# Patient Record
Sex: FEMALE | Race: WHITE | NOT HISPANIC OR LATINO
[De-identification: names, ages, dates, MRNs, and addresses within clinical notes are randomized per-mention and may not be internally consistent; named-entity substitution may affect disease eponyms.]

---

## 2019-05-15 ENCOUNTER — APPOINTMENT (OUTPATIENT)
Dept: SURGICAL ONCOLOGY | Facility: CLINIC | Age: 84
End: 2019-05-15
Payer: COMMERCIAL

## 2019-05-15 VITALS
TEMPERATURE: 98 F | DIASTOLIC BLOOD PRESSURE: 80 MMHG | HEIGHT: 67 IN | HEART RATE: 83 BPM | OXYGEN SATURATION: 96 % | WEIGHT: 146.5 LBS | BODY MASS INDEX: 22.99 KG/M2 | SYSTOLIC BLOOD PRESSURE: 163 MMHG

## 2019-05-15 DIAGNOSIS — Z78.9 OTHER SPECIFIED HEALTH STATUS: ICD-10-CM

## 2019-05-15 DIAGNOSIS — Z86.79 PERSONAL HISTORY OF OTHER DISEASES OF THE CIRCULATORY SYSTEM: ICD-10-CM

## 2019-05-15 DIAGNOSIS — Z86.69 PERSONAL HISTORY OF OTHER DISEASES OF THE NERVOUS SYSTEM AND SENSE ORGANS: ICD-10-CM

## 2019-05-15 PROCEDURE — 99245 OFF/OP CONSLTJ NEW/EST HI 55: CPT

## 2019-05-15 NOTE — OB HISTORY
[Postmenopausal] : The patient is postmenopausal [Menarche Age ____] : menarche age [unfilled] [Menopause Age____] : age at menopause was [unfilled]

## 2019-05-15 NOTE — HISTORY OF PRESENT ILLNESS
[de-identified] : 84 y/o female presents for initial consultation for left heel melanoma. \par \par She is s/p punch biopsy of two sites on her left heel on 5/1/19 performed by Navneet Branch PA-C.\par Pathology:\par 1. 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 pT4B\par 2. Left heel inferior: malignant melanoma, 4.2 mm thick, with ulcerations. pT4B \par \par She reports she has had the lesion for many decades and it started to bleed a few months ago, which prompted her to visit the dermatologist. \par Pt has a pacemaker and is currently on Warfarin for Afib as per Dr. Ronald Shepard of cardiology.

## 2019-05-15 NOTE — CONSULT LETTER
[Please see my note below.] : Please see my note below. [Consult Letter:] : I had the pleasure of evaluating your patient, [unfilled]. [Dear  ___] : Dear  [unfilled], [Sincerely,] : Sincerely, [DrElizabeth  ___] : Dr. COLUNGA [FreeTextEntry3] : Perez Russo MD FACS

## 2019-05-15 NOTE — ASSESSMENT
[FreeTextEntry1] : Left heel melanoma at least 4.2 mm thick with ulcerations and mitoses\par I had a long discussion with the patient and her son regarding her diagnosis, prognosis, and all management options. \par We will proceed with wide excision with 2 cm margins and left inguinal lymph node biopsy with plastic surgery reconstruction. \par Procedure discussed in detail.\par All questions answered. \par Will refer patient to Dr. Azam Rodas of plastic surgery. \par Will get cardiology clearance \par Hold coumadin 3 days pre op if ok w cardiology

## 2019-05-15 NOTE — PHYSICAL EXAM
[Normal] : supple, no neck mass and thyroid not enlarged [Normal Supraclavicular Lymph Nodes] : normal supraclavicular lymph nodes [Normal Neck Lymph Nodes] : normal neck lymph nodes  [Normal Axillary Lymph Nodes] : normal axillary lymph nodes [Normal Groin Lymph Nodes] : normal groin lymph nodes [Normal] : oriented to person, place and time, with appropriate affect [de-identified] : 3 x 2 cm pigmented lesion left heel, no in transit mets

## 2019-05-15 NOTE — ADDENDUM
[FreeTextEntry1] : I, Chadwick William, acted solely as a scribe for Dr. Perez Russo on this date 05/15/2019.

## 2019-05-21 ENCOUNTER — OUTPATIENT (OUTPATIENT)
Dept: OUTPATIENT SERVICES | Facility: HOSPITAL | Age: 84
LOS: 1 days | End: 2019-05-21
Payer: COMMERCIAL

## 2019-05-21 ENCOUNTER — RESULT REVIEW (OUTPATIENT)
Age: 84
End: 2019-05-21

## 2019-05-21 VITALS
HEIGHT: 64.4 IN | DIASTOLIC BLOOD PRESSURE: 83 MMHG | SYSTOLIC BLOOD PRESSURE: 160 MMHG | WEIGHT: 146.39 LBS | TEMPERATURE: 98 F | HEART RATE: 77 BPM | RESPIRATION RATE: 16 BRPM

## 2019-05-21 DIAGNOSIS — Z90.89 ACQUIRED ABSENCE OF OTHER ORGANS: Chronic | ICD-10-CM

## 2019-05-21 DIAGNOSIS — I48.91 UNSPECIFIED ATRIAL FIBRILLATION: ICD-10-CM

## 2019-05-21 DIAGNOSIS — C43.9 MALIGNANT MELANOMA OF SKIN, UNSPECIFIED: ICD-10-CM

## 2019-05-21 DIAGNOSIS — Z01.818 ENCOUNTER FOR OTHER PREPROCEDURAL EXAMINATION: ICD-10-CM

## 2019-05-21 DIAGNOSIS — C43.72 MALIGNANT MELANOMA OF LEFT LOWER LIMB, INCLUDING HIP: ICD-10-CM

## 2019-05-21 DIAGNOSIS — Z95.0 PRESENCE OF CARDIAC PACEMAKER: ICD-10-CM

## 2019-05-21 LAB
ANION GAP SERPL CALC-SCNC: 6 MMOL/L — SIGNIFICANT CHANGE UP (ref 5–17)
APTT BLD: 44.7 SEC — HIGH (ref 27.5–36.3)
BUN SERPL-MCNC: 22 MG/DL — SIGNIFICANT CHANGE UP (ref 7–23)
CALCIUM SERPL-MCNC: 10.1 MG/DL — SIGNIFICANT CHANGE UP (ref 8.4–10.5)
CHLORIDE SERPL-SCNC: 105 MMOL/L — SIGNIFICANT CHANGE UP (ref 96–108)
CO2 SERPL-SCNC: 31 MMOL/L — SIGNIFICANT CHANGE UP (ref 22–31)
CREAT SERPL-MCNC: 0.71 MG/DL — SIGNIFICANT CHANGE UP (ref 0.5–1.3)
GLUCOSE SERPL-MCNC: 124 MG/DL — HIGH (ref 70–99)
HCT VFR BLD CALC: 44.7 % — SIGNIFICANT CHANGE UP (ref 34.5–45)
HGB BLD-MCNC: 14.2 G/DL — SIGNIFICANT CHANGE UP (ref 11.5–15.5)
INR BLD: 2.36 RATIO — HIGH (ref 0.88–1.16)
MCHC RBC-ENTMCNC: 30 PG — SIGNIFICANT CHANGE UP (ref 27–34)
MCHC RBC-ENTMCNC: 31.8 GM/DL — LOW (ref 32–36)
MCV RBC AUTO: 94.5 FL — SIGNIFICANT CHANGE UP (ref 80–100)
PLATELET # BLD AUTO: 127 K/UL — LOW (ref 150–400)
POTASSIUM SERPL-MCNC: 4.8 MMOL/L — SIGNIFICANT CHANGE UP (ref 3.5–5.3)
POTASSIUM SERPL-SCNC: 4.8 MMOL/L — SIGNIFICANT CHANGE UP (ref 3.5–5.3)
PROTHROM AB SERPL-ACNC: 27.2 SEC — HIGH (ref 10–12.9)
RBC # BLD: 4.73 M/UL — SIGNIFICANT CHANGE UP (ref 3.8–5.2)
RBC # FLD: 14.1 % — SIGNIFICANT CHANGE UP (ref 10.3–14.5)
SODIUM SERPL-SCNC: 142 MMOL/L — SIGNIFICANT CHANGE UP (ref 135–145)
WBC # BLD: 6.14 K/UL — SIGNIFICANT CHANGE UP (ref 3.8–10.5)
WBC # FLD AUTO: 6.14 K/UL — SIGNIFICANT CHANGE UP (ref 3.8–10.5)

## 2019-05-21 PROCEDURE — 85610 PROTHROMBIN TIME: CPT

## 2019-05-21 PROCEDURE — 85027 COMPLETE CBC AUTOMATED: CPT

## 2019-05-21 PROCEDURE — 93005 ELECTROCARDIOGRAM TRACING: CPT

## 2019-05-21 PROCEDURE — 85730 THROMBOPLASTIN TIME PARTIAL: CPT

## 2019-05-21 PROCEDURE — 80048 BASIC METABOLIC PNL TOTAL CA: CPT

## 2019-05-21 PROCEDURE — 93010 ELECTROCARDIOGRAM REPORT: CPT | Mod: NC

## 2019-05-21 PROCEDURE — G0463: CPT

## 2019-05-21 PROCEDURE — 88321 CONSLTJ&REPRT SLD PREP ELSWR: CPT

## 2019-05-21 PROCEDURE — 36415 COLL VENOUS BLD VENIPUNCTURE: CPT

## 2019-05-21 RX ORDER — SODIUM CHLORIDE 9 MG/ML
1000 INJECTION, SOLUTION INTRAVENOUS
Refills: 0 | Status: DISCONTINUED | OUTPATIENT
Start: 2019-05-28 | End: 2019-05-28

## 2019-05-21 RX ORDER — WARFARIN SODIUM 2.5 MG/1
1 TABLET ORAL
Qty: 0 | Refills: 0 | DISCHARGE

## 2019-05-21 NOTE — H&P PST ADULT - NSICDXPASTMEDICALHX_GEN_ALL_CORE_FT
PAST MEDICAL HISTORY:  Atrial fibrillation     HLD (hyperlipidemia)     HTN (hypertension)     Pacemaker

## 2019-05-21 NOTE — H&P PST ADULT - ASSESSMENT
84 y/o female with pmhx HTN, HLD, afib ( on coumadin ), PPM presents to PST for scheduled wide excision left heel melanoma left inguinal sentinel node biopsy gamma probe injection in nuclear med notified on 5/28/19. Patient reports change to mole on left heel seen by dermatologist and removal required.

## 2019-05-21 NOTE — H&P PST ADULT - MUSCULOSKELETAL
details… detailed exam no joint warmth/no joint erythema/no joint swelling/ROM intact/normal strength

## 2019-05-21 NOTE — H&P PST ADULT - HISTORY OF PRESENT ILLNESS
82 y/o female with pmhx HTN, HLD, afib (on coumadin ), PPM presents to PST for scheduled wide excision left heel melanoma left inguinal sentinel node biopsy gamma probe injection in nuclear med notified on 5/28/19. Patient reports change to mole associated with occasional on left heel seen by dermatologist and removal required.

## 2019-05-21 NOTE — H&P PST ADULT - RS GEN PE MLT RESP DETAILS PC
respirations non-labored/airway patent/clear to auscultation bilaterally/good air movement/breath sounds equal

## 2019-05-21 NOTE — H&P PST ADULT - NSICDXPROBLEM_GEN_ALL_CORE_FT
PROBLEM DIAGNOSES  Problem: Skin melanoma  Assessment and Plan:   Patient scheduled wide excision left heel melanoma left inguinal sentinel node biopsy gamma probe injection in nuclear med notified on 5/28/19.   Pre op instructions given and explained.  Avoid NSAIDs and OTC supplements.   Patient verbalized understanding      Problem: Atrial fibrillation  Assessment and Plan: cardiac consult scheduled 5/22/19  coumadin as per cardiology.  Cardiac meds am of surgery with sip of water   Patient verbalized understanding.     Problem: Pacemaker  Assessment and Plan: or notified   [employer] faxed to to

## 2019-05-21 NOTE — H&P PST ADULT - NSANTHOSAYNRD_GEN_A_CORE
No. JAIME screening performed.  STOP BANG Legend: 0-2 = LOW Risk; 3-4 = INTERMEDIATE Risk; 5-8 = HIGH Risk

## 2019-05-22 LAB — NRBC # BLD: 0 /100 WBCS — SIGNIFICANT CHANGE UP (ref 0–0)

## 2019-05-23 LAB — SURGICAL PATHOLOGY STUDY: SIGNIFICANT CHANGE UP

## 2019-05-23 NOTE — H&P PST ADULT - MALLAMPATI CLASS
How Many Skin Cancers Have You Had?: more than one What Is The Reason For Today's Visit?: History of Non-Melanoma Skin Cancer Class II - visualization of the soft palate, fauces, and uvula

## 2019-05-27 ENCOUNTER — TRANSCRIPTION ENCOUNTER (OUTPATIENT)
Age: 84
End: 2019-05-27

## 2019-05-27 ENCOUNTER — FORM ENCOUNTER (OUTPATIENT)
Age: 84
End: 2019-05-27

## 2019-05-28 ENCOUNTER — APPOINTMENT (OUTPATIENT)
Dept: SURGICAL ONCOLOGY | Facility: HOSPITAL | Age: 84
End: 2019-05-28

## 2019-05-28 ENCOUNTER — RESULT REVIEW (OUTPATIENT)
Age: 84
End: 2019-05-28

## 2019-05-28 ENCOUNTER — OUTPATIENT (OUTPATIENT)
Dept: OUTPATIENT SERVICES | Facility: HOSPITAL | Age: 84
LOS: 1 days | End: 2019-05-28
Payer: COMMERCIAL

## 2019-05-28 VITALS
HEIGHT: 64.4 IN | TEMPERATURE: 98 F | SYSTOLIC BLOOD PRESSURE: 150 MMHG | DIASTOLIC BLOOD PRESSURE: 90 MMHG | RESPIRATION RATE: 18 BRPM | OXYGEN SATURATION: 99 % | HEART RATE: 85 BPM | WEIGHT: 146.39 LBS

## 2019-05-28 VITALS
DIASTOLIC BLOOD PRESSURE: 75 MMHG | HEART RATE: 62 BPM | SYSTOLIC BLOOD PRESSURE: 132 MMHG | OXYGEN SATURATION: 98 % | TEMPERATURE: 98 F | RESPIRATION RATE: 16 BRPM

## 2019-05-28 DIAGNOSIS — Z98.49 CATARACT EXTRACTION STATUS, UNSPECIFIED EYE: Chronic | ICD-10-CM

## 2019-05-28 DIAGNOSIS — C43.72 MALIGNANT MELANOMA OF LEFT LOWER LIMB, INCLUDING HIP: ICD-10-CM

## 2019-05-28 DIAGNOSIS — Z90.89 ACQUIRED ABSENCE OF OTHER ORGANS: Chronic | ICD-10-CM

## 2019-05-28 LAB
INR BLD: 1.44 RATIO — HIGH (ref 0.88–1.16)
PROTHROM AB SERPL-ACNC: 16.3 SEC — HIGH (ref 10–12.9)

## 2019-05-28 PROCEDURE — 38900 IO MAP OF SENT LYMPH NODE: CPT | Mod: AS

## 2019-05-28 PROCEDURE — 38792 RA TRACER ID OF SENTINL NODE: CPT | Mod: 59

## 2019-05-28 PROCEDURE — 88305 TISSUE EXAM BY PATHOLOGIST: CPT | Mod: 26

## 2019-05-28 PROCEDURE — 78195 LYMPH SYSTEM IMAGING: CPT | Mod: 26

## 2019-05-28 PROCEDURE — 38531 OPEN BX/EXC INGUINOFEM NODES: CPT

## 2019-05-28 PROCEDURE — 88342 IMHCHEM/IMCYTCHM 1ST ANTB: CPT | Mod: 26

## 2019-05-28 PROCEDURE — 88307 TISSUE EXAM BY PATHOLOGIST: CPT

## 2019-05-28 PROCEDURE — 38308 INCISION OF LYMPH CHANNELS: CPT | Mod: AS

## 2019-05-28 PROCEDURE — 88305 TISSUE EXAM BY PATHOLOGIST: CPT

## 2019-05-28 PROCEDURE — 38792 RA TRACER ID OF SENTINL NODE: CPT

## 2019-05-28 PROCEDURE — 85610 PROTHROMBIN TIME: CPT

## 2019-05-28 PROCEDURE — 88341 IMHCHEM/IMCYTCHM EA ADD ANTB: CPT

## 2019-05-28 PROCEDURE — 38308 INCISION OF LYMPH CHANNELS: CPT

## 2019-05-28 PROCEDURE — 15100 SPLT AGRFT T/A/L 1ST 100SQCM: CPT

## 2019-05-28 PROCEDURE — 36415 COLL VENOUS BLD VENIPUNCTURE: CPT

## 2019-05-28 PROCEDURE — 38531 OPEN BX/EXC INGUINOFEM NODES: CPT | Mod: LT

## 2019-05-28 PROCEDURE — 12042 INTMD RPR N-HF/GENIT2.6-7.5: CPT

## 2019-05-28 PROCEDURE — 28047 RESECT FOOT/TOE TUMOR 3 CM/>: CPT

## 2019-05-28 PROCEDURE — 88341 IMHCHEM/IMCYTCHM EA ADD ANTB: CPT | Mod: 26

## 2019-05-28 PROCEDURE — 88307 TISSUE EXAM BY PATHOLOGIST: CPT | Mod: 26

## 2019-05-28 PROCEDURE — 38792 RA TRACER ID OF SENTINL NODE: CPT | Mod: AS

## 2019-05-28 PROCEDURE — 28047 RESECT FOOT/TOE TUMOR 3 CM/>: CPT | Mod: AS

## 2019-05-28 PROCEDURE — 38900 IO MAP OF SENT LYMPH NODE: CPT

## 2019-05-28 PROCEDURE — A9541: CPT

## 2019-05-28 PROCEDURE — 11626 EXC S/N/H/F/G MAL+MRG >4 CM: CPT

## 2019-05-28 PROCEDURE — 88342 IMHCHEM/IMCYTCHM 1ST ANTB: CPT

## 2019-05-28 PROCEDURE — 78195 LYMPH SYSTEM IMAGING: CPT

## 2019-05-28 RX ORDER — SODIUM CHLORIDE 9 MG/ML
1000 INJECTION, SOLUTION INTRAVENOUS
Refills: 0 | Status: DISCONTINUED | OUTPATIENT
Start: 2019-05-28 | End: 2019-05-28

## 2019-05-28 RX ORDER — MORPHINE SULFATE 50 MG/1
2 CAPSULE, EXTENDED RELEASE ORAL ONCE
Refills: 0 | Status: DISCONTINUED | OUTPATIENT
Start: 2019-05-28 | End: 2019-05-28

## 2019-05-28 RX ORDER — IBUPROFEN 200 MG
400 TABLET ORAL EVERY 6 HOURS
Refills: 0 | Status: DISCONTINUED | OUTPATIENT
Start: 2019-05-28 | End: 2019-06-12

## 2019-05-28 RX ORDER — HYDROMORPHONE HYDROCHLORIDE 2 MG/ML
0.2 INJECTION INTRAMUSCULAR; INTRAVENOUS; SUBCUTANEOUS
Refills: 0 | Status: DISCONTINUED | OUTPATIENT
Start: 2019-05-28 | End: 2019-05-28

## 2019-05-28 RX ORDER — OXYCODONE HYDROCHLORIDE 5 MG/1
5 TABLET ORAL ONCE
Refills: 0 | Status: DISCONTINUED | OUTPATIENT
Start: 2019-05-28 | End: 2019-05-28

## 2019-05-28 RX ORDER — ONDANSETRON 8 MG/1
4 TABLET, FILM COATED ORAL ONCE
Refills: 0 | Status: DISCONTINUED | OUTPATIENT
Start: 2019-05-28 | End: 2019-05-28

## 2019-05-28 RX ADMIN — SODIUM CHLORIDE 50 MILLILITER(S): 9 INJECTION, SOLUTION INTRAVENOUS at 11:34

## 2019-05-28 NOTE — BRIEF OPERATIVE NOTE - NSICDXBRIEFPROCEDURE_GEN_ALL_CORE_FT
PROCEDURES:  Biopsy of left sentinel inguinal lymph node 28-May-2019 15:08:59  Jodi Nash  Wide excision, melanoma, lower extremity, with split-thickness skin graft application if indicated 28-May-2019 15:08:40  Jodi Nash

## 2019-05-28 NOTE — ASU DISCHARGE PLAN (ADULT/PEDIATRIC) - PROVIDER TOKENS
PROVIDER:[TOKEN:[[id number]],FOLLOWUP:[1 week]],PROVIDER:[TOKEN:[[id number]],FOLLOWUP:[1 week]]

## 2019-05-28 NOTE — ASU DISCHARGE PLAN (ADULT/PEDIATRIC) - ASU DC SPECIAL INSTRUCTIONSFT
Keep Dressings dry   Ambulate very little on left foot . Elevate when resting.  Take Percocet for severe pain , otherwise just take tylenol  Follow up with Ariane Russo and [facility] as informed by them

## 2019-05-28 NOTE — ASU DISCHARGE PLAN (ADULT/PEDIATRIC) - CALL YOUR DOCTOR IF YOU HAVE ANY OF THE FOLLOWING:
Swelling that gets worse/Pain not relieved by Medications/Wound/Surgical Site with redness, or foul smelling discharge or pus/Numbness, tingling, color or temperature change to extremity/Bleeding that does not stop/Fever greater than (need to indicate Fahrenheit or Celsius)

## 2019-05-28 NOTE — ASU DISCHARGE PLAN (ADULT/PEDIATRIC) - CARE PROVIDER_API CALL
Perez Russo)  Surgery  [address] [place] [address]  Phone: [phone]  Fax: [phone]  Follow Up Time: 1 week    Azam Rodas)  Plastic Surgery; Surgery  [address] [place] [address], [address]  [place] [address]  Phone: [phone]  Fax: [phone]  Follow Up Time: 1 week

## 2019-05-28 NOTE — ASU DISCHARGE PLAN (ADULT/PEDIATRIC) - PROCEDURE
Wide excision Melanoma left foot, [place] node bx left groin, skin graft from right thigh Wide excision Melanoma left foot, [place] node bx left groin, skin graft from right thigh s/p Biopsy of left sentinel inguinal lymph node Wide excision, melanoma, lower extremity, with split-thickness skin graft application

## 2019-05-28 NOTE — BRIEF OPERATIVE NOTE - NSICDXBRIEFPOSTOP_GEN_ALL_CORE_FT
POST-OP DIAGNOSIS:  Malignant melanoma 28-May-2019 15:08:03  Jodi Nash  Melanoma 28-May-2019 15:07:53  Jodi Nash

## 2019-05-29 PROBLEM — Z95.0 PRESENCE OF CARDIAC PACEMAKER: Chronic | Status: ACTIVE | Noted: 2019-05-21

## 2019-05-29 PROBLEM — E78.5 HYPERLIPIDEMIA, UNSPECIFIED: Chronic | Status: ACTIVE | Noted: 2019-05-21

## 2019-05-29 PROBLEM — I48.91 UNSPECIFIED ATRIAL FIBRILLATION: Chronic | Status: ACTIVE | Noted: 2019-05-21

## 2019-05-29 PROBLEM — I10 ESSENTIAL (PRIMARY) HYPERTENSION: Chronic | Status: ACTIVE | Noted: 2019-05-21

## 2019-06-05 ENCOUNTER — APPOINTMENT (OUTPATIENT)
Dept: SURGICAL ONCOLOGY | Facility: CLINIC | Age: 84
End: 2019-06-05
Payer: COMMERCIAL

## 2019-06-05 VITALS
HEIGHT: 67 IN | SYSTOLIC BLOOD PRESSURE: 147 MMHG | RESPIRATION RATE: 18 BRPM | OXYGEN SATURATION: 84 % | HEART RATE: 101 BPM | WEIGHT: 143 LBS | DIASTOLIC BLOOD PRESSURE: 74 MMHG | TEMPERATURE: 98 F | BODY MASS INDEX: 22.44 KG/M2

## 2019-06-05 PROCEDURE — 99024 POSTOP FOLLOW-UP VISIT: CPT

## 2019-06-05 NOTE — REASON FOR VISIT
[Initial Consultation] : an initial consultation for [Post-Op] : a post-op for [Melanoma] : melanoma

## 2019-06-07 LAB — SURGICAL PATHOLOGY STUDY: SIGNIFICANT CHANGE UP

## 2019-06-12 ENCOUNTER — OUTPATIENT (OUTPATIENT)
Dept: OUTPATIENT SERVICES | Facility: HOSPITAL | Age: 84
LOS: 1 days | Discharge: HOME | End: 2019-06-12

## 2019-06-12 DIAGNOSIS — Z90.89 ACQUIRED ABSENCE OF OTHER ORGANS: Chronic | ICD-10-CM

## 2019-06-12 DIAGNOSIS — Z98.49 CATARACT EXTRACTION STATUS, UNSPECIFIED EYE: Chronic | ICD-10-CM

## 2019-06-12 DIAGNOSIS — C43.8 MALIGNANT MELANOMA OF OVERLAPPING SITES OF SKIN: ICD-10-CM

## 2019-06-14 ENCOUNTER — FORM ENCOUNTER (OUTPATIENT)
Age: 84
End: 2019-06-14

## 2019-06-15 ENCOUNTER — OUTPATIENT (OUTPATIENT)
Dept: OUTPATIENT SERVICES | Facility: HOSPITAL | Age: 84
LOS: 1 days | End: 2019-06-15
Payer: COMMERCIAL

## 2019-06-15 ENCOUNTER — APPOINTMENT (OUTPATIENT)
Dept: NUCLEAR MEDICINE | Facility: CLINIC | Age: 84
End: 2019-06-15
Payer: COMMERCIAL

## 2019-06-15 DIAGNOSIS — Z00.8 ENCOUNTER FOR OTHER GENERAL EXAMINATION: ICD-10-CM

## 2019-06-15 DIAGNOSIS — Z98.49 CATARACT EXTRACTION STATUS, UNSPECIFIED EYE: Chronic | ICD-10-CM

## 2019-06-15 DIAGNOSIS — Z90.89 ACQUIRED ABSENCE OF OTHER ORGANS: Chronic | ICD-10-CM

## 2019-06-15 PROCEDURE — 78816 PET IMAGE W/CT FULL BODY: CPT | Mod: 26,PI

## 2019-06-15 PROCEDURE — 78816 PET IMAGE W/CT FULL BODY: CPT

## 2019-06-15 PROCEDURE — A9552: CPT

## 2019-06-18 ENCOUNTER — RESULT REVIEW (OUTPATIENT)
Age: 84
End: 2019-06-18

## 2019-06-18 ENCOUNTER — APPOINTMENT (OUTPATIENT)
Dept: HEMATOLOGY ONCOLOGY | Facility: CLINIC | Age: 84
End: 2019-06-18
Payer: COMMERCIAL

## 2019-06-18 VITALS
HEART RATE: 76 BPM | HEIGHT: 65 IN | WEIGHT: 145 LBS | TEMPERATURE: 98.7 F | SYSTOLIC BLOOD PRESSURE: 150 MMHG | OXYGEN SATURATION: 98 % | RESPIRATION RATE: 16 BRPM | BODY MASS INDEX: 24.16 KG/M2 | DIASTOLIC BLOOD PRESSURE: 78 MMHG

## 2019-06-18 LAB
BASOPHILS # BLD AUTO: 0 K/UL — SIGNIFICANT CHANGE UP (ref 0–0.2)
BASOPHILS NFR BLD AUTO: 0.6 % — SIGNIFICANT CHANGE UP (ref 0–2)
EOSINOPHIL # BLD AUTO: 0 K/UL — SIGNIFICANT CHANGE UP (ref 0–0.5)
EOSINOPHIL NFR BLD AUTO: 0.4 % — SIGNIFICANT CHANGE UP (ref 0–6)
HCT VFR BLD CALC: 46 % — HIGH (ref 34.5–45)
HGB BLD-MCNC: 15.1 G/DL — SIGNIFICANT CHANGE UP (ref 11.5–15.5)
LYMPHOCYTES # BLD AUTO: 1 K/UL — SIGNIFICANT CHANGE UP (ref 1–3.3)
LYMPHOCYTES # BLD AUTO: 18.7 % — SIGNIFICANT CHANGE UP (ref 13–44)
MCHC RBC-ENTMCNC: 30.8 PG — SIGNIFICANT CHANGE UP (ref 27–34)
MCHC RBC-ENTMCNC: 32.8 G/DL — SIGNIFICANT CHANGE UP (ref 32–36)
MCV RBC AUTO: 93.9 FL — SIGNIFICANT CHANGE UP (ref 80–100)
MONOCYTES # BLD AUTO: 0.3 K/UL — SIGNIFICANT CHANGE UP (ref 0–0.9)
MONOCYTES NFR BLD AUTO: 6.2 % — SIGNIFICANT CHANGE UP (ref 2–14)
NEUTROPHILS # BLD AUTO: 3.9 K/UL — SIGNIFICANT CHANGE UP (ref 1.8–7.4)
NEUTROPHILS NFR BLD AUTO: 74.1 % — SIGNIFICANT CHANGE UP (ref 43–77)
PLATELET # BLD AUTO: 183 K/UL — SIGNIFICANT CHANGE UP (ref 150–400)
RBC # BLD: 4.89 M/UL — SIGNIFICANT CHANGE UP (ref 3.8–5.2)
RBC # FLD: 13 % — SIGNIFICANT CHANGE UP (ref 10.3–14.5)
WBC # BLD: 5.2 K/UL — SIGNIFICANT CHANGE UP (ref 3.8–10.5)
WBC # FLD AUTO: 5.2 K/UL — SIGNIFICANT CHANGE UP (ref 3.8–10.5)

## 2019-06-18 PROCEDURE — 99205 OFFICE O/P NEW HI 60 MIN: CPT

## 2019-06-18 NOTE — CONSULT LETTER
[Dear  ___] : Dear  [unfilled], [Consult Letter:] : I had the pleasure of evaluating your patient, [unfilled]. [Please see my note below.] : Please see my note below. [Sincerely,] : Sincerely, [DrElizabeth  ___] : Dr. COLUNGA [DrElizabeth ___] : Dr. COLUNGA

## 2019-06-19 LAB
ALBUMIN SERPL ELPH-MCNC: 4.7 G/DL
ALP BLD-CCNC: 58 U/L
ALT SERPL-CCNC: 20 U/L
ANION GAP SERPL CALC-SCNC: 12 MMOL/L
APTT BLD: 44.2 SEC
AST SERPL-CCNC: 22 U/L
BILIRUB SERPL-MCNC: 0.5 MG/DL
BUN SERPL-MCNC: 21 MG/DL
CALCIUM SERPL-MCNC: 10.1 MG/DL
CHLORIDE SERPL-SCNC: 99 MMOL/L
CO2 SERPL-SCNC: 29 MMOL/L
CREAT SERPL-MCNC: 0.62 MG/DL
CRP SERPL-MCNC: 0.18 MG/DL
GLUCOSE SERPL-MCNC: 109 MG/DL
HBV CORE IGG+IGM SER QL: NONREACTIVE
HBV SURFACE AB SER QL: NONREACTIVE
HBV SURFACE AG SER QL: NONREACTIVE
HCV AB SER QL: NONREACTIVE
HCV S/CO RATIO: 0.39 S/CO
INR PPP: 3.33 RATIO
LDH SERPL-CCNC: 235 U/L
POTASSIUM SERPL-SCNC: 4.7 MMOL/L
PROT SERPL-MCNC: 7.2 G/DL
PT BLD: 39.8 SEC
SODIUM SERPL-SCNC: 140 MMOL/L
TSH SERPL-ACNC: 1.07 UIU/ML

## 2019-06-19 NOTE — REVIEW OF SYSTEMS
[Negative] : Allergic/Immunologic [FreeTextEntry2] : has been using a walker to avoid weight bearing; and was told that she can start to do so now by the Plastics doctor. [FreeTextEntry3] : glasses; cataracts s/p repair [FreeTextEntry5] : no palpitations.

## 2019-06-19 NOTE — HISTORY OF PRESENT ILLNESS
[T: ___] : T[unfilled] [N: ___] : N[unfilled] [AJCC Stage: ____] : AJCC Stage: [unfilled] [M: ___] : M[unfilled] [de-identified] : Mrs Rucker is an 84 year old female with past medical history of HTN, PPM (last battery replacement 2013), AFib presenting to the office for an initial consultation of melanoma.\par \par She noticed a lesion located on her left heel which started to bleed a few months ago which prompted her to be seen by her Dermatologist.  On 5/1/2019 had punch biopsy performed.\par Pathology:\par 1. 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 pT4b\par 2. Left heel inferior: malignant melanoma, 4.2 mm thick, with ulcerations. pT4b\par \par Patient was seen by Dr Perez Russo on 5/15/2019 who recommended wide excision with 2 cm margins and left inguinal LN biopsy with reconstruction.\par \par On 5/28/2019 patient underwent left heel wide resection and sentinel lymph node biopsy by Dr Russo.\par Pathology: \par 1. Left inguinal lymph node biopsy: two out of the four lymph nodes positive for melanoma (2/4)\par 2. Skin and soft tissue, left heel foot: melanoma, 3.5 mm in thickness.  Margins negative: pT4b pN2a\par \par PET/CT performed on 6/15/2019: Postsurgical changes left heel with multiple surgical clips and high attenuation material. Residual FDG avidity along the surgical defect may represent postsurgical change/inflammation, residual disease, or combination of these entities. Postsurgical collection left inguinal region. No FDG avid locoregional or distant metastatic disease. \par \par She is healing well on the primary and graft harvest site. [de-identified] : Surgical Oncology: Perez Russo [phone]\par Cardiology: Ronald Shepard [phone]\par Plastics - Azam Rodas\par Dermatology - Juan Diego Olson\par \par Son - Philip. Cell # [phone]\par Patient - Cell [phone]\par 3 other children; 2 relatively local and one in [place].

## 2019-06-19 NOTE — PHYSICAL EXAM
[Ambulatory and capable of all self care but unable to carry out any work activities] : Status 2- Ambulatory and capable of all self care but unable to carry out any work activities. Up and about more than 50% of waking hours [Normal] : grossly intact [de-identified] : She has not been weightbearing on the left foot and has difficulty standing on the left leg at this time. She needs assistance to stand.  [de-identified] : RIght thigh - harvest site is healing well. Left groin - seroma is soft and nontender, and incision is well healed. Left heel is wrapped and clean and dry.

## 2019-06-20 LAB — ANA SER IF-ACNC: NEGATIVE

## 2019-06-22 NOTE — ASSESSMENT
[FreeTextEntry1] : I had a long discussion with the patient and her son regarding preliminary path report which revealed negative margins on the wide excision of the left heel melanoma and at least two positive sentinel lymph nodes. \par Will order PET/CT scan to evaluate extent of residual disease and to rule out metastatic disease\par Will refer to Dr. Nile Mcbride of med onc since she will need adjuvant systemic therapy given positive nodes\par Await final pathology \par Discussed left radical groin dissection vs systemic immunotherapy pending additional workup and pending Dr. Mcbride's consultation\par \par enclosed pathology report

## 2019-06-22 NOTE — HISTORY OF PRESENT ILLNESS
[de-identified] : 85 y/o female s/p left superior heel melanoma excision with left inguinal sentinel node biopsy on 5/28/19.\par Preliminary pathology revealed negative margins with at least two positive inguinal lymph nodes.\par \par She is s/p punch biopsy of two sites on her left heel on 5/1/19 performed by Navneet Branch PA-C.\par Pathology:\par 1. 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 pT4B\par 2. Left heel inferior: malignant melanoma, 4.2 mm thick, with ulcerations. pT4B \par \par She reports she has had the lesion for many decades and it started to bleed a few months ago, which prompted her to visit the dermatologist. \par Pt has a pacemaker and is currently on Warfarin for Afib as per Dr. Ronald Shepard of cardiology.

## 2019-06-22 NOTE — ADDENDUM
[FreeTextEntry1] : I, Chadwick William, acted solely as a scribe for Dr. Perez Russo on this date 06/05/2019.

## 2019-06-22 NOTE — PHYSICAL EXAM
Fax # [phone] Dr Lisa [facility] # [phone]   Sathya Tena  Patient physician requesting cardiac clearance and holding blood thinners prior to Left ESWL under general anesthesia please advise last visit 3-1-18 thanks.
Faxed as requested.
[de-identified] : Left leg dressing intact with mild serous drainage on heel. Left groin incision healing without evidence of seroma.

## 2019-06-22 NOTE — CONSULT LETTER
[Dear  ___] : Dear  [unfilled], [Please see my note below.] : Please see my note below. [Sincerely,] : Sincerely, [DrElizabeth  ___] : Dr. COLUNGA [Courtesy Letter:] : I had the pleasure of seeing your patient, [unfilled], in my office today. [FreeTextEntry3] : Perez Russo MD FACS

## 2019-06-24 ENCOUNTER — EMERGENCY (EMERGENCY)
Facility: HOSPITAL | Age: 84
LOS: 0 days | Discharge: HOME | End: 2019-06-24
Attending: EMERGENCY MEDICINE | Admitting: EMERGENCY MEDICINE
Payer: COMMERCIAL

## 2019-06-24 VITALS
TEMPERATURE: 98 F | DIASTOLIC BLOOD PRESSURE: 81 MMHG | RESPIRATION RATE: 16 BRPM | HEART RATE: 80 BPM | SYSTOLIC BLOOD PRESSURE: 140 MMHG | OXYGEN SATURATION: 100 %

## 2019-06-24 VITALS
DIASTOLIC BLOOD PRESSURE: 79 MMHG | HEART RATE: 67 BPM | TEMPERATURE: 98 F | RESPIRATION RATE: 17 BRPM | SYSTOLIC BLOOD PRESSURE: 122 MMHG | OXYGEN SATURATION: 99 %

## 2019-06-24 DIAGNOSIS — Z90.89 ACQUIRED ABSENCE OF OTHER ORGANS: Chronic | ICD-10-CM

## 2019-06-24 DIAGNOSIS — I48.91 UNSPECIFIED ATRIAL FIBRILLATION: ICD-10-CM

## 2019-06-24 DIAGNOSIS — M79.89 OTHER SPECIFIED SOFT TISSUE DISORDERS: ICD-10-CM

## 2019-06-24 DIAGNOSIS — Z98.49 CATARACT EXTRACTION STATUS, UNSPECIFIED EYE: Chronic | ICD-10-CM

## 2019-06-24 DIAGNOSIS — L76.21 POSTPROCEDURAL HEMORRHAGE OF SKIN AND SUBCUTANEOUS TISSUE FOLLOWING A DERMATOLOGIC PROCEDURE: ICD-10-CM
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
## 2019-06-24 DIAGNOSIS — Z88.0 ALLERGY STATUS TO PENICILLIN: ICD-10-CM

## 2019-06-24 DIAGNOSIS — Z98.890 OTHER SPECIFIED POSTPROCEDURAL STATES: Chronic | ICD-10-CM

## 2019-06-24 DIAGNOSIS — I10 ESSENTIAL (PRIMARY) HYPERTENSION: ICD-10-CM

## 2019-06-24 DIAGNOSIS — C76.52 MALIGNANT NEOPLASM OF LEFT LOWER LIMB: ICD-10-CM

## 2019-06-24 DIAGNOSIS — Z95.0 PRESENCE OF CARDIAC PACEMAKER: ICD-10-CM

## 2019-06-24 DIAGNOSIS — E78.5 HYPERLIPIDEMIA, UNSPECIFIED: ICD-10-CM

## 2019-06-24 LAB
ALBUMIN SERPL ELPH-MCNC: 2.9 G/DL — LOW (ref 3.3–5)
ALP SERPL-CCNC: 60 U/L — SIGNIFICANT CHANGE UP (ref 40–120)
ALT FLD-CCNC: 18 U/L — SIGNIFICANT CHANGE UP (ref 12–78)
ANION GAP SERPL CALC-SCNC: 6 MMOL/L — SIGNIFICANT CHANGE UP (ref 5–17)
APTT BLD: 37 SEC — HIGH (ref 27.5–36.3)
AST SERPL-CCNC: 12 U/L — LOW (ref 15–37)
BASOPHILS # BLD AUTO: 0.05 K/UL — SIGNIFICANT CHANGE UP (ref 0–0.2)
BASOPHILS NFR BLD AUTO: 0.7 % — SIGNIFICANT CHANGE UP (ref 0–2)
BILIRUB SERPL-MCNC: 0.8 MG/DL — SIGNIFICANT CHANGE UP (ref 0.2–1.2)
BUN SERPL-MCNC: 23 MG/DL — SIGNIFICANT CHANGE UP (ref 7–23)
CALCIUM SERPL-MCNC: 9.3 MG/DL — SIGNIFICANT CHANGE UP (ref 8.5–10.1)
CHLORIDE SERPL-SCNC: 102 MMOL/L — SIGNIFICANT CHANGE UP (ref 96–108)
CO2 SERPL-SCNC: 31 MMOL/L — SIGNIFICANT CHANGE UP (ref 22–31)
CREAT SERPL-MCNC: 0.61 MG/DL — SIGNIFICANT CHANGE UP (ref 0.5–1.3)
EOSINOPHIL # BLD AUTO: 0.04 K/UL — SIGNIFICANT CHANGE UP (ref 0–0.5)
EOSINOPHIL NFR BLD AUTO: 0.5 % — SIGNIFICANT CHANGE UP (ref 0–6)
GLUCOSE SERPL-MCNC: 111 MG/DL — HIGH (ref 70–99)
HCT VFR BLD CALC: 38.9 % — SIGNIFICANT CHANGE UP (ref 34.5–45)
HGB BLD-MCNC: 12.8 G/DL — SIGNIFICANT CHANGE UP (ref 11.5–15.5)
IMM GRANULOCYTES NFR BLD AUTO: 0.3 % — SIGNIFICANT CHANGE UP (ref 0–1.5)
INR BLD: 2.39 RATIO — HIGH (ref 0.88–1.16)
LYMPHOCYTES # BLD AUTO: 0.92 K/UL — LOW (ref 1–3.3)
LYMPHOCYTES # BLD AUTO: 12.4 % — LOW (ref 13–44)
MCHC RBC-ENTMCNC: 30.5 PG — SIGNIFICANT CHANGE UP (ref 27–34)
MCHC RBC-ENTMCNC: 32.9 GM/DL — SIGNIFICANT CHANGE UP (ref 32–36)
MCV RBC AUTO: 92.8 FL — SIGNIFICANT CHANGE UP (ref 80–100)
MONOCYTES # BLD AUTO: 0.68 K/UL — SIGNIFICANT CHANGE UP (ref 0–0.9)
MONOCYTES NFR BLD AUTO: 9.2 % — SIGNIFICANT CHANGE UP (ref 2–14)
NEUTROPHILS # BLD AUTO: 5.7 K/UL — SIGNIFICANT CHANGE UP (ref 1.8–7.4)
NEUTROPHILS NFR BLD AUTO: 76.9 % — SIGNIFICANT CHANGE UP (ref 43–77)
PLATELET # BLD AUTO: 163 K/UL — SIGNIFICANT CHANGE UP (ref 150–400)
POTASSIUM SERPL-MCNC: 4.2 MMOL/L — SIGNIFICANT CHANGE UP (ref 3.5–5.3)
POTASSIUM SERPL-SCNC: 4.2 MMOL/L — SIGNIFICANT CHANGE UP (ref 3.5–5.3)
PROT SERPL-MCNC: 6.6 GM/DL — SIGNIFICANT CHANGE UP (ref 6–8.3)
PROTHROM AB SERPL-ACNC: 27.2 SEC — HIGH (ref 10–12.9)
RBC # BLD: 4.19 M/UL — SIGNIFICANT CHANGE UP (ref 3.8–5.2)
RBC # FLD: 13.5 % — SIGNIFICANT CHANGE UP (ref 10.3–14.5)
SODIUM SERPL-SCNC: 139 MMOL/L — SIGNIFICANT CHANGE UP (ref 135–145)
WBC # BLD: 7.41 K/UL — SIGNIFICANT CHANGE UP (ref 3.8–10.5)
WBC # FLD AUTO: 7.41 K/UL — SIGNIFICANT CHANGE UP (ref 3.8–10.5)

## 2019-06-24 PROCEDURE — 93971 EXTREMITY STUDY: CPT | Mod: 26,LT

## 2019-06-24 PROCEDURE — 99284 EMERGENCY DEPT VISIT MOD MDM: CPT | Mod: 25

## 2019-06-24 NOTE — ED ADULT NURSE REASSESSMENT NOTE - NS ED NURSE REASSESS COMMENT FT1
RN placed sterile gauze on wound upper left groin. Pt to see Plastic surgeon later today to for I/D.

## 2019-06-24 NOTE — ED PROVIDER NOTE - SKIN [+], MLM
bleeding from surgical wound left foot, erythema/induration left groin (site of lymph node excision)

## 2019-06-24 NOTE — ED ADULT NURSE NOTE - OBJECTIVE STATEMENT
85 y/o female awake alert and oriented x4 presents to ED c/o left foot pain and bleeding. pt had a foot surgery (mole removed and had skin graft) on 5/28/19. Had staples removed couple of days ago and noticed bleeding from the site this morning. Pt is scheduled to follow up with plastic today. Denies fall or injury.

## 2019-06-24 NOTE — ED PROVIDER NOTE - CLINICAL SUMMARY MEDICAL DECISION MAKING FREE TEXT BOX
(1) left foot wound - no active bleeding, no s/s infection. Will check INR, dressing applied  (2) left groin seroma s/p I&D -- on PO doxy, followed closely by Plastics ([facility]) and has appt later today.  Likely f/u in office for further management, ?repeat I&D  (3) left leg swelling -- most likely post surgical inflammation, particularly given lymph node excision. Will check duplex to exclude DVT

## 2019-06-24 NOTE — ED PROVIDER NOTE - NSFOLLOWUPINSTRUCTIONS_ED_ALL_ED_FT
Follow-up with Dr. Rodas now  Return with any recurrent bleeding, new fevers, persistent/worsening redness pain to left groin  continue current medications

## 2019-06-24 NOTE — ED PROVIDER NOTE - OBJECTIVE STATEMENT
83F hx HTN, hyperchol, AF, PPM s/p wide excision left heel melanoma 5/28 here today with bleeding 83F hx HTN, hyperchol, AF, PPM s/p wide excision left heel melanoma 5/28 here today with bleeding from site (now controlled).  Also with some mild left calf/leg swelling, and tender induration left groin (site of lymph node resection, recent I&D of seroma). No fever/chills.  No other complaints.

## 2019-06-24 NOTE — ED PROVIDER NOTE - CARE PROVIDER_API CALL
Azam Rodas)  Plastic Surgery; Surgery  [address] [facility], [address]  [place] [address]  Phone: [phone]  Fax: [phone]  Follow Up Time:

## 2019-06-24 NOTE — ED ADULT NURSE NOTE - NSIMPLEMENTINTERV_GEN_ALL_ED
Implemented All Fall with Harm Risk Interventions:  [place] to call system. Call bell, personal items and telephone within reach. Instruct patient to call for assistance. Room bathroom lighting operational. Non-slip footwear when patient is off stretcher. Physically safe environment: no spills, clutter or unnecessary equipment. Stretcher in lowest position, wheels locked, appropriate side rails in place. Provide visual cue, wrist band, yellow gown, etc. Monitor gait and stability. Monitor for mental status changes and reorient to person, place, and time. Review medications for side effects contributing to fall risk. Reinforce activity limits and safety measures with patient and family. Provide visual clues: red socks.

## 2019-06-24 NOTE — ED PROVIDER NOTE - PSH
H/O cataract extraction  both eyes  History of melanoma excision    S/P tonsillectomy and adenoidectomy

## 2019-06-26 ENCOUNTER — LABORATORY RESULT (OUTPATIENT)
Age: 84
End: 2019-06-26

## 2019-06-26 ENCOUNTER — APPOINTMENT (OUTPATIENT)
Dept: SURGICAL ONCOLOGY | Facility: CLINIC | Age: 84
End: 2019-06-26
Payer: COMMERCIAL

## 2019-06-26 VITALS
TEMPERATURE: 98.1 F | RESPIRATION RATE: 16 BRPM | WEIGHT: 145 LBS | HEIGHT: 65 IN | OXYGEN SATURATION: 97 % | DIASTOLIC BLOOD PRESSURE: 75 MMHG | HEART RATE: 94 BPM | SYSTOLIC BLOOD PRESSURE: 149 MMHG | BODY MASS INDEX: 24.16 KG/M2

## 2019-06-26 PROCEDURE — 99024 POSTOP FOLLOW-UP VISIT: CPT

## 2019-06-26 NOTE — PROCEDURE
[FreeTextEntry1] : I&D of left groin infected seroma performed. \par Penrose drain placed\par Cultures sent.\par Patient tolerated procedure well \par \par

## 2019-06-26 NOTE — CONSULT LETTER
[Dear  ___] : Dear  [unfilled], [Courtesy Letter:] : I had the pleasure of seeing your patient, [unfilled], in my office today. [Please see my note below.] : Please see my note below. [Sincerely,] : Sincerely, [DrElizabeth  ___] : Dr. COLUNGA [FreeTextEntry3] : Perez Russo MD FACS

## 2019-06-26 NOTE — HISTORY OF PRESENT ILLNESS
[de-identified] : 85 y/o female s/p left superior heel melanoma excision with left inguinal sentinel node biopsy on 5/28/19.\par Pathology:\par 1. Left inguinal sentinel lymph nodes, biopsy. Two out of four lymph nodes positive for melanoma (2/4). \par 2. Skin and soft tissue, left heel foot, wide resection. Melanoma 3.5mm in thickness. Margins negative (1mm from the closest deep margin). [id number]\par \par PET/CT 6/15/19: 1. Postsurgical changes left heel with multiple surgical clips and high attenuation material. Residual FDG avidity along the surgical defect may represent postsurgical change/inflammation, residual disease, or combination of these entities. \par 2. Postsurgical collection left inguinal region. No FDG avid locoregional or distant metastatic disease.\par \par She reports recent ED visit after seeing drainage from her incision site.\par She is currently on oral Doxycycline. \par She also c/o edema of her left lower extremity and erythema and edema at her left groin near her incision.\par She is scheduled to begin immunotherapy July 2019.\par \par She is s/p punch biopsy of two sites on her left heel on 5/1/19 performed by Navneet Branch PA-C.\par Pathology:\par 1. 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 pT4B\par 2. Left heel inferior: malignant melanoma, 4.2 mm thick, with ulcerations. pT4B \par \par She reports she has had the lesion for many decades and it started to bleed a few months prior to diagnosis, which prompted her to visit the dermatologist. \par Pt has a pacemaker and is currently on Warfarin for Afib as per Dr. Ronald Shepard of cardiology.

## 2019-06-26 NOTE — ADDENDUM
[FreeTextEntry1] : I, Chadwick William, acted solely as a scribe for Dr. Perez Russo on this date 06/26/2019.

## 2019-06-26 NOTE — PHYSICAL EXAM
[de-identified] : Left groin incision erythematous and indurated w fluctuant area proximal portion of incision as well as fluctuant area superior to inguinal crease.

## 2019-06-26 NOTE — ASSESSMENT
[FreeTextEntry1] : Left groin infected seroma\par S/p I&D\par Continue Penrose drain\par Continue Doxycycline \par RTO 1 week\par F/U with Dr. Rodas

## 2019-07-03 ENCOUNTER — APPOINTMENT (OUTPATIENT)
Dept: SURGICAL ONCOLOGY | Facility: CLINIC | Age: 84
End: 2019-07-03
Payer: COMMERCIAL

## 2019-07-03 VITALS
TEMPERATURE: 98.4 F | SYSTOLIC BLOOD PRESSURE: 154 MMHG | HEIGHT: 65 IN | OXYGEN SATURATION: 98 % | WEIGHT: 145 LBS | DIASTOLIC BLOOD PRESSURE: 73 MMHG | HEART RATE: 83 BPM | RESPIRATION RATE: 18 BRPM | BODY MASS INDEX: 24.16 KG/M2

## 2019-07-03 PROCEDURE — 99024 POSTOP FOLLOW-UP VISIT: CPT

## 2019-07-03 NOTE — HISTORY OF PRESENT ILLNESS
[de-identified] : 83 y/o female s/p left superior heel melanoma excision with left inguinal sentinel node biopsy on 5/28/19.\par Pathology:\par 1. Left inguinal sentinel lymph nodes, biopsy. Two out of four lymph nodes positive for melanoma (2/4). \par 2. Skin and soft tissue, left heel foot, wide resection. Melanoma 3.5mm in thickness. Margins negative (1mm from the closest deep margin). [id number]\par \par She is s/p I&D of left groin infected seroma (6/26/19).\par Culture: Rare Enterobacter cloaecae\par \par \par PET/CT 6/15/19: 1. Postsurgical changes left heel with multiple surgical clips and high attenuation material. Residual FDG avidity along the surgical defect may represent postsurgical change/inflammation, residual disease, or combination of these entities. \par 2. Postsurgical collection left inguinal region. No FDG avid locoregional or distant metastatic disease.\par \par She reports recent ED visit after seeing drainage from her incision site.\par She is currently on oral Doxycycline. \par She also c/o edema of her left lower extremity and erythema and edema at her left groin near her incision.\par She is scheduled to begin immunotherapy July 2019.\par \par She is s/p punch biopsy of two sites on her left heel on 5/1/19 performed by Navneet Branch PA-C.\par Pathology:\par 1. 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 pT4B\par 2. Left heel inferior: malignant melanoma, 4.2 mm thick, with ulcerations. pT4B \par \par She reports she has had the lesion for many decades and it started to bleed a few months prior to diagnosis, which prompted her to visit the dermatologist. \par Pt has a pacemaker and is currently on Warfarin for Afib as per Dr. Ronald Shepard of cardiology.

## 2019-07-03 NOTE — CONSULT LETTER
[Courtesy Letter:] : I had the pleasure of seeing your patient, [unfilled], in my office today. [Dear  ___] : Dear  [unfilled], [Please see my note below.] : Please see my note below. [Sincerely,] : Sincerely, [DrElizabeth  ___] : Dr. COLUNGA [FreeTextEntry3] : Perez Russo MD FACS

## 2019-07-03 NOTE — ASSESSMENT
[FreeTextEntry1] : Clinically improved\par Continue Penrose drain left groin\par Continue local wound care left heel as per Dr. Rodas  of plastic surgery\par Complete course of oral abx\par RTO when returns from vacation

## 2019-07-03 NOTE — PHYSICAL EXAM
[de-identified] : Significant improvement left groin infected wound. Penrose drain in place. Abscess cavity significantly smaller. Drain left in place. Sterile dressing applied.

## 2019-07-11 ENCOUNTER — OUTPATIENT (OUTPATIENT)
Dept: OUTPATIENT SERVICES | Facility: HOSPITAL | Age: 84
LOS: 1 days | Discharge: HOME | End: 2019-07-11

## 2019-07-11 DIAGNOSIS — Z90.89 ACQUIRED ABSENCE OF OTHER ORGANS: Chronic | ICD-10-CM

## 2019-07-11 DIAGNOSIS — C43.8 MALIGNANT MELANOMA OF OVERLAPPING SITES OF SKIN: ICD-10-CM

## 2019-07-11 DIAGNOSIS — Z98.49 CATARACT EXTRACTION STATUS, UNSPECIFIED EYE: Chronic | ICD-10-CM

## 2019-07-11 DIAGNOSIS — Z98.890 OTHER SPECIFIED POSTPROCEDURAL STATES: Chronic | ICD-10-CM

## 2019-07-11 RX ORDER — WARFARIN 4 MG/1
TABLET ORAL
Refills: 0 | Status: DISCONTINUED | COMMUNITY
End: 2019-07-05

## 2019-07-15 ENCOUNTER — LABORATORY RESULT (OUTPATIENT)
Age: 84
End: 2019-07-15

## 2019-07-15 ENCOUNTER — APPOINTMENT (OUTPATIENT)
Dept: INFUSION THERAPY | Facility: HOSPITAL | Age: 84
End: 2019-07-15

## 2019-07-16 DIAGNOSIS — Z51.11 ENCOUNTER FOR ANTINEOPLASTIC CHEMOTHERAPY: ICD-10-CM

## 2019-07-16 DIAGNOSIS — C43.9 MALIGNANT MELANOMA OF SKIN, UNSPECIFIED: ICD-10-CM

## 2019-07-31 ENCOUNTER — APPOINTMENT (OUTPATIENT)
Dept: SURGICAL ONCOLOGY | Facility: CLINIC | Age: 84
End: 2019-07-31
Payer: COMMERCIAL

## 2019-07-31 VITALS
DIASTOLIC BLOOD PRESSURE: 87 MMHG | RESPIRATION RATE: 18 BRPM | HEIGHT: 65 IN | BODY MASS INDEX: 23.32 KG/M2 | TEMPERATURE: 98.4 F | WEIGHT: 140 LBS | SYSTOLIC BLOOD PRESSURE: 153 MMHG | OXYGEN SATURATION: 98 % | HEART RATE: 83 BPM

## 2019-07-31 PROCEDURE — 99024 POSTOP FOLLOW-UP VISIT: CPT

## 2019-07-31 NOTE — ADDENDUM
[FreeTextEntry1] : I, Jeb Torre, acted solely as a scribe for Dr. Perez Russo on this date 07/31/2019.\par

## 2019-07-31 NOTE — ASSESSMENT
[FreeTextEntry1] : Clinically improved\par Continue Penrose drain left groin\par Continue local wound care left heel as per Dr. Rodas  of plastic surgery\par Complete course of oral abx\par RTO 1 month

## 2019-07-31 NOTE — HISTORY OF PRESENT ILLNESS
[de-identified] : 83 y/o female s/p left superior heel melanoma excision with left inguinal sentinel node biopsy on 5/28/19.\par Pathology:\par 1. Left inguinal sentinel lymph nodes, biopsy. Two out of four lymph nodes positive for melanoma (2/4). \par 2. Skin and soft tissue, left heel foot, wide resection. Melanoma 3.5mm in thickness. Margins negative (1mm from the closest deep margin). [id number]\par \par She is s/p I&D of left groin infected seroma (6/26/19).\par Culture: Rare Enterobacter cloaecae\par \par \par PET/CT 6/15/19: 1. Postsurgical changes left heel with multiple surgical clips and high attenuation material. Residual FDG avidity along the surgical defect may represent postsurgical change/inflammation, residual disease, or combination of these entities. \par 2. Postsurgical collection left inguinal region. No FDG avid locoregional or distant metastatic disease.\par \par She reports recent ED visit after seeing drainage from her incision site.\par She is currently on oral Doxycycline. \par She also c/o edema of her left lower extremity and erythema and edema at her left groin near her incision.\par She began immunotherapy July 2019.\par \par She is s/p punch biopsy of two sites on her left heel on 5/1/19 performed by Navneet Branch PA-C.\par Pathology:\par 1. 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 pT4B\par 2. Left heel inferior: malignant melanoma, 4.2 mm thick, with ulcerations. pT4B \par \par She reports she has had the lesion for many decades and it started to bleed a few months prior to diagnosis, which prompted her to visit the dermatologist. \par Pt has a pacemaker and is currently on Warfarin for Afib as per Dr. Ronald Shepard of cardiology. \par She is now taking Eliquis.

## 2019-08-01 ENCOUNTER — APPOINTMENT (OUTPATIENT)
Dept: HEMATOLOGY ONCOLOGY | Facility: CLINIC | Age: 84
End: 2019-08-01
Payer: COMMERCIAL

## 2019-08-01 VITALS
RESPIRATION RATE: 16 BRPM | DIASTOLIC BLOOD PRESSURE: 76 MMHG | SYSTOLIC BLOOD PRESSURE: 124 MMHG | HEART RATE: 97 BPM | TEMPERATURE: 98.3 F | OXYGEN SATURATION: 100 % | BODY MASS INDEX: 23.3 KG/M2 | WEIGHT: 139.99 LBS

## 2019-08-01 PROCEDURE — 99214 OFFICE O/P EST MOD 30 MIN: CPT

## 2019-08-01 RX ORDER — SULFAMETHOXAZOLE AND TRIMETHOPRIM 800; 160 MG/1; MG/1
800-160 TABLET ORAL
Qty: 14 | Refills: 0 | Status: COMPLETED | COMMUNITY
Start: 2019-06-28

## 2019-08-01 RX ORDER — DOXYCYCLINE HYCLATE 100 MG/1
100 CAPSULE ORAL
Qty: 14 | Refills: 0 | Status: COMPLETED | COMMUNITY
Start: 2019-06-26 | End: 2019-07-10

## 2019-08-01 RX ORDER — OXYCODONE AND ACETAMINOPHEN 5; 325 MG/1; MG/1
5-325 TABLET ORAL
Qty: 3 | Refills: 0 | Status: COMPLETED | COMMUNITY
Start: 2019-05-28

## 2019-08-01 RX ORDER — MUPIROCIN 20 MG/G
2 OINTMENT TOPICAL
Qty: 22 | Refills: 0 | Status: DISCONTINUED | COMMUNITY
Start: 2019-05-01

## 2019-08-01 RX ORDER — CLINDAMYCIN HYDROCHLORIDE 300 MG/1
300 CAPSULE ORAL
Qty: 15 | Refills: 0 | Status: COMPLETED | COMMUNITY
Start: 2019-05-28

## 2019-08-01 RX ORDER — METOPROLOL TARTRATE 100 MG/1
100 TABLET, FILM COATED ORAL
Qty: 180 | Refills: 0 | Status: COMPLETED | COMMUNITY
Start: 2019-02-09

## 2019-08-01 NOTE — REVIEW OF SYSTEMS
[Negative] : Allergic/Immunologic [FreeTextEntry3] : glasses; cataracts s/p repair [FreeTextEntry2] : she is weight bearing, and no pain. [FreeTextEntry5] : no palpitations.

## 2019-08-01 NOTE — HISTORY OF PRESENT ILLNESS
[T: ___] : T[unfilled] [N: ___] : N[unfilled] [M: ___] : M[unfilled] [AJCC Stage: ____] : AJCC Stage: [unfilled] [Disease: _____________________] : Disease: [unfilled] [de-identified] : Mrs Rucker is an 84 year old female with past medical history of HTN, PPM (last battery replacement 2013), AFib presenting to the office for an initial consultation of melanoma.\par \par She noticed a lesion located on her left heel which started to bleed a few months ago which prompted her to be seen by her Dermatologist.  On 5/1/2019 had punch biopsy performed.\par Pathology:\par 1. 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 pT4b\par 2. Left heel inferior: malignant melanoma, 4.2 mm thick, with ulcerations. pT4b\par \par Patient was seen by Dr Perez Russo on 5/15/2019 who recommended wide excision with 2 cm margins and left inguinal LN biopsy with reconstruction.\par \par On 5/28/2019 patient underwent left heel wide resection and sentinel lymph node biopsy by Dr Russo.\par Pathology: \par 1. Left inguinal lymph node biopsy: two out of the four lymph nodes positive for melanoma (2/4)\par 2. Skin and soft tissue, left heel foot: melanoma, 3.5 mm in thickness.  Margins negative: pT4b pN2a\par \par PET/CT performed on 6/15/2019: Postsurgical changes left heel with multiple surgical clips and high attenuation material. Residual FDG avidity along the surgical defect may represent postsurgical change/inflammation, residual disease, or combination of these entities. Postsurgical collection left inguinal region. No FDG avid locoregional or distant metastatic disease. \par \par She is healing well on the primary and graft harvest site.\par \par 8/1/2019: Patient is here for follow up.  Nivolumab C1 was given on 7/15/2019. She tolerated infusion well and reports no major adverse events from treatment.  She feels well today.  Denies fever, chills, arthralgia, diarrhea or cough.  Her left lower leg wound is healing appropriately.  She is able to ambulate and put weight on her left foot without any restrictions. Her main complaint is swelling in the left lower leg that is edema from the node dissection. She hopes to continue to work full time as an . She has asked about treatment continuation at a [facility] facility closer to home in [place].  [Date: ____________] : Patient's last distress assessment performed on [unfilled]. [de-identified] : Surgical Oncology: Perez Russo [phone]\par Cardiology: Ronald Shepard [phone]\par Plastics - Azam Rodas\par Dermatology - Juan Diego Olson\par \par Son - Philip. Cell # [phone]\par Hortencia - Cell # [phone]\par Patient - Cell [phone]\par 3 other children; 2 relatively local and one in [place]. [3 - Distress Level] : Distress Level: 3 [Patient given social work contact information and resource sheet] : Patient was given social work contact information and resource sheet

## 2019-08-01 NOTE — PHYSICAL EXAM
[Normal] : affect appropriate [Restricted in physically strenuous activity but ambulatory and able to carry out work of a light or sedentary nature] : Status 1- Restricted in physically strenuous activity but ambulatory and able to carry out work of a light or sedentary nature, e.g., light house work, office work [de-identified] : Left groin - seroma is decreasing, and incision is well healed. Left heel is wrapped and clean and dry. Left lower leg edema is 2+

## 2019-08-08 ENCOUNTER — OUTPATIENT (OUTPATIENT)
Dept: OUTPATIENT SERVICES | Facility: HOSPITAL | Age: 84
LOS: 1 days | Discharge: HOME | End: 2019-08-08

## 2019-08-08 DIAGNOSIS — Z90.89 ACQUIRED ABSENCE OF OTHER ORGANS: Chronic | ICD-10-CM

## 2019-08-08 DIAGNOSIS — C43.8 MALIGNANT MELANOMA OF OVERLAPPING SITES OF SKIN: ICD-10-CM

## 2019-08-08 DIAGNOSIS — Z98.890 OTHER SPECIFIED POSTPROCEDURAL STATES: Chronic | ICD-10-CM

## 2019-08-08 DIAGNOSIS — Z98.49 CATARACT EXTRACTION STATUS, UNSPECIFIED EYE: Chronic | ICD-10-CM

## 2019-08-12 ENCOUNTER — LABORATORY RESULT (OUTPATIENT)
Age: 84
End: 2019-08-12

## 2019-08-12 ENCOUNTER — APPOINTMENT (OUTPATIENT)
Dept: INFUSION THERAPY | Facility: HOSPITAL | Age: 84
End: 2019-08-12

## 2019-08-12 ENCOUNTER — RESULT REVIEW (OUTPATIENT)
Age: 84
End: 2019-08-12

## 2019-08-12 LAB
BASOPHILS # BLD AUTO: 0 K/UL — SIGNIFICANT CHANGE UP (ref 0–0.2)
BASOPHILS NFR BLD AUTO: 0.8 % — SIGNIFICANT CHANGE UP (ref 0–2)
EOSINOPHIL # BLD AUTO: 0.1 K/UL — SIGNIFICANT CHANGE UP (ref 0–0.5)
EOSINOPHIL NFR BLD AUTO: 1.5 % — SIGNIFICANT CHANGE UP (ref 0–6)
HCT VFR BLD CALC: 39.3 % — SIGNIFICANT CHANGE UP (ref 34.5–45)
HGB BLD-MCNC: 12.7 G/DL — SIGNIFICANT CHANGE UP (ref 11.5–15.5)
LYMPHOCYTES # BLD AUTO: 1.3 K/UL — SIGNIFICANT CHANGE UP (ref 1–3.3)
LYMPHOCYTES # BLD AUTO: 23.5 % — SIGNIFICANT CHANGE UP (ref 13–44)
MCHC RBC-ENTMCNC: 31.1 PG — SIGNIFICANT CHANGE UP (ref 27–34)
MCHC RBC-ENTMCNC: 32.2 G/DL — SIGNIFICANT CHANGE UP (ref 32–36)
MCV RBC AUTO: 96.5 FL — SIGNIFICANT CHANGE UP (ref 80–100)
MONOCYTES # BLD AUTO: 0.4 K/UL — SIGNIFICANT CHANGE UP (ref 0–0.9)
MONOCYTES NFR BLD AUTO: 7.7 % — SIGNIFICANT CHANGE UP (ref 2–14)
NEUTROPHILS # BLD AUTO: 3.6 K/UL — SIGNIFICANT CHANGE UP (ref 1.8–7.4)
NEUTROPHILS NFR BLD AUTO: 66.4 % — SIGNIFICANT CHANGE UP (ref 43–77)
PLATELET # BLD AUTO: 151 K/UL — SIGNIFICANT CHANGE UP (ref 150–400)
RBC # BLD: 4.08 M/UL — SIGNIFICANT CHANGE UP (ref 3.8–5.2)
RBC # FLD: 13.6 % — SIGNIFICANT CHANGE UP (ref 10.3–14.5)
WBC # BLD: 5.4 K/UL — SIGNIFICANT CHANGE UP (ref 3.8–10.5)
WBC # FLD AUTO: 5.4 K/UL — SIGNIFICANT CHANGE UP (ref 3.8–10.5)

## 2019-08-12 RX ORDER — WARFARIN SODIUM 2.5 MG/1
0 TABLET ORAL
Qty: 0 | Refills: 0 | DISCHARGE

## 2019-08-13 DIAGNOSIS — C43.9 MALIGNANT MELANOMA OF SKIN, UNSPECIFIED: ICD-10-CM

## 2019-08-13 DIAGNOSIS — Z51.11 ENCOUNTER FOR ANTINEOPLASTIC CHEMOTHERAPY: ICD-10-CM

## 2019-08-16 ENCOUNTER — APPOINTMENT (OUTPATIENT)
Age: 84
End: 2019-08-16
Payer: COMMERCIAL

## 2019-08-16 VITALS
DIASTOLIC BLOOD PRESSURE: 84 MMHG | BODY MASS INDEX: 23.49 KG/M2 | SYSTOLIC BLOOD PRESSURE: 149 MMHG | HEIGHT: 65 IN | HEART RATE: 90 BPM | TEMPERATURE: 97.7 F | WEIGHT: 141 LBS

## 2019-08-16 DIAGNOSIS — Z82.49 FAMILY HISTORY OF ISCHEMIC HEART DISEASE AND OTHER DISEASES OF THE CIRCULATORY SYSTEM: ICD-10-CM

## 2019-08-16 DIAGNOSIS — Z82.3 FAMILY HISTORY OF STROKE: ICD-10-CM

## 2019-08-16 DIAGNOSIS — Z63.4 DISAPPEARANCE AND DEATH OF FAMILY MEMBER: ICD-10-CM

## 2019-08-16 PROCEDURE — 99244 OFF/OP CNSLTJ NEW/EST MOD 40: CPT

## 2019-08-16 SDOH — SOCIAL STABILITY - SOCIAL INSECURITY: DISSAPEARANCE AND DEATH OF FAMILY MEMBER: Z63.4

## 2019-08-19 NOTE — HISTORY OF PRESENT ILLNESS
[de-identified] : 84 F with HTN, PPM, atrial fibrillation ( Dr. Elam- 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.\par \par CASE SYNOPSIS:\par May 2019- patient notices a bleeding left heel cutaneous lesion; \par \par 5/1/19-punch biopsy performed by a dermatologist ([facility]) ; pathology:\par - left heel superior: malignant melanoma, 4.2 mm thick with ulcerations, pT4b.\par -left heel inferior: malignant melanoma, 4.2 mm thick, with ulceration, pT4b.\par \par 5/28/19- left heel wide resection and sentinel lymph node biopsy (); pathology:\par -Left inguinal lymph node biopsy: 2/4 lymph nodes positive for melanoma\par -Skin and soft tissue, left heel foot, consistent with melanoma, 3.5 mm thickness, margins negative, pT4b, pN 2a= stage III C\par \par 6/15/19: PET- residual FDG avidity along the surgical defect consistent with postsurgical changes, or residual disease, or combination of the two. No FDG avid local regional or distant metastatic disease. \par \par 7/15/19- started Nivolumab under the care of Dr. Mcbride at [facility].\par \par 8/12/19- cycle # 2 Nivolumab also at [facility].\par  [FreeTextEntry1] : Nivolumab- flat dose monthly [de-identified] : Here to transition medical oncologic care to our facility, which is closer to her home in [place]. Patient recovered well postoperatively and is making progress with physical therapy. Her wound is healed, but she continues to have swelling in the left lower extremity (she is status post sentinel lymph node dissection). She is still  working as a  at a local school. Accompanied by son, Brent.

## 2019-08-19 NOTE — ASSESSMENT
[FreeTextEntry1] : Ms. HULL 's questions were answered to her satisfaction. She expressed her understanding and willingness to comply with the above recommendations, and  will return to the office to review the results of the blood tests in 4 weeks.\par

## 2019-08-19 NOTE — CONSULT LETTER
[Consult Letter:] : I had the pleasure of evaluating your patient, [unfilled]. [Please see my note below.] : Please see my note below. [Consult Closing:] : Thank you very much for allowing me to participate in the care of this patient.  If you have any questions, please do not hesitate to contact me. [Sincerely,] : Sincerely, [Dear  ___] : Dear ~LARA, [DrElizabeth ___] : Dr. COLUNGA [DrElizabeth  ___] : Dr. COLUNGA [FreeTextEntry2] : Nile Mcbride M.D. [FreeTextEntry3] : MARCY HIDALGO M.D.\par Medical Oncology\par [facility]\par [facility]\par [address] [place] [address], [facility] [address]\par [place] [address]\par Telephone: [phone]\par FAX: [phone]\par \par

## 2019-08-19 NOTE — PHYSICAL EXAM
[Restricted in physically strenuous activity but ambulatory and able to carry out work of a light or sedentary nature] : Status 1- Restricted in physically strenuous activity but ambulatory and able to carry out work of a light or sedentary nature, e.g., light house work, office work [Normal] : affect appropriate [de-identified] : Left lower extremity edema 2+ [de-identified] : left groin seroma decreasing in size.  [de-identified] : left heel wound measuring 5 cm in largest diameter, pink granulation tissue on surface; no bleeding.

## 2019-08-19 NOTE — REASON FOR VISIT
[Initial Consultation] : an initial consultation [Family Member] : family member [FreeTextEntry2] : left foot melanoma

## 2019-08-21 ENCOUNTER — APPOINTMENT (OUTPATIENT)
Dept: SURGICAL ONCOLOGY | Facility: CLINIC | Age: 84
End: 2019-08-21
Payer: COMMERCIAL

## 2019-08-21 VITALS
WEIGHT: 141 LBS | OXYGEN SATURATION: 98 % | HEART RATE: 96 BPM | RESPIRATION RATE: 18 BRPM | BODY MASS INDEX: 23.49 KG/M2 | HEIGHT: 65 IN | TEMPERATURE: 98 F | SYSTOLIC BLOOD PRESSURE: 158 MMHG | DIASTOLIC BLOOD PRESSURE: 90 MMHG

## 2019-08-21 PROCEDURE — 99215 OFFICE O/P EST HI 40 MIN: CPT | Mod: 24

## 2019-08-21 NOTE — CONSULT LETTER
[Dear  ___] : Dear  [unfilled], [Courtesy Letter:] : I had the pleasure of seeing your patient, [unfilled], in my office today. [Sincerely,] : Sincerely, [Please see my note below.] : Please see my note below. [DrElizabeth  ___] : Dr. COLUNGA [FreeTextEntry3] : Perez Russo MD FACS

## 2019-08-21 NOTE — ASSESSMENT
[FreeTextEntry1] : Stage III left heel melanoma\par On immunotherapy\par Wound essentially closed\par Continue wound care\par Will refer to Dr. Zhang of cancer rehab for lymph edema therapy\par RTO 3 months

## 2019-08-21 NOTE — HISTORY OF PRESENT ILLNESS
[de-identified] : 83 y/o female s/p left superior heel melanoma excision with left inguinal sentinel node biopsy on 5/28/19.\par Pathology:\par 1. Left inguinal sentinel lymph nodes, biopsy. Two out of four lymph nodes positive for melanoma (2/4). \par 2. Skin and soft tissue, left heel foot, wide resection. Melanoma 3.5mm in thickness. Margins negative (1mm from the closest deep margin). [id number]\par \par She is s/p I&D of left groin infected seroma (6/26/19).\par Culture: Rare Enterobacter cloaecae\par \par PET/CT 6/15/19: 1. Postsurgical changes left heel with multiple surgical clips and high attenuation material. Residual FDG avidity along the surgical defect may represent postsurgical change/inflammation, residual disease, or combination of these entities. \par 2. Postsurgical collection left inguinal region. No FDG avid locoregional or distant metastatic disease.\par \par She reports recent ED visit after seeing drainage from her incision site.\par She was on oral Doxycycline. \par She began immunotherapy July 2019. She is switching to a local doctor in [place] from Dr. Mcbride.\par She desires going to PT. She c/o lymphedema in her left LE.\par \par She is s/p punch biopsy of two sites on her left heel on 5/1/19 performed by Navneet Branch PA-C.\par Pathology:\par 1. 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 pT4B\par 2. Left heel inferior: malignant melanoma, 4.2 mm thick, with ulcerations. pT4B \par \par She reports she has had the lesion for many decades and it started to bleed a few months prior to diagnosis, which prompted her to visit the dermatologist. \par Pt has a pacemaker and is currently on Warfarin for Afib as per Dr. Ronald Shepard of cardiology. \par She is now taking Eliquis.

## 2019-08-21 NOTE — ADDENDUM
[FreeTextEntry1] : I, Jeb Torre, acted solely as a scribe for Dr. Perez Russo on this date 08/21/2019.\par

## 2019-08-27 ENCOUNTER — APPOINTMENT (OUTPATIENT)
Dept: PHYSICAL MEDICINE AND REHAB | Facility: CLINIC | Age: 84
End: 2019-08-27
Payer: COMMERCIAL

## 2019-08-27 VITALS
OXYGEN SATURATION: 100 % | DIASTOLIC BLOOD PRESSURE: 96 MMHG | HEART RATE: 84 BPM | SYSTOLIC BLOOD PRESSURE: 156 MMHG | TEMPERATURE: 97.8 F

## 2019-08-27 PROCEDURE — 99203 OFFICE O/P NEW LOW 30 MIN: CPT

## 2019-08-27 NOTE — PHYSICAL EXAM
[FreeTextEntry1] : General Appearance: Well developed, well nourished, in no acute distress.\par Skin: Inspection of the skin reveals no rashes or ulcerations.\par HEENT: The sclerae were anicteric and conjunctivae were pink and moist. \par Chest: Normal chest expansion.\par Abdomen: Soft and non-tender with normal bowel sounds.\par Musculoskeletal: There is tenderness over lumbar paraspinals. Decreased range of motion of the lumbar spine. No gross bony deformities. Straight leg raise test is negative. Normal range of motion bilateral hips. Albert/ZAIN test is negative.\par Extremities + edema LLE, + left heel wound, dressed \par Neurologic: Cranial nerves are intact The patient has normal muscle strength in bilateral upper and lower extremities.

## 2019-08-27 NOTE — ASSESSMENT
[FreeTextEntry1] : \par Duplex LLE to r/o DVT \par Pt will require LD therapy after left heel wound heals \par Patient would benefit from manual lymphatic drainage for upper extremity lymphedema as well as compression bandaging to progress to compression sleeve. Lymphedema education provided including proper skin care as well as to see me or PMD if notices any redness or increase in swelling. Patient will follow up with me in 6 weeks. \par

## 2019-08-27 NOTE — HISTORY OF PRESENT ILLNESS
[FreeTextEntry1] : 83 y/o female s/p left superior heel melanoma excision with left inguinal sentinel node biopsy on 5/28/19.\par Pathology: Two out of four lymph nodes positive for melanoma (2/4). \par 2. Skin and soft tissue, left heel foot, wide resection. Melanoma 3.5mm in thickness. Margins negative \par \par PET/CT 6/15/19: 1. Postsurgical changes left heel with multiple surgical clips and high attenuation material. Residual FDG avidity along the surgical defect may represent postsurgical change/inflammation, residual disease, or combination of these entities. \par \par She began immunotherapy July 2019.\par She still has a wound on her heel, sees plastics for wound care\par She has no pain, no difficulty with mobility.

## 2019-08-30 ENCOUNTER — FORM ENCOUNTER (OUTPATIENT)
Age: 84
End: 2019-08-30

## 2019-08-31 ENCOUNTER — APPOINTMENT (OUTPATIENT)
Dept: ULTRASOUND IMAGING | Facility: CLINIC | Age: 84
End: 2019-08-31
Payer: COMMERCIAL

## 2019-08-31 ENCOUNTER — OUTPATIENT (OUTPATIENT)
Dept: OUTPATIENT SERVICES | Facility: HOSPITAL | Age: 84
LOS: 1 days | End: 2019-08-31
Payer: COMMERCIAL

## 2019-08-31 DIAGNOSIS — Z90.89 ACQUIRED ABSENCE OF OTHER ORGANS: Chronic | ICD-10-CM

## 2019-08-31 DIAGNOSIS — Z98.890 OTHER SPECIFIED POSTPROCEDURAL STATES: Chronic | ICD-10-CM

## 2019-08-31 DIAGNOSIS — Z98.49 CATARACT EXTRACTION STATUS, UNSPECIFIED EYE: Chronic | ICD-10-CM

## 2019-08-31 DIAGNOSIS — Z00.8 ENCOUNTER FOR OTHER GENERAL EXAMINATION: ICD-10-CM

## 2019-08-31 PROCEDURE — 93971 EXTREMITY STUDY: CPT | Mod: 26,LT

## 2019-08-31 PROCEDURE — 93971 EXTREMITY STUDY: CPT

## 2019-09-05 RX ORDER — SODIUM CHLORIDE 9 MG/ML
250 INJECTION INTRAMUSCULAR; INTRAVENOUS; SUBCUTANEOUS
Refills: 0 | Status: DISCONTINUED | OUTPATIENT
Start: 2019-09-09 | End: 2019-12-17

## 2019-09-06 ENCOUNTER — OTHER (OUTPATIENT)
Age: 84
End: 2019-09-06

## 2019-09-06 ENCOUNTER — EMERGENCY (EMERGENCY)
Facility: HOSPITAL | Age: 84
LOS: 0 days | Discharge: HOME | End: 2019-09-06
Attending: EMERGENCY MEDICINE
Payer: COMMERCIAL

## 2019-09-06 VITALS
WEIGHT: 141.1 LBS | HEIGHT: 65 IN | TEMPERATURE: 97 F | RESPIRATION RATE: 18 BRPM | HEART RATE: 66 BPM | DIASTOLIC BLOOD PRESSURE: 92 MMHG | OXYGEN SATURATION: 100 % | SYSTOLIC BLOOD PRESSURE: 154 MMHG

## 2019-09-06 VITALS
HEART RATE: 62 BPM | TEMPERATURE: 98 F | DIASTOLIC BLOOD PRESSURE: 72 MMHG | RESPIRATION RATE: 18 BRPM | OXYGEN SATURATION: 99 % | SYSTOLIC BLOOD PRESSURE: 136 MMHG
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
## 2019-09-06 DIAGNOSIS — E78.5 HYPERLIPIDEMIA, UNSPECIFIED: ICD-10-CM

## 2019-09-06 DIAGNOSIS — S09.90XA UNSPECIFIED INJURY OF HEAD, INITIAL ENCOUNTER: ICD-10-CM
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
## 2019-09-06 DIAGNOSIS — I10 ESSENTIAL (PRIMARY) HYPERTENSION: ICD-10-CM

## 2019-09-06 DIAGNOSIS — S01.21XA LACERATION WITHOUT FOREIGN BODY OF NOSE, INITIAL ENCOUNTER: ICD-10-CM

## 2019-09-06 DIAGNOSIS — I48.91 UNSPECIFIED ATRIAL FIBRILLATION: ICD-10-CM

## 2019-09-06 DIAGNOSIS — Z98.49 CATARACT EXTRACTION STATUS, UNSPECIFIED EYE: Chronic | ICD-10-CM

## 2019-09-06 DIAGNOSIS — S01.511A LACERATION WITHOUT FOREIGN BODY OF LIP, INITIAL ENCOUNTER: ICD-10-CM

## 2019-09-06 DIAGNOSIS — Z23 ENCOUNTER FOR IMMUNIZATION: ICD-10-CM

## 2019-09-06 DIAGNOSIS — Z98.890 OTHER SPECIFIED POSTPROCEDURAL STATES: Chronic | ICD-10-CM
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
## 2019-09-06 DIAGNOSIS — Z95.0 PRESENCE OF CARDIAC PACEMAKER: ICD-10-CM

## 2019-09-06 DIAGNOSIS — Z79.01 LONG TERM (CURRENT) USE OF ANTICOAGULANTS: ICD-10-CM

## 2019-09-06 DIAGNOSIS — M50.30 OTHER CERVICAL DISC DEGENERATION, UNSPECIFIED CERVICAL REGION: ICD-10-CM

## 2019-09-06 DIAGNOSIS — M47.9 SPONDYLOSIS, UNSPECIFIED: ICD-10-CM

## 2019-09-06 DIAGNOSIS — Z90.89 ACQUIRED ABSENCE OF OTHER ORGANS: Chronic | ICD-10-CM
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
## 2019-09-06 DIAGNOSIS — S02.2XXB FRACTURE OF NASAL BONES, INITIAL ENCOUNTER FOR OPEN FRACTURE: ICD-10-CM

## 2019-09-06 LAB
ALBUMIN SERPL ELPH-MCNC: 4.3 G/DL — SIGNIFICANT CHANGE UP (ref 3.3–5)
ALP SERPL-CCNC: 60 U/L — SIGNIFICANT CHANGE UP (ref 40–120)
ALT FLD-CCNC: 40 U/L — SIGNIFICANT CHANGE UP (ref 12–78)
ANION GAP SERPL CALC-SCNC: 10 MMOL/L — SIGNIFICANT CHANGE UP (ref 5–17)
APTT BLD: 34.2 SEC — SIGNIFICANT CHANGE UP (ref 27.5–36.3)
AST SERPL-CCNC: 28 U/L — SIGNIFICANT CHANGE UP (ref 15–37)
BASOPHILS # BLD AUTO: 0.06 K/UL — SIGNIFICANT CHANGE UP (ref 0–0.2)
BASOPHILS NFR BLD AUTO: 1.4 % — SIGNIFICANT CHANGE UP (ref 0–2)
BILIRUB SERPL-MCNC: 0.8 MG/DL — SIGNIFICANT CHANGE UP (ref 0.2–1.2)
BUN SERPL-MCNC: 16 MG/DL — SIGNIFICANT CHANGE UP (ref 7–23)
CALCIUM SERPL-MCNC: 9.3 MG/DL — SIGNIFICANT CHANGE UP (ref 8.5–10.1)
CHLORIDE SERPL-SCNC: 103 MMOL/L — SIGNIFICANT CHANGE UP (ref 96–108)
CO2 SERPL-SCNC: 29 MMOL/L — SIGNIFICANT CHANGE UP (ref 22–31)
CREAT SERPL-MCNC: 0.75 MG/DL — SIGNIFICANT CHANGE UP (ref 0.5–1.3)
EOSINOPHIL # BLD AUTO: 0.06 K/UL — SIGNIFICANT CHANGE UP (ref 0–0.5)
EOSINOPHIL NFR BLD AUTO: 1.4 % — SIGNIFICANT CHANGE UP (ref 0–6)
ETHANOL SERPL-MCNC: <10 MG/DL — SIGNIFICANT CHANGE UP (ref 0–10)
GLUCOSE SERPL-MCNC: 131 MG/DL — HIGH (ref 70–99)
HCT VFR BLD CALC: 40.8 % — SIGNIFICANT CHANGE UP (ref 34.5–45)
HGB BLD-MCNC: 13 G/DL — SIGNIFICANT CHANGE UP (ref 11.5–15.5)
IMM GRANULOCYTES NFR BLD AUTO: 0.2 % — SIGNIFICANT CHANGE UP (ref 0–1.5)
INR BLD: 1.27 RATIO — HIGH (ref 0.88–1.16)
LACTATE SERPL-SCNC: 2.1 MMOL/L — HIGH (ref 0.7–2)
LIDOCAIN IGE QN: 130 U/L — SIGNIFICANT CHANGE UP (ref 73–393)
LYMPHOCYTES # BLD AUTO: 0.92 K/UL — LOW (ref 1–3.3)
LYMPHOCYTES # BLD AUTO: 20.7 % — SIGNIFICANT CHANGE UP (ref 13–44)
MCHC RBC-ENTMCNC: 31 PG — SIGNIFICANT CHANGE UP (ref 27–34)
MCHC RBC-ENTMCNC: 31.9 GM/DL — LOW (ref 32–36)
MCV RBC AUTO: 97.1 FL — SIGNIFICANT CHANGE UP (ref 80–100)
MONOCYTES # BLD AUTO: 0.44 K/UL — SIGNIFICANT CHANGE UP (ref 0–0.9)
MONOCYTES NFR BLD AUTO: 9.9 % — SIGNIFICANT CHANGE UP (ref 2–14)
NEUTROPHILS # BLD AUTO: 2.95 K/UL — SIGNIFICANT CHANGE UP (ref 1.8–7.4)
NEUTROPHILS NFR BLD AUTO: 66.4 % — SIGNIFICANT CHANGE UP (ref 43–77)
PLATELET # BLD AUTO: 138 K/UL — LOW (ref 150–400)
POTASSIUM SERPL-MCNC: 4.3 MMOL/L — SIGNIFICANT CHANGE UP (ref 3.5–5.3)
POTASSIUM SERPL-SCNC: 4.3 MMOL/L — SIGNIFICANT CHANGE UP (ref 3.5–5.3)
PROT SERPL-MCNC: 7.6 GM/DL — SIGNIFICANT CHANGE UP (ref 6–8.3)
PROTHROM AB SERPL-ACNC: 14.2 SEC — HIGH (ref 10–12.9)
RBC # BLD: 4.2 M/UL — SIGNIFICANT CHANGE UP (ref 3.8–5.2)
RBC # FLD: 14.1 % — SIGNIFICANT CHANGE UP (ref 10.3–14.5)
SODIUM SERPL-SCNC: 142 MMOL/L — SIGNIFICANT CHANGE UP (ref 135–145)
WBC # BLD: 4.44 K/UL — SIGNIFICANT CHANGE UP (ref 3.8–10.5)
WBC # FLD AUTO: 4.44 K/UL — SIGNIFICANT CHANGE UP (ref 3.8–10.5)

## 2019-09-06 PROCEDURE — 80053 COMPREHEN METABOLIC PANEL: CPT

## 2019-09-06 PROCEDURE — 36415 COLL VENOUS BLD VENIPUNCTURE: CPT

## 2019-09-06 PROCEDURE — 86901 BLOOD TYPING SEROLOGIC RH(D): CPT

## 2019-09-06 PROCEDURE — 93010 ELECTROCARDIOGRAM REPORT: CPT | Mod: 77

## 2019-09-06 PROCEDURE — 72125 CT NECK SPINE W/O DYE: CPT

## 2019-09-06 PROCEDURE — 80307 DRUG TEST PRSMV CHEM ANLYZR: CPT

## 2019-09-06 PROCEDURE — 76376 3D RENDER W/INTRP POSTPROCES: CPT

## 2019-09-06 PROCEDURE — 70486 CT MAXILLOFACIAL W/O DYE: CPT | Mod: 26

## 2019-09-06 PROCEDURE — 76376 3D RENDER W/INTRP POSTPROCES: CPT | Mod: 26

## 2019-09-06 PROCEDURE — 70486 CT MAXILLOFACIAL W/O DYE: CPT

## 2019-09-06 PROCEDURE — 83690 ASSAY OF LIPASE: CPT

## 2019-09-06 PROCEDURE — 85025 COMPLETE CBC W/AUTO DIFF WBC: CPT

## 2019-09-06 PROCEDURE — 86850 RBC ANTIBODY SCREEN: CPT

## 2019-09-06 PROCEDURE — 96365 THER/PROPH/DIAG IV INF INIT: CPT | Mod: 59

## 2019-09-06 PROCEDURE — 70450 CT HEAD/BRAIN W/O DYE: CPT

## 2019-09-06 PROCEDURE — 96366 THER/PROPH/DIAG IV INF ADDON: CPT | Mod: 59

## 2019-09-06 PROCEDURE — 85610 PROTHROMBIN TIME: CPT

## 2019-09-06 PROCEDURE — 83605 ASSAY OF LACTIC ACID: CPT

## 2019-09-06 PROCEDURE — 99284 EMERGENCY DEPT VISIT MOD MDM: CPT

## 2019-09-06 PROCEDURE — 90715 TDAP VACCINE 7 YRS/> IM: CPT

## 2019-09-06 PROCEDURE — 72125 CT NECK SPINE W/O DYE: CPT | Mod: 26

## 2019-09-06 PROCEDURE — 86900 BLOOD TYPING SEROLOGIC ABO: CPT

## 2019-09-06 PROCEDURE — 99284 EMERGENCY DEPT VISIT MOD MDM: CPT | Mod: 25

## 2019-09-06 PROCEDURE — 70450 CT HEAD/BRAIN W/O DYE: CPT | Mod: 26

## 2019-09-06 PROCEDURE — 93005 ELECTROCARDIOGRAM TRACING: CPT

## 2019-09-06 PROCEDURE — 85730 THROMBOPLASTIN TIME PARTIAL: CPT

## 2019-09-06 PROCEDURE — 90471 IMMUNIZATION ADMIN: CPT

## 2019-09-06 RX ORDER — TETANUS TOXOID, REDUCED DIPHTHERIA TOXOID AND ACELLULAR PERTUSSIS VACCINE, ADSORBED 5; 2.5; 8; 8; 2.5 [IU]/.5ML; [IU]/.5ML; UG/.5ML; UG/.5ML; UG/.5ML
0.5 SUSPENSION INTRAMUSCULAR ONCE
Refills: 0 | Status: COMPLETED | OUTPATIENT
Start: 2019-09-06 | End: 2019-09-06

## 2019-09-06 RX ADMIN — Medication 100 MILLIGRAM(S): at 12:15

## 2019-09-06 RX ADMIN — Medication 900 MILLIGRAM(S): at 14:00

## 2019-09-06 RX ADMIN — TETANUS TOXOID, REDUCED DIPHTHERIA TOXOID AND ACELLULAR PERTUSSIS VACCINE, ADSORBED 0.5 MILLILITER(S): 5; 2.5; 8; 8; 2.5 SUSPENSION INTRAMUSCULAR at 14:05

## 2019-09-06 NOTE — ED PROVIDER NOTE - ENMT, MLM
Airway patent, Nasal mucosa clear. Mouth with normal mucosa. Throat has no vesicles, no oropharyngeal exudates and uvula is midline. Airway patent, Nasal mucosa clear. Mouth with normal mucosa. Throat has no vesicles, no oropharyngeal exudates and uvula is midline.  No septal

## 2019-09-06 NOTE — ED PROVIDER NOTE - CARE PLAN
Principal Discharge DX:	Injury of head, initial encounter  Secondary Diagnosis:	Lip laceration, initial encounter  Secondary Diagnosis:	Nasal laceration, initial encounter  Secondary Diagnosis:	Open fracture of nasal bone, initial encounter

## 2019-09-06 NOTE — ED PROVIDER NOTE - SECONDARY DIAGNOSIS.
Lip laceration, initial encounter Nasal laceration, initial encounter Open fracture of nasal bone, initial encounter

## 2019-09-06 NOTE — ED PROVIDER NOTE - PROGRESS NOTE DETAILS
Ashia URENA for ED attending Dr. Rodriguez: Pt requested Dr. Ramirez. Attending adam Rodriguez/rell Ramirez and will see pt.

## 2019-09-06 NOTE — ED ADULT NURSE REASSESSMENT NOTE - NS ED NURSE REASSESS COMMENT FT1
patient transported to [facility] for suturing by  dr villar.  trauma alert cancelled by dr morrison

## 2019-09-06 NOTE — ED ADULT TRIAGE NOTE - CHIEF COMPLAINT QUOTE
Pt trip and fall while walking in the hallway at her job , nasal laceration , neg loc, pt is on immunotherapy , and she is on elliquis

## 2019-09-06 NOTE — ED PROVIDER NOTE - CARE PROVIDER_API CALL
Yariel Franco)  Plastic Surgery  [address], [address], [place] [address]  Phone: [phone]  Fax: [phone]  Follow Up Time:

## 2019-09-06 NOTE — ED PROVIDER NOTE - PATIENT PORTAL LINK FT
You can access the FollowMyHealth Patient Portal offered by [facility] by registering at the following website: [email or web address]. By joining [facility]’s FollowMyHealth portal, you will also be able to view your health information using other applications (apps) compatible with our system.

## 2019-09-06 NOTE — ED PROVIDER NOTE - OBJECTIVE STATEMENT
83 y/o female with a PMHx of A fib s/p pacemaker on Eliquis, HTN, HLD presents to the ED c/o mechanical trip and fall this morning. Pt notes tripping and falling due to lymphedema in legs at approximately 09:00. No LOC. Presents with laceration to bridge of nose and upper lip. +Facial pain. No other acute complaints at this time. 83 y/o female with a PMHx of A fib s/p pacemaker on Eliquis, HTN, HLD, s/p melanoma excision, s/p tonsillectomy, s/p adenoidectomy presents to the ED c/o mechanical trip and fall this morning. Pt notes tripping and falling due to lymphedema in legs at approximately 09:00. No LOC. Presents with laceration to bridge of nose and upper lip. +Facial pain. No other acute complaints at this time. 85 y/o female with a PMHx of A fib s/p pacemaker on Eliquis, HTN, HLD, s/p melanoma excision, s/p tonsillectomy, s/p adenoidectomy presents to the ED c/o mechanical trip and fall this morning. Pt notes tripping and falling due to lymphedema in legs at approximately 09:00. No LOC. Presents with laceration to bridge of nose and upper lip. +Facial pain. No other acute complaints at this time. Pt notes she is on immunotherapy.

## 2019-09-06 NOTE — ED ADULT NURSE NOTE - OBJECTIVE STATEMENT
patient s/p trip and fall, no LOC, Hit her nose causing a laceration to bridge of nose.  Patient is on eliquis for afib.  Patient was wearing crocs and states they were loose on her foot causing the fall.  Only c/o pain on her nose

## 2019-09-09 ENCOUNTER — APPOINTMENT (OUTPATIENT)
Dept: INFUSION THERAPY | Facility: HOSPITAL | Age: 84
End: 2019-09-09

## 2019-09-09 ENCOUNTER — OUTPATIENT (OUTPATIENT)
Dept: OUTPATIENT SERVICES | Facility: HOSPITAL | Age: 84
LOS: 1 days | End: 2019-09-09
Payer: COMMERCIAL

## 2019-09-09 ENCOUNTER — OTHER (OUTPATIENT)
Age: 84
End: 2019-09-09

## 2019-09-09 VITALS
SYSTOLIC BLOOD PRESSURE: 129 MMHG | DIASTOLIC BLOOD PRESSURE: 82 MMHG | HEIGHT: 65 IN | TEMPERATURE: 99 F | HEART RATE: 85 BPM | RESPIRATION RATE: 18 BRPM | WEIGHT: 142.2 LBS

## 2019-09-09 VITALS — DIASTOLIC BLOOD PRESSURE: 70 MMHG | HEART RATE: 83 BPM | SYSTOLIC BLOOD PRESSURE: 125 MMHG

## 2019-09-09 DIAGNOSIS — Z90.89 ACQUIRED ABSENCE OF OTHER ORGANS: Chronic | ICD-10-CM

## 2019-09-09 DIAGNOSIS — Z98.890 OTHER SPECIFIED POSTPROCEDURAL STATES: Chronic | ICD-10-CM

## 2019-09-09 DIAGNOSIS — R69 ILLNESS, UNSPECIFIED: ICD-10-CM

## 2019-09-09 DIAGNOSIS — Z98.49 CATARACT EXTRACTION STATUS, UNSPECIFIED EYE: Chronic | ICD-10-CM

## 2019-09-09 LAB
ALBUMIN SERPL ELPH-MCNC: 3.9 G/DL — SIGNIFICANT CHANGE UP (ref 3.3–5)
ALP SERPL-CCNC: 58 U/L — SIGNIFICANT CHANGE UP (ref 40–120)
ALT FLD-CCNC: 35 U/L — SIGNIFICANT CHANGE UP (ref 12–78)
ANION GAP SERPL CALC-SCNC: 4 MMOL/L — LOW (ref 5–17)
AST SERPL-CCNC: 19 U/L — SIGNIFICANT CHANGE UP (ref 15–37)
BASOPHILS # BLD AUTO: 0.05 K/UL — SIGNIFICANT CHANGE UP (ref 0–0.2)
BASOPHILS NFR BLD AUTO: 0.9 % — SIGNIFICANT CHANGE UP (ref 0–2)
BILIRUB SERPL-MCNC: 0.6 MG/DL — SIGNIFICANT CHANGE UP (ref 0.2–1.2)
BUN SERPL-MCNC: 16 MG/DL — SIGNIFICANT CHANGE UP (ref 7–23)
CALCIUM SERPL-MCNC: 9.5 MG/DL — SIGNIFICANT CHANGE UP (ref 8.5–10.1)
CHLORIDE SERPL-SCNC: 105 MMOL/L — SIGNIFICANT CHANGE UP (ref 96–108)
CO2 SERPL-SCNC: 29 MMOL/L — SIGNIFICANT CHANGE UP (ref 22–31)
CREAT SERPL-MCNC: 0.71 MG/DL — SIGNIFICANT CHANGE UP (ref 0.5–1.3)
EOSINOPHIL # BLD AUTO: 0.04 K/UL — SIGNIFICANT CHANGE UP (ref 0–0.5)
EOSINOPHIL NFR BLD AUTO: 0.7 % — SIGNIFICANT CHANGE UP (ref 0–6)
GLUCOSE SERPL-MCNC: 118 MG/DL — HIGH (ref 70–99)
HCT VFR BLD CALC: 39.8 % — SIGNIFICANT CHANGE UP (ref 34.5–45)
HGB BLD-MCNC: 12.8 G/DL — SIGNIFICANT CHANGE UP (ref 11.5–15.5)
IMM GRANULOCYTES NFR BLD AUTO: 0.3 % — SIGNIFICANT CHANGE UP (ref 0–1.5)
LYMPHOCYTES # BLD AUTO: 0.82 K/UL — LOW (ref 1–3.3)
LYMPHOCYTES # BLD AUTO: 14.3 % — SIGNIFICANT CHANGE UP (ref 13–44)
MCHC RBC-ENTMCNC: 30.6 PG — SIGNIFICANT CHANGE UP (ref 27–34)
MCHC RBC-ENTMCNC: 32.2 GM/DL — SIGNIFICANT CHANGE UP (ref 32–36)
MCV RBC AUTO: 95.2 FL — SIGNIFICANT CHANGE UP (ref 80–100)
MONOCYTES # BLD AUTO: 0.71 K/UL — SIGNIFICANT CHANGE UP (ref 0–0.9)
MONOCYTES NFR BLD AUTO: 12.4 % — SIGNIFICANT CHANGE UP (ref 2–14)
NEUTROPHILS # BLD AUTO: 4.1 K/UL — SIGNIFICANT CHANGE UP (ref 1.8–7.4)
NEUTROPHILS NFR BLD AUTO: 71.4 % — SIGNIFICANT CHANGE UP (ref 43–77)
PLATELET # BLD AUTO: 142 K/UL — LOW (ref 150–400)
POTASSIUM SERPL-MCNC: 4.5 MMOL/L — SIGNIFICANT CHANGE UP (ref 3.5–5.3)
POTASSIUM SERPL-SCNC: 4.5 MMOL/L — SIGNIFICANT CHANGE UP (ref 3.5–5.3)
PROT SERPL-MCNC: 7.2 GM/DL — SIGNIFICANT CHANGE UP (ref 6–8.3)
RBC # BLD: 4.18 M/UL — SIGNIFICANT CHANGE UP (ref 3.8–5.2)
RBC # FLD: 14.2 % — SIGNIFICANT CHANGE UP (ref 10.3–14.5)
SODIUM SERPL-SCNC: 138 MMOL/L — SIGNIFICANT CHANGE UP (ref 135–145)
TSH SERPL-MCNC: 1.13 UU/ML — SIGNIFICANT CHANGE UP (ref 0.34–4.82)
WBC # BLD: 5.74 K/UL — SIGNIFICANT CHANGE UP (ref 3.8–10.5)
WBC # FLD AUTO: 5.74 K/UL — SIGNIFICANT CHANGE UP (ref 3.8–10.5)

## 2019-09-09 PROCEDURE — 36415 COLL VENOUS BLD VENIPUNCTURE: CPT

## 2019-09-09 PROCEDURE — 80053 COMPREHEN METABOLIC PANEL: CPT

## 2019-09-09 PROCEDURE — 85025 COMPLETE CBC W/AUTO DIFF WBC: CPT

## 2019-09-09 PROCEDURE — 96413 CHEMO IV INFUSION 1 HR: CPT

## 2019-09-09 PROCEDURE — 84443 ASSAY THYROID STIM HORMONE: CPT

## 2019-09-09 RX ORDER — NIVOLUMAB 10 MG/ML
480 INJECTION INTRAVENOUS ONCE
Refills: 0 | Status: COMPLETED | OUTPATIENT
Start: 2019-09-09 | End: 2019-09-09

## 2019-09-09 RX ADMIN — NIVOLUMAB 196 MILLIGRAM(S): 10 INJECTION INTRAVENOUS at 16:09

## 2019-09-10 DIAGNOSIS — R69 ILLNESS, UNSPECIFIED: ICD-10-CM

## 2019-09-10 DIAGNOSIS — C43.72 MALIGNANT MELANOMA OF LEFT LOWER LIMB, INCLUDING HIP: ICD-10-CM

## 2019-09-16 ENCOUNTER — APPOINTMENT (OUTPATIENT)
Dept: ELECTROPHYSIOLOGY | Facility: CLINIC | Age: 84
End: 2019-09-16
Payer: OTHER MISCELLANEOUS

## 2019-09-16 VITALS
HEART RATE: 88 BPM | WEIGHT: 140 LBS | DIASTOLIC BLOOD PRESSURE: 93 MMHG | SYSTOLIC BLOOD PRESSURE: 149 MMHG | BODY MASS INDEX: 23.32 KG/M2 | HEIGHT: 65 IN

## 2019-09-16 PROCEDURE — 93279 PRGRMG DEV EVAL PM/LDLS PM: CPT

## 2019-10-07 ENCOUNTER — OUTPATIENT (OUTPATIENT)
Dept: OUTPATIENT SERVICES | Facility: HOSPITAL | Age: 84
LOS: 1 days | End: 2019-10-07
Payer: COMMERCIAL

## 2019-10-07 VITALS
RESPIRATION RATE: 16 BRPM | TEMPERATURE: 98 F | HEIGHT: 65 IN | HEART RATE: 88 BPM | SYSTOLIC BLOOD PRESSURE: 130 MMHG | WEIGHT: 149.03 LBS | DIASTOLIC BLOOD PRESSURE: 79 MMHG

## 2019-10-07 VITALS — SYSTOLIC BLOOD PRESSURE: 133 MMHG | HEART RATE: 79 BPM | DIASTOLIC BLOOD PRESSURE: 73 MMHG

## 2019-10-07 DIAGNOSIS — C43.72 MALIGNANT MELANOMA OF LEFT LOWER LIMB, INCLUDING HIP: ICD-10-CM

## 2019-10-07 DIAGNOSIS — R69 ILLNESS, UNSPECIFIED: ICD-10-CM

## 2019-10-07 DIAGNOSIS — Z98.49 CATARACT EXTRACTION STATUS, UNSPECIFIED EYE: Chronic | ICD-10-CM

## 2019-10-07 DIAGNOSIS — Z98.890 OTHER SPECIFIED POSTPROCEDURAL STATES: Chronic | ICD-10-CM

## 2019-10-07 DIAGNOSIS — Z90.89 ACQUIRED ABSENCE OF OTHER ORGANS: Chronic | ICD-10-CM

## 2019-10-07 LAB
ALBUMIN SERPL ELPH-MCNC: 4.1 G/DL — SIGNIFICANT CHANGE UP (ref 3.3–5)
ALP SERPL-CCNC: 53 U/L — SIGNIFICANT CHANGE UP (ref 40–120)
ALT FLD-CCNC: 37 U/L — SIGNIFICANT CHANGE UP (ref 12–78)
ANION GAP SERPL CALC-SCNC: 6 MMOL/L — SIGNIFICANT CHANGE UP (ref 5–17)
AST SERPL-CCNC: 23 U/L — SIGNIFICANT CHANGE UP (ref 15–37)
BASOPHILS # BLD AUTO: 0.07 K/UL — SIGNIFICANT CHANGE UP (ref 0–0.2)
BASOPHILS NFR BLD AUTO: 1.6 % — SIGNIFICANT CHANGE UP (ref 0–2)
BILIRUB SERPL-MCNC: 0.6 MG/DL — SIGNIFICANT CHANGE UP (ref 0.2–1.2)
BUN SERPL-MCNC: 20 MG/DL — SIGNIFICANT CHANGE UP (ref 7–23)
CALCIUM SERPL-MCNC: 9.4 MG/DL — SIGNIFICANT CHANGE UP (ref 8.5–10.1)
CHLORIDE SERPL-SCNC: 103 MMOL/L — SIGNIFICANT CHANGE UP (ref 96–108)
CO2 SERPL-SCNC: 29 MMOL/L — SIGNIFICANT CHANGE UP (ref 22–31)
CREAT SERPL-MCNC: 0.74 MG/DL — SIGNIFICANT CHANGE UP (ref 0.5–1.3)
EOSINOPHIL # BLD AUTO: 0.07 K/UL — SIGNIFICANT CHANGE UP (ref 0–0.5)
EOSINOPHIL NFR BLD AUTO: 1.6 % — SIGNIFICANT CHANGE UP (ref 0–6)
GLUCOSE SERPL-MCNC: 92 MG/DL — SIGNIFICANT CHANGE UP (ref 70–99)
HBA1C BLD-MCNC: 5.2 % — SIGNIFICANT CHANGE UP (ref 4–5.6)
HCT VFR BLD CALC: 38.8 % — SIGNIFICANT CHANGE UP (ref 34.5–45)
HGB BLD-MCNC: 12.5 G/DL — SIGNIFICANT CHANGE UP (ref 11.5–15.5)
IMM GRANULOCYTES NFR BLD AUTO: 0.2 % — SIGNIFICANT CHANGE UP (ref 0–1.5)
LYMPHOCYTES # BLD AUTO: 1.17 K/UL — SIGNIFICANT CHANGE UP (ref 1–3.3)
LYMPHOCYTES # BLD AUTO: 27.1 % — SIGNIFICANT CHANGE UP (ref 13–44)
MCHC RBC-ENTMCNC: 30.6 PG — SIGNIFICANT CHANGE UP (ref 27–34)
MCHC RBC-ENTMCNC: 32.2 GM/DL — SIGNIFICANT CHANGE UP (ref 32–36)
MCV RBC AUTO: 95.1 FL — SIGNIFICANT CHANGE UP (ref 80–100)
MONOCYTES # BLD AUTO: 0.38 K/UL — SIGNIFICANT CHANGE UP (ref 0–0.9)
MONOCYTES NFR BLD AUTO: 8.8 % — SIGNIFICANT CHANGE UP (ref 2–14)
NEUTROPHILS # BLD AUTO: 2.61 K/UL — SIGNIFICANT CHANGE UP (ref 1.8–7.4)
NEUTROPHILS NFR BLD AUTO: 60.7 % — SIGNIFICANT CHANGE UP (ref 43–77)
PLATELET # BLD AUTO: 131 K/UL — LOW (ref 150–400)
POTASSIUM SERPL-MCNC: 4.3 MMOL/L — SIGNIFICANT CHANGE UP (ref 3.5–5.3)
POTASSIUM SERPL-SCNC: 4.3 MMOL/L — SIGNIFICANT CHANGE UP (ref 3.5–5.3)
PROT SERPL-MCNC: 7.4 GM/DL — SIGNIFICANT CHANGE UP (ref 6–8.3)
RBC # BLD: 4.08 M/UL — SIGNIFICANT CHANGE UP (ref 3.8–5.2)
RBC # FLD: 13.5 % — SIGNIFICANT CHANGE UP (ref 10.3–14.5)
SODIUM SERPL-SCNC: 138 MMOL/L — SIGNIFICANT CHANGE UP (ref 135–145)
TSH SERPL-MCNC: 0.96 UU/ML — SIGNIFICANT CHANGE UP (ref 0.34–4.82)
WBC # BLD: 4.31 K/UL — SIGNIFICANT CHANGE UP (ref 3.8–10.5)
WBC # FLD AUTO: 4.31 K/UL — SIGNIFICANT CHANGE UP (ref 3.8–10.5)

## 2019-10-07 PROCEDURE — 96413 CHEMO IV INFUSION 1 HR: CPT

## 2019-10-07 PROCEDURE — 83036 HEMOGLOBIN GLYCOSYLATED A1C: CPT

## 2019-10-07 PROCEDURE — 36415 COLL VENOUS BLD VENIPUNCTURE: CPT

## 2019-10-07 PROCEDURE — 84443 ASSAY THYROID STIM HORMONE: CPT

## 2019-10-07 PROCEDURE — 80053 COMPREHEN METABOLIC PANEL: CPT

## 2019-10-07 PROCEDURE — 85025 COMPLETE CBC W/AUTO DIFF WBC: CPT

## 2019-10-07 RX ORDER — NIVOLUMAB 10 MG/ML
480 INJECTION INTRAVENOUS ONCE
Refills: 0 | Status: COMPLETED | OUTPATIENT
Start: 2019-10-07 | End: 2019-10-07

## 2019-10-07 RX ORDER — SODIUM CHLORIDE 9 MG/ML
250 INJECTION INTRAMUSCULAR; INTRAVENOUS; SUBCUTANEOUS
Refills: 0 | Status: DISCONTINUED | OUTPATIENT
Start: 2019-10-07 | End: 2020-01-09

## 2019-10-07 RX ADMIN — NIVOLUMAB 480 MILLIGRAM(S): 10 INJECTION INTRAVENOUS at 16:47

## 2019-10-07 RX ADMIN — NIVOLUMAB 196 MILLIGRAM(S): 10 INJECTION INTRAVENOUS at 16:20

## 2019-10-07 RX ADMIN — SODIUM CHLORIDE 250 MILLILITER(S): 9 INJECTION INTRAMUSCULAR; INTRAVENOUS; SUBCUTANEOUS at 16:47

## 2019-10-07 RX ADMIN — SODIUM CHLORIDE 30 MILLILITER(S): 9 INJECTION INTRAMUSCULAR; INTRAVENOUS; SUBCUTANEOUS at 16:23

## 2019-10-14 ENCOUNTER — FORM ENCOUNTER (OUTPATIENT)
Age: 84
End: 2019-10-14

## 2019-10-15 ENCOUNTER — OUTPATIENT (OUTPATIENT)
Dept: OUTPATIENT SERVICES | Facility: HOSPITAL | Age: 84
LOS: 1 days | End: 2019-10-15
Payer: COMMERCIAL

## 2019-10-15 ENCOUNTER — APPOINTMENT (OUTPATIENT)
Dept: ULTRASOUND IMAGING | Facility: CLINIC | Age: 84
End: 2019-10-15
Payer: COMMERCIAL

## 2019-10-15 DIAGNOSIS — Z98.890 OTHER SPECIFIED POSTPROCEDURAL STATES: Chronic | ICD-10-CM

## 2019-10-15 DIAGNOSIS — Z98.49 CATARACT EXTRACTION STATUS, UNSPECIFIED EYE: Chronic | ICD-10-CM

## 2019-10-15 DIAGNOSIS — Z90.89 ACQUIRED ABSENCE OF OTHER ORGANS: Chronic | ICD-10-CM

## 2019-10-15 DIAGNOSIS — Z00.8 ENCOUNTER FOR OTHER GENERAL EXAMINATION: ICD-10-CM

## 2019-10-15 PROCEDURE — 76882 US LMTD JT/FCL EVL NVASC XTR: CPT

## 2019-10-15 PROCEDURE — 76882 US LMTD JT/FCL EVL NVASC XTR: CPT | Mod: 26,LT

## 2019-10-17 NOTE — ED PROVIDER NOTE - WOUND TYPE
[FreeTextEntry1] : Dear Peggy,\par \par Thank you for referring Jesus Bailey for cardiac evaluation and followup of chronic atrial fibrillation. He is a 60-year-old male with a history of hypertension, hypercholesterolemia, type 2 diabetes, and obesity. He has been in atrial fibrillation since you started following him in 2017.  He had an echo and nuclear stress test done at that time and both were unremarkable.  He is physically active and has no palpitations or other cardiac symptoms.  He was seen in your office last week after an interval of 18 months and he had run out of his medications a few week prior to the visit.  He is now back on his usual regimen and is asymptomatic.
surgical wound left foot

## 2019-10-29 RX ORDER — SODIUM CHLORIDE 9 MG/ML
250 INJECTION INTRAMUSCULAR; INTRAVENOUS; SUBCUTANEOUS
Refills: 0 | Status: DISCONTINUED | OUTPATIENT
Start: 2019-11-04 | End: 2020-02-04

## 2019-11-04 ENCOUNTER — OUTPATIENT (OUTPATIENT)
Dept: OUTPATIENT SERVICES | Facility: HOSPITAL | Age: 84
LOS: 1 days | End: 2019-11-04
Payer: COMMERCIAL

## 2019-11-04 VITALS
HEART RATE: 85 BPM | DIASTOLIC BLOOD PRESSURE: 80 MMHG | RESPIRATION RATE: 16 BRPM | WEIGHT: 149.25 LBS | TEMPERATURE: 98 F | HEIGHT: 65 IN | SYSTOLIC BLOOD PRESSURE: 159 MMHG

## 2019-11-04 DIAGNOSIS — C43.72 MALIGNANT MELANOMA OF LEFT LOWER LIMB, INCLUDING HIP: ICD-10-CM

## 2019-11-04 DIAGNOSIS — Z90.89 ACQUIRED ABSENCE OF OTHER ORGANS: Chronic | ICD-10-CM

## 2019-11-04 DIAGNOSIS — Z98.890 OTHER SPECIFIED POSTPROCEDURAL STATES: Chronic | ICD-10-CM

## 2019-11-04 DIAGNOSIS — R69 ILLNESS, UNSPECIFIED: ICD-10-CM

## 2019-11-04 DIAGNOSIS — Z98.49 CATARACT EXTRACTION STATUS, UNSPECIFIED EYE: Chronic | ICD-10-CM

## 2019-11-04 LAB
ALBUMIN SERPL ELPH-MCNC: 3.5 G/DL — SIGNIFICANT CHANGE UP (ref 3.3–5)
ALP SERPL-CCNC: 53 U/L — SIGNIFICANT CHANGE UP (ref 40–120)
ALT FLD-CCNC: 43 U/L — SIGNIFICANT CHANGE UP (ref 12–78)
ANION GAP SERPL CALC-SCNC: 5 MMOL/L — SIGNIFICANT CHANGE UP (ref 5–17)
AST SERPL-CCNC: 25 U/L — SIGNIFICANT CHANGE UP (ref 15–37)
BILIRUB SERPL-MCNC: 0.6 MG/DL — SIGNIFICANT CHANGE UP (ref 0.2–1.2)
BUN SERPL-MCNC: 25 MG/DL — HIGH (ref 7–23)
CALCIUM SERPL-MCNC: 9.2 MG/DL — SIGNIFICANT CHANGE UP (ref 8.5–10.1)
CHLORIDE SERPL-SCNC: 104 MMOL/L — SIGNIFICANT CHANGE UP (ref 96–108)
CO2 SERPL-SCNC: 26 MMOL/L — SIGNIFICANT CHANGE UP (ref 22–31)
CREAT SERPL-MCNC: 0.79 MG/DL — SIGNIFICANT CHANGE UP (ref 0.5–1.3)
GLUCOSE SERPL-MCNC: 104 MG/DL — HIGH (ref 70–99)
POTASSIUM SERPL-MCNC: 4.4 MMOL/L — SIGNIFICANT CHANGE UP (ref 3.5–5.3)
POTASSIUM SERPL-SCNC: 4.4 MMOL/L — SIGNIFICANT CHANGE UP (ref 3.5–5.3)
PROT SERPL-MCNC: 7 GM/DL — SIGNIFICANT CHANGE UP (ref 6–8.3)
SODIUM SERPL-SCNC: 135 MMOL/L — SIGNIFICANT CHANGE UP (ref 135–145)

## 2019-11-04 PROCEDURE — 80053 COMPREHEN METABOLIC PANEL: CPT

## 2019-11-04 PROCEDURE — 36415 COLL VENOUS BLD VENIPUNCTURE: CPT

## 2019-11-04 PROCEDURE — 96413 CHEMO IV INFUSION 1 HR: CPT

## 2019-11-04 RX ORDER — NIVOLUMAB 10 MG/ML
480 INJECTION INTRAVENOUS ONCE
Refills: 0 | Status: COMPLETED | OUTPATIENT
Start: 2019-11-04 | End: 2019-11-04

## 2019-11-04 RX ADMIN — SODIUM CHLORIDE 250 MILLILITER(S): 9 INJECTION INTRAMUSCULAR; INTRAVENOUS; SUBCUTANEOUS at 17:05

## 2019-11-04 RX ADMIN — SODIUM CHLORIDE 30 MILLILITER(S): 9 INJECTION INTRAMUSCULAR; INTRAVENOUS; SUBCUTANEOUS at 16:31

## 2019-11-04 RX ADMIN — NIVOLUMAB 480 MILLIGRAM(S): 10 INJECTION INTRAVENOUS at 17:00

## 2019-11-04 RX ADMIN — NIVOLUMAB 196 MILLIGRAM(S): 10 INJECTION INTRAVENOUS at 16:28

## 2019-11-05 ENCOUNTER — APPOINTMENT (OUTPATIENT)
Dept: HEMATOLOGY ONCOLOGY | Facility: CLINIC | Age: 84
End: 2019-11-05
Payer: COMMERCIAL

## 2019-11-05 ENCOUNTER — OTHER (OUTPATIENT)
Age: 84
End: 2019-11-05

## 2019-11-05 VITALS
HEART RATE: 69 BPM | TEMPERATURE: 97.5 F | SYSTOLIC BLOOD PRESSURE: 165 MMHG | WEIGHT: 148.8 LBS | RESPIRATION RATE: 16 BRPM | HEIGHT: 65 IN | BODY MASS INDEX: 24.79 KG/M2 | DIASTOLIC BLOOD PRESSURE: 75 MMHG

## 2019-11-05 PROCEDURE — 99214 OFFICE O/P EST MOD 30 MIN: CPT

## 2019-11-05 RX ORDER — SIMVASTATIN 10 MG/1
10 TABLET, FILM COATED ORAL DAILY
Refills: 0 | Status: ACTIVE | COMMUNITY
Start: 2019-11-05

## 2019-11-05 RX ORDER — APIXABAN 5 MG/1
5 TABLET, FILM COATED ORAL
Qty: 180 | Refills: 3 | Status: ACTIVE | COMMUNITY
Start: 2019-11-05

## 2019-11-05 RX ORDER — MULTIVITAMIN/IRON/FOLIC ACID 18MG-0.4MG
TABLET ORAL DAILY
Refills: 0 | Status: ACTIVE | COMMUNITY
Start: 2019-11-05

## 2019-11-05 RX ORDER — VIT A/VIT C/VIT E/ZINC/COPPER 4296-226
CAPSULE ORAL
Refills: 0 | Status: ACTIVE | COMMUNITY
Start: 2019-11-05

## 2019-11-05 NOTE — ASSESSMENT
[FreeTextEntry1] : Ms. HULL 's questions were answered to her satisfaction. She will return to the office  in 4 months.\par

## 2019-11-05 NOTE — RESULTS/DATA
[FreeTextEntry1] : I reviewed recent blood work results with patient.\par 10/7/19:\par WBC 4.31, hemoglobin 12.3 g/dL, hematocrit 38.8%, platelet 131,000\par

## 2019-11-05 NOTE — REASON FOR VISIT
[Follow-Up Visit] : a follow-up [Family Member] : family member [FreeTextEntry2] : left foot melanoma

## 2019-11-05 NOTE — PHYSICAL EXAM
[Restricted in physically strenuous activity but ambulatory and able to carry out work of a light or sedentary nature] : Status 1- Restricted in physically strenuous activity but ambulatory and able to carry out work of a light or sedentary nature, e.g., light house work, office work [Normal] : affect appropriate [de-identified] : Left lower extremity edema 2+ [de-identified] : + lymphedema left thigh  [de-identified] : wearing compressive stockings [de-identified] : left heel wound reportedly healed

## 2019-11-05 NOTE — HISTORY OF PRESENT ILLNESS
[de-identified] : 84 F with HTN, PPM, atrial fibrillation ( Dr. Elam- 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 2019.\par \par CASE SYNOPSIS:\par May 2019- patient notices a bleeding left heel cutaneous lesion; \par \par 5/1/19-punch biopsy performed by a dermatologist ([facility]) ; pathology:\par - left heel superior: malignant melanoma, 4.2 mm thick with ulcerations, pT4b.\par -left heel inferior: malignant melanoma, 4.2 mm thick, with ulceration, pT4b.\par \par 5/28/19- left heel wide resection and sentinel lymph node biopsy (); pathology:\par -Left inguinal lymph node biopsy: 2/4 lymph nodes positive for melanoma\par -Skin and soft tissue, left heel foot, consistent with melanoma, 3.5 mm thickness, margins negative, pT4b, pN 2a= stage III C\par \par 6/15/19: PET- residual FDG avidity along the surgical defect consistent with postsurgical changes, or residual disease, or combination of the two. No FDG avid local regional or distant metastatic disease. \par \par 7/15/19- started Nivolumab under the care of Dr. Mcbride at [facility].\par \par 8/12/19- cycle # 2 Nivolumab also at [facility].\par \par 10/15/19: Left lower extremity ultrasound-benign appearing left inguinal lymph nodes, containing fatty hilum, measuring 0.9 x 0.8 x 1.3 cm, 1.2 x 0.7 x 0.9 cm, and one 0.5 x 0.8 x 0.9 cm. Additional fluid collection representing seroma present in the left groin. [FreeTextEntry1] : Nivolumab- flat dose monthly [de-identified] : Returning for short-term followup; last seen in the office over 16 2019. In the interim patient had repeat lower extremity ultrasound focusing on inguinal lymphadenopathy. Study consistent with left lower extremity ultrasound-benign appearing left inguinal lymph nodes, containing fatty hilum, measuring 0.9 x 0.8 x 1.3 cm, 1.2 x 0.7 x 0.9 cm, and one 0.5 x 0.8 x 0.9 cm. Additional fluid collection representing seroma present in the left groin. Since her care was switched to our office, patient had 5th dose of Nivolumab yesterday ( 11//4/19). Seems to tolerate treatment well. Locally, wound essentially closed. Wearing compressive stocking.

## 2019-11-06 ENCOUNTER — APPOINTMENT (OUTPATIENT)
Dept: SURGICAL ONCOLOGY | Facility: CLINIC | Age: 84
End: 2019-11-06
Payer: COMMERCIAL

## 2019-11-06 VITALS
OXYGEN SATURATION: 98 % | HEIGHT: 65 IN | DIASTOLIC BLOOD PRESSURE: 78 MMHG | HEART RATE: 92 BPM | RESPIRATION RATE: 16 BRPM | SYSTOLIC BLOOD PRESSURE: 156 MMHG | BODY MASS INDEX: 24.32 KG/M2 | WEIGHT: 146 LBS

## 2019-11-06 PROCEDURE — 99215 OFFICE O/P EST HI 40 MIN: CPT

## 2019-11-25 NOTE — ASSESSMENT
[FreeTextEntry1] : Stage III left heel melanoma s/p WEX 5/19\par ROXANN\par Continue immunotherapy as per Dr. Kimbrough\par Would get CT scan chest abdomen and pelvis at 6 months and PEt scan in 1 year\par Follow up with Dr. Olson of dermatology\par RTO 3 months

## 2019-11-25 NOTE — PHYSICAL EXAM
[Normal] : supple, no neck mass and thyroid not enlarged [Normal Neck Lymph Nodes] : normal neck lymph nodes  [Normal Supraclavicular Lymph Nodes] : normal supraclavicular lymph nodes [Normal Groin Lymph Nodes] : normal groin lymph nodes [Normal Axillary Lymph Nodes] : normal axillary lymph nodes [Normal] : oriented to person, place and time, with appropriate affect [de-identified] : 2+ left LE lymphedema. No inguinal adenopathy. [de-identified] : Left lateral heel skin graft site with hyperkeratosis - debrided. No evidence of recurrence.

## 2019-11-25 NOTE — HISTORY OF PRESENT ILLNESS
[de-identified] : 85 y/o female s/p wide excision left superior heel melanoma with left inguinal sentinel node biopsy on 5/28/19.\par Pathology:\par 1. Left inguinal sentinel lymph nodes, biopsy. Two out of four lymph nodes positive for melanoma (2/4). \par 2. Skin and soft tissue, left heel foot, wide resection. Melanoma 3.5mm in thickness. Margins negative (1mm from the closest deep margin). [id number]\par \par She is s/p I&D of left groin infected seroma (6/26/19).\par Culture: Rare Enterobacter cloaecae\par \par PET/CT 6/15/19: 1. Postsurgical changes left heel with multiple surgical clips and high attenuation material. Residual FDG avidity along the surgical defect may represent postsurgical change/inflammation, residual disease, or combination of these entities. \par 2. Postsurgical collection left inguinal region. No FDG avid locoregional or distant metastatic disease.\par \par She began immunotherapy Nivolumab July 2019. She is following up with Dr. Kimbrough. She notes that she will likely be on immunotherapy for a year.\par She states that she is wearing compression stockings and she believes her lymphedema edema is improving.\par She states that she will be following up with her dermatologist, Dr. Olson.\par \par She is s/p punch biopsy of two sites on her left heel on 5/1/19 performed by Navneet Branch PA-C.\par Pathology:\par 1. 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 pT4B\par 2. Left heel inferior: malignant melanoma, 4.2 mm thick, with ulcerations. pT4B \par \par She c/o crusty, flaking appearance of left heel wound. She denies use of moisturizer.\par She notes a fractured nose requiring stitches by Dr. Ramirez of [facility] in September 2019.\par Pt has a pacemaker and is currently on Warfarin for Afib as per Dr. Ronald Shepard of cardiology. \par She is now taking Eliquis.\par Denies constitutional symptoms. \par Denies fever or chills.

## 2019-11-25 NOTE — CONSULT LETTER
[Courtesy Letter:] : I had the pleasure of seeing your patient, [unfilled], in my office today. [Please see my note below.] : Please see my note below. [Sincerely,] : Sincerely, [DrElizabeth  ___] : Dr. COLUNGA [FreeTextEntry3] : Perez Russo MD FACS  [DrElizabeth ___] : Dr. COLUNGA [Dear  ___] : Dear  [unfilled],

## 2019-12-02 ENCOUNTER — OUTPATIENT (OUTPATIENT)
Dept: OUTPATIENT SERVICES | Facility: HOSPITAL | Age: 84
LOS: 1 days | End: 2019-12-02
Payer: COMMERCIAL

## 2019-12-02 VITALS
DIASTOLIC BLOOD PRESSURE: 79 MMHG | TEMPERATURE: 98 F | WEIGHT: 149.25 LBS | SYSTOLIC BLOOD PRESSURE: 146 MMHG | HEART RATE: 83 BPM

## 2019-12-02 DIAGNOSIS — C43.72 MALIGNANT MELANOMA OF LEFT LOWER LIMB, INCLUDING HIP: ICD-10-CM

## 2019-12-02 DIAGNOSIS — Z98.890 OTHER SPECIFIED POSTPROCEDURAL STATES: Chronic | ICD-10-CM

## 2019-12-02 DIAGNOSIS — Z98.49 CATARACT EXTRACTION STATUS, UNSPECIFIED EYE: Chronic | ICD-10-CM

## 2019-12-02 DIAGNOSIS — Z90.89 ACQUIRED ABSENCE OF OTHER ORGANS: Chronic | ICD-10-CM

## 2019-12-02 DIAGNOSIS — R69 ILLNESS, UNSPECIFIED: ICD-10-CM

## 2019-12-02 LAB
ALBUMIN SERPL ELPH-MCNC: 3.7 G/DL — SIGNIFICANT CHANGE UP (ref 3.3–5)
ALP SERPL-CCNC: 59 U/L — SIGNIFICANT CHANGE UP (ref 40–120)
ALT FLD-CCNC: 47 U/L — SIGNIFICANT CHANGE UP (ref 12–78)
ANION GAP SERPL CALC-SCNC: 6 MMOL/L — SIGNIFICANT CHANGE UP (ref 5–17)
AST SERPL-CCNC: 34 U/L — SIGNIFICANT CHANGE UP (ref 15–37)
BASOPHILS # BLD AUTO: 0.04 K/UL — SIGNIFICANT CHANGE UP (ref 0–0.2)
BASOPHILS NFR BLD AUTO: 0.9 % — SIGNIFICANT CHANGE UP (ref 0–2)
BILIRUB SERPL-MCNC: 0.6 MG/DL — SIGNIFICANT CHANGE UP (ref 0.2–1.2)
BUN SERPL-MCNC: 23 MG/DL — SIGNIFICANT CHANGE UP (ref 7–23)
CALCIUM SERPL-MCNC: 9.2 MG/DL — SIGNIFICANT CHANGE UP (ref 8.5–10.1)
CHLORIDE SERPL-SCNC: 103 MMOL/L — SIGNIFICANT CHANGE UP (ref 96–108)
CO2 SERPL-SCNC: 30 MMOL/L — SIGNIFICANT CHANGE UP (ref 22–31)
CREAT SERPL-MCNC: 0.6 MG/DL — SIGNIFICANT CHANGE UP (ref 0.5–1.3)
EOSINOPHIL # BLD AUTO: 0.11 K/UL — SIGNIFICANT CHANGE UP (ref 0–0.5)
EOSINOPHIL NFR BLD AUTO: 2.5 % — SIGNIFICANT CHANGE UP (ref 0–6)
GLUCOSE SERPL-MCNC: 84 MG/DL — SIGNIFICANT CHANGE UP (ref 70–99)
HCT VFR BLD CALC: 38.4 % — SIGNIFICANT CHANGE UP (ref 34.5–45)
HGB BLD-MCNC: 12 G/DL — SIGNIFICANT CHANGE UP (ref 11.5–15.5)
IMM GRANULOCYTES NFR BLD AUTO: 0.2 % — SIGNIFICANT CHANGE UP (ref 0–1.5)
LYMPHOCYTES # BLD AUTO: 1.05 K/UL — SIGNIFICANT CHANGE UP (ref 1–3.3)
LYMPHOCYTES # BLD AUTO: 23.5 % — SIGNIFICANT CHANGE UP (ref 13–44)
MCHC RBC-ENTMCNC: 29.6 PG — SIGNIFICANT CHANGE UP (ref 27–34)
MCHC RBC-ENTMCNC: 31.3 GM/DL — LOW (ref 32–36)
MCV RBC AUTO: 94.6 FL — SIGNIFICANT CHANGE UP (ref 80–100)
MONOCYTES # BLD AUTO: 0.48 K/UL — SIGNIFICANT CHANGE UP (ref 0–0.9)
MONOCYTES NFR BLD AUTO: 10.7 % — SIGNIFICANT CHANGE UP (ref 2–14)
NEUTROPHILS # BLD AUTO: 2.78 K/UL — SIGNIFICANT CHANGE UP (ref 1.8–7.4)
NEUTROPHILS NFR BLD AUTO: 62.2 % — SIGNIFICANT CHANGE UP (ref 43–77)
PLATELET # BLD AUTO: 125 K/UL — LOW (ref 150–400)
POTASSIUM SERPL-MCNC: 4.1 MMOL/L — SIGNIFICANT CHANGE UP (ref 3.5–5.3)
POTASSIUM SERPL-SCNC: 4.1 MMOL/L — SIGNIFICANT CHANGE UP (ref 3.5–5.3)
PROT SERPL-MCNC: 7 GM/DL — SIGNIFICANT CHANGE UP (ref 6–8.3)
RBC # BLD: 4.06 M/UL — SIGNIFICANT CHANGE UP (ref 3.8–5.2)
RBC # FLD: 13.1 % — SIGNIFICANT CHANGE UP (ref 10.3–14.5)
SODIUM SERPL-SCNC: 139 MMOL/L — SIGNIFICANT CHANGE UP (ref 135–145)
TSH SERPL-MCNC: 1.82 UU/ML — SIGNIFICANT CHANGE UP (ref 0.34–4.82)
WBC # BLD: 4.47 K/UL — SIGNIFICANT CHANGE UP (ref 3.8–10.5)
WBC # FLD AUTO: 4.47 K/UL — SIGNIFICANT CHANGE UP (ref 3.8–10.5)

## 2019-12-02 PROCEDURE — 96413 CHEMO IV INFUSION 1 HR: CPT

## 2019-12-02 PROCEDURE — 85025 COMPLETE CBC W/AUTO DIFF WBC: CPT

## 2019-12-02 PROCEDURE — 36415 COLL VENOUS BLD VENIPUNCTURE: CPT

## 2019-12-02 PROCEDURE — 84443 ASSAY THYROID STIM HORMONE: CPT

## 2019-12-02 PROCEDURE — 80053 COMPREHEN METABOLIC PANEL: CPT

## 2019-12-02 RX ORDER — NIVOLUMAB 10 MG/ML
480 INJECTION INTRAVENOUS ONCE
Refills: 0 | Status: COMPLETED | OUTPATIENT
Start: 2019-12-02 | End: 2019-12-02

## 2019-12-02 RX ADMIN — NIVOLUMAB 196 MILLIGRAM(S): 10 INJECTION INTRAVENOUS at 16:47

## 2019-12-02 RX ADMIN — NIVOLUMAB 480 MILLIGRAM(S): 10 INJECTION INTRAVENOUS at 17:20

## 2019-12-26 RX ORDER — SODIUM CHLORIDE 9 MG/ML
250 INJECTION INTRAMUSCULAR; INTRAVENOUS; SUBCUTANEOUS
Refills: 0 | Status: DISCONTINUED | OUTPATIENT
Start: 2019-12-30 | End: 2020-03-31

## 2019-12-30 ENCOUNTER — OUTPATIENT (OUTPATIENT)
Dept: OUTPATIENT SERVICES | Facility: HOSPITAL | Age: 84
LOS: 1 days | End: 2019-12-30
Payer: COMMERCIAL

## 2019-12-30 VITALS — DIASTOLIC BLOOD PRESSURE: 68 MMHG | HEART RATE: 63 BPM | SYSTOLIC BLOOD PRESSURE: 104 MMHG

## 2019-12-30 VITALS
WEIGHT: 144.84 LBS | DIASTOLIC BLOOD PRESSURE: 72 MMHG | HEART RATE: 62 BPM | HEIGHT: 65 IN | SYSTOLIC BLOOD PRESSURE: 117 MMHG | TEMPERATURE: 98 F

## 2019-12-30 DIAGNOSIS — Z98.890 OTHER SPECIFIED POSTPROCEDURAL STATES: Chronic | ICD-10-CM

## 2019-12-30 DIAGNOSIS — Z98.49 CATARACT EXTRACTION STATUS, UNSPECIFIED EYE: Chronic | ICD-10-CM

## 2019-12-30 DIAGNOSIS — Z90.89 ACQUIRED ABSENCE OF OTHER ORGANS: Chronic | ICD-10-CM

## 2019-12-30 DIAGNOSIS — R69 ILLNESS, UNSPECIFIED: ICD-10-CM

## 2019-12-30 LAB
ALBUMIN SERPL ELPH-MCNC: 3.1 G/DL — LOW (ref 3.3–5)
ALP SERPL-CCNC: 65 U/L — SIGNIFICANT CHANGE UP (ref 40–120)
ALT FLD-CCNC: 31 U/L — SIGNIFICANT CHANGE UP (ref 12–78)
ANION GAP SERPL CALC-SCNC: 7 MMOL/L — SIGNIFICANT CHANGE UP (ref 5–17)
AST SERPL-CCNC: 27 U/L — SIGNIFICANT CHANGE UP (ref 15–37)
BASOPHILS # BLD AUTO: 0.07 K/UL — SIGNIFICANT CHANGE UP (ref 0–0.2)
BASOPHILS NFR BLD AUTO: 1.2 % — SIGNIFICANT CHANGE UP (ref 0–2)
BILIRUB SERPL-MCNC: 0.7 MG/DL — SIGNIFICANT CHANGE UP (ref 0.2–1.2)
BUN SERPL-MCNC: 20 MG/DL — SIGNIFICANT CHANGE UP (ref 7–23)
CALCIUM SERPL-MCNC: 9.2 MG/DL — SIGNIFICANT CHANGE UP (ref 8.5–10.1)
CHLORIDE SERPL-SCNC: 102 MMOL/L — SIGNIFICANT CHANGE UP (ref 96–108)
CO2 SERPL-SCNC: 27 MMOL/L — SIGNIFICANT CHANGE UP (ref 22–31)
CREAT SERPL-MCNC: 0.58 MG/DL — SIGNIFICANT CHANGE UP (ref 0.5–1.3)
EOSINOPHIL # BLD AUTO: 0.24 K/UL — SIGNIFICANT CHANGE UP (ref 0–0.5)
EOSINOPHIL NFR BLD AUTO: 4.1 % — SIGNIFICANT CHANGE UP (ref 0–6)
GLUCOSE SERPL-MCNC: 76 MG/DL — SIGNIFICANT CHANGE UP (ref 70–99)
HCT VFR BLD CALC: 36 % — SIGNIFICANT CHANGE UP (ref 34.5–45)
HGB BLD-MCNC: 11.9 G/DL — SIGNIFICANT CHANGE UP (ref 11.5–15.5)
IMM GRANULOCYTES NFR BLD AUTO: 0.3 % — SIGNIFICANT CHANGE UP (ref 0–1.5)
LYMPHOCYTES # BLD AUTO: 1.33 K/UL — SIGNIFICANT CHANGE UP (ref 1–3.3)
LYMPHOCYTES # BLD AUTO: 22.5 % — SIGNIFICANT CHANGE UP (ref 13–44)
MCHC RBC-ENTMCNC: 29.9 PG — SIGNIFICANT CHANGE UP (ref 27–34)
MCHC RBC-ENTMCNC: 33.1 GM/DL — SIGNIFICANT CHANGE UP (ref 32–36)
MCV RBC AUTO: 90.5 FL — SIGNIFICANT CHANGE UP (ref 80–100)
MONOCYTES # BLD AUTO: 0.54 K/UL — SIGNIFICANT CHANGE UP (ref 0–0.9)
MONOCYTES NFR BLD AUTO: 9.1 % — SIGNIFICANT CHANGE UP (ref 2–14)
NEUTROPHILS # BLD AUTO: 3.71 K/UL — SIGNIFICANT CHANGE UP (ref 1.8–7.4)
NEUTROPHILS NFR BLD AUTO: 62.8 % — SIGNIFICANT CHANGE UP (ref 43–77)
PLATELET # BLD AUTO: 155 K/UL — SIGNIFICANT CHANGE UP (ref 150–400)
POTASSIUM SERPL-MCNC: 4.3 MMOL/L — SIGNIFICANT CHANGE UP (ref 3.5–5.3)
POTASSIUM SERPL-SCNC: 4.3 MMOL/L — SIGNIFICANT CHANGE UP (ref 3.5–5.3)
PROT SERPL-MCNC: 6.3 GM/DL — SIGNIFICANT CHANGE UP (ref 6–8.3)
RBC # BLD: 3.98 M/UL — SIGNIFICANT CHANGE UP (ref 3.8–5.2)
RBC # FLD: 12.7 % — SIGNIFICANT CHANGE UP (ref 10.3–14.5)
SODIUM SERPL-SCNC: 136 MMOL/L — SIGNIFICANT CHANGE UP (ref 135–145)
TSH SERPL-MCNC: 3.19 UU/ML — SIGNIFICANT CHANGE UP (ref 0.34–4.82)
WBC # BLD: 5.91 K/UL — SIGNIFICANT CHANGE UP (ref 3.8–10.5)
WBC # FLD AUTO: 5.91 K/UL — SIGNIFICANT CHANGE UP (ref 3.8–10.5)

## 2019-12-30 PROCEDURE — 80053 COMPREHEN METABOLIC PANEL: CPT

## 2019-12-30 PROCEDURE — 36415 COLL VENOUS BLD VENIPUNCTURE: CPT

## 2019-12-30 PROCEDURE — 96413 CHEMO IV INFUSION 1 HR: CPT

## 2019-12-30 PROCEDURE — 84443 ASSAY THYROID STIM HORMONE: CPT

## 2019-12-30 PROCEDURE — 85025 COMPLETE CBC W/AUTO DIFF WBC: CPT

## 2019-12-30 RX ORDER — NIVOLUMAB 10 MG/ML
480 INJECTION INTRAVENOUS ONCE
Refills: 0 | Status: COMPLETED | OUTPATIENT
Start: 2019-12-30 | End: 2019-12-30

## 2019-12-30 RX ADMIN — NIVOLUMAB 196 MILLIGRAM(S): 10 INJECTION INTRAVENOUS at 13:39

## 2019-12-30 RX ADMIN — SODIUM CHLORIDE 30 MILLILITER(S): 9 INJECTION INTRAMUSCULAR; INTRAVENOUS; SUBCUTANEOUS at 13:12

## 2019-12-30 NOTE — DISCHARGE INSTRUCTIONS: CHEMOTHERAPY - [id number]
For Rash: continue to apply moisturizing lotion, can also apply cortisone cream.  Can try Benadryl at night or claritin.

## 2019-12-31 DIAGNOSIS — R69 ILLNESS, UNSPECIFIED: ICD-10-CM

## 2019-12-31 DIAGNOSIS — C43.9 MALIGNANT MELANOMA OF SKIN, UNSPECIFIED: ICD-10-CM

## 2020-01-13 ENCOUNTER — OUTPATIENT (OUTPATIENT)
Dept: OUTPATIENT SERVICES | Facility: HOSPITAL | Age: 85
LOS: 1 days | Discharge: HOME | End: 2020-01-13

## 2020-01-13 DIAGNOSIS — Z98.49 CATARACT EXTRACTION STATUS, UNSPECIFIED EYE: Chronic | ICD-10-CM

## 2020-01-13 DIAGNOSIS — Z90.89 ACQUIRED ABSENCE OF OTHER ORGANS: Chronic | ICD-10-CM

## 2020-01-13 DIAGNOSIS — Z98.890 OTHER SPECIFIED POSTPROCEDURAL STATES: Chronic | ICD-10-CM

## 2020-01-13 DIAGNOSIS — C43.72 MALIGNANT MELANOMA OF LEFT LOWER LIMB, INCLUDING HIP: ICD-10-CM

## 2020-01-15 ENCOUNTER — APPOINTMENT (OUTPATIENT)
Age: 85
End: 2020-01-15
Payer: COMMERCIAL

## 2020-01-15 ENCOUNTER — RESULT REVIEW (OUTPATIENT)
Age: 85
End: 2020-01-15

## 2020-01-15 PROCEDURE — 99499A: CUSTOM | Mod: NC

## 2020-01-16 DIAGNOSIS — Z79.899 ENCOUNTER FOR THERAPEUTIC DRUG LVL MONITORING: ICD-10-CM

## 2020-01-16 DIAGNOSIS — Z51.81 ENCOUNTER FOR THERAPEUTIC DRUG LVL MONITORING: ICD-10-CM

## 2020-01-16 DIAGNOSIS — Z51.12 ENCOUNTER FOR ANTINEOPLASTIC IMMUNOTHERAPY: ICD-10-CM

## 2020-01-17 ENCOUNTER — RESULT REVIEW (OUTPATIENT)
Age: 85
End: 2020-01-17

## 2020-01-17 ENCOUNTER — APPOINTMENT (OUTPATIENT)
Age: 85
End: 2020-01-17
Payer: COMMERCIAL

## 2020-01-17 PROCEDURE — 99499A: CUSTOM | Mod: NC

## 2020-01-26 ENCOUNTER — INPATIENT (INPATIENT)
Facility: HOSPITAL | Age: 85
LOS: 4 days | Discharge: HOME | DRG: 350 | End: 2020-01-31
Attending: SURGERY | Admitting: SURGERY
Payer: COMMERCIAL

## 2020-01-26 VITALS
DIASTOLIC BLOOD PRESSURE: 92 MMHG | SYSTOLIC BLOOD PRESSURE: 150 MMHG | RESPIRATION RATE: 18 BRPM | HEART RATE: 88 BPM | WEIGHT: 132.94 LBS | HEIGHT: 64 IN | OXYGEN SATURATION: 100 % | TEMPERATURE: 98 F

## 2020-01-26 DIAGNOSIS — Z79.01 LONG TERM (CURRENT) USE OF ANTICOAGULANTS: ICD-10-CM

## 2020-01-26 DIAGNOSIS — E43 UNSPECIFIED SEVERE PROTEIN-CALORIE MALNUTRITION: ICD-10-CM

## 2020-01-26 DIAGNOSIS — Z86.73 PERSONAL HISTORY OF TRANSIENT ISCHEMIC ATTACK (TIA), AND CEREBRAL INFARCTION WITHOUT RESIDUAL DEFICITS: ICD-10-CM

## 2020-01-26 DIAGNOSIS — I48.91 UNSPECIFIED ATRIAL FIBRILLATION: ICD-10-CM

## 2020-01-26 DIAGNOSIS — Z98.49 CATARACT EXTRACTION STATUS, UNSPECIFIED EYE: Chronic | ICD-10-CM

## 2020-01-26 DIAGNOSIS — K40.30 UNILATERAL INGUINAL HERNIA, WITH OBSTRUCTION, WITHOUT GANGRENE, NOT SPECIFIED AS RECURRENT: ICD-10-CM

## 2020-01-26 DIAGNOSIS — Z98.890 OTHER SPECIFIED POSTPROCEDURAL STATES: Chronic | ICD-10-CM

## 2020-01-26 DIAGNOSIS — Z90.89 ACQUIRED ABSENCE OF OTHER ORGANS: Chronic | ICD-10-CM

## 2020-01-26 DIAGNOSIS — I10 ESSENTIAL (PRIMARY) HYPERTENSION: ICD-10-CM

## 2020-01-26 NOTE — ED ADULT TRIAGE NOTE - CHIEF COMPLAINT QUOTE
pt presents to ED with complaints of constant generalized abdominal pain, nausea, and vomiting. pt states symptoms started this am.

## 2020-01-27 ENCOUNTER — RESULT REVIEW (OUTPATIENT)
Age: 85
End: 2020-01-27

## 2020-01-27 ENCOUNTER — APPOINTMENT (OUTPATIENT)
Age: 85
End: 2020-01-27

## 2020-01-27 DIAGNOSIS — K56.609 UNSPECIFIED INTESTINAL OBSTRUCTION, UNSPECIFIED AS TO PARTIAL VERSUS COMPLETE OBSTRUCTION: ICD-10-CM

## 2020-01-27 LAB
ALBUMIN SERPL ELPH-MCNC: 3.7 G/DL — SIGNIFICANT CHANGE UP (ref 3.3–5)
ALP SERPL-CCNC: 70 U/L — SIGNIFICANT CHANGE UP (ref 40–120)
ALT FLD-CCNC: 48 U/L — SIGNIFICANT CHANGE UP (ref 12–78)
ANION GAP SERPL CALC-SCNC: 7 MMOL/L — SIGNIFICANT CHANGE UP (ref 5–17)
APTT BLD: 32 SEC — SIGNIFICANT CHANGE UP (ref 27.5–36.3)
AST SERPL-CCNC: 22 U/L — SIGNIFICANT CHANGE UP (ref 15–37)
BASOPHILS # BLD AUTO: 0.01 K/UL — SIGNIFICANT CHANGE UP (ref 0–0.2)
BASOPHILS NFR BLD AUTO: 0.1 % — SIGNIFICANT CHANGE UP (ref 0–2)
BILIRUB SERPL-MCNC: 0.7 MG/DL — SIGNIFICANT CHANGE UP (ref 0.2–1.2)
BUN SERPL-MCNC: 37 MG/DL — HIGH (ref 7–23)
CALCIUM SERPL-MCNC: 9.3 MG/DL — SIGNIFICANT CHANGE UP (ref 8.5–10.1)
CHLORIDE SERPL-SCNC: 102 MMOL/L — SIGNIFICANT CHANGE UP (ref 96–108)
CO2 SERPL-SCNC: 26 MMOL/L — SIGNIFICANT CHANGE UP (ref 22–31)
CREAT SERPL-MCNC: 0.6 MG/DL — SIGNIFICANT CHANGE UP (ref 0.5–1.3)
EOSINOPHIL # BLD AUTO: 0 K/UL — SIGNIFICANT CHANGE UP (ref 0–0.5)
EOSINOPHIL NFR BLD AUTO: 0 % — SIGNIFICANT CHANGE UP (ref 0–6)
GLUCOSE SERPL-MCNC: 124 MG/DL — HIGH (ref 70–99)
HCT VFR BLD CALC: 37 % — SIGNIFICANT CHANGE UP (ref 34.5–45)
HGB BLD-MCNC: 11.9 G/DL — SIGNIFICANT CHANGE UP (ref 11.5–15.5)
IMM GRANULOCYTES NFR BLD AUTO: 0.6 % — SIGNIFICANT CHANGE UP (ref 0–1.5)
INR BLD: 1.05 RATIO — SIGNIFICANT CHANGE UP (ref 0.88–1.16)
INR BLD: 1.21 RATIO — HIGH (ref 0.88–1.16)
LACTATE SERPL-SCNC: 1.8 MMOL/L — SIGNIFICANT CHANGE UP (ref 0.7–2)
LIDOCAIN IGE QN: 180 U/L — SIGNIFICANT CHANGE UP (ref 73–393)
LYMPHOCYTES # BLD AUTO: 1.79 K/UL — SIGNIFICANT CHANGE UP (ref 1–3.3)
LYMPHOCYTES # BLD AUTO: 11.4 % — LOW (ref 13–44)
MCHC RBC-ENTMCNC: 29.5 PG — SIGNIFICANT CHANGE UP (ref 27–34)
MCHC RBC-ENTMCNC: 32.2 GM/DL — SIGNIFICANT CHANGE UP (ref 32–36)
MCV RBC AUTO: 91.6 FL — SIGNIFICANT CHANGE UP (ref 80–100)
MONOCYTES # BLD AUTO: 1.49 K/UL — HIGH (ref 0–0.9)
MONOCYTES NFR BLD AUTO: 9.5 % — SIGNIFICANT CHANGE UP (ref 2–14)
NEUTROPHILS # BLD AUTO: 12.26 K/UL — HIGH (ref 1.8–7.4)
NEUTROPHILS NFR BLD AUTO: 78.4 % — HIGH (ref 43–77)
PLATELET # BLD AUTO: 222 K/UL — SIGNIFICANT CHANGE UP (ref 150–400)
POTASSIUM SERPL-MCNC: 4.2 MMOL/L — SIGNIFICANT CHANGE UP (ref 3.5–5.3)
POTASSIUM SERPL-SCNC: 4.2 MMOL/L — SIGNIFICANT CHANGE UP (ref 3.5–5.3)
PROT SERPL-MCNC: 6.8 GM/DL — SIGNIFICANT CHANGE UP (ref 6–8.3)
PROTHROM AB SERPL-ACNC: 11.7 SEC — SIGNIFICANT CHANGE UP (ref 10–12.9)
PROTHROM AB SERPL-ACNC: 13.5 SEC — HIGH (ref 10–12.9)
RBC # BLD: 4.04 M/UL — SIGNIFICANT CHANGE UP (ref 3.8–5.2)
RBC # FLD: 13.9 % — SIGNIFICANT CHANGE UP (ref 10.3–14.5)
SODIUM SERPL-SCNC: 135 MMOL/L — SIGNIFICANT CHANGE UP (ref 135–145)
WBC # BLD: 15.65 K/UL — HIGH (ref 3.8–10.5)
WBC # FLD AUTO: 15.65 K/UL — HIGH (ref 3.8–10.5)

## 2020-01-27 PROCEDURE — 80162 ASSAY OF DIGOXIN TOTAL: CPT

## 2020-01-27 PROCEDURE — 85610 PROTHROMBIN TIME: CPT

## 2020-01-27 PROCEDURE — 93005 ELECTROCARDIOGRAM TRACING: CPT

## 2020-01-27 PROCEDURE — 74177 CT ABD & PELVIS W/CONTRAST: CPT | Mod: 26

## 2020-01-27 PROCEDURE — 88302 TISSUE EXAM BY PATHOLOGIST: CPT | Mod: 26

## 2020-01-27 PROCEDURE — 97163 PT EVAL HIGH COMPLEX 45 MIN: CPT | Mod: GP

## 2020-01-27 PROCEDURE — 85730 THROMBOPLASTIN TIME PARTIAL: CPT

## 2020-01-27 PROCEDURE — C9113: CPT

## 2020-01-27 PROCEDURE — 99223 1ST HOSP IP/OBS HIGH 75: CPT

## 2020-01-27 PROCEDURE — 88302 TISSUE EXAM BY PATHOLOGIST: CPT

## 2020-01-27 PROCEDURE — 97116 GAIT TRAINING THERAPY: CPT | Mod: GP

## 2020-01-27 PROCEDURE — 36415 COLL VENOUS BLD VENIPUNCTURE: CPT

## 2020-01-27 PROCEDURE — 74177 CT ABD & PELVIS W/CONTRAST: CPT

## 2020-01-27 PROCEDURE — 80053 COMPREHEN METABOLIC PANEL: CPT

## 2020-01-27 PROCEDURE — C1781: CPT

## 2020-01-27 PROCEDURE — 80048 BASIC METABOLIC PNL TOTAL CA: CPT

## 2020-01-27 PROCEDURE — 85027 COMPLETE CBC AUTOMATED: CPT

## 2020-01-27 PROCEDURE — 93010 ELECTROCARDIOGRAM REPORT: CPT

## 2020-01-27 PROCEDURE — C9132: CPT

## 2020-01-27 PROCEDURE — 74177 CT ABD & PELVIS W/CONTRAST: CPT | Mod: 26,77

## 2020-01-27 RX ORDER — DIGOXIN 250 MCG
0.1 TABLET ORAL ONCE
Refills: 0 | Status: COMPLETED | OUTPATIENT
Start: 2020-01-27 | End: 2020-01-27

## 2020-01-27 RX ORDER — SIMVASTATIN 20 MG/1
10 TABLET, FILM COATED ORAL AT BEDTIME
Refills: 0 | Status: DISCONTINUED | OUTPATIENT
Start: 2020-01-27 | End: 2020-01-27

## 2020-01-27 RX ORDER — APIXABAN 2.5 MG/1
2.5 TABLET, FILM COATED ORAL
Refills: 0 | Status: DISCONTINUED | OUTPATIENT
Start: 2020-01-27 | End: 2020-01-27

## 2020-01-27 RX ORDER — PANTOPRAZOLE SODIUM 20 MG/1
40 TABLET, DELAYED RELEASE ORAL DAILY
Refills: 0 | Status: DISCONTINUED | OUTPATIENT
Start: 2020-01-27 | End: 2020-01-29

## 2020-01-27 RX ORDER — MORPHINE SULFATE 50 MG/1
2 CAPSULE, EXTENDED RELEASE ORAL EVERY 4 HOURS
Refills: 0 | Status: DISCONTINUED | OUTPATIENT
Start: 2020-01-27 | End: 2020-01-27

## 2020-01-27 RX ORDER — FENTANYL CITRATE 50 UG/ML
25 INJECTION INTRAVENOUS
Refills: 0 | Status: DISCONTINUED | OUTPATIENT
Start: 2020-01-27 | End: 2020-01-28

## 2020-01-27 RX ORDER — DIGOXIN 250 MCG
0.12 TABLET ORAL DAILY
Refills: 0 | Status: DISCONTINUED | OUTPATIENT
Start: 2020-01-27 | End: 2020-01-27

## 2020-01-27 RX ORDER — DIGOXIN 250 MCG
0.1 TABLET ORAL DAILY
Refills: 0 | Status: DISCONTINUED | OUTPATIENT
Start: 2020-01-27 | End: 2020-01-27

## 2020-01-27 RX ORDER — MORPHINE SULFATE 50 MG/1
2 CAPSULE, EXTENDED RELEASE ORAL EVERY 4 HOURS
Refills: 0 | Status: DISCONTINUED | OUTPATIENT
Start: 2020-01-27 | End: 2020-01-29

## 2020-01-27 RX ORDER — OXYCODONE HYDROCHLORIDE 5 MG/1
5 TABLET ORAL ONCE
Refills: 0 | Status: DISCONTINUED | OUTPATIENT
Start: 2020-01-27 | End: 2020-01-27

## 2020-01-27 RX ORDER — SODIUM CHLORIDE 9 MG/ML
1000 INJECTION INTRAMUSCULAR; INTRAVENOUS; SUBCUTANEOUS
Refills: 0 | Status: DISCONTINUED | OUTPATIENT
Start: 2020-01-27 | End: 2020-01-27

## 2020-01-27 RX ORDER — KETOROLAC TROMETHAMINE 30 MG/ML
15 SYRINGE (ML) INJECTION EVERY 4 HOURS
Refills: 0 | Status: DISCONTINUED | OUTPATIENT
Start: 2020-01-27 | End: 2020-01-27

## 2020-01-27 RX ORDER — METOPROLOL TARTRATE 50 MG
5 TABLET ORAL EVERY 6 HOURS
Refills: 0 | Status: DISCONTINUED | OUTPATIENT
Start: 2020-01-27 | End: 2020-01-29

## 2020-01-27 RX ORDER — ONDANSETRON 8 MG/1
4 TABLET, FILM COATED ORAL ONCE
Refills: 0 | Status: DISCONTINUED | OUTPATIENT
Start: 2020-01-27 | End: 2020-01-28

## 2020-01-27 RX ORDER — SODIUM CHLORIDE 9 MG/ML
1000 INJECTION, SOLUTION INTRAVENOUS
Refills: 0 | Status: DISCONTINUED | OUTPATIENT
Start: 2020-01-27 | End: 2020-01-28

## 2020-01-27 RX ORDER — MORPHINE SULFATE 50 MG/1
2 CAPSULE, EXTENDED RELEASE ORAL ONCE
Refills: 0 | Status: DISCONTINUED | OUTPATIENT
Start: 2020-01-27 | End: 2020-01-27

## 2020-01-27 RX ORDER — ENOXAPARIN SODIUM 100 MG/ML
40 INJECTION SUBCUTANEOUS DAILY
Refills: 0 | Status: DISCONTINUED | OUTPATIENT
Start: 2020-01-27 | End: 2020-01-29

## 2020-01-27 RX ORDER — MORPHINE SULFATE 50 MG/1
4 CAPSULE, EXTENDED RELEASE ORAL ONCE
Refills: 0 | Status: DISCONTINUED | OUTPATIENT
Start: 2020-01-27 | End: 2020-01-27

## 2020-01-27 RX ORDER — METOPROLOL TARTRATE 50 MG
100 TABLET ORAL
Refills: 0 | Status: DISCONTINUED | OUTPATIENT
Start: 2020-01-27 | End: 2020-01-27

## 2020-01-27 RX ORDER — ONDANSETRON 8 MG/1
4 TABLET, FILM COATED ORAL ONCE
Refills: 0 | Status: COMPLETED | OUTPATIENT
Start: 2020-01-27 | End: 2020-01-27

## 2020-01-27 RX ORDER — PROTHROMBIN COMPLEX CONCENTRATE (HUMAN) 25.5; 16.5; 24; 22; 22; 26 [IU]/ML; [IU]/ML; [IU]/ML; [IU]/ML; [IU]/ML; [IU]/ML
2000 POWDER, FOR SOLUTION INTRAVENOUS ONCE
Refills: 0 | Status: COMPLETED | OUTPATIENT
Start: 2020-01-27 | End: 2020-01-27

## 2020-01-27 RX ORDER — SODIUM CHLORIDE 9 MG/ML
1000 INJECTION INTRAMUSCULAR; INTRAVENOUS; SUBCUTANEOUS ONCE
Refills: 0 | Status: COMPLETED | OUTPATIENT
Start: 2020-01-27 | End: 2020-01-27

## 2020-01-27 RX ADMIN — Medication 100 MILLIGRAM(S): at 09:56

## 2020-01-27 RX ADMIN — Medication 15 MILLIGRAM(S): at 04:34

## 2020-01-27 RX ADMIN — Medication 15 MILLIGRAM(S): at 10:25

## 2020-01-27 RX ADMIN — MORPHINE SULFATE 2 MILLIGRAM(S): 50 CAPSULE, EXTENDED RELEASE ORAL at 16:35

## 2020-01-27 RX ADMIN — MORPHINE SULFATE 4 MILLIGRAM(S): 50 CAPSULE, EXTENDED RELEASE ORAL at 02:21

## 2020-01-27 RX ADMIN — MORPHINE SULFATE 2 MILLIGRAM(S): 50 CAPSULE, EXTENDED RELEASE ORAL at 17:05

## 2020-01-27 RX ADMIN — Medication 40 MILLIGRAM(S): at 14:53

## 2020-01-27 RX ADMIN — Medication 0.12 MILLIGRAM(S): at 07:06

## 2020-01-27 RX ADMIN — MORPHINE SULFATE 2 MILLIGRAM(S): 50 CAPSULE, EXTENDED RELEASE ORAL at 00:18

## 2020-01-27 RX ADMIN — MORPHINE SULFATE 4 MILLIGRAM(S): 50 CAPSULE, EXTENDED RELEASE ORAL at 02:50

## 2020-01-27 RX ADMIN — PANTOPRAZOLE SODIUM 40 MILLIGRAM(S): 20 TABLET, DELAYED RELEASE ORAL at 14:54

## 2020-01-27 RX ADMIN — MORPHINE SULFATE 2 MILLIGRAM(S): 50 CAPSULE, EXTENDED RELEASE ORAL at 00:40

## 2020-01-27 RX ADMIN — SODIUM CHLORIDE 100 MILLILITER(S): 9 INJECTION INTRAMUSCULAR; INTRAVENOUS; SUBCUTANEOUS at 03:59

## 2020-01-27 RX ADMIN — Medication 15 MILLIGRAM(S): at 09:55

## 2020-01-27 RX ADMIN — SODIUM CHLORIDE 100 MILLILITER(S): 9 INJECTION INTRAMUSCULAR; INTRAVENOUS; SUBCUTANEOUS at 12:29

## 2020-01-27 RX ADMIN — SODIUM CHLORIDE 1000 MILLILITER(S): 9 INJECTION INTRAMUSCULAR; INTRAVENOUS; SUBCUTANEOUS at 01:18

## 2020-01-27 RX ADMIN — ONDANSETRON 4 MILLIGRAM(S): 8 TABLET, FILM COATED ORAL at 00:18

## 2020-01-27 RX ADMIN — ENOXAPARIN SODIUM 40 MILLIGRAM(S): 100 INJECTION SUBCUTANEOUS at 12:29

## 2020-01-27 RX ADMIN — PROTHROMBIN COMPLEX CONCENTRATE (HUMAN) 400 INTERNATIONAL UNIT(S): 25.5; 16.5; 24; 22; 22; 26 POWDER, FOR SOLUTION INTRAVENOUS at 15:37

## 2020-01-27 RX ADMIN — Medication 15 MILLIGRAM(S): at 05:00

## 2020-01-27 RX ADMIN — SODIUM CHLORIDE 1000 MILLILITER(S): 9 INJECTION INTRAMUSCULAR; INTRAVENOUS; SUBCUTANEOUS at 00:18

## 2020-01-27 NOTE — BRIEF OPERATIVE NOTE - OPERATION/FINDINGS
Left Incarcerated Direct Inguinal Hernia, containing loop of dusky small bowel.  Reduced Laparoscopically.  Open left inguinal hernia repaired with mesh.   Re-eval laparoscopic look at bowel showed peristalsis and better color. Left Incarcerated indirect Inguinal Hernia, containing loop of dusky small bowel.  Reduced Laparoscopically.  Open left inguinal hernia repaired with mesh.   Re-eval laparoscopic look at bowel showed peristalsis and better color. Left Incarcerated direct Inguinal Hernia, defect medial to epigastric vessels but higher then where a femoral defect would be. containing loop of dusky small bowel.  Reduced Laparoscopically.  Open left inguinal hernia repaired with mesh. diffuse edema odleft groin, Pt had recent left groin LN dissection and had scaring, limiting the evaluation for out side femoral sac or defect also very oozy because eo being on Eliquis did not appreciate a medial bulge, there was obvious direct weakness of inguinal floor and we also found a small sac like herniation at deep ring.  Re-eval laparoscopic look at bowel showed peristalsis and better color.

## 2020-01-27 NOTE — PROGRESS NOTE ADULT - SUBJECTIVE AND OBJECTIVE BOX
Patient is a 84y old  Female who presents with a chief complaint of abd pain (27 Jan 2020 14:51)      HPI:  85 yo female c/o abdominal pain, diffuse, since early afternoon.  Cramping in nature.   +nausea, vomited x 1.  No diarrhea.  Had  a BM this morning, soft but formed. Passing gas today.  Nonsmoker, nondrinker.  No previous abdominal surgeries.  On prednisone x 8 days for low cortisol levels.    ED attempted to reduce left inguinal hernia, however unsuccessful. Pt given ice packs, morphine and placed in trendelenberg position. Patient then stating that her pain is gone. Patients hernia then able to be reduced in ED.  11/27: Pt seen and examined, still has abdominal soreness, left groin pain less then before, no nausea but feels distended, and has not passed any gas or had a BM.     ROS:.  [X] A ten-point review of systems was otherwise negative except as noted.  Systemic:	[ ] Fever	[ ] Chills	[ ] Night sweats    [ ] Fatigue	[ ] Other  [] Cardiovascular:  [] Pulmonary:  [] Renal/Urologic:  [] Gastrointestinal: abdominal pain, vomiting  [] Metabolic:  [] Neurologic:  [] Hematologic:  [] ENT:  [] Ophthalmologic:  [] Musculoskeletal:    [ ] Due to altered mental status/intubation, subjective information were not able to be obtained from the patient. History was obtained, to the extent possible, from review of the chart and collateral sources of information.    All other system review is negative .  PAST MEDICAL & SURGICAL HISTORY:  HLD (hyperlipidemia)  HTN (hypertension)  Pacemaker  Atrial fibrillation  History of melanoma excision  H/O cataract extraction: both eyes  S/P tonsillectomy and adenoidectomy    FAMILY HISTORY:  No pertinent family history in first degree relatives    Social History:     Alcohol: Denied  Smoking: Denied  Drug Use: Denied        Vital Signs Last 24 Hrs  T(C): 36.6 (27 Jan 2020 11:45), Max: 36.8 (26 Jan 2020 23:47)  T(F): 97.8 (27 Jan 2020 11:45), Max: 98.3 (27 Jan 2020 03:24)  HR: 68 (27 Jan 2020 11:45) (68 - 98)  BP: 109/52 (27 Jan 2020 11:45) (109/52 - 150/92)  BP(mean): 65 (27 Jan 2020 11:45) (65 - 65)  RR: 17 (27 Jan 2020 11:45) (17 - 20)  SpO2: 99% (27 Jan 2020 11:45) (97% - 100%)    I&O's Summary    27 Jan 2020 07:01  -  27 Jan 2020 15:36  --------------------------------------------------------  IN: 900 mL / OUT: 0 mL / NET: 900 mL        PHYSICAL EXAM:  Constitutional: NAD, GCS: 15/15  AOX3  Eyes:  WNL  ENMT:  WNL  Neck:  WNL, non tender  Back: Non tender  Respiratory: CTABL  Cardiovascular:  S1+S2+0  Gastrointestinal: Soft, distended, diffuse soreness, no rebound or guarding  Left groin bulge not fully reducible. No skin changes.   Genitourinary:  WNL  Extremities: NV intact  Vascular:  Intact  Neurological: No focal neurological deficit,  CN, motor and sensory system grossly intact.  Skin: WNL  Musculoskeletal: WNL  Psychiatric: Grossly WNL        Labs:                          11.9   15.65 )-----------( 222      ( 27 Jan 2020 00:07 )             37.0       01-27    135  |  102  |  37<H>  ----------------------------<  124<H>  4.2   |  26  |  0.60    Ca    9.3      27 Jan 2020 00:07    TPro  6.8  /  Alb  3.7  /  TBili  0.7  /  DBili  x   /  AST  22  /  ALT  48  /  AlkPhos  70  01-27      PT/INR - ( 27 Jan 2020 14:19 )   PT: 13.5 sec;   INR: 1.21 ratio         PTT - ( 27 Jan 2020 14:19 )  PTT:32.0 sec  Lactate, Blood: 1.8 mmol/L (01.27.20 @ 00:07)    < from: CT Abdomen and Pelvis w/ IV Cont (01.27.20 @ 12:59) >  FINDINGS:    LOWER CHEST: Developing bilateral pleural effusions, larger on the right, with underlying compressive atelectasis of lower lobes. Marked cardiomegaly. Pacemaker in situ.    LIVER: Hepatic cysts and hepatic hypodensities unchanged.  BILE DUCTS: Normal caliber.  GALLBLADDER: Vicarious excretion of contrast into the gallbladder lumen.  SPLEEN: Within normal limits.  PANCREAS: Within normal limits.  ADRENALS: Within normal limits.  KIDNEYS/URETERS: Within normal limits.    BLADDER: Collapsed  REPRODUCTIVE ORGANS: Unremarkable uterus.    BOWEL: Abnormally distended proximal and mid small bowel loops with air-fluid levels measuring up to 3.3 cm in caliber. Transition point appears to be at the left inguinal hernia where there is a knuckle of small bowel. A few collapsed loops are present distal to the hernia. Appendix is normal.. Mild retained fecal matter throughout the colon.  PERITONEUM: Increasing ascites.  VESSELS: Within normal limits.  RETROPERITONEUM/LYMPH NODES: No lymphadenopathy.    ABDOMINAL WALL: Left inguinal hernia containing ascites and a knuckle of small bowel.  BONES: There are at the lumbar degenerative changes. Grade 1 L4-5 anterolisthesis. Osteopenia.    IMPRESSION:     Since the previous examination approximately 12 hours prior, bilateral pleural effusions have developed and ascites is increasing.      < end of copied text >  < from: CT Abdomen and Pelvis w/ IV Cont (01.27.20 @ 12:59) >  Persistent distal small bowel obstruction secondary to probable incarcerated left inguinal hernia.    Pacemaker. Cardiomegaly.      < end of copied text >

## 2020-01-27 NOTE — ED PROVIDER NOTE - OBJECTIVE STATEMENT
85 yo female c/o abdominal pain, diffuse, since early afternoon.  Cramping in nature.   +nausea, vomited x 1.  No diarrhea.  Had  a BM this morning, soft but formed. Passing gas today.  Nonsmoker, nondrinker.  No previous abdominal surgeries.  On prednisone x 8 days for low cortisol levels.

## 2020-01-27 NOTE — DIETITIAN INITIAL EVALUATION ADULT. - ADD RECOMMEND
1) Advance diet when feasible to fulls>low fiber. 2) monitor weights daily 3) Gelatein plus jello po TID (60 gm protein). 4) monitor labs/lytes and replete prn. 5) PTA pt with inadequate protein intake to promote wound healing-recommend increasing protein enriched foods in po diet when advanced. 6) Suggest add MVI w/minerals, Vit C 500 mg BID, add Zinc Sulfate 220 mg x 10 days to promote wound healing.

## 2020-01-27 NOTE — ED PROVIDER NOTE - PROGRESS NOTE DETAILS
results of CT d/w pt.  inguinal hernia tender, firm, not reducible.  Ice placed.  morphine given. d/w surgical resident will see patient hernia improved, seen by surgical resident.  will admit to dr ahumada service for observation orders placed by surgery prior to bridge orders placed in ED

## 2020-01-27 NOTE — DIETITIAN INITIAL EVALUATION ADULT. - OTHER INFO
85 yo female c/o abdominal pain, diffuse, since early afternoon.  Cramping in nature.   +nausea, vomited x 1.  No diarrhea.  Had  a BM this morning, soft but formed. Passing gas today.  Nonsmoker, nondrinker.  No previous abdominal surgeries.  On prednisone x 8 days for low cortisol levels. Pt with small bowel obstruction  and small left hernia. ED attempted to reduce left inguinal hernia however temporary possible surgery needed. Spoke with surgeon (Dr. Abernathy ) pending repeat CT scan- pending results surgical intervention if bowel remains obstructed. Last bowel movement on 1/26/20 before admission. Pt remains NPO currently, if CT results with no Bowel obstruction then as per surgery diet will be advanced. Pt with no difficulty chewing or swallowing. Pt appears thin and frail. Pt with + weight loss x 4 months 7% (non significant). Currently receiving immuno therapy for left ankle s/p melanoma excision. Will continue to monitor and suggest advancing po diet when medically feasible. Pt meets criteria for severe protein/calorie malnutrition in context of acute on chronic illness. See below for suggestions.

## 2020-01-27 NOTE — BRIEF OPERATIVE NOTE - NSICDXBRIEFPROCEDURE_GEN_ALL_CORE_FT
PROCEDURES:  Diagnostic laparoscopy 27-Jan-2020 20:16:04  SARITA FOOTE  Initial repair of incarcerated inguinal hernia in patient 5 years of age or older 27-Jan-2020 20:15:50  SARITA FOOTE

## 2020-01-27 NOTE — H&P ADULT - NSICDXPASTSURGICALHX_GEN_ALL_CORE_FT
PAST SURGICAL HISTORY:  H/O cataract extraction both eyes    History of melanoma excision     S/P tonsillectomy and adenoidectomy

## 2020-01-27 NOTE — DIETITIAN INITIAL EVALUATION ADULT. - PERTINENT MEDS FT
MEDICATIONS  (STANDING):  digoxin     Tablet 0.125 milliGRAM(s) Oral daily  enoxaparin Injectable 40 milliGRAM(s) SubCutaneous daily  metoprolol tartrate 100 milliGRAM(s) Oral two times a day  simvastatin 10 milliGRAM(s) Oral at bedtime  sodium chloride 0.9%. 1000 milliLiter(s) (100 mL/Hr) IV Continuous <Continuous>    MEDICATIONS  (PRN):  ketorolac   Injectable 15 milliGRAM(s) IV Push every 4 hours PRN Moderate Pain (4 - 6)

## 2020-01-27 NOTE — H&P ADULT - NSHPLABSRESULTS_GEN_ALL_CORE
Complete Blood Count + Automated Diff (01.27.20 @ 00:07)    WBC Count: 15.65 K/uL    RBC Count: 4.04 M/uL    Hemoglobin< from: CT Abdomen and Pelvis w/ IV Cont (01.27.20 @ 01:26) >      EXAM:  CT ABDOMEN AND PELVIS IC                            PROCEDURE DATE:  01/27/2020          INTERPRETATION:  CLINICAL INFORMATION: Abdominal pain, nausea, vomiting.    COMPARISON: 6/15/2019.    PROCEDURE:   CT of the Abdomen and Pelvis was performed with intravenous contrast.   Intravenous contrast: 90 ml Omnipaque 350. 10 ml discarded.  Oral contrast: None.  Sagittal and coronal reformats were performed.    FINDINGS:    LOWER CHEST: Mild interlobular septal thickening at the lung bases. Bibasilar dependent and platelike atelectasis. Trace bilateral pleural effusions. Marked cardiomegaly. Small pericardial effusion. Pacemaker lead in the right ventricle.    LIVER: Mild periportal edema. Few small hepatic cysts and subcentimeter low-attenuation lesions that are too small to characterize.  BILE DUCTS: Normal caliber.  GALLBLADDER: Within normal limits.  SPLEEN: Within normal limits.  PANCREAS: Within normal limits.  ADRENALS: Within normal limits.  KIDNEYS/URETERS: Kidneys enhance symmetrically without hydronephrosis or focal renal parenchymal lesion.    BLADDER: Under distended.  REPRODUCTIVE ORGANS: Few small calcified uterine fibroids. Adnexa are grossly unremarkable.    BOWEL: Numerous dilated fluid and air-filled small bowel loops throughout the abdomen with distal fecalization and transition point at a small left inguinal hernia containing a short segment of small bowel. Small volume of fluid in the hernia sac. Distal small bowel loops are relatively decompressed. Appendix is normal. Colonic diverticulosis without evidence of diverticulitis.  PERITONEUM: Associated mesenteric edema and small volume ascites including a small volume of fluid in the left inguinal hernia sac. No pneumoperitoneum. No mesenteric/portal venous gas.  VESSELS: Atherosclerotic calcifications of the aortoiliac tree and abdominal aortic branches. Normal caliber abdominal aorta.  RETROPERITONEUM/LYMPH NODES: No significant retroperitoneal lymphadenopathy.    ABDOMINAL WALL: Generalized soft tissue edema and left inguinal hernia as described above.  BONES: Bones are demineralized with multilevel degenerative changes of the spine.    IMPRESSION:     Small bowel obstruction with transition point at a small left inguinal hernia containing a short segment of small bowel. Associated mesenteric edema and small volume ascites including fluid in the hernia sac.                    LOVLEEN ULI D.O. ATTENDING RADIOLOGIST  This document has been electronically signed. Jan 27 2020  1:57AM                < end of copied text >    : 11.9 g/dL    Hematocrit: 37.0 %    Mean Cell Volume: 91.6 fl    Mean Cell Hemoglobin: 29.5 pg    Mean Cell Hemoglobin Conc: 32.2 gm/dL    Red Cell Distrib Width: 13.9 %    Platelet Count - Automated: 222 K/uL

## 2020-01-27 NOTE — H&P ADULT - HISTORY OF PRESENT ILLNESS
85 yo female c/o abdominal pain, diffuse, since early afternoon.  Cramping in nature.   +nausea, vomited x 1.  No diarrhea.  Had  a BM this morning, soft but formed. Passing gas today.  Nonsmoker, nondrinker.  No previous abdominal surgeries.  On prednisone x 8 days for low cortisol levels.    ED attempted to reduce left inguinal hernia, however unsuccessful. Pt given ice packs, morphine and placed in trendelenberg position. Patient then stating that her pain is gone. Patients hernia then able to be reduced in ED.      PAST MEDICAL/SURGICAL/FAMILY/SOCIAL HISTORY:    Past Medical History:  Atrial fibrillation    HLD (hyperlipidemia)    HTN (hypertension)    Pacemaker.     Past Surgical History:  H/O cataract extraction  both eyes  History of melanoma excision    S/P tonsillectomy and adenoidectomy.

## 2020-01-27 NOTE — ED ADULT NURSE NOTE - OBJECTIVE STATEMENT
pt presents to the ED with abdominal pain that started earlier today, pt states she is unable to keep down any food or liquids and has been forcing herself to vomit to help relieve the pain, pt states she did not take any medication PTA, pt states she is on immunotherapy for a melanoma in her foot and had her last transfusion in december, pt denies chest pain, SOB, LOPEZ, fevers, sick contacts, last BM this morning

## 2020-01-27 NOTE — H&P ADULT - NSHPPHYSICALEXAM_GEN_ALL_CORE
Vital Signs Last 24 Hrs  T(C): 36.8 (26 Jan 2020 23:47), Max: 36.8 (26 Jan 2020 23:47)  T(F): 98.2 (26 Jan 2020 23:47), Max: 98.2 (26 Jan 2020 23:47)  HR: 85 (27 Jan 2020 02:17) (85 - 88)  BP: 149/84 (27 Jan 2020 02:17) (149/84 - 150/92)  BP(mean): --  RR: 20 (27 Jan 2020 02:17) (18 - 20)  SpO2: 97% (27 Jan 2020 02:17) (97% - 100%)    PHYSICAL EXAM:   · CONSTITUTIONAL: Well appearing, awake, alert, oriented to person, place, time/situation; appears uncomfortable  · ENMT: Airway patent, Nasal mucosa clear. Mouth with normal mucosa.  · EYES: Clear bilaterally, pupils equal, round and reactive to light.  · CARDIAC: Normal rate, regular rhythm.  · RESPIRATORY: Breath sounds clear and equal bilaterally.  · GASTROINTESTINAL: Abdomen soft, diffusely tender, distended.  No r/g, no palpable inguinal hernia on examination  · MUSCULOSKELETAL: Spine appears normal, range of motion is not limited, no muscle or joint tenderness  · NEUROLOGICAL: Alert and oriented, no focal deficits, no motor or sensory deficits.  · SKIN: Skin normal color for race, warm, dry and intact. No evidence of rash.  · PSYCHIATRIC: Alert and oriented to person, place, time/situation. normal mood and affect. no apparent risk to self or others.

## 2020-01-27 NOTE — PROVIDER CONTACT NOTE (OTHER) - SITUATION
spoke with Dr. Mae, she will try for consult today, if patient is going to OR today we should ask hospitalist up on unit. if surgery is not today she will come to see patient

## 2020-01-27 NOTE — CHART NOTE - NSCHARTNOTEFT_GEN_A_CORE
Upon Nutritional Assessment by the Registered Dietitian your patient was determined to meet criteria / has evidence of the following diagnosis/diagnoses:          [ ]  Mild Protein Calorie Malnutrition        [ ]  Moderate Protein Calorie Malnutrition        [x ] Severe Protein Calorie Malnutrition        [ ] Unspecified Protein Calorie Malnutrition        [ ] Underweight / BMI <19        [ ] Morbid Obesity / BMI > 40      Findings as based on:  •  Comprehensive nutrition assessment and consultation  •  Calorie counts (nutrient intake analysis)  •  Food acceptance and intake status from observations by staff  •  Follow up  •  Patient education  •  Intervention secondary to interdisciplinary rounds  •   concerns      *********Malnutrition severe protein calorie malnutrition in context of acute on chronic illness.   Etiology related to inadequate po calorie/protein intake to meet ENN 2/2 SBO w/ Hernia.   Signs/Symptoms NPO with poor intake x 2 days, weight loss, severe/moderate muscle/fat wasting, unhealing wound.   Goal/Expected Outcome optimal po intake w/ increased protein to meet ENN. Reduced s/s of malnutrition. Healing wound.    · Additional Recommendations  1) Advance diet when feasible to fulls>low fiber.   2) monitor weights daily   3) Gelatein plus jello po TID (60 gm protein).   4) monitor labs/lytes and replete prn.   5) PTA pt with inadequate protein intake to promote wound healing-recommend increasing protein enriched foods in po diet when advanced.   6) Suggest add MVI w/minerals, Vit C 500 mg BID, add Zinc Sulfate 220 mg x 10 days to promote wound healing.          PROVIDER Section:     By signing this assessment you are acknowledging and agree with the diagnosis/diagnoses assigned by the Registered Dietitian    Comments:

## 2020-01-27 NOTE — DIETITIAN INITIAL EVALUATION ADULT. - PHYSICAL APPEARANCE
other (specify)/underweight NFPE Findings:  MUSCLE WASTING: Severe: (x  )Temporalis ( x ) Clavicle (x  ) Deltoid ( x )Scapula   ( x ) Interosseous   FAT LOSS: Severe: (x  ) Ribs ( x ) Triceps Moderate: (x  ) Ocular ( x ) Buccal   Skin: no pressure injuries, (+) wound skin graft with no edema documented. David score-21 (low risk for skin breakdown)

## 2020-01-27 NOTE — PROGRESS NOTE ADULT - ASSESSMENT
84 Y old female with multiple medical problems, on A/C  , on steroids and immunotherapy with SBO due to incarcerated left inguinal hernia.  Th hernia was partially reduced overnight, she still has SBO, last Eliquis was yesterday morning, she is a high risk for perioperative  morbidity, mortality, and complications of bleeding, poor healing , anastomotic leak but considering he incarcerated hernia, not fully reducible even this am  bowel is risk of necrosis, perforation , which can be fatal as well, discussed at length with Pt and family medicine, cardiology , will plan for diagnostic laparoscopy repair, possible laparotomy, bowel resection or ostomy all risk benefits and alternatives discussed.  Risk of cardio respiratory event , ICU monitoring also discussed. Informed consent obtained  NPO   IV hydration  NGT  Medicine consult  Cardiology consult  K centra in hope to help her coagulate better for surgery , dose discussed with pharmacy   Pt booked awaiting OR.

## 2020-01-27 NOTE — PATIENT PROFILE ADULT - NSPROHMONCTXGIVEN_GEN_A_NUR
immunotherarpy received one a month, and January appt was postponed because of her cortisol level and she is supposed to follow-up with an endocrinologist, on Feb 5th.

## 2020-01-27 NOTE — CONSULT NOTE ADULT - ASSESSMENT
A/P: 84F p/w abd pain.    1. Preoperative optimization. As per surgery, pt will need emergent surgery for incarcerated hernia.  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 Her last dose was yesterday AM.  I would usually wait 48hrs prior to any surgery- but since this surgery is emergent- she will be taken to OR.  Cont Bbl preoperatively.  Pt is moderate risk for surgery, but a higher bleeding risk with recent eliquis use.  As per surgery, this is an emergency procedure.    2. Atrial fibrillation. Cont rate control strategy with Bbl. No LTA for now.    3. Recent cardiac testing. Last ischemic eval 2/17 that was negative for ischemia.  2Decho 8/17 with Nl LV fxn, mod-sev MR.    4. HTN. BP is stable.     5. DVT proph. Keep NPO as pt will be going to OR.

## 2020-01-27 NOTE — ED PROVIDER NOTE - CONSTITUTIONAL, MLM
normal... Well appearing, awake, alert, oriented to person, place, time/situation; appears uncomfortable

## 2020-01-27 NOTE — DIETITIAN INITIAL EVALUATION ADULT. - PERTINENT LABORATORY DATA
01-27 Na135 mmol/L Glu 124 mg/dL<H> K+ 4.2 mmol/L Cr  0.60 mg/dL BUN 37 mg/dL<H> Phos n/a   Alb 3.7 g/dL PAB n/a

## 2020-01-27 NOTE — CONSULT NOTE ADULT - SUBJECTIVE AND OBJECTIVE BOX
Cardiology Consultation    HPI: 85 yo female c/o abdominal pain, diffuse, since early afternoon.  Cramping in nature.   +nausea, vomited x 1.  No diarrhea.  Had  a BM this morning, soft but formed. Passing gas today.  Nonsmoker, nondrinker.  No previous abdominal surgeries.  On prednisone x 8 days for low cortisol levels.  ED attempted to reduce left inguinal hernia, however unsuccessful. Pt given ice packs, morphine and placed in trendelenberg position. Patient then stating that her pain is gone. Patients hernia then able to be reduced in ED.    1/27. Pt seen examined on 5S.   D/w surgery- as per surgery, pt will need emergency surgery for incarcerated hernia, worsening SBO.  Case d/w pt and son. No CP/SOB. Not on tele.   +Abd pain.    PAST MEDICAL/SURGICAL/FAMILY/SOCIAL HISTORY:    Past Medical History:  Atrial fibrillation    HLD (hyperlipidemia)    HTN (hypertension)    Pacemaker.     Past Surgical History:  H/O cataract extraction  both eyes  History of melanoma excision    S/P tonsillectomy and adenoidectomy. (27 Jan 2020 02:52)    PAST MEDICAL & SURGICAL HISTORY:  HLD (hyperlipidemia)  HTN (hypertension)  Pacemaker  Atrial fibrillation  History of melanoma excision  H/O cataract extraction: both eyes  S/P tonsillectomy and adenoidectomy    Allergies  penicillins (Hives)    SOCIAL HISTORY: Denies tobacco, etoh abuse or illicit drug use    FAMILY HISTORY:  No pertinent family history in first degree relatives    MEDICATIONS  (STANDING):  digoxin     Tablet 0.125 milliGRAM(s) Oral daily  enoxaparin Injectable 40 milliGRAM(s) SubCutaneous daily  methylPREDNISolone sodium succinate Injectable 40 milliGRAM(s) IV Push daily  metoprolol tartrate 100 milliGRAM(s) Oral two times a day  pantoprazole  Injectable 40 milliGRAM(s) IV Push daily  prothrombin complex concentrate IVPB (KCENTRA) 2000 International Unit(s) IV Intermittent once  simvastatin 10 milliGRAM(s) Oral at bedtime  sodium chloride 0.9%. 1000 milliLiter(s) (100 mL/Hr) IV Continuous <Continuous>    MEDICATIONS  (PRN):  ketorolac   Injectable 15 milliGRAM(s) IV Push every 4 hours PRN Moderate Pain (4 - 6)    Vital Signs Last 24 Hrs  T(C): 36.6 (27 Jan 2020 11:45), Max: 36.8 (26 Jan 2020 23:47)  T(F): 97.8 (27 Jan 2020 11:45), Max: 98.3 (27 Jan 2020 03:24)  HR: 68 (27 Jan 2020 11:45) (68 - 98)  BP: 109/52 (27 Jan 2020 11:45) (109/52 - 150/92)  BP(mean): 65 (27 Jan 2020 11:45) (65 - 65)  RR: 17 (27 Jan 2020 11:45) (17 - 20)  SpO2: 99% (27 Jan 2020 11:45) (97% - 100%)    REVIEW OF SYSTEMS:    CONSTITUTIONAL:  As per HPI.  HEENT:  Eyes:  No diplopia or blurred vision. ENT:  No earache, sore throat or runny nose.  CARDIOVASCULAR:  No pressure, squeezing, strangling, tightness, heaviness or aching about the chest, neck, axilla or epigastrium.  RESPIRATORY:  No cough, shortness of breath, PND or orthopnea.  GASTROINTESTINAL:  +abd pain, distension.  GENITOURINARY:  No dysuria, frequency or urgency.  MUSCULOSKELETAL:  As per HPI.  SKIN:  No change in skin, hair or nails.  NEUROLOGIC:  No paresthesias, fasciculations, seizures or weakness.    PHYSICAL EXAMINATION:    GENERAL APPEARANCE:  Pt. is not currently dyspneic, in no distress. Pt. is alert, oriented, and pleasant.  HEENT:  Pupils are normal and react normally. No icterus. Mucous membranes well colored.  NECK:  Supple. No lymphadenopathy. Jugular venous pressure not elevated. Carotids equal.   HEART:   The cardiac impulse has a normal quality. There are no murmurs, rubs or gallops noted. Irregular rhythm  CHEST:  Chest is clear to auscultation. Normal respiratory effort.  ABDOMEN:  Soft and nontender.   EXTREMITIES:  There is no edema.     I&O's Summary    27 Jan 2020 07:01  -  27 Jan 2020 14:53  --------------------------------------------------------  IN: 900 mL / OUT: 0 mL / NET: 900 mL    LABS:                        11.9   15.65 )-----------( 222      ( 27 Jan 2020 00:07 )             37.0     01-27    135  |  102  |  37<H>  ----------------------------<  124<H>  4.2   |  26  |  0.60    Ca    9.3      27 Jan 2020 00:07    TPro  6.8  /  Alb  3.7  /  TBili  0.7  /  DBili  x   /  AST  22  /  ALT  48  /  AlkPhos  70  01-27    LIVER FUNCTIONS - ( 27 Jan 2020 00:07 )  Alb: 3.7 g/dL / Pro: 6.8 gm/dL / ALK PHOS: 70 U/L / ALT: 48 U/L / AST: 22 U/L / GGT: x           EKG: A sensed V paced @ 69.    TELEMETRY: Not on tele.     CARDIAC TESTS: pending    RADIOLOGY & ADDITIONAL STUDIES:   < from: CT Abdomen and Pelvis w/ IV Cont (01.27.20 @ 12:59) >  Since the previous examination approximately 12 hours prior, bilateral pleural effusions have developed and ascites is increasing.  Persistent distal small bowel obstruction secondary to probable incarcerated left inguinal hernia.  Pacemaker. Cardiomegaly.    ASSESSMENT & PLAN:

## 2020-01-27 NOTE — CONSULT NOTE ADULT - ASSESSMENT
84/F with PMHx of HTN, A fib, s/p PPM on Eliquis, HTN, HLD, Melanoma s/p Sx now on immunotherapy leading to Adrenal insufficiency on prednisone 20 mg orally BID, was brought to ED for c/o Abd pain and admitted under Sx service for SBO and ? incarcerated left inguinal Hernia. Repeat CT abdo shows worsening ascites and SBO with incarcerated left inguinal hernia.     Pt denies any BM or flatus.      Pt admitted with :    1) SBO + ? Incarcerated Left Inguinal Hernia: admitted to Sx service  NPO except meds  Eliquis on hold  iv fluids  would need cardiac clearance prior to Sx; cardio consult requested  serial abd exams  add iv Protonix as pt NPO    2) Hx of A Fib:   Eliquis on hold  no heparin drip as per Sx  cardio consult requested  cont BB  cont dog; check Dig level    3) HX of recent Adrenal Insufficiency sec to immunotherapy : on prednisone 20 mg BID  would need stress doses if going for Sx, which seems most likely at this time  would request endo consult also Mx of immunotherapy induced adrenal insufficiency  would switch to iv steroid for now     4) PPM: cont supportive care    5) HTN: cont BB    6) HLD: cont statin    7) DVT PPX: on lovenox    poc discussed with pt, her son at bedside, team, Dr. William.     time spent 90 min

## 2020-01-27 NOTE — ED ADULT NURSE NOTE - CHPI ED NUR SYMPTOMS NEG
no burning urination/no dysuria/no hematuria/no chills/no fever/no abdominal distension/no blood in stool/no diarrhea

## 2020-01-27 NOTE — CONSULT NOTE ADULT - SUBJECTIVE AND OBJECTIVE BOX
84/F with PMHx of HTN, A fib, s/p PPM on Eliquis, HTN, HLD, Melanoma s/p Sx now on immunotherapy leading to Adrenal insufficiency on prednisone 20 mg orally BID, was brought to ED for c/o Abd pain and admitted under Sx service for SBO and ? incarcerated left inguinal Hernia. Repeat CT abdo shows worsening ascites and SBO with incarcerated left inguinal hernia.     Pt denies any BM or flatus.    PMHX: a above; no known CAD, DM  PSHx: as above  Family Hx: no Melanoma  Social HX: non smoker; drinks wine socially    ROS: All 10 systems reviewed; apart from mentioned above everything else is neg.      PHYSICAL EXAM:    Daily Height in cm: 162.56 (2020 23:47)    Daily Weight in k.2 (2020 11:24)    ICU Vital Signs Last 24 Hrs  T(C): 36.6 (2020 11:45), Max: 36.8 (2020 23:47)  T(F): 97.8 (2020 11:45), Max: 98.3 (2020 03:24)  HR: 68 (2020 11:45) (68 - 98)  BP: 109/52 (2020 11:45) (109/52 - 150/92)  BP(mean): 65 (2020 11:45) (65 - 65)  ABP: --  ABP(mean): --  RR: 17 (2020 11:45) (17 - 20)  SpO2: 99% (2020 11:45) (97% - 100%)      Constitutional: Well appearing  HEENT: Atraumatic, JOEL, Normal, No congestion  Respiratory: Breath Sounds normal, no rhonchi/wheeze  Cardiovascular: N S1S2; LITO present  Gastrointestinal: Abdomen soft, non tender, Bowel Sounds present  Extremities: No edema, peripheral pulses present  Neurological: AAO x 3, no gross focal motor deficits  Skin: Non cellulitic, no rash, ulcers  Lymph Nodes: No lymphadenopathy noted  Back: No CVA tenderness   Musculoskeletal: non tender  Breasts: Deferred  Genitourinary: deferred  Rectal: Deferred                          11.9   15.65 )-----------( 222      ( 2020 00:07 )             37.0       CBC Full  -  ( 2020 00:07 )  WBC Count : 15.65 K/uL  RBC Count : 4.04 M/uL  Hemoglobin : 11.9 g/dL  Hematocrit : 37.0 %  Platelet Count - Automated : 222 K/uL  Mean Cell Volume : 91.6 fl  Mean Cell Hemoglobin : 29.5 pg  Mean Cell Hemoglobin Concentration : 32.2 gm/dL  Auto Neutrophil # : 12.26 K/uL  Auto Lymphocyte # : 1.79 K/uL  Auto Monocyte # : 1.49 K/uL  Auto Eosinophil # : 0.00 K/uL  Auto Basophil # : 0.01 K/uL  Auto Neutrophil % : 78.4 %  Auto Lymphocyte % : 11.4 %  Auto Monocyte % : 9.5 %  Auto Eosinophil % : 0.0 %  Auto Basophil % : 0.1 %          135  |  102  |  37<H>  ----------------------------<  124<H>  4.2   |  26  |  0.60    Ca    9.3      2020 00:07    TPro  6.8  /  Alb  3.7  /  TBili  0.7  /  DBili  x   /  AST  22  /  ALT  48  /  AlkPhos  70        LIVER FUNCTIONS - ( 2020 00:07 )  Alb: 3.7 g/dL / Pro: 6.8 gm/dL / ALK PHOS: 70 U/L / ALT: 48 U/L / AST: 22 U/L / GGT: x               < from: CT Abdomen and Pelvis w/ IV Cont (20 @ 12:59) >  IMPRESSION:     Since the previous examination approximately 12 hours prior, bilateral pleural effusions have developed and ascites is increasing.    Persistent distal small bowel obstruction secondary to probable incarcerated left inguinal hernia.    Pacemaker. Cardiomegaly.    < end of copied text >          < from: CT Abdomen and Pelvis w/ IV Cont (20 @ 01:26) >  IMPRESSION:     Small bowel obstruction with transition point at a small left inguinal hernia containing a short segment of small bowel. Associated mesenteric edema and small volume ascites including fluid in the hernia sac.    < end of copied text >          MEDICATIONS  (STANDING):  digoxin     Tablet 0.125 milliGRAM(s) Oral daily  enoxaparin Injectable 40 milliGRAM(s) SubCutaneous daily  metoprolol tartrate 100 milliGRAM(s) Oral two times a day  simvastatin 10 milliGRAM(s) Oral at bedtime  sodium chloride 0.9%. 1000 milliLiter(s) (100 mL/Hr) IV Continuous <Continuous> 84/F with PMHx of HTN, A fib, s/p PPM on Eliquis, HTN, HLD, Melanoma s/p Sx now on immunotherapy leading to Adrenal insufficiency on prednisone 20 mg orally BID, was brought to ED for c/o Abd pain and admitted under Sx service for SBO and ? incarcerated left inguinal Hernia. Repeat CT abdo shows worsening ascites and SBO with incarcerated left inguinal hernia.     Pt denies any BM or flatus.    PMHX: a above; no known CAD, DM  PSHx: as above  Family Hx: no Melanoma  Social HX: non smoker; drinks wine socially    ROS: All 10 systems reviewed; apart from mentioned above everything else is neg.      PHYSICAL EXAM:    Daily Height in cm: 162.56 (2020 23:47)    Daily Weight in k.2 (2020 11:24)    ICU Vital Signs Last 24 Hrs  T(C): 36.6 (2020 11:45), Max: 36.8 (2020 23:47)  T(F): 97.8 (2020 11:45), Max: 98.3 (2020 03:24)  HR: 68 (2020 11:45) (68 - 98)  BP: 109/52 (2020 11:45) (109/52 - 150/92)  BP(mean): 65 (2020 11:45) (65 - 65)  ABP: --  ABP(mean): --  RR: 17 (2020 11:45) (17 - 20)  SpO2: 99% (2020 11:45) (97% - 100%)      Constitutional: Well appearing  HEENT: Atraumatic, JOEL, Normal, No congestion  Respiratory: Breath Sounds normal, no rhonchi/wheeze  Cardiovascular: N S1S2; LITO present  Gastrointestinal: Abdomen soft, non tender, Bowel Sounds absent  Extremities: No edema, peripheral pulses present  Neurological: AAO x 3, no gross focal motor deficits  Skin: Non cellulitic, no rash, ulcers  Lymph Nodes: No lymphadenopathy noted  Back: No CVA tenderness   Musculoskeletal: non tender  Breasts: Deferred  Genitourinary: deferred  Rectal: Deferred                          11.9   15.65 )-----------( 222      ( 2020 00:07 )             37.0       CBC Full  -  ( 2020 00:07 )  WBC Count : 15.65 K/uL  RBC Count : 4.04 M/uL  Hemoglobin : 11.9 g/dL  Hematocrit : 37.0 %  Platelet Count - Automated : 222 K/uL  Mean Cell Volume : 91.6 fl  Mean Cell Hemoglobin : 29.5 pg  Mean Cell Hemoglobin Concentration : 32.2 gm/dL  Auto Neutrophil # : 12.26 K/uL  Auto Lymphocyte # : 1.79 K/uL  Auto Monocyte # : 1.49 K/uL  Auto Eosinophil # : 0.00 K/uL  Auto Basophil # : 0.01 K/uL  Auto Neutrophil % : 78.4 %  Auto Lymphocyte % : 11.4 %  Auto Monocyte % : 9.5 %  Auto Eosinophil % : 0.0 %  Auto Basophil % : 0.1 %          135  |  102  |  37<H>  ----------------------------<  124<H>  4.2   |  26  |  0.60    Ca    9.3      2020 00:07    TPro  6.8  /  Alb  3.7  /  TBili  0.7  /  DBili  x   /  AST  22  /  ALT  48  /  AlkPhos  70        LIVER FUNCTIONS - ( 2020 00:07 )  Alb: 3.7 g/dL / Pro: 6.8 gm/dL / ALK PHOS: 70 U/L / ALT: 48 U/L / AST: 22 U/L / GGT: x               < from: CT Abdomen and Pelvis w/ IV Cont (20 @ 12:59) >  IMPRESSION:     Since the previous examination approximately 12 hours prior, bilateral pleural effusions have developed and ascites is increasing.    Persistent distal small bowel obstruction secondary to probable incarcerated left inguinal hernia.    Pacemaker. Cardiomegaly.    < end of copied text >          < from: CT Abdomen and Pelvis w/ IV Cont (20 @ 01:26) >  IMPRESSION:     Small bowel obstruction with transition point at a small left inguinal hernia containing a short segment of small bowel. Associated mesenteric edema and small volume ascites including fluid in the hernia sac.    < end of copied text >          MEDICATIONS  (STANDING):  digoxin     Tablet 0.125 milliGRAM(s) Oral daily  enoxaparin Injectable 40 milliGRAM(s) SubCutaneous daily  metoprolol tartrate 100 milliGRAM(s) Oral two times a day  simvastatin 10 milliGRAM(s) Oral at bedtime  sodium chloride 0.9%. 1000 milliLiter(s) (100 mL/Hr) IV Continuous <Continuous>

## 2020-01-27 NOTE — ED PROVIDER NOTE - CARE PLAN
Principal Discharge DX:	SBO (small bowel obstruction)  Secondary Diagnosis:	Inguinal hernia of left side with obstruction

## 2020-01-27 NOTE — H&P ADULT - ASSESSMENT
84 year old female with previously incarcerated left inguinal hernia causing SBO. Left inguinal hernia reduced in ED    - Admit to Dr. Pool  - Pain control  - NPO/IVF  - Resume home meds  - Serial abdominal exams  - f/u return of bowel function, then can resume diet  - Hold eliquis, in case surgical intervention becomes necessary if patients obstruction does not resolve  - DVT ppx  - Hospitalist eval    Discussed with Dr. Pool

## 2020-01-28 LAB
ALBUMIN SERPL ELPH-MCNC: 2.8 G/DL — LOW (ref 3.3–5)
ALP SERPL-CCNC: 62 U/L — SIGNIFICANT CHANGE UP (ref 40–120)
ALT FLD-CCNC: 30 U/L — SIGNIFICANT CHANGE UP (ref 12–78)
ANION GAP SERPL CALC-SCNC: 4 MMOL/L — LOW (ref 5–17)
ANION GAP SERPL CALC-SCNC: 5 MMOL/L — SIGNIFICANT CHANGE UP (ref 5–17)
AST SERPL-CCNC: 16 U/L — SIGNIFICANT CHANGE UP (ref 15–37)
BILIRUB SERPL-MCNC: 0.9 MG/DL — SIGNIFICANT CHANGE UP (ref 0.2–1.2)
BUN SERPL-MCNC: 33 MG/DL — HIGH (ref 7–23)
BUN SERPL-MCNC: 35 MG/DL — HIGH (ref 7–23)
CALCIUM SERPL-MCNC: 7.9 MG/DL — LOW (ref 8.5–10.1)
CALCIUM SERPL-MCNC: 8.2 MG/DL — LOW (ref 8.5–10.1)
CHLORIDE SERPL-SCNC: 105 MMOL/L — SIGNIFICANT CHANGE UP (ref 96–108)
CHLORIDE SERPL-SCNC: 105 MMOL/L — SIGNIFICANT CHANGE UP (ref 96–108)
CO2 SERPL-SCNC: 26 MMOL/L — SIGNIFICANT CHANGE UP (ref 22–31)
CO2 SERPL-SCNC: 27 MMOL/L — SIGNIFICANT CHANGE UP (ref 22–31)
CREAT SERPL-MCNC: 0.54 MG/DL — SIGNIFICANT CHANGE UP (ref 0.5–1.3)
CREAT SERPL-MCNC: 0.64 MG/DL — SIGNIFICANT CHANGE UP (ref 0.5–1.3)
DIGOXIN SERPL-MCNC: 0.73 NG/ML — LOW (ref 0.8–2)
GLUCOSE SERPL-MCNC: 113 MG/DL — HIGH (ref 70–99)
GLUCOSE SERPL-MCNC: 122 MG/DL — HIGH (ref 70–99)
HCT VFR BLD CALC: 33.2 % — LOW (ref 34.5–45)
HGB BLD-MCNC: 10.8 G/DL — LOW (ref 11.5–15.5)
MCHC RBC-ENTMCNC: 30.2 PG — SIGNIFICANT CHANGE UP (ref 27–34)
MCHC RBC-ENTMCNC: 32.5 GM/DL — SIGNIFICANT CHANGE UP (ref 32–36)
MCV RBC AUTO: 92.7 FL — SIGNIFICANT CHANGE UP (ref 80–100)
PLATELET # BLD AUTO: 156 K/UL — SIGNIFICANT CHANGE UP (ref 150–400)
POTASSIUM SERPL-MCNC: 4.8 MMOL/L — SIGNIFICANT CHANGE UP (ref 3.5–5.3)
POTASSIUM SERPL-MCNC: 5.2 MMOL/L — SIGNIFICANT CHANGE UP (ref 3.5–5.3)
POTASSIUM SERPL-SCNC: 4.8 MMOL/L — SIGNIFICANT CHANGE UP (ref 3.5–5.3)
POTASSIUM SERPL-SCNC: 5.2 MMOL/L — SIGNIFICANT CHANGE UP (ref 3.5–5.3)
PROT SERPL-MCNC: 5.5 GM/DL — LOW (ref 6–8.3)
RBC # BLD: 3.58 M/UL — LOW (ref 3.8–5.2)
RBC # FLD: 14.2 % — SIGNIFICANT CHANGE UP (ref 10.3–14.5)
SODIUM SERPL-SCNC: 136 MMOL/L — SIGNIFICANT CHANGE UP (ref 135–145)
SODIUM SERPL-SCNC: 136 MMOL/L — SIGNIFICANT CHANGE UP (ref 135–145)
WBC # BLD: 9.66 K/UL — SIGNIFICANT CHANGE UP (ref 3.8–10.5)
WBC # FLD AUTO: 9.66 K/UL — SIGNIFICANT CHANGE UP (ref 3.8–10.5)

## 2020-01-28 PROCEDURE — 49507 PRP I/HERN INIT BLOCK >5 YR: CPT

## 2020-01-28 RX ADMIN — Medication 40 MILLIGRAM(S): at 05:58

## 2020-01-28 RX ADMIN — ENOXAPARIN SODIUM 40 MILLIGRAM(S): 100 INJECTION SUBCUTANEOUS at 12:10

## 2020-01-28 RX ADMIN — Medication 5 MILLIGRAM(S): at 18:46

## 2020-01-28 RX ADMIN — Medication 5 MILLIGRAM(S): at 05:58

## 2020-01-28 RX ADMIN — SODIUM CHLORIDE 75 MILLILITER(S): 9 INJECTION, SOLUTION INTRAVENOUS at 04:00

## 2020-01-28 RX ADMIN — PANTOPRAZOLE SODIUM 40 MILLIGRAM(S): 20 TABLET, DELAYED RELEASE ORAL at 11:44

## 2020-01-28 NOTE — PROGRESS NOTE ADULT - ASSESSMENT
84 Y old female with SBO secondary to Incarcerated Left Inguinal hernia POD 1 Left inguinal hernia repair.  - Continue NPO   - ContinueIV hydration  - Medicine/Card following  - Hold off on AC today, will likely restart tomorrow  - D/C rivas today    discussed with attending, Dr. William

## 2020-01-28 NOTE — PROGRESS NOTE ADULT - SUBJECTIVE AND OBJECTIVE BOX
Patient seen and examined at bedside on am rounds.    Remains in PACU overnight, no events as per nursing.  Uo rivas catheter:  300cc overnight.  Denies flatus, bowel movement since surgery.  Pain controlled.    ICU Vital Signs Last 24 Hrs  T(C): 36.9 (28 Jan 2020 06:00), Max: 37.4 (27 Jan 2020 20:32)  T(F): 98.4 (28 Jan 2020 06:00), Max: 99.4 (27 Jan 2020 20:32)  HR: 76 (28 Jan 2020 08:00) (66 - 82)  BP: 103/52 (28 Jan 2020 08:00) (95/49 - 128/54)  BP(mean): 65 (27 Jan 2020 11:45) (65 - 65)  ABP: --  ABP(mean): --  RR: 23 (28 Jan 2020 08:00) (11 - 23)  SpO2: 99% (28 Jan 2020 08:00) (95% - 100%)    MEDICATIONS  (STANDING):  enoxaparin Injectable 40 milliGRAM(s) SubCutaneous daily  lactated ringers. 1000 milliLiter(s) (75 mL/Hr) IV Continuous <Continuous>  methylPREDNISolone sodium succinate Injectable 40 milliGRAM(s) IV Push daily  metoprolol tartrate Injectable 5 milliGRAM(s) IV Push every 6 hours  pantoprazole  Injectable 40 milliGRAM(s) IV Push daily    MEDICATIONS  (PRN):  fentaNYL    Injectable 25 MICROGram(s) IV Push every 5 minutes PRN Moderate Pain (4 - 6)  morphine  - Injectable 2 milliGRAM(s) IV Push every 4 hours PRN Moderate Pain (4 - 6)  ondansetron Injectable 4 milliGRAM(s) IV Push once PRN Nausea and/or Vomiting      PHYSICAL EXAM:  Constitutional: NAD, GCS: 15/15  AOX3  Eyes:  WNL  ENMT:  WNL  Neck:  WNL, non tender  Back: Non tender  Respiratory: CTABL  Cardiovascular:  S1+S2+0  Gastrointestinal: Soft, distended, diffuse soreness, no rebound or guarding, dressing C/D/I.  Left groin bulge not fully reducible. No skin changes.   Genitourinary:  WNL  Extremities: NV intact  Vascular:  Intact  Neurological: No focal neurological deficit,  CN, motor and sensory system grossly intact.  Skin: WNL  Musculoskeletal: WNL  Psychiatric: Grossly WNL        Labs:                          10.8   9.66  )-----------( 156      ( 28 Jan 2020 06:10 )             33.2     01-28    136  |  105  |  35<H>  ----------------------------<  122<H>  5.2   |  27  |  0.54    Ca    7.9<L>      28 Jan 2020 06:10    TPro  6.8  /  Alb  3.7  /  TBili  0.7  /  DBili  x   /  AST  22  /  ALT  48  /  AlkPhos  70  01-27

## 2020-01-28 NOTE — PROGRESS NOTE ADULT - ASSESSMENT
84/F with PMHx of HTN, A fib, s/p PPM on Eliquis, HTN, HLD, Melanoma s/p Sx now on immunotherapy leading to Adrenal insufficiency on prednisone 20 mg orally BID, was brought to ED for c/o Abd pain and admitted under Sx service for SBO and ? incarcerated left inguinal Hernia. Repeat CT abdo shows worsening ascites and SBO with incarcerated left inguinal hernia.     SBO + ? Incarcerated Left Inguinal Hernia: admitted to Sx service  NPO except meds  Eliquis on hold  iv fluids   cardiac clearance prior to Sx   serial abd exams  iv Protonix as pt NPO    Hx of A Fib:   Eliquis on hold  no heparin drip as per Sx  cont BB  cont dog; check Dig level    HX of recent Adrenal Insufficiency sec to immunotherapy : on prednisone 20 mg BID  would need stress doses if going for Sx, which seems most likely at this time  would request endo consult also Mx of immunotherapy induced adrenal insufficiency  would switch to iv steroid for now 84/F with PMHx of HTN, A fib, s/p PPM on Eliquis, HTN, HLD, Melanoma s/p Sx now on immunotherapy leading to Adrenal insufficiency on prednisone 20 mg orally BID, was brought to ED for c/o Abd pain and admitted under Sx service for SBO and ? incarcerated left inguinal Hernia. Repeat CT abdo shows worsening ascites and SBO with incarcerated left inguinal hernia.     SBO secondary to L inguinal hernia.  -01/27, diagnostic laparoscopy, reduction of incarcerated bowel in L inguinal hernia.  open repair.  -NPO, ice chips.  -IVFs.  -may resume AC in AM.  -DVT/GI prophylaxis.    AF.  -apixaban on hold.  -metoprolol.  -digoxin (level 0.73).    AI secondary to immunotherapy.  -outpatient regimen, prednisone 20mg po bid.  -steroids changed to stress dose while in the perioperative period.  -Endocrine consult.

## 2020-01-28 NOTE — PROGRESS NOTE ADULT - SUBJECTIVE AND OBJECTIVE BOX
CC:  Patient is a 84y old  Female who presents with a chief complaint of SBO (28 Jan 2020 08:43)    SUBJECTIVE:     -no new complaints or issues at current time.    ROS:  all other review of systems are negative unless indicated above.    enoxaparin Injectable 40 milliGRAM(s) SubCutaneous daily  methylPREDNISolone sodium succinate Injectable 40 milliGRAM(s) IV Push daily  metoprolol tartrate Injectable 5 milliGRAM(s) IV Push every 6 hours  morphine  - Injectable 2 milliGRAM(s) IV Push every 4 hours PRN  pantoprazole  Injectable 40 milliGRAM(s) IV Push daily    T(C): 36.6 (01-28-20 @ 14:33), Max: 37.4 (01-27-20 @ 20:32)  HR: 93 (01-28-20 @ 14:33) (66 - 93)  BP: 108/55 (01-28-20 @ 14:33) (95/49 - 128/54)  RR: 16 (01-28-20 @ 14:33) (11 - 23)  SpO2: 98% (01-28-20 @ 14:33) (95% - 100%)    Constitutional: NAD.   HEENT: PERRL, EOMI, MMM.  Neck: Soft and supple, No carotid bruit, No JVD  Respiratory: Breath sounds are clear bilaterally, No wheezing, rales or rhonchi  Cardiovascular: S1 and S2, regular rate and rhythm, no murmur, rub or gallop.  Gastrointestinal: Bowel Sounds present, soft, nontender, nondistended, no guarding, no rebound, no mass.  Extremities: No peripheral edema  Vascular: 2+ peripheral pulses  Neurological: A/O x , no focal deficits  Musculoskeletal: 5/5 strength b/l upper and lower extremities  Skin:  no visible rashes.                         10.8   9.66  )-----------( 156      ( 28 Jan 2020 06:10 )             33.2     PT/INR - ( 27 Jan 2020 16:32 )   PT: 11.7 sec;   INR: 1.05 ratio         PTT - ( 27 Jan 2020 14:19 )  PTT:32.0 sec  01-28    136  |  105  |  33<H>  ----------------------------<  113<H>  4.8   |  26  |  0.64    Ca    8.2<L>      28 Jan 2020 11:19    TPro  5.5<L>  /  Alb  2.8<L>  /  TBili  0.9  /  DBili  x   /  AST  16  /  ALT  30  /  AlkPhos  62  01-28

## 2020-01-28 NOTE — PROGRESS NOTE ADULT - ASSESSMENT
84 y old female S/P diagnostic laparoscopy  reduction of incarcerated bowel in left inguinal hernia , open repair of hernia  POD one  has minimal GI function A fib, frequent PVCs  npo ,ice chips  IV hydration   DVT/GI prophylaxis  A/C in am   Serial abdominal exam , await GI function   trend labs, replace electrolyte  OOB, ambulate   D/C Rodriguez  medical and cardiology follow up   D/W nursing staff, family

## 2020-01-28 NOTE — PROGRESS NOTE ADULT - SUBJECTIVE AND OBJECTIVE BOX
Patient is a 84y old  Female who presents with a chief complaint of SBO (28 Jan 2020 08:43)      HPI:  83 yo female c/o abdominal pain, diffuse, since early afternoon.  Cramping in nature.   +nausea, vomited x 1.  No diarrhea.  Had  a BM this morning, soft but formed. Passing gas today.  Nonsmoker, nondrinker.  No previous abdominal surgeries.  On prednisone x 8 days for low cortisol levels.    ED attempted to reduce left inguinal hernia, however unsuccessful. Pt given ice packs, morphine and placed in trendelenberg position. Patient then stating that her pain is gone. Patients hernia then able to be reduced in ED.    1/28: Pt seen and examined, pain well controlled no fever, passed some gas, no nausea.    ROS:.  [X] A ten-point review of systems was otherwise negative except as noted.  Systemic:	[ ] Fever	[ ] Chills	[ ] Night sweats    [ ] Fatigue	[ ] Other  [] Cardiovascular:  [] Pulmonary:  [] Renal/Urologic:  [] Gastrointestinal: abdominal pain, vomiting  [] Metabolic:  [] Neurologic:  [] Hematologic:  [] ENT:  [] Ophthalmologic:  [] Musculoskeletal:    [ ] Due to altered mental status/intubation, subjective information were not able to be obtained from the patient. History was obtained, to the extent possible, from review of the chart and collateral sources of information.    All other system review is negative .  PAST MEDICAL & SURGICAL HISTORY:  HLD (hyperlipidemia)  HTN (hypertension)  Pacemaker  Atrial fibrillation  History of melanoma excision  H/O cataract extraction: both eyes  S/P tonsillectomy and adenoidectomy    FAMILY HISTORY:  No pertinent family history in first degree relatives    Social History:     Alcohol: Denied  Smoking: Denied  Drug Use: Denied        Vital Signs Last 24 Hrs  T(C): 36.9 (28 Jan 2020 06:00), Max: 37.4 (27 Jan 2020 20:32)  T(F): 98.4 (28 Jan 2020 06:00), Max: 99.4 (27 Jan 2020 20:32)  HR: 77 (28 Jan 2020 12:00) (66 - 82)  BP: 118/50 (28 Jan 2020 12:00) (95/49 - 128/54)  BP(mean): --  RR: 14 (28 Jan 2020 12:00) (11 - 23)  SpO2: 100% (28 Jan 2020 12:00) (95% - 100%)    I&O's Summary    27 Jan 2020 07:01  -  28 Jan 2020 07:00  --------------------------------------------------------  IN: 3100 mL / OUT: 510 mL / NET: 2590 mL    28 Jan 2020 07:01  -  28 Jan 2020 13:19  --------------------------------------------------------  IN: 0 mL / OUT: 115 mL / NET: -115 mL        PHYSICAL EXAM:  Constitutional: NAD, GCS: 15/15  AOX3  Eyes:  WNL  ENMT:  WNL  Neck:  WNL, non tender  Back: Non tender  Respiratory: CTABL  Cardiovascular:  S1+S2+0  Gastrointestinal: Soft, mild distension, appropriately tender, dressing CDI.  Genitourinary:  WNL  Extremities: NV intact  Vascular:  Intact  Neurological: No focal neurological deficit,  CN, motor and sensory system grossly intact.  Skin: WNL  Musculoskeletal: WNL  Psychiatric: Grossly WNL        Labs:                          10.8   9.66  )-----------( 156      ( 28 Jan 2020 06:10 )             33.2       01-28    136  |  105  |  33<H>  ----------------------------<  113<H>  4.8   |  26  |  0.64    Ca    8.2<L>      28 Jan 2020 11:19    TPro  5.5<L>  /  Alb  2.8<L>  /  TBili  0.9  /  DBili  x   /  AST  16  /  ALT  30  /  AlkPhos  62  01-28      PT/INR - ( 27 Jan 2020 16:32 )   PT: 11.7 sec;   INR: 1.05 ratio         PTT - ( 27 Jan 2020 14:19 )  PTT:32.0 sec

## 2020-01-29 LAB
ANION GAP SERPL CALC-SCNC: 5 MMOL/L — SIGNIFICANT CHANGE UP (ref 5–17)
BUN SERPL-MCNC: 33 MG/DL — HIGH (ref 7–23)
CALCIUM SERPL-MCNC: 8.1 MG/DL — LOW (ref 8.5–10.1)
CHLORIDE SERPL-SCNC: 106 MMOL/L — SIGNIFICANT CHANGE UP (ref 96–108)
CO2 SERPL-SCNC: 26 MMOL/L — SIGNIFICANT CHANGE UP (ref 22–31)
CREAT SERPL-MCNC: 0.54 MG/DL — SIGNIFICANT CHANGE UP (ref 0.5–1.3)
GLUCOSE SERPL-MCNC: 90 MG/DL — SIGNIFICANT CHANGE UP (ref 70–99)
HCT VFR BLD CALC: 31.6 % — LOW (ref 34.5–45)
HGB BLD-MCNC: 10.3 G/DL — LOW (ref 11.5–15.5)
MCHC RBC-ENTMCNC: 29.7 PG — SIGNIFICANT CHANGE UP (ref 27–34)
MCHC RBC-ENTMCNC: 32.6 GM/DL — SIGNIFICANT CHANGE UP (ref 32–36)
MCV RBC AUTO: 91.1 FL — SIGNIFICANT CHANGE UP (ref 80–100)
PLATELET # BLD AUTO: 150 K/UL — SIGNIFICANT CHANGE UP (ref 150–400)
POTASSIUM SERPL-MCNC: 4 MMOL/L — SIGNIFICANT CHANGE UP (ref 3.5–5.3)
POTASSIUM SERPL-SCNC: 4 MMOL/L — SIGNIFICANT CHANGE UP (ref 3.5–5.3)
RBC # BLD: 3.47 M/UL — LOW (ref 3.8–5.2)
RBC # FLD: 14.4 % — SIGNIFICANT CHANGE UP (ref 10.3–14.5)
SODIUM SERPL-SCNC: 137 MMOL/L — SIGNIFICANT CHANGE UP (ref 135–145)
WBC # BLD: 8.07 K/UL — SIGNIFICANT CHANGE UP (ref 3.8–10.5)
WBC # FLD AUTO: 8.07 K/UL — SIGNIFICANT CHANGE UP (ref 3.8–10.5)

## 2020-01-29 PROCEDURE — 99222 1ST HOSP IP/OBS MODERATE 55: CPT

## 2020-01-29 PROCEDURE — 99024 POSTOP FOLLOW-UP VISIT: CPT

## 2020-01-29 RX ORDER — PANTOPRAZOLE SODIUM 20 MG/1
40 TABLET, DELAYED RELEASE ORAL
Refills: 0 | Status: DISCONTINUED | OUTPATIENT
Start: 2020-01-30 | End: 2020-01-31

## 2020-01-29 RX ORDER — METOPROLOL TARTRATE 50 MG
50 TABLET ORAL
Refills: 0 | Status: DISCONTINUED | OUTPATIENT
Start: 2020-01-29 | End: 2020-01-31

## 2020-01-29 RX ORDER — APIXABAN 2.5 MG/1
5 TABLET, FILM COATED ORAL
Refills: 0 | Status: DISCONTINUED | OUTPATIENT
Start: 2020-01-29 | End: 2020-01-31

## 2020-01-29 RX ADMIN — Medication 5 MILLIGRAM(S): at 00:07

## 2020-01-29 RX ADMIN — APIXABAN 5 MILLIGRAM(S): 2.5 TABLET, FILM COATED ORAL at 18:56

## 2020-01-29 RX ADMIN — Medication 5 MILLIGRAM(S): at 13:19

## 2020-01-29 RX ADMIN — Medication 5 MILLIGRAM(S): at 05:49

## 2020-01-29 RX ADMIN — Medication 50 MILLIGRAM(S): at 18:56

## 2020-01-29 RX ADMIN — Medication 40 MILLIGRAM(S): at 05:49

## 2020-01-29 RX ADMIN — PANTOPRAZOLE SODIUM 40 MILLIGRAM(S): 20 TABLET, DELAYED RELEASE ORAL at 13:19

## 2020-01-29 NOTE — PROGRESS NOTE ADULT - SUBJECTIVE AND OBJECTIVE BOX
CC:Patient is a 84y old  Female who presents with a chief complaint of preop optimization (29 Jan 2020 09:14)      Subjective:  Pt seen and examined at bedside with chaperone. Pt is AAOx3, pt in no acute distress. Pt denied c/o fever, chills, chest pain, SOB, N/V/D, extremity pain or dysfunction, hemoptysis, hematemesis, hematuria, hematochexia, headache, diplopia, vertigo, dizzyness. Pt states (+) void, (+) ambulation, (+) bowel function of flatus, (+) incisional pain control    ROS:  otherwise as abovementioned ROS    Vital Signs Last 24 Hrs  T(C): 36.5 (29 Jan 2020 11:08), Max: 37.3 (28 Jan 2020 14:00)  T(F): 97.7 (29 Jan 2020 11:08), Max: 99.2 (29 Jan 2020 00:01)  HR: 90 (29 Jan 2020 05:49) (75 - 96)  BP: 120/66 (29 Jan 2020 11:08) (101/44 - 135/66)  BP(mean): --  RR: 18 (29 Jan 2020 11:08) (16 - 18)  SpO2: 99% (29 Jan 2020 11:08) (96% - 99%)    Labs:                                10.3   8.07  )-----------( 150      ( 29 Jan 2020 07:19 )             31.6     CBC Full  -  ( 29 Jan 2020 07:19 )  WBC Count : 8.07 K/uL  RBC Count : 3.47 M/uL  Hemoglobin : 10.3 g/dL  Hematocrit : 31.6 %  Platelet Count - Automated : 150 K/uL  Mean Cell Volume : 91.1 fl  Mean Cell Hemoglobin : 29.7 pg  Mean Cell Hemoglobin Concentration : 32.6 gm/dL  Auto Neutrophil # : x  Auto Lymphocyte # : x  Auto Monocyte # : x  Auto Eosinophil # : x  Auto Basophil # : x  Auto Neutrophil % : x  Auto Lymphocyte % : x  Auto Monocyte % : x  Auto Eosinophil % : x  Auto Basophil % : x    01-29    137  |  106  |  33<H>  ----------------------------<  90  4.0   |  26  |  0.54    Ca    8.1<L>      29 Jan 2020 07:19    TPro  5.5<L>  /  Alb  2.8<L>  /  TBili  0.9  /  DBili  x   /  AST  16  /  ALT  30  /  AlkPhos  62  01-28    LIVER FUNCTIONS - ( 28 Jan 2020 11:19 )  Alb: 2.8 g/dL / Pro: 5.5 gm/dL / ALK PHOS: 62 U/L / ALT: 30 U/L / AST: 16 U/L / GGT: x           PT/INR - ( 27 Jan 2020 16:32 )   PT: 11.7 sec;   INR: 1.05 ratio         PTT - ( 27 Jan 2020 14:19 )  PTT:32.0 sec      Meds:  apixaban 5 milliGRAM(s) Oral two times a day  methylPREDNISolone sodium succinate Injectable 40 milliGRAM(s) IV Push daily  metoprolol tartrate Injectable 5 milliGRAM(s) IV Push every 6 hours  morphine  - Injectable 2 milliGRAM(s) IV Push every 4 hours PRN  pantoprazole  Injectable 40 milliGRAM(s) IV Push daily      Radiology:      Physical exam:  Pt is aaox3  Pt in no acute distress  Psych: normal affect  Resp: CTAB  CVS: S1S2(+)  ABD: bowel sounds (+), soft, non distended, no rebound, no guarding, no rigidity, no skin changes to exam. Incision sites are clean, dry, intact, no cellulitis, no d/c, no purulence, no fluctuance. (+) appropriate incisional tenderness to exam  EXT: no calf tenderness or edema to exam b/l, on VTE prophylaxis  Skin: no adverse skin changes to exam

## 2020-01-29 NOTE — PROGRESS NOTE ADULT - SUBJECTIVE AND OBJECTIVE BOX
Cardiology Progress Note    HPI: 83 yo female c/o abdominal pain, diffuse, since early afternoon.  Cramping in nature.   +nausea, vomited x 1.  No diarrhea.  Had  a BM this morning, soft but formed. Passing gas today.  Nonsmoker, nondrinker.  No previous abdominal surgeries.  On prednisone x 8 days for low cortisol levels.  ED attempted to reduce left inguinal hernia, however unsuccessful. Pt given ice packs, morphine and placed in trendelenberg position. Patient then stating that her pain is gone. Patients hernia then able to be reduced in ED.    1/27. Pt seen examined on 5S.   D/w surgery- as per surgery, pt will need emergency surgery for incarcerated hernia, worsening SBO.  Case d/w pt and son. No CP/SOB. Not on tele.   +Abd pain.    1/29. POD1 from abd surgery. No CP. Feels better.   No events last pm.     PAST MEDICAL/SURGICAL/FAMILY/SOCIAL HISTORY:    Past Medical History:  Atrial fibrillation    HLD (hyperlipidemia)    HTN (hypertension)    Pacemaker.     Past Surgical History:  H/O cataract extraction  both eyes  History of melanoma excision    S/P tonsillectomy and adenoidectomy. (27 Jan 2020 02:52)    PAST MEDICAL & SURGICAL HISTORY:  HLD (hyperlipidemia)  HTN (hypertension)  Pacemaker  Atrial fibrillation  History of melanoma excision  H/O cataract extraction: both eyes  S/P tonsillectomy and adenoidectomy    Allergies  penicillins (Hives)    SOCIAL HISTORY: Denies tobacco, etoh abuse or illicit drug use    FAMILY HISTORY:  No pertinent family history in first degree relatives    MEDICATIONS  (STANDING):  digoxin     Tablet 0.125 milliGRAM(s) Oral daily  enoxaparin Injectable 40 milliGRAM(s) SubCutaneous daily  methylPREDNISolone sodium succinate Injectable 40 milliGRAM(s) IV Push daily  metoprolol tartrate 100 milliGRAM(s) Oral two times a day  pantoprazole  Injectable 40 milliGRAM(s) IV Push daily  prothrombin complex concentrate IVPB (KCENTRA) 2000 International Unit(s) IV Intermittent once  simvastatin 10 milliGRAM(s) Oral at bedtime  sodium chloride 0.9%. 1000 milliLiter(s) (100 mL/Hr) IV Continuous <Continuous>    MEDICATIONS  (PRN):  ketorolac   Injectable 15 milliGRAM(s) IV Push every 4 hours PRN Moderate Pain (4 - 6)    Vital Signs Last 24 Hrs  T(C): 36.6 (27 Jan 2020 11:45), Max: 36.8 (26 Jan 2020 23:47)  T(F): 97.8 (27 Jan 2020 11:45), Max: 98.3 (27 Jan 2020 03:24)  HR: 68 (27 Jan 2020 11:45) (68 - 98)  BP: 109/52 (27 Jan 2020 11:45) (109/52 - 150/92)  BP(mean): 65 (27 Jan 2020 11:45) (65 - 65)  RR: 17 (27 Jan 2020 11:45) (17 - 20)  SpO2: 99% (27 Jan 2020 11:45) (97% - 100%)    REVIEW OF SYSTEMS:    CONSTITUTIONAL:  As per HPI.  HEENT:  Eyes:  No diplopia or blurred vision. ENT:  No earache, sore throat or runny nose.  CARDIOVASCULAR:  No pressure, squeezing, strangling, tightness, heaviness or aching about the chest, neck, axilla or epigastrium.  RESPIRATORY:  No cough, shortness of breath, PND or orthopnea.  GASTROINTESTINAL:  +abd pain, distension.  GENITOURINARY:  No dysuria, frequency or urgency.  MUSCULOSKELETAL:  As per HPI.  SKIN:  No change in skin, hair or nails.  NEUROLOGIC:  No paresthesias, fasciculations, seizures or weakness.    PHYSICAL EXAMINATION:    GENERAL APPEARANCE:  Pt. is not currently dyspneic, in no distress. Pt. is alert, oriented, and pleasant.  HEENT:  Pupils are normal and react normally. No icterus. Mucous membranes well colored.  NECK:  Supple. No lymphadenopathy. Jugular venous pressure not elevated. Carotids equal.   HEART:   The cardiac impulse has a normal quality. There are no murmurs, rubs or gallops noted. Irregular rhythm  CHEST:  Chest is clear to auscultation. Normal respiratory effort.  ABDOMEN:  Soft and nontender.   EXTREMITIES:  There is no edema.     I&O's Summary    27 Jan 2020 07:01  -  27 Jan 2020 14:53  --------------------------------------------------------  IN: 900 mL / OUT: 0 mL / NET: 900 mL    LABS:                        11.9   15.65 )-----------( 222      ( 27 Jan 2020 00:07 )             37.0     01-27    135  |  102  |  37<H>  ----------------------------<  124<H>  4.2   |  26  |  0.60    Ca    9.3      27 Jan 2020 00:07    TPro  6.8  /  Alb  3.7  /  TBili  0.7  /  DBili  x   /  AST  22  /  ALT  48  /  AlkPhos  70  01-27    LIVER FUNCTIONS - ( 27 Jan 2020 00:07 )  Alb: 3.7 g/dL / Pro: 6.8 gm/dL / ALK PHOS: 70 U/L / ALT: 48 U/L / AST: 22 U/L / GGT: x           EKG: A sensed V paced @ 69.    TELEMETRY: Not on tele.     CARDIAC TESTS: pending    RADIOLOGY & ADDITIONAL STUDIES:   < from: CT Abdomen and Pelvis w/ IV Cont (01.27.20 @ 12:59) >  Since the previous examination approximately 12 hours prior, bilateral pleural effusions have developed and ascites is increasing.  Persistent distal small bowel obstruction secondary to probable incarcerated left inguinal hernia.  Pacemaker. Cardiomegaly.    ASSESSMENT & PLAN:

## 2020-01-29 NOTE — CONSULT NOTE ADULT - SUBJECTIVE AND OBJECTIVE BOX
HPI:  85 yo female c/o abdominal pain, diffuse, since early afternoon.  Cramping in nature.   +nausea, vomited x 1.  No diarrhea.  Had  a BM this morning, soft but formed. Passing gas today.  Nonsmoker, nondrinker.  No previous abdominal surgeries.  On prednisone x 8 days for low cortisol levels.    ED attempted to reduce left inguinal hernia, however unsuccessful. Pt given ice packs, morphine and placed in trendelenberg position. Patient then stating that her pain is gone. Patients hernia then able to be reduced in ED.     S/P SBO secondary to Incarcerated Left Inguinal hernia , Left inguinal hernia repair.      Patient is a 84y old  Female who presents with a chief complaint of abdominal soreness s/p ex-lap for SBO and incarcerated hernia      Consulted by Dr. William for VTE prophylaxis, risk stratification, and anticoagulation management.    PAST MEDICAL & SURGICAL HISTORY:  HLD (hyperlipidemia)  HTN (hypertension)  Pacemaker  Atrial fibrillation  History of melanoma excision  H/O cataract extraction: both eyes  S/P tonsillectomy and adenoidectomy    BMI: 23.5    CrCl:76.6    CAPRINI SCORE  AGE RELATED RISK FACTORS                                                       MOBILITY RELATED FACTORS  [ ] Age 41-60 years                                            (1 Point)                  [ ] Bed rest                                                        (1 Point)  [ ] Age: 61-74 years                                           (2 Points)                [ ] Plaster cast                                                   (2 Points)  [ x] Age= 75 years                                              (3 Points)                 [ ] Bed bound for more than 72 hours                   (2 Points)    DISEASE RELATED RISK FACTORS                                               GENDER SPECIFIC FACTORS  [ ] Edema in the lower extremities                       (1 Point)           [ ] Pregnancy                                                            (1 Point)  [ ] Varicose veins                                               (1 Point)                  [ ] Post-partum < 6 weeks                                      (1 Point)             [ ] BMI > 25 Kg/m2                                            (1 Point)                  [ ] Hormonal therapy or oral contraception       (1 Point)                 [ ] Sepsis (in the previous month)                        (1 Point)             [ ] History of pregnancy complications                (1Point)  [ ] Pneumonia or serious lung disease                                             [ ] Unexplained or recurrent  (=/>3), premature                                 (In the previous month)                               (1 Point)                birth with toxemia or growth-restricted infant (1 Point)  [ ] Abnormal pulmonary function test            (1 Point)                                   SURGERY RELATED RISK FACTORS  [ ] Acute myocardial infarction                       (1 Point)                  [ ]  Section                                         (1 Point)  [ ] Congestive heart failure (in the previous month) (1 Point)   [ ] Minor surgery   lasting <45 minutes       (1 Point)   [ ] Inflammatory bowel disease                             (1 Point)          [ ] Arthroscopic surgery                                  (2 Points)  [ ] Central venous access                                    (2 Points)            [ x] General surgery lasting >45 minutes      (2 Points)       [ ] Stroke (in the previous month)                  (5 Points)            [ ] Elective major lower extremity arthroplasty (5 Points)                                   [  ] Malignancy (present or past include skin melanoma                                          but exclude  basal skin cell)    (2 points)                                      TRAUMA RELATED RISK FACTORS                HEMATOLOGY RELATED FACTORS                                  [ ] Fracture of the hip, pelvis, or leg                       (5 Points)  [ ] Prior episodes of VTE                                     (3 Points)          [ ] Acute spinal cord injury (in the previous month)  (5 Points)  [ ] Positive family history for VTE                         (3 Points)       [ ] Paralysis (less than 1 month)                          (5 Points)  [ ] Prothrombin 23530 A                                      (3 Points)         [ ] Multiple Trauma (within 1month)                 (5Points)                                                                                                                                                                [ ] Factor V Leiden                                          (3 Points)                                OTHER RISK FACTORS                          [ ] Lupus anticoagulants                                     (3 Points)                       [ ] BMI > 40                          (1 Point)                                                         [ ] Anticardiolipin antibodies                                (3 Points)                   [ ] Smoking                              (1Point)                                                [ ] High homocysteine in the blood                      (3 Points)                [  ] Diabetes requiring insulin (1point)                         [ ] Other congenital or acquired thrombophilia       (3 Points)          [  ] Chemotherapy                   (1 Point)  [ ] Heparin induced thrombocytopenia                  (3 Points)             [  ] Blood Transfusion                (1 point)                                                                                                             Total Score [   5       ]                                                                                                                                                                                                                                                                                                                                      [id number] Score: 6    IMPROVE Bleeding Risk Score: 1.5      Falls Risk:   High (x  )  Mod (  )  Low (  )    : Patient seen at bedside OOB to chair eating clear liquids and tolerating. Reports abdominal pain as "pinching pain" with movement. Denies any NVD, +flatus. Explained will resume eliquis this evening. She verbalizes agreement and understanding.    FAMILY HISTORY:  No pertinent family history in first degree relatives    Denies any personal or familial history of clotting or bleeding disorders.    Allergies    penicillins (Hives)    Intolerances    REVIEW OF SYSTEMS    (  )Fever	     (  )Constipation	(  )SOB				(  )Headache	(  )Dysuria  (  )Chills	     (  )Melena	(  )Dyspnea present on exertion (  )Dizziness   (  )Polyuria  (  )Nausea	     (  )Hematochezia	(  )Cough         (  )Syncope   	         (  )Hematuria  (  )Vomiting    (  )Chest Pain	(  )Wheezing		(x) abdominal discomfort	(  )Weakness  (  )Diarrhea     (  )Palpitations	(  )Anorexia			(  )Myalgia    Pertinent positives in HPI and daily subjective. All other systems negative.    PHYSICAL EXAM:    Constitutional: Appears Well    Neurological: A& O x 3    Skin: Warm    Respiratory and Thorax: normal effort; Breath sounds: normal; No rales/wheezing/rhonchi  	  Cardiovascular: S1, S2, irregularly irregular, NMBR	    Gastrointestinal: BS + x 4Q,sluggish, tender LLQ	    Genitourinary:  Bladder nondistended, nontender    Musculoskeletal:   General Right:   no muscle/joint tenderness,   normal tone, no joint swelling,   ROM: full	    General Left:   no muscle/joint tenderness,   normal tone, no joint swelling,   ROM: full    Abd: LLQ with staple DELFINA    Lower extrems:   Right: no calf tenderness              negative richard's sign               + pedal pulses    Left:   no calf tenderness              negative richard's sign               + pedal pulses                          10.3   8.07  )-----------( 150      ( 2020 07:19 )             31.6       01-    137  |  106  |  33<H>  ----------------------------<  90  4.0   |  26  |  0.54    Ca    8.1<L>      2020 07:19    TPro  5.5<L>  /  Alb  2.8<L>  /  TBili  0.9  /  DBili  x   /  AST  16  /  ALT  30  /  AlkPhos  62  01-28      PT/INR - ( 2020 16:32 )   PT: 11.7 sec;   INR: 1.05 ratio         PTT - ( 2020 14:19 )  PTT:32.0 sec				    MEDICATIONS  (STANDING):  apixaban 5 milliGRAM(s) Oral two times a day  methylPREDNISolone sodium succinate Injectable 40 milliGRAM(s) IV Push daily  metoprolol tartrate Injectable 5 milliGRAM(s) IV Push every 6 hours  pantoprazole  Injectable 40 milliGRAM(s) IV Push daily      Vital Signs Last 24 Hrs  T(C): 36.5 (2020 11:08), Max: 37.3 (2020 14:00)  T(F): 97.7 (2020 11:08), Max: 99.2 (2020 00:01)  HR: 90 (2020 05:49) (75 - 96)  BP: 120/66 (2020 11:08) (101/44 - 135/66)  BP(mean): --  RR: 18 (2020 11:08) (14 - 18)  SpO2: 99% (2020 11:08) (96% - 100%)      **Current DVT Prophylaxis:    LMWH                   (  )  Heparin SQ           (  )  Coumadin             (  )  Xarelto                  (  )  Eliquis                   ( x )  Venodynes           (x  )  Ambulation          (x  )  UFH                       (  )  ECASA                   (  )  Contraindicated  (  )

## 2020-01-29 NOTE — PROGRESS NOTE ADULT - ASSESSMENT
A/P:  SBO  Incarcerated left inguinal hernia, s/p repair  Trial of diet advancement  Monitor bowel function  GI/DVT prophylaxis  Pain control  Incentive spirometry  Serial abd exams  Cont current care and meds  Pt aware of and agrees with all of the above

## 2020-01-29 NOTE — PROGRESS NOTE ADULT - ASSESSMENT
A/P: 84F p/w abd pain.    1. Postop care. Restart eliquis for CVA proph in setting of afib.   Cont Bbl. PT.     2. Atrial fibrillation. Cont rate control strategy with Bbl. Restart LTA when ok by surgery.     3. Recent cardiac testing. Last ischemic eval 2/17 that was negative for ischemia.  2Decho 8/17 with Nl LV fxn, mod-sev MR.    4. HTN. BP is stable.     5. DVT proph.

## 2020-01-29 NOTE — PROGRESS NOTE ADULT - SUBJECTIVE AND OBJECTIVE BOX
Patient seen and examined at bedside on am rounds.    No overnight events.  Admits flatus this am.  Tolerating sips of water.  Ambulating with assistance and Pain controlled.    ICU Vital Signs Last 24 Hrs  T(C): 36.4 (29 Jan 2020 05:49), Max: 37.3 (28 Jan 2020 14:00)  T(F): 97.5 (29 Jan 2020 05:49), Max: 99.2 (29 Jan 2020 00:01)  HR: 90 (29 Jan 2020 05:49) (73 - 96)  BP: 135/66 (29 Jan 2020 05:49) (101/44 - 135/66)  BP(mean): --  ABP: --  ABP(mean): --  RR: 18 (29 Jan 2020 05:49) (14 - 20)  SpO2: 97% (29 Jan 2020 05:49) (96% - 100%)      PHYSICAL EXAM:  Constitutional: NAD, GCS: 15/15  AOX3  Eyes:  WNL  ENMT:  WNL  Neck:  WNL, non tender  Back: Non tender  Respiratory: CTABL  Cardiovascular:  S1+S2+0  Gastrointestinal: Soft, nondistended, diffuse soreness, no rebound or guarding, dressing C/D/I.  Left groin bulge not fully reducible. No skin changes.   Genitourinary:  WNL  Extremities: NV intact  Vascular:  Intact  Neurological: No focal neurological deficit,  CN, motor and sensory system grossly intact.  Skin: WNL  Musculoskeletal: WNL  Psychiatric: Grossly WNL                              10.3   8.07  )-----------( 150      ( 29 Jan 2020 07:19 )             31.6     01-29    137  |  106  |  33<H>  ----------------------------<  90  4.0   |  26  |  0.54    Ca    8.1<L>      29 Jan 2020 07:19    TPro  5.5<L>  /  Alb  2.8<L>  /  TBili  0.9  /  DBili  x   /  AST  16  /  ALT  30  /  AlkPhos  62  01-28

## 2020-01-29 NOTE — PROGRESS NOTE ADULT - ASSESSMENT
84 Y old female with SBO secondary to Incarcerated Left Inguinal hernia POD 2 Left inguinal hernia repair.  - Advance to CLD today  - ContinueIV hydration until tolerating CLD  - Medicine/Card following  - AC today, will likely restart today, on tele  - DVT ppx    discussed with attending, Dr. William

## 2020-01-29 NOTE — PROGRESS NOTE ADULT - SUBJECTIVE AND OBJECTIVE BOX
CC:  Patient is a 84y old  Female who presents with a chief complaint of preop optimization (29 Jan 2020 09:14)    SUBJECTIVE: son at bedside.    -no new complaints or issues at current time.    ROS:  all other review of systems are negative unless indicated above.    apixaban 5 milliGRAM(s) Oral two times a day  metoprolol tartrate 50 milliGRAM(s) Oral two times a day  predniSONE   Tablet 20 milliGRAM(s) Oral two times a day    T(C): 36.5 (01-29-20 @ 11:08), Max: 37.3 (01-29-20 @ 00:01)  HR: 90 (01-29-20 @ 05:49) (86 - 96)  BP: 120/66 (01-29-20 @ 11:08) (120/60 - 135/66)  RR: 18 (01-29-20 @ 11:08) (17 - 18)  SpO2: 99% (01-29-20 @ 11:08) (96% - 99%)    Constitutional: NAD.   HEENT: PERRL, EOMI, MMM.  Neck: Soft and supple, No carotid bruit, No JVD  Respiratory: Breath sounds are clear bilaterally, No wheezing, rales or rhonchi  Cardiovascular: S1 and S2, regular rate and rhythm, no murmur, rub or gallop.  Gastrointestinal: Bowel Sounds present, soft, nontender, nondistended, no guarding, no rebound, no mass.  Extremities: No peripheral edema  Vascular: 2+ peripheral pulses  Neurological: A/O x 3, no focal deficits  Musculoskeletal: 5/5 strength b/l upper and lower extremities  Skin:  no visible rashes.                         10.3   8.07  )-----------( 150      ( 29 Jan 2020 07:19 )             31.6     PT/INR - ( 27 Jan 2020 16:32 )   PT: 11.7 sec;   INR: 1.05 ratio           01-29    137  |  106  |  33<H>  ----------------------------<  90  4.0   |  26  |  0.54    Ca    8.1<L>      29 Jan 2020 07:19    TPro  5.5<L>  /  Alb  2.8<L>  /  TBili  0.9  /  DBili  x   /  AST  16  /  ALT  30  /  AlkPhos  62  01-28

## 2020-01-29 NOTE — PROGRESS NOTE ADULT - ASSESSMENT
84/F with PMHx of HTN, A fib, s/p PPM on Eliquis, HTN, HLD, Melanoma s/p Sx now on immunotherapy leading to Adrenal insufficiency on prednisone 20 mg orally BID, was brought to ED for c/o Abd pain and admitted under Sx service for SBO and ? incarcerated left inguinal Hernia. Repeat CT abdo shows worsening ascites and SBO with incarcerated left inguinal hernia.     SBO secondary to L inguinal hernia.  -01/27, diagnostic laparoscopy, reduction of incarcerated bowel in L inguinal hernia.  open repair.  -diet advanced to clear liquids.  -DC Protonix iv, start 40mg po qd.    AF.  -resume apixaban 5mg po bid.  -DC metoprolol iv, resume metoprolol tartrate 50mg po bid.    AI secondary to immunotherapy.  -outpatient regimen, prednisone 20mg po bid.  -DC SoluMedrol, resume prednisone 20mg po bid.    debility.  -mobilize/PT.

## 2020-01-29 NOTE — CONSULT NOTE ADULT - ASSESSMENT
This is an 84 year old female s/p explratory lap with reduction and repair incarcerated left inuinal hernia with atrial fibrillation ahd [id number] score 6 due to history of CVA. She is high risk for VTE and low risk for bleeding.     Plan:  ::Stop Lovenox 40 mg SQ daily  ::Resume Eliquis 5mg PO BID, first dose tonight 1/29  ::Watch for s/s bleeding  ::Daily CBC/BMP  ::Venodynes b/l  :: Enc ambulation    Thank you for this consult, will continue to follow.    Dispo: Home

## 2020-01-30 PROCEDURE — 99231 SBSQ HOSP IP/OBS SF/LOW 25: CPT

## 2020-01-30 PROCEDURE — 99024 POSTOP FOLLOW-UP VISIT: CPT

## 2020-01-30 RX ADMIN — PANTOPRAZOLE SODIUM 40 MILLIGRAM(S): 20 TABLET, DELAYED RELEASE ORAL at 05:39

## 2020-01-30 RX ADMIN — APIXABAN 5 MILLIGRAM(S): 2.5 TABLET, FILM COATED ORAL at 05:37

## 2020-01-30 RX ADMIN — Medication 50 MILLIGRAM(S): at 05:37

## 2020-01-30 RX ADMIN — Medication 20 MILLIGRAM(S): at 18:11

## 2020-01-30 RX ADMIN — APIXABAN 5 MILLIGRAM(S): 2.5 TABLET, FILM COATED ORAL at 18:11

## 2020-01-30 RX ADMIN — Medication 20 MILLIGRAM(S): at 05:37

## 2020-01-30 NOTE — PROGRESS NOTE ADULT - ASSESSMENT
A/P: 84F p/w abd pain.    1. Postop care. Restart eliquis for CVA proph in setting of afib.   Cont Bbl. PT.     2. Atrial fibrillation. Cont rate control strategy with Bbl. .     3. Recent cardiac testing. Last ischemic eval 2/17 that was negative for ischemia.  2Decho 8/17 with Nl LV fxn, mod-sev MR.    4. HTN. BP is stable.     5. DVT proph.

## 2020-01-30 NOTE — PROGRESS NOTE ADULT - SUBJECTIVE AND OBJECTIVE BOX
Patient seen and examined at bedside on am rounds.    No overnight events.  Admits flatus this am.  No BM.  Tolerating CLD.  Ambulating with assistance and Pain controlled.    ICU Vital Signs Last 24 Hrs  T(C): 36.6 (30 Jan 2020 05:05), Max: 36.6 (30 Jan 2020 05:05)  T(F): 97.9 (30 Jan 2020 05:05), Max: 97.9 (30 Jan 2020 05:05)  HR: 84 (30 Jan 2020 05:05) (69 - 84)  BP: 121/54 (30 Jan 2020 05:05) (120/66 - 130/67)  BP(mean): 71 (30 Jan 2020 05:05) (71 - 71)  ABP: --  ABP(mean): --  RR: 18 (30 Jan 2020 05:05) (18 - 19)  SpO2: 99% (30 Jan 2020 05:05) (99% - 100%)    PHYSICAL EXAM:  Constitutional: NAD, GCS: 15/15  AOX3  Eyes:  WNL  ENMT:  WNL  Neck:  WNL, non tender  Back: Non tender  Respiratory: CTABL  Cardiovascular:  S1+S2+0  Gastrointestinal: Soft, nondistended, diffuse soreness, no rebound or guarding, dressing C/D/I.  Left groin bulge not fully reducible. No skin changes.   Genitourinary:  WNL  Extremities: NV intact  Vascular:  Intact  Neurological: No focal neurological deficit,  CN, motor and sensory system grossly intact.  Skin: WNL  Musculoskeletal: WNL  Psychiatric: Grossly WNL                          10.3   8.07  )-----------( 150      ( 29 Jan 2020 07:19 )             31.6     01-29    137  |  106  |  33<H>  ----------------------------<  90  4.0   |  26  |  0.54    Ca    8.1<L>      29 Jan 2020 07:19    TPro  5.5<L>  /  Alb  2.8<L>  /  TBili  0.9  /  DBili  x   /  AST  16  /  ALT  30  /  AlkPhos  62  01-28

## 2020-01-30 NOTE — PROGRESS NOTE ADULT - SUBJECTIVE AND OBJECTIVE BOX
Patient is a 84y old  Female who presents with a chief complaint of abd pain (30 Jan 2020 14:18)      HPI:  83 yo female c/o abdominal pain, diffuse, since early afternoon.  Cramping in nature.   +nausea, vomited x 1.  No diarrhea.  Had  a BM this morning, soft but formed. Passing gas today.  Nonsmoker, nondrinker.  No previous abdominal surgeries.  On prednisone x 8 days for low cortisol levels.    ED attempted to reduce left inguinal hernia, however unsuccessful. Pt given ice packs, morphine and placed in trendelenberg position. Patient then stating that her pain is gone. Patients hernia then able to be reduced in ED.  1/30: Pt seen and examined, feeling well, tolerating diet, no fever, passing gas.  ROS:.  [X] A ten-point review of systems was otherwise negative except as noted in HPI.  Systemic:	[ ] Fever	[ ] Chills	[ ] Night sweats    [ ] Fatigue	[ ] Other  [] Cardiovascular:  [] Pulmonary:  [] Renal/Urologic:  [] Gastrointestinal: abdominal pain, vomiting  [] Metabolic:  [] Neurologic:  [] Hematologic:  [] ENT:  [] Ophthalmologic:  [] Musculoskeletal:    [ ] Due to altered mental status/intubation, subjective information were not able to be obtained from the patient. History was obtained, to the extent possible, from review of the chart and collateral sources of information.    All other system review is negative .  PAST MEDICAL & SURGICAL HISTORY:  HLD (hyperlipidemia)  HTN (hypertension)  Pacemaker  Atrial fibrillation  History of melanoma excision  H/O cataract extraction: both eyes  S/P tonsillectomy and adenoidectomy    FAMILY HISTORY:  No pertinent family history in first degree relatives    Social History:     Alcohol: Denied  Smoking: Denied  Drug Use: Denied        Vital Signs Last 24 Hrs  T(C): 36.7 (30 Jan 2020 11:10), Max: 36.7 (30 Jan 2020 11:10)  T(F): 98 (30 Jan 2020 11:10), Max: 98 (30 Jan 2020 11:10)  HR: 78 (30 Jan 2020 11:10) (69 - 84)  BP: 122/63 (30 Jan 2020 11:10) (121/54 - 130/67)  BP(mean): 71 (30 Jan 2020 05:05) (71 - 71)  RR: 18 (30 Jan 2020 11:10) (18 - 19)  SpO2: 100% (30 Jan 2020 11:10) (99% - 100%)    I&O's Summary      PHYSICAL EXAM:  Constitutional: NAD, GCS: 15/15  AOX3  Eyes:  WNL  ENMT:  WNL  Neck:  WNL, non tender  Back: Non tender  Respiratory: CTABL  Cardiovascular:  S1+S2+0  Gastrointestinal: Soft, ND, appropriately tender  incision CDI.   Genitourinary:  WNL  Extremities: NV intact  Vascular:  Intact  Neurological: No focal neurological deficit,  CN, motor and sensory system grossly intact.  Skin: WNL  Musculoskeletal: WNL  Psychiatric: Grossly WNL        Labs:                          10.3   8.07  )-----------( 150      ( 29 Jan 2020 07:19 )             31.6       01-29    137  |  106  |  33<H>  ----------------------------<  90  4.0   |  26  |  0.54    Ca    8.1<L>      29 Jan 2020 07:19

## 2020-01-30 NOTE — PROGRESS NOTE ADULT - ASSESSMENT
This is an 84 year old female s/p explratory lap with reduction and repair incarcerated left inuinal hernia with atrial fibrillation ahd [id number] score 6 due to history of CVA. She is high risk for VTE and low risk for bleeding.     Plan:    ::cont Eliquis 5mg PO BID,   ::Watch for s/s bleeding  ::Daily CBC/BMP  ::Venodynes b/l  :: Enc ambulation     will sign off case please reconsult if needed.     Dispo: Home

## 2020-01-30 NOTE — PROGRESS NOTE ADULT - ASSESSMENT
84 Y old female with SBO secondary to Incarcerated Left Inguinal hernia POD 3 Left inguinal hernia repair.  - FLD today  - ContinueIV hydration until tolerating diet  - Medicine/Card following  - Eliquis restarted.  - DVT ppx    will discuss with attending, Dr. William

## 2020-01-30 NOTE — PROGRESS NOTE ADULT - ASSESSMENT
84/F with PMHx of HTN, A fib, s/p PPM on Eliquis, HTN, HLD, Melanoma s/p Sx now on immunotherapy leading to Adrenal insufficiency on prednisone 20 mg orally BID, was brought to ED for c/o Abd pain and admitted under Sx service for SBO and ? incarcerated left inguinal Hernia. Repeat CT abdo shows worsening ascites and SBO with incarcerated left inguinal hernia.     SBO secondary to L inguinal hernia.  -01/27, diagnostic laparoscopy, reduction of incarcerated bowel in L inguinal hernia.  open repair.  -diet advanced to regular diet.  -Protonix 40mg po qd.    AF.  -apixaban 5mg po bid.  -metoprolol tartrate 50mg po bid.    AI secondary to immunotherapy.  -outpatient regimen, prednisone 20mg po bid.  -prednisone 20mg po bid.    debility.  -mobilize/PT.

## 2020-01-30 NOTE — PROGRESS NOTE ADULT - SUBJECTIVE AND OBJECTIVE BOX
Patient is a 84y old  Female who presents with a chief complaint of SBO secondary to incarcerated left inguinal hernia (30 Jan 2020 07:47)    1/30- denies CP or SOB , tolerating clears    MEDICATIONS  (STANDING):  apixaban 5 milliGRAM(s) Oral two times a day  metoprolol tartrate 50 milliGRAM(s) Oral two times a day  pantoprazole    Tablet 40 milliGRAM(s) Oral before breakfast  predniSONE   Tablet 20 milliGRAM(s) Oral two times a day    MEDICATIONS  (PRN):            Vital Signs Last 24 Hrs  T(C): 36.6 (30 Jan 2020 05:05), Max: 36.6 (30 Jan 2020 05:05)  T(F): 97.9 (30 Jan 2020 05:05), Max: 97.9 (30 Jan 2020 05:05)  HR: 84 (30 Jan 2020 05:05) (69 - 84)  BP: 121/54 (30 Jan 2020 05:05) (120/66 - 130/67)  BP(mean): 71 (30 Jan 2020 05:05) (71 - 71)  RR: 18 (30 Jan 2020 05:05) (18 - 19)  SpO2: 99% (30 Jan 2020 05:05) (99% - 100%)            INTERPRETATION OF TELEMETRY: AFIB     ECG:        LABS:                        10.3   8.07  )-----------( 150      ( 29 Jan 2020 07:19 )             31.6     01-29    137  |  106  |  33<H>  ----------------------------<  90  4.0   |  26  |  0.54    Ca    8.1<L>      29 Jan 2020 07:19    TPro  5.5<L>  /  Alb  2.8<L>  /  TBili  0.9  /  DBili  x   /  AST  16  /  ALT  30  /  AlkPhos  62  01-28            I&O's Summary    BNP  RADIOLOGY & ADDITIONAL STUDIES:

## 2020-01-30 NOTE — PROGRESS NOTE ADULT - ASSESSMENT
84 Y old female S/P diagnostic laparoscopy release of SBO, repair of left groin hernia, tolerating  Clear liquid diet.  Advance diet  Pain control, continue PCA  DVT/GI prophylaxis  Resume home meds   Medical service following  Await GI more  function  Replace electrolyte  PT  OOB, ambulate  incentive spirometry  D/C home in am if continue to tolerate diet   D/W family, Nursing staff.

## 2020-01-30 NOTE — PHYSICAL THERAPY INITIAL EVALUATION ADULT - GENERAL OBSERVATIONS, REHAB EVAL
Pt found sitting OOB in chair with tele monitor, and chair alarm attached. Son present. Pt c/o pain by sx site 5/10.

## 2020-01-30 NOTE — PROGRESS NOTE ADULT - SUBJECTIVE AND OBJECTIVE BOX
CC:  Patient is a 84y old  Female who presents with a chief complaint of abd pain (30 Jan 2020 14:18)    SUBJECTIVE: son at bedside.  OOB in chair.    -resumed regular diet, tolerating.    ROS:  all other review of systems are negative unless indicated above.    apixaban 5 milliGRAM(s) Oral two times a day  metoprolol tartrate 50 milliGRAM(s) Oral two times a day  pantoprazole    Tablet 40 milliGRAM(s) Oral before breakfast  predniSONE   Tablet 20 milliGRAM(s) Oral two times a day    T(C): 36.7 (01-30-20 @ 11:10), Max: 36.7 (01-30-20 @ 11:10)  HR: 78 (01-30-20 @ 11:10) (69 - 84)  BP: 122/63 (01-30-20 @ 11:10) (121/54 - 130/67)  RR: 18 (01-30-20 @ 11:10) (18 - 19)  SpO2: 100% (01-30-20 @ 11:10) (99% - 100%)    Constitutional: NAD.   HEENT: PERRL, EOMI, MMM.  Neck: Soft and supple, No carotid bruit, No JVD  Respiratory: Breath sounds are clear bilaterally, No wheezing, rales or rhonchi  Cardiovascular: S1 and S2, regular rate and rhythm, no murmur, rub or gallop.  Gastrointestinal: Bowel Sounds present, soft, nontender, nondistended, no guarding, no rebound, no mass.  Extremities: No peripheral edema  Vascular: 2+ peripheral pulses  Neurological: A/O x 3, no focal deficits  Musculoskeletal: 5/5 strength b/l upper and lower extremities  Skin:  no visible rashes.                         10.3   8.07  )-----------( 150      ( 29 Jan 2020 07:19 )             31.6       01-29    137  |  106  |  33<H>  ----------------------------<  90  4.0   |  26  |  0.54    Ca    8.1<L>      29 Jan 2020 07:19

## 2020-01-30 NOTE — PHYSICAL THERAPY INITIAL EVALUATION ADULT - PERTINENT HX OF CURRENT PROBLEM, REHAB EVAL
Pt admitted to  secondary to abd pain. Pt with a  hx of melanoma, had sx, and now on immunotherapy. CT abd/pelvis: SBO with left inguinal hernia.

## 2020-01-31 ENCOUNTER — TRANSCRIPTION ENCOUNTER (OUTPATIENT)
Age: 85
End: 2020-01-31

## 2020-01-31 VITALS
SYSTOLIC BLOOD PRESSURE: 109 MMHG | TEMPERATURE: 98 F | HEART RATE: 85 BPM | OXYGEN SATURATION: 99 % | RESPIRATION RATE: 17 BRPM | DIASTOLIC BLOOD PRESSURE: 64 MMHG

## 2020-01-31 RX ADMIN — PANTOPRAZOLE SODIUM 40 MILLIGRAM(S): 20 TABLET, DELAYED RELEASE ORAL at 06:02

## 2020-01-31 RX ADMIN — APIXABAN 5 MILLIGRAM(S): 2.5 TABLET, FILM COATED ORAL at 06:02

## 2020-01-31 RX ADMIN — Medication 20 MILLIGRAM(S): at 06:02

## 2020-01-31 RX ADMIN — Medication 50 MILLIGRAM(S): at 06:02

## 2020-01-31 NOTE — PROGRESS NOTE ADULT - SUBJECTIVE AND OBJECTIVE BOX
CC:  Patient is a 84y old  Female who presents with a chief complaint of Bowel obstruction (31 Jan 2020 13:56)    SUBJECTIVE: son at bedside.    -tolerating regular diet.  -BM today.    ROS:  (-) AB pain, NV.  all other review of systems are negative unless indicated above.    apixaban 5 milliGRAM(s) Oral two times a day  bisacodyl 5 milliGRAM(s) Oral every 12 hours PRN  metoprolol tartrate 50 milliGRAM(s) Oral two times a day  pantoprazole    Tablet 40 milliGRAM(s) Oral before breakfast  predniSONE   Tablet 20 milliGRAM(s) Oral two times a day    T(C): 36.4 (01-31-20 @ 10:36), Max: 36.8 (01-31-20 @ 05:23)  HR: 85 (01-31-20 @ 10:36) (85 - 113)  BP: 109/64 (01-31-20 @ 10:36) (96/55 - 138/73)  RR: 17 (01-31-20 @ 10:36) (17 - 18)  SpO2: 99% (01-31-20 @ 10:36) (96% - 99%)    Constitutional: NAD.   HEENT: PERRL, EOMI, MMM.  Neck: Soft and supple, No carotid bruit, No JVD  Respiratory: Breath sounds are clear bilaterally, No wheezing, rales or rhonchi  Cardiovascular: S1 and S2, regular rate and rhythm, no murmur, rub or gallop.  Gastrointestinal: Bowel Sounds present, soft, nontender, nondistended, no guarding, no rebound, no mass.  Extremities: No peripheral edema  Vascular: 2+ peripheral pulses  Neurological: A/O x 3, no focal deficits  Musculoskeletal: 5/5 strength b/l upper and lower extremities  Skin:  no visible rashes.

## 2020-01-31 NOTE — DISCHARGE NOTE PROVIDER - NSDCMRMEDTOKEN_GEN_ALL_CORE_FT
areds 2: 2 tab(s) orally once a day  bisacodyl 5 mg oral delayed release tablet: 1 tab(s) orally every 12 hours, As needed, Constipation  Centrum oral tablet: 1 tab(s) orally once a day  digoxin 125 mcg (0.125 mg) oral tablet: 1 tab(s) orally once a day  digoxin 250 mcg (0.25 mg) oral tablet: 1 tab(s) orally once a day  Eliquis 2.5 mg oral tablet: 1 tab(s) orally 2 times a day  metoprolol tartrate 100 mg oral tablet: 1 tab(s) orally 2 times a day  simvastatin 10 mg oral tablet: 1 tab(s) orally once a day (at bedtime)  Tylenol 500 mg oral tablet: 2 tab(s) orally , As Needed

## 2020-01-31 NOTE — PROGRESS NOTE ADULT - ASSESSMENT
84/F with PMHx of HTN, A fib, s/p PPM on Eliquis, HTN, HLD, Melanoma s/p Sx now on immunotherapy leading to Adrenal insufficiency on prednisone 20 mg orally BID, was brought to ED for c/o Abd pain and admitted under Sx service for SBO and ? incarcerated left inguinal Hernia. Repeat CT abdo shows worsening ascites and SBO with incarcerated left inguinal hernia.     SBO secondary to L inguinal hernia.  -01/27, diagnostic laparoscopy, reduction of incarcerated bowel in L inguinal hernia.  open repair.  -diet advanced to regular diet.  -Protonix 40mg po qd.    AF.  -apixaban 5mg po bid.  -metoprolol tartrate 50mg po bid.    AI secondary to immunotherapy.  -resumed outpatient regimen, prednisone 20mg po bid.    debility.  -mobilize/PT.    disposition.  -may DC home today.

## 2020-01-31 NOTE — PROGRESS NOTE ADULT - REASON FOR ADMISSION
SBO
SBO secondary to incarcerated left inguinal hernia
SBO secondary to incarcerated left inguinal hernia
abd pain
preop optimization
preop optimization
AFIB

## 2020-01-31 NOTE — DISCHARGE NOTE PROVIDER - NSDCFUSCHEDAPPT_GEN_ALL_CORE_FT
SHARON HULL ; 02/05/2020 ; NPP Endocrin [address]  SHARON HULL ; 02/08/2020 ; NPP Rad  SHARON Demarco ; 02/12/2020 ; NPP Surgonc [address]  SHARON HULL ; 02/13/2020 ; NPP [facility]

## 2020-01-31 NOTE — DISCHARGE NOTE PROVIDER - CARE PROVIDER_API CALL
Karen William)  Surgery; Surgical Critical Care  [address] [facility], [address] [place] [address]  Phone: [phone]  Fax: [phone]  Follow Up Time:

## 2020-01-31 NOTE — DISCHARGE NOTE PROVIDER - NSDCCPCAREPLAN_GEN_ALL_CORE_FT
PRINCIPAL DISCHARGE DIAGNOSIS  Diagnosis: SBO (small bowel obstruction)  Assessment and Plan of Treatment:       SECONDARY DISCHARGE DIAGNOSES  Diagnosis: Inguinal hernia of left side with obstruction  Assessment and Plan of Treatment:

## 2020-01-31 NOTE — PROGRESS NOTE ADULT - SUBJECTIVE AND OBJECTIVE BOX
Cardiology Progress Note    HPI: 85 yo female c/o abdominal pain, diffuse, since early afternoon.  Cramping in nature.   +nausea, vomited x 1.  No diarrhea.  Had  a BM this morning, soft but formed. Passing gas today.  Nonsmoker, nondrinker.  No previous abdominal surgeries.  On prednisone x 8 days for low cortisol levels.  ED attempted to reduce left inguinal hernia, however unsuccessful. Pt given ice packs, morphine and placed in trendelenberg position. Patient then stating that her pain is gone. Patients hernia then able to be reduced in ED.    1/27. Pt seen examined on 5S.   D/w surgery- as per surgery, pt will need emergency surgery for incarcerated hernia, worsening SBO.  Case d/w pt and son. No CP/SOB. Not on tele.   +Abd pain.    1/29. POD1 from abd surgery. No CP. Feels better.   No events last pm.     1/31. No CP/SOB. Sitting in chair, eating breakfast.   No events last pm.    PAST MEDICAL/SURGICAL/FAMILY/SOCIAL HISTORY:    Past Medical History:  Atrial fibrillation    HLD (hyperlipidemia)    HTN (hypertension)    Pacemaker.     Past Surgical History:  H/O cataract extraction  both eyes  History of melanoma excision    S/P tonsillectomy and adenoidectomy. (27 Jan 2020 02:52)    PAST MEDICAL & SURGICAL HISTORY:  HLD (hyperlipidemia)  HTN (hypertension)  Pacemaker  Atrial fibrillation  History of melanoma excision  H/O cataract extraction: both eyes  S/P tonsillectomy and adenoidectomy    Allergies  penicillins (Hives)    SOCIAL HISTORY: Denies tobacco, etoh abuse or illicit drug use    FAMILY HISTORY:  No pertinent family history in first degree relatives    MEDICATIONS  (STANDING):  digoxin     Tablet 0.125 milliGRAM(s) Oral daily  enoxaparin Injectable 40 milliGRAM(s) SubCutaneous daily  methylPREDNISolone sodium succinate Injectable 40 milliGRAM(s) IV Push daily  metoprolol tartrate 100 milliGRAM(s) Oral two times a day  pantoprazole  Injectable 40 milliGRAM(s) IV Push daily  prothrombin complex concentrate IVPB (KCENTRA) 2000 International Unit(s) IV Intermittent once  simvastatin 10 milliGRAM(s) Oral at bedtime  sodium chloride 0.9%. 1000 milliLiter(s) (100 mL/Hr) IV Continuous <Continuous>    MEDICATIONS  (PRN):  ketorolac   Injectable 15 milliGRAM(s) IV Push every 4 hours PRN Moderate Pain (4 - 6)    Vital Signs Last 24 Hrs  T(C): 36.6 (27 Jan 2020 11:45), Max: 36.8 (26 Jan 2020 23:47)  T(F): 97.8 (27 Jan 2020 11:45), Max: 98.3 (27 Jan 2020 03:24)  HR: 68 (27 Jan 2020 11:45) (68 - 98)  BP: 109/52 (27 Jan 2020 11:45) (109/52 - 150/92)  BP(mean): 65 (27 Jan 2020 11:45) (65 - 65)  RR: 17 (27 Jan 2020 11:45) (17 - 20)  SpO2: 99% (27 Jan 2020 11:45) (97% - 100%)    REVIEW OF SYSTEMS:    CONSTITUTIONAL:  As per HPI.  HEENT:  Eyes:  No diplopia or blurred vision. ENT:  No earache, sore throat or runny nose.  CARDIOVASCULAR:  No pressure, squeezing, strangling, tightness, heaviness or aching about the chest, neck, axilla or epigastrium.  RESPIRATORY:  No cough, shortness of breath, PND or orthopnea.  GASTROINTESTINAL:  +abd pain, distension.  GENITOURINARY:  No dysuria, frequency or urgency.  MUSCULOSKELETAL:  As per HPI.  SKIN:  No change in skin, hair or nails.  NEUROLOGIC:  No paresthesias, fasciculations, seizures or weakness.    PHYSICAL EXAMINATION:    GENERAL APPEARANCE:  Pt. is not currently dyspneic, in no distress. Pt. is alert, oriented, and pleasant.  HEENT:  Pupils are normal and react normally. No icterus. Mucous membranes well colored.  NECK:  Supple. No lymphadenopathy. Jugular venous pressure not elevated. Carotids equal.   HEART:   The cardiac impulse has a normal quality. There are no murmurs, rubs or gallops noted. Irregular rhythm  CHEST:  Chest is clear to auscultation. Normal respiratory effort.  ABDOMEN:  Soft and nontender.   EXTREMITIES:  There is no edema.     I&O's Summary    27 Jan 2020 07:01  -  27 Jan 2020 14:53  --------------------------------------------------------  IN: 900 mL / OUT: 0 mL / NET: 900 mL    LABS:                        11.9   15.65 )-----------( 222      ( 27 Jan 2020 00:07 )             37.0     01-27    135  |  102  |  37<H>  ----------------------------<  124<H>  4.2   |  26  |  0.60    Ca    9.3      27 Jan 2020 00:07    TPro  6.8  /  Alb  3.7  /  TBili  0.7  /  DBili  x   /  AST  22  /  ALT  48  /  AlkPhos  70  01-27    LIVER FUNCTIONS - ( 27 Jan 2020 00:07 )  Alb: 3.7 g/dL / Pro: 6.8 gm/dL / ALK PHOS: 70 U/L / ALT: 48 U/L / AST: 22 U/L / GGT: x           EKG: A sensed V paced @ 69.    TELEMETRY: Not on tele.     CARDIAC TESTS: pending    RADIOLOGY & ADDITIONAL STUDIES:   < from: CT Abdomen and Pelvis w/ IV Cont (01.27.20 @ 12:59) >  Since the previous examination approximately 12 hours prior, bilateral pleural effusions have developed and ascites is increasing.  Persistent distal small bowel obstruction secondary to probable incarcerated left inguinal hernia.  Pacemaker. Cardiomegaly.    ASSESSMENT & PLAN:

## 2020-01-31 NOTE — DISCHARGE NOTE PROVIDER - NSDCCPTREATMENT_GEN_ALL_CORE_FT
PRINCIPAL PROCEDURE  Procedure: Repair of nonreducible recurrent inguinal hernia  Findings and Treatment:

## 2020-01-31 NOTE — DISCHARGE NOTE PROVIDER - HOSPITAL COURSE
85 yo female c/o abdominal pain, diffuse, since early afternoon.  Cramping in nature.   +nausea, vomited x 1.  No diarrhea.  Had  a BM this morning, soft but formed. Passing gas today.  Nonsmoker, nondrinker.  No previous abdominal surgeries.  On prednisone x 8 days for low cortisol levels.        ED attempted to reduce left inguinal hernia, however unsuccessful. Pt given ice packs, morphine and placed in trendelenberg position. Patient then stating that her pain is gone. Patients hernia then able to be reduced in ED.    1/30: Pt seen and examined, feeling well, tolerating diet, no fever, passing gas.    1/31 Tolerating diet, clear for discharge.        Vital Signs Last 24 Hrs    T(C): 36.4 (31 Jan 2020 10:36), Max: 36.8 (31 Jan 2020 05:23)    T(F): 97.6 (31 Jan 2020 10:36), Max: 98.3 (31 Jan 2020 05:23)    HR: 85 (31 Jan 2020 10:36) (85 - 113)    BP: 109/64 (31 Jan 2020 10:36) (96/55 - 138/73)    RR: 17 (31 Jan 2020 10:36) (17 - 18)    SpO2: 99% (31 Jan 2020 10:36) (96% - 99%)        Abd- wounds clear, non tender.

## 2020-01-31 NOTE — PROGRESS NOTE ADULT - ASSESSMENT
A/P: 84F p/w abd pain.    1. Postop care. Continue eliquis for CVA proph in setting of afib.   Cont Bbl. PT.     2. Atrial fibrillation. Cont rate control strategy with Bbl. Continue eliquis.      3. Recent cardiac testing. Last ischemic eval 2/17 that was negative for ischemia.  2Decho 8/17 with Nl LV fxn, mod-sev MR.  Outpt f/u with valvular heart disease.    4. HTN. BP is stable.     5. DVT proph. OOB as tolerated.

## 2020-01-31 NOTE — DISCHARGE NOTE NURSING/CASE MANAGEMENT/SOCIAL WORK - PATIENT PORTAL LINK FT
You can access the FollowMyHealth Patient Portal offered by [facility] by registering at the following website: [email or web address]. By joining [facility]’s FollowMyHealth portal, you will also be able to view your health information using other applications (apps) compatible with our system.

## 2020-02-05 ENCOUNTER — APPOINTMENT (OUTPATIENT)
Dept: ENDOCRINOLOGY | Facility: CLINIC | Age: 85
End: 2020-02-05
Payer: COMMERCIAL

## 2020-02-05 VITALS — SYSTOLIC BLOOD PRESSURE: 130 MMHG | DIASTOLIC BLOOD PRESSURE: 80 MMHG

## 2020-02-05 VITALS
HEART RATE: 100 BPM | DIASTOLIC BLOOD PRESSURE: 84 MMHG | TEMPERATURE: 97.7 F | WEIGHT: 136 LBS | OXYGEN SATURATION: 95 % | HEIGHT: 65 IN | SYSTOLIC BLOOD PRESSURE: 142 MMHG | BODY MASS INDEX: 22.66 KG/M2

## 2020-02-05 PROCEDURE — 99204 OFFICE O/P NEW MOD 45 MIN: CPT

## 2020-02-07 ENCOUNTER — FORM ENCOUNTER (OUTPATIENT)
Age: 85
End: 2020-02-07

## 2020-02-08 ENCOUNTER — OUTPATIENT (OUTPATIENT)
Dept: OUTPATIENT SERVICES | Facility: HOSPITAL | Age: 85
LOS: 1 days | End: 2020-02-08
Payer: COMMERCIAL

## 2020-02-08 ENCOUNTER — APPOINTMENT (OUTPATIENT)
Dept: ULTRASOUND IMAGING | Facility: CLINIC | Age: 85
End: 2020-02-08
Payer: COMMERCIAL

## 2020-02-08 DIAGNOSIS — Z00.8 ENCOUNTER FOR OTHER GENERAL EXAMINATION: ICD-10-CM

## 2020-02-08 DIAGNOSIS — Z98.890 OTHER SPECIFIED POSTPROCEDURAL STATES: Chronic | ICD-10-CM

## 2020-02-08 DIAGNOSIS — Z98.49 CATARACT EXTRACTION STATUS, UNSPECIFIED EYE: Chronic | ICD-10-CM

## 2020-02-08 DIAGNOSIS — Z90.89 ACQUIRED ABSENCE OF OTHER ORGANS: Chronic | ICD-10-CM

## 2020-02-08 PROCEDURE — 76882 US LMTD JT/FCL EVL NVASC XTR: CPT

## 2020-02-08 PROCEDURE — 76882 US LMTD JT/FCL EVL NVASC XTR: CPT | Mod: 26,LT

## 2020-02-11 ENCOUNTER — OUTPATIENT (OUTPATIENT)
Dept: OUTPATIENT SERVICES | Facility: HOSPITAL | Age: 85
LOS: 1 days | Discharge: HOME | End: 2020-02-11

## 2020-02-11 DIAGNOSIS — C43.72 MALIGNANT MELANOMA OF LEFT LOWER LIMB, INCLUDING HIP: ICD-10-CM

## 2020-02-11 DIAGNOSIS — Z98.49 CATARACT EXTRACTION STATUS, UNSPECIFIED EYE: Chronic | ICD-10-CM

## 2020-02-11 DIAGNOSIS — Z90.89 ACQUIRED ABSENCE OF OTHER ORGANS: Chronic | ICD-10-CM

## 2020-02-11 DIAGNOSIS — Z98.890 OTHER SPECIFIED POSTPROCEDURAL STATES: Chronic | ICD-10-CM

## 2020-02-12 ENCOUNTER — APPOINTMENT (OUTPATIENT)
Dept: SURGICAL ONCOLOGY | Facility: CLINIC | Age: 85
End: 2020-02-12
Payer: MEDICARE

## 2020-02-12 VITALS
HEART RATE: 104 BPM | BODY MASS INDEX: 21.99 KG/M2 | SYSTOLIC BLOOD PRESSURE: 166 MMHG | DIASTOLIC BLOOD PRESSURE: 93 MMHG | OXYGEN SATURATION: 98 % | RESPIRATION RATE: 16 BRPM | WEIGHT: 132 LBS | HEIGHT: 65 IN

## 2020-02-12 PROCEDURE — 99215 OFFICE O/P EST HI 40 MIN: CPT

## 2020-02-12 NOTE — PROCEDURE
[FreeTextEntry1] : Left heel skin graft well healed with some hypertrophic keratin eschar inferiorly and distally - debrided sharply at the bed side.

## 2020-02-12 NOTE — CONSULT LETTER
[Dear  ___] : Dear  [unfilled], [Courtesy Letter:] : I had the pleasure of seeing your patient, [unfilled], in my office today. [Please see my note below.] : Please see my note below. [Sincerely,] : Sincerely, [DrElizabeth  ___] : Dr. COLUNGA [DrElizabeth ___] : Dr. COLUNGA [FreeTextEntry3] : Perez Russo MD FACS

## 2020-02-12 NOTE — ADDENDUM
[FreeTextEntry1] : I, Jeb Torre, acted solely as a scribe for Dr. Perez Russo on this date 02/12/2020.

## 2020-02-12 NOTE — ASSESSMENT
[FreeTextEntry1] : Stage III left heel melanoma\par S/p WEX May 2019\par ROXANN\par Continue steroids as per endocrinology\par Follow up with medical oncology concerning restarting immunotherapy\par Continue dermatologic surveillance as per Dr. Olson\par RTO 3 months

## 2020-02-12 NOTE — HISTORY OF PRESENT ILLNESS
[de-identified] : 83 y/o female s/p wide excision left superior heel melanoma with left inguinal sentinel node biopsy on 5/28/19.\par Pathology:\par 1. Left inguinal sentinel lymph nodes, biopsy. Two out of four lymph nodes positive for melanoma (2/4). \par 2. Skin and soft tissue, left heel foot, wide resection. Melanoma 3.5mm in thickness. Margins negative (1mm from the closest deep margin). [id number]\par \par She is s/p I&D of left groin infected seroma (6/26/19).\par Culture: Rare Enterobacter cloaecae\par \par PET/CT 6/15/19: 1. Postsurgical changes left heel with multiple surgical clips and high attenuation material. Residual FDG avidity along the surgical defect may represent postsurgical change/inflammation, residual disease, or combination of these entities. \par 2. Postsurgical collection left inguinal region. No FDG avid locoregional or distant metastatic disease.\par \par She began immunotherapy Nivolumab July 2019 and finished 7 sessions but her thighs felt very heavy. She was instructed to hold the immunotherapy after low cortisol levels and grade 3 adrenal insufficiency. She reports losing 14lbs. She is following up with Dr. Kimbrough tomorrow. She followed up with an endocrinologist and is currently on prednisone.\par She states that she is wearing compression stockings and she believes her lymphedema edema is improving.\par She states that she will be following up with her dermatologist, Dr. Olson.\par \par She is s/p punch biopsy of two sites on her left heel on 5/1/19 performed by Navneet Branch PA-C.\par Pathology:\par 1. 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 pT4B\par 2. Left heel inferior: malignant melanoma, 4.2 mm thick, with ulcerations. pT4B \par \par She notes incarceration of hernia near left groin for which she underwent emergency surgery end of January 2020 at [facility] by Dr. William.\par She notes a fractured nose requiring stitches by Dr. Ramirez of [place] in September 2019.\par Pt has a pacemaker and is currently on Warfarin for Afib as per Dr. Ronald Shepard of cardiology. \par She is now taking Eliquis.\par Denies fever or chills.

## 2020-02-12 NOTE — PHYSICAL EXAM
[Normal] : supple, no neck mass and thyroid not enlarged [Normal Neck Lymph Nodes] : normal neck lymph nodes  [Normal Supraclavicular Lymph Nodes] : normal supraclavicular lymph nodes [Normal Groin Lymph Nodes] : normal groin lymph nodes [Normal Axillary Lymph Nodes] : normal axillary lymph nodes [Normal] : oriented to person, place and time, with appropriate affect [de-identified] : 1+ left LE lymphedema. [de-identified] : Left heel skin graft well healed with some hypertrophic keratin eschar inferiorly and distally - debrided sharply at the bed side. No evidence of recurrence. No adenopathy. No other suspicious skin lesions.

## 2020-02-13 ENCOUNTER — APPOINTMENT (OUTPATIENT)
Dept: SURGERY | Facility: CLINIC | Age: 85
End: 2020-02-13
Payer: COMMERCIAL

## 2020-02-13 ENCOUNTER — RESULT REVIEW (OUTPATIENT)
Age: 85
End: 2020-02-13

## 2020-02-13 ENCOUNTER — APPOINTMENT (OUTPATIENT)
Age: 85
End: 2020-02-13
Payer: COMMERCIAL

## 2020-02-13 VITALS
BODY MASS INDEX: 22.53 KG/M2 | DIASTOLIC BLOOD PRESSURE: 86 MMHG | HEIGHT: 64 IN | OXYGEN SATURATION: 98 % | WEIGHT: 132 LBS | SYSTOLIC BLOOD PRESSURE: 147 MMHG | HEART RATE: 77 BPM

## 2020-02-13 VITALS
HEART RATE: 83 BPM | DIASTOLIC BLOOD PRESSURE: 76 MMHG | TEMPERATURE: 98.8 F | BODY MASS INDEX: 22.49 KG/M2 | HEIGHT: 65 IN | RESPIRATION RATE: 16 BRPM | SYSTOLIC BLOOD PRESSURE: 146 MMHG | WEIGHT: 135 LBS

## 2020-02-13 DIAGNOSIS — Z98.890 OTHER SPECIFIED POSTPROCEDURAL STATES: ICD-10-CM

## 2020-02-13 PROCEDURE — 99214 OFFICE O/P EST MOD 30 MIN: CPT

## 2020-02-13 PROCEDURE — 99024 POSTOP FOLLOW-UP VISIT: CPT

## 2020-02-13 NOTE — PHYSICAL EXAM
[de-identified] : Abdomen soft, ND< NT, incision CDI, mild serous  drainage LEft inguinal incision no fluctuation, cellulitis , no cough impulse, normal post of swelling.

## 2020-02-13 NOTE — REASON FOR VISIT
[Post Op: _________] : a [unfilled] post op visit [FreeTextEntry1] : diagnostic laparoscopy , left inguinal hernia repair

## 2020-02-13 NOTE — ASSESSMENT
[FreeTextEntry1] : S/P diagnostic clap, release of SBO, repair of left inguinal hernia , doing well. Pt has melanoma with  mets to LN, on immunotherapy and steroid; She is aware she is high risk for hernia recurrence\par Staples removed\par CT in 3- month \par to resume immunotherapy in 3 weeks.\par Follow up with  PMD, Oncology .

## 2020-02-13 NOTE — HISTORY OF PRESENT ILLNESS
[de-identified] : Pt seen and examined, pain well controlled, no fever, tolerating diet, regular GI function . Generalized weakness.

## 2020-02-14 LAB — LDH SERPL L TO P-CCNC: 245 U/L — HIGH (ref 50–242)

## 2020-02-23 NOTE — PHYSICAL EXAM
[Alert] : alert [No Acute Distress] : no acute distress [Well Nourished] : well nourished [Well Developed] : well developed [EOMI] : extra ocular movement intact [Normal Sclera/Conjunctiva] : normal sclera/conjunctiva [Normal Oropharynx] : the oropharynx was normal [No Proptosis] : no proptosis [No Thyroid Nodules] : there were no palpable thyroid nodules [No Respiratory Distress] : no respiratory distress [Thyroid Not Enlarged] : the thyroid was not enlarged [Normal Rate] : heart rate was normal  [No Accessory Muscle Use] : no accessory muscle use [Clear to Auscultation] : lungs were clear to auscultation bilaterally [Normal S1, S2] : normal S1 and S2 [Pedal Pulses Normal] : the pedal pulses are present [Regular Rhythm] : with a regular rhythm [No Edema] : there was no peripheral edema [Normal Bowel Sounds] : normal bowel sounds [Not Tender] : non-tender [Not Distended] : not distended [Soft] : abdomen soft [Post Cervical Nodes] : posterior cervical nodes [Anterior Cervical Nodes] : anterior cervical nodes [Normal] : normal and non tender [Axillary Nodes] : axillary nodes [No Spinal Tenderness] : no spinal tenderness [Spine Straight] : spine straight [Normal Strength/Tone] : muscle strength and tone were normal [No Stigmata of Cushings Syndrome] : no stigmata of cushings syndrome [Normal Gait] : normal gait [Normal Reflexes] : deep tendon reflexes were 2+ and symmetric [No Rash] : no rash [No Tremors] : no tremors [Oriented x3] : oriented to person, place, and time [Acanthosis Nigricans] : no acanthosis nigricans

## 2020-02-23 NOTE — HISTORY OF PRESENT ILLNESS
[FreeTextEntry1] : Ms. HULL  is a 84 year  year old female  who presents for initial endocrine evaluation. She presents with regard to a history of recently noted low end cortisol She presnts via the courtesy of Dr. Kimbrough.\par . Additional medical history includes that of  Malignant Melanoma for which she has been on immunotherapy -had seven treatments -started in July and ended in late December Was on Nivolumab(Opdivo) which can have side effect of hypophysitis\par She has started to feel not as well in late december-noted incr fatigue/lethargy.\par thighs fellt heavy.\par Then became increasingly lethargic\par Has had loss of appetite had lsot 14 lbs\par Recent cortisol  am returned at 1.3 with concomitant level of Acth of 5.8\par Denies having taken or having been given any corticosteroids prior.\par Had been placed on prednisone 10 mg bid then incr to 20 mg bid\par Since going on the prednisone has very quickly felt significantly better. Appetite and energy level have returned just about to pre treatment levels.\par Last week though had emergency surgery for  incarcerated left ?inguinal hernia. In hospital for one week-now feeling well.\par Additional medical history includes that of a fib, gerd and and anemia.Does follow with Dr. Shepard as her cardiologist.\par \par \par

## 2020-02-28 RX ORDER — SODIUM CHLORIDE 9 MG/ML
250 INJECTION INTRAMUSCULAR; INTRAVENOUS; SUBCUTANEOUS
Refills: 0 | Status: DISCONTINUED | OUTPATIENT
Start: 2020-03-02 | End: 2020-06-02

## 2020-03-02 ENCOUNTER — OUTPATIENT (OUTPATIENT)
Dept: OUTPATIENT SERVICES | Facility: HOSPITAL | Age: 85
LOS: 1 days | End: 2020-03-02
Payer: COMMERCIAL

## 2020-03-02 VITALS
WEIGHT: 139.11 LBS | DIASTOLIC BLOOD PRESSURE: 79 MMHG | HEIGHT: 65 IN | HEART RATE: 97 BPM | TEMPERATURE: 98 F | SYSTOLIC BLOOD PRESSURE: 134 MMHG | RESPIRATION RATE: 16 BRPM

## 2020-03-02 DIAGNOSIS — Z90.89 ACQUIRED ABSENCE OF OTHER ORGANS: Chronic | ICD-10-CM

## 2020-03-02 DIAGNOSIS — Z98.890 OTHER SPECIFIED POSTPROCEDURAL STATES: Chronic | ICD-10-CM

## 2020-03-02 DIAGNOSIS — C43.9 MALIGNANT MELANOMA OF SKIN, UNSPECIFIED: ICD-10-CM

## 2020-03-02 DIAGNOSIS — R69 ILLNESS, UNSPECIFIED: ICD-10-CM

## 2020-03-02 DIAGNOSIS — Z98.49 CATARACT EXTRACTION STATUS, UNSPECIFIED EYE: Chronic | ICD-10-CM

## 2020-03-02 LAB
ALBUMIN SERPL ELPH-MCNC: 3.8 G/DL — SIGNIFICANT CHANGE UP (ref 3.3–5)
ALP SERPL-CCNC: 52 U/L — SIGNIFICANT CHANGE UP (ref 40–120)
ALT FLD-CCNC: 49 U/L — SIGNIFICANT CHANGE UP (ref 12–78)
ANION GAP SERPL CALC-SCNC: 5 MMOL/L — SIGNIFICANT CHANGE UP (ref 5–17)
AST SERPL-CCNC: 28 U/L — SIGNIFICANT CHANGE UP (ref 15–37)
BASOPHILS # BLD AUTO: 0.04 K/UL — SIGNIFICANT CHANGE UP (ref 0–0.2)
BASOPHILS NFR BLD AUTO: 0.6 % — SIGNIFICANT CHANGE UP (ref 0–2)
BILIRUB SERPL-MCNC: 0.7 MG/DL — SIGNIFICANT CHANGE UP (ref 0.2–1.2)
BUN SERPL-MCNC: 27 MG/DL — HIGH (ref 7–23)
CALCIUM SERPL-MCNC: 9 MG/DL — SIGNIFICANT CHANGE UP (ref 8.5–10.1)
CHLORIDE SERPL-SCNC: 108 MMOL/L — SIGNIFICANT CHANGE UP (ref 96–108)
CO2 SERPL-SCNC: 28 MMOL/L — SIGNIFICANT CHANGE UP (ref 22–31)
CREAT SERPL-MCNC: 0.69 MG/DL — SIGNIFICANT CHANGE UP (ref 0.5–1.3)
EOSINOPHIL # BLD AUTO: 0.01 K/UL — SIGNIFICANT CHANGE UP (ref 0–0.5)
EOSINOPHIL NFR BLD AUTO: 0.1 % — SIGNIFICANT CHANGE UP (ref 0–6)
GLUCOSE SERPL-MCNC: 95 MG/DL — SIGNIFICANT CHANGE UP (ref 70–99)
HCT VFR BLD CALC: 39 % — SIGNIFICANT CHANGE UP (ref 34.5–45)
HGB BLD-MCNC: 12.2 G/DL — SIGNIFICANT CHANGE UP (ref 11.5–15.5)
IMM GRANULOCYTES NFR BLD AUTO: 0.4 % — SIGNIFICANT CHANGE UP (ref 0–1.5)
LYMPHOCYTES # BLD AUTO: 0.48 K/UL — LOW (ref 1–3.3)
LYMPHOCYTES # BLD AUTO: 7 % — LOW (ref 13–44)
MCHC RBC-ENTMCNC: 30.4 PG — SIGNIFICANT CHANGE UP (ref 27–34)
MCHC RBC-ENTMCNC: 31.3 GM/DL — LOW (ref 32–36)
MCV RBC AUTO: 97.3 FL — SIGNIFICANT CHANGE UP (ref 80–100)
MONOCYTES # BLD AUTO: 0.28 K/UL — SIGNIFICANT CHANGE UP (ref 0–0.9)
MONOCYTES NFR BLD AUTO: 4.1 % — SIGNIFICANT CHANGE UP (ref 2–14)
NEUTROPHILS # BLD AUTO: 6.01 K/UL — SIGNIFICANT CHANGE UP (ref 1.8–7.4)
NEUTROPHILS NFR BLD AUTO: 87.8 % — HIGH (ref 43–77)
PLATELET # BLD AUTO: 147 K/UL — LOW (ref 150–400)
POTASSIUM SERPL-MCNC: 4.4 MMOL/L — SIGNIFICANT CHANGE UP (ref 3.5–5.3)
POTASSIUM SERPL-SCNC: 4.4 MMOL/L — SIGNIFICANT CHANGE UP (ref 3.5–5.3)
PROT SERPL-MCNC: 6.6 GM/DL — SIGNIFICANT CHANGE UP (ref 6–8.3)
RBC # BLD: 4.01 M/UL — SIGNIFICANT CHANGE UP (ref 3.8–5.2)
RBC # FLD: 16.6 % — HIGH (ref 10.3–14.5)
SODIUM SERPL-SCNC: 141 MMOL/L — SIGNIFICANT CHANGE UP (ref 135–145)
T4 FREE SERPL-MCNC: 1.06 NG/DL — SIGNIFICANT CHANGE UP (ref 0.76–1.46)
TSH SERPL-MCNC: 0.76 UU/ML — SIGNIFICANT CHANGE UP (ref 0.34–4.82)
WBC # BLD: 6.85 K/UL — SIGNIFICANT CHANGE UP (ref 3.8–10.5)
WBC # FLD AUTO: 6.85 K/UL — SIGNIFICANT CHANGE UP (ref 3.8–10.5)

## 2020-03-02 PROCEDURE — 85025 COMPLETE CBC W/AUTO DIFF WBC: CPT

## 2020-03-02 PROCEDURE — 80053 COMPREHEN METABOLIC PANEL: CPT

## 2020-03-02 PROCEDURE — 84443 ASSAY THYROID STIM HORMONE: CPT

## 2020-03-02 PROCEDURE — 84439 ASSAY OF FREE THYROXINE: CPT

## 2020-03-02 PROCEDURE — 36415 COLL VENOUS BLD VENIPUNCTURE: CPT

## 2020-03-02 PROCEDURE — 96413 CHEMO IV INFUSION 1 HR: CPT

## 2020-03-02 RX ORDER — NIVOLUMAB 10 MG/ML
480 INJECTION INTRAVENOUS ONCE
Refills: 0 | Status: COMPLETED | OUTPATIENT
Start: 2020-03-02 | End: 2020-03-02

## 2020-03-02 RX ADMIN — NIVOLUMAB 480 MILLIGRAM(S): 10 INJECTION INTRAVENOUS at 13:56

## 2020-03-02 RX ADMIN — SODIUM CHLORIDE 30 MILLILITER(S): 9 INJECTION INTRAMUSCULAR; INTRAVENOUS; SUBCUTANEOUS at 13:30

## 2020-03-02 RX ADMIN — SODIUM CHLORIDE 250 MILLILITER(S): 9 INJECTION INTRAMUSCULAR; INTRAVENOUS; SUBCUTANEOUS at 14:05

## 2020-03-02 RX ADMIN — NIVOLUMAB 196 MILLIGRAM(S): 10 INJECTION INTRAVENOUS at 13:26

## 2020-03-04 ENCOUNTER — APPOINTMENT (OUTPATIENT)
Dept: ENDOCRINOLOGY | Facility: CLINIC | Age: 85
End: 2020-03-04
Payer: COMMERCIAL

## 2020-03-04 VITALS
OXYGEN SATURATION: 97 % | DIASTOLIC BLOOD PRESSURE: 78 MMHG | SYSTOLIC BLOOD PRESSURE: 125 MMHG | TEMPERATURE: 98.7 F | BODY MASS INDEX: 22.66 KG/M2 | HEART RATE: 80 BPM | HEIGHT: 65 IN | WEIGHT: 136 LBS

## 2020-03-04 PROCEDURE — 99214 OFFICE O/P EST MOD 30 MIN: CPT

## 2020-03-12 ENCOUNTER — APPOINTMENT (OUTPATIENT)
Dept: SURGERY | Facility: CLINIC | Age: 85
End: 2020-03-12
Payer: COMMERCIAL

## 2020-03-12 VITALS
HEART RATE: 63 BPM | WEIGHT: 136 LBS | BODY MASS INDEX: 22.66 KG/M2 | DIASTOLIC BLOOD PRESSURE: 79 MMHG | TEMPERATURE: 97.4 F | SYSTOLIC BLOOD PRESSURE: 128 MMHG | HEIGHT: 65 IN | OXYGEN SATURATION: 99 %

## 2020-03-12 PROCEDURE — 99024 POSTOP FOLLOW-UP VISIT: CPT

## 2020-03-12 NOTE — HISTORY OF PRESENT ILLNESS
[de-identified] : S/P LIH repair 3 weeks ago, with clear oozing  one edge of the wound, no fever, no redness, no pain. H/O L groin LN exploration few weeks before hernia repair.

## 2020-03-12 NOTE — PHYSICAL EXAM
[de-identified] : Left groin wound clean, no redness, no induration, no purulence, no fluctuation, lateral end with constant clear fluid .

## 2020-03-12 NOTE — PLAN
[FreeTextEntry1] : post op clear fluid, no infection, H/O left LN dissection ore op with fluid collection, not sure if it is a seroma or lymphatic leak. \par CLean with Betadine , cover with gauze and tape, will do ultra sound if continue t0 drain VS CT, follow up with surg oncology.

## 2020-03-25 LAB
25(OH)D3 SERPL-MCNC: 37.5 NG/ML
ACTH SER-ACNC: <1.5 PG/ML
ALDOSTERONE SERUM: 6.5 NG/DL
CORTICOSTEROID BIND GLOBULIN: 1.8 MG/DL
CORTIS SERPL-MCNC: 2.6 UG/DL
CORTIS SERPL-MCNC: <1 UG/DL
CORTISOL, FREE: <0.03 UG/DL
ESTIMATED AVERAGE GLUCOSE: 111 MG/DL
FSH SERPL-MCNC: 79.4 IU/L
HBA1C MFR BLD HPLC: 5.5 %
IGF-1 INTERP: NORMAL
IGF-I BLD-MCNC: 127 NG/ML
LH SERPL-ACNC: 38.7 IU/L
PFCX: <3.1 %
PROLACTIN SERPL-MCNC: 22.5 NG/ML
PROLACTIN SERPL-MCNC: 24.4 NG/ML
RENIN ACTIVITY, PLASMA: 0.28 NG/ML/HR
T3FREE SERPL-MCNC: 1.72 PG/ML
T3FREE SERPL-MCNC: 2.62 PG/ML
T4 FREE SERPL-MCNC: 1.4 NG/DL
T4 FREE SERPL-MCNC: 1.6 NG/DL
THYROGLOB AB SERPL-ACNC: <20 IU/ML
THYROPEROXIDASE AB SERPL IA-ACNC: 14.6 IU/ML
TSH SERPL-ACNC: 1.24 UIU/ML
TSH SERPL-ACNC: 1.41 UIU/ML
VIT B12 SERPL-MCNC: 952 PG/ML

## 2020-03-27 RX ORDER — SODIUM CHLORIDE 9 MG/ML
250 INJECTION INTRAMUSCULAR; INTRAVENOUS; SUBCUTANEOUS
Refills: 0 | Status: DISCONTINUED | OUTPATIENT
Start: 2020-03-30 | End: 2020-06-30

## 2020-03-28 NOTE — HISTORY OF PRESENT ILLNESS
[FreeTextEntry1] : Ms. HULL   is a 84 year year old female who returns today for endocrine reevaluation.  Patient returns with regard to  a history of adrenal insufficiency felt to be cynthia on bu her immunotherapy with regard to her hx of  malignant melanoma\par \par Melanoma stable -is back on the immunotherapy as of this past week.\par Has tapered the prednisone from 40 mg down to 10 mg in am  2.5 mg at 4 pm. On this for at least one week.\par does wear surg stockings\par Denies c/p sob dizziness or lightheadedness.\par

## 2020-03-30 ENCOUNTER — OUTPATIENT (OUTPATIENT)
Dept: OUTPATIENT SERVICES | Facility: HOSPITAL | Age: 85
LOS: 1 days | End: 2020-03-30
Payer: COMMERCIAL

## 2020-03-30 VITALS
HEART RATE: 86 BPM | SYSTOLIC BLOOD PRESSURE: 147 MMHG | DIASTOLIC BLOOD PRESSURE: 83 MMHG | WEIGHT: 139.77 LBS | TEMPERATURE: 98 F

## 2020-03-30 VITALS — SYSTOLIC BLOOD PRESSURE: 111 MMHG | DIASTOLIC BLOOD PRESSURE: 69 MMHG | HEART RATE: 67 BPM

## 2020-03-30 DIAGNOSIS — Z98.890 OTHER SPECIFIED POSTPROCEDURAL STATES: Chronic | ICD-10-CM

## 2020-03-30 DIAGNOSIS — C43.9 MALIGNANT MELANOMA OF SKIN, UNSPECIFIED: ICD-10-CM

## 2020-03-30 DIAGNOSIS — Z90.89 ACQUIRED ABSENCE OF OTHER ORGANS: Chronic | ICD-10-CM

## 2020-03-30 DIAGNOSIS — Z98.49 CATARACT EXTRACTION STATUS, UNSPECIFIED EYE: Chronic | ICD-10-CM

## 2020-03-30 DIAGNOSIS — R69 ILLNESS, UNSPECIFIED: ICD-10-CM

## 2020-03-30 PROCEDURE — 96413 CHEMO IV INFUSION 1 HR: CPT

## 2020-03-30 RX ORDER — NIVOLUMAB 10 MG/ML
480 INJECTION INTRAVENOUS ONCE
Refills: 0 | Status: COMPLETED | OUTPATIENT
Start: 2020-03-30 | End: 2020-03-30

## 2020-03-30 RX ADMIN — NIVOLUMAB 196 MILLIGRAM(S): 10 INJECTION INTRAVENOUS at 13:14

## 2020-03-30 RX ADMIN — SODIUM CHLORIDE 30 MILLILITER(S): 9 INJECTION INTRAMUSCULAR; INTRAVENOUS; SUBCUTANEOUS at 12:03

## 2020-04-13 ENCOUNTER — OUTPATIENT (OUTPATIENT)
Dept: OUTPATIENT SERVICES | Facility: HOSPITAL | Age: 85
LOS: 1 days | Discharge: HOME | End: 2020-04-13

## 2020-04-13 DIAGNOSIS — C43.72 MALIGNANT MELANOMA OF LEFT LOWER LIMB, INCLUDING HIP: ICD-10-CM

## 2020-04-13 DIAGNOSIS — Z90.89 ACQUIRED ABSENCE OF OTHER ORGANS: Chronic | ICD-10-CM

## 2020-04-13 DIAGNOSIS — Z98.49 CATARACT EXTRACTION STATUS, UNSPECIFIED EYE: Chronic | ICD-10-CM

## 2020-04-13 DIAGNOSIS — Z98.890 OTHER SPECIFIED POSTPROCEDURAL STATES: Chronic | ICD-10-CM

## 2020-04-14 NOTE — PHYSICAL EXAM
[Restricted in physically strenuous activity but ambulatory and able to carry out work of a light or sedentary nature] : Status 1- Restricted in physically strenuous activity but ambulatory and able to carry out work of a light or sedentary nature, e.g., light house work, office work [Normal] : grossly intact [de-identified] : Left lower extremity edema 2+ [de-identified] : s/p left inguinal hernia repair [de-identified] : + lymphedema left thigh  [de-identified] : wearing compressive stockings [de-identified] : left heel wound reportedly healed

## 2020-04-14 NOTE — HISTORY OF PRESENT ILLNESS
[Disease: _____________________] : Disease: [unfilled] [T: ___] : T[unfilled] [N: ___] : N[unfilled] [M: ___] : M[unfilled] [AJCC Stage: ____] : AJCC Stage: [unfilled] [de-identified] : 84 F with HTN, PPM, atrial fibrillation ( Dr. Elam- [facility]), on Eliquis since July 019, diagnosed with malignant melanoma of left heel in May 2019.\par \par CASE SYNOPSIS:\par May 2019- patient notices a bleeding left heel cutaneous lesion; \par \par 5/1/19-punch biopsy performed by a dermatologist ([facility]) ; pathology:\par - left heel superior: malignant melanoma, 4.2 mm thick with ulcerations, pT4b.\par -left heel inferior: malignant melanoma, 4.2 mm thick, with ulceration, pT4b.\par \par 5/28/19- left heel wide resection and sentinel lymph node biopsy (); pathology:\par -Left inguinal lymph node biopsy: 2/4 lymph nodes positive for melanoma\par -Skin and soft tissue, left heel foot, consistent with melanoma, 3.5 mm thickness, margins negative, pT4b, pN 2a= stage III C\par \par 6/15/19: PET- residual FDG avidity along the surgical defect consistent with postsurgical changes, or residual disease, or combination of the two. No FDG avid local regional or distant metastatic disease. \par \par 7/15/19- started Nivolumab under the care of Dr. Mcbride at [facility], [place].\par \par 8/12/19- cycle # 2 Nivolumab also at [facility], [place].\par \par 10/15/19: Left lower extremity ultrasound-benign appearing left inguinal lymph nodes, containing fatty hilum, measuring 0.9 x 0.8 x 1.3 cm, 1.2 x 0.7 x 0.9 cm, and one 0.5 x 0.8 x 0.9 cm. 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 Probable left groin postoperative collections/seromas.\par  [de-identified] : Malignant melanoma [FreeTextEntry1] : Nivolumab- flat dose monthly [de-identified] : Last seen in the office 11/5/19; in interim patient admitted at [facility] with small bowel obstruction and incarcerated bowel in the left inguinal hernia which was diagnosed and repaired laparoscopically. She was subsequently discharged home and the resumed immunotherapy in office (last dose #7-given on 12/30/19). Concerned about not being able to gain weight.She was also seen in consultation by Dr. Antonio Gaines (endocrinology) for adrenal insufficiency (low cortisol), thought to be due to immunotherapy. Continued using Prednisone daily; has a sliding scale of tapering Prednisone until end of February, and was cleared to resume immunotherapy. Decided on group home in June 2020. Accompanied by daughter and son.

## 2020-04-14 NOTE — RESULTS/DATA
[FreeTextEntry1] : I reviewed recent blood work results with patient.\par \par 10/7/19:\par WBC 4.31, hemoglobin 12.3 g/dL, hematocrit 38.8%, platelet 131,000\par

## 2020-04-14 NOTE — ASSESSMENT
[FreeTextEntry1] : Ms. HULL 's questions were answered to her satisfaction. She will return to the office  in 2 months.\par

## 2020-04-16 ENCOUNTER — APPOINTMENT (OUTPATIENT)
Dept: ENDOCRINOLOGY | Facility: CLINIC | Age: 85
End: 2020-04-16

## 2020-04-16 ENCOUNTER — APPOINTMENT (OUTPATIENT)
Dept: ENDOCRINOLOGY | Facility: CLINIC | Age: 85
End: 2020-04-16
Payer: COMMERCIAL

## 2020-04-16 PROCEDURE — 99214 OFFICE O/P EST MOD 30 MIN: CPT | Mod: 95

## 2020-04-16 RX ORDER — PREDNISONE 5 MG/1
5 TABLET ORAL
Qty: 30 | Refills: 5 | Status: DISCONTINUED | COMMUNITY
Start: 2020-02-05 | End: 2020-04-16

## 2020-04-16 RX ORDER — PREDNISONE 10 MG/1
10 TABLET ORAL
Qty: 60 | Refills: 4 | Status: DISCONTINUED | COMMUNITY
Start: 2020-01-16 | End: 2020-04-16

## 2020-04-19 NOTE — HISTORY OF PRESENT ILLNESS
[Home] : at home, [unfilled] , at the time of the visit. [Medical Office: ([facility])___] : at the medical office located in  [Patient] : the patient [Self] : self [FreeTextEntry1] : Ms. HULL   is a 84 year year old female who returns today for endocrine reevaluation.  Patient returns with regard to  a history of adrenal insufficiency felt to be brought on bu her immunotherapy with regard to her hx of  malignant melanoma\par Melanoma stable -is back on the immunotherapy as of this past week.\par Has tapered the prednisone now as 5mg in am and 2.5 mg in afternoon. Does note she falls asleep at times briefly in the afternoon.\par does wear surg stockings\par Denies c/p sob dizziness or lightheadedness.\par

## 2020-04-19 NOTE — PHYSICAL EXAM
[Alert] : alert [Well Nourished] : well nourished [No Acute Distress] : no acute distress [Oriented x3] : oriented to person, place, and time [Normal Insight/Judgement] : insight and judgment were intact

## 2020-04-23 DIAGNOSIS — Z79.899 OTHER LONG TERM (CURRENT) DRUG THERAPY: ICD-10-CM

## 2020-04-24 RX ORDER — OMEPRAZOLE 40 MG/1
40 CAPSULE, DELAYED RELEASE ORAL
Qty: 90 | Refills: 3 | Status: DISCONTINUED | COMMUNITY
Start: 2020-01-21 | End: 2020-04-24

## 2020-04-27 ENCOUNTER — RESULT REVIEW (OUTPATIENT)
Age: 85
End: 2020-04-27

## 2020-04-27 ENCOUNTER — APPOINTMENT (OUTPATIENT)
Age: 85
End: 2020-04-27
Payer: COMMERCIAL

## 2020-04-27 VITALS
WEIGHT: 143 LBS | HEIGHT: 65 IN | HEART RATE: 80 BPM | RESPIRATION RATE: 16 BRPM | BODY MASS INDEX: 23.82 KG/M2 | TEMPERATURE: 97.8 F | DIASTOLIC BLOOD PRESSURE: 73 MMHG | SYSTOLIC BLOOD PRESSURE: 128 MMHG

## 2020-04-27 VITALS
HEIGHT: 65 IN | SYSTOLIC BLOOD PRESSURE: 128 MMHG | DIASTOLIC BLOOD PRESSURE: 73 MMHG | BODY MASS INDEX: 23.82 KG/M2 | WEIGHT: 143 LBS

## 2020-04-27 LAB
ALBUMIN SERPL ELPH-MCNC: 4.5 G/DL — SIGNIFICANT CHANGE UP (ref 3.3–5)
ALP SERPL-CCNC: 56 U/L — SIGNIFICANT CHANGE UP (ref 40–120)
ALT FLD-CCNC: 26 U/L — SIGNIFICANT CHANGE UP (ref 10–45)
ANION GAP SERPL CALC-SCNC: 11 MMOL/L — SIGNIFICANT CHANGE UP (ref 5–17)
AST SERPL-CCNC: 21 U/L — SIGNIFICANT CHANGE UP (ref 10–40)
BASOPHILS # BLD AUTO: 0.07 K/UL — SIGNIFICANT CHANGE UP (ref 0–0.2)
BASOPHILS NFR BLD AUTO: 0.9 % — SIGNIFICANT CHANGE UP (ref 0–2)
BILIRUB SERPL-MCNC: 0.5 MG/DL — SIGNIFICANT CHANGE UP (ref 0.2–1.2)
BUN SERPL-MCNC: 34 MG/DL — HIGH (ref 7–23)
CALCIUM SERPL-MCNC: 9.3 MG/DL — SIGNIFICANT CHANGE UP (ref 8.4–10.5)
CHLORIDE SERPL-SCNC: 99 MMOL/L — SIGNIFICANT CHANGE UP (ref 96–108)
CO2 SERPL-SCNC: 29 MMOL/L — SIGNIFICANT CHANGE UP (ref 22–31)
CREAT SERPL-MCNC: 0.79 MG/DL — SIGNIFICANT CHANGE UP (ref 0.5–1.3)
EOSINOPHIL # BLD AUTO: 0.07 K/UL — SIGNIFICANT CHANGE UP (ref 0–0.5)
EOSINOPHIL NFR BLD AUTO: 0.9 % — SIGNIFICANT CHANGE UP (ref 0–6)
GLUCOSE SERPL-MCNC: 132 MG/DL — HIGH (ref 70–99)
HCT VFR BLD CALC: 41.4 % — SIGNIFICANT CHANGE UP (ref 34.5–45)
HGB BLD-MCNC: 13.2 G/DL — SIGNIFICANT CHANGE UP (ref 11.5–15.5)
IMM GRANULOCYTES NFR BLD AUTO: 0.4 % — SIGNIFICANT CHANGE UP (ref 0–1.5)
LDH SERPL L TO P-CCNC: 240 U/L — SIGNIFICANT CHANGE UP (ref 50–242)
LYMPHOCYTES # BLD AUTO: 0.99 K/UL — LOW (ref 1–3.3)
LYMPHOCYTES # BLD AUTO: 12.6 % — LOW (ref 13–44)
MCHC RBC-ENTMCNC: 30.8 PG — SIGNIFICANT CHANGE UP (ref 27–34)
MCHC RBC-ENTMCNC: 31.9 GM/DL — LOW (ref 32–36)
MCV RBC AUTO: 96.5 FL — SIGNIFICANT CHANGE UP (ref 80–100)
MONOCYTES # BLD AUTO: 0.4 K/UL — SIGNIFICANT CHANGE UP (ref 0–0.9)
MONOCYTES NFR BLD AUTO: 5.1 % — SIGNIFICANT CHANGE UP (ref 2–14)
NEUTROPHILS # BLD AUTO: 6.28 K/UL — SIGNIFICANT CHANGE UP (ref 1.8–7.4)
NEUTROPHILS NFR BLD AUTO: 80.1 % — HIGH (ref 43–77)
NRBC # BLD: 0 /100 WBCS — SIGNIFICANT CHANGE UP (ref 0–0)
PLATELET # BLD AUTO: 153 K/UL — SIGNIFICANT CHANGE UP (ref 150–400)
POTASSIUM SERPL-MCNC: 4.5 MMOL/L — SIGNIFICANT CHANGE UP (ref 3.5–5.3)
POTASSIUM SERPL-SCNC: 4.5 MMOL/L — SIGNIFICANT CHANGE UP (ref 3.5–5.3)
PROT SERPL-MCNC: 6.6 G/DL — SIGNIFICANT CHANGE UP (ref 6–8.3)
RBC # BLD: 4.29 M/UL — SIGNIFICANT CHANGE UP (ref 3.8–5.2)
RBC # FLD: 14.3 % — SIGNIFICANT CHANGE UP (ref 10.3–14.5)
SODIUM SERPL-SCNC: 140 MMOL/L — SIGNIFICANT CHANGE UP (ref 135–145)
T4 FREE+ TSH PNL SERPL: 1.72 UIU/ML — SIGNIFICANT CHANGE UP (ref 0.27–4.2)
WBC # BLD: 7.84 K/UL — SIGNIFICANT CHANGE UP (ref 3.8–10.5)
WBC # FLD AUTO: 7.84 K/UL — SIGNIFICANT CHANGE UP (ref 3.8–10.5)

## 2020-04-27 PROCEDURE — 99214 OFFICE O/P EST MOD 30 MIN: CPT

## 2020-04-27 NOTE — HISTORY OF PRESENT ILLNESS
[Disease: _____________________] : Disease: [unfilled] [T: ___] : T[unfilled] [N: ___] : N[unfilled] [M: ___] : M[unfilled] [AJCC Stage: ____] : AJCC Stage: [unfilled] [FreeTextEntry1] : Nivolumab- flat dose monthly [de-identified] : Malignant melanoma [de-identified] : 84 F with HTN, PPM, atrial fibrillation ( Dr. Elam- [facility]), on Eliquis since July 019, diagnosed with malignant melanoma of left heel in May 2019.\par \par CASE SYNOPSIS:\par May 2019- patient notices a bleeding left heel cutaneous lesion; \par \par 5/1/19-punch biopsy performed by a dermatologist ([facility]) ; pathology:\par - left heel superior: malignant melanoma, 4.2 mm thick with ulcerations, pT4b.\par -left heel inferior: malignant melanoma, 4.2 mm thick, with ulceration, pT4b.\par \par 5/28/19- left heel wide resection and sentinel lymph node biopsy (); pathology:\par -Left inguinal lymph node biopsy: 2/4 lymph nodes positive for melanoma\par -Skin and soft tissue, left heel foot, consistent with melanoma, 3.5 mm thickness, margins negative, pT4b, pN 2a= stage III C\par \par 6/15/19: PET- residual FDG avidity along the surgical defect consistent with postsurgical changes, or residual disease, or combination of the two. No FDG avid local regional or distant metastatic disease. \par \par 7/15/19- started Nivolumab under the care of Dr. Mcbride at [facility].\par \par 8/12/19- cycle # 2 Nivolumab also at [facility].\par \par 10/15/19: Left lower extremity ultrasound-benign appearing left inguinal lymph nodes, containing fatty hilum, measuring 0.9 x 0.8 x 1.3 cm, 1.2 x 0.7 x 0.9 cm, and one 0.5 x 0.8 x 0.9 cm. Additional fluid collection representing seroma present in the left groin.\par \par 1/27-1/31/20 patient admitted at 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 Probable left groin postoperative collections/seromas.\par  [de-identified] : Ms. HULL is here for follow up; she was last seen in office 2 months ago. She continued systemic immunotherapy with Nivolumab (last dose 3/2/20); for cycle #10/ 13 today. Tolerating treatment well, with no significant toxicities. Had restaging LLE ultrasound 2/8/20 showed postoperative seroma, no lymphadenopathy. Has follow up with Dr. Antonio Gaines for therapy- induced adrenal insufficiency; continues on daily physiologic doses of prednisone (presently at 7.5 mg daily).  Her lower extremity swelling has resolved.  Patient has retired from school.\par

## 2020-04-27 NOTE — RESULTS/DATA
[FreeTextEntry1] : I reviewed recent + today's blood work results with patient.\par \par 3/2/20:\par WBC  6.85 K; hemoglobin 12.2 g/dL; hematocrit 39 %, platelet 147,000\par \par 10/7/19:\par WBC 4.31, hemoglobin 12.3 g/dL, hematocrit 38.8%, platelet 131,000\par

## 2020-04-27 NOTE — ASSESSMENT
Pt advised   [FreeTextEntry1] : Ms. HULL 's questions were answered to her satisfaction. She will return to the office  in 2 months.\par

## 2020-04-27 NOTE — PHYSICAL EXAM
[Restricted in physically strenuous activity but ambulatory and able to carry out work of a light or sedentary nature] : Status 1- Restricted in physically strenuous activity but ambulatory and able to carry out work of a light or sedentary nature, e.g., light house work, office work [Normal] : affect appropriate [de-identified] : Left lower extremity edema 2+ [de-identified] : + lymphedema left thigh much improved [de-identified] : s/p left inguinal hernia repair [de-identified] : left heel wound  healed [de-identified] : wearing compressive stockings

## 2020-04-28 DIAGNOSIS — Z51.11 ENCOUNTER FOR ANTINEOPLASTIC CHEMOTHERAPY: ICD-10-CM

## 2020-04-28 LAB — ACTH SER-ACNC: <1.5 PG/ML — LOW (ref 7.2–63.3)

## 2020-04-28 RX ORDER — PREDNISONE 2.5 MG/1
2.5 TABLET ORAL
Qty: 270 | Refills: 1 | Status: DISCONTINUED | COMMUNITY
Start: 2020-04-16 | End: 2020-04-28

## 2020-05-03 LAB
CORTICOSTEROID BINDING GLOBULIN RESULT: 2.2 MG/DL — SIGNIFICANT CHANGE UP
CORTIS F/TOTAL MFR SERPL: <2.5 % — SIGNIFICANT CHANGE UP
CORTIS SERPL-MCNC: <1 UG/DL — LOW
CORTISOL, FREE RESULT: <0.02 UG/DL — LOW

## 2020-05-06 ENCOUNTER — EMERGENCY (EMERGENCY)
Facility: HOSPITAL | Age: 85
LOS: 0 days | Discharge: HOME | End: 2020-05-06
Attending: EMERGENCY MEDICINE
Payer: COMMERCIAL

## 2020-05-06 VITALS
SYSTOLIC BLOOD PRESSURE: 122 MMHG | DIASTOLIC BLOOD PRESSURE: 84 MMHG | TEMPERATURE: 98 F | HEART RATE: 71 BPM | RESPIRATION RATE: 18 BRPM | OXYGEN SATURATION: 96 %

## 2020-05-06 VITALS — WEIGHT: 169.98 LBS | HEIGHT: 67 IN

## 2020-05-06 DIAGNOSIS — Z98.890 OTHER SPECIFIED POSTPROCEDURAL STATES: Chronic | ICD-10-CM

## 2020-05-06 DIAGNOSIS — Z98.49 CATARACT EXTRACTION STATUS, UNSPECIFIED EYE: Chronic | ICD-10-CM

## 2020-05-06 DIAGNOSIS — Z90.89 ACQUIRED ABSENCE OF OTHER ORGANS: Chronic | ICD-10-CM

## 2020-05-06 DIAGNOSIS — T14.8XXA OTHER INJURY OF UNSPECIFIED BODY REGION, INITIAL ENCOUNTER: ICD-10-CM

## 2020-05-06 DIAGNOSIS — Z86.2 PERSONAL HISTORY OF DISEASES OF THE BLOOD AND BLOOD-FORMING ORGANS AND CERTAIN DISORDERS INVOLVING THE IMMUNE MECHANISM: ICD-10-CM

## 2020-05-06 LAB
ALBUMIN SERPL ELPH-MCNC: 3.8 G/DL — SIGNIFICANT CHANGE UP (ref 3.3–5)
ALP SERPL-CCNC: 56 U/L — SIGNIFICANT CHANGE UP (ref 40–120)
ALT FLD-CCNC: 36 U/L — SIGNIFICANT CHANGE UP (ref 12–78)
ANION GAP SERPL CALC-SCNC: 5 MMOL/L — SIGNIFICANT CHANGE UP (ref 5–17)
APTT BLD: 29.4 SEC — SIGNIFICANT CHANGE UP (ref 27.5–36.3)
AST SERPL-CCNC: 16 U/L — SIGNIFICANT CHANGE UP (ref 15–37)
BASOPHILS # BLD AUTO: 0.01 K/UL — SIGNIFICANT CHANGE UP (ref 0–0.2)
BASOPHILS NFR BLD AUTO: 0.1 % — SIGNIFICANT CHANGE UP (ref 0–2)
BILIRUB SERPL-MCNC: 0.6 MG/DL — SIGNIFICANT CHANGE UP (ref 0.2–1.2)
BUN SERPL-MCNC: 36 MG/DL — HIGH (ref 7–23)
CALCIUM SERPL-MCNC: 9 MG/DL — SIGNIFICANT CHANGE UP (ref 8.5–10.1)
CHLORIDE SERPL-SCNC: 100 MMOL/L — SIGNIFICANT CHANGE UP (ref 96–108)
CO2 SERPL-SCNC: 31 MMOL/L — SIGNIFICANT CHANGE UP (ref 22–31)
CREAT SERPL-MCNC: 0.94 MG/DL — SIGNIFICANT CHANGE UP (ref 0.5–1.3)
EOSINOPHIL # BLD AUTO: 0 K/UL — SIGNIFICANT CHANGE UP (ref 0–0.5)
EOSINOPHIL NFR BLD AUTO: 0 % — SIGNIFICANT CHANGE UP (ref 0–6)
GLUCOSE SERPL-MCNC: 119 MG/DL — HIGH (ref 70–99)
HCT VFR BLD CALC: 43.2 % — SIGNIFICANT CHANGE UP (ref 34.5–45)
HGB BLD-MCNC: 13.8 G/DL — SIGNIFICANT CHANGE UP (ref 11.5–15.5)
IMM GRANULOCYTES NFR BLD AUTO: 0.6 % — SIGNIFICANT CHANGE UP (ref 0–1.5)
INR BLD: 1.18 RATIO — HIGH (ref 0.88–1.16)
LIDOCAIN IGE QN: 73 U/L — SIGNIFICANT CHANGE UP (ref 73–393)
LYMPHOCYTES # BLD AUTO: 1.37 K/UL — SIGNIFICANT CHANGE UP (ref 1–3.3)
LYMPHOCYTES # BLD AUTO: 15.5 % — SIGNIFICANT CHANGE UP (ref 13–44)
MCHC RBC-ENTMCNC: 30.3 PG — SIGNIFICANT CHANGE UP (ref 27–34)
MCHC RBC-ENTMCNC: 31.9 GM/DL — LOW (ref 32–36)
MCV RBC AUTO: 94.7 FL — SIGNIFICANT CHANGE UP (ref 80–100)
MONOCYTES # BLD AUTO: 0.51 K/UL — SIGNIFICANT CHANGE UP (ref 0–0.9)
MONOCYTES NFR BLD AUTO: 5.8 % — SIGNIFICANT CHANGE UP (ref 2–14)
NEUTROPHILS # BLD AUTO: 6.89 K/UL — SIGNIFICANT CHANGE UP (ref 1.8–7.4)
NEUTROPHILS NFR BLD AUTO: 78 % — HIGH (ref 43–77)
PLATELET # BLD AUTO: 153 K/UL — SIGNIFICANT CHANGE UP (ref 150–400)
POTASSIUM SERPL-MCNC: 4.4 MMOL/L — SIGNIFICANT CHANGE UP (ref 3.5–5.3)
POTASSIUM SERPL-SCNC: 4.4 MMOL/L — SIGNIFICANT CHANGE UP (ref 3.5–5.3)
PROT SERPL-MCNC: 7.3 GM/DL — SIGNIFICANT CHANGE UP (ref 6–8.3)
PROTHROM AB SERPL-ACNC: 13.2 SEC — HIGH (ref 10–12.9)
RBC # BLD: 4.56 M/UL — SIGNIFICANT CHANGE UP (ref 3.8–5.2)
RBC # FLD: 13.9 % — SIGNIFICANT CHANGE UP (ref 10.3–14.5)
SARS-COV-2 RNA SPEC QL NAA+PROBE: SIGNIFICANT CHANGE UP
SODIUM SERPL-SCNC: 136 MMOL/L — SIGNIFICANT CHANGE UP (ref 135–145)
WBC # BLD: 8.83 K/UL — SIGNIFICANT CHANGE UP (ref 3.8–10.5)
WBC # FLD AUTO: 8.83 K/UL — SIGNIFICANT CHANGE UP (ref 3.8–10.5)

## 2020-05-06 PROCEDURE — 74177 CT ABD & PELVIS W/CONTRAST: CPT

## 2020-05-06 PROCEDURE — 96374 THER/PROPH/DIAG INJ IV PUSH: CPT | Mod: XU

## 2020-05-06 PROCEDURE — 86901 BLOOD TYPING SEROLOGIC RH(D): CPT

## 2020-05-06 PROCEDURE — 85730 THROMBOPLASTIN TIME PARTIAL: CPT

## 2020-05-06 PROCEDURE — 99285 EMERGENCY DEPT VISIT HI MDM: CPT | Mod: 25

## 2020-05-06 PROCEDURE — 80053 COMPREHEN METABOLIC PANEL: CPT

## 2020-05-06 PROCEDURE — 87635 SARS-COV-2 COVID-19 AMP PRB: CPT

## 2020-05-06 PROCEDURE — 74177 CT ABD & PELVIS W/CONTRAST: CPT | Mod: 26

## 2020-05-06 PROCEDURE — 85610 PROTHROMBIN TIME: CPT

## 2020-05-06 PROCEDURE — 86850 RBC ANTIBODY SCREEN: CPT

## 2020-05-06 PROCEDURE — 86900 BLOOD TYPING SEROLOGIC ABO: CPT

## 2020-05-06 PROCEDURE — 83690 ASSAY OF LIPASE: CPT

## 2020-05-06 PROCEDURE — 36415 COLL VENOUS BLD VENIPUNCTURE: CPT

## 2020-05-06 PROCEDURE — 99284 EMERGENCY DEPT VISIT MOD MDM: CPT

## 2020-05-06 PROCEDURE — 85025 COMPLETE CBC W/AUTO DIFF WBC: CPT

## 2020-05-06 PROCEDURE — 99285 EMERGENCY DEPT VISIT HI MDM: CPT

## 2020-05-06 RX ORDER — ONDANSETRON 8 MG/1
4 TABLET, FILM COATED ORAL ONCE
Refills: 0 | Status: COMPLETED | OUTPATIENT
Start: 2020-05-06 | End: 2020-05-06

## 2020-05-06 RX ORDER — ONDANSETRON 8 MG/1
4 TABLET, FILM COATED ORAL ONCE
Refills: 0 | Status: DISCONTINUED | OUTPATIENT
Start: 2020-05-06 | End: 2020-05-06

## 2020-05-06 RX ORDER — SODIUM CHLORIDE 9 MG/ML
1000 INJECTION, SOLUTION INTRAVENOUS
Refills: 0 | Status: DISCONTINUED | OUTPATIENT
Start: 2020-05-06 | End: 2020-05-06

## 2020-05-06 RX ADMIN — ONDANSETRON 4 MILLIGRAM(S): 8 TABLET, FILM COATED ORAL at 01:45

## 2020-05-06 RX ADMIN — SODIUM CHLORIDE 50 MILLILITER(S): 9 INJECTION, SOLUTION INTRAVENOUS at 04:45

## 2020-05-06 NOTE — ED PROVIDER NOTE - NSFOLLOWUPINSTRUCTIONS_ED_ALL_ED_FT
please follow up with Dr Wliliam tomorrow.   call her office today for appointment time.   return to ED for any concerns.

## 2020-05-06 NOTE — CONSULT NOTE ADULT - ASSESSMENT
left groin lump reduced in ER. Abdominal pain better. Pt clinically has recurrence, no SBO on CT. She wants to go home today She was aware considering she was in steroids, chemo she was malnourished she is high risk of recurrence. She will need a lap repair. She wants to go home today. Should call office if develops pain, , lump again followup up in office tomorrow to discuss plan to repair the recurrence .

## 2020-05-06 NOTE — CONSULT NOTE ADULT - SUBJECTIVE AND OBJECTIVE BOX
Patient is a 84y old  Female who presents with a chief complaint of   HPI:  Pt with metastatic lymphoma, on Eliquis prednisone, chemo had left groin hernia repair in January with abdominal pain, left groin swelling after yard work this evening. Had vomiting. No fever.   ROS:.  [X] A ten-point review of systems was otherwise negative except as noted.  Systemic:	[ ] Fever	[ ] Chills	[ ] Night sweats    [ ] Fatigue	[ ] Other  [] Cardiovascular:  [] Pulmonary:  [] Renal/Urologic:  [] Gastrointestinal: abdominal pain, vomiting  [] Metabolic:  [] Neurologic:  [] Hematologic:  [] ENT:  [] Ophthalmologic:  [] Musculoskeletal:    [ ] Due to altered mental status/intubation, subjective information were not able to be obtained from the patient. History was obtained, to the extent possible, from review of the chart and collateral sources of information.    PAST MEDICAL & SURGICAL HISTORY:  HLD (hyperlipidemia)  HTN (hypertension)  Pacemaker  Atrial fibrillation  History of melanoma excision  H/O cataract extraction: both eyes  S/P tonsillectomy and adenoidectomy    FAMILY HISTORY:  No pertinent family history in first degree relatives    Social History:    Alcohol: Denied  Smoking: Denied  Drug Use: Denied        Allergies    penicillins (Hives)    Intolerances      MEDICATIONS  (STANDING):  lactated ringers. 1000 milliLiter(s) (50 mL/Hr) IV Continuous <Continuous>  ondansetron Injectable 4 milliGRAM(s) IV Push once    Vital Signs Last 24 Hrs  T(C): 36.8 (06 May 2020 04:45), Max: 36.8 (06 May 2020 04:45)  T(F): 98.3 (06 May 2020 04:45), Max: 98.3 (06 May 2020 04:45)  HR: 71 (06 May 2020 04:45) (71 - 90)  BP: 122/84 (06 May 2020 04:45) (122/84 - 179/114)  BP(mean): 133 (06 May 2020 01:05) (133 - 133)  RR: 18 (06 May 2020 04:45) (18 - 18)  SpO2: 96% (06 May 2020 04:45) (96% - 96%)  PHYSICAL EXAM:  Constitutional: NAD, GCS: 15/15  AOX3  Eyes:  WNL  ENMT:  WNL  Neck:  WNL, non tender  Back: Non tender  Respiratory: CTABL  Cardiovascular:  S1+S2+0  Gastrointestinal: Soft, ND , NT. left groin swilling possible recurrence  Genitourinary:  WNL  Extremities: NV intact  Vascular:  Intact  Neurological: No focal neurological deficit,  CN, motor and sensory system grossly intact.  Skin: WNL  Musculoskeletal: WNL  Psychiatric: Grossly WNL      Labs:                          13.8   8.83  )-----------( 153      ( 06 May 2020 01:39 )             43.2       05-06    136  |  100  |  36<H>  ----------------------------<  119<H>  4.4   |  31  |  0.94    Ca    9.0      06 May 2020 01:39    TPro  7.3  /  Alb  3.8  /  TBili  0.6  /  DBili  x   /  AST  16  /  ALT  36  /  AlkPhos  56  05-06      PT/INR - ( 06 May 2020 01:39 )   PT: 13.2 sec;   INR: 1.18 ratio         PTT - ( 06 May 2020 01:39 )  PTT:29.4 sec    Radiology Results:    < from: CT Abdomen and Pelvis w/ Oral Cont and w/ IV Cont (05.06.20 @ 03:44) >    BOWEL: Small hiatus hernia. No bowel obstruction or overt bowel wall thickening. Appendix is not discretely identified. Colonic diverticulosis without evidence of diverticulitis.Moderate volume of stool in thecolon.  PERITONEUM: Small volume of abdominal and pelvic ascites. No pneumoperitoneum. No loculated collection to suggest abscess. No mesenteric lymphadenopathy.  VESSELS: Atherosclerotic calcifications of the aortoiliac tree and abdominal aortic branches. Normal caliber abdominal aorta.  RETROPERITONEUM/LYMPH NODES: No lymphadenopathy.    ABDOMINAL WALL: Postsurgical changes in the left groin with small simple fluid collection measuring up to 3.9 x 3.1 cm.  BONES: Multilevel degenerative changesof the spine and mild lumbar dextroscoliosis.    IMPRESSION:     No bowel obstruction or evidence of bowel inflammation. Colonic diverticulosis without evidence of diverticulitis. Moderate volume of stool in the colon.    Small volume of abdominal and pelvic ascites.          < end of copied text >

## 2020-05-06 NOTE — ED ADULT NURSE NOTE - NSIMPLEMENTINTERV_GEN_ALL_ED
Implemented All Fall with Harm Risk Interventions:  [place] to call system. Call bell, personal items and telephone within reach. Instruct patient to call for assistance. Room bathroom lighting operational. Non-slip footwear when patient is off stretcher. Physically safe environment: no spills, clutter or unnecessary equipment. Stretcher in lowest position, wheels locked, appropriate side rails in place. Provide visual cue, wrist band, yellow gown, etc. Monitor gait and stability. Monitor for mental status changes and reorient to person, place, and time. Review medications for side effects contributing to fall risk. Reinforce activity limits and safety measures with patient and family. Provide visual clues: red socks.

## 2020-05-06 NOTE — ED PROVIDER NOTE - CARE PROVIDER_API CALL
Karen William)  Surgery; Surgical Critical Care  [address] [facility], [address] [place] [address]  Phone: [phone]  Fax: [phone]  Follow Up Time:

## 2020-05-06 NOTE — ED PROVIDER NOTE - OBJECTIVE STATEMENT
83 y/o female in ED c/o n/v/LLQ pain today after gardening.   pt states h/o hernia repair in 1/2020 by Dr William.   states feels the same.   pt denies any fever, HA, cp, sob, diarrhea .   no sick contacts or recent travel.

## 2020-05-06 NOTE — ED PROVIDER NOTE - PROGRESS NOTE DETAILS
Dr William in ED and reduced left inguinal hernia.   requesting CT, labs, and will reeval results noted.   Dr William notified and recommend d/c home and f/u in office tomorrow.

## 2020-05-06 NOTE — ED PROVIDER NOTE - PATIENT PORTAL LINK FT
You can access the FollowMyHealth Patient Portal offered by [facility] by registering at the following website: [email or web address]. By joining [employer]’s FollowMyHealth portal, you will also be able to view your health information using other applications (apps) compatible with our system.

## 2020-05-06 NOTE — ED ADULT TRIAGE NOTE - CHIEF COMPLAINT QUOTE
PT P/w c/o left inguinal hernia.  pt had recent hernia repair in January in same location.  pt denies chest pain, fever, cough, sick contacts.

## 2020-05-07 ENCOUNTER — APPOINTMENT (OUTPATIENT)
Dept: SURGERY | Facility: CLINIC | Age: 85
End: 2020-05-07
Payer: COMMERCIAL

## 2020-05-07 VITALS
DIASTOLIC BLOOD PRESSURE: 90 MMHG | BODY MASS INDEX: 23.82 KG/M2 | HEIGHT: 65 IN | WEIGHT: 143 LBS | TEMPERATURE: 97.7 F | OXYGEN SATURATION: 99 % | SYSTOLIC BLOOD PRESSURE: 147 MMHG | HEART RATE: 88 BPM

## 2020-05-07 DIAGNOSIS — I10 ESSENTIAL (PRIMARY) HYPERTENSION: ICD-10-CM

## 2020-05-07 DIAGNOSIS — Z79.01 LONG TERM (CURRENT) USE OF ANTICOAGULANTS: ICD-10-CM

## 2020-05-07 DIAGNOSIS — R10.32 LEFT LOWER QUADRANT PAIN: ICD-10-CM

## 2020-05-07 DIAGNOSIS — R11.2 NAUSEA WITH VOMITING, UNSPECIFIED: ICD-10-CM

## 2020-05-07 DIAGNOSIS — Z98.41 CATARACT EXTRACTION STATUS, RIGHT EYE: ICD-10-CM

## 2020-05-07 DIAGNOSIS — K57.30 DIVERTICULOSIS OF LARGE INTESTINE WITHOUT PERFORATION OR ABSCESS WITHOUT BLEEDING: ICD-10-CM

## 2020-05-07 DIAGNOSIS — Z85.828 PERSONAL HISTORY OF OTHER MALIGNANT NEOPLASM OF SKIN: ICD-10-CM

## 2020-05-07 DIAGNOSIS — M50.30 OTHER CERVICAL DISC DEGENERATION, UNSPECIFIED CERVICAL REGION: ICD-10-CM

## 2020-05-07 DIAGNOSIS — K76.89 OTHER SPECIFIED DISEASES OF LIVER: ICD-10-CM

## 2020-05-07 DIAGNOSIS — Z95.0 PRESENCE OF CARDIAC PACEMAKER: ICD-10-CM

## 2020-05-07 DIAGNOSIS — Z98.890 OTHER SPECIFIED POSTPROCEDURAL STATES: ICD-10-CM

## 2020-05-07 DIAGNOSIS — Z98.42 CATARACT EXTRACTION STATUS, LEFT EYE: ICD-10-CM

## 2020-05-07 DIAGNOSIS — K41.90 UNILATERAL FEMORAL HERNIA, W/OUT OBSTRUCTION OR GANGRENE, NOT SPECIFIED AS RECURRENT: ICD-10-CM

## 2020-05-07 DIAGNOSIS — Z88.0 ALLERGY STATUS TO PENICILLIN: ICD-10-CM

## 2020-05-07 DIAGNOSIS — K44.9 DIAPHRAGMATIC HERNIA WITHOUT OBSTRUCTION OR GANGRENE: ICD-10-CM

## 2020-05-07 DIAGNOSIS — I48.91 UNSPECIFIED ATRIAL FIBRILLATION: ICD-10-CM

## 2020-05-07 DIAGNOSIS — E78.5 HYPERLIPIDEMIA, UNSPECIFIED: ICD-10-CM

## 2020-05-07 PROCEDURE — 99213 OFFICE O/P EST LOW 20 MIN: CPT

## 2020-05-07 NOTE — PLAN
[FreeTextEntry1] : Laparoscopic left femoral hernia repair \par Hold Eliquis  4day before surgery \par Medical /Cardiac clearance\par PST \par Hernia repair 5/22 07:14

## 2020-05-07 NOTE — HISTORY OF PRESENT ILLNESS
[de-identified] : H/O emergency left groin hernia repair 5 month ago, on prednisone, chemo and A/C with recurrence 2 days ago, reduced in ER can cause SBO  again needs laparoscopic repair. mild pain. Passing gas, no nausea.

## 2020-05-19 ENCOUNTER — OUTPATIENT (OUTPATIENT)
Dept: OUTPATIENT SERVICES | Facility: HOSPITAL | Age: 85
LOS: 1 days | Discharge: HOME | End: 2020-05-19

## 2020-05-19 DIAGNOSIS — Z98.890 OTHER SPECIFIED POSTPROCEDURAL STATES: Chronic | ICD-10-CM

## 2020-05-19 DIAGNOSIS — C43.72 MALIGNANT MELANOMA OF LEFT LOWER LIMB, INCLUDING HIP: ICD-10-CM

## 2020-05-19 DIAGNOSIS — Z90.89 ACQUIRED ABSENCE OF OTHER ORGANS: Chronic | ICD-10-CM

## 2020-05-19 DIAGNOSIS — Z98.49 CATARACT EXTRACTION STATUS, UNSPECIFIED EYE: Chronic | ICD-10-CM

## 2020-05-22 ENCOUNTER — APPOINTMENT (OUTPATIENT)
Dept: ENDOCRINOLOGY | Facility: CLINIC | Age: 85
End: 2020-05-22
Payer: COMMERCIAL

## 2020-05-22 PROCEDURE — 99214 OFFICE O/P EST MOD 30 MIN: CPT | Mod: 95

## 2020-05-22 NOTE — REASON FOR VISIT
[Home] : at home, [unfilled] , at the time of the visit. [Medical Office: ([facility])___] : at the medical office located in

## 2020-05-26 ENCOUNTER — APPOINTMENT (OUTPATIENT)
Age: 85
End: 2020-05-26

## 2020-05-26 ENCOUNTER — RESULT REVIEW (OUTPATIENT)
Age: 85
End: 2020-05-26

## 2020-05-26 VITALS
SYSTOLIC BLOOD PRESSURE: 126 MMHG | BODY MASS INDEX: 23.49 KG/M2 | TEMPERATURE: 97.8 F | DIASTOLIC BLOOD PRESSURE: 82 MMHG | HEART RATE: 86 BPM | WEIGHT: 141 LBS | RESPIRATION RATE: 16 BRPM | HEIGHT: 65 IN

## 2020-05-27 DIAGNOSIS — Z51.11 ENCOUNTER FOR ANTINEOPLASTIC CHEMOTHERAPY: ICD-10-CM

## 2020-05-27 LAB — ACTH SER-ACNC: <1.5 PG/ML — LOW (ref 7.2–63.3)

## 2020-06-02 LAB
CORTICOSTEROID BINDING GLOBULIN RESULT: 2 MG/DL — SIGNIFICANT CHANGE UP
CORTIS F/TOTAL MFR SERPL: <2.8 % — SIGNIFICANT CHANGE UP
CORTIS SERPL-MCNC: <1 UG/DL — LOW
CORTISOL, FREE RESULT: <0.03 UG/DL — LOW

## 2020-06-07 NOTE — HISTORY OF PRESENT ILLNESS
[FreeTextEntry1] : Ms. HULL   is a 84 year year old female who returns today for endocrine reevaluation.  Patient returns with regard to  a history of adrenal insufficiency felt to be brought on by her immunotherapy with regard to her hx of  malignant melanoma\par Melanoma stable -Continue son her immunotherapy.  Last round we had increased the hydrocortisone around the time of her treatment then gradually tapered back down to physiologic replacement dose.\par Has tapered the prednisone now at 7.5 mgin am and 2.5 mg in afternoon. We had increased the st to 22.5 mg total  and slowly tapered down and will have to restart at higher dose as she is due for immunotherapy tx next week.\par Denies c/p sob dizziness or lightheadedness.\par \par

## 2020-06-11 ENCOUNTER — APPOINTMENT (OUTPATIENT)
Dept: SURGERY | Facility: CLINIC | Age: 85
End: 2020-06-11
Payer: COMMERCIAL

## 2020-06-11 VITALS
DIASTOLIC BLOOD PRESSURE: 96 MMHG | SYSTOLIC BLOOD PRESSURE: 165 MMHG | OXYGEN SATURATION: 99 % | HEART RATE: 85 BPM | HEIGHT: 65 IN | WEIGHT: 141 LBS | BODY MASS INDEX: 23.49 KG/M2 | TEMPERATURE: 98.1 F

## 2020-06-11 DIAGNOSIS — K40.90 UNILATERAL INGUINAL HERNIA, W/OUT OBSTRUCTION OR GANGRENE, NOT SPECIFIED AS RECURRENT: ICD-10-CM

## 2020-06-11 PROCEDURE — 99212 OFFICE O/P EST SF 10 MIN: CPT

## 2020-06-11 NOTE — HISTORY OF PRESENT ILLNESS
[de-identified] : Pt with melanoma, on steroids  and immunotherapy S/P hernia repair in January  now with recurrence, H/O incarceration last month that was reduced in ER. Need a repair because of high risk of incarceration.

## 2020-06-11 NOTE — ASSESSMENT
[FreeTextEntry1] : discussed in detail, pt will be off Eliquis 48 hours. We discussed in detail, risk of surgery  now, she will continue her immunotherapy ad steroids.  We discuss waiting till August, that she potentially can but if incarceration occurs that we can not reduce may need emergent surgery then. We discussed it is hard to predict what will happen and she will decide  and let us know.

## 2020-06-14 ENCOUNTER — INPATIENT (INPATIENT)
Facility: HOSPITAL | Age: 85
LOS: 4 days | Discharge: HOME | DRG: 330 | End: 2020-06-19
Attending: SURGERY | Admitting: SURGERY
Payer: COMMERCIAL

## 2020-06-14 VITALS — WEIGHT: 136.03 LBS

## 2020-06-14 DIAGNOSIS — C43.9 MALIGNANT MELANOMA OF SKIN, UNSPECIFIED: ICD-10-CM

## 2020-06-14 DIAGNOSIS — K43.0 INCISIONAL HERNIA WITH OBSTRUCTION, WITHOUT GANGRENE: ICD-10-CM

## 2020-06-14 DIAGNOSIS — Z98.42 CATARACT EXTRACTION STATUS, LEFT EYE: ICD-10-CM

## 2020-06-14 DIAGNOSIS — Z98.890 OTHER SPECIFIED POSTPROCEDURAL STATES: ICD-10-CM

## 2020-06-14 DIAGNOSIS — Z79.52 LONG TERM (CURRENT) USE OF SYSTEMIC STEROIDS: ICD-10-CM

## 2020-06-14 DIAGNOSIS — K55.9 VASCULAR DISORDER OF INTESTINE, UNSPECIFIED: ICD-10-CM

## 2020-06-14 DIAGNOSIS — Z95.0 PRESENCE OF CARDIAC PACEMAKER: ICD-10-CM

## 2020-06-14 DIAGNOSIS — E78.5 HYPERLIPIDEMIA, UNSPECIFIED: ICD-10-CM

## 2020-06-14 DIAGNOSIS — Z98.49 CATARACT EXTRACTION STATUS, UNSPECIFIED EYE: Chronic | ICD-10-CM

## 2020-06-14 DIAGNOSIS — K40.31 UNILATERAL INGUINAL HERNIA, WITH OBSTRUCTION, WITHOUT GANGRENE, RECURRENT: ICD-10-CM

## 2020-06-14 DIAGNOSIS — Z92.21 PERSONAL HISTORY OF ANTINEOPLASTIC CHEMOTHERAPY: ICD-10-CM

## 2020-06-14 DIAGNOSIS — Z90.89 ACQUIRED ABSENCE OF OTHER ORGANS: Chronic | ICD-10-CM

## 2020-06-14 DIAGNOSIS — I10 ESSENTIAL (PRIMARY) HYPERTENSION: ICD-10-CM

## 2020-06-14 DIAGNOSIS — Z98.890 OTHER SPECIFIED POSTPROCEDURAL STATES: Chronic | ICD-10-CM

## 2020-06-14 DIAGNOSIS — K46.0 UNSPECIFIED ABDOMINAL HERNIA WITH OBSTRUCTION, WITHOUT GANGRENE: ICD-10-CM

## 2020-06-14 DIAGNOSIS — Z79.01 LONG TERM (CURRENT) USE OF ANTICOAGULANTS: ICD-10-CM

## 2020-06-14 DIAGNOSIS — Z53.31 LAPAROSCOPIC SURGICAL PROCEDURE CONVERTED TO OPEN PROCEDURE: ICD-10-CM

## 2020-06-14 DIAGNOSIS — E27.3 DRUG-INDUCED ADRENOCORTICAL INSUFFICIENCY: ICD-10-CM

## 2020-06-14 DIAGNOSIS — Z88.0 ALLERGY STATUS TO PENICILLIN: ICD-10-CM

## 2020-06-14 DIAGNOSIS — Z98.41 CATARACT EXTRACTION STATUS, RIGHT EYE: ICD-10-CM

## 2020-06-14 DIAGNOSIS — I48.91 UNSPECIFIED ATRIAL FIBRILLATION: ICD-10-CM

## 2020-06-14 LAB
ALBUMIN SERPL ELPH-MCNC: 3.7 G/DL — SIGNIFICANT CHANGE UP (ref 3.3–5)
ALP SERPL-CCNC: 40 U/L — SIGNIFICANT CHANGE UP (ref 40–120)
ALT FLD-CCNC: 44 U/L — SIGNIFICANT CHANGE UP (ref 12–78)
ANION GAP SERPL CALC-SCNC: 2 MMOL/L — LOW (ref 5–17)
APTT BLD: 29.5 SEC — SIGNIFICANT CHANGE UP (ref 27.5–36.3)
AST SERPL-CCNC: 26 U/L — SIGNIFICANT CHANGE UP (ref 15–37)
BASOPHILS # BLD AUTO: 0.02 K/UL — SIGNIFICANT CHANGE UP (ref 0–0.2)
BASOPHILS NFR BLD AUTO: 0.3 % — SIGNIFICANT CHANGE UP (ref 0–2)
BILIRUB SERPL-MCNC: 0.7 MG/DL — SIGNIFICANT CHANGE UP (ref 0.2–1.2)
BUN SERPL-MCNC: 31 MG/DL — HIGH (ref 7–23)
CALCIUM SERPL-MCNC: 9 MG/DL — SIGNIFICANT CHANGE UP (ref 8.5–10.1)
CHLORIDE SERPL-SCNC: 104 MMOL/L — SIGNIFICANT CHANGE UP (ref 96–108)
CO2 SERPL-SCNC: 30 MMOL/L — SIGNIFICANT CHANGE UP (ref 22–31)
CREAT SERPL-MCNC: 0.78 MG/DL — SIGNIFICANT CHANGE UP (ref 0.5–1.3)
EOSINOPHIL # BLD AUTO: 0 K/UL — SIGNIFICANT CHANGE UP (ref 0–0.5)
EOSINOPHIL NFR BLD AUTO: 0 % — SIGNIFICANT CHANGE UP (ref 0–6)
GLUCOSE SERPL-MCNC: 116 MG/DL — HIGH (ref 70–99)
HCT VFR BLD CALC: 43.1 % — SIGNIFICANT CHANGE UP (ref 34.5–45)
HGB BLD-MCNC: 13.7 G/DL — SIGNIFICANT CHANGE UP (ref 11.5–15.5)
IMM GRANULOCYTES NFR BLD AUTO: 0.4 % — SIGNIFICANT CHANGE UP (ref 0–1.5)
INR BLD: 1.14 RATIO — SIGNIFICANT CHANGE UP (ref 0.88–1.16)
LACTATE SERPL-SCNC: 1 MMOL/L — SIGNIFICANT CHANGE UP (ref 0.7–2)
LYMPHOCYTES # BLD AUTO: 0.94 K/UL — LOW (ref 1–3.3)
LYMPHOCYTES # BLD AUTO: 13.4 % — SIGNIFICANT CHANGE UP (ref 13–44)
MCHC RBC-ENTMCNC: 30.3 PG — SIGNIFICANT CHANGE UP (ref 27–34)
MCHC RBC-ENTMCNC: 31.8 GM/DL — LOW (ref 32–36)
MCV RBC AUTO: 95.4 FL — SIGNIFICANT CHANGE UP (ref 80–100)
MONOCYTES # BLD AUTO: 0.33 K/UL — SIGNIFICANT CHANGE UP (ref 0–0.9)
MONOCYTES NFR BLD AUTO: 4.7 % — SIGNIFICANT CHANGE UP (ref 2–14)
NEUTROPHILS # BLD AUTO: 5.71 K/UL — SIGNIFICANT CHANGE UP (ref 1.8–7.4)
NEUTROPHILS NFR BLD AUTO: 81.2 % — HIGH (ref 43–77)
PLATELET # BLD AUTO: 121 K/UL — LOW (ref 150–400)
POTASSIUM SERPL-MCNC: 4.5 MMOL/L — SIGNIFICANT CHANGE UP (ref 3.5–5.3)
POTASSIUM SERPL-SCNC: 4.5 MMOL/L — SIGNIFICANT CHANGE UP (ref 3.5–5.3)
PROT SERPL-MCNC: 6.8 GM/DL — SIGNIFICANT CHANGE UP (ref 6–8.3)
PROTHROM AB SERPL-ACNC: 12.7 SEC — SIGNIFICANT CHANGE UP (ref 10–12.9)
RBC # BLD: 4.52 M/UL — SIGNIFICANT CHANGE UP (ref 3.8–5.2)
RBC # FLD: 13.9 % — SIGNIFICANT CHANGE UP (ref 10.3–14.5)
SARS-COV-2 RNA SPEC QL NAA+PROBE: SIGNIFICANT CHANGE UP
SODIUM SERPL-SCNC: 136 MMOL/L — SIGNIFICANT CHANGE UP (ref 135–145)
WBC # BLD: 7.03 K/UL — SIGNIFICANT CHANGE UP (ref 3.8–10.5)
WBC # FLD AUTO: 7.03 K/UL — SIGNIFICANT CHANGE UP (ref 3.8–10.5)

## 2020-06-14 PROCEDURE — 88302 TISSUE EXAM BY PATHOLOGIST: CPT

## 2020-06-14 PROCEDURE — 88307 TISSUE EXAM BY PATHOLOGIST: CPT

## 2020-06-14 PROCEDURE — 80048 BASIC METABOLIC PNL TOTAL CA: CPT

## 2020-06-14 PROCEDURE — 87635 SARS-COV-2 COVID-19 AMP PRB: CPT

## 2020-06-14 PROCEDURE — 99232 SBSQ HOSP IP/OBS MODERATE 35: CPT | Mod: 57

## 2020-06-14 PROCEDURE — 74018 RADEX ABDOMEN 1 VIEW: CPT | Mod: 26

## 2020-06-14 PROCEDURE — C1889: CPT

## 2020-06-14 PROCEDURE — 84100 ASSAY OF PHOSPHORUS: CPT

## 2020-06-14 PROCEDURE — 83735 ASSAY OF MAGNESIUM: CPT

## 2020-06-14 PROCEDURE — C9132: CPT

## 2020-06-14 PROCEDURE — 87040 BLOOD CULTURE FOR BACTERIA: CPT

## 2020-06-14 PROCEDURE — 36415 COLL VENOUS BLD VENIPUNCTURE: CPT

## 2020-06-14 PROCEDURE — 97163 PT EVAL HIGH COMPLEX 45 MIN: CPT | Mod: GP

## 2020-06-14 PROCEDURE — 86769 SARS-COV-2 COVID-19 ANTIBODY: CPT

## 2020-06-14 PROCEDURE — 93005 ELECTROCARDIOGRAM TRACING: CPT

## 2020-06-14 PROCEDURE — 97116 GAIT TRAINING THERAPY: CPT | Mod: GP

## 2020-06-14 PROCEDURE — 71045 X-RAY EXAM CHEST 1 VIEW: CPT

## 2020-06-14 PROCEDURE — 97530 THERAPEUTIC ACTIVITIES: CPT | Mod: GP

## 2020-06-14 PROCEDURE — 85027 COMPLETE CBC AUTOMATED: CPT

## 2020-06-14 PROCEDURE — 83605 ASSAY OF LACTIC ACID: CPT

## 2020-06-14 PROCEDURE — 74177 CT ABD & PELVIS W/CONTRAST: CPT | Mod: 26

## 2020-06-14 PROCEDURE — 74018 RADEX ABDOMEN 1 VIEW: CPT

## 2020-06-14 RX ORDER — DIGOXIN 250 MCG
0.12 TABLET ORAL DAILY
Refills: 0 | Status: DISCONTINUED | OUTPATIENT
Start: 2020-06-14 | End: 2020-06-14

## 2020-06-14 RX ORDER — METOPROLOL TARTRATE 50 MG
5 TABLET ORAL EVERY 6 HOURS
Refills: 0 | Status: DISCONTINUED | OUTPATIENT
Start: 2020-06-14 | End: 2020-06-15

## 2020-06-14 RX ORDER — ENOXAPARIN SODIUM 100 MG/ML
40 INJECTION SUBCUTANEOUS DAILY
Refills: 0 | Status: DISCONTINUED | OUTPATIENT
Start: 2020-06-15 | End: 2020-06-15

## 2020-06-14 RX ORDER — MORPHINE SULFATE 50 MG/1
4 CAPSULE, EXTENDED RELEASE ORAL ONCE
Refills: 0 | Status: DISCONTINUED | OUTPATIENT
Start: 2020-06-14 | End: 2020-06-14

## 2020-06-14 RX ORDER — SODIUM CHLORIDE 9 MG/ML
1000 INJECTION, SOLUTION INTRAVENOUS
Refills: 0 | Status: DISCONTINUED | OUTPATIENT
Start: 2020-06-14 | End: 2020-06-15

## 2020-06-14 RX ORDER — ACETAMINOPHEN 500 MG
1000 TABLET ORAL ONCE
Refills: 0 | Status: COMPLETED | OUTPATIENT
Start: 2020-06-14 | End: 2020-06-14

## 2020-06-14 RX ORDER — SODIUM CHLORIDE 9 MG/ML
1000 INJECTION INTRAMUSCULAR; INTRAVENOUS; SUBCUTANEOUS ONCE
Refills: 0 | Status: COMPLETED | OUTPATIENT
Start: 2020-06-14 | End: 2020-06-14

## 2020-06-14 RX ORDER — ACETAMINOPHEN 500 MG
650 TABLET ORAL EVERY 6 HOURS
Refills: 0 | Status: DISCONTINUED | OUTPATIENT
Start: 2020-06-14 | End: 2020-06-15

## 2020-06-14 RX ORDER — METOPROLOL TARTRATE 50 MG
100 TABLET ORAL
Refills: 0 | Status: DISCONTINUED | OUTPATIENT
Start: 2020-06-14 | End: 2020-06-14

## 2020-06-14 RX ADMIN — Medication 5 MILLIGRAM(S): at 22:52

## 2020-06-14 RX ADMIN — MORPHINE SULFATE 4 MILLIGRAM(S): 50 CAPSULE, EXTENDED RELEASE ORAL at 19:02

## 2020-06-14 RX ADMIN — Medication 400 MILLIGRAM(S): at 22:53

## 2020-06-14 RX ADMIN — SODIUM CHLORIDE 1000 MILLILITER(S): 9 INJECTION INTRAMUSCULAR; INTRAVENOUS; SUBCUTANEOUS at 22:48

## 2020-06-14 NOTE — H&P ADULT - NSHPPHYSICALEXAM_GEN_ALL_CORE
Gen: NAD, afebrile, elderly female  Neuro A&Ox3, no gross motor or sensory deficit  Chest no tenderness  CV S1/S2  Abd soft, mildly distended, nontender, left groin tender on palpation, small tense nonreducible mass in groin below incision line  Ext warm, well perfused extremity, no peripheral edema  Skin no skin changes in left groin, obvious healed groin scar

## 2020-06-14 NOTE — ED ADULT NURSE REASSESSMENT NOTE - NS ED NURSE REASSESS COMMENT FT1
Received pt from [facility], pt to get NG tube and indwelling rivas. Pt rapid swabbed for COVID. VSS. EKG completed.

## 2020-06-14 NOTE — CONSULT NOTE ADULT - ASSESSMENT
85 F with hx of left inguinal hernia repair in 01/2020, presents with left groin pain, currently on chemo, prednisone and eliquis for multiple comorbidities    -exam of left groin mass suggesting postop scar tissue, possibly from recent hernia repair or lymph node biopsy  -follow up CT A/P pending  -will admit if CT suggesting incarcerated hernia  -however recent CT on 05/2020 did not suggest any inguinal hernia at that time  -otherwise if CT nl, can follow up in surgery clinic for elective repairwith Dr William     Plan discussed with surgical attending, Dr Driver

## 2020-06-14 NOTE — ED ADULT NURSE NOTE - CHIEF COMPLAINT QUOTE
pt states she has a hernia in her left groin that popped out today that she can not put back in, pt c/o left groin pain and abdominal pain pt cole NVD and fever, pt ahs surgery to repair hernia on june 25th [facility] scheduled

## 2020-06-14 NOTE — H&P ADULT - NSHPLABSRESULTS_GEN_ALL_CORE
13.7   7.03  )-----------( 121      ( 14 Jun 2020 18:10 )             43.1   06-14    136  |  104  |  31<H>  ----------------------------<  116<H>  4.5   |  30  |  0.78    Ca    9.0      14 Jun 2020 18:10    TPro  6.8  /  Alb  3.7  /  TBili  0.7  /  DBili  x   /  AST  26  /  ALT  44  /  AlkPhos  40  06-14    < from: CT Abdomen and Pelvis w/ IV Cont (06.14.20 @ 20:16) >      EXAM:  CT ABDOMEN AND PELVIS IC                            PROCEDURE DATE:  06/14/2020          INTERPRETATION:  Abdominal/Pelvic CT    6/14/2020 8:48 PM    Indication: Abdominal pain, evaluate for an incarcerated left inguinal hernia    Technique:Axial images were obtained following oral and IV contrast from the lung bases through pubic symphysis.  90 cc of Omnipaque 350 was administered intravenously without complication and 10 cc was discarded.  Reformatted coronal and sagittal images are submitted.    Comparison: CT of May 6, 2020    FINDINGS:    LUNG BASES:  There are no pleural effusions. Enlarged heart. A pacemaker wire terminates in the right ventricle. Trace pericardial effusion.  PERITONEUM:  There is no free air or focal collection.  Mild interloop fluid.  LIVER: Redemonstration of scattered small cysts.  SPLEEN: Normal.  GALLBLADDER: Contracted.  BILIARY TREE: Unremarkable.  PANCREAS: Normal.  ADRENAL GLANDS: Normal.  KIDNEYS: Normal.  BOWEL: The stomach is incompletely distended. Dilated fluid-filled loops of small bowel with transition point in the left inguinal hernia. There is fluid in the left inguinal hernia concerning for incarceration. There is mild to moderate mesenteric edema and interloop fluid around the dilated loops. Distal small bowel loops are collapsed. The appendix is unremarkable. No significant fecal load. Extensive sigmoid diverticulosis.    URINARY BLADDER: Incompletely distended.  PELVIC ORGANS: No pelvic masses.    There is no significant adenopathy.  VASCULATURE: Unremarkable.  RETROPERITONEUM:  There is no mass.  BONES: Unremarkable.  ABDOMINAL WALL: Unremarkable.    IMPRESSION:    High-grade small bowel obstruction related to an obstructed knuckle of small bowel in the left inguinal hernia. Fluid within the hernia sac is concerning for incarcerated bowel . Mild to moderate mesenteric edema and interloop fluid.  No pneumatosis, bowel thickening, abnormal bowel enhancement, free air, or focal collection at this time.     Findings discussed with MIRIAM Monahan at 9 pm on 6/14/2020 with RBV.    Diverticulosis.    GAGE KOHLER M.D, ATTENDING RADIOLOGIST  This document has been electronically signed. Jun 14 2020  9:01PM              < end of copied text >

## 2020-06-14 NOTE — H&P ADULT - HISTORY OF PRESENT ILLNESS
84 y/o female with hx of A-fib, HTN, HLD, Adrenal insufficiency, L inguinal hernia, and around 2pm, pt felt the hernia pop out with some pain associated in abdomen. No other sx. Pt tried to push it back in but was not successful. Has an appointment scheduled with Dr. William on 06/29 for surgery but was unable to contact GI today so went to ED. Denies any fever, chills, CP, SOB, N/V, constipation or diarrhea.  Pt also has a hx of metastatic lymphoma, on chemo, AFib on Eliquis and Adrenal insufficiency on prednisone 10 mg daily ,had left groin hernia repair in January with Dr William. Pt had recent abdominal pain with left groin swelling after yard work in 05/2020.Came to ED at that time and Dr William was able to reduce it in ED. Pt also has a hx of left lymph node dissection for lymphoma bx 2 years ago. 86 y/o female with hx of A-fib, HTN, HLD, Melanoma, Med-induced Adrenal insufficiency, L inguinal hernia, and around 2pm, pt felt the hernia pop out with some pain associated in abdomen. No other sx. Pt tried to push it back in but was not successful. Has an appointment scheduled with Dr. William on 06/29 for surgery but was unable to contact GI today so went to ED. Denies any fever, chills, CP, SOB, N/V, constipation or diarrhea.  Pt also has a hx of metastatic lymphoma, on chemo, AFib on Eliquis and Adrenal insufficiency on prednisone 10 mg daily ,had left groin hernia repair in January with Dr William. Pt had recent abdominal pain with left groin swelling after yard work in 05/2020.Came to ED at that time and Dr William was able to reduce it in ED. Pt also has a hx of left lymph node dissection for melanoma bx 2 years ago.

## 2020-06-14 NOTE — ED STATDOCS - GASTROINTESTINAL, MLM
abdomen soft, non-tender, and non-distended. Bowel sounds present.  Firm mass in left inguinal region, nonreducible.

## 2020-06-14 NOTE — H&P ADULT - ASSESSMENT
85 F with hx of left inguinal hernia repair in 01/2020, presents with left groin pain, currently on chemo, prednisone and eliquis for multiple comorbidities    -exam of left groin mass suggesting bulging ass could be incarcerated hernia or postop fluid collection scar tissue seen on previous CT 05/2020  -reviewed CT A/P  persistent for SBO and incarcerated inguinal hernia  -will admit to surgical service  -NPO  -NGT to LWS  -IVF hydration  -hold ELiquis  -DVT/GI ppx  -Hospitalist consult for multiple comorbidities management  -Plan for surgery possibly in am due to high grade SBO  -Medical optimization for surgery needed    Plan discussed with surgical attending, Dr Driver

## 2020-06-14 NOTE — ED ADULT TRIAGE NOTE - CHIEF COMPLAINT QUOTE
pt states she has a hernia in her left groin that popped out today that she can not put back in, pt c/o left groin pain and abdominal pain pt cole NVD and fever, pt ahs surgery to repair hernia on june 25th [facility] scheduled

## 2020-06-14 NOTE — CONSULT NOTE ADULT - SUBJECTIVE AND OBJECTIVE BOX
84 y/o female with hx of A-fib, HTN, HLD, Adrenal insufficiency, L inguinal hernia, and around 2pm, pt felt the hernia pop out with some pain associated in abdomen. No other sx. Pt tried to push it back in but was not successful. Has an appointment scheduled with Dr. William on 06/29 for surgery but was unable to contact GI today so went to ED. Denies any fever, chills, CP, SOB, N/V, constipation or diarrhea.  Pt also has a hx of metastatic lymphoma, on chemo, AFib on Eliquis and Adrenal insufficiency on prednisone 10 mg daily ,had left groin hernia repair in January with Dr William. Pt had recent abdominal pain with left groin swelling after yard work in 05/2020.Came to ED at that time and Dr William was able to reduce it in ED. Pt also has a hx of left lymph node dissection for lymphoma bx 2 years ago.    PAST MEDICAL & SURGICAL HISTORY:  Lymphoma  HLD (hyperlipidemia)  HTN (hypertension)  Pacemaker  Atrial fibrillation  Adrenal Insufficiency  History of melanoma excision  H/O cataract extraction: both eyes  S/P tonsillectomy and adenoidectomy  S/P Left Inguinal Hernia Repair  S/P Left Inguinal Lymph Nodes Biopsy    Allergies    penicillins (Hives)    Intolerances    Vital Signs Last 24 Hrs  T(C): 36.6 (14 Jun 2020 16:38), Max: 36.6 (14 Jun 2020 16:38)  T(F): 97.8 (14 Jun 2020 16:38), Max: 97.8 (14 Jun 2020 16:38)  HR: 101 (14 Jun 2020 16:38) (101 - 101)  BP: 136/119 (14 Jun 2020 16:38) (136/119 - 136/119)  BP(mean): 125 (14 Jun 2020 16:38) (125 - 125)  RR: 17 (14 Jun 2020 16:38) (17 - 17)  SpO2: 100% (14 Jun 2020 16:38) (100% - 100%)    Gen: NAD, afebrile, elderly female  Neuro A&Ox3, no gross motor or sensory deficit  Chest no tenderness  CV S1/S2  Abd soft, mildly distended, nontender, left groin tender on palpation, small tense nonreducible mass in groin below incision line  Ext warm, well perfused extremity, no peripheral edema  Skin no skin changes in left groin, obvious healed groin scar                          13.7   7.03  )-----------( x        ( 14 Jun 2020 18:10 )             43.1     06-14    136  |  104  |  31<H>  ----------------------------<  116<H>  4.5   |  30  |  0.78    Ca    9.0      14 Jun 2020 18:10    TPro  6.8  /  Alb  3.7  /  TBili  0.7  /  DBili  x   /  AST  26  /  ALT  44  /  AlkPhos  40  06-14    pending repeat CT A/P    < from: CT Abdomen and Pelvis w/ Oral Cont and w/ IV Cont (05.06.20 @ 03:44) >      < end of copied text >

## 2020-06-14 NOTE — ED STATDOCS - PROGRESS NOTE DETAILS
85 yr. old female PMH: left Ing. Hernia presents to ED with report of painful  left inguinal hernia that popped out and unable to push it back in. Has surgery scheduled with dr. William on June 29th. Seen and examined by attending in intake. Plan: Reduce Hernia Will F/U with results and re evaluate. Todd NP Surgical Resident Dr. Martinez called and consulted. Will evaluate patient in ED. Todd CAZARES Seen an devaluated by Dr. Driver in ED. Agreed to CT abd./pelvis and Labs. Todd NP Case d/w radiologist, +SBO secondary to incarcerated hernia.  Case d/w surgical resident who already saw the patient earlier who stated she would call Dr. Driver and call back.  Patient and patient's son updated on the findings and need for admission -Imelda Causey PA-C lactate added to assess for bowel ischemia in the setting of incarcerated hernia.  As per resident Briana, will hold abx until lactate results -Imelda Causey PA-C

## 2020-06-15 ENCOUNTER — RESULT REVIEW (OUTPATIENT)
Age: 85
End: 2020-06-15

## 2020-06-15 LAB
ANION GAP SERPL CALC-SCNC: 5 MMOL/L — SIGNIFICANT CHANGE UP (ref 5–17)
ANION GAP SERPL CALC-SCNC: 7 MMOL/L — SIGNIFICANT CHANGE UP (ref 5–17)
BUN SERPL-MCNC: 18 MG/DL — SIGNIFICANT CHANGE UP (ref 7–23)
BUN SERPL-MCNC: 23 MG/DL — SIGNIFICANT CHANGE UP (ref 7–23)
CALCIUM SERPL-MCNC: 7.9 MG/DL — LOW (ref 8.5–10.1)
CALCIUM SERPL-MCNC: 8.6 MG/DL — SIGNIFICANT CHANGE UP (ref 8.5–10.1)
CHLORIDE SERPL-SCNC: 100 MMOL/L — SIGNIFICANT CHANGE UP (ref 96–108)
CHLORIDE SERPL-SCNC: 102 MMOL/L — SIGNIFICANT CHANGE UP (ref 96–108)
CO2 SERPL-SCNC: 28 MMOL/L — SIGNIFICANT CHANGE UP (ref 22–31)
CO2 SERPL-SCNC: 28 MMOL/L — SIGNIFICANT CHANGE UP (ref 22–31)
CREAT SERPL-MCNC: 0.66 MG/DL — SIGNIFICANT CHANGE UP (ref 0.5–1.3)
CREAT SERPL-MCNC: 0.96 MG/DL — SIGNIFICANT CHANGE UP (ref 0.5–1.3)
GLUCOSE SERPL-MCNC: 122 MG/DL — HIGH (ref 70–99)
GLUCOSE SERPL-MCNC: 123 MG/DL — HIGH (ref 70–99)
HCT VFR BLD CALC: 41.5 % — SIGNIFICANT CHANGE UP (ref 34.5–45)
HCT VFR BLD CALC: 41.5 % — SIGNIFICANT CHANGE UP (ref 34.5–45)
HGB BLD-MCNC: 13.2 G/DL — SIGNIFICANT CHANGE UP (ref 11.5–15.5)
HGB BLD-MCNC: 13.6 G/DL — SIGNIFICANT CHANGE UP (ref 11.5–15.5)
LACTATE SERPL-SCNC: 1.1 MMOL/L — SIGNIFICANT CHANGE UP (ref 0.7–2)
MAGNESIUM SERPL-MCNC: 1.8 MG/DL — SIGNIFICANT CHANGE UP (ref 1.6–2.6)
MCHC RBC-ENTMCNC: 30.5 PG — SIGNIFICANT CHANGE UP (ref 27–34)
MCHC RBC-ENTMCNC: 31.3 PG — SIGNIFICANT CHANGE UP (ref 27–34)
MCHC RBC-ENTMCNC: 31.8 GM/DL — LOW (ref 32–36)
MCHC RBC-ENTMCNC: 32.8 GM/DL — SIGNIFICANT CHANGE UP (ref 32–36)
MCV RBC AUTO: 95.6 FL — SIGNIFICANT CHANGE UP (ref 80–100)
MCV RBC AUTO: 95.8 FL — SIGNIFICANT CHANGE UP (ref 80–100)
NRBC # BLD: 0 /100 WBCS — SIGNIFICANT CHANGE UP (ref 0–0)
PHOSPHATE SERPL-MCNC: 3 MG/DL — SIGNIFICANT CHANGE UP (ref 2.5–4.5)
PLATELET # BLD AUTO: 112 K/UL — LOW (ref 150–400)
PLATELET # BLD AUTO: 116 K/UL — LOW (ref 150–400)
POTASSIUM SERPL-MCNC: 3.8 MMOL/L — SIGNIFICANT CHANGE UP (ref 3.5–5.3)
POTASSIUM SERPL-MCNC: 4.3 MMOL/L — SIGNIFICANT CHANGE UP (ref 3.5–5.3)
POTASSIUM SERPL-SCNC: 3.8 MMOL/L — SIGNIFICANT CHANGE UP (ref 3.5–5.3)
POTASSIUM SERPL-SCNC: 4.3 MMOL/L — SIGNIFICANT CHANGE UP (ref 3.5–5.3)
RBC # BLD: 4.33 M/UL — SIGNIFICANT CHANGE UP (ref 3.8–5.2)
RBC # BLD: 4.34 M/UL — SIGNIFICANT CHANGE UP (ref 3.8–5.2)
RBC # FLD: 14 % — SIGNIFICANT CHANGE UP (ref 10.3–14.5)
RBC # FLD: 14.3 % — SIGNIFICANT CHANGE UP (ref 10.3–14.5)
SARS-COV-2 IGG SERPL QL IA: NEGATIVE — SIGNIFICANT CHANGE UP
SARS-COV-2 IGM SERPL IA-ACNC: <0.1 INDEX — SIGNIFICANT CHANGE UP
SODIUM SERPL-SCNC: 133 MMOL/L — LOW (ref 135–145)
SODIUM SERPL-SCNC: 137 MMOL/L — SIGNIFICANT CHANGE UP (ref 135–145)
WBC # BLD: 7.97 K/UL — SIGNIFICANT CHANGE UP (ref 3.8–10.5)
WBC # BLD: 8.31 K/UL — SIGNIFICANT CHANGE UP (ref 3.8–10.5)
WBC # FLD AUTO: 7.97 K/UL — SIGNIFICANT CHANGE UP (ref 3.8–10.5)
WBC # FLD AUTO: 8.31 K/UL — SIGNIFICANT CHANGE UP (ref 3.8–10.5)

## 2020-06-15 PROCEDURE — 44120 REMOVAL OF SMALL INTESTINE: CPT | Mod: 80

## 2020-06-15 PROCEDURE — 99233 SBSQ HOSP IP/OBS HIGH 50: CPT | Mod: 57

## 2020-06-15 PROCEDURE — 99254 IP/OBS CNSLTJ NEW/EST MOD 60: CPT

## 2020-06-15 PROCEDURE — 88302 TISSUE EXAM BY PATHOLOGIST: CPT | Mod: 26

## 2020-06-15 PROCEDURE — 49557 REREPAIR FEM HERNIA BLOCKED: CPT | Mod: 80

## 2020-06-15 PROCEDURE — 71045 X-RAY EXAM CHEST 1 VIEW: CPT | Mod: 26

## 2020-06-15 PROCEDURE — 88307 TISSUE EXAM BY PATHOLOGIST: CPT | Mod: 26

## 2020-06-15 RX ORDER — ONDANSETRON 8 MG/1
4 TABLET, FILM COATED ORAL EVERY 6 HOURS
Refills: 0 | Status: DISCONTINUED | OUTPATIENT
Start: 2020-06-15 | End: 2020-06-15

## 2020-06-15 RX ORDER — DIGOXIN 250 MCG
0.12 TABLET ORAL DAILY
Refills: 0 | Status: DISCONTINUED | OUTPATIENT
Start: 2020-06-15 | End: 2020-06-15

## 2020-06-15 RX ORDER — FENTANYL CITRATE 50 UG/ML
50 INJECTION INTRAVENOUS
Refills: 0 | Status: DISCONTINUED | OUTPATIENT
Start: 2020-06-15 | End: 2020-06-15

## 2020-06-15 RX ORDER — MORPHINE SULFATE 50 MG/1
2 CAPSULE, EXTENDED RELEASE ORAL EVERY 4 HOURS
Refills: 0 | Status: DISCONTINUED | OUTPATIENT
Start: 2020-06-15 | End: 2020-06-19

## 2020-06-15 RX ORDER — SIMVASTATIN 20 MG/1
10 TABLET, FILM COATED ORAL AT BEDTIME
Refills: 0 | Status: DISCONTINUED | OUTPATIENT
Start: 2020-06-15 | End: 2020-06-15

## 2020-06-15 RX ORDER — METOPROLOL TARTRATE 50 MG
5 TABLET ORAL EVERY 6 HOURS
Refills: 0 | Status: DISCONTINUED | OUTPATIENT
Start: 2020-06-15 | End: 2020-06-15

## 2020-06-15 RX ORDER — HYDROCORTISONE 20 MG
50 TABLET ORAL DAILY
Refills: 0 | Status: DISCONTINUED | OUTPATIENT
Start: 2020-06-15 | End: 2020-06-15

## 2020-06-15 RX ORDER — HYDROMORPHONE HYDROCHLORIDE 2 MG/ML
0.5 INJECTION INTRAMUSCULAR; INTRAVENOUS; SUBCUTANEOUS
Refills: 0 | Status: DISCONTINUED | OUTPATIENT
Start: 2020-06-15 | End: 2020-06-15

## 2020-06-15 RX ORDER — PROCHLORPERAZINE MALEATE 5 MG
10 TABLET ORAL ONCE
Refills: 0 | Status: COMPLETED | OUTPATIENT
Start: 2020-06-15 | End: 2020-06-15

## 2020-06-15 RX ORDER — DIGOXIN 250 MCG
0.1 TABLET ORAL DAILY
Refills: 0 | Status: DISCONTINUED | OUTPATIENT
Start: 2020-06-15 | End: 2020-06-18

## 2020-06-15 RX ORDER — HEPARIN SODIUM 5000 [USP'U]/ML
5000 INJECTION INTRAVENOUS; SUBCUTANEOUS EVERY 8 HOURS
Refills: 0 | Status: DISCONTINUED | OUTPATIENT
Start: 2020-06-16 | End: 2020-06-18

## 2020-06-15 RX ORDER — SODIUM CHLORIDE 9 MG/ML
1000 INJECTION, SOLUTION INTRAVENOUS
Refills: 0 | Status: DISCONTINUED | OUTPATIENT
Start: 2020-06-15 | End: 2020-06-15

## 2020-06-15 RX ORDER — PROTHROMBIN COMPLEX CONCENTRATE (HUMAN) 25.5; 16.5; 24; 22; 22; 26 [IU]/ML; [IU]/ML; [IU]/ML; [IU]/ML; [IU]/ML; [IU]/ML
1500 POWDER, FOR SOLUTION INTRAVENOUS ONCE
Refills: 0 | Status: COMPLETED | OUTPATIENT
Start: 2020-06-15 | End: 2020-06-15

## 2020-06-15 RX ORDER — HYDROCORTISONE 20 MG
50 TABLET ORAL DAILY
Refills: 0 | Status: DISCONTINUED | OUTPATIENT
Start: 2020-06-15 | End: 2020-06-16

## 2020-06-15 RX ORDER — DIGOXIN 250 MCG
0.1 TABLET ORAL DAILY
Refills: 0 | Status: DISCONTINUED | OUTPATIENT
Start: 2020-06-15 | End: 2020-06-15

## 2020-06-15 RX ORDER — METOPROLOL TARTRATE 50 MG
5 TABLET ORAL EVERY 6 HOURS
Refills: 0 | Status: DISCONTINUED | OUTPATIENT
Start: 2020-06-15 | End: 2020-06-18

## 2020-06-15 RX ORDER — METOPROLOL TARTRATE 50 MG
100 TABLET ORAL
Refills: 0 | Status: DISCONTINUED | OUTPATIENT
Start: 2020-06-15 | End: 2020-06-15

## 2020-06-15 RX ORDER — ONDANSETRON 8 MG/1
4 TABLET, FILM COATED ORAL ONCE
Refills: 0 | Status: DISCONTINUED | OUTPATIENT
Start: 2020-06-15 | End: 2020-06-15

## 2020-06-15 RX ADMIN — Medication 650 MILLIGRAM(S): at 12:02

## 2020-06-15 RX ADMIN — Medication 5 MILLIGRAM(S): at 22:26

## 2020-06-15 RX ADMIN — ONDANSETRON 4 MILLIGRAM(S): 8 TABLET, FILM COATED ORAL at 00:53

## 2020-06-15 RX ADMIN — Medication 10 MILLIGRAM(S): at 03:45

## 2020-06-15 RX ADMIN — ONDANSETRON 4 MILLIGRAM(S): 8 TABLET, FILM COATED ORAL at 12:02

## 2020-06-15 RX ADMIN — SODIUM CHLORIDE 120 MILLILITER(S): 9 INJECTION, SOLUTION INTRAVENOUS at 00:47

## 2020-06-15 RX ADMIN — SODIUM CHLORIDE 120 MILLILITER(S): 9 INJECTION, SOLUTION INTRAVENOUS at 09:29

## 2020-06-15 RX ADMIN — PROTHROMBIN COMPLEX CONCENTRATE (HUMAN) 400 INTERNATIONAL UNIT(S): 25.5; 16.5; 24; 22; 22; 26 POWDER, FOR SOLUTION INTRAVENOUS at 09:29

## 2020-06-15 RX ADMIN — Medication 50 MILLIGRAM(S): at 22:26

## 2020-06-15 RX ADMIN — ONDANSETRON 4 MILLIGRAM(S): 8 TABLET, FILM COATED ORAL at 06:29

## 2020-06-15 NOTE — PROGRESS NOTE ADULT - SUBJECTIVE AND OBJECTIVE BOX
Pt was seen and examined at bedside this morning with surgery team. No acute overnight events reported by nursing staff. Pt offers no new complaints at this time, NGT in place no gas/bm reported. Denies fever/cp/sob/n/v/d/c. Vitals stable.     T(C): 36.4 (06-15-20 @ 04:40), Max: 36.7 (06-14-20 @ 22:40)  HR: 81 (06-15-20 @ 06:00) (80 - 103)  BP: 144/68 (06-15-20 @ 06:00) (125/72 - 171/92)  RR: 22 (06-15-20 @ 06:00) (16 - 22)  SpO2: 95% (06-15-20 @ 06:00) (95% - 100%)    PHYSICAL EXAM:  Constitutional: NAD, GCS: 15/15  AOX3  Eyes:  WNL  ENMT:  WNL  Neck:  WNL, non tender  Back: Non tender  Respiratory: CTABL  Cardiovascular:  S1+S2+0  Gastrointestinal: Soft, nt/nd, no rgr  Groin: left inguinal hernia appreciated  Genitourinary:  WNL  Extremities: NV intact  Vascular:  Intact  Neurological: No focal neurological deficit,  CN, motor and sensory system grossly intact.  Skin: WNL  Musculoskeletal: WNL  Psychiatric: Grossly WNL                          13.2   7.97  )-----------( 116      ( 15 Magnus 2020 06:54 )             41.5     06-15    137  |  102  |  23  ----------------------------<  123<H>  3.8   |  28  |  0.66    Ca    8.6      15 Magnus 2020 06:54  Phos  3.0     06-15  Mg     1.8     06-15    TPro  6.8  /  Alb  3.7  /  TBili  0.7  /  DBili  x   /  AST  26  /  ALT  44  /  AlkPhos  40  06-14

## 2020-06-15 NOTE — BRIEF OPERATIVE NOTE - NSICDXBRIEFPROCEDURE_GEN_ALL_CORE_FT
PROCEDURES:  Exploratory laparotomy 15-Magnus-2020 19:16:58  NOELLE KENNEY  Open repair of recurrent inguinal hernia using suture 15-Magnus-2020 19:16:41  NOELLE KENNEY

## 2020-06-15 NOTE — BRIEF OPERATIVE NOTE - OPERATION/FINDINGS
Laparoscopy showing non viable bowel in femoral hernia  Open approach via left inguinal incision and midline infra umbilical laparotomy  5cm small bowel resected   Hernia repaired primarily with 2-0 prolene suture     Skin staples applied to skin   PACU in stable condition

## 2020-06-15 NOTE — CONSULT NOTE ADULT - SUBJECTIVE AND OBJECTIVE BOX
Patient is 86yo female with PMhx of Afib, on Digoxin and Eliquis, HLD, melanoma on chemo, Adrenal Insufficiency on Prednisone, presented with groin pain, and incarcerated bowel s/p OR repair, with surgery. Pt is post op, and reports to be feeling fine, no major complaints. Pt denies fever, chills, CP, SOB, palpitations, HD, dizziness.     PMHx as above  PSHx as above  Meds as per EMR  FHx NC  Social denies smoking, etoh, drug use  ROS as above    T(C): 36.3 (06-15-20 @ 19:12), Max: 38.2 (06-15-20 @ 11:54)  HR: 93 (06-15-20 @ 20:00) (80 - 127)  BP: 145/63 (06-15-20 @ 20:00) (115/49 - 171/92)  RR: 20 (06-15-20 @ 20:00) (16 - 26)  SpO2: 96% (06-15-20 @ 20:00) (93% - 99%)  Wt(kg): --        Gen: AAOx3, NAD  HEENT: NCAT, EOMI, +NGT  Neck: Supple  CV: nml S1S2, RRR  Lungs: CTABL  Abd: Soft, NT, ND, BS+  Ext: No edema  Neuro: Non focal  Skin: mid-umbilical incision  Psych: normal affect                              13.2   7.97  )-----------( 116      ( 15 Magnus 2020 06:54 )             41.5     15 Magnus 2020 06:54    137    |  102    |  23     ----------------------------<  123    3.8     |  28     |  0.66     Ca    8.6        15 Magnus 2020 06:54  Phos  3.0       15 Magnus 2020 06:54  Mg     1.8       15 Magnus 2020 06:54    TPro  6.8    /  Alb  3.7    /  TBili  0.7    /  DBili  x      /  AST  26     /  ALT  44     /  AlkPhos  40     14 Jun 2020 18:10    PT/INR - ( 14 Jun 2020 18:10 )   PT: 12.7 sec;   INR: 1.14 ratio         PTT - ( 14 Jun 2020 18:10 )  PTT:29.5 sec  CAPILLARY BLOOD GLUCOSE        LIVER FUNCTIONS - ( 14 Jun 2020 18:10 )  Alb: 3.7 g/dL / Pro: 6.8 gm/dL / ALK PHOS: 40 U/L / ALT: 44 U/L / AST: 26 U/L / GGT: x

## 2020-06-15 NOTE — PATIENT PROFILE ADULT - DISASTER - INDICATE TYPE
Health Care Proxy (HCP)
This patient is currently being treated with Vancomycin and Zosyn IV for  pneumonia.  ED vitals Temp 101.4F , HR  128 O sat 83% on 3L NC.  Please specify the medical diagnosis associated with this findings in the setting of pneumonia.  Please specify if this was likely;    Non infectious SIRS  Sepsis present on admission  Other ( please specify condition)  Clinically insignificant

## 2020-06-15 NOTE — CONSULT NOTE ADULT - ASSESSMENT
86yo female with PMhx of Afib, on Digoxin and Eliquis, HLD, melanoma on chemo, Adrenal Insufficiency on Prednisone, presented with groin pain, and incarcerated bowel s/p OR repair, with surgery.      Afib  HLD  Melanoma  Adrenal Insufficiency    Recommend:  - supp o2 as needed  - post op abx as per surgery  - Incentive spirometry  - resume A/C, Eliquis when cleared from surgical standpoint  - Digoxin 0.125mg daily  - Lopressor  - Prednisone 12.5mg PO daily  - IVF  - Pain control  - GI ppx, PPI  - DVT ppx,  - Medicine will continue to follow

## 2020-06-15 NOTE — PROGRESS NOTE ADULT - ATTENDING COMMENTS
85 Y old female with recurrent LIH with SBO, on Eliquis, irreducible, high risk of bowel ischemia, on steroids, immunotherapy for node positive melanoma    NPO  NGT   IV hydration   Hold Eliquis  K centra  Medcial clearance  last Eliquis 24 hours ago  D/W Pt and son Will reverse with k centra, OR today for exploration and repair.  D/W Nursing staff 85 Y old female with recurrent LIH with SBO, on Eliquis, irreducible, high risk of bowel ischemia, on steroids, immunotherapy for node positive melanoma    NPO  NGT   IV hydration   Hold Eliquis  K centra  Medical consult, for in patient monitoring, pt for emergent surgery .  last Eliquis 24 hours ago  D/W Pt and son Will reverse with k centra, OR today for exploration and repair.  D/W Nursing staff

## 2020-06-16 LAB
HCT VFR BLD CALC: 39.6 % — SIGNIFICANT CHANGE UP (ref 34.5–45)
HGB BLD-MCNC: 13 G/DL — SIGNIFICANT CHANGE UP (ref 11.5–15.5)
MAGNESIUM SERPL-MCNC: 1.6 MG/DL — SIGNIFICANT CHANGE UP (ref 1.6–2.6)
MCHC RBC-ENTMCNC: 31.3 PG — SIGNIFICANT CHANGE UP (ref 27–34)
MCHC RBC-ENTMCNC: 32.8 GM/DL — SIGNIFICANT CHANGE UP (ref 32–36)
MCV RBC AUTO: 95.2 FL — SIGNIFICANT CHANGE UP (ref 80–100)
PHOSPHATE SERPL-MCNC: 4.5 MG/DL — SIGNIFICANT CHANGE UP (ref 2.5–4.5)
PLATELET # BLD AUTO: 111 K/UL — LOW (ref 150–400)
RBC # BLD: 4.16 M/UL — SIGNIFICANT CHANGE UP (ref 3.8–5.2)
RBC # FLD: 14.1 % — SIGNIFICANT CHANGE UP (ref 10.3–14.5)
WBC # BLD: 9.07 K/UL — SIGNIFICANT CHANGE UP (ref 3.8–10.5)
WBC # FLD AUTO: 9.07 K/UL — SIGNIFICANT CHANGE UP (ref 3.8–10.5)

## 2020-06-16 PROCEDURE — 99024 POSTOP FOLLOW-UP VISIT: CPT

## 2020-06-16 PROCEDURE — 99291 CRITICAL CARE FIRST HOUR: CPT

## 2020-06-16 RX ORDER — SODIUM CHLORIDE 9 MG/ML
1000 INJECTION INTRAMUSCULAR; INTRAVENOUS; SUBCUTANEOUS
Refills: 0 | Status: DISCONTINUED | OUTPATIENT
Start: 2020-06-16 | End: 2020-06-18

## 2020-06-16 RX ORDER — HYDROCORTISONE 20 MG
25 TABLET ORAL EVERY 12 HOURS
Refills: 0 | Status: DISCONTINUED | OUTPATIENT
Start: 2020-06-16 | End: 2020-06-18

## 2020-06-16 RX ORDER — METOPROLOL TARTRATE 50 MG
5 TABLET ORAL ONCE
Refills: 0 | Status: COMPLETED | OUTPATIENT
Start: 2020-06-16 | End: 2020-06-16

## 2020-06-16 RX ORDER — METOPROLOL TARTRATE 50 MG
5 TABLET ORAL ONCE
Refills: 0 | Status: DISCONTINUED | OUTPATIENT
Start: 2020-06-16 | End: 2020-06-16

## 2020-06-16 RX ADMIN — Medication 50 MILLIGRAM(S): at 11:01

## 2020-06-16 RX ADMIN — Medication 5 MILLIGRAM(S): at 20:03

## 2020-06-16 RX ADMIN — Medication 5 MILLIGRAM(S): at 16:25

## 2020-06-16 RX ADMIN — Medication 5 MILLIGRAM(S): at 11:02

## 2020-06-16 RX ADMIN — HEPARIN SODIUM 5000 UNIT(S): 5000 INJECTION INTRAVENOUS; SUBCUTANEOUS at 23:41

## 2020-06-16 RX ADMIN — HEPARIN SODIUM 5000 UNIT(S): 5000 INJECTION INTRAVENOUS; SUBCUTANEOUS at 05:07

## 2020-06-16 RX ADMIN — Medication 5 MILLIGRAM(S): at 05:07

## 2020-06-16 RX ADMIN — HEPARIN SODIUM 5000 UNIT(S): 5000 INJECTION INTRAVENOUS; SUBCUTANEOUS at 13:17

## 2020-06-16 RX ADMIN — SODIUM CHLORIDE 100 MILLILITER(S): 9 INJECTION INTRAMUSCULAR; INTRAVENOUS; SUBCUTANEOUS at 22:23

## 2020-06-16 RX ADMIN — Medication 0.1 MILLIGRAM(S): at 11:02

## 2020-06-16 RX ADMIN — SODIUM CHLORIDE 100 MILLILITER(S): 9 INJECTION INTRAMUSCULAR; INTRAVENOUS; SUBCUTANEOUS at 11:06

## 2020-06-16 NOTE — PROGRESS NOTE ADULT - SUBJECTIVE AND OBJECTIVE BOX
Pt was seen and examined at bedside this morning with surgery team. No acute overnight events reported by nursing staff. Pt offers no new complaints at this time. Denies fever/cp/sob/n/v/d/c. Vitals stable.     T(C): 36.4 (06-16-20 @ 05:00), Max: 38.2 (06-15-20 @ 11:54)  HR: 107 (06-16-20 @ 08:00) (93 - 134)  BP: 151/71 (06-16-20 @ 08:00) (115/49 - 157/96)  RR: 18 (06-16-20 @ 08:00) (16 - 26)  SpO2: 99% (06-16-20 @ 08:00) (93% - 99%)    PHYSICAL EXAM:  Constitutional: NAD, GCS: 15/15  AOX3  Eyes:  WNL  ENMT:  WNL  Neck:  WNL, non tender  Back: Non tender  Respiratory: CTABL  Cardiovascular:  S1+S2+0  Gastrointestinal: Soft,nt/nd  Surgical site: Dressing c/d/i, mild strikethough  Genitourinary:  WNL  Extremities: NV intact  Vascular:  Intact  Neurological: No focal neurological deficit,  CN, motor and sensory system grossly intact.  Skin: WNL  Musculoskeletal: WNL  Psychiatric: Grossly WNL                          13.0   9.07  )-----------( 111      ( 16 Jun 2020 07:22 )             39.6     06-16    133<L>  |  100  |  20  ----------------------------<  111<H>  4.0   |  26  |  0.70    Ca    8.2<L>      16 Jun 2020 07:22  Phos  4.5     06-16  Mg     1.6     06-16    TPro  6.8  /  Alb  3.7  /  TBili  0.7  /  DBili  x   /  AST  26  /  ALT  44  /  AlkPhos  40  06-14

## 2020-06-16 NOTE — PROGRESS NOTE ADULT - SUBJECTIVE AND OBJECTIVE BOX
CC:Patient is a 85y old  Female who presents with a chief complaint of incarcerated hernia repair (15 Magnus 2020 19:53)      Subjective:  Pt seen and examined at bedside with chaperone. Pt is AAOx3, pt in no acute distress. Pt states tolerated incisional pain with meds. Pt denied c/o fever, chills, chest pain, SOB, N/V/D, extremity pain or dysfunction, hemoptysis, hematemesis, hematuria, hematochexia, headache, diplopia, vertigo, dizzyness. +Rivas cath, awaiting bowel function and ambulation with PT    ROS:  otherwise as abovementioned ROS    Vital Signs Last 24 Hrs  T(C): 36.4 (16 Jun 2020 05:00), Max: 38.1 (15 Magnus 2020 14:00)  T(F): 97.6 (16 Jun 2020 05:00), Max: 100.5 (15 Magnus 2020 14:00)  HR: 109 (16 Jun 2020 10:00) (93 - 134)  BP: 130/67 (16 Jun 2020 10:00) (115/49 - 157/96)  BP(mean): 82 (16 Jun 2020 10:00) (63 - 102)  RR: 23 (16 Jun 2020 11:00) (16 - 25)  SpO2: 98% (16 Jun 2020 09:00) (93% - 99%)    Labs:                                13.0   9.07  )-----------( 111      ( 16 Jun 2020 07:22 )             39.6     CBC Full  -  ( 16 Jun 2020 07:22 )  WBC Count : 9.07 K/uL  RBC Count : 4.16 M/uL  Hemoglobin : 13.0 g/dL  Hematocrit : 39.6 %  Platelet Count - Automated : 111 K/uL  Mean Cell Volume : 95.2 fl  Mean Cell Hemoglobin : 31.3 pg  Mean Cell Hemoglobin Concentration : 32.8 gm/dL  Auto Neutrophil # : x  Auto Lymphocyte # : x  Auto Monocyte # : x  Auto Eosinophil # : x  Auto Basophil # : x  Auto Neutrophil % : x  Auto Lymphocyte % : x  Auto Monocyte % : x  Auto Eosinophil % : x  Auto Basophil % : x    06-16    133<L>  |  100  |  20  ----------------------------<  111<H>  4.0   |  26  |  0.70    Ca    8.2<L>      16 Jun 2020 07:22  Phos  4.5     06-16  Mg     1.6     06-16    TPro  6.8  /  Alb  3.7  /  TBili  0.7  /  DBili  x   /  AST  26  /  ALT  44  /  AlkPhos  40  06-14    LIVER FUNCTIONS - ( 14 Jun 2020 18:10 )  Alb: 3.7 g/dL / Pro: 6.8 gm/dL / ALK PHOS: 40 U/L / ALT: 44 U/L / AST: 26 U/L / GGT: x           PT/INR - ( 14 Jun 2020 18:10 )   PT: 12.7 sec;   INR: 1.14 ratio         PTT - ( 14 Jun 2020 18:10 )  PTT:29.5 sec      Meds:  digoxin  Injectable 0.1 milliGRAM(s) IV Push daily  heparin   Injectable 5000 Unit(s) SubCutaneous every 8 hours  hydrocortisone sodium succinate Injectable 50 milliGRAM(s) IV Push daily  metoprolol tartrate Injectable 5 milliGRAM(s) IV Push every 6 hours  morphine  - Injectable 2 milliGRAM(s) IV Push every 4 hours PRN  sodium chloride 0.9%. 1000 milliLiter(s) IV Continuous <Continuous>      Radiology:      Physical exam:  Pt is aaox3  Pt in no acute distress  Psych: normal affect  Resp: CTAB  CVS: S1S2(+)  ABD: NGT demonstrates appropriate bilious output. Bowel sounds hypoactive, soft, non distended, no rebound, no guarding, no rigidity, no skin changes to exam. Incision sites clean, dry, intact, no cellulitis, no d/c, no purulence, no fluctuance. (+) appropriate incisional tenderness to exam  EXT: no calf tenderness or edema to exam b/l, on VTE prophylaxis  Skin: no adverse skin changes to exam  : rivas cath

## 2020-06-16 NOTE — PHYSICAL THERAPY INITIAL EVALUATION ADULT - GENERAL OBSERVATIONS, REHAB EVAL
Pt received seated in bedside chair, AAOx4, reports 4/10 pinching at incision. Pt with NGT, IV, tele, BP cuff.

## 2020-06-16 NOTE — PHYSICAL THERAPY INITIAL EVALUATION ADULT - PERTINENT HX OF CURRENT PROBLEM, REHAB EVAL
85 F with hx of left inguinal hernia repair in 01/2020, L groin swelling and abd pain after yard work in 05/2020, presents with left sudden groin pain and felt hernia pop out. Pt now POD#1 for L femoral hernia repair.

## 2020-06-16 NOTE — PHYSICAL THERAPY INITIAL EVALUATION ADULT - CRITERIA FOR SKILLED THERAPEUTIC INTERVENTIONS
therapy frequency/anticipated equipment needs at discharge/impairments found/risk reduction/prevention/rehab potential/anticipated discharge recommendation/functional limitations in following categories/predicted duration of therapy intervention

## 2020-06-16 NOTE — PROGRESS NOTE ADULT - SUBJECTIVE AND OBJECTIVE BOX
HPI: 85 y.o. female with PMHx of Afib on Digoxin and Eliquis, HLD, melanoma on chemo, Adrenal Insufficiency on Prednisone, presented with groin pain, and incarcerated bowel s/p OR repair, with surgery. Pt is post op, and reports to be feeling fine, no major complaints. Pt denies fever, chills, CP, SOB, palpitations, HD, dizziness.     Interval HPI: mild pain, abdominal distention, thirsty, at the moment Afib with RVR - responded to IV metoprolol  Other ROS reviewed and neg     ICU Vital Signs Last 24 Hrs  T(C): 37.4 (16 Jun 2020 15:43), Max: 37.4 (16 Jun 2020 15:43)  T(F): 99.4 (16 Jun 2020 15:43), Max: 99.4 (16 Jun 2020 15:43)  HR: 109 (16 Jun 2020 10:00) (93 - 134)  BP: 130/67 (16 Jun 2020 10:00) (130/67 - 157/96)  BP(mean): 82 (16 Jun 2020 10:00) (82 - 102)  RR: 23 (16 Jun 2020 11:00) (16 - 25)  SpO2: 98% (16 Jun 2020 09:00) (93% - 99%)    I&O's Detail    15 Magnus 2020 07:01  -  16 Jun 2020 07:00  --------------------------------------------------------  IN:    Other: 2200 mL  Total IN: 2200 mL    OUT:    Indwelling Catheter - Urethral: 500 mL    Other: 810 mL  Total OUT: 1310 mL    Total NET: 890 mL                        13.0   9.07  )-----------( 111      ( 16 Jun 2020 07:22 )             39.6     16 Jun 2020 07:22    133    |  100    |  20     ----------------------------<  111    4.0     |  26     |  0.70     Ca    8.2        16 Jun 2020 07:22  Phos  4.5       16 Jun 2020 07:22  Mg     1.6       16 Jun 2020 07:22    TPro  6.8    /  Alb  3.7    /  TBili  0.7    /  DBili  x      /  AST  26     /  ALT  44     /  AlkPhos  40     14 Jun 2020 18:10    PT/INR - ( 14 Jun 2020 18:10 )   PT: 12.7 sec;   INR: 1.14 ratio       PTT - ( 14 Jun 2020 18:10 )  PTT:29.5 sec    LIVER FUNCTIONS - ( 14 Jun 2020 18:10 )  Alb: 3.7 g/dL / Pro: 6.8 gm/dL / ALK PHOS: 40 U/L / ALT: 44 U/L / AST: 26 U/L / GGT: x        MEDICATIONS  (STANDING):  digoxin  Injectable 0.1 milliGRAM(s) IV Push daily  heparin   Injectable 5000 Unit(s) SubCutaneous every 8 hours  hydrocortisone sodium succinate Injectable 50 milliGRAM(s) IV Push daily  metoprolol tartrate Injectable 5 milliGRAM(s) IV Push every 6 hours  sodium chloride 0.9%. 1000 milliLiter(s) (100 mL/Hr) IV Continuous <Continuous>    MEDICATIONS  (PRN):  morphine  - Injectable 2 milliGRAM(s) IV Push every 4 hours PRN Moderate Pain (4 - 6)      PHYSICAL EXAM:    General: elderly female in no acute distress  Eyes: PERRLA, EOMI; conjunctiva and sclera clear  Head: Normocephalic; atraumatic  ENMT: No nasal discharge; airway clear  Neck: Supple; non tender; no masses  Respiratory: No wheezes, rales or rhonchi  Cardiovascular: S1, S2 irreg, tachycardic  Gastrointestinal: Soft distended abd with diminished bowel sounds, NGT to LWS with bilous content  Genitourinary: No costovertebral angle tenderness  Extremities: No clubbing, cyanosis or edema  Vascular: Peripheral pulses palpable 2+ bilaterally  Neurological: Alert and oriented x4  Skin: Warm and dry.   Musculoskeletal: Normal tone, without deformities  Psychiatric: Cooperative and appropriate

## 2020-06-17 ENCOUNTER — APPOINTMENT (OUTPATIENT)
Dept: SURGICAL ONCOLOGY | Facility: CLINIC | Age: 85
End: 2020-06-17

## 2020-06-17 LAB
ANION GAP SERPL CALC-SCNC: 6 MMOL/L — SIGNIFICANT CHANGE UP (ref 5–17)
BUN SERPL-MCNC: 18 MG/DL — SIGNIFICANT CHANGE UP (ref 7–23)
CALCIUM SERPL-MCNC: 8.2 MG/DL — LOW (ref 8.5–10.1)
CHLORIDE SERPL-SCNC: 104 MMOL/L — SIGNIFICANT CHANGE UP (ref 96–108)
CO2 SERPL-SCNC: 26 MMOL/L — SIGNIFICANT CHANGE UP (ref 22–31)
CREAT SERPL-MCNC: 0.54 MG/DL — SIGNIFICANT CHANGE UP (ref 0.5–1.3)
GLUCOSE SERPL-MCNC: 72 MG/DL — SIGNIFICANT CHANGE UP (ref 70–99)
HCT VFR BLD CALC: 36.8 % — SIGNIFICANT CHANGE UP (ref 34.5–45)
HGB BLD-MCNC: 11.9 G/DL — SIGNIFICANT CHANGE UP (ref 11.5–15.5)
MAGNESIUM SERPL-MCNC: 1.6 MG/DL — SIGNIFICANT CHANGE UP (ref 1.6–2.6)
MCHC RBC-ENTMCNC: 30.4 PG — SIGNIFICANT CHANGE UP (ref 27–34)
MCHC RBC-ENTMCNC: 32.3 GM/DL — SIGNIFICANT CHANGE UP (ref 32–36)
MCV RBC AUTO: 94.1 FL — SIGNIFICANT CHANGE UP (ref 80–100)
PHOSPHATE SERPL-MCNC: 1.5 MG/DL — LOW (ref 2.5–4.5)
PLATELET # BLD AUTO: 100 K/UL — LOW (ref 150–400)
POTASSIUM SERPL-MCNC: 3.4 MMOL/L — LOW (ref 3.5–5.3)
POTASSIUM SERPL-SCNC: 3.4 MMOL/L — LOW (ref 3.5–5.3)
RBC # BLD: 3.91 M/UL — SIGNIFICANT CHANGE UP (ref 3.8–5.2)
RBC # FLD: 14.2 % — SIGNIFICANT CHANGE UP (ref 10.3–14.5)
SODIUM SERPL-SCNC: 136 MMOL/L — SIGNIFICANT CHANGE UP (ref 135–145)
WBC # BLD: 6.53 K/UL — SIGNIFICANT CHANGE UP (ref 3.8–10.5)
WBC # FLD AUTO: 6.53 K/UL — SIGNIFICANT CHANGE UP (ref 3.8–10.5)

## 2020-06-17 PROCEDURE — 99233 SBSQ HOSP IP/OBS HIGH 50: CPT

## 2020-06-17 PROCEDURE — 99232 SBSQ HOSP IP/OBS MODERATE 35: CPT | Mod: 57

## 2020-06-17 RX ADMIN — Medication 5 MILLIGRAM(S): at 17:33

## 2020-06-17 RX ADMIN — HEPARIN SODIUM 5000 UNIT(S): 5000 INJECTION INTRAVENOUS; SUBCUTANEOUS at 06:28

## 2020-06-17 RX ADMIN — Medication 5 MILLIGRAM(S): at 12:13

## 2020-06-17 RX ADMIN — Medication 5 MILLIGRAM(S): at 06:28

## 2020-06-17 RX ADMIN — HEPARIN SODIUM 5000 UNIT(S): 5000 INJECTION INTRAVENOUS; SUBCUTANEOUS at 20:33

## 2020-06-17 RX ADMIN — Medication 25 MILLIGRAM(S): at 17:33

## 2020-06-17 RX ADMIN — Medication 25 MILLIGRAM(S): at 06:28

## 2020-06-17 RX ADMIN — Medication 0.1 MILLIGRAM(S): at 12:14

## 2020-06-17 RX ADMIN — Medication 5 MILLIGRAM(S): at 00:41

## 2020-06-17 RX ADMIN — HEPARIN SODIUM 5000 UNIT(S): 5000 INJECTION INTRAVENOUS; SUBCUTANEOUS at 13:57

## 2020-06-17 NOTE — PROGRESS NOTE ADULT - SUBJECTIVE AND OBJECTIVE BOX
Pt seen and examined at bedside. Pt is AAOx3, pt in no acute distress. Pt states tolerated incisional pain with meds. Pt denied c/o fever, chills, chest pain, SOB, N/V/D, extremity pain or dysfunction, hemoptysis, hematemesis, hematuria, hematochezia headache, diplopia, vertigo, dizziness, awaiting bowel function and ambulation with PT. Midline dressing was saturated overnight and had to n=be changed    ROS:  otherwise as abovementioned ROS    Vital Signs Last 24 Hrs  T(C): 37.2 (17 Jun 2020 09:25), Max: 37.6 (16 Jun 2020 20:19)  T(F): 99 (17 Jun 2020 09:25), Max: 99.7 (17 Jun 2020 05:30)  HR: 95 (17 Jun 2020 07:00) (92 - 140)  BP: 150/74 (17 Jun 2020 07:00) (115/64 - 165/69)  BP(mean): 93 (17 Jun 2020 07:00) (75 - 101)  RR: 21 (17 Jun 2020 07:00) (17 - 28)  SpO2: 95% (17 Jun 2020 07:00) (93% - 98%)      Labs:              11.9                 136  | 26   | 18           6.53  >-----------< 100<L>   ------------------------< 72                    36.8                 3.4<L>| 104  | 0.54                                                                      Ca 8.2<L> Mg 1.6   Ph 1.5<L>    ,             13.0                 133<L>| 26   | 20           9.07  >-----------< 111<L>   ------------------------< 111<H>                 39.6                 4.0  | 100  | 0.70                                                                      Ca 8.2<L> Mg 1.6   Ph 4.5                Meds:  digoxin  Injectable 0.1 milliGRAM(s) IV Push daily  heparin   Injectable 5000 Unit(s) SubCutaneous every 8 hours  hydrocortisone sodium succinate Injectable 50 milliGRAM(s) IV Push daily  metoprolol tartrate Injectable 5 milliGRAM(s) IV Push every 6 hours  morphine  - Injectable 2 milliGRAM(s) IV Push every 4 hours PRN  sodium chloride 0.9%. 1000 milliLiter(s) IV Continuous <Continuous>      Radiology:      Physical exam:  Pt is aaox3  Pt in no acute distress  Psych: normal affect  Resp: CTAB  CVS: S1S2(+)  ABD: NGT demonstrates appropriate bilious output but was minimal; output of 150. Bowel sounds hypoactive, soft, non distended, no rebound, no guarding, no rigidity, no skin changes to exam. Incision sites clean, dry, intact, no cellulitis, no d/c, no purulence, no fluctuance. (+) appropriate incisional tenderness to exam  EXT: no calf tenderness or edema to exam b/l, on VTE prophylaxis  Skin: no adverse skin changes to exam  : rivas cath

## 2020-06-17 NOTE — PROGRESS NOTE ADULT - SUBJECTIVE AND OBJECTIVE BOX
HPI: 85 y.o. female with PMHx of Afib on Digoxin and Eliquis, HLD, melanoma on chemo, Adrenal Insufficiency on Prednisone, presented with groin pain, and incarcerated bowel s/p OR repair, with surgery. Pt is post op, and reports to be feeling fine, no major complaints. Pt denies fever, chills, CP, SOB, palpitations, HD, dizziness.     Interval HPI: mild pain, abdominal distention, thirsty, rate is controlled, ambulated, NGT clamped, no flatus  Other ROS reviewed and neg     ICU Vital Signs Last 24 Hrs  T(C): 37.7 (17 Jun 2020 12:45), Max: 37.7 (17 Jun 2020 12:45)  T(F): 99.9 (17 Jun 2020 12:45), Max: 99.9 (17 Jun 2020 12:45)  HR: 95 (17 Jun 2020 07:00) (92 - 140)  BP: 150/74 (17 Jun 2020 07:00) (115/64 - 165/69)  BP(mean): 93 (17 Jun 2020 07:00) (75 - 101)  RR: 21 (17 Jun 2020 07:00) (17 - 21)  SpO2: 95% (17 Jun 2020 07:00) (93% - 98%)                       11.9   6.53  )-----------( 100      ( 17 Jun 2020 07:30 )             36.8     17 Jun 2020 07:30    136    |  104    |  18     ----------------------------<  72     3.4     |  26     |  0.54     Ca    8.2        17 Jun 2020 07:30  Phos  1.5       17 Jun 2020 07:30  Mg     1.6       17 Jun 2020 07:30    MEDICATIONS  (STANDING):  digoxin  Injectable 0.1 milliGRAM(s) IV Push daily  heparin   Injectable 5000 Unit(s) SubCutaneous every 8 hours  hydrocortisone sodium succinate Injectable 25 milliGRAM(s) IV Push every 12 hours  metoprolol tartrate Injectable 5 milliGRAM(s) IV Push every 6 hours  sodium chloride 0.9%. 1000 milliLiter(s) (100 mL/Hr) IV Continuous <Continuous>    MEDICATIONS  (PRN):  morphine  - Injectable 2 milliGRAM(s) IV Push every 4 hours PRN Moderate Pain (4 - 6)    PHYSICAL EXAM:    General: elderly female in no acute distress  Eyes: PERRLA, EOMI; conjunctiva and sclera clear  Head: Normocephalic; atraumatic  ENMT: No nasal discharge; airway clear  Neck: Supple; non tender; no masses  Respiratory: No wheezes, rales or rhonchi  Cardiovascular: S1, S2 irreg  Gastrointestinal: Soft distended abd with + bowel sounds, NGT clamped  Genitourinary: No costovertebral angle tenderness  Extremities: No clubbing, cyanosis or edema  Vascular: Peripheral pulses palpable 2+ bilaterally  Neurological: Alert and oriented x4  Skin: Warm and dry.   Musculoskeletal: Normal tone, without deformities  Psychiatric: Cooperative and appropriate

## 2020-06-18 PROCEDURE — 99024 POSTOP FOLLOW-UP VISIT: CPT

## 2020-06-18 PROCEDURE — 99233 SBSQ HOSP IP/OBS HIGH 50: CPT

## 2020-06-18 RX ORDER — METOPROLOL TARTRATE 50 MG
100 TABLET ORAL EVERY 12 HOURS
Refills: 0 | Status: DISCONTINUED | OUTPATIENT
Start: 2020-06-18 | End: 2020-06-19

## 2020-06-18 RX ORDER — DIGOXIN 250 MCG
0.12 TABLET ORAL DAILY
Refills: 0 | Status: DISCONTINUED | OUTPATIENT
Start: 2020-06-18 | End: 2020-06-19

## 2020-06-18 RX ORDER — MAGNESIUM SULFATE 500 MG/ML
2 VIAL (ML) INJECTION ONCE
Refills: 0 | Status: COMPLETED | OUTPATIENT
Start: 2020-06-18 | End: 2020-06-18

## 2020-06-18 RX ORDER — POTASSIUM PHOSPHATE, MONOBASIC POTASSIUM PHOSPHATE, DIBASIC 236; 224 MG/ML; MG/ML
15 INJECTION, SOLUTION INTRAVENOUS ONCE
Refills: 0 | Status: COMPLETED | OUTPATIENT
Start: 2020-06-18 | End: 2020-06-18

## 2020-06-18 RX ORDER — APIXABAN 2.5 MG/1
2.5 TABLET, FILM COATED ORAL
Refills: 0 | Status: DISCONTINUED | OUTPATIENT
Start: 2020-06-18 | End: 2020-06-19

## 2020-06-18 RX ADMIN — HEPARIN SODIUM 5000 UNIT(S): 5000 INJECTION INTRAVENOUS; SUBCUTANEOUS at 13:04

## 2020-06-18 RX ADMIN — HEPARIN SODIUM 5000 UNIT(S): 5000 INJECTION INTRAVENOUS; SUBCUTANEOUS at 06:26

## 2020-06-18 RX ADMIN — Medication 25 MILLIGRAM(S): at 06:25

## 2020-06-18 RX ADMIN — Medication 5 MILLIGRAM(S): at 12:58

## 2020-06-18 RX ADMIN — Medication 100 MILLIGRAM(S): at 18:49

## 2020-06-18 RX ADMIN — Medication 5 MILLIGRAM(S): at 00:03

## 2020-06-18 RX ADMIN — APIXABAN 2.5 MILLIGRAM(S): 2.5 TABLET, FILM COATED ORAL at 18:49

## 2020-06-18 RX ADMIN — POTASSIUM PHOSPHATE, MONOBASIC POTASSIUM PHOSPHATE, DIBASIC 63.75 MILLIMOLE(S): 236; 224 INJECTION, SOLUTION INTRAVENOUS at 12:58

## 2020-06-18 RX ADMIN — Medication 0.1 MILLIGRAM(S): at 12:43

## 2020-06-18 RX ADMIN — Medication 5 MILLIGRAM(S): at 06:26

## 2020-06-18 RX ADMIN — Medication 50 GRAM(S): at 12:52

## 2020-06-18 NOTE — PROGRESS NOTE ADULT - ATTENDING COMMENTS
Pt seen and examined, S/P ex lap, SBR, left femoral hernia repair, POD 4 passing gas, had a small BM  CLD  Pain control  IV hydration  DVT/GI prophylaxis  Resume home meds   Medical service following  Await more  GI function  Replace electrolyte  Abdominal binders  PT  OOB, ambulate  incentive spirometry  Nursing staff.

## 2020-06-18 NOTE — PROGRESS NOTE ADULT - SUBJECTIVE AND OBJECTIVE BOX
Pt seen and examined at bedside, no acute events. tolerating sips of clears. + bowel function.     ROS:  otherwise as abovementioned ROS    ICU Vital Signs Last 24 Hrs  T(C): 36.4 (18 Jun 2020 06:10), Max: 37.7 (17 Jun 2020 12:45)  T(F): 97.6 (18 Jun 2020 06:10), Max: 99.9 (17 Jun 2020 12:45)  HR: 85 (18 Jun 2020 04:00) (85 - 125)  BP: 141/71 (18 Jun 2020 04:00) (115/47 - 168/62)  BP(mean): 87 (18 Jun 2020 04:00) (64 - 100)  ABP: --  ABP(mean): --  RR: 20 (18 Jun 2020 04:00) (16 - 25)  SpO2: 95% (18 Jun 2020 04:00) (92% - 100%)        Labs:                           11.9   6.53  )-----------( 100      ( 17 Jun 2020 07:30 )             36.8     06-17    136  |  104  |  18  ----------------------------<  72  3.4<L>   |  26  |  0.54    Ca    8.2<L>      17 Jun 2020 07:30  Phos  1.5     06-17  Mg     1.6     06-17          Meds:  digoxin  Injectable 0.1 milliGRAM(s) IV Push daily  heparin   Injectable 5000 Unit(s) SubCutaneous every 8 hours  hydrocortisone sodium succinate Injectable 50 milliGRAM(s) IV Push daily  metoprolol tartrate Injectable 5 milliGRAM(s) IV Push every 6 hours  morphine  - Injectable 2 milliGRAM(s) IV Push every 4 hours PRN  sodium chloride 0.9%. 1000 milliLiter(s) IV Continuous <Continuous>      Radiology:      Physical exam:  Pt is aaox3  Pt in no acute distress  Psych: normal affect  Resp: CTAB  CVS: S1S2(+)  ABD: NGT demonstrates appropriate bilious output but was minimal; output of 150. Bowel sounds hypoactive, soft, non distended, no rebound, no guarding, no rigidity, no skin changes to exam. Incision sites clean, dry, intact, no cellulitis, no d/c, no purulence, no fluctuance. (+) appropriate incisional tenderness to exam  EXT: no calf tenderness or edema to exam b/l, on VTE prophylaxis  Skin: no adverse skin changes to exam  : rivas cath

## 2020-06-18 NOTE — PROGRESS NOTE ADULT - SUBJECTIVE AND OBJECTIVE BOX
HPI: 85 y.o. female with PMHx of Afib on Digoxin and Eliquis, HLD, melanoma on chemo, Adrenal Insufficiency on Prednisone, presented with groin pain, and incarcerated bowel s/p OR repair, with surgery. Pt is post op, and reports to be feeling fine, no major complaints. Pt denies fever, chills, CP, SOB, palpitations, HD, dizziness.     Interval HPI: tolerates liquid diet, mild abdominal distention, rate is controlled, ambulated, NGT removed, + flatus  Other ROS reviewed and neg     ICU Vital Signs Last 24 Hrs  T(C): 37.3 (18 Jun 2020 11:56), Max: 37.4 (17 Jun 2020 15:41)  T(F): 99.1 (18 Jun 2020 11:56), Max: 99.4 (17 Jun 2020 15:41)  HR: 95 (18 Jun 2020 13:00) (83 - 111)  BP: 125/72 (18 Jun 2020 13:00) (115/47 - 168/62)  BP(mean): 81 (18 Jun 2020 13:00) (64 - 103)  RR: 20 (18 Jun 2020 13:00) (12 - 26)  SpO2: 98% (18 Jun 2020 13:00) (93% - 100%)                       11.9   6.53  )-----------( 100      ( 17 Jun 2020 07:30 )             36.8     17 Jun 2020 07:30    136    |  104    |  18     ----------------------------<  72     3.4     |  26     |  0.54     Ca    8.2        17 Jun 2020 07:30  Phos  1.5       17 Jun 2020 07:30  Mg     1.6       17 Jun 2020 07:30    MEDICATIONS  (STANDING):  apixaban 2.5 milliGRAM(s) Oral two times a day  digoxin     Tablet 0.125 milliGRAM(s) Oral daily  metoprolol tartrate 100 milliGRAM(s) Oral every 12 hours  predniSONE   Tablet 20 milliGRAM(s) Oral daily    MEDICATIONS  (PRN):  morphine  - Injectable 2 milliGRAM(s) IV Push every 4 hours PRN Moderate Pain (4 - 6)    PHYSICAL EXAM:    General: elderly female in no acute distress  Eyes: PERRLA, EOMI; conjunctiva and sclera clear  Head: Normocephalic; atraumatic  ENMT: No nasal discharge; airway clear  Neck: Supple; non tender; no masses  Respiratory: No wheezes, rales or rhonchi  Cardiovascular: S1, S2 irreg  Gastrointestinal: Soft minimally distended abd with + bowel sounds  Genitourinary: No costovertebral angle tenderness  Extremities: No clubbing, cyanosis or edema  Vascular: Peripheral pulses palpable 2+ bilaterally  Neurological: Alert and oriented x4  Skin: Warm and dry.   Musculoskeletal: Normal tone, without deformities  Psychiatric: Cooperative and appropriate

## 2020-06-19 ENCOUNTER — TRANSCRIPTION ENCOUNTER (OUTPATIENT)
Age: 85
End: 2020-06-19

## 2020-06-19 VITALS — TEMPERATURE: 98 F

## 2020-06-19 LAB
ANION GAP SERPL CALC-SCNC: 5 MMOL/L — SIGNIFICANT CHANGE UP (ref 5–17)
BUN SERPL-MCNC: 16 MG/DL — SIGNIFICANT CHANGE UP (ref 7–23)
CALCIUM SERPL-MCNC: 8.6 MG/DL — SIGNIFICANT CHANGE UP (ref 8.5–10.1)
CHLORIDE SERPL-SCNC: 107 MMOL/L — SIGNIFICANT CHANGE UP (ref 96–108)
CO2 SERPL-SCNC: 28 MMOL/L — SIGNIFICANT CHANGE UP (ref 22–31)
CREAT SERPL-MCNC: 0.55 MG/DL — SIGNIFICANT CHANGE UP (ref 0.5–1.3)
GLUCOSE SERPL-MCNC: 87 MG/DL — SIGNIFICANT CHANGE UP (ref 70–99)
HCT VFR BLD CALC: 39.2 % — SIGNIFICANT CHANGE UP (ref 34.5–45)
HGB BLD-MCNC: 13 G/DL — SIGNIFICANT CHANGE UP (ref 11.5–15.5)
MCHC RBC-ENTMCNC: 31.1 PG — SIGNIFICANT CHANGE UP (ref 27–34)
MCHC RBC-ENTMCNC: 33.2 GM/DL — SIGNIFICANT CHANGE UP (ref 32–36)
MCV RBC AUTO: 93.8 FL — SIGNIFICANT CHANGE UP (ref 80–100)
PLATELET # BLD AUTO: 120 K/UL — LOW (ref 150–400)
POTASSIUM SERPL-MCNC: 3.7 MMOL/L — SIGNIFICANT CHANGE UP (ref 3.5–5.3)
POTASSIUM SERPL-SCNC: 3.7 MMOL/L — SIGNIFICANT CHANGE UP (ref 3.5–5.3)
RBC # BLD: 4.18 M/UL — SIGNIFICANT CHANGE UP (ref 3.8–5.2)
RBC # FLD: 14.1 % — SIGNIFICANT CHANGE UP (ref 10.3–14.5)
SODIUM SERPL-SCNC: 140 MMOL/L — SIGNIFICANT CHANGE UP (ref 135–145)
WBC # BLD: 4.68 K/UL — SIGNIFICANT CHANGE UP (ref 3.8–10.5)
WBC # FLD AUTO: 4.68 K/UL — SIGNIFICANT CHANGE UP (ref 3.8–10.5)

## 2020-06-19 PROCEDURE — 99233 SBSQ HOSP IP/OBS HIGH 50: CPT

## 2020-06-19 PROCEDURE — 99024 POSTOP FOLLOW-UP VISIT: CPT

## 2020-06-19 RX ORDER — OXYCODONE HYDROCHLORIDE 5 MG/1
1 TABLET ORAL
Qty: 20 | Refills: 0
Start: 2020-06-19 | End: 2020-06-23

## 2020-06-19 RX ADMIN — APIXABAN 2.5 MILLIGRAM(S): 2.5 TABLET, FILM COATED ORAL at 06:01

## 2020-06-19 RX ADMIN — Medication 0.12 MILLIGRAM(S): at 06:01

## 2020-06-19 RX ADMIN — Medication 20 MILLIGRAM(S): at 06:01

## 2020-06-19 RX ADMIN — Medication 100 MILLIGRAM(S): at 06:49

## 2020-06-19 NOTE — DISCHARGE NOTE PROVIDER - CARE PROVIDER_API CALL
Karen William  SURGERY  [address] [place] [address]  Phone: [phone]  Fax: [phone]  Follow Up Time: 2 weeks    Primary Medical Doctor,   Phone: (   )    -  Fax: (   )    -  Follow Up Time: 1 week

## 2020-06-19 NOTE — DISCHARGE NOTE PROVIDER - NSDCACTIVITY_GEN_ALL_CORE
No heavy lifting/straining/Do not drive or operate machinery/Stairs allowed/Walking - Outdoors allowed/Showering allowed/Do not make important decisions/Walking - Indoors allowed

## 2020-06-19 NOTE — DISCHARGE NOTE PROVIDER - PROVIDER TOKENS
PROVIDER:[TOKEN:[[id number]],FOLLOWUP:[2 weeks]],FREE:[LAST:[Primary Medical Doctor],PHONE:[(   )    -],FAX:[(   )    -],FOLLOWUP:[1 week]]

## 2020-06-19 NOTE — DISCHARGE NOTE PROVIDER - NSDCMRMEDTOKEN_GEN_ALL_CORE_FT
areds 2: 2 tab(s) orally once a day  bisacodyl 5 mg oral delayed release tablet: 1 tab(s) orally every 12 hours, As needed, Constipation  Centrum oral tablet: 1 tab(s) orally once a day  digoxin 125 mcg (0.125 mg) oral tablet: 1 tab(s) orally once a day  digoxin 250 mcg (0.25 mg) oral tablet: 1 tab(s) orally once a day  Eliquis 2.5 mg oral tablet: 1 tab(s) orally 2 times a day  metoprolol tartrate 100 mg oral tablet: 1 tab(s) orally 2 times a day  oxyCODONE 5 mg oral tablet: 1 tab(s) orally every 6 hours, As Needed -for moderate pain MDD:4 tabs   predniSONE: 12.5  orally once a day  simvastatin 10 mg oral tablet: 1 tab(s) orally once a day (at bedtime)  Tylenol 500 mg oral tablet: 2 tab(s) orally , As Needed

## 2020-06-19 NOTE — DISCHARGE NOTE PROVIDER - NSDCHC_MEDRECSTATUS_GEN_ALL_CORE
I have contacted patient to schedule dm eye exam. Patient stated that she will schedule her appointment with Cris Lemus's at the [facility] in the next month.   
Admission Reconciliation is Completed  Discharge Reconciliation is Completed

## 2020-06-19 NOTE — PROGRESS NOTE ADULT - ASSESSMENT
Patient is a 85y old  Female who presents with a chief complaint of     HPI:  86 y/o female with hx of A-fib, HTN, HLD, Melanoma, Med-induced Adrenal insufficiency, L inguinal hernia, and around 2pm, pt felt the hernia pop out with some pain associated in abdomen. No other sx. Pt tried to push it back in but was not successful. Has an appointment scheduled with Dr. William on 06/29 for surgery but was unable to contact GI today so went to ED. Denies any fever, chills, CP, SOB, N/V, constipation or diarrhea.  Pt also has a hx of metastatic lymphoma, on chemo, AFib on Eliquis and Adrenal insufficiency on prednisone 10 mg daily ,had left groin hernia repair in January with Dr William. Pt had recent abdominal pain with left groin swelling after yard work in 05/2020.Came to ED at that time and Dr William was able to reduce it in ED. Pt also has a hx of left lymph node dissection for melanoma bx 2 years ago. (14 Jun 2020 22:07)  6/15: no nausea, no fever, not  GI function, mild right groin pain,   ROS:.  [X] A ten-point review of systems was otherwise negative except as noted.  Systemic:	[ ] Fever	[ ] Chills	[ ] Night sweats    [ ] Fatigue	[ ] Other  [] Cardiovascular:  [] Pulmonary:  [] Renal/Urologic:  [] Gastrointestinal: abdominal pain, vomiting  [] Metabolic:  [] Neurologic:  [] Hematologic:  [] ENT:  [] Ophthalmologic:  [] Musculoskeletal:    [ ] Due to altered mental status/intubation, subjective information were not able to be obtained from the patient. History was obtained, to the extent possible, from review of the chart and collateral sources of information.    All other system review is negative .  PAST MEDICAL & SURGICAL HISTORY:  HLD (hyperlipidemia)  HTN (hypertension)  Pacemaker  Atrial fibrillation  History of melanoma excision  H/O cataract extraction: both eyes  S/P tonsillectomy and adenoidectomy    FAMILY HISTORY:  No pertinent family history in first degree relatives    Social History:     Alcohol: Denied  Smoking: Denied  Drug Use: Denied        Vital Signs Last 24 Hrs  T(C): 37.9 (15 Magnus 2020 09:04), Max: 37.9 (15 Magnus 2020 09:04)  T(F): 100.2 (15 Magnus 2020 09:04), Max: 100.2 (15 Magnus 2020 09:04)  HR: 89 (15 Magnus 2020 08:00) (80 - 103)  BP: 132/57 (15 Magnus 2020 08:00) (125/72 - 171/92)  BP(mean): 77 (15 Magnus 2020 08:00) (75 - 125)  RR: 24 (15 Magnus 2020 08:00) (16 - 24)  SpO2: 95% (15 Magnus 2020 08:00) (95% - 100%)    I&O's Summary    14 Jun 2020 07:01  -  15 Magnus 2020 07:00  --------------------------------------------------------  IN: 0 mL / OUT: 200 mL / NET: -200 mL        PHYSICAL EXAM:  Constitutional: NAD, GCS: 15/15  AOX3  Eyes:  WNL  ENMT:  WNL  Neck:  WNL, non tender  Back: Non tender  Respiratory: CTABL  Cardiovascular:  S1+S2+0  Gastrointestinal: Soft, distended, NT, right groin lump partially reduced moderate pain.  Genitourinary:  WNL  Extremities: NV intact  Vascular:  Intact  Neurological: No focal neurological deficit,  CN, motor and sensory system grossly intact.  Skin: WNL  Musculoskeletal: WNL  Psychiatric: Grossly WNL        Labs:                          13.2   7.97  )-----------( 116      ( 15 Magnus 2020 06:54 )             41.5       06-15    137  |  102  |  23  ----------------------------<  123<H>  3.8   |  28  |  0.66    Ca    8.6      15 Magnus 2020 06:54  Phos  3.0     06-15  Mg     1.8     06-15    TPro  6.8  /  Alb  3.7  /  TBili  0.7  /  DBili  x   /  AST  26  /  ALT  44  /  AlkPhos  40  06-14      PT/INR - ( 14 Jun 2020 18:10 )   PT: 12.7 sec;   INR: 1.14 ratio         PTT - ( 14 Jun 2020 18:10 )  PTT:29.5 sec    < from: CT Abdomen and Pelvis w/ IV Cont (06.14.20 @ 20:16) >  IMPRESSION:    High-grade small bowel obstruction related to an obstructed knuckle of small bowel in the left inguinal hernia. Fluid within the hernia sac is concerning for incarcerated bowel . Mild to moderate mesenteric edema and interloop fluid.  No pneumatosis, bowel thickening, abnormal bowel enhancement, free air, or focal collection at this time.     Findings discussed with MIRIAM Monahan at 9 pm on 6/14/2020 with RBV.    Diverticulosis.        < end of copied text >
Pt explained the surgical procedures in both medical terminology and in lay terms that the patient understood,  exploratory laparotomy possible bowel resection, possible ostomy. Repair of left inguinal hernia.    the benefits and alternatives of surgery including non-operative management. Pt explained at length in both  medical terminology and in lay terms that the patient understood, the associated risks of surgery including but not limited to infection,  bleeding, nerve/organ injury, post operative pain, poor wound healing, scar formation both internally and externally, risk of seroma/  hematoma/abcess formation, risk of hernia development, risk of  anastomotic leak or breakdown, risk of need for further GI/IR/Surgery intervention as required. Risk of recurrence, pt on Eliquis high risk of bleeding. Pt explained in both medical terminology and in lay terms that the patient understood, the HIGH associated yan and post operative risks of cardiac/cns/respiratory insult or injury, risk of death. Pt understood all of the above. All questions were answered. Pt gave informed consent for surgery.
85 F with hx of left inguinal hernia repair in 01/2020, presents with left groin pain, currently on chemo, prednisone and eliquis for multiple comorbidities    -NPO  -Continue NGT to LWS  -Monitor I's/O's  -IVF hydration  -hold ELiquis off for 24 today, continue to hold  -DVT/GI ppx  -FU Hospitalist recs  -Plan for surgery possibly in am due to high grade SBO  -Medical optimization for surgery needed    Will discuss plan with Dr. William, surgery attending
85 y.o. female with PMHx of Afib on Digoxin and Eliquis, HLD, melanoma on chemo, Adrenal Insufficiency on Prednisone, presented with groin pain, and incarcerated bowel s/p OR repair, with surgery.    1. Incarcerated hernia s/p repair POD#1 - pain control, NGT to LWS, IV hydration   - add chewing gum to restart peristalsis    2. Afib with RVR - IV digoxin, IV metoprolol, change to po when diet advanced   - resume eliquis when cleared by surgery    3. Hx of adrenal insufficiency on prednisone - now on IV hydrocortisone daily - appropriate dosing is BID or TID - will change    4. VTE proph - UFH until switched to eliquis
85 y.o. female with PMHx of Afib on Digoxin and Eliquis, HLD, melanoma on chemo, Adrenal Insufficiency on Prednisone, presented with groin pain, and incarcerated bowel s/p OR repair, with surgery.    1. Incarcerated hernia s/p repair POD#2 - pain control, NGT clamped, IV hydration   - added chewing gum to restart peristalsis - + BS    2. Afib with RVR - IV digoxin, IV metoprolol, change to po when diet advanced   - resume eliquis when cleared by surgery    3. Hx of adrenal insufficiency on prednisone - now on IV hydrocortisone daily - appropriate dosing is BID or TID - will change    4. VTE proph - UFH until switched to eliquis
85 y.o. female with PMHx of Afib on Digoxin and Eliquis, HLD, melanoma on chemo, Adrenal Insufficiency on Prednisone, presented with groin pain, and incarcerated bowel s/p OR repair, with surgery.    1. Incarcerated hernia s/p repair POD#3 - pain control, NGT removed, IV hydration stopped as tolerates po     2. Afib with RVR - changed to po metoprolol and digoxin (she was off dig since may)   - resumed eliquis     3. Hx of adrenal insufficiency on prednisone - changed from hydrocortizone to prednisone 20 mg and plan to DC on 15 mg - discussed with Dr. Gaines    4. VTE proph - UFH switched to eliquis
85 y.o. female with PMHx of Afib on Digoxin and Eliquis, HLD, melanoma on chemo, Adrenal Insufficiency on Prednisone, presented with groin pain, and incarcerated bowel s/p OR repair, with surgery.    1. Incarcerated hernia s/p repair POD#4 - tolerates diet, DC home as per surgical team     2. Afib with RVR - changed to po metoprolol and digoxin (she was off dig since may)   - resumed eliquis    - cont metoprolol on DC and may stop digoxin    3. Hx of adrenal insufficiency on prednisone due to immunotx for melanoma - changed from hydrocortizone to prednisone 20 mg and plan to DC on 15 mg - discussed with Dr. Gaines    4. VTE proph - UFH switched to eliquis
A/P:  S/P ex-lap, small bowel resection  S/P femoral hernia repair  NGT clamped this am  NPO, IV hydration  GI/DVT prophylaxis  Pain control  Incentive spirometry  Serial abd exams  F/U labs  Monitor bowel function return  Ambulation with PT  Cont current care and meds      Case discussed with Dr Drivre
A/P:  S/P ex-lap, small bowel resection  S/P femoral hernia repair  NGT dc  advance diet as tolerated  GI/DVT prophylaxis  Pain control  Incentive spirometry  Serial abd exams  F/U labs  Monitor bowel function return  Ambulation with PT  Cont current care and meds      Case discussed with Dr Driver
A/P:  S/P ex-lap, small bowel resection  S/P femoral hernia repair  NGT decompression  NPO, IV hydration  GI/DVT prophylaxis  Pain control  Incentive spirometry  Serial abd exams  F/U labs  Monitor bowel function return  Ambulation with PT  Cont current care and meds  Pt aware of and agrees with all of the above
Assessment:  86 YO F s/p Exlap, small bowel resection and repair of left femoral hernia    Plan:  Pain control  Will advance diet as tolerated  Incentive spirometry  Serial abd exams  Incisions clean and intact  GI/DVT prophylaxis  Ambulation with PT  Dispo: Possible DC if tolerating solid food      Case discussed with Dr Pool
Pt is a 85 year old female found to have incasurated left inguinal hernia s/p exploratory laparotomy open left inguinal hernia repair POD1    -Monitor vitals  -Diet: NPO ADAT  -NGT to LWS poss clamp trial today  -Pain control prn  -Anti emetic prn  -Continue IVF  -Serial abd exams  -Monitor surgical site  -Monitor daily labs   -Encourage oob/ambulation  -Encourage IS use  -Poss restart eliquis today   -DVT/GI ppx    Will discuss with Dr. William , surgery attending

## 2020-06-19 NOTE — PROGRESS NOTE ADULT - SUBJECTIVE AND OBJECTIVE BOX
HPI: 85 y.o. female with PMHx of Afib on Digoxin and Eliquis, HLD, melanoma on chemo, Adrenal Insufficiency on Prednisone, presented with groin pain, and incarcerated bowel s/p OR repair, with surgery. Pt is post op, and reports to be feeling fine, no major complaints. Pt denies fever, chills, CP, SOB, palpitations, HD, dizziness.     Interval HPI: tolerates diet, + BM, rate is controlled, ambulated  Other ROS reviewed and neg     ICU Vital Signs Last 24 Hrs  T(C): 36.6 (19 Jun 2020 11:15), Max: 37.1 (19 Jun 2020 00:02)  T(F): 97.9 (19 Jun 2020 11:15), Max: 98.8 (19 Jun 2020 00:02)  HR: 90 (19 Jun 2020 10:00) (78 - 117)  BP: 115/62 (19 Jun 2020 10:00) (115/62 - 146/72)  BP(mean): 74 (19 Jun 2020 10:00) (74 - 107)  RR: 21 (19 Jun 2020 10:00) (15 - 26)  SpO2: 96% (19 Jun 2020 10:00) (92% - 98%)                        13.0   4.68  )-----------( 120      ( 19 Jun 2020 06:56 )             39.2     19 Jun 2020 06:56    140    |  107    |  16     ----------------------------<  87     3.7     |  28     |  0.55     Ca    8.6        19 Jun 2020 06:56    MEDICATIONS  (STANDING):  apixaban 2.5 milliGRAM(s) Oral two times a day  digoxin     Tablet 0.125 milliGRAM(s) Oral daily  metoprolol tartrate 100 milliGRAM(s) Oral every 12 hours  predniSONE   Tablet 20 milliGRAM(s) Oral daily    MEDICATIONS  (PRN):  morphine  - Injectable 2 milliGRAM(s) IV Push every 4 hours PRN Moderate Pain (4 - 6)    PHYSICAL EXAM:    General: elderly female in no acute distress  Eyes: PERRLA, EOMI; conjunctiva and sclera clear  Head: Normocephalic; atraumatic  ENMT: No nasal discharge; airway clear  Neck: Supple; non tender; no masses  Respiratory: No wheezes, rales or rhonchi  Cardiovascular: S1, S2 irreg  Gastrointestinal: Soft minimally distended abd with + bowel sounds  Genitourinary: No costovertebral angle tenderness  Extremities: No clubbing, cyanosis or edema  Vascular: Peripheral pulses palpable 2+ bilaterally  Neurological: Alert and oriented x4  Skin: Warm and dry.   Musculoskeletal: Normal tone, without deformities  Psychiatric: Cooperative and appropriate

## 2020-06-19 NOTE — DISCHARGE NOTE NURSING/CASE MANAGEMENT/SOCIAL WORK - PATIENT PORTAL LINK FT
You can access the FollowMyHealth Patient Portal offered by [facility] by registering at the following website: [email or web address]. By joining [employer]’s FollowMyHealth portal, you will also be able to view your health information using other applications (apps) compatible with our system.

## 2020-06-19 NOTE — DISCHARGE NOTE PROVIDER - NSDCFUSCHEDAPPT_GEN_ALL_CORE_FT
SHARON HULL ; 06/22/2020 ; HNT PreAdmits  SHARON HULL ; 06/25/2020 ; HNT PreAdmits  SHARON HULL ; 06/25/2020 ; NPP IntMed Surg [address]  SHARON HULL ; 07/02/2020 ; NPP [facility]  SHARON HULL ; 07/14/2020 ; NPP Endocrin [address]

## 2020-06-19 NOTE — DISCHARGE NOTE PROVIDER - HOSPITAL COURSE
Pt s/p exploratory laparotomy, left femoral hernia repair, small bowel resection. Pt tolerating diet with resumption of bowel function. Pt stable throughout hospital stay and admission

## 2020-06-19 NOTE — DISCHARGE NOTE PROVIDER - NSDCCPCAREPLAN_GEN_ALL_CORE_FT
PRINCIPAL DISCHARGE DIAGNOSIS  Diagnosis: Incarcerated hernia  Assessment and Plan of Treatment:       SECONDARY DISCHARGE DIAGNOSES  Diagnosis: Small bowel obstruction  Assessment and Plan of Treatment:

## 2020-06-19 NOTE — DISCHARGE NOTE PROVIDER - NSDCFUADDINST_GEN_ALL_CORE_FT
Take all appropriate fall risk precautions  Please seek immediate medical attention for fever>100.4, chest pain, worsening abdominal pain, shortness of breath, any adverse changes to health

## 2020-06-19 NOTE — PROGRESS NOTE ADULT - SUBJECTIVE AND OBJECTIVE BOX
Pt seen and examined at bedside, no acute events. Pt had no complaints. Tolerating liquid diet. Continues to have bowel function. Denied fever, chills, nausea, vomiting or SOB overnight.     Vital Signs Last 24 Hrs  T(C): 36.5 (19 Jun 2020 05:51), Max: 37.3 (18 Jun 2020 11:56)  T(F): 97.7 (19 Jun 2020 05:51), Max: 99.1 (18 Jun 2020 11:56)  HR: 80 (19 Jun 2020 08:00) (78 - 117)  BP: 118/90 (19 Jun 2020 08:00) (118/90 - 159/77)  BP(mean): 96 (19 Jun 2020 08:00) (75 - 107)  RR: 22 (19 Jun 2020 08:00) (15 - 26)  SpO2: 97% (19 Jun 2020 08:00) (92% - 98%)      General: NAD,   Neck: Supple  Chest: Equal expansion bilaterally, equal breath sounds  CV: S1S2 Present  Abdomen: Soft, distended, incisional tender, non-tympanic, bowel sounds , Staples intact, abdominal binder in place  Extremities: Grossly symmetric       Labs:               13.0                        140  | 28   | 16           4.68  >-----------< 120<L>   ------------------------< 87                     39.2                         3.7  | 107  | 0.55                                       Ca 8.6   Mg x     Ph x        Meds:  digoxin  Injectable 0.1 milliGRAM(s) IV Push daily  heparin   Injectable 5000 Unit(s) SubCutaneous every 8 hours  hydrocortisone sodium succinate Injectable 50 milliGRAM(s) IV Push daily  metoprolol tartrate Injectable 5 milliGRAM(s) IV Push every 6 hours  morphine  - Injectable 2 milliGRAM(s) IV Push every 4 hours PRN  sodium chloride 0.9%. 1000 milliLiter(s) IV Continuous <Continuous>

## 2020-06-20 LAB
CULTURE RESULTS: SIGNIFICANT CHANGE UP
SPECIMEN SOURCE: SIGNIFICANT CHANGE UP

## 2020-06-24 ENCOUNTER — OUTPATIENT (OUTPATIENT)
Dept: OUTPATIENT SERVICES | Facility: HOSPITAL | Age: 85
LOS: 1 days | Discharge: HOME | End: 2020-06-24

## 2020-06-24 DIAGNOSIS — Z90.89 ACQUIRED ABSENCE OF OTHER ORGANS: Chronic | ICD-10-CM

## 2020-06-24 DIAGNOSIS — Z98.890 OTHER SPECIFIED POSTPROCEDURAL STATES: Chronic | ICD-10-CM

## 2020-06-24 DIAGNOSIS — C43.72 MALIGNANT MELANOMA OF LEFT LOWER LIMB, INCLUDING HIP: ICD-10-CM

## 2020-06-24 DIAGNOSIS — Z98.49 CATARACT EXTRACTION STATUS, UNSPECIFIED EYE: Chronic | ICD-10-CM

## 2020-06-25 ENCOUNTER — RESULT REVIEW (OUTPATIENT)
Age: 85
End: 2020-06-25

## 2020-06-25 ENCOUNTER — APPOINTMENT (OUTPATIENT)
Age: 85
End: 2020-06-25

## 2020-06-25 ENCOUNTER — APPOINTMENT (OUTPATIENT)
Dept: SURGERY | Facility: HOSPITAL | Age: 85
End: 2020-06-25

## 2020-06-25 VITALS
BODY MASS INDEX: 22.16 KG/M2 | WEIGHT: 133 LBS | HEIGHT: 65 IN | SYSTOLIC BLOOD PRESSURE: 116 MMHG | DIASTOLIC BLOOD PRESSURE: 70 MMHG | TEMPERATURE: 96.6 F | RESPIRATION RATE: 16 BRPM | HEART RATE: 85 BPM

## 2020-06-25 LAB
ALBUMIN SERPL ELPH-MCNC: 4.4 G/DL — SIGNIFICANT CHANGE UP (ref 3.3–5)
ALP SERPL-CCNC: 38 U/L — LOW (ref 40–120)
ALT FLD-CCNC: 43 U/L — SIGNIFICANT CHANGE UP (ref 10–45)
ANION GAP SERPL CALC-SCNC: 13 MMOL/L — SIGNIFICANT CHANGE UP (ref 5–17)
AST SERPL-CCNC: 21 U/L — SIGNIFICANT CHANGE UP (ref 10–40)
BASOPHILS # BLD AUTO: 0.04 K/UL — SIGNIFICANT CHANGE UP (ref 0–0.2)
BASOPHILS NFR BLD AUTO: 0.6 % — SIGNIFICANT CHANGE UP (ref 0–2)
BILIRUB SERPL-MCNC: 0.3 MG/DL — SIGNIFICANT CHANGE UP (ref 0.2–1.2)
BUN SERPL-MCNC: 36 MG/DL — HIGH (ref 7–23)
CALCIUM SERPL-MCNC: 9.5 MG/DL — SIGNIFICANT CHANGE UP (ref 8.4–10.5)
CHLORIDE SERPL-SCNC: 100 MMOL/L — SIGNIFICANT CHANGE UP (ref 96–108)
CO2 SERPL-SCNC: 28 MMOL/L — SIGNIFICANT CHANGE UP (ref 22–31)
CREAT SERPL-MCNC: 0.81 MG/DL — SIGNIFICANT CHANGE UP (ref 0.5–1.3)
EOSINOPHIL # BLD AUTO: 0.04 K/UL — SIGNIFICANT CHANGE UP (ref 0–0.5)
EOSINOPHIL NFR BLD AUTO: 0.6 % — SIGNIFICANT CHANGE UP (ref 0–6)
GLUCOSE SERPL-MCNC: 147 MG/DL — HIGH (ref 70–99)
HCT VFR BLD CALC: 42.6 % — SIGNIFICANT CHANGE UP (ref 34.5–45)
HGB BLD-MCNC: 13.6 G/DL — SIGNIFICANT CHANGE UP (ref 11.5–15.5)
IMM GRANULOCYTES NFR BLD AUTO: 0.9 % — SIGNIFICANT CHANGE UP (ref 0–1.5)
LDH SERPL L TO P-CCNC: 262 U/L — HIGH (ref 50–242)
LYMPHOCYTES # BLD AUTO: 0.85 K/UL — LOW (ref 1–3.3)
LYMPHOCYTES # BLD AUTO: 12.7 % — LOW (ref 13–44)
MCHC RBC-ENTMCNC: 30.4 PG — SIGNIFICANT CHANGE UP (ref 27–34)
MCHC RBC-ENTMCNC: 31.9 GM/DL — LOW (ref 32–36)
MCV RBC AUTO: 95.1 FL — SIGNIFICANT CHANGE UP (ref 80–100)
MONOCYTES # BLD AUTO: 0.23 K/UL — SIGNIFICANT CHANGE UP (ref 0–0.9)
MONOCYTES NFR BLD AUTO: 3.4 % — SIGNIFICANT CHANGE UP (ref 2–14)
NEUTROPHILS # BLD AUTO: 5.49 K/UL — SIGNIFICANT CHANGE UP (ref 1.8–7.4)
NEUTROPHILS NFR BLD AUTO: 81.8 % — HIGH (ref 43–77)
NRBC # BLD: 0 /100 WBCS — SIGNIFICANT CHANGE UP (ref 0–0)
PLATELET # BLD AUTO: 202 K/UL — SIGNIFICANT CHANGE UP (ref 150–400)
POTASSIUM SERPL-MCNC: 4.1 MMOL/L — SIGNIFICANT CHANGE UP (ref 3.5–5.3)
POTASSIUM SERPL-SCNC: 4.1 MMOL/L — SIGNIFICANT CHANGE UP (ref 3.5–5.3)
PROT SERPL-MCNC: 6.3 G/DL — SIGNIFICANT CHANGE UP (ref 6–8.3)
RBC # BLD: 4.48 M/UL — SIGNIFICANT CHANGE UP (ref 3.8–5.2)
RBC # FLD: 14.4 % — SIGNIFICANT CHANGE UP (ref 10.3–14.5)
SODIUM SERPL-SCNC: 141 MMOL/L — SIGNIFICANT CHANGE UP (ref 135–145)
WBC # BLD: 6.71 K/UL — SIGNIFICANT CHANGE UP (ref 3.8–10.5)
WBC # FLD AUTO: 6.71 K/UL — SIGNIFICANT CHANGE UP (ref 3.8–10.5)

## 2020-06-25 RX ORDER — DIGOXIN 250 MCG
1 TABLET ORAL
Qty: 0 | Refills: 0 | DISCHARGE

## 2020-06-26 DIAGNOSIS — Z51.11 ENCOUNTER FOR ANTINEOPLASTIC CHEMOTHERAPY: ICD-10-CM

## 2020-07-02 ENCOUNTER — APPOINTMENT (OUTPATIENT)
Dept: SURGERY | Facility: CLINIC | Age: 85
End: 2020-07-02
Payer: MEDICARE

## 2020-07-02 VITALS
OXYGEN SATURATION: 99 % | TEMPERATURE: 97.6 F | BODY MASS INDEX: 22.16 KG/M2 | WEIGHT: 133 LBS | HEART RATE: 81 BPM | HEIGHT: 65 IN | SYSTOLIC BLOOD PRESSURE: 135 MMHG | DIASTOLIC BLOOD PRESSURE: 83 MMHG

## 2020-07-02 DIAGNOSIS — Z98.890 OTHER SPECIFIED POSTPROCEDURAL STATES: ICD-10-CM

## 2020-07-02 PROCEDURE — 99024 POSTOP FOLLOW-UP VISIT: CPT

## 2020-07-02 NOTE — PLAN
[FreeTextEntry1] : Doing well, staples removed, steri strips applied, \par Follow up in 6 month \par Pt instructed to call if any pain, incisional discharge.

## 2020-07-02 NOTE — REASON FOR VISIT
[Post Op: _________] : a [unfilled] post op visit [FreeTextEntry1] : S/P ex lap, SBR, left groin hernia repair.

## 2020-07-02 NOTE — HISTORY OF PRESENT ILLNESS
[de-identified] : Doing well, tolerating diet, no nausea, vomiting, fever. regular GI function, off an on stool softener.

## 2020-07-14 ENCOUNTER — APPOINTMENT (OUTPATIENT)
Dept: ENDOCRINOLOGY | Facility: CLINIC | Age: 85
End: 2020-07-14
Payer: MEDICARE

## 2020-07-16 ENCOUNTER — APPOINTMENT (OUTPATIENT)
Dept: ENDOCRINOLOGY | Facility: CLINIC | Age: 85
End: 2020-07-16
Payer: MEDICARE

## 2020-07-16 PROCEDURE — 99214 OFFICE O/P EST MOD 30 MIN: CPT | Mod: 95

## 2020-07-16 NOTE — PHYSICAL EXAM
[No Acute Distress] : no acute distress [Well Nourished] : well nourished [Alert] : alert [Oriented x3] : oriented to person, place, and time [Normal Insight/Judgement] : insight and judgment were intact [Normal Affect] : the affect was normal [Normal Mood] : the mood was normal

## 2020-07-18 DIAGNOSIS — Z11.59 ENCOUNTER FOR SCREENING FOR OTHER VIRAL DISEASES: ICD-10-CM

## 2020-07-20 ENCOUNTER — LABORATORY RESULT (OUTPATIENT)
Age: 85
End: 2020-07-20

## 2020-07-20 ENCOUNTER — APPOINTMENT (OUTPATIENT)
Age: 85
End: 2020-07-20

## 2020-07-20 ENCOUNTER — OUTPATIENT (OUTPATIENT)
Dept: OUTPATIENT SERVICES | Facility: HOSPITAL | Age: 85
LOS: 1 days | Discharge: HOME | End: 2020-07-20

## 2020-07-20 DIAGNOSIS — C43.72 MALIGNANT MELANOMA OF LEFT LOWER LIMB, INCLUDING HIP: ICD-10-CM

## 2020-07-20 DIAGNOSIS — Z90.89 ACQUIRED ABSENCE OF OTHER ORGANS: Chronic | ICD-10-CM

## 2020-07-20 DIAGNOSIS — Z98.49 CATARACT EXTRACTION STATUS, UNSPECIFIED EYE: Chronic | ICD-10-CM

## 2020-07-20 DIAGNOSIS — Z98.890 OTHER SPECIFIED POSTPROCEDURAL STATES: Chronic | ICD-10-CM

## 2020-07-23 ENCOUNTER — APPOINTMENT (OUTPATIENT)
Age: 85
End: 2020-07-23

## 2020-07-23 ENCOUNTER — RESULT REVIEW (OUTPATIENT)
Age: 85
End: 2020-07-23

## 2020-07-23 ENCOUNTER — APPOINTMENT (OUTPATIENT)
Age: 85
End: 2020-07-23
Payer: MEDICARE

## 2020-07-23 VITALS
SYSTOLIC BLOOD PRESSURE: 119 MMHG | BODY MASS INDEX: 22.99 KG/M2 | HEIGHT: 65 IN | RESPIRATION RATE: 16 BRPM | DIASTOLIC BLOOD PRESSURE: 74 MMHG | WEIGHT: 138 LBS | TEMPERATURE: 97.3 F | HEART RATE: 81 BPM

## 2020-07-23 VITALS
WEIGHT: 138 LBS | HEIGHT: 65 IN | BODY MASS INDEX: 22.99 KG/M2 | DIASTOLIC BLOOD PRESSURE: 74 MMHG | SYSTOLIC BLOOD PRESSURE: 119 MMHG

## 2020-07-23 LAB
BASOPHILS # BLD AUTO: 0.03 K/UL — SIGNIFICANT CHANGE UP (ref 0–0.2)
BASOPHILS NFR BLD AUTO: 0.4 % — SIGNIFICANT CHANGE UP (ref 0–2)
EOSINOPHIL # BLD AUTO: 0.01 K/UL — SIGNIFICANT CHANGE UP (ref 0–0.5)
EOSINOPHIL NFR BLD AUTO: 0.1 % — SIGNIFICANT CHANGE UP (ref 0–6)
HCT VFR BLD CALC: 41.5 % — SIGNIFICANT CHANGE UP (ref 34.5–45)
HGB BLD-MCNC: 12.9 G/DL — SIGNIFICANT CHANGE UP (ref 11.5–15.5)
IMM GRANULOCYTES NFR BLD AUTO: 0.4 % — SIGNIFICANT CHANGE UP (ref 0–1.5)
LYMPHOCYTES # BLD AUTO: 0.75 K/UL — LOW (ref 1–3.3)
LYMPHOCYTES # BLD AUTO: 10.4 % — LOW (ref 13–44)
MCHC RBC-ENTMCNC: 29.7 PG — SIGNIFICANT CHANGE UP (ref 27–34)
MCHC RBC-ENTMCNC: 31.1 GM/DL — LOW (ref 32–36)
MCV RBC AUTO: 95.6 FL — SIGNIFICANT CHANGE UP (ref 80–100)
MONOCYTES # BLD AUTO: 0.14 K/UL — SIGNIFICANT CHANGE UP (ref 0–0.9)
MONOCYTES NFR BLD AUTO: 1.9 % — LOW (ref 2–14)
NEUTROPHILS # BLD AUTO: 6.28 K/UL — SIGNIFICANT CHANGE UP (ref 1.8–7.4)
NEUTROPHILS NFR BLD AUTO: 86.8 % — HIGH (ref 43–77)
NRBC # BLD: 0 /100 WBCS — SIGNIFICANT CHANGE UP (ref 0–0)
PLATELET # BLD AUTO: 146 K/UL — LOW (ref 150–400)
RBC # BLD: 4.34 M/UL — SIGNIFICANT CHANGE UP (ref 3.8–5.2)
RBC # FLD: 14.7 % — HIGH (ref 10.3–14.5)
WBC # BLD: 7.24 K/UL — SIGNIFICANT CHANGE UP (ref 3.8–10.5)
WBC # FLD AUTO: 7.24 K/UL — SIGNIFICANT CHANGE UP (ref 3.8–10.5)

## 2020-07-23 PROCEDURE — 99214 OFFICE O/P EST MOD 30 MIN: CPT

## 2020-07-23 RX ORDER — DIGOXIN 125 UG/1
125 TABLET ORAL DAILY
Refills: 0 | Status: DISCONTINUED | COMMUNITY
Start: 2019-11-05 | End: 2020-07-23

## 2020-07-23 NOTE — HISTORY OF PRESENT ILLNESS
[T: ___] : T[unfilled] [Disease: _____________________] : Disease: [unfilled] [M: ___] : M[unfilled] [N: ___] : N[unfilled] [AJCC Stage: ____] : AJCC Stage: [unfilled] [de-identified] : Malignant melanoma [de-identified] : 84 F with HTN, PPM, atrial fibrillation ( Dr. 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.\par \par CASE SYNOPSIS:\par May 2019- patient notices a bleeding left heel cutaneous lesion; \par \par 5/1/19- punch biopsy performed by a dermatologist ([facility]) ; pathology:\par - left heel superior: malignant melanoma, 4.2 mm thick with ulcerations, pT4b.\par -left heel inferior: malignant melanoma, 4.2 mm thick, with ulceration, pT4b.\par \par 5/28/19- left heel wide resection and sentinel lymph node biopsy (); pathology:\par -Left inguinal lymph node biopsy: 2/4 lymph nodes positive for melanoma\par -Skin and soft tissue, left heel foot, consistent with melanoma, 3.5 mm thickness, margins negative, pT4b, pN 2a= stage III C\par \par 6/15/19: PET- residual FDG avidity along the surgical defect consistent with postsurgical changes, or residual disease, or combination of the two. No FDG avid local regional or distant metastatic disease. \par \par 7/15/19- started Nivolumab under the care of Dr. Mcbride at [facility].\par \par 8/12/19- cycle # 2 Nivolumab also at [facility].\par \par 10/15/19: Left lower extremity ultrasound-benign appearing left inguinal lymph nodes, containing fatty hilum, measuring 0.9 x 0.8 x 1.3 cm, 1.2 x 0.7 x 0.9 cm, and one 0.5 x 0.8 x 0.9 cm. 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 Probable left groin postoperative collections/seromas.\par \par 6/14/2020–exploratory laparotomy, left femoral hernia repair, small bowel resection (due to incarcerated hernia).\par \par 7/15/19 - 7/22/20: completed 1 year maintenance Nivolumab. [FreeTextEntry1] : Nivolumab- flat dose monthly [de-identified] : Here for short-term follow-up as today she is completing 1 year of maintenance nivolumab for stage III malignant melanoma of lower extremity.  Of note, patient started nivolumab therapy on 7/15/2019 at [facility] under the care of Dr. Mcbride.  Patient has recovered well after recent incarcerated hernia repair on 6/14/2020.  Surgical pathology consistent with mucosal ischemia, vascular congestion, edema and reactive changes in the wall of the segment of small intestine.  There was no evidence of metastatic malignant melanoma.  Patient has recovered well postoperatively and was able to complete last cycle of nivolumab without delays.  Staples removed on 7/12/2020 by Dr. Argueta.  Patient was also followed up monthly by Dr. Gaines (endocrinologist) who has helped with tapering doses of prednisone and treatment for immunotherapy–induced adrenal insufficiency.\par

## 2020-07-23 NOTE — PHYSICAL EXAM
[Restricted in physically strenuous activity but ambulatory and able to carry out work of a light or sedentary nature] : Status 1- Restricted in physically strenuous activity but ambulatory and able to carry out work of a light or sedentary nature, e.g., light house work, office work [Normal] : normal appearance, no rash, nodules, vesicles, ulcers, erythema [de-identified] : Left lower extremity edema 2+ [de-identified] : wearing compressive stockings [de-identified] : + lymphedema left leg much improved [de-identified] : s/p left inguinal hernia repair [de-identified] : left heel wound  healed, hypopigmented skin

## 2020-07-24 DIAGNOSIS — Z51.11 ENCOUNTER FOR ANTINEOPLASTIC CHEMOTHERAPY: ICD-10-CM

## 2020-07-24 LAB
ALBUMIN SERPL ELPH-MCNC: 4.3 G/DL — SIGNIFICANT CHANGE UP (ref 3.3–5)
ALP SERPL-CCNC: 38 U/L — LOW (ref 40–120)
ALT FLD-CCNC: 26 U/L — SIGNIFICANT CHANGE UP (ref 10–45)
ANION GAP SERPL CALC-SCNC: 14 MMOL/L — SIGNIFICANT CHANGE UP (ref 5–17)
AST SERPL-CCNC: 24 U/L — SIGNIFICANT CHANGE UP (ref 10–40)
BILIRUB SERPL-MCNC: 0.4 MG/DL — SIGNIFICANT CHANGE UP (ref 0.2–1.2)
BUN SERPL-MCNC: 28 MG/DL — HIGH (ref 7–23)
CALCIUM SERPL-MCNC: 9.5 MG/DL — SIGNIFICANT CHANGE UP (ref 8.4–10.5)
CHLORIDE SERPL-SCNC: 102 MMOL/L — SIGNIFICANT CHANGE UP (ref 96–108)
CO2 SERPL-SCNC: 25 MMOL/L — SIGNIFICANT CHANGE UP (ref 22–31)
CREAT SERPL-MCNC: 0.8 MG/DL — SIGNIFICANT CHANGE UP (ref 0.5–1.3)
GLUCOSE SERPL-MCNC: 180 MG/DL — HIGH (ref 70–99)
POTASSIUM SERPL-MCNC: 4.5 MMOL/L — SIGNIFICANT CHANGE UP (ref 3.5–5.3)
POTASSIUM SERPL-SCNC: 4.5 MMOL/L — SIGNIFICANT CHANGE UP (ref 3.5–5.3)
PROT SERPL-MCNC: 6.4 G/DL — SIGNIFICANT CHANGE UP (ref 6–8.3)
SODIUM SERPL-SCNC: 142 MMOL/L — SIGNIFICANT CHANGE UP (ref 135–145)
TSH SERPL-MCNC: 0.37 UIU/ML — SIGNIFICANT CHANGE UP (ref 0.27–4.2)

## 2020-07-30 ENCOUNTER — APPOINTMENT (OUTPATIENT)
Dept: SURGERY | Facility: CLINIC | Age: 85
End: 2020-07-30
Payer: MEDICARE

## 2020-07-30 VITALS
DIASTOLIC BLOOD PRESSURE: 84 MMHG | OXYGEN SATURATION: 99 % | TEMPERATURE: 97.7 F | HEART RATE: 94 BPM | SYSTOLIC BLOOD PRESSURE: 123 MMHG

## 2020-07-30 PROCEDURE — 99212 OFFICE O/P EST SF 10 MIN: CPT

## 2020-07-30 NOTE — HISTORY OF PRESENT ILLNESS
[de-identified] : doing well,  pain well controlled, tolerating diet, regular GI function. Finished with Chemo. mild swelling of incision, no redness,no drainage.

## 2020-08-07 NOTE — HISTORY OF PRESENT ILLNESS
[Home] : at home, [unfilled] , at the time of the visit. [Verbal consent obtained from patient] : the patient, [unfilled] [Spouse] : spouse [FreeTextEntry1] : Ms. HULL   is a 84 year year old female who returns today for endocrine reevaluation.  Patient returns with regard to  a history of adrenal insufficiency felt to be brought on by her immunotherapy with regard to her hx of  malignant melanoma.\par She is s/p  emergency  ex lap with small bowel repair and left groin hernia repair on June 15th. Had immunotherapy tx  and is due for another tx next weeks-treatments are now every 28 days.\par Melanoma  said to be stable -Continues on her immunotherapy per Dr. Pastrana . The next tx is the last of the 13th tx.Last round we had increased the hydrocortisone around the time of her treatment then gradually tapered back down to physiologic replacement dose.\par Has tapered the prednisone now at 7.5 mg in am and 2.5 mg in afternoon. We had increased the st to 22.5 mg total  and slowly tapered down and will have to restart at higher dose as she is due for immunotherapy tx next week.\par Denies c/p, sob, dizziness, lightheadedness nausea or vomiting.\par \par

## 2020-08-19 ENCOUNTER — OUTPATIENT (OUTPATIENT)
Dept: OUTPATIENT SERVICES | Facility: HOSPITAL | Age: 85
LOS: 1 days | Discharge: HOME | End: 2020-08-19

## 2020-08-19 DIAGNOSIS — C43.72 MALIGNANT MELANOMA OF LEFT LOWER LIMB, INCLUDING HIP: ICD-10-CM

## 2020-08-19 DIAGNOSIS — Z98.49 CATARACT EXTRACTION STATUS, UNSPECIFIED EYE: Chronic | ICD-10-CM

## 2020-08-19 DIAGNOSIS — Z98.890 OTHER SPECIFIED POSTPROCEDURAL STATES: Chronic | ICD-10-CM

## 2020-08-19 DIAGNOSIS — Z90.89 ACQUIRED ABSENCE OF OTHER ORGANS: Chronic | ICD-10-CM

## 2020-08-20 ENCOUNTER — RESULT REVIEW (OUTPATIENT)
Age: 85
End: 2020-08-20

## 2020-08-20 ENCOUNTER — APPOINTMENT (OUTPATIENT)
Age: 85
End: 2020-08-20
Payer: COMMERCIAL

## 2020-08-20 LAB
BASOPHILS # BLD AUTO: 0.03 K/UL — SIGNIFICANT CHANGE UP (ref 0–0.2)
BASOPHILS NFR BLD AUTO: 0.5 % — SIGNIFICANT CHANGE UP (ref 0–2)
EOSINOPHIL # BLD AUTO: 0.04 K/UL — SIGNIFICANT CHANGE UP (ref 0–0.5)
EOSINOPHIL NFR BLD AUTO: 0.6 % — SIGNIFICANT CHANGE UP (ref 0–6)
HCT VFR BLD CALC: 39.9 % — SIGNIFICANT CHANGE UP (ref 34.5–45)
HGB BLD-MCNC: 12.7 G/DL — SIGNIFICANT CHANGE UP (ref 11.5–15.5)
IMM GRANULOCYTES NFR BLD AUTO: 0.3 % — SIGNIFICANT CHANGE UP (ref 0–1.5)
LYMPHOCYTES # BLD AUTO: 0.94 K/UL — LOW (ref 1–3.3)
LYMPHOCYTES # BLD AUTO: 15 % — SIGNIFICANT CHANGE UP (ref 13–44)
MCHC RBC-ENTMCNC: 30.4 PG — SIGNIFICANT CHANGE UP (ref 27–34)
MCHC RBC-ENTMCNC: 31.8 GM/DL — LOW (ref 32–36)
MCV RBC AUTO: 95.5 FL — SIGNIFICANT CHANGE UP (ref 80–100)
MONOCYTES # BLD AUTO: 0.48 K/UL — SIGNIFICANT CHANGE UP (ref 0–0.9)
MONOCYTES NFR BLD AUTO: 7.6 % — SIGNIFICANT CHANGE UP (ref 2–14)
NEUTROPHILS # BLD AUTO: 4.77 K/UL — SIGNIFICANT CHANGE UP (ref 1.8–7.4)
NEUTROPHILS NFR BLD AUTO: 76 % — SIGNIFICANT CHANGE UP (ref 43–77)
NRBC # BLD: 0 /100 WBCS — SIGNIFICANT CHANGE UP (ref 0–0)
PLATELET # BLD AUTO: 143 K/UL — LOW (ref 150–400)
RBC # BLD: 4.18 M/UL — SIGNIFICANT CHANGE UP (ref 3.8–5.2)
RBC # FLD: 15.4 % — HIGH (ref 10.3–14.5)
WBC # BLD: 6.28 K/UL — SIGNIFICANT CHANGE UP (ref 3.8–10.5)
WBC # FLD AUTO: 6.28 K/UL — SIGNIFICANT CHANGE UP (ref 3.8–10.5)

## 2020-08-20 PROCEDURE — 99499A: CUSTOM | Mod: NC

## 2020-08-21 LAB
ALBUMIN SERPL ELPH-MCNC: 4.2 G/DL — SIGNIFICANT CHANGE UP (ref 3.3–5)
ALP SERPL-CCNC: 37 U/L — LOW (ref 40–120)
ALT FLD-CCNC: 22 U/L — SIGNIFICANT CHANGE UP (ref 10–45)
ANION GAP SERPL CALC-SCNC: 11 MMOL/L — SIGNIFICANT CHANGE UP (ref 5–17)
AST SERPL-CCNC: 16 U/L — SIGNIFICANT CHANGE UP (ref 10–40)
BILIRUB SERPL-MCNC: 0.5 MG/DL — SIGNIFICANT CHANGE UP (ref 0.2–1.2)
BUN SERPL-MCNC: 31 MG/DL — HIGH (ref 7–23)
CALCIUM SERPL-MCNC: 9.3 MG/DL — SIGNIFICANT CHANGE UP (ref 8.4–10.5)
CHLORIDE SERPL-SCNC: 103 MMOL/L — SIGNIFICANT CHANGE UP (ref 96–108)
CO2 SERPL-SCNC: 30 MMOL/L — SIGNIFICANT CHANGE UP (ref 22–31)
CREAT SERPL-MCNC: 0.88 MG/DL — SIGNIFICANT CHANGE UP (ref 0.5–1.3)
GLUCOSE SERPL-MCNC: 97 MG/DL — SIGNIFICANT CHANGE UP (ref 70–99)
LDH SERPL L TO P-CCNC: 229 U/L — SIGNIFICANT CHANGE UP (ref 50–242)
POTASSIUM SERPL-MCNC: 4.6 MMOL/L — SIGNIFICANT CHANGE UP (ref 3.5–5.3)
POTASSIUM SERPL-SCNC: 4.6 MMOL/L — SIGNIFICANT CHANGE UP (ref 3.5–5.3)
PROT SERPL-MCNC: 6 G/DL — SIGNIFICANT CHANGE UP (ref 6–8.3)
SODIUM SERPL-SCNC: 144 MMOL/L — SIGNIFICANT CHANGE UP (ref 135–145)

## 2020-08-26 ENCOUNTER — APPOINTMENT (OUTPATIENT)
Dept: SURGICAL ONCOLOGY | Facility: CLINIC | Age: 85
End: 2020-08-26
Payer: MEDICARE

## 2020-08-26 VITALS
HEIGHT: 65 IN | BODY MASS INDEX: 23.32 KG/M2 | OXYGEN SATURATION: 97 % | DIASTOLIC BLOOD PRESSURE: 97 MMHG | WEIGHT: 140 LBS | RESPIRATION RATE: 15 BRPM | SYSTOLIC BLOOD PRESSURE: 143 MMHG | HEART RATE: 91 BPM

## 2020-08-26 PROCEDURE — 99214 OFFICE O/P EST MOD 30 MIN: CPT

## 2020-08-26 NOTE — ADDENDUM
[FreeTextEntry1] : I, Laura Christianson, acted soley as a scribe for Dr. Perez Russo on this date 08/26/2020.

## 2020-08-26 NOTE — HISTORY OF PRESENT ILLNESS
Physical Therapy Daily Progress Note    Visit #13    Subjective     Joselyn Grant reports: she was able to push the footrest of her recliner in with her left leg. Also went shopping. Sciatica is really bad lately, she feels like that is limiting her more than her knee at this point.       Objective   See Exercise, Manual, and Modality Logs for complete treatment.       Assessment/Plan  Added TrA retraining today, encouraged pt to incorporate this into daily activities to stabilize her low back.  Progress per Plan of Care           Manual Therapy:    0     mins  80012;  Therapeutic Exercise:    20     mins  91710;     Neuromuscular Driss:    15    mins  31286;    Therapeutic Activity:     0     mins  27589;     Gait Trainin     mins  11704;     Ultrasound:     0     mins  71558;    Electrical Stimulation:    15     mins  18800 ( );    Timed Treatment:   43   mins   Total Treatment:     58   mins    Zena Longo PT, DPT  Physical Therapist  [place] License #[id number]                    
[de-identified] : 84 y/o female s/p wide excision left superior heel melanoma with left inguinal sentinel node biopsy on 5/28/19.\par Pathology:\par 1. Left inguinal sentinel lymph nodes, biopsy. Two out of four lymph nodes positive for melanoma (2/4). \par 2. Skin and soft tissue, left heel foot, wide resection. Melanoma 3.5mm in thickness. Margins negative (1mm from the closest deep margin). [id number]\par \par She is s/p I&D of left groin infected seroma (6/26/19).\par Culture: Rare Enterobacter cloaecae\par \par PET/CT 6/15/19: 1. Postsurgical changes left heel with multiple surgical clips and high attenuation material. Residual FDG avidity along the surgical defect may represent postsurgical change/inflammation, residual disease, or combination of these entities. \par 2. Postsurgical collection left inguinal region. No FDG avid locoregional or distant metastatic disease.\par \par She completed immunotherapy Nivolumab July 2020 as per Dr. Kimbrough\par Pt continues to wear compression stockings for lymphedema. \par \par Pt notes she is s/p left groin hernia repair with mesh.\par \par She states that she will be following up with her dermatologist, Dr. Olson.\par She is s/p punch biopsy of two sites on her left heel on 5/1/19 performed by Navneet Branch PA-C.\par Pathology:\par 1. 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 pT4B\par 2. Left heel inferior: malignant melanoma, 4.2 mm thick, with ulcerations. pT4B \par \par She c/o crusty, flaking appearance of left heel wound. She denies use of moisturizer.\par She notes a fractured nose requiring stitches by Dr. Ramirez of [place] in September 2019.\par Pt has a pacemaker and is currently on Warfarin for Afib as per Dr. Ronald Shepard of cardiology. \par She is now taking Eliquis.\par Denies constitutional symptoms. \par Denies fever or chills.

## 2020-08-26 NOTE — PHYSICAL EXAM
[Normal] : supple, no neck mass and thyroid not enlarged [Normal] : oriented to person, place and time, with appropriate affect [de-identified] : No inguinal adenopathy.  [de-identified] : N [de-identified] : 2+ left LE lymphedema.  [de-identified] : Left lateral heel skin graft healed well. No evidence of recurrence.

## 2020-08-26 NOTE — ASSESSMENT
[FreeTextEntry1] : Stage III left heel melanoma s/p WEX 5/19\par S/p immunotherapy w Dr. Kimbrough 7/2020. \par ROXANN\par Follow up with med/onc. \par Will likely need repeat imaging in December 2020. \par Cont. dermatologic surveillance with Dr. Olson.\par RTO 4 months

## 2020-09-14 RX ORDER — NITROFURANTOIN MACROCRYSTAL 50 MG
1 CAPSULE ORAL
Qty: 0 | Refills: 0 | DISCHARGE
Start: 2020-09-14 | End: 2020-09-21

## 2020-09-23 ENCOUNTER — INPATIENT (INPATIENT)
Facility: HOSPITAL | Age: 85
LOS: 5 days | Discharge: HOME HEALTH | DRG: 871 | End: 2020-09-29
Attending: HOSPITALIST | Admitting: HOSPITALIST
Payer: MEDICARE

## 2020-09-23 VITALS — OXYGEN SATURATION: 89 % | HEIGHT: 65 IN | WEIGHT: 141.98 LBS

## 2020-09-23 DIAGNOSIS — Z90.89 ACQUIRED ABSENCE OF OTHER ORGANS: Chronic | ICD-10-CM

## 2020-09-23 DIAGNOSIS — J18.9 PNEUMONIA, UNSPECIFIED ORGANISM: ICD-10-CM

## 2020-09-23 DIAGNOSIS — Z98.890 OTHER SPECIFIED POSTPROCEDURAL STATES: Chronic | ICD-10-CM

## 2020-09-23 DIAGNOSIS — Z98.49 CATARACT EXTRACTION STATUS, UNSPECIFIED EYE: Chronic | ICD-10-CM

## 2020-09-23 LAB
ADD ON TEST-SPECIMEN IN LAB: SIGNIFICANT CHANGE UP
ALBUMIN SERPL ELPH-MCNC: 3.2 G/DL — LOW (ref 3.3–5)
ALP SERPL-CCNC: 40 U/L — SIGNIFICANT CHANGE UP (ref 40–120)
ALT FLD-CCNC: 34 U/L — SIGNIFICANT CHANGE UP (ref 12–78)
ANION GAP SERPL CALC-SCNC: 7 MMOL/L — SIGNIFICANT CHANGE UP (ref 5–17)
APPEARANCE UR: CLEAR — SIGNIFICANT CHANGE UP
APTT BLD: 40.3 SEC — HIGH (ref 27.5–35.5)
AST SERPL-CCNC: 24 U/L — SIGNIFICANT CHANGE UP (ref 15–37)
BASOPHILS # BLD AUTO: 0.02 K/UL — SIGNIFICANT CHANGE UP (ref 0–0.2)
BASOPHILS NFR BLD AUTO: 0.2 % — SIGNIFICANT CHANGE UP (ref 0–2)
BILIRUB SERPL-MCNC: 1.8 MG/DL — HIGH (ref 0.2–1.2)
BILIRUB UR-MCNC: NEGATIVE — SIGNIFICANT CHANGE UP
BUN SERPL-MCNC: 26 MG/DL — HIGH (ref 7–23)
CALCIUM SERPL-MCNC: 8.4 MG/DL — LOW (ref 8.5–10.1)
CHLORIDE SERPL-SCNC: 98 MMOL/L — SIGNIFICANT CHANGE UP (ref 96–108)
CO2 SERPL-SCNC: 27 MMOL/L — SIGNIFICANT CHANGE UP (ref 22–31)
COLOR SPEC: YELLOW — SIGNIFICANT CHANGE UP
CREAT SERPL-MCNC: 0.97 MG/DL — SIGNIFICANT CHANGE UP (ref 0.5–1.3)
DIFF PNL FLD: ABNORMAL
EOSINOPHIL # BLD AUTO: 0.05 K/UL — SIGNIFICANT CHANGE UP (ref 0–0.5)
EOSINOPHIL NFR BLD AUTO: 0.4 % — SIGNIFICANT CHANGE UP (ref 0–6)
GLUCOSE SERPL-MCNC: 125 MG/DL — HIGH (ref 70–99)
GLUCOSE UR QL: NEGATIVE MG/DL — SIGNIFICANT CHANGE UP
HCT VFR BLD CALC: 42.5 % — SIGNIFICANT CHANGE UP (ref 34.5–45)
HGB BLD-MCNC: 14 G/DL — SIGNIFICANT CHANGE UP (ref 11.5–15.5)
IMM GRANULOCYTES NFR BLD AUTO: 0.9 % — SIGNIFICANT CHANGE UP (ref 0–1.5)
KETONES UR-MCNC: ABNORMAL
LACTATE SERPL-SCNC: 1.8 MMOL/L — SIGNIFICANT CHANGE UP (ref 0.7–2)
LEUKOCYTE ESTERASE UR-ACNC: ABNORMAL
LIDOCAIN IGE QN: 34 U/L — LOW (ref 73–393)
LYMPHOCYTES # BLD AUTO: 0.39 K/UL — LOW (ref 1–3.3)
LYMPHOCYTES # BLD AUTO: 3.1 % — LOW (ref 13–44)
MAGNESIUM SERPL-MCNC: 1.5 MG/DL — LOW (ref 1.6–2.6)
MCHC RBC-ENTMCNC: 30.8 PG — SIGNIFICANT CHANGE UP (ref 27–34)
MCHC RBC-ENTMCNC: 32.9 GM/DL — SIGNIFICANT CHANGE UP (ref 32–36)
MCV RBC AUTO: 93.4 FL — SIGNIFICANT CHANGE UP (ref 80–100)
MONOCYTES # BLD AUTO: 1.1 K/UL — HIGH (ref 0–0.9)
MONOCYTES NFR BLD AUTO: 8.7 % — SIGNIFICANT CHANGE UP (ref 2–14)
NEUTROPHILS # BLD AUTO: 11.03 K/UL — HIGH (ref 1.8–7.4)
NEUTROPHILS NFR BLD AUTO: 86.7 % — HIGH (ref 43–77)
NITRITE UR-MCNC: NEGATIVE — SIGNIFICANT CHANGE UP
NT-PROBNP SERPL-SCNC: 8125 PG/ML — HIGH (ref 0–450)
PH UR: 6.5 — SIGNIFICANT CHANGE UP (ref 5–8)
PLATELET # BLD AUTO: 115 K/UL — LOW (ref 150–400)
POTASSIUM SERPL-MCNC: 3.6 MMOL/L — SIGNIFICANT CHANGE UP (ref 3.5–5.3)
POTASSIUM SERPL-SCNC: 3.6 MMOL/L — SIGNIFICANT CHANGE UP (ref 3.5–5.3)
PROT SERPL-MCNC: 6.5 GM/DL — SIGNIFICANT CHANGE UP (ref 6–8.3)
PROT UR-MCNC: 30 MG/DL
RBC # BLD: 4.55 M/UL — SIGNIFICANT CHANGE UP (ref 3.8–5.2)
RBC # FLD: 14.6 % — HIGH (ref 10.3–14.5)
SARS-COV-2 RNA SPEC QL NAA+PROBE: SIGNIFICANT CHANGE UP
SODIUM SERPL-SCNC: 132 MMOL/L — LOW (ref 135–145)
SP GR SPEC: 1 — LOW (ref 1.01–1.02)
TROPONIN I SERPL-MCNC: 0.03 NG/ML — SIGNIFICANT CHANGE UP (ref 0.01–0.04)
TROPONIN I SERPL-MCNC: 0.05 NG/ML — HIGH (ref 0.01–0.04)
UROBILINOGEN FLD QL: 4 MG/DL
WBC # BLD: 12.71 K/UL — HIGH (ref 3.8–10.5)
WBC # FLD AUTO: 12.71 K/UL — HIGH (ref 3.8–10.5)

## 2020-09-23 PROCEDURE — 80048 BASIC METABOLIC PNL TOTAL CA: CPT

## 2020-09-23 PROCEDURE — 71045 X-RAY EXAM CHEST 1 VIEW: CPT | Mod: 26

## 2020-09-23 PROCEDURE — 86769 SARS-COV-2 COVID-19 ANTIBODY: CPT

## 2020-09-23 PROCEDURE — 83735 ASSAY OF MAGNESIUM: CPT

## 2020-09-23 PROCEDURE — 84484 ASSAY OF TROPONIN QUANT: CPT

## 2020-09-23 PROCEDURE — 85027 COMPLETE CBC AUTOMATED: CPT

## 2020-09-23 PROCEDURE — 81001 URINALYSIS AUTO W/SCOPE: CPT

## 2020-09-23 PROCEDURE — 36415 COLL VENOUS BLD VENIPUNCTURE: CPT

## 2020-09-23 PROCEDURE — 71275 CT ANGIOGRAPHY CHEST: CPT | Mod: 26

## 2020-09-23 PROCEDURE — 99223 1ST HOSP IP/OBS HIGH 75: CPT

## 2020-09-23 PROCEDURE — 0225U NFCT DS DNA&RNA 21 SARSCOV2: CPT

## 2020-09-23 PROCEDURE — 93306 TTE W/DOPPLER COMPLETE: CPT

## 2020-09-23 RX ORDER — APIXABAN 2.5 MG/1
5 TABLET, FILM COATED ORAL EVERY 12 HOURS
Refills: 0 | Status: DISCONTINUED | OUTPATIENT
Start: 2020-09-23 | End: 2020-09-29

## 2020-09-23 RX ORDER — SODIUM CHLORIDE 9 MG/ML
2000 INJECTION INTRAMUSCULAR; INTRAVENOUS; SUBCUTANEOUS ONCE
Refills: 0 | Status: COMPLETED | OUTPATIENT
Start: 2020-09-23 | End: 2020-09-23

## 2020-09-23 RX ORDER — AZTREONAM 2 G
1000 VIAL (EA) INJECTION EVERY 8 HOURS
Refills: 0 | Status: DISCONTINUED | OUTPATIENT
Start: 2020-09-23 | End: 2020-09-24

## 2020-09-23 RX ORDER — ONDANSETRON 8 MG/1
4 TABLET, FILM COATED ORAL EVERY 6 HOURS
Refills: 0 | Status: DISCONTINUED | OUTPATIENT
Start: 2020-09-23 | End: 2020-09-29

## 2020-09-23 RX ORDER — AZTREONAM 2 G
1000 VIAL (EA) INJECTION ONCE
Refills: 0 | Status: COMPLETED | OUTPATIENT
Start: 2020-09-23 | End: 2020-09-23

## 2020-09-23 RX ORDER — ACETAMINOPHEN 500 MG
650 TABLET ORAL EVERY 6 HOURS
Refills: 0 | Status: DISCONTINUED | OUTPATIENT
Start: 2020-09-23 | End: 2020-09-29

## 2020-09-23 RX ORDER — ACETAMINOPHEN 500 MG
2 TABLET ORAL
Qty: 0 | Refills: 0 | DISCHARGE

## 2020-09-23 RX ORDER — INFLUENZA VIRUS VACCINE 15; 15; 15; 15 UG/.5ML; UG/.5ML; UG/.5ML; UG/.5ML
0.5 SUSPENSION INTRAMUSCULAR ONCE
Refills: 0 | Status: COMPLETED | OUTPATIENT
Start: 2020-09-23 | End: 2020-09-23

## 2020-09-23 RX ORDER — VANCOMYCIN HCL 1 G
1000 VIAL (EA) INTRAVENOUS ONCE
Refills: 0 | Status: COMPLETED | OUTPATIENT
Start: 2020-09-23 | End: 2020-09-23

## 2020-09-23 RX ORDER — SIMVASTATIN 20 MG/1
10 TABLET, FILM COATED ORAL AT BEDTIME
Refills: 0 | Status: DISCONTINUED | OUTPATIENT
Start: 2020-09-23 | End: 2020-09-29

## 2020-09-23 RX ORDER — ASPIRIN/CALCIUM CARB/MAGNESIUM 324 MG
81 TABLET ORAL DAILY
Refills: 0 | Status: DISCONTINUED | OUTPATIENT
Start: 2020-09-23 | End: 2020-09-23

## 2020-09-23 RX ORDER — METOPROLOL TARTRATE 50 MG
100 TABLET ORAL
Refills: 0 | Status: DISCONTINUED | OUTPATIENT
Start: 2020-09-23 | End: 2020-09-24

## 2020-09-23 RX ORDER — ASPIRIN/CALCIUM CARB/MAGNESIUM 324 MG
162 TABLET ORAL ONCE
Refills: 0 | Status: COMPLETED | OUTPATIENT
Start: 2020-09-23 | End: 2020-09-23

## 2020-09-23 RX ADMIN — Medication 250 MILLIGRAM(S): at 15:28

## 2020-09-23 RX ADMIN — Medication 50 MILLIGRAM(S): at 22:42

## 2020-09-23 RX ADMIN — SIMVASTATIN 10 MILLIGRAM(S): 20 TABLET, FILM COATED ORAL at 22:42

## 2020-09-23 RX ADMIN — APIXABAN 5 MILLIGRAM(S): 2.5 TABLET, FILM COATED ORAL at 22:52

## 2020-09-23 RX ADMIN — SODIUM CHLORIDE 2000 MILLILITER(S): 9 INJECTION INTRAMUSCULAR; INTRAVENOUS; SUBCUTANEOUS at 15:05

## 2020-09-23 RX ADMIN — Medication 50 MILLIGRAM(S): at 14:30

## 2020-09-23 RX ADMIN — Medication 162 MILLIGRAM(S): at 15:05

## 2020-09-23 NOTE — ED PROVIDER NOTE - NEUROLOGICAL, MLM
Alert and oriented, no focal deficits, no motor or sensory deficits. Alert and oriented, no focal deficits, no motor or sensory deficits. NIH 0. GCS 15.

## 2020-09-23 NOTE — ED PROVIDER NOTE - NS_ ATTENDINGSCRIBEDETAILS _ED_A_ED_FT
I Balbir Malave MD saw and examined the patient. Scribe documented for me and under my supervision. I have modified the scribe's documentation where necessary to reflect my history, physical exam and other relevant documentations pertinent to the care of the patient.

## 2020-09-23 NOTE — ED PROVIDER NOTE - CARE PLAN
Principal Discharge DX:	Pneumonia of both lower lobes due to infectious organism  Secondary Diagnosis:	Fever, unspecified fever cause  Secondary Diagnosis:	Elevated troponin I level   Principal Discharge DX:	Pneumonia of both lower lobes due to infectious organism  Goal:	SOB, bilateral pleural effusion, fever, possible sepsis vs. PNA vs. pleural effusion/undiagnosed CHF  Assessment and plan of treatment:	- Fever, treated with antipyretic, IV hydration  - SOB, tachypnea, abnormal x-ray, CT angio to rule out PE, CT shows PNA. Abx treatment, inpatient follow up and care, patient not safe to go home.  Secondary Diagnosis:	Fever, unspecified fever cause  Secondary Diagnosis:	Elevated troponin I level

## 2020-09-23 NOTE — ED PROVIDER NOTE - RESPIRATORY, MLM
Breath sounds clear and equal bilaterally. Tachypneic. b/l basilar crackles, greater on right. No retractions.

## 2020-09-23 NOTE — H&P ADULT - NSHPPHYSICALEXAM_GEN_ALL_CORE
Vital Signs Last 24 Hrs  T(C): 37.1 (24 Sep 2020 03:00), Max: 39.1 (23 Sep 2020 14:13)  T(F): 98.7 (24 Sep 2020 03:00), Max: 102.4 (23 Sep 2020 14:13)  HR: 106 (24 Sep 2020 04:00) (80 - 106)  BP: 114/74 (24 Sep 2020 04:00) (99/55 - 114/74)  BP(mean): 84 (24 Sep 2020 04:00) (68 - 84)  RR: 19 (24 Sep 2020 04:00) (18 - 22)  SpO2: 94% (24 Sep 2020 04:00) (89% - 100%)

## 2020-09-23 NOTE — ED ADULT NURSE REASSESSMENT NOTE - NS ED NURSE REASSESS COMMENT FT1
pt transferred to unit. experienced 1 episode of emesis in elevator in route to floor. When arriving on unit, pt reports she is no longer nauseous. pt transported on monitor and with 6L NC. chart w/ pt.

## 2020-09-23 NOTE — ED PROVIDER NOTE - PLAN OF CARE
SOB, bilateral pleural effusion, fever, possible sepsis vs. PNA vs. pleural effusion/undiagnosed CHF - Fever, treated with antipyretic, IV hydration  - SOB, tachypnea, abnormal x-ray, CT angio to rule out PE, CT shows PNA. Abx treatment, inpatient follow up and care, patient not safe to go home.

## 2020-09-23 NOTE — H&P ADULT - NEUROLOGICAL DETAILS
sensation intact/cranial nerves intact/alert and oriented x 3/responds to verbal commands/normal strength/deep reflexes intact/responds to pain

## 2020-09-23 NOTE — ED PROVIDER NOTE - PROGRESS NOTE DETAILS
Ashia Perez for attending Dr. Malave: Pt requests to contact her son, Laci. Gives permission to discuss all details.  Will contact Laci when results come back and pt is updated. Scribe Jaclyn Casale for attending Dr Malave: updated pts son, Laci, with results of the labs and imagining as per pts son pt has no known hx of CHF, does not take any diuretic medications at home. will admit pt. I Jaclyn Casale attest that this documentation has been prepared under the direction and in the presence of Doctor Malave Scribe Jaclyn Casale for attending Dr Malave: spoke with dr nathan, CT angio shows possible BL PNA. pt also has BL pleural effusion. pt initially placed on 2L normal saline spoke with Rn who will hold further fluid. abx already given pt s/p Vancomycin. updated pts son with results and plan for admission. Scribe Jaclyn Casale for attending Dr Malave: spoke to  on behalf of transfer center, states that his anticoagulants do not need to be held inspite of their conjunctival hemorrhage. Scribe Jaclyn Casale for attending Dr Malave: updated pts son, Laci, with results of the labs and imagining as per pts son pt has no known hx of CHF, does not take any diuretic medications at home. will admit pt given her fever, SOB, high risk for PNA, CHF, vs. ACS. Scribe Jaclyn Casale for attending Dr Malave: spoke with dr thomas, CT angio shows possible BL PNA. pt also has BL pleural effusion. pt initially placed on 2L normal saline (as per sepsis criteria) spoke with RN who will hold further fluid for the next hour or go in slowly with time. abx already given pt s/p Vancomycin. updated pts son with results and plan for admission. Scribe Jaclyn Casale for attending Dr Malave: spoke to Dr. Flann[facility], via transfer center, states that her anticoagulants do not need to be held in spite of presence of conjunctival hemorrhage as risk of cessation is high and subconjunctival hemorrhage are often HTNisve or idipathic and would heal with time.

## 2020-09-23 NOTE — ED PROVIDER NOTE - ENMT, MLM
Airway patent, Nasal mucosa clear. Mouth with normal mucosa. Throat has no vesicles, no oropharyngeal exudates and uvula is midline. Airway patent, Nasal mucosa clear. Mouth with normal mucosa. Throat has no vesicles, no oropharyngeal exudates and uvula is midline. No epistaxis.

## 2020-09-23 NOTE — PHARMACOTHERAPY INTERVENTION NOTE - COMMENTS
med history complete, reviewed medications and allergies with patient and confirmed with doctor first med profile, all medications related questions answered

## 2020-09-23 NOTE — ED ADULT NURSE NOTE - OBJECTIVE STATEMENT
pt reports she was brought to hospital by son because he noticed that the patient was having labored breathing. pt denies chest pain/sob/dizziness/lightheadedness. Pt reports soft stools yesterday and nausea/vomiting yesterday for several times. pt on the monitor, will ctm

## 2020-09-23 NOTE — ED ADULT NURSE NOTE - CHPI ED NUR SYMPTOMS NEG
no body aches/no cough/no headache/no hemoptysis/no chills/no chest pain/no diaphoresis/no fever/no wheezing/no edema

## 2020-09-23 NOTE — ED PROVIDER NOTE - OBJECTIVE STATEMENT
84 y/o female with a PMHx of Afib, GERD, HLD, HTN, hypoadrenalism, lymphedema, metastatic melanoma, pacemaker presents to the ED c/o SOB and dehydration. On Digoxin. No other complaints at this time. PCP: Dr. Shepard. 84 y/o female with a PMHx of Afib previously on Digoxin, currently on Eliquis, GERD, HLD, HTN, hypoadrenalism, lymphedema, metastatic melanoma, pacemaker presents to the ED c/o left eye redness, SOB, and "drained." States her son told her she was breathing fast. Denies abd pain, neck stiffness, rash, hematuria, hematochezia, melena. Recently treated for UTI, unsure which medication. No recent trauma. Allergies: Penicillin. No other complaints at this time. Oncologist: Dr. Kimbrough. 84 y/o female with a PMHx of Afib previously on Digoxin, currently on Eliquis, GERD, HLD, HTN, hypoadrenalism, lymphedema, metastatic melanoma, pacemaker presents to the ED c/o left eye redness, SOB, and feeling "drained." States her son told her she was breathing fast. Denies abd pain, neck stiffness, rash, hematuria, hematochezia, melena. Recently treated for UTI, unsure which medication. No recent trauma. Allergies: Penicillin. No other complaints at this time. Oncologist: Dr. Kimbrough. 84 y/o female with a PMHx of Afib previously on Digoxin, currently on Eliquis, GERD, HLD, HTN, hypoadrenalism, lymphedema, metastatic melanoma, pacemaker presents to the ED c/o left eye redness, SOB, and feeling "drained." States her son told her she was breathing fast having difficulty getting words out, and short of breath. Denies abd pain, neck stiffness, rash, hematuria, hematochezia, melena, neck stiffness. Recently treated for UTI, unsure which medication/abx used for treatment. No recent trauma. No saddle anesthesia. No incontinence of urine or stool. No visual or focal neurological problems. As per patient, she had some "redness in her eyes" which combined with other findings, trigged concern on part of the patient's son behooving her to come to the ED. No neck stiffness, and no rash. Allergies: Penicillin. No other complaints at this time. Oncologist: Dr. Kimbrough.

## 2020-09-23 NOTE — H&P ADULT - HISTORY OF PRESENT ILLNESS
84 y/o female with a PMHx of Afib previously on Digoxin, currently on Eliquis, GERD, HLD, HTN, hypoadrenalism, lymphedema, metastatic melanoma, pacemaker presents to the ED c/o left eye redness, SOB, and feeling "drained." States her son told her she was breathing fast. Denies abd pain, neck stiffness, rash, hematuria, hematochezia, melena. Recently treated for UTI, unsure which medication. No recent trauma. Allergies: Penicillin. No other complaints at this time. 86 y/o Female with a PMHx of Afib previously on Digoxin, currently on Eliquis, GERD, HLD, HTN, hypoadrenalism, lymphedema, metastatic melanoma, pacemaker presents to the ED shortness of breath, and feeling "drained." Also has some non productive cough. No chest pain. States her son told her she was breathing fast. Denies abdominal pain, neck stiffness, rash, hematuria, hematochezia, melena. Recently treated for UTI, unsure which medication. No recent trauma. She also has redness in both eyes today. No vision problem. No other complaints at this time. No fever at home. Patient  was found to be hypoxic in ED and was given Oxygen by NC.

## 2020-09-23 NOTE — ED PROVIDER NOTE - GASTROINTESTINAL, MLM
Abdomen soft, non-tender, no guarding. Abdomen soft, non-tender, no guarding. Rectal temp performed by RN, resulting 102+. Abdomen soft, non-tender, no guarding. Rectal temp performed by RN, resulting 102F + fever.

## 2020-09-23 NOTE — ED ADULT NURSE NOTE - EXTENSIONS OF SELF_ADULT
None Bactrim Pregnancy And Lactation Text: This medication is Pregnancy Category D and is known to cause fetal risk.  It is also excreted in breast milk.

## 2020-09-23 NOTE — ED PROVIDER NOTE - MUSCULOSKELETAL, MLM
Spine appears normal, range of motion is not limited, no muscle or joint tenderness Spine appears normal, range of motion is not limited, no muscle or joint tenderness. No nuchal rigidity.

## 2020-09-23 NOTE — ED PROVIDER NOTE - EYES, MLM
Clear bilaterally, pupils equal, round and reactive to light. Clear bilaterally, pupils equal, round and reactive to light. EOMI. Left subconjunctival hemorrhage Clear bilaterally, pupils equal, round and reactive to light. EOMI. Left sided lateral subconjunctival hemorrhage and a small area of Rt medial subconjunctival hemorrhage.

## 2020-09-23 NOTE — H&P ADULT - ASSESSMENT
86 yo Female presented with shortness of breath and cough.    A/P:    1.  Pneumonia  Sepsis  -started on IV antibiotics  -follow clinically and cultures  -follow ID consult    2.  Elevated troponin  Elevated BNP  -follow troponin  -follow echo report  -follow cardiology consult    3.  Chronic A fib  -follow clinically  -continue Metoprolol  -continue Eliquis    4.  Conjunctival hemorrhage  -as per Oncall Opthal, as there is no visual disturbance, can be follow wed up in clinic  -no other intervention needed now    5.  DVT ppx  -Eliquis 86 yo Female presented with shortness of breath and cough.    A/P:    1.  Pneumonia  Sepsis  -started on IV antibiotics  -follow clinically and cultures  -follow ID consult    2.  Elevated troponin  Elevated BNP  -follow troponin  -follow echo report  -follow cardiology consult    3.  Chronic A fib  -follow clinically  -continue Metoprolol  -continue Eliquis    4.  Conjunctival hemorrhage  -as per Oncall Opthal, as there is no visual disturbance, can be follow wed up in clinic  -no other intervention needed now    5.  DVT ppx  -Eliquis    6.  Code status: Full code.

## 2020-09-23 NOTE — ED PROVIDER NOTE - PMH
Atrial fibrillation    GERD (gastroesophageal reflux disease)    HLD (hyperlipidemia)    HTN (hypertension)    Hypoadrenalism    Lymphedema    Metastatic melanoma    Pacemaker

## 2020-09-23 NOTE — ED PROVIDER NOTE - CLINICAL SUMMARY MEDICAL DECISION MAKING FREE TEXT BOX
Pt with fever and tachypnea, unknown COVID status, recent UTI on abx. Plan: UA, septic workup, XR, CTA of chest to r/o PE vs PNA vs other pulmonary pathology, likely admission.

## 2020-09-23 NOTE — ED PROVIDER NOTE - CONSTITUTIONAL, MLM
normal... Well appearing, awake, alert, oriented to person, place, time/situation and in no apparent distress. Well appearing, awake, alert, oriented to person, place, time/situation and in mild distress due to tachypnea.

## 2020-09-24 LAB
ANION GAP SERPL CALC-SCNC: 6 MMOL/L — SIGNIFICANT CHANGE UP (ref 5–17)
BUN SERPL-MCNC: 28 MG/DL — HIGH (ref 7–23)
CALCIUM SERPL-MCNC: 8.8 MG/DL — SIGNIFICANT CHANGE UP (ref 8.5–10.1)
CHLORIDE SERPL-SCNC: 98 MMOL/L — SIGNIFICANT CHANGE UP (ref 96–108)
CO2 SERPL-SCNC: 28 MMOL/L — SIGNIFICANT CHANGE UP (ref 22–31)
CREAT SERPL-MCNC: 0.74 MG/DL — SIGNIFICANT CHANGE UP (ref 0.5–1.3)
GLUCOSE SERPL-MCNC: 87 MG/DL — SIGNIFICANT CHANGE UP (ref 70–99)
HCT VFR BLD CALC: 44.8 % — SIGNIFICANT CHANGE UP (ref 34.5–45)
HGB BLD-MCNC: 14.8 G/DL — SIGNIFICANT CHANGE UP (ref 11.5–15.5)
MCHC RBC-ENTMCNC: 30.7 PG — SIGNIFICANT CHANGE UP (ref 27–34)
MCHC RBC-ENTMCNC: 33 GM/DL — SIGNIFICANT CHANGE UP (ref 32–36)
MCV RBC AUTO: 92.9 FL — SIGNIFICANT CHANGE UP (ref 80–100)
PLATELET # BLD AUTO: 86 K/UL — LOW (ref 150–400)
POTASSIUM SERPL-MCNC: 3.8 MMOL/L — SIGNIFICANT CHANGE UP (ref 3.5–5.3)
POTASSIUM SERPL-SCNC: 3.8 MMOL/L — SIGNIFICANT CHANGE UP (ref 3.5–5.3)
RBC # BLD: 4.82 M/UL — SIGNIFICANT CHANGE UP (ref 3.8–5.2)
RBC # FLD: 14.4 % — SIGNIFICANT CHANGE UP (ref 10.3–14.5)
SODIUM SERPL-SCNC: 132 MMOL/L — LOW (ref 135–145)
TROPONIN I SERPL-MCNC: <0.015 NG/ML — SIGNIFICANT CHANGE UP (ref 0.01–0.04)
WBC # BLD: 5.34 K/UL — SIGNIFICANT CHANGE UP (ref 3.8–10.5)
WBC # FLD AUTO: 5.34 K/UL — SIGNIFICANT CHANGE UP (ref 3.8–10.5)

## 2020-09-24 PROCEDURE — 99233 SBSQ HOSP IP/OBS HIGH 50: CPT

## 2020-09-24 PROCEDURE — 93306 TTE W/DOPPLER COMPLETE: CPT | Mod: 26

## 2020-09-24 RX ORDER — METOPROLOL TARTRATE 50 MG
5 TABLET ORAL EVERY 6 HOURS
Refills: 0 | Status: DISCONTINUED | OUTPATIENT
Start: 2020-09-24 | End: 2020-09-29

## 2020-09-24 RX ORDER — METOPROLOL TARTRATE 50 MG
50 TABLET ORAL
Refills: 0 | Status: DISCONTINUED | OUTPATIENT
Start: 2020-09-24 | End: 2020-09-25

## 2020-09-24 RX ORDER — PROCHLORPERAZINE MALEATE 5 MG
10 TABLET ORAL ONCE
Refills: 0 | Status: COMPLETED | OUTPATIENT
Start: 2020-09-24 | End: 2020-09-24

## 2020-09-24 RX ORDER — FUROSEMIDE 40 MG
40 TABLET ORAL ONCE
Refills: 0 | Status: COMPLETED | OUTPATIENT
Start: 2020-09-24 | End: 2020-09-24

## 2020-09-24 RX ORDER — METOPROLOL TARTRATE 50 MG
5 TABLET ORAL EVERY 6 HOURS
Refills: 0 | Status: DISCONTINUED | OUTPATIENT
Start: 2020-09-24 | End: 2020-09-24

## 2020-09-24 RX ORDER — CEFEPIME 1 G/1
1000 INJECTION, POWDER, FOR SOLUTION INTRAMUSCULAR; INTRAVENOUS EVERY 12 HOURS
Refills: 0 | Status: DISCONTINUED | OUTPATIENT
Start: 2020-09-24 | End: 2020-09-25

## 2020-09-24 RX ORDER — CEFEPIME 1 G/1
1000 INJECTION, POWDER, FOR SOLUTION INTRAMUSCULAR; INTRAVENOUS EVERY 12 HOURS
Refills: 0 | Status: DISCONTINUED | OUTPATIENT
Start: 2020-09-24 | End: 2020-09-24

## 2020-09-24 RX ADMIN — Medication 100 MILLIGRAM(S): at 11:10

## 2020-09-24 RX ADMIN — Medication 10 MILLIGRAM(S): at 03:45

## 2020-09-24 RX ADMIN — SIMVASTATIN 10 MILLIGRAM(S): 20 TABLET, FILM COATED ORAL at 21:20

## 2020-09-24 RX ADMIN — Medication 100 MILLIGRAM(S): at 09:31

## 2020-09-24 RX ADMIN — APIXABAN 5 MILLIGRAM(S): 2.5 TABLET, FILM COATED ORAL at 21:20

## 2020-09-24 RX ADMIN — Medication 50 MILLIGRAM(S): at 06:16

## 2020-09-24 RX ADMIN — ONDANSETRON 4 MILLIGRAM(S): 8 TABLET, FILM COATED ORAL at 08:53

## 2020-09-24 RX ADMIN — APIXABAN 5 MILLIGRAM(S): 2.5 TABLET, FILM COATED ORAL at 09:31

## 2020-09-24 RX ADMIN — CEFEPIME 1000 MILLIGRAM(S): 1 INJECTION, POWDER, FOR SOLUTION INTRAMUSCULAR; INTRAVENOUS at 11:10

## 2020-09-24 RX ADMIN — ONDANSETRON 4 MILLIGRAM(S): 8 TABLET, FILM COATED ORAL at 02:51

## 2020-09-24 RX ADMIN — CEFEPIME 1000 MILLIGRAM(S): 1 INJECTION, POWDER, FOR SOLUTION INTRAMUSCULAR; INTRAVENOUS at 21:20

## 2020-09-24 RX ADMIN — Medication 50 MILLIGRAM(S): at 21:20

## 2020-09-24 RX ADMIN — Medication 15 MILLIGRAM(S): at 09:31

## 2020-09-24 RX ADMIN — Medication 100 MILLIGRAM(S): at 21:20

## 2020-09-24 NOTE — CONSULT NOTE ADULT - SUBJECTIVE AND OBJECTIVE BOX
Patient is a 85y old  Female who presents with a chief complaint of complain of shortness of breath (24 Sep 2020 08:41)    HPI:  84 y/o Female with a PMHx of Afib previously on Digoxin, currently on Eliquis, GERD, HLD, HTN, hypoadrenalism, lymphedema, metastatic melanoma, pacemaker admitted on  for evaluation of shortness of breath and weakness with mild nonproductive cough; upon admission she was hypoxic and felt better after being put on oxygen via nasal cannula. Of note she has bilateral conjunctival injection; she has mascara on her eyes for days and does not recall if she is allergic to any makeup.   She did not travel, has no sick contacts; lives with her son who is not sick and he went to [place] over the weekend to do car racing. Had mild nausea, no bowel movement since admission and denies diarrhea      PMH: as above  PSH: as above  Meds: per reconciliation sheet, noted below  MEDICATIONS  (STANDING):  apixaban 5 milliGRAM(s) Oral every 12 hours  cefepime   IVPB 1000 milliGRAM(s) IV Intermittent every 12 hours  furosemide   Injectable 40 milliGRAM(s) IV Push once  influenza   Vaccine 0.5 milliLiter(s) IntraMuscular once  metoprolol tartrate 100 milliGRAM(s) Oral two times a day  predniSONE   Tablet 15 milliGRAM(s) Oral daily  simvastatin 10 milliGRAM(s) Oral at bedtime    MEDICATIONS  (PRN):  acetaminophen   Tablet .. 650 milliGRAM(s) Oral every 6 hours PRN Temp greater or equal to 38C (100.4F), Mild Pain (1 - 3)  metoprolol tartrate Injectable 5 milliGRAM(s) IV Push every 6 hours PRN HR >110  ondansetron Injectable 4 milliGRAM(s) IV Push every 6 hours PRN Nausea and/or Vomiting    Allergies    penicillins (Hives)--- 20 years ago    Intolerances      Social: no smoking, no alcohol, no illegal drugs; no recent travel, no exposure to TB  FAMILY HISTORY:  No pertinent family history in first degree relatives       no history of premature cardiovascular disease in first degree relatives  ROS: the patient has no chills, no HA, no dizziness, no sore throat, no blurry vision, no CP, no palpitations,no abdominal pain, no diarrhea, no N/V, no dysuria, no leg pain, no claudication, no rash, no joint aches, no rectal pain or bleeding, no night sweats  All other systems reviewed and are negative    Vital Signs Last 24 Hrs  T(C): 37.1 (24 Sep 2020 03:00), Max: 39.1 (23 Sep 2020 14:13)  T(F): 98.7 (24 Sep 2020 03:00), Max: 102.4 (23 Sep 2020 14:13)  HR: 127 (24 Sep 2020 09:00) (80 - 127)  BP: 133/52 (24 Sep 2020 08:15) (99/55 - 133/52)  BP(mean): 67 (24 Sep 2020 08:15) (67 - 84)  RR: 30 (24 Sep 2020 09:00) (18 - 30)  SpO2: 96% (24 Sep 2020 09:00) (89% - 100%)  Daily Height in cm: 165.1 (23 Sep 2020 14:01)    Daily Weight in k.5 (23 Sep 2020 21:46)    PE:    Constitutional: frail looking  HEENT: NC/AT, EOMI, PERRLA, conjunctivae injection bilaterally; caked mascara on eyelids and under her eyes  Neck: supple; thyroid not palpable  Back: no tenderness  Respiratory: respiratory effort normal; bibasilar crackles  Cardiovascular: S1S2 regular, no murmurs  Abdomen: soft, not tender, not distended, positive BS; no liver or spleen organomegaly  Genitourinary: no suprapubic tenderness  Musculoskeletal: no muscle tenderness, no joint swelling or tenderness  Neurological/ Psychiatric: AxOx3, judgement and insight normal;  moving all extremities  Skin: no rashes; no palpable lesions    Labs: all available labs reviewed                        14.8   5.34  )-----------( 86       ( 24 Sep 2020 06:45 )             44.8     09-24    132<L>  |  98  |  28<H>  ----------------------------<  87  3.8   |  28  |  0.74    Ca    8.8      24 Sep 2020 06:45  Mg     1.5     09-    TPro  6.5  /  Alb  3.2<L>  /  TBili  1.8<H>  /  DBili  x   /  AST  24  /  ALT  34  /  AlkPhos  40  09-     LIVER FUNCTIONS - ( 23 Sep 2020 14:32 )  Alb: 3.2 g/dL / Pro: 6.5 gm/dL / ALK PHOS: 40 U/L / ALT: 34 U/L / AST: 24 U/L / GGT: x           Urinalysis Basic - ( 23 Sep 2020 17:26 )    Color: Yellow / Appearance: Clear / S.005 / pH: x  Gluc: x / Ketone: Trace  / Bili: Negative / Urobili: 4 mg/dL   Blood: x / Protein: 30 mg/dL / Nitrite: Negative   Leuk Esterase: Trace / RBC: 11-25 /HPF / WBC 6-10   Sq Epi: x / Non Sq Epi: Moderate / Bacteria: Moderate      COVID-19 PCR . (20 @ 14:32)    COVID-19 PCR: NotDetec: Testing is performed using polymerase chain reaction (PCR) or  transcription mediated amplification (TMA). This COVID-19 (SARS-CoV-2)  nucleic acid amplification test was validated by "[employer] [place] and is  in use under the FDA Emergency Use Authorization (EUA) for clinical labs  CLIA-certified to perform high complexity testing. Test results should be  correlated with clinical presentation, patient history, and epidemiology.          < from: CT Angio Chest PE Protocol w/ IV Cont (20 @ 16:24) >    IMPRESSION:  No pulmonary embolism    Cardiomegaly.    Small bilateral pleural effusions. Bilateral lower lobe opacities could be on the basis of atelectasis or pneumonia.      < end of copied text >            Radiology: all available radiological tests reviewed    Advanced directives addressed: full resuscitation

## 2020-09-24 NOTE — CONSULT NOTE ADULT - SUBJECTIVE AND OBJECTIVE BOX
Patient is a 85y old  Female who presents with a chief complaint of complain of shortness of breath (23 Sep 2020 21:06)      HPI:  84 y/o Female with a PMHx of Afib previously on Digoxin, currently on Eliquis, GERD, HLD, HTN, hypoadrenalism, lymphedema, metastatic melanoma, pacemaker presents to the ED shortness of breath, and feeling "drained." Also has some non productive cough. No chest pain. States her son told her she was breathing fast. Denies abdominal pain, neck stiffness, rash, hematuria, hematochezia, melena. Recently treated for UTI, unsure which medication. No recent trauma. She also has redness in both eyes today. No vision problem. No other complaints at this time. No fever at home. Patient  was found to be hypoxic in ED and was given Oxygen by NC.  (23 Sep 2020 21:06)    was exposed to son who had fever recently , she also has AFIB with RVR but no CP or SOB also found to have borderline trop , B/L PNA by CT scan Had an echo ast year that showed Normal LV fucntion , last stress test was in 2017   PAST MEDICAL & SURGICAL HISTORY:  HLD (hyperlipidemia)    HTN (hypertension)    Pacemaker    Atrial fibrillation    History of melanoma excision    H/O cataract extraction  both eyes    S/P tonsillectomy and adenoidectomy                                      MEDICATIONS  (STANDING):  apixaban 5 milliGRAM(s) Oral every 12 hours  aztreonam  IVPB 1000 milliGRAM(s) IV Intermittent every 8 hours  influenza   Vaccine 0.5 milliLiter(s) IntraMuscular once'  metoprolol tartrate 100 milliGRAM(s) Oral two times a day  predniSONE   Tablet 15 milliGRAM(s) Oral daily  simvastatin 10 milliGRAM(s) Oral at bedtime    MEDICATIONS  (PRN):  acetaminophen   Tablet .. 650 milliGRAM(s) Oral every 6 hours PRN Temp greater or equal to 38C (100.4F), Mild Pain (1 - 3)  ondansetron Injectable 4 milliGRAM(s) IV Push every 6 hours PRN Nausea and/or Vomiting      FAMILY HISTORY:  No pertinent family history in first degree relatives        SOCIAL HISTORY:    CIGARETTES:        Vital Signs Last 24 Hrs  T(C): 37.1 (24 Sep 2020 03:00), Max: 39.1 (23 Sep 2020 14:13)  T(F): 98.7 (24 Sep 2020 03:00), Max: 102.4 (23 Sep 2020 14:13)  HR: 114 (24 Sep 2020 08:15) (80 - 114)  BP: 133/52 (24 Sep 2020 08:15) (99/55 - 133/52)  BP(mean): 67 (24 Sep 2020 08:15) (67 - 84)  RR: 24 (24 Sep 2020 08:15) (18 - 24)  SpO2: 98% (24 Sep 2020 08:15) (89% - 100%)            INTERPRETATION OF TELEMETRY: AFIB with HR 130s    ECG:    I&O's Detail    23 Sep 2020 07:01  -  24 Sep 2020 07:00  --------------------------------------------------------  IN:    IV PiggyBack: 50 mL  Total IN: 50 mL    OUT:  Total OUT: 0 mL    Total NET: 50 mL          LABS:                        14.8   5.34  )-----------( 86       ( 24 Sep 2020 06:45 )             44.8     09-24    132<L>  |  98  |  28<H>  ----------------------------<  87  3.8   |  28  |  0.74    Ca    8.8      24 Sep 2020 06:45  Mg     1.5     09-23    TPro  6.5  /  Alb  3.2<L>  /  TBili  1.8<H>  /  DBili  x   /  AST  24  /  ALT  34  /  AlkPhos  40  09-23    CARDIAC MARKERS ( 24 Sep 2020 06:45 )  <0.015 ng/mL / x     / x     / x     / x      CARDIAC MARKERS ( 23 Sep 2020 18:42 )  0.030 ng/mL / x     / x     / x     / x      CARDIAC MARKERS ( 23 Sep 2020 14:32 )  0.049 ng/mL / x     / x     / x     / x          PTT - ( 23 Sep 2020 14:32 )  PTT:40.3 sec  Urinalysis Basic - ( 23 Sep 2020 17:26 )    Color: Yellow / Appearance: Clear / S.005 / pH: x  Gluc: x / Ketone: Trace  / Bili: Negative / Urobili: 4 mg/dL   Blood: x / Protein: 30 mg/dL / Nitrite: Negative   Leuk Esterase: Trace / RBC: 11-25 /HPF / WBC 6-10   Sq Epi: x / Non Sq Epi: Moderate / Bacteria: Moderate      I&O's Summary    23 Sep 2020 07:01  -  24 Sep 2020 07:00  --------------------------------------------------------  IN: 50 mL / OUT: 0 mL / NET: 50 mL      BNPSerum Pro-Brain Natriuretic Peptide: 8125 pg/mL ( @ 14:32)    RADIOLOGY & ADDITIONAL STUDIES:

## 2020-09-24 NOTE — CONSULT NOTE ADULT - ASSESSMENT
84 y/o Female with a PMHx of Afib previously on Digoxin, currently on Eliquis, GERD, HLD, HTN, hypoadrenalism, lymphedema, metastatic melanoma, pacemaker admitted on 9/23 for evaluation of shortness of breath and weakness with mild nonproductive cough; upon admission she was hypoxic and felt better after being put on oxygen via nasal cannula. Of note she has bilateral conjunctival injection; she has mascara on her eyes for days and does not recall if she is allergic to any makeup.   She did not travel, has no sick contacts; lives with her son who is not sick and he went to [place] over the weekend to do car racing. Had mild nausea, no bowel movement since admission and denies diarrhea  1. Patient admitted with pneumonia superimposed on pulmonary edema  - follow up cultures   - serial cbc and monitor temperature   - oxygen and nebs as needed   - iv hydration and supportive care   - reviewed prior medical records to evaluate for resistant or atypical pathogens   - will optimize antibiotics to cefepime, which the patient should tolerate with remote penicillin allergy which is usually to cogeners in older formulations to penicillin  - Monitor closely in view of history of penicillin allergy   - add doxycycline to atypical and resistant pathogens  2. other issues: per medicine
84 y/o Female with a PMHx of Afib previously on Digoxin, currently on Eliquis, GERD, HLD, HTN, hypoadrenalism, lymphedema, metastatic melanoma, pacemaker presents to the ED shortness of breath, and feeling "drained." Also has some non productive cough. No chest pain. States her son told her she was breathing fast. Denies abdominal pain, neck stiffness, rash, hematuria, hematochezia, melena. Recently treated for UTI, unsure which medication. No recent trauma. She also has redness in both eyes today. No vision problem. No other complaints at this time. No fever at home. Patient  was found to be hypoxic in ED and was given Oxygen by NC.  (23 Sep 2020 21:06)    was exposed to son who had fever recently , she also has AFIB with RVR but no CP or SOB also found to have borderline trop , B/L PNA by CT scan Had an echo ast year that showed Normal LV fucntion , last stress test was in 2017   Trop likely on basis of demand ischemia from rapid HR   1) cont metoprolol 100 mg BID add IV lopressor 5mg Q6 PRN for HR >110  2) ABX for PNA  3) cont eliquis for AFIB   4) recheck echo to look for a decline in EF which would prompt further cardiac gibson  5) cont statin for hyperlipedemia

## 2020-09-24 NOTE — PROGRESS NOTE ADULT - ASSESSMENT
86 y/o Female with PMHx of Afib previously on Digoxin, currently on Eliquis, GERD, HLD, HTN, hypoadrenalism, lymphedema, metastatic melanoma, pacemaker presents to the ED shortness of breath, and feeling "drained" with N/V/decreased po intake for days and s/p recent UTI Tx with macrobid.    1. Sepsis POA w/o septic shock due to BL PNA due to suspected Gram neg/pos organisms - f/u BCx, sputum Cx   - IV abx as per ID   - may need additional IVF    2. PAF with RVR in the settings of hypovolemia - responded to metoprolol but dropped BP - may need bolus if not recovered   - cont eliquis   - metoprolol dose decreased    3. Dehydration due to SE of macrobid - N/V, may need additional fluids    4. Hx of hypoadrenalism on po steroids - if remains hypotensive will give few doses of stress steroids    5. Recent UTI - resolved, f/u UCx    6. Thrombocytopenia - monitor, possible due to macrobid

## 2020-09-25 LAB
ANION GAP SERPL CALC-SCNC: 8 MMOL/L — SIGNIFICANT CHANGE UP (ref 5–17)
ANION GAP SERPL CALC-SCNC: 9 MMOL/L — SIGNIFICANT CHANGE UP (ref 5–17)
BUN SERPL-MCNC: 29 MG/DL — HIGH (ref 7–23)
BUN SERPL-MCNC: 33 MG/DL — HIGH (ref 7–23)
CALCIUM SERPL-MCNC: 8.9 MG/DL — SIGNIFICANT CHANGE UP (ref 8.5–10.1)
CALCIUM SERPL-MCNC: 9.2 MG/DL — SIGNIFICANT CHANGE UP (ref 8.5–10.1)
CHLORIDE SERPL-SCNC: 100 MMOL/L — SIGNIFICANT CHANGE UP (ref 96–108)
CHLORIDE SERPL-SCNC: 102 MMOL/L — SIGNIFICANT CHANGE UP (ref 96–108)
CO2 SERPL-SCNC: 24 MMOL/L — SIGNIFICANT CHANGE UP (ref 22–31)
CO2 SERPL-SCNC: 25 MMOL/L — SIGNIFICANT CHANGE UP (ref 22–31)
CREAT SERPL-MCNC: 0.68 MG/DL — SIGNIFICANT CHANGE UP (ref 0.5–1.3)
CREAT SERPL-MCNC: 0.94 MG/DL — SIGNIFICANT CHANGE UP (ref 0.5–1.3)
GLUCOSE SERPL-MCNC: 107 MG/DL — HIGH (ref 70–99)
GLUCOSE SERPL-MCNC: 77 MG/DL — SIGNIFICANT CHANGE UP (ref 70–99)
HCT VFR BLD CALC: 38.8 % — SIGNIFICANT CHANGE UP (ref 34.5–45)
HGB BLD-MCNC: 12.6 G/DL — SIGNIFICANT CHANGE UP (ref 11.5–15.5)
MCHC RBC-ENTMCNC: 30.4 PG — SIGNIFICANT CHANGE UP (ref 27–34)
MCHC RBC-ENTMCNC: 32.5 GM/DL — SIGNIFICANT CHANGE UP (ref 32–36)
MCV RBC AUTO: 93.5 FL — SIGNIFICANT CHANGE UP (ref 80–100)
PLATELET # BLD AUTO: 92 K/UL — LOW (ref 150–400)
POTASSIUM SERPL-MCNC: 3.3 MMOL/L — LOW (ref 3.5–5.3)
POTASSIUM SERPL-MCNC: 3.4 MMOL/L — LOW (ref 3.5–5.3)
POTASSIUM SERPL-SCNC: 3.3 MMOL/L — LOW (ref 3.5–5.3)
POTASSIUM SERPL-SCNC: 3.4 MMOL/L — LOW (ref 3.5–5.3)
RBC # BLD: 4.15 M/UL — SIGNIFICANT CHANGE UP (ref 3.8–5.2)
RBC # FLD: 14.3 % — SIGNIFICANT CHANGE UP (ref 10.3–14.5)
SODIUM SERPL-SCNC: 133 MMOL/L — LOW (ref 135–145)
SODIUM SERPL-SCNC: 135 MMOL/L — SIGNIFICANT CHANGE UP (ref 135–145)
WBC # BLD: 5.22 K/UL — SIGNIFICANT CHANGE UP (ref 3.8–10.5)
WBC # FLD AUTO: 5.22 K/UL — SIGNIFICANT CHANGE UP (ref 3.8–10.5)

## 2020-09-25 PROCEDURE — 99232 SBSQ HOSP IP/OBS MODERATE 35: CPT

## 2020-09-25 PROCEDURE — 99497 ADVNCD CARE PLAN 30 MIN: CPT

## 2020-09-25 RX ORDER — METOPROLOL TARTRATE 50 MG
100 TABLET ORAL
Refills: 0 | Status: DISCONTINUED | OUTPATIENT
Start: 2020-09-25 | End: 2020-09-29

## 2020-09-25 RX ORDER — MEROPENEM 1 G/30ML
500 INJECTION INTRAVENOUS EVERY 8 HOURS
Refills: 0 | Status: DISCONTINUED | OUTPATIENT
Start: 2020-09-25 | End: 2020-09-29

## 2020-09-25 RX ORDER — DIPHENHYDRAMINE HCL 50 MG
25 CAPSULE ORAL EVERY 4 HOURS
Refills: 0 | Status: DISCONTINUED | OUTPATIENT
Start: 2020-09-25 | End: 2020-09-29

## 2020-09-25 RX ORDER — POTASSIUM BICARBONATE 978 MG/1
20 TABLET, EFFERVESCENT ORAL
Refills: 0 | Status: DISCONTINUED | OUTPATIENT
Start: 2020-09-25 | End: 2020-09-25

## 2020-09-25 RX ORDER — FUROSEMIDE 40 MG
40 TABLET ORAL ONCE
Refills: 0 | Status: COMPLETED | OUTPATIENT
Start: 2020-09-25 | End: 2020-09-25

## 2020-09-25 RX ORDER — POTASSIUM CHLORIDE 20 MEQ
40 PACKET (EA) ORAL ONCE
Refills: 0 | Status: COMPLETED | OUTPATIENT
Start: 2020-09-25 | End: 2020-09-25

## 2020-09-25 RX ORDER — FUROSEMIDE 40 MG
40 TABLET ORAL ONCE
Refills: 0 | Status: DISCONTINUED | OUTPATIENT
Start: 2020-09-25 | End: 2020-09-25

## 2020-09-25 RX ADMIN — Medication 100 MILLIGRAM(S): at 11:11

## 2020-09-25 RX ADMIN — MEROPENEM 100 MILLIGRAM(S): 1 INJECTION INTRAVENOUS at 16:35

## 2020-09-25 RX ADMIN — APIXABAN 5 MILLIGRAM(S): 2.5 TABLET, FILM COATED ORAL at 11:11

## 2020-09-25 RX ADMIN — Medication 40 MILLIEQUIVALENT(S): at 11:11

## 2020-09-25 RX ADMIN — Medication 40 MILLIGRAM(S): at 11:10

## 2020-09-25 RX ADMIN — Medication 15 MILLIGRAM(S): at 12:33

## 2020-09-25 RX ADMIN — MEROPENEM 100 MILLIGRAM(S): 1 INJECTION INTRAVENOUS at 21:41

## 2020-09-25 RX ADMIN — Medication 100 MILLIGRAM(S): at 21:42

## 2020-09-25 RX ADMIN — Medication 100 MILLIGRAM(S): at 18:52

## 2020-09-25 RX ADMIN — Medication 100 MILLIGRAM(S): at 11:12

## 2020-09-25 RX ADMIN — SIMVASTATIN 10 MILLIGRAM(S): 20 TABLET, FILM COATED ORAL at 21:41

## 2020-09-25 RX ADMIN — Medication 25 MILLIGRAM(S): at 11:11

## 2020-09-25 RX ADMIN — Medication 25 MILLIGRAM(S): at 23:35

## 2020-09-25 RX ADMIN — APIXABAN 5 MILLIGRAM(S): 2.5 TABLET, FILM COATED ORAL at 21:40

## 2020-09-25 RX ADMIN — Medication 25 MILLIGRAM(S): at 18:52

## 2020-09-25 NOTE — CDI QUERY NOTE - NSCDIOTHERTXTBX_GEN_ALL_CORE_HH
This patient was documented by cardiology to have decompensated diastolic dysfunction.    Progress Note Adult-Cardiology Attending 9-25-20 @ 08:54  4)echo still shows normal LV systolic function but probable decompenasted diastolic dysfxn from rapid HR will give additional IV lasix today times 1 but replete K first     TTE Echo Complete w/o Contrast w/ Doppler (09.24.20 @ 14:35)  Trace pericardial effusion is present.   Pleural effusion - is present..    Serum Pro-Brain Natriuretic Peptide: 8125 pg/mL (09.23.20 @ 14:32)    Patient is received IV Lasix.    IS the above clinical criteria indicative of a further diagnosis?  A) Acute diastolic CHF.  B) Acute on chronic diastolic CHF.  C) Other, please specify:

## 2020-09-25 NOTE — PROGRESS NOTE ADULT - ASSESSMENT
84 y/o Female with a PMHx of Afib previously on Digoxin, currently on Eliquis, GERD, HLD, HTN, hypoadrenalism, lymphedema, metastatic melanoma, pacemaker presents to the ED shortness of breath, and feeling "drained." Also has some non productive cough. No chest pain. States her son told her she was breathing fast. Denies abdominal pain, neck stiffness, rash, hematuria, hematochezia, melena. Recently treated for UTI, unsure which medication. No recent trauma. She also has redness in both eyes today. No vision problem. No other complaints at this time. No fever at home. Patient  was found to be hypoxic in ED and was given Oxygen by NC.  (23 Sep 2020 21:06)    was exposed to son who had fever recently , she also has AFIB with RVR but no CP or SOB also found to have borderline trop , B/L PNA by CT scan Had an echo ast year that showed Normal LV fucntion , last stress test was in 2017   Trop likely on basis of demand ischemia from rapid HR   1) Increase metoprolol back to  100 mg BID add IV lopressor 5mg Q6 PRN for HR >110  2) ABX for PNA  3) cont eliquis for AFIB   4)echo still shows normal LV systolic function but probable decompenasted diastolic dysfxn from rapid HR will give additional IV lasix today times 1 but replete K first   5) cont statin for hyperlipedemia

## 2020-09-25 NOTE — PROGRESS NOTE ADULT - ASSESSMENT
86 y/o Female with a PMHx of Afib previously on Digoxin, currently on Eliquis, GERD, HLD, HTN, hypoadrenalism, lymphedema, metastatic melanoma, pacemaker admitted on 9/23 for evaluation of shortness of breath and weakness with mild nonproductive cough; upon admission she was hypoxic and felt better after being put on oxygen via nasal cannula. Of note she has bilateral conjunctival injection; she has mascara on her eyes for days and does not recall if she is allergic to any makeup.   She did not travel, has no sick contacts; lives with her son who is not sick and he went to [place] over the weekend to do car racing. Had mild nausea, no bowel movement since admission and denies diarrhea  1. Patient admitted with pneumonia superimposed on pulmonary edema  - follow up cultures   - serial cbc and monitor temperature   - oxygen and nebs as needed   - iv hydration and supportive care   - reviewed prior medical records to evaluate for resistant or atypical pathogens   - day #2 doxycycline  - given possible drug rash will change cefepime to meropenem  2. other issues: per medicine

## 2020-09-25 NOTE — PROGRESS NOTE ADULT - ASSESSMENT
84 y/o Female with PMHx of Afib previously on Digoxin, currently on Eliquis, GERD, HLD, HTN, hypoadrenalism, lymphedema, metastatic melanoma, pacemaker presents to the ED shortness of breath, and feeling "drained" with N/V/decreased po intake for days and s/p recent UTI Tx with macrobid.    1. Sepsis POA w/o septic shock due to BL PNA due to suspected Gram neg/pos organisms - f/u BCx, sputum Cx   - IV abx as per ID. ?rash to cefepime. will give benadryl. cefepime being changed to meropenem per ID   - may need additional IVF    2. PAF with RVR in the settings of hypovolemia - responded to metoprolol but dropped BP - may need bolus if not recovered   - cont eliquis   - metoprolol dose decreased    3. Dehydration due to SE of macrobid - N/V, may need additional fluids    4. Hx of hypoadrenalism on po steroids - if remains hypotensive will give few doses of stress steroids    5. Recent UTI - resolved, f/u UCx    6. Thrombocytopenia - monitor, possible due to macrobid 86 y/o Female with PMHx of Afib previously on Digoxin, currently on Eliquis, GERD, HLD, HTN, hypoadrenalism, lymphedema, metastatic melanoma, pacemaker presents to the ED shortness of breath, and feeling "drained" with N/V/decreased po intake for days and s/p recent UTI Tx with macrobid.    acute respiratory failure w/ hypoxia, POA  Sepsis POA w/o septic shock due to BL PNA due to suspected Gram neg/pos organisms    - f/u BCx, sputum Cx   - IV abx as per ID. ?rash to cefepime. will give benadryl. cefepime being changed to meropenem per ID   - may need additional IVF   - ID consult appreciated    PAF with RVR  suspected acute diastolic CHF   - cont eliquis   - metoprolol dose adjusted   - s/p IV lasix   - cardio consult appreciated    Hx of hypoadrenalism on po steroids - if remains hypotensive will give few doses of stress steroids    Recent UTI - resolved, f/u UCx    Thrombocytopenia - monitor, possible due to macrobid    dispo: tele

## 2020-09-25 NOTE — PROGRESS NOTE ADULT - CONVERSATION DETAILS
discussed at bedside with patient discussing GOC, advance care planning, and code status. pt wants to be full code.

## 2020-09-26 LAB
ANION GAP SERPL CALC-SCNC: 4 MMOL/L — LOW (ref 5–17)
BUN SERPL-MCNC: 39 MG/DL — HIGH (ref 7–23)
CALCIUM SERPL-MCNC: 8.7 MG/DL — SIGNIFICANT CHANGE UP (ref 8.5–10.1)
CHLORIDE SERPL-SCNC: 105 MMOL/L — SIGNIFICANT CHANGE UP (ref 96–108)
CO2 SERPL-SCNC: 29 MMOL/L — SIGNIFICANT CHANGE UP (ref 22–31)
CREAT SERPL-MCNC: 0.69 MG/DL — SIGNIFICANT CHANGE UP (ref 0.5–1.3)
GLUCOSE SERPL-MCNC: 93 MG/DL — SIGNIFICANT CHANGE UP (ref 70–99)
HCT VFR BLD CALC: 38.2 % — SIGNIFICANT CHANGE UP (ref 34.5–45)
HGB BLD-MCNC: 12.7 G/DL — SIGNIFICANT CHANGE UP (ref 11.5–15.5)
MAGNESIUM SERPL-MCNC: 1.8 MG/DL — SIGNIFICANT CHANGE UP (ref 1.6–2.6)
MCHC RBC-ENTMCNC: 30.6 PG — SIGNIFICANT CHANGE UP (ref 27–34)
MCHC RBC-ENTMCNC: 33.2 GM/DL — SIGNIFICANT CHANGE UP (ref 32–36)
MCV RBC AUTO: 92 FL — SIGNIFICANT CHANGE UP (ref 80–100)
PLATELET # BLD AUTO: 113 K/UL — LOW (ref 150–400)
POTASSIUM SERPL-MCNC: 3.8 MMOL/L — SIGNIFICANT CHANGE UP (ref 3.5–5.3)
POTASSIUM SERPL-SCNC: 3.8 MMOL/L — SIGNIFICANT CHANGE UP (ref 3.5–5.3)
RBC # BLD: 4.15 M/UL — SIGNIFICANT CHANGE UP (ref 3.8–5.2)
RBC # FLD: 14 % — SIGNIFICANT CHANGE UP (ref 10.3–14.5)
SODIUM SERPL-SCNC: 138 MMOL/L — SIGNIFICANT CHANGE UP (ref 135–145)
WBC # BLD: 4.74 K/UL — SIGNIFICANT CHANGE UP (ref 3.8–10.5)
WBC # FLD AUTO: 4.74 K/UL — SIGNIFICANT CHANGE UP (ref 3.8–10.5)

## 2020-09-26 PROCEDURE — 99232 SBSQ HOSP IP/OBS MODERATE 35: CPT

## 2020-09-26 RX ORDER — FUROSEMIDE 40 MG
20 TABLET ORAL DAILY
Refills: 0 | Status: DISCONTINUED | OUTPATIENT
Start: 2020-09-26 | End: 2020-09-29

## 2020-09-26 RX ADMIN — MEROPENEM 100 MILLIGRAM(S): 1 INJECTION INTRAVENOUS at 22:38

## 2020-09-26 RX ADMIN — Medication 100 MILLIGRAM(S): at 06:19

## 2020-09-26 RX ADMIN — Medication 15 MILLIGRAM(S): at 10:55

## 2020-09-26 RX ADMIN — Medication 20 MILLIGRAM(S): at 17:22

## 2020-09-26 RX ADMIN — SIMVASTATIN 10 MILLIGRAM(S): 20 TABLET, FILM COATED ORAL at 22:42

## 2020-09-26 RX ADMIN — Medication 100 MILLIGRAM(S): at 22:41

## 2020-09-26 RX ADMIN — APIXABAN 5 MILLIGRAM(S): 2.5 TABLET, FILM COATED ORAL at 10:54

## 2020-09-26 RX ADMIN — APIXABAN 5 MILLIGRAM(S): 2.5 TABLET, FILM COATED ORAL at 22:41

## 2020-09-26 RX ADMIN — MEROPENEM 100 MILLIGRAM(S): 1 INJECTION INTRAVENOUS at 13:38

## 2020-09-26 RX ADMIN — MEROPENEM 100 MILLIGRAM(S): 1 INJECTION INTRAVENOUS at 06:19

## 2020-09-26 RX ADMIN — Medication 100 MILLIGRAM(S): at 10:55

## 2020-09-26 RX ADMIN — Medication 100 MILLIGRAM(S): at 17:12

## 2020-09-26 NOTE — PROGRESS NOTE ADULT - ASSESSMENT
86 y/o Female with PMHx of Afib previously on Digoxin, currently on Eliquis, GERD, HLD, HTN, hypoadrenalism, lymphedema, metastatic melanoma, pacemaker presents to the ED shortness of breath, and feeling "drained" with N/V/decreased po intake for days and s/p recent UTI Tx with macrobid.    acute respiratory failure w/ hypoxia, POA  Sepsis POA w/o septic shock due to BL PNA due to suspected Gram neg/pos organisms    - blood culture NGTD   - ?rash to cefepime. benadryl prn. cefepime being changed to meropenem per ID   - may need additional IVF   - ID consult appreciated    PAF with RVR  suspected acute diastolic CHF   - cont eliquis   - metoprolol dose adjusted   - s/p IV lasix   - cardio consult appreciated    Hx of hypoadrenalism on po steroids - if remains hypotensive will give few doses of stress steroids    Recent UTI - resolved    Thrombocytopenia - monitor, possible due to macrobid, plts improving    dispo: tele

## 2020-09-26 NOTE — PROGRESS NOTE ADULT - ASSESSMENT
Pneumonia- on ABx    Atrial fib with RVR  continue current dose of metoprolol- just uptitrated yesterday and rate is better.  Continue full dose AC with Elliquis      HFpEF acute decompensated.    Can give lasix 20mg daily  Diuresis with close monitoring of the renal function and electrolytes.  Goal potassium of 4 and magnesium of 2.   Strict I/O and daily wt checks. Low sodium diet. Nutrition education.     Other medical issues- Management per primary team.   Thank you for allowing me to participate in the care of this patient. Please feel free to contact me with any questions.

## 2020-09-26 NOTE — PROGRESS NOTE ADULT - ASSESSMENT
84 y/o Female with a PMHx of Afib previously on Digoxin, currently on Eliquis, GERD, HLD, HTN, hypoadrenalism, lymphedema, metastatic melanoma, pacemaker admitted on 9/23 for evaluation of shortness of breath and weakness with mild nonproductive cough; upon admission she was hypoxic and felt better after being put on oxygen via nasal cannula. Of note she has bilateral conjunctival injection; she has mascara on her eyes for days and does not recall if she is allergic to any makeup.   She did not travel, has no sick contacts; lives with her son who is not sick and he went to [place] over the weekend to do car racing. Had mild nausea, no bowel movement since admission and denies diarrhea  1. Patient admitted with pneumonia superimposed on pulmonary edema  - follow up cultures   - serial cbc and monitor temperature   - oxygen and nebs as needed   - iv hydration and supportive care   - reviewed prior medical records to evaluate for resistant or atypical pathogens   - day #3 doxycycline  - given possible drug rash will change cefepime to meropenem, day # 2  - tolerating antibiotics without rashes or side effects   2. other issues: per medicine

## 2020-09-27 LAB
ANION GAP SERPL CALC-SCNC: 7 MMOL/L — SIGNIFICANT CHANGE UP (ref 5–17)
BUN SERPL-MCNC: 41 MG/DL — HIGH (ref 7–23)
CALCIUM SERPL-MCNC: 9.2 MG/DL — SIGNIFICANT CHANGE UP (ref 8.5–10.1)
CHLORIDE SERPL-SCNC: 104 MMOL/L — SIGNIFICANT CHANGE UP (ref 96–108)
CO2 SERPL-SCNC: 29 MMOL/L — SIGNIFICANT CHANGE UP (ref 22–31)
CREAT SERPL-MCNC: 0.76 MG/DL — SIGNIFICANT CHANGE UP (ref 0.5–1.3)
GLUCOSE SERPL-MCNC: 99 MG/DL — SIGNIFICANT CHANGE UP (ref 70–99)
MAGNESIUM SERPL-MCNC: 1.7 MG/DL — SIGNIFICANT CHANGE UP (ref 1.6–2.6)
POTASSIUM SERPL-MCNC: 3.3 MMOL/L — LOW (ref 3.5–5.3)
POTASSIUM SERPL-SCNC: 3.3 MMOL/L — LOW (ref 3.5–5.3)
SODIUM SERPL-SCNC: 140 MMOL/L — SIGNIFICANT CHANGE UP (ref 135–145)

## 2020-09-27 PROCEDURE — 99232 SBSQ HOSP IP/OBS MODERATE 35: CPT

## 2020-09-27 RX ORDER — POTASSIUM CHLORIDE 20 MEQ
40 PACKET (EA) ORAL EVERY 4 HOURS
Refills: 0 | Status: COMPLETED | OUTPATIENT
Start: 2020-09-27 | End: 2020-09-27

## 2020-09-27 RX ORDER — MAGNESIUM SULFATE 500 MG/ML
1 VIAL (ML) INJECTION ONCE
Refills: 0 | Status: COMPLETED | OUTPATIENT
Start: 2020-09-27 | End: 2020-09-27

## 2020-09-27 RX ADMIN — MEROPENEM 100 MILLIGRAM(S): 1 INJECTION INTRAVENOUS at 22:08

## 2020-09-27 RX ADMIN — SIMVASTATIN 10 MILLIGRAM(S): 20 TABLET, FILM COATED ORAL at 22:10

## 2020-09-27 RX ADMIN — Medication 40 MILLIEQUIVALENT(S): at 22:09

## 2020-09-27 RX ADMIN — Medication 100 MILLIGRAM(S): at 06:24

## 2020-09-27 RX ADMIN — Medication 15 MILLIGRAM(S): at 11:06

## 2020-09-27 RX ADMIN — Medication 100 MILLIGRAM(S): at 22:10

## 2020-09-27 RX ADMIN — MEROPENEM 100 MILLIGRAM(S): 1 INJECTION INTRAVENOUS at 14:48

## 2020-09-27 RX ADMIN — Medication 100 MILLIGRAM(S): at 17:15

## 2020-09-27 RX ADMIN — APIXABAN 5 MILLIGRAM(S): 2.5 TABLET, FILM COATED ORAL at 22:10

## 2020-09-27 RX ADMIN — Medication 20 MILLIGRAM(S): at 11:05

## 2020-09-27 RX ADMIN — Medication 100 GRAM(S): at 17:14

## 2020-09-27 RX ADMIN — Medication 40 MILLIEQUIVALENT(S): at 17:15

## 2020-09-27 RX ADMIN — Medication 100 MILLIGRAM(S): at 11:05

## 2020-09-27 RX ADMIN — APIXABAN 5 MILLIGRAM(S): 2.5 TABLET, FILM COATED ORAL at 11:05

## 2020-09-27 RX ADMIN — MEROPENEM 100 MILLIGRAM(S): 1 INJECTION INTRAVENOUS at 06:24

## 2020-09-27 NOTE — PROGRESS NOTE ADULT - ASSESSMENT
84 y/o Female with PMHx of Afib previously on Digoxin, currently on Eliquis, GERD, HLD, HTN, hypoadrenalism, lymphedema, metastatic melanoma, pacemaker presents to the ED shortness of breath, and feeling "drained" with N/V/decreased po intake for days and s/p recent UTI Tx with macrobid.    acute respiratory failure w/ hypoxia, POA  Sepsis POA w/o septic shock due to BL PNA due to suspected Gram neg/pos organisms    - blood culture NGTD   - ?rash to cefepime. benadryl prn. cefepime being changed to meropenem per ID   - doxycycline   - may need additional IVF   - ID consult appreciated    PAF with RVR  suspected acute diastolic CHF   - cont eliquis   - metoprolol dose adjusted   - s/p IV lasix, now on oral lasix  - cardio consult appreciated    Hypokalemia  - replace K and Mg    Hx of hypoadrenalism on po steroids - if remains hypotensive will give few doses of stress steroids    Recent UTI - resolved    Thrombocytopenia - monitor, possible due to macrobid, plts improving    dispo: tele. possible discharge home on Monday

## 2020-09-27 NOTE — PROGRESS NOTE ADULT - ASSESSMENT
Pneumonia- on ABx    Atrial fib with RVR  continue current dose of metoprolol- HR better controlled.   Continue full dose AC with Elliquis      HFpEF acute decompensated.    Can give lasix 20mg daily  Diuresis with close monitoring of the renal function and electrolytes.  Goal potassium of 4 and magnesium of 2.   Strict I/O and daily wt checks. Low sodium diet. Nutrition education.     Other medical issues- Management per primary team.   Thank you for allowing me to participate in the care of this patient. Please feel free to contact me with any questions.

## 2020-09-28 ENCOUNTER — TRANSCRIPTION ENCOUNTER (OUTPATIENT)
Age: 85
End: 2020-09-28

## 2020-09-28 LAB
ANION GAP SERPL CALC-SCNC: 2 MMOL/L — LOW (ref 5–17)
BUN SERPL-MCNC: 34 MG/DL — HIGH (ref 7–23)
CALCIUM SERPL-MCNC: 8.7 MG/DL — SIGNIFICANT CHANGE UP (ref 8.5–10.1)
CHLORIDE SERPL-SCNC: 107 MMOL/L — SIGNIFICANT CHANGE UP (ref 96–108)
CO2 SERPL-SCNC: 32 MMOL/L — HIGH (ref 22–31)
CREAT SERPL-MCNC: 0.63 MG/DL — SIGNIFICANT CHANGE UP (ref 0.5–1.3)
CULTURE RESULTS: SIGNIFICANT CHANGE UP
GLUCOSE SERPL-MCNC: 79 MG/DL — SIGNIFICANT CHANGE UP (ref 70–99)
HCT VFR BLD CALC: 39.9 % — SIGNIFICANT CHANGE UP (ref 34.5–45)
HGB BLD-MCNC: 12.4 G/DL — SIGNIFICANT CHANGE UP (ref 11.5–15.5)
MAGNESIUM SERPL-MCNC: 2 MG/DL — SIGNIFICANT CHANGE UP (ref 1.6–2.6)
MCHC RBC-ENTMCNC: 29.8 PG — SIGNIFICANT CHANGE UP (ref 27–34)
MCHC RBC-ENTMCNC: 31.1 GM/DL — LOW (ref 32–36)
MCV RBC AUTO: 95.9 FL — SIGNIFICANT CHANGE UP (ref 80–100)
PLATELET # BLD AUTO: 146 K/UL — LOW (ref 150–400)
POTASSIUM SERPL-MCNC: 4.5 MMOL/L — SIGNIFICANT CHANGE UP (ref 3.5–5.3)
POTASSIUM SERPL-SCNC: 4.5 MMOL/L — SIGNIFICANT CHANGE UP (ref 3.5–5.3)
RBC # BLD: 4.16 M/UL — SIGNIFICANT CHANGE UP (ref 3.8–5.2)
RBC # FLD: 14 % — SIGNIFICANT CHANGE UP (ref 10.3–14.5)
SODIUM SERPL-SCNC: 141 MMOL/L — SIGNIFICANT CHANGE UP (ref 135–145)
SPECIMEN SOURCE: SIGNIFICANT CHANGE UP
WBC # BLD: 5.34 K/UL — SIGNIFICANT CHANGE UP (ref 3.8–10.5)
WBC # FLD AUTO: 5.34 K/UL — SIGNIFICANT CHANGE UP (ref 3.8–10.5)

## 2020-09-28 PROCEDURE — 99232 SBSQ HOSP IP/OBS MODERATE 35: CPT

## 2020-09-28 RX ORDER — DIGOXIN 250 MCG
0.12 TABLET ORAL DAILY
Refills: 0 | Status: DISCONTINUED | OUTPATIENT
Start: 2020-09-28 | End: 2020-09-29

## 2020-09-28 RX ADMIN — Medication 20 MILLIGRAM(S): at 10:09

## 2020-09-28 RX ADMIN — Medication 100 MILLIGRAM(S): at 06:43

## 2020-09-28 RX ADMIN — APIXABAN 5 MILLIGRAM(S): 2.5 TABLET, FILM COATED ORAL at 10:10

## 2020-09-28 RX ADMIN — Medication 100 MILLIGRAM(S): at 17:45

## 2020-09-28 RX ADMIN — MEROPENEM 100 MILLIGRAM(S): 1 INJECTION INTRAVENOUS at 06:43

## 2020-09-28 RX ADMIN — Medication 100 MILLIGRAM(S): at 10:10

## 2020-09-28 RX ADMIN — MEROPENEM 100 MILLIGRAM(S): 1 INJECTION INTRAVENOUS at 21:41

## 2020-09-28 RX ADMIN — Medication 100 MILLIGRAM(S): at 21:42

## 2020-09-28 RX ADMIN — APIXABAN 5 MILLIGRAM(S): 2.5 TABLET, FILM COATED ORAL at 21:42

## 2020-09-28 RX ADMIN — MEROPENEM 100 MILLIGRAM(S): 1 INJECTION INTRAVENOUS at 14:09

## 2020-09-28 RX ADMIN — Medication 0.12 MILLIGRAM(S): at 10:09

## 2020-09-28 RX ADMIN — Medication 15 MILLIGRAM(S): at 10:09

## 2020-09-28 RX ADMIN — SIMVASTATIN 10 MILLIGRAM(S): 20 TABLET, FILM COATED ORAL at 21:42

## 2020-09-28 NOTE — DISCHARGE NOTE PROVIDER - HOSPITAL COURSE
Assessment and Plan:   86 y/o Female with PMHx of Afib previously on Digoxin, currently on Eliquis, GERD, HLD, HTN, hypoadrenalism, lymphedema, metastatic melanoma, pacemaker presents to the ED shortness of breath, and feeling "drained" with N/V/decreased po intake for days and s/p recent UTI Tx with macrobid.    acute respiratory failure w/ hypoxia, POA  Sepsis POA w/o septic shock due to BL PNA due to suspected Gram neg/pos organisms    - blood culture NGTD   - ?rash to cefepime. benadryl prn. cefepime changed to meropenem per ID -- rash has cleared up   - doxycycline    - ID consult appreciated -- plan to d/c home on 5 more days of oral doxycycline as rec by ID    PAF with RVR  suspected acute diastolic CHF   - cont eliquis   - metoprolol dose adjusted, now on 100 mg BID. rate uncontrolled. digxoin started today   - s/p IV lasix, now on oral lasix  - cardio consult appreciated    Hypokalemia  - replace K and Mg    Hx of hypoadrenalism on po steroids - if remains hypotensive will give few doses of stress steroids    Recent UTI - resolved    Thrombocytopenia - monitor, possible due to macrobid, plts improving CC: sob   HPI: 85 y female w/ pmh afib on eliquis, PPM, previously on dig, GERD, HLD, HTN, hypoadrenalism, lymphedema, metastatic melanoma p/w sob, non productive cough. No chest pain.  Treated here for rapid afib and pna.  Chart reviewed.  seen early this morning and feeling much better.  Eager to go home.  Denies cp, sob, palp, cough.  States her pna improved.  No complaints.  Discussed d/c plan and meds as well as close f/u w/ Dr. Shepard. Discussed w/ RN, will ambulate today and check O2 sats prior to d/c.   ROS: as per hpi above, other 10 point ros negative   Vital Signs Last 24 Hrs  T(C): 36.8 (29 Sep 2020 07:31), Max: 36.8 (29 Sep 2020 07:31)  T(F): 98.2 (29 Sep 2020 07:31), Max: 98.2 (29 Sep 2020 07:31)  HR: 85 (29 Sep 2020 07:31) (85 - 91)  BP: 133/86 (29 Sep 2020 07:31) (120/69 - 133/86)  BP(mean): --  RR: 18 (29 Sep 2020 07:31) (18 - 19)  SpO2: 100% (29 Sep 2020 07:31) (97% - 100%)    PHYSICAL EXAM:    General: NAD, comfortable, seated in chair, non acutely ill appearing  Neuro: AAOx3, no focal deficits  HEENT: NCAT  Neck: Soft and supple, No JVD  Respiratory: CTA b/l, no w/r/r  Cardiovascular: S1 and S2, irregularly irregular  Gastrointestinal: non ttp   Extremities: No edema  Vascular: 2+ peripheral pulses  Musculoskeletal: full rom    all labs reviewed.  meds reviewed        Assessment and Plan:   1. acute respiratory failure w/ hypoxia, POA due to sepsis POA w/o septic shock due to BL PNA due to suspected Gram neg/pos organisms - Resolved   - blood culture NGTD   - ?rash to cefepime. benadryl prn. cefepime changed to meropenem per ID -- rash has cleared up   - doxycycline    - ID consult appreciated -- plan to d/c home on 5 more days of oral doxycycline as rec by ID.  check O2 sats prior to d/c w/ exertion 9/29    RN aware   2. PAF with RVR in setting of above- resolved  suspected acute on chronic diastolic CHF- improved.  Echo nml ef, mod pulm htn, TR   - cont eliquis   - metoprolol dose adjusted, now on 100 mg BID. rate uncontrolled. digxoin started today   - s/p IV lasix, now on oral lasix  - cardio consult appreciated- improving on dig, BB.  ambulate and d/c planning home if stable O2 sats and HR w/ exertion  3. Hypokalemia  - replace K and Mg  4. Hx of hypoadrenalism on po steroids    d/c home 55min.   Meds sent to pharmacy **FULL PROGRESS NOTE 9/29**  CC: sob   HPI: 85 y female w/ pmh afib on eliquis, PPM, previously on dig, GERD, HLD, HTN, hypoadrenalism, lymphedema, metastatic melanoma p/w sob, non productive cough. No chest pain.  Treated here for rapid afib and pna.  Chart reviewed.  seen early this morning and feeling much better.  Eager to go home.  Denies cp, sob, palp, cough.  States her pna improved.  No complaints.  Discussed d/c plan and meds as well as close f/u w/ Dr. Shepard. Discussed w/ RN, will ambulate today and check O2 sats prior to d/c.   ROS: as per hpi above, other 10 point ros negative   Vital Signs Last 24 Hrs  T(C): 36.8 (29 Sep 2020 07:31), Max: 36.8 (29 Sep 2020 07:31)  T(F): 98.2 (29 Sep 2020 07:31), Max: 98.2 (29 Sep 2020 07:31)  HR: 85 (29 Sep 2020 07:31) (85 - 91)  BP: 133/86 (29 Sep 2020 07:31) (120/69 - 133/86)  BP(mean): --  RR: 18 (29 Sep 2020 07:31) (18 - 19)  SpO2: 100% (29 Sep 2020 07:31) (97% - 100%)    PHYSICAL EXAM:    General: NAD, comfortable, seated in chair, non acutely ill appearing  Neuro: AAOx3, no focal deficits  HEENT: NCAT  Neck: Soft and supple, No JVD  Respiratory: CTA b/l, no w/r/r  Cardiovascular: S1 and S2, irregularly irregular  Gastrointestinal: non ttp   Extremities: No edema  Vascular: 2+ peripheral pulses  Musculoskeletal: full rom    all labs reviewed.  meds reviewed        Assessment and Plan:   1. acute respiratory failure w/ hypoxia, POA due to sepsis POA w/o septic shock due to BL PNA due to suspected Gram neg/pos organisms - Resolved   - blood culture NGTD   - ?rash to cefepime. benadryl prn. cefepime changed to meropenem per ID -- rash has cleared up   - doxycycline    - ID consult appreciated -- plan to d/c home on 5 more days of oral doxycycline as rec by ID.  check O2 sats prior to d/c w/ exertion 9/29    RN aware   2. PAF with RVR in setting of above- resolved  suspected acute on chronic diastolic CHF- improved.  Echo nml ef, mod pulm htn, TR   - cont eliquis   - metoprolol dose adjusted, now on 100 mg BID. rate uncontrolled. digxoin started today   - s/p IV lasix, now on oral lasix  - cardio consult appreciated- improving on dig, BB.  ambulate and d/c planning home if stable O2 sats and HR w/ exertion  3. Hypokalemia  - replace K and Mg  4. Hx of hypoadrenalism on po steroids    d/c home 55min.   Meds sent to pharmacy

## 2020-09-28 NOTE — DISCHARGE NOTE PROVIDER - NSDCMRMEDTOKEN_GEN_ALL_CORE_FT
areds 2: 2 tab(s) orally once a day  Centrum oral tablet: 1 tab(s) orally once a day  Eliquis 5 mg oral tablet: 1 tab(s) orally 2 times a day  metoprolol tartrate 100 mg oral tablet: 1 tab(s) orally 2 times a day  predniSONE 2.5 mg oral tablet: 3 tab(s) orally once a day (total of 7.5 mg daily)  simvastatin 10 mg oral tablet: 1 tab(s) orally once a day (at bedtime)   areds 2: 2 tab(s) orally once a day  Centrum oral tablet: 1 tab(s) orally once a day  digoxin 125 mcg (0.125 mg) oral tablet: 1 tab(s) orally once a day  doxycycline monohydrate 100 mg oral capsule: 1 cap(s) orally every 12 hours  Eliquis 5 mg oral tablet: 1 tab(s) orally 2 times a day  furosemide 20 mg oral tablet: 1 tab(s) orally once a day  metoprolol tartrate 100 mg oral tablet: 1 tab(s) orally 2 times a day  predniSONE 2.5 mg oral tablet: 3 tab(s) orally once a day (total of 7.5 mg daily)  simvastatin 10 mg oral tablet: 1 tab(s) orally once a day (at bedtime)

## 2020-09-28 NOTE — PROGRESS NOTE ADULT - ASSESSMENT
84 y/o Female with a PMHx of Afib previously on Digoxin, currently on Eliquis, GERD, HLD, HTN, hypoadrenalism, lymphedema, metastatic melanoma, pacemaker admitted on 9/23 for evaluation of shortness of breath and weakness with mild nonproductive cough; upon admission she was hypoxic and felt better after being put on oxygen via nasal cannula. Of note she has bilateral conjunctival injection; she has mascara on her eyes for days and does not recall if she is allergic to any makeup.   She did not travel, has no sick contacts; lives with her son who is not sick and he went to [place] over the weekend to do car racing. Had mild nausea, no bowel movement since admission and denies diarrhea  1. Patient admitted with pneumonia superimposed on pulmonary edema  - follow up cultures   - serial cbc and monitor temperature   - oxygen and nebs as needed   - iv hydration and supportive care   - reviewed prior medical records to evaluate for resistant or atypical pathogens   - day #5 doxycycline  - given possible drug rash will change cefepime to meropenem, day # 4; completed 5 days iv antibiotics  - okay  from id standpoint to discharge home on 5 more days doxycycline 100 mg po q 12 hours  - tolerating antibiotics without rashes or side effects   2. other issues: per medicine

## 2020-09-28 NOTE — DIETITIAN INITIAL EVALUATION ADULT. - PERTINENT LABORATORY DATA
09-28 Na141 mmol/L Glu 79 mg/dL K+ 4.5 mmol/L Cr  0.63 mg/dL BUN 34 mg/dL<H> Phos n/a   Alb n/a   PAB n/a

## 2020-09-28 NOTE — DISCHARGE NOTE PROVIDER - NSDCCPCAREPLAN_GEN_ALL_CORE_FT
PRINCIPAL DISCHARGE DIAGNOSIS  Diagnosis: Pneumonia of both lower lobes due to infectious organism  Assessment and Plan of Treatment:       SECONDARY DISCHARGE DIAGNOSES  Diagnosis: Atrial fibrillation with RVR  Assessment and Plan of Treatment:      PRINCIPAL DISCHARGE DIAGNOSIS  Diagnosis: PNA (pneumonia)  Assessment and Plan of Treatment: Take 5 more days of doxycycline as prescribed.  Follow up with primary doc in 1 week.  Return to ED if you have shortness of breath, fevers, worsening cough.      SECONDARY DISCHARGE DIAGNOSES  Diagnosis: Atrial fibrillation with RVR  Assessment and Plan of Treatment: Improved w/ med changes.  Take metoprolol 100mg twice daily, digoxin 0.125mg daily and continue your eliquis.  Continue to take lasix 20mg daily as per Cardiology.  Check daily weights and keep record.  Follow up with your Cardiologist  within 1 week- call to make appt.

## 2020-09-28 NOTE — DIETITIAN INITIAL EVALUATION ADULT. - PHYSICAL APPEARANCE
well nourished/other (specify) David score 19; skin intact; no NFPE performed due to COVID suspicion

## 2020-09-28 NOTE — DIETITIAN INITIAL EVALUATION ADULT. - OTHER INFO
84 y/o Female with a PMHx of Afib previously on Digoxin, currently on Eliquis, GERD, HLD, HTN, hypoadrenalism, lymphedema, metastatic melanoma, pacemaker presents to the ED shortness of breath, and feeling "drained." Also has some non productive cough. No chest pain. States her son told her she was breathing fast. Denies abdominal pain, neck stiffness, rash, hematuria, hematochezia, melena. Recently treated for UTI, unsure which medication. No recent trauma. She also has redness in both eyes today. No vision problem. No other complaints at this time. No fever at home. Patient  was found to be hypoxic in ED and was given Oxygen by NC.    Pt is feeling better and doesn't state N/V/D/C or chewing/swallowing difficulty. She stated she had 100% of pancakes and fresh fruit this morning and has been tolerating meals well. Takes centrum silver. No food intolerances. UBW 2 years prior was 142, then unintentionally lost 10 lbs due to various health concerns (hernia, chemo) and gained it back. Now presents at 142#. Weight gain is considered appropriate and intentional after weight loss. Pt understands adequate nutrition for her condition and feels better.

## 2020-09-28 NOTE — PROGRESS NOTE ADULT - ASSESSMENT
Pneumonia- on ABx    Atrial fib with RVR-   continue current dose of metoprolol- HR better controlled but still in 90/min at rest.  Will add digoxin since she maxed on dose of metoprolol.   Continue full dose AC with Elliquis      HFpEF acute decompensated.    continue lasix 20mg daily.Appears euvolemic.  Diuresis with close monitoring of the renal function and electrolytes.  Goal potassium of 4 and magnesium of 2.   Strict I/O and daily wt checks. Low sodium diet. Nutrition education.     Other medical issues- Management per primary team.   Thank you for allowing me to participate in the care of this patient. Please feel free to contact me with any questions.

## 2020-09-28 NOTE — PROGRESS NOTE ADULT - ASSESSMENT
84 y/o Female with PMHx of Afib previously on Digoxin, currently on Eliquis, GERD, HLD, HTN, hypoadrenalism, lymphedema, metastatic melanoma, pacemaker presents to the ED shortness of breath, and feeling "drained" with N/V/decreased po intake for days and s/p recent UTI Tx with macrobid.    acute respiratory failure w/ hypoxia, POA  Sepsis POA w/o septic shock due to BL PNA due to suspected Gram neg/pos organisms    - blood culture NGTD   - ?rash to cefepime. benadryl prn. cefepime changed to meropenem per ID -- rash has cleared up   - doxycycline    - ID consult appreciated -- plan to d/c home on 5 more days of oral doxycycline as rec by ID    PAF with RVR  suspected acute diastolic CHF   - cont eliquis   - metoprolol dose adjusted, now on 100 mg BID. rate uncontrolled. digxoin started today   - s/p IV lasix, now on oral lasix  - cardio consult appreciated    Hypokalemia  - replace K and Mg    Hx of hypoadrenalism on po steroids - if remains hypotensive will give few doses of stress steroids    Recent UTI - resolved    Thrombocytopenia - monitor, possible due to macrobid, plts improving    Dispo/ signout for AM hospitalist: discharge home tomorrow if cleared by cardio. a-fib rate uncontrolled today and started on digoxin. BB dose adjusted this admission. on treatment w/ Iv antibx for pneumonia but plan to d/c home on oral doxy as rec by ID. pt is off O2 and w/ good O2 sats on RA. reduced prednisone dose back to home dose of 7.5 mg (takes 15 mg in acute setting).

## 2020-09-28 NOTE — DISCHARGE NOTE PROVIDER - CARE PROVIDER_API CALL
Ronald Shepard (MD)  Cardiovascular Disease; Nuclear Cardiology  [address] [place] [address]  Phone: [phone]  Fax: [phone]  Follow Up Time:

## 2020-09-28 NOTE — DIETITIAN INITIAL EVALUATION ADULT. - PERTINENT MEDS FT
MEDICATIONS  (STANDING):  apixaban 5 milliGRAM(s) Oral every 12 hours  digoxin     Tablet 0.125 milliGRAM(s) Oral daily  doxycycline hyclate Capsule 100 milliGRAM(s) Oral every 12 hours  furosemide    Tablet 20 milliGRAM(s) Oral daily  influenza   Vaccine 0.5 milliLiter(s) IntraMuscular once  meropenem  IVPB 500 milliGRAM(s) IV Intermittent every 8 hours  metoprolol tartrate 100 milliGRAM(s) Oral two times a day  predniSONE   Tablet 15 milliGRAM(s) Oral daily  simvastatin 10 milliGRAM(s) Oral at bedtime    MEDICATIONS  (PRN):  acetaminophen   Tablet .. 650 milliGRAM(s) Oral every 6 hours PRN Temp greater or equal to 38C (100.4F), Mild Pain (1 - 3)  diphenhydrAMINE 25 milliGRAM(s) Oral every 4 hours PRN Rash and/or Itching  metoprolol tartrate Injectable 5 milliGRAM(s) IV Push every 6 hours PRN HR >110  ondansetron Injectable 4 milliGRAM(s) IV Push every 6 hours PRN Nausea and/or Vomiting

## 2020-09-29 ENCOUNTER — TRANSCRIPTION ENCOUNTER (OUTPATIENT)
Age: 85
End: 2020-09-29

## 2020-09-29 VITALS — OXYGEN SATURATION: 99 % | HEART RATE: 88 BPM

## 2020-09-29 LAB
ANION GAP SERPL CALC-SCNC: 5 MMOL/L — SIGNIFICANT CHANGE UP (ref 5–17)
BUN SERPL-MCNC: 31 MG/DL — HIGH (ref 7–23)
CALCIUM SERPL-MCNC: 9 MG/DL — SIGNIFICANT CHANGE UP (ref 8.5–10.1)
CHLORIDE SERPL-SCNC: 103 MMOL/L — SIGNIFICANT CHANGE UP (ref 96–108)
CO2 SERPL-SCNC: 31 MMOL/L — SIGNIFICANT CHANGE UP (ref 22–31)
CREAT SERPL-MCNC: 0.69 MG/DL — SIGNIFICANT CHANGE UP (ref 0.5–1.3)
GLUCOSE SERPL-MCNC: 83 MG/DL — SIGNIFICANT CHANGE UP (ref 70–99)
MAGNESIUM SERPL-MCNC: 1.9 MG/DL — SIGNIFICANT CHANGE UP (ref 1.6–2.6)
POTASSIUM SERPL-MCNC: 4.2 MMOL/L — SIGNIFICANT CHANGE UP (ref 3.5–5.3)
POTASSIUM SERPL-SCNC: 4.2 MMOL/L — SIGNIFICANT CHANGE UP (ref 3.5–5.3)
SODIUM SERPL-SCNC: 139 MMOL/L — SIGNIFICANT CHANGE UP (ref 135–145)

## 2020-09-29 PROCEDURE — 99239 HOSP IP/OBS DSCHRG MGMT >30: CPT

## 2020-09-29 RX ORDER — DIGOXIN 250 MCG
1 TABLET ORAL
Qty: 30 | Refills: 0
Start: 2020-09-29 | End: 2020-10-28

## 2020-09-29 RX ORDER — METOPROLOL TARTRATE 50 MG
1 TABLET ORAL
Qty: 60 | Refills: 0
Start: 2020-09-29 | End: 2020-10-28

## 2020-09-29 RX ORDER — METOPROLOL TARTRATE 50 MG
1 TABLET ORAL
Qty: 0 | Refills: 0 | DISCHARGE
Start: 2020-09-29

## 2020-09-29 RX ORDER — METOPROLOL TARTRATE 50 MG
1 TABLET ORAL
Qty: 0 | Refills: 0 | DISCHARGE

## 2020-09-29 RX ORDER — FUROSEMIDE 40 MG
1 TABLET ORAL
Qty: 30 | Refills: 0
Start: 2020-09-29 | End: 2020-10-28

## 2020-09-29 RX ADMIN — Medication 100 MILLIGRAM(S): at 09:52

## 2020-09-29 RX ADMIN — Medication 100 MILLIGRAM(S): at 05:45

## 2020-09-29 RX ADMIN — MEROPENEM 100 MILLIGRAM(S): 1 INJECTION INTRAVENOUS at 05:45

## 2020-09-29 RX ADMIN — Medication 20 MILLIGRAM(S): at 09:52

## 2020-09-29 RX ADMIN — APIXABAN 5 MILLIGRAM(S): 2.5 TABLET, FILM COATED ORAL at 09:52

## 2020-09-29 RX ADMIN — MEROPENEM 100 MILLIGRAM(S): 1 INJECTION INTRAVENOUS at 14:11

## 2020-09-29 RX ADMIN — Medication 7.5 MILLIGRAM(S): at 09:52

## 2020-09-29 RX ADMIN — Medication 0.12 MILLIGRAM(S): at 09:52

## 2020-09-29 NOTE — PROGRESS NOTE ADULT - ASSESSMENT
Pneumonia- on ABx    Atrial fib with RVR-   On max dose of metoprolol , on digoxin.  still rate 80-90/min. Will ambulate her shortly and reasses the rate. Informed RN.   Continue full dose AC with Elliquis      HFpEF acute decompensated.    continue lasix 20mg daily.Appears euvolemic.  Diuresis with close monitoring of the renal function and electrolytes.  Goal potassium of 4 and magnesium of 2.   Strict I/O and daily wt checks. Low sodium diet. Nutrition education.     Other medical issues- Management per primary team.   Thank you for allowing me to participate in the care of this patient. Please feel free to contact me with any questions.

## 2020-09-29 NOTE — DISCHARGE NOTE NURSING/CASE MANAGEMENT/SOCIAL WORK - PATIENT PORTAL LINK FT
You can access the FollowMyHealth Patient Portal offered by [facility] by registering at the following website: [email or web address]. By joining [employer]’s FollowMyHealth portal, you will also be able to view your health information using other applications (apps) compatible with our system.

## 2020-09-29 NOTE — PROGRESS NOTE ADULT - PROVIDER SPECIALTY LIST ADULT
Cardiology
Hospitalist
Infectious Disease
Hospitalist
Cardiology
Cardiology

## 2020-09-29 NOTE — PROGRESS NOTE ADULT - SUBJECTIVE AND OBJECTIVE BOX
Patient is a 85y old  Female who presents with a chief complaint of complain of shortness of breath (24 Sep 2020 11:38)    - "I feel so much better today "    MEDICATIONS  (STANDING):  apixaban 5 milliGRAM(s) Oral every 12 hours  cefepime  Injectable. 1000 milliGRAM(s) IV Push every 12 hours  doxycycline hyclate Capsule 100 milliGRAM(s) Oral every 12 hours  influenza   Vaccine 0.5 milliLiter(s) IntraMuscular once  metoprolol tartrate 50 milliGRAM(s) Oral two times a day  predniSONE   Tablet 15 milliGRAM(s) Oral daily  simvastatin 10 milliGRAM(s) Oral at bedtime    MEDICATIONS  (PRN):  acetaminophen   Tablet .. 650 milliGRAM(s) Oral every 6 hours PRN Temp greater or equal to 38C (100.4F), Mild Pain (1 - 3)  metoprolol tartrate Injectable 5 milliGRAM(s) IV Push every 6 hours PRN HR >110  ondansetron Injectable 4 milliGRAM(s) IV Push every 6 hours PRN Nausea and/or Vomiting            Vital Signs Last 24 Hrs  T(C): 36.9 (25 Sep 2020 08:05), Max: 37.1 (24 Sep 2020 19:06)  T(F): 98.5 (25 Sep 2020 08:05), Max: 98.7 (24 Sep 2020 19:06)  HR: 65 (25 Sep 2020 08:05) (65 - 127)  BP: 167/56 (25 Sep 2020 08:05) (79/56 - 167/56)  BP(mean): 61 (24 Sep 2020 14:00) (50 - 67)  RR: 16 (25 Sep 2020 08:05) (16 - 31)  SpO2: 97% (25 Sep 2020 08:05) (92% - 100%)            INTERPRETATION OF TELEMETRY:  AFIB 100-110  ECG:        LABS:                        12.6   5.22  )-----------( 92       ( 25 Sep 2020 06:53 )             38.8     -    135  |  102  |  29<H>  ----------------------------<  77  3.3<L>   |  24  |  0.68    Ca    8.9      25 Sep 2020 06:53  Mg     1.5     09-    TPro  6.5  /  Alb  3.2<L>  /  TBili  1.8<H>  /  DBili  x   /  AST  24  /  ALT  34  /  AlkPhos  40      CARDIAC MARKERS ( 24 Sep 2020 06:45 )  <0.015 ng/mL / x     / x     / x     / x      CARDIAC MARKERS ( 23 Sep 2020 18:42 )  0.030 ng/mL / x     / x     / x     / x      CARDIAC MARKERS ( 23 Sep 2020 14:32 )  0.049 ng/mL / x     / x     / x     / x          PTT - ( 23 Sep 2020 14:32 )  PTT:40.3 sec  Urinalysis Basic - ( 23 Sep 2020 17:26 )    Color: Yellow / Appearance: Clear / S.005 / pH: x  Gluc: x / Ketone: Trace  / Bili: Negative / Urobili: 4 mg/dL   Blood: x / Protein: 30 mg/dL / Nitrite: Negative   Leuk Esterase: Trace / RBC: 11-25 /HPF / WBC 6-10   Sq Epi: x / Non Sq Epi: Moderate / Bacteria: Moderate      I&O's Summary    24 Sep 2020 07:01  -  25 Sep 2020 07:00  --------------------------------------------------------  IN: 30 mL / OUT: 0 mL / NET: 30 mL      BNP  RADIOLOGY & ADDITIONAL STUDIES:            
Patient is a 85y old  Female who presents with a chief complaint of complain of shortness of breath.       HPI:  86 y/o Female with a PMHx of Afib previously on Digoxin, currently on Eliquis, GERD, HLD, HTN, hypoadrenalism, lymphedema, metastatic melanoma, pacemaker presents to the ED shortness of breath, and feeling "drained." Also has some non productive cough.     9/26- pt still c/o palpitations, mild SOB.     9/27- pt seen and examined this am.  She denies any symptoms.     9/28- pt seen and examined by me today. pt states that her SOB is getting better.    PAST MEDICAL & SURGICAL HISTORY:  HLD (hyperlipidemia)    HTN (hypertension)    Pacemaker    Atrial fibrillation    History of melanoma excision    H/O cataract extraction  both eyes    S/P tonsillectomy and adenoidectomy        MEDICATIONS  (STANDING):  apixaban 5 milliGRAM(s) Oral every 12 hours  doxycycline hyclate Capsule 100 milliGRAM(s) Oral every 12 hours  influenza   Vaccine 0.5 milliLiter(s) IntraMuscular once  meropenem  IVPB 500 milliGRAM(s) IV Intermittent every 8 hours  metoprolol tartrate 100 milliGRAM(s) Oral two times a day  predniSONE   Tablet 15 milliGRAM(s) Oral daily  simvastatin 10 milliGRAM(s) Oral at bedtime    MEDICATIONS  (PRN):  acetaminophen   Tablet .. 650 milliGRAM(s) Oral every 6 hours PRN Temp greater or equal to 38C (100.4F), Mild Pain (1 - 3)  diphenhydrAMINE 25 milliGRAM(s) Oral every 4 hours PRN Rash and/or Itching  metoprolol tartrate Injectable 5 milliGRAM(s) IV Push every 6 hours PRN HR >110  ondansetron Injectable 4 milliGRAM(s) IV Push every 6 hours PRN Nausea and/or Vomiting      FAMILY HISTORY:  No pertinent family history in first degree relatives        SOCIAL HISTORY:    REVIEW OF SYSTEMS:  CONSTITUTIONAL:    No fatigue, malaise, lethargy.  No fever or chills.  RESPIRATORY:  No cough.  No wheeze.  No hemoptysis.  no shortness of breath.  CARDIOVASCULAR:  No chest pains.  no palpitations. no shortness of breath, No orthopnea or PND.  GASTROINTESTINAL:  No abdominal pain.  No nausea or vomiting.    GENITOURINARY:    No hematuria.    MUSCULOSKELETAL:  c/o musculoskeletal pain.  No joint swelling.  No arthritis.  NEUROLOGICAL:  No tingling or numbness or weakness.  PSYCHIATRIC:  No confusion  SKIN:  No rashes.          ICU Vital Signs Last 24 Hrs  T(C): 36.4 (27 Sep 2020 21:51), Max: 36.9 (27 Sep 2020 09:12)  T(F): 97.5 (27 Sep 2020 21:51), Max: 98.5 (27 Sep 2020 09:12)  HR: 97 (27 Sep 2020 21:51) (96 - 97)  BP: 124/64 (27 Sep 2020 21:51) (119/76 - 124/64)  BP(mean): --  ABP: --  ABP(mean): --  RR: 18 (27 Sep 2020 09:12) (18 - 18)  SpO2: 97% (27 Sep 2020 21:51) (96% - 97%)      PHYSICAL EXAM-    Constitutional : ill looking female , no distress    Head: Head is normocephalic and atraumatic.      Neck: No jugular venous distention. No audible carotid bruits. There are strong carotid pulses bilaterally. No JVD.     Cardiovascular: irregular rate and rhythm without S3, S4. No murmurs or rubs are appreciated.      Respiratory: faint basal rales.     Abdomen: Soft, nontender, nondistended with positive bowel sounds.      Extremity: No tenderness. No  pitting edema     Neurologic: The patient is alert and oriented.      Skin: No rash, no obvious lesions noted.      Psychiatric: The patient appears to be emotionally stable.      INTERPRETATION OF TELEMETRY: afib 90/min     ECG:    I&O's Detail      LABS:                        12.4   5.34  )-----------( 146      ( 28 Sep 2020 07:09 )             39.9     09-27    140  |  104  |  41<H>  ----------------------------<  99  3.3<L>   |  29  |  0.76    Ca    9.2      27 Sep 2020 10:58  Mg     1.7     09-27                                I&O's Summary    BNP  RADIOLOGY & ADDITIONAL STUDIES:  est< from: CT Angio Chest PE Protocol w/ IV Cont (09.23.20 @ 16:24) >  IMPRESSION:  No pulmonary embolism    Cardiomegaly.    Small bilateral pleural effusions. Bilateral lower lobe opacities could be on the basis of atelectasis or pneumonia.              ETELVINA GREY M.D.,ATTENDING RADIOLOGIST  This document has been electronically signed. Sep 23 2020  4:33PM    < end of copied text >  e< from: TTE Echo Complete w/o Contrast w/ Doppler (09.24.20 @ 14:35) >   Impression     Summary     Mild concentric left ventricular hypertrophy is present.   Estimated left ventricular ejection fraction is 60-65 %.   A calcified papillary muscle tip is noted.   The left atrium is severely dilated.   The right atrium appears severely dilated.   A device wire is seen in the RV and RA.   Fibrocalcific changes noted to the Aortic valve leaflets with preserved   leaflet excursion.   Trace aortic regurgitation is present.   Mild mitral annular calcification is present.   Fibrocalcific changes noted to the mitral valve leaflets with preserved   leaflet excursion.   Mild (1+) mitral regurgitation is present.   Thetricuspid valve leaflets are thin and pliable; valve motion is normal.   Severe (4+) tricuspid valve regurgitation is present.   Moderate pulmonary hypertension.   Pulmonic valve not well seen.   Trace pericardial effusion is present.   Pleural effusion - is present..   The IVC is mildly dilated.     Signature     ----------------------------------------------------------------   Electronically signed by Ej Simmons MD(Interpreting   physician) on 09/24/2020 05:18 PM    < end of copied text >  
  Date of service: 09-25-20 @ 12:49    Patient sitting in bed  She is breathing more comfortable   Has redness of extremities, is sweaty as well        ROS unable to obtain secondary to patient medical condition     MEDICATIONS  (STANDING):  apixaban 5 milliGRAM(s) Oral every 12 hours  doxycycline hyclate Capsule 100 milliGRAM(s) Oral every 12 hours  influenza   Vaccine 0.5 milliLiter(s) IntraMuscular once  meropenem  IVPB 500 milliGRAM(s) IV Intermittent every 8 hours  metoprolol tartrate 100 milliGRAM(s) Oral two times a day  predniSONE   Tablet 15 milliGRAM(s) Oral daily  simvastatin 10 milliGRAM(s) Oral at bedtime    MEDICATIONS  (PRN):  acetaminophen   Tablet .. 650 milliGRAM(s) Oral every 6 hours PRN Temp greater or equal to 38C (100.4F), Mild Pain (1 - 3)  diphenhydrAMINE 25 milliGRAM(s) Oral every 4 hours PRN Rash and/or Itching  metoprolol tartrate Injectable 5 milliGRAM(s) IV Push every 6 hours PRN HR >110  ondansetron Injectable 4 milliGRAM(s) IV Push every 6 hours PRN Nausea and/or Vomiting      Vital Signs Last 24 Hrs  T(C): 36.9 (25 Sep 2020 08:05), Max: 37.1 (24 Sep 2020 19:06)  T(F): 98.5 (25 Sep 2020 08:05), Max: 98.7 (24 Sep 2020 19:06)  HR: 65 (25 Sep 2020 08:05) (65 - 125)  BP: 167/56 (25 Sep 2020 08:05) (102/49 - 167/56)  BP(mean): 61 (24 Sep 2020 14:00) (61 - 67)  RR: 16 (25 Sep 2020 08:05) (16 - 23)  SpO2: 97% (25 Sep 2020 08:05) (95% - 100%)        Physical Exam:            Constitutional: frail looking  HEENT: NC/AT, EOMI, PERRLA, conjunctivae injection bilaterally with mild clearing on lateral edges  Neck: supple; thyroid not palpable  Back: no tenderness  Respiratory: respiratory effort normal; bibasilar crackles  Cardiovascular: S1S2 regular, no murmurs  Abdomen: soft, not tender, not distended, positive BS; no liver or spleen organomegaly  Genitourinary: no suprapubic tenderness  Musculoskeletal: no muscle tenderness, no joint swelling or tenderness  Neurological/ Psychiatric: AxOx3, judgement and insight normal;  moving all extremities  Skin: no rashes; no palpable lesions; erythema of extremities    Labs: all available labs reviewed                       Labs:                        12.6   5.22  )-----------( 92       ( 25 Sep 2020 06:53 )             38.8     09-25    135  |  102  |  29<H>  ----------------------------<  77  3.3<L>   |  24  |  0.68    Ca    8.9      25 Sep 2020 06:53  Mg     1.5     09-23    TPro  6.5  /  Alb  3.2<L>  /  TBili  1.8<H>  /  DBili  x   /  AST  24  /  ALT  34  /  AlkPhos  40  09-23           Cultures:       Culture - Blood (collected 09-23-20 @ 14:32)  Source: .Blood Blood  Preliminary Report (09-24-20 @ 20:01):    No growth to date.              COVID-19 PCR . (09.23.20 @ 14:32)    COVID-19 PCR: NotDetec: Testing is performed using polymerase chain reaction (PCR) or  transcription mediated amplification (TMA). This COVID-19 (SARS-CoV-2)  nucleic acid amplification test was validated by [facility] and is  in use under the FDA Emergency Use Authorization (EUA) for clinical labs  CLIA-certified to perform high complexity testing. Test results should be  correlated with clinical presentation, patient history, and epidemiology.          < from: CT Angio Chest PE Protocol w/ IV Cont (09.23.20 @ 16:24) >    IMPRESSION:  No pulmonary embolism    Cardiomegaly.    Small bilateral pleural effusions. Bilateral lower lobe opacities could be on the basis of atelectasis or pneumonia.      < end of copied text >            Radiology: all available radiological tests reviewed    Advanced directives addressed: full resuscitation
CC: complain of shortness of breath (24 Sep 2020 10:20)    HPI: 84 y/o Female with PMHx of Afib previously on Digoxin, currently on Eliquis, GERD, HLD, HTN, hypoadrenalism, lymphedema, metastatic melanoma, pacemaker presents to the ED shortness of breath, and feeling "drained." Also has some non productive cough. No chest pain. States her son told her she was breathing fast. Denies abdominal pain, neck stiffness, rash, hematuria, hematochezia, melena. Recently treated for UTI, unsure which medication. No recent trauma. She also has redness in both eyes today. No vision problem. No other complaints at this time. No fever at home. Patient  was found to be hypoxic in ED and was given Oxygen by NC.  (23 Sep 2020 21:06)    INTERVAL HPI/OVERNIGHT EVENTS: feeling better but had palpitations earlier. rash is improving    ROS: no chest pain, no dyspnea    T(C): 36.5 (09-26-20 @ 09:23), Max: 36.8 (09-25-20 @ 20:53)  HR: 82 (09-26-20 @ 09:23) (82 - 98)  BP: 118/82 (09-26-20 @ 09:23) (109/70 - 118/82)  RR: 18 (09-26-20 @ 09:23) (18 - 18)  SpO2: 97% (09-26-20 @ 09:23) (92% - 97%)     PHYSICAL EXAM:    General: elderly female in no acute distress  Eyes: PERRLA, EOMI; b/l conjunctiva with hemorrhage  Head: Normocephalic; atraumatic  ENMT: No nasal discharge; airway clear, dry mucosals  Neck: Supple; non tender; no masses  Respiratory: No wheezes, rales or rhonchi, decreased BS at bases BL with bibasilar crackles  Cardiovascular: S1, S2 irreg, Left upper chest PPM  Gastrointestinal: Soft non-tender non-distended; Normal bowel sounds  Genitourinary: No costovertebral angle tenderness  Extremities: No clubbing, cyanosis or edema  Vascular: Peripheral pulses palpable 2+ bilaterally  Neurological: Alert and oriented x4  Skin: Warm and dry. erythematous patches on b/l LE w/ macular rash on the lateral thighs. also involving abdomen and b/l UE   Musculoskeletal: Normal tone, without deformities  Psychiatric: Cooperative and appropriate      LABS: All Labs Reviewed:                        12.7   4.74  )-----------( 113      ( 26 Sep 2020 07:17 )             38.2     09-26    138  |  105  |  39<H>  ----------------------------<  93  3.8   |  29  |  0.69    Ca    8.7      26 Sep 2020 07:17  Mg     1.8     09-26              Blood Culture: 09-23 @ 14:32  Organism --  Gram Stain Blood -- Gram Stain --  Specimen Source .Blood Blood  Culture-Blood --      MEDICATIONS  (STANDING):  apixaban 5 milliGRAM(s) Oral every 12 hours  cefepime  Injectable. 1000 milliGRAM(s) IV Push every 12 hours  doxycycline hyclate Capsule 100 milliGRAM(s) Oral every 12 hours  influenza   Vaccine 0.5 milliLiter(s) IntraMuscular once  metoprolol tartrate 50 milliGRAM(s) Oral two times a day  predniSONE   Tablet 15 milliGRAM(s) Oral daily  simvastatin 10 milliGRAM(s) Oral at bedtime    MEDICATIONS  (PRN):  acetaminophen   Tablet .. 650 milliGRAM(s) Oral every 6 hours PRN Temp greater or equal to 38C (100.4F), Mild Pain (1 - 3)  metoprolol tartrate Injectable 5 milliGRAM(s) IV Push every 6 hours PRN HR >110  ondansetron Injectable 4 milliGRAM(s) IV Push every 6 hours PRN Nausea and/or Vomiting    RADIOLOGY & ADDITIONAL TESTS: personally visualized    
CC: complain of shortness of breath (24 Sep 2020 10:20)    HPI: 86 y/o Female with PMHx of Afib previously on Digoxin, currently on Eliquis, GERD, HLD, HTN, hypoadrenalism, lymphedema, metastatic melanoma, pacemaker presents to the ED shortness of breath, and feeling "drained." Also has some non productive cough. No chest pain. States her son told her she was breathing fast. Denies abdominal pain, neck stiffness, rash, hematuria, hematochezia, melena. Recently treated for UTI, unsure which medication. No recent trauma. She also has redness in both eyes today. No vision problem. No other complaints at this time. No fever at home. Patient  was found to be hypoxic in ED and was given Oxygen by NC.  (23 Sep 2020 21:06)    INTERVAL HPI/OVERNIGHT EVENTS: N/V resolved, dropped BP after metoprolol PO, clinically dry and thirsty  Other ROS reviewed and neg     Vital Signs Last 24 Hrs  T(C): 37.1 (24 Sep 2020 03:00), Max: 39.1 (23 Sep 2020 14:13)  T(F): 98.7 (24 Sep 2020 03:00), Max: 102.4 (23 Sep 2020 14:13)  HR: 77 (24 Sep 2020 10:45) (73 - 127)  BP: 84/46 (24 Sep 2020 10:45) (81/44 - 133/52)  BP(mean): 55 (24 Sep 2020 10:45) (50 - 84)  RR: 22 (24 Sep 2020 10:45) (16 - 30)  SpO2: 95% (24 Sep 2020 10:45) (89% - 100%)  I&O's Detail    23 Sep 2020 07:  -  24 Sep 2020 07:00  --------------------------------------------------------  IN:    IV PiggyBack: 50 mL  Total IN: 50 mL    OUT:  Total OUT: 0 mL    Total NET: 50 mL      24 Sep 2020 07:  -  24 Sep 2020 11:38  --------------------------------------------------------  IN:    Oral Fluid: 30 mL  Total IN: 30 mL    OUT:  Total OUT: 0 mL    Total NET: 30 mL          CARDIAC MARKERS ( 24 Sep 2020 06:45 )  <0.015 ng/mL / x     / x     / x     / x      CARDIAC MARKERS ( 23 Sep 2020 18:42 )  0.030 ng/mL / x     / x     / x     / x      CARDIAC MARKERS ( 23 Sep 2020 14:32 )  0.049 ng/mL / x     / x     / x     / x                                14.8   5.34  )-----------( 86       ( 24 Sep 2020 06:45 )             44.8     24 Sep 2020 06:45    132    |  98     |  28     ----------------------------<  87     3.8     |  28     |  0.74     Ca    8.8        24 Sep 2020 06:45  Mg     1.5       23 Sep 2020 14:32    TPro  6.5    /  Alb  3.2    /  TBili  1.8    /  DBili  x      /  AST  24     /  ALT  34     /  AlkPhos  40     23 Sep 2020 14:32    PTT - ( 23 Sep 2020 14:32 )  PTT:40.3 sec    LIVER FUNCTIONS - ( 23 Sep 2020 14:32 )  Alb: 3.2 g/dL / Pro: 6.5 gm/dL / ALK PHOS: 40 U/L / ALT: 34 U/L / AST: 24 U/L / GGT: x           Urinalysis Basic - ( 23 Sep 2020 17:26 )    Color: Yellow / Appearance: Clear / S.005 / pH: x  Gluc: x / Ketone: Trace  / Bili: Negative / Urobili: 4 mg/dL   Blood: x / Protein: 30 mg/dL / Nitrite: Negative   Leuk Esterase: Trace / RBC: 11-25 /HPF / WBC 6-10   Sq Epi: x / Non Sq Epi: Moderate / Bacteria: Moderate    MEDICATIONS  (STANDING):  apixaban 5 milliGRAM(s) Oral every 12 hours  cefepime  Injectable. 1000 milliGRAM(s) IV Push every 12 hours  doxycycline hyclate Capsule 100 milliGRAM(s) Oral every 12 hours  influenza   Vaccine 0.5 milliLiter(s) IntraMuscular once  metoprolol tartrate 50 milliGRAM(s) Oral two times a day  predniSONE   Tablet 15 milliGRAM(s) Oral daily  simvastatin 10 milliGRAM(s) Oral at bedtime    MEDICATIONS  (PRN):  acetaminophen   Tablet .. 650 milliGRAM(s) Oral every 6 hours PRN Temp greater or equal to 38C (100.4F), Mild Pain (1 - 3)  metoprolol tartrate Injectable 5 milliGRAM(s) IV Push every 6 hours PRN HR >110  ondansetron Injectable 4 milliGRAM(s) IV Push every 6 hours PRN Nausea and/or Vomiting    RADIOLOGY & ADDITIONAL TESTS: personally visualized    PHYSICAL EXAM:    General: elderly female in no acute distress  Eyes: PERRLA, EOMI; Left conjunctiva with hemorrhage  Head: Normocephalic; atraumatic  ENMT: No nasal discharge; airway clear, dry mucosals  Neck: Supple; non tender; no masses  Respiratory: No wheezes, rales or rhonchi, decreased BS at bases BL with bibasilar crackles  Cardiovascular: S1, S2 irreg, Left upper chest PPM  Gastrointestinal: Soft non-tender non-distended; Normal bowel sounds  Genitourinary: No costovertebral angle tenderness  Extremities: No clubbing, cyanosis or edema  Vascular: Peripheral pulses palpable 2+ bilaterally  Neurological: Alert and oriented x4  Skin: Warm and dry.   Musculoskeletal: Normal tone, without deformities  Psychiatric: Cooperative and appropriate
CC: complain of shortness of breath (24 Sep 2020 10:20)    HPI: 86 y/o Female with PMHx of Afib previously on Digoxin, currently on Eliquis, GERD, HLD, HTN, hypoadrenalism, lymphedema, metastatic melanoma, pacemaker presents to the ED shortness of breath, and feeling "drained." Also has some non productive cough. No chest pain. States her son told her she was breathing fast. Denies abdominal pain, neck stiffness, rash, hematuria, hematochezia, melena. Recently treated for UTI, unsure which medication. No recent trauma. She also has redness in both eyes today. No vision problem. No other complaints at this time. No fever at home. Patient  was found to be hypoxic in ED and was given Oxygen by NC.  (23 Sep 2020 21:06)    SUBJECTIVE: feeling better. rash has cleared up. no dyspnea. no cough    ROS: no chest pain, no dyspnea    T(C): 36.9 (09-27-20 @ 09:12), Max: 36.9 (09-27-20 @ 09:12)  HR: 96 (09-27-20 @ 09:12) (96 - 96)  BP: 119/76 (09-27-20 @ 09:12) (119/76 - 122/53)  RR: 18 (09-27-20 @ 09:12) (16 - 18)  SpO2: 96% (09-27-20 @ 09:12) (96% - 97%)     PHYSICAL EXAM:  Gen - Pleasant, cooperative, in no acute distress  HEENT- PERRL, moist mucus membranes, OP clear  CV - irregularly irregular, No m/r/g, +pulses, no edema  Lungs - Good effort, Clear to auscultation bilaterally  Abdomen - Soft, nontender, nondistended, +BS, No rebound/rigidity/guarding  Ext - No cyanosis/clubbing.   Skin - No rashes, No jaundice  Psych- Alert & oriented x 3  Neuro- fluent speech, no facial droop, EOMI. moves all ext      LABS: All Labs Reviewed:                        12.7   4.74  )-----------( 113      ( 26 Sep 2020 07:17 )             38.2   09-27    140  |  104  |  41<H>  ----------------------------<  99  3.3<L>   |  29  |  0.76    Ca    9.2      27 Sep 2020 10:58  Mg     1.7     09-27        Blood Culture: 09-23 @ 14:32  Organism --  Gram Stain Blood -- Gram Stain --  Specimen Source .Blood Blood  Culture-Blood --      MEDICATIONS  (STANDING):  apixaban 5 milliGRAM(s) Oral every 12 hours  doxycycline hyclate Capsule 100 milliGRAM(s) Oral every 12 hours  furosemide    Tablet 20 milliGRAM(s) Oral daily  influenza   Vaccine 0.5 milliLiter(s) IntraMuscular once  magnesium sulfate  IVPB 1 Gram(s) IV Intermittent once  meropenem  IVPB 500 milliGRAM(s) IV Intermittent every 8 hours  metoprolol tartrate 100 milliGRAM(s) Oral two times a day  potassium chloride    Tablet ER 40 milliEquivalent(s) Oral every 4 hours  predniSONE   Tablet 15 milliGRAM(s) Oral daily  simvastatin 10 milliGRAM(s) Oral at bedtime    MEDICATIONS  (PRN):  acetaminophen   Tablet .. 650 milliGRAM(s) Oral every 6 hours PRN Temp greater or equal to 38C (100.4F), Mild Pain (1 - 3)  diphenhydrAMINE 25 milliGRAM(s) Oral every 4 hours PRN Rash and/or Itching  metoprolol tartrate Injectable 5 milliGRAM(s) IV Push every 6 hours PRN HR >110  ondansetron Injectable 4 milliGRAM(s) IV Push every 6 hours PRN Nausea and/or Vomiting    RADIOLOGY & ADDITIONAL TESTS: personally visualized    
Date of service: 09-26-20 @ 13:00    Patient sitting in chair; no longer with any reddened skin; shortness of breath is resolving  Afebrile        ROS: no fever or chills; denies dizziness, no HA, no SOB or cough, no abdominal pain, no diarrhea or constipation; no dysuria, no urinary frequency, no legs pain, no rashes    MEDICATIONS  (STANDING):  apixaban 5 milliGRAM(s) Oral every 12 hours  doxycycline hyclate Capsule 100 milliGRAM(s) Oral every 12 hours  influenza   Vaccine 0.5 milliLiter(s) IntraMuscular once  meropenem  IVPB 500 milliGRAM(s) IV Intermittent every 8 hours  metoprolol tartrate 100 milliGRAM(s) Oral two times a day  predniSONE   Tablet 15 milliGRAM(s) Oral daily  simvastatin 10 milliGRAM(s) Oral at bedtime    MEDICATIONS  (PRN):  acetaminophen   Tablet .. 650 milliGRAM(s) Oral every 6 hours PRN Temp greater or equal to 38C (100.4F), Mild Pain (1 - 3)  diphenhydrAMINE 25 milliGRAM(s) Oral every 4 hours PRN Rash and/or Itching  metoprolol tartrate Injectable 5 milliGRAM(s) IV Push every 6 hours PRN HR >110  ondansetron Injectable 4 milliGRAM(s) IV Push every 6 hours PRN Nausea and/or Vomiting      Vital Signs Last 24 Hrs  T(C): 36.5 (26 Sep 2020 09:23), Max: 36.8 (25 Sep 2020 20:53)  T(F): 97.7 (26 Sep 2020 09:23), Max: 98.3 (25 Sep 2020 20:53)  HR: 82 (26 Sep 2020 09:23) (82 - 98)  BP: 118/82 (26 Sep 2020 09:23) (109/70 - 118/82)  BP(mean): --  RR: 18 (26 Sep 2020 09:23) (18 - 18)  SpO2: 97% (26 Sep 2020 09:23) (92% - 97%)        Physical Exam:            Constitutional: frail looking  HEENT: NC/AT, EOMI, PERRLA, conjunctivae injection bilaterally with mild clearing on lateral edges  Neck: supple; thyroid not palpable  Back: no tenderness  Respiratory: respiratory effort normal; bibasilar crackles  Cardiovascular: S1S2 regular, no murmurs  Abdomen: soft, not tender, not distended, positive BS; no liver or spleen organomegaly  Genitourinary: no suprapubic tenderness  Musculoskeletal: no muscle tenderness, no joint swelling or tenderness  Neurological/ Psychiatric: AxOx3, judgement and insight normal;  moving all extremities  Skin: no rashes; no palpable lesions; erythema of extremities    Labs: all available labs reviewed                        Labs:                        12.7   4.74  )-----------( 113      ( 26 Sep 2020 07:17 )             38.2     09-26    138  |  105  |  39<H>  ----------------------------<  93  3.8   |  29  |  0.69    Ca    8.7      26 Sep 2020 07:17  Mg     1.8     09-26             Cultures:       Culture - Blood (collected 09-23-20 @ 14:32)  Source: .Blood Blood  Preliminary Report (09-24-20 @ 20:01):    No growth to date.              COVID-19 PCR . (09.23.20 @ 14:32)    COVID-19 PCR: NotDetec: Testing is performed using polymerase chain reaction (PCR) or  transcription mediated amplification (TMA). This COVID-19 (SARS-CoV-2)  nucleic acid amplification test was validated by [facility] and is  in use under the FDA Emergency Use Authorization (EUA) for clinical labs  CLIA-certified to perform high complexity testing. Test results should be  correlated with clinical presentation, patient history, and epidemiology.          < from: CT Angio Chest PE Protocol w/ IV Cont (09.23.20 @ 16:24) >    IMPRESSION:  No pulmonary embolism    Cardiomegaly.    Small bilateral pleural effusions. Bilateral lower lobe opacities could be on the basis of atelectasis or pneumonia.      < end of copied text >            Radiology: all available radiological tests reviewed    Advanced directives addressed: full resuscitation
Date of service: 09-28-20 @ 10:48      Patient sitting in chair; not requiring any oxygen  No cough, afebrile      ROS: no fever or chills; denies dizziness, no HA, no SOB or cough, no abdominal pain, no diarrhea or constipation; no dysuria, no urinary frequency, no legs pain, no rashes    MEDICATIONS  (STANDING):  apixaban 5 milliGRAM(s) Oral every 12 hours  digoxin     Tablet 0.125 milliGRAM(s) Oral daily  doxycycline hyclate Capsule 100 milliGRAM(s) Oral every 12 hours  furosemide    Tablet 20 milliGRAM(s) Oral daily  influenza   Vaccine 0.5 milliLiter(s) IntraMuscular once  meropenem  IVPB 500 milliGRAM(s) IV Intermittent every 8 hours  metoprolol tartrate 100 milliGRAM(s) Oral two times a day  predniSONE   Tablet 15 milliGRAM(s) Oral daily  simvastatin 10 milliGRAM(s) Oral at bedtime    MEDICATIONS  (PRN):  acetaminophen   Tablet .. 650 milliGRAM(s) Oral every 6 hours PRN Temp greater or equal to 38C (100.4F), Mild Pain (1 - 3)  diphenhydrAMINE 25 milliGRAM(s) Oral every 4 hours PRN Rash and/or Itching  metoprolol tartrate Injectable 5 milliGRAM(s) IV Push every 6 hours PRN HR >110  ondansetron Injectable 4 milliGRAM(s) IV Push every 6 hours PRN Nausea and/or Vomiting      Vital Signs Last 24 Hrs  T(C): 36.7 (28 Sep 2020 07:44), Max: 36.7 (28 Sep 2020 07:44)  T(F): 98 (28 Sep 2020 07:44), Max: 98 (28 Sep 2020 07:44)  HR: 90 (28 Sep 2020 07:44) (90 - 97)  BP: 140/75 (28 Sep 2020 07:44) (124/64 - 140/75)  BP(mean): --  RR: 19 (28 Sep 2020 07:44) (19 - 19)  SpO2: 98% (28 Sep 2020 07:44) (97% - 98%)        Physical Exam:        Constitutional: frail looking  HEENT: NC/AT, EOMI, PERRLA, conjunctivae injection bilaterally with mild clearing on lateral edges  Neck: supple; thyroid not palpable  Back: no tenderness  Respiratory: respiratory effort normal; bibasilar crackles resolved; CTA  Cardiovascular: S1S2 regular, no murmurs  Abdomen: soft, not tender, not distended, positive BS; no liver or spleen organomegaly  Genitourinary: no suprapubic tenderness  Musculoskeletal: no muscle tenderness, no joint swelling or tenderness  Neurological/ Psychiatric: AxOx3, judgement and insight normal;  moving all extremities  Skin: no rashes; no palpable lesions; erythema of extremities    Labs: all available labs reviewed                        Labs:                       Labs:                        12.4   5.34  )-----------( 146      ( 28 Sep 2020 07:09 )             39.9     09-28    141  |  107  |  34<H>  ----------------------------<  79  4.5   |  32<H>  |  0.63    Ca    8.7      28 Sep 2020 07:09  Mg     2.0     09-28             Cultures:       Culture - Blood (collected 09-23-20 @ 14:32)  Source: .Blood Blood  Preliminary Report (09-24-20 @ 20:01):    No growth to date.              COVID-19 PCR . (09.23.20 @ 14:32)    COVID-19 PCR: NotDetec: Testing is performed using polymerase chain reaction (PCR) or  transcription mediated amplification (TMA). This COVID-19 (SARS-CoV-2)  nucleic acid amplification test was validated by [facility] and is  in use under the FDA Emergency Use Authorization (EUA) for clinical labs  CLIA-certified to perform high complexity testing. Test results should be  correlated with clinical presentation, patient history, and epidemiology.          < from: CT Angio Chest PE Protocol w/ IV Cont (09.23.20 @ 16:24) >    IMPRESSION:  No pulmonary embolism    Cardiomegaly.    Small bilateral pleural effusions. Bilateral lower lobe opacities could be on the basis of atelectasis or pneumonia.      < end of copied text >            Radiology: all available radiological tests reviewed    Advanced directives addressed: full resuscitation
Patient is a 85y old  Female who presents with a chief complaint of complain of shortness of breath.       HPI:  84 y/o Female with a PMHx of Afib previously on Digoxin, currently on Eliquis, GERD, HLD, HTN, hypoadrenalism, lymphedema, metastatic melanoma, pacemaker presents to the ED shortness of breath, and feeling "drained." Also has some non productive cough.     9/26- pt still c/o palpitations, mild SOB.     9/27- pt seen and examined this am.  She denies any symptoms.   PAST MEDICAL & SURGICAL HISTORY:  HLD (hyperlipidemia)    HTN (hypertension)    Pacemaker    Atrial fibrillation    History of melanoma excision    H/O cataract extraction  both eyes    S/P tonsillectomy and adenoidectomy        MEDICATIONS  (STANDING):  apixaban 5 milliGRAM(s) Oral every 12 hours  doxycycline hyclate Capsule 100 milliGRAM(s) Oral every 12 hours  influenza   Vaccine 0.5 milliLiter(s) IntraMuscular once  meropenem  IVPB 500 milliGRAM(s) IV Intermittent every 8 hours  metoprolol tartrate 100 milliGRAM(s) Oral two times a day  predniSONE   Tablet 15 milliGRAM(s) Oral daily  simvastatin 10 milliGRAM(s) Oral at bedtime    MEDICATIONS  (PRN):  acetaminophen   Tablet .. 650 milliGRAM(s) Oral every 6 hours PRN Temp greater or equal to 38C (100.4F), Mild Pain (1 - 3)  diphenhydrAMINE 25 milliGRAM(s) Oral every 4 hours PRN Rash and/or Itching  metoprolol tartrate Injectable 5 milliGRAM(s) IV Push every 6 hours PRN HR >110  ondansetron Injectable 4 milliGRAM(s) IV Push every 6 hours PRN Nausea and/or Vomiting      FAMILY HISTORY:  No pertinent family history in first degree relatives        SOCIAL HISTORY:    REVIEW OF SYSTEMS:  CONSTITUTIONAL:    No fatigue, malaise, lethargy.  No fever or chills.  RESPIRATORY:  No cough.  No wheeze.  No hemoptysis.  no shortness of breath.  CARDIOVASCULAR:  No chest pains.  no palpitations. no shortness of breath, No orthopnea or PND.  GASTROINTESTINAL:  No abdominal pain.  No nausea or vomiting.    GENITOURINARY:    No hematuria.    MUSCULOSKELETAL:  c/o musculoskeletal pain.  No joint swelling.  No arthritis.  NEUROLOGICAL:  No tingling or numbness or weakness.  PSYCHIATRIC:  No confusion  SKIN:  No rashes.            ICU Vital Signs Last 24 Hrs  T(C): 36.3 (26 Sep 2020 21:12), Max: 36.5 (26 Sep 2020 09:23)  T(F): 97.4 (26 Sep 2020 21:12), Max: 97.7 (26 Sep 2020 09:23)  HR: 96 (26 Sep 2020 21:12) (82 - 96)  BP: 122/53 (26 Sep 2020 21:12) (118/82 - 122/53)  BP(mean): --  ABP: --  ABP(mean): --  RR: 16 (26 Sep 2020 21:12) (16 - 18)  SpO2: 97% (26 Sep 2020 21:12) (97% - 97%)      PHYSICAL EXAM-    Constitutional : ill looking female , no distress    Head: Head is normocephalic and atraumatic.      Neck: No jugular venous distention. No audible carotid bruits. There are strong carotid pulses bilaterally. No JVD.     Cardiovascular: irregular rate and rhythm without S3, S4. No murmurs or rubs are appreciated.      Respiratory: faint basal rales.     Abdomen: Soft, nontender, nondistended with positive bowel sounds.      Extremity: No tenderness. No  pitting edema     Neurologic: The patient is alert and oriented.      Skin: No rash, no obvious lesions noted.      Psychiatric: The patient appears to be emotionally stable.      INTERPRETATION OF TELEMETRY: afib 90/min     ECG:    I&O's Detail      LABS:                        12.7   4.74  )-----------( 113      ( 26 Sep 2020 07:17 )             38.2     09-26    138  |  105  |  39<H>  ----------------------------<  93  3.8   |  29  |  0.69    Ca    8.7      26 Sep 2020 07:17  Mg     1.8     09-26                            I&O's Summary    BNP  RADIOLOGY & ADDITIONAL STUDIES:  est< from: CT Angio Chest PE Protocol w/ IV Cont (09.23.20 @ 16:24) >  IMPRESSION:  No pulmonary embolism    Cardiomegaly.    Small bilateral pleural effusions. Bilateral lower lobe opacities could be on the basis of atelectasis or pneumonia.              ETELVINA GREY M.D.,ATTENDING RADIOLOGIST  This document has been electronically signed. Sep 23 2020  4:33PM    < end of copied text >  e< from: TTE Echo Complete w/o Contrast w/ Doppler (09.24.20 @ 14:35) >   Impression     Summary     Mild concentric left ventricular hypertrophy is present.   Estimated left ventricular ejection fraction is 60-65 %.   A calcified papillary muscle tip is noted.   The left atrium is severely dilated.   The right atrium appears severely dilated.   A device wire is seen in the RV and RA.   Fibrocalcific changes noted to the Aortic valve leaflets with preserved   leaflet excursion.   Trace aortic regurgitation is present.   Mild mitral annular calcification is present.   Fibrocalcific changes noted to the mitral valve leaflets with preserved   leaflet excursion.   Mild (1+) mitral regurgitation is present.   Thetricuspid valve leaflets are thin and pliable; valve motion is normal.   Severe (4+) tricuspid valve regurgitation is present.   Moderate pulmonary hypertension.   Pulmonic valve not well seen.   Trace pericardial effusion is present.   Pleural effusion - is present..   The IVC is mildly dilated.     Signature     ----------------------------------------------------------------   Electronically signed by Ej Simmons MD(Interpreting   physician) on 09/24/2020 05:18 PM    < end of copied text >  
Patient is a 85y old  Female who presents with a chief complaint of complain of shortness of breath.       HPI:  84 y/o Female with a PMHx of Afib previously on Digoxin, currently on Eliquis, GERD, HLD, HTN, hypoadrenalism, lymphedema, metastatic melanoma, pacemaker presents to the ED shortness of breath, and feeling "drained." Also has some non productive cough.     9/26- pt still c/o palpitations, mild SOB.     PAST MEDICAL & SURGICAL HISTORY:  HLD (hyperlipidemia)    HTN (hypertension)    Pacemaker    Atrial fibrillation    History of melanoma excision    H/O cataract extraction  both eyes    S/P tonsillectomy and adenoidectomy        MEDICATIONS  (STANDING):  apixaban 5 milliGRAM(s) Oral every 12 hours  doxycycline hyclate Capsule 100 milliGRAM(s) Oral every 12 hours  influenza   Vaccine 0.5 milliLiter(s) IntraMuscular once  meropenem  IVPB 500 milliGRAM(s) IV Intermittent every 8 hours  metoprolol tartrate 100 milliGRAM(s) Oral two times a day  predniSONE   Tablet 15 milliGRAM(s) Oral daily  simvastatin 10 milliGRAM(s) Oral at bedtime    MEDICATIONS  (PRN):  acetaminophen   Tablet .. 650 milliGRAM(s) Oral every 6 hours PRN Temp greater or equal to 38C (100.4F), Mild Pain (1 - 3)  diphenhydrAMINE 25 milliGRAM(s) Oral every 4 hours PRN Rash and/or Itching  metoprolol tartrate Injectable 5 milliGRAM(s) IV Push every 6 hours PRN HR >110  ondansetron Injectable 4 milliGRAM(s) IV Push every 6 hours PRN Nausea and/or Vomiting      FAMILY HISTORY:  No pertinent family history in first degree relatives        SOCIAL HISTORY:    REVIEW OF SYSTEMS:  CONSTITUTIONAL:    No fatigue, malaise, lethargy.  No fever or chills.  RESPIRATORY:  No cough.  No wheeze.  No hemoptysis.  c/o shortness of breath.  CARDIOVASCULAR:  No chest pains.  c/o palpitations. c/o shortness of breath, No orthopnea or PND.  GASTROINTESTINAL:  No abdominal pain.  No nausea or vomiting.    GENITOURINARY:    No hematuria.    MUSCULOSKELETAL:  c/o musculoskeletal pain.  No joint swelling.  No arthritis.  NEUROLOGICAL:  No tingling or numbness or weakness.  PSYCHIATRIC:  No confusion  SKIN:  No rashes.            Vital Signs Last 24 Hrs  T(C): 36.8 (25 Sep 2020 20:53), Max: 36.9 (25 Sep 2020 08:05)  T(F): 98.3 (25 Sep 2020 20:53), Max: 98.5 (25 Sep 2020 08:05)  HR: 98 (25 Sep 2020 20:53) (65 - 98)  BP: 109/70 (25 Sep 2020 20:53) (109/70 - 167/56)  BP(mean): --  RR: 16 (25 Sep 2020 08:05) (16 - 16)  SpO2: 92% (25 Sep 2020 20:53) (92% - 97%)    PHYSICAL EXAM-    Constitutional : ill looking female , no distress    Head: Head is normocephalic and atraumatic.      Neck: No jugular venous distention. No audible carotid bruits. There are strong carotid pulses bilaterally. No JVD.     Cardiovascular: irregular rate and rhythm without S3, S4. No murmurs or rubs are appreciated.      Respiratory: faint basal rales.     Abdomen: Soft, nontender, nondistended with positive bowel sounds.      Extremity: No tenderness. No  pitting edema     Neurologic: The patient is alert and oriented.      Skin: No rash, no obvious lesions noted.      Psychiatric: The patient appears to be emotionally stable.      INTERPRETATION OF TELEMETRY: afib 90/min     ECG:    I&O's Detail      LABS:                        12.6   5.22  )-----------( 92       ( 25 Sep 2020 06:53 )             38.8     09-25    133<L>  |  100  |  33<H>  ----------------------------<  107<H>  3.4<L>   |  25  |  0.94    Ca    9.2      25 Sep 2020 12:17              I&O's Summary    BNP  RADIOLOGY & ADDITIONAL STUDIES:  est< from: CT Angio Chest PE Protocol w/ IV Cont (09.23.20 @ 16:24) >  IMPRESSION:  No pulmonary embolism    Cardiomegaly.    Small bilateral pleural effusions. Bilateral lower lobe opacities could be on the basis of atelectasis or pneumonia.              ETELVINA GREY M.D.,ATTENDING RADIOLOGIST  This document has been electronically signed. Sep 23 2020  4:33PM    < end of copied text >  e< from: TTE Echo Complete w/o Contrast w/ Doppler (09.24.20 @ 14:35) >   Impression     Summary     Mild concentric left ventricular hypertrophy is present.   Estimated left ventricular ejection fraction is 60-65 %.   A calcified papillary muscle tip is noted.   The left atrium is severely dilated.   The right atrium appears severely dilated.   A device wire is seen in the RV and RA.   Fibrocalcific changes noted to the Aortic valve leaflets with preserved   leaflet excursion.   Trace aortic regurgitation is present.   Mild mitral annular calcification is present.   Fibrocalcific changes noted to the mitral valve leaflets with preserved   leaflet excursion.   Mild (1+) mitral regurgitation is present.   Thetricuspid valve leaflets are thin and pliable; valve motion is normal.   Severe (4+) tricuspid valve regurgitation is present.   Moderate pulmonary hypertension.   Pulmonic valve not well seen.   Trace pericardial effusion is present.   Pleural effusion - is present..   The IVC is mildly dilated.     Signature     ----------------------------------------------------------------   Electronically signed by Ej Simmons MD(Interpreting   physician) on 09/24/2020 05:18 PM    < end of copied text >  
Patient is a 85y old  Female who presents with a chief complaint of complain of shortness of breath.       HPI:  86 y/o Female with a PMHx of Afib previously on Digoxin, currently on Eliquis, GERD, HLD, HTN, hypoadrenalism, lymphedema, metastatic melanoma, pacemaker presents to the ED shortness of breath, and feeling "drained." Also has some non productive cough.     9/26- pt still c/o palpitations, mild SOB.     9/27- pt seen and examined this am.  She denies any symptoms.     9/28- pt seen and examined by me today. pt states that her SOB is getting better.    9/29- pt denies any symptoms.  Wants to go home.      PAST MEDICAL & SURGICAL HISTORY:  HLD (hyperlipidemia)    HTN (hypertension)    Pacemaker    Atrial fibrillation    History of melanoma excision    H/O cataract extraction  both eyes    S/P tonsillectomy and adenoidectomy        MEDICATIONS  (STANDING):  apixaban 5 milliGRAM(s) Oral every 12 hours  doxycycline hyclate Capsule 100 milliGRAM(s) Oral every 12 hours  influenza   Vaccine 0.5 milliLiter(s) IntraMuscular once  meropenem  IVPB 500 milliGRAM(s) IV Intermittent every 8 hours  metoprolol tartrate 100 milliGRAM(s) Oral two times a day  predniSONE   Tablet 15 milliGRAM(s) Oral daily  simvastatin 10 milliGRAM(s) Oral at bedtime    MEDICATIONS  (PRN):  acetaminophen   Tablet .. 650 milliGRAM(s) Oral every 6 hours PRN Temp greater or equal to 38C (100.4F), Mild Pain (1 - 3)  diphenhydrAMINE 25 milliGRAM(s) Oral every 4 hours PRN Rash and/or Itching  metoprolol tartrate Injectable 5 milliGRAM(s) IV Push every 6 hours PRN HR >110  ondansetron Injectable 4 milliGRAM(s) IV Push every 6 hours PRN Nausea and/or Vomiting      FAMILY HISTORY:  No pertinent family history in first degree relatives        SOCIAL HISTORY:     REVIEW OF SYSTEMS:  CONSTITUTIONAL:    No fatigue, malaise, lethargy.  No fever or chills.  RESPIRATORY:  No cough.  No wheeze.  No hemoptysis.  no shortness of breath.  CARDIOVASCULAR:  No chest pains.  no palpitations. no shortness of breath, No orthopnea or PND.  GASTROINTESTINAL:  No abdominal pain.  No nausea or vomiting.    GENITOURINARY:    No hematuria.    MUSCULOSKELETAL:  c/o musculoskeletal pain.  No joint swelling.  No arthritis.  NEUROLOGICAL:  No tingling or numbness or weakness.  PSYCHIATRIC:  No confusion  SKIN:  No rashes.          ICU Vital Signs Last 24 Hrs  T(C): 36.8 (29 Sep 2020 07:31), Max: 36.8 (29 Sep 2020 07:31)  T(F): 98.2 (29 Sep 2020 07:31), Max: 98.2 (29 Sep 2020 07:31)  HR: 85 (29 Sep 2020 07:31) (85 - 91)  BP: 133/86 (29 Sep 2020 07:31) (120/69 - 133/86)  BP(mean): --  ABP: --  ABP(mean): --  RR: 18 (29 Sep 2020 07:31) (18 - 19)  SpO2: 100% (29 Sep 2020 07:31) (97% - 100%)      PHYSICAL EXAM-    Constitutional : ill looking female , no distress    Head: Head is normocephalic and atraumatic.      Neck: No jugular venous distention. No audible carotid bruits. There are strong carotid pulses bilaterally. No JVD.     Cardiovascular: irregular rate and rhythm without S3, S4. No murmurs or rubs are appreciated.      Respiratory: faint basal rales.     Abdomen: Soft, nontender, nondistended with positive bowel sounds.      Extremity: No tenderness. No  pitting edema     Neurologic: The patient is alert and oriented.      Skin: No rash, no obvious lesions noted.      Psychiatric: The patient appears to be emotionally stable.      INTERPRETATION OF TELEMETRY: afib 90/min     ECG:    I&O's Detail      LABS:                                   12.4   5.34  )-----------( 146      ( 28 Sep 2020 07:09 )             39.9     09-29    139  |  103  |  31<H>  ----------------------------<  83  4.2   |  31  |  0.69    Ca    9.0      29 Sep 2020 07:08  Mg     1.9     09-29                                            I&O's Summary    BNP  RADIOLOGY & ADDITIONAL STUDIES:  est< from: CT Angio Chest PE Protocol w/ IV Cont (09.23.20 @ 16:24) >  IMPRESSION:  No pulmonary embolism    Cardiomegaly.    Small bilateral pleural effusions. Bilateral lower lobe opacities could be on the basis of atelectasis or pneumonia.              ETELVINA GREY M.D.,ATTENDING RADIOLOGIST  This document has been electronically signed. Sep 23 2020  4:33PM    < end of copied text >  e< from: TTE Echo Complete w/o Contrast w/ Doppler (09.24.20 @ 14:35) >   Impression     Summary     Mild concentric left ventricular hypertrophy is present.   Estimated left ventricular ejection fraction is 60-65 %.   A calcified papillary muscle tip is noted.   The left atrium is severely dilated.   The right atrium appears severely dilated.   A device wire is seen in the RV and RA.   Fibrocalcific changes noted to the Aortic valve leaflets with preserved   leaflet excursion.   Trace aortic regurgitation is present.   Mild mitral annular calcification is present.   Fibrocalcific changes noted to the mitral valve leaflets with preserved   leaflet excursion.   Mild (1+) mitral regurgitation is present.   Thetricuspid valve leaflets are thin and pliable; valve motion is normal.   Severe (4+) tricuspid valve regurgitation is present.   Moderate pulmonary hypertension.   Pulmonic valve not well seen.   Trace pericardial effusion is present.   Pleural effusion - is present..   The IVC is mildly dilated.     Signature     ----------------------------------------------------------------   Electronically signed by Ej Simmons MD(Interpreting   physician) on 09/24/2020 05:18 PM    < end of copied text >  
CC: complain of shortness of breath (24 Sep 2020 10:20)    HPI: 86 y/o Female with PMHx of Afib previously on Digoxin, currently on Eliquis, GERD, HLD, HTN, hypoadrenalism, lymphedema, metastatic melanoma, pacemaker presents to the ED shortness of breath, and feeling "drained." Also has some non productive cough. No chest pain. States her son told her she was breathing fast. Denies abdominal pain, neck stiffness, rash, hematuria, hematochezia, melena. Recently treated for UTI, unsure which medication. No recent trauma. She also has redness in both eyes today. No vision problem. No other complaints at this time. No fever at home. Patient  was found to be hypoxic in ED and was given Oxygen by NC.  (23 Sep 2020 21:06)    SUBJECTIVE: rate uncontrolled, 110's at rest on tele. digoxin started today. pt denies any complaints, feeling better w/ meds     ROS: no chest pain, no dyspnea    T(C): 36.7 (09-28-20 @ 07:44), Max: 36.7 (09-28-20 @ 07:44)  HR: 90 (09-28-20 @ 07:44) (90 - 97)  BP: 140/75 (09-28-20 @ 07:44) (124/64 - 140/75)  RR: 19 (09-28-20 @ 07:44) (19 - 19)  SpO2: 98% (09-28-20 @ 07:44) (97% - 98%)     PHYSICAL EXAM:  Gen - Pleasant, cooperative, in no acute distress  HEENT- PERRL, moist mucus membranes, OP clear  CV - irregularly irregular, No m/r/g, +pulses, no edema  Lungs - Good effort, Clear to auscultation bilaterally  Abdomen - Soft, nontender, nondistended, +BS, No rebound/rigidity/guarding  Ext - No cyanosis/clubbing.   Skin - No rashes, No jaundice  Psych- Alert & oriented x 3  Neuro- fluent speech, no facial droop, EOMI. moves all ext    MEDICATIONS  (STANDING):  apixaban 5 milliGRAM(s) Oral every 12 hours  digoxin     Tablet 0.125 milliGRAM(s) Oral daily  doxycycline hyclate Capsule 100 milliGRAM(s) Oral every 12 hours  furosemide    Tablet 20 milliGRAM(s) Oral daily  influenza   Vaccine 0.5 milliLiter(s) IntraMuscular once  meropenem  IVPB 500 milliGRAM(s) IV Intermittent every 8 hours  metoprolol tartrate 100 milliGRAM(s) Oral two times a day  predniSONE   Tablet 15 milliGRAM(s) Oral daily  simvastatin 10 milliGRAM(s) Oral at bedtime    MEDICATIONS  (PRN):  acetaminophen   Tablet .. 650 milliGRAM(s) Oral every 6 hours PRN Temp greater or equal to 38C (100.4F), Mild Pain (1 - 3)  diphenhydrAMINE 25 milliGRAM(s) Oral every 4 hours PRN Rash and/or Itching  metoprolol tartrate Injectable 5 milliGRAM(s) IV Push every 6 hours PRN HR >110  ondansetron Injectable 4 milliGRAM(s) IV Push every 6 hours PRN Nausea and/or Vomiting    LABS: All Labs Reviewed:                        12.4   5.34  )-----------( 146      ( 28 Sep 2020 07:09 )             39.9     09-28    141  |  107  |  34<H>  ----------------------------<  79  4.5   |  32<H>  |  0.63    Ca    8.7      28 Sep 2020 07:09  Mg     2.0     09-28    RADIOLOGY & ADDITIONAL TESTS: personally visualized    
CC: complain of shortness of breath (24 Sep 2020 10:20)    HPI: 84 y/o Female with PMHx of Afib previously on Digoxin, currently on Eliquis, GERD, HLD, HTN, hypoadrenalism, lymphedema, metastatic melanoma, pacemaker presents to the ED shortness of breath, and feeling "drained." Also has some non productive cough. No chest pain. States her son told her she was breathing fast. Denies abdominal pain, neck stiffness, rash, hematuria, hematochezia, melena. Recently treated for UTI, unsure which medication. No recent trauma. She also has redness in both eyes today. No vision problem. No other complaints at this time. No fever at home. Patient  was found to be hypoxic in ED and was given Oxygen by NC.  (23 Sep 2020 21:06)    INTERVAL HPI/OVERNIGHT EVENTS: no dyspnea. c/o rash on legs. no pruritis. no tongue swelling  Other ROS reviewed and neg     T(C): 36.8 (--20 @ 20:53), Max: 36.9 (-25-20 @ 08:05)  HR: 98 (-25-20 @ 20:53) (65 - 98)  BP: 109/70 (-25-20 @ 20:53) (109/70 - 167/56)  RR: 16 (--20 @ 08:05) (16 - 16)  SpO2: 92% (--20 @ 20:53) (92% - 97%)     PHYSICAL EXAM:    General: elderly female in no acute distress  Eyes: PERRLA, EOMI; b/l conjunctiva with hemorrhage  Head: Normocephalic; atraumatic  ENMT: No nasal discharge; airway clear, dry mucosals  Neck: Supple; non tender; no masses  Respiratory: No wheezes, rales or rhonchi, decreased BS at bases BL with bibasilar crackles  Cardiovascular: S1, S2 irreg, Left upper chest PPM  Gastrointestinal: Soft non-tender non-distended; Normal bowel sounds  Genitourinary: No costovertebral angle tenderness  Extremities: No clubbing, cyanosis or edema  Vascular: Peripheral pulses palpable 2+ bilaterally  Neurological: Alert and oriented x4  Skin: Warm and dry. erythematous patches on b/l LE w/ macular rash on the lateral thighs. also involving abdomen and b/l UE   Musculoskeletal: Normal tone, without deformities  Psychiatric: Cooperative and appropriate                            14.8   5.34  )-----------( 86       ( 24 Sep 2020 06:45 )             44.8     24 Sep 2020 06:45    132    |  98     |  28     ----------------------------<  87     3.8     |  28     |  0.74     Ca    8.8        24 Sep 2020 06:45  Mg     1.5       23 Sep 2020 14:32    TPro  6.5    /  Alb  3.2    /  TBili  1.8    /  DBili  x      /  AST  24     /  ALT  34     /  AlkPhos  40     23 Sep 2020 14:32    PTT - ( 23 Sep 2020 14:32 )  PTT:40.3 sec    LIVER FUNCTIONS - ( 23 Sep 2020 14:32 )  Alb: 3.2 g/dL / Pro: 6.5 gm/dL / ALK PHOS: 40 U/L / ALT: 34 U/L / AST: 24 U/L / GGT: x           Urinalysis Basic - ( 23 Sep 2020 17:26 )    Color: Yellow / Appearance: Clear / S.005 / pH: x  Gluc: x / Ketone: Trace  / Bili: Negative / Urobili: 4 mg/dL   Blood: x / Protein: 30 mg/dL / Nitrite: Negative   Leuk Esterase: Trace / RBC: 11-25 /HPF / WBC 6-10   Sq Epi: x / Non Sq Epi: Moderate / Bacteria: Moderate    MEDICATIONS  (STANDING):  apixaban 5 milliGRAM(s) Oral every 12 hours  cefepime  Injectable. 1000 milliGRAM(s) IV Push every 12 hours  doxycycline hyclate Capsule 100 milliGRAM(s) Oral every 12 hours  influenza   Vaccine 0.5 milliLiter(s) IntraMuscular once  metoprolol tartrate 50 milliGRAM(s) Oral two times a day  predniSONE   Tablet 15 milliGRAM(s) Oral daily  simvastatin 10 milliGRAM(s) Oral at bedtime    MEDICATIONS  (PRN):  acetaminophen   Tablet .. 650 milliGRAM(s) Oral every 6 hours PRN Temp greater or equal to 38C (100.4F), Mild Pain (1 - 3)  metoprolol tartrate Injectable 5 milliGRAM(s) IV Push every 6 hours PRN HR >110  ondansetron Injectable 4 milliGRAM(s) IV Push every 6 hours PRN Nausea and/or Vomiting    RADIOLOGY & ADDITIONAL TESTS: personally visualized

## 2020-09-30 RX ORDER — METOPROLOL TARTRATE 100 MG/1
100 TABLET, FILM COATED ORAL
Refills: 0 | Status: ACTIVE | COMMUNITY
Start: 2019-11-05

## 2020-10-05 DIAGNOSIS — E78.5 HYPERLIPIDEMIA, UNSPECIFIED: ICD-10-CM

## 2020-10-05 DIAGNOSIS — Z95.0 PRESENCE OF CARDIAC PACEMAKER: ICD-10-CM

## 2020-10-05 DIAGNOSIS — K21.9 GASTRO-ESOPHAGEAL REFLUX DISEASE WITHOUT ESOPHAGITIS: ICD-10-CM

## 2020-10-05 DIAGNOSIS — J15.6 PNEUMONIA DUE TO OTHER GRAM-NEGATIVE BACTERIA: ICD-10-CM

## 2020-10-05 DIAGNOSIS — J96.01 ACUTE RESPIRATORY FAILURE WITH HYPOXIA: ICD-10-CM

## 2020-10-05 DIAGNOSIS — Z88.0 ALLERGY STATUS TO PENICILLIN: ICD-10-CM

## 2020-10-05 DIAGNOSIS — Z79.01 LONG TERM (CURRENT) USE OF ANTICOAGULANTS: ICD-10-CM

## 2020-10-05 DIAGNOSIS — I24.8 OTHER FORMS OF ACUTE ISCHEMIC HEART DISEASE: ICD-10-CM

## 2020-10-05 DIAGNOSIS — I48.0 PAROXYSMAL ATRIAL FIBRILLATION: ICD-10-CM

## 2020-10-05 DIAGNOSIS — H11.30 CONJUNCTIVAL HEMORRHAGE, UNSPECIFIED EYE: ICD-10-CM

## 2020-10-05 DIAGNOSIS — Z85.820 PERSONAL HISTORY OF MALIGNANT MELANOMA OF SKIN: ICD-10-CM

## 2020-10-05 DIAGNOSIS — I11.0 HYPERTENSIVE HEART DISEASE WITH HEART FAILURE: ICD-10-CM

## 2020-10-05 DIAGNOSIS — D69.6 THROMBOCYTOPENIA, UNSPECIFIED: ICD-10-CM

## 2020-10-05 DIAGNOSIS — A41.9 SEPSIS, UNSPECIFIED ORGANISM: ICD-10-CM

## 2020-10-05 DIAGNOSIS — E87.6 HYPOKALEMIA: ICD-10-CM

## 2020-10-05 DIAGNOSIS — I50.31 ACUTE DIASTOLIC (CONGESTIVE) HEART FAILURE: ICD-10-CM

## 2020-10-05 DIAGNOSIS — H57.89 OTHER SPECIFIED DISORDERS OF EYE AND ADNEXA: ICD-10-CM

## 2020-10-05 DIAGNOSIS — E27.40 UNSPECIFIED ADRENOCORTICAL INSUFFICIENCY: ICD-10-CM

## 2020-10-09 NOTE — H&P PST ADULT - NS PRO FEM REPRO HEALTH SCREEN
From: Ferny Sevilla  To: Maryana López MD  Sent: 10/8/2020 5:50 PM CDT  Subject: Test Results Question    Is it possible to have my lab results From July 2019 sent to my new dermatologist, please?  If so, its Dr. Francisca [facility]

## 2020-10-13 ENCOUNTER — OUTPATIENT (OUTPATIENT)
Dept: OUTPATIENT SERVICES | Facility: HOSPITAL | Age: 85
LOS: 1 days | Discharge: HOME | End: 2020-10-13

## 2020-10-13 ENCOUNTER — APPOINTMENT (OUTPATIENT)
Age: 85
End: 2020-10-13
Payer: MEDICARE

## 2020-10-13 ENCOUNTER — RESULT REVIEW (OUTPATIENT)
Age: 85
End: 2020-10-13

## 2020-10-13 VITALS
HEART RATE: 87 BPM | BODY MASS INDEX: 23 KG/M2 | DIASTOLIC BLOOD PRESSURE: 68 MMHG | SYSTOLIC BLOOD PRESSURE: 113 MMHG | TEMPERATURE: 96.7 F | WEIGHT: 138.2 LBS

## 2020-10-13 DIAGNOSIS — C43.72 MALIGNANT MELANOMA OF LEFT LOWER LIMB, INCLUDING HIP: ICD-10-CM

## 2020-10-13 DIAGNOSIS — Z98.49 CATARACT EXTRACTION STATUS, UNSPECIFIED EYE: Chronic | ICD-10-CM

## 2020-10-13 DIAGNOSIS — Z90.89 ACQUIRED ABSENCE OF OTHER ORGANS: Chronic | ICD-10-CM

## 2020-10-13 DIAGNOSIS — Z98.890 OTHER SPECIFIED POSTPROCEDURAL STATES: Chronic | ICD-10-CM

## 2020-10-13 PROBLEM — I89.0 LYMPHEDEMA, NOT ELSEWHERE CLASSIFIED: Chronic | Status: ACTIVE | Noted: 2020-10-02

## 2020-10-13 PROBLEM — C79.9 SECONDARY MALIGNANT NEOPLASM OF UNSPECIFIED SITE: Chronic | Status: ACTIVE | Noted: 2020-10-02

## 2020-10-13 PROBLEM — E27.40 UNSPECIFIED ADRENOCORTICAL INSUFFICIENCY: Chronic | Status: ACTIVE | Noted: 2020-10-02

## 2020-10-13 PROBLEM — K21.9 GASTRO-ESOPHAGEAL REFLUX DISEASE WITHOUT ESOPHAGITIS: Chronic | Status: ACTIVE | Noted: 2020-10-02

## 2020-10-13 LAB — LDH SERPL L TO P-CCNC: 256 U/L — HIGH (ref 50–242)

## 2020-10-13 PROCEDURE — 99214 OFFICE O/P EST MOD 30 MIN: CPT

## 2020-10-13 RX ORDER — DOXYCYCLINE 100 MG/1
100 CAPSULE ORAL
Qty: 10 | Refills: 0 | Status: DISCONTINUED | COMMUNITY
Start: 2020-09-30 | End: 2020-10-13

## 2020-10-13 NOTE — ASSESSMENT
[FreeTextEntry1] : Ms. HULL 's questions were answered to her satisfaction. She will return to the office  in 6 months.\par

## 2020-10-13 NOTE — REVIEW OF SYSTEMS
[Negative] : Allergic/Immunologic [Shortness Of Breath] : shortness of breath [Wheezing] : wheezing [Cough] : no cough

## 2020-10-13 NOTE — HISTORY OF PRESENT ILLNESS
[Disease: _____________________] : Disease: [unfilled] [T: ___] : T[unfilled] [N: ___] : N[unfilled] [M: ___] : M[unfilled] [AJCC Stage: ____] : AJCC Stage: [unfilled] [de-identified] : 84 F with HTN, PPM, atrial fibrillation ( Dr. Elam- 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.\par \par CASE SYNOPSIS:\par May 2019- patient notices a bleeding left heel cutaneous lesion; \par \par 5/1/19- punch biopsy performed by a dermatologist ([facility]) ; pathology:\par - left heel superior: malignant melanoma, 4.2 mm thick with ulcerations, pT4b.\par -left heel inferior: malignant melanoma, 4.2 mm thick, with ulceration, pT4b.\par \par 5/28/19- left heel wide resection and sentinel lymph node biopsy (); pathology:\par -Left inguinal lymph node biopsy: 2/4 lymph nodes positive for melanoma\par -Skin and soft tissue, left heel foot, consistent with melanoma, 3.5 mm thickness, margins negative, pT4b, pN 2a= stage III C\par \par 6/15/19: PET- residual FDG avidity along the surgical defect consistent with postsurgical changes, or residual disease, or combination of the two. No FDG avid local regional or distant metastatic disease. \par \par 7/15/19- started Nivolumab under the care of Dr. Mcbride at [facility].\par \par 8/12/19- cycle # 2 Nivolumab also at [facility].\par \par 10/15/19: Left lower extremity ultrasound-benign appearing left inguinal lymph nodes, containing fatty hilum, measuring 0.9 x 0.8 x 1.3 cm, 1.2 x 0.7 x 0.9 cm, and one 0.5 x 0.8 x 0.9 cm. 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 Probable left groin postoperative collections/seromas.\par \par 6/14/2020–exploratory laparotomy, left femoral hernia repair, small bowel resection (due to incarcerated hernia).\par \par 7/15/19 - 7/22/20: completed 1 year maintenance Nivolumab.\par \par September 2020–admitted with bilateral pneumonia; tested negative for COVID. [de-identified] : Malignant melanoma [FreeTextEntry1] : Nivolumab- flat dose monthly [de-identified] : Patient is here for short-term follow-up 3-month after completion of 1 year maintenance nivolumab for stage III malignant melanoma of lower extremity.  She continues follow-up with endocrinology for therapy induced adrenal insufficiency, she is currently taking low-dose prednisone.  Last month she was admitted with symptoms of shortness of breath and found with pneumonia (tested negative for COVID-19).  She underwent a trial of antibiotics and was subsequently discharged.  She feels slightly fragile, but continues to be active.  Followed up with surgical oncology for melanoma–physical status unchanged.  Here accompanied by her son.

## 2020-10-13 NOTE — PHYSICAL EXAM
[Restricted in physically strenuous activity but ambulatory and able to carry out work of a light or sedentary nature] : Status 1- Restricted in physically strenuous activity but ambulatory and able to carry out work of a light or sedentary nature, e.g., light house work, office work [Normal] : normal appearance, no rash, nodules, vesicles, ulcers, erythema [de-identified] : Left lower extremity edema 2+ [de-identified] : s/p left inguinal hernia repair [de-identified] : + lymphedema left leg much improved [de-identified] : wearing compressive stockings [de-identified] : left heel wound  healed, hypopigmented skin

## 2020-10-16 ENCOUNTER — APPOINTMENT (OUTPATIENT)
Dept: CARE COORDINATION | Facility: HOME HEALTH | Age: 85
End: 2020-10-16
Payer: MEDICARE

## 2020-10-16 VITALS
OXYGEN SATURATION: 99 % | DIASTOLIC BLOOD PRESSURE: 82 MMHG | RESPIRATION RATE: 18 BRPM | HEART RATE: 74 BPM | SYSTOLIC BLOOD PRESSURE: 130 MMHG

## 2020-10-16 DIAGNOSIS — J18.9 PNEUMONIA, UNSPECIFIED ORGANISM: ICD-10-CM

## 2020-10-16 PROCEDURE — 99349 HOME/RES VST EST MOD MDM 40: CPT

## 2020-10-16 NOTE — PLAN
[FreeTextEntry1] : A/P.\par 1. S/P hospitalization for PNA s/p IV ABT, completed course po doxy on discharge home.\par Pneumonia Care Plan\par Adhere to all medications including demonstrating proper use of inhalers and nebulizers.\par Increase activity as tolerated and maintain optimal activity levels. Continue coughing and deep breathing exercises including use of Incentive Spirometry.\par Receive routine pneumococcal and influenza vaccinations.\par Maintain proper nutrition and adequate hydration.\par \par 2. Afib: \par followed by Dr Shepard, HR regular on exam today. Restarted on Digoxin in the hospital, had been on it in the past and discontinued approx 1 year ago per pt. Con't Digoxin & metoprolol for rate control, eliquis for AC. Call for any palpitations, dizziness.\par \par 3. Adrenal Insufficiency:\par Secondary to malignant melanoma treatment. Con't prednisone 7.5mg daily, follow up with Onc Dr Pastrana as scheduled and prn.\par \par 4. HLD:\par Simvastatin 10mg HS. Low fat, low chol diet. F/U with PCP as scheduled and prn for lab monitoring,\par \par \par \par

## 2020-10-16 NOTE — HISTORY OF PRESENT ILLNESS
[FreeTextEntry2] : FROM [facility] DOCUMENT:\par Hospital Course: **FULL PROGRESS NOTE 9/29**\par CC: sob \par HPI: 85 y female w/ pmh afib on eliquis, PPM, previously on dig, GERD, HLD, HTN, hypoadrenalism, lymphedema, metastatic melanoma p/w sob, non productive cough. No chest pain. Treated here for rapid afib and pna. Chart reviewed. seen early this morning and feeling much better. Eager to go home. Denies cp, sob, palp, cough. States her pna improved. No complaints. Discussed d/c plan and meds as well as close f/u w/ Dr. Shepard. Discussed w/ RN, will ambulate today and check O2 sats prior to d/c. \par ROS: as per hpi above, other 10 point ros negative \par Vital Signs Last 24 Hrs\par T(C): 36.8 (29 Sep 2020 07:31), Max: 36.8 (29 Sep 2020 07:31)\par T(F): 98.2 (29 Sep 2020 07:31), Max: 98.2 (29 Sep 2020 07:31)\par HR: 85 (29 Sep 2020 07:31) (85 - 91)\par BP: 133/86 (29 Sep 2020 07:31) (120/69 - 133/86)\par BP(mean): --\par RR: 18 (29 Sep 2020 07:31) (18 - 19)\par SpO2: 100% (29 Sep 2020 07:31) (97% - 100%)\par PHYSICAL EXAM:\par General: NAD, comfortable, seated in chair, non acutely ill appearing\par Neuro: AAOx3, no focal deficits\par HEENT: NCAT\par Neck: Soft and supple, No JVD\par Respiratory: CTA b/l, no w/r/r\par Cardiovascular: S1 and S2, irregularly irregular\par Gastrointestinal: non ttp \par Extremities: No edema\par Vascular: 2+ peripheral pulses\par Musculoskeletal: full rom\par all labs reviewed.\par meds reviewed\par Assessment and Plan: \par 1. acute respiratory failure w/ hypoxia, POA due to sepsis POA w/o septic shock due to BL PNA due to suspected Gram neg/pos organisms - Resolved\par  - blood culture NGTD\par  - ?rash to cefepime. benadryl prn. cefepime changed to meropenem per ID -- rash has cleared up\par  - doxycycline \par  - ID consult appreciated -- plan to d/c home on 5 more days of oral doxycycline as rec by ID. check O2 sats prior to d/c w/ exertion 9/29 RN aware \par 2. PAF with RVR in setting of above- resolved\par suspected acute on chronic diastolic CHF- improved. Echo nml ef, mod pulm htn, TR\par  - cont eliquis\par  - metoprolol dose adjusted, now on 100 mg BID. rate uncontrolled. digxoin started today\par  - s/p IV lasix, now on oral lasix\par - cardio consult appreciated- improving on dig, BB. ambulate and d/c planning home if stable O2 sats and HR w/ exertion\par 3. Hypokalemia\par - replace K and Mg\par 4. 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 Meds sent to pharmacy

## 2020-10-16 NOTE — ASSESSMENT
[FreeTextEntry1] : CC:\par Pt is seen at home s/p recent admission for PNA\par HPI:\par Pt Is a 86 y/o female enrolled in the [facility] program seen at home s/p a recent admission for PNA admitted from 9/23-9/29. Pt was contacted by TCM and med rec was done within 48 hours of DC. Upon arrival to patient's home\par pt sitting in her kitchen A&O x 3, NAD, does not appear stated age. Denies CP, SOB, pain, abd discomfort or difficulty with bowel or bladder. She has completed a course of doxycycline upon discharge. She reports not feeling 100% just yet, but remains active. She lives alone has 4 supportive children. Cardiologist Dr. Shepard manages her afib, HR reg on exam today. She had post hospital f/u on 10/6, no med changes. F/U visit appreciated with Onc Dr. Pastrana on 10/13 s/p tx for stage 3 malignant melanoma of L heel with treat induced adenal insuff for which she takes prednisone daily. She takes simvastatin for her HLD and eats a balanced diet. \par She offers no complaints at this time.\par

## 2020-10-22 ENCOUNTER — NON-APPOINTMENT (OUTPATIENT)
Age: 85
End: 2020-10-22

## 2020-10-30 ENCOUNTER — NON-APPOINTMENT (OUTPATIENT)
Age: 85
End: 2020-10-30

## 2020-11-04 NOTE — CDI QUERY NOTE - NSCDIOTHERTXTBX2_GEN_ALL_CORE_FT
Adult Nutrition  Assessment/PES    Patient Name:  Noe Martel  YOB: 1935  MRN: [id number]  Admit Date:  10/23/2020    Assessment Date:  11/4/2020    Comments:    Recommend:  1. Continue NPO diet order as medically appropriate. Advance diet once appropriate.  2. If pt remains NPO >5 days and family is agreeable, consider EN.   3. Recommend Boost Pudding BID, Prostat BID, and Magic Cup daily if diet is advanced.  4. Consider a MVI with minerals daily.  5. PO intake has been poor with an average intake of 30% x 9 meals prior to NPO status.     RD to follow pt and available PRN.      Reason for Assessment     Row Name 11/04/20 1153          Reason for Assessment    Reason For Assessment  follow-up protocol     Diagnosis  cardiac disease;infection/sepsis;pulmonary disease;renal disease CAD, COPD, HTN, HLD, GERD, Sepsis, COVID, PNA, Grace             Labs/Tests/Procedures/Meds     Row Name 11/04/20 1153          Labs/Procedures/Meds    Lab Results Reviewed  reviewed, pertinent     Lab Results Comments  Low: Alb; High: Na, Cl, BUN, HgbA1c, Gluc, Cr, Procal        Medications    Pertinent Medications Reviewed  reviewed, pertinent     Pertinent Medications Comments  Novolin R, Remeron, Prostat         Physical Findings     Row Name 11/04/20 1154          Physical Findings    Skin  pressure injury;other (see comments) Bilateral gluteal and right heel pressure injuries, MASD           Nutrition Prescription Ordered     Row Name 11/04/20 1155          Nutrition Prescription PO    Current PO Diet  NPO         Evaluation of Received Nutrient/Fluid Intake     Row Name 11/04/20 1155          PO Evaluation    Number of Days PO Intake Evaluated  3 days prior to NPO     Number of Meals  9     % PO Intake  30               Problem/Interventions:  Problem 1     Row Name 11/04/20 1156          Nutrition Diagnoses Problem 1    Problem 1  Increased Nutrient Needs     Macronutrient  Kcal;Fluid;Protein     Etiology (related  to)  Medical Diagnosis     Infectious Disease  Sepsis     Pulmonary/Critical Care  Pneumonia;COPD;Other (comment) COVID     Skin  Pressure injury     Signs/Symptoms (evidenced by)  Report/Observation     Reported/Observed By  MD         Problem 2     Row Name 11/04/20 1156          Nutrition Diagnoses Problem 2    Problem 2  Altered Nutrition Related to Labs     Etiology (related to)  Medical Diagnosis     Endocrine  Hyperglycemia     Renal  SANG     Signs/Symptoms (evidenced by)  Biochemical     Specific Labs Noted  BUN;Creatinine         Problem 3     Row Name 11/04/20 1157          Nutrition Diagnoses Problem 3    Problem 3  Inadequate Intake/Infusion     Inadequate Intake Type  Oral     Macronutrient  Kcal;Fat;Fluid;Fiber;Carbohydrate;Protein     Micronutrient  Vitamin;Mineral     Etiology (related to)  MNT for Treatment/Condition     Signs/Symptoms (evidenced by)  NPO           Intervention Goal     Row Name 11/04/20 1157          Intervention Goal    General  Improved nutrition related lab(s);Meet nutritional needs for age/condition;Nutrition support treatment     PO  Meet estimated needs;Advance diet     TF/PN  Inititiate TF/PN     Weight  Maintain weight         Nutrition Intervention     Row Name 11/04/20 1159          Nutrition Intervention    RD/Tech Action  Follow Tx progress;Recommend/ordered     Recommended/Ordered  EN         Nutrition Prescription     Row Name 11/04/20 1152          Nutrition Prescription PO    PO Prescription  Other (comment) Continue NPO as appropriate     New PO Prescription Ordered?  No, recommended        Nutrition Prescription EN    Enteral Prescription  Enteral begin/change If pt remains NPO >5 days and family is agreeable     New EN Prescription Ordered?  No, recommended        Other Orders    Obtain Weight  Daily     Obtain Weight Ordered?  No, recommended     Supplement  Vitamin mineral supplement     Supplement Ordered?  No, recommended         Education/Evaluation     Row  Name 11/04/20 1158          Education    Education  Education not appropriate at this time     Please explain  Defer until post ICU;Other (comment) isolation status        Monitor/Evaluation    Monitor  Per protocol;I&O;Pertinent labs;Weight;Skin status           Electronically signed by:  Julia Saxena RD  11/04/20 12:00 EST   On admission the patients SpO2 was 89% on room air:    ED Provider Note 9-23-20 @ 14:30  · Respiratory [+]: SHORTNESS OF BREATH  · CONSTITUTIONAL: Well appearing, awake, alert, oriented to person, place, time/situation and in mild distress due to tachypnea.   RESPIRATORY: Tachypneic. b/l basilar crackles, greater on right. No retractions    ED Adult Triage Note 9-23-20 @ 14:01  · O2 Delivery/Oxygen Delivery Method: room air    SpO2 (%):  89 %    Patient was placed on 6L nasal cannula: RR of 22 at 94% on 6L NC.    Is the above clinical criteria indicative of a further diagnosis?  A) Acute respiratory failure present on admission.  B) No evidence of acute respiratory failure on admission.  C) Other, please specify:

## 2020-12-02 ENCOUNTER — RESULT REVIEW (OUTPATIENT)
Age: 85
End: 2020-12-02

## 2020-12-02 ENCOUNTER — APPOINTMENT (OUTPATIENT)
Age: 85
End: 2020-12-02
Payer: MEDICARE

## 2020-12-02 ENCOUNTER — OUTPATIENT (OUTPATIENT)
Dept: OUTPATIENT SERVICES | Facility: HOSPITAL | Age: 85
LOS: 1 days | Discharge: HOME | End: 2020-12-02

## 2020-12-02 VITALS
RESPIRATION RATE: 18 BRPM | HEART RATE: 84 BPM | TEMPERATURE: 96.4 F | DIASTOLIC BLOOD PRESSURE: 83 MMHG | WEIGHT: 143 LBS | BODY MASS INDEX: 23.82 KG/M2 | SYSTOLIC BLOOD PRESSURE: 135 MMHG | HEIGHT: 65 IN

## 2020-12-02 DIAGNOSIS — Z98.49 CATARACT EXTRACTION STATUS, UNSPECIFIED EYE: Chronic | ICD-10-CM

## 2020-12-02 DIAGNOSIS — C43.72 MALIGNANT MELANOMA OF LEFT LOWER LIMB, INCLUDING HIP: ICD-10-CM

## 2020-12-02 DIAGNOSIS — Z90.89 ACQUIRED ABSENCE OF OTHER ORGANS: Chronic | ICD-10-CM

## 2020-12-02 DIAGNOSIS — Z98.890 OTHER SPECIFIED POSTPROCEDURAL STATES: Chronic | ICD-10-CM

## 2020-12-02 LAB
BASOPHILS # BLD AUTO: 0.04 K/UL — SIGNIFICANT CHANGE UP (ref 0–0.2)
BASOPHILS NFR BLD AUTO: 0.7 % — SIGNIFICANT CHANGE UP (ref 0–2)
EOSINOPHIL # BLD AUTO: 0.03 K/UL — SIGNIFICANT CHANGE UP (ref 0–0.5)
EOSINOPHIL NFR BLD AUTO: 0.5 % — SIGNIFICANT CHANGE UP (ref 0–6)
HCT VFR BLD CALC: 38.9 % — SIGNIFICANT CHANGE UP (ref 34.5–45)
HGB BLD-MCNC: 12.3 G/DL — SIGNIFICANT CHANGE UP (ref 11.5–15.5)
IMM GRANULOCYTES NFR BLD AUTO: 0.4 % — SIGNIFICANT CHANGE UP (ref 0–1.5)
LYMPHOCYTES # BLD AUTO: 0.88 K/UL — LOW (ref 1–3.3)
LYMPHOCYTES # BLD AUTO: 15.4 % — SIGNIFICANT CHANGE UP (ref 13–44)
MCHC RBC-ENTMCNC: 30.5 PG — SIGNIFICANT CHANGE UP (ref 27–34)
MCHC RBC-ENTMCNC: 31.6 GM/DL — LOW (ref 32–36)
MCV RBC AUTO: 96.5 FL — SIGNIFICANT CHANGE UP (ref 80–100)
MONOCYTES # BLD AUTO: 0.32 K/UL — SIGNIFICANT CHANGE UP (ref 0–0.9)
MONOCYTES NFR BLD AUTO: 5.6 % — SIGNIFICANT CHANGE UP (ref 2–14)
NEUTROPHILS # BLD AUTO: 4.41 K/UL — SIGNIFICANT CHANGE UP (ref 1.8–7.4)
NEUTROPHILS NFR BLD AUTO: 77.4 % — HIGH (ref 43–77)
NRBC # BLD: 0 /100 WBCS — SIGNIFICANT CHANGE UP (ref 0–0)
PLATELET # BLD AUTO: 157 K/UL — SIGNIFICANT CHANGE UP (ref 150–400)
RBC # BLD: 4.03 M/UL — SIGNIFICANT CHANGE UP (ref 3.8–5.2)
RBC # FLD: 14.6 % — HIGH (ref 10.3–14.5)
WBC # BLD: 5.7 K/UL — SIGNIFICANT CHANGE UP (ref 3.8–10.5)
WBC # FLD AUTO: 5.7 K/UL — SIGNIFICANT CHANGE UP (ref 3.8–10.5)

## 2020-12-02 PROCEDURE — 99499A: CUSTOM | Mod: NC

## 2020-12-03 LAB — LDH SERPL L TO P-CCNC: 228 U/L — SIGNIFICANT CHANGE UP (ref 50–242)

## 2020-12-28 NOTE — DISCHARGE NOTE NURSING/CASE MANAGEMENT/SOCIAL WORK - NSDCPEELIQUISFU_GEN_ALL_CORE
SURVEY:    You may be receiving a survey from [employer] regarding your visit today. Please complete the survey to enable us to provide the highest quality of care to you and your family. If you cannot score us a very good on any question, please call the office to discuss how we could have made your experience a very good one. Thank you.
Go for blood tests as directed. Your doctor will do lab tests at regular visits to monitor the effects of this medicine. Please follow up with your doctor and keep your health care provider appointments.

## 2021-01-11 ENCOUNTER — APPOINTMENT (OUTPATIENT)
Dept: ENDOCRINOLOGY | Facility: CLINIC | Age: 86
End: 2021-01-11
Payer: MEDICARE

## 2021-01-11 PROCEDURE — 99214 OFFICE O/P EST MOD 30 MIN: CPT | Mod: 95

## 2021-01-11 NOTE — PHYSICAL EXAM
[Alert] : alert [Well Nourished] : well nourished [No Acute Distress] : no acute distress [Oriented x3] : oriented to person, place, and time [Normal Affect] : the affect was normal [Normal Insight/Judgement] : insight and judgment were intact

## 2021-01-21 ENCOUNTER — APPOINTMENT (OUTPATIENT)
Dept: SURGERY | Facility: CLINIC | Age: 86
End: 2021-01-21
Payer: MEDICARE

## 2021-01-21 VITALS
BODY MASS INDEX: 23.82 KG/M2 | HEIGHT: 65 IN | WEIGHT: 143 LBS | DIASTOLIC BLOOD PRESSURE: 92 MMHG | SYSTOLIC BLOOD PRESSURE: 144 MMHG | HEART RATE: 83 BPM | OXYGEN SATURATION: 99 % | TEMPERATURE: 97.5 F

## 2021-01-21 DIAGNOSIS — Z09 ENCOUNTER FOR FOLLOW-UP EXAMINATION AFTER COMPLETED TREATMENT FOR CONDITIONS OTHER THAN MALIGNANT NEOPLASM: ICD-10-CM

## 2021-01-21 PROCEDURE — 99024 POSTOP FOLLOW-UP VISIT: CPT

## 2021-01-24 NOTE — REASON FOR VISIT
[Home] : at home, [unfilled] , at the time of the visit. [Medical Office: ([facility])___] : at the medical office located in  [Verbal consent obtained from patient] : the patient, [unfilled]

## 2021-01-24 NOTE — HISTORY OF PRESENT ILLNESS
[Medical Office: ([facility])___] : at the medical office located in  [Verbal consent obtained from patient] : the patient, [unfilled] [FreeTextEntry1] : Ms. HULL   is a 85 year year old female who returns today via telehealth for endocrine reevaluation. We tried, but were unable to successfully connect with [facility].\par   Patient returns with regard to  a history of adrenal insufficiency felt to be brought on by her immunotherapy with regard to her hx of  malignant melanoma. Likely secondary to hypophysitis\par She too has hx of  ex lap with small bowel repair and left groin hernia repair on June 15th. Has finished  her immunotherapy tx's\par Melanoma  said to be stable. Had pneumonia at end of last September-non covid \par Has tapered the prednisone now at  5mg am and 2.5 mg pm. Off immunotherapy since July\par Denies c/p, sob, dizziness, lightheadedness nausea or vomiting.\par \par \par PMD sees MIRIAM Loo at Dr. Calvin office in [place]  [phone].  \par

## 2021-01-25 NOTE — CONSULT NOTE ADULT - PROVIDER SPECIALTY LIST ADULT
Spoke to pt's daughter who states that pt is more confused starting last Thursday. Questioning if pt has a fever, any urinary symptoms but pt's daughter was unsure. Advised PCP out of office today and tomorrow. Advised pt could be seen in urgent care and pt's daughter agreed to plan.   
Infectious Disease
Cardiology

## 2021-01-27 ENCOUNTER — APPOINTMENT (OUTPATIENT)
Dept: SURGICAL ONCOLOGY | Facility: CLINIC | Age: 86
End: 2021-01-27
Payer: MEDICARE

## 2021-01-27 VITALS
HEIGHT: 65 IN | RESPIRATION RATE: 15 BRPM | OXYGEN SATURATION: 97 % | DIASTOLIC BLOOD PRESSURE: 82 MMHG | WEIGHT: 142 LBS | BODY MASS INDEX: 23.66 KG/M2 | SYSTOLIC BLOOD PRESSURE: 150 MMHG | HEART RATE: 79 BPM

## 2021-01-27 PROCEDURE — 99214 OFFICE O/P EST MOD 30 MIN: CPT

## 2021-01-28 ENCOUNTER — NON-APPOINTMENT (OUTPATIENT)
Age: 86
End: 2021-01-28

## 2021-01-28 LAB
ACTH SER-ACNC: <1.5 PG/ML
ALBUMIN SERPL ELPH-MCNC: 4.2 G/DL
ALP BLD-CCNC: 46 U/L
ALT SERPL-CCNC: 33 U/L
ANION GAP SERPL CALC-SCNC: 13 MMOL/L
AST SERPL-CCNC: 21 U/L
BILIRUB SERPL-MCNC: 0.6 MG/DL
BUN SERPL-MCNC: 30 MG/DL
CALCIUM SERPL-MCNC: 9.9 MG/DL
CHLORIDE SERPL-SCNC: 104 MMOL/L
CO2 SERPL-SCNC: 26 MMOL/L
CORTIS SERPL-MCNC: 0.4 UG/DL
CREAT SERPL-MCNC: 0.83 MG/DL
GLUCOSE SERPL-MCNC: 87 MG/DL
POTASSIUM SERPL-SCNC: 4.5 MMOL/L
PROT SERPL-MCNC: 6.4 G/DL
SODIUM SERPL-SCNC: 144 MMOL/L
T3FREE SERPL-MCNC: 2.11 PG/ML
T4 FREE SERPL-MCNC: 1.4 NG/DL
TSH SERPL-ACNC: 1.6 UIU/ML

## 2021-02-06 ENCOUNTER — OUTPATIENT (OUTPATIENT)
Dept: OUTPATIENT SERVICES | Facility: HOSPITAL | Age: 86
LOS: 1 days | Discharge: HOME | End: 2021-02-06

## 2021-02-06 DIAGNOSIS — Z98.49 CATARACT EXTRACTION STATUS, UNSPECIFIED EYE: Chronic | ICD-10-CM

## 2021-02-06 DIAGNOSIS — C43.72 MALIGNANT MELANOMA OF LEFT LOWER LIMB, INCLUDING HIP: ICD-10-CM

## 2021-02-06 DIAGNOSIS — Z98.890 OTHER SPECIFIED POSTPROCEDURAL STATES: Chronic | ICD-10-CM

## 2021-02-06 DIAGNOSIS — Z90.89 ACQUIRED ABSENCE OF OTHER ORGANS: Chronic | ICD-10-CM

## 2021-02-08 PROBLEM — Z09 FOLLOW UP: Status: ACTIVE | Noted: 2020-07-30

## 2021-02-08 NOTE — ASSESSMENT
[FreeTextEntry1] : 6 month follow up after recurrent femoral hernia repair, doing well, no recurrence\par Follow up PRN.

## 2021-02-08 NOTE — ED PROVIDER NOTE - PROVIDER TOKENS
Quality 110: Preventive Care And Screening: Influenza Immunization: Influenza Immunization Administered during Influenza season Detail Level: Detailed Quality 111:Pneumonia Vaccination Status For Older Adults: Pneumococcal Vaccination Previously Received PROVIDER:[TOKEN:[[id number]]]

## 2021-02-08 NOTE — HISTORY OF PRESENT ILLNESS
[de-identified] : S/P left femoral hernia repair 8 month ago, doing well, no pain or bulge. Tolerating diet. Finishes immunotherapy still on steroids.

## 2021-02-09 ENCOUNTER — APPOINTMENT (OUTPATIENT)
Age: 86
End: 2021-02-09
Payer: MEDICARE

## 2021-02-09 VITALS
SYSTOLIC BLOOD PRESSURE: 133 MMHG | DIASTOLIC BLOOD PRESSURE: 94 MMHG | TEMPERATURE: 96.4 F | BODY MASS INDEX: 24.13 KG/M2 | HEART RATE: 88 BPM | WEIGHT: 145 LBS

## 2021-02-09 PROCEDURE — 99214 OFFICE O/P EST MOD 30 MIN: CPT

## 2021-02-09 RX ORDER — FUROSEMIDE 20 MG/1
20 TABLET ORAL
Refills: 0 | Status: ACTIVE | COMMUNITY
Start: 2020-09-30

## 2021-02-09 RX ORDER — NITROFURANTOIN (MONOHYDRATE/MACROCRYSTALS) 25; 75 MG/1; MG/1
100 CAPSULE ORAL
Qty: 20 | Refills: 0 | Status: DISCONTINUED | COMMUNITY
Start: 2020-09-14

## 2021-02-09 NOTE — REVIEW OF SYSTEMS
[Shortness Of Breath] : shortness of breath [Wheezing] : wheezing [Negative] : Allergic/Immunologic [Cough] : no cough [Skin Rash] : skin rash [de-identified] : left sole cellulitis

## 2021-02-09 NOTE — REASON FOR VISIT
[Follow-Up Visit] : a follow-up [Family Member] : family member [FreeTextEntry2] : left foot melanoma [FreeTextEntry3] : \par  [TWNoteComboBox1] : False

## 2021-02-09 NOTE — PHYSICAL EXAM
[Restricted in physically strenuous activity but ambulatory and able to carry out work of a light or sedentary nature] : Status 1- Restricted in physically strenuous activity but ambulatory and able to carry out work of a light or sedentary nature, e.g., light house work, office work [Normal] : no peripheral adenopathy appreciated [de-identified] : s/p left inguinal hernia repair [de-identified] : left heel wound  healed, hypopigmented skin; left sole cellulitis resolved

## 2021-02-09 NOTE — HISTORY OF PRESENT ILLNESS
[Disease: _____________________] : Disease: [unfilled] [T: ___] : T[unfilled] [N: ___] : N[unfilled] [M: ___] : M[unfilled] [AJCC Stage: ____] : AJCC Stage: [unfilled] [de-identified] : 85 F with HTN, PPM, atrial fibrillation ( Dr. 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.\par \par CASE SYNOPSIS:\par May 2019- patient notices a bleeding left heel cutaneous lesion; \par \par 5/1/19- punch biopsy performed by a dermatologist ([facility]) ; pathology:\par - left heel superior: malignant melanoma, 4.2 mm thick with ulcerations, pT4b.\par -left heel inferior: malignant melanoma, 4.2 mm thick, with ulceration, pT4b.\par \par 5/28/19- left heel wide resection and sentinel lymph node biopsy (); pathology:\par -Left inguinal lymph node biopsy: 2/4 lymph nodes positive for melanoma\par -Skin and soft tissue, left heel foot, consistent with melanoma, 3.5 mm thickness, margins negative, pT4b, pN 2a= stage III C\par \par 6/15/19: PET- residual FDG avidity along the surgical defect consistent with postsurgical changes, or residual disease, or combination of the two. No FDG avid local regional or distant metastatic disease. \par \par 7/15/19- started Nivolumab under the care of Dr. Mcbride at [facility].\par \par 8/12/19- cycle # 2 Nivolumab also at [facility].\par \par 10/15/19: Left lower extremity ultrasound-benign appearing left inguinal lymph nodes, containing fatty hilum, measuring 0.9 x 0.8 x 1.3 cm, 1.2 x 0.7 x 0.9 cm, and one 0.5 x 0.8 x 0.9 cm. 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 Probable left groin postoperative collections/seromas.\par \par 6/14/2020–exploratory laparotomy, left femoral hernia repair, small bowel resection (due to incarcerated hernia).\par \par 7/15/19 - 7/22/20: completed 1 year maintenance Nivolumab.\par \par September 2020–admitted with bilateral pneumonia; tested negative for COVID. [de-identified] : Malignant melanoma [FreeTextEntry1] : Nivolumab- flat dose monthly [de-identified] : Returning for follow-up.  Last seen in the office 5-month ago.  Since then patient has been stable clinically, and continue to follow-up with Dr. 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  Recent cortisol level still reduced, reason for which patient is taking prednisone 5 mg in a.m. and 2.5 mg in p.m.  Patient completed  10 days doxycycline for left sole cellulitis . Recent LDH within normal range, ay 228. Denies recent hospitalizations, shortness of breath.  In the past tested negative for Covid.  Had follow-up with surgical oncology–examination showed no evidence of recurrent melanoma.  Patient has completed a year maintenance nivolumab in July 2020.

## 2021-03-13 ENCOUNTER — OUTPATIENT (OUTPATIENT)
Dept: OUTPATIENT SERVICES | Facility: HOSPITAL | Age: 86
LOS: 1 days | Discharge: HOME | End: 2021-03-13

## 2021-03-13 DIAGNOSIS — C43.72 MALIGNANT MELANOMA OF LEFT LOWER LIMB, INCLUDING HIP: ICD-10-CM

## 2021-03-13 DIAGNOSIS — Z90.89 ACQUIRED ABSENCE OF OTHER ORGANS: Chronic | ICD-10-CM

## 2021-03-13 DIAGNOSIS — Z98.890 OTHER SPECIFIED POSTPROCEDURAL STATES: Chronic | ICD-10-CM

## 2021-03-13 DIAGNOSIS — Z98.49 CATARACT EXTRACTION STATUS, UNSPECIFIED EYE: Chronic | ICD-10-CM

## 2021-03-23 ENCOUNTER — APPOINTMENT (OUTPATIENT)
Age: 86
End: 2021-03-23

## 2021-03-23 ENCOUNTER — RESULT REVIEW (OUTPATIENT)
Age: 86
End: 2021-03-23

## 2021-03-23 LAB — LDH SERPL L TO P-CCNC: 251 U/L — HIGH (ref 50–242)

## 2021-03-30 ENCOUNTER — APPOINTMENT (OUTPATIENT)
Dept: ENDOCRINOLOGY | Facility: CLINIC | Age: 86
End: 2021-03-30
Payer: MEDICARE

## 2021-03-30 VITALS
TEMPERATURE: 97.9 F | HEART RATE: 58 BPM | HEIGHT: 65 IN | OXYGEN SATURATION: 98 % | SYSTOLIC BLOOD PRESSURE: 132 MMHG | WEIGHT: 144.38 LBS | DIASTOLIC BLOOD PRESSURE: 82 MMHG | BODY MASS INDEX: 24.06 KG/M2

## 2021-03-30 PROCEDURE — 99214 OFFICE O/P EST MOD 30 MIN: CPT

## 2021-03-31 RX ORDER — PREDNISONE 2.5 MG/1
2.5 TABLET ORAL
Qty: 90 | Refills: 0 | Status: DISCONTINUED | COMMUNITY
Start: 2021-01-14 | End: 2021-03-31

## 2021-04-05 ENCOUNTER — NON-APPOINTMENT (OUTPATIENT)
Age: 86
End: 2021-04-05

## 2021-04-12 LAB
CORTICOSTEROID BIND GLOBULIN: 2 MG/DL
CORTIS SERPL-MCNC: <1 UG/DL
CORTISOL, FREE: <0.03 UG/DL
PFCX: <2.8 %

## 2021-04-12 NOTE — HISTORY OF PRESENT ILLNESS
[FreeTextEntry1] : Ms. HULL is a 85 year old female who returns today  for endocrine reevaluation. Patient returns with regard to a history of adrenal insufficiency felt to be brought on by her immunotherapy with regard to her hx of malignant melanoma. Likely secondary to hypophysitis. \par \par Reports foot discomfort, told her tissues were thin. Too, reports increase in appetite. \par Recent LDH was 251. \par Morning cortisol prior to prednisone dose was 0.4. \par Has tapered the prednisone, now at 5 mg am and 2.5 mg pm. Off immunotherapy since July. \par Pt wears Loom Bracelet. \par \par She too has hx of ex lap with small bowel repair and left groin hernia repair on June 15th. Has finished  her immunotherapy tx's\par Melanoma  said to be stable. Had pneumonia at end of last September-non covid \par \par Denies c/p, sob, dizziness, lightheadedness nausea or vomiting.\par Follows with Dr. Kimbrough \par PMD sees MIRIAM Loo at Dr. Calvin office in [place]  [phone].  \par \par Received first dose of Pfizer COVID vaccine. \par

## 2021-05-26 ENCOUNTER — APPOINTMENT (OUTPATIENT)
Dept: ENDOCRINOLOGY | Facility: CLINIC | Age: 86
End: 2021-05-26
Payer: MEDICARE

## 2021-05-26 DIAGNOSIS — E23.6 OTHER DISORDERS OF PITUITARY GLAND: ICD-10-CM

## 2021-05-26 PROCEDURE — 99214 OFFICE O/P EST MOD 30 MIN: CPT | Mod: 95

## 2021-05-26 RX ORDER — DIGOXIN 125 UG/1
125 TABLET ORAL DAILY
Refills: 0 | Status: DISCONTINUED | COMMUNITY
Start: 2020-09-30 | End: 2021-05-26

## 2021-05-31 PROBLEM — E23.6 HYPOPHYSITIS: Status: ACTIVE | Noted: 2020-01-16

## 2021-05-31 NOTE — PHYSICAL EXAM
[Alert] : alert [Well Nourished] : well nourished [Healthy Appearance] : healthy appearance [Well Developed] : well developed [Oriented x3] : oriented to person, place, and time [Normal Insight/Judgement] : insight and judgment were intact

## 2021-05-31 NOTE — HISTORY OF PRESENT ILLNESS
[FreeTextEntry1] : Ms. HULL   is a 85 year year old female who returns today  for endocrine reevaluation. \par   Patient returns with regard to  a history of adrenal insufficiency felt to be brought on by her immunotherapy with regard to her hx of  malignant melanoma. Likely secondary to hypophysitis\par She too has hx of  ex lap with small bowel repair last June 2020 Has finished  her immunotherapy tx's\par had recent cold and some intermittent hoarseness.\par Had her 2 pfixer vaccines-after wnd injection had nausea, bad headache and fatigue\par Melanoma  said to be stable. Had pneumonia at end of last September-non covid \par Has tapered the prednisone now at  5mg am and 2.0 mg pm. Off immunotherapy since July\par Denies c/p, sob, dizziness, lightheadedness nausea or vomiting.\par Some incr fatigue at times. \par has been iff Digoxin and furosemide per her cardiologist\par Does continue on Metoprolol dn Simvastatin.\par \par \par PMD sees MIRIAM Loo at Dr. Calvin office in [place]  [phone].  \par

## 2021-06-30 NOTE — H&P ADULT - NSICDXPASTMEDICALHX_GEN_ALL_CORE_FT
PAST MEDICAL HISTORY:  Atrial fibrillation     HLD (hyperlipidemia)     HTN (hypertension)     Pacemaker no known allergies

## 2021-07-02 ENCOUNTER — NON-APPOINTMENT (OUTPATIENT)
Age: 86
End: 2021-07-02

## 2021-07-02 LAB
ALBUMIN SERPL ELPH-MCNC: 4.3 G/DL
ALP BLD-CCNC: 46 U/L
ALT SERPL-CCNC: 19 U/L
ANION GAP SERPL CALC-SCNC: 12 MMOL/L
AST SERPL-CCNC: 18 U/L
BASOPHILS # BLD AUTO: 0.05 K/UL
BASOPHILS NFR BLD AUTO: 0.8 %
BILIRUB SERPL-MCNC: 0.6 MG/DL
BUN SERPL-MCNC: 24 MG/DL
CALCIUM SERPL-MCNC: 9.4 MG/DL
CHLORIDE SERPL-SCNC: 104 MMOL/L
CO2 SERPL-SCNC: 27 MMOL/L
CORTICOSTEROID BIND GLOBULIN: 1.9 MG/DL
CORTIS SERPL-MCNC: 0.5 UG/DL
CORTIS SERPL-MCNC: <1 UG/DL
CORTISOL, FREE: <0.03 UG/DL
CREAT SERPL-MCNC: 0.84 MG/DL
EOSINOPHIL # BLD AUTO: 0.08 K/UL
EOSINOPHIL NFR BLD AUTO: 1.4 %
FSH SERPL-MCNC: 76.8 IU/L
GLUCOSE SERPL-MCNC: 83 MG/DL
HCT VFR BLD CALC: 41.9 %
HGB BLD-MCNC: 12.9 G/DL
IMM GRANULOCYTES NFR BLD AUTO: 0.2 %
LH SERPL-ACNC: 27.4 IU/L
LYMPHOCYTES # BLD AUTO: 2 K/UL
LYMPHOCYTES NFR BLD AUTO: 33.9 %
MAN DIFF?: NORMAL
MCHC RBC-ENTMCNC: 29.9 PG
MCHC RBC-ENTMCNC: 30.8 GM/DL
MCV RBC AUTO: 97.2 FL
MONOCYTES # BLD AUTO: 0.55 K/UL
MONOCYTES NFR BLD AUTO: 9.3 %
NEUTROPHILS # BLD AUTO: 3.21 K/UL
NEUTROPHILS NFR BLD AUTO: 54.4 %
PFCX: <2.9 %
PLATELET # BLD AUTO: 157 K/UL
POTASSIUM SERPL-SCNC: 4.1 MMOL/L
PROT SERPL-MCNC: 6.3 G/DL
RBC # BLD: 4.31 M/UL
RBC # FLD: 14.7 %
RENIN ACTIVITY, PLASMA: 0.88 NG/ML/HR
SODIUM SERPL-SCNC: 143 MMOL/L
T3FREE SERPL-MCNC: 2.64 PG/ML
T4 FREE SERPL-MCNC: 1.4 NG/DL
TSH SERPL-ACNC: 3.16 UIU/ML
WBC # FLD AUTO: 5.9 K/UL

## 2021-07-16 ENCOUNTER — APPOINTMENT (OUTPATIENT)
Dept: SURGICAL ONCOLOGY | Facility: CLINIC | Age: 86
End: 2021-07-16
Payer: MEDICARE

## 2021-07-16 VITALS
TEMPERATURE: 98.1 F | RESPIRATION RATE: 16 BRPM | WEIGHT: 145 LBS | BODY MASS INDEX: 24.16 KG/M2 | DIASTOLIC BLOOD PRESSURE: 80 MMHG | OXYGEN SATURATION: 97 % | HEART RATE: 89 BPM | SYSTOLIC BLOOD PRESSURE: 128 MMHG | HEIGHT: 65 IN

## 2021-07-16 DIAGNOSIS — I89.0 LYMPHEDEMA, NOT ELSEWHERE CLASSIFIED: ICD-10-CM

## 2021-07-16 PROCEDURE — 99214 OFFICE O/P EST MOD 30 MIN: CPT

## 2021-07-16 NOTE — ADDENDUM
[FreeTextEntry1] : I, Jena Branch, acted solely as a scribe for Dr. Perez Russo on this date 07/16/2021.\par

## 2021-07-16 NOTE — HISTORY OF PRESENT ILLNESS
[de-identified] : 87 y/o female returns for a follow-up visit for stage III left heel melanoma, s/p wide excision on 5/2019. \par \par She completed immunotherapy Nivolumab July 2020 as per Dr. Kimbrough\par Pt continues to wear compression stockings for lymphedema.\par \par She states that she follows up with her dermatologist, Dr. Olson.\par \par Left groin ultrasound on 2/8/20 showed no evidence of lymphadenopathy. \par Pt notes she is s/p left groin hernia repair with mesh.\par \par She is s/p I&D of left groin infected seroma (6/26/19).\par Culture: Rare Enterobacter cloaecae\par \par PET/CT 6/15/19: 1. Postsurgical changes left heel with multiple surgical clips and high attenuation material. Residual FDG avidity along the surgical defect may represent postsurgical change/inflammation, residual disease, or combination of these entities. \par 2. Postsurgical collection left inguinal region. No FDG avid locoregional or distant metastatic disease.\par \par She subsequently underwent wide excision left superior heel melanoma with left inguinal sentinel node biopsy on 5/28/19.\par Pathology:\par 1. Left inguinal sentinel lymph nodes, biopsy. Two out of four lymph nodes positive for melanoma (2/4). \par 2. Skin and soft tissue, left heel foot, wide resection. Melanoma 3.5mm in thickness. Margins negative (1mm from the closest deep margin). [id number]\par \par She is s/p punch biopsy of two sites on her left heel on 5/1/19 performed by Navneet Branch PA-C.\par Pathology:\par 1. 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 pT4B\par 2. Left heel inferior: malignant melanoma, 4.2 mm thick, with ulcerations. pT4B  \par \par She notes a fractured nose requiring stitches by Dr. Ramirez of [place] in September 2019.\par Pt has a pacemaker and is currently on Warfarin for Afib as per Dr. Ronald Shepard of cardiology. \par She is now taking Eliquis.\par \par Imaging was due December 2020 per last note...............

## 2021-07-16 NOTE — PHYSICAL EXAM
[Normal] : supple, no neck mass and thyroid not enlarged [Normal] : oriented to person, place and time, with appropriate affect [de-identified] :  No inguinal adenopathy [de-identified] : stable 1 cm lipoma left thigh. 2+ left LE lymphedema.  [de-identified] : Left  heel skin graft well-healed.  no evidence of recurrence. No suspicious skin lesions.

## 2021-07-16 NOTE — ASSESSMENT
[FreeTextEntry1] : History of stage III left heel melanoma s/p WEX 5/2019\par ROXANN\par Continue dermatologic surveillance with Dr. Olson\par Will refer to physical therapy for LLE lymphedema \par RTO 6 months

## 2021-08-07 LAB
ALBUMIN SERPL ELPH-MCNC: 4.3 G/DL
ALP BLD-CCNC: 47 U/L
ALT SERPL-CCNC: 23 U/L
ANION GAP SERPL CALC-SCNC: 12 MMOL/L
AST SERPL-CCNC: 19 U/L
BASOPHILS # BLD AUTO: 0.05 K/UL
BASOPHILS NFR BLD AUTO: 0.7 %
BILIRUB SERPL-MCNC: 0.6 MG/DL
BUN SERPL-MCNC: 25 MG/DL
CALCIUM SERPL-MCNC: 9.5 MG/DL
CHLORIDE SERPL-SCNC: 101 MMOL/L
CO2 SERPL-SCNC: 26 MMOL/L
CREAT SERPL-MCNC: 0.82 MG/DL
EOSINOPHIL # BLD AUTO: 0.05 K/UL
EOSINOPHIL NFR BLD AUTO: 0.7 %
GLUCOSE SERPL-MCNC: 89 MG/DL
HCT VFR BLD CALC: 42.6 %
HGB BLD-MCNC: 13.5 G/DL
IMM GRANULOCYTES NFR BLD AUTO: 0.3 %
LDH SERPL-CCNC: 209 U/L
LYMPHOCYTES # BLD AUTO: 1.12 K/UL
LYMPHOCYTES NFR BLD AUTO: 16.8 %
MAN DIFF?: NORMAL
MCHC RBC-ENTMCNC: 30.7 PG
MCHC RBC-ENTMCNC: 31.7 GM/DL
MCV RBC AUTO: 96.8 FL
MONOCYTES # BLD AUTO: 0.47 K/UL
MONOCYTES NFR BLD AUTO: 7 %
NEUTROPHILS # BLD AUTO: 4.96 K/UL
NEUTROPHILS NFR BLD AUTO: 74.5 %
PLATELET # BLD AUTO: 173 K/UL
POTASSIUM SERPL-SCNC: 4.4 MMOL/L
PROT SERPL-MCNC: 6.5 G/DL
RBC # BLD: 4.4 M/UL
RBC # FLD: 14.1 %
SODIUM SERPL-SCNC: 140 MMOL/L
WBC # FLD AUTO: 6.67 K/UL

## 2021-08-09 ENCOUNTER — OUTPATIENT (OUTPATIENT)
Dept: OUTPATIENT SERVICES | Facility: HOSPITAL | Age: 86
LOS: 1 days | Discharge: HOME | End: 2021-08-09

## 2021-08-09 DIAGNOSIS — Z90.89 ACQUIRED ABSENCE OF OTHER ORGANS: Chronic | ICD-10-CM

## 2021-08-09 DIAGNOSIS — Z98.890 OTHER SPECIFIED POSTPROCEDURAL STATES: Chronic | ICD-10-CM

## 2021-08-09 DIAGNOSIS — C43.72 MALIGNANT MELANOMA OF LEFT LOWER LIMB, INCLUDING HIP: ICD-10-CM

## 2021-08-09 DIAGNOSIS — Z98.49 CATARACT EXTRACTION STATUS, UNSPECIFIED EYE: Chronic | ICD-10-CM

## 2021-08-10 ENCOUNTER — APPOINTMENT (OUTPATIENT)
Age: 86
End: 2021-08-10
Payer: MEDICARE

## 2021-08-10 VITALS
BODY MASS INDEX: 24.24 KG/M2 | DIASTOLIC BLOOD PRESSURE: 88 MMHG | OXYGEN SATURATION: 98 % | SYSTOLIC BLOOD PRESSURE: 165 MMHG | TEMPERATURE: 96.8 F | RESPIRATION RATE: 14 BRPM | HEART RATE: 60 BPM | WEIGHT: 145.5 LBS | HEIGHT: 64.96 IN

## 2021-08-10 PROCEDURE — 99214 OFFICE O/P EST MOD 30 MIN: CPT

## 2021-08-10 NOTE — HISTORY OF PRESENT ILLNESS
[Disease: _____________________] : Disease: [unfilled] [T: ___] : T[unfilled] [N: ___] : N[unfilled] [M: ___] : M[unfilled] [AJCC Stage: ____] : AJCC Stage: [unfilled] [de-identified] : 86 F with HTN, PPM, atrial fibrillation ( Dr. 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.\par \par CASE SYNOPSIS:\par May 2019- patient notices a bleeding left heel cutaneous lesion; \par \par 5/1/19- punch biopsy performed by a dermatologist ([facility]) ; pathology:\par - left heel superior: malignant melanoma, 4.2 mm thick with ulcerations, pT4b.\par -left heel inferior: malignant melanoma, 4.2 mm thick, with ulceration, pT4b.\par \par 5/28/19- left heel wide resection and sentinel lymph node biopsy (); pathology:\par -Left inguinal lymph node biopsy: 2/4 lymph nodes positive for melanoma\par -Skin and soft tissue, left heel foot, consistent with melanoma, 3.5 mm thickness, margins negative, pT4b, pN 2a= stage III C\par \par 6/15/19: PET- residual FDG avidity along the surgical defect consistent with postsurgical changes, or residual disease, or combination of the two. No FDG avid local regional or distant metastatic disease. \par \par 7/15/19- started Nivolumab under the care of Dr. Mcbride at [facility].\par \par 8/12/19- cycle # 2 Nivolumab also at [facility].\par \par 10/15/19: Left lower extremity ultrasound-benign appearing left inguinal lymph nodes, containing fatty hilum, measuring 0.9 x 0.8 x 1.3 cm, 1.2 x 0.7 x 0.9 cm, and one 0.5 x 0.8 x 0.9 cm. Additional fluid collection representing seroma present in the left groin.\par \par 1/27- 1/31/20 patient admitted at [facility] with SBO secondary to left inguinal hernia; diagnostic laparoscopy with reduction of incarcerated bowel in the left inguinal hernia/open repair performed 1/27/20.\par \par 2/5/20- endocrine consult for possible drug- induced adrenal insufficiency.\par \par 2/8/20- Ultrasound left lower extremity- no left groin lymphadenopathy. Probable left groin postoperative collections/seromas.\par \par 6/14/2020–exploratory laparotomy, left femoral hernia repair, small bowel resection (due to incarcerated hernia).\par \par 7/15/19 - 7/22/20: completed 1 year maintenance Nivolumab.\par \par September 2020–admitted with bilateral pneumonia; tested negative for COVID.\par \par 8/7/21- LDH :209 [de-identified] : Malignant melanoma [FreeTextEntry1] : Nivolumab- flat dose monthly [de-identified] : Ms. HULL continues biannual oncologic surveillance; recent LDH in normal range (209 U/L), with patient enjoying good quality of life.  Tinnitus daily low-dose prednisone as prescribed by endocrinologist for drug-induced adrenal insufficiency.  Denies hospitalization, venous thrombosis, palpitations or chest pain.  Dermatologic follow-up showed no evidence of recurrent melanoma.  It has been 1 year since completion of maintenance nivolumab (July 2020).  Continues anticoagulation with Eliquis for atrial fibrillation; PPM in place.  Wearing compression stockings during the day, and has a minimal degree of lower extremity lymphedema.  On physical exam there is no evident palpable peripheral lymphadenopathy.  Here accompanied by her son, Philip.\par \par \par

## 2021-08-10 NOTE — REVIEW OF SYSTEMS
[Shortness Of Breath] : shortness of breath [Wheezing] : wheezing [Skin Rash] : skin rash [Negative] : Allergic/Immunologic [Cough] : no cough [FreeTextEntry9] : wearing compressive stockings [de-identified] : left sole cellulitis

## 2021-08-10 NOTE — ASSESSMENT
[FreeTextEntry1] : Ms. HULL 's questions were answered to her satisfaction. She will return to the office  in 6 months.\par \par

## 2021-08-10 NOTE — PHYSICAL EXAM
[Restricted in physically strenuous activity but ambulatory and able to carry out work of a light or sedentary nature] : Status 1- Restricted in physically strenuous activity but ambulatory and able to carry out work of a light or sedentary nature, e.g., light house work, office work [Normal] : normal appearance, no rash, nodules, vesicles, ulcers, erythema [de-identified] : s/p left inguinal hernia repair [de-identified] : left heel wound  healed, hypopigmented skin; left sole cellulitis resolved

## 2021-08-10 NOTE — REASON FOR VISIT
[Follow-Up Visit] : a follow-up [Family Member] : family member [FreeTextEntry2] : left foot melanoma [FreeTextEntry3] : \par

## 2021-09-09 ENCOUNTER — NON-APPOINTMENT (OUTPATIENT)
Age: 86
End: 2021-09-09

## 2021-09-13 ENCOUNTER — NON-APPOINTMENT (OUTPATIENT)
Age: 86
End: 2021-09-13

## 2021-09-14 ENCOUNTER — APPOINTMENT (OUTPATIENT)
Dept: NUCLEAR MEDICINE | Facility: CLINIC | Age: 86
End: 2021-09-14
Payer: MEDICARE

## 2021-09-14 ENCOUNTER — OUTPATIENT (OUTPATIENT)
Dept: OUTPATIENT SERVICES | Facility: HOSPITAL | Age: 86
LOS: 1 days | End: 2021-09-14
Payer: MEDICARE

## 2021-09-14 DIAGNOSIS — Z98.49 CATARACT EXTRACTION STATUS, UNSPECIFIED EYE: Chronic | ICD-10-CM

## 2021-09-14 DIAGNOSIS — Z98.890 OTHER SPECIFIED POSTPROCEDURAL STATES: Chronic | ICD-10-CM

## 2021-09-14 DIAGNOSIS — Z90.89 ACQUIRED ABSENCE OF OTHER ORGANS: Chronic | ICD-10-CM

## 2021-09-14 DIAGNOSIS — C77.9 SECONDARY AND UNSPECIFIED MALIGNANT NEOPLASM OF LYMPH NODE, UNSPECIFIED: ICD-10-CM

## 2021-09-14 DIAGNOSIS — C43.9 MALIGNANT MELANOMA OF SKIN, UNSPECIFIED: ICD-10-CM

## 2021-09-14 PROCEDURE — A9552: CPT

## 2021-09-14 PROCEDURE — 78816 PET IMAGE W/CT FULL BODY: CPT | Mod: 26,PS,MH

## 2021-09-14 PROCEDURE — 78816 PET IMAGE W/CT FULL BODY: CPT

## 2021-11-01 NOTE — HISTORY OF PRESENT ILLNESS
[de-identified] : 86 y/o female returns for follow up.  She is s/p punch biopsy of two sites on her left heel on 5/1/19 performed by Navneet Branch PA-C.\par Pathology:\par 1. 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 pT4B\par 2. Left heel inferior: malignant melanoma, 4.2 mm thick, with ulcerations. pT4B  \par \par She subsequently undewent wide excision left superior heel melanoma with left inguinal sentinel node biopsy on 5/28/19.\par Pathology:\par 1. Left inguinal sentinel lymph nodes, biopsy. Two out of four lymph nodes positive for melanoma (2/4). \par 2. Skin and soft tissue, left heel foot, wide resection. Melanoma 3.5mm in thickness. Margins negative (1mm from the closest deep margin). [id number]\par \par She is s/p I&D of left groin infected seroma (6/26/19).\par Culture: Rare Enterobacter cloaecae\par \par PET/CT 6/15/19: 1. Postsurgical changes left heel with multiple surgical clips and high attenuation material. Residual FDG avidity along the surgical defect may represent postsurgical change/inflammation, residual disease, or combination of these entities. \par 2. Postsurgical collection left inguinal region. No FDG avid locoregional or distant metastatic disease.\par \par Left groin ultrasound on 2/8/20 showed no evidence of lymphadenopathy. \par \par She completed immunotherapy Nivolumab July 2020 as per Dr. Kimbrough\par Pt continues to wear compression stockings for lymphedema. \par \par Pt notes she is s/p left groin hernia repair with mesh.\par \par She states that she will be following up with her dermatologist, Dr. Olson.\par \par She notes a fractured nose requiring stitches by Dr. Ramirez of [place] in September 2019.\par Pt has a pacemaker and is currently on Warfarin for Afib as per Dr. Ronald Shepard of cardiology. \par She is now taking Eliquis.\par Denies constitutional symptoms. \par Denies fever or chills.\par \par Imaging was due December 2020 per last note...............\par \par

## 2021-11-01 NOTE — PHYSICAL EXAM
[Normal] : supple, no neck mass and thyroid not enlarged [Normal] : oriented to person, place and time, with appropriate affect [de-identified] : No inguinal adenopathy.  [de-identified] : 2+ left LE lymphedema.  [de-identified] : Left lateral heel skin graft healed well. No evidence of recurrence.

## 2021-11-01 NOTE — ASSESSMENT
[FreeTextEntry1] : Stage III left heel melanoma s/p WEX 5/19\par S/p immunotherapy w Dr. Kimbrough 7/2020. \par ROXANN\par Cont. dermatologic surveillance with Dr. Olson.\par RTO 6 mos

## 2021-12-02 ENCOUNTER — EMERGENCY (EMERGENCY)
Facility: HOSPITAL | Age: 86
LOS: 0 days | Discharge: HOME | End: 2021-12-02
Attending: EMERGENCY MEDICINE
Payer: MEDICARE

## 2021-12-02 ENCOUNTER — NON-APPOINTMENT (OUTPATIENT)
Age: 86
End: 2021-12-02

## 2021-12-02 VITALS
DIASTOLIC BLOOD PRESSURE: 81 MMHG | SYSTOLIC BLOOD PRESSURE: 123 MMHG | TEMPERATURE: 98 F | OXYGEN SATURATION: 100 % | RESPIRATION RATE: 18 BRPM | HEART RATE: 80 BPM

## 2021-12-02 VITALS
HEIGHT: 65 IN | TEMPERATURE: 98 F | OXYGEN SATURATION: 99 % | SYSTOLIC BLOOD PRESSURE: 102 MMHG | HEART RATE: 85 BPM | WEIGHT: 147.93 LBS | RESPIRATION RATE: 18 BRPM | DIASTOLIC BLOOD PRESSURE: 62 MMHG

## 2021-12-02 DIAGNOSIS — K21.9 GASTRO-ESOPHAGEAL REFLUX DISEASE WITHOUT ESOPHAGITIS: ICD-10-CM

## 2021-12-02 DIAGNOSIS — E78.5 HYPERLIPIDEMIA, UNSPECIFIED: ICD-10-CM

## 2021-12-02 DIAGNOSIS — I10 ESSENTIAL (PRIMARY) HYPERTENSION: ICD-10-CM

## 2021-12-02 DIAGNOSIS — Z90.89 ACQUIRED ABSENCE OF OTHER ORGANS: Chronic | ICD-10-CM

## 2021-12-02 DIAGNOSIS — R22.0 LOCALIZED SWELLING, MASS AND LUMP, HEAD: ICD-10-CM

## 2021-12-02 DIAGNOSIS — T78.49XA OTHER ALLERGY, INITIAL ENCOUNTER: ICD-10-CM

## 2021-12-02 DIAGNOSIS — R21 RASH AND OTHER NONSPECIFIC SKIN ERUPTION: ICD-10-CM

## 2021-12-02 DIAGNOSIS — E27.40 UNSPECIFIED ADRENOCORTICAL INSUFFICIENCY: ICD-10-CM

## 2021-12-02 DIAGNOSIS — Z98.890 OTHER SPECIFIED POSTPROCEDURAL STATES: ICD-10-CM

## 2021-12-02 DIAGNOSIS — Z95.0 PRESENCE OF CARDIAC PACEMAKER: ICD-10-CM

## 2021-12-02 DIAGNOSIS — Z98.890 OTHER SPECIFIED POSTPROCEDURAL STATES: Chronic | ICD-10-CM

## 2021-12-02 DIAGNOSIS — Z98.49 CATARACT EXTRACTION STATUS, UNSPECIFIED EYE: Chronic | ICD-10-CM

## 2021-12-02 DIAGNOSIS — H11.89 OTHER SPECIFIED DISORDERS OF CONJUNCTIVA: ICD-10-CM

## 2021-12-02 DIAGNOSIS — I48.91 UNSPECIFIED ATRIAL FIBRILLATION: ICD-10-CM

## 2021-12-02 DIAGNOSIS — Z88.0 ALLERGY STATUS TO PENICILLIN: ICD-10-CM
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
## 2021-12-02 DIAGNOSIS — Z90.89 ACQUIRED ABSENCE OF OTHER ORGANS: ICD-10-CM

## 2021-12-02 DIAGNOSIS — C79.9 SECONDARY MALIGNANT NEOPLASM OF UNSPECIFIED SITE: ICD-10-CM

## 2021-12-02 DIAGNOSIS — Z79.01 LONG TERM (CURRENT) USE OF ANTICOAGULANTS: ICD-10-CM

## 2021-12-02 DIAGNOSIS — Z98.49 CATARACT EXTRACTION STATUS, UNSPECIFIED EYE: ICD-10-CM
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
## 2021-12-02 LAB
ALBUMIN SERPL ELPH-MCNC: 3.1 G/DL — LOW (ref 3.3–5)
ALP SERPL-CCNC: 62 U/L — SIGNIFICANT CHANGE UP (ref 40–120)
ALT FLD-CCNC: 94 U/L — HIGH (ref 12–78)
ANION GAP SERPL CALC-SCNC: 6 MMOL/L — SIGNIFICANT CHANGE UP (ref 5–17)
AST SERPL-CCNC: 100 U/L — HIGH (ref 15–37)
BASOPHILS # BLD AUTO: 0.03 K/UL — SIGNIFICANT CHANGE UP (ref 0–0.2)
BASOPHILS NFR BLD AUTO: 1 % — SIGNIFICANT CHANGE UP (ref 0–2)
BILIRUB SERPL-MCNC: 1.5 MG/DL — HIGH (ref 0.2–1.2)
BUN SERPL-MCNC: 28 MG/DL — HIGH (ref 7–23)
C3 SERPL-MCNC: 114 MG/DL — SIGNIFICANT CHANGE UP (ref 81–157)
C4 SERPL-MCNC: 38 MG/DL — SIGNIFICANT CHANGE UP (ref 13–39)
CALCIUM SERPL-MCNC: 8.7 MG/DL — SIGNIFICANT CHANGE UP (ref 8.5–10.1)
CHLORIDE SERPL-SCNC: 101 MMOL/L — SIGNIFICANT CHANGE UP (ref 96–108)
CO2 SERPL-SCNC: 28 MMOL/L — SIGNIFICANT CHANGE UP (ref 22–31)
COVID-19 SPIKE DOMAIN AB INTERP: POSITIVE
COVID-19 SPIKE DOMAIN ANTIBODY RESULT: >250 U/ML — HIGH
CREAT SERPL-MCNC: 0.87 MG/DL — SIGNIFICANT CHANGE UP (ref 0.5–1.3)
EOSINOPHIL # BLD AUTO: 0 K/UL — SIGNIFICANT CHANGE UP (ref 0–0.5)
EOSINOPHIL NFR BLD AUTO: 0 % — SIGNIFICANT CHANGE UP (ref 0–6)
GLUCOSE SERPL-MCNC: 92 MG/DL — SIGNIFICANT CHANGE UP (ref 70–99)
HCT VFR BLD CALC: 42.8 % — SIGNIFICANT CHANGE UP (ref 34.5–45)
HGB BLD-MCNC: 13.8 G/DL — SIGNIFICANT CHANGE UP (ref 11.5–15.5)
LYMPHOCYTES # BLD AUTO: 0.41 K/UL — LOW (ref 1–3.3)
LYMPHOCYTES # BLD AUTO: 14 % — SIGNIFICANT CHANGE UP (ref 13–44)
MCHC RBC-ENTMCNC: 30.3 PG — SIGNIFICANT CHANGE UP (ref 27–34)
MCHC RBC-ENTMCNC: 32.2 GM/DL — SIGNIFICANT CHANGE UP (ref 32–36)
MCV RBC AUTO: 94.1 FL — SIGNIFICANT CHANGE UP (ref 80–100)
MONOCYTES # BLD AUTO: 0.29 K/UL — SIGNIFICANT CHANGE UP (ref 0–0.9)
MONOCYTES NFR BLD AUTO: 10 % — SIGNIFICANT CHANGE UP (ref 2–14)
NEUTROPHILS # BLD AUTO: 2.18 K/UL — SIGNIFICANT CHANGE UP (ref 1.8–7.4)
NEUTROPHILS NFR BLD AUTO: 64 % — SIGNIFICANT CHANGE UP (ref 43–77)
NRBC # BLD: SIGNIFICANT CHANGE UP /100 WBCS (ref 0–0)
PLATELET # BLD AUTO: 74 K/UL — LOW (ref 150–400)
POTASSIUM SERPL-MCNC: 3.5 MMOL/L — SIGNIFICANT CHANGE UP (ref 3.5–5.3)
POTASSIUM SERPL-SCNC: 3.5 MMOL/L — SIGNIFICANT CHANGE UP (ref 3.5–5.3)
PROT SERPL-MCNC: 6.5 GM/DL — SIGNIFICANT CHANGE UP (ref 6–8.3)
RBC # BLD: 4.55 M/UL — SIGNIFICANT CHANGE UP (ref 3.8–5.2)
RBC # FLD: 13.9 % — SIGNIFICANT CHANGE UP (ref 10.3–14.5)
SARS-COV-2 IGG+IGM SERPL QL IA: >250 U/ML — HIGH
SARS-COV-2 IGG+IGM SERPL QL IA: POSITIVE
SODIUM SERPL-SCNC: 135 MMOL/L — SIGNIFICANT CHANGE UP (ref 135–145)
TOTAL HEM COMP BLD-ACNC: 49 U/ML — SIGNIFICANT CHANGE UP (ref 42–95)
WBC # BLD: 2.9 K/UL — LOW (ref 3.8–10.5)
WBC # FLD AUTO: 2.9 K/UL — LOW (ref 3.8–10.5)

## 2021-12-02 PROCEDURE — 99284 EMERGENCY DEPT VISIT MOD MDM: CPT | Mod: 25

## 2021-12-02 PROCEDURE — 36415 COLL VENOUS BLD VENIPUNCTURE: CPT

## 2021-12-02 PROCEDURE — 96375 TX/PRO/DX INJ NEW DRUG ADDON: CPT

## 2021-12-02 PROCEDURE — 86769 SARS-COV-2 COVID-19 ANTIBODY: CPT

## 2021-12-02 PROCEDURE — 86160 COMPLEMENT ANTIGEN: CPT

## 2021-12-02 PROCEDURE — 99284 EMERGENCY DEPT VISIT MOD MDM: CPT

## 2021-12-02 PROCEDURE — 86162 COMPLEMENT TOTAL (CH50): CPT

## 2021-12-02 PROCEDURE — 80053 COMPREHEN METABOLIC PANEL: CPT

## 2021-12-02 PROCEDURE — 85025 COMPLETE CBC W/AUTO DIFF WBC: CPT

## 2021-12-02 PROCEDURE — 83520 IMMUNOASSAY QUANT NOS NONAB: CPT

## 2021-12-02 PROCEDURE — 96374 THER/PROPH/DIAG INJ IV PUSH: CPT

## 2021-12-02 RX ORDER — DIPHENHYDRAMINE HCL 50 MG
1 CAPSULE ORAL
Qty: 20 | Refills: 0
Start: 2021-12-02 | End: 2021-12-11

## 2021-12-02 RX ORDER — DIPHENHYDRAMINE HCL 50 MG
25 CAPSULE ORAL ONCE
Refills: 0 | Status: COMPLETED | OUTPATIENT
Start: 2021-12-02 | End: 2021-12-02

## 2021-12-02 RX ORDER — FAMOTIDINE 10 MG/ML
1 INJECTION INTRAVENOUS
Qty: 20 | Refills: 0
Start: 2021-12-02 | End: 2021-12-11

## 2021-12-02 RX ORDER — FAMOTIDINE 10 MG/ML
20 INJECTION INTRAVENOUS ONCE
Refills: 0 | Status: COMPLETED | OUTPATIENT
Start: 2021-12-02 | End: 2021-12-02

## 2021-12-02 RX ADMIN — Medication 125 MILLIGRAM(S): at 06:12

## 2021-12-02 RX ADMIN — Medication 25 MILLIGRAM(S): at 05:29

## 2021-12-02 RX ADMIN — FAMOTIDINE 20 MILLIGRAM(S): 10 INJECTION INTRAVENOUS at 05:29

## 2021-12-02 NOTE — ED ADULT NURSE NOTE - CHIEF COMPLAINT QUOTE
patient brought in by son to the ER for covid vaccine rxn. Pt got the pfizer booster shot on monday, started feeling weak, lethargic, and with diarrhea on tuesday, and today still presenting will nausea, upset stomach, bilateral subconjunctival hemorrhages, and swelling around both of the eyes. patient has no other complaints. breathing is even and unlabored. pt states she had some reaction to the second dose of the vaccine but not as severe as this reaction. pt son felix, [phone]

## 2021-12-02 NOTE — ED PROVIDER NOTE - OBJECTIVE STATEMENT
87 y/o female in ED c/o possible reaction to Pfizer COVID booster x 4 days.   pt states had booster on 11/29/21 and then awoke in the morning with redness and swelling to face.   states then develop hemorrhages to both eyes.   pt denies any pain.   states no vision changes.   pt denies any trauma.   denies any fever, HA, cp, sob, n/v/d/abd pain.   no meds taken for symptoms.

## 2021-12-02 NOTE — ED PROVIDER NOTE - NSICDXPASTMEDICALHX_GEN_ALL_CORE_FT
PAST MEDICAL HISTORY:  Atrial fibrillation     GERD (gastroesophageal reflux disease)     HLD (hyperlipidemia)     HTN (hypertension)     Hypoadrenalism     Lymphedema     Metastatic melanoma     Pacemaker

## 2021-12-02 NOTE — ED PROVIDER NOTE - PATIENT PORTAL LINK FT
You can access the FollowMyHealth Patient Portal offered by [facility] by registering at the following website: [email or web address]. By joining [facility]’s FollowMyHealth portal, you will also be able to view your health information using other applications (apps) compatible with our system.

## 2021-12-02 NOTE — ED PROVIDER NOTE - PROGRESS NOTE DETAILS
pt told of results.   states feels better.  denies any respiratory symptoms.   agree with plan for d/c home with f/u with PMD for ophthalmology referral.

## 2021-12-02 NOTE — ED ADULT TRIAGE NOTE - CHIEF COMPLAINT QUOTE
patient brought in by son to the ER for covid vaccine rxn. Pt got the pfizer booster shot on monday, started feeling weak, lethargic, and with diarrhea on tuesday, and today still presenting will nausea, upset stomach, bilateral subconjunctival hemorrhages, and swelling around both of the eyes. patient has no other complaints. breathing is even and unlabored. pt states she had some reaction to the second dose of the vaccine but not as severe as this reaction. pt son felix, [phone]

## 2021-12-02 NOTE — ED PROVIDER NOTE - NSFOLLOWUPINSTRUCTIONS_ED_ALL_ED_FT
please follow up with your doctor in 1-2 days for ophthalmology referral.   take medications as prescribed.   return to ED for any concerns

## 2021-12-02 NOTE — ED ADULT NURSE NOTE - OBJECTIVE STATEMENT
Pt is an 86yr old female, A&Ox4, arrives to ED s/p Pfizer booster shot on Monday, c/o of nausea, diarrhea, fatigue, and b/l eye redness/swelling starting on Tuesday. On Eliquis for AFIB. Pt denies eye discharge or eye itchiness. States only "reaction" to last vaccine was HA for a few days. Currently denies any recent falls/trauma, CP, SOB, vision changes, HA, dizziness, abd. pain, fever/chills, cough, and urinary symptoms at this time. 20g IV placed to the L AC. Labs sent. Meds given as per order. VS as noted. In NAD.

## 2021-12-02 NOTE — ED ADULT NURSE NOTE - NSIMPLEMENTINTERV_GEN_ALL_ED
Implemented All Fall with Harm Risk Interventions:  [place] to call system. Call bell, personal items and telephone within reach. Instruct patient to call for assistance. Room bathroom lighting operational. Non-slip footwear when patient is off stretcher. Physically safe environment: no spills, clutter or unnecessary equipment. Stretcher in lowest position, wheels locked, appropriate side rails in place. Provide visual cue, wrist band, yellow gown, etc. Monitor gait and stability. Monitor for mental status changes and reorient to person, place, and time. Review medications for side effects contributing to fall risk. Reinforce activity limits and safety measures with patient and family. Provide visual clues: red socks.

## 2021-12-05 LAB
A-TUMOR NECROSIS FACT SERPL-MCNC: 1.7 PG/ML — SIGNIFICANT CHANGE UP
IL10 SERPL-MCNC: 24 PG/ML — HIGH
IL12 SERPL-MCNC: <1.9 PG/ML — SIGNIFICANT CHANGE UP
IL13 SERPL-MCNC: 7.8 PG/ML — HIGH
IL17A SERPL-MCNC: <1.4 PG/ML — SIGNIFICANT CHANGE UP
IL2 SERPL-MCNC: 3646.1 PG/ML — HIGH (ref 175.3–858.2)
IL2 SERPL-MCNC: 5.1 PG/ML — HIGH
IL4 SERPL-MCNC: <2.2 PG/ML — SIGNIFICANT CHANGE UP
IL6 SERPL-MCNC: 34.3 PG/ML — HIGH
IL8 SERPL-MCNC: <3 PG/ML — SIGNIFICANT CHANGE UP
INTERFERON GAMMA, BLOOD: 4.7 PG/ML — HIGH
INTERLEUKIN 1 BETA: <6.5 PG/ML — SIGNIFICANT CHANGE UP
INTERLEUKIN 5: 6.7 PG/ML — HIGH
TRYPTASE SERPL-MCNC: 6.5 UG/L — SIGNIFICANT CHANGE UP (ref 2.2–13.2)

## 2021-12-06 LAB
C5 SERPL-MCNC: 16.7 MG/DL — SIGNIFICANT CHANGE UP (ref 10.6–26.3)
C6 SERPL-MCNC: 56 U/ML — SIGNIFICANT CHANGE UP (ref 32–57)
C7 SERPL-MCNC: 57 U/ML — SIGNIFICANT CHANGE UP (ref 36–60)
C8 SERPL-MCNC: 55 U/ML — SIGNIFICANT CHANGE UP (ref 33–58)
C9 SERPL-MCNC: 58 U/ML — SIGNIFICANT CHANGE UP (ref 37–61)

## 2021-12-06 NOTE — ED POST DISCHARGE NOTE - RESULT SUMMARY
Pt with abnormal cytokine panel, possible allergic reaction to COVID-19 booster. Some lab results still not resulted. Recommend pt wait for rest of results and f/u with allergist to see if she is allergic to vaccine. Pt agrees with plan of  care. signed Herlinda Mendez PA-C

## 2022-01-06 ENCOUNTER — APPOINTMENT (OUTPATIENT)
Dept: PEDIATRIC ALLERGY IMMUNOLOGY | Facility: CLINIC | Age: 87
End: 2022-01-06
Payer: MEDICARE

## 2022-01-06 DIAGNOSIS — T50.B95A ADVERSE EFFECT OF OTHER VIRAL VACCINES, INITIAL ENCOUNTER: ICD-10-CM

## 2022-01-06 DIAGNOSIS — Z83.6 FAMILY HISTORY OF OTHER DISEASES OF THE RESPIRATORY SYSTEM: ICD-10-CM

## 2022-01-06 PROCEDURE — 99204 OFFICE O/P NEW MOD 45 MIN: CPT | Mod: 95

## 2022-01-07 PROBLEM — T50.B95A ADVERSE EFFECT OF COVID-19 VACCINE: Status: ACTIVE | Noted: 2022-01-07

## 2022-01-07 NOTE — HISTORY OF PRESENT ILLNESS
[Home] : at home, [unfilled] , at the time of the visit. [Other Location: e.g. Home (Enter Location, City,State)___] : at [unfilled] [Verbal consent obtained from patient] : the patient, [unfilled] [de-identified] : Herlinda is an 86 year old woman with a hx of a reaction to the booster vaccine.\par \par Flu shot beginning of November\par End of November - had the Pfizer booster at 6PM. Pt felt fine immediately after the vaccine and for hours after the vaccine.  Sx developed around 5AM. She recalls feeling well up till 3AM.\par Nausea, dry heaving, diarrhea.  Then her face swelled and the white of her eyes turned red. Diarrhea lasted x 1 week, slept round the clock for 2 days.\par No itching. Very blood shot eyes. Eyelids also swelled. No lip or tongue swelling.  No trouble swallowing, no trouble speaking or breathing. Slept for 2 days and tried to wait it out, but then got to the point where she could not sleep . Pt was still swollen 3 days later. Went to the ED and she was given a higher dose of prednisone 40mg  x 5 days (currently on prednisone 7mg total daily).  Given Benadryl and steroids and swelling resolved.  \par \par Recalls some reaction about 12 hours after the second dose of the Pfizer - took Benadryl - possibly for a rash. \par Does not recall the details.\par \par Prior hx of Melanoma on her foot in 2019 - took immunotherapy and started to get lethargic. Developed adrenal insufficiency and was started on daily maintenance prednisone - 7mg total daily.\par Pt followed by endocrine.\par \par No prior hx of angioedema  or hives.\par No rash.  No fever. No headache. \par \par PCN allergy in her 20's.\par Lots of clear rhinorrhea. No prior allergy testing.\par No food allergies.\par \par Pt worked up until recently - retired July 2020.\par \par Takes meds for HTN and has A-fib. Takes elequis.\par \par Pt has never had a colonscopy.\par Never took MIralax. She has taken stool softeners PO.\par \par Did you receive the Moderna or Pfizer vaccine? Pfizer\par Have you ever had a?previous?allergic/anaphylactic reaction for which an EpiPen?(or other epinephrine autoinjector)?was required??? \par NO\par Do you have any history of allergies to medications/drugs?? \par PCN\par Have you ever had a reaction to injectable steroids or any other injectable medication? \par Never had injectables\par Do you have any history of allergic reactions to vaccines??? \par NO - tolerates the flu shot - pneumonia sot (unknown), tolerated shingles shot\par Do you have any history of allergies to foods??? \par NO\par Do you have any history of allergy to IV contrast??? \par NO\par Do you have any history of allergic reaction to bee sting?or other stinging insects?? \par NO\par Do you have any history of environmental allergies/hay fever??? \par YES\par Do you have any?history of asthma??? \par NO\par Do you have any?history of?eczema or?atopic dermatitis??? \par NO\par Do you have any?history of contact dermatitis??? \par NO\par Do you have any?history of hives?(urticaria)?or angioedema?? \par NO\par Do you have any?history of a mast cell disorder??? \par NO\par Do you have any?history of any other allergic reaction of any kind??? \par NO\par Have you ever had a colonoscopy???When was your last colonoscopy? \par Never had\par Have you had a reaction to preparation for colonoscopy?\par Never had \par Have you ever used?Miralax?(polyethylene glycol) or another laxative?? When was your last use? \par Never had\par Do you have any cosmetic fillers? When was the last procedure? \par NO\par Were you infected with?SARS-COV2?to your knowledge?(Have you ever had COVID-19)??? \par NO\par Have you received passive antibody therapy (monoclonal antibodies or convalescent serum) as treatment for COVID-19?? \par NO\par Do you take a beta-blocker??? \par YES\par Do you take an ACE inhibitor?? \par NO\par Do you use NSAIDs?? Did you take NSAIDs within 24 hours of your vaccine?? \par NO\par Do you have a history of vasovagal reactions (fainting after blood draw, shots or other situations)??? \par NO\par Do you have a history of anxiety or panic attacks??? \par NO\par What is your occupation? \par Retired\par Do you work with or have a hobby working with automobiles? \par NO\par What was the timing of your reaction? How soon did you feel any symptoms??? \par 12 hours later\par What were your symptoms?after receiving the COVID-19 vaccine?in chronological order??? \par Did you feel any:?? \par Itchiness? NO \par Rash/urticaria?  NO\par flushing NO\par Swelling? YES\par Difficulty swallowing/lump in throat?  NO\par Shortness of breath/wheezing/cough?? NO\par Abdominal pain/cramping/vomiting/diarrhea? YES - diarrhea \par Dizziness/Lightheadedness/Fainting/Loss of Consciousness?  NO\par Impending sense of doom? NO\par What medications were you treated with??? \par Benadryl (or H1 blocker); which one?? YES \par Pepcid (or another H2 blocker); which one?? NO \par Steroids (PO/IV/IM); which one?? YES\par Montelukast? NO\par Epinephrine? NO

## 2022-01-07 NOTE — CONSULT LETTER
[Dear  ___] : Dear  [unfilled], [Consult Letter:] : I had the pleasure of evaluating your patient, [unfilled]. [Please see my note below.] : Please see my note below. [Consult Closing:] : Thank you very much for allowing me to participate in the care of this patient.  If you have any questions, please do not hesitate to contact me. [Sincerely,] : Sincerely, [FreeTextEntry2] : Ronald Shepard MD [FreeTextEntry3] : Rebecca Patel MD\par Attending Physician \par Division of Allergy/Immunology \par [facility] \par \par  of Medicine and Pediatrics\par [facility] \par \par [address]\[place] [address]\par Tel: [phone]\par Fax: [phone]\par Email: [email or web address]\par \par \par \par

## 2022-01-07 NOTE — SOCIAL HISTORY
[House] : [unfilled] lives in a house  [None] : none [Smokers in Household] : there are smokers in the home [de-identified] : son [FreeTextEntry2] : retired

## 2022-02-02 LAB
BASOPHILS # BLD AUTO: 0.07 K/UL
BASOPHILS NFR BLD AUTO: 0.9 %
EOSINOPHIL # BLD AUTO: 0.05 K/UL
EOSINOPHIL NFR BLD AUTO: 0.6 %
HCT VFR BLD CALC: 48.1 %
HGB BLD-MCNC: 14.5 G/DL
IMM GRANULOCYTES NFR BLD AUTO: 0.4 %
LYMPHOCYTES # BLD AUTO: 1.44 K/UL
LYMPHOCYTES NFR BLD AUTO: 18.7 %
MAN DIFF?: NORMAL
MCHC RBC-ENTMCNC: 30.1 GM/DL
MCHC RBC-ENTMCNC: 30.2 PG
MCV RBC AUTO: 100.2 FL
MONOCYTES # BLD AUTO: 0.66 K/UL
MONOCYTES NFR BLD AUTO: 8.6 %
NEUTROPHILS # BLD AUTO: 5.45 K/UL
NEUTROPHILS NFR BLD AUTO: 70.8 %
PLATELET # BLD AUTO: 163 K/UL
RBC # BLD: 4.8 M/UL
RBC # FLD: 14.6 %
WBC # FLD AUTO: 7.7 K/UL

## 2022-02-03 LAB
ALBUMIN SERPL ELPH-MCNC: 4.8 G/DL
ALP BLD-CCNC: 54 U/L
ALT SERPL-CCNC: 31 U/L
ANION GAP SERPL CALC-SCNC: 14 MMOL/L
AST SERPL-CCNC: 30 U/L
BILIRUB SERPL-MCNC: 0.7 MG/DL
BUN SERPL-MCNC: 27 MG/DL
CALCIUM SERPL-MCNC: 9.9 MG/DL
CHLORIDE SERPL-SCNC: 101 MMOL/L
CO2 SERPL-SCNC: 25 MMOL/L
CREAT SERPL-MCNC: 0.76 MG/DL
GLUCOSE SERPL-MCNC: 105 MG/DL
LDH SERPL-CCNC: 266 U/L
POTASSIUM SERPL-SCNC: 4.4 MMOL/L
PROT SERPL-MCNC: 7 G/DL
SODIUM SERPL-SCNC: 140 MMOL/L

## 2022-02-07 ENCOUNTER — OUTPATIENT (OUTPATIENT)
Dept: OUTPATIENT SERVICES | Facility: HOSPITAL | Age: 87
LOS: 1 days | Discharge: HOME | End: 2022-02-07

## 2022-02-07 DIAGNOSIS — C43.72 MALIGNANT MELANOMA OF LEFT LOWER LIMB, INCLUDING HIP: ICD-10-CM

## 2022-02-07 DIAGNOSIS — Z98.49 CATARACT EXTRACTION STATUS, UNSPECIFIED EYE: Chronic | ICD-10-CM

## 2022-02-07 DIAGNOSIS — Z90.89 ACQUIRED ABSENCE OF OTHER ORGANS: Chronic | ICD-10-CM

## 2022-02-07 DIAGNOSIS — Z98.890 OTHER SPECIFIED POSTPROCEDURAL STATES: Chronic | ICD-10-CM

## 2022-02-08 ENCOUNTER — APPOINTMENT (OUTPATIENT)
Age: 87
End: 2022-02-08
Payer: MEDICARE

## 2022-02-08 VITALS
TEMPERATURE: 96.6 F | BODY MASS INDEX: 25.35 KG/M2 | OXYGEN SATURATION: 98 % | WEIGHT: 152.12 LBS | SYSTOLIC BLOOD PRESSURE: 176 MMHG | DIASTOLIC BLOOD PRESSURE: 103 MMHG | HEART RATE: 82 BPM | RESPIRATION RATE: 16 BRPM

## 2022-02-08 VITALS — DIASTOLIC BLOOD PRESSURE: 110 MMHG | SYSTOLIC BLOOD PRESSURE: 169 MMHG

## 2022-02-08 PROCEDURE — 99214 OFFICE O/P EST MOD 30 MIN: CPT

## 2022-02-09 NOTE — REVIEW OF SYSTEMS
[Shortness Of Breath] : shortness of breath [Wheezing] : wheezing [Recent Change In Weight] : ~T recent weight change [Negative] : Integumentary [Fatigue] : no fatigue [Cough] : no cough [Skin Rash] : no skin rash [FreeTextEntry9] : wearing compressive stockings

## 2022-02-09 NOTE — HISTORY OF PRESENT ILLNESS
[Disease: _____________________] : Disease: [unfilled] [N: ___] : N[unfilled] [M: ___] : M[unfilled] [AJCC Stage: ____] : AJCC Stage: [unfilled] [T: ___] : T[unfilled] [de-identified] : 86 F with HTN, PPM, atrial fibrillation ( Dr. Elam- 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.\par \par CASE SYNOPSIS:\par May 2019- patient notices a bleeding left heel cutaneous lesion; \par \par 5/1/19- punch biopsy performed by a dermatologist ([facility]) ; pathology:\par - left heel superior: malignant melanoma, 4.2 mm thick with ulcerations, pT4b.\par -left heel inferior: malignant melanoma, 4.2 mm thick, with ulceration, pT4b.\par \par 5/28/19- left heel wide resection and sentinel lymph node biopsy (); pathology:\par -Left inguinal lymph node biopsy: 2/4 lymph nodes positive for melanoma\par -Skin and soft tissue, left heel foot, consistent with melanoma, 3.5 mm thickness, margins negative, pT4b, pN 2a= stage III C\par \par 6/15/19: PET- residual FDG avidity along the surgical defect consistent with postsurgical changes, or residual disease, or combination of the two. No FDG avid local regional or distant metastatic disease. \par \par 7/15/19- started Nivolumab under the care of Dr. Mcbride at [facility].\par \par 8/12/19- cycle # 2 Nivolumab also at [facility].\par \par 10/15/19: Left lower extremity ultrasound-benign appearing left inguinal lymph nodes, containing fatty hilum, measuring 0.9 x 0.8 x 1.3 cm, 1.2 x 0.7 x 0.9 cm, and one 0.5 x 0.8 x 0.9 cm. 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 Probable left groin postoperative collections/seromas.\par \par 6/14/2020–exploratory laparotomy, left femoral hernia repair, small bowel resection (due to incarcerated hernia).\par \par 7/15/19 - 7/22/20: completed 1 year maintenance Nivolumab.\par \par September 2020–admitted with bilateral pneumonia; tested negative for COVID.\par \par 8/7/21- LDH : 209\par \par 9/14/2021: PET scan–postsurgical changes left heel with multiple surgical clips and high attenuation material.  Residual FDG avidity along the surgical defect consistent with postsurgical changes/inflammation, and postsurgical collection left inguinal region.  No FDG avid local regional or distant metastatic disease.\par \par 2/2/22: LDH :266 [de-identified] : Malignant melanoma [FreeTextEntry1] : Nivolumab- flat dose monthly [de-identified] : Ms. HULL had restaging PET scans in September 2021, consistent with postsurgical changes left heel with multiple surgical clips and high attenuation material, residual FDG avidity along the surgical defect consistent with postsurgical changes/inflammation, and postsurgical collection left inguinal region.  No FDG avid local regional or distant metastatic disease.  She was last seen in the office in August 2021.  Since then she was under the care of Dr. Grace (endocrinology) for immunotherapy- induced adrenal insufficiency.  She has gained weight, as she continues treatment with prednisone 7 mg daily since her last visit she gained 7 pounds.  Received Covid booster, with no complications.  She has completed a year of maintenance nivolumab in July 2020.  Her CBC remained stable, and serum LDH levels alternate around 250, most recent at 266.  Patient is concerned with the weight gain, but plans to resume physical activity explained.  She is here accompanied by her son, Philip.\par \par

## 2022-02-09 NOTE — PHYSICAL EXAM
[Restricted in physically strenuous activity but ambulatory and able to carry out work of a light or sedentary nature] : Status 1- Restricted in physically strenuous activity but ambulatory and able to carry out work of a light or sedentary nature, e.g., light house work, office work [Normal] : normal appearance, no rash, nodules, vesicles, ulcers, erythema [de-identified] : s/p left inguinal hernia repair [de-identified] : left heel wound  healed, hypopigmented skin; left sole cellulitis resolved

## 2022-02-16 ENCOUNTER — APPOINTMENT (OUTPATIENT)
Dept: SURGICAL ONCOLOGY | Facility: CLINIC | Age: 87
End: 2022-02-16
Payer: MEDICARE

## 2022-02-16 VITALS
OXYGEN SATURATION: 96 % | RESPIRATION RATE: 16 BRPM | HEIGHT: 64.96 IN | BODY MASS INDEX: 25.33 KG/M2 | TEMPERATURE: 97.8 F | HEART RATE: 80 BPM | DIASTOLIC BLOOD PRESSURE: 96 MMHG | WEIGHT: 152 LBS | SYSTOLIC BLOOD PRESSURE: 162 MMHG

## 2022-02-16 PROCEDURE — 99214 OFFICE O/P EST MOD 30 MIN: CPT

## 2022-02-28 NOTE — PHYSICAL EXAM
[Normal] : supple, no neck mass and thyroid not enlarged [Normal] : full range of motion and no deformities appreciated [de-identified] :  No inguinal adenopathy [de-identified] : Left  heel skin graft well-healed.  no evidence of recurrence. No suspicious skin lesions.

## 2022-02-28 NOTE — ASSESSMENT
[FreeTextEntry1] : History of stage III left heel melanoma s/p WEX 5/2019\par ROXANN\par PET/CT negative September 2021 \par Recent LDH elevated February 2022 \par Will get one month follow up LDH \par Continue dermatologic surveillance with Dr. Olson\par RTO 6 months

## 2022-02-28 NOTE — HISTORY OF PRESENT ILLNESS
[de-identified] : 85 y/o female returns for a follow-up visit for stage III left heel melanoma, s/p wide excision on 5/2019. \par She completed immunotherapy Nivolumab July 2020 as per Dr. Kimbrough\par She states that she follows up with her dermatologist, Dr. Olson.\par \par Recent LDH 2/2/22: 266 \par PET/CT 9/14/21- negative findings\par Patient was referred to physical therapy for LLE lymphedema. \par \par Left groin ultrasound on 2/8/20 showed no evidence of lymphadenopathy. \par Pt notes she is s/p left groin hernia repair with mesh.\par \par She is s/p I&D of left groin infected seroma (6/26/19).\par Culture: Rare Enterobacter cloaecae\par \par PET/CT 6/15/19: 1. Postsurgical changes left heel with multiple surgical clips and high attenuation material. Residual FDG avidity along the surgical defect may represent postsurgical change/inflammation, residual disease, or combination of these entities. \par 2. Postsurgical collection left inguinal region. No FDG avid locoregional or distant metastatic disease.\par \par She subsequently underwent wide excision left superior heel melanoma with left inguinal sentinel node biopsy on 5/28/19.\par Pathology:\par 1. Left inguinal sentinel lymph nodes, biopsy. Two out of four lymph nodes positive for melanoma (2/4). \par 2. Skin and soft tissue, left heel foot, wide resection. Melanoma 3.5mm in thickness. Margins negative (1mm from the closest deep margin). [id number]\par \par She is s/p punch biopsy of two sites on her left heel on 5/1/19 performed by Navneet Branch PA-C.\par Pathology:\par 1. 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 pT4B\par 2. Left heel inferior: malignant melanoma, 4.2 mm thick, with ulcerations. pT4B  \par \par She notes a fractured nose requiring stitches by Dr. Ramirez of [place] in September 2019.\par Pt has a pacemaker and is currently on Warfarin for Afib as per Dr. Ronald Shepard of cardiology. \par She is now taking Eliquis.\par Covid vaccinated.

## 2022-02-28 NOTE — ADDENDUM
[FreeTextEntry1] : I, Jena Branch, acted solely as a scribe for Dr. Perez Russo on this date 02/16/2022.\par \par

## 2022-03-05 NOTE — ED ADULT TRIAGE NOTE - ACCOMPANIED BY
Patient discharged in stable condition. Patient ambulatory with steady even gait. IV removed. Questions answered regarding lab results. Patient denies any questions or concerns at this time.       Stephy Kidd RN  03/04/22 2022 Self

## 2022-03-15 ENCOUNTER — NON-APPOINTMENT (OUTPATIENT)
Age: 87
End: 2022-03-15

## 2022-03-26 ENCOUNTER — APPOINTMENT (OUTPATIENT)
Dept: ENDOCRINOLOGY | Facility: CLINIC | Age: 87
End: 2022-03-26
Payer: MEDICARE

## 2022-03-26 VITALS
HEART RATE: 68 BPM | HEIGHT: 64.96 IN | WEIGHT: 149.31 LBS | OXYGEN SATURATION: 97 % | BODY MASS INDEX: 24.88 KG/M2 | DIASTOLIC BLOOD PRESSURE: 82 MMHG | SYSTOLIC BLOOD PRESSURE: 122 MMHG

## 2022-03-26 PROCEDURE — 99214 OFFICE O/P EST MOD 30 MIN: CPT

## 2022-03-26 NOTE — HISTORY OF PRESENT ILLNESS
[FreeTextEntry1] : Ms. HULL  is an 86  year year old female who returns today  for endocrine reevaluation. \par   Patient returns with regard to  a history of adrenal insufficiency felt to be brought on by her immunotherapy with regard to her hx of  malignant melanoma. Likely secondary to hypophysitis\par She is currently taking Prednisone 5 mg AM + 2 mg PM\par She too has hx of  ex lap with small bowel repair last June 2020 Has finished  her immunotherapy tx's\par \par Had 3 Pfizer covid vaccines-with booster-slept for 2 days. Had diarrhea.To Er-\par \par Melanoma  said to be stable. Had pneumonia at end of last September-non covid   had prednsione and benadryl for facialswelling.\par Remains on prednisone at  5mg am and 2.0 mg pm. Off immunotherapy since July 2021\par Denies c/p, sob, dizziness, lightheadedness nausea or vomiting.\par Some incr fatigue at times. \par Continues on Eliquis, Lasix, Metoprolol and Simvastatin.\par Does continue on Metoprolol and Simvastatin and Lasix and Aerad 2 for eyes and multivitamin.\par Still with a fib\par \par \par PMD sees MIRIAM Loo at Dr. Calvin office in [place]  [phone].  \par Hematologist:  Dr. Pastrana.\par \par Cardiologist Dr. Shepard in [place]

## 2022-03-26 NOTE — PHYSICAL EXAM
[Alert] : alert [Well Nourished] : well nourished [No Acute Distress] : no acute distress [Normal Sclera/Conjunctiva] : normal sclera/conjunctiva [Well Developed] : well developed [EOMI] : extra ocular movement intact [No Proptosis] : no proptosis [Normal Oropharynx] : the oropharynx was normal [Thyroid Not Enlarged] : the thyroid was not enlarged [No Thyroid Nodules] : no palpable thyroid nodules [No Respiratory Distress] : no respiratory distress [No Accessory Muscle Use] : no accessory muscle use [Clear to Auscultation] : lungs were clear to auscultation bilaterally [Normal S1, S2] : normal S1 and S2 [Normal Rate] : heart rate was normal [Regular Rhythm] : with a regular rhythm [No Edema] : no peripheral edema [Pedal Pulses Normal] : the pedal pulses are present [Normal Bowel Sounds] : normal bowel sounds [Not Tender] : non-tender [Not Distended] : not distended [Soft] : abdomen soft [Normal Anterior Cervical Nodes] : no anterior cervical lymphadenopathy [Normal Posterior Cervical Nodes] : no posterior cervical lymphadenopathy [No Spinal Tenderness] : no spinal tenderness [Spine Straight] : spine straight [No Stigmata of Cushings Syndrome] : no stigmata of Cushings Syndrome [Normal Gait] : normal gait [Normal Strength/Tone] : muscle strength and tone were normal [No Rash] : no rash [Normal Reflexes] : deep tendon reflexes were 2+ and symmetric [No Tremors] : no tremors [Oriented x3] : oriented to person, place, and time [Acanthosis Nigricans] : no acanthosis nigricans
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
## 2022-08-10 ENCOUNTER — APPOINTMENT (OUTPATIENT)
Dept: SURGICAL ONCOLOGY | Facility: CLINIC | Age: 87
End: 2022-08-10

## 2022-08-10 VITALS
RESPIRATION RATE: 15 BRPM | TEMPERATURE: 98.6 F | DIASTOLIC BLOOD PRESSURE: 99 MMHG | OXYGEN SATURATION: 94 % | HEART RATE: 74 BPM | HEIGHT: 64 IN | BODY MASS INDEX: 25.13 KG/M2 | SYSTOLIC BLOOD PRESSURE: 166 MMHG | WEIGHT: 147.2 LBS

## 2022-08-10 PROCEDURE — 99214 OFFICE O/P EST MOD 30 MIN: CPT

## 2022-08-10 NOTE — ADDENDUM
Arterial Line:    An arterial line was placed using surface landmarks, in the OR for the following indication(s): continuous blood pressure monitoring and blood sampling needed. A 20 gauge (size), 1 and 3/4 inch (length), Arrow (type) catheter was placed, Seldinger technique used, into the left radial artery, secured by tape and Tegaderm. Anesthesia type: General    Events:  patient tolerated procedure well with no complications and EBL > 5mL.   10/14/2021 8:10 AM10/14/2021 8:15 AM  Resident/CRNA: WILBER Trivedi CRNA  Performed: Resident/CRNA   Preanesthetic Checklist  Completed: patient identified, IV checked, site marked, risks and benefits discussed, surgical consent, monitors and equipment checked, pre-op evaluation, timeout performed, anesthesia consent given, oxygen available and patient being monitored [FreeTextEntry1] : I, Jena Branch, acted solely as a scribe for Dr. Perez Russo on this date 08/10/2022.\par \par \par

## 2022-08-10 NOTE — ASSESSMENT
[FreeTextEntry1] : History of stage III left heel melanoma s/p WEX 5/2019\par ROXANN\par Follow up LDH wnl March 2022\par Continue dermatologic surveillance with Dr. Olson\par RTO 6 months

## 2022-08-10 NOTE — HISTORY OF PRESENT ILLNESS
[de-identified] : 86 y/o female presents a follow-up visit for stage III left heel melanoma, s/p wide excision on 5/2019. \par She completed immunotherapy Nivolumab July 2020 as per Dr. Kimbrough. \par She states that she follows up with her dermatologist, Dr. Olson.\par \par Recent follow up LDH 3/2/22: 223\par LDH 2/2/22: 266 (elevated)\par \par PET/CT 9/14/21- negative findings\par Patient was referred to physical therapy for LLE lymphedema. \par \par Left groin ultrasound on 2/8/20 showed no evidence of lymphadenopathy. \par Pt notes she is s/p left groin hernia repair with mesh.\par \par She is s/p I&D of left groin infected seroma (6/26/19).\par Culture: Rare Enterobacter cloaecae\par \par PET/CT 6/15/19: 1. Postsurgical changes left heel with multiple surgical clips and high attenuation material. Residual FDG avidity along the surgical defect may represent postsurgical change/inflammation, residual disease, or combination of these entities. \par 2. Postsurgical collection left inguinal region. No FDG avid locoregional or distant metastatic disease.\par \par She subsequently underwent wide excision left superior heel melanoma with left inguinal sentinel node biopsy on 5/28/19.\par Pathology:\par 1. Left inguinal sentinel lymph nodes, biopsy. Two out of four lymph nodes positive for melanoma (2/4). \par 2. Skin and soft tissue, left heel foot, wide resection. Melanoma 3.5mm in thickness. Margins negative (1mm from the closest deep margin). [id number]\par \par She is s/p punch biopsy of two sites on her left heel on 5/1/19 performed by Navneet Branch PA-C.\par Pathology:\par 1. 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 pT4B\par 2. Left heel inferior: malignant melanoma, 4.2 mm thick, with ulcerations. pT4B  \par \par She notes a fractured nose requiring stitches by Dr. Ramirez of [place] in September 2019.\par Pt has a pacemaker and is currently on Warfarin for Afib as per Dr. Ronald Shepard of cardiology. \par She is now taking Eliquis.\par Covid vaccinated.

## 2022-08-10 NOTE — PHYSICAL EXAM
[Normal] : supple, no neck mass and thyroid not enlarged [Normal] : oriented to person, place and time, with appropriate affect [de-identified] : no inguinal adenopathy.  [de-identified] : 1/2+ left lower extremity edema.   [de-identified] : Left  heel skin graft well-healed.  no evidence of recurrence. No suspicious skin lesions.

## 2022-08-19 ENCOUNTER — TRANSCRIPTION ENCOUNTER (OUTPATIENT)
Age: 87
End: 2022-08-19

## 2022-08-19 LAB
ALBUMIN SERPL ELPH-MCNC: 4.6 G/DL
ALP BLD-CCNC: 44 U/L
ALT SERPL-CCNC: 25 U/L
ANION GAP SERPL CALC-SCNC: 9 MMOL/L
AST SERPL-CCNC: 21 U/L
BASOPHILS # BLD AUTO: 0.08 K/UL
BASOPHILS NFR BLD AUTO: 1.3 %
BILIRUB SERPL-MCNC: 0.7 MG/DL
BUN SERPL-MCNC: 24 MG/DL
CALCIUM SERPL-MCNC: 9.9 MG/DL
CHLORIDE SERPL-SCNC: 101 MMOL/L
CO2 SERPL-SCNC: 30 MMOL/L
CREAT SERPL-MCNC: 0.85 MG/DL
EGFR: 67 ML/MIN/1.73M2
EOSINOPHIL # BLD AUTO: 0.05 K/UL
EOSINOPHIL NFR BLD AUTO: 0.8 %
GLUCOSE SERPL-MCNC: 80 MG/DL
HCT VFR BLD CALC: 45.1 %
HGB BLD-MCNC: 13.8 G/DL
IMM GRANULOCYTES NFR BLD AUTO: 0.2 %
LDH SERPL-CCNC: 204 U/L
LYMPHOCYTES # BLD AUTO: 1.13 K/UL
LYMPHOCYTES NFR BLD AUTO: 18.6 %
MAN DIFF?: NORMAL
MCHC RBC-ENTMCNC: 30.6 GM/DL
MCHC RBC-ENTMCNC: 30.7 PG
MCV RBC AUTO: 100.4 FL
MONOCYTES # BLD AUTO: 0.5 K/UL
MONOCYTES NFR BLD AUTO: 8.2 %
NEUTROPHILS # BLD AUTO: 4.31 K/UL
NEUTROPHILS NFR BLD AUTO: 70.9 %
PLATELET # BLD AUTO: 143 K/UL
POTASSIUM SERPL-SCNC: 4.3 MMOL/L
PROT SERPL-MCNC: 6.3 G/DL
RBC # BLD: 4.49 M/UL
RBC # FLD: 14.5 %
SODIUM SERPL-SCNC: 140 MMOL/L
WBC # FLD AUTO: 6.08 K/UL

## 2022-08-23 ENCOUNTER — OUTPATIENT (OUTPATIENT)
Dept: OUTPATIENT SERVICES | Facility: HOSPITAL | Age: 87
LOS: 1 days | Discharge: HOME | End: 2022-08-23

## 2022-08-23 DIAGNOSIS — C43.72 MALIGNANT MELANOMA OF LEFT LOWER LIMB, INCLUDING HIP: ICD-10-CM

## 2022-08-23 DIAGNOSIS — Z98.890 OTHER SPECIFIED POSTPROCEDURAL STATES: Chronic | ICD-10-CM

## 2022-08-23 DIAGNOSIS — Z90.89 ACQUIRED ABSENCE OF OTHER ORGANS: Chronic | ICD-10-CM

## 2022-08-23 DIAGNOSIS — Z98.49 CATARACT EXTRACTION STATUS, UNSPECIFIED EYE: Chronic | ICD-10-CM

## 2022-08-26 ENCOUNTER — APPOINTMENT (OUTPATIENT)
Age: 87
End: 2022-08-26

## 2022-08-26 VITALS
DIASTOLIC BLOOD PRESSURE: 119 MMHG | HEART RATE: 96 BPM | WEIGHT: 146.98 LBS | SYSTOLIC BLOOD PRESSURE: 164 MMHG | TEMPERATURE: 96.8 F | RESPIRATION RATE: 16 BRPM | OXYGEN SATURATION: 97 % | BODY MASS INDEX: 25.23 KG/M2

## 2022-08-26 PROCEDURE — 99214 OFFICE O/P EST MOD 30 MIN: CPT

## 2022-08-28 NOTE — HISTORY OF PRESENT ILLNESS
[T: ___] : T[unfilled] [N: ___] : N[unfilled] [M: ___] : M[unfilled] [AJCC Stage: ____] : AJCC Stage: [unfilled] [Disease: _____________________] : Disease: [unfilled] [de-identified] : 87 F with HTN, PPM, atrial fibrillation ( Dr. 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.\par \par CASE SYNOPSIS:\par May 2019- patient notices a bleeding left heel cutaneous lesion; \par \par 5/1/19- punch biopsy performed by a dermatologist ([facility]) ; pathology:\par - left heel superior: malignant melanoma, 4.2 mm thick with ulcerations, pT4b.\par -left heel inferior: malignant melanoma, 4.2 mm thick, with ulceration, pT4b.\par \par 5/28/19- left heel wide resection and sentinel lymph node biopsy (); pathology:\par -Left inguinal lymph node biopsy: 2/4 lymph nodes positive for melanoma\par -Skin and soft tissue, left heel foot, consistent with melanoma, 3.5 mm thickness, margins negative, pT4b, pN 2a= stage III C\par \par 6/15/19: PET- residual FDG avidity along the surgical defect consistent with postsurgical changes, or residual disease, or combination of the two. No FDG avid local regional or distant metastatic disease. \par \par 7/15/19- started Nivolumab under the care of Dr. Mcbride at [facility].\par \par 8/12/19- cycle # 2 Nivolumab also at [facility].\par \par 10/15/19: Left lower extremity ultrasound-benign appearing left inguinal lymph nodes, containing fatty hilum, measuring 0.9 x 0.8 x 1.3 cm, 1.2 x 0.7 x 0.9 cm, and one 0.5 x 0.8 x 0.9 cm. 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 Probable left groin postoperative collections/seromas.\par \par 6/14/2020–exploratory laparotomy, left femoral hernia repair, small bowel resection (due to incarcerated hernia).\par \par 7/15/19 - 7/22/20: completed 1 year maintenance Nivolumab.\par \par September 2020–admitted with bilateral pneumonia; tested negative for COVID.\par \par 8/7/21- LDH : 209\par \par 9/14/2021: PET scan–postsurgical changes left heel with multiple surgical clips and high attenuation material.  Residual FDG avidity along the surgical defect consistent with postsurgical changes/inflammation, and postsurgical collection left inguinal region.  No FDG avid local regional or distant metastatic disease.\par \par 2/2/22: LDH :266 [de-identified] : acral lentiginous malignant melanoma [FreeTextEntry1] : Nivolumab- flat dose monthly [de-identified] : Biannual oncologic follow-up for stage IIIc malignant melanoma diagnosed May 2019, s/p completion 1 year treatment with nivolumab.  No evidence of recurrent disease on PET scan (last one from September 2021), with fluctuant LDH (most recent 223).\par Appears nontoxic, younger than her stated age, in good spirits and energetic.  Denies changes in appetite or weight.  Continues follow-up with  for iatrogenic adrenal insufficiency, a complication of previous immunotherapy.\par Accompanied by her son, Philip.\par \par

## 2022-08-28 NOTE — PHYSICAL EXAM
[Restricted in physically strenuous activity but ambulatory and able to carry out work of a light or sedentary nature] : Status 1- Restricted in physically strenuous activity but ambulatory and able to carry out work of a light or sedentary nature, e.g., light house work, office work [Normal] : normal appearance, no rash, nodules, vesicles, ulcers, erythema [de-identified] : s/p left inguinal hernia repair [de-identified] : left heel wound  healed, hypopigmented skin; left sole cellulitis resolved

## 2022-08-28 NOTE — REVIEW OF SYSTEMS
[Recent Change In Weight] : ~T recent weight change [Shortness Of Breath] : shortness of breath [Wheezing] : wheezing [Negative] : Psychiatric [Fatigue] : no fatigue [Cough] : no cough [Skin Rash] : no skin rash [FreeTextEntry9] : wearing compressive stockings

## 2022-09-12 ENCOUNTER — APPOINTMENT (OUTPATIENT)
Dept: ENDOCRINOLOGY | Facility: CLINIC | Age: 87
End: 2022-09-12

## 2022-09-12 VITALS
DIASTOLIC BLOOD PRESSURE: 82 MMHG | HEIGHT: 64 IN | OXYGEN SATURATION: 98 % | HEART RATE: 78 BPM | WEIGHT: 144.13 LBS | BODY MASS INDEX: 24.61 KG/M2 | SYSTOLIC BLOOD PRESSURE: 124 MMHG | TEMPERATURE: 98.5 F

## 2022-09-12 PROCEDURE — 99214 OFFICE O/P EST MOD 30 MIN: CPT

## 2022-09-12 NOTE — HISTORY OF PRESENT ILLNESS
[FreeTextEntry1] : Ms. HULL is an 87 year old female who returns today for endocrine reevaluation. \par Patient returns with regard to a history of adrenal insufficiency felt to be brought on by her immunotherapy with regard to her hx of malignant melanoma. Likely secondary to hypophysitis\par She is currently taking Prednisone 5 mg AM + 2 mg PM\par She too has hx of ex lap with small bowel repair last June 2020 Has finished her immunotherapy tx's since July 2021 \par \par She had a monitor, EKG and PET scan return normal. Hx of PVC, afib and pacemaker.\par Taking Metoprolol 125 mg bid.\par \par Denies c/p, sob, dizziness, lightheadedness nausea or vomiting.\par \par Continues on Eliquis, Lasix, Metoprolol and Simvastatin.\par Still with a fib\par \par PMD sees MIRIAM Loo at Dr. Calvin office in [place] [phone]. \par Hematologist: Dr. Pastrana.\par \par Cardiologist Dr. Shepard in [place]

## 2022-09-22 NOTE — ED ADULT NURSE NOTE - NS ED NURSE RECORD ANOTHER HT AND WT
Health Maintenance Due   Topic Date Due    URINE MICROALBUMIN  Never done    DXA SCAN  Never done    COLORECTAL CANCER SCREENING  Never done    ANNUAL WELLNESS VISIT  Never done    DIABETIC FOOT EXAM  Never done    PAP SMEAR  Never done    DIABETIC EYE EXAM  04/22/2022    COVID-19 Vaccine (5 - Booster for Moderna series) 07/14/2022    HEMOGLOBIN A1C  09/16/2022    Calorie Counting for Weight Loss  Calories are units of energy. Your body needs a certain amount of calories from food to keep you going throughout the day. When you eat more calories than your body needs, your body stores the extra calories as fat. When you eat fewer calories than your body needs, your body burns fat to get the energy it needs.  Calorie counting means keeping track of how many calories you eat and drink each day. Calorie counting can be helpful if you need to lose weight. If you make sure to eat fewer calories than your body needs, you should lose weight. Ask your health care provider what a healthy weight is for you.  For calorie counting to work, you will need to eat the right number of calories in a day in order to lose a healthy amount of weight per week. A dietitian can help you determine how many calories you need in a day and will give you suggestions on how to reach your calorie goal.  A healthy amount of weight to lose per week is usually 1-2 lb (0.5-0.9 kg). This usually means that your daily calorie intake should be reduced by 500-750 calories.  Eating 1,200 - 1,500 calories per day can help most women lose weight.  Eating 1,500 - 1,800 calories per day can help most men lose weight.  What is my plan?  My goal is to have __________ calories per day.  If I have this many calories per day, I should lose around __________ pounds per week.  What do I need to know about calorie counting?  In order to meet your daily calorie goal, you will need to:  Find out how many calories are in each food you would like to eat. Try to do this  before you eat.  Decide how much of the food you plan to eat.  Write down what you ate and how many calories it had. Doing this is called keeping a food log.  To successfully lose weight, it is important to balance calorie counting with a healthy lifestyle that includes regular activity. Aim for 150 minutes of moderate exercise (such as walking) or 75 minutes of vigorous exercise (such as running) each week.  Where do I find calorie information?      The number of calories in a food can be found on a Nutrition Facts label. If a food does not have a Nutrition Facts label, try to look up the calories online or ask your dietitian for help.  Remember that calories are listed per serving. If you choose to have more than one serving of a food, you will have to multiply the calories per serving by the amount of servings you plan to eat. For example, the label on a package of bread might say that a serving size is 1 slice and that there are 90 calories in a serving. If you eat 1 slice, you will have eaten 90 calories. If you eat 2 slices, you will have eaten 180 calories.  How do I keep a food log?  Immediately after each meal, record the following information in your food log:  What you ate. Don't forget to include toppings, sauces, and other extras on the food.  How much you ate. This can be measured in cups, ounces, or number of items.  How many calories each food and drink had.  The total number of calories in the meal.  Keep your food log near you, such as in a small notebook in your pocket, or use a mobile fernando or website. Some programs will calculate calories for you and show you how many calories you have left for the day to meet your goal.  What are some calorie counting tips?      Use your calories on foods and drinks that will fill you up and not leave you hungry:  Some examples of foods that fill you up are nuts and nut butters, vegetables, lean proteins, and high-fiber foods like whole grains. High-fiber foods  "are foods with more than 5 g fiber per serving.  Drinks such as sodas, specialty coffee drinks, alcohol, and juices have a lot of calories, yet do not fill you up.  Eat nutritious foods and avoid empty calories. Empty calories are calories you get from foods or beverages that do not have many vitamins or protein, such as candy, sweets, and soda. It is better to have a nutritious high-calorie food (such as an avocado) than a food with few nutrients (such as a bag of chips).  Know how many calories are in the foods you eat most often. This will help you calculate calorie counts faster.  Pay attention to calories in drinks. Low-calorie drinks include water and unsweetened drinks.  Pay attention to nutrition labels for \"low fat\" or \"fat free\" foods. These foods sometimes have the same amount of calories or more calories than the full fat versions. They also often have added sugar, starch, or salt, to make up for flavor that was removed with the fat.  Find a way of tracking calories that works for you. Get creative. Try different apps or programs if writing down calories does not work for you.  What are some portion control tips?  Know how many calories are in a serving. This will help you know how many servings of a certain food you can have.  Use a measuring cup to measure serving sizes. You could also try weighing out portions on a kitchen scale. With time, you will be able to estimate serving sizes for some foods.  Take some time to put servings of different foods on your favorite plates, bowls, and cups so you know what a serving looks like.  Try not to eat straight from a bag or box. Doing this can lead to overeating. Put the amount you would like to eat in a cup or on a plate to make sure you are eating the right portion.  Use smaller plates, glasses, and bowls to prevent overeating.  Try not to multitask (for example, watch TV or use your computer) while eating. If it is time to eat, sit down at a table and enjoy " "your food. This will help you to know when you are full. It will also help you to be aware of what you are eating and how much you are eating.  What are tips for following this plan?  Reading food labels  Check the calorie count compared to the serving size. The serving size may be smaller than what you are used to eating.  Check the source of the calories. Make sure the food you are eating is high in vitamins and protein and low in saturated and trans fats.  Shopping  Read nutrition labels while you shop. This will help you make healthy decisions before you decide to purchase your food.  Make a grocery list and stick to it.  Cooking  Try to cook your favorite foods in a healthier way. For example, try baking instead of frying.  Use low-fat dairy products.  Meal planning  Use more fruits and vegetables. Half of your plate should be fruits and vegetables.  Include lean proteins like poultry and fish.  How do I count calories when eating out?  Ask for smaller portion sizes.  Consider sharing an entree and sides instead of getting your own entree.  If you get your own entree, eat only half. Ask for a box at the beginning of your meal and put the rest of your entree in it so you are not tempted to eat it.  If calories are listed on the menu, choose the lower calorie options.  Choose dishes that include vegetables, fruits, whole grains, low-fat dairy products, and lean protein.  Choose items that are boiled, broiled, grilled, or steamed. Stay away from items that are buttered, battered, fried, or served with cream sauce. Items labeled \"crispy\" are usually fried, unless stated otherwise.  Choose water, low-fat milk, unsweetened iced tea, or other drinks without added sugar. If you want an alcoholic beverage, choose a lower calorie option such as a glass of wine or light beer.  Ask for dressings, sauces, and syrups on the side. These are usually high in calories, so you should limit the amount you eat.  If you want a " salad, choose a garden salad and ask for grilled meats. Avoid extra toppings like villegas, cheese, or fried items. Ask for the dressing on the side, or ask for olive oil and vinegar or lemon to use as dressing.  Estimate how many servings of a food you are given. For example, a serving of cooked rice is ½ cup or about the size of half a baseball. Knowing serving sizes will help you be aware of how much food you are eating at restaurants. The list below tells you how big or small some common portion sizes are based on everyday objects:  1 oz--4 stacked dice.  3 oz--1 deck of cards.  1 tsp--1 die.  1 Tbsp--½ a ping-pong ball.  2 Tbsp--1 ping-pong ball.  ½ cup--½ baseball.  1 cup--1 baseball.  Summary  Calorie counting means keeping track of how many calories you eat and drink each day. If you eat fewer calories than your body needs, you should lose weight.  A healthy amount of weight to lose per week is usually 1-2 lb (0.5-0.9 kg). This usually means reducing your daily calorie intake by 500-750 calories.  The number of calories in a food can be found on a Nutrition Facts label. If a food does not have a Nutrition Facts label, try to look up the calories online or ask your dietitian for help.  Use your calories on foods and drinks that will fill you up, and not on foods and drinks that will leave you hungry.  Use smaller plates, glasses, and bowls to prevent overeating.  This information is not intended to replace advice given to you by your health care provider. Make sure you discuss any questions you have with your health care provider.  Document Released: 12/18/2006 Document Revised: 09/06/2019 Document Reviewed: 11/17/2017  MMIS Interactive Patient Education © 2019 [employer]     Ask neurosurgeon about mass on back and if they could recommend PT after next shunt placement    Ask nephrologist about renal cyst and have her send most recent report   Yes

## 2022-09-28 ENCOUNTER — OUTPATIENT (OUTPATIENT)
Dept: OUTPATIENT SERVICES | Facility: HOSPITAL | Age: 87
LOS: 1 days | End: 2022-09-28
Payer: MEDICARE

## 2022-09-28 ENCOUNTER — APPOINTMENT (OUTPATIENT)
Dept: NUCLEAR MEDICINE | Facility: CLINIC | Age: 87
End: 2022-09-28

## 2022-09-28 DIAGNOSIS — Z90.89 ACQUIRED ABSENCE OF OTHER ORGANS: Chronic | ICD-10-CM

## 2022-09-28 DIAGNOSIS — C43.9 MALIGNANT MELANOMA OF SKIN, UNSPECIFIED: ICD-10-CM

## 2022-09-28 DIAGNOSIS — Z98.890 OTHER SPECIFIED POSTPROCEDURAL STATES: Chronic | ICD-10-CM

## 2022-09-28 DIAGNOSIS — Z98.49 CATARACT EXTRACTION STATUS, UNSPECIFIED EYE: Chronic | ICD-10-CM

## 2022-09-28 PROCEDURE — A9552: CPT

## 2022-09-28 PROCEDURE — 78815 PET IMAGE W/CT SKULL-THIGH: CPT | Mod: 26,PS,MH

## 2022-09-28 PROCEDURE — 78815 PET IMAGE W/CT SKULL-THIGH: CPT

## 2022-11-19 ENCOUNTER — EMERGENCY (EMERGENCY)
Facility: HOSPITAL | Age: 87
LOS: 0 days | Discharge: HOME | End: 2022-11-19
Attending: EMERGENCY MEDICINE
Payer: MEDICARE

## 2022-11-19 VITALS
SYSTOLIC BLOOD PRESSURE: 121 MMHG | DIASTOLIC BLOOD PRESSURE: 66 MMHG | HEART RATE: 76 BPM | RESPIRATION RATE: 18 BRPM | OXYGEN SATURATION: 93 % | TEMPERATURE: 101 F

## 2022-11-19 VITALS
DIASTOLIC BLOOD PRESSURE: 74 MMHG | SYSTOLIC BLOOD PRESSURE: 139 MMHG | RESPIRATION RATE: 20 BRPM | HEART RATE: 86 BPM | TEMPERATURE: 101 F | OXYGEN SATURATION: 96 %

## 2022-11-19 DIAGNOSIS — Z98.49 CATARACT EXTRACTION STATUS, UNSPECIFIED EYE: Chronic | ICD-10-CM

## 2022-11-19 DIAGNOSIS — Z88.0 ALLERGY STATUS TO PENICILLIN: ICD-10-CM

## 2022-11-19 DIAGNOSIS — R53.1 WEAKNESS: ICD-10-CM

## 2022-11-19 DIAGNOSIS — E27.40 UNSPECIFIED ADRENOCORTICAL INSUFFICIENCY: ICD-10-CM

## 2022-11-19 DIAGNOSIS — Z79.01 LONG TERM (CURRENT) USE OF ANTICOAGULANTS: ICD-10-CM

## 2022-11-19 DIAGNOSIS — I48.91 UNSPECIFIED ATRIAL FIBRILLATION: ICD-10-CM

## 2022-11-19 DIAGNOSIS — E78.5 HYPERLIPIDEMIA, UNSPECIFIED: ICD-10-CM

## 2022-11-19 DIAGNOSIS — C43.9 MALIGNANT MELANOMA OF SKIN, UNSPECIFIED: ICD-10-CM

## 2022-11-19 DIAGNOSIS — C79.9 SECONDARY MALIGNANT NEOPLASM OF UNSPECIFIED SITE: ICD-10-CM

## 2022-11-19 DIAGNOSIS — D69.6 THROMBOCYTOPENIA, UNSPECIFIED: ICD-10-CM

## 2022-11-19 DIAGNOSIS — R50.9 FEVER, UNSPECIFIED: ICD-10-CM

## 2022-11-19 DIAGNOSIS — Z98.890 OTHER SPECIFIED POSTPROCEDURAL STATES: Chronic | ICD-10-CM

## 2022-11-19 DIAGNOSIS — R11.0 NAUSEA: ICD-10-CM

## 2022-11-19 DIAGNOSIS — K21.9 GASTRO-ESOPHAGEAL REFLUX DISEASE WITHOUT ESOPHAGITIS: ICD-10-CM

## 2022-11-19 DIAGNOSIS — U07.1 COVID-19: ICD-10-CM

## 2022-11-19 DIAGNOSIS — Z95.0 PRESENCE OF CARDIAC PACEMAKER: ICD-10-CM

## 2022-11-19 DIAGNOSIS — I89.0 LYMPHEDEMA, NOT ELSEWHERE CLASSIFIED: ICD-10-CM

## 2022-11-19 DIAGNOSIS — Z90.89 ACQUIRED ABSENCE OF OTHER ORGANS: Chronic | ICD-10-CM

## 2022-11-19 DIAGNOSIS — I10 ESSENTIAL (PRIMARY) HYPERTENSION: ICD-10-CM

## 2022-11-19 LAB
ALBUMIN SERPL ELPH-MCNC: 3.3 G/DL — SIGNIFICANT CHANGE UP (ref 3.3–5)
ALP SERPL-CCNC: 41 U/L — SIGNIFICANT CHANGE UP (ref 40–120)
ALT FLD-CCNC: 45 U/L — SIGNIFICANT CHANGE UP (ref 12–78)
ANION GAP SERPL CALC-SCNC: 5 MMOL/L — SIGNIFICANT CHANGE UP (ref 5–17)
AST SERPL-CCNC: 31 U/L — SIGNIFICANT CHANGE UP (ref 15–37)
BASOPHILS # BLD AUTO: 0.03 K/UL — SIGNIFICANT CHANGE UP (ref 0–0.2)
BASOPHILS NFR BLD AUTO: 0.5 % — SIGNIFICANT CHANGE UP (ref 0–2)
BILIRUB SERPL-MCNC: 1 MG/DL — SIGNIFICANT CHANGE UP (ref 0.2–1.2)
BUN SERPL-MCNC: 21 MG/DL — SIGNIFICANT CHANGE UP (ref 7–23)
CALCIUM SERPL-MCNC: 8.9 MG/DL — SIGNIFICANT CHANGE UP (ref 8.5–10.1)
CHLORIDE SERPL-SCNC: 104 MMOL/L — SIGNIFICANT CHANGE UP (ref 96–108)
CO2 SERPL-SCNC: 29 MMOL/L — SIGNIFICANT CHANGE UP (ref 22–31)
CREAT SERPL-MCNC: 0.77 MG/DL — SIGNIFICANT CHANGE UP (ref 0.5–1.3)
EGFR: 75 ML/MIN/1.73M2 — SIGNIFICANT CHANGE UP
EOSINOPHIL # BLD AUTO: 0.01 K/UL — SIGNIFICANT CHANGE UP (ref 0–0.5)
EOSINOPHIL NFR BLD AUTO: 0.2 % — SIGNIFICANT CHANGE UP (ref 0–6)
GLUCOSE SERPL-MCNC: 107 MG/DL — HIGH (ref 70–99)
HCT VFR BLD CALC: 40 % — SIGNIFICANT CHANGE UP (ref 34.5–45)
HGB BLD-MCNC: 13 G/DL — SIGNIFICANT CHANGE UP (ref 11.5–15.5)
IMM GRANULOCYTES NFR BLD AUTO: 0.7 % — SIGNIFICANT CHANGE UP (ref 0–0.9)
LYMPHOCYTES # BLD AUTO: 0.96 K/UL — LOW (ref 1–3.3)
LYMPHOCYTES # BLD AUTO: 16.6 % — SIGNIFICANT CHANGE UP (ref 13–44)
MCHC RBC-ENTMCNC: 31 PG — SIGNIFICANT CHANGE UP (ref 27–34)
MCHC RBC-ENTMCNC: 32.5 GM/DL — SIGNIFICANT CHANGE UP (ref 32–36)
MCV RBC AUTO: 95.5 FL — SIGNIFICANT CHANGE UP (ref 80–100)
MONOCYTES # BLD AUTO: 0.63 K/UL — SIGNIFICANT CHANGE UP (ref 0–0.9)
MONOCYTES NFR BLD AUTO: 10.9 % — SIGNIFICANT CHANGE UP (ref 2–14)
NEUTROPHILS # BLD AUTO: 4.13 K/UL — SIGNIFICANT CHANGE UP (ref 1.8–7.4)
NEUTROPHILS NFR BLD AUTO: 71.1 % — SIGNIFICANT CHANGE UP (ref 43–77)
PLATELET # BLD AUTO: 95 K/UL — LOW (ref 150–400)
POTASSIUM SERPL-MCNC: 4.2 MMOL/L — SIGNIFICANT CHANGE UP (ref 3.5–5.3)
POTASSIUM SERPL-SCNC: 4.2 MMOL/L — SIGNIFICANT CHANGE UP (ref 3.5–5.3)
PROT SERPL-MCNC: 6.5 GM/DL — SIGNIFICANT CHANGE UP (ref 6–8.3)
RBC # BLD: 4.19 M/UL — SIGNIFICANT CHANGE UP (ref 3.8–5.2)
RBC # FLD: 14.2 % — SIGNIFICANT CHANGE UP (ref 10.3–14.5)
SODIUM SERPL-SCNC: 138 MMOL/L — SIGNIFICANT CHANGE UP (ref 135–145)
WBC # BLD: 5.8 K/UL — SIGNIFICANT CHANGE UP (ref 3.8–10.5)
WBC # FLD AUTO: 5.8 K/UL — SIGNIFICANT CHANGE UP (ref 3.8–10.5)

## 2022-11-19 PROCEDURE — M0222: CPT

## 2022-11-19 PROCEDURE — 71045 X-RAY EXAM CHEST 1 VIEW: CPT | Mod: 26

## 2022-11-19 PROCEDURE — 71045 X-RAY EXAM CHEST 1 VIEW: CPT

## 2022-11-19 PROCEDURE — 99284 EMERGENCY DEPT VISIT MOD MDM: CPT | Mod: 25

## 2022-11-19 PROCEDURE — 99284 EMERGENCY DEPT VISIT MOD MDM: CPT | Mod: FS,CS

## 2022-11-19 PROCEDURE — 85025 COMPLETE CBC W/AUTO DIFF WBC: CPT

## 2022-11-19 PROCEDURE — 36415 COLL VENOUS BLD VENIPUNCTURE: CPT

## 2022-11-19 PROCEDURE — 80053 COMPREHEN METABOLIC PANEL: CPT

## 2022-11-19 RX ORDER — BEBTELOVIMAB 87.5 MG/ML
175 INJECTION, SOLUTION INTRAVENOUS ONCE
Refills: 0 | Status: COMPLETED | OUTPATIENT
Start: 2022-11-19 | End: 2022-11-19

## 2022-11-19 RX ORDER — SODIUM CHLORIDE 9 MG/ML
1000 INJECTION INTRAMUSCULAR; INTRAVENOUS; SUBCUTANEOUS ONCE
Refills: 0 | Status: COMPLETED | OUTPATIENT
Start: 2022-11-19 | End: 2022-11-19

## 2022-11-19 RX ADMIN — SODIUM CHLORIDE 1000 MILLILITER(S): 9 INJECTION INTRAMUSCULAR; INTRAVENOUS; SUBCUTANEOUS at 11:40

## 2022-11-19 RX ADMIN — BEBTELOVIMAB 175 MILLIGRAM(S): 87.5 INJECTION, SOLUTION INTRAVENOUS at 11:36

## 2022-11-19 NOTE — ED STATDOCS - PATIENT PORTAL LINK FT
You can access the FollowMyHealth Patient Portal offered by [facility] by registering at the following website: [email or web address]. By joining [employer]’s FollowMyHealth portal, you will also be able to view your health information using other applications (apps) compatible with our system.

## 2022-11-19 NOTE — ED STATDOCS - CLINICAL SUMMARY MEDICAL DECISION MAKING FREE TEXT BOX
Will get basic labs, if mab will treat Will get basic labs, if mab will treat      Pt. received MAB and had no adverse reaction.  Platelet count 95.  Pt. without history.  Educated her on isolation at home, need to follow with PMD for repeat labs.  Fall precautions discussed.  PMD:  DEBORA Eugene PA-C

## 2022-11-19 NOTE — ED STATDOCS - ATTENDING APP SHARED VISIT CONTRIBUTION OF CARE
I personally saw the patient with the BEKA, and completed the key components of the history and physical exam. I then discussed the management plan with the BEKA.

## 2022-11-19 NOTE — ED STATDOCS - OBJECTIVE STATEMENT
86 y/o female with a PMHx of Afib previously on Digoxin, currently on Eliquis, GERD, HLD, HTN on Eliquis, hypoadrenalism, lymphedema, metastatic melanoma, pacemaker presents to the ED c/o flu-like symptoms. States she has was exposed to covid 11/15 and started to have a cough 11/16. Now c/o weakness, nausea, increased BM. No SOB, fever, rash. covid vax x2, boosted x1, +flu shot. Allergic to penicillin

## 2022-11-19 NOTE — ED STATDOCS - CARE PLAN
Principal Discharge DX:	2019 novel coronavirus disease (COVID-19)  Secondary Diagnosis:	Thrombocytopenia due to COVID-19 virus   1

## 2022-11-19 NOTE — ED ADULT TRIAGE NOTE - CHIEF COMPLAINT QUOTE
PUI COVID, pt. had positive covid home swab, and contact with +COVID friend. pt. developed nasal congestion, Headache 2 days ago and diarrhea last night. denies SOB.

## 2022-11-19 NOTE — ED ADULT NURSE NOTE - OBJECTIVE STATEMENT
PUI COVID, tested positive for covid via home swab, endorses close contact with COVID + friend. pt. developed nasal congestion and HA x2 days ago and starting experiencing diarrhea last night. denies SOB, no acute respiratory distress noted.

## 2022-11-19 NOTE — ED STATDOCS - NS ED ATTENDING STATEMENT MOD
This was a shared visit with the BEKA. I reviewed and verified the documentation and independently performed the documented:

## 2022-11-19 NOTE — ED STATDOCS - PROGRESS NOTE DETAILS
86 y/o female with a PMHx of Afib previously on Digoxin, currently on Eliquis, GERD, HLD, HTN on Eliquis, hypoadrenalism, lymphedema, metastatic melanoma, pacemaker presents to the ED c/o flu-like symptoms. States she has was exposed to covid 11/15 and started to have a cough 11/16. Now c/o weakness, nausea, increased BM. No SOB, fever, rash. covid vax x2, boosted x1, +flu shot. Allergic to penicillin Pt. is an 87 year old female history of afib, GERD, HLD, HTN, PPM, hypoadrenalism, lymphedema, metastatic melanoma on Eliquis presents with URI symptoms.  Pt. COVID +.  Pt. has had a cough x 3 days.  Tested COVID + this morning at home.  Pt. with weakness, nausea, Fever.  Pt. denies SOB.  Pt. vaccinated x 2 with one booster. Pt. stable.  Afebrile, good oxygen saturation. Will administer MAB. Kaykay Eugene PA-C Pt. received MAB and had no adverse reaction.  Platelet count 95.  Pt. without history.  Educated her on isolation at home, need to follow with PMD for repeat labs.  Fall precautions discussed.  PMD:  DEBORA Eugene PA-C Unable to place wet read   enlarged cardiac size no infiltrate

## 2022-11-19 NOTE — ED STATDOCS - NSFOLLOWUPINSTRUCTIONS_ED_ALL_ED_FT
Symptoms of COVID-19      Watch for symptoms    People with COVID-19 have had a wide range of symptoms reported – ranging from mild symptoms to severe illness. Symptoms may appear 2-14 days after exposure to the virus. Anyone can have mild to severe symptoms. People with these symptoms may have COVID-19:  •Fever or chills       •Cough       •Shortness of breath or difficulty breathing       •Fatigue       •Muscle or body aches       •Headache       •New loss of taste or smell       •Sore throat       •Congestion or runny nose       •Nausea or vomiting       •Diarrhea      This list does not include all possible symptoms. CDC will continue to update this list as we learn more about COVID-19. Older adults and people who have severe underlying medical conditions like heart or lung disease or diabetes seem to be at higher risk for developing more serious complications from COVID-19 illness.    Omicron Variant: Learn what you should know about Omicron and other COVID-19 variants.      Feeling sick?  • Coronavirus Self-   •A tool to help you make decisions on when to seek testing and medical care.        • If you are sick       • Testing for COVID-19       • Quarantine and isolation       • Caring for someone who is sick         When to seek emergency medical attention    Look for emergency warning signs* for COVID-19:  •Trouble breathing      •Persistent pain or pressure in the chest       •New confusion       •Inability to wake or stay awake       •Pale, gray, or blue-colored skin, lips, or nail beds, depending on skin tone      *This list is not all possible symptoms. Please call your medical provider for any other symptoms that are severe or concerning to you.     If someone is showing any of these signs, call 911 or call ahead to your local emergency facility: Notify the  that you are seeking care for someone who has or may have COVID-19.      Difference between flu and COVID-19    Influenza (Flu) and COVID-19 are both contagious respiratory illnesses, but they are caused by different viruses. COVID-19 is caused by infection with a coronavirus first identified in 2019, and flu is caused by infection with influenza viruses.    More about flu and COVID-19       More information    People at increased risk for severe illness     Healthcare workers: Information on COVID-19     03/22/2022    Content source: National Center for Immunization and Respiratory Diseases (NCIRD), Division of Viral Diseases    This information is not intended to replace advice given to you by your health care provider. Make sure you discuss any questions you have with your health care provider.      Thrombocytopenia    WHAT YOU NEED TO KNOW:    Thrombocytopenia is a condition that develops because your body does not have enough platelets. Platelets are cells that help your blood to clot. Your body may not be making enough platelets, or it may be destroying too many platelets. When platelets become low, your risk for bleeding increases. Severe bleeding may become life-threatening.    DISCHARGE INSTRUCTIONS:    Call your local emergency number (911 in the ) for any of the following:   •You have chest pain, tightness, or heaviness that spreads to your shoulders, arms, jaw, neck, or back.      •You have weakness on one side of your body, a severe headache, difficulty speaking, or a change in vision.      Seek care immediately if:   •You have bleeding that does not stop after you elevate and place pressure on the area.      •You vomit blood or material that looks like coffee grounds.      •Your arm or leg feels warm, tender, and painful. It may look swollen and red.      •You suddenly feel lightheaded, dizzy, or weak.      Call your doctor or hematologist if:   •You have bleeding from your gums, mouth, or nose.      •You have irregular or heavy menstrual bleeding.      •You have blood in your urine or bowel movement.      •You have more bruises or small red or purple spots on your skin.      •You have questions or concerns about your condition or care.      Medicines:   •Medicines can help increase platelet production and prevent bleeding.      •Take your medicine as directed. Contact your healthcare provider if you think your medicine is not helping or if you have side effects. Tell your provider if you are allergic to any medicine. Keep a list of the medicines, vitamins, and herbs you take. Include the amounts, and when and why you take them. Bring the list or the pill bottles to follow-up visits. Carry your medicine list with you in case of an emergency.      Prevent or manage bleeding:   •Care for cuts, scrapes, or nosebleeds. Examine your skin for minor bumps, scrapes, and cuts. These injuries can increase your risk for bleeding that can become life-threatening. Apply firm, steady, pressure to cuts or scrapes. Use gauze or a clean towel. If possible, elevate the body part above the level of your heart. If your nose bleeds, pinch the top of your nose until bleeding stops.      •Be careful with skin and mouth care. Use a soft washcloth when you bathe. Use a soft toothbrush to keep your gums from bleeding. Use lip balm to prevent your lips from cracking. Apply lotion to dry skin. Keep your nails trimmed. If you shave, use an electric shaver.      •Do not strain when you have a bowel movement. The strain can increase pressure in your brain and cause bleeding. Ask your healthcare provider about a stool softener or laxative if you are constipated. Do not use enemas or suppositories.      •Use a cool mist humidifier to increase moisture in your home. Moisture may help prevent coughing or nosebleeds. Coughing can increase pressure in your brain and could cause bleeding.      •Avoid activities that may cause scratches or bruises. You may not be able to play contact sports such as football, hockey, or wrestling. Ask your healthcare provider which activities are safe for you.      •Do not take aspirin or NSAIDs. These medicines can cause you to bleed and bruise more easily.      Wear medical alert identification: Wear a medical alert bracelet or carry a card that says you have thrombocytopenia. Ask where to get these items.  Medical Alert [facility]         Follow up with your doctor or hematologist as directed: You will need to return for more blood tests. Write down your questions so you remember to ask them during your visits.

## 2022-11-20 ENCOUNTER — EMERGENCY (EMERGENCY)
Facility: HOSPITAL | Age: 87
LOS: 0 days | Discharge: HOME | End: 2022-11-20
Attending: EMERGENCY MEDICINE
Payer: MEDICARE

## 2022-11-20 VITALS
HEART RATE: 74 BPM | TEMPERATURE: 98 F | RESPIRATION RATE: 18 BRPM | SYSTOLIC BLOOD PRESSURE: 135 MMHG | OXYGEN SATURATION: 98 % | DIASTOLIC BLOOD PRESSURE: 53 MMHG

## 2022-11-20 VITALS — HEIGHT: 62 IN | WEIGHT: 149.91 LBS

## 2022-11-20 DIAGNOSIS — Z90.89 ACQUIRED ABSENCE OF OTHER ORGANS: Chronic | ICD-10-CM

## 2022-11-20 DIAGNOSIS — I10 ESSENTIAL (PRIMARY) HYPERTENSION: ICD-10-CM

## 2022-11-20 DIAGNOSIS — E27.40 UNSPECIFIED ADRENOCORTICAL INSUFFICIENCY: ICD-10-CM

## 2022-11-20 DIAGNOSIS — R53.83 OTHER FATIGUE: ICD-10-CM

## 2022-11-20 DIAGNOSIS — R11.0 NAUSEA: ICD-10-CM

## 2022-11-20 DIAGNOSIS — H11.30 CONJUNCTIVAL HEMORRHAGE, UNSPECIFIED EYE: ICD-10-CM

## 2022-11-20 DIAGNOSIS — I48.91 UNSPECIFIED ATRIAL FIBRILLATION: ICD-10-CM

## 2022-11-20 DIAGNOSIS — I89.0 LYMPHEDEMA, NOT ELSEWHERE CLASSIFIED: ICD-10-CM

## 2022-11-20 DIAGNOSIS — Z98.890 OTHER SPECIFIED POSTPROCEDURAL STATES: Chronic | ICD-10-CM

## 2022-11-20 DIAGNOSIS — Z95.0 PRESENCE OF CARDIAC PACEMAKER: ICD-10-CM

## 2022-11-20 DIAGNOSIS — K21.9 GASTRO-ESOPHAGEAL REFLUX DISEASE WITHOUT ESOPHAGITIS: ICD-10-CM

## 2022-11-20 DIAGNOSIS — C43.9 MALIGNANT MELANOMA OF SKIN, UNSPECIFIED: ICD-10-CM

## 2022-11-20 DIAGNOSIS — U07.1 COVID-19: ICD-10-CM

## 2022-11-20 DIAGNOSIS — H02.846 EDEMA OF LEFT EYE, UNSPECIFIED EYELID: ICD-10-CM

## 2022-11-20 DIAGNOSIS — D69.6 THROMBOCYTOPENIA, UNSPECIFIED: ICD-10-CM

## 2022-11-20 DIAGNOSIS — E78.00 PURE HYPERCHOLESTEROLEMIA, UNSPECIFIED: ICD-10-CM

## 2022-11-20 DIAGNOSIS — Z79.01 LONG TERM (CURRENT) USE OF ANTICOAGULANTS: ICD-10-CM

## 2022-11-20 DIAGNOSIS — R53.1 WEAKNESS: ICD-10-CM

## 2022-11-20 DIAGNOSIS — Z98.49 CATARACT EXTRACTION STATUS, UNSPECIFIED EYE: Chronic | ICD-10-CM

## 2022-11-20 DIAGNOSIS — Z88.0 ALLERGY STATUS TO PENICILLIN: ICD-10-CM

## 2022-11-20 LAB
ALBUMIN SERPL ELPH-MCNC: 3.2 G/DL — LOW (ref 3.3–5)
ALP SERPL-CCNC: 44 U/L — SIGNIFICANT CHANGE UP (ref 40–120)
ALT FLD-CCNC: 59 U/L — SIGNIFICANT CHANGE UP (ref 12–78)
ANION GAP SERPL CALC-SCNC: 4 MMOL/L — LOW (ref 5–17)
APTT BLD: 36.9 SEC — HIGH (ref 27.5–35.5)
AST SERPL-CCNC: 43 U/L — HIGH (ref 15–37)
BASOPHILS # BLD AUTO: 0.03 K/UL — SIGNIFICANT CHANGE UP (ref 0–0.2)
BASOPHILS NFR BLD AUTO: 0.5 % — SIGNIFICANT CHANGE UP (ref 0–2)
BILIRUB SERPL-MCNC: 1.3 MG/DL — HIGH (ref 0.2–1.2)
BUN SERPL-MCNC: 19 MG/DL — SIGNIFICANT CHANGE UP (ref 7–23)
CALCIUM SERPL-MCNC: 8.4 MG/DL — LOW (ref 8.5–10.1)
CHLORIDE SERPL-SCNC: 101 MMOL/L — SIGNIFICANT CHANGE UP (ref 96–108)
CO2 SERPL-SCNC: 28 MMOL/L — SIGNIFICANT CHANGE UP (ref 22–31)
CREAT SERPL-MCNC: 0.61 MG/DL — SIGNIFICANT CHANGE UP (ref 0.5–1.3)
EGFR: 87 ML/MIN/1.73M2 — SIGNIFICANT CHANGE UP
EOSINOPHIL # BLD AUTO: 0.15 K/UL — SIGNIFICANT CHANGE UP (ref 0–0.5)
EOSINOPHIL NFR BLD AUTO: 2.6 % — SIGNIFICANT CHANGE UP (ref 0–6)
GLUCOSE SERPL-MCNC: 90 MG/DL — SIGNIFICANT CHANGE UP (ref 70–99)
HCT VFR BLD CALC: 38.6 % — SIGNIFICANT CHANGE UP (ref 34.5–45)
HGB BLD-MCNC: 12.6 G/DL — SIGNIFICANT CHANGE UP (ref 11.5–15.5)
IMM GRANULOCYTES NFR BLD AUTO: 0.3 % — SIGNIFICANT CHANGE UP (ref 0–0.9)
INR BLD: 1.84 RATIO — HIGH (ref 0.88–1.16)
LYMPHOCYTES # BLD AUTO: 0.64 K/UL — LOW (ref 1–3.3)
LYMPHOCYTES # BLD AUTO: 11 % — LOW (ref 13–44)
MCHC RBC-ENTMCNC: 31 PG — SIGNIFICANT CHANGE UP (ref 27–34)
MCHC RBC-ENTMCNC: 32.6 GM/DL — SIGNIFICANT CHANGE UP (ref 32–36)
MCV RBC AUTO: 95.1 FL — SIGNIFICANT CHANGE UP (ref 80–100)
MONOCYTES # BLD AUTO: 0.75 K/UL — SIGNIFICANT CHANGE UP (ref 0–0.9)
MONOCYTES NFR BLD AUTO: 12.9 % — SIGNIFICANT CHANGE UP (ref 2–14)
NEUTROPHILS # BLD AUTO: 4.23 K/UL — SIGNIFICANT CHANGE UP (ref 1.8–7.4)
NEUTROPHILS NFR BLD AUTO: 72.7 % — SIGNIFICANT CHANGE UP (ref 43–77)
PLATELET # BLD AUTO: 82 K/UL — LOW (ref 150–400)
POTASSIUM SERPL-MCNC: 4 MMOL/L — SIGNIFICANT CHANGE UP (ref 3.5–5.3)
POTASSIUM SERPL-SCNC: 4 MMOL/L — SIGNIFICANT CHANGE UP (ref 3.5–5.3)
PROT SERPL-MCNC: 6 GM/DL — SIGNIFICANT CHANGE UP (ref 6–8.3)
PROTHROM AB SERPL-ACNC: 21.5 SEC — HIGH (ref 10.5–13.4)
RBC # BLD: 4.06 M/UL — SIGNIFICANT CHANGE UP (ref 3.8–5.2)
RBC # FLD: 14 % — SIGNIFICANT CHANGE UP (ref 10.3–14.5)
SODIUM SERPL-SCNC: 133 MMOL/L — LOW (ref 135–145)
TROPONIN I, HIGH SENSITIVITY RESULT: 20.32 NG/L — SIGNIFICANT CHANGE UP
WBC # BLD: 5.82 K/UL — SIGNIFICANT CHANGE UP (ref 3.8–10.5)
WBC # FLD AUTO: 5.82 K/UL — SIGNIFICANT CHANGE UP (ref 3.8–10.5)

## 2022-11-20 PROCEDURE — 99284 EMERGENCY DEPT VISIT MOD MDM: CPT | Mod: 25

## 2022-11-20 PROCEDURE — 80053 COMPREHEN METABOLIC PANEL: CPT

## 2022-11-20 PROCEDURE — 84484 ASSAY OF TROPONIN QUANT: CPT

## 2022-11-20 PROCEDURE — 96374 THER/PROPH/DIAG INJ IV PUSH: CPT

## 2022-11-20 PROCEDURE — 85730 THROMBOPLASTIN TIME PARTIAL: CPT

## 2022-11-20 PROCEDURE — 85610 PROTHROMBIN TIME: CPT

## 2022-11-20 PROCEDURE — 99284 EMERGENCY DEPT VISIT MOD MDM: CPT | Mod: CS

## 2022-11-20 PROCEDURE — 96375 TX/PRO/DX INJ NEW DRUG ADDON: CPT

## 2022-11-20 PROCEDURE — 36415 COLL VENOUS BLD VENIPUNCTURE: CPT

## 2022-11-20 PROCEDURE — 85025 COMPLETE CBC W/AUTO DIFF WBC: CPT

## 2022-11-20 RX ORDER — ONDANSETRON 8 MG/1
1 TABLET, FILM COATED ORAL
Qty: 15 | Refills: 0
Start: 2022-11-20 | End: 2022-11-24

## 2022-11-20 RX ORDER — KETOROLAC TROMETHAMINE 30 MG/ML
15 SYRINGE (ML) INJECTION ONCE
Refills: 0 | Status: DISCONTINUED | OUTPATIENT
Start: 2022-11-20 | End: 2022-11-20

## 2022-11-20 RX ORDER — SODIUM CHLORIDE 9 MG/ML
1000 INJECTION INTRAMUSCULAR; INTRAVENOUS; SUBCUTANEOUS ONCE
Refills: 0 | Status: COMPLETED | OUTPATIENT
Start: 2022-11-20 | End: 2022-11-20

## 2022-11-20 RX ORDER — HYDROCORTISONE 20 MG
50 TABLET ORAL ONCE
Refills: 0 | Status: COMPLETED | OUTPATIENT
Start: 2022-11-20 | End: 2022-11-20

## 2022-11-20 RX ORDER — ONDANSETRON 8 MG/1
4 TABLET, FILM COATED ORAL ONCE
Refills: 0 | Status: COMPLETED | OUTPATIENT
Start: 2022-11-20 | End: 2022-11-20

## 2022-11-20 RX ORDER — DIPHENHYDRAMINE HCL 50 MG
25 CAPSULE ORAL ONCE
Refills: 0 | Status: COMPLETED | OUTPATIENT
Start: 2022-11-20 | End: 2022-11-20

## 2022-11-20 RX ADMIN — ONDANSETRON 4 MILLIGRAM(S): 8 TABLET, FILM COATED ORAL at 09:37

## 2022-11-20 RX ADMIN — Medication 15 MILLIGRAM(S): at 09:37

## 2022-11-20 RX ADMIN — Medication 25 MILLIGRAM(S): at 09:36

## 2022-11-20 RX ADMIN — SODIUM CHLORIDE 1000 MILLILITER(S): 9 INJECTION INTRAMUSCULAR; INTRAVENOUS; SUBCUTANEOUS at 09:37

## 2022-11-20 RX ADMIN — Medication 50 MILLIGRAM(S): at 09:37

## 2022-11-20 NOTE — ED ADULT TRIAGE NOTE - CHIEF COMPLAINT QUOTE
Pt. to the ED BIB Son C/O Swelling in the face- Pt. reports she is + Covid and was evaluated in ED yesterday and sent home with Antivirals- States she might be having reaction- Denies Respiratory distress

## 2022-11-20 NOTE — ED ADULT TRIAGE NOTE - MODE OF ARRIVAL
MD Notification    Notified Person: MD    Notified Person Name: Julieta    Notification Date/Time:  7/26 13:40    Notification Interaction:  Paged MD, discussed via phone    Purpose of Notification:  Orders for Keppra 1500 mg BID and 250 mg BID - Note states 1500 mg BID only.    Orders Received:  D/C mistaken order for 250 mg BID - order should be Keppra 1500 mg BID only.    Comments:  Orders placed   [employer]

## 2022-11-20 NOTE — ED PROVIDER NOTE - PATIENT PORTAL LINK FT
You can access the FollowMyHealth Patient Portal offered by [facility] by registering at the following website: [email or web address]. By joining [employer]’s FollowMyHealth portal, you will also be able to view your health information using other applications (apps) compatible with our system.

## 2022-11-20 NOTE — ED PROVIDER NOTE - PROGRESS NOTE DETAILS
lot [address] ex 6/30/23 qc+ brown stool guiac negative. Primo Coats M.D., Attending Physician The patient is feeling much better she is tolerating p.o. her abdomen is soft nontender her vital signs are stable oxygen is 98% on room air.  The patient wants to go home discussed thrombocytopenia with patient and son who is at bedside they can follow-up with her hematologist.  Will discharge with follow-up and strict return precautions

## 2022-11-20 NOTE — ED PROVIDER NOTE - CLINICAL SUMMARY MEDICAL DECISION MAKING FREE TEXT BOX
87-year-old female history of hypertension high cholesterol A. 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  Patient was diagnosed with COVID yesterday was seen in the emergency department had lab work that showed thrombocytopenia was given MAB and was discharged in good condition.  Patient states that today she noticed some swelling around the left eye.  No tongue or throat swelling no shortness of breath no diarrhea.  Patient is also noted that over for the past couple days she has had decreased p.o. intake nausea generalized weakness and fatigue.  Patient has also noticed some dark stool starting yesterday. PERRLA EOMI mild swelling to orbital region, no proptosis no redness warmth, mild subconjunctival hemorrhage. no pain with eye movement. possibly mild allergic reaction around left eye from MAB (pt also had mild allergic rx to covid vaccine) no signs of preseptal or orbital cellulitis. pt complaining of weakness will r/o acs, dehydration check for anemia reassess. Primo Coats M.D., Attending Physician

## 2022-11-20 NOTE — ED PROVIDER NOTE - NSFOLLOWUPINSTRUCTIONS_ED_ALL_ED_FT
1. return for worsening symptoms or anything concerning to you  2. take all home meds as prescribed  3. follow up with your pmd call to make an appointment  4. follow up with your heme/onc regarding thrombocytopenia  5. return for O2 <92%   6. see attached covid instructions    Acute Nausea and Vomiting    WHAT YOU NEED TO KNOW:    Acute nausea and vomiting start suddenly, worsen quickly, and last a short time.    DISCHARGE INSTRUCTIONS:    Return to the emergency department if:     You see blood in your vomit or your bowel movements.      You have sudden, severe pain in your chest and upper abdomen after hard vomiting or retching.      You have swelling in your neck and chest.       You are dizzy, cold, and thirsty and your eyes and mouth are dry.      You are urinating very little or not at all.      You have muscle weakness, leg cramps, and trouble breathing.       Your heart is beating much faster than normal.       You continue to vomit for more than 48 hours.     Contact your healthcare provider if:     You have frequent dry heaves (vomiting but nothing comes out).      Your nausea and vomiting does not get better or go away after you use medicine.      You have questions or concerns about your condition or treatment.    Medicines: You may need any of the following:     Medicines may be given to calm your stomach and stop your vomiting. You may also need medicines to help you feel more relaxed or to stop nausea and vomiting caused by motion sickness.      Gastrointestinal stimulants are used to help empty your stomach and bowels. This may help decrease nausea and vomiting.      Take your medicine as directed. Contact your healthcare provider if you think your medicine is not helping or if you have side effects. Tell him or her if you are allergic to any medicine. Keep a list of the medicines, vitamins, and herbs you take. Include the amounts, and when and why you take them. Bring the list or the pill bottles to follow-up visits. Carry your medicine list with you in case of an emergency.    Prevent or manage acute nausea and vomiting:     Do not drink alcohol. Alcohol may upset or irritate your stomach. Too much alcohol can also cause acute nausea and vomiting.      Control stress. Headaches due to stress may cause nausea and vomiting. Find ways to relax and manage your stress. Get more rest and sleep.      Drink more liquids as directed. Vomiting can lead to dehydration. It is important to drink more liquids to help replace lost body fluids. Ask your healthcare provider how much liquid to drink each day and which liquids are best for you. Your provider may recommend that you drink an oral rehydration solution (ORS). ORS contains water, salts, and sugar that are needed to replace the lost body fluids. Ask what kind of ORS to use, how much to drink, and where to get it.      Eat smaller meals, more often. Eat small amounts of food every 2 to 3 hours, even if you are not hungry. Food in your stomach may decrease your nausea.      Talk to your healthcare provider before you take over-the-counter (OTC) medicines. These medicines can cause serious problems if you use certain other medicines, or you have a medical condition. You may have problems if you use too much or use them for longer than the label says. Follow directions on the label carefully.     Follow up with your healthcare provider as directed: Write down your questions so you remember to ask them during your follow-up visits.

## 2022-11-20 NOTE — ED PROVIDER NOTE - OBJECTIVE STATEMENT
87-year-old female history of hypertension high cholesterol A. 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  Patient was diagnosed with COVID yesterday was seen in the emergency department had lab work that showed thrombocytopenia was given MAB and was discharged in good condition.  Patient states that today she noticed some swelling around the left eye.  No tongue or throat swelling no shortness of breath no diarrhea.  Patient is also noted that over for the past couple days she has had decreased p.o. intake nausea generalized weakness and fatigue.  Patient has also noticed some dark stool starting yesterday.

## 2022-11-20 NOTE — ED ADULT NURSE NOTE - OBJECTIVE STATEMENT
Pt reports to the c/o facial swelling, headache 3/10 and nausea. Pt was in the ED yesterday c/o COVID + home test. Pt received antivirals yesterday in ED and reports facial swelling beginning this morning. Pt reports previous COVID vaccines/boosters caused a similar reaction and was given Benadryl that helped. Pt denies sob or chest pain. Pt denies fever/chills.

## 2022-11-20 NOTE — ED ADULT NURSE NOTE - NS ED NURSE LEVEL OF CONSCIOUSNESS ORIENTATION
December 21, 2020       Travis Shafer MD  [address] [place] [address]  [facility] [address]  Via Fax: [phone]      Patient: Nirmal Ellis   YOB: 1945   Date of Visit: 12/21/2020       Dear Dr. Shafer:    Thank you for referring Nirmal Ellis to me for evaluation. Below are my notes for this visit with him.    If you have questions, please do not hesitate to call me. I look forward to following your patient along with you.      Sincerely,        Rayray Winn MD        CC: No Recipients  Rayray Winn MD  12/21/2020  2:03 PM  Signed       [facility]      OfficeProgress Note    Referring physician: Travis Shafer MD    Nirmal Ellis is a 75 year old male.    Reason for visit  Chief Complaint   Patient presents with   • Follow-up       HPI:   This is a pleasant 75 year old male with coronary disease known bypass and aortic valve placement 2014 with a flutter ablation in the past who now presents for follow-up.  Since last seen has been doing well but has gained some weight.  He has had a few episodes of A. fib being monitored by Dr. Frias.  He did not tolerate sotalol due to fatigue.  His stress echo in October showed questionable inferior wall hypokinesis but preserved EF.  And echo in June 2019 showed an aortic gradient of 22 with some question of LV dysfunction.  Lab Results   Component Value Date    CHOLESTEROL 172 07/17/2020    CALCLDL 87 07/17/2020    HDL 73 07/17/2020    TRIGLYCERIDE 60 07/17/2020    AST 19 07/17/2020       Current Outpatient Medications   Medication Sig Dispense Refill   • carbamazepine (TEGretol XR) 400 MG 12 hr tablet Take 400 mg by mouth 2 times daily.     • rivaroxaban (XARELTO) 20 MG Tab Take 1 tablet by mouth nightly. 90 tablet 3   • sildenafil (REVATIO) 20 MG tablet Take 5 tablets 1-2 hours before sexual activity     • Glucosamine-Chondroit-Vit C-Mn (GLUCOSAMINE 1500 COMPLEX) Cap Take 1 tablet by mouth daily.     • atorvastatin  (LIPITOR) 40 MG tablet Take 40 mg by mouth daily.     • spironolactone (ALDACTONE) 25 MG tablet Take 25 mg by mouth daily.     • levothyroxine (SYNTHROID, LEVOTHROID) 100 MCG tablet Take 100 mcg by mouth daily.     • aspirin 81 MG tablet daily     • amoxicillin (AMOXIL) 500 MG capsule TAKE 4 CAPSULES 1 HOUR PRIOR TO PROCEDURE.       No current facility-administered medications for this visit.       Past Medical History:   Diagnosis Date   • Atrial fibrillation (CMS/HCC)    • Atrial fibrillation, currently in sinus rhythm    • Atrial fibrillation, currently in sinus rhythm after cardioversion    • CAD (coronary artery disease)    • Dyslipidemia    • Essential hypertension    • S/P ablation of atrial flutter       Social History:  Social History     Tobacco Use   • Smoking status: Former Smoker     Packs/day: 0.00   • Smokeless tobacco: Never Used   • Tobacco comment: Never used tobacco. denies smoking.    Substance Use Topics   • Alcohol use: Yes     Alcohol/week: 1.0 standard drinks     Types: 1 Cans of beer per week     Frequency: Monthly or less     Drinks per session: 1 or 2     Comment: drinks occasionally   • Drug use: Never     Family History  Family History   Problem Relation Age of Onset   • Stroke Mother    • Heart disease Other    • Coronary Artery Disease Neg Hx         Negative for premature CAD.    • Aneurysm Neg Hx         Negative for AAA.        REVIEW OF SYSTEMS:   GENERAL HEALTH: feels well otherwise  SKIN: denies any unusual skin lesions or rashes  RESPIRATORY: denies shortness of breath with exertion  CARDIOVASCULAR:See HPI  GI: denies abdominal pain and denies heartburn  NEURO: denies headaches  Remainder of review of systems is completed and negative    EXAM:     Visit Vitals  /72 (BP Location: LUE - Left upper extremity, Patient Position: Sitting, Cuff Size: Large Adult)   Pulse (!) 56   Ht 5' 7\" (1.702 m)   Wt 96.2 kg (212 lb)   SpO2 96%   BMI 33.20 kg/m²     GENERAL: well developed,  well nourished,in no apparent distress  SKIN: no rashes,no suspicious lesions  HEENT: atraumatic, normocephalic,ears and throat are clear  NECK: supple,no adenopathy,no bruits  LUNGS: clear to auscultation  CARDIO: regular rate and rhythm,nl S1,S2, 2/6 systolic ejection murmur  GI: good BS's,no masses, HSM or tenderness  EXTREMITIES: no cyanosis, clubbing or edema  NEURO: no focal deficits  PSYCH:alert and oriented x3    ASSESSMENT AND PLAN:     Problem List Items Addressed This Visit        Circulatory    Coronary atherosclerosis of native coronary artery - Primary    Relevant Orders    SGOT    SGPT    LIPID PANEL WITH REFLEX    STRESS TEST WITH MYOCARDIAL PERFUSION    Essential hypertension, benign    Relevant Orders    SGOT    SGPT    LIPID PANEL WITH REFLEX    STRESS TEST WITH MYOCARDIAL PERFUSION       Endocrine    Pure hypercholesterolemia    Relevant Orders    SGOT    SGPT    LIPID PANEL WITH REFLEX    STRESS TEST WITH MYOCARDIAL PERFUSION           In conclusion,this pleasant 75 year old male with coronary disease prior bypass and aortic valve replacement with elevated cholesterol and paroxysmal A. fib who overall is feeling well.  Continue to monitor rhythm with Dr. Frias.  Since last seen he is gained weight and needs to work on exercise diet and weight loss.  He had an equivocal stress test but is asymptomatic and will follow clinically.  Would recommend nuclear stress test in 1 year to reassess for ischemia and EF.  His cholesterol not at goal and will increase the atorvastatin to 80 mg daily checking labs in 2 months.  He will call with new symptoms.  He denies history of significant sleep apnea  The patient indicates understanding of these issues and agrees to the plan.  Follow Up: Return in about 1 year (around 12/21/2021).    Rayray Winn MD  12/21/2020  2:00 PM    This note used Dragon technology.Transcription errors are not uncommon and may not have been corrected prior to electronically  signing the note. Should you find these errors, please consult the clinician for interpretation (or apply common sense adjustment when safe and appropriate).              Oriented - self; Oriented - place; Oriented - time

## 2022-11-20 NOTE — ED PROVIDER NOTE - PHYSICAL EXAMINATION
Constitutional: NAD AAOx3  Eyes: PERRLA EOMI mild swelling to orbital region, no proptosis no redness warmth, mild subconjunctival hemorrhage. no pain with eye movement.   Head: Normocephalic atraumatic  Mouth: MMM  Cardiac: regular rate   Resp: Lungs CTAB  GI: Abd s/nt/nd  Neuro: CN2-12 intact  Skin: No visible rashes Constitutional: NAD AAOx3  Eyes: PERRLA EOMI mild swelling to orbital region, no proptosis no redness warmth, mild subconjunctival hemorrhage. no pain with eye movement.   ent no tongue or uvular swelling no lip swelling no facial swelling  Head: Normocephalic atraumatic  Mouth: MMM  Cardiac: regular rate   Resp: Lungs CTAB  GI: Abd s/nt/nd  Neuro: CN2-12 intact  Skin: No visible rashes

## 2022-11-20 NOTE — ED PROVIDER NOTE - NS ED ROS FT
Constitutional: No fever or chills + generalized weakness/fatigue  Eyes: No visual changes + swelling to left eye  HEENT: No throat pain  CV: No chest pain  Resp: No SOB no cough  GI: No abd pain, + nausea no vomiting  : No dysuria  MSK: No musculoskeletal pain  Skin: No rash  Neuro: + headache

## 2022-11-20 NOTE — ED PROVIDER NOTE - CARE PLAN
Principal Discharge DX:	2019 novel coronavirus disease (COVID-19)  Secondary Diagnosis:	Nausea  Secondary Diagnosis:	Weakness  Secondary Diagnosis:	Thrombocytopenia   1

## 2022-12-15 LAB
ALBUMIN SERPL ELPH-MCNC: 4.5 G/DL
ALP BLD-CCNC: 52 U/L
ALT SERPL-CCNC: 32 U/L
ANION GAP SERPL CALC-SCNC: 14 MMOL/L
AST SERPL-CCNC: 29 U/L
BASOPHILS # BLD AUTO: 0.06 K/UL
BASOPHILS NFR BLD AUTO: 1 %
BILIRUB SERPL-MCNC: 0.7 MG/DL
BUN SERPL-MCNC: 26 MG/DL
CALCIUM SERPL-MCNC: 9.8 MG/DL
CHLORIDE SERPL-SCNC: 101 MMOL/L
CO2 SERPL-SCNC: 25 MMOL/L
CREAT SERPL-MCNC: 0.74 MG/DL
EGFR: 79 ML/MIN/1.73M2
EOSINOPHIL # BLD AUTO: 0.03 K/UL
EOSINOPHIL NFR BLD AUTO: 0.5 %
GLUCOSE SERPL-MCNC: 101 MG/DL
HCT VFR BLD CALC: 45.4 %
HGB BLD-MCNC: 13.6 G/DL
IMM GRANULOCYTES NFR BLD AUTO: 0.3 %
LYMPHOCYTES # BLD AUTO: 0.94 K/UL
LYMPHOCYTES NFR BLD AUTO: 15.9 %
MAN DIFF?: NORMAL
MCHC RBC-ENTMCNC: 30 GM/DL
MCHC RBC-ENTMCNC: 30.6 PG
MCV RBC AUTO: 102 FL
MONOCYTES # BLD AUTO: 0.39 K/UL
MONOCYTES NFR BLD AUTO: 6.6 %
NEUTROPHILS # BLD AUTO: 4.46 K/UL
NEUTROPHILS NFR BLD AUTO: 75.7 %
PLATELET # BLD AUTO: 137 K/UL
POTASSIUM SERPL-SCNC: 4.4 MMOL/L
PROT SERPL-MCNC: 6.7 G/DL
RBC # BLD: 4.45 M/UL
RBC # FLD: 14.6 %
SODIUM SERPL-SCNC: 140 MMOL/L
WBC # FLD AUTO: 5.9 K/UL

## 2023-01-18 ENCOUNTER — OFFICE (OUTPATIENT)
Dept: URBAN - METROPOLITAN AREA CLINIC 94 | Facility: CLINIC | Age: 88
Setting detail: OPHTHALMOLOGY
End: 2023-01-18
Payer: MEDICARE

## 2023-01-18 DIAGNOSIS — H35.379: ICD-10-CM

## 2023-01-18 DIAGNOSIS — H04.123: ICD-10-CM

## 2023-01-18 DIAGNOSIS — H35.3131: ICD-10-CM

## 2023-01-18 DIAGNOSIS — H35.033: ICD-10-CM

## 2023-01-18 PROCEDURE — 92014 COMPRE OPH EXAM EST PT 1/>: CPT | Performed by: OPHTHALMOLOGY

## 2023-01-18 PROCEDURE — 92134 CPTRZ OPH DX IMG PST SGM RTA: CPT | Performed by: OPHTHALMOLOGY

## 2023-01-18 PROCEDURE — 92242 FLUORESCEIN&ICG ANGIOGRAPHY: CPT | Performed by: OPHTHALMOLOGY

## 2023-01-18 ASSESSMENT — REFRACTION_CURRENTRX
OS_OVR_VA: 20/
OS_SPHERE: +1.25
OD_OVR_VA: 20/
OD_SPHERE: +1.25

## 2023-01-18 ASSESSMENT — REFRACTION_AUTOREFRACTION
OD_SPHERE: +0.50
OD_CYLINDER: -1.25
OS_CYLINDER: -1.50
OS_AXIS: 087
OS_SPHERE: +1.00
OD_AXIS: 105

## 2023-01-18 ASSESSMENT — TONOMETRY
OS_IOP_MMHG: 21
OD_IOP_MMHG: 20
OD_IOP_MMHG: 21

## 2023-01-18 ASSESSMENT — VISUAL ACUITY
OD_BCVA: 20/20
OS_BCVA: 20/20-1

## 2023-01-18 ASSESSMENT — KERATOMETRY
OD_K2POWER_DIOPTERS: 43.50
METHOD_AUTO_MANUAL: AUTO
OS_K2POWER_DIOPTERS: 43.75
OS_K1POWER_DIOPTERS: 42.75
OS_AXISANGLE_DEGREES: 179
OD_K1POWER_DIOPTERS: 42.50
OD_AXISANGLE_DEGREES: 018

## 2023-01-18 ASSESSMENT — AXIALLENGTH_DERIVED
OD_AL: 23.8266
OS_AL: 23.5863

## 2023-01-18 ASSESSMENT — CONFRONTATIONAL VISUAL FIELD TEST (CVF)
OS_FINDINGS: FULL
OD_FINDINGS: FULL

## 2023-01-18 ASSESSMENT — SPHEQUIV_DERIVED
OS_SPHEQUIV: 0.25
OD_SPHEQUIV: -0.125

## 2023-01-18 ASSESSMENT — LID EXAM ASSESSMENTS
OS_MEIBOMITIS: T
OD_MEIBOMITIS: T

## 2023-02-08 ENCOUNTER — APPOINTMENT (OUTPATIENT)
Dept: SURGICAL ONCOLOGY | Facility: CLINIC | Age: 88
End: 2023-02-08
Payer: MEDICARE

## 2023-02-08 VITALS
WEIGHT: 147 LBS | DIASTOLIC BLOOD PRESSURE: 92 MMHG | BODY MASS INDEX: 25.1 KG/M2 | HEIGHT: 64 IN | RESPIRATION RATE: 16 BRPM | SYSTOLIC BLOOD PRESSURE: 154 MMHG | TEMPERATURE: 97.6 F | HEART RATE: 64 BPM | OXYGEN SATURATION: 99 %

## 2023-02-08 PROCEDURE — 99214 OFFICE O/P EST MOD 30 MIN: CPT

## 2023-02-08 NOTE — PHYSICAL EXAM
[Normal] : supple, no neck mass and thyroid not enlarged [Normal] : oriented to person, place and time, with appropriate affect [de-identified] : No inguinal adenopathy [de-identified] : 1/2+ left lower extremity edema [de-identified] : Left heel skin graft well-healed.  No evidence of recurrence. Multiple varicose veins LLE w/ 1/2+ edema. No suspicious skin lesions.

## 2023-02-08 NOTE — HISTORY OF PRESENT ILLNESS
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 \par She completed immunotherapy Nivolumab July 2020 as per Dr. Kimbrough. \par She follows up with her dermatologist, Dr. Olson and reports no new biopsies. \par She continues to wear compression stockings for left lower extremity edema.  \par \par Recent LDH 8/19/22: 204 (wnl)\par \par Follow up LDH 3/2/22: 223 (wnl)\par LDH 2/2/22: 266 (elevated)\par \par PET/CT 9/14/21- negative findings\par Patient was referred to physical therapy for LLE lymphedema. \par \par Left groin ultrasound on 2/8/20 showed no evidence of lymphadenopathy. \par Pt notes she is s/p left groin hernia repair with mesh.\par \par She is s/p I&D of left groin infected seroma (6/26/19).\par Culture: Rare Enterobacter cloaecae\par \par PET/CT 6/15/19: 1. Postsurgical changes left heel with multiple surgical clips and high attenuation material. Residual FDG avidity along the surgical defect may represent postsurgical change/inflammation, residual disease, or combination of these entities. \par 2. Postsurgical collection left inguinal region. No FDG avid locoregional or distant metastatic disease.\par \par She subsequently underwent wide excision left superior heel melanoma with left inguinal sentinel node biopsy on 5/28/19.\par Pathology:\par 1. Left inguinal sentinel lymph nodes, biopsy. Two out of four lymph nodes positive for melanoma (2/4). \par 2. Skin and soft tissue, left heel foot, wide resection. Melanoma 3.5mm in thickness. Margins negative (1mm from the closest deep margin). [id number]\par \par She is s/p punch biopsy of two sites on her left heel on 5/1/19 performed by Navneet Branch PA-C.\par Pathology:\par 1. 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 pT4B\par 2. Left heel inferior: malignant melanoma, 4.2 mm thick, with ulcerations. pT4B  \par \par She notes a fractured nose requiring stitches by Dr. Raimrez of [place] in September 2019.\par Pt has a pacemaker and is currently on Warfarin for Afib as per Dr. Ronald Shepard of cardiology. \par She is now taking Eliquis.\par Covid vaccinated.

## 2023-02-08 NOTE — ADDENDUM
[FreeTextEntry1] : I, Jena Branch, acted solely as a scribe for Dr. Perez Russo on this date 02/08/2023.\par \par

## 2023-02-16 ENCOUNTER — OUTPATIENT (OUTPATIENT)
Dept: OUTPATIENT SERVICES | Facility: HOSPITAL | Age: 88
LOS: 1 days | Discharge: HOME | End: 2023-02-16

## 2023-02-16 DIAGNOSIS — Z98.890 OTHER SPECIFIED POSTPROCEDURAL STATES: Chronic | ICD-10-CM

## 2023-02-16 DIAGNOSIS — Z98.49 CATARACT EXTRACTION STATUS, UNSPECIFIED EYE: Chronic | ICD-10-CM

## 2023-02-16 DIAGNOSIS — C43.72 MALIGNANT MELANOMA OF LEFT LOWER LIMB, INCLUDING HIP: ICD-10-CM

## 2023-02-16 DIAGNOSIS — Z90.89 ACQUIRED ABSENCE OF OTHER ORGANS: Chronic | ICD-10-CM

## 2023-02-26 PROBLEM — E27.49 IATROGENIC ADRENAL INSUFFICIENCY: Status: ACTIVE | Noted: 2020-04-27

## 2023-02-27 ENCOUNTER — APPOINTMENT (OUTPATIENT)
Age: 88
End: 2023-02-27
Payer: MEDICARE

## 2023-02-27 VITALS
HEART RATE: 91 BPM | DIASTOLIC BLOOD PRESSURE: 89 MMHG | SYSTOLIC BLOOD PRESSURE: 150 MMHG | HEIGHT: 63.98 IN | WEIGHT: 152.12 LBS | BODY MASS INDEX: 25.97 KG/M2 | RESPIRATION RATE: 16 BRPM | TEMPERATURE: 98 F | OXYGEN SATURATION: 96 %

## 2023-02-27 DIAGNOSIS — E27.49 OTHER ADRENOCORTICAL INSUFFICIENCY: ICD-10-CM

## 2023-02-27 PROCEDURE — 99214 OFFICE O/P EST MOD 30 MIN: CPT

## 2023-02-27 NOTE — PHYSICAL EXAM
[Restricted in physically strenuous activity but ambulatory and able to carry out work of a light or sedentary nature] : Status 1- Restricted in physically strenuous activity but ambulatory and able to carry out work of a light or sedentary nature, e.g., light house work, office work [Normal] : normal appearance, no rash, nodules, vesicles, ulcers, erythema [de-identified] : s/p left inguinal hernia repair [de-identified] : left heel wound  healed, hypopigmented skin; left sole cellulitis resolved

## 2023-02-27 NOTE — ASSESSMENT
[FreeTextEntry1] : RTC in 6-9 month;  Ms. HULL was advised to contact the office should she have any additional questions or concerns in the interim.\par \par \par \par \par

## 2023-02-27 NOTE — HISTORY OF PRESENT ILLNESS
[Disease: _____________________] : Disease: [unfilled] [T: ___] : T[unfilled] [N: ___] : N[unfilled] [M: ___] : M[unfilled] [AJCC Stage: ____] : AJCC Stage: [unfilled] [de-identified] : 87 F with HTN, PPM, atrial fibrillation ( Dr. Elam- [facility]), on Eliquis since July 019, diagnosed with malignant melanoma of left heel in May 2019.\par \par CASE SYNOPSIS:\par May 2019- patient notices a bleeding left heel cutaneous lesion; \par \par 5/1/19- punch biopsy performed by a dermatologist ([facility]) ; pathology:\par - left heel superior: malignant melanoma, 4.2 mm thick with ulcerations, pT4b.\par -left heel inferior: malignant melanoma, 4.2 mm thick, with ulceration, pT4b.\par \par 5/28/19- left heel wide resection and sentinel lymph node biopsy (); pathology:\par -Left inguinal lymph node biopsy: 2/4 lymph nodes positive for melanoma\par -Skin and soft tissue, left heel foot, consistent with melanoma, 3.5 mm thickness, margins negative, pT4b, pN 2a= stage III C\par \par 6/15/19: PET- residual FDG avidity along the surgical defect consistent with postsurgical changes, or residual disease, or combination of the two. No FDG avid local regional or distant metastatic disease. \par \par 7/15/19- started Nivolumab under the care of Dr. Mcbride at [facility].\par \par 8/12/19- cycle # 2 Nivolumab also at [facility].\par \par 10/15/19: Left lower extremity ultrasound-benign appearing left inguinal lymph nodes, containing fatty hilum, measuring 0.9 x 0.8 x 1.3 cm, 1.2 x 0.7 x 0.9 cm, and one 0.5 x 0.8 x 0.9 cm. Additional fluid collection representing seroma present in the left groin.\par \par 1/27- 1/31/20 patient admitted at [facility] with SBO secondary to left inguinal hernia; diagnostic laparoscopy with reduction of incarcerated bowel in the left inguinal hernia/open repair performed 1/27/20.\par \par 2/5/20- endocrine consult for possible drug- induced adrenal insufficiency.\par \par 2/8/20- Ultrasound left lower extremity- no left groin lymphadenopathy. Probable left groin postoperative collections/seromas.\par \par 6/14/2020–exploratory laparotomy, left femoral hernia repair, small bowel resection (due to incarcerated hernia).\par \par 7/15/19 - 7/22/20: completed 1 year maintenance Nivolumab.\par \par September 2020–admitted with bilateral pneumonia; tested negative for COVID.\par \par 8/7/21- LDH : 209\par \par 9/14/2021: PET scan–postsurgical changes left heel with multiple surgical clips and high attenuation material.  Residual FDG avidity along the surgical defect consistent with postsurgical changes/inflammation, and postsurgical collection left inguinal region.  No FDG avid local regional or distant metastatic disease.\par \par 2/2/22: LDH :266\par \par 8/19/22: \par \par 9/21/22: Molecular diagnostic tests of original malignant melanoma biopsy from 5/1/2019–no reportable mutations identified in BRAF gene.\par \par 9/28/2022: PET– interval resolution of mild linear FDG avidity in distal small bowel.  New focal mild FDG avidity in the sigmoid colon with no definite abnormality on CT.  No evidence of recurrent or FDG avid disease. [de-identified] : acral lentiginous malignant melanoma [FreeTextEntry1] : Nivolumab- flat dose monthly [de-identified] : Here for oncologic follow-up.  Since her last visit in August 2022, request for molecular study of original malignant melanoma biopsy from May 2019 was completed, showing no evidence of pathogenic, targetable alteration in BRAF, KRAS, NRAS, GNAQ, GNA11, MEK,etc. restaging PET from 9/28/2022 showed no evidence of recurrent or FDG avid disease, with interval resolution of mild linear FDG avidity in distal small bowel.  Also seen, new focal mild FDG avidity in the sigmoid colon with no definite abnormality on CT.  No evidence of recurrent or FDG avid disease.  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 Grace.\par Accompanied by her son, Philip.\par \par \par

## 2023-03-07 ENCOUNTER — APPOINTMENT (OUTPATIENT)
Dept: ENDOCRINOLOGY | Facility: CLINIC | Age: 88
End: 2023-03-07
Payer: MEDICARE

## 2023-03-07 VITALS
SYSTOLIC BLOOD PRESSURE: 135 MMHG | DIASTOLIC BLOOD PRESSURE: 80 MMHG | OXYGEN SATURATION: 95 % | HEART RATE: 78 BPM | HEIGHT: 63 IN | TEMPERATURE: 98 F

## 2023-03-07 PROCEDURE — 36415 COLL VENOUS BLD VENIPUNCTURE: CPT

## 2023-03-07 PROCEDURE — 99214 OFFICE O/P EST MOD 30 MIN: CPT | Mod: 25

## 2023-03-08 LAB
25(OH)D3 SERPL-MCNC: 59.3 NG/ML
ALBUMIN SERPL ELPH-MCNC: 4.4 G/DL
ALP BLD-CCNC: 53 U/L
ALT SERPL-CCNC: 27 U/L
ANION GAP SERPL CALC-SCNC: 13 MMOL/L
AST SERPL-CCNC: 24 U/L
BASOPHILS # BLD AUTO: 0.05 K/UL
BASOPHILS NFR BLD AUTO: 0.8 %
BILIRUB SERPL-MCNC: 0.7 MG/DL
BUN SERPL-MCNC: 26 MG/DL
CALCIUM SERPL-MCNC: 10 MG/DL
CHLORIDE SERPL-SCNC: 103 MMOL/L
CHOLEST SERPL-MCNC: 207 MG/DL
CO2 SERPL-SCNC: 27 MMOL/L
CORTIS SERPL-MCNC: 1.6 UG/DL
CREAT SERPL-MCNC: 0.78 MG/DL
EGFR: 74 ML/MIN/1.73M2
EOSINOPHIL # BLD AUTO: 0.04 K/UL
EOSINOPHIL NFR BLD AUTO: 0.6 %
GLUCOSE SERPL-MCNC: 96 MG/DL
HCT VFR BLD CALC: 46.4 %
HDLC SERPL-MCNC: 109 MG/DL
HGB BLD-MCNC: 14 G/DL
IMM GRANULOCYTES NFR BLD AUTO: 0.5 %
LDLC SERPL DIRECT ASSAY-MCNC: 79 MG/DL
LYMPHOCYTES # BLD AUTO: 0.78 K/UL
LYMPHOCYTES NFR BLD AUTO: 12.1 %
MAN DIFF?: NORMAL
MCHC RBC-ENTMCNC: 30.1 PG
MCHC RBC-ENTMCNC: 30.2 GM/DL
MCV RBC AUTO: 99.8 FL
MONOCYTES # BLD AUTO: 0.47 K/UL
MONOCYTES NFR BLD AUTO: 7.3 %
NEUTROPHILS # BLD AUTO: 5.09 K/UL
NEUTROPHILS NFR BLD AUTO: 78.7 %
PLATELET # BLD AUTO: 152 K/UL
POTASSIUM SERPL-SCNC: 4.7 MMOL/L
PROT SERPL-MCNC: 6.6 G/DL
RBC # BLD: 4.65 M/UL
RBC # FLD: 14.6 %
SODIUM SERPL-SCNC: 143 MMOL/L
T3FREE SERPL-MCNC: 2.36 PG/ML
T4 FREE SERPL-MCNC: 1.5 NG/DL
TRIGL SERPL-MCNC: 71 MG/DL
TSH SERPL-ACNC: 1.4 UIU/ML
VIT B12 SERPL-MCNC: 587 PG/ML
WBC # FLD AUTO: 6.46 K/UL

## 2023-03-12 NOTE — HISTORY OF PRESENT ILLNESS
[FreeTextEntry1] : Ms. HULL is an 87 year old female who returns today for endocrine reevaluation. \par Patient returns with regard to a history of adrenal insufficiency felt to be brought on by her immunotherapy with regard to her hx of malignant melanoma. Likely secondary to hypophysitis. \par \par She is currently taking Prednisone 5 mg AM + 2 mg PM\par She too has hx of ex lap with small bowel repair last June 2020 Has finished her immunotherapy tx's since July 2021 \par \par She had a monitor, EKG and PET scan return normal. Hx of PVC, afib and pacemaker.\par Taking Metoprolol 125 mg bid. Recently started on Amlodipine 5 mg has per Dr. Lyle. \par \par Denies c/p, sob, dizziness, lightheadedness nausea or vomiting.\par \par Continues on Eliquis, Lasix, Metoprolol and Simvastatin.\par Still with a fib\par \par PMD sees MIRIAM Loo at Dr. Calvin office in [place] [phone]. \par Hematologist: Dr. Pastrana.\par \par Cardiologist Dr. Shepard in [place] \par \par COVID infection in November 2022. Received  monoclonal antibodies.

## 2023-03-29 NOTE — H&P PST ADULT - NS PRO TALK SOMEONE YN
Please send recent CT chest and CT of neck to Dr. Sellers at  please. Call his office and talk to the nurse and send results. He can call me if any questions.    no

## 2023-05-30 ENCOUNTER — OFFICE (OUTPATIENT)
Dept: URBAN - METROPOLITAN AREA CLINIC 112 | Facility: CLINIC | Age: 88
Setting detail: OPHTHALMOLOGY
End: 2023-05-30
Payer: MEDICARE

## 2023-05-30 DIAGNOSIS — H00.11: ICD-10-CM

## 2023-05-30 DIAGNOSIS — L25.9: ICD-10-CM

## 2023-05-30 DIAGNOSIS — H04.123: ICD-10-CM

## 2023-05-30 DIAGNOSIS — H50.012: ICD-10-CM

## 2023-05-30 PROCEDURE — 99213 OFFICE O/P EST LOW 20 MIN: CPT | Performed by: OPHTHALMOLOGY

## 2023-05-30 ASSESSMENT — AXIALLENGTH_DERIVED
OD_AL: 23.8735
OS_AL: 23.6841

## 2023-05-30 ASSESSMENT — LID EXAM ASSESSMENTS
OS_MEIBOMITIS: T
OD_MEIBOMITIS: T

## 2023-05-30 ASSESSMENT — REFRACTION_AUTOREFRACTION
OD_SPHERE: +0.50
OS_CYLINDER: -1.00
OD_AXIS: 095
OD_CYLINDER: -1.25
OS_SPHERE: +0.50
OS_AXIS: 092

## 2023-05-30 ASSESSMENT — CONFRONTATIONAL VISUAL FIELD TEST (CVF)
OS_FINDINGS: FULL
OD_FINDINGS: FULL

## 2023-05-30 ASSESSMENT — KERATOMETRY
OD_AXISANGLE_DEGREES: 015
OD_K2POWER_DIOPTERS: 43.50
OD_K1POWER_DIOPTERS: 42.25
METHOD_AUTO_MANUAL: AUTO
OS_K2POWER_DIOPTERS: 43.50
OS_K1POWER_DIOPTERS: 43.00
OS_AXISANGLE_DEGREES: 172

## 2023-05-30 ASSESSMENT — REFRACTION_CURRENTRX
OS_SPHERE: +1.25
OD_SPHERE: +1.25
OS_OVR_VA: 20/
OD_OVR_VA: 20/

## 2023-05-30 ASSESSMENT — SPHEQUIV_DERIVED
OS_SPHEQUIV: 0
OD_SPHEQUIV: -0.125

## 2023-05-30 ASSESSMENT — VISUAL ACUITY
OD_BCVA: 20/20
OS_BCVA: 20/20-1

## 2023-06-01 ENCOUNTER — OFFICE (OUTPATIENT)
Dept: URBAN - METROPOLITAN AREA CLINIC 94 | Facility: CLINIC | Age: 88
Setting detail: OPHTHALMOLOGY
End: 2023-06-01
Payer: MEDICARE

## 2023-06-01 ENCOUNTER — RX ONLY (RX ONLY)
Age: 88
End: 2023-06-01

## 2023-06-01 DIAGNOSIS — L25.9: ICD-10-CM

## 2023-06-01 DIAGNOSIS — H50.012: ICD-10-CM

## 2023-06-01 DIAGNOSIS — H00.11: ICD-10-CM

## 2023-06-01 DIAGNOSIS — H04.123: ICD-10-CM

## 2023-06-01 PROCEDURE — 99213 OFFICE O/P EST LOW 20 MIN: CPT | Performed by: OPHTHALMOLOGY

## 2023-06-01 ASSESSMENT — CONFRONTATIONAL VISUAL FIELD TEST (CVF)
OD_FINDINGS: FULL
OS_FINDINGS: FULL

## 2023-06-01 ASSESSMENT — REFRACTION_CURRENTRX
OS_SPHERE: +1.25
OD_SPHERE: +1.25
OS_OVR_VA: 20/
OD_OVR_VA: 20/

## 2023-06-01 ASSESSMENT — KERATOMETRY
OD_K1POWER_DIOPTERS: 42.25
OS_AXISANGLE_DEGREES: 172
OD_AXISANGLE_DEGREES: 015
METHOD_AUTO_MANUAL: AUTO
OD_K2POWER_DIOPTERS: 43.50
OS_K2POWER_DIOPTERS: 43.50
OS_K1POWER_DIOPTERS: 43.00

## 2023-06-01 ASSESSMENT — REFRACTION_AUTOREFRACTION
OD_AXIS: 095
OS_SPHERE: +0.50
OD_CYLINDER: -1.25
OD_SPHERE: +0.50
OS_CYLINDER: -1.00
OS_AXIS: 092

## 2023-06-01 ASSESSMENT — AXIALLENGTH_DERIVED
OD_AL: 23.8735
OS_AL: 23.6841

## 2023-06-01 ASSESSMENT — SPHEQUIV_DERIVED
OD_SPHEQUIV: -0.125
OS_SPHEQUIV: 0

## 2023-06-01 ASSESSMENT — LID EXAM ASSESSMENTS
OS_MEIBOMITIS: T
OD_MEIBOMITIS: T

## 2023-06-01 ASSESSMENT — VISUAL ACUITY
OD_BCVA: 20/20-
OS_BCVA: 20/20-

## 2023-06-09 ENCOUNTER — OFFICE (OUTPATIENT)
Dept: URBAN - METROPOLITAN AREA CLINIC 38 | Facility: CLINIC | Age: 88
Setting detail: OPHTHALMOLOGY
End: 2023-06-09
Payer: MEDICARE

## 2023-06-09 DIAGNOSIS — H00.11: ICD-10-CM

## 2023-06-09 DIAGNOSIS — H01.005: ICD-10-CM

## 2023-06-09 DIAGNOSIS — H01.004: ICD-10-CM

## 2023-06-09 DIAGNOSIS — H01.002: ICD-10-CM

## 2023-06-09 DIAGNOSIS — H01.001: ICD-10-CM

## 2023-06-09 PROCEDURE — 99213 OFFICE O/P EST LOW 20 MIN: CPT | Performed by: OPHTHALMOLOGY

## 2023-06-09 ASSESSMENT — CONFRONTATIONAL VISUAL FIELD TEST (CVF)
OD_FINDINGS: FULL
OS_FINDINGS: FULL

## 2023-06-09 ASSESSMENT — REFRACTION_CURRENTRX
OS_SPHERE: +1.25
OD_SPHERE: +1.25
OS_OVR_VA: 20/
OD_OVR_VA: 20/

## 2023-06-09 ASSESSMENT — LID EXAM ASSESSMENTS
OS_BLEPHARITIS: LLL LUL 2+
OD_MEIBOMITIS: T
OD_BLEPHARITIS: RLL RUL 2+
OS_MEIBOMITIS: T

## 2023-06-09 ASSESSMENT — KERATOMETRY
OS_AXISANGLE_DEGREES: 172
OS_K2POWER_DIOPTERS: 43.50
OD_AXISANGLE_DEGREES: 015
OD_K1POWER_DIOPTERS: 42.25
OS_K1POWER_DIOPTERS: 43.00
METHOD_AUTO_MANUAL: AUTO
OD_K2POWER_DIOPTERS: 43.50

## 2023-06-09 ASSESSMENT — REFRACTION_AUTOREFRACTION
OS_SPHERE: +0.50
OD_AXIS: 095
OS_AXIS: 092
OD_SPHERE: +0.50
OS_CYLINDER: -1.00
OD_CYLINDER: -1.25

## 2023-06-09 ASSESSMENT — SPHEQUIV_DERIVED
OS_SPHEQUIV: 0
OD_SPHEQUIV: -0.125

## 2023-06-09 ASSESSMENT — VISUAL ACUITY
OD_BCVA: 20/25
OS_BCVA: 20/25-2

## 2023-06-09 ASSESSMENT — AXIALLENGTH_DERIVED
OD_AL: 23.8735
OS_AL: 23.6841

## 2023-06-16 NOTE — H&P PST ADULT - ENMT
details… Cheek Interpolation Flap Text: Due to geometric and functional constraints, a flap reconstruction was performed to reconstruct the defect. To that end, adjacent tissue was incised and carried over to close the defect in the following manner: A decision was made to reconstruct the defect utilizing an interpolation axial flap and a staged reconstruction.  A telfa template was made of the defect.  This telfa template was then used to outline the Cheek Interpolation flap.  The donor area for the pedicle flap was then injected with anesthesia.  The flap was excised through the skin and subcutaneous tissue down to the layer of the underlying musculature.  The interpolation flap was carefully excised within this deep plane to maintain its blood supply.  The edges of the donor site were undermined.   The donor site was closed in a primary fashion.  The pedicle was then rotated into position and sutured.  Once the tube was sutured into place, adequate blood supply was confirmed with blanching and refill.  The pedicle was then wrapped with xeroform gauze and dressed appropriately with a telfa and gauze bandage to ensure continued blood supply and protect the attached pedicle.

## 2023-06-20 ENCOUNTER — NON-APPOINTMENT (OUTPATIENT)
Age: 88
End: 2023-06-20

## 2023-06-25 RX ORDER — NITROFURANTOIN MACROCRYSTAL 50 MG
1 CAPSULE ORAL
Refills: 0 | DISCHARGE
Start: 2023-06-25 | End: 2023-06-29

## 2023-06-26 ENCOUNTER — INPATIENT (INPATIENT)
Facility: HOSPITAL | Age: 88
LOS: 1 days | Discharge: HOME | DRG: 392 | End: 2023-06-28
Attending: HOSPITALIST | Admitting: INTERNAL MEDICINE
Payer: MEDICARE

## 2023-06-26 VITALS
TEMPERATURE: 101 F | SYSTOLIC BLOOD PRESSURE: 120 MMHG | DIASTOLIC BLOOD PRESSURE: 71 MMHG | RESPIRATION RATE: 17 BRPM | HEART RATE: 70 BPM | OXYGEN SATURATION: 95 %

## 2023-06-26 DIAGNOSIS — Z90.89 ACQUIRED ABSENCE OF OTHER ORGANS: Chronic | ICD-10-CM

## 2023-06-26 DIAGNOSIS — Z98.49 CATARACT EXTRACTION STATUS, UNSPECIFIED EYE: Chronic | ICD-10-CM

## 2023-06-26 DIAGNOSIS — Z98.890 OTHER SPECIFIED POSTPROCEDURAL STATES: Chronic | ICD-10-CM

## 2023-06-26 LAB
ALBUMIN SERPL ELPH-MCNC: 3.9 G/DL — SIGNIFICANT CHANGE UP (ref 3.3–5)
ALP SERPL-CCNC: 42 U/L — SIGNIFICANT CHANGE UP (ref 40–120)
ALT FLD-CCNC: 34 U/L — SIGNIFICANT CHANGE UP (ref 12–78)
ANION GAP SERPL CALC-SCNC: 7 MMOL/L — SIGNIFICANT CHANGE UP (ref 5–17)
AST SERPL-CCNC: 18 U/L — SIGNIFICANT CHANGE UP (ref 15–37)
BASOPHILS # BLD AUTO: 0.01 K/UL — SIGNIFICANT CHANGE UP (ref 0–0.2)
BASOPHILS NFR BLD AUTO: 0.1 % — SIGNIFICANT CHANGE UP (ref 0–2)
BILIRUB SERPL-MCNC: 0.8 MG/DL — SIGNIFICANT CHANGE UP (ref 0.2–1.2)
BLD GP AB SCN SERPL QL: SIGNIFICANT CHANGE UP
BUN SERPL-MCNC: 26 MG/DL — HIGH (ref 7–23)
CALCIUM SERPL-MCNC: 9.1 MG/DL — SIGNIFICANT CHANGE UP (ref 8.5–10.1)
CHLORIDE SERPL-SCNC: 106 MMOL/L — SIGNIFICANT CHANGE UP (ref 96–108)
CO2 SERPL-SCNC: 27 MMOL/L — SIGNIFICANT CHANGE UP (ref 22–31)
CREAT SERPL-MCNC: 0.9 MG/DL — SIGNIFICANT CHANGE UP (ref 0.5–1.3)
EGFR: 62 ML/MIN/1.73M2 — SIGNIFICANT CHANGE UP
EOSINOPHIL # BLD AUTO: 0.03 K/UL — SIGNIFICANT CHANGE UP (ref 0–0.5)
EOSINOPHIL NFR BLD AUTO: 0.4 % — SIGNIFICANT CHANGE UP (ref 0–6)
GLUCOSE SERPL-MCNC: 130 MG/DL — HIGH (ref 70–99)
HCT VFR BLD CALC: 44.4 % — SIGNIFICANT CHANGE UP (ref 34.5–45)
HGB BLD-MCNC: 14.4 G/DL — SIGNIFICANT CHANGE UP (ref 11.5–15.5)
IMM GRANULOCYTES NFR BLD AUTO: 0.6 % — SIGNIFICANT CHANGE UP (ref 0–0.9)
LACTATE SERPL-SCNC: 2.5 MMOL/L — HIGH (ref 0.7–2)
LIDOCAIN IGE QN: 75 U/L — SIGNIFICANT CHANGE UP (ref 73–393)
LYMPHOCYTES # BLD AUTO: 0.24 K/UL — LOW (ref 1–3.3)
LYMPHOCYTES # BLD AUTO: 2.8 % — LOW (ref 13–44)
MANUAL SMEAR VERIFICATION: SIGNIFICANT CHANGE UP
MCHC RBC-ENTMCNC: 31.2 PG — SIGNIFICANT CHANGE UP (ref 27–34)
MCHC RBC-ENTMCNC: 32.4 GM/DL — SIGNIFICANT CHANGE UP (ref 32–36)
MCV RBC AUTO: 96.1 FL — SIGNIFICANT CHANGE UP (ref 80–100)
MONOCYTES # BLD AUTO: 0.34 K/UL — SIGNIFICANT CHANGE UP (ref 0–0.9)
MONOCYTES NFR BLD AUTO: 4 % — SIGNIFICANT CHANGE UP (ref 2–14)
NEUTROPHILS # BLD AUTO: 7.76 K/UL — HIGH (ref 1.8–7.4)
NEUTROPHILS NFR BLD AUTO: 92.1 % — HIGH (ref 43–77)
PLAT MORPH BLD: NORMAL — SIGNIFICANT CHANGE UP
PLATELET # BLD AUTO: 103 K/UL — LOW (ref 150–400)
PLATELET COUNT - ESTIMATE: ABNORMAL
POTASSIUM SERPL-MCNC: 4.1 MMOL/L — SIGNIFICANT CHANGE UP (ref 3.5–5.3)
POTASSIUM SERPL-SCNC: 4.1 MMOL/L — SIGNIFICANT CHANGE UP (ref 3.5–5.3)
PROT SERPL-MCNC: 7 GM/DL — SIGNIFICANT CHANGE UP (ref 6–8.3)
RBC # BLD: 4.62 M/UL — SIGNIFICANT CHANGE UP (ref 3.8–5.2)
RBC # FLD: 14.4 % — SIGNIFICANT CHANGE UP (ref 10.3–14.5)
RBC BLD AUTO: NORMAL — SIGNIFICANT CHANGE UP
SODIUM SERPL-SCNC: 140 MMOL/L — SIGNIFICANT CHANGE UP (ref 135–145)
TROPONIN I, HIGH SENSITIVITY RESULT: 13.32 NG/L — SIGNIFICANT CHANGE UP
WBC # BLD: 8.43 K/UL — SIGNIFICANT CHANGE UP (ref 3.8–10.5)
WBC # FLD AUTO: 8.43 K/UL — SIGNIFICANT CHANGE UP (ref 3.8–10.5)

## 2023-06-26 PROCEDURE — 93010 ELECTROCARDIOGRAM REPORT: CPT

## 2023-06-26 PROCEDURE — 74177 CT ABD & PELVIS W/CONTRAST: CPT | Mod: 26,MA

## 2023-06-26 PROCEDURE — 99285 EMERGENCY DEPT VISIT HI MDM: CPT

## 2023-06-26 PROCEDURE — 71045 X-RAY EXAM CHEST 1 VIEW: CPT | Mod: 26

## 2023-06-26 RX ORDER — SODIUM CHLORIDE 9 MG/ML
1000 INJECTION INTRAMUSCULAR; INTRAVENOUS; SUBCUTANEOUS ONCE
Refills: 0 | Status: COMPLETED | OUTPATIENT
Start: 2023-06-26 | End: 2023-06-26

## 2023-06-26 RX ORDER — HYDROCORTISONE 20 MG
100 TABLET ORAL ONCE
Refills: 0 | Status: COMPLETED | OUTPATIENT
Start: 2023-06-26 | End: 2023-06-26

## 2023-06-26 RX ORDER — CEFTRIAXONE 500 MG/1
1000 INJECTION, POWDER, FOR SOLUTION INTRAMUSCULAR; INTRAVENOUS ONCE
Refills: 0 | Status: COMPLETED | OUTPATIENT
Start: 2023-06-26 | End: 2023-06-26

## 2023-06-26 RX ORDER — ACETAMINOPHEN 500 MG
1000 TABLET ORAL ONCE
Refills: 0 | Status: COMPLETED | OUTPATIENT
Start: 2023-06-26 | End: 2023-06-26

## 2023-06-26 RX ORDER — ONDANSETRON 8 MG/1
4 TABLET, FILM COATED ORAL ONCE
Refills: 0 | Status: COMPLETED | OUTPATIENT
Start: 2023-06-26 | End: 2023-06-26

## 2023-06-26 RX ADMIN — ONDANSETRON 4 MILLIGRAM(S): 8 TABLET, FILM COATED ORAL at 22:17

## 2023-06-26 RX ADMIN — SODIUM CHLORIDE 1000 MILLILITER(S): 9 INJECTION INTRAMUSCULAR; INTRAVENOUS; SUBCUTANEOUS at 23:20

## 2023-06-26 RX ADMIN — SODIUM CHLORIDE 1000 MILLILITER(S): 9 INJECTION INTRAMUSCULAR; INTRAVENOUS; SUBCUTANEOUS at 21:44

## 2023-06-26 RX ADMIN — Medication 400 MILLIGRAM(S): at 22:17

## 2023-06-26 RX ADMIN — Medication 100 MILLIGRAM(S): at 23:47

## 2023-06-26 NOTE — ED PROVIDER NOTE - OBJECTIVE STATEMENT
88 year old female with PMHx of Afib on Eliquis, GERD, HLD, HTN, hypoadrenalism on steroids, lymphedema, metastatic melanoma, pacemaker presents with acute onset of N/V. Patient had been bactrim tx for UTI diagnosed around Wednesday 6/21 and was changed to nitrofurantoin. This AM, patient slept, and developed N/V shortly after, threw up medications. Pt family at bedside endorses shallow breathing. Pt denies rashes, denies abd pain.

## 2023-06-26 NOTE — ED PROVIDER NOTE - HIV OFFER
Called to pt. Verified pt name and date of birth  Pt indicated pain in groin started yesterday. Denied skin in the area being swollen, red or warm to touch. Denied trouble urinating, or pain with urination. Denied injury to area. Pt requested to come in and be evaluated, appointment made. Previously Declined (within the last year)

## 2023-06-26 NOTE — ED PROVIDER NOTE - CLINICAL SUMMARY MEDICAL DECISION MAKING FREE TEXT BOX
0252: Herb WHITNEY: s/o received from Dr. Mederos pending UA results and admission for UTI. s/o given to Hospitalist, Dr. Gordon for Admission, family updated on plan.

## 2023-06-26 NOTE — ED STATDOCS - PROGRESS NOTE DETAILS
Ashia Wakefield for Dr. Jones:  88 year old female with PMHx of Afib on Eliquis, GERD, HLD, HTN, hypoadrenalism on steroids, lymphedema, metastatic melanoma, pacemaker presents with acute onset of N/V. Patient had been bactrim tx for UTI and was changed to nitrofurantoin. This AM, patient slept, woke up and developed N/V. Sclera appear yellow. Will send pt to main ED for further evaluation.

## 2023-06-26 NOTE — ED ADULT TRIAGE NOTE - CHIEF COMPLAINT QUOTE
bib daughter, c/o nausea, heart palpations. on abx for uti. pt states " I was diagnosed with a uti on abx for 3 days got a call to change my medication to macrobid last night. I became very nauseous and my heart started palpitating". denies sob/v/d/fever/chills. PMH: pacemaker/ afib/ hbp/ on eliquis. ekg in triage.

## 2023-06-27 DIAGNOSIS — N39.0 URINARY TRACT INFECTION, SITE NOT SPECIFIED: ICD-10-CM

## 2023-06-27 LAB
APPEARANCE UR: CLEAR — SIGNIFICANT CHANGE UP
BACTERIA # UR AUTO: ABNORMAL
BILIRUB UR-MCNC: NEGATIVE — SIGNIFICANT CHANGE UP
COLOR SPEC: YELLOW — SIGNIFICANT CHANGE UP
DIFF PNL FLD: ABNORMAL
EPI CELLS # UR: SIGNIFICANT CHANGE UP
GLUCOSE UR QL: NEGATIVE — SIGNIFICANT CHANGE UP
HYALINE CASTS # UR AUTO: NEGATIVE /LPF — SIGNIFICANT CHANGE UP
KETONES UR-MCNC: NEGATIVE — SIGNIFICANT CHANGE UP
LACTATE SERPL-SCNC: 1.2 MMOL/L — SIGNIFICANT CHANGE UP (ref 0.7–2)
LEUKOCYTE ESTERASE UR-ACNC: NEGATIVE — SIGNIFICANT CHANGE UP
NITRITE UR-MCNC: NEGATIVE — SIGNIFICANT CHANGE UP
NT-PROBNP SERPL-SCNC: 3257 PG/ML — HIGH (ref 0–450)
PH UR: 6 — SIGNIFICANT CHANGE UP (ref 5–8)
PROT UR-MCNC: NEGATIVE — SIGNIFICANT CHANGE UP
RBC CASTS # UR COMP ASSIST: SIGNIFICANT CHANGE UP /HPF (ref 0–4)
SP GR SPEC: 1 — LOW (ref 1.01–1.02)
TROPONIN I, HIGH SENSITIVITY RESULT: 18.62 NG/L — SIGNIFICANT CHANGE UP
UROBILINOGEN FLD QL: 1
WBC UR QL: SIGNIFICANT CHANGE UP /HPF (ref 0–5)

## 2023-06-27 PROCEDURE — 71250 CT THORAX DX C-: CPT | Mod: 26

## 2023-06-27 PROCEDURE — 99497 ADVNCD CARE PLAN 30 MIN: CPT | Mod: 25

## 2023-06-27 PROCEDURE — 80048 BASIC METABOLIC PNL TOTAL CA: CPT

## 2023-06-27 PROCEDURE — 71250 CT THORAX DX C-: CPT

## 2023-06-27 PROCEDURE — 84484 ASSAY OF TROPONIN QUANT: CPT

## 2023-06-27 PROCEDURE — 97116 GAIT TRAINING THERAPY: CPT | Mod: GP

## 2023-06-27 PROCEDURE — 99223 1ST HOSP IP/OBS HIGH 75: CPT

## 2023-06-27 PROCEDURE — 93306 TTE W/DOPPLER COMPLETE: CPT

## 2023-06-27 PROCEDURE — 85027 COMPLETE CBC AUTOMATED: CPT

## 2023-06-27 PROCEDURE — 83880 ASSAY OF NATRIURETIC PEPTIDE: CPT

## 2023-06-27 PROCEDURE — 83735 ASSAY OF MAGNESIUM: CPT

## 2023-06-27 PROCEDURE — 36415 COLL VENOUS BLD VENIPUNCTURE: CPT

## 2023-06-27 PROCEDURE — 84100 ASSAY OF PHOSPHORUS: CPT

## 2023-06-27 PROCEDURE — 97161 PT EVAL LOW COMPLEX 20 MIN: CPT | Mod: GP

## 2023-06-27 RX ORDER — ONDANSETRON 8 MG/1
4 TABLET, FILM COATED ORAL EVERY 8 HOURS
Refills: 0 | Status: DISCONTINUED | OUTPATIENT
Start: 2023-06-27 | End: 2023-06-28

## 2023-06-27 RX ORDER — ACETAMINOPHEN 500 MG
650 TABLET ORAL EVERY 6 HOURS
Refills: 0 | Status: DISCONTINUED | OUTPATIENT
Start: 2023-06-27 | End: 2023-06-28

## 2023-06-27 RX ORDER — AMLODIPINE BESYLATE 2.5 MG/1
1 TABLET ORAL
Refills: 0 | DISCHARGE

## 2023-06-27 RX ORDER — FUROSEMIDE 40 MG
20 TABLET ORAL DAILY
Refills: 0 | Status: DISCONTINUED | OUTPATIENT
Start: 2023-06-27 | End: 2023-06-28

## 2023-06-27 RX ORDER — METOPROLOL TARTRATE 50 MG
100 TABLET ORAL EVERY 12 HOURS
Refills: 0 | Status: DISCONTINUED | OUTPATIENT
Start: 2023-06-27 | End: 2023-06-28

## 2023-06-27 RX ORDER — SIMVASTATIN 20 MG/1
1 TABLET, FILM COATED ORAL
Qty: 0 | Refills: 0 | DISCHARGE

## 2023-06-27 RX ORDER — LANOLIN ALCOHOL/MO/W.PET/CERES
3 CREAM (GRAM) TOPICAL AT BEDTIME
Refills: 0 | Status: DISCONTINUED | OUTPATIENT
Start: 2023-06-27 | End: 2023-06-28

## 2023-06-27 RX ORDER — AMLODIPINE BESYLATE 2.5 MG/1
5 TABLET ORAL DAILY
Refills: 0 | Status: DISCONTINUED | OUTPATIENT
Start: 2023-06-27 | End: 2023-06-28

## 2023-06-27 RX ORDER — MULTIVIT-MIN/FERROUS GLUCONATE 9 MG/15 ML
1 LIQUID (ML) ORAL
Qty: 0 | Refills: 0 | DISCHARGE

## 2023-06-27 RX ORDER — MULTIVIT-MIN/FERROUS GLUCONATE 9 MG/15 ML
1 LIQUID (ML) ORAL DAILY
Refills: 0 | Status: DISCONTINUED | OUTPATIENT
Start: 2023-06-27 | End: 2023-06-28

## 2023-06-27 RX ORDER — SIMVASTATIN 20 MG/1
10 TABLET, FILM COATED ORAL AT BEDTIME
Refills: 0 | Status: DISCONTINUED | OUTPATIENT
Start: 2023-06-27 | End: 2023-06-28

## 2023-06-27 RX ORDER — APIXABAN 2.5 MG/1
5 TABLET, FILM COATED ORAL
Refills: 0 | Status: DISCONTINUED | OUTPATIENT
Start: 2023-06-27 | End: 2023-06-28

## 2023-06-27 RX ORDER — APIXABAN 2.5 MG/1
1 TABLET, FILM COATED ORAL
Qty: 0 | Refills: 0 | DISCHARGE

## 2023-06-27 RX ORDER — MULTIVIT-MIN/FERROUS GLUCONATE 9 MG/15 ML
1 LIQUID (ML) ORAL
Refills: 0 | DISCHARGE

## 2023-06-27 RX ADMIN — Medication 100 MILLIGRAM(S): at 13:19

## 2023-06-27 RX ADMIN — CEFTRIAXONE 1000 MILLIGRAM(S): 500 INJECTION, POWDER, FOR SOLUTION INTRAMUSCULAR; INTRAVENOUS at 00:35

## 2023-06-27 RX ADMIN — AMLODIPINE BESYLATE 5 MILLIGRAM(S): 2.5 TABLET ORAL at 13:18

## 2023-06-27 RX ADMIN — Medication 2 MILLIGRAM(S): at 22:24

## 2023-06-27 RX ADMIN — SIMVASTATIN 10 MILLIGRAM(S): 20 TABLET, FILM COATED ORAL at 22:25

## 2023-06-27 RX ADMIN — APIXABAN 5 MILLIGRAM(S): 2.5 TABLET, FILM COATED ORAL at 13:17

## 2023-06-27 RX ADMIN — Medication 100 MILLIGRAM(S): at 22:36

## 2023-06-27 RX ADMIN — Medication 3 MILLIGRAM(S): at 22:00

## 2023-06-27 RX ADMIN — Medication 1 TABLET(S): at 13:19

## 2023-06-27 RX ADMIN — Medication 5 MILLIGRAM(S): at 13:18

## 2023-06-27 RX ADMIN — APIXABAN 5 MILLIGRAM(S): 2.5 TABLET, FILM COATED ORAL at 22:00

## 2023-06-27 NOTE — H&P ADULT - ASSESSMENT
Recent UTI, s/p abx, a second course with nitrofurantoin was started whi resulted in nausea and vomiting   CXR with cardiomegaly and patient with mild hypoxic respiratory failure, with sats of 93% on RA   r/o Aspiration Pneumonia vs early CHF   HTN HLP GERD  AFIB on Eliquis  Metastatic Melanoma s/p wide excision left heel melanoma   Adrenal Insufficiency 2/2 Nivolumab on prednisone chronically       Plan:  Watch off abx for any fevers, leukocytosis   UA reviewed, CT chest without infiltrate  check BNP, mildly elevated  check ECHO  consider low dose  lasix 20  mild fluid retention could be related to steroid use  Card Cx  cont home med :  BB Eliquis  cont amlodipine - uses compression stocking daily for her lymphedema   cont PPI     FULL CODE  DC home when medically stable        One episode of fever in the ER on 6/266/23  Recent UTI, s/p abx, a second course with nitrofurantoin was started which resulted in nausea and vomiting   CXR with cardiomegaly and patient with mild hypoxic respiratory failure, with sats of 93% on RA   r/o Aspiration Pneumonia vs early CHF   HTN HLP GERD  AFIB on Eliquis  Metastatic Melanoma s/p wide excision left heel melanoma   Adrenal Insufficiency 2/2 Nivolumab on prednisone chronically       Plan:  Follow up on cultures drawn in the ER - BCx and UCx  She got a dose of rocephin this morning  Pt denies all symptoms at the moment  Watch off abx for any fevers, leukocytosis   UA reviewed, CT chest without infiltrate  check BNP, mildly elevated  check ECHO  consider low dose  lasix 20  mild fluid retention could be related to steroid use and lymphedema  cont amlodipine - uses compression stocking daily for her lymphedema   Card Cx  cont home med :  BB Eliquis  cont PPI     FULL CODE  DC home when medically stable

## 2023-06-27 NOTE — H&P ADULT - CONVERSATION DETAILS
The patient reports that she has thought of her GOC in the past.  We went over her medical illnesses and her quality of life now (which is good - she is independent, has a supportive family).  We talked about CPR and ETT.  She states that she may have filled out a form about her advance directives in the past but is not sure.  She feels that if she got seriously ill and the prognosis was grim, she would want to be a DNR DNI.  However at the present time she would like to be full code.  what matters most - being physically active     time spent -17 mins

## 2023-06-27 NOTE — PATIENT PROFILE ADULT - FALL HARM RISK - RISK INTERVENTIONS

## 2023-06-27 NOTE — ED ADULT NURSE NOTE - NSFALLHARMRISKINTERV_ED_ALL_ED

## 2023-06-27 NOTE — H&P ADULT - NSHPLABSRESULTS_GEN_ALL_CORE
LABS: All Labs Reviewed:                    14.4   8.43  )-----------( 103      ( 26 Jun 2023 21:42 )             44.4   140  |  106  |  26<H>  ----------------------------<  130<H>  4.1   |  27  |  0.90    TPro  7.0  /  Alb  3.9  /  TBili  0.8  /  DBili  x   /  AST  18  /  ALT  34  /  AlkPhos  42  06-26    RADIOLOGY/EKG:  < from: CT Abdomen and Pelvis w/ IV Cont (06.26.23 @ 23:09) >  Thickening of the gastric fundus and body may be due to underdistention   or gastritis.  No other potentially acute findings.  Marked cardiomegaly partially visible.    < from: Xray Chest 1 View- PORTABLE-Urgent (11.19.22 @ 10:30) >  No active pulmonary disease. LABS: All Labs Reviewed:                    14.4   8.43  )-----------( 103      ( 26 Jun 2023 21:42 )             44.4   140  |  106  |  26<H>  ----------------------------<  130<H>  4.1   |  27  |  0.90    TPro  7.0  /  Alb  3.9  /  TBili  0.8  /  DBili  x   /  AST  18  /  ALT  34  /  AlkPhos  42  06-26    RADIOLOGY/EKG:  < from: CT Abdomen and Pelvis w/ IV Cont (06.26.23 @ 23:09) >  Thickening of the gastric fundus and body may be due to underdistention   or gastritis.  No other potentially acute findings.  Marked cardiomegaly partially visible.    < from: Xray Chest 1 View- PORTABLE-Urgent (11.19.22 @ 10:30) >  No active pulmonary disease.    < from: CT Chest No Cont (06.27.23 @ 19:36) >    AIRWAYS, LUNGS, PLEURA: Patent central airways. Biapical scarring. Small   bilateral pleural effusions and mild bibasilar subsegmental atelectasis.   Cluster of centrilobular micronodules within right upper lobe likely   impacted distal airways. The lungs are otherwise clear.    LYMPH NODES, MEDIASTINUM: No lymphadenopathy.    HEART, VESSELS: Cardiomegaly. The atria are severely dilated. Left pacer   in place. Mild coronary and aortic calcifications. No pericardial   effusion. Thoracic aorta normal in diameter.    < end of copied text >

## 2023-06-27 NOTE — ED ADULT NURSE NOTE - OBJECTIVE STATEMENT
Pt in ED c/o nausea and heart palpitations.  Pt breathing symmetrical and unlabored, cardiac monitoring in place, #20 IV inserted into L AC, bloods drawn and sent to lab.  Pt in no acute distress at this time.

## 2023-06-27 NOTE — H&P ADULT - NSHPSOURCEINFORD_GEN_ALL_CORE
"Discharge    Today's date: 2023  Patient name: Tonya Fox  : 1961  MRN: [id number]  Referring provider: Jacques Dent Patient

## 2023-06-27 NOTE — H&P ADULT - NSHPPHYSICALEXAM_GEN_ALL_CORE
PHYSICAL EXAM:  T(F): 98 (27 Jun 2023 07:05), Max: 101 (26 Jun 2023 20:56)  HR: 90 (27 Jun 2023 07:05) (70 - 90)  BP: 113/55 (27 Jun 2023 07:05) (93/48 - 120/71)  BP(mean): 75 (27 Jun 2023 04:02) (62 - 86)  RR: 18 (27 Jun 2023 07:05) (17 - 18)  SpO2: 95% (27 Jun 2023 07:05) (93% - 98%)/RA   GENERAL: NAD, able to lie flat in bed  HEAD:  Atraumatic, Normocephalic  EYES: EOMI, PERRLA, normal sclera  ENT: Moist mucous membranes  NECK: Supple, No JVD, no nuchal rigidity  CHEST/LUNG: Clear to auscultation bilaterally; No rales, rhonchi, wheezing, or rubs. Unlabored respirations  HEART: Regular rate and rhythm; No murmurs, rubs, or gallops  ABDOMEN: Bowel sounds present; Soft, Nontender, Nondistended. No hepatomegaly  EXTREMITIES:  no pitting bilaterally  NERVOUS SYSTEM:  Alert & Oriented X3, speech clear. No focal motor or sensory deficits  MSK: FROM all 4 extremities, full and equal strength  SKIN: No rashes or lesions PHYSICAL EXAM:  T(F): 98 (27 Jun 2023 07:05), Max: 101 (26 Jun 2023 20:56)  HR: 90 (27 Jun 2023 07:05) (70 - 90)  BP: 113/55 (27 Jun 2023 07:05) (93/48 - 120/71)  BP(mean): 75 (27 Jun 2023 04:02) (62 - 86)  RR: 18 (27 Jun 2023 07:05) (17 - 18)  SpO2: 95% (27 Jun 2023 07:05) (93% - 98%)/RA   GENERAL: NAD, able to lie flat in bed without any resp distress   HEAD:  Atraumatic, Normocephalic  EYES: EOMI, PERRLA, normal sclera  ENT: Moist mucous membranes  NECK: Supple, No JVD, no nuchal rigidity  CHEST/LUNG: Clear to auscultation bilaterally; No rales, rhonchi, wheezing, or rubs. Unlabored respirations  HEART: Regular rate and rhythm; No murmurs, rubs, or gallops  ABDOMEN: Bowel sounds present; Soft, Nontender, Nondistended. No hepatomegaly  EXTREMITIES:  no pitting bilaterally  NERVOUS SYSTEM:  Alert & Oriented X3, speech clear. No focal motor or sensory deficits  MSK: FROM all 4 extremities, full and equal strength  SKIN: No rashes or lesions

## 2023-06-27 NOTE — H&P ADULT - HISTORY OF PRESENT ILLNESS
CC: Patient had been bactrim tx for UTI diagnosed around Wednesday 6/21 and was changed to nitrofurantoin. This AM, patient slept, and developed N/V shortly after, threw up medications. Pt family at bedside endorses shallow breathing.      PMH:  HTN HLP GERD  AFIB on Eliquis  Metastatic Melanoma s/p wide excision left heel melanoma   Adrenal Insufficiency 2/2 Nivolumab on prednisone 20 mg BID  Hypoadrenalism  Lymphedema    FH:      SH:  TIB use - none  ETOH - none CC: This is a pleasant 88 yr old lady who  reports that she was until a few days when she developed burning on urination and was started on antibiotics which she took for three days.   She reports that dysuria resolved after that but her physician noted an abnormality in the urine results and decided to give her nitrofurantoin.  She said after taking two doses of the nitrofurantoin she started to feel unwell, had dry heaving, and felt nauseous and threw up her meds.  She denies ha lightheadedness cp palps sob abdo pain dysuria abdo pain. She reports that she doesn't feel nauseous now.   She does state that her daughter noted she was having dry heaving or may even short of breath in her sleep.  The patient uses one pillow to sleep at night, did not note any weight gain or pedal edema. She states her sleep has been fine.  10 point ROS is otherwise neg.    PMH:  HTN HLP GERD  AFIB on Eliquis  Metastatic Melanoma s/p wide excision left heel melanoma   Adrenal Insufficiency 2/2 Nivolumab on prednisone chronically   Hypoadrenalism  Lymphedema    FH:  UTO    SH:  TOB use - none  ETOH - none  walks without any assistive devices

## 2023-06-27 NOTE — PHYSICAL THERAPY INITIAL EVALUATION ADULT - NSPTDISCHREC_GEN_A_CORE
home, NO AD, pt was able to amb 200' independently w/o AD, steady on feet, pt did toilet activities independently. pt also did 10 steps with rails, pt does not require acute care PT , DC from PT, Pt can amb with floor staff, pt was left lying in bed, alarm on/No skilled PT needs

## 2023-06-27 NOTE — PHYSICAL THERAPY INITIAL EVALUATION ADULT - PERTINENT HX OF CURRENT PROBLEM, REHAB EVAL
Patient had been bactrim tx for UTI diagnosed around Wednesday 6/21 and was changed to nitrofurantoin. This AM, patient slept, and developed N/V shortly after, threw up medications. Pt family at bedside endorses shallow breathing.

## 2023-06-28 ENCOUNTER — TRANSCRIPTION ENCOUNTER (OUTPATIENT)
Age: 88
End: 2023-06-28

## 2023-06-28 VITALS
DIASTOLIC BLOOD PRESSURE: 57 MMHG | RESPIRATION RATE: 18 BRPM | SYSTOLIC BLOOD PRESSURE: 105 MMHG | HEART RATE: 91 BPM | OXYGEN SATURATION: 97 % | TEMPERATURE: 99 F

## 2023-06-28 LAB
ANION GAP SERPL CALC-SCNC: 4 MMOL/L — LOW (ref 5–17)
BUN SERPL-MCNC: 26 MG/DL — HIGH (ref 7–23)
CALCIUM SERPL-MCNC: 8.4 MG/DL — LOW (ref 8.5–10.1)
CHLORIDE SERPL-SCNC: 111 MMOL/L — HIGH (ref 96–108)
CO2 SERPL-SCNC: 25 MMOL/L — SIGNIFICANT CHANGE UP (ref 22–31)
CREAT SERPL-MCNC: 0.68 MG/DL — SIGNIFICANT CHANGE UP (ref 0.5–1.3)
CULTURE RESULTS: SIGNIFICANT CHANGE UP
EGFR: 84 ML/MIN/1.73M2 — SIGNIFICANT CHANGE UP
GLUCOSE SERPL-MCNC: 116 MG/DL — HIGH (ref 70–99)
HCT VFR BLD CALC: 37 % — SIGNIFICANT CHANGE UP (ref 34.5–45)
HGB BLD-MCNC: 12.2 G/DL — SIGNIFICANT CHANGE UP (ref 11.5–15.5)
MAGNESIUM SERPL-MCNC: 2 MG/DL — SIGNIFICANT CHANGE UP (ref 1.6–2.6)
MCHC RBC-ENTMCNC: 31.4 PG — SIGNIFICANT CHANGE UP (ref 27–34)
MCHC RBC-ENTMCNC: 33 GM/DL — SIGNIFICANT CHANGE UP (ref 32–36)
MCV RBC AUTO: 95.1 FL — SIGNIFICANT CHANGE UP (ref 80–100)
PHOSPHATE SERPL-MCNC: 2.7 MG/DL — SIGNIFICANT CHANGE UP (ref 2.5–4.5)
PLATELET # BLD AUTO: 85 K/UL — LOW (ref 150–400)
POTASSIUM SERPL-MCNC: 4 MMOL/L — SIGNIFICANT CHANGE UP (ref 3.5–5.3)
POTASSIUM SERPL-SCNC: 4 MMOL/L — SIGNIFICANT CHANGE UP (ref 3.5–5.3)
RBC # BLD: 3.89 M/UL — SIGNIFICANT CHANGE UP (ref 3.8–5.2)
RBC # FLD: 14.2 % — SIGNIFICANT CHANGE UP (ref 10.3–14.5)
SODIUM SERPL-SCNC: 140 MMOL/L — SIGNIFICANT CHANGE UP (ref 135–145)
SPECIMEN SOURCE: SIGNIFICANT CHANGE UP
WBC # BLD: 5.82 K/UL — SIGNIFICANT CHANGE UP (ref 3.8–10.5)
WBC # FLD AUTO: 5.82 K/UL — SIGNIFICANT CHANGE UP (ref 3.8–10.5)

## 2023-06-28 PROCEDURE — 99239 HOSP IP/OBS DSCHRG MGMT >30: CPT

## 2023-06-28 PROCEDURE — 93306 TTE W/DOPPLER COMPLETE: CPT | Mod: 26

## 2023-06-28 RX ORDER — PANTOPRAZOLE SODIUM 20 MG/1
1 TABLET, DELAYED RELEASE ORAL
Qty: 60 | Refills: 0
Start: 2023-06-28 | End: 2023-07-27

## 2023-06-28 RX ORDER — PANTOPRAZOLE SODIUM 20 MG/1
40 TABLET, DELAYED RELEASE ORAL
Refills: 0 | Status: DISCONTINUED | OUTPATIENT
Start: 2023-06-28 | End: 2023-06-28

## 2023-06-28 RX ADMIN — Medication 20 MILLIGRAM(S): at 11:42

## 2023-06-28 RX ADMIN — Medication 5 MILLIGRAM(S): at 11:42

## 2023-06-28 RX ADMIN — Medication 100 MILLIGRAM(S): at 11:42

## 2023-06-28 RX ADMIN — PANTOPRAZOLE SODIUM 40 MILLIGRAM(S): 20 TABLET, DELAYED RELEASE ORAL at 11:43

## 2023-06-28 RX ADMIN — APIXABAN 5 MILLIGRAM(S): 2.5 TABLET, FILM COATED ORAL at 11:42

## 2023-06-28 RX ADMIN — Medication 1 TABLET(S): at 11:41

## 2023-06-28 RX ADMIN — AMLODIPINE BESYLATE 5 MILLIGRAM(S): 2.5 TABLET ORAL at 11:43

## 2023-06-28 NOTE — DISCHARGE NOTE NURSING/CASE MANAGEMENT/SOCIAL WORK - NSDCPEFALRISK_GEN_ALL_CORE
For information on Fall & Injury Prevention, visit: [email or web address] OR  [email or web address] OR  https://www.cdc.gov/steadi/patient.html

## 2023-06-28 NOTE — DISCHARGE NOTE PROVIDER - PROVIDER TOKENS
PROVIDER:[TOKEN:[[id number]],FOLLOWUP:[1 week]],PROVIDER:[TOKEN:[[id number]],FOLLOWUP:[2 weeks]]

## 2023-06-28 NOTE — DISCHARGE NOTE PROVIDER - NSDCFUSCHEDAPPT_GEN_ALL_CORE_FT
Perez Russo  [facility]  [facility] [address]  Scheduled Appointment: 08/09/2023    Celina Kimbrough  [facility]  [facility]  Scheduled Appointment: 09/07/2023    Antonio Gaines  [facility] [address]  Scheduled Appointment: 09/19/2023

## 2023-06-28 NOTE — DISCHARGE NOTE PROVIDER - NSDCHC_MEDRECLITE_GEN_ALL_CORE
Click to Modify Medication Indication on Note Save Parent Bladder non-tender and non-distended. Urine clear yellow.

## 2023-06-28 NOTE — DISCHARGE NOTE NURSING/CASE MANAGEMENT/SOCIAL WORK - NSDCVIVACCINE_GEN_ALL_CORE_FT
Tdap; 06-Sep-2019 14:05; Jesica Davies (SHELDON); [employer]; [id number] (Exp. Date: 07-Jun-2021); IntraMuscular; Deltoid Left.; 0.5 milliLiter(s); VIS (VIS Published: 09-May-2013, VIS Presented: 06-Sep-2019);

## 2023-06-28 NOTE — DISCHARGE NOTE NURSING/CASE MANAGEMENT/SOCIAL WORK - PATIENT PORTAL LINK FT
You can access the FollowMyHealth Patient Portal offered by [facility] by registering at the following website: [email or web address]. By joining [employer]’s FollowMyHealth portal, you will also be able to view your health information using other applications (apps) compatible with our system.

## 2023-06-28 NOTE — CONSULT NOTE ADULT - ASSESSMENT
87 y/o female with PMHx of chronic afib, HTN, HLD, adrenal insufficiency on steroids, diastolic CHF, PPM presented for UTI. Cardiology consulted for possible HF.    #Chronic Diastolic CHF  #Chronic Afib  #s/p PPM  #HTN  #HLD  #Adrenal insufficiency on chronic steroids    -      87 y/o female with PMHx of chronic afib, HTN, HLD, adrenal insufficiency on steroids, diastolic CHF, PPM presented for UTI. Cardiology consulted for possible HF.    #Chronic Diastolic CHF  #Chronic Afib  #s/p PPM  #HTN  #HLD  #Adrenal insufficiency on chronic steroids    - Continue Metoprolol for rate control.   - Eliquis for stroke ppx  - Bp stable, c/w Norvasc daily  - Statin  - Agree with PO Lasix daily  - Echo reviewed, no change from prior  - ABX for UTI    Case d/w Dr Haas  will sign off, call with questions, recommend outpatient follow up.

## 2023-06-28 NOTE — CONSULT NOTE ADULT - SUBJECTIVE AND OBJECTIVE BOX
CHIEF COMPLAINT: Patient is a 88y old  Female who presents with a chief complaint of urinary burning.    FROM H&P: This is a pleasant 88 yr old lady who  reports that she was until a few days when she developed burning on urination and was started on antibiotics which she took for three days.   She reports that dysuria resolved after that but her physician noted an abnormality in the urine results and decided to give her nitrofurantoin.  She said after taking two doses of the nitrofurantoin she started to feel unwell, had dry heaving, and felt nauseous and threw up her meds.  She denies ha lightheadedness cp palps sob abdo pain dysuria abdo pain. She reports that she doesn't feel nauseous now.   She does state that her daughter noted she was having dry heaving or may even short of breath in her sleep.  The patient uses one pillow to sleep at night, did not note any weight gain or pedal edema. She states her sleep has been fine.  10 point ROS is otherwise neg.    6/28.    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 chronically   Hypoadrenalism  Lymphedema    SOCIAL HISTORY:  TOB use - none  ETOH - none  walks without any assistive devices (27 Jun 2023 08:15)    PREVIOUS DIAGNOSTIC TESTING:    ECHO: FINDINGS:1/2022: EF 55-60, Mod MR, mod TR, mild pulmHTN  STRESS: FINDINGS: 8/2022: CARDIAC PET: Normal    MEDICATIONS:  amLODIPine   Tablet 5 milliGRAM(s) Oral daily  apixaban 5 milliGRAM(s) Oral two times a day  furosemide    Tablet 20 milliGRAM(s) Oral daily  metoprolol tartrate 100 milliGRAM(s) Oral every 12 hours  multivitamin/minerals 1 Tablet(s) Oral daily  predniSONE   Tablet 2 milliGRAM(s) Oral at bedtime  predniSONE   Tablet 5 milliGRAM(s) Oral daily  simvastatin 10 milliGRAM(s) Oral at bedtime    MEDICATIONS  (PRN):  acetaminophen     Tablet .. 650 milliGRAM(s) Oral every 6 hours PRN Temp greater or equal to 38C (100.4F), Mild Pain (1 - 3)  aluminum hydroxide/magnesium hydroxide/simethicone Suspension 30 milliLiter(s) Oral every 4 hours PRN Dyspepsia  melatonin 3 milliGRAM(s) Oral at bedtime PRN Insomnia  ondansetron Injectable 4 milliGRAM(s) IV Push every 8 hours PRN Nausea and/or Vomiting    HOME MEDICATIONS:  amLODIPine 5 mg oral tablet: 1 tab(s) orally once a day (27 Jun 2023 08:21)  Bactrim  mg-160 mg oral tablet: 1 tab(s) orally 2 times a day x 3 days ***Course Complete*** (27 Jun 2023 08:21)  Centrum oral tablet: 1 tab(s) orally once a day (27 Jun 2023 08:18)  doxycycline hyclate 50 mg oral capsule: 1 cap(s) orally 2 times a day x 10 days ***Course Complete*** (27 Jun 2023 08:21)  Eliquis 5 mg oral tablet: 1 tab(s) orally 2 times a day (27 Jun 2023 08:18)  Macrobid 100 mg oral capsule: 1 cap(s) orally 2 times a day x 5 days ***Course Not Complete*** (27 Jun 2023 08:21)  predniSONE 1 mg oral tablet: 2 tab(s) orally once a day (in the evening) (27 Jun 2023 08:21)  predniSONE 5 mg oral tablet: 1 tab(s) orally once a day (in the morning) (27 Jun 2023 08:21)  PreserVision AREDS 2 oral capsule: 1 cap(s) orally once a day (27 Jun 2023 08:21)  simvastatin 10 mg oral tablet: 1 tab(s) orally once a day (at bedtime) (27 Jun 2023 08:18)        PHYSICAL EXAM:  Vital Signs Last 24 Hrs  T(C): 37.4 (27 Jun 2023 16:59), Max: 37.5 (27 Jun 2023 09:39)  T(F): 99.3 (27 Jun 2023 16:59), Max: 99.5 (27 Jun 2023 09:39)  HR: 67 (27 Jun 2023 16:59) (67 - 93)  BP: 113/66 (27 Jun 2023 16:59) (113/66 - 126/59)  BP(mean): --  RR: 18 (27 Jun 2023 16:59) (18 - 18)  SpO2: 93% (27 Jun 2023 16:59) (93% - 93%)    Parameters below as of 27 Jun 2023 16:59  Patient On (Oxygen Delivery Method): room air        Constitutional: NAD, awake and alert  HEENT: PERR, EOMI, Normal Hearing, MMM  Neck: Soft and supple, No LAD, No JVD  Respiratory: Breath sounds are clear bilaterally, No wheezing, rales or rhonchi  Cardiovascular: S1 and S2, regular rate and rhythm, no Murmurs, gallops or rubs  Gastrointestinal: Bowel Sounds present, soft, nontender, nondistended, no guarding, no rebound  Extremities: No peripheral edema  Vascular: 2+ peripheral pulses  Neurological: A/O x 3, no focal deficits  Musculoskeletal: 5/5 strength b/l upper and lower extremities  Skin: No rashes    =======================================    INTERPRETATION OF TELEMETRY:    ECG:    ========================================    LABS:                        12.2   5.82  )-----------( 85       ( 28 Jun 2023 06:39 )             37.0     06-28    140  |  111<H>  |  26<H>  ----------------------------<  116<H>  4.0   |  25  |  0.68    Ca    8.4<L>      28 Jun 2023 06:39  Phos  2.7     06-28  Mg     2.0     06-28    TPro  7.0  /  Alb  3.9  /  TBili  0.8  /  DBili  x   /  AST  18  /  ALT  34  /  AlkPhos  42  06-26          Urinalysis Basic - ( 28 Jun 2023 06:39 )    Color: x / Appearance: x / SG: x / pH: x  Gluc: 116 mg/dL / Ketone: x  / Bili: x / Urobili: x   Blood: x / Protein: x / Nitrite: x   Leuk Esterase: x / RBC: x / WBC x   Sq Epi: x / Non Sq Epi: x / Bacteria: x      I&O's Summary    BNP      RADIOLOGY & ADDITIONAL STUDIES: CHIEF COMPLAINT: Patient is a 88y old  Female who presents with a chief complaint of urinary burning.    FROM H&P: This is a pleasant 88 yr old lady who  reports that she was until a few days when she developed burning on urination and was started on antibiotics which she took for three days.   She reports that dysuria resolved after that but her physician noted an abnormality in the urine results and decided to give her nitrofurantoin.  She said after taking two doses of the nitrofurantoin she started to feel unwell, had dry heaving, and felt nauseous and threw up her meds.  She denies ha lightheadedness cp palps sob abdo pain dysuria abdo pain. She reports that she doesn't feel nauseous now.   She does state that her daughter noted she was having dry heaving or may even short of breath in her sleep.  The patient uses one pillow to sleep at night, did not note any weight gain or pedal edema. She states her sleep has been fine.  10 point ROS is otherwise neg.    6/28.    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 chronically   Hypoadrenalism  Lymphedema    SOCIAL HISTORY:  TOB use - none  ETOH - none  walks without any assistive devices (27 Jun 2023 08:15)    PREVIOUS DIAGNOSTIC TESTING:    ECHO: FINDINGS:1/2022: EF 55-60, Mod MR, mod TR, mild pulmHTN  STRESS: FINDINGS: 8/2022: CARDIAC PET: Normal    MEDICATIONS:  amLODIPine   Tablet 5 milliGRAM(s) Oral daily  apixaban 5 milliGRAM(s) Oral two times a day  furosemide    Tablet 20 milliGRAM(s) Oral daily  metoprolol tartrate 100 milliGRAM(s) Oral every 12 hours  multivitamin/minerals 1 Tablet(s) Oral daily  predniSONE   Tablet 2 milliGRAM(s) Oral at bedtime  predniSONE   Tablet 5 milliGRAM(s) Oral daily  simvastatin 10 milliGRAM(s) Oral at bedtime    MEDICATIONS  (PRN):  acetaminophen     Tablet .. 650 milliGRAM(s) Oral every 6 hours PRN Temp greater or equal to 38C (100.4F), Mild Pain (1 - 3)  aluminum hydroxide/magnesium hydroxide/simethicone Suspension 30 milliLiter(s) Oral every 4 hours PRN Dyspepsia  melatonin 3 milliGRAM(s) Oral at bedtime PRN Insomnia  ondansetron Injectable 4 milliGRAM(s) IV Push every 8 hours PRN Nausea and/or Vomiting    HOME MEDICATIONS:  amLODIPine 5 mg oral tablet: 1 tab(s) orally once a day (27 Jun 2023 08:21)  Bactrim  mg-160 mg oral tablet: 1 tab(s) orally 2 times a day x 3 days ***Course Complete*** (27 Jun 2023 08:21)  Centrum oral tablet: 1 tab(s) orally once a day (27 Jun 2023 08:18)  doxycycline hyclate 50 mg oral capsule: 1 cap(s) orally 2 times a day x 10 days ***Course Complete*** (27 Jun 2023 08:21)  Eliquis 5 mg oral tablet: 1 tab(s) orally 2 times a day (27 Jun 2023 08:18)  Macrobid 100 mg oral capsule: 1 cap(s) orally 2 times a day x 5 days ***Course Not Complete*** (27 Jun 2023 08:21)  predniSONE 1 mg oral tablet: 2 tab(s) orally once a day (in the evening) (27 Jun 2023 08:21)  predniSONE 5 mg oral tablet: 1 tab(s) orally once a day (in the morning) (27 Jun 2023 08:21)  PreserVision AREDS 2 oral capsule: 1 cap(s) orally once a day (27 Jun 2023 08:21)  simvastatin 10 mg oral tablet: 1 tab(s) orally once a day (at bedtime) (27 Jun 2023 08:18)    PHYSICAL EXAM:  T(C): 37.4 (27 Jun 2023 16:59), Max: 37.5 (27 Jun 2023 09:39)  T(F): 99.3 (27 Jun 2023 16:59), Max: 99.5 (27 Jun 2023 09:39)  HR: 67 (27 Jun 2023 16:59) (67 - 93)  BP: 113/66 (27 Jun 2023 16:59) (113/66 - 126/59)  RR: 18 (27 Jun 2023 16:59) (18 - 18)  SpO2: 93% (27 Jun 2023 16:59) (93% - 93%)    Parameters below as of 27 Jun 2023 16:59  Patient On (Oxygen Delivery Method): room air    Constitutional: NAD, awake and alert  HEENT: PERR, EOMI, Normal Hearing, MMM  Neck: Soft and supple, No LAD, No JVD  Respiratory: Breath sounds are clear bilaterally, No wheezing, rales or rhonchi  Cardiovascular: S1 and S2, regular rate and rhythm, no Murmurs, gallops or rubs  Gastrointestinal: Bowel Sounds present, soft, nontender, nondistended, no guarding, no rebound  Extremities: No peripheral edema  Vascular: 2+ peripheral pulses  Neurological: A/O x 3, no focal deficits  Musculoskeletal: 5/5 strength b/l upper and lower extremities  Skin: No rashes    =======================================    INTERPRETATION OF TELEMETRY:    ECG:    ========================================    LABS:                        12.2   5.82  )-----------( 85       ( 28 Jun 2023 06:39 )             37.0     06-28    140  |  111<H>  |  26<H>  ----------------------------<  116<H>  4.0   |  25  |  0.68    Ca    8.4<L>      28 Jun 2023 06:39  Phos  2.7     06-28  Mg     2.0     06-28    TPro  7.0  /  Alb  3.9  /  TBili  0.8  /  DBili  x   /  AST  18  /  ALT  34  /  AlkPhos  42  06-26    TroponinI hsT: <-18.62, <-13.32    BNP 3257    RADIOLOGY & ADDITIONAL STUDIES:    6/27/23: CT Chest No Cont: AIRWAYS, LUNGS, PLEURA: Patent central airways. Biapical scarring. Small bilateral pleural effusions and mild bibasilar subsegmental atelectasis. Cluster of centrilobular micronodules within right upper lobe likely   impacted distal airways. The lungs are otherwise clear.    6/26/23: CT Abdomen and Pelvis w/ IV Cont: Thickening of the gastric fundus and body may be due to underdistention or gastritis. No other potentially acute findings. Marked cardiomegaly partially visible.   CHIEF COMPLAINT: Patient is a 88y old  Female who presents with a chief complaint of urinary burning.    FROM H&P: This is a pleasant 88 yr old lady who  reports that she was until a few days when she developed burning on urination and was started on antibiotics which she took for three days.   She reports that dysuria resolved after that but her physician noted an abnormality in the urine results and decided to give her nitrofurantoin.  She said after taking two doses of the nitrofurantoin she started to feel unwell, had dry heaving, and felt nauseous and threw up her meds.  She denies ha lightheadedness cp palps sob abdo pain dysuria abdo pain. She reports that she doesn't feel nauseous now.   She does state that her daughter noted she was having dry heaving or may even short of breath in her sleep.  The patient uses one pillow to sleep at night, did not note any weight gain or pedal edema. She states her sleep has been fine.  10 point ROS is otherwise neg.    6/28. no complaints, breathing stable, endorsed LOPEZ. agree with PO lasix 20mg daily  no cp or palps.    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 chronically   Hypoadrenalism  Lymphedema    SOCIAL HISTORY:  TOB use - none  ETOH - none  walks without any assistive devices (27 Jun 2023 08:15)    PREVIOUS DIAGNOSTIC TESTING:    ECHO: FINDINGS:1/2022: EF 55-60, Mod MR, mod TR, mild pulmHTN  STRESS: FINDINGS: 8/2022: CARDIAC PET: Normal    MEDICATIONS:  MEDICATIONS  (STANDING):  amLODIPine   Tablet 5 milliGRAM(s) Oral daily  apixaban 5 milliGRAM(s) Oral two times a day  furosemide    Tablet 20 milliGRAM(s) Oral daily  metoprolol tartrate 100 milliGRAM(s) Oral every 12 hours  multivitamin/minerals 1 Tablet(s) Oral daily  pantoprazole    Tablet 40 milliGRAM(s) Oral before breakfast  predniSONE   Tablet 2 milliGRAM(s) Oral at bedtime  predniSONE   Tablet 5 milliGRAM(s) Oral daily  simvastatin 10 milliGRAM(s) Oral at bedtime    MEDICATIONS  (PRN):  acetaminophen     Tablet .. 650 milliGRAM(s) Oral every 6 hours PRN Temp greater or equal to 38C (100.4F), Mild Pain (1 - 3)  aluminum hydroxide/magnesium hydroxide/simethicone Suspension 30 milliLiter(s) Oral every 4 hours PRN Dyspepsia  melatonin 3 milliGRAM(s) Oral at bedtime PRN Insomnia  ondansetron Injectable 4 milliGRAM(s) IV Push every 8 hours PRN Nausea and/or Vomiting    HOME MEDICATIONS:  amLODIPine 5 mg oral tablet: 1 tab(s) orally once a day (27 Jun 2023 08:21)  Bactrim  mg-160 mg oral tablet: 1 tab(s) orally 2 times a day x 3 days ***Course Complete*** (27 Jun 2023 08:21)  Centrum oral tablet: 1 tab(s) orally once a day (27 Jun 2023 08:18)  doxycycline hyclate 50 mg oral capsule: 1 cap(s) orally 2 times a day x 10 days ***Course Complete*** (27 Jun 2023 08:21)  Eliquis 5 mg oral tablet: 1 tab(s) orally 2 times a day (27 Jun 2023 08:18)  Macrobid 100 mg oral capsule: 1 cap(s) orally 2 times a day x 5 days ***Course Not Complete*** (27 Jun 2023 08:21)  predniSONE 1 mg oral tablet: 2 tab(s) orally once a day (in the evening) (27 Jun 2023 08:21)  predniSONE 5 mg oral tablet: 1 tab(s) orally once a day (in the morning) (27 Jun 2023 08:21)  PreserVision AREDS 2 oral capsule: 1 cap(s) orally once a day (27 Jun 2023 08:21)  simvastatin 10 mg oral tablet: 1 tab(s) orally once a day (at bedtime) (27 Jun 2023 08:18)    PHYSICAL EXAM:  T(C): 37.4 (27 Jun 2023 16:59), Max: 37.5 (27 Jun 2023 09:39)  T(F): 99.3 (27 Jun 2023 16:59), Max: 99.5 (27 Jun 2023 09:39)  HR: 67 (27 Jun 2023 16:59) (67 - 93)  BP: 113/66 (27 Jun 2023 16:59) (113/66 - 126/59)  RR: 18 (27 Jun 2023 16:59) (18 - 18)  SpO2: 93% (27 Jun 2023 16:59) (93% - 93%)    Parameters below as of 27 Jun 2023 16:59  Patient On (Oxygen Delivery Method): room air    Constitutional: Awake and alert  HEENT: PERR, EOMI, Normal Hearing, MMM  Neck: Soft and supple, No LAD, No JVD  Respiratory: Breath sounds are clear bilaterally, No wheezing, rales or rhonchi  Cardiovascular: S1 and S2, regular rate and rhythm, no Murmurs, gallops or rubs  Gastrointestinal: Bowel Sounds present, soft, nontender, nondistended, no guarding, no rebound  Extremities: No peripheral edema  Vascular: 2+ peripheral pulses  Neurological: A/O x 3, no focal deficits  Musculoskeletal: 5/5 strength b/l upper and lower extremities  Skin: No rashes    =======================================    INTERPRETATION OF TELEMETRY: Not on tele    ECG: paced, afib    ========================================    LABS:                        12.2   5.82  )-----------( 85       ( 28 Jun 2023 06:39 )             37.0     06-28    140  |  111<H>  |  26<H>  ----------------------------<  116<H>  4.0   |  25  |  0.68    Ca    8.4<L>      28 Jun 2023 06:39  Phos  2.7     06-28  Mg     2.0     06-28    TPro  7.0  /  Alb  3.9  /  TBili  0.8  /  DBili  x   /  AST  18  /  ALT  34  /  AlkPhos  42  06-26    TroponinI hsT: <-18.62, <-13.32    BNP 3257    RADIOLOGY & ADDITIONAL STUDIES:    6/27/23: CT Chest No Cont: AIRWAYS, LUNGS, PLEURA: Patent central airways. Biapical scarring. Small bilateral pleural effusions and mild bibasilar subsegmental atelectasis. Cluster of centrilobular micronodules within right upper lobe likely   impacted distal airways. The lungs are otherwise clear.    6/26/23: CT Abdomen and Pelvis w/ IV Cont: Thickening of the gastric fundus and body may be due to underdistention or gastritis. No other potentially acute findings. Marked cardiomegaly partially visible.

## 2023-06-28 NOTE — DISCHARGE NOTE PROVIDER - HOSPITAL COURSE
HPI:  CC: This is a pleasant 88 yr old lady who  reports that she was until a few days when she developed burning on urination and was started on antibiotics which she took for three days.   She reports that dysuria resolved after that but her physician noted an abnormality in the urine results and decided to give her nitrofurantoin.  Etiology appears to be gastritis which is also shown in CT finding. Started on PPI and she is feeling better, able to eat. I discussed with pt need for follow up with GI for possible EGD. Pt is being discharged with further management as an otpt    #pt has no symptoms suggestive of uti and has been treated in past, No need for any ongoing treatment for uti     #For her cardiomegaly in CT finding she has been advised to follow up with cardiology for ongoing management. Currently  pt appears euvolemic and no sign and symptoms suggestive of heart failure. Further management as an outpt     PHYSICAL EXAM:    Vital Signs Last 24 Hrs  T(C): 37.4 (27 Jun 2023 16:59), Max: 37.4 (27 Jun 2023 16:59)  T(F): 99.3 (27 Jun 2023 16:59), Max: 99.3 (27 Jun 2023 16:59)  HR: 67 (27 Jun 2023 16:59) (67 - 67)  BP: 113/66 (27 Jun 2023 16:59) (113/66 - 113/66)  BP(mean): --  RR: 18 (27 Jun 2023 16:59) (18 - 18)  SpO2: 93% (27 Jun 2023 16:59) (93% - 93%)    Parameters below as of 27 Jun 2023 16:59  Patient On (Oxygen Delivery Method): room air        GENERAL: comfortable   HEAD:  Atraumatic, Normocephalic  EYES: EOMI, PERRLA, conjunctiva and sclera clear  HEENT: Moist mucous membranes  NECK: Supple, No JVD  NERVOUS SYSTEM:  Alert & Oriented X3, Motor Strength 5/5 B/L upper and lower extremities; DTRs 2+ intact and symmetric  CHEST/LUNG: Clear to auscultation bilaterally; No rales, rhonchi, wheezing, or rubs  HEART:S1S2 normal, no murmer  ABDOMEN: Soft, Nontender, Nondistended; Bowel sounds present  GENITOURINARY- Voiding, no palpable bladder  EXTREMITIES:  2+ Peripheral Pulses, No clubbing, cyanosis, or edema  MUSCULOSKELTAL- No muscle tenderness, Muscle tone normal, No joint tenderness, no Joint swelling, Joint range of motion-normal  SKIN-no rash, no lesion  PSYCH- Mood stable  LYMPH Node- No palpable lymph node    LABS:                        12.2   5.82  )-----------( 85       ( 28 Jun 2023 06:39 )             37.0     06-28    140  |  111<H>  |  26<H>  ----------------------------<  116<H>  4.0   |  25  |  0.68    Ca    8.4<L>      28 Jun 2023 06:39  Phos  2.7     06-28  Mg     2.0     06-28    TPro  7.0  /  Alb  3.9  /  TBili  0.8  /  DBili  x   /  AST  18  /  ALT  34  /  AlkPhos  42  06-26      Urinalysis Basic - ( 28 Jun 2023 06:39 )

## 2023-06-28 NOTE — DISCHARGE NOTE PROVIDER - NSDCMRMEDTOKEN_GEN_ALL_CORE_FT
amLODIPine 5 mg oral tablet: 1 tab(s) orally once a day  Centrum oral tablet: 1 tab(s) orally once a day  Eliquis 5 mg oral tablet: 1 tab(s) orally 2 times a day  metoprolol tartrate 100 mg oral tablet: 1 tab(s) orally 2 times a day  pantoprazole 40 mg oral delayed release tablet: 1 tab(s) orally twice a day before breakfast and dinner  predniSONE 1 mg oral tablet: 2 tab(s) orally once a day (in the evening)  predniSONE 5 mg oral tablet: 1 tab(s) orally once a day (in the morning)  PreserVision AREDS 2 oral capsule: 1 cap(s) orally once a day  simvastatin 10 mg oral tablet: 1 tab(s) orally once a day (at bedtime)

## 2023-07-02 LAB
CULTURE RESULTS: SIGNIFICANT CHANGE UP
CULTURE RESULTS: SIGNIFICANT CHANGE UP
SPECIMEN SOURCE: SIGNIFICANT CHANGE UP
SPECIMEN SOURCE: SIGNIFICANT CHANGE UP

## 2023-07-06 DIAGNOSIS — I89.0 LYMPHEDEMA, NOT ELSEWHERE CLASSIFIED: ICD-10-CM

## 2023-07-06 DIAGNOSIS — E27.40 UNSPECIFIED ADRENOCORTICAL INSUFFICIENCY: ICD-10-CM

## 2023-07-06 DIAGNOSIS — I48.20 CHRONIC ATRIAL FIBRILLATION, UNSPECIFIED: ICD-10-CM

## 2023-07-06 DIAGNOSIS — K29.70 GASTRITIS, UNSPECIFIED, WITHOUT BLEEDING: ICD-10-CM

## 2023-07-06 DIAGNOSIS — Z85.820 PERSONAL HISTORY OF MALIGNANT MELANOMA OF SKIN: ICD-10-CM

## 2023-07-06 DIAGNOSIS — Z95.0 PRESENCE OF CARDIAC PACEMAKER: ICD-10-CM

## 2023-07-06 DIAGNOSIS — I50.32 CHRONIC DIASTOLIC (CONGESTIVE) HEART FAILURE: ICD-10-CM

## 2023-07-06 DIAGNOSIS — I11.0 HYPERTENSIVE HEART DISEASE WITH HEART FAILURE: ICD-10-CM

## 2023-07-06 DIAGNOSIS — Z79.01 LONG TERM (CURRENT) USE OF ANTICOAGULANTS: ICD-10-CM

## 2023-07-06 DIAGNOSIS — E78.5 HYPERLIPIDEMIA, UNSPECIFIED: ICD-10-CM

## 2023-07-06 DIAGNOSIS — Z79.52 LONG TERM (CURRENT) USE OF SYSTEMIC STEROIDS: ICD-10-CM

## 2023-07-06 DIAGNOSIS — Z88.0 ALLERGY STATUS TO PENICILLIN: ICD-10-CM

## 2023-07-06 DIAGNOSIS — K21.9 GASTRO-ESOPHAGEAL REFLUX DISEASE WITHOUT ESOPHAGITIS: ICD-10-CM

## 2023-07-16 NOTE — DISCHARGE INSTRUCTIONS: CHEMOTHERAPY - TREATMENT DUE W FREE TEXT
Contacted hospitalist with pt. Lab values.  Noted changes and requested new orders for DKA appropriate fluid admin  
due April 27th 2020. Make appointment

## 2023-08-09 ENCOUNTER — APPOINTMENT (OUTPATIENT)
Dept: SURGICAL ONCOLOGY | Facility: CLINIC | Age: 88
End: 2023-08-09
Payer: MEDICARE

## 2023-08-09 VITALS
SYSTOLIC BLOOD PRESSURE: 138 MMHG | HEART RATE: 84 BPM | WEIGHT: 141.6 LBS | RESPIRATION RATE: 16 BRPM | DIASTOLIC BLOOD PRESSURE: 90 MMHG | BODY MASS INDEX: 25.09 KG/M2 | OXYGEN SATURATION: 98 % | HEIGHT: 63 IN

## 2023-08-09 PROCEDURE — 99214 OFFICE O/P EST MOD 30 MIN: CPT

## 2023-08-09 NOTE — PHYSICAL EXAM
[Normal] : supple, no neck mass and thyroid not enlarged [Normal] : oriented to person, place and time, with appropriate affect [de-identified] : No inguinal adenopathy [de-identified] : Left heel skin graft well-healed.  No evidence of recurrence. No adenopathy. No suspicious skin lesions.  [de-identified] : multiple varicose veins 1/2+ left lower extremity edema

## 2023-08-09 NOTE — ASSESSMENT
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 Olson Patient will be undergoing PT for LLE edema  RTO 6 months after yearly blood work including LDH level

## 2023-08-09 NOTE — ADDENDUM
[FreeTextEntry1] : I, Jena Branch, acted solely as a scribe for Dr. Perez Russo on this date 08/09/2023.

## 2023-08-09 NOTE — HISTORY OF PRESENT ILLNESS
[de-identified] : 87 y/o female presents a follow-up visit for stage III left heel melanoma, s/p wide excision on 5/2019.  She completed immunotherapy Nivolumab July 2020 as per Dr. Kimbrough.  She follows up with her dermatologist, Dr. Olson and reports no new biopsies.  She continues to wear compression stockings for left lower extremity edema.    She continues to see Dr. Kimbrough for surveillance.  She will go to the lab for an LDH level prior to her next appointment with her in September 2023.  She will also discuss plan for interval surveillance imaging.    She was hospitalized at [facility] in June 20[facility] for UTI.  CT C/A/P performed revealed thickening of the gastric fundus or body possibly due to underdistention or gastritis.  Advised to see GI for follow up but never did.  She is also overdue for dermatology follow up.  She denies any new or changing skin lesion or constitutional symptoms.   PET/CT 9/28/22- Interval resolution of mild linear FDG avidity in distal small bowel. New focal mild FDG avidity in the sigmoid colon with no definite abnormality on CT. Please correlate clinically or with colonoscopy as indicated.  Otherwise, no evidence of recurrent or FDG avid disease.  LDH 8/19/22: 204 (wnl)  Follow up LDH 3/2/22: 223 (wnl) LDH 2/2/22: 266 (elevated)  PET/CT 9/14/21- negative findings Patient was referred to physical therapy for LLE lymphedema.   Left groin ultrasound on 2/8/20 showed no evidence of lymphadenopathy.  Pt notes she is s/p left groin hernia repair with mesh.  She is s/p I&D of left groin infected seroma (6/26/19). Culture: Rare Enterobacter cloaecae  PET/CT 6/15/19: 1. Postsurgical changes left heel with multiple surgical clips and high attenuation material. Residual FDG avidity along the surgical defect may represent postsurgical change/inflammation, residual disease, or combination of these entities.  2. Postsurgical collection left inguinal region. No FDG avid locoregional or distant metastatic disease.  She subsequently underwent wide excision left superior heel melanoma with left inguinal sentinel node biopsy on 5/28/19. Pathology: 1. Left inguinal sentinel lymph nodes, biopsy. Two out of four lymph nodes positive for melanoma (2/4).  2. Skin and soft tissue, left heel foot, wide resection. Melanoma 3.5mm in thickness. Margins negative (1mm from the closest deep margin). [id number]  She is s/p punch biopsy of two sites on her left heel on 5/1/19 performed by Navneet Branch PA-C. Pathology: 1. 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 pT4B 2. Left heel inferior: malignant melanoma, 4.2 mm thick, with ulcerations. pT4B    She notes a fractured nose requiring stitches by Dr. Ramirez of [place] in September 2019. Pt has a pacemaker and is currently on Warfarin for Afib as per Dr. Ronald Shepard of cardiology.  She is now taking Eliquis. Covid vaccinated.

## 2023-08-26 ENCOUNTER — EMERGENCY (EMERGENCY)
Facility: HOSPITAL | Age: 88
LOS: 0 days | Discharge: HOME | End: 2023-08-26
Attending: EMERGENCY MEDICINE
Payer: MEDICARE

## 2023-08-26 VITALS
DIASTOLIC BLOOD PRESSURE: 97 MMHG | OXYGEN SATURATION: 96 % | TEMPERATURE: 99 F | SYSTOLIC BLOOD PRESSURE: 172 MMHG | WEIGHT: 141.98 LBS | HEART RATE: 100 BPM | RESPIRATION RATE: 18 BRPM | HEIGHT: 64 IN

## 2023-08-26 VITALS — SYSTOLIC BLOOD PRESSURE: 137 MMHG | HEART RATE: 90 BPM | DIASTOLIC BLOOD PRESSURE: 77 MMHG

## 2023-08-26 DIAGNOSIS — Z79.01 LONG TERM (CURRENT) USE OF ANTICOAGULANTS: ICD-10-CM

## 2023-08-26 DIAGNOSIS — H81.12 BENIGN PAROXYSMAL VERTIGO, LEFT EAR: ICD-10-CM

## 2023-08-26 DIAGNOSIS — E78.5 HYPERLIPIDEMIA, UNSPECIFIED: ICD-10-CM

## 2023-08-26 DIAGNOSIS — K21.9 GASTRO-ESOPHAGEAL REFLUX DISEASE WITHOUT ESOPHAGITIS: ICD-10-CM

## 2023-08-26 DIAGNOSIS — Z90.89 ACQUIRED ABSENCE OF OTHER ORGANS: Chronic | ICD-10-CM

## 2023-08-26 DIAGNOSIS — I48.91 UNSPECIFIED ATRIAL FIBRILLATION: ICD-10-CM

## 2023-08-26 DIAGNOSIS — Z88.0 ALLERGY STATUS TO PENICILLIN: ICD-10-CM

## 2023-08-26 DIAGNOSIS — Z98.49 CATARACT EXTRACTION STATUS, UNSPECIFIED EYE: Chronic | ICD-10-CM

## 2023-08-26 DIAGNOSIS — Z98.890 OTHER SPECIFIED POSTPROCEDURAL STATES: Chronic | ICD-10-CM

## 2023-08-26 DIAGNOSIS — Z95.0 PRESENCE OF CARDIAC PACEMAKER: ICD-10-CM

## 2023-08-26 DIAGNOSIS — R82.71 BACTERIURIA: ICD-10-CM

## 2023-08-26 DIAGNOSIS — R42 DIZZINESS AND GIDDINESS: ICD-10-CM

## 2023-08-26 DIAGNOSIS — I10 ESSENTIAL (PRIMARY) HYPERTENSION: ICD-10-CM

## 2023-08-26 LAB
ALBUMIN SERPL ELPH-MCNC: 4 G/DL — SIGNIFICANT CHANGE UP (ref 3.3–5)
ALP SERPL-CCNC: 45 U/L — SIGNIFICANT CHANGE UP (ref 40–120)
ALT FLD-CCNC: 38 U/L — SIGNIFICANT CHANGE UP (ref 12–78)
ANION GAP SERPL CALC-SCNC: 6 MMOL/L — SIGNIFICANT CHANGE UP (ref 5–17)
ANISOCYTOSIS BLD QL: SLIGHT — SIGNIFICANT CHANGE UP
AST SERPL-CCNC: 22 U/L — SIGNIFICANT CHANGE UP (ref 15–37)
BASOPHILS # BLD AUTO: 0 K/UL — SIGNIFICANT CHANGE UP (ref 0–0.2)
BASOPHILS NFR BLD AUTO: 0 % — SIGNIFICANT CHANGE UP (ref 0–2)
BILIRUB SERPL-MCNC: 0.7 MG/DL — SIGNIFICANT CHANGE UP (ref 0.2–1.2)
BIZARRE PLATELETS BLD QL SMEAR: PRESENT — SIGNIFICANT CHANGE UP
BUN SERPL-MCNC: 24 MG/DL — HIGH (ref 7–23)
BURR CELLS BLD QL SMEAR: PRESENT — SIGNIFICANT CHANGE UP
CALCIUM SERPL-MCNC: 9 MG/DL — SIGNIFICANT CHANGE UP (ref 8.5–10.1)
CHLORIDE SERPL-SCNC: 105 MMOL/L — SIGNIFICANT CHANGE UP (ref 96–108)
CO2 SERPL-SCNC: 27 MMOL/L — SIGNIFICANT CHANGE UP (ref 22–31)
CREAT SERPL-MCNC: 1 MG/DL — SIGNIFICANT CHANGE UP (ref 0.5–1.3)
EGFR: 55 ML/MIN/1.73M2 — LOW
EOSINOPHIL # BLD AUTO: 0.11 K/UL — SIGNIFICANT CHANGE UP (ref 0–0.5)
EOSINOPHIL NFR BLD AUTO: 2 % — SIGNIFICANT CHANGE UP (ref 0–6)
GLUCOSE SERPL-MCNC: 107 MG/DL — HIGH (ref 70–99)
HCT VFR BLD CALC: 46.8 % — HIGH (ref 34.5–45)
HGB BLD-MCNC: 14.9 G/DL — SIGNIFICANT CHANGE UP (ref 11.5–15.5)
LIDOCAIN IGE QN: 42 U/L — SIGNIFICANT CHANGE UP (ref 13–75)
LYMPHOCYTES # BLD AUTO: 0.28 K/UL — LOW (ref 1–3.3)
LYMPHOCYTES # BLD AUTO: 5 % — LOW (ref 13–44)
MANUAL SMEAR VERIFICATION: SIGNIFICANT CHANGE UP
MCHC RBC-ENTMCNC: 31.1 PG — SIGNIFICANT CHANGE UP (ref 27–34)
MCHC RBC-ENTMCNC: 31.8 GM/DL — LOW (ref 32–36)
MCV RBC AUTO: 97.7 FL — SIGNIFICANT CHANGE UP (ref 80–100)
MONOCYTES # BLD AUTO: 0.22 K/UL — SIGNIFICANT CHANGE UP (ref 0–0.9)
MONOCYTES NFR BLD AUTO: 4 % — SIGNIFICANT CHANGE UP (ref 2–14)
NEUTROPHILS # BLD AUTO: 4.92 K/UL — SIGNIFICANT CHANGE UP (ref 1.8–7.4)
NEUTROPHILS NFR BLD AUTO: 86 % — HIGH (ref 43–77)
NEUTS BAND # BLD: 3 % — SIGNIFICANT CHANGE UP (ref 0–8)
NRBC # BLD: 0 /100 — SIGNIFICANT CHANGE UP (ref 0–0)
NRBC # BLD: SIGNIFICANT CHANGE UP /100 WBCS (ref 0–0)
PLAT MORPH BLD: NORMAL — SIGNIFICANT CHANGE UP
PLATELET # BLD AUTO: 123 K/UL — LOW (ref 150–400)
PLATELET COUNT - ESTIMATE: ABNORMAL
POTASSIUM SERPL-MCNC: 3.4 MMOL/L — LOW (ref 3.5–5.3)
POTASSIUM SERPL-SCNC: 3.4 MMOL/L — LOW (ref 3.5–5.3)
PROT SERPL-MCNC: 7.5 GM/DL — SIGNIFICANT CHANGE UP (ref 6–8.3)
RBC # BLD: 4.79 M/UL — SIGNIFICANT CHANGE UP (ref 3.8–5.2)
RBC # FLD: 14.2 % — SIGNIFICANT CHANGE UP (ref 10.3–14.5)
RBC BLD AUTO: SIGNIFICANT CHANGE UP
SODIUM SERPL-SCNC: 138 MMOL/L — SIGNIFICANT CHANGE UP (ref 135–145)
TROPONIN I, HIGH SENSITIVITY RESULT: 20.8 NG/L — SIGNIFICANT CHANGE UP
WBC # BLD: 5.53 K/UL — SIGNIFICANT CHANGE UP (ref 3.8–10.5)
WBC # FLD AUTO: 5.53 K/UL — SIGNIFICANT CHANGE UP (ref 3.8–10.5)

## 2023-08-26 PROCEDURE — 83690 ASSAY OF LIPASE: CPT

## 2023-08-26 PROCEDURE — 93005 ELECTROCARDIOGRAM TRACING: CPT

## 2023-08-26 PROCEDURE — 99284 EMERGENCY DEPT VISIT MOD MDM: CPT

## 2023-08-26 PROCEDURE — 85025 COMPLETE CBC W/AUTO DIFF WBC: CPT

## 2023-08-26 PROCEDURE — 80053 COMPREHEN METABOLIC PANEL: CPT

## 2023-08-26 PROCEDURE — 96374 THER/PROPH/DIAG INJ IV PUSH: CPT

## 2023-08-26 PROCEDURE — 70450 CT HEAD/BRAIN W/O DYE: CPT | Mod: 26,MA

## 2023-08-26 PROCEDURE — 36415 COLL VENOUS BLD VENIPUNCTURE: CPT

## 2023-08-26 PROCEDURE — 84484 ASSAY OF TROPONIN QUANT: CPT

## 2023-08-26 PROCEDURE — 70450 CT HEAD/BRAIN W/O DYE: CPT | Mod: MA

## 2023-08-26 PROCEDURE — 93010 ELECTROCARDIOGRAM REPORT: CPT

## 2023-08-26 PROCEDURE — 99285 EMERGENCY DEPT VISIT HI MDM: CPT | Mod: 25

## 2023-08-26 RX ORDER — ONDANSETRON 8 MG/1
4 TABLET, FILM COATED ORAL ONCE
Refills: 0 | Status: COMPLETED | OUTPATIENT
Start: 2023-08-26 | End: 2023-08-26

## 2023-08-26 RX ORDER — MECLIZINE HCL 12.5 MG
1 TABLET ORAL
Qty: 15 | Refills: 0
Start: 2023-08-26 | End: 2023-08-30

## 2023-08-26 RX ORDER — APIXABAN 2.5 MG/1
5 TABLET, FILM COATED ORAL ONCE
Refills: 0 | Status: COMPLETED | OUTPATIENT
Start: 2023-08-26 | End: 2023-08-26

## 2023-08-26 RX ORDER — ONDANSETRON 8 MG/1
1 TABLET, FILM COATED ORAL
Qty: 15 | Refills: 0
Start: 2023-08-26 | End: 2023-08-30

## 2023-08-26 RX ORDER — METOPROLOL TARTRATE 50 MG
100 TABLET ORAL DAILY
Refills: 0 | Status: DISCONTINUED | OUTPATIENT
Start: 2023-08-26 | End: 2023-08-26

## 2023-08-26 RX ORDER — MECLIZINE HCL 12.5 MG
25 TABLET ORAL ONCE
Refills: 0 | Status: COMPLETED | OUTPATIENT
Start: 2023-08-26 | End: 2023-08-26

## 2023-08-26 RX ADMIN — ONDANSETRON 4 MILLIGRAM(S): 8 TABLET, FILM COATED ORAL at 12:08

## 2023-08-26 RX ADMIN — APIXABAN 5 MILLIGRAM(S): 2.5 TABLET, FILM COATED ORAL at 12:08

## 2023-08-26 RX ADMIN — Medication 100 MILLIGRAM(S): at 12:08

## 2023-08-26 RX ADMIN — Medication 25 MILLIGRAM(S): at 12:08

## 2023-08-26 NOTE — ED ADULT NURSE NOTE - OBJECTIVE STATEMENT
88 year old female found laying on stretcher complaining of dizziness this morning.  pt states she woke up and was dizzy and thought she had low blood pressure so she took her BP and it was 170/98 which is high for her.  pt is concerned about a stroke.

## 2023-08-26 NOTE — ED PROVIDER NOTE - NEUROLOGICAL, MLM
Alert and oriented, no focal deficits, no motor or sensory deficits. No nystagmus. +Nicolette rinaldi-pike to the left, no dysmetria, normal finger to nose

## 2023-08-26 NOTE — ED ADULT NURSE NOTE - NSFALLHARMRISKINTERV_ED_ALL_ED
Assistance OOB with selected safe patient handling equipment if applicable/Assistance with ambulation/Communicate risk of Fall with Harm to all staff, patient, and family/Encourage patient to sit up slowly, dangle for a short time, stand at bedside before walking/Monitor gait and stability/Orthostatic vital signs/Provide visual cue: red socks, yellow wristband, yellow gown, etc/Reinforce activity limits and safety measures with patient and family/Bed in lowest position, wheels locked, appropriate side rails in place/Call bell, personal items and telephone in reach/Instruct patient to call for assistance before getting out of bed/chair/stretcher/Non-slip footwear applied when patient is off stretcher/[place] to call system/Physically safe environment - no spills, clutter or unnecessary equipment/Purposeful Proactive Rounding/Room/bathroom lighting operational, light cord in reach

## 2023-08-26 NOTE — ED PROVIDER NOTE - OBJECTIVE STATEMENT
87 y/o female w/ a PMHx of GERD, metastatic melanoma s/p excision, lymphedema, hypoadrenalism, HTN on Amlodipine and Metoprolol, HLD, Afib on Eliquis, pacemaker, cardiac murmur, and cataract s/p extraction presents to the ED BIB son for dizziness since waking up at approx 7am today. Pt states she went to sleep normally last night at approx 11:30pm, also woke up twice in the middle of the night to use the bathroom at approx 4am and still felt fine. Pt states dizziness is only present w/ head movements and walking. Denies fever, chills, weakness, n/v, chills, CP, syncope, urinary Sx, ear pain, or hx of similar Sx. Pt notes she recently started Cipro for a UTI that had + cultures for bacteria, first dose taken at 9pm last night. Pt had a cardiac workup this past week w/ Dr. Shepard which was normal. Pt endorses difficulty ambulating secondary to dizziness. Pt doesn't have any urinary symptoms, found to have UTI on routine test. Pt last took Cipro for a UTI last month. Pt instructed to stop Amlodipine in July by Dr. Shepard as pt was becoming hypotensive. No other complaints at this time. Pt hasn't taken her morning meds. Allergies: Penicillin. PCP: Dr. GINA Adrian. Cardio: Dr. Shepard.

## 2023-08-26 NOTE — ED ADULT TRIAGE NOTE - CHIEF COMPLAINT QUOTE
pt bib son c/o dizziness since 7am, 1st time cipro dose, HTN. Pt woke up dizzy. HX AFib, HTN, on furosemide, adrenal insuffiencey. Allergy to penicillin. Family hx of CVA. No code stroke called by MD Goodson.

## 2023-08-26 NOTE — ED PROVIDER NOTE - NSFOLLOWUPCLINICS_GEN_ALL_ED_FT
[facility]  Neurology  [address] [place] [address] - [address] [place] [address]  Phone: [phone]  Fax:   Follow Up Time: 4-6 Days

## 2023-08-26 NOTE — ED PROVIDER NOTE - CLINICAL SUMMARY MEDICAL DECISION MAKING FREE TEXT BOX
Differential includes BPV, no evidence of central vertigo or CVA. BP likely d/t discomfort. Plan for CBC, CMP, trop, EKG, Ct head, Meclizine, and re-assess. Differential includes BPPV, no evidence of central vertigo or CVA. BP likely d/t discomfort. Plan for CBC, CMP, trop, EKG, Ct head, Meclizine, and re-assess.

## 2023-08-26 NOTE — ED PROVIDER NOTE - PROGRESS NOTE DETAILS
Pt is feeling much better, ambulating without difficulty. CT and labs wnl. Pt ready for d/c. Referred to neurology, and return precautions provided.

## 2023-08-28 ENCOUNTER — OUTPATIENT (OUTPATIENT)
Dept: OUTPATIENT SERVICES | Facility: HOSPITAL | Age: 88
LOS: 1 days | Discharge: HOME | End: 2023-08-28

## 2023-08-28 DIAGNOSIS — Z90.89 ACQUIRED ABSENCE OF OTHER ORGANS: Chronic | ICD-10-CM

## 2023-08-28 DIAGNOSIS — C43.72 MALIGNANT MELANOMA OF LEFT LOWER LIMB, INCLUDING HIP: ICD-10-CM

## 2023-08-28 DIAGNOSIS — Z98.49 CATARACT EXTRACTION STATUS, UNSPECIFIED EYE: Chronic | ICD-10-CM

## 2023-08-28 DIAGNOSIS — Z98.890 OTHER SPECIFIED POSTPROCEDURAL STATES: Chronic | ICD-10-CM

## 2023-09-01 LAB
ALBUMIN SERPL ELPH-MCNC: 4.4 G/DL
ALP BLD-CCNC: 44 U/L
ALT SERPL-CCNC: 28 U/L
ANION GAP SERPL CALC-SCNC: 15 MMOL/L
AST SERPL-CCNC: 22 U/L
BILIRUB SERPL-MCNC: 0.7 MG/DL
BUN SERPL-MCNC: 29 MG/DL
CALCIUM SERPL-MCNC: 9.9 MG/DL
CHLORIDE SERPL-SCNC: 102 MMOL/L
CO2 SERPL-SCNC: 26 MMOL/L
CREAT SERPL-MCNC: 0.91 MG/DL
EGFR: 61 ML/MIN/1.73M2
GLUCOSE SERPL-MCNC: 100 MG/DL
LDH SERPL-CCNC: 214 U/L
POTASSIUM SERPL-SCNC: 3.7 MMOL/L
PROT SERPL-MCNC: 6.5 G/DL
SODIUM SERPL-SCNC: 143 MMOL/L

## 2023-09-07 ENCOUNTER — APPOINTMENT (OUTPATIENT)
Dept: HEMATOLOGY ONCOLOGY | Facility: CLINIC | Age: 88
End: 2023-09-07
Payer: MEDICARE

## 2023-09-07 PROCEDURE — 99214 OFFICE O/P EST MOD 30 MIN: CPT

## 2023-09-08 ENCOUNTER — APPOINTMENT (OUTPATIENT)
Dept: NEUROLOGY | Facility: CLINIC | Age: 88
End: 2023-09-08
Payer: MEDICARE

## 2023-09-08 VITALS
BODY MASS INDEX: 24.07 KG/M2 | HEART RATE: 64 BPM | WEIGHT: 141 LBS | DIASTOLIC BLOOD PRESSURE: 80 MMHG | TEMPERATURE: 98.2 F | SYSTOLIC BLOOD PRESSURE: 127 MMHG | HEIGHT: 64 IN

## 2023-09-08 DIAGNOSIS — H81.10 BENIGN PAROXYSMAL VERTIGO, UNSPECIFIED EAR: ICD-10-CM

## 2023-09-08 PROCEDURE — 99204 OFFICE O/P NEW MOD 45 MIN: CPT

## 2023-09-08 RX ORDER — ERYTHROMYCIN 5 MG/G
5 OINTMENT OPHTHALMIC
Qty: 4 | Refills: 0 | Status: DISCONTINUED | COMMUNITY
Start: 2021-01-19 | End: 2023-09-08

## 2023-09-08 RX ORDER — MECLIZINE HYDROCHLORIDE 12.5 MG/1
12.5 TABLET ORAL
Qty: 24 | Refills: 0 | Status: ACTIVE | COMMUNITY
Start: 2023-09-08 | End: 1900-01-01

## 2023-09-08 RX ORDER — DAPAGLIFLOZIN 10 MG/1
10 TABLET, FILM COATED ORAL DAILY
Refills: 0 | Status: ACTIVE | COMMUNITY

## 2023-09-08 NOTE — HISTORY OF PRESENT ILLNESS
[FreeTextEntry1] : Ms. Romano presents for neurology evaluation. She is here today with her son. She was seen in the ED at [facility] on 8/26/23. She says that she had woken up, moved her head and felt dizzy. She describes it as a dizzy moment. By the time she sat up she did not feel dizzy. She went downstairs to check her BP (this has been running low lately). Her SBP was 170/98. She went to the ED. She had a CT head. In the ED she felt dizzy when she turned her head to the left. She was discharged home after about five hours with a diagnosis of BPPV. The night she got home she was getting out of her recliner and had a sudden episode where the room seemed to be spinning. It only lasted a moment. She was prescribed meclizine. She took it for five days (although she did not feel dizzy). She did not have any nausea. She did not have any focal weakness, double vision, slurred speech, aphasia or focal numbness/tingling.  She has a history of atrial fibrillation and is on Eliquis.  She has a pacemaker. Her first pacemaker was placed in 2009. A new battery was placed in 2016.

## 2023-09-08 NOTE — PHYSICAL EXAM
[FreeTextEntry1] : Examination: Constitutional: normal, no apparent distress Eyes: normal conjunctiva b/l, no ptosis, visual fields full Respiratory: no respiratory distress, normal effort, normal auscultation Cardiovascular: normal rate, rhythm, no murmurs Neck: supple, no masses Vascular: carotids normal Skin: normal color, no rashes Psych: normal mood, affect  Neurological: Memory: normal memory, oriented to person, place, time Language intact/no aphasia Cranial Nerves: II-XII intact, Pupils equally round and reactive to light, ocular muscles/movements intact, no ptosis, no facial weakness, tongue protrudes normally in the midline,  Motor: normal tone, no pronator drift, full strength in all four extremities in the proximal and distal muscle groups Coordination: Fine motor movements intact, rapid alternating movements intact, finger to nose intact bilaterally Sensory: intact to light touch, vibration, joint position sense DTRs: symmetric, 1+ in b/l triceps, 1+ in b/l biceps, 1+ in b/l brachioradialis, 1+ in bilateral patellars, absent in bilateral Achilles, Babinskis negative bilaterally Gait: narrow based, steady

## 2023-09-08 NOTE — HISTORY OF PRESENT ILLNESS
[Disease: _____________________] : Disease: [unfilled] [T: ___] : T[unfilled] [N: ___] : N[unfilled] [M: ___] : M[unfilled] [AJCC Stage: ____] : AJCC Stage: [unfilled] [de-identified] : 88F with HTN, PPM, atrial fibrillation ( Dr. 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  CASE SYNOPSIS: May 2019- patient notices a bleeding left heel cutaneous lesion;   5/1/19- punch biopsy performed by a dermatologist ([facility]) ; pathology: - left heel superior: malignant melanoma, 4.2 mm thick with ulcerations, pT4b. -left heel inferior: malignant melanoma, 4.2 mm thick, with ulceration, pT4b.  5/28/19- left heel wide resection and sentinel lymph node biopsy (); pathology: -Left inguinal lymph node biopsy: 2/4 lymph nodes positive for melanoma -Skin and soft tissue, left heel foot, consistent with melanoma, 3.5 mm thickness, margins negative, pT4b, pN 2a= stage III C  6/15/19: PET- residual FDG avidity along the surgical defect consistent with postsurgical changes, or residual disease, or combination of the two. No FDG avid local regional or distant metastatic disease.   7/15/19- started Nivolumab under the care of Dr. Mcbride at [facility].  8/12/19- cycle # 2 Nivolumab also at [facility].  10/15/19: Left lower extremity ultrasound-benign appearing left inguinal lymph nodes, containing fatty hilum, measuring 0.9 x 0.8 x 1.3 cm, 1.2 x 0.7 x 0.9 cm, and one 0.5 x 0.8 x 0.9 cm. Additional fluid collection representing seroma present in the left groin.  1/27- 1/31/20 patient admitted at [facility] with SBO secondary to left inguinal hernia; diagnostic laparoscopy with reduction of incarcerated bowel in the left inguinal hernia/open repair performed 1/27/20.  2/5/20- endocrine consult for possible drug- induced adrenal insufficiency.  2/8/20- Ultrasound left lower extremity- no left groin lymphadenopathy. Probable left groin postoperative collections/seromas.  6/14/2020-exploratory laparotomy, left femoral hernia repair, small bowel resection (due to incarcerated hernia).  7/15/19 - 7/22/20: completed 1 year maintenance Nivolumab.  September 2020-admitted with bilateral pneumonia; tested negative for COVID.  8/7/21- LDH : 209  9/14/2021: PET scan-postsurgical changes left heel with multiple surgical clips and high attenuation material.  Residual FDG avidity along the surgical defect consistent with postsurgical changes/inflammation, and postsurgical collection left inguinal region.  No FDG avid local regional or distant metastatic disease.  2/2/22: LDH :266  8/19/22:   9/21/22: Molecular diagnostic tests of original malignant melanoma biopsy from 5/1/2019-no reportable mutations identified in BRAF gene.  9/28/2022: PET- interval resolution of mild linear FDG avidity in distal small bowel.  New focal mild FDG avidity in the sigmoid colon with no definite abnormality on CT.  No evidence of recurrent or FDG avid disease. [de-identified] : acral lentiginous malignant melanoma [FreeTextEntry1] : Nivolumab- flat dose monthly [de-identified] : Returning for biannual oncologic follow up.  Since her last visit 6 months ago patient has followed up with surgery, dermatology.  Continues warfarin for paroxysmal atrial fibrillation; follows up with Dr. Shepard (cardiology).  Overall physical status unchanged and patient remains constitutionally stable. Denies B symptoms. No reports of hospitalizations, but went to ED for an episode of vertigo 2 weeks ago.  Compliant with medication (list includes Meclizine).  Hematologic picture stable; CBC and LDH in normal limits in August 2023.  Accompanied by her son, Philip.

## 2023-09-08 NOTE — PHYSICAL EXAM
[Restricted in physically strenuous activity but ambulatory and able to carry out work of a light or sedentary nature] : Status 1- Restricted in physically strenuous activity but ambulatory and able to carry out work of a light or sedentary nature, e.g., light house work, office work [Normal] : normal appearance, no rash, nodules, vesicles, ulcers, erythema [de-identified] : s/p left inguinal hernia repair [de-identified] : left heel wound  healed, hypopigmented skin; left sole cellulitis resolved

## 2023-09-08 NOTE — DATA REVIEWED
[de-identified] : CT head 8/26/23: No acute hemorrhage mass or mass effect  If symptoms continue MRI of brain can be done for further evaluation if  there are no contraindications.

## 2023-09-08 NOTE — DISCUSSION/SUMMARY
[FreeTextEntry1] : Ms. Romano is an 88 year old woman who was seen in the emergency department on 8/26/2023 after an episode of dizziness.  She had a another episode while in the emergency room and a third episode when she got home that night.  All episodes were brief and were associated with position change. She had a CT head which did not show any acute pathology.  I do believe that she had benign paroxysmal positional vertigo. She had no other neurological symptoms at the time to suggest stroke/TIA. Her neurological examination is non-focal at this time.  I will try to find out from electrophysiology whether her pacemakers are compatible with MRI.  She had her first pacemaker placed in 2009.  The generator was changed in 2016.  It is possible that the leads from the original pacemaker are not compatible with MRI.  I have sent a message to cardiology EP.  She has a history of atrial fibrillation and is on Eliquis which should be good protection from stroke.  At this time her symptoms have resolved.  If symptoms recur and are persistent she can be referred to vestibular therapy. I am also giving her a prescription for meclizine 12.5 to be used in the event of a recurrence of vertigo symptoms.  She could take this medication 2-3 times per day.  She was advised that it may cause drowsiness.  She should follow up as needed.

## 2023-09-08 NOTE — CONSULT LETTER
[Dear  ___] : Dear ~LARA, [Consult Letter:] : I had the pleasure of evaluating your patient, [unfilled]. [Please see my note below.] : Please see my note below. [Consult Closing:] : Thank you very much for allowing me to participate in the care of this patient.  If you have any questions, please do not hesitate to contact me. [FreeTextEntry2] : Mary Adrian [FreeTextEntry3] : Sincerely,   Lashonda Payne MD Diplomate, American Academy of Psychiatry and Neurology Board Certified in the Subspecialty of Clinical Neurophysiology Board Certified in the Subspecialty of Sleep Medicine Board Certified in the Subspecialty of Epilepsy [DrElizabeth  ___] : Dr. COLUNGA

## 2023-09-19 ENCOUNTER — APPOINTMENT (OUTPATIENT)
Dept: ENDOCRINOLOGY | Facility: CLINIC | Age: 88
End: 2023-09-19
Payer: MEDICARE

## 2023-09-19 VITALS
TEMPERATURE: 98 F | OXYGEN SATURATION: 98 % | WEIGHT: 146.13 LBS | DIASTOLIC BLOOD PRESSURE: 80 MMHG | BODY MASS INDEX: 24.95 KG/M2 | SYSTOLIC BLOOD PRESSURE: 110 MMHG | HEIGHT: 64 IN | HEART RATE: 75 BPM

## 2023-09-19 DIAGNOSIS — K21.9 GASTRO-ESOPHAGEAL REFLUX DISEASE W/OUT ESOPHAGITIS: ICD-10-CM

## 2023-09-19 PROCEDURE — 99214 OFFICE O/P EST MOD 30 MIN: CPT

## 2023-09-19 PROCEDURE — 36415 COLL VENOUS BLD VENIPUNCTURE: CPT

## 2023-09-20 LAB
24R-OH-CALCIDIOL SERPL-MCNC: 55.5 PG/ML
25(OH)D3 SERPL-MCNC: 54.1 NG/ML
ANION GAP SERPL CALC-SCNC: 15 MMOL/L
BUN SERPL-MCNC: 25 MG/DL
CALCIUM SERPL-MCNC: 10.2 MG/DL
CHLORIDE SERPL-SCNC: 99 MMOL/L
CHOLEST SERPL-MCNC: 222 MG/DL
CO2 SERPL-SCNC: 27 MMOL/L
CORTIS SERPL-MCNC: 1.5 UG/DL
CREAT SERPL-MCNC: 0.82 MG/DL
EGFR: 69 ML/MIN/1.73M2
FOLATE SERPL-MCNC: >20 NG/ML
GLUCOSE SERPL-MCNC: 94 MG/DL
HDLC SERPL-MCNC: 108 MG/DL
LDLC SERPL CALC-MCNC: 100 MG/DL
NONHDLC SERPL-MCNC: 114 MG/DL
POTASSIUM SERPL-SCNC: 4.4 MMOL/L
SODIUM SERPL-SCNC: 142 MMOL/L
T3FREE SERPL-MCNC: 2.4 PG/ML
T4 FREE SERPL-MCNC: 1.5 NG/DL
TRIGL SERPL-MCNC: 83 MG/DL
TSH SERPL-ACNC: 1.48 UIU/ML
VIT B12 SERPL-MCNC: 679 PG/ML

## 2023-09-27 ENCOUNTER — EMERGENCY (EMERGENCY)
Facility: HOSPITAL | Age: 88
LOS: 0 days | Discharge: HOME | End: 2023-09-28
Attending: EMERGENCY MEDICINE
Payer: MEDICARE

## 2023-09-27 ENCOUNTER — OUTPATIENT (OUTPATIENT)
Dept: OUTPATIENT SERVICES | Facility: HOSPITAL | Age: 88
LOS: 1 days | End: 2023-09-27
Payer: MEDICARE

## 2023-09-27 ENCOUNTER — APPOINTMENT (OUTPATIENT)
Dept: CT IMAGING | Facility: CLINIC | Age: 88
End: 2023-09-27
Payer: MEDICARE

## 2023-09-27 VITALS — HEIGHT: 64 IN | WEIGHT: 139.99 LBS

## 2023-09-27 DIAGNOSIS — R11.2 NAUSEA WITH VOMITING, UNSPECIFIED: ICD-10-CM

## 2023-09-27 DIAGNOSIS — I89.0 LYMPHEDEMA, NOT ELSEWHERE CLASSIFIED: ICD-10-CM

## 2023-09-27 DIAGNOSIS — E27.40 UNSPECIFIED ADRENOCORTICAL INSUFFICIENCY: ICD-10-CM

## 2023-09-27 DIAGNOSIS — Z98.890 OTHER SPECIFIED POSTPROCEDURAL STATES: Chronic | ICD-10-CM

## 2023-09-27 DIAGNOSIS — Z88.0 ALLERGY STATUS TO PENICILLIN: ICD-10-CM

## 2023-09-27 DIAGNOSIS — K21.9 GASTRO-ESOPHAGEAL REFLUX DISEASE WITHOUT ESOPHAGITIS: ICD-10-CM

## 2023-09-27 DIAGNOSIS — Z90.89 ACQUIRED ABSENCE OF OTHER ORGANS: Chronic | ICD-10-CM

## 2023-09-27 DIAGNOSIS — Z79.01 LONG TERM (CURRENT) USE OF ANTICOAGULANTS: ICD-10-CM

## 2023-09-27 DIAGNOSIS — I10 ESSENTIAL (PRIMARY) HYPERTENSION: ICD-10-CM

## 2023-09-27 DIAGNOSIS — I48.91 UNSPECIFIED ATRIAL FIBRILLATION: ICD-10-CM

## 2023-09-27 DIAGNOSIS — Z85.820 PERSONAL HISTORY OF MALIGNANT MELANOMA OF SKIN: ICD-10-CM

## 2023-09-27 DIAGNOSIS — Z98.49 CATARACT EXTRACTION STATUS, UNSPECIFIED EYE: Chronic | ICD-10-CM

## 2023-09-27 DIAGNOSIS — Z95.0 PRESENCE OF CARDIAC PACEMAKER: ICD-10-CM

## 2023-09-27 DIAGNOSIS — E78.5 HYPERLIPIDEMIA, UNSPECIFIED: ICD-10-CM

## 2023-09-27 DIAGNOSIS — Z90.89 ACQUIRED ABSENCE OF OTHER ORGANS: ICD-10-CM

## 2023-09-27 DIAGNOSIS — C43.72 MALIGNANT MELANOMA OF LEFT LOWER LIMB, INCLUDING HIP: ICD-10-CM

## 2023-09-27 LAB
ALBUMIN SERPL ELPH-MCNC: 3.7 G/DL — SIGNIFICANT CHANGE UP (ref 3.3–5)
ALP SERPL-CCNC: 50 U/L — SIGNIFICANT CHANGE UP (ref 40–120)
ALT FLD-CCNC: 92 U/L — HIGH (ref 12–78)
ANION GAP SERPL CALC-SCNC: 4 MMOL/L — LOW (ref 5–17)
AST SERPL-CCNC: 56 U/L — HIGH (ref 15–37)
BASOPHILS # BLD AUTO: 0.03 K/UL — SIGNIFICANT CHANGE UP (ref 0–0.2)
BASOPHILS NFR BLD AUTO: 0.5 % — SIGNIFICANT CHANGE UP (ref 0–2)
BILIRUB SERPL-MCNC: 1 MG/DL — SIGNIFICANT CHANGE UP (ref 0.2–1.2)
BUN SERPL-MCNC: 28 MG/DL — HIGH (ref 7–23)
CALCIUM SERPL-MCNC: 9.3 MG/DL — SIGNIFICANT CHANGE UP (ref 8.5–10.1)
CHLORIDE SERPL-SCNC: 104 MMOL/L — SIGNIFICANT CHANGE UP (ref 96–108)
CO2 SERPL-SCNC: 33 MMOL/L — HIGH (ref 22–31)
CREAT SERPL-MCNC: 0.79 MG/DL — SIGNIFICANT CHANGE UP (ref 0.5–1.3)
EGFR: 72 ML/MIN/1.73M2 — SIGNIFICANT CHANGE UP
EOSINOPHIL # BLD AUTO: 0.04 K/UL — SIGNIFICANT CHANGE UP (ref 0–0.5)
EOSINOPHIL NFR BLD AUTO: 0.6 % — SIGNIFICANT CHANGE UP (ref 0–6)
GLUCOSE SERPL-MCNC: 117 MG/DL — HIGH (ref 70–99)
HCT VFR BLD CALC: 45.5 % — HIGH (ref 34.5–45)
HGB BLD-MCNC: 14.5 G/DL — SIGNIFICANT CHANGE UP (ref 11.5–15.5)
IMM GRANULOCYTES NFR BLD AUTO: 0.3 % — SIGNIFICANT CHANGE UP (ref 0–0.9)
LIDOCAIN IGE QN: 29 U/L — SIGNIFICANT CHANGE UP (ref 13–75)
LYMPHOCYTES # BLD AUTO: 0.43 K/UL — LOW (ref 1–3.3)
LYMPHOCYTES # BLD AUTO: 6.7 % — LOW (ref 13–44)
MANUAL SMEAR VERIFICATION: SIGNIFICANT CHANGE UP
MCHC RBC-ENTMCNC: 31.1 PG — SIGNIFICANT CHANGE UP (ref 27–34)
MCHC RBC-ENTMCNC: 31.9 GM/DL — LOW (ref 32–36)
MCV RBC AUTO: 97.6 FL — SIGNIFICANT CHANGE UP (ref 80–100)
MONOCYTES # BLD AUTO: 0.58 K/UL — SIGNIFICANT CHANGE UP (ref 0–0.9)
MONOCYTES NFR BLD AUTO: 9 % — SIGNIFICANT CHANGE UP (ref 2–14)
NEUTROPHILS # BLD AUTO: 5.34 K/UL — SIGNIFICANT CHANGE UP (ref 1.8–7.4)
NEUTROPHILS NFR BLD AUTO: 82.9 % — HIGH (ref 43–77)
PLAT MORPH BLD: NORMAL — SIGNIFICANT CHANGE UP
PLATELET # BLD AUTO: 116 K/UL — LOW (ref 150–400)
PLATELET COUNT - ESTIMATE: ABNORMAL
POTASSIUM SERPL-MCNC: 3.8 MMOL/L — SIGNIFICANT CHANGE UP (ref 3.5–5.3)
POTASSIUM SERPL-SCNC: 3.8 MMOL/L — SIGNIFICANT CHANGE UP (ref 3.5–5.3)
PROT SERPL-MCNC: 6.8 GM/DL — SIGNIFICANT CHANGE UP (ref 6–8.3)
RBC # BLD: 4.66 M/UL — SIGNIFICANT CHANGE UP (ref 3.8–5.2)
RBC # FLD: 14.3 % — SIGNIFICANT CHANGE UP (ref 10.3–14.5)
RBC BLD AUTO: NORMAL — SIGNIFICANT CHANGE UP
RENIN ACTIVITY, PLASMA: 1.47 NG/ML/HR
SODIUM SERPL-SCNC: 141 MMOL/L — SIGNIFICANT CHANGE UP (ref 135–145)
WBC # BLD: 6.44 K/UL — SIGNIFICANT CHANGE UP (ref 3.8–10.5)
WBC # FLD AUTO: 6.44 K/UL — SIGNIFICANT CHANGE UP (ref 3.8–10.5)

## 2023-09-27 PROCEDURE — 74177 CT ABD & PELVIS W/CONTRAST: CPT

## 2023-09-27 PROCEDURE — 99284 EMERGENCY DEPT VISIT MOD MDM: CPT | Mod: 25

## 2023-09-27 PROCEDURE — 74177 CT ABD & PELVIS W/CONTRAST: CPT | Mod: 26,MH

## 2023-09-27 PROCEDURE — 96374 THER/PROPH/DIAG INJ IV PUSH: CPT

## 2023-09-27 PROCEDURE — 80053 COMPREHEN METABOLIC PANEL: CPT

## 2023-09-27 PROCEDURE — 99284 EMERGENCY DEPT VISIT MOD MDM: CPT

## 2023-09-27 PROCEDURE — 85025 COMPLETE CBC W/AUTO DIFF WBC: CPT

## 2023-09-27 PROCEDURE — 36415 COLL VENOUS BLD VENIPUNCTURE: CPT

## 2023-09-27 PROCEDURE — 83690 ASSAY OF LIPASE: CPT

## 2023-09-27 PROCEDURE — 96375 TX/PRO/DX INJ NEW DRUG ADDON: CPT

## 2023-09-27 RX ORDER — FAMOTIDINE 10 MG/ML
20 INJECTION INTRAVENOUS ONCE
Refills: 0 | Status: COMPLETED | OUTPATIENT
Start: 2023-09-27 | End: 2023-09-27

## 2023-09-27 RX ORDER — ONDANSETRON 8 MG/1
4 TABLET, FILM COATED ORAL ONCE
Refills: 0 | Status: COMPLETED | OUTPATIENT
Start: 2023-09-27 | End: 2023-09-27

## 2023-09-27 RX ORDER — SODIUM CHLORIDE 9 MG/ML
1000 INJECTION INTRAMUSCULAR; INTRAVENOUS; SUBCUTANEOUS ONCE
Refills: 0 | Status: COMPLETED | OUTPATIENT
Start: 2023-09-27 | End: 2023-09-27

## 2023-09-27 RX ORDER — KETOROLAC TROMETHAMINE 30 MG/ML
15 SYRINGE (ML) INJECTION ONCE
Refills: 0 | Status: DISCONTINUED | OUTPATIENT
Start: 2023-09-27 | End: 2023-09-27

## 2023-09-27 RX ADMIN — SODIUM CHLORIDE 1000 MILLILITER(S): 9 INJECTION INTRAMUSCULAR; INTRAVENOUS; SUBCUTANEOUS at 22:37

## 2023-09-27 RX ADMIN — Medication 15 MILLIGRAM(S): at 22:35

## 2023-09-27 RX ADMIN — FAMOTIDINE 20 MILLIGRAM(S): 10 INJECTION INTRAVENOUS at 22:35

## 2023-09-27 RX ADMIN — ONDANSETRON 4 MILLIGRAM(S): 8 TABLET, FILM COATED ORAL at 22:36

## 2023-09-27 NOTE — ED PROVIDER NOTE - CLINICAL SUMMARY MEDICAL DECISION MAKING FREE TEXT BOX
reeval at 2350:   pt and family told of results.   states feels better.   denies any current n/v.   no emesis in ED.   agree with plan for d/c home and will f/u.   pt states still has zofran at home that she can take if needed. 89 y/o female with h/o melanoma in ED c/o n/v today after completing CT A/P with IV contrast outpt.    no meds taken for symptoms.   pt states has had IV contrast years ago with no issues.   states unable to tolerate PO.   pt denies any fever, HA, cp, sob, abd pain or diarrhea.   no sick contacts.   pt with dry MM.   abd soft and NT.   no current active emesis.   possible gastritis vs IV contrast reaction.   will check labs, IVF< meds and reeval    reeval at 2350:   pt and family told of results.   states feels better.   denies any current n/v.   no emesis in ED.   agree with plan for d/c home and will f/u.   pt states still has zofran at home that she can take if needed.

## 2023-09-27 NOTE — ED PROVIDER NOTE - PATIENT PORTAL LINK FT
You can access the FollowMyHealth Patient Portal offered by [facility] by registering at the following website: [email or web address]. By joining [employer]’s FollowMyHealth portal, you will also be able to view your health information using other applications (apps) compatible with our system.

## 2023-09-27 NOTE — ED ADULT TRIAGE NOTE - CHIEF COMPLAINT QUOTE
Pt. presents to ED c/o vomiting. Pt. reports having CTAP with contrast this morning at appx 1130. Pt. then reports she has been vomiting since 2pm. Pt. is unsure if she is having a reaction to the contrast. Pt. reports having CT scans in the past with no reaction. Pt. denies respiratory symptoms, breathing even and unlabored. Pt. endorses chills and 1 "soft, dark bowel movement". Denies fevers.

## 2023-09-27 NOTE — ED PROVIDER NOTE - NSFOLLOWUPINSTRUCTIONS_ED_ALL_ED_FT
please follow up with your doctor in 2-3 days.   continue with medications as prescribed.   drink plenty of fluids.   return to ED for any concerns

## 2023-09-28 VITALS
DIASTOLIC BLOOD PRESSURE: 58 MMHG | RESPIRATION RATE: 17 BRPM | SYSTOLIC BLOOD PRESSURE: 103 MMHG | OXYGEN SATURATION: 95 % | HEART RATE: 80 BPM | TEMPERATURE: 98 F

## 2023-09-29 NOTE — ED ADULT NURSE NOTE - NSFALLDEVICES_ED_ALL_ED
Pt's friend ([place]) stated she informed the NP that when she got home after pt's appt on 9/12/23 she realized pt is currently taking losartan 100mg, but was prescribed 25mg by NP, [place] would like to know which one should the pt take?       [id number] None

## 2023-10-17 ENCOUNTER — OFFICE (OUTPATIENT)
Dept: URBAN - METROPOLITAN AREA CLINIC 112 | Facility: CLINIC | Age: 88
Setting detail: OPHTHALMOLOGY
End: 2023-10-17
Payer: MEDICARE

## 2023-10-17 DIAGNOSIS — H01.005: ICD-10-CM

## 2023-10-17 DIAGNOSIS — H43.393: ICD-10-CM

## 2023-10-17 DIAGNOSIS — H35.033: ICD-10-CM

## 2023-10-17 DIAGNOSIS — H35.3111: ICD-10-CM

## 2023-10-17 DIAGNOSIS — H04.123: ICD-10-CM

## 2023-10-17 DIAGNOSIS — H01.002: ICD-10-CM

## 2023-10-17 DIAGNOSIS — H35.3121: ICD-10-CM

## 2023-10-17 PROCEDURE — 92014 COMPRE OPH EXAM EST PT 1/>: CPT | Performed by: OPHTHALMOLOGY

## 2023-10-17 PROCEDURE — 92250 FUNDUS PHOTOGRAPHY W/I&R: CPT | Performed by: OPHTHALMOLOGY

## 2023-10-17 ASSESSMENT — LID EXAM ASSESSMENTS
OD_MEIBOMITIS: RLL T
OS_MEIBOMITIS: LLL T
OS_BLEPHARITIS: LLL T
OD_BLEPHARITIS: RLL T

## 2023-10-17 ASSESSMENT — REFRACTION_AUTOREFRACTION
OS_SPHERE: +0.75
OS_CYLINDER: -1.25
OD_AXIS: 094
OS_AXIS: 084
OD_SPHERE: +1.00
OD_CYLINDER: -1.75

## 2023-10-17 ASSESSMENT — REFRACTION_CURRENTRX
OD_OVR_VA: 20/
OS_OVR_VA: 20/
OD_SPHERE: +1.25
OS_SPHERE: +1.25

## 2023-10-17 ASSESSMENT — KERATOMETRY
METHOD_AUTO_MANUAL: AUTO
OD_AXISANGLE_DEGREES: 006
OS_K2POWER_DIOPTERS: 43.75
OD_K1POWER_DIOPTERS: 42.00
OS_AXISANGLE_DEGREES: 172
OD_K2POWER_DIOPTERS: 43.50
OS_K1POWER_DIOPTERS: 42.75

## 2023-10-17 ASSESSMENT — TONOMETRY
OS_IOP_MMHG: 17
OD_IOP_MMHG: 15

## 2023-10-17 ASSESSMENT — SPHEQUIV_DERIVED
OD_SPHEQUIV: 0.125
OS_SPHEQUIV: 0.125

## 2023-10-17 ASSESSMENT — AXIALLENGTH_DERIVED
OS_AL: 23.6351
OD_AL: 23.8209

## 2023-10-17 ASSESSMENT — VISUAL ACUITY
OD_BCVA: 20/20-1
OS_BCVA: 20/30

## 2023-11-13 NOTE — PATIENT PROFILE ADULT - ABILITY TO HEAR (WITH HEARING AID OR HEARING APPLIANCE IF NORMALLY USED):
48M hx DM, HTN, HL, MO (BMI 34.8) here as same day admission s/p laparoscopic sleeve gastrectomy.
Adequate: hears normal conversation without difficulty
55

## 2023-11-14 NOTE — ED ADULT NURSE NOTE - NS PRO PASSIVE SMOKE EXP
No
Dr. Clifford: I performed a face to face bedside interview with patient regarding history of present illness, review of symptoms and past medical history. I completed an independent physical exam.  I have discussed patient's plan of care with PA.   I agree with note as stated above, having amended the EMR as needed to reflect my findings.   This includes HISTORY OF PRESENT ILLNESS, HIV, PAST MEDICAL/SURGICAL/FAMILY/SOCIAL HISTORY, ALLERGIES AND HOME MEDICATIONS, REVIEW OF SYSTEMS, PHYSICAL EXAM, and any PROGRESS NOTES during the time I functioned as the attending physician for this patient.  GEORGIE Clifford DO

## 2023-12-04 NOTE — ED ADULT NURSE NOTE - NSFALLUNIVINTERV_ED_ALL_ED
Yes Bed/Stretcher in lowest position, wheels locked, appropriate side rails in place/Call bell, personal items and telephone in reach/Instruct patient to call for assistance before getting out of bed/chair/stretcher/Non-slip footwear applied when patient is off stretcher/[place] to call system/Physically safe environment - no spills, clutter or unnecessary equipment/Purposeful proactive rounding/Room/bathroom lighting operational, light cord in reach

## 2024-01-05 NOTE — ASSESSMENT
[FreeTextEntry1] : Ms. HULL 's questions were answered to her satisfaction. She will return to the office  in 3 months.\par 
No
Hypertension

## 2024-02-06 NOTE — ADDENDUM
[FreeTextEntry1] : I, Jeb Torre, acted solely as a scribe for Dr. Perez Russo on this date 07/03/2019.\par  All other review of systems negative, except as noted in HPI

## 2024-02-07 ENCOUNTER — APPOINTMENT (OUTPATIENT)
Dept: SURGICAL ONCOLOGY | Facility: CLINIC | Age: 89
End: 2024-02-07
Payer: MEDICARE

## 2024-02-07 VITALS
DIASTOLIC BLOOD PRESSURE: 90 MMHG | HEART RATE: 88 BPM | OXYGEN SATURATION: 98 % | WEIGHT: 146 LBS | SYSTOLIC BLOOD PRESSURE: 140 MMHG | HEIGHT: 64 IN | BODY MASS INDEX: 24.92 KG/M2

## 2024-02-07 PROCEDURE — 99214 OFFICE O/P EST MOD 30 MIN: CPT

## 2024-02-07 NOTE — PHYSICAL EXAM
[Normal] : supple, no neck mass and thyroid not enlarged [Normal] : oriented to person, place and time, with appropriate affect [de-identified] : No inguinal adenopathy [de-identified] : multiple varicose veins 1/2+ left lower extremity edema [de-identified] : Left heel skin graft well-healed.  No evidence of recurrence. No adenopathy. No suspicious skin lesions.

## 2024-02-07 NOTE — ADDENDUM
[FreeTextEntry1] : I, Jena Branch, acted solely as a scribe for Dr. Perez Russo on this date 02/07/2024.

## 2024-02-07 NOTE — ASSESSMENT
[FreeTextEntry1] : History of stage III left heel melanoma s/p WEX 5/2019 ROXANN Normal LDH August 2023  Continue dermatologic surveillance with Dr. Olson Continue yearly blood work including LDH level 8/2024 RTO 1 year

## 2024-02-07 NOTE — HISTORY OF PRESENT ILLNESS
[de-identified] : 89 y/o female presents a follow-up visit for stage III left heel melanoma, s/p wide excision on 5/2019.  She completed immunotherapy Nivolumab July 2020 as per Dr. Kimbrough.  She follows up with her dermatologist, Dr. Olson and reports no new biopsies.    LDH (8/31/23): 214 wnl   She was hospitalized at [facility] in June 2023 for UTI.  CT C/A/P performed revealed thickening of the gastric fundus or body possibly due to underdistention or gastritis.  Advised to see GI for follow up but never did.  She is also overdue for dermatology follow up.  She denies any new or changing skin lesion or constitutional symptoms.   PET/CT 9/28/22- Interval resolution of mild linear FDG avidity in distal small bowel. New focal mild FDG avidity in the sigmoid colon with no definite abnormality on CT. Please correlate clinically or with colonoscopy as indicated.  Otherwise, no evidence of recurrent or FDG avid disease.  LDH 8/19/22: 204 (wnl)  Follow up LDH 3/2/22: 223 (wnl) LDH 2/2/22: 266 (elevated)  PET/CT 9/14/21- negative findings Patient was referred to physical therapy for LLE lymphedema.   Left groin ultrasound on 2/8/20 showed no evidence of lymphadenopathy.  Pt notes she is s/p left groin hernia repair with mesh.  She is s/p I&D of left groin infected seroma (6/26/19). Culture: Rare Enterobacter cloaecae  PET/CT 6/15/19: 1. Postsurgical changes left heel with multiple surgical clips and high attenuation material. Residual FDG avidity along the surgical defect may represent postsurgical change/inflammation, residual disease, or combination of these entities.  2. Postsurgical collection left inguinal region. No FDG avid locoregional or distant metastatic disease.  She subsequently underwent wide excision left superior heel melanoma with left inguinal sentinel node biopsy on 5/28/19. Pathology: 1. Left inguinal sentinel lymph nodes, biopsy. Two out of four lymph nodes positive for melanoma (2/4).  2. Skin and soft tissue, left heel foot, wide resection. Melanoma 3.5mm in thickness. Margins negative (1mm from the closest deep margin). [id number]  She is s/p punch biopsy of two sites on her left heel on 5/1/19 performed by Navneet Branch PA-C. Pathology: 1. 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 pT4B 2. Left heel inferior: malignant melanoma, 4.2 mm thick, with ulcerations. pT4B    She notes a fractured nose requiring stitches by Dr. Ramirez of [place] in September 2019. Pt has a pacemaker and is currently on Warfarin for Afib as per Dr. Ronald Shepard of cardiology.  She is now taking Eliquis. Covid vaccinated.

## 2024-02-20 NOTE — PATIENT PROFILE ADULT - SURGICAL SITE INCISION
Called patient no answer, left voicemail for patient to return call to Sheldon Cunningham MD office at [phone].    ECO Representative: Please reach out to [address] [facility] via Epic Secure Chat to see if a team member is available to take patient's call.    If team member is unavailable, please document in this encounter that patient returned call and route to: FIOR CUNNINGHAM  NURSE [facility] POOL [[phone]].   
no
no

## 2024-03-15 ENCOUNTER — OFFICE (OUTPATIENT)
Dept: URBAN - METROPOLITAN AREA CLINIC 94 | Facility: CLINIC | Age: 89
Setting detail: OPHTHALMOLOGY
End: 2024-03-15
Payer: MEDICARE

## 2024-03-15 DIAGNOSIS — H35.3111: ICD-10-CM

## 2024-03-15 DIAGNOSIS — H43.393: ICD-10-CM

## 2024-03-15 DIAGNOSIS — H35.033: ICD-10-CM

## 2024-03-15 DIAGNOSIS — H01.005: ICD-10-CM

## 2024-03-15 DIAGNOSIS — H04.123: ICD-10-CM

## 2024-03-15 DIAGNOSIS — H35.3121: ICD-10-CM

## 2024-03-15 DIAGNOSIS — H01.002: ICD-10-CM

## 2024-03-15 PROCEDURE — 92235 FLUORESCEIN ANGRPH MLTIFRAME: CPT | Performed by: OPHTHALMOLOGY

## 2024-03-15 PROCEDURE — 92134 CPTRZ OPH DX IMG PST SGM RTA: CPT | Performed by: OPHTHALMOLOGY

## 2024-03-15 PROCEDURE — 99212 OFFICE O/P EST SF 10 MIN: CPT | Performed by: OPHTHALMOLOGY

## 2024-03-15 ASSESSMENT — LID EXAM ASSESSMENTS
OS_BLEPHARITIS: LLL T
OD_BLEPHARITIS: RLL T
OD_MEIBOMITIS: RLL T
OS_MEIBOMITIS: LLL T

## 2024-03-15 ASSESSMENT — REFRACTION_CURRENTRX
OD_OVR_VA: 20/
OS_OVR_VA: 20/
OD_SPHERE: +1.25
OS_SPHERE: +1.25

## 2024-03-19 ENCOUNTER — APPOINTMENT (OUTPATIENT)
Dept: ENDOCRINOLOGY | Facility: CLINIC | Age: 89
End: 2024-03-19
Payer: MEDICARE

## 2024-03-19 VITALS
WEIGHT: 144.06 LBS | BODY MASS INDEX: 24.59 KG/M2 | HEART RATE: 74 BPM | DIASTOLIC BLOOD PRESSURE: 80 MMHG | TEMPERATURE: 97.8 F | HEIGHT: 64 IN | OXYGEN SATURATION: 96 % | SYSTOLIC BLOOD PRESSURE: 130 MMHG

## 2024-03-19 DIAGNOSIS — E27.40 UNSPECIFIED ADRENOCORTICAL INSUFFICIENCY: ICD-10-CM

## 2024-03-19 DIAGNOSIS — I48.91 UNSPECIFIED ATRIAL FIBRILLATION: ICD-10-CM

## 2024-03-19 DIAGNOSIS — C43.72 MALIGNANT MELANOMA OF LEFT LOWER LIMB, INCLUDING HIP: ICD-10-CM

## 2024-03-19 DIAGNOSIS — E78.5 HYPERLIPIDEMIA, UNSPECIFIED: ICD-10-CM

## 2024-03-19 PROCEDURE — 99214 OFFICE O/P EST MOD 30 MIN: CPT

## 2024-03-19 PROCEDURE — 36415 COLL VENOUS BLD VENIPUNCTURE: CPT

## 2024-03-20 LAB
25(OH)D3 SERPL-MCNC: 56.4 NG/ML
ALBUMIN SERPL ELPH-MCNC: 4.4 G/DL
ALP BLD-CCNC: 54 U/L
ALT SERPL-CCNC: 27 U/L
ANION GAP SERPL CALC-SCNC: 12 MMOL/L
AST SERPL-CCNC: 22 U/L
BILIRUB SERPL-MCNC: 0.7 MG/DL
BUN SERPL-MCNC: 32 MG/DL
CALCIUM SERPL-MCNC: 9.8 MG/DL
CHLORIDE SERPL-SCNC: 101 MMOL/L
CHOLEST SERPL-MCNC: 198 MG/DL
CO2 SERPL-SCNC: 30 MMOL/L
CREAT SERPL-MCNC: 0.84 MG/DL
EGFR: 67 ML/MIN/1.73M2
FOLATE SERPL-MCNC: >20 NG/ML
GLUCOSE SERPL-MCNC: 84 MG/DL
HDLC SERPL-MCNC: 105 MG/DL
LDLC SERPL CALC-MCNC: 82 MG/DL
NONHDLC SERPL-MCNC: 93 MG/DL
POTASSIUM SERPL-SCNC: 4.1 MMOL/L
PROT SERPL-MCNC: 6.5 G/DL
SODIUM SERPL-SCNC: 143 MMOL/L
T3FREE SERPL-MCNC: 2.44 PG/ML
T4 FREE SERPL-MCNC: 1.4 NG/DL
TRIGL SERPL-MCNC: 63 MG/DL
TSH SERPL-ACNC: 1.3 UIU/ML
VIT B12 SERPL-MCNC: 640 PG/ML

## 2024-03-25 NOTE — DISCHARGE INSTRUCTIONS: CHEMOTHERAPY - I ACKNOWLEDGE THAT I HAVE RECEIVED AND UNDERSTAND THE ABOVE INSTRUCTIONS AND AGREE TO BEING DISCHARGED TODAY
Patient has lab only appointment on 11-3-2023. Please put in lab orders if needed.   Statement Selected Never smoker

## 2024-03-26 PROBLEM — E78.5 HYPERLIPIDEMIA: Status: ACTIVE | Noted: 2020-06-07

## 2024-03-26 PROBLEM — C43.72: Status: ACTIVE | Noted: 2019-05-15

## 2024-03-26 PROBLEM — I48.91 ATRIAL FIBRILLATION, UNSPECIFIED TYPE: Status: ACTIVE | Noted: 2019-05-15

## 2024-03-26 NOTE — HISTORY OF PRESENT ILLNESS
Problem: Physical Therapy Goal  Goal: Physical Therapy Goal  Goals to be met by: 2019     Patient will increase functional independence with mobility by performin. Supine to sit with Set-up [place] -not met  2. Sit to supine with Set-up [place] -not met  3. Sit to stand transfer with Stand-by Assistance -met 7/15  3a. Sit to stand transfer with Mod-I   4. Bed to chair transfer with Stand-by Assistance using Rolling Walker -not met  5. Gait  x 30 feet with Contact Guard Assistance using Rolling Walker. -not met  6. Lower extremity exercise program x20 reps per handout, with assistance as needed -not met       Outcome: Ongoing (interventions implemented as appropriate)  Goals reviewed and remain appropriate. Pt progressing towards goals.    Eboni Pittman, PT, DPT   2019  [phone]       [FreeTextEntry1] : Ms. HULL is an 88-year-old female who returns today for endocrine reevaluation.    Patient returns with regard to  a history of adrenal insufficiency felt to be brought on by her immunotherapy with regard to her hx of malignant melanoma. Likely secondary to hypophysits  She is currently taking Prednisone 5 mg AM + 2 mg PM She too has hx of  ex lap with small bowel repair June 2020   Melanoma said to be stable.  Remains on prednisone at  5mg am and 2.0 mg pm. Off immunotherapy since July 2021. No recent need for stress dosing.  Denies c/p, sob, dizziness, lightheadedness nausea or vomiting. Some incr fatigue at times, has good energy when she is out and on her feet.   Had 1 episode of vertigo 6 months ago and presented to [facility] ER was given anti nausea medication. No further episodes since.   Continues on Eliquis, Lasix, Metoprolol and Simvastatin vitamin D  Still with a fib  PMD sees MIRIAM Loo at Dr. Calvin office in [place]  [phone].   Hematologist:  Dr. Pastrana. Cardiologist  in [facility] when she had Ct abdomen with contrast 1 year ago she could not tolerate contrast so now will do folllow up MRI

## 2024-03-26 NOTE — PHYSICAL EXAM
[Alert] : alert [Well Nourished] : well nourished [Well Developed] : well developed [No Acute Distress] : no acute distress [Normal Sclera/Conjunctiva] : normal sclera/conjunctiva [EOMI] : extra ocular movement intact [Normal Oropharynx] : the oropharynx was normal [No Proptosis] : no proptosis [Thyroid Not Enlarged] : the thyroid was not enlarged [No Thyroid Nodules] : no palpable thyroid nodules [No Respiratory Distress] : no respiratory distress [No Accessory Muscle Use] : no accessory muscle use [Clear to Auscultation] : lungs were clear to auscultation bilaterally [Normal Rate] : heart rate was normal [Normal S1, S2] : normal S1 and S2 [Regular Rhythm] : with a regular rhythm [No Edema] : no peripheral edema [Pedal Pulses Normal] : the pedal pulses are present [Normal Bowel Sounds] : normal bowel sounds [Not Tender] : non-tender [Not Distended] : not distended [Soft] : abdomen soft [Normal Anterior Cervical Nodes] : no anterior cervical lymphadenopathy [Normal Posterior Cervical Nodes] : no posterior cervical lymphadenopathy [No Spinal Tenderness] : no spinal tenderness [Spine Straight] : spine straight [No Stigmata of Cushings Syndrome] : no stigmata of Cushings Syndrome [Normal Gait] : normal gait [No Rash] : no rash [Normal Strength/Tone] : muscle strength and tone were normal [Normal Reflexes] : deep tendon reflexes were 2+ and symmetric [Oriented x3] : oriented to person, place, and time [No Tremors] : no tremors [Acanthosis Nigricans] : no acanthosis nigricans

## 2024-05-09 ENCOUNTER — OUTPATIENT (OUTPATIENT)
Dept: OUTPATIENT SERVICES | Facility: HOSPITAL | Age: 89
LOS: 1 days | Discharge: HOME | End: 2024-05-09

## 2024-05-09 DIAGNOSIS — Z98.890 OTHER SPECIFIED POSTPROCEDURAL STATES: Chronic | ICD-10-CM

## 2024-05-09 DIAGNOSIS — C43.72 MALIGNANT MELANOMA OF LEFT LOWER LIMB, INCLUDING HIP: ICD-10-CM

## 2024-05-09 DIAGNOSIS — Z98.49 CATARACT EXTRACTION STATUS, UNSPECIFIED EYE: Chronic | ICD-10-CM

## 2024-05-09 DIAGNOSIS — Z90.89 ACQUIRED ABSENCE OF OTHER ORGANS: Chronic | ICD-10-CM

## 2024-05-16 ENCOUNTER — NON-APPOINTMENT (OUTPATIENT)
Age: 89
End: 2024-05-16

## 2024-05-17 ENCOUNTER — RESULT REVIEW (OUTPATIENT)
Age: 89
End: 2024-05-17

## 2024-05-17 ENCOUNTER — APPOINTMENT (OUTPATIENT)
Dept: HEMATOLOGY ONCOLOGY | Facility: CLINIC | Age: 89
End: 2024-05-17
Payer: MEDICARE

## 2024-05-17 VITALS
BODY MASS INDEX: 24.14 KG/M2 | SYSTOLIC BLOOD PRESSURE: 126 MMHG | DIASTOLIC BLOOD PRESSURE: 74 MMHG | OXYGEN SATURATION: 98 % | WEIGHT: 140.65 LBS | TEMPERATURE: 97.1 F | HEART RATE: 64 BPM | RESPIRATION RATE: 16 BRPM

## 2024-05-17 DIAGNOSIS — C77.9 SECONDARY AND UNSPECIFIED MALIGNANT NEOPLASM OF LYMPH NODE, UNSPECIFIED: ICD-10-CM

## 2024-05-17 LAB
BASOPHILS # BLD AUTO: 0.06 K/UL — SIGNIFICANT CHANGE UP (ref 0–0.2)
BASOPHILS NFR BLD AUTO: 0.8 % — SIGNIFICANT CHANGE UP (ref 0–2)
EOSINOPHIL # BLD AUTO: 0.04 K/UL — SIGNIFICANT CHANGE UP (ref 0–0.5)
EOSINOPHIL NFR BLD AUTO: 0.6 % — SIGNIFICANT CHANGE UP (ref 0–6)
HCT VFR BLD CALC: 48.4 % — HIGH (ref 34.5–45)
HGB BLD-MCNC: 15.3 G/DL — SIGNIFICANT CHANGE UP (ref 11.5–15.5)
IMM GRANULOCYTES NFR BLD AUTO: 0.3 % — SIGNIFICANT CHANGE UP (ref 0–0.9)
LYMPHOCYTES # BLD AUTO: 0.78 K/UL — LOW (ref 1–3.3)
LYMPHOCYTES # BLD AUTO: 11 % — LOW (ref 13–44)
MCHC RBC-ENTMCNC: 30.9 PG — SIGNIFICANT CHANGE UP (ref 27–34)
MCHC RBC-ENTMCNC: 31.6 G/DL — LOW (ref 32–36)
MCV RBC AUTO: 97.8 FL — SIGNIFICANT CHANGE UP (ref 80–100)
MONOCYTES # BLD AUTO: 0.51 K/UL — SIGNIFICANT CHANGE UP (ref 0–0.9)
MONOCYTES NFR BLD AUTO: 7.2 % — SIGNIFICANT CHANGE UP (ref 2–14)
NEUTROPHILS # BLD AUTO: 5.7 K/UL — SIGNIFICANT CHANGE UP (ref 1.8–7.4)
NEUTROPHILS NFR BLD AUTO: 80.1 % — HIGH (ref 43–77)
NRBC # BLD: 0 /100 WBCS — SIGNIFICANT CHANGE UP (ref 0–0)
PLATELET # BLD AUTO: 139 K/UL — LOW (ref 150–400)
RBC # BLD: 4.95 M/UL — SIGNIFICANT CHANGE UP (ref 3.8–5.2)
RBC # FLD: 14.4 % — SIGNIFICANT CHANGE UP (ref 10.3–14.5)
WBC # BLD: 7.11 K/UL — SIGNIFICANT CHANGE UP (ref 3.8–10.5)
WBC # FLD AUTO: 7.11 K/UL — SIGNIFICANT CHANGE UP (ref 3.8–10.5)

## 2024-05-17 PROCEDURE — 99214 OFFICE O/P EST MOD 30 MIN: CPT

## 2024-05-19 PROBLEM — C77.9 MALIGNANT MELANOMA METASTATIC TO LYMPH NODE: Status: ACTIVE | Noted: 2019-08-21

## 2024-05-19 LAB
ALBUMIN SERPL ELPH-MCNC: 4.5 G/DL
ALP BLD-CCNC: 52 U/L
ALT SERPL-CCNC: 26 U/L
ANION GAP SERPL CALC-SCNC: 10 MMOL/L
AST SERPL-CCNC: 22 U/L
BILIRUB SERPL-MCNC: 0.8 MG/DL
BUN SERPL-MCNC: 31 MG/DL
CALCIUM SERPL-MCNC: 10.4 MG/DL
CHLORIDE SERPL-SCNC: 100 MMOL/L
CO2 SERPL-SCNC: 30 MMOL/L
CREAT SERPL-MCNC: 0.88 MG/DL
EGFR: 64 ML/MIN/1.73M2
GLUCOSE SERPL-MCNC: 127 MG/DL
LDH SERPL-CCNC: 241 U/L
POTASSIUM SERPL-SCNC: 5.2 MMOL/L
PROT SERPL-MCNC: 6.9 G/DL
SODIUM SERPL-SCNC: 141 MMOL/L

## 2024-05-19 NOTE — HISTORY OF PRESENT ILLNESS
[Disease: _____________________] : Disease: [unfilled] [T: ___] : T[unfilled] [N: ___] : N[unfilled] [M: ___] : M[unfilled] [AJCC Stage: ____] : AJCC Stage: [unfilled] [de-identified] : 88F with HTN, PPM, atrial fibrillation ( Dr. 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  CASE SYNOPSIS: May 2019- patient notices a bleeding left heel cutaneous lesion;   5/1/19- punch biopsy performed by a dermatologist ([facility]) ; pathology: - left heel superior: malignant melanoma, 4.2 mm thick with ulcerations, pT4b. -left heel inferior: malignant melanoma, 4.2 mm thick, with ulceration, pT4b.  5/28/19- left heel wide resection and sentinel lymph node biopsy (); pathology: -Left inguinal lymph node biopsy: 2/4 lymph nodes positive for melanoma -Skin and soft tissue, left heel foot, consistent with melanoma, 3.5 mm thickness, margins negative, pT4b, pN 2a= stage III C  6/15/19: PET- residual FDG avidity along the surgical defect consistent with postsurgical changes, or residual disease, or combination of the two. No FDG avid local regional or distant metastatic disease.   7/15/19- started Nivolumab under the care of Dr. Mcbride at [facility].  8/12/19- cycle # 2 Nivolumab also at [facility].  10/15/19: Left lower extremity ultrasound-benign appearing left inguinal lymph nodes, containing fatty hilum, measuring 0.9 x 0.8 x 1.3 cm, 1.2 x 0.7 x 0.9 cm, and one 0.5 x 0.8 x 0.9 cm. Additional fluid collection representing seroma present in the left groin.  1/27- 1/31/20 patient admitted at [facility] with SBO secondary to left inguinal hernia; diagnostic laparoscopy with reduction of incarcerated bowel in the left inguinal hernia/open repair performed 1/27/20.  2/5/20- endocrine consult for possible drug- induced adrenal insufficiency.  2/8/20- Ultrasound left lower extremity- no left groin lymphadenopathy. Probable left groin postoperative collections/seromas.  6/14/2020-exploratory laparotomy, left femoral hernia repair, small bowel resection (due to incarcerated hernia).  7/15/19 - 7/22/20: completed 1 year maintenance Nivolumab.  September 2020-admitted with bilateral pneumonia; tested negative for COVID.  8/7/21- LDH : 209  9/14/2021: PET scan-postsurgical changes left heel with multiple surgical clips and high attenuation material.  Residual FDG avidity along the surgical defect consistent with postsurgical changes/inflammation, and postsurgical collection left inguinal region.  No FDG avid local regional or distant metastatic disease.  2/2/22: LDH :266  8/19/22:   9/21/22: Molecular diagnostic tests of original malignant melanoma biopsy from 5/1/2019-no reportable mutations identified in BRAF gene.  9/28/2022: PET- interval resolution of mild linear FDG avidity in distal small bowel.  New focal mild FDG avidity in the sigmoid colon with no definite abnormality on CT.  No evidence of recurrent or FDG avid disease.  5/17/2024  [de-identified] : acral lentiginous malignant melanoma [FreeTextEntry1] : Nivolumab- flat dose monthly [de-identified] : Patient presents today for routine follow-up.  Last seen in office in September 2023.  Endorses no new complaints today.  Denies fever, night sweats pain or shortness of breath.  Recently traveled to [place] via car.  Reports no venous thrombotic events.  Appetite preserved, reports no hospitalization, denies new episodes of vertigo or syncope.. Ms. HULL's last PET scan in September 2022 show no evidence of recurrent or FDG avid disease.  This month it will be 5 years since melanoma resection; has follow-up appointment with .  Here accompanied by her son, Philip.

## 2024-05-19 NOTE — ASSESSMENT
[FreeTextEntry1] : RTC in 6-9 month;  Ms. HULL was advised to contact the office should she have any additional questions or concerns in the interim.

## 2024-05-19 NOTE — PHYSICAL EXAM
[Restricted in physically strenuous activity but ambulatory and able to carry out work of a light or sedentary nature] : Status 1- Restricted in physically strenuous activity but ambulatory and able to carry out work of a light or sedentary nature, e.g., light house work, office work [Normal] : normal appearance, no rash, nodules, vesicles, ulcers, erythema [de-identified] : s/p left inguinal hernia repair [de-identified] : left heel wound  healed, hypopigmented skin; left sole cellulitis resolved

## 2024-06-04 NOTE — ED ADULT NURSE NOTE - PRO INTERPRETER NEED 2
Assessment/Plan:    Type 2 diabetes mellitus with diabetic microalbuminuria, without long-term current use of insulin (H)  Type 2 diabetes reviewed.  Has lost 17 pounds since prior office visit on February 28, 2024 following initiation of Ozempic and has titrated from 1/4 mg a week up to half milligram a week.  Further dose increase to 1 mg weekly anticipated with decrease Lantus from 50 units down to 40 units ideally.  Continuing metformin  mg using 2 tablets twice daily plus low-dose aspirin.  Prior microalbumin screen remains abnormal February 20, 2024 is on angiotensin receptor blocker.  Diabetic eye exams to be completed yearly.  Reassess at scheduled follow-up September 4, 2024.  - metFORMIN (GLUCOPHAGE XR) 500 MG 24 hr tablet  Dispense: 360 tablet; Refill: 3  - Semaglutide, 1 MG/DOSE, (OZEMPIC) 4 MG/3ML pen  Dispense: 9 mL; Refill: 1  - insulin glargine (LANTUS SOLOSTAR) 100 UNIT/ML pen  Dispense: 45 mL; Refill: 5  - Basic metabolic panel    Essential (primary) hypertension  Hypertension reviewed.  Stable with amlodipine 10 mg daily, losartan 100 mg daily metoprolol tartrate 100 mg twice daily.    Dyslipidemia  Dyslipidemia.  Atorvastatin 40 mg daily continuing.  Anticipate reassessment at follow-up in 3 months.    Microalbuminuria  Microalbuminuria renal protective benefits of losartan noted.               Subjective:    Lm Leigh is seen today for follow-up assessment.  Type 2 diabetes.  Continues Lantus 50 units daily plus metformin  mg using 2 tablets twice daily.  Has started on Ozempic quarter milligram weekly at prior office visit and titrated to half milligram weekly.  Doing better now with this without GI issues any longer.  Has lost 17 pounds.  Patient does have underlying hypertension treated with amlodipine 10 mg daily, losartan 100 mg daily metoprolol to tartrate 100 mg twice daily.  Microalbuminuria and does benefit from losartan for renal protection.  Atorvastatin 40 mg daily  for lipid management as well.  Patient somewhat frustrated with slow weight loss but does understand he is going the right direction with decreased dietary indiscretions.  Comprehensive review of systems as above otherwise all negative.      Single   No children   Tobacco:  none   EtOH:  never (by choice) - grandparents with EtOH issue   Mom - type 2 diabetes   Dad -   1 younger sis -   Surgeries:  none   Hospitalizations:  right groin drainage at [facility] after admission ([facility]) with infection into testicle...   Work:  [employer]   Hobbies:  collect autographs       History reviewed. No pertinent surgical history.     History reviewed. No pertinent family history.     Past Medical History:   Diagnosis Date    Hypertension     Obesity         Social History     Tobacco Use    Smoking status: Never     Passive exposure: Never    Smokeless tobacco: Never   Substance Use Topics    Alcohol use: Not Currently    Drug use: Never        Current Outpatient Medications   Medication Sig Dispense Refill    amLODIPine (NORVASC) 10 MG tablet TAKE 1 TABLET BY MOUTH EVERY DAY. INCREASED DOSE 90 tablet 3    aspirin (ASA) 81 MG EC tablet Take 81 mg by mouth daily      atorvastatin (LIPITOR) 40 MG tablet TAKE 1 TABLET BY MOUTH EVERY DAY 90 tablet 2    BD PEN NEEDLE ZION 2ND GEN 32G X 4 MM miscellaneous USE TO INJECT INSULIN ONCE DAILY 100 each 0    blood glucose (NO BRAND SPECIFIED) lancets standard Use to check blood sugars three times daily. Dispense brand per patient's insurance at pharmacy discretion.      blood glucose (PRECISION QID TEST) test strip Use to check blood sugars three times daily. Strips compatible with [employer] guide. Dispense brand per patient's insurance at pharmacy discretion. 300 strip 3    fluticasone (FLONASE) 50 MCG/ACT nasal spray INSTILL 1 SPRAY INTO BOTH NOSTRILS DAILY 18.2 mL 1    insulin glargine (LANTUS SOLOSTAR) 100 UNIT/ML pen Inject 50 Units Subcutaneous at bedtime 45 mL 5    levocetirizine  (XYZAL) 5 MG tablet Take 1 tablet (5 mg) by mouth every evening 30 tablet 3    losartan (COZAAR) 100 MG tablet TAKE 1 TABLET BY MOUTH EVERY DAY 90 tablet 3    metFORMIN (GLUCOPHAGE XR) 500 MG 24 hr tablet Take 2 tablets (1,000 mg) by mouth 2 times daily (with meals) 360 tablet 3    metoprolol tartrate (LOPRESSOR) 100 MG tablet TAKE 1 TABLET BY MOUTH TWICE A  tablet 1    Semaglutide, 1 MG/DOSE, (OZEMPIC) 4 MG/3ML pen Inject 1 mg Subcutaneous every 7 days 9 mL 1          Objective:    Vitals:    06/04/24 1020   BP: 130/81   BP Location: Right arm   Patient Position: Sitting   Cuff Size: Adult Large   Pulse: 81   Resp: 16   Temp: 97.4  F (36.3  C)   TempSrc: Temporal   SpO2: 96%   Weight: (!) 166.5 kg (367 lb)      Body mass index is 47.85 kg/m .    Alert.  No apparent distress.  Cardiac exam regular.  Blood pressure at goal.  17 pound weight loss noted.  BMI 47.85.      This note has been dictated using voice recognition software and as a result may contain minor grammatical errors and unintended word substitutions.       Answers submitted by the patient for this visit:  Diabetes Visit (Submitted on 6/3/2024)  Chief Complaint: Chronic problems general questions HPI Form  Frequency of checking blood sugars:: three times daily  What time of day are you checking your blood sugars : before meals, at bedtime  Have you had any blood sugars above 200?: No  Have you had any blood sugars below 70?: No  Hypoglycemia symptoms:: none  Diabetic concerns:: frequent infections, other  Paraesthesia present:: none of these symptoms  General Questionnaire (Submitted on 6/3/2024)  Chief Complaint: Chronic problems general questions HPI Form  How many servings of fruits and vegetables do you eat daily?: 2-3  On average, how many sweetened beverages do you drink each day (Examples: soda, juice, sweet tea, etc.  Do NOT count diet or artificially sweetened beverages)?: 0  How many minutes a day do you exercise enough to make your  heart beat faster?: 30 to 60  How many days a week do you exercise enough to make your heart beat faster?: 4  How many days per week do you miss taking your medication?: 0     English

## 2024-06-10 NOTE — H&P ADULT - PSYCHIATRIC
details… detailed exam no chills/no dizziness/no fever/no nausea/no pain/no tingling/no vomiting/no weakness

## 2024-06-14 RX ORDER — PREDNISONE 1 MG/1
1 TABLET ORAL
Qty: 180 | Refills: 2 | Status: ACTIVE | COMMUNITY
Start: 2021-03-31 | End: 1900-01-01

## 2024-06-14 RX ORDER — PREDNISONE 5 MG/1
5 TABLET ORAL
Qty: 90 | Refills: 2 | Status: ACTIVE | COMMUNITY
Start: 2020-04-28 | End: 1900-01-01

## 2024-07-24 ENCOUNTER — LABORATORY RESULT (OUTPATIENT)
Age: 89
End: 2024-07-24

## 2024-07-29 NOTE — PHYSICAL THERAPY INITIAL EVALUATION ADULT - MD/RN NOTIFIED
Discharge Instructions       PATIENT ID: Dwayne Ceja  MRN: [id number]   YOB: 1946    DATE OF ADMISSION: 7/28/2024   DATE OF DISCHARGE: 7/30/2024    PRIMARY CARE PROVIDER: Danielle Onofre     ATTENDING PHYSICIAN: Prashanth Spicer MD   DISCHARGING PROVIDER: Prashanth Spicer MD    To contact this individual call [phone] and ask the  to page.   If unavailable ask to be transferred the Adult Hospitalist Department.    DISCHARGE DIAGNOSES Lightheadedness    CONSULTATIONS: Neurology    PROCEDURES/SURGERIES: * No surgery found *    PENDING TEST RESULTS:   At the time of discharge the following test results are still pending: none    FOLLOW UP APPOINTMENTS:   PCP  [facility]  Cardiology    ADDITIONAL CARE RECOMMENDATIONS:   BP twice daily and maintain a diary to show it to your PMD  If bp>180/100 please call your PMD  Outpatient sleep study    DIET: cardiac diet    ACTIVITY: activity as tolerated    WOUND CARE: none    EQUIPMENT needed: none      DISCHARGE MEDICATIONS:   See Medication Reconciliation Form    It is important that you take the medication exactly as they are prescribed.   Keep your medication in the bottles provided by the pharmacist and keep a list of the medication names, dosages, and times to be taken in your wallet.   Do not take other medications without consulting your doctor.       NOTIFY YOUR PHYSICIAN FOR ANY OF THE FOLLOWING:   Fever over 101 degrees for 24 hours.   Chest pain, shortness of breath, fever, chills, nausea, vomiting, diarrhea, change in mentation, falling, weakness, bleeding. Severe pain or pain not relieved by medications.  Or, any other signs or symptoms that you may have questions about.      DISPOSITION:   X Home With: none   OT  PT  HH  RN       SNF/Inpatient Rehab/LTAC    Independent/assisted living    Hospice    Other:     CDMP Checked:   Yes X     PROBLEM LIST Updated:  Yes X       Signed:   Prashanth Spicer MD  7/30/2024  10:22 AM    
yes

## 2024-09-17 ENCOUNTER — OFFICE (OUTPATIENT)
Dept: URBAN - METROPOLITAN AREA CLINIC 112 | Facility: CLINIC | Age: 89
Setting detail: OPHTHALMOLOGY
End: 2024-09-17
Payer: MEDICARE

## 2024-09-17 DIAGNOSIS — H35.033: ICD-10-CM

## 2024-09-17 DIAGNOSIS — H01.005: ICD-10-CM

## 2024-09-17 DIAGNOSIS — H35.3111: ICD-10-CM

## 2024-09-17 DIAGNOSIS — H43.393: ICD-10-CM

## 2024-09-17 DIAGNOSIS — H35.3121: ICD-10-CM

## 2024-09-17 DIAGNOSIS — H01.002: ICD-10-CM

## 2024-09-17 DIAGNOSIS — H04.123: ICD-10-CM

## 2024-09-17 PROCEDURE — 99213 OFFICE O/P EST LOW 20 MIN: CPT | Performed by: OPHTHALMOLOGY

## 2024-09-17 PROCEDURE — 92250 FUNDUS PHOTOGRAPHY W/I&R: CPT | Performed by: OPHTHALMOLOGY

## 2024-09-17 ASSESSMENT — LID EXAM ASSESSMENTS
OD_BLEPHARITIS: RLL T
OD_MEIBOMITIS: RLL T
OS_MEIBOMITIS: LLL T
OS_BLEPHARITIS: LLL T

## 2024-09-17 ASSESSMENT — CONFRONTATIONAL VISUAL FIELD TEST (CVF)
OD_FINDINGS: FULL
OS_FINDINGS: FULL

## 2024-09-24 ENCOUNTER — APPOINTMENT (OUTPATIENT)
Dept: ENDOCRINOLOGY | Facility: CLINIC | Age: 89
End: 2024-09-24
Payer: MEDICARE

## 2024-09-24 VITALS
OXYGEN SATURATION: 97 % | HEIGHT: 64 IN | SYSTOLIC BLOOD PRESSURE: 130 MMHG | HEART RATE: 87 BPM | RESPIRATION RATE: 18 BRPM | TEMPERATURE: 97 F | DIASTOLIC BLOOD PRESSURE: 80 MMHG | BODY MASS INDEX: 23.93 KG/M2 | WEIGHT: 140.2 LBS

## 2024-09-24 DIAGNOSIS — E55.9 VITAMIN D DEFICIENCY, UNSPECIFIED: ICD-10-CM

## 2024-09-24 DIAGNOSIS — E27.40 UNSPECIFIED ADRENOCORTICAL INSUFFICIENCY: ICD-10-CM

## 2024-09-24 DIAGNOSIS — E78.5 HYPERLIPIDEMIA, UNSPECIFIED: ICD-10-CM

## 2024-09-24 DIAGNOSIS — I48.91 UNSPECIFIED ATRIAL FIBRILLATION: ICD-10-CM

## 2024-09-24 DIAGNOSIS — C43.72 MALIGNANT MELANOMA OF LEFT LOWER LIMB, INCLUDING HIP: ICD-10-CM

## 2024-09-24 PROCEDURE — G2211 COMPLEX E/M VISIT ADD ON: CPT

## 2024-09-24 PROCEDURE — 99214 OFFICE O/P EST MOD 30 MIN: CPT

## 2024-09-24 PROCEDURE — 36415 COLL VENOUS BLD VENIPUNCTURE: CPT

## 2024-09-24 NOTE — ADDENDUM
[FreeTextEntry1] : Blood will be drawn in office today.  This note was written by Ruma Wilson on 09/24/2024 acting as medical scribe for Dr. Antonio Gaines. I, Dr. Antonio Gaines, have read and attest that all the information, medical decision making and discharge instructions within are true and accurate.

## 2024-09-24 NOTE — HISTORY OF PRESENT ILLNESS
[FreeTextEntry1] : Ms. HULL is an 89-year-old female who returns today for endocrine reevaluation. Patient returns with regard to a history of adrenal insufficiency felt to be brought on by her immunotherapy with regard to her hx of malignant melanoma. 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  She is currently taking Prednisone 5mg AM + 2mg PM. No recent need for stress dosing.  Melanoma said to be stable. Off immunotherapy since July 2021.  Denies c/p, sob, dizziness, lightheadedness nausea or vomiting. Some incr fatigue at times, has good energy when she is out and on her feet.  Still with A-Fib.   She was recently started on Farxiga 10mg daily per cardiologist, Dr. Shepard.  Continues on Eliquis, Lasix 20mg, Metoprolol and Simvastatin, vitamin D, Centrum Silver MV, AREDS for eyes   PMD sees MIRIAM Loo at Dr. Calvin office in [place] [phone]. Hematologist: Dr. Pastrana. Cardiologist Dr. Shepard in [place]

## 2024-09-24 NOTE — HISTORY OF PRESENT ILLNESS
[FreeTextEntry1] : Ms. HULL is an 89-year-old female who returns today for endocrine reevaluation. Patient returns with regard to a history of adrenal insufficiency felt to be brought on by her immunotherapy with regard to her hx of malignant melanoma. 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  She is currently taking Prednisone 5mg AM + 2mg PM. No recent need for stress dosing.  Melanoma said to be stable. Off immunotherapy since July 2021.  Denies c/p, sob, dizziness, lightheadedness nausea or vomiting. Some incr fatigue at times, has good energy when she is out and on her feet.  Still with A-Fib.   She was recently started on Farxiga 10mg daily per cardiologist, Dr. Shepard.  Continues on Eliquis, Lasix 20mg, Metoprolol and Simvastatin, vitamin D, Centrum Silver MV, AREDS for eyes   PMD sees MIRIAM Loo at Dr. Calvin office in [place] [phone]. Hematologist: Dr. Pastrana. Cardiologist Dr. Shepard in [place]

## 2024-09-25 LAB
25(OH)D3 SERPL-MCNC: 49 NG/ML
ALBUMIN SERPL ELPH-MCNC: 4.3 G/DL
ALP BLD-CCNC: 49 U/L
ALT SERPL-CCNC: 27 U/L
ANION GAP SERPL CALC-SCNC: 13 MMOL/L
AST SERPL-CCNC: 18 U/L
BILIRUB SERPL-MCNC: 0.7 MG/DL
BUN SERPL-MCNC: 28 MG/DL
CALCIUM SERPL-MCNC: 9.8 MG/DL
CHLORIDE SERPL-SCNC: 100 MMOL/L
CO2 SERPL-SCNC: 30 MMOL/L
CREAT SERPL-MCNC: 0.78 MG/DL
EGFR: 73 ML/MIN/1.73M2
GLUCOSE SERPL-MCNC: 92 MG/DL
POTASSIUM SERPL-SCNC: 4.4 MMOL/L
PROT SERPL-MCNC: 6.7 G/DL
SODIUM SERPL-SCNC: 142 MMOL/L
T3FREE SERPL-MCNC: 2.49 PG/ML
T4 FREE SERPL-MCNC: 1.5 NG/DL
TSH SERPL-ACNC: 1.09 UIU/ML

## 2024-11-01 ENCOUNTER — OFFICE (OUTPATIENT)
Dept: URBAN - METROPOLITAN AREA CLINIC 94 | Facility: CLINIC | Age: 89
Setting detail: OPHTHALMOLOGY
End: 2024-11-01
Payer: MEDICARE

## 2024-11-01 DIAGNOSIS — H01.005: ICD-10-CM

## 2024-11-01 DIAGNOSIS — H01.002: ICD-10-CM

## 2024-11-01 DIAGNOSIS — H35.3121: ICD-10-CM

## 2024-11-01 DIAGNOSIS — H04.123: ICD-10-CM

## 2024-11-01 DIAGNOSIS — H52.4: ICD-10-CM

## 2024-11-01 DIAGNOSIS — H35.3111: ICD-10-CM

## 2024-11-01 DIAGNOSIS — H43.393: ICD-10-CM

## 2024-11-01 DIAGNOSIS — H35.033: ICD-10-CM

## 2024-11-01 PROCEDURE — 92015 DETERMINE REFRACTIVE STATE: CPT | Performed by: OPHTHALMOLOGY

## 2024-11-01 PROCEDURE — 99213 OFFICE O/P EST LOW 20 MIN: CPT | Performed by: OPHTHALMOLOGY

## 2024-11-01 PROCEDURE — 92134 CPTRZ OPH DX IMG PST SGM RTA: CPT | Performed by: OPHTHALMOLOGY

## 2024-11-01 PROCEDURE — 92235 FLUORESCEIN ANGRPH MLTIFRAME: CPT | Performed by: OPHTHALMOLOGY

## 2024-11-01 ASSESSMENT — REFRACTION_MANIFEST
OD_ADD: +2.25
OS_VA1: 20/25
OD_AXIS: 096
OD_CYLINDER: -1.75
OS_VA2: 20/20
OS_ADD: +2.25
OD_SPHERE: +1.00
OU_VA: 20/25
OS_CYLINDER: -0.50
OD_VA1: 20/25
OS_AXIS: 089
OD_VA2: 20/20
OS_SPHERE: PLANO

## 2024-11-01 ASSESSMENT — VISUAL ACUITY
OS_BCVA: 20/40
OD_BCVA: 20/30-1

## 2024-11-01 ASSESSMENT — TONOMETRY
OD_IOP_MMHG: 18
OS_IOP_MMHG: 20

## 2024-11-01 ASSESSMENT — KERATOMETRY
METHOD_AUTO_MANUAL: AUTO
OD_AXISANGLE_DEGREES: 037
OS_AXISANGLE_DEGREES: 075
OS_K1POWER_DIOPTERS: 43.25
OD_K2POWER_DIOPTERS: 43.75
OD_K1POWER_DIOPTERS: 42.25
OS_K2POWER_DIOPTERS: 43.50

## 2024-11-01 ASSESSMENT — REFRACTION_AUTOREFRACTION
OS_AXIS: 089
OD_SPHERE: +1.00
OD_CYLINDER: -2.00
OS_SPHERE: +0.50
OS_CYLINDER: -0.50
OD_AXIS: 096

## 2024-11-01 ASSESSMENT — REFRACTION_CURRENTRX
OS_SPHERE: +1.25
OD_SPHERE: +1.25
OD_OVR_VA: 20/
OS_OVR_VA: 20/

## 2024-11-01 ASSESSMENT — CONFRONTATIONAL VISUAL FIELD TEST (CVF)
OS_FINDINGS: FULL
OD_FINDINGS: FULL

## 2024-11-01 ASSESSMENT — LID EXAM ASSESSMENTS
OD_MEIBOMITIS: RLL T
OS_MEIBOMITIS: LLL T
OD_BLEPHARITIS: RLL T
OS_BLEPHARITIS: LLL T

## 2025-01-22 ENCOUNTER — OUTPATIENT (OUTPATIENT)
Dept: OUTPATIENT SERVICES | Facility: HOSPITAL | Age: 89
LOS: 1 days | Discharge: HOME | End: 2025-01-22

## 2025-01-22 DIAGNOSIS — Z98.890 OTHER SPECIFIED POSTPROCEDURAL STATES: Chronic | ICD-10-CM

## 2025-01-22 DIAGNOSIS — C43.72 MALIGNANT MELANOMA OF LEFT LOWER LIMB, INCLUDING HIP: ICD-10-CM

## 2025-01-22 DIAGNOSIS — Z98.49 CATARACT EXTRACTION STATUS, UNSPECIFIED EYE: Chronic | ICD-10-CM

## 2025-01-22 DIAGNOSIS — Z90.89 ACQUIRED ABSENCE OF OTHER ORGANS: Chronic | ICD-10-CM

## 2025-01-23 ENCOUNTER — RESULT REVIEW (OUTPATIENT)
Age: 89
End: 2025-01-23

## 2025-01-23 ENCOUNTER — APPOINTMENT (OUTPATIENT)
Dept: HEMATOLOGY ONCOLOGY | Facility: CLINIC | Age: 89
End: 2025-01-23
Payer: MEDICARE

## 2025-01-23 DIAGNOSIS — C77.9 SECONDARY AND UNSPECIFIED MALIGNANT NEOPLASM OF LYMPH NODE, UNSPECIFIED: ICD-10-CM

## 2025-01-23 LAB
BASOPHILS # BLD AUTO: 0.05 K/UL — SIGNIFICANT CHANGE UP (ref 0–0.2)
BASOPHILS NFR BLD AUTO: 0.7 % — SIGNIFICANT CHANGE UP (ref 0–2)
EOSINOPHIL # BLD AUTO: 0.01 K/UL — SIGNIFICANT CHANGE UP (ref 0–0.5)
EOSINOPHIL NFR BLD AUTO: 0.1 % — SIGNIFICANT CHANGE UP (ref 0–6)
HCT VFR BLD CALC: 48.3 % — HIGH (ref 34.5–45)
HGB BLD-MCNC: 15.2 G/DL — SIGNIFICANT CHANGE UP (ref 11.5–15.5)
IMM GRANULOCYTES NFR BLD AUTO: 0.3 % — SIGNIFICANT CHANGE UP (ref 0–0.9)
LYMPHOCYTES # BLD AUTO: 0.7 K/UL — LOW (ref 1–3.3)
LYMPHOCYTES # BLD AUTO: 10.2 % — LOW (ref 13–44)
MCHC RBC-ENTMCNC: 30.8 PG — SIGNIFICANT CHANGE UP (ref 27–34)
MCHC RBC-ENTMCNC: 31.5 G/DL — LOW (ref 32–36)
MCV RBC AUTO: 97.8 FL — SIGNIFICANT CHANGE UP (ref 80–100)
MONOCYTES # BLD AUTO: 0.38 K/UL — SIGNIFICANT CHANGE UP (ref 0–0.9)
MONOCYTES NFR BLD AUTO: 5.5 % — SIGNIFICANT CHANGE UP (ref 2–14)
NEUTROPHILS # BLD AUTO: 5.72 K/UL — SIGNIFICANT CHANGE UP (ref 1.8–7.4)
NEUTROPHILS NFR BLD AUTO: 83.2 % — HIGH (ref 43–77)
NRBC # BLD: 0 /100 WBCS — SIGNIFICANT CHANGE UP (ref 0–0)
NRBC BLD-RTO: 0 /100 WBCS — SIGNIFICANT CHANGE UP (ref 0–0)
PLATELET # BLD AUTO: 130 K/UL — LOW (ref 150–400)
RBC # BLD: 4.94 M/UL — SIGNIFICANT CHANGE UP (ref 3.8–5.2)
RBC # FLD: 14 % — SIGNIFICANT CHANGE UP (ref 10.3–14.5)
WBC # BLD: 6.88 K/UL — SIGNIFICANT CHANGE UP (ref 3.8–10.5)
WBC # FLD AUTO: 6.88 K/UL — SIGNIFICANT CHANGE UP (ref 3.8–10.5)

## 2025-01-23 PROCEDURE — 99214 OFFICE O/P EST MOD 30 MIN: CPT

## 2025-01-24 LAB
ALBUMIN SERPL ELPH-MCNC: 4.5 G/DL
ALP BLD-CCNC: 58 U/L
ALT SERPL-CCNC: 29 U/L
ANION GAP SERPL CALC-SCNC: 12 MMOL/L
AST SERPL-CCNC: 23 U/L
BILIRUB SERPL-MCNC: 0.8 MG/DL
BUN SERPL-MCNC: 30 MG/DL
CALCIUM SERPL-MCNC: 10.2 MG/DL
CHLORIDE SERPL-SCNC: 100 MMOL/L
CO2 SERPL-SCNC: 31 MMOL/L
CREAT SERPL-MCNC: 0.81 MG/DL
EGFR: 70 ML/MIN/1.73M2
GLUCOSE SERPL-MCNC: 104 MG/DL
LDH SERPL-CCNC: 249 U/L
POTASSIUM SERPL-SCNC: 4.8 MMOL/L
PROT SERPL-MCNC: 6.9 G/DL
SODIUM SERPL-SCNC: 142 MMOL/L

## 2025-02-04 ENCOUNTER — APPOINTMENT (OUTPATIENT)
Dept: CARDIOLOGY | Facility: CLINIC | Age: 89
End: 2025-02-04
Payer: MEDICARE

## 2025-02-04 ENCOUNTER — NON-APPOINTMENT (OUTPATIENT)
Age: 89
End: 2025-02-04

## 2025-02-04 VITALS
DIASTOLIC BLOOD PRESSURE: 97 MMHG | SYSTOLIC BLOOD PRESSURE: 149 MMHG | BODY MASS INDEX: 24.41 KG/M2 | OXYGEN SATURATION: 98 % | HEIGHT: 64 IN | WEIGHT: 143 LBS | HEART RATE: 88 BPM

## 2025-02-04 DIAGNOSIS — I36.1 NONRHEUMATIC TRICUSPID (VALVE) INSUFFICIENCY: ICD-10-CM

## 2025-02-04 PROCEDURE — 99204 OFFICE O/P NEW MOD 45 MIN: CPT

## 2025-02-04 PROCEDURE — G2211 COMPLEX E/M VISIT ADD ON: CPT

## 2025-02-04 PROCEDURE — 93000 ELECTROCARDIOGRAM COMPLETE: CPT

## 2025-02-04 RX ORDER — MULTIVIT,IRON,MINERALS/LUTEIN
TABLET ORAL
Refills: 0 | Status: ACTIVE | COMMUNITY

## 2025-02-05 PROBLEM — I36.1 NONRHEUMATIC TRICUSPID VALVE REGURGITATION: Status: ACTIVE | Noted: 2025-02-05

## 2025-02-07 PROBLEM — I34.0 NONRHEUMATIC MITRAL VALVE REGURGITATION: Status: ACTIVE | Noted: 2025-02-05

## 2025-02-11 ENCOUNTER — APPOINTMENT (OUTPATIENT)
Dept: CARDIOTHORACIC SURGERY | Facility: CLINIC | Age: 89
End: 2025-02-11

## 2025-02-11 DIAGNOSIS — I34.0 NONRHEUMATIC MITRAL (VALVE) INSUFFICIENCY: ICD-10-CM

## 2025-02-12 ENCOUNTER — APPOINTMENT (OUTPATIENT)
Dept: SURGICAL ONCOLOGY | Facility: CLINIC | Age: 89
End: 2025-02-12

## 2025-02-26 ENCOUNTER — APPOINTMENT (OUTPATIENT)
Dept: SURGICAL ONCOLOGY | Facility: CLINIC | Age: 89
End: 2025-02-26
Payer: MEDICARE

## 2025-02-26 VITALS
BODY MASS INDEX: 24.59 KG/M2 | HEART RATE: 90 BPM | HEIGHT: 64 IN | OXYGEN SATURATION: 98 % | WEIGHT: 144 LBS | DIASTOLIC BLOOD PRESSURE: 90 MMHG | SYSTOLIC BLOOD PRESSURE: 160 MMHG

## 2025-02-26 DIAGNOSIS — D03.61 MELANOMA IN SITU OF RIGHT UPPER LIMB, INCLUDING SHOULDER: ICD-10-CM

## 2025-02-26 PROCEDURE — 99215 OFFICE O/P EST HI 40 MIN: CPT

## 2025-02-28 LAB
HCT VFR BLD CALC: 47.1 %
HGB BLD-MCNC: 14.4 G/DL
MCHC RBC-ENTMCNC: 30.6 G/DL
MCHC RBC-ENTMCNC: 30.7 PG
MCV RBC AUTO: 100.4 FL
PLATELET # BLD AUTO: 126 K/UL
RBC # BLD: 4.69 M/UL
RBC # FLD: 14.5 %
WBC # FLD AUTO: 6.56 K/UL

## 2025-03-01 LAB
ANION GAP SERPL CALC-SCNC: 13 MMOL/L
BUN SERPL-MCNC: 27 MG/DL
CALCIUM SERPL-MCNC: 9.9 MG/DL
CHLORIDE SERPL-SCNC: 105 MMOL/L
CO2 SERPL-SCNC: 26 MMOL/L
CREAT SERPL-MCNC: 0.8 MG/DL
EGFR: 71 ML/MIN/1.73M2
GLUCOSE SERPL-MCNC: 109 MG/DL
POTASSIUM SERPL-SCNC: 4.7 MMOL/L
SODIUM SERPL-SCNC: 144 MMOL/L

## 2025-03-01 RX ADMIN — TRAMADOL HYDROCHLORIDE 25 MILLIGRAM(S): 50 TABLET, FILM COATED ORAL at 17:00

## 2025-03-05 NOTE — ASU PATIENT PROFILE, ADULT - FALL HARM RISK - UNIVERSAL INTERVENTIONS
Bed in lowest position, wheels locked, appropriate side rails in place/Call bell, personal items and telephone in reach/Instruct patient to call for assistance before getting out of bed or chair/Non-slip footwear when patient is out of bed/[place] to call system/Physically safe environment - no spills, clutter or unnecessary equipment/Purposeful Proactive Rounding/Room/bathroom lighting operational, light cord in reach

## 2025-03-06 ENCOUNTER — TRANSCRIPTION ENCOUNTER (OUTPATIENT)
Age: 89
End: 2025-03-06

## 2025-03-06 ENCOUNTER — OUTPATIENT (OUTPATIENT)
Dept: OUTPATIENT SERVICES | Facility: HOSPITAL | Age: 89
LOS: 1 days | Discharge: HOME | End: 2025-03-06
Payer: MEDICARE

## 2025-03-06 VITALS
OXYGEN SATURATION: 97 % | SYSTOLIC BLOOD PRESSURE: 127 MMHG | RESPIRATION RATE: 18 BRPM | HEART RATE: 98 BPM | DIASTOLIC BLOOD PRESSURE: 75 MMHG

## 2025-03-06 VITALS
TEMPERATURE: 98 F | DIASTOLIC BLOOD PRESSURE: 94 MMHG | SYSTOLIC BLOOD PRESSURE: 153 MMHG | OXYGEN SATURATION: 100 % | WEIGHT: 141.98 LBS | RESPIRATION RATE: 18 BRPM | HEART RATE: 91 BPM | HEIGHT: 64 IN

## 2025-03-06 DIAGNOSIS — I34.0 NONRHEUMATIC MITRAL (VALVE) INSUFFICIENCY: ICD-10-CM

## 2025-03-06 DIAGNOSIS — Z98.49 CATARACT EXTRACTION STATUS, UNSPECIFIED EYE: Chronic | ICD-10-CM

## 2025-03-06 DIAGNOSIS — Z98.890 OTHER SPECIFIED POSTPROCEDURAL STATES: Chronic | ICD-10-CM

## 2025-03-06 DIAGNOSIS — Z90.89 ACQUIRED ABSENCE OF OTHER ORGANS: Chronic | ICD-10-CM

## 2025-03-06 LAB — SAO2 % BLDA: 92.8 % — LOW (ref 94–98)

## 2025-03-06 PROCEDURE — C1889: CPT

## 2025-03-06 PROCEDURE — 93460 R&L HRT ART/VENTRICLE ANGIO: CPT

## 2025-03-06 PROCEDURE — 93005 ELECTROCARDIOGRAM TRACING: CPT | Mod: XU

## 2025-03-06 PROCEDURE — 99152 MOD SED SAME PHYS/QHP 5/>YRS: CPT

## 2025-03-06 PROCEDURE — C1887: CPT

## 2025-03-06 PROCEDURE — 93460 R&L HRT ART/VENTRICLE ANGIO: CPT | Mod: 26

## 2025-03-06 PROCEDURE — 93010 ELECTROCARDIOGRAM REPORT: CPT

## 2025-03-06 PROCEDURE — C1894: CPT

## 2025-03-06 PROCEDURE — 82803 BLOOD GASES ANY COMBINATION: CPT

## 2025-03-06 PROCEDURE — C1769: CPT

## 2025-03-06 RX ADMIN — Medication 62.5 MILLILITER(S): at 08:45

## 2025-03-06 RX ADMIN — Medication 50 MILLILITER(S): at 12:00

## 2025-03-06 NOTE — H&P ADULT - ASSESSMENT
88 y/o female with PMH PPM, Afib(eliquis), melanoma, biatrial enlargement, severe MR/TR, presented to cardiology for surgical clearance ( Melanoma right upper arm). Pt's TTE reveals severe MR/TR, pt recommended to interventional cardiology for assessment for SRAVANTHI.      ASA class: III  Creatinine: 0.80  GFR: 72  Bleeding  Risk score: 2.4%  Bob Score:  5pts

## 2025-03-06 NOTE — ASU DISCHARGE PLAN (ADULT/PEDIATRIC) - CARE PROVIDER_API CALL
Milo Murguia  Cardiovascular Disease  [address] [place] [address]  Phone: [phone]  Fax: [phone]  Follow Up Time:

## 2025-03-06 NOTE — PACU DISCHARGE NOTE - COMMENTS
pt aaox4, pt denies any symptoms, clean dry dressing intact over right wrist and groin, no s/sx of hematoma, swelling, or bleeding noted.  Right wrist and groin soft and mobile. pt ambulated well prior to dc, iv removed, no s/sx of infection noted. assisted pt and family member to waiting room.

## 2025-03-06 NOTE — ASU DISCHARGE PLAN (ADULT/PEDIATRIC) - FINANCIAL ASSISTANCE
[facility] provides services at a reduced cost to those who are determined to be eligible through [facility]’s financial assistance program. Information regarding [facility]’s financial assistance program can be found by going to [email or web address] or by calling [phone].

## 2025-03-06 NOTE — H&P ADULT - HISTORY OF PRESENT ILLNESS
88 y/o female with PMH PPM, Afib(eliquis), melanoma, biatrial enlargement, severe MR/TR, presented to cardiology for surgical clearance ( Melanoma right upper arm). Pt's TTE reveals severe MR/TR, pt recommended to interventional cardiology for assessment for SRAVANTHI.

## 2025-03-06 NOTE — ASU DISCHARGE PLAN (ADULT/PEDIATRIC) - ASU DISCHARGE DATE/TIME
06-Mar-2025 13:30 [Recent Change In Weight] : ~T recent weight change [Nocturia] : nocturia [Negative] : Heme/Lymph [Fever] : no fever [Chills] : no chills [Fatigue] : no fatigue [Chest Pain] : no chest pain [Palpitations] : no palpitations [Lower Ext Edema] : no lower extremity edema [Shortness Of Breath] : no shortness of breath [Wheezing] : no wheezing [Cough] : no cough [Dyspnea on Exertion] : no dyspnea on exertion [Abdominal Pain] : no abdominal pain [Nausea] : no nausea [Constipation] : no constipation [Diarrhea] : diarrhea [Vomiting] : no vomiting [Heartburn] : no heartburn [Melena] : no melena [Dysuria] : no dysuria [Incontinence] : no incontinence [Hematuria] : no hematuria [Joint Pain] : no joint pain [Joint Stiffness] : no joint stiffness [Joint Swelling] : no joint swelling [Muscle Pain] : no muscle pain [Back Pain] : no back pain [Itching] : no itching [Mole Changes] : no mole changes [Skin Rash] : no skin rash [Headache] : no headache [Dizziness] : no dizziness [Fainting] : no fainting [Unsteady Walking] : no ataxia [Insomnia] : no insomnia [Anxiety] : no anxiety [Depression] : no depression [Easy Bleeding] : no easy bleeding [Easy Bruising] : no easy bruising [Swollen Glands] : no swollen glands [FreeTextEntry2] : She lost a few lbs in the last year,  [FreeTextEntry7] : Occ mild abdominal when eating heavy food, [FreeTextEntry8] : Nocturia of 1 X per night,

## 2025-03-06 NOTE — PROGRESS NOTE ADULT - SUBJECTIVE AND OBJECTIVE BOX
Nurse Practitioner Progress note:     HPI:            T(C): 36.8 (03-06-25 @ 08:59), Max: 36.8 (03-06-25 @ 08:59)  HR: 86 (03-06-25 @ 11:50) (84 - 91)  BP: 116/64 (03-06-25 @ 11:50) (116/64 - 153/94)  RR: 18 (03-06-25 @ 11:50) (18 - 18)  SpO2: 97% (03-06-25 @ 11:50) (97% - 100%)  Wt(kg): --        PHYSICAL EXAM:  GENERAL: A&Ox3 , NAD   CHEST/LUNG: Clear to auscultation bilaterally; No wheeze  HEART: s1 s2 Regular rate and rhythm; No murmurs, rubs, or gallops  ABDOMEN: Soft, Nontender, Nondistended; Bowel sounds present X 4 quadrants   EXTREMITIES:  2+ Peripheral Pulses, No clubbing, cyanosis, or edema   VASCULAR: Peripheral pulses palpable 2+ bilaterally  PROCEDURE SITE: Right ...................,Site is without hematoma or bleeding. Sensation and NAYAN intact. Distal pulses palpable 2+, capillary refill < 2 seconds. Patient denies pain, numbness, tingling, CP or SOB. Clean dry dressing applied     PROCEDURE RESULTS:    ASSESSMENT: HPI:        PLAN:  -VS, diet, activity as per post cath orders  -FREDRICK post hydration protocol  -Continue current medications  -Plan of care discussed with patient and   -Post cath instructions reviewed, patient verbalizes and understands instructions  - Patient to be discharged home, recommend following up with cardiologist in 2 weeks           Nurse Practitioner Progress note:     HPI:  88 y/o female with PMH PPM, Afib(eliquis), melanoma, biatrial enlargement, severe MR/TR, presented to cardiology for surgical clearance ( Melanoma right upper arm). Pt's TTE reveals severe MR/TR, pt recommended to interventional cardiology for assessment for SRAVANTHI.      T(C): 36.8 (03-06-25 @ 08:59), Max: 36.8 (03-06-25 @ 08:59)  HR: 86 (03-06-25 @ 11:50) (84 - 91)  BP: 116/64 (03-06-25 @ 11:50) (116/64 - 153/94)  RR: 18 (03-06-25 @ 11:50) (18 - 18)  SpO2: 97% (03-06-25 @ 11:50) (97% - 100%)  Wt(kg): --    PHYSICAL EXAM:  GENERAL: A&Ox3 , NAD   CHEST/LUNG: Clear to auscultation bilaterally; No wheeze  HEART: + systolic grade IV Murmur   ABDOMEN: Soft, Nontender, Nondistended; Bowel sounds present X 4 quadrants   EXTREMITIES:  2+ Peripheral Pulses, No clubbing, cyanosis, or edema   VASCULAR: Peripheral pulses palpable 2+ bilaterally  PROCEDURE SITE: RRA band and RFV sheath,Site is without hematoma or bleeding. Sensation and NAYAN intact. Distal pulses palpable 2+, capillary refill < 2 seconds. Patient denies pain, numbness, tingling, CP or SOB. Clean dry dressing applied     PROCEDURE RESULTS: full report to follow     ASSESSMENT: HPI:  88 y/o female with PMH PPM, Afib(eliquis), melanoma, biatrial enlargement, severe MR/TR, presented to cardiology for surgical clearance ( Melanoma right upper arm). Pt's TTE reveals severe MR/TR, pt recommended to interventional cardiology for assessment for SRAVANTHI.     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   -Plan of care discussed with patient, daughter, and Dr Murguia  -Post cath instructions reviewed, patient verbalizes and understands instructions  - Patient to be discharged home  -Follow up with Dr Murguia post melanoma surgery

## 2025-03-06 NOTE — H&P ADULT - PROBLEM SELECTOR PLAN 1
evaluation for SRAVANTHI    Ilana prehydration NS 250cc bolus x 1  RHC/LHC  -Consent obtained by interventional cardiologist for cardiac catheterization w/ coronary angiogram, possible sedation and/or analgesia and possible stent placement. Pt is competent, has capacity, and understands risks and benefits of procedure. Risks and benefits discussed. Risk discussed included, but not limited to MI, stroke, mortality, major bleeding, arrythmia, or infection. All questions answered

## 2025-03-06 NOTE — H&P ADULT - NSHPPHYSICALEXAM_GEN_ALL_CORE
PHYSICAL EXAM  GENERAL: NAD, AAOx3  HEAD:  Atraumatic, Normocephalic  EYES: EOMI, PERRLA, conjunctiva and sclera clear  NECK: Supple, No JVD, No LAD  CHEST/LUNG: Clear to auscultation bilaterally; No wheeze  HEART: Grade IV systolic murmur , paced rhythm   ABDOMEN: Soft, Nontender, Nondistended; Bowel sounds present X 4 quadrants   EXTREMITIES:  2+ Peripheral Pulses, No clubbing, cyanosis, or edema  SKIN: No rashes or lesions,  b/l LE not red, cool to touch,  no open skin no drainage  NEURO: nonfocal CN/motor/sensory/reflexes  Psych: normal affect and behavior, calm and cooperative

## 2025-03-07 DIAGNOSIS — I34.0 NONRHEUMATIC MITRAL (VALVE) INSUFFICIENCY: ICD-10-CM

## 2025-03-07 NOTE — POST DISCHARGE NOTE - DETAILS:
Post procedure phone call completed; patient understood all discharge paperwork. No questions regarding medications or pain management. Will make MD follow up appointment after surgery next week . Patient was able to rest when they were discharged. Patient will recommend [facility], no complaints of hospital stay, satisfied with care. Instructed patient to contact provider with any further questions or concerns.

## 2025-03-11 ENCOUNTER — APPOINTMENT (OUTPATIENT)
Dept: ENDOCRINOLOGY | Facility: CLINIC | Age: 89
End: 2025-03-11
Payer: MEDICARE

## 2025-03-11 ENCOUNTER — OUTPATIENT (OUTPATIENT)
Dept: OUTPATIENT SERVICES | Facility: HOSPITAL | Age: 89
LOS: 1 days | End: 2025-03-11
Payer: MEDICARE

## 2025-03-11 VITALS
OXYGEN SATURATION: 94 % | HEART RATE: 74 BPM | HEIGHT: 64 IN | SYSTOLIC BLOOD PRESSURE: 111 MMHG | BODY MASS INDEX: 23.79 KG/M2 | WEIGHT: 139.38 LBS | TEMPERATURE: 98 F | DIASTOLIC BLOOD PRESSURE: 60 MMHG

## 2025-03-11 VITALS
HEART RATE: 91 BPM | DIASTOLIC BLOOD PRESSURE: 77 MMHG | RESPIRATION RATE: 18 BRPM | OXYGEN SATURATION: 98 % | WEIGHT: 142.86 LBS | HEIGHT: 64.5 IN | SYSTOLIC BLOOD PRESSURE: 136 MMHG | TEMPERATURE: 98 F

## 2025-03-11 DIAGNOSIS — E27.40 UNSPECIFIED ADRENOCORTICAL INSUFFICIENCY: ICD-10-CM

## 2025-03-11 DIAGNOSIS — Z98.890 OTHER SPECIFIED POSTPROCEDURAL STATES: Chronic | ICD-10-CM

## 2025-03-11 DIAGNOSIS — E78.5 HYPERLIPIDEMIA, UNSPECIFIED: ICD-10-CM

## 2025-03-11 DIAGNOSIS — D03.61 MELANOMA IN SITU OF RIGHT UPPER LIMB, INCLUDING SHOULDER: ICD-10-CM

## 2025-03-11 DIAGNOSIS — C43.72 MALIGNANT MELANOMA OF LEFT LOWER LIMB, INCLUDING HIP: ICD-10-CM

## 2025-03-11 DIAGNOSIS — Z01.818 ENCOUNTER FOR OTHER PREPROCEDURAL EXAMINATION: ICD-10-CM

## 2025-03-11 DIAGNOSIS — I48.91 UNSPECIFIED ATRIAL FIBRILLATION: ICD-10-CM

## 2025-03-11 DIAGNOSIS — Z98.49 CATARACT EXTRACTION STATUS, UNSPECIFIED EYE: Chronic | ICD-10-CM

## 2025-03-11 PROCEDURE — G0463: CPT

## 2025-03-11 PROCEDURE — 36415 COLL VENOUS BLD VENIPUNCTURE: CPT

## 2025-03-11 PROCEDURE — 99214 OFFICE O/P EST MOD 30 MIN: CPT

## 2025-03-11 PROCEDURE — G2211 COMPLEX E/M VISIT ADD ON: CPT

## 2025-03-11 NOTE — H&P PST ADULT - COMMENTS
Denies h/o DVT, PE  Denies bleeding or clotting disorders  Denies dentures/partials, loose teeth/caps

## 2025-03-11 NOTE — H&P PST ADULT - NSICDXFAMILYHX_GEN_ALL_CORE_FT
FAMILY HISTORY:  Sibling  Still living? Unknown  Family history of leukemia, Age at diagnosis: Age Unknown

## 2025-03-11 NOTE — H&P PST ADULT - PROBLEM SELECTOR PLAN 1
Patient provided with pre-operative instructions and verbalized understanding.  Patient can take metoprolol and prednisone but otherwise will be NPO on day of surgery. Patient will stop NSAIDs, aspirin, herbal supplements or vitamins 1 week prior to surgery.  Chlorhexidine wash provided with instructions, verbalized understanding.     Pending endo clearance as per surgeon with stress dose steroid recommendations.    cardiac clearance in chart, hold Farxiga 4 days prior and hold Eliquis 2 days prior as per cardiologist (in clearance)

## 2025-03-11 NOTE — H&P PST ADULT - NSANTHOSAYNRD_GEN_A_CORE
No. JAIME screening performed.  STOP BANG Legend: 0-2 = LOW Risk; 3-4 = INTERMEDIATE Risk; 5-8 = HIGH Risk neck 13.5 inches/No. JAIME screening performed.  STOP BANG Legend: 0-2 = LOW Risk; 3-4 = INTERMEDIATE Risk; 5-8 = HIGH Risk

## 2025-03-11 NOTE — H&P PST ADULT - NSICDXPASTSURGICALHX_GEN_ALL_CORE_FT
PAST SURGICAL HISTORY:  H/O cataract extraction both eyes    History of left inguinal hernia repair     History of melanoma excision     S/P tonsillectomy and adenoidectomy

## 2025-03-11 NOTE — H&P PST ADULT - HISTORY OF PRESENT ILLNESS
blleeding lesion noted, prompted to see derm,  Patient is a 89 year old F presents for preoperative testing for Wide excision right arm melanoma, right inguinal sentinel lymph node biopsy with Dr. Russo scheduled for 03/18/2025. Reports bleeding lesion noted to RUE, prompted to see dermatologist, had biopsy, came back as melanoma, therefore having upcoming surgery. Denies any acute symptoms at this time. Patient reports feeling well overall.

## 2025-03-11 NOTE — H&P PST ADULT - NSICDXPASTMEDICALHX_GEN_ALL_CORE_FT
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

## 2025-03-12 ENCOUNTER — OUTPATIENT (OUTPATIENT)
Dept: OUTPATIENT SERVICES | Facility: HOSPITAL | Age: 89
LOS: 1 days | End: 2025-03-12
Payer: MEDICARE

## 2025-03-12 ENCOUNTER — RESULT REVIEW (OUTPATIENT)
Age: 89
End: 2025-03-12

## 2025-03-12 DIAGNOSIS — Z98.890 OTHER SPECIFIED POSTPROCEDURAL STATES: Chronic | ICD-10-CM

## 2025-03-12 DIAGNOSIS — R89.9 UNSPECIFIED ABNORMAL FINDING IN SPECIMENS FROM OTHER ORGANS, SYSTEMS AND TISSUES: ICD-10-CM

## 2025-03-12 DIAGNOSIS — C80.1 MALIGNANT (PRIMARY) NEOPLASM, UNSPECIFIED: ICD-10-CM

## 2025-03-12 DIAGNOSIS — Z90.89 ACQUIRED ABSENCE OF OTHER ORGANS: Chronic | ICD-10-CM

## 2025-03-12 DIAGNOSIS — Z98.49 CATARACT EXTRACTION STATUS, UNSPECIFIED EYE: Chronic | ICD-10-CM

## 2025-03-12 PROBLEM — D03.61 MELANOMA IN SITU OF RIGHT UPPER LIMB, INCLUDING SHOULDER: Chronic | Status: ACTIVE | Noted: 2025-03-11

## 2025-03-12 LAB
25(OH)D3 SERPL-MCNC: 60.1 NG/ML
ALBUMIN SERPL ELPH-MCNC: 4.4 G/DL
ALP BLD-CCNC: 57 U/L
ALT SERPL-CCNC: 19 U/L
ANION GAP SERPL CALC-SCNC: 14 MMOL/L
APTT BLD: 31.1 SEC
AST SERPL-CCNC: 17 U/L
BASOPHILS # BLD AUTO: 0.03 K/UL
BASOPHILS NFR BLD AUTO: 0.5 %
BILIRUB SERPL-MCNC: 0.7 MG/DL
BUN SERPL-MCNC: 25 MG/DL
CALCIUM SERPL-MCNC: 10.1 MG/DL
CHLORIDE SERPL-SCNC: 105 MMOL/L
CHOLEST SERPL-MCNC: 214 MG/DL
CO2 SERPL-SCNC: 27 MMOL/L
CREAT SERPL-MCNC: 0.71 MG/DL
EGFRCR SERPLBLD CKD-EPI 2021: 82 ML/MIN/1.73M2
EOSINOPHIL # BLD AUTO: 0.06 K/UL
EOSINOPHIL NFR BLD AUTO: 1 %
GLUCOSE SERPL-MCNC: 93 MG/DL
HCT VFR BLD CALC: 48.7 %
HDLC SERPL-MCNC: 102 MG/DL
HGB BLD-MCNC: 14.6 G/DL
IMM GRANULOCYTES NFR BLD AUTO: 0.3 %
LDLC SERPL DIRECT ASSAY-MCNC: 104 MG/DL
LYMPHOCYTES # BLD AUTO: 0.75 K/UL
LYMPHOCYTES NFR BLD AUTO: 12.5 %
MAN DIFF?: NORMAL
MCHC RBC-ENTMCNC: 30 G/DL
MCHC RBC-ENTMCNC: 30.4 PG
MCV RBC AUTO: 101.5 FL
MONOCYTES # BLD AUTO: 0.43 K/UL
MONOCYTES NFR BLD AUTO: 7.2 %
NEUTROPHILS # BLD AUTO: 4.72 K/UL
NEUTROPHILS NFR BLD AUTO: 78.5 %
PLATELET # BLD AUTO: 135 K/UL
POTASSIUM SERPL-SCNC: 4.8 MMOL/L
PROT SERPL-MCNC: 6.8 G/DL
RBC # BLD: 4.8 M/UL
RBC # FLD: 14.3 %
SODIUM SERPL-SCNC: 146 MMOL/L
T3FREE SERPL-MCNC: 2.66 PG/ML
T4 FREE SERPL-MCNC: 1.7 NG/DL
TRIGL SERPL-MCNC: 70 MG/DL
TSH SERPL-ACNC: 1.78 UIU/ML
WBC # FLD AUTO: 6.01 K/UL

## 2025-03-12 PROCEDURE — 88321 CONSLTJ&REPRT SLD PREP ELSWR: CPT

## 2025-03-13 LAB — SURGICAL PATHOLOGY STUDY: SIGNIFICANT CHANGE UP

## 2025-03-17 LAB — SODIUM SERPL-SCNC: 142 MMOL/L

## 2025-03-17 RX ORDER — HYDROCORTISONE 20 MG
100 TABLET ORAL ONCE
Refills: 0 | Status: DISCONTINUED | OUTPATIENT
Start: 2025-03-18 | End: 2025-03-18

## 2025-03-17 NOTE — ASU PATIENT PROFILE, ADULT - NSICDXPASTSURGICALHX_GEN_ALL_CORE_FT
PAST SURGICAL HISTORY:  H/O cardiac catheterization 3/15, no  blockages    H/O cataract extraction both eyes    History of left inguinal hernia repair mesh    History of melanoma excision left foot    S/P tonsillectomy and adenoidectomy

## 2025-03-17 NOTE — ASU PATIENT PROFILE, ADULT - NSICDXPASTMEDICALHX_GEN_ALL_CORE_FT
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

## 2025-03-17 NOTE — ASU PATIENT PROFILE, ADULT - HEALTH/HEALTHCARE ANXIETIES, PROFILE
patient has lower back pain of new origin, patient wants son Philip to go to holding, Prednisone dosing due to hypoadrenalism

## 2025-03-17 NOTE — ASU PATIENT PROFILE, ADULT - FALL HARM RISK - FALL HARM RISK
[de-identified] : Examination of the cervical spine reveals no midline or paraspinal tenderness to palpation. No cervical lymphadenopathy. Decreased range of motion with respect to flexion, extension, rotation, and lateral bending. Negative Spurlings. Negative Lhermitte's. Full range of motion bilateral shoulders without evidence of impingement. No instability of bilateral upper extremities.  Cranial nerves II through XII grossly intact. Intact sensation bilateral upper extremities. 5/5 deltoids biceps triceps wrist extensors wrist flexors finger flexors and hand intrinsics. 1+ biceps triceps and brachioradialis reflexes. Negative Juárez's. 2+ radial pulse. Negative Tinel's over the cubital and carpal tunnel. No skin lesions on the right and left upper extremities. [de-identified] : AP lateral cervical x-rays reveals mild spondylosis with loss of cervical lordosis Age

## 2025-03-18 ENCOUNTER — TRANSCRIPTION ENCOUNTER (OUTPATIENT)
Age: 89
End: 2025-03-18

## 2025-03-18 ENCOUNTER — OUTPATIENT (OUTPATIENT)
Dept: OUTPATIENT SERVICES | Facility: HOSPITAL | Age: 89
LOS: 1 days | End: 2025-03-18
Payer: MEDICARE

## 2025-03-18 ENCOUNTER — RESULT REVIEW (OUTPATIENT)
Age: 89
End: 2025-03-18

## 2025-03-18 ENCOUNTER — APPOINTMENT (OUTPATIENT)
Dept: SURGICAL ONCOLOGY | Facility: HOSPITAL | Age: 89
End: 2025-03-18

## 2025-03-18 VITALS
RESPIRATION RATE: 16 BRPM | HEART RATE: 103 BPM | DIASTOLIC BLOOD PRESSURE: 57 MMHG | SYSTOLIC BLOOD PRESSURE: 102 MMHG | OXYGEN SATURATION: 96 % | TEMPERATURE: 97 F

## 2025-03-18 VITALS
HEIGHT: 64.5 IN | OXYGEN SATURATION: 99 % | SYSTOLIC BLOOD PRESSURE: 139 MMHG | WEIGHT: 142.86 LBS | HEART RATE: 99 BPM | DIASTOLIC BLOOD PRESSURE: 89 MMHG | TEMPERATURE: 99 F | RESPIRATION RATE: 16 BRPM

## 2025-03-18 DIAGNOSIS — Z98.890 OTHER SPECIFIED POSTPROCEDURAL STATES: Chronic | ICD-10-CM

## 2025-03-18 DIAGNOSIS — Z90.89 ACQUIRED ABSENCE OF OTHER ORGANS: Chronic | ICD-10-CM

## 2025-03-18 DIAGNOSIS — Z98.49 CATARACT EXTRACTION STATUS, UNSPECIFIED EYE: Chronic | ICD-10-CM

## 2025-03-18 DIAGNOSIS — D03.61 MELANOMA IN SITU OF RIGHT UPPER LIMB, INCLUDING SHOULDER: ICD-10-CM

## 2025-03-18 PROCEDURE — 38790 INJECT FOR LYMPHATIC X-RAY: CPT | Mod: XU,RT

## 2025-03-18 PROCEDURE — 88341 IMHCHEM/IMCYTCHM EA ADD ANTB: CPT | Mod: 26

## 2025-03-18 PROCEDURE — A9541: CPT

## 2025-03-18 PROCEDURE — 88307 TISSUE EXAM BY PATHOLOGIST: CPT | Mod: 26

## 2025-03-18 PROCEDURE — 88342 IMHCHEM/IMCYTCHM 1ST ANTB: CPT | Mod: 26

## 2025-03-18 PROCEDURE — 38900 IO MAP OF SENT LYMPH NODE: CPT

## 2025-03-18 PROCEDURE — 88305 TISSUE EXAM BY PATHOLOGIST: CPT | Mod: 26

## 2025-03-18 PROCEDURE — 38900 IO MAP OF SENT LYMPH NODE: CPT | Mod: RT

## 2025-03-18 PROCEDURE — 38792 RA TRACER ID OF SENTINL NODE: CPT | Mod: XP,RT

## 2025-03-18 PROCEDURE — 38525 BIOPSY/REMOVAL LYMPH NODES: CPT | Mod: RT

## 2025-03-18 PROCEDURE — 38308 INCISION OF LYMPH CHANNELS: CPT

## 2025-03-18 PROCEDURE — C1889: CPT

## 2025-03-18 PROCEDURE — 14021 TIS TRNFR S/A/L 10.1-30 SQCM: CPT

## 2025-03-18 PROCEDURE — 88305 TISSUE EXAM BY PATHOLOGIST: CPT

## 2025-03-18 PROCEDURE — 88341 IMHCHEM/IMCYTCHM EA ADD ANTB: CPT

## 2025-03-18 PROCEDURE — 24079 RAD RESCJ TUM TISS A/E 5 CM+: CPT | Mod: RT

## 2025-03-18 PROCEDURE — 88342 IMHCHEM/IMCYTCHM 1ST ANTB: CPT

## 2025-03-18 PROCEDURE — 78195 LYMPH SYSTEM IMAGING: CPT | Mod: 26

## 2025-03-18 PROCEDURE — 38525 BIOPSY/REMOVAL LYMPH NODES: CPT

## 2025-03-18 PROCEDURE — 78195 LYMPH SYSTEM IMAGING: CPT | Mod: MC

## 2025-03-18 PROCEDURE — 88307 TISSUE EXAM BY PATHOLOGIST: CPT

## 2025-03-18 PROCEDURE — 14001 TIS TRNFR TRUNK 10.1-30SQCM: CPT

## 2025-03-18 DEVICE — SURGICEL FIBRILLAR 4 X 4": Type: IMPLANTABLE DEVICE | Status: FUNCTIONAL

## 2025-03-18 DEVICE — AGENT HEMOSTATIC HEMOBLAST 1.65G 10CM: Type: IMPLANTABLE DEVICE | Status: FUNCTIONAL

## 2025-03-18 RX ORDER — SODIUM CHLORIDE 9 G/1000ML
1000 INJECTION, SOLUTION INTRAVENOUS
Refills: 0 | Status: DISCONTINUED | OUTPATIENT
Start: 2025-03-18 | End: 2025-03-18

## 2025-03-18 RX ORDER — ONDANSETRON HCL/PF 4 MG/2 ML
4 VIAL (ML) INJECTION ONCE
Refills: 0 | Status: DISCONTINUED | OUTPATIENT
Start: 2025-03-18 | End: 2025-03-18

## 2025-03-18 RX ORDER — HYDROMORPHONE/SOD CHLOR,ISO/PF 2 MG/10 ML
0.5 SYRINGE (ML) INJECTION
Refills: 0 | Status: DISCONTINUED | OUTPATIENT
Start: 2025-03-18 | End: 2025-03-18

## 2025-03-18 RX ADMIN — SODIUM CHLORIDE 50 MILLILITER(S): 9 INJECTION, SOLUTION INTRAVENOUS at 06:32

## 2025-03-18 NOTE — ASU DISCHARGE PLAN (ADULT/PEDIATRIC) - CARE PROVIDER_API CALL
Perez Russo [facility]  [address] [place] [id number]  Phone: [phone]  Fax: [phone]  Established Patient  Follow Up Time: 2 weeks

## 2025-03-18 NOTE — ASU PREOP CHECKLIST - PATIENT'S PERSONAL PROPERTY REMOVED
glasses Rhofade Pregnancy And Lactation Text: This medication has not been assigned a Pregnancy Risk Category. It is unknown if the medication is excreted in breast milk.

## 2025-03-18 NOTE — ASU DISCHARGE PLAN (ADULT/PEDIATRIC) - FINANCIAL ASSISTANCE
[facility] provides services at a reduced cost to those who are determined to be eligible through [facility]’s financial assistance program. Information regarding [facility]’s financial assistance program can be found by going to [email or web address] or by calling [phone].

## 2025-03-19 PROBLEM — M54.50 LOW BACK PAIN, UNSPECIFIED: Chronic | Status: ACTIVE | Noted: 2025-03-17

## 2025-03-21 ENCOUNTER — INPATIENT (INPATIENT)
Facility: HOSPITAL | Age: 89
LOS: 3 days | Discharge: HOME | DRG: 57 | End: 2025-03-25
Attending: STUDENT IN AN ORGANIZED HEALTH CARE EDUCATION/TRAINING PROGRAM | Admitting: STUDENT IN AN ORGANIZED HEALTH CARE EDUCATION/TRAINING PROGRAM
Payer: MEDICARE

## 2025-03-21 ENCOUNTER — EMERGENCY (EMERGENCY)
Facility: HOSPITAL | Age: 89
LOS: 0 days | Discharge: ANOTHER HOSPITAL | End: 2025-03-21
Attending: EMERGENCY MEDICINE
Payer: MEDICARE

## 2025-03-21 ENCOUNTER — RESULT REVIEW (OUTPATIENT)
Age: 89
End: 2025-03-21

## 2025-03-21 ENCOUNTER — TRANSCRIPTION ENCOUNTER (OUTPATIENT)
Age: 89
End: 2025-03-21

## 2025-03-21 VITALS
OXYGEN SATURATION: 98 % | TEMPERATURE: 98 F | RESPIRATION RATE: 22 BRPM | SYSTOLIC BLOOD PRESSURE: 115 MMHG | DIASTOLIC BLOOD PRESSURE: 90 MMHG | HEART RATE: 98 BPM | WEIGHT: 132.28 LBS

## 2025-03-21 VITALS
DIASTOLIC BLOOD PRESSURE: 89 MMHG | OXYGEN SATURATION: 94 % | SYSTOLIC BLOOD PRESSURE: 133 MMHG | RESPIRATION RATE: 16 BRPM | TEMPERATURE: 98 F | HEART RATE: 93 BPM

## 2025-03-21 VITALS
TEMPERATURE: 98 F | DIASTOLIC BLOOD PRESSURE: 121 MMHG | SYSTOLIC BLOOD PRESSURE: 146 MMHG | OXYGEN SATURATION: 96 % | HEART RATE: 102 BPM | RESPIRATION RATE: 24 BRPM

## 2025-03-21 DIAGNOSIS — Z98.890 OTHER SPECIFIED POSTPROCEDURAL STATES: Chronic | ICD-10-CM

## 2025-03-21 DIAGNOSIS — I66.02 OCCLUSION AND STENOSIS OF LEFT MIDDLE CEREBRAL ARTERY: ICD-10-CM

## 2025-03-21 DIAGNOSIS — I10 ESSENTIAL (PRIMARY) HYPERTENSION: ICD-10-CM

## 2025-03-21 DIAGNOSIS — Z88.0 ALLERGY STATUS TO PENICILLIN: ICD-10-CM

## 2025-03-21 DIAGNOSIS — I48.91 UNSPECIFIED ATRIAL FIBRILLATION: ICD-10-CM

## 2025-03-21 DIAGNOSIS — Z90.89 ACQUIRED ABSENCE OF OTHER ORGANS: Chronic | ICD-10-CM

## 2025-03-21 DIAGNOSIS — I47.29 OTHER VENTRICULAR TACHYCARDIA: ICD-10-CM

## 2025-03-21 DIAGNOSIS — Z95.0 PRESENCE OF CARDIAC PACEMAKER: ICD-10-CM

## 2025-03-21 DIAGNOSIS — Z98.49 CATARACT EXTRACTION STATUS, UNSPECIFIED EYE: Chronic | ICD-10-CM

## 2025-03-21 DIAGNOSIS — Z79.01 LONG TERM (CURRENT) USE OF ANTICOAGULANTS: ICD-10-CM

## 2025-03-21 DIAGNOSIS — R41.0 DISORIENTATION, UNSPECIFIED: ICD-10-CM

## 2025-03-21 DIAGNOSIS — I63.9 CEREBRAL INFARCTION, UNSPECIFIED: ICD-10-CM

## 2025-03-21 DIAGNOSIS — I69.30 UNSPECIFIED SEQUELAE OF CEREBRAL INFARCTION: ICD-10-CM

## 2025-03-21 LAB
ALBUMIN SERPL ELPH-MCNC: 3.1 G/DL — LOW (ref 3.3–5)
ALBUMIN SERPL ELPH-MCNC: 3.4 G/DL — SIGNIFICANT CHANGE UP (ref 3.3–5.2)
ALP SERPL-CCNC: 41 U/L — SIGNIFICANT CHANGE UP (ref 40–120)
ALP SERPL-CCNC: 44 U/L — SIGNIFICANT CHANGE UP (ref 40–120)
ALT FLD-CCNC: 17 U/L — SIGNIFICANT CHANGE UP
ALT FLD-CCNC: 23 U/L — SIGNIFICANT CHANGE UP (ref 12–78)
ANION GAP SERPL CALC-SCNC: 13 MMOL/L — SIGNIFICANT CHANGE UP (ref 5–17)
ANION GAP SERPL CALC-SCNC: 5 MMOL/L — SIGNIFICANT CHANGE UP (ref 5–17)
APPEARANCE UR: CLEAR — SIGNIFICANT CHANGE UP
APTT BLD: 26.1 SEC — SIGNIFICANT CHANGE UP (ref 24.5–35.6)
APTT BLD: 30.2 SEC — SIGNIFICANT CHANGE UP (ref 24.5–35.6)
AST SERPL-CCNC: 13 U/L — LOW (ref 15–37)
AST SERPL-CCNC: 16 U/L — SIGNIFICANT CHANGE UP
BACTERIA # UR AUTO: ABNORMAL /HPF
BASOPHILS # BLD AUTO: 0.05 K/UL — SIGNIFICANT CHANGE UP (ref 0–0.2)
BASOPHILS NFR BLD AUTO: 0.8 % — SIGNIFICANT CHANGE UP (ref 0–2)
BILIRUB SERPL-MCNC: 0.7 MG/DL — SIGNIFICANT CHANGE UP (ref 0.2–1.2)
BILIRUB SERPL-MCNC: 0.7 MG/DL — SIGNIFICANT CHANGE UP (ref 0.4–2)
BILIRUB UR-MCNC: NEGATIVE — SIGNIFICANT CHANGE UP
BLD GP AB SCN SERPL QL: SIGNIFICANT CHANGE UP
BLD GP AB SCN SERPL QL: SIGNIFICANT CHANGE UP
BUN SERPL-MCNC: 21.9 MG/DL — HIGH (ref 8–20)
BUN SERPL-MCNC: 23 MG/DL — SIGNIFICANT CHANGE UP (ref 7–23)
CALCIUM SERPL-MCNC: 8.1 MG/DL — LOW (ref 8.4–10.5)
CALCIUM SERPL-MCNC: 9.1 MG/DL — SIGNIFICANT CHANGE UP (ref 8.5–10.1)
CAST: 1 /LPF — SIGNIFICANT CHANGE UP (ref 0–4)
CHLORIDE SERPL-SCNC: 104 MMOL/L — SIGNIFICANT CHANGE UP (ref 96–108)
CHLORIDE SERPL-SCNC: 105 MMOL/L — SIGNIFICANT CHANGE UP (ref 96–108)
CO2 SERPL-SCNC: 23 MMOL/L — SIGNIFICANT CHANGE UP (ref 22–29)
CO2 SERPL-SCNC: 27 MMOL/L — SIGNIFICANT CHANGE UP (ref 22–31)
COLOR SPEC: YELLOW — SIGNIFICANT CHANGE UP
CREAT SERPL-MCNC: 0.68 MG/DL — SIGNIFICANT CHANGE UP (ref 0.5–1.3)
CREAT SERPL-MCNC: 0.7 MG/DL — SIGNIFICANT CHANGE UP (ref 0.5–1.3)
DIFF PNL FLD: NEGATIVE — SIGNIFICANT CHANGE UP
EGFR: 83 ML/MIN/1.73M2 — SIGNIFICANT CHANGE UP
EGFR: 83 ML/MIN/1.73M2 — SIGNIFICANT CHANGE UP
EGFR: 84 ML/MIN/1.73M2 — SIGNIFICANT CHANGE UP
EGFR: 84 ML/MIN/1.73M2 — SIGNIFICANT CHANGE UP
EOSINOPHIL # BLD AUTO: 0.05 K/UL — SIGNIFICANT CHANGE UP (ref 0–0.5)
EOSINOPHIL NFR BLD AUTO: 0.8 % — SIGNIFICANT CHANGE UP (ref 0–6)
ETHANOL SERPL-MCNC: <10 MG/DL — SIGNIFICANT CHANGE UP (ref 0–10)
FIBRINOGEN PPP-MCNC: 284 MG/DL — SIGNIFICANT CHANGE UP (ref 200–450)
GLUCOSE BLDC GLUCOMTR-MCNC: 135 MG/DL — HIGH (ref 70–99)
GLUCOSE SERPL-MCNC: 135 MG/DL — HIGH (ref 70–99)
GLUCOSE SERPL-MCNC: 150 MG/DL — HIGH (ref 70–99)
GLUCOSE UR QL: 500 MG/DL
HCT VFR BLD CALC: 39.9 % — SIGNIFICANT CHANGE UP (ref 34.5–45)
HCT VFR BLD CALC: 40.8 % — SIGNIFICANT CHANGE UP (ref 34.5–45)
HGB BLD-MCNC: 12.9 G/DL — SIGNIFICANT CHANGE UP (ref 11.5–15.5)
HGB BLD-MCNC: 13 G/DL — SIGNIFICANT CHANGE UP (ref 11.5–15.5)
IMM GRANULOCYTES # BLD AUTO: 0.04 K/UL — SIGNIFICANT CHANGE UP (ref 0–0.07)
IMM GRANULOCYTES NFR BLD AUTO: 0.7 % — SIGNIFICANT CHANGE UP (ref 0–0.9)
INR BLD: 1.08 RATIO — SIGNIFICANT CHANGE UP (ref 0.85–1.16)
INR BLD: 1.12 RATIO — SIGNIFICANT CHANGE UP (ref 0.85–1.16)
KETONES UR-MCNC: NEGATIVE MG/DL — SIGNIFICANT CHANGE UP
LEUKOCYTE ESTERASE UR-ACNC: ABNORMAL
LYMPHOCYTES # BLD AUTO: 0.82 K/UL — LOW (ref 1–3.3)
LYMPHOCYTES NFR BLD AUTO: 13.7 % — SIGNIFICANT CHANGE UP (ref 13–44)
MAGNESIUM SERPL-MCNC: 2.4 MG/DL — SIGNIFICANT CHANGE UP (ref 1.6–2.6)
MCHC RBC-ENTMCNC: 30.6 PG — SIGNIFICANT CHANGE UP (ref 27–34)
MCHC RBC-ENTMCNC: 31.1 PG — SIGNIFICANT CHANGE UP (ref 27–34)
MCHC RBC-ENTMCNC: 31.6 G/DL — LOW (ref 32–36)
MCHC RBC-ENTMCNC: 32.6 G/DL — SIGNIFICANT CHANGE UP (ref 32–36)
MCV RBC AUTO: 95.5 FL — SIGNIFICANT CHANGE UP (ref 80–100)
MCV RBC AUTO: 96.7 FL — SIGNIFICANT CHANGE UP (ref 80–100)
MONOCYTES # BLD AUTO: 0.53 K/UL — SIGNIFICANT CHANGE UP (ref 0–0.9)
MONOCYTES NFR BLD AUTO: 8.8 % — SIGNIFICANT CHANGE UP (ref 2–14)
MRSA PCR RESULT.: SIGNIFICANT CHANGE UP
NEUTROPHILS # BLD AUTO: 4.5 K/UL — SIGNIFICANT CHANGE UP (ref 1.8–7.4)
NEUTROPHILS NFR BLD AUTO: 75.2 % — SIGNIFICANT CHANGE UP (ref 43–77)
NITRITE UR-MCNC: NEGATIVE — SIGNIFICANT CHANGE UP
NRBC # BLD AUTO: 0 K/UL — SIGNIFICANT CHANGE UP (ref 0–0)
NRBC # BLD AUTO: 0 K/UL — SIGNIFICANT CHANGE UP (ref 0–0)
NRBC # FLD: 0 K/UL — SIGNIFICANT CHANGE UP (ref 0–0)
NRBC # FLD: 0 K/UL — SIGNIFICANT CHANGE UP (ref 0–0)
NRBC BLD AUTO-RTO: 0 /100 WBCS — SIGNIFICANT CHANGE UP (ref 0–0)
NRBC BLD AUTO-RTO: 0 /100 WBCS — SIGNIFICANT CHANGE UP (ref 0–0)
PH UR: 5 — SIGNIFICANT CHANGE UP (ref 5–8)
PHOSPHATE SERPL-MCNC: 3.1 MG/DL — SIGNIFICANT CHANGE UP (ref 2.4–4.7)
PLATELET # BLD AUTO: 118 K/UL — LOW (ref 150–400)
PLATELET # BLD AUTO: 122 K/UL — LOW (ref 150–400)
PMV BLD: 10.1 FL — SIGNIFICANT CHANGE UP (ref 7–13)
PMV BLD: 10.4 FL — SIGNIFICANT CHANGE UP (ref 7–13)
POTASSIUM SERPL-MCNC: 3.6 MMOL/L — SIGNIFICANT CHANGE UP (ref 3.5–5.3)
POTASSIUM SERPL-MCNC: 3.7 MMOL/L — SIGNIFICANT CHANGE UP (ref 3.5–5.3)
POTASSIUM SERPL-SCNC: 3.6 MMOL/L — SIGNIFICANT CHANGE UP (ref 3.5–5.3)
POTASSIUM SERPL-SCNC: 3.7 MMOL/L — SIGNIFICANT CHANGE UP (ref 3.5–5.3)
PROT SERPL-MCNC: 5.6 G/DL — LOW (ref 6.6–8.7)
PROT SERPL-MCNC: 5.9 GM/DL — LOW (ref 6–8.3)
PROT UR-MCNC: NEGATIVE MG/DL — SIGNIFICANT CHANGE UP
PROTHROM AB SERPL-ACNC: 12.5 SEC — SIGNIFICANT CHANGE UP (ref 9.9–13.4)
PROTHROM AB SERPL-ACNC: 13.2 SEC — SIGNIFICANT CHANGE UP (ref 9.9–13.4)
RBC # BLD: 4.18 M/UL — SIGNIFICANT CHANGE UP (ref 3.8–5.2)
RBC # BLD: 4.22 M/UL — SIGNIFICANT CHANGE UP (ref 3.8–5.2)
RBC # FLD: 13.8 % — SIGNIFICANT CHANGE UP (ref 10.3–14.5)
RBC # FLD: 13.8 % — SIGNIFICANT CHANGE UP (ref 10.3–14.5)
RBC CASTS # UR COMP ASSIST: 2 /HPF — SIGNIFICANT CHANGE UP (ref 0–4)
S AUREUS DNA NOSE QL NAA+PROBE: SIGNIFICANT CHANGE UP
SODIUM SERPL-SCNC: 137 MMOL/L — SIGNIFICANT CHANGE UP (ref 135–145)
SODIUM SERPL-SCNC: 140 MMOL/L — SIGNIFICANT CHANGE UP (ref 135–145)
SP GR SPEC: >1.03 — HIGH (ref 1–1.03)
SQUAMOUS # UR AUTO: 4 /HPF — SIGNIFICANT CHANGE UP (ref 0–5)
TROPONIN I, HIGH SENSITIVITY RESULT: 11.39 NG/L — SIGNIFICANT CHANGE UP
UROBILINOGEN FLD QL: 0.2 MG/DL — SIGNIFICANT CHANGE UP (ref 0.2–1)
WBC # BLD: 4.11 K/UL — SIGNIFICANT CHANGE UP (ref 3.8–10.5)
WBC # BLD: 5.99 K/UL — SIGNIFICANT CHANGE UP (ref 3.8–10.5)
WBC # FLD AUTO: 4.11 K/UL — SIGNIFICANT CHANGE UP (ref 3.8–10.5)
WBC # FLD AUTO: 5.99 K/UL — SIGNIFICANT CHANGE UP (ref 3.8–10.5)
WBC UR QL: 10 /HPF — HIGH (ref 0–5)

## 2025-03-21 PROCEDURE — 84484 ASSAY OF TROPONIN QUANT: CPT

## 2025-03-21 PROCEDURE — 70450 CT HEAD/BRAIN W/O DYE: CPT | Mod: XU

## 2025-03-21 PROCEDURE — 0042T: CPT

## 2025-03-21 PROCEDURE — 99291 CRITICAL CARE FIRST HOUR: CPT

## 2025-03-21 PROCEDURE — 96374 THER/PROPH/DIAG INJ IV PUSH: CPT | Mod: XU

## 2025-03-21 PROCEDURE — 93306 TTE W/DOPPLER COMPLETE: CPT | Mod: 26

## 2025-03-21 PROCEDURE — 70498 CT ANGIOGRAPHY NECK: CPT

## 2025-03-21 PROCEDURE — 70450 CT HEAD/BRAIN W/O DYE: CPT | Mod: 26,77,XU

## 2025-03-21 PROCEDURE — 74018 RADEX ABDOMEN 1 VIEW: CPT | Mod: 26

## 2025-03-21 PROCEDURE — 85025 COMPLETE CBC W/AUTO DIFF WBC: CPT

## 2025-03-21 PROCEDURE — 86900 BLOOD TYPING SEROLOGIC ABO: CPT

## 2025-03-21 PROCEDURE — 81001 URINALYSIS AUTO W/SCOPE: CPT

## 2025-03-21 PROCEDURE — 80307 DRUG TEST PRSMV CHEM ANLYZR: CPT

## 2025-03-21 PROCEDURE — 36415 COLL VENOUS BLD VENIPUNCTURE: CPT

## 2025-03-21 PROCEDURE — 85730 THROMBOPLASTIN TIME PARTIAL: CPT

## 2025-03-21 PROCEDURE — 71045 X-RAY EXAM CHEST 1 VIEW: CPT

## 2025-03-21 PROCEDURE — 70450 CT HEAD/BRAIN W/O DYE: CPT | Mod: 26,XU

## 2025-03-21 PROCEDURE — 85610 PROTHROMBIN TIME: CPT

## 2025-03-21 PROCEDURE — 99223 1ST HOSP IP/OBS HIGH 75: CPT

## 2025-03-21 PROCEDURE — 70496 CT ANGIOGRAPHY HEAD: CPT

## 2025-03-21 PROCEDURE — 82962 GLUCOSE BLOOD TEST: CPT

## 2025-03-21 PROCEDURE — 70498 CT ANGIOGRAPHY NECK: CPT | Mod: 26

## 2025-03-21 PROCEDURE — 61645 PERQ ART M-THROMBECT &/NFS: CPT | Mod: LT

## 2025-03-21 PROCEDURE — 72170 X-RAY EXAM OF PELVIS: CPT | Mod: 26

## 2025-03-21 PROCEDURE — 86901 BLOOD TYPING SEROLOGIC RH(D): CPT

## 2025-03-21 PROCEDURE — 86850 RBC ANTIBODY SCREEN: CPT

## 2025-03-21 PROCEDURE — 80053 COMPREHEN METABOLIC PANEL: CPT

## 2025-03-21 PROCEDURE — 93970 EXTREMITY STUDY: CPT | Mod: 26

## 2025-03-21 PROCEDURE — 72170 X-RAY EXAM OF PELVIS: CPT

## 2025-03-21 PROCEDURE — 93010 ELECTROCARDIOGRAM REPORT: CPT

## 2025-03-21 PROCEDURE — 71045 X-RAY EXAM CHEST 1 VIEW: CPT | Mod: 26

## 2025-03-21 PROCEDURE — 93005 ELECTROCARDIOGRAM TRACING: CPT

## 2025-03-21 PROCEDURE — 70496 CT ANGIOGRAPHY HEAD: CPT | Mod: 26

## 2025-03-21 RX ORDER — AMIODARONE HYDROCHLORIDE 50 MG/ML
150 INJECTION, SOLUTION INTRAVENOUS ONCE
Refills: 0 | Status: COMPLETED | OUTPATIENT
Start: 2025-03-21 | End: 2025-03-21

## 2025-03-21 RX ORDER — PREDNISONE 20 MG/1
5 TABLET ORAL DAILY
Refills: 0 | Status: DISCONTINUED | OUTPATIENT
Start: 2025-03-22 | End: 2025-03-25

## 2025-03-21 RX ORDER — PREDNISONE 20 MG/1
2 TABLET ORAL AT BEDTIME
Refills: 0 | Status: DISCONTINUED | OUTPATIENT
Start: 2025-03-21 | End: 2025-03-25

## 2025-03-21 RX ORDER — DAPAGLIFLOZIN 5 MG/1
1 TABLET, FILM COATED ORAL
Qty: 0 | Refills: 0 | DISCHARGE

## 2025-03-21 RX ORDER — FUROSEMIDE 10 MG/ML
1 INJECTION INTRAMUSCULAR; INTRAVENOUS
Refills: 0 | DISCHARGE

## 2025-03-21 RX ORDER — SENNA 187 MG
2 TABLET ORAL AT BEDTIME
Refills: 0 | Status: DISCONTINUED | OUTPATIENT
Start: 2025-03-21 | End: 2025-03-25

## 2025-03-21 RX ORDER — ATORVASTATIN CALCIUM 80 MG/1
40 TABLET, FILM COATED ORAL AT BEDTIME
Refills: 0 | Status: DISCONTINUED | OUTPATIENT
Start: 2025-03-21 | End: 2025-03-25

## 2025-03-21 RX ORDER — ASPIRIN 325 MG
300 TABLET ORAL ONCE
Refills: 0 | Status: DISCONTINUED | OUTPATIENT
Start: 2025-03-21 | End: 2025-03-21

## 2025-03-21 RX ORDER — ENOXAPARIN SODIUM 100 MG/ML
40 INJECTION SUBCUTANEOUS
Refills: 0 | Status: DISCONTINUED | OUTPATIENT
Start: 2025-03-21 | End: 2025-03-25

## 2025-03-21 RX ORDER — PHENYLEPHRINE HCL IN 0.9% NACL 0.5 MG/5ML
0.1 SYRINGE (ML) INTRAVENOUS
Qty: 40 | Refills: 0 | Status: DISCONTINUED | OUTPATIENT
Start: 2025-03-21 | End: 2025-03-22

## 2025-03-21 RX ORDER — LABETALOL HYDROCHLORIDE 200 MG/1
10 TABLET, FILM COATED ORAL
Refills: 0 | Status: DISCONTINUED | OUTPATIENT
Start: 2025-03-21 | End: 2025-03-25

## 2025-03-21 RX ORDER — APIXABAN 2.5 MG/1
1 TABLET, FILM COATED ORAL
Refills: 0 | DISCHARGE

## 2025-03-21 RX ORDER — METOPROLOL SUCCINATE 50 MG/1
50 TABLET, EXTENDED RELEASE ORAL
Refills: 0 | Status: DISCONTINUED | OUTPATIENT
Start: 2025-03-21 | End: 2025-03-21

## 2025-03-21 RX ORDER — METOPROLOL SUCCINATE 50 MG/1
5 TABLET, EXTENDED RELEASE ORAL EVERY 6 HOURS
Refills: 0 | Status: DISCONTINUED | OUTPATIENT
Start: 2025-03-21 | End: 2025-03-21

## 2025-03-21 RX ORDER — MIDODRINE HYDROCHLORIDE 5 MG/1
5 TABLET ORAL EVERY 8 HOURS
Refills: 0 | Status: DISCONTINUED | OUTPATIENT
Start: 2025-03-21 | End: 2025-03-23

## 2025-03-21 RX ORDER — POLYETHYLENE GLYCOL 3350 17 G/17G
17 POWDER, FOR SOLUTION ORAL DAILY
Refills: 0 | Status: DISCONTINUED | OUTPATIENT
Start: 2025-03-21 | End: 2025-03-25

## 2025-03-21 RX ORDER — ASPIRIN 325 MG
81 TABLET ORAL DAILY
Refills: 0 | Status: DISCONTINUED | OUTPATIENT
Start: 2025-03-21 | End: 2025-03-25

## 2025-03-21 RX ORDER — PREDNISONE 20 MG/1
5 TABLET ORAL ONCE
Refills: 0 | Status: COMPLETED | OUTPATIENT
Start: 2025-03-21 | End: 2025-03-21

## 2025-03-21 RX ADMIN — Medication 100 MILLIEQUIVALENT(S): at 09:34

## 2025-03-21 RX ADMIN — ENOXAPARIN SODIUM 40 MILLIGRAM(S): 100 INJECTION SUBCUTANEOUS at 21:44

## 2025-03-21 RX ADMIN — ATORVASTATIN CALCIUM 40 MILLIGRAM(S): 80 TABLET, FILM COATED ORAL at 21:43

## 2025-03-21 RX ADMIN — AMIODARONE HYDROCHLORIDE 600 MILLIGRAM(S): 50 INJECTION, SOLUTION INTRAVENOUS at 03:00

## 2025-03-21 RX ADMIN — Medication 2 TABLET(S): at 21:44

## 2025-03-21 RX ADMIN — PREDNISONE 5 MILLIGRAM(S): 20 TABLET ORAL at 11:56

## 2025-03-21 RX ADMIN — MIDODRINE HYDROCHLORIDE 5 MILLIGRAM(S): 5 TABLET ORAL at 21:44

## 2025-03-21 RX ADMIN — Medication 100 MILLIEQUIVALENT(S): at 11:12

## 2025-03-21 RX ADMIN — METOPROLOL SUCCINATE 5 MILLIGRAM(S): 50 TABLET, EXTENDED RELEASE ORAL at 11:12

## 2025-03-21 RX ADMIN — Medication 1 APPLICATION(S): at 07:45

## 2025-03-21 RX ADMIN — Medication 81 MILLIGRAM(S): at 17:39

## 2025-03-21 RX ADMIN — POLYETHYLENE GLYCOL 3350 17 GRAM(S): 17 POWDER, FOR SOLUTION ORAL at 17:39

## 2025-03-21 RX ADMIN — Medication 500 MILLILITER(S): at 12:55

## 2025-03-21 RX ADMIN — MIDODRINE HYDROCHLORIDE 5 MILLIGRAM(S): 5 TABLET ORAL at 15:47

## 2025-03-21 RX ADMIN — Medication 1000 MILLILITER(S): at 13:55

## 2025-03-21 RX ADMIN — PREDNISONE 2 MILLIGRAM(S): 20 TABLET ORAL at 21:45

## 2025-03-21 NOTE — ED PROVIDER NOTE - CONSTITUTIONAL, MLM
Elderly female; unable to speak; awake; no facial droop normal... Elderly female; unable to speak; awake; right lower facial droop

## 2025-03-21 NOTE — SPEECH LANGUAGE PATHOLOGY EVALUATION - SLP GENERAL OBSERVATIONS
Pt received & seen seated upright in bed, awake/alert, on room air with 02 sats: 98%, son present, 0/10 pain pre/post

## 2025-03-21 NOTE — PROGRESS NOTE ADULT - SUBJECTIVE AND OBJECTIVE BOX
Chief complaint:   Patient is a 89y old  Female who presents with a chief complaint of   HPI:  88 y/o female with PMHx of Afib (on Eliquis), PPM, HTN, HLD, GERD, severe MR/TR, iatrogenic adrenal insufficiency (on prednisone), lymphedema, metastatic melanoma s/p L foot melanoma and lymph node resection, s/p RUE melanoma and R axillary lymph node excision at [facility] (3/18/25) presented to  ED 3/21 with AMS s/p unwitnessed fall off the toilet in the bathroom. As per son Philip, patient was LKW 10PM prior to going to the bathroom. EMS noted patient with R sided weakness, aphasia and facial droop. Upon arrival to  ED patient went into Replaced by [facility], given Amiodarone 150mg. 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 Neuro IR consulted and patient transferred to [facility] for mechanical thrombectomy. S/p cerebral angiogram for mechanical thrombectomy of L M1 occlusion, TICI 3 (one pass). Patient recently taken off Eliquis (2 days prior to procedure) for melanoma removal and restarted yesterday, last dose 7PM. Patient's farxiga was held 4 days prior to procedure and restarted yesterday morning.  (21 Mar 2025 04:01)        24hr EVENTS:  3/20 - Thrombectomy. Admitted to NSICU  3/21 - Passed S/S    -----------------------------------------------------------------------------------------------------------------------------------------------------------------------------------    PHYSICAL EXAM:  General: Calm,  HEENT: MMM  Neuro:  - Aphasic  - PERRL, EOMI, Left gaze preference  - Right Hemiparesis with drift, 4/5  - LLE no effort  - No dysmetria     CV: RRR  Pulm: clear to auscultation  Abd: Soft, nontender, nondistended  Ext: no noted edema in lower ext  Skin: warm, dry      -----------------------------------------------------------------------------------------------------------------------------------------------------------------------------------  ICU Vital Signs Last 24 Hrs  T(C): 37.2 (21 Mar 2025 10:55), Max: 37.5 (21 Mar 2025 08:55)  T(F): 99 (21 Mar 2025 10:55), Max: 99.5 (21 Mar 2025 08:55)  HR: 98 (21 Mar 2025 11:25) (81 - 126)  BP: 110/74 (21 Mar 2025 11:25) (98/71 - 161/90)  BP(mean): 85 (21 Mar 2025 11:25) (67 - 131)  ABP: --  ABP(mean): --  RR: 19 (21 Mar 2025 11:25) (16 - 24)  SpO2: 98% (21 Mar 2025 11:25) (92% - 100%)    O2 Parameters below as of 21 Mar 2025 10:25  Patient On (Oxygen Delivery Method): room air            I&O's Summary    20 Mar 2025 07:01  -  21 Mar 2025 07:00  --------------------------------------------------------  IN: 120 mL / OUT: 400 mL / NET: -280 mL    21 Mar 2025 07:01  -  21 Mar 2025 11:49  --------------------------------------------------------  IN: 440 mL / OUT: 850 mL / NET: -410 mL        MEDICATIONS  (STANDING):  atorvastatin 40 milliGRAM(s) Oral at bedtime  chlorhexidine 2% Cloths 1 Application(s) Topical <User Schedule>  metoprolol tartrate 50 milliGRAM(s) Oral two times a day  polyethylene glycol 3350 17 Gram(s) Oral daily  predniSONE   Tablet 2 milliGRAM(s) Oral at bedtime  predniSONE   Tablet 5 milliGRAM(s) Oral once  senna 2 Tablet(s) Oral at bedtime  sodium chloride 0.9%. 1000 milliLiter(s) (60 mL/Hr) IV Continuous <Continuous>      RESPIRATORY:        IMAGING:   Recent imaging studies were reviewed.    LAB RESULTS:                          12.9   4.11  )-----------( 118      ( 21 Mar 2025 06:35 )             40.8       PT/INR - ( 21 Mar 2025 06:35 )   PT: 12.5 sec;   INR: 1.08 ratio         PTT - ( 21 Mar 2025 06:35 )  PTT:26.1 sec    03-21    140  |  104  |  21.9[H]  ----------------------------<  135[H]  3.7   |  23.0  |  0.68    Ca    8.1[L]      21 Mar 2025 06:35  Phos  3.1     03-21  Mg     2.4     03-21    TPro  5.6[L]  /  Alb  3.4  /  TBili  0.7  /  DBili  x   /  AST  16  /  ALT  17  /  AlkPhos  44  03-21                Urinalysis with Rflx Culture (collected 03-21-25 @ 03:19)

## 2025-03-21 NOTE — PATIENT PROFILE ADULT - NSPROGENPREVTRANSF_GEN_A_NUR
LEXII pt lethargic, not speaking and not following commands LEXII pt lethargic, not speaking and not following commands/no history of blood product transfusion

## 2025-03-21 NOTE — CONSULT NOTE ADULT - ASSESSMENT
88 y/o female with PMHx of Afib (on Eliquis), PPM, HTN, HLD, GERD, severe MR/TR, iatrogenic adrenal insufficiency (on prednisone), lymphedema, metastatic melanoma s/p RUE melanoma and R axillary lymph node excision on 3/18 presented to  ED 3/21 with AMS s/p unwitnessed fall off the toilet in the bathroom. Pt now s/p thrombectomy for a L M1 occlusion

## 2025-03-21 NOTE — SWALLOW BEDSIDE ASSESSMENT ADULT - COMMENTS
As per MD note: "89F PMHx of Afib (on Eliquis), PPM, HTN, HLD, GERD, severe MR/TR, iatrogenic adrenal insufficiency (on prednisone), lymphedema, metastatic melanoma s/p L foot melanoma and lymph node resection, s/p RUE melanoma and R axillary lymph node excision at [facility] (3/18/25) presented to  ED 3/21 with AMS s/p unwitnessed fall off the toilet in the bathroom. EMS noted patient with R sided weakness, aphasia and facial droop. 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 Neuro IR consulted and patient transferred to [facility] for mechanical thrombectomy. S/p cerebral angiogram for mechanical thrombectomy of L M1 occlusion, TICI 3 (one pass). "

## 2025-03-21 NOTE — H&P ADULT - NSHPPHYSICALEXAM_GEN_ALL_CORE
Constitutional:  89 y/o female in no acute distress.  Eyes:  Sclera anicteric, conjunctiva noninjected.  ENMT: Oropharyngeal mucosa moist, pink. Tongue midline.    Respiratory: normal chest rise, nonlabored breathing.  Cardiovascular: Regular rate and rhythm  Gastrointestinal:  Soft, nontender, nondistended.   Vascular: Extremities warm, no ulcers, no discoloration of skin.   Neurological: ___________  Skin: Warm, dry, no erythema. Constitutional:  89 y/o female in no acute distress.  Eyes:  Sclera anicteric, conjunctiva noninjected.  ENMT: Oropharyngeal mucosa moist, pink. Tongue midline.    Respiratory: normal chest rise, nonlabored breathing.  Cardiovascular: Regular rate and rhythm  Gastrointestinal:  Soft, nontender, nondistended.   Vascular: Extremities warm, no ulcers, no discoloration of skin.   Neurological: OE spontaneously, AAOx1 (nods to name), nonverbal  intermittently follows simple commands (wiggle toes, lift arms)  R neglect, mild R facial droop    R pupil 3mm brisk, L pupil 3mm sluggish, able to track and cross midline   RUE antigravity with drift, LUE/LLE antigravity   moves RLE within plane of bed (limited d/t groin checks)  Skin: Warm, dry, no erythema,  R groin dressing c/d/i

## 2025-03-21 NOTE — H&P ADULT - HISTORY OF PRESENT ILLNESS
88 y/o female with PMHx of Afib (on Eliquis), PPM, HTN, hx of RUE melanoma removal with R axillary lymph node removal at [facility] 4 days ago presented to  ED with AMS s/p unwitnessed fall off the toilet in the bathroom. EMS noted patient with R sided weakness, aphasia and facial droop. Upon arrival to  ED patient went into Vtach. 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 Neuro IR consulted and patient transferred to [facility] for mechanical thrombectomy. S/p cerebral angiogram for mechanical thrombectomy of L M1 occlusion, TICI ____. Patient recently taken off Eliquis for melanoma removal and restarted yesterday, last dose 7PM.  88 y/o female with PMHx of Afib (on Eliquis), PPM, HTN, HLD, GERD, severe MR, iatrogenic adrenal insufficiency (on prednisone), lymphedema, metastatic melanoma s/p RUE melanoma and R axillary lymph node excision at [facility] (3/18/25) presented to  ED 3/21 with AMS s/p unwitnessed fall off the toilet in the bathroom. EMS noted patient with R sided weakness, aphasia and facial droop. [facility] 150[facility]. 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 Neuro IR consulted and patient transferred to [facility] for mechanical thrombectomy. S/p cerebral angiogram for mechanical thrombectomy of L M1 occlusion, TICI ____. Patient recently taken off Eliquis (2 days prior to procedure) for melanoma removal and restarted yesterday, last dose 7PM.  89 y/o female with PMHx of Afib (on Eliquis), PPM, HTN, HLD, GERD, severe MR, iatrogenic adrenal insufficiency (on prednisone), lymphedema, metastatic melanoma s/p RUE melanoma and R axillary lymph node excision at [facility] (3/18/25) presented to  ED 3/21 with AMS s/p unwitnessed fall off the toilet in the bathroom. EMS noted patient with R sided weakness, aphasia and facial droop. Upon arrival to  ED patient went into [facility], given Amiodarone 150mg. 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 Neuro IR consulted and patient transferred to [facility] for mechanical thrombectomy. S/p cerebral angiogram for mechanical thrombectomy of L M1 occlusion, TICI 3 (one pass). Patient recently taken off Eliquis (2 days prior to procedure) for melanoma removal and restarted yesterday, last dose 7PM.  88 y/o female with PMHx of Afib (on Eliquis), PPM, HTN, HLD, GERD, severe MR/TR, iatrogenic adrenal insufficiency (on prednisone), lymphedema, metastatic melanoma s/p L foot melanoma and lymph node resection, s/p RUE melanoma and R axillary lymph node excision at [facility] (3/18/25) presented to  ED 3/21 with AMS s/p unwitnessed fall off the toilet in the bathroom. As per son Philip, patient was LKW 10PM prior to going to the bathroom. EMS noted patient with R sided weakness, aphasia and facial droop. Upon arrival to  ED patient went into [facility], given Amiodarone 150mg. 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 Neuro IR consulted and patient transferred to [facility] for mechanical thrombectomy. S/p cerebral angiogram for mechanical thrombectomy of L M1 occlusion, TICI 3 (one pass). Patient recently taken off Eliquis (2 days prior to procedure) for melanoma removal and restarted yesterday, last dose 7PM. Patient's farxiga was held 4 days prior to procedure and restarted yesterday morning.

## 2025-03-21 NOTE — CONSULT NOTE ADULT - SUBJECTIVE AND OBJECTIVE BOX
89yF was admitted on 03-21 after a fall with right sided weakness and aphasia.  Patient underwent thrombectomy.     Imaging Reviewed Today:  CT PERFUSION 3/21 - Technical limitations: Motion artifact, suboptimal contrast bolus. Core infarction: 5 ml  Penumbra / tissue at risk for active ischemia: 0 ml    CTA NECK 3/21 - No evidence of significant stenosis or occlusion.    CTA HEAD 3/21 - Nonopacification of the left carotid terminus and proximal left A1 and M1 segments. Left anterior cerebral artery likely opacified via a patent anterior communicating artery. Some distal reconstitution of the distal left MCA distribution, although is markedly decreased blood vessel density when compared with the right side.  ----------------------------  Patient awake.  Son at bedside.  Able to intermittently nod her head.   Able to follow some commands with multiple cues.       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, including shoulder    Lower back pain    S/P tonsillectomy and adenoidectomy    H/O cataract extraction    History of melanoma excision    History of left inguinal hernia repair    H/O cardiac catheterization         RECENT LABS REVIEWED    CBC Full  -  ( 21 Mar 2025 06:35 )  WBC Count : 4.11 K/uL  RBC Count : 4.22 M/uL  Hemoglobin : 12.9 g/dL  Hematocrit : 40.8 %  Platelet Count - Automated : 118 K/uL  Mean Cell Volume : 96.7 fl  Mean Cell Hemoglobin : 30.6 pg  Mean Cell Hemoglobin Concentration : 31.6 g/dL  Auto Neutrophil # : x  Auto Lymphocyte # : x  Auto Monocyte # : x  Auto Eosinophil # : x  Auto Basophil # : x  Auto Neutrophil % : x  Auto Lymphocyte % : x  Auto Monocyte % : x  Auto Eosinophil % : x  Auto Basophil % : x    03-21    140  |  104  |  21.9[H]  ----------------------------<  135[H]  3.7   |  23.0  |  0.68    Ca    8.1[L]      21 Mar 2025 06:35  Phos  3.1     03-21  Mg     2.4     03-21    TPro  5.6[L]  /  Alb  3.4  /  TBili  0.7  /  DBili  x   /  AST  16  /  ALT  17  /  AlkPhos  44  03-21    Urinalysis Basic - ( 21 Mar 2025 06:35 )    Color: x / Appearance: x / SG: x / pH: x  Gluc: 135 mg/dL / Ketone: x  / Bili: x / Urobili: x   Blood: x / Protein: x / Nitrite: x   Leuk Esterase: x / RBC: x / WBC x   Sq Epi: x / Non Sq Epi: x / Bacteria: x        ALLERGIES  penicillins (Hives)      MEDICATIONS   chlorhexidine 2% Cloths 1 Application(s) Topical <User Schedule>  hydrALAZINE Injectable 10 milliGRAM(s) IV Push every 2 hours PRN  labetalol Injectable 10 milliGRAM(s) IV Push every 2 hours PRN  sodium chloride 0.9%. 1000 milliLiter(s) IV Continuous <Continuous>      ----------------------------------------------------------------------------------------  FUNCTIONAL HISTORY  Lives with son, 1 or 2 RASHIDA depending on entrance  Independent    FUNCTIONAL STATUS/PROGRESS  Pending formal eval    ----------------------------------------------------------------------------------------  PHYSICAL EXAM  Constitutional - NAD, Appears Comfortable  HEENT - NCAT, EOMI  Neck - Supple, No limited ROM  Chest - Breathing comfortably, No wheezing  Cardiovascular - S1S2   Abdomen - Soft   Extremities - No C/C/E, No calf tenderness   Neurologic Exam -                    Cognitive - AAO to self      Communication - Intermittently nods head, Limited attempt to verbalize     Cranial Nerves - Right lip droop, Right field cut?, Able to turn head right     FUNCTIONAL MOTOR EXAM -                     LEFT    UE - ShAB 3/5, EF 3/5, EE 3/5, WE 3/5,  1/5                    RIGHT UE - ShAB 2/5, EF 2/5, EE 2/5, WE 1/5,  1/5                    LEFT    LE - HF 3/5, KE 3/5   Psychiatric - Calm  ----------------------------------------------------------------------------------------  ASSESSMENT/PLAN  89yFemale with functional deficits after an acute CVA  Acute left CVA s/p thrombectomy TICI3 - Monitoring  DVT PPX - SCDs  Rehab/Impaired mobility and function - Patient continues to require hospitalization for the above diagnoses and ongoing active management of comorbid complications that are substantially posing a threat to bodily function, functional ability and quality of life.     RECOMMEND - OOB daily  Pending head CT, TTE, BLE Dopplers    Patient noted to have some preservation of the right side.  Expect ongoing improvement.   Will continue to follow.

## 2025-03-21 NOTE — ED ADULT TRIAGE NOTE - CHIEF COMPLAINT QUOTE
Pt BIBEMS from home with c/o unwitnessed fall, right sided weakness and droop, and aphasia. +use of Eliquis. Upon arrival, EMS reports runs of vtach on arrival. Pt conscious, arousable but unable to speak. Unable to obtain further information from pt due to acuity of illness. MD Gan, RN Amber at bedside on arrival. Pt brought directly into trauma room. Code stroke called by MD Gan at 0228. 18G IV in left arm placed by EMS PTA. A&Ox0, +allergies, hx of afib, pacemaker

## 2025-03-21 NOTE — ED PROVIDER NOTE - PROGRESS NOTE DETAILS
JG: Radiology reports left MCA occlusion or stenosis; no flow with evidence of stroke.  No bleed.  Will consider aspirin and heparin as patient took eliquis last night at 7pm.  Weight entered.  Will discuss with transfer center.  BPs 140s/80s. Last known well 10pm last night.  Out of the window for thrombolytics but also took eliquis last night. JG: Discussed stroke case with telestroke.  Do not recommend aspirin or heparin at this time ; reviewing CTA head and neck and will make recommendations for possible transfer.  Out of window for lytics. JG: Rhythm strips printed and included on chart.  Multiple long runs of Vtach en route to hospital.  Amiodarone 150mg given.  No current runs of Vtach on monitor.  BP stable. JG: Telestroke confirms no aspirin or anticoagulation to be given at this time.  Patient going directly to neuro IR suite.

## 2025-03-21 NOTE — H&P ADULT - ASSESSMENT
ASSESSMENT:   89 y/o female with PMHx of Afib (on Eliquis), PPM, HTN, hx of RUE melanoma removal with R axillary lymph node removal at [facility] 4 days ago presented to  ED with AMS s/p unwitnessed fall off the toilet in the bathroom. EMS noted patient with R sided weakness, aphasia and facial droop. 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 Neuro IR consulted and patient transferred to [facility] for mechanical thrombectomy. S/p cerebral angiogram for mechanical thrombectomy of L M1 occlusion, TICI ____.     PLAN:   Neuro:  - Q1 hour Neuro checks, Q1 hour Vitals  - HOB 30 degrees, Neck midline position  - Maintain normothermia, PO acetaminophen for temp>38 C or pain  - STAT CTH for acute change in mental status/neurological decline   - pend MRI brain   - stroke core measures   	  CV:	  - SBP Goal ____    Pulm:  - Supplemental O2 PRN to maintain Spo2>92%    GI:  - NPO   - Bowel regimen when able to tolerate   	  Gu:  - Rodriguez / Voiding   - I&O Q1 hour  - Monitor Electrolytes & Renal Function    Heme:  - Monitor H&H  - DVT ppx: SCDs  - pend b/l LE duplex   	  ID:  - Monitor WBC and Temperature    Endo  - Monitor BGL, maintain <180  - f/u A1C, TSH     DISPO:   - admit to NSICU   - pend PT/OT         ASSESSMENT:   89F PMHx of Afib (on Eliquis), PPM, HTN, HLD, GERD, severe MR, iatrogenic adrenal insufficiency (on prednisone), lymphedema, metastatic melanoma s/p RUE melanoma and R axillary lymph node excision at [facility] (3/18/25) presented to  ED 3/21 with AMS s/p unwitnessed fall off the toilet in the bathroom. EMS noted patient with R sided weakness, aphasia and facial droop. 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 Neuro IR consulted and patient transferred to [facility] for mechanical thrombectomy. S/p cerebral angiogram for mechanical thrombectomy of L M1 occlusion, TICI ____.     PLAN:   Neuro:  - Q1 hour Neuro checks, Q1 hour Vitals  - HOB 30 degrees, Neck midline position  - Maintain normothermia, PO acetaminophen for temp>38 C or pain  - STAT CTH for acute change in mental status/neurological decline   - pend MRI brain   - stroke core measures   	  CV:	  - SBP Goal ____    Pulm:  - Supplemental O2 PRN to maintain Spo2>92%    GI:  - NPO   - Bowel regimen when able to tolerate   	  Gu:  - Rodriguez / Voiding   - I&O Q1 hour  - Monitor Electrolytes & Renal Function    Heme:  - Monitor H&H  - DVT ppx: SCDs  - pend b/l LE duplex   	  ID:  - Monitor WBC and Temperature    Endo  - Monitor BGL, maintain <180  - f/u A1C, TSH     DISPO:   - admit to NSICU   - pend PT/OT         ASSESSMENT:   89F PMHx of Afib (on Eliquis), PPM, HTN, HLD, GERD, severe MR, iatrogenic adrenal insufficiency (on prednisone), lymphedema, metastatic melanoma s/p RUE melanoma and R axillary lymph node excision at [facility] (3/18/25) presented to  ED 3/21 with AMS s/p unwitnessed fall off the toilet in the bathroom. EMS noted patient with R sided weakness, aphasia and facial droop. 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 Neuro IR consulted and patient transferred to [facility] for mechanical thrombectomy. S/p cerebral angiogram for mechanical thrombectomy of L M1 occlusion, TICI 3 (one pass).     PLAN:   Neuro:  - Q1 hour Neuro checks, Q1 hour Vitals  - HOB 30 degrees, Neck midline position  - Maintain normothermia, PO acetaminophen for temp>38 C or pain  - STAT CTH for acute change in mental status/neurological decline   - pending MRI brain   - pending CTH 4hrs post-procedure (10PM)  - stroke core measures   	  CV:	  - SBP Goal   - PRN: hydralazine, labetalol    Pulm:  - Supplemental O2 PRN to maintain Spo2>92%    GI:  - NPO   - Bowel regimen when able to tolerate   	  Gu:  - Rodriguez?  - I&O Q1 hour  - Monitor Electrolytes & Renal Function    Heme:  - Monitor H&H  - DVT ppx: SCDs  - pend b/l LE duplex   	  ID:  - Monitor WBC and Temperature    Endo  - Monitor BGL, maintain <180  - f/u A1C, TSH     DISPO:   - admit to NSICU   - pend PT/OT         ASSESSMENT:   89F PMHx of Afib (on Eliquis), PPM, HTN, HLD, GERD, severe MR/TR, iatrogenic adrenal insufficiency (on prednisone), lymphedema, metastatic melanoma s/p RUE melanoma and R axillary lymph node excision at [facility] (3/18/25) presented to  ED 3/21 with AMS s/p unwitnessed fall off the toilet in the bathroom. EMS noted patient with R sided weakness, aphasia and facial droop. 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 Neuro IR consulted and patient transferred to [facility] for mechanical thrombectomy. S/p cerebral angiogram for mechanical thrombectomy of L M1 occlusion, TICI 3 (one pass).   ADMISSION: NIHSS 20, MRS 0     PLAN:   Neuro:  - Q1 hour Neuro checks, Q1 hour Vitals  - HOB 30 degrees, Neck midline position  - Maintain normothermia, PO acetaminophen for temp>38 C or pain  - STAT CTH for acute change in mental status/neurological decline   - pending MRI brain   - pending CTH 4hrs post-procedure (10PM)  - stroke core measures   	  CV:	  - SBP Goal   - PRN: hydralazine, labetalol    Pulm:  - Supplemental O2 PRN to maintain Spo2>92%    GI:  - NPO   - Bowel regimen when able to tolerate   	  Gu:  - NS@60  - Monitor Electrolytes & Renal Function    Heme:  - Monitor H&H  - DVT ppx: SCDs  - pend b/l LE duplex   	  ID:  - Monitor WBC and Temperature    Endo  - Monitor BGL, maintain <180  - f/u A1C, TSH     DISPO:   - admit to NSICU   - pend PT/OT         ASSESSMENT:   89F PMHx of Afib (on Eliquis), PPM, HTN, HLD, GERD, severe MR/TR, iatrogenic adrenal insufficiency (on prednisone), lymphedema, metastatic melanoma s/p L foot melanoma and lymph node resection, s/p RUE melanoma and R axillary lymph node excision at [facility] (3/18/25) presented to  ED 3/21 with AMS s/p unwitnessed fall off the toilet in the bathroom. EMS noted patient with R sided weakness, aphasia and facial droop. 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 Neuro IR consulted and patient transferred to [facility] for mechanical thrombectomy. S/p cerebral angiogram for mechanical thrombectomy of L M1 occlusion, TICI 3 (one pass).   ADMISSION: NIHSS 20, MRS 0     PLAN:   Neuro:  - Q1 hour Neuro checks, Q1 hour Vitals  - HOB 30 degrees, Neck midline position  - Maintain normothermia, PO acetaminophen for temp>38 C or pain  - STAT CTH for acute change in mental status/neurological decline   - pending MRI brain   - pending CTH 4hrs post-procedure (10PM)  - stroke core measures   - right groin checks   - pending stroke neurology consult   	  CV:	  - SBP Goal   - PRN: hydralazine, labetalol  - pending echo   - pending cardiology consult     Pulm:  - Supplemental O2 PRN to maintain Spo2>92%    GI:  - NPO   - Bowel regimen when able to tolerate   	  Gu:  - NS@60  - Monitor Electrolytes & Renal Function    Heme:  - Monitor H&H  - DVT ppx: SCDs  - pend b/l LE duplex   	  ID:  - Monitor WBC and Temperature    Endo  - Monitor BGL, maintain <180  - f/u A1C, TSH     DISPO:   - admit to NSICU   - pend PT/OT

## 2025-03-21 NOTE — PROCEDURE NOTE - NSTIMEOUT_GEN_A_CORE
Patient's first and last name, , procedure, and correct site confirmed prior to the start of procedure. intact/bruising

## 2025-03-21 NOTE — PHYSICAL THERAPY INITIAL EVALUATION ADULT - LEVEL OF CONSCIOUSNESS, REHAB EVAL
The patient is Stable - Low risk of patient condition declining or worsening    Shift Goals  Clinical Goals: pain control, monitor R knee  Patient Goals: sleep  Family Goals: N/A    Progress made toward(s) clinical / shift goals:      Problem: Knowledge Deficit - Standard  Goal: Patient and family/care givers will demonstrate understanding of plan of care, disease process/condition, diagnostic tests and medications  Outcome: Progressing  Note: A/Ox4, pt is able to understand plan of care. All questions answered at the moment. Pt having some pain to right knee, pain relieved with ice pack.     Problem: Respiratory  Goal: Patient will achieve/maintain optimum respiratory ventilation and gas exchange  Outcome: Progressing  Note: Pt currently on 1L O2 nasal canula tolerating well, will continue to monitor and titrate as needed.      Problem: Fall Risk  Goal: Patient will remain free from falls  Outcome: Progressing  Note: Fall precautions in place, bed in lowest position, call light and belongings in reach.   Pt calls appropriately.       Patient is not progressing towards the following goals:    Problem: Mobility  Goal: Patient's capacity to carry out activities will improve  Outcome: Not Progressing  Note: Pt has not gotten out of bed at this time, educated on importance of increasing mobility. Pt understands importance and states she is ready to work with PT tomorrow.       alert

## 2025-03-21 NOTE — CONSULT NOTE ADULT - PROBLEM SELECTOR RECOMMENDATION 9
-Patient with history of chronic afib currently on Eliquis 5mg BID   - Eliquis was held prior to RUE melanoma excision 3/18   - Patient had an unwitnessed fall and AMS this AM with R sided deficits now s/p thrombectomy L M1.   - Pt Afib on monitor with rate 130s   - 50mg Metroprolol BID started, can titrate up to patient home dose 100mg BID if needed for further rate control  - Resume AC with Eliquis when cleared by Neurosurgery -Patient with history of chronic afib currently on Eliquis 5mg BID   - Eliquis was held prior to RUE melanoma excision 3/18   - Patient had an unwitnessed fall and AMS this AM with R sided deficits now s/p thrombectomy L M1.   - Pt Afib on monitor with rate 130s   - 50mg Metroprolol BID started, can titrate up if needed for further rate control (home dose 100mg BID)  - Resume AC with Eliquis when cleared by Neurosurgery -Patient with history of chronic afib currently on Eliquis 5mg BID   - Eliquis was held prior to RUE melanoma excision 3/18   - Patient had an unwitnessed fall and AMS this AM with R sided deficits now s/p thrombectomy L M1.   - Pt Afib on monitor with rate 130s   - 50mg Metroprolol BID started, can titrate up if needed for further rate control (home dose 100mg BID)  - Resume AC with Eliquis when cleared by Neurosurgery  - will interrogate PPM to correlate time of fall to see if there were any events

## 2025-03-21 NOTE — ED ADULT NURSE NOTE - OBJECTIVE STATEMENT
88 y/o female presents to the ED s/p unwitnessed fall with deficits including R sided facial droop, R upper extremity weakness, and aphasia. While en route to [facility], EMS reports pt had runs of vtach. Pt awake and alert but unable to speak, unable to obtain further information at this time. Pt , placed on defib pads, and cardiac monitor. Upon arrival to  ED pt brought directly to trauma room and code stroke called at 02:28. Per EMS, pmhx a fib (on eliquis), pacemaker. safety and comfort maintained.

## 2025-03-21 NOTE — CHART NOTE - NSCHARTNOTEFT_GEN_A_CORE
Neurointerventional Surgery Post Procedure Note    Procedure: Selective Cerebral Angiography     Pre- Procedure Diagnosis: L M1 occlusion  Post- Procedure Diagnosis: TICI 3, 1 pass    : Dr. Malaika MD  Physician Assistant: Elda Barrera  Nurse: SHELDON Barger  Anesthesiologist: Dr Blackman                                         Radiology Tech: Jamal and Kenneth    Sheath:  6Fr Sinhala Sheath    I/Os: estimated blood loss less than 10cc,  IV fluids cc, Contrast: Omnipaque 240 30cc    Vitals:  BP  95/69  HR 92  Spo2 100%    Preliminary Report:  Under a 6 Sinhala short sheath via the right groin under general sedation, a selective cerebral angiography was performed and reveals L M1 occlusion, now TICI 3, 1 pass. (Official note to follow).    Patient tolerated procedure well.  Patient remains hemodynamically stable, no change in neurological status compared to baseline.  Results were discussed with patient, patient's family and Neurosurgery.  Right groin sheath was discontinued at 0537. Hemostasis was obtained with approximately 15 minutes of manual compression.     No active bleeding, no hematoma, no ecchymosis.   Quick clot and safeguard balloon dressing applied at 0550  Patient transferred to NSICU in stable condition. Neurointerventional Surgery Post Procedure Note    Procedure: Selective Cerebral Angiography     Pre- Procedure Diagnosis: L M1 occlusion  Post- Procedure Diagnosis: TICI 3, 1 pass    : Dr. Malaika MD  Physician Assistant: Elda Barrera  Nurse: SHELDON Barger  Anesthesiologist: Dr Blackman                                         Radiology Tech: Jamal and Kenneth    Sheath:  6Fr Chinese Sheath    I/Os: estimated blood loss less than 10cc, Contrast: Omnipaque 240 30cc    Vitals:  BP  95/69  HR 92  Spo2 100%    Preliminary Report:  Under a 6 Chinese short sheath via the right groin under general sedation, a selective cerebral angiography was performed and reveals L M1 occlusion, now TICI 3, 1 pass. (Official note to follow).    Patient tolerated procedure well.  Patient remains hemodynamically stable, no change in neurological status compared to baseline.  Results were discussed with patient, patient's family and Neurosurgery.  Right groin sheath was discontinued at 0537. Hemostasis was obtained with approximately 20 minutes of manual compression.     No active bleeding, no hematoma, no ecchymosis.   Quick clot and safeguard balloon dressing applied at 0555.  Patient transferred to NSICU in stable condition.

## 2025-03-21 NOTE — CHART NOTE - NSCHARTNOTEFT_GEN_A_CORE
Audio Telestroke Consult Note    SHARON HULL ,[id number] , presented to Health system, w/ last known normal time 10pm    HPI:    88 yo fully functioning [employer] (PRE-MRS 0- drives, performs all ADLs independently)  with hx of Afib (on Eliquis LD 7PM), PPM, HTN, melanoma presenting to ED for unwitnessed fall in the bathroom, noted to have R sided weakness, R facial droop and speech difficulty, LKW 10pm prior to sleeping.  Of note, pt has been off of Eliquis and restarted 1 day ago for removal of RUE w/ R axillary LN removal at [facility]. Son heard her fall off the toilet to the side and out the open bathroom door. Patient was confused, had confused speech. Son called ambulance. EMS states patient had right sided arm and leg weakness facial droop, and aphasia. Upon arrival, EMS states patient went into [facility] so was taken to trauma bay.     Radiology reports left MCA occlusion or stenosis; no flow with evidence of stroke. No bleed. Will consider aspirin and heparin as patient took eliquis last night at 7pm. NIHSS=19    NEUROLOGIC EXAMINATION deferred – interprofessional audio discussion only     HOME MEDICATIONS:  Home Medications:  Centrum oral tablet: 1 tab(s) orally once a day (21 Mar 2025 08:03)  Eliquis 5 mg oral tablet: 1 tab(s) orally 2 times a day Resume tonight 3/6/25 (21 Mar 2025 08:03)  Farxiga 10 mg oral tablet: 1 tab(s) orally once a day resume at next scheduled dose. (21 Mar 2025 08:03)  Lasix 20 mg oral tablet: 1 tab(s) orally once a day (21 Mar 2025 08:02)  predniSONE 1 mg oral tablet: 2 tab(s) orally once a day (in the evening) (21 Mar 2025 08:03)  predniSONE 5 mg oral tablet: 1 tab(s) orally once a day (in the morning) (21 Mar 2025 08:03)  PreserVision AREDS 2 oral capsule: 1 cap(s) orally 2 times a day (21 Mar 2025 08:03)  simvastatin 10 mg oral tablet: 1 tab(s) orally once a day (at bedtime) (21 Mar 2025 08:03)         VITALS/DATA/ORDERS: [x] Reviewed     IMAGING:  CT Head: < from: CT Brain Stroke Protocol (03.21.25 @ 02:39) >  No acute intracranial hemorrhage.  Density in the region of the left carotid terminus concerning for thrombus, with question loss of gray-white differentiation in the left insula which may reflect acute infarct.  < end of copied text >    CTA Head/Neck: < from: CT Angio Brain Stroke Protocol  w/ IV Cont (03.21.25 @ 02:47) >    CT PERFUSION:  Technical limitations: Motion artifact, suboptimal contrast bolus.    Core infarction: 5 ml  Penumbra / tissue at risk for active ischemia: 0 ml    CTA NECK:  No evidence of significant stenosis or occlusion.    CTA HEAD:  Nonopacification of the left carotid terminus and proximal left A1 and M1 segments. Left anterior cerebral artery likely opacified via a patent anterior communicating artery. Some distal reconstitution of the distal left MCA distribution, although is markedly decreased blood vessel density when compared with the right side.  < end of copied text >      Labs:                                             12.9                  Neurophils% (auto):   x      (03-21 @ 06:35):    4.11 )-----------(118          Lymphocytes% (auto):  x                                             40.8                   Eosinphils% (auto):   x        Manual%: Neutrophils x    ; Lymphocytes x    ; Eosinophils x    ; Bands%: x    ; Blasts x                                    140    |  104    |  21.9                Calcium: 8.1   / iCa: x      (03-21 @ 06:35)    ----------------------------<  135       Magnesium: 2.4                              3.7     |  23.0   |  0.68             Phosphorous: 3.1      TPro  5.6    /  Alb  3.4    /  TBili  0.7    /  DBili  x      /  AST  16     /  ALT  17     /  AlkPhos  44     21 Mar 2025 06:35    ( 03-21 @ 06:35 )   PT: 12.5 sec;   INR: 1.08 ratio  aPTT: 26.1 sec    CAPILLARY BLOOD GLUCOSE      POCT Blood Glucose.: 135 mg/dL (21 Mar 2025 03:07)    Platelet Count - Automated: 118 K/uL (03-21-25 @ 06:35)  Platelet Count - Automated: 122 K/uL (03-21-25 @ 03:07)      Inclusion Criteria:  Clearly defined time onset within 4.5 hours of treatment No [X]    Ischemic stroke with a measurable deficit on NIHSS or a non-measurable deficit that is deemed disabling?  no []    Review thrombolytic CONTRAINDICATIONS  [X] None      RISKS/BENEFITS DISCUSSION done by ED provider.  RECOMMENDATIONS:  - NIHSS 19:  R hemiplegia , R facial droop and aphasia  - Patient is out of window for TNK w/ LKW 10pm last night  - CTA revealed left carotid terminus and proximal left A1 and M1 segments- L M1 occlusion  - Case discussed with EDUARDO Dai from [facility] - accepted patient for emergent thrombectomy Tier 1 transfer straight to IR suite.  - Further management and stroke workup by inhouse Neurology team at [facility]  - Plan discussed with Dr. Gan and Dr. Mann.    Consultation provided via live, two-way audio  stream.     Patient Location:  Health system       ACP Attestation of Treatment:  The management and treatment decisions which include intravenous thrombolysis and mechanical thrombectomy were discussed with: Neurology Attending.     Neurology Attending: Dr. Jj Mann Audio Telestroke Consult Note    SHARON HULL ,[id number] , presented to [facility], w/ last known normal time 10pm    HPI:    89 yo fully functioning Femae (PRE-MRS 0- drives, performs all ADLs independently)  with hx of Afib (on Eliquis LD 7PM), PPM, HTN, melanoma presenting to ED for unwitnessed fall in the bathroom, noted to have R sided weakness, R facial droop and speech difficulty, LKW 10pm prior to sleeping.  Of note, pt has been off of Eliquis and restarted 1 day ago for removal of RUE w/ R axillary LN removal at [facility]. Son heard her fall off the toilet to the side and out the open bathroom door. Patient was confused, had confused speech. Son called ambulance. EMS states patient had right sided arm and leg weakness facial droop, and aphasia. Upon arrival, EMS states patient went into [facility] so was taken to trauma bay.     Radiology reports left MCA occlusion or stenosis; no flow with evidence of stroke. No bleed. Will consider aspirin and heparin as patient took eliquis last night at 7pm. NIHSS=19    NEUROLOGIC EXAMINATION deferred – interprofessional audio discussion only     HOME MEDICATIONS:  Home Medications:  Centrum oral tablet: 1 tab(s) orally once a day (21 Mar 2025 08:03)  Eliquis 5 mg oral tablet: 1 tab(s) orally 2 times a day Resume tonight 3/6/25 (21 Mar 2025 08:03)  Farxiga 10 mg oral tablet: 1 tab(s) orally once a day resume at next scheduled dose. (21 Mar 2025 08:03)  Lasix 20 mg oral tablet: 1 tab(s) orally once a day (21 Mar 2025 08:02)  predniSONE 1 mg oral tablet: 2 tab(s) orally once a day (in the evening) (21 Mar 2025 08:03)  predniSONE 5 mg oral tablet: 1 tab(s) orally once a day (in the morning) (21 Mar 2025 08:03)  PreserVision AREDS 2 oral capsule: 1 cap(s) orally 2 times a day (21 Mar 2025 08:03)  simvastatin 10 mg oral tablet: 1 tab(s) orally once a day (at bedtime) (21 Mar 2025 08:03)         VITALS/DATA/ORDERS: [x] Reviewed     IMAGING:  CT Head: < from: CT Brain Stroke Protocol (03.21.25 @ 02:39) >  No acute intracranial hemorrhage.  Density in the region of the left carotid terminus concerning for thrombus, with question loss of gray-white differentiation in the left insula which may reflect acute infarct.  < end of copied text >    CTA Head/Neck: < from: CT Angio Brain Stroke Protocol  w/ IV Cont (03.21.25 @ 02:47) >    CT PERFUSION:  Technical limitations: Motion artifact, suboptimal contrast bolus.    Core infarction: 5 ml  Penumbra / tissue at risk for active ischemia: 0 ml    CTA NECK:  No evidence of significant stenosis or occlusion.    CTA HEAD:  Nonopacification of the left carotid terminus and proximal left A1 and M1 segments. Left anterior cerebral artery likely opacified via a patent anterior communicating artery. Some distal reconstitution of the distal left MCA distribution, although is markedly decreased blood vessel density when compared with the right side.  < end of copied text >      Labs:                                             12.9                  Neurophils% (auto):   x      (03-21 @ 06:35):    4.11 )-----------(118          Lymphocytes% (auto):  x                                             40.8                   Eosinphils% (auto):   x        Manual%: Neutrophils x    ; Lymphocytes x    ; Eosinophils x    ; Bands%: x    ; Blasts x                                    140    |  104    |  21.9                Calcium: 8.1   / iCa: x      (03-21 @ 06:35)    ----------------------------<  135       Magnesium: 2.4                              3.7     |  23.0   |  0.68             Phosphorous: 3.1      TPro  5.6    /  Alb  3.4    /  TBili  0.7    /  DBili  x      /  AST  16     /  ALT  17     /  AlkPhos  44     21 Mar 2025 06:35    ( 03-21 @ 06:35 )   PT: 12.5 sec;   INR: 1.08 ratio  aPTT: 26.1 sec    CAPILLARY BLOOD GLUCOSE      POCT Blood Glucose.: 135 mg/dL (21 Mar 2025 03:07)    Platelet Count - Automated: 118 K/uL (03-21-25 @ 06:35)  Platelet Count - Automated: 122 K/uL (03-21-25 @ 03:07)      Inclusion Criteria:  Clearly defined time onset within 4.5 hours of treatment No [X]    Ischemic stroke with a measurable deficit on NIHSS or a non-measurable deficit that is deemed disabling?  no []    Review thrombolytic CONTRAINDICATIONS  [X] None      RISKS/BENEFITS DISCUSSION done by ED provider.  RECOMMENDATIONS:  - NIHSS 19:  R hemiplegia , R facial droop and aphasia  - Patient is out of window for TNK w/ LKW 10pm last night  - CTA revealed left carotid terminus and proximal left A1 and M1 segments- L M1 occlusion  - Case discussed with EDUARDO Dai from [facility] - accepted patient for emergent thrombectomy Tier 1 transfer straight to IR suite.  - Further management and stroke workup by inhouse Neurology team at [facility]  - Plan discussed with Dr. Gan and Dr. Mann.    Consultation provided via live, two-way audio  stream.     Patient Location:  [facility] Attestation of Treatment:  The management and treatment decisions which include intravenous thrombolysis and mechanical thrombectomy were discussed with: Neurology Attending.     Attending Attestation: Agree with above, patient has sympomatic L carotid terminus occlusion, to be transferred to [facility] for thrombectomy. Outside of window for TNK.

## 2025-03-21 NOTE — DISCHARGE NOTE NURSING/CASE MANAGEMENT/SOCIAL WORK - NSDCVIVACCINE_GEN_ALL_CORE_FT
Tdap; 06-Sep-2019 14:05; Jesica Davies (SHELDON); [employer]; [id number] (Exp. Date: 07-Jun-2021); IntraMuscular; Deltoid Left.; 0.5 milliLiter(s); VIS (VIS Published: 09-May-2013, VIS Presented: 06-Sep-2019);

## 2025-03-21 NOTE — PHARMACOTHERAPY INTERVENTION NOTE - COMMENTS
Outpatient Medication Review updated using [facility] outpatient medication fill records and prior medical records.     HOME MEDICATIONS:  Centrum oral tablet: 1 tab(s) orally once a day (21 Mar 2025 08:03)  Eliquis 5 mg oral tablet: 1 tab(s) orally 2 times a day Resume tonight 3/6/25 (21 Mar 2025 08:03)  Farxiga 10 mg oral tablet: 1 tab(s) orally once a day resume at next scheduled dose. (21 Mar 2025 08:03)  Lasix 20 mg oral tablet: 1 tab(s) orally once a day (21 Mar 2025 08:02)  metoprolol tartrate 100 mg oral tablet: 1 tab(s) orally 2 times a day (21 Mar 2025 08:03)  predniSONE 1 mg oral tablet: 2 tab(s) orally once a day (in the evening) (21 Mar 2025 08:03)  predniSONE 5 mg oral tablet: 1 tab(s) orally once a day (in the morning) (21 Mar 2025 08:03)  PreserVision AREDS 2 oral capsule: 1 cap(s) orally 2 times a day (21 Mar 2025 08:03)  simvastatin 10 mg oral tablet: 1 tab(s) orally once a day (at bedtime) (21 Mar 2025 08:03)

## 2025-03-21 NOTE — ED PROVIDER NOTE - CARE PLAN
Principal Discharge DX:	Acute ischemic stroke   1 Principal Discharge DX:	Acute ischemic stroke  Secondary Diagnosis:	NSVT (nonsustained ventricular tachycardia)

## 2025-03-21 NOTE — ED ADULT NURSE NOTE - NSFALLHARMRISKINTERV_ED_ALL_ED

## 2025-03-21 NOTE — SWALLOW BEDSIDE ASSESSMENT ADULT - SWALLOW EVAL: DIAGNOSIS
Mild to moderate oral dysphagia across administered PO trials. Pharyngeal stage of swallow clinically unremarkable for puree & assessed solids as well as moderately thick fluids via TSPN. Pharyngeal dysphagia suspected for thin fluids via tspn, mildly thick fluids via tspn/cup & moderately thick fluids via cup

## 2025-03-21 NOTE — PHYSICAL THERAPY INITIAL EVALUATION ADULT - ACTIVE RANGE OF MOTION EXAMINATION, REHAB EVAL
right shoulder flex limited(recent procedure however no restrictions per son), elbow WFL/Left UE Active ROM was WFL (within functional limits)/bilateral  lower extremity Active ROM was WFL (within functional limits)

## 2025-03-21 NOTE — DISCHARGE NOTE NURSING/CASE MANAGEMENT/SOCIAL WORK - PATIENT PORTAL LINK FT
You can access the FollowMyHealth Patient Portal offered by [facility] by registering at the following website: [email or web address]. By joining [employer]’s FollowMyHealth portal, you will also be able to view your health information using other applications (apps) compatible with our system.

## 2025-03-21 NOTE — PHYSICAL THERAPY INITIAL EVALUATION ADULT - MANUAL MUSCLE TESTING RESULTS, REHAB EVAL
left shoulder flex, elbow flex WFL; right shoulder flex 3-/5, elbow flex 3+/5; left hip flex 3+/5, knee ext 4-/5, ankle df 4/5; right hip flex 3/5, knee ext 3+/5, ankle df 4/5

## 2025-03-21 NOTE — SWALLOW BEDSIDE ASSESSMENT ADULT - PHARYNGEAL PHASE
Delayed throat clear post oral intake Cough post oral intake Within functional limits via cup; NO overt s/s penetration/aspiration noted via TSPN/Delayed throat clear post oral intake

## 2025-03-21 NOTE — CONSULT NOTE ADULT - NS ATTEND AMEND GEN_ALL_CORE FT
Patient seen and examined by me.    89 y/o female with PMHx of Afib (on Eliquis), PPM, HTN, HLD, GERD, severe MR/TR, iatrogenic adrenal insufficiency (on prednisone), lymphedema, metastatic melanoma s/p L foot melanoma and lymph node resection, s/p RUE melanoma and R axillary lymph node excision at [facility] (3/18/25) presented to  ED 3/21 with AMS s/p unwitnessed fall off the toilet in the bathroom. As per son Philip, patient was LKW 10PM prior to going to the bathroom. EMS noted patient with R sided weakness, aphasia and facial droop. Upon arrival to  ED patient went into [facility], given Amiodarone 150mg. 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 Neuro IR consulted and patient transferred to [facility] for mechanical thrombectomy. S/p cerebral angiogram for mechanical thrombectomy of L M1 occlusion, TICI 3 (one pass). Patient recently taken off Eliquis (2 days prior to procedure) for melanoma removal and restarted yesterday, last dose 7PM. Patient's farxiga was held 4 days prior to procedure and restarted yesterday morning.  (21 Mar 2025 04:01)    Review of systems is negative except as above.    Vital signs reviewed in HER.  Gen WN/WD  NAD    Eye: no scleral icterus no pallor    Pulm CTA b/l good effort    CV: S1 S2 irreg tachy no m/r/g, no JVD, no bruits, 2+ pulses b/l    GI: s/nt/nd + BS no HSM    Lymph: no LAD, no edema    Skin: forehead melkanoma   Neuro: Non focal   Psych: Mood and affect appropriate     Labs, imaging, cardiac testing, EKGs reviewed in EHR.    Impression and recommendations:   89-year-old woman with history of atrial fibrillation on Eliquis at home for anticoagulation [id number] of 4, pacemaker, hypertension, hyperlipidemia, GERD, metastatic melanoma status post multiple resections.  Patient admitted with acute CVA with left M1 occlusion after discontinuing Eliquis 2 days before for surgical excision of melanoma.    1.  A-fib with RVR–restart home beta-blocker therapy, currently on 50 mg twice daily (home dose is 100 mg twice daily).  Reviewed telemetry that showed wide-complex tachycardia, most likely A-fib with RVR and aberrancy rather than actual VT.  Patient was given amiodarone, no need to continue Amio.  Restart home medications as BP tolerates.  Restart Eliquis when okay from neuro surgical standpoint.    Given acute CVA in the short window after discontinuation of Eliquis, I would recommend that this patient get Lovenox as bridging next time anticoagulation would need to be discontinued,  Especially given metastatic cancer which promotes a hypercoagulable state.

## 2025-03-21 NOTE — SPEECH LANGUAGE PATHOLOGY EVALUATION - SLP DIAGNOSIS
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

## 2025-03-21 NOTE — PATIENT PROFILE ADULT - FALL HARM RISK - HARM RISK INTERVENTIONS

## 2025-03-21 NOTE — ED PROVIDER NOTE - OBJECTIVE STATEMENT
Pt. is a 88 yo F with hx of Afib, PPM, HTN, hx of melanoma removal on right upper extremity with right axillary lymph node removal at [facility] 4 days ago presents as an unwitnessed fall off the toilet in the bathroom.  Son heard her fall off the toilet to the side and out the open bathroom door.  Patient was confused, had confused speech.  Son called ambulance. EMS states patient had right sided arm and leg weakness and aphasia.  Upon arrival, EMS states patient went into Vtach so was taken to trauma bay.  Code Stroke called. Patient was off eliquis for melanoma removal and re-started it yesterday, last dose last night around 7pm. PMD: Mary Wetzel; Cards: Drea Pt. is a 89 yo F with hx of Afib, PPM, HTN, hx of melanoma removal on right upper extremity with right axillary lymph node removal at [facility] 4 days ago presents as an unwitnessed fall off the toilet in the bathroom.  Son heard her fall off the toilet to the side and out the open bathroom door.  Patient was confused, had confused speech.  Son called ambulance. EMS states patient had right sided arm and leg weakness facial droop, and aphasia.  Upon arrival, EMS states patient went into Vtach so was taken to trauma bay.  Code Stroke called. Patient was off eliquis for melanoma removal and re-started it yesterday, last dose last night around 7pm. PMD: Mary Wetzel; Cards: Drea

## 2025-03-21 NOTE — H&P ADULT - NSHPSOCIALHISTORY_GEN_ALL_CORE
Son Philip Romano: [phone] lives with son Philip Romano: [phone]  drinks 3-6oz of wine daily, stopped 1 week ago. son denies smoking/drug use.

## 2025-03-21 NOTE — CONSULT NOTE ADULT - SUBJECTIVE AND OBJECTIVE BOX
Preliminary note, official note pending attending review/signature.  Seen and examined by Stroke team attending/team, assessment/ plan as discussed with stroke team attending/team as noted.                                [facility] Stroke Team  CC: R sided weakness, aphasia and facial droop.  HPI:  89 y/o female with PMHx of Afib (on Eliquis), PPM, HTN, HLD, GERD, severe MR/TR, iatrogenic adrenal insufficiency (on prednisone), lymphedema, metastatic melanoma s/p L foot melanoma and lymph node resection, s/p RUE melanoma and R axillary lymph node excision at [facility] (3/18/25) presented to  ED 3/21/25 at 0200 with AMS s/p unwitnessed fall off the toilet in the bathroom. As per son Philip, patient last known well was 10PM on 3/20/25 prior to going to the bathroom. EMS noted patient with R sided weakness, aphasia and facial droop. Upon arrival to  ED patient went into [facility], given Amiodarone 150mg. Initial NIHSS 19, stroke code activated, imaging revealed L M1 occlusion. Neuro IR consulted and patient transferred to [facility] for mechanical thrombectomy.    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 1000 milliLiter(s) (60 mL/Hr) IV Continuous <Continuous>    MEDICATIONS  (PRN):  hydrALAZINE Injectable 10 milliGRAM(s) IV Push every 2 hours PRN SBP>140  labetalol Injectable 10 milliGRAM(s) IV Push every 2 hours PRN SBP>140      Allergies penicillins (Hives)    SOCIAL HISTORY:  no tob,   no alcohol   no drugs    FAMILY HISTORY:  Family history of leukemia (Sibling)      ROS: 14 point ROS negative other than what is present in HPI or below    Vital Signs Last 24 Hrs  T(C): 37.5 (21 Mar 2025 09:55), Max: 37.5 (21 Mar 2025 08:55)  T(F): 99.5 (21 Mar 2025 09:55), Max: 99.5 (21 Mar 2025 08:55)  HR: 112 (21 Mar 2025 09:55) (81 - 126)  BP: 115/83 (21 Mar 2025 09:55) (104/56 - 161/90)  BP(mean): 94 (21 Mar 2025 09:55) (67 - 131)  RR: 20 (21 Mar 2025 09:55) (16 - 24)  SpO2: 96% (21 Mar 2025 09:55) (92% - 100%)    Parameters below as of 21 Mar 2025 08:00  Patient On (Oxygen Delivery Method): nasal cannula  O2 Flow (L/min): 2      Detailed Neurologic Exam:    Mental status: The patient sleeping arouses to tactile stimuli. Unable to recall current events. Unable to name objects, follow commands, or repeat sentences.    Cranial nerves: Pupils equal and react symmetrically to light.  Extraocular motion is full with no nystagmus. Left gaze preference, decreased right eye blink to threat There is no ptosis. Facial sensation is intact. Facial musculature is symmetric. Palate elevates symmetrically. Tongue is midline.    Motor: There is normal bulk and tone.  There is no tremor.  Right arm drift, right leg drift Strength is 3/5 in the right arm and right leg antigravity     Strength is 2/5 in the left arm and no effort on left leg.    Sensation: Intact to light touch and pin in 4 extremities    Cerebellar: There is no dysmetria on finger to nose testing.    Gait : deferred    NIH SS:  DATE:  TIME:  1A: Level of consciousness (0-3):   1B: Questions (0-2):   1C: Commands (0-2):   2: Gaze (0-2):   3: Visual fields (0-3):   4: Facial palsy (0-3):   MOTOR:  5A: Left arm motor drift (0-4):   5B: Right arm motor drift (0-4):   6A: Left leg motor drift (0-4):   6B: Right leg motor drift (0-4):   7: Limb ataxia (0-2):   SENSORY:  8: Sensation (0-2):   SPEECH:  9: Language (0-3):   10: Dysarthria (0-2):   EXTINCTION:  11: Extinction/inattention (0-2):     TOTAL SCORE:     prehospital mRS=      LABS:                         12.9   4.11  )-----------( 118      ( 21 Mar 2025 06:35 )             40.8       03-21    140  |  104  |  21.9[H]  ----------------------------<  135[H]  3.7   |  23.0  |  0.68    Ca    8.1[L]      21 Mar 2025 06:35  Phos  3.1     03-21  Mg     2.4     03-21    TPro  5.6[L]  /  Alb  3.4  /  TBili  0.7  /  DBili  x   /  AST  16  /  ALT  17  /  AlkPhos  44  03-21      PT/INR - ( 21 Mar 2025 06:35 )   PT: 12.5 sec;   INR: 1.08 ratio         PTT - ( 21 Mar 2025 06:35 )  PTT:26.1 sec        A1C:         RADIOLOGY & ADDITIONAL STUDIES:    Preliminary note, official note pending attending review/signature.  Seen and examined by Stroke team attending/team, assessment/ plan as discussed with stroke team attending/team as noted.                                [facility] Stroke Team  CC: R sided weakness, aphasia and facial droop.  HPI:  88 y/o female with PMHx of Afib (on Eliquis), PPM, HTN, HLD, GERD, severe MR/TR, iatrogenic adrenal insufficiency (on prednisone), lymphedema, metastatic melanoma s/p L foot melanoma and lymph node resection, s/p RUE melanoma and R axillary lymph node excision at [facility] (3/18/25) presented to  ED 3/21/25 at 0200 with AMS s/p unwitnessed fall off the toilet in the bathroom. As per son Philip, patient last known well was 10PM on 3/20/25 prior to going to the bathroom. EMS noted patient with R sided weakness, aphasia and facial droop. Upon arrival to  ED patient went into [facility], given Amiodarone 150mg. Initial NIHSS 19, stroke code activated, imaging revealed L M1 occlusion. Neuro IR consulted and patient transferred to [facility] for mechanical thrombectomy.    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 1000 milliLiter(s) (60 mL/Hr) IV Continuous <Continuous>    MEDICATIONS  (PRN):  hydrALAZINE Injectable 10 milliGRAM(s) IV Push every 2 hours PRN SBP>140  labetalol Injectable 10 milliGRAM(s) IV Push every 2 hours PRN SBP>140      Allergies penicillins (Hives)    SOCIAL HISTORY:  no tob,   no alcohol   no drugs    FAMILY HISTORY:  Family history of leukemia (Sibling)      ROS: 14 point ROS negative other than what is present in HPI or below    Vital Signs Last 24 Hrs  T(C): 37.5 (21 Mar 2025 09:55), Max: 37.5 (21 Mar 2025 08:55)  T(F): 99.5 (21 Mar 2025 09:55), Max: 99.5 (21 Mar 2025 08:55)  HR: 112 (21 Mar 2025 09:55) (81 - 126)  BP: 115/83 (21 Mar 2025 09:55) (104/56 - 161/90)  BP(mean): 94 (21 Mar 2025 09:55) (67 - 131)  RR: 20 (21 Mar 2025 09:55) (16 - 24)  SpO2: 96% (21 Mar 2025 09:55) (92% - 100%)    Parameters below as of 21 Mar 2025 08:00  Patient On (Oxygen Delivery Method): nasal cannula  O2 Flow (L/min): 2      Detailed Neurologic Exam:    Mental status: The patient sleeping arouses to tactile stimuli. Unable to recall current events. Unable to name objects, follow commands, or repeat sentences.    Cranial nerves: Pupils equal and react symmetrically to light.  Extraocular motion is full with no nystagmus. Left gaze preference, decreased right eye blink to threat There is no ptosis. Facial sensation is intact. Facial musculature is symmetric. Palate elevates symmetrically. Tongue is midline.    Motor: There is normal bulk and tone.  There is no tremor.  Right arm drift, right leg drift Strength is 3/5 in the right arm and right leg antigravity     Strength is 2/5 in the left arm and no effort on left leg.    Sensation: Intact to light touch and pin in 4 extremities    Cerebellar: There is no dysmetria on finger to nose testing.    Gait : deferred    [facility] SS:  DATE:  3/21/25   TIME: 0945   1A: Level of consciousness (0-3): 1  1B: Questions (0-2): 2  1C: Commands (0-2): 1  2: Gaze (0-2): 1  3: Visual fields (0-3): 0  4: Facial palsy (0-3): 1  MOTOR:  5A: Left arm motor drift (0-4): 1  5B: Right arm motor drift (0-4): 2  6A: Left leg motor drift (0-4): 1  6B: Right leg motor drift (0-4): 2  7: Limb ataxia (0-2): 0  SENSORY:  8: Sensation (0-2): 0  SPEECH:  9: Language (0-3): 2  10: Dysarthria (0-2): 2  EXTINCTION:  11: Extinction/inattention (0-2): 1    TOTAL SCORE: 17    prehospital mRS= 3      LABS:                         12.9   4.11  )-----------( 118      ( 21 Mar 2025 06:35 )             40.8       03-21    140  |  104  |  21.9[H]  ----------------------------<  135[H]  3.7   |  23.0  |  0.68    Ca    8.1[L]      21 Mar 2025 06:35  Phos  3.1     03-21  Mg     2.4     03-21    TPro  5.6[L]  /  Alb  3.4  /  TBili  0.7  /  DBili  x   /  AST  16  /  ALT  17  /  AlkPhos  44  03-21      PT/INR - ( 21 Mar 2025 06:35 )   PT: 12.5 sec;   INR: 1.08 ratio         PTT - ( 21 Mar 2025 06:35 )  PTT:26.1 sec        A1C: pending         RADIOLOGY & ADDITIONAL STUDIES:    CT Head No Cont (03.21.25 @ 10:14)     IMPRESSION:  Mild chronic microvascular changes without evidence of an   acute transcortical infarction or hemorrhage    CT Angio Neck Stroke Protocol w/ IV Cont (03.21.25 @ 02:48)  IMPRESSION:    CT PERFUSION:  Technical limitations: Motion artifact, suboptimal contrast bolus.    Core infarction: 5 ml  Penumbra / tissue at risk for active ischemia: 0 ml    CTA NECK:  No evidence of significant stenosis or occlusion.    CTA HEAD:  Nonopacification of the left carotid terminus and proximal left A1 and M1   segments. Left anterior cerebral artery likely opacified via a patent   anterior communicating artery. Some distal reconstitution of the distal   left MCA distribution, although is markedly decreased blood vessel   density when compared with the right side.      CT Brain Stroke Protocol (03.21.25 @ 02:39)   IMPRESSION:  No acute intracranial hemorrhage.  Density in the region of the left carotid terminus concerning for   thrombus, with question loss of gray-white differentiation in the left   insula which may reflect acute infarct.     US Duplex Venous Lower Ext Complete, Bilateral (03.21.25 @ 11:44)  IMPRESSION:    Right common femoral vein could not be seen secondary to an overlying   bandage, otherwise no evidence of deep venous thrombosis in the   visualized bilateral lower extremity veins.     TTE W or WO Ultrasound Enhancing Agent (03.21.25 @ 11:05)   CONCLUSIONS:      1. Patient tachycardic during entire study.   2. Left atrium is severely dilated.   3. Left ventricular systolic function is normal with an ejection fraction visually estimated at 65 to 70 %.   4. There is moderate (grade 2) left ventricular diastolic dysfunction, with elevated left ventricular filling pressure.   5. The right atrium is severely dilated.   6. Normal right ventricular cavity size and normal right ventricular systolic function.   7. Moderate mitral regurgitation.   8. Moderate tricuspid regurgitation.   9. Estimated pulmonary artery systolic pressure is 55 mmHg, consistent with moderate pulmonary hypertension.  10. No pericardial effusion seen.                                    [facility] Stroke Team  CC: R sided weakness, aphasia and facial droop.  HPI:  88 y/o female with PMHx of Afib (on Eliquis), PPM, HTN, HLD, GERD, severe MR/TR, iatrogenic adrenal insufficiency (on prednisone), lymphedema, metastatic melanoma s/p L foot melanoma and lymph node resection, s/p RUE melanoma and R axillary lymph node excision at [facility] (3/18/25) presented to  ED 3/21/25 at 0200 with AMS s/p unwitnessed fall off the toilet in the bathroom. As per son Philip, patient last known well was 10PM on 3/20/25 prior to going to the bathroom. EMS noted patient with R sided weakness, aphasia and facial droop. Upon arrival to  ED patient went into [facility], given Amiodarone 150mg. Initial NIHSS 19, stroke code activated, imaging revealed L M1 occlusion. Neuro IR consulted and patient transferred to [facility] for mechanical thrombectomy.    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 1000 milliLiter(s) (60 mL/Hr) IV Continuous <Continuous>    MEDICATIONS  (PRN):  hydrALAZINE Injectable 10 milliGRAM(s) IV Push every 2 hours PRN SBP>140  labetalol Injectable 10 milliGRAM(s) IV Push every 2 hours PRN SBP>140      Allergies penicillins (Hives)    SOCIAL HISTORY:  no tob,   no alcohol   no drugs    FAMILY HISTORY:  Family history of leukemia (Sibling)      ROS: 14 point ROS negative other than what is present in HPI or below    Vital Signs Last 24 Hrs  T(C): 37.5 (21 Mar 2025 09:55), Max: 37.5 (21 Mar 2025 08:55)  T(F): 99.5 (21 Mar 2025 09:55), Max: 99.5 (21 Mar 2025 08:55)  HR: 112 (21 Mar 2025 09:55) (81 - 126)  BP: 115/83 (21 Mar 2025 09:55) (104/56 - 161/90)  BP(mean): 94 (21 Mar 2025 09:55) (67 - 131)  RR: 20 (21 Mar 2025 09:55) (16 - 24)  SpO2: 96% (21 Mar 2025 09:55) (92% - 100%)    Parameters below as of 21 Mar 2025 08:00  Patient On (Oxygen Delivery Method): nasal cannula  O2 Flow (L/min): 2      Detailed Neurologic Exam:    Mental status: The patient sleeping arouses to tactile stimuli. Unable to recall current events. Unable to name objects, follow commands, or repeat sentences.    Cranial nerves: Pupils equal and react symmetrically to light.  Extraocular motion is full with no nystagmus. Left gaze preference, decreased right eye blink to threat There is no ptosis. Facial sensation is intact. Facial musculature is symmetric. Palate elevates symmetrically. Tongue is midline.    Motor: There is normal bulk and tone.  There is no tremor.  Right arm drift, right leg drift Strength is 3/5 in the right arm and right leg antigravity     Strength is 4/5 in the left arm and no effort on left leg.    Sensation: wd to noxious in all, except llE?    Cerebellar: unable to assess    Gait : deferred    [facility] SS:  DATE:  3/21/25   TIME: 0945   1A: Level of consciousness (0-3): 1  1B: Questions (0-2): 2  1C: Commands (0-2): 1  2: Gaze (0-2): 1  3: Visual fields (0-3): 0  4: Facial palsy (0-3): 1  MOTOR:  5A: Left arm motor drift (0-4): 1  5B: Right arm motor drift (0-4): 2  6A: Left leg motor drift (0-4): 1  6B: Right leg motor drift (0-4): 2  7: Limb ataxia (0-2): 0  SENSORY:  8: Sensation (0-2): 0  SPEECH:  9: Language (0-3): 2  10: Dysarthria (0-2): 2  EXTINCTION:  11: Extinction/inattention (0-2): 1    TOTAL SCORE: 17    prehospital mRS= 3      LABS:                         12.9   4.11  )-----------( 118      ( 21 Mar 2025 06:35 )             40.8       03-21    140  |  104  |  21.9[H]  ----------------------------<  135[H]  3.7   |  23.0  |  0.68    Ca    8.1[L]      21 Mar 2025 06:35  Phos  3.1     03-21  Mg     2.4     03-21    TPro  5.6[L]  /  Alb  3.4  /  TBili  0.7  /  DBili  x   /  AST  16  /  ALT  17  /  AlkPhos  44  03-21      PT/INR - ( 21 Mar 2025 06:35 )   PT: 12.5 sec;   INR: 1.08 ratio         PTT - ( 21 Mar 2025 06:35 )  PTT:26.1 sec        A1C: pending         RADIOLOGY & ADDITIONAL STUDIES:    CT Head No Cont (03.21.25 @ 10:14)     IMPRESSION:  Mild chronic microvascular changes without evidence of an   acute transcortical infarction or hemorrhage    CT Angio Neck Stroke Protocol w/ IV Cont (03.21.25 @ 02:48)  IMPRESSION:    CT PERFUSION:  Technical limitations: Motion artifact, suboptimal contrast bolus.    Core infarction: 5 ml  Penumbra / tissue at risk for active ischemia: 0 ml    CTA NECK:  No evidence of significant stenosis or occlusion.    CTA HEAD:  Nonopacification of the left carotid terminus and proximal left A1 and M1   segments. Left anterior cerebral artery likely opacified via a patent   anterior communicating artery. Some distal reconstitution of the distal   left MCA distribution, although is markedly decreased blood vessel   density when compared with the right side.      CT Brain Stroke Protocol (03.21.25 @ 02:39)   IMPRESSION:  No acute intracranial hemorrhage.  Density in the region of the left carotid terminus concerning for   thrombus, with question loss of gray-white differentiation in the left   insula which may reflect acute infarct.     US Duplex Venous Lower Ext Complete, Bilateral (03.21.25 @ 11:44)  IMPRESSION:    Right common femoral vein could not be seen secondary to an overlying   bandage, otherwise no evidence of deep venous thrombosis in the   visualized bilateral lower extremity veins.     TTE W or WO Ultrasound Enhancing Agent (03.21.25 @ 11:05)   CONCLUSIONS:      1. Patient tachycardic during entire study.   2. Left atrium is severely dilated.   3. Left ventricular systolic function is normal with an ejection fraction visually estimated at 65 to 70 %.   4. There is moderate (grade 2) left ventricular diastolic dysfunction, with elevated left ventricular filling pressure.   5. The right atrium is severely dilated.   6. Normal right ventricular cavity size and normal right ventricular systolic function.   7. Moderate mitral regurgitation.   8. Moderate tricuspid regurgitation.   9. Estimated pulmonary artery systolic pressure is 55 mmHg, consistent with moderate pulmonary hypertension.  10. No pericardial effusion seen.

## 2025-03-21 NOTE — PHYSICAL THERAPY INITIAL EVALUATION ADULT - PERTINENT HX OF CURRENT PROBLEM, REHAB EVAL
88 y/o female with PMHx of Afib (on Eliquis), PPM, HTN, HLD, GERD, severe MR/TR, iatrogenic adrenal insufficiency (on prednisone), lymphedema, metastatic melanoma s/p L foot melanoma and lymph node resection, s/p RUE melanoma and R axillary lymph node excision at [facility] (3/18/25) presented to  ED 3/21 with AMS s/p unwitnessed fall off the toilet in the bathroom. As per son Philip, patient was LKW 10PM prior to going to the bathroom. EMS noted patient with R sided weakness, aphasia and facial droop. Upon arrival to  ED patient went into [facility], given Amiodarone 150mg. 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 Neuro IR consulted and patient transferred to [facility] for mechanical thrombectomy. S/p cerebral angiogram for mechanical thrombectomy of L M1 occlusion, TICI 3 (one pass). Patient recently taken off Eliquis (2 days prior to procedure) for melanoma removal and restarted yesterday, last dose 7PM. Patient's farxiga was held 4 days prior to procedure and restarted yesterday morning.

## 2025-03-21 NOTE — SWALLOW BEDSIDE ASSESSMENT ADULT - SLP GENERAL OBSERVATIONS
Pt received & seen seated upright in bed, awake/alert, aphasic, dysarthric, on room air with 02 sats: 98%, son present, 0/10 pain pre/post

## 2025-03-21 NOTE — PROCEDURE NOTE - ADDITIONAL PROCEDURE DETAILS
Normal device function  Sensing, capture thresholds and impedance are all stable and WNL   burden 22%,  VS 78%  Prior "VT" events which c/w with brief episodes of PAF with RVR To 170s - all episodes occurred on 3/7/25    Per EMS - patient had episode of "VT" this morning while in ambulance.   NO correlating arrhythmia events noted on PPM interrogation.    **This device is conditionally approved for use with MRI. MRI department will contact the appropriate vendor for reprogramming during scan.   Order form completed and placed in the chart. Please recall EP if you have any further questions.**

## 2025-03-21 NOTE — PHYSICAL THERAPY INITIAL EVALUATION ADULT - PASSIVE RANGE OF MOTION EXAMINATION, REHAB EVAL
right shoulder flex limited(recent procedure however no restrictions per son), elbow WFL/Left UE Passive ROM was WFL (within functional limits)/bilateral lower extremity Passive ROM was WFL (within functional limits)

## 2025-03-21 NOTE — CONSULT NOTE ADULT - CRITICAL CARE ATTENDING COMMENT
Exam and hx as above  cth this AM stable  still with significant deficits o/e  await mri for AC timeline

## 2025-03-21 NOTE — CHART NOTE - NSCHARTNOTEFT_GEN_A_CORE
Neurointerventional Surgery  Pre-Procedure Note     HPI: 89 YOF with PMHx of Afib (on Eliquis), PPM, HTN, hx of RUE melanoma removal with R axillary lymph node removal at [facility] 4 days ago presented to  ED with AMS s/p unwitnessed fall off the toilet in the bathroom. Patient recently taken off Eliquis for melanoma removal and restarted yesterday, last dose 7PM. EMS noted patient with R sided weakness, aphasia and facial droop. Upon arrival to  ED patient went into [facility]. 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 Pt transferred to [facility] for thrombectomy.      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  PreserVision AREDS 2 oral capsule: 1 cap(s) orally 2 times a day  simvastatin 10 mg oral tablet: 1 tab(s) orally once a day (at bedtime)  Lasix 20 mg oral tablet: 1 tab(s) orally every other day  Eliquis 5 mg oral tablet: 1 tab(s) orally 2 times a day Resume tonight 3/6/25  Centrum oral tablet: 1 tab(s) orally once a day  predniSONE 1 mg oral tablet: 2 tab(s) orally once a day (in the evening)  predniSONE 5 mg oral tablet      Physical Exam:  Constitutional: NAD, lying in bed  Neuro  * Mental Status:  GCS 15: Awake, alert, oriented to conversation. No aphasia or difficulty speaking. No dysarthria. Able to name objects and their function.  * Cranial Nerves: Cnii-Cnxii grossly intact. PERRL, EOMI, tongue midline, no gaze deviation  * Motor: RUE 5/5, LUE 5/5, RLE 5/5, LLE 5/5, no drift or dysmetria  * Sensory: Sensation intact to light touch  * Reflexes: not assessed   Cardiovascular: Regular rate and rhythm.  Eyes: See neurologic examination with detailed examination of eyes.  ENT: No JVD, Trachea Midline  Respiratory: non labored breathing   Gastrointestinal: Soft, nontender, nondistended.  Genitourinary: [ ] Rodriguez, [ x ] No Rodriguez.   Musculoskeletal: No muscle wasting noted, (See neurologic assessment for full muscle strength assessment) No pretibial edema appreciated, no appreciable calf tenderness.  Skin:  no wounds, no redness, no abrasions noted  Hematologic / Lymph / Immunologic: No bleeding from IV sites or wounds, No lymphadenopathy, No Hives or allergic type skin lesions    NIH SS:  DATE:  TIME:  1A: Level of consciousness (0-3): 0  1B: Questions (0-2): 0    1C: Commands (0-2): 0  2: Gaze (0-2): 0  3: Visual fields (0-3): 0  4: Facial palsy (0-3): 0  MOTOR:  5A: Left arm motor drift (0-4): 0  5B: Right arm motor drift (0-4): 0  6A: Left leg motor drift (0-4): 0  6B: Right leg motor drift (0-4): 0  7: Limb ataxia (0-2): 0  SENSORY:  8: Sensation (0-2): 0  SPEECH:  9: Language (0-3): 0  10: Dysarthria (0-2): 0  EXTINCTION:  11: Extinction/inattention (0-2): 0    TOTAL SCORE:     Labs:                         13.0   5.99  )-----------( 122      ( 21 Mar 2025 03:07 )             39.9       03-21    137  |  105  |  23  ----------------------------<  150[H]  3.6   |  27  |  0.70    Ca    9.1      21 Mar 2025 03:07    TPro  5.9[L]  /  Alb  3.1[L]  /  TBili  0.7  /  DBili  x   /  AST  13[L]  /  ALT  23  /  AlkPhos  41  03-21          PT/INR - ( 21 Mar 2025 03:07 )   PT: 13.2 sec;   INR: 1.12 ratio         PTT - ( 21 Mar 2025 03:07 )  PTT:30.2 sec      Assessment/Plan:   89 YOF with PMHx of Afib (on Eliquis), PPM, HTN, hx of RUE melanoma removal with R axillary lymph node removal at [facility] 4 days ago who presented after unwitnessed fall at home, found to have L M1 occlusion.   Patient presents to neuro-IR for selective cerebral angiography and mechanical thrombectomy.     Procedure, goals, risks, benefits and alternatives were discussed with patient and (patient's family).  All questions were answered.  Risks include but are not limited to stroke, vessel injury, hemorrhage, and or groin hematoma.  Patient demonstrates understanding  of all risks involved with this procedure and wishes to continue.  Appropriate  consent was obtained from patient and consent is in the patient's chart. Neurointerventional Surgery  Pre-Procedure Note     HPI: 89 YOF with PMHx of Afib (on Eliquis), PPM, HTN, hx of RUE melanoma removal with R axillary lymph node removal at [facility] 4 days ago presented to  ED with AMS s/p unwitnessed fall off the toilet in the bathroom. Patient recently taken off Eliquis for melanoma removal and restarted yesterday, last dose 7PM. EMS noted patient with R sided weakness, aphasia and facial droop. Upon arrival to  ED patient went into [facility]. 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 Pt transferred to [facility] for thrombectomy.      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  PreserVision AREDS 2 oral capsule: 1 cap(s) orally 2 times a day  simvastatin 10 mg oral tablet: 1 tab(s) orally once a day (at bedtime)  Lasix 20 mg oral tablet: 1 tab(s) orally every other day  Eliquis 5 mg oral tablet: 1 tab(s) orally 2 times a day Resume tonight 3/6/25  Centrum oral tablet: 1 tab(s) orally once a day  predniSONE 1 mg oral tablet: 2 tab(s) orally once a day (in the evening)  predniSONE 5 mg oral tablet      Physical Exam:  Constitutional: NAD, lying in bed  Neuro  * Mental Status:  Arouses to voice. AxO x 0. Not following commands  * Cranial Nerves: Cnii-Cnxii grossly intact. PERRL, EOMI, tongue midline, left gaze deviation  * Motor: RUE and RLE drift. LUE and LLE some effort against gravity, at least 3/5.  * Sensory: Sensation intact to light touch  * Reflexes: not assessed   Cardiovascular: Regular rate and rhythm.  Eyes: See neurologic examination with detailed examination of eyes.  ENT: No JVD, Trachea Midline  Respiratory: non labored breathing   Gastrointestinal: Soft, nontender, nondistended.  Genitourinary: [ ] Rodriguez, [ x ] No Rodriguez.   Musculoskeletal: No muscle wasting noted, (See neurologic assessment for full muscle strength assessment) No pretibial edema appreciated, no appreciable calf tenderness.  Left arm w/steri strips over a recent surgical incision w/ecchymosis  Skin:  no wounds, no redness, no abrasions noted  Hematologic / Lymph / Immunologic: No bleeding from IV sites or wounds    [facility] SS:  DATE: 3/21/25  TIME: 5:10 AM  1A: Level of consciousness (0-3): 1  1B: Questions (0-2): 2    1C: Commands (0-2): 2  2: Gaze (0-2): 2  3: Visual fields (0-3): 0  4: Facial palsy (0-3): 2  MOTOR:  5A: Left arm motor drift (0-4): 1  5B: Right arm motor drift (0-4): 2  6A: Left leg motor drift (0-4): 1  6B: Right leg motor drift (0-4): 2  7: Limb ataxia (0-2): 0  SENSORY:  8: Sensation (0-2): 0  SPEECH:  9: Language (0-3): 2  10: Dysarthria (0-2): 1  EXTINCTION:  11: Extinction/inattention (0-2): 1    TOTAL SCORE: 20    Labs:                         13.0   5.99  )-----------( 122      ( 21 Mar 2025 03:07 )             39.9       03-21    137  |  105  |  23  ----------------------------<  150[H]  3.6   |  27  |  0.70    Ca    9.1      21 Mar 2025 03:07    TPro  5.9[L]  /  Alb  3.1[L]  /  TBili  0.7  /  DBili  x   /  AST  13[L]  /  ALT  23  /  AlkPhos  41  03-21          PT/INR - ( 21 Mar 2025 03:07 )   PT: 13.2 sec;   INR: 1.12 ratio         PTT - ( 21 Mar 2025 03:07 )  PTT:30.2 sec      Assessment/Plan:   89 YOF with PMHx of Afib (on Eliquis), PPM, HTN, hx of RUE melanoma removal with R axillary lymph node removal at [facility] 4 days ago who presented after unwitnessed fall at home, found to have L M1 occlusion.   Patient presents to neuro-IR for selective cerebral angiography and mechanical thrombectomy.     Procedure, goals, risks, benefits and alternatives were discussed with patient's family.  All questions were answered.  Risks include but are not limited to stroke, vessel injury, hemorrhage, and or groin hematoma.  Patient's son demonstrates understanding of all risks involved with this procedure and wishes to continue.  Appropriate consent was obtained from patient's son and consent is in the patient's chart.

## 2025-03-21 NOTE — PHYSICAL THERAPY INITIAL EVALUATION ADULT - ADDITIONAL COMMENTS
history per son/daughter due to language deficits. pt lives with son in a 2-story house with 1 step to enter and 14 up(+rail). independent without devices prior to admit. +driving

## 2025-03-21 NOTE — H&P ADULT - NSHPLABSRESULTS_GEN_ALL_CORE
LABS:                        13.0   5.99  )-----------( 122      ( 21 Mar 2025 03:07 )             39.9     03-21    137  |  105  |  23  ----------------------------<  150[H]  3.6   |  27  |  0.70  Ca    9.1      21 Mar 2025 03:07  TPro  5.9[L]  /  Alb  3.1[L]  /  TBili  0.7  /  DBili  x   /  AST  13[L]  /  ALT  23  /  AlkPhos  41  03-21  PT/INR - ( 21 Mar 2025 03:07 )   PT: 13.2 sec;   INR: 1.12 ratio    PTT - ( 21 Mar 2025 03:07 )  PTT:30.2 sec  Urinalysis Basic - ( 21 Mar 2025 03:19 )  Color: Yellow / Appearance: Clear / SG: >1.030 / pH: x  Gluc: x / Ketone: Negative mg/dL  / Bili: Negative / Urobili: 0.2 mg/dL   Blood: x / Protein: Negative mg/dL / Nitrite: Negative   Leuk Esterase: Trace / RBC: 2 /HPF / WBC 10 /HPF   Sq Epi: x / Non Sq Epi: 4 /HPF / Bacteria: Many /HPF      CAPILLARY BLOOD GLUCOSE  POCT Blood Glucose.: 135 mg/dL (21 Mar 2025 03:07)      RADIOLOGY & ADDITIONAL TESTS:   < from: CT Angio Neck Stroke Protocol w/ IV Cont (03.21.25 @ 02:48) >  IMPRESSION:  CT PERFUSION:  Technical limitations: Motion artifact, suboptimal contrast bolus.    Core infarction: 5 ml  Penumbra / tissue at risk for active ischemia: 0 ml    CTA NECK:  No evidence of significant stenosis or occlusion.    CTA HEAD:  Nonopacification of the left carotid terminus and proximal left A1 and M1   segments. Left anterior cerebral artery likely opacified via a patent   anterior communicating artery. Some distal reconstitution of the distal   left MCA distribution, although is markedly decreased blood vessel   density when compared with the right side.    < from: CT Brain Stroke Protocol (03.21.25 @ 02:39) >  IMPRESSION:  No acute intracranial hemorrhage.  Density in the region of the left carotid terminus concerning for   thrombus, with question loss of gray-white differentiation in the left   insula which may reflect acute infarct.

## 2025-03-21 NOTE — SPEECH LANGUAGE PATHOLOGY EVALUATION - COMMENTS
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 Pt able to states: "yes" at times, though not consistent and did appropriately state: "bye"at end of session Defer assessment at this time given severity of language deficits To be assessed Speech tx services are RX following discharge To be assessed as appropriate As per MD note: "89F PMHx of Afib (on Eliquis), PPM, HTN, HLD, GERD, severe MR/TR, iatrogenic adrenal insufficiency (on prednisone), lymphedema, metastatic melanoma s/p L foot melanoma and lymph node resection, s/p RUE melanoma and R axillary lymph node excision at [facility] (3/18/25) presented to  ED 3/21 with AMS s/p unwitnessed fall off the toilet in the bathroom. EMS noted patient with R sided weakness, aphasia and facial droop. 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 Neuro IR consulted and patient transferred to [facility] for mechanical thrombectomy. S/p cerebral angiogram for mechanical thrombectomy of L M1 occlusion, TICI 3 (one pass). " Pt unable to consistently use head nods for posed yes/no ?, unable to effectively utilize thumbs up/down given models & cues

## 2025-03-21 NOTE — ED PROVIDER NOTE - WR ORDER DATE AND TIME 2
Discharge instructions reviewed with patient, briefly discussed new medication prescriptions linezolid and oxycodone. Pt instructed to follow up with PCP and with ENT Dr Pabon. IV access removed prior to discharge, cathter intact. Patient ambulatory and stable at time of discharge, going home with  with no further needs.   21-Mar-2025 02:32

## 2025-03-21 NOTE — PATIENT PROFILE ADULT - NSPROGENBLOODRESTRICT_GEN_A_NUR
LEXII pt lethargic, not speaking and not following commands LEXII pt lethargic, not speaking and not following commands/none

## 2025-03-21 NOTE — ED PROVIDER NOTE - ENMT, MLM
Airway patent, No stridor; Nasal mucosa clear. Mouth with normal mucosa. Throat has no vesicles, no oropharyngeal exudates and uvula is midline.

## 2025-03-21 NOTE — DISCHARGE NOTE NURSING/CASE MANAGEMENT/SOCIAL WORK - FINANCIAL ASSISTANCE
[facility] provides services at a reduced cost to those who are determined to be eligible through [facility]’s financial assistance program. Information regarding [facility]’s financial assistance program can be found by going to [email or web address] or by calling [phone].

## 2025-03-21 NOTE — ED PROVIDER NOTE - NEUROLOGICAL, MLM
Right sided dense weakness; contracted right arm with spasticity.; aphasia Right sided dense weakness- no  movement of right arm or leg; left arm and leg strength normal; contracted right arm with spasticity.; aphasia; right lower facial droop; NIHSS=19

## 2025-03-21 NOTE — SWALLOW BEDSIDE ASSESSMENT ADULT - ORAL PHASE
Within functional limits/Delayed oral transit time Delayed oral transit time premature loss & piecemeal deglutition suspected/Delayed oral transit time premature loss & piecemeal deglutition suspected

## 2025-03-21 NOTE — SPEECH LANGUAGE PATHOLOGY EVALUATION - SLP VERBAL EXPRESSION
- Presented with lower abdominal pain.   - UA pos for UTI and WBC borderline at 10k.   - s/p Rocephin 1g IVPB in ED 8/31, will continue QD for 4 more days.   - No kidney involvement on CT A/P.   - Await final culture results.
impaired

## 2025-03-21 NOTE — PATIENT PROFILE ADULT - FUNCTIONAL ASSESSMENT - BASIC MOBILITY 6.
LEXII pt lethargic, not speaking and not following commands LEXII pt lethargic, not speaking and not following commands/4 = No assist / stand by assistance

## 2025-03-21 NOTE — ED ADULT NURSE REASSESSMENT NOTE - NS ED NURSE REASSESS COMMENT FT1
Pt son arrived to [facility]. Per son the pt last known well was 10:00 PM. Son states that the pt had a fall near the bathroom and he noticed R facial droop, R sided weakness, and aphasia. Per son pt stopped taking her daily eliquis because she had a procedure to remove melanoma and lymph node from her R arm/side on Tuesday. Son states pt restarted her eliquis dose yesterday morning. Per son, at baseline the pt is ambulatory without assistance and "able to do everything on her own". Pt safety and comfort maintained, son at bedside has no complaints at this time.

## 2025-03-21 NOTE — SPEECH LANGUAGE PATHOLOGY EVALUATION - SLP AUTOMATIC SPEECH
6/10 with cues from ST, days of the week: 3/7 with max cues. Pt noted to be perseverative with targeted task/counting

## 2025-03-21 NOTE — DISCHARGE NOTE NURSING/CASE MANAGEMENT/SOCIAL WORK - NSDCPEFALRISK_GEN_ALL_CORE
For information on Fall & Injury Prevention, visit: [email or web address] OR  [email or web address] OR  https://www.cdc.gov/steadi/patient.html

## 2025-03-21 NOTE — H&P ADULT - NSHPREVIEWOFSYSTEMS_GEN_ALL_CORE
[address] [facility] of Interventional Radiology  [phone]    PORT CATHETER REMOVAL  DISCHARGE INSTRUCTIONS    General Information:     Your doctor has ordered for us to remove your drain, port, or catheter. This could be that you do not need it anymore, it is not doing its job, your physician has decided on another plan for your treatment and/or it may need replacing. Home Care Instructions: You can resume your regular diet and medication regimen. Do not drink alcohol, drive, or make any important legal decisions in the next 24 hours. Do not lift anything heavier than a gallon of milk, or do anything strenuous for the next 24 hours. You will notice a dressing over the site of the removal. This dressing should stay in place until the site is healed. The dressing should be changed at least every 48 hours. You should change the dressing sooner if it becomes soiled or wet. The nurse who discharges you to home should review with you any wound care instructions. Resume your normal level of activity slowly. You may shower after 24 hours, but do not take a bath, swim or immerse yourself in water until the site has healed, and keep the dressing dry with plastic wrap while showering. The site may ooze for a couple of days. This drainage should lessen with each passing day. Call If:     You should call your Physician and/or the Radiology Nurse if you have any bleeding other than a small spot on your bandage. Call if you have any signs of infection, fever, or increased pain at the site of the tube. Call if the oozing increases, if it changes color, or begins to have an odor. Follow-Up Instructions: Please see your ordering doctor as he/she has requested.        Radiologist:   Dr. Jeanine Street    Date:    7/16/2020    Portacath Removal Discharge Instructions      Watch for signs of infection:  redness, fever, chills, increased pain, and/or drainage from the site. If this occurs, call your physician at once. Resume your previous diet and follow medication reconciliation form. Do not lift anything heavier than 5 pounds with the affected arm for the next week. You may take Tylenol, as directed on the label, for pain. Avoid ibuprofen (Advil, Motrin) and aspirin as they may cause you to bleed. Because you received sedation, you are not to drive or sign any legal documents for the next 24 hours. Side effects of sedation medications and other medications used today have been reviewed. Notify us of nausea, itching, hives, dizziness, or anything else out of the ordinary. Should you experience any of these significant changes, please call [phone] between the hours of 7:30 am and 10 pm or [phone] after hours.  After hours, ask the  to page the [address] Technologist, and describe the problem to the technologist.            Patient Signature:  Date: 7/16/2020  Discharging Nurse: Louis Edwards RN limited ROS d/t aphasia

## 2025-03-21 NOTE — PROGRESS NOTE ADULT - ASSESSMENT
89F Afib AC held iso melanoma excision L MCA s/p Thrombectomy    Patient is critically ill with high probability of imminent neurologic or life-threatening deterioration due to the following diagnoses:  1) Acute Ischemic Stroke  2) Cerebral Edema  - For which we are monitoring neurologic exam and will administer hyperosmolar medications if indicated    PLAN:   Neuro:  - Neurochecks  - 24h CTH, MRI brain   - Will need AC for secondary stroke ppx, NSGY cleared requires imaging for stroke burden prior to reinitiating per Stroke Neuro  - MAP >65  - Metorpolol 50mg BID (half home dose) for Afib  - TTE  - Spo2>92%  - Advance diet. Bowel regimen   - DVT ppx: SCD, SQH following 24h CTH  - Restart home Prednisone  - Monitor BGL, maintain <180    My full attention was spent providing medically necessary critical care to the patient with details documented in my note above.   Critical care time spent examining patient, reviewing vitals, labs, medications, imaging and discussing with the team goals of care   The combined critical care time provided to the patient was 60 minutes  This time does not include bedside procedures that are documented separately.

## 2025-03-21 NOTE — PATIENT PROFILE ADULT - OVER THE PAST TWO WEEKS, HAVE YOU FELT LITTLE INTEREST OR PLEASURE IN DOING THINGS?
LEXII pt lethargic, not speaking and not following commands LEXII pt lethargic, not speaking and not following commands/no

## 2025-03-21 NOTE — ED PROVIDER NOTE - WR ORDER STATUS 1
Problem: Falls - Risk of  Goal: *Absence of Falls  Description: Document Refugio Doyle Fall Risk and appropriate interventions in the flowsheet. Outcome: Progressing Towards Goal  Note: Fall Risk Interventions:  Mobility Interventions: Patient to call before getting OOB              Elimination Interventions: Call light in reach              Problem: Patient Education: Go to Patient Education Activity  Goal: Patient/Family Education  Outcome: Progressing Towards Goal     Problem: Pressure Injury - Risk of  Goal: *Prevention of pressure injury  Description: Document Johnnie Scale and appropriate interventions in the flowsheet.   Outcome: Progressing Towards Goal  Note: Pressure Injury Interventions:  Sensory Interventions: Minimize linen layers    Moisture Interventions: Absorbent underpads    Activity Interventions: Pressure redistribution bed/mattress(bed type)    Mobility Interventions: HOB 30 degrees or less    Nutrition Interventions: Document food/fluid/supplement intake    Friction and Shear Interventions: HOB 30 degrees or less                Problem: Patient Education: Go to Patient Education Activity  Goal: Patient/Family Education  Outcome: Progressing Towards Goal     Problem: Discharge Planning  Goal: *Discharge to safe environment  Outcome: Progressing Towards Goal  Goal: *Knowledge of medication management  Outcome: Progressing Towards Goal  Goal: *Knowledge of discharge instructions  Outcome: Progressing Towards Goal     Problem: Patient Education: Go to Patient Education Activity  Goal: Patient/Family Education  Outcome: Progressing Towards Goal Performed

## 2025-03-21 NOTE — CONSULT NOTE ADULT - ASSESSMENT
ASSESSMENT:      S/p cerebral angiogram for mechanical thrombectomy of L M1 occlusion, TICI 3 (one pass). Patient recently taken off Eliquis (2 days prior to procedure) for melanoma removal and restarted yesterday, last dose 7PM. Patient's farxiga was held 4 days prior to procedure and restarted yesterday morning.  (21 Mar 2025 04:01)  The patient is a 89y Female who presented with      NEURO:   -Neurologically ---   -Continue close monitoring for neurologic deterioration    -Stroke neuro checks q _   -Permissive HTN or  SBP goal ____ avoiding rapid fluctuations and hypotension   -ANTITHROMBOTIC THERAPY:   -STATIN THERAPY: ****, LDL ***, titrate statin to LDL goal less than 70  -MRI Brain w/o, MRA Head w/o and Neck w/contrast  -Dysphagia screen: pass/fail   -Physical therapy/OT/Speech eval/treatment  -Check TTE/TTE as noted above   -Cardiac monitoring w/ telemetry for now, further evaluation pending findings of noted workup , +/- ILR***             -Patient should have all age and risk appropriate malignancy screenings with PCP or sooner if clinically suspected   -DVT ppx: Heparin s.c [] LMWH [] SCD[]  -- No neurological contraindication to pharmacological DVT ppx***  -Maintain adequate hydration    -Na Goal: 135-145   -Monitor for si/sx of infection   -Obtain Lipid Profile***  -Obtain HgA1c***  -Stroke Education     OTHER: Condition and plan of care discussed with patient, family at bedside, primary team, questions and concerns addressed.     DISPOSITION: Per primary team      CORE MEASURES:       Admission NIHSS:     Tenecteplase : [] YES [] NO     LDL/HDL/A1C:     Depression Screen- if depression hx and/or present     Statin Therapy:     Dysphagia Screen: [] PASS [] FAIL     Smoking in the past 12 months [] YES [] NO     Afib [] YES [] NO     Diabetes [] YES [] NO     If patient has Diabetes, are they prescribed cardioprotective antihyperglycemic medication (GLP-1 receptor agonist or SGLT-2 inhibitor) on        discharge [] YES [] NO         If NO: [] Patient/Family refusal after risk/benefit discussion, [] Allergy/Intolerance, [] Not appropriate at this time, need to follow up with PCP or Endocrinology         outpatient to consider initiation after further evaluation of clinical status, [] Hga1c >/= 9% indicating poor glycemic control and therefore would not be appropriate at         this time, [] Needs prior authorization - needs follow up with PCP or endocrine to obtain approval, [] SGLT-2 inhibitor not appropriate given recent ketoacidosis,         [] History of bladder cancer (SGLT-2 contraindicated), [] History or Family History of medullary thyroid tumor (GLP-1 receptor agonist contraindicated), [] History of         pancreatitis (GLP-1 receptor agonist contraindicated)     Stroke Education [] YES [] NO   ASSESSMENT: 89 YOF with PMHx of Afib (on Eliquis), PPM, HTN, hx of RUE melanoma removal with R axillary lymph node removal at [facility] 4 days PTA. Presented to  ED on 3/21/25 at 0200 with AMS s/p unwitnessed fall off the toilet in the bathroom. Of note recently taken off Eliquis for melanoma removal and restarted 3/20/25, last dose 7PM. Per EMS noted with R sided weakness, aphasia and facial droop. Upon arrival to  ED patient went into [facility]. Initial NIHSS 19, stroke code activated, CT imagining found revealing L M1 occlusion. Pt transferred to [facility]UARDO eval, s/p cerebral angiogram for mechanical thrombectomy of L M1 occlusion, TICI 3 (one pass) on 3/21/25. Admitted to NSICU for close monitoring post thrombectomy    IMPRESSION: L M1 occlusion s/p thrombectomy TICI 3 on 3/21. Etiology likely cardioembolic in the setting of afib and off a/c for surgical procedure.     NEURO:   -Neurologically with right hemiplegia NIH 17    -Continue close monitoring for neurologic deterioration    -Stroke neuro checks per post thrombectomy protocol   -SBP goal as per NSICU, avoiding rapid fluctuations and hypotension   -ANTITHROMBOTIC THERAPY: -*-*  -STATIN THERAPY: resume statin when pass dysphagia , LDL pending, titrate statin to LDL goal less than 70  -MRI Brain pending   -Dysphagia screen: fail  -Physical therapy/OT/Speech eval/treatment  -Check TTE/TTE as noted above   -Cardiac monitoring w/ telemetry for now, further evaluation pending findings of noted workup            -Patient should have all age and risk appropriate malignancy screenings with PCP or sooner if clinically suspected   -DVT ppx: SCD[x]  No neurological contraindication to pharmacological DVT ppx  -Maintain adequate hydration    -Na Goal: 135-145   -Monitor for si/sx of infection   -Obtain Lipid Profile  -Obtain HgA1c  -Stroke Education     OTHER: Condition and plan of care discussed with patient, family at bedside, primary team, questions and concerns addressed.     DISPOSITION: Per primary team      CORE MEASURES:       Admission NIHSS: 20     Tenecteplase : [] YES [x] NO     LDL/HDL/A1C: pending      Depression Screen- if depression hx and/or present     Statin Therapy: resume when pass      Dysphagia Screen: [] PASS [x] FAIL     Smoking in the past 12 months [] YES [x] NO     Afib [x] YES [] NO     Diabetes [x] YES [] NO     Stroke Education [x] YES [] NO   ASSESSMENT: 89 YOF with PMHx of Afib (on Eliquis), PPM, HTN, hx of RUE melanoma removal with R axillary lymph node removal at [facility] 4 days PTA. Presented to  ED on 3/21/25 at 0200 with AMS s/p unwitnessed fall off the toilet in the bathroom. Of note recently taken off Eliquis for melanoma removal and restarted 3/20/25, last dose 7PM. Per EMS noted with R sided weakness, aphasia and facial droop. Upon arrival to  ED patient went into Vt[facility]. Initial NIHSS 19, stroke code activated, CT imagining found revealing L M1 occlusion. Pt transferred to [facility] EDUARDO eval, s/p cerebral angiogram for mechanical thrombectomy of L M1 occlusion, TICI 3 (one pass) on 3/21/25. Admitted to NSICU for close monitoring post thrombectomy    IMPRESSION: L M1 occlusion s/p thrombectomy TICI 3 on 3/21. Etiology likely cardioembolic in the setting of afib and off a/c for surgical procedure.     NEURO:   -Neurologically with right hemiplegia NIH 17    -Continue close monitoring for neurologic deterioration    -Stroke neuro checks per post thrombectomy protocol   -SBP goal as per NSICU, avoiding rapid fluctuations and hypotension   -ANTITHROMBOTIC THERAPY: Aspirin 81mg daily, HOLD A/C until MRI to determine stroke burden  -STATIN THERAPY: resume statin when pass dysphagia , LDL pending, titrate statin to LDL goal less than 70  -MRI Brain pending   -Dysphagia screen: fail  -Physical therapy/OT/Speech eval/treatment  -Check TTE/TTE as noted above   -Cardiac monitoring w/ telemetry for now, further evaluation pending findings of noted workup            -Patient should have all age and risk appropriate malignancy screenings with PCP or sooner if clinically suspected   -DVT ppx: SCD[x]  No neurological contraindication to pharmacological DVT ppx  -Maintain adequate hydration    -Na Goal: 135-145   -Monitor for si/sx of infection   -Obtain Lipid Profile  -Obtain HgA1c  -Stroke Education     OTHER: Condition and plan of care discussed with patient, family at bedside, primary team, questions and concerns addressed.     DISPOSITION: Per primary team      CORE MEASURES:       Admission NIHSS: 20     Tenecteplase : [] YES [x] NO     LDL/HDL/A1C: pending      Depression Screen- if depression hx and/or present     Statin Therapy: resume when pass      Dysphagia Screen: [] PASS [x] FAIL     Smoking in the past 12 months [] YES [x] NO     Afib [x] YES [] NO     Diabetes [x] YES [] NO     Stroke Education [x] YES [] NO

## 2025-03-21 NOTE — CONSULT NOTE ADULT - SUBJECTIVE AND OBJECTIVE BOX
[facility]                                              CARDIOLOGY AT [facility]                                                   [address], [place] [address]                                             Telephone: [phone]. Fax:[phone]                                                       CARDIOLOGY CONSULTATION NOTE                                                                                             History obtained by: Patient and medical record   Community Cardiologist: Floridalma Murguia    obtained: Yes [  ] No [x]  Available out pt records reviewed: Yes [x] No [  ]    Chief complaint:    Patient is a 89y old  Female who presents with a chief complaint of Stroke (21 Mar 2025 11:49)      HPI:  89 y/o female with PMHx of Afib (on Eliquis), PPM, HTN, HLD, GERD, severe MR/TR, iatrogenic adrenal insufficiency (on prednisone), lymphedema, metastatic melanoma s/p L foot melanoma and lymph node resection, s/p RUE melanoma and R axillary lymph node excision at [facility] (3/18/25) presented to  ED 3/21 with AMS s/p unwitnessed fall off the toilet in the bathroom. As per son Philip, patient was LKW 10PM prior to going to the bathroom. EMS noted patient with R sided weakness, aphasia and facial droop. Upon arrival to  ED patient went into [facility], given Amiodarone 150mg. 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 Neuro IR consulted and patient transferred to [facility] for mechanical thrombectomy. S/p cerebral angiogram for mechanical thrombectomy of L M1 occlusion, TICI 3 (one pass). Patient recently taken off Eliquis (2 days prior to procedure) for melanoma removal and restarted yesterday, last dose 7PM. Patient's farxiga was held 4 days prior to procedure and restarted yesterday morning.  (21 Mar 2025 04:01)      Review of symptoms:   Cardiac:  No chest pain. No dyspnea. No palpitations.  Respiratory: no cough. No dyspnea  Gastrointestinal: No diarrhea. No abdominal pain. No bleeding.   Neuro: No focal neuro complaints.  All other ROS negative unless otherwise listed above    PHYSICAL EXAM:  Appearance: Comfortable. No acute distress  HEENT:  Atraumatic. Normocephalic.  Normal oral mucosa  Neurologic: A & O x 3, no gross focal deficits.  Cardiovascular: RRR S1 S2, No murmur, no rubs/gallops. No JVD  Respiratory: Lungs clear to auscultation, unlabored   Gastrointestinal:  Soft, Non-tender, + BS  Lower Extremities: 2+ Peripheral Pulses, No clubbing, cyanosis, or edema  Psychiatry: Patient is calm. No agitation.   Skin: warm and dry.    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, including shoulder    Lower back pain        PAST SURGICAL HISTORY  S/P tonsillectomy and adenoidectomy    H/O cataract extraction    History of melanoma excision    History of left inguinal hernia repair    H/O cardiac catheterization      SUBSTANCE USE HISTORY  Denies current and previous substance use [  ]   CIGARETTES - denies  ALCOHOL - 1 glass wine daily  DRUGS - denies    FAMILY HISTORY:  Family history of leukemia (Sibling)    CARDIAC SPECIFIC FAMILY HX   No KNOWN family history of Cardiovascular disease, CAD, or sudden death in first degree relatives unless specified below  Family History of Cardiovascular Disease:  [  ]   Coronary Artery Disease in first degree relative:  [  ]   Sudden Cardiac Death in First degree relative: [  ]    HOME MEDICATIONS:  Centrum oral tablet: 1 tab(s) orally once a day (21 Mar 2025 08:03)  Eliquis 5 mg oral tablet: 1 tab(s) orally 2 times a day Resume tonight 3/6/25 (21 Mar 2025 08:03)  Farxiga 10 mg oral tablet: 1 tab(s) orally once a day resume at next scheduled dose. (21 Mar 2025 08:03)  Lasix 20 mg oral tablet: 1 tab(s) orally once a day (21 Mar 2025 08:02)  predniSONE 1 mg oral tablet: 2 tab(s) orally once a day (in the evening) (21 Mar 2025 08:03)  predniSONE 5 mg oral tablet: 1 tab(s) orally once a day (in the morning) (21 Mar 2025 08:03)  PreserVision AREDS 2 oral capsule: 1 cap(s) orally 2 times a day (21 Mar 2025 08:03)  simvastatin 10 mg oral tablet: 1 tab(s) orally once a day (at bedtime) (21 Mar 2025 08:03)      CURRENT CARDIAC MEDICATIONS:  hydrALAZINE Injectable 10 milliGRAM(s) IV Push every 2 hours PRN SBP>140  labetalol Injectable 10 milliGRAM(s) IV Push every 2 hours PRN SBP>140  metoprolol tartrate 50 milliGRAM(s) Oral two times a day      CURRENT OTHER MEDICATIONS:  polyethylene glycol 3350 17 Gram(s) Oral daily  senna 2 Tablet(s) Oral at bedtime  atorvastatin 40 milliGRAM(s) Oral at bedtime  chlorhexidine 2% Cloths 1 Application(s) Topical <User Schedule>  predniSONE   Tablet 2 milliGRAM(s) Oral at bedtime  sodium chloride 0.9% Bolus 250 milliLiter(s) IV Bolus once, Stop order after: 1 Doses  sodium chloride 0.9% Bolus 250 milliLiter(s) IV Bolus once, Stop order after: 1 Doses      ALLERGIES:   penicillins (Hives)      VITAL SIGNS:  T(C): 37.2 (25 @ 10:55), Max: 37.5 (25 @ 08:55)  T(F): 99 (25 @ 10:55), Max: 99.5 (25 @ 08:55)  HR: 110 (25 @ 12:55) (81 - 126)  BP: 76/48 (25 @ 12:55) (76/48 - 161/90)  RR: 17 (25 @ 12:55) (16 - 24)  SpO2: 98% (25 @ 12:55) (92% - 100%)    INTAKE AND OUTPUT:      @ 07:01  -   @ 07:00  --------------------------------------------------------  IN: 120 mL / OUT: 400 mL / NET: -280 mL     @ 07:01  -   @ 13:35  --------------------------------------------------------  IN: 750 mL / OUT: 850 mL / NET: -100 mL      LABS:                        12.9   4.11  )-----------( 118      ( 21 Mar 2025 06:35 )             40.8         140  |  104  |  21.9[H]  ----------------------------<  135[H]  3.7   |  23.0  |  0.68    Ca    8.1[L]      21 Mar 2025 06:35  Phos  3.1       Mg     2.4         TPro  5.6[L]  /  Alb  3.4  /  TBili  0.7  /  DBili  x   /  AST  16  /  ALT  17  /  AlkPhos  44  03-    PT/INR - ( 21 Mar 2025 06:35 )   PT: 12.5 sec;   INR: 1.08 ratio         PTT - ( 21 Mar 2025 06:35 )  PTT:26.1 sec  Urinalysis Basic - ( 21 Mar 2025 06:35 )    Color: x / Appearance: x / SG: x / pH: x  Gluc: 135 mg/dL / Ketone: x  / Bili: x / Urobili: x   Blood: x / Protein: x / Nitrite: x   Leuk Esterase: x / RBC: x / WBC x   Sq Epi: x / Non Sq Epi: x / Bacteria: x      RADIOLOGY IMAGIN Lead ECG (25 @ 02:33) [Completed]  Xray Chest 1 View- PORTABLE-Urgent: Urgent   Indication: Stroke Code  Transport: Portable  Exam Completed (25 @ 02:33) [Performed]                                                          [facility]                                              CARDIOLOGY AT [facility]                                                   [address], [place] [address]                                             Telephone: [phone]. Fax:[phone]                                                       CARDIOLOGY CONSULTATION NOTE                                                                                             History obtained by: Patient and medical record   Community Cardiologist: Floridalma Murguia    obtained: Yes [  ] No [x]  Available out pt records reviewed: Yes [x] No [  ]    Chief complaint:    Patient is a 89y old  Female who presents with a chief complaint of Stroke (21 Mar 2025 11:49)      HPI:  88 y/o female with PMHx of Afib (on Eliquis), PPM, HTN, HLD, GERD, severe MR/TR, iatrogenic adrenal insufficiency (on prednisone), lymphedema, metastatic melanoma s/p L foot melanoma and lymph node resection, s/p RUE melanoma and R axillary lymph node excision at [facility] (3/18/25) presented to  ED 3/21 with AMS s/p unwitnessed fall off the toilet in the bathroom. As per son Philip, patient was LKW 10PM prior to going to the bathroom. EMS noted patient with R sided weakness, aphasia and facial droop. Upon arrival to  ED patient went into [facility], given Amiodarone 150mg. 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 Neuro IR consulted and patient transferred to [facility] for mechanical thrombectomy. S/p cerebral angiogram for mechanical thrombectomy of L M1 occlusion, TICI 3 (one pass). Patient recently taken off Eliquis (2 days prior to procedure) for melanoma removal and restarted yesterday, last dose 7PM. Patient's farxiga was held 4 days prior to procedure and restarted yesterday morning.  (21 Mar 2025 04:01)      Review of symptoms:   Cardiac:  No chest pain. No dyspnea. No palpitations.  Respiratory: no cough. No dyspnea  Gastrointestinal: No diarrhea. No abdominal pain. No bleeding.   Neuro: No focal neuro complaints.  All other ROS negative unless otherwise listed above    PHYSICAL EXAM:  Appearance: Comfortable. No acute distress  HEENT:  Atraumatic. Normocephalic.  Normal oral mucosa  Neurologic: +lethargic, +aphasia, A & O x 0, +LE weakness.  Cardiovascular: +afib, S1 S2, No murmur, no rubs/gallops. No JVD  Respiratory: Lungs clear to auscultation, unlabored   Gastrointestinal:  Soft, Non-tender, + BS  Lower Extremities: 2+ Peripheral Pulses, No clubbing, cyanosis, or edema  Psychiatry: Patient is calm. No agitation.   Skin: warm and dry.    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, including shoulder    Lower back pain        PAST SURGICAL HISTORY  S/P tonsillectomy and adenoidectomy    H/O cataract extraction    History of melanoma excision    History of left inguinal hernia repair    H/O cardiac catheterization      SUBSTANCE USE HISTORY  Denies current and previous substance use [  ]   CIGARETTES - denies  ALCOHOL - 1 glass wine daily  DRUGS - denies    FAMILY HISTORY:  Family history of leukemia (Sibling)    CARDIAC SPECIFIC FAMILY HX   No KNOWN family history of Cardiovascular disease, CAD, or sudden death in first degree relatives unless specified below  Family History of Cardiovascular Disease:  [  ]   Coronary Artery Disease in first degree relative:  [  ]   Sudden Cardiac Death in First degree relative: [  ]    HOME MEDICATIONS:  Centrum oral tablet: 1 tab(s) orally once a day (21 Mar 2025 08:03)  Eliquis 5 mg oral tablet: 1 tab(s) orally 2 times a day Resume tonight 3/6/25 (21 Mar 2025 08:03)  Farxiga 10 mg oral tablet: 1 tab(s) orally once a day resume at next scheduled dose. (21 Mar 2025 08:03)  Lasix 20 mg oral tablet: 1 tab(s) orally once a day (21 Mar 2025 08:02)  predniSONE 1 mg oral tablet: 2 tab(s) orally once a day (in the evening) (21 Mar 2025 08:03)  predniSONE 5 mg oral tablet: 1 tab(s) orally once a day (in the morning) (21 Mar 2025 08:03)  PreserVision AREDS 2 oral capsule: 1 cap(s) orally 2 times a day (21 Mar 2025 08:03)  simvastatin 10 mg oral tablet: 1 tab(s) orally once a day (at bedtime) (21 Mar 2025 08:03)      CURRENT CARDIAC MEDICATIONS:  hydrALAZINE Injectable 10 milliGRAM(s) IV Push every 2 hours PRN SBP>140  labetalol Injectable 10 milliGRAM(s) IV Push every 2 hours PRN SBP>140  metoprolol tartrate 50 milliGRAM(s) Oral two times a day      CURRENT OTHER MEDICATIONS:  polyethylene glycol 3350 17 Gram(s) Oral daily  senna 2 Tablet(s) Oral at bedtime  atorvastatin 40 milliGRAM(s) Oral at bedtime  chlorhexidine 2% Cloths 1 Application(s) Topical <User Schedule>  predniSONE   Tablet 2 milliGRAM(s) Oral at bedtime  sodium chloride 0.9% Bolus 250 milliLiter(s) IV Bolus once, Stop order after: 1 Doses  sodium chloride 0.9% Bolus 250 milliLiter(s) IV Bolus once, Stop order after: 1 Doses      ALLERGIES:   penicillins (Hives)      VITAL SIGNS:  T(C): 37.2 (25 @ 10:55), Max: 37.5 (25 @ 08:55)  T(F): 99 (25 @ 10:55), Max: 99.5 (25 @ 08:55)  HR: 110 (25 @ 12:55) (81 - 126)  BP: 76/48 (25 @ 12:55) (76/48 - 161/90)  RR: 17 (25 @ 12:55) (16 - 24)  SpO2: 98% (25 @ 12:55) (92% - 100%)    INTAKE AND OUTPUT:      @ 07:  -   @ 07:00  --------------------------------------------------------  IN: 120 mL / OUT: 400 mL / NET: -280 mL     @ 07:01  -   @ 13:35  --------------------------------------------------------  IN: 750 mL / OUT: 850 mL / NET: -100 mL      LABS:                        12.9   4.11  )-----------( 118      ( 21 Mar 2025 06:35 )             40.8         140  |  104  |  21.9[H]  ----------------------------<  135[H]  3.7   |  23.0  |  0.68    Ca    8.1[L]      21 Mar 2025 06:35  Phos  3.1       Mg     2.4         TPro  5.6[L]  /  Alb  3.4  /  TBili  0.7  /  DBili  x   /  AST  16  /  ALT  17  /  AlkPhos  44  -    PT/INR - ( 21 Mar 2025 06:35 )   PT: 12.5 sec;   INR: 1.08 ratio         PTT - ( 21 Mar 2025 06:35 )  PTT:26.1 sec  Urinalysis Basic - ( 21 Mar 2025 06:35 )    Color: x / Appearance: x / SG: x / pH: x  Gluc: 135 mg/dL / Ketone: x  / Bili: x / Urobili: x   Blood: x / Protein: x / Nitrite: x   Leuk Esterase: x / RBC: x / WBC x   Sq Epi: x / Non Sq Epi: x / Bacteria: x      RADIOLOGY IMAGIN Lead ECG (25 @ 02:33) [Completed]  Xray Chest 1 View- PORTABLE-Urgent: Urgent   Indication: Stroke Code  Transport: Portable  Exam Completed (25 @ 02:33) [Performed]

## 2025-03-21 NOTE — ED PROVIDER NOTE - CLINICAL SUMMARY MEDICAL DECISION MAKING FREE TEXT BOX
88 yo F with Afib, PPM, recent melanoma removal right UE; found to have stroke symptoms after fall off toilet in bathroom.  Last known well 3/20/25 at 10pm.  Baseline function, speaking, walking, driving, performs all ADLs.  Code Stroke called.    CTs showing thrombosis of carotid, occlusion of left MCA.  Patient to get transferred to [facility] for thrombectomy. Accepted by Dr. Theo Dai.

## 2025-03-21 NOTE — SPEECH LANGUAGE PATHOLOGY EVALUATION - YES/NO QUESTIONS: BASIC
50% (Pt initially responded with head nod at beginng of session, however not consistent as session progressed)/no

## 2025-03-22 LAB
A1C WITH ESTIMATED AVERAGE GLUCOSE RESULT: 5.7 % — HIGH (ref 4–5.6)
ANION GAP SERPL CALC-SCNC: 10 MMOL/L — SIGNIFICANT CHANGE UP (ref 5–17)
ANISOCYTOSIS BLD QL: SLIGHT — SIGNIFICANT CHANGE UP
APTT BLD: 25.4 SEC — SIGNIFICANT CHANGE UP (ref 24.5–35.6)
BASOPHILS # BLD AUTO: 0.02 K/UL — SIGNIFICANT CHANGE UP (ref 0–0.2)
BASOPHILS # BLD MANUAL: 0 K/UL — SIGNIFICANT CHANGE UP (ref 0–0.2)
BASOPHILS NFR BLD AUTO: 0.3 % — SIGNIFICANT CHANGE UP (ref 0–2)
BASOPHILS NFR BLD MANUAL: 0 % — SIGNIFICANT CHANGE UP (ref 0–2)
BUN SERPL-MCNC: 21.1 MG/DL — HIGH (ref 8–20)
CALCIUM SERPL-MCNC: 8.2 MG/DL — LOW (ref 8.4–10.5)
CHLORIDE SERPL-SCNC: 107 MMOL/L — SIGNIFICANT CHANGE UP (ref 96–108)
CHOLEST SERPL-MCNC: 136 MG/DL — SIGNIFICANT CHANGE UP
CO2 SERPL-SCNC: 23 MMOL/L — SIGNIFICANT CHANGE UP (ref 22–29)
CREAT SERPL-MCNC: 0.55 MG/DL — SIGNIFICANT CHANGE UP (ref 0.5–1.3)
EGFR: 88 ML/MIN/1.73M2 — SIGNIFICANT CHANGE UP
EGFR: 88 ML/MIN/1.73M2 — SIGNIFICANT CHANGE UP
ELLIPTOCYTES BLD QL SMEAR: SLIGHT — SIGNIFICANT CHANGE UP
EOSINOPHIL # BLD AUTO: 0.03 K/UL — SIGNIFICANT CHANGE UP (ref 0–0.5)
EOSINOPHIL # BLD MANUAL: 0.16 K/UL — SIGNIFICANT CHANGE UP (ref 0–0.5)
EOSINOPHIL NFR BLD AUTO: 0.5 % — SIGNIFICANT CHANGE UP (ref 0–6)
EOSINOPHIL NFR BLD MANUAL: 2.6 % — SIGNIFICANT CHANGE UP (ref 0–6)
ESTIMATED AVERAGE GLUCOSE: 117 MG/DL — HIGH (ref 68–114)
GLUCOSE SERPL-MCNC: 102 MG/DL — HIGH (ref 70–99)
HCT VFR BLD CALC: 35.3 % — SIGNIFICANT CHANGE UP (ref 34.5–45)
HDLC SERPL-MCNC: 66 MG/DL — SIGNIFICANT CHANGE UP
HGB BLD-MCNC: 11.5 G/DL — SIGNIFICANT CHANGE UP (ref 11.5–15.5)
IMM GRANULOCYTES # BLD AUTO: 0.02 K/UL — SIGNIFICANT CHANGE UP (ref 0–0.07)
IMM GRANULOCYTES NFR BLD AUTO: 0.3 % — SIGNIFICANT CHANGE UP (ref 0–0.9)
INR BLD: 1.08 RATIO — SIGNIFICANT CHANGE UP (ref 0.85–1.16)
LIPID PNL WITH DIRECT LDL SERPL: 58 MG/DL — SIGNIFICANT CHANGE UP
LYMPHOCYTES # BLD AUTO: 0.37 K/UL — LOW (ref 1–3.3)
LYMPHOCYTES # BLD MANUAL: 0.27 K/UL — LOW (ref 1–3.3)
LYMPHOCYTES NFR BLD AUTO: 6 % — LOW (ref 13–44)
LYMPHOCYTES NFR BLD MANUAL: 4.3 % — LOW (ref 13–44)
MACROCYTES BLD QL: SLIGHT — SIGNIFICANT CHANGE UP
MAGNESIUM SERPL-MCNC: 2 MG/DL — SIGNIFICANT CHANGE UP (ref 1.6–2.6)
MCHC RBC-ENTMCNC: 31.1 PG — SIGNIFICANT CHANGE UP (ref 27–34)
MCHC RBC-ENTMCNC: 32.6 G/DL — SIGNIFICANT CHANGE UP (ref 32–36)
MCV RBC AUTO: 95.4 FL — SIGNIFICANT CHANGE UP (ref 80–100)
MICROCYTES BLD QL: SLIGHT — SIGNIFICANT CHANGE UP
MONOCYTES # BLD AUTO: 0.57 K/UL — SIGNIFICANT CHANGE UP (ref 0–0.9)
MONOCYTES # BLD MANUAL: 0.42 K/UL — SIGNIFICANT CHANGE UP (ref 0–0.9)
MONOCYTES NFR BLD AUTO: 9.2 % — SIGNIFICANT CHANGE UP (ref 2–14)
MONOCYTES NFR BLD MANUAL: 6.8 % — SIGNIFICANT CHANGE UP (ref 2–14)
NEUTROPHILS # BLD AUTO: 5.18 K/UL — SIGNIFICANT CHANGE UP (ref 1.8–7.4)
NEUTROPHILS # BLD MANUAL: 5.34 K/UL — SIGNIFICANT CHANGE UP (ref 1.8–7.4)
NEUTROPHILS NFR BLD AUTO: 83.7 % — HIGH (ref 43–77)
NEUTROPHILS NFR BLD MANUAL: 86.3 % — HIGH (ref 43–77)
NON HDL CHOLESTEROL: 70 MG/DL — SIGNIFICANT CHANGE UP
NRBC # BLD AUTO: 0 K/UL — SIGNIFICANT CHANGE UP (ref 0–0)
NRBC # FLD: 0 K/UL — SIGNIFICANT CHANGE UP (ref 0–0)
NRBC BLD AUTO-RTO: 0 /100 WBCS — SIGNIFICANT CHANGE UP (ref 0–0)
OVALOCYTES BLD QL SMEAR: SLIGHT — SIGNIFICANT CHANGE UP
PHOSPHATE SERPL-MCNC: 3.3 MG/DL — SIGNIFICANT CHANGE UP (ref 2.4–4.7)
PLAT MORPH BLD: NORMAL — SIGNIFICANT CHANGE UP
PLATELET # BLD AUTO: 109 K/UL — LOW (ref 150–400)
PLATELET COUNT - ESTIMATE: ABNORMAL
PMV BLD: 10.6 FL — SIGNIFICANT CHANGE UP (ref 7–13)
POIKILOCYTOSIS BLD QL AUTO: SLIGHT — SIGNIFICANT CHANGE UP
POLYCHROMASIA BLD QL SMEAR: SLIGHT — SIGNIFICANT CHANGE UP
POTASSIUM SERPL-MCNC: 4.1 MMOL/L — SIGNIFICANT CHANGE UP (ref 3.5–5.3)
POTASSIUM SERPL-SCNC: 4.1 MMOL/L — SIGNIFICANT CHANGE UP (ref 3.5–5.3)
PROTHROM AB SERPL-ACNC: 12.2 SEC — SIGNIFICANT CHANGE UP (ref 9.9–13.4)
RBC # BLD: 3.7 M/UL — LOW (ref 3.8–5.2)
RBC # FLD: 14 % — SIGNIFICANT CHANGE UP (ref 10.3–14.5)
RBC BLD AUTO: ABNORMAL
SODIUM SERPL-SCNC: 140 MMOL/L — SIGNIFICANT CHANGE UP (ref 135–145)
TRIGL SERPL-MCNC: 56 MG/DL — SIGNIFICANT CHANGE UP
TSH SERPL-MCNC: 0.22 UIU/ML — LOW (ref 0.27–4.2)
WBC # BLD: 6.19 K/UL — SIGNIFICANT CHANGE UP (ref 3.8–10.5)
WBC # FLD AUTO: 6.19 K/UL — SIGNIFICANT CHANGE UP (ref 3.8–10.5)

## 2025-03-22 PROCEDURE — 99232 SBSQ HOSP IP/OBS MODERATE 35: CPT

## 2025-03-22 PROCEDURE — 99233 SBSQ HOSP IP/OBS HIGH 50: CPT

## 2025-03-22 RX ORDER — METOPROLOL SUCCINATE 50 MG/1
50 TABLET, EXTENDED RELEASE ORAL DAILY
Refills: 0 | Status: DISCONTINUED | OUTPATIENT
Start: 2025-03-22 | End: 2025-03-25

## 2025-03-22 RX ADMIN — ATORVASTATIN CALCIUM 40 MILLIGRAM(S): 80 TABLET, FILM COATED ORAL at 21:03

## 2025-03-22 RX ADMIN — MIDODRINE HYDROCHLORIDE 5 MILLIGRAM(S): 5 TABLET ORAL at 21:04

## 2025-03-22 RX ADMIN — Medication 81 MILLIGRAM(S): at 11:11

## 2025-03-22 RX ADMIN — MIDODRINE HYDROCHLORIDE 5 MILLIGRAM(S): 5 TABLET ORAL at 14:51

## 2025-03-22 RX ADMIN — PREDNISONE 2 MILLIGRAM(S): 20 TABLET ORAL at 21:03

## 2025-03-22 RX ADMIN — ENOXAPARIN SODIUM 40 MILLIGRAM(S): 100 INJECTION SUBCUTANEOUS at 21:04

## 2025-03-22 RX ADMIN — POLYETHYLENE GLYCOL 3350 17 GRAM(S): 17 POWDER, FOR SOLUTION ORAL at 11:12

## 2025-03-22 RX ADMIN — MIDODRINE HYDROCHLORIDE 5 MILLIGRAM(S): 5 TABLET ORAL at 05:26

## 2025-03-22 RX ADMIN — METOPROLOL SUCCINATE 50 MILLIGRAM(S): 50 TABLET, EXTENDED RELEASE ORAL at 16:54

## 2025-03-22 RX ADMIN — Medication 2 TABLET(S): at 21:04

## 2025-03-22 RX ADMIN — Medication 1 APPLICATION(S): at 05:26

## 2025-03-22 NOTE — PROGRESS NOTE ADULT - ASSESSMENT
COMPLETE A/P PENDING   ASSESSMENT: 89 YOF with PMHx of Afib (on Eliquis), PPM, HTN, hx of RUE melanoma removal with R axillary lymph node removal at [facility] 4 days PTA. Presented to  ED on 3/21/25 at 0200 with AMS s/p unwitnessed fall off the toilet in the bathroom. Of note recently taken off Eliquis for melanoma removal and restarted 3/20/25, last dose 7PM. Per EMS noted with R sided weakness, aphasia and facial droop. Upon arrival to  ED patient went into [facility]. Initial NIHSS 19, stroke code activated, CT imagining found revealing L M1 occlusion. Pt transferred to [facility] EDUARDO eval, s/p cerebral angiogram for mechanical thrombectomy of L M1 occlusion, TICI 3 (one pass) on 3/21/25. Admitted to NSICU for close monitoring post thrombectomy.     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 Pending MRI to assess for infarct burden. Etiology likely cardioembolic in the setting of afib and off a/c for surgical procedure.     NEURO:   -Neurologically ---   -Continue close monitoring for neurologic deterioration    - Stroke neuro checks q2hrs   -SBP goal 120-180mmHg avoiding rapid fluctuations or hypotension    -ANTITHROMBOTIC THERAPY: 81 mg ASA Daily for now. Hold AC until MRI to determine stroke burden   -LDL=58, below goal of 70. Resume home statin dosing as applicable.  -MRI Brain w/o pending   -Dysphagia screen: pass  -Physical therapy/OT/Speech eval/treatment.     CARDIOVASCULAR:   -TTE results as above  -cardiac monitoring w/ telemetry                      HEMATOLOGY:  - H/H 11.5/35.3, Platelets 109. Trend thrombocytopenia   -LE duplex negative for DVT   -patient should have all age and risk appropriate malignancy screenings with PCP or sooner if clinically suspected   -DVT ppx: Heparin s.c [] LMWH []     PULMONARY:   - protecting airway, saturating well on 2L nc O2     RENAL:   -BUN/Cr 21.1/0.55, appears baseline. Continue to trend   -monitor urine output, maintain adequate hydration    -Na Goal:  135-145    ID:   -afebrile, no leukocytosis  -monitor for si/sx of infection     OTHER:    -condition and plan of care d/w patient, questions and concerns addressed.     DISPOSITION:   -Rehab or home depending on PT eval once stable and workup is complete    CORE MEASURES:       Admission NIHSS:     Tenecteplase : [] YES [x] NO     LDL/A1C: 58/5.7      Depression Screen- if depression hx and/or present     Statin Therapy: Atorvastatin 40mg daily      Dysphagia Screen: [x] PASS [] FAIL     Smoking in the past 12 months [] YES [x] NO     Afib [x] YES [] NO     Stroke Education [] YES [] NO [x] pending      Diabetes [] YES [x] NO     Obtain screening lower extremity venous ultrasound in patients who meet 1 or more of the following criteria as patient is high risk for DVT/PE on admission:   [] History of DVT/PE  []Hypercoagulable states (Factor V Leiden, Cancer, OCP, etc. )  []Prolonged immobility (hemiplegia/hemiparesis/post operative or any other extended immobilization)  [] Transferred from outside facility (Rehab or Long term care)  [] Age </= to 50  [x]Stroke ASSESSMENT: 89 YOF with PMHx of Afib (on Eliquis), PPM, HTN, hx of RUE melanoma removal with R axillary lymph node removal at [facility] 4 days PTA. Presented to  ED on 3/21/25 at 0200 with AMS s/p unwitnessed fall off the toilet in the bathroom. Of note recently taken off Eliquis for melanoma removal and restarted 3/20/25, last dose 7PM. Per EMS noted with R sided weakness, aphasia and facial droop. Upon arrival to  ED patient went into [facility]. Initial NIHSS 19, stroke code activated, CT imagining found revealing L M1 occlusion. Pt transferred to [facility] EDUARDO eval, s/p cerebral angiogram for mechanical thrombectomy of L M1 occlusion, TICI 3 (one pass) on 3/21/25. Admitted to NSICU for close monitoring post thrombectomy. Accepted for transfer stroke service on 3/22/25.     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 Pending MRI to assess for infarct burden. Etiology likely cardioembolic in the setting of afib and off a/c for surgical procedure.     NEURO:   -Neurologically w/ slight expressive aphasia/dysarthria.   -Continue close monitoring for neurologic deterioration    - Stroke neuro checks q2hrs   -SBP goal 120-180mmHg avoiding rapid fluctuations or hypotension    -ANTITHROMBOTIC THERAPY: 81 mg ASA Daily for now. Hold AC until MRI to determine stroke burden   -LDL=58, below goal of 70. Resume home statin dosing as applicable.  -MRI Brain w/o pending   -Dysphagia screen: pass  -Physical therapy/OT/Speech eval/treatment.     CARDIOVASCULAR:   -Cardiology consulted for atrial fibrillation w/ RVR; started on metoprolol 50mg BID, per consult note can titrate up to home dose of 100mg BID if needed for rate control.   -PPM interrogated w/ findings of brief episodes of pAF with RVR to 170s, all episodes occured on 3/7/25. Device MRI contingent.   -TTE results as above  -cardiac monitoring w/ telemetry                      HEMATOLOGY:  - H/H 11.5/35.3, Platelets 109. Trend thrombocytopenia   -LE duplex negative for DVT   -patient should have all age and risk appropriate malignancy screenings with PCP or sooner if clinically suspected   -DVT ppx: Heparin s.c [] LMWH [x]     PULMONARY:   - protecting airway, saturating well on room air     RENAL:   -BUN/Cr 21.1/0.55, appears baseline. Continue to trend   -monitor urine output, maintain adequate hydration    -Na Goal:  135-145    ID:   -afebrile, no leukocytosis  -monitor for si/sx of infection     OTHER:    -condition and plan of care d/w patient, questions and concerns addressed.     DISPOSITION:   -Rehab or home depending on PT eval once stable and workup is complete    CORE MEASURES:       Admission NIHSS:     Tenecteplase : [] YES [x] NO     LDL/A1C: 58/5.7      Depression Screen- if depression hx and/or present     Statin Therapy: Atorvastatin 40mg daily      Dysphagia Screen: [x] PASS [] FAIL     Smoking in the past 12 months [] YES [x] NO     Afib [x] YES [] NO     Stroke Education [] YES [] NO [x] pending      Diabetes [] YES [x] NO     Obtain screening lower extremity venous ultrasound in patients who meet 1 or more of the following criteria as patient is high risk for DVT/PE on admission:   [] History of DVT/PE  []Hypercoagulable states (Factor V Leiden, Cancer, OCP, etc. )  []Prolonged immobility (hemiplegia/hemiparesis/post operative or any other extended immobilization)  [] Transferred from outside facility (Rehab or Long term care)  [] Age </= to 50  [x]Stroke

## 2025-03-22 NOTE — CHART NOTE - NSCHARTNOTEFT_GEN_A_CORE
Downgrade Note  Accepting Attending: Dr. Najera  Service: Stroke  Level of care: SDU    HPI:  88 y/o female with PMHx of Afib (on Eliquis), PPM, HTN, HLD, GERD, severe MR/TR, iatrogenic adrenal insufficiency (on prednisone), lymphedema, metastatic melanoma s/p L foot melanoma and lymph node resection, s/p RUE melanoma and R axillary lymph node excision at [facility] (3/18/25) presented to  ED 3/21 with AMS s/p unwitnessed fall off the toilet in the bathroom. As per son Philip, patient was LKW 10PM prior to going to the bathroom. EMS noted patient with R sided weakness, aphasia and facial droop. Upon arrival to  ED patient went into [facility], given Amiodarone 150mg. 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 Neuro IR consulted and patient transferred to [facility] for mechanical thrombectomy. S/p cerebral angiogram for mechanical thrombectomy of L M1 occlusion, TICI 3 (one pass). Patient recently taken off Eliquis (2 days prior to procedure) for melanoma removal and restarted yesterday, last dose 7PM. Patient's farxiga was held 4 days prior to procedure and restarted yesterday morning.    HOSPITAL COURSE:  Transferred from  to [facility] for thrombectomy. Admitted to NSICU. 4hr post-procedure CTH stable and without ICH. LED negative. TTE EF 65-70%. MR brain pending. Off levo, on midodrine and metoprolol held d/t hypoTN.    PROCEDURE: Mechanical thrombectomy TICI 3  POD#1    Vital Signs Last 24 Hrs  T(C): 36.8 (03-22-25 @ 04:12), Max: 37.5 (03-21-25 @ 09:55)  T(F): 98.2 (03-22-25 @ 04:12), Max: 99.5 (03-21-25 @ 09:55)  HR: 98 (03-22-25 @ 08:55) (87 - 121)  BP: 94/77 (03-22-25 @ 08:55) (74/59 - 136/80)  BP(mean): 85 (03-22-25 @ 08:55) (58 - 96)  RR: 19 (03-22-25 @ 08:55) (14 - 26)  SpO2: 94% (03-22-25 @ 08:55) (91% - 100%)    PHYSICAL EXAM:  General: NAD  Head: atraumatic, normocephalic  Neurological: AOx3, open eyes spontaneously, appropriately conversant without aphasia or dysarthria, follows commands, PERRL, EOMI, VFF  Cerebellar: no dysmetria on FTN  Motor: b/l UE 5/5, RLE antigravity; LLE 2/5 HF, 5/5 KF/KE/PF/DF. No drift  Sensation: intact to light touch  Respiratory: normal chest rise, nonlabored breathing  Cardiovascular: afib rhythm with rate   Gastrointestinal:  Soft, nondistended  Extremities: peripheral pulses intact  Skin: Warm, dry, no erythema,  R groin dressing c/d/i    LABS:                        11.5   6.19  )-----------( 109      ( 22 Mar 2025 03:15 )             35.3    03-22    140  |  107  |  21.1[H]  ----------------------------<  102[H]  4.1   |  23.0  |  0.55    Ca    8.2[L]      22 Mar 2025 03:15  Phos  3.3     03-22  Mg     2.0     03-22    TPro  5.6[L]  /  Alb  3.4  /  TBili  0.7  /  DBili  x   /  AST  16  /  ALT  17  /  AlkPhos  44  03-21  PT/INR - ( 22 Mar 2025 03:15 )   PT: 12.2 sec;   INR: 1.08 ratio         PTT - ( 22 Mar 2025 03:15 )  PTT:25.4 sec    IMAGING:     MEDICATIONS:  Antibiotics:    Neuro:    Cardiac:  hydrALAZINE Injectable 10 milliGRAM(s) IV Push every 2 hours PRN SBP>140  labetalol Injectable 10 milliGRAM(s) IV Push every 2 hours PRN SBP>140  midodrine 5 milliGRAM(s) Oral every 8 hours  phenylephrine    Infusion 0.1 MICROgram(s)/kG/Min IV Continuous <Continuous>    Pulm:    GI/:  polyethylene glycol 3350 17 Gram(s) Oral daily  senna 2 Tablet(s) Oral at bedtime    Other:   aspirin  chewable 81 milliGRAM(s) Oral daily  atorvastatin 40 milliGRAM(s) Oral at bedtime  chlorhexidine 2% Cloths 1 Application(s) Topical <User Schedule>  enoxaparin Injectable 40 milliGRAM(s) SubCutaneous <User Schedule>  predniSONE   Tablet 2 milliGRAM(s) Oral at bedtime  predniSONE   Tablet 5 milliGRAM(s) Oral daily    RADIOLOGY;    ACC: [id number] EXAM:  CT BRAIN   ORDERED BY: XIANG CAMPUZANO   PROCEDURE DATE:  03/21/2025    INTERPRETATION:  CLINICAL INDICATIONS:  s/p thrombectomy for L M1   occlusion  COMPARISON: None.  TECHNIQUE: Noncontrast CT of the head. Multiplanar reformations are   submitted.  FINDINGS:  There is periventricular and subcortical white matter hypodensity without   mass effect, nonspecific, likely representing mild chronic microvascular   ischemic changes. There is no compelling evidence for an acute   transcortical infarction. There is no evidence of mass, mass effect,   midline shift or extra-axial fluid collection. The lateral ventricles and   cortical sulci are age-appropriate in size and configuration. The patient   is status post bilateral ocular lens replacement surgery. The orbits,   mastoid air cells and visualized paranasal sinuses are otherwise   unremarkable. The calvarium is intact. Consider MRI as clinically   warranted.    IMPRESSION:  Mild chronic microvascular changes without evidence of an   acute transcortical infarction or hemorrhage.    --- End of Report ---    BETO ORELLANA MD; Attending Radiologist  This document has been electronically signed. Mar 21 2025 10:31AM    ----------------------------------------------------------------------------    ASSESSMENT  89F PMHx of Afib (on Eliquis), PPM, HTN, HLD, GERD, severe MR/TR, iatrogenic adrenal insufficiency (on prednisone), lymphedema, metastatic melanoma s/p L foot melanoma and lymph node resection, s/p RUE melanoma and R axillary lymph node excision at [facility] (3/18/25) presented to  ED 3/21 with AMS s/p unwitnessed fall off the toilet in the bathroom. EMS noted patient with R sided weakness, aphasia and facial droop. 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 Neuro IR consulted and patient transferred to [facility] for mechanical thrombectomy. S/p cerebral angiogram for mechanical thrombectomy of L M1 occlusion, TICI 3 (one pass) POD#1   ADMISSION: NIHSS 20, MRS 0     PLAN  Neuro:  - Neuro checks Q2hrs  - HOB 30 degrees, Neck midline position  - Maintain normothermia, PO acetaminophen for temp>38 C or pain  - STAT CTH for acute change in mental status/neurological decline   - 4hr post procedure CTH stable  - Pending MRI brain   - Stroke core measures   	  CV:	  - SBP Goal   - Off levo  - Midodrine 5q8, wean as tolerated  - Lipitor 40  - PRN: hydralazine, labetalol  - TTE 65-70%  - Cards and EP consulted for afib/stroke w/u and PPM clearance for MRI     Pulm:  - Supplemental O2 PRN to maintain Spo2>92%    GI:  - DASH diet  - Bowel regimen: senna, miralax  	  :  - Voiding  - Monitor Electrolytes & Renal Function    Heme:  - Monitor H&H  - DVT ppx: SCDs, SQL 40  - LED negative  - ASA 81  - Resume eliquis after MRI  	  ID:  - Monitor WBC and Temperature    Endo  - Monitor BGL, maintain <180  - A1C: 5.7; TSH: 0.22  - Continue home prednisone 5mg qd and 2mg qhs    DISPO:   - Stable for downgrade from Neuro ICU to stroke SDU  - PT/OT recommending AIR; PM&R consulted

## 2025-03-22 NOTE — PROGRESS NOTE ADULT - SUBJECTIVE AND OBJECTIVE BOX
Preliminary note, offical recommendations pending attending review/signature   [facility] Stroke Team  Progress Note     HPI:  89 y/o female with PMHx of Afib (on Eliquis), PPM, HTN, HLD, GERD, severe MR/TR, iatrogenic adrenal insufficiency (on prednisone), lymphedema, metastatic melanoma s/p L foot melanoma and lymph node resection, s/p RUE melanoma and R axillary lymph node excision at [facility] (3/18/25) presented to  ED 3/21/25 at 0200 with AMS s/p unwitnessed fall off the toilet in the bathroom. As per son Philip, patient last known well was 10PM on 3/20/25 prior to going to the bathroom. EMS noted patient with R sided weakness, aphasia and facial droop. Upon arrival to  ED patient went into [facility], given Amiodarone 150mg. Initial NIHSS 19, stroke code activated, imaging revealed L M1 occlusion. Neuro IR consulted and patient transferred to [facility] for neuro IR evaluation. Now s/p mechanical thrombectomy with TICI 3 reperfusion. Admitted to neuro ICU for post procedure monitoring, stroke team asked to consult.     SUBJECTIVE: No events overnight.  No new neurologic complaints.  ROS reported negative unless otherwise noted.    aspirin  chewable 81 milliGRAM(s) Oral daily  atorvastatin 40 milliGRAM(s) Oral at bedtime  chlorhexidine 2% Cloths 1 Application(s) Topical <User Schedule>  enoxaparin Injectable 40 milliGRAM(s) SubCutaneous <User Schedule>  hydrALAZINE Injectable 10 milliGRAM(s) IV Push every 2 hours PRN  labetalol Injectable 10 milliGRAM(s) IV Push every 2 hours PRN  midodrine 5 milliGRAM(s) Oral every 8 hours  phenylephrine    Infusion 0.1 MICROgram(s)/kG/Min IV Continuous <Continuous>  polyethylene glycol 3350 17 Gram(s) Oral daily  predniSONE   Tablet 2 milliGRAM(s) Oral at bedtime  predniSONE   Tablet 5 milliGRAM(s) Oral daily  senna 2 Tablet(s) Oral at bedtime      PHYSICAL EXAM:   Vital Signs Last 24 Hrs  T(C): 36.8 (22 Mar 2025 04:12), Max: 37.5 (21 Mar 2025 08:55)  T(F): 98.2 (22 Mar 2025 04:12), Max: 99.5 (21 Mar 2025 08:55)  HR: 88 (22 Mar 2025 07:00) (87 - 121)  BP: 120/55 (22 Mar 2025 07:00) (74/59 - 136/80)  BP(mean): 69 (22 Mar 2025 07:00) (58 - 96)  RR: 21 (22 Mar 2025 07:00) (14 - 26)  SpO2: 98% (22 Mar 2025 07:00) (91% - 100%)    Parameters below as of 22 Mar 2025 04:00  Patient On (Oxygen Delivery Method): nasal cannula  O2 Flow (L/min): 2      COMPLETE EXAM PENDING     General: No acute distress.   HEENT: Head normocephalic, atraumatic. Conjunctivae clear w/o exudates or hemorrhage. Sclera non-icteric  Neck:  Trachea midline. No JVD.  Cardiac: Irregularly irregular rhythm.   Respiratory:  Chest wall symmetric, nontender.   Abdominal: Soft, nondistended, nontender.   Skin: Skin is warm, dry and intact without rashes or lesions.   Extremities: No edema, SCDs in place bilateral lower extremities.     Detailed Neurologic Exam:    Mental status: The patient sleeping arouses to tactile stimuli. Unable to recall current events. Unable to name objects, follow commands, or repeat sentences.    Cranial nerves: Pupils equal and react symmetrically to light.  Extraocular motion is full with no nystagmus. Left gaze preference, decreased right eye blink to threat There is no ptosis. Facial sensation is intact. Facial musculature is symmetric. Palate elevates symmetrically. Tongue is midline.    Motor: There is normal bulk and tone.  There is no tremor.  Right arm drift, right leg drift Strength is 3/5 in the right arm and right leg antigravity     Strength is 4/5 in the left arm and no effort on left leg.    Sensation: wd to noxious in all, except llE?    Cerebellar: unable to assess    Gait : deferred    LABS:                        11.5   6.19  )-----------( 109      ( 22 Mar 2025 03:15 )             35.3    03-22    140  |  107  |  21.1[H]  ----------------------------<  102[H]  4.1   |  23.0  |  0.55    Ca    8.2[L]      22 Mar 2025 03:15  Phos  3.3     03-22  Mg     2.0     03-22    TPro  5.6[L]  /  Alb  3.4  /  TBili  0.7  /  DBili  x   /  AST  16  /  ALT  17  /  AlkPhos  44  03-21  PT/INR - ( 22 Mar 2025 03:15 )   PT: 12.2 sec;   INR: 1.08 ratio         PTT - ( 22 Mar 2025 03:15 )  PTT:25.4 sec    LDL: 58  A1C: 5.7    IMAGING: Reviewed by me.   CT Head No Cont (03.21.25 @ 10:14)   IMPRESSION:  Mild chronic microvascular changes without evidence of an   acute transcortical infarction or hemorrhage.    CT Brain Stroke Protocol (03.21.25 @ 02:39)   IMPRESSION:  No acute intracranial hemorrhage.  Density in the region of the left carotid terminus concerning for   thrombus, with question loss of gray-white differentiation in the left   insula which may reflect acute infarct.    CT Angio Brain and Neck, CT Perfusion Stroke Protocol  w/ IV Cont (03.21.25 @ 02:47)   IMPRESSION:  CT PERFUSION:  Technical limitations: Motion artifact, suboptimal contrast bolus.    Core infarction: 5 ml  Penumbra / tissue at risk for active ischemia: 0 ml    CTA NECK:  No evidence of significant stenosis or occlusion.    CTA HEAD:  Nonopacification of the left carotid terminus and proximal left A1 and M1   segments. Left anterior cerebral artery likely opacified via a patent   anterior communicating artery. Some distal reconstitution of the distal   left MCA distribution, although is markedly decreased blood vessel   density when compared with the right side.  --  Ultrasound:  US Duplex Venous Lower Ext Complete, Bilateral (03.21.25 @ 11:44)  IMPRESSION:  Right common femoral vein could not be seen secondary to an overlying   bandage, otherwise no evidence of deep venous thrombosis in the   visualized bilateral lower extremity veins.  --  Cardiac Studies:  TTE W or WO Ultrasound Enhancing Agent (03.21.25 @ 11:05)   CONCLUSIONS:      1. Patient tachycardic during entire study.   2. Left atrium is severely dilated.   3. Left ventricular systolic function is normal with an ejection fraction visually estimated at 65 to 70 %.   4. There is moderate (grade 2) left ventricular diastolic dysfunction, with elevated left ventricular filling pressure.   5. The right atrium is severely dilated.   6. Normal right ventricular cavity size and normal right ventricular systolic function.   7. Moderate mitral regurgitation.   8. Moderate tricuspid regurgitation.   9. Estimated pulmonary artery systolic pressure is 55 mmHg, consistent with moderate pulmonary hypertension.  10. No pericardial effusion seen.     Preliminary note, offical recommendations pending attending review/signature   [facility] Stroke Team  Progress Note     HPI:  88 y/o female with PMHx of Afib (on Eliquis), PPM, HTN, HLD, GERD, severe MR/TR, iatrogenic adrenal insufficiency (on prednisone), lymphedema, metastatic melanoma s/p L foot melanoma and lymph node resection, s/p RUE melanoma and R axillary lymph node excision at [facility] (3/18/25) presented to  ED 3/21/25 at 0200 with AMS s/p unwitnessed fall off the toilet in the bathroom. As per son Philip, patient last known well was 10PM on 3/20/25 prior to going to the bathroom. EMS noted patient with R sided weakness, aphasia and facial droop. Upon arrival to  ED patient went into [facility], given Amiodarone 150mg. Initial NIHSS 19, stroke code activated, imaging revealed L M1 occlusion. Neuro IR consulted and patient transferred to [facility] for neuro IR evaluation. Now s/p mechanical thrombectomy with TICI 3 reperfusion. Admitted to neuro ICU for post procedure monitoring, stroke team asked to consult.     SUBJECTIVE: No events overnight.  No new neurologic complaints.  ROS reported negative unless otherwise noted.    aspirin  chewable 81 milliGRAM(s) Oral daily  atorvastatin 40 milliGRAM(s) Oral at bedtime  chlorhexidine 2% Cloths 1 Application(s) Topical <User Schedule>  enoxaparin Injectable 40 milliGRAM(s) SubCutaneous <User Schedule>  hydrALAZINE Injectable 10 milliGRAM(s) IV Push every 2 hours PRN  labetalol Injectable 10 milliGRAM(s) IV Push every 2 hours PRN  midodrine 5 milliGRAM(s) Oral every 8 hours  phenylephrine    Infusion 0.1 MICROgram(s)/kG/Min IV Continuous <Continuous>  polyethylene glycol 3350 17 Gram(s) Oral daily  predniSONE   Tablet 2 milliGRAM(s) Oral at bedtime  predniSONE   Tablet 5 milliGRAM(s) Oral daily  senna 2 Tablet(s) Oral at bedtime      PHYSICAL EXAM:   Vital Signs Last 24 Hrs  T(C): 36.8 (22 Mar 2025 04:12), Max: 37.5 (21 Mar 2025 08:55)  T(F): 98.2 (22 Mar 2025 04:12), Max: 99.5 (21 Mar 2025 08:55)  HR: 88 (22 Mar 2025 07:00) (87 - 121)  BP: 120/55 (22 Mar 2025 07:00) (74/59 - 136/80)  BP(mean): 69 (22 Mar 2025 07:00) (58 - 96)  RR: 21 (22 Mar 2025 07:00) (14 - 26)  SpO2: 98% (22 Mar 2025 07:00) (91% - 100%)    Parameters below as of 22 Mar 2025 04:00  Patient On (Oxygen Delivery Method): nasal cannula  O2 Flow (L/min): 2      General: No acute distress.   HEENT: Head normocephalic, atraumatic. Conjunctivae clear w/o exudates or hemorrhage. Sclera non-icteric  Neck:  Trachea midline. No JVD.  Cardiac: Irregularly irregular rhythm.   Respiratory:  Chest wall symmetric, nontender.   Abdominal: Soft, nondistended, nontender.   Skin: Skin is warm, dry and intact without rashes or lesions.   Extremities: No edema, SCDs in place bilateral lower extremities.     Detailed Neurologic Exam:    Mental status: Awake, alert, oriented x3; can recall events surrounding current hospitalization. some mild expressive aphasia w/ word finding hesitancy. Able to name objects, repeat sentences without difficulty.     Cranial nerves: Pupils equal and react symmetrically to light.  Extraocular motion is full with no nystagmus. Extraocular movements intact. No visual field deficit to confrontation. There is no ptosis. Slight left facial asymmetry. Palate elevates symmetrically. Tongue is midline. mild dysarthria.     Motor: There is normal bulk and tone.  There is no tremor.  Strength is 5/5 in bilateral upper extremities  Strenght is 3/5 in left lower extremity (pain limited), 5/5 in right lower extremity     Sensation: Sensation intact to light touch in all four extremities     Cerebellar: unable to assess    Gait : deferred    LABS:                        11.5   6.19  )-----------( 109      ( 22 Mar 2025 03:15 )             35.3    03-22    140  |  107  |  21.1[H]  ----------------------------<  102[H]  4.1   |  23.0  |  0.55    Ca    8.2[L]      22 Mar 2025 03:15  Phos  3.3     03-22  Mg     2.0     03-22    TPro  5.6[L]  /  Alb  3.4  /  TBili  0.7  /  DBili  x   /  AST  16  /  ALT  17  /  AlkPhos  44  03-21  PT/INR - ( 22 Mar 2025 03:15 )   PT: 12.2 sec;   INR: 1.08 ratio         PTT - ( 22 Mar 2025 03:15 )  PTT:25.4 sec    LDL: 58  A1C: 5.7    IMAGING: Reviewed by me.   CT Head No Cont (03.21.25 @ 10:14)   IMPRESSION:  Mild chronic microvascular changes without evidence of an   acute transcortical infarction or hemorrhage.    CT Brain Stroke Protocol (03.21.25 @ 02:39)   IMPRESSION:  No acute intracranial hemorrhage.  Density in the region of the left carotid terminus concerning for   thrombus, with question loss of gray-white differentiation in the left   insula which may reflect acute infarct.    CT Angio Brain and Neck, CT Perfusion Stroke Protocol  w/ IV Cont (03.21.25 @ 02:47)   IMPRESSION:  CT PERFUSION:  Technical limitations: Motion artifact, suboptimal contrast bolus.    Core infarction: 5 ml  Penumbra / tissue at risk for active ischemia: 0 ml    CTA NECK:  No evidence of significant stenosis or occlusion.    CTA HEAD:  Nonopacification of the left carotid terminus and proximal left A1 and M1   segments. Left anterior cerebral artery likely opacified via a patent   anterior communicating artery. Some distal reconstitution of the distal   left MCA distribution, although is markedly decreased blood vessel   density when compared with the right side.  --  Ultrasound:  US Duplex Venous Lower Ext Complete, Bilateral (03.21.25 @ 11:44)  IMPRESSION:  Right common femoral vein could not be seen secondary to an overlying   bandage, otherwise no evidence of deep venous thrombosis in the   visualized bilateral lower extremity veins.  --  Cardiac Studies:  TTE W or WO Ultrasound Enhancing Agent (03.21.25 @ 11:05)   CONCLUSIONS:      1. Patient tachycardic during entire study.   2. Left atrium is severely dilated.   3. Left ventricular systolic function is normal with an ejection fraction visually estimated at 65 to 70 %.   4. There is moderate (grade 2) left ventricular diastolic dysfunction, with elevated left ventricular filling pressure.   5. The right atrium is severely dilated.   6. Normal right ventricular cavity size and normal right ventricular systolic function.   7. Moderate mitral regurgitation.   8. Moderate tricuspid regurgitation.   9. Estimated pulmonary artery systolic pressure is 55 mmHg, consistent with moderate pulmonary hypertension.  10. No pericardial effusion seen.

## 2025-03-23 LAB
ANION GAP SERPL CALC-SCNC: 10 MMOL/L — SIGNIFICANT CHANGE UP (ref 5–17)
BUN SERPL-MCNC: 19.9 MG/DL — SIGNIFICANT CHANGE UP (ref 8–20)
CALCIUM SERPL-MCNC: 8.2 MG/DL — LOW (ref 8.4–10.5)
CHLORIDE SERPL-SCNC: 103 MMOL/L — SIGNIFICANT CHANGE UP (ref 96–108)
CO2 SERPL-SCNC: 25 MMOL/L — SIGNIFICANT CHANGE UP (ref 22–29)
CREAT SERPL-MCNC: 0.54 MG/DL — SIGNIFICANT CHANGE UP (ref 0.5–1.3)
EGFR: 88 ML/MIN/1.73M2 — SIGNIFICANT CHANGE UP
EGFR: 88 ML/MIN/1.73M2 — SIGNIFICANT CHANGE UP
GLUCOSE SERPL-MCNC: 94 MG/DL — SIGNIFICANT CHANGE UP (ref 70–99)
HCT VFR BLD CALC: 35 % — SIGNIFICANT CHANGE UP (ref 34.5–45)
HGB BLD-MCNC: 11.6 G/DL — SIGNIFICANT CHANGE UP (ref 11.5–15.5)
MAGNESIUM SERPL-MCNC: 1.9 MG/DL — SIGNIFICANT CHANGE UP (ref 1.6–2.6)
MCHC RBC-ENTMCNC: 31.8 PG — SIGNIFICANT CHANGE UP (ref 27–34)
MCHC RBC-ENTMCNC: 33.1 G/DL — SIGNIFICANT CHANGE UP (ref 32–36)
MCV RBC AUTO: 95.9 FL — SIGNIFICANT CHANGE UP (ref 80–100)
NRBC # BLD AUTO: 0 K/UL — SIGNIFICANT CHANGE UP (ref 0–0)
NRBC # FLD: 0 K/UL — SIGNIFICANT CHANGE UP (ref 0–0)
NRBC BLD AUTO-RTO: 0 /100 WBCS — SIGNIFICANT CHANGE UP (ref 0–0)
PHOSPHATE SERPL-MCNC: 2.6 MG/DL — SIGNIFICANT CHANGE UP (ref 2.4–4.7)
PLATELET # BLD AUTO: 120 K/UL — LOW (ref 150–400)
PMV BLD: 10.4 FL — SIGNIFICANT CHANGE UP (ref 7–13)
POTASSIUM SERPL-MCNC: 4.4 MMOL/L — SIGNIFICANT CHANGE UP (ref 3.5–5.3)
POTASSIUM SERPL-SCNC: 4.4 MMOL/L — SIGNIFICANT CHANGE UP (ref 3.5–5.3)
RBC # BLD: 3.65 M/UL — LOW (ref 3.8–5.2)
RBC # FLD: 13.8 % — SIGNIFICANT CHANGE UP (ref 10.3–14.5)
SODIUM SERPL-SCNC: 138 MMOL/L — SIGNIFICANT CHANGE UP (ref 135–145)
WBC # BLD: 6.47 K/UL — SIGNIFICANT CHANGE UP (ref 3.8–10.5)
WBC # FLD AUTO: 6.47 K/UL — SIGNIFICANT CHANGE UP (ref 3.8–10.5)

## 2025-03-23 PROCEDURE — 99233 SBSQ HOSP IP/OBS HIGH 50: CPT

## 2025-03-23 RX ADMIN — POLYETHYLENE GLYCOL 3350 17 GRAM(S): 17 POWDER, FOR SOLUTION ORAL at 11:36

## 2025-03-23 RX ADMIN — Medication 81 MILLIGRAM(S): at 11:36

## 2025-03-23 RX ADMIN — PREDNISONE 2 MILLIGRAM(S): 20 TABLET ORAL at 21:03

## 2025-03-23 RX ADMIN — ATORVASTATIN CALCIUM 40 MILLIGRAM(S): 80 TABLET, FILM COATED ORAL at 21:03

## 2025-03-23 RX ADMIN — Medication 2 TABLET(S): at 21:03

## 2025-03-23 RX ADMIN — ENOXAPARIN SODIUM 40 MILLIGRAM(S): 100 INJECTION SUBCUTANEOUS at 21:02

## 2025-03-23 RX ADMIN — METOPROLOL SUCCINATE 50 MILLIGRAM(S): 50 TABLET, EXTENDED RELEASE ORAL at 05:25

## 2025-03-23 RX ADMIN — MIDODRINE HYDROCHLORIDE 5 MILLIGRAM(S): 5 TABLET ORAL at 05:26

## 2025-03-23 RX ADMIN — PREDNISONE 5 MILLIGRAM(S): 20 TABLET ORAL at 05:25

## 2025-03-23 NOTE — PROGRESS NOTE ADULT - SUBJECTIVE AND OBJECTIVE BOX
INCOMPLETE NOTE *************************************************  Preliminary note, official recommendations pending attending review/signature   Seen and examined by Stroke team attending/team, assessment/ plan as discussed with stroke team attending/team as noted.     [facility] Stroke Team  Progress Note     HPI:  88 y/o female with PMHx of Afib (on Eliquis), PPM, HTN, HLD, GERD, severe MR/TR, iatrogenic adrenal insufficiency (on prednisone), lymphedema, metastatic melanoma s/p L foot melanoma and lymph node resection, s/p RUE melanoma and R axillary lymph node excision at [facility] (3/18/25) presented to  ED 3/21/25 at 0200 with AMS s/p unwitnessed fall off the toilet in the bathroom. As per son Philip, patient last known well was 10PM on 3/20/25 prior to going to the bathroom. EMS noted patient with R sided weakness, aphasia and facial droop. Upon arrival to  ED patient went into [facility], given Amiodarone 150mg. Initial NIHSS 19, stroke code activated, imaging revealed L M1 occlusion. Neuro IR consulted and patient transferred to [facility] for neuro IR evaluation. Now s/p mechanical thrombectomy with TICI 3 reperfusion. Admitted to neuro ICU for post procedure monitoring, stroke team asked to consult.    SUBJECTIVE: No events overnight.  No new neurologic complaints.  ROS reported negative unless otherwise noted.    aspirin  chewable 81 milliGRAM(s) Oral daily  atorvastatin 40 milliGRAM(s) Oral at bedtime  enoxaparin Injectable 40 milliGRAM(s) SubCutaneous <User Schedule>  hydrALAZINE Injectable 10 milliGRAM(s) IV Push every 2 hours PRN  labetalol Injectable 10 milliGRAM(s) IV Push every 2 hours PRN  metoprolol succinate ER 50 milliGRAM(s) Oral daily  midodrine 5 milliGRAM(s) Oral every 8 hours  polyethylene glycol 3350 17 Gram(s) Oral daily  predniSONE   Tablet 2 milliGRAM(s) Oral at bedtime  predniSONE   Tablet 5 milliGRAM(s) Oral daily  senna 2 Tablet(s) Oral at bedtime      PHYSICAL EXAM:   Vital Signs Last 24 Hrs  T(C): 36.8 (23 Mar 2025 07:52), Max: 36.9 (22 Mar 2025 09:00)  T(F): 98.2 (23 Mar 2025 07:52), Max: 98.5 (22 Mar 2025 21:11)  HR: 108 (23 Mar 2025 06:00) (88 - 120)  BP: 124/81 (23 Mar 2025 06:00) (94/77 - 135/69)  BP(mean): 91 (23 Mar 2025 06:00) (70 - 99)  RR: 21 (23 Mar 2025 06:00) (14 - 22)  SpO2: 94% (23 Mar 2025 06:00) (92% - 99%)    Parameters below as of 23 Mar 2025 08:00  Patient On (Oxygen Delivery Method): room air      EXAM PENDING **************************************************    General: No acute distress.   HEENT: Head normocephalic, atraumatic. Conjunctivae clear w/o exudates or hemorrhage. Sclera non-icteric  Neck:  Trachea midline. No JVD.  Cardiac: Irregularly irregular rhythm.   Respiratory:  Chest wall symmetric, nontender.   Abdominal: Soft, nondistended, nontender.   Skin: Skin is warm, dry and intact without rashes or lesions.   Extremities: No edema, SCDs in place bilateral lower extremities.     Detailed Neurologic Exam:    Mental status: Awake, alert, oriented x3; can recall events surrounding current hospitalization. some mild expressive aphasia w/ word finding hesitancy. Able to name objects, repeat sentences without difficulty.     Cranial nerves: Pupils equal and react symmetrically to light.  Extraocular motion is full with no nystagmus. Extraocular movements intact. No visual field deficit to confrontation. There is no ptosis. Slight left facial asymmetry. Palate elevates symmetrically. Tongue is midline. mild dysarthria.     Motor: There is normal bulk and tone.  There is no tremor.  Strength is 5/5 in bilateral upper extremities  Strenght is 3/5 in left lower extremity (pain limited), 5/5 in right lower extremity     Sensation: Sensation intact to light touch in all four extremities     Cerebellar: unable to assess    Gait : deferred        LABS:                        11.6   6.47  )-----------( 120      ( 23 Mar 2025 04:54 )             35.0    03-23    138  |  103  |  19.9  ----------------------------<  94  4.4   |  25.0  |  0.54    Ca    8.2[L]      23 Mar 2025 04:54  Phos  2.6     03-23  Mg     1.9     03-23    PT/INR - ( 22 Mar 2025 03:15 )   PT: 12.2 sec;   INR: 1.08 ratio         PTT - ( 22 Mar 2025 03:15 )  PTT:25.4 sec      03-22 Chol 136 LDL -- HDL 66 Trig 56    A1C: 5.7    IMAGING: Reviewed by me.       CT Head No Cont (03.21.25 @ 10:14)   IMPRESSION:  Mild chronic microvascular changes without evidence of an   acute transcortical infarction or hemorrhage.    CT Brain Stroke Protocol (03.21.25 @ 02:39)   IMPRESSION:  No acute intracranial hemorrhage.  Density in the region of the left carotid terminus concerning for   thrombus, with question loss of gray-white differentiation in the left   insula which may reflect acute infarct.    CT Angio Brain and Neck, CT Perfusion Stroke Protocol  w/ IV Cont (03.21.25 @ 02:47)   IMPRESSION:  CT PERFUSION:  Technical limitations: Motion artifact, suboptimal contrast bolus.    Core infarction: 5 ml  Penumbra / tissue at risk for active ischemia: 0 ml    CTA NECK:  No evidence of significant stenosis or occlusion.    CTA HEAD:  Nonopacification of the left carotid terminus and proximal left A1 and M1   segments. Left anterior cerebral artery likely opacified via a patent   anterior communicating artery. Some distal reconstitution of the distal   left MCA distribution, although is markedly decreased blood vessel   density when compared with the right side.  --  Ultrasound:  US Duplex Venous Lower Ext Complete, Bilateral (03.21.25 @ 11:44)  IMPRESSION:  Right common femoral vein could not be seen secondary to an overlying   bandage, otherwise no evidence of deep venous thrombosis in the   visualized bilateral lower extremity veins.  --    Cardiac Studies:  TTE W or WO Ultrasound Enhancing Agent (03.21.25 @ 11:05)   CONCLUSIONS:      1. Patient tachycardic during entire study.   2. Left atrium is severely dilated.   3. Left ventricular systolic function is normal with an ejection fraction visually estimated at 65 to 70 %.   4. There is moderate (grade 2) left ventricular diastolic dysfunction, with elevated left ventricular filling pressure.   5. The right atrium is severely dilated.   6. Normal right ventricular cavity size and normal right ventricular systolic function.   7. Moderate mitral regurgitation.   8. Moderate tricuspid regurgitation.   9. Estimated pulmonary artery systolic pressure is 55 mmHg, consistent with moderate pulmonary hypertension.  10. No pericardial effusion seen.     Preliminary note, official recommendations pending attending review/signature   Seen and examined by Stroke team attending/team, assessment/ plan as discussed with stroke team attending/team as noted.     [facility] Stroke Team  Progress Note     HPI:  88 y/o female with PMHx of Afib (on Eliquis), PPM, HTN, HLD, GERD, severe MR/TR, iatrogenic adrenal insufficiency (on prednisone), lymphedema, metastatic melanoma s/p L foot melanoma and lymph node resection, s/p RUE melanoma and R axillary lymph node excision at [facility] (3/18/25) presented to  ED 3/21/25 at 0200 with AMS s/p unwitnessed fall off the toilet in the bathroom. As per son Philip, patient last known well was 10PM on 3/20/25 prior to going to the bathroom. EMS noted patient with R sided weakness, aphasia and facial droop. Upon arrival to  ED patient went into [facility], given Amiodarone 150mg. Initial NIHSS 19, stroke code activated, imaging revealed L M1 occlusion. Neuro IR consulted and patient transferred to [facility] for neuro IR evaluation. Now s/p mechanical thrombectomy with TICI 3 reperfusion. Admitted to neuro ICU for post procedure monitoring, stroke team asked to consult.    SUBJECTIVE: No events overnight.  No new neurologic complaints.  ROS reported negative unless otherwise noted.      aspirin  chewable 81 milliGRAM(s) Oral daily  atorvastatin 40 milliGRAM(s) Oral at bedtime  enoxaparin Injectable 40 milliGRAM(s) SubCutaneous <User Schedule>  hydrALAZINE Injectable 10 milliGRAM(s) IV Push every 2 hours PRN  labetalol Injectable 10 milliGRAM(s) IV Push every 2 hours PRN  metoprolol succinate ER 50 milliGRAM(s) Oral daily  midodrine 5 milliGRAM(s) Oral every 8 hours  polyethylene glycol 3350 17 Gram(s) Oral daily  predniSONE   Tablet 2 milliGRAM(s) Oral at bedtime  predniSONE   Tablet 5 milliGRAM(s) Oral daily  senna 2 Tablet(s) Oral at bedtime      PHYSICAL EXAM:   Vital Signs Last 24 Hrs  T(C): 36.8 (23 Mar 2025 07:52), Max: 36.9 (22 Mar 2025 09:00)  T(F): 98.2 (23 Mar 2025 07:52), Max: 98.5 (22 Mar 2025 21:11)  HR: 108 (23 Mar 2025 06:00) (88 - 120)  BP: 124/81 (23 Mar 2025 06:00) (94/77 - 135/69)  BP(mean): 91 (23 Mar 2025 06:00) (70 - 99)  RR: 21 (23 Mar 2025 06:00) (14 - 22)  SpO2: 94% (23 Mar 2025 06:00) (92% - 99%)    Parameters below as of 23 Mar 2025 08:00  Patient On (Oxygen Delivery Method): room air          General: No acute distress.   HEENT: Head normocephalic, atraumatic. Conjunctivae clear w/o exudates or hemorrhage. Sclera non-icteric  Neck:  Trachea midline. No JVD.  Cardiac: Irregularly irregular rhythm.   Respiratory:  Chest wall symmetric, nontender.   Abdominal: Soft, nondistended, nontender.   Skin: Skin is warm, dry and intact without rashes or lesions.   Extremities: No edema, SCDs in place bilateral lower extremities.     Detailed Neurologic Exam:    Mental status: Awake, alert, oriented x3; can recall events surrounding current hospitalization. some mild expressive aphasia w/ word finding hesitancy. Able to name objects, repeat sentences without difficulty.     Cranial nerves: Pupils equal and react symmetrically to light.  Extraocular motion is full with no nystagmus. Extraocular movements intact. No visual field deficit to confrontation. There is no ptosis. Slight left facial asymmetry. Palate elevates symmetrically. Tongue is midline. mild dysarthria.     Motor: There is normal bulk and tone.  There is no tremor.  Strength is 5/5 in bilateral upper extremities  Strenght is 3/5 in left lower extremity (pain limited), 5/5 in right lower extremity     Sensation: Sensation intact to light touch in all four extremities     Cerebellar: unable to assess    Gait : deferred        LABS:                        11.6   6.47  )-----------( 120      ( 23 Mar 2025 04:54 )             35.0    03-23    138  |  103  |  19.9  ----------------------------<  94  4.4   |  25.0  |  0.54    Ca    8.2[L]      23 Mar 2025 04:54  Phos  2.6     03-23  Mg     1.9     03-23    PT/INR - ( 22 Mar 2025 03:15 )   PT: 12.2 sec;   INR: 1.08 ratio         PTT - ( 22 Mar 2025 03:15 )  PTT:25.4 sec      03-22 Chol 136 LDL -- HDL 66 Trig 56    A1C: 5.7    IMAGING: Reviewed by me.       CT Head No Cont (03.21.25 @ 10:14)   IMPRESSION:  Mild chronic microvascular changes without evidence of an   acute transcortical infarction or hemorrhage.    CT Brain Stroke Protocol (03.21.25 @ 02:39)   IMPRESSION:  No acute intracranial hemorrhage.  Density in the region of the left carotid terminus concerning for   thrombus, with question loss of gray-white differentiation in the left   insula which may reflect acute infarct.    CT Angio Brain and Neck, CT Perfusion Stroke Protocol  w/ IV Cont (03.21.25 @ 02:47)   IMPRESSION:  CT PERFUSION:  Technical limitations: Motion artifact, suboptimal contrast bolus.    Core infarction: 5 ml  Penumbra / tissue at risk for active ischemia: 0 ml    CTA NECK:  No evidence of significant stenosis or occlusion.    CTA HEAD:  Non opacification of the left carotid terminus and proximal left A1 and M1   segments. Left anterior cerebral artery likely opacified via a patent   anterior communicating artery. Some distal reconstitution of the distal   left MCA distribution, although is markedly decreased blood vessel   density when compared with the right side.  --  Ultrasound:  US Duplex Venous Lower Ext Complete, Bilateral (03.21.25 @ 11:44)  IMPRESSION:  Right common femoral vein could not be seen secondary to an overlying   bandage, otherwise no evidence of deep venous thrombosis in the   visualized bilateral lower extremity veins.  --    Cardiac Studies:  TTE W or WO Ultrasound Enhancing Agent (03.21.25 @ 11:05)   CONCLUSIONS:      1. Patient tachycardic during entire study.   2. Left atrium is severely dilated.   3. Left ventricular systolic function is normal with an ejection fraction visually estimated at 65 to 70 %.   4. There is moderate (grade 2) left ventricular diastolic dysfunction, with elevated left ventricular filling pressure.   5. The right atrium is severely dilated.   6. Normal right ventricular cavity size and normal right ventricular systolic function.   7. Moderate mitral regurgitation.   8. Moderate tricuspid regurgitation.   9. Estimated pulmonary artery systolic pressure is 55 mmHg, consistent with moderate pulmonary hypertension.  10. No pericardial effusion seen.

## 2025-03-24 ENCOUNTER — TRANSCRIPTION ENCOUNTER (OUTPATIENT)
Age: 89
End: 2025-03-24

## 2025-03-24 DIAGNOSIS — R73.03 PREDIABETES: ICD-10-CM

## 2025-03-24 DIAGNOSIS — E78.5 HYPERLIPIDEMIA, UNSPECIFIED: ICD-10-CM

## 2025-03-24 DIAGNOSIS — I10 ESSENTIAL (PRIMARY) HYPERTENSION: ICD-10-CM

## 2025-03-24 LAB
ALBUMIN SERPL ELPH-MCNC: 3.2 G/DL — LOW (ref 3.3–5.2)
ALP SERPL-CCNC: 38 U/L — LOW (ref 40–120)
ALT FLD-CCNC: 12 U/L — SIGNIFICANT CHANGE UP
ANION GAP SERPL CALC-SCNC: 10 MMOL/L — SIGNIFICANT CHANGE UP (ref 5–17)
AST SERPL-CCNC: 15 U/L — SIGNIFICANT CHANGE UP
BASOPHILS # BLD AUTO: 0.03 K/UL — SIGNIFICANT CHANGE UP (ref 0–0.2)
BASOPHILS NFR BLD AUTO: 0.4 % — SIGNIFICANT CHANGE UP (ref 0–2)
BILIRUB SERPL-MCNC: 0.9 MG/DL — SIGNIFICANT CHANGE UP (ref 0.4–2)
BUN SERPL-MCNC: 22.7 MG/DL — HIGH (ref 8–20)
CALCIUM SERPL-MCNC: 8.4 MG/DL — SIGNIFICANT CHANGE UP (ref 8.4–10.5)
CHLORIDE SERPL-SCNC: 103 MMOL/L — SIGNIFICANT CHANGE UP (ref 96–108)
CO2 SERPL-SCNC: 25 MMOL/L — SIGNIFICANT CHANGE UP (ref 22–29)
CREAT SERPL-MCNC: 0.57 MG/DL — SIGNIFICANT CHANGE UP (ref 0.5–1.3)
EGFR: 87 ML/MIN/1.73M2 — SIGNIFICANT CHANGE UP
EGFR: 87 ML/MIN/1.73M2 — SIGNIFICANT CHANGE UP
EOSINOPHIL # BLD AUTO: 0.15 K/UL — SIGNIFICANT CHANGE UP (ref 0–0.5)
EOSINOPHIL NFR BLD AUTO: 2.2 % — SIGNIFICANT CHANGE UP (ref 0–6)
GLUCOSE SERPL-MCNC: 103 MG/DL — HIGH (ref 70–99)
HCT VFR BLD CALC: 35.4 % — SIGNIFICANT CHANGE UP (ref 34.5–45)
HGB BLD-MCNC: 11.5 G/DL — SIGNIFICANT CHANGE UP (ref 11.5–15.5)
IMM GRANULOCYTES # BLD AUTO: 0.04 K/UL — SIGNIFICANT CHANGE UP (ref 0–0.07)
IMM GRANULOCYTES NFR BLD AUTO: 0.6 % — SIGNIFICANT CHANGE UP (ref 0–0.9)
LYMPHOCYTES # BLD AUTO: 0.83 K/UL — LOW (ref 1–3.3)
LYMPHOCYTES NFR BLD AUTO: 12.3 % — LOW (ref 13–44)
MAGNESIUM SERPL-MCNC: 2 MG/DL — SIGNIFICANT CHANGE UP (ref 1.6–2.6)
MCHC RBC-ENTMCNC: 30.9 PG — SIGNIFICANT CHANGE UP (ref 27–34)
MCHC RBC-ENTMCNC: 32.5 G/DL — SIGNIFICANT CHANGE UP (ref 32–36)
MCV RBC AUTO: 95.2 FL — SIGNIFICANT CHANGE UP (ref 80–100)
MONOCYTES # BLD AUTO: 0.59 K/UL — SIGNIFICANT CHANGE UP (ref 0–0.9)
MONOCYTES NFR BLD AUTO: 8.8 % — SIGNIFICANT CHANGE UP (ref 2–14)
NEUTROPHILS # BLD AUTO: 5.1 K/UL — SIGNIFICANT CHANGE UP (ref 1.8–7.4)
NEUTROPHILS NFR BLD AUTO: 75.7 % — SIGNIFICANT CHANGE UP (ref 43–77)
NRBC # BLD AUTO: 0 K/UL — SIGNIFICANT CHANGE UP (ref 0–0)
NRBC # FLD: 0 K/UL — SIGNIFICANT CHANGE UP (ref 0–0)
NRBC BLD AUTO-RTO: 0 /100 WBCS — SIGNIFICANT CHANGE UP (ref 0–0)
PHOSPHATE SERPL-MCNC: 3.4 MG/DL — SIGNIFICANT CHANGE UP (ref 2.4–4.7)
PLATELET # BLD AUTO: 116 K/UL — LOW (ref 150–400)
PMV BLD: 10.2 FL — SIGNIFICANT CHANGE UP (ref 7–13)
POTASSIUM SERPL-MCNC: 4.4 MMOL/L — SIGNIFICANT CHANGE UP (ref 3.5–5.3)
POTASSIUM SERPL-SCNC: 4.4 MMOL/L — SIGNIFICANT CHANGE UP (ref 3.5–5.3)
PROT SERPL-MCNC: 5.3 G/DL — LOW (ref 6.6–8.7)
RBC # BLD: 3.72 M/UL — LOW (ref 3.8–5.2)
RBC # FLD: 13.8 % — SIGNIFICANT CHANGE UP (ref 10.3–14.5)
SODIUM SERPL-SCNC: 137 MMOL/L — SIGNIFICANT CHANGE UP (ref 135–145)
WBC # BLD: 6.74 K/UL — SIGNIFICANT CHANGE UP (ref 3.8–10.5)
WBC # FLD AUTO: 6.74 K/UL — SIGNIFICANT CHANGE UP (ref 3.8–10.5)

## 2025-03-24 PROCEDURE — 99233 SBSQ HOSP IP/OBS HIGH 50: CPT

## 2025-03-24 PROCEDURE — 70450 CT HEAD/BRAIN W/O DYE: CPT | Mod: 26

## 2025-03-24 RX ADMIN — PREDNISONE 5 MILLIGRAM(S): 20 TABLET ORAL at 06:34

## 2025-03-24 RX ADMIN — Medication 2 TABLET(S): at 21:02

## 2025-03-24 RX ADMIN — PREDNISONE 2 MILLIGRAM(S): 20 TABLET ORAL at 21:02

## 2025-03-24 RX ADMIN — ENOXAPARIN SODIUM 40 MILLIGRAM(S): 100 INJECTION SUBCUTANEOUS at 21:02

## 2025-03-24 RX ADMIN — ATORVASTATIN CALCIUM 40 MILLIGRAM(S): 80 TABLET, FILM COATED ORAL at 21:02

## 2025-03-24 RX ADMIN — POLYETHYLENE GLYCOL 3350 17 GRAM(S): 17 POWDER, FOR SOLUTION ORAL at 11:53

## 2025-03-24 RX ADMIN — METOPROLOL SUCCINATE 50 MILLIGRAM(S): 50 TABLET, EXTENDED RELEASE ORAL at 06:06

## 2025-03-24 RX ADMIN — Medication 81 MILLIGRAM(S): at 11:53

## 2025-03-24 NOTE — PROGRESS NOTE ADULT - SUBJECTIVE AND OBJECTIVE BOX
Preliminary note, official recommendations pending attending review/signature   Seen and examined by Stroke team attending/team, assessment/ plan as discussed with stroke team attending/team as noted.     [facility] Stroke Team  Progress Note     HPI:  89 year old female with PMHx of Afib (on Eliquis), PPM, HTN, HLD, GERD, severe MR/TR, iatrogenic adrenal insufficiency (on prednisone), lymphedema, metastatic melanoma s/p L foot melanoma and lymph node resection, s/p RUE melanoma and R axillary lymph node excision at [facility] (3/18/25) presented to  ED 3/21/25 at 0200 with AMS s/p unwitnessed fall off the toilet in the bathroom. As per son Philip, patient last known well was 10PM on 3/20/25 prior to going to the bathroom. EMS noted patient with R sided weakness, aphasia and facial droop. Upon arrival to  ED patient went into [facility], given Amiodarone 150mg. Initial NIHSS 19, stroke code activated, imaging revealed L M1 occlusion. Neuro IR consulted and patient transferred to [facility] for neuro IR evaluation. Now s/p mechanical thrombectomy with TICI 3 reperfusion.  Patient recently off Eliquis for melanoma removal and restarted day prior to arrival. Admitted to neuro ICU for post procedure monitoring, stroke team consulted.    SUBJECTIVE: No events overnight.  No new neurologic complaints.  ROS reported negative unless otherwise noted.    aspirin  chewable 81 milliGRAM(s) Oral daily  atorvastatin 40 milliGRAM(s) Oral at bedtime  enoxaparin Injectable 40 milliGRAM(s) SubCutaneous <User Schedule>  hydrALAZINE Injectable 10 milliGRAM(s) IV Push every 2 hours PRN  labetalol Injectable 10 milliGRAM(s) IV Push every 2 hours PRN  metoprolol succinate ER 50 milliGRAM(s) Oral daily  polyethylene glycol 3350 17 Gram(s) Oral daily  predniSONE   Tablet 2 milliGRAM(s) Oral at bedtime  predniSONE   Tablet 5 milliGRAM(s) Oral daily  senna 2 Tablet(s) Oral at bedtime      PHYSICAL EXAM: EXAM PENDING VISIT  Vital Signs Last 24 Hrs  T(C): 36.7 (24 Mar 2025 00:08), Max: 37.1 (23 Mar 2025 16:05)  T(F): 98.1 (24 Mar 2025 00:08), Max: 98.7 (23 Mar 2025 16:05)  HR: 96 (24 Mar 2025 08:00) (77 - 104)  BP: 116/72 (24 Mar 2025 08:00) (102/68 - 131/72)  BP(mean): 85 (24 Mar 2025 08:00) (76 - 96)  RR: 25 (24 Mar 2025 08:00) (14 - 25)  SpO2: 97% (24 Mar 2025 08:00) (93% - 100%)    Parameters below as of 24 Mar 2025 08:00  Patient On (Oxygen Delivery Method): nasal cannula  O2 Flow (L/min): 2      General: No acute distress.   HEENT: Head normocephalic, atraumatic. Conjunctivae clear w/o exudates or hemorrhage. Sclera non-icteric  Neck:  Trachea midline. No JVD.  Cardiac: Irregularly irregular rhythm.   Respiratory:  Chest wall symmetric, nontender.   Abdominal: Soft, nondistended, nontender.   Skin: Skin is warm, dry and intact without rashes or lesions.   Extremities: No edema, SCDs in place bilateral lower extremities.     Detailed Neurologic Exam:    Mental status: Awake, alert, oriented x3; can recall events surrounding current hospitalization. some mild expressive aphasia w/ word finding hesitancy. Able to name objects, repeat sentences without difficulty.     Cranial nerves: Pupils equal and react symmetrically to light.  Extraocular motion is full with no nystagmus. Extraocular movements intact. No visual field deficit to confrontation. There is no ptosis. Slight left facial asymmetry. Palate elevates symmetrically. Tongue is midline. mild dysarthria.     Motor: There is normal bulk and tone.  There is no tremor.  Strength is 5/5 in bilateral upper extremities  Strenght is 3/5 in left lower extremity (pain limited), 5/5 in right lower extremity     Sensation: Sensation intact to light touch in all four extremities     Cerebellar: unable to assess    Gait : deferred      LABS:                        11.5   6.74  )-----------( 116      ( 24 Mar 2025 02:35 )             35.4    03-24    137  |  103  |  22.7[H]  ----------------------------<  103[H]  4.4   |  25.0  |  0.57    Ca    8.4      24 Mar 2025 02:35  Phos  3.4     03-24  Mg     2.0     03-24    TPro  5.3[L]  /  Alb  3.2[L]  /  TBili  0.9  /  DBili  x   /  AST  15  /  ALT  12  /  AlkPhos  38[L]  03-24    Lipid Profile (03.22.25 @ 03:15):  Cholesterol: 136 mg/dL  Triglycerides, Serum: 56 mg/dL  HDL Cholesterol: 66 mg/dL  Non HDL Cholesterol: 70 mg/dL  LDL Cholesterol Calculated: 58 mg/dL  A1C with Estimated Average Glucose in AM (03.22.25 @ 03:15): 5.7 %    RADIOLOGY IMAGING & Additional Studies (Independently reviewed unless otherwise noted):    US Duplex Venous Lower Ext Complete, Bilateral (03.21.25 @ 11:44) >  IMPRESSION: Right common femoral vein could not be seen secondary to an overlying bandage, otherwise no evidence of deep venous thrombosis in the visualized bilateral lower extremity veins.  --- End of Report ---    TTE W or WO Ultrasound Enhancing Agent (03.21.25 @ 11:05) >  CONCLUSIONS:    1. Patient tachycardic during entire study.   2. Left atrium is severely dilated.   3. Left ventricular systolic function is normal with an ejection fraction visually estimated at 65 to 70 %.   4. There is moderate (grade 2) left ventricular diastolic dysfunction, with elevated left ventricular filling pressure.   5. The right atrium is severely dilated.   6. Normal right ventricular cavity size and normal right ventricular systolic function.   7. Moderate mitral regurgitation.   8. Moderate tricuspid regurgitation.   9. Estimated pulmonary artery systolic pressure is 55 mmHg, consistent with moderate pulmonary hypertension.  10. No pericardial effusion seen.    CT Head No Cont (03.21.25 @ 10:14) >  IMPRESSION:  Mild chronic microvascular changes without evidence of an acute transcortical infarction or hemorrhage.  --- End of Report ---    CT Angio Brain Stroke Protocol  w/ IV Cont (03.21.25 @ 02:47) >  CT PERFUSION: Technical limitations: Motion artifact, suboptimal contrast bolus. Core infarction: 5 ml. Penumbra / tissue at risk for active ischemia: 0 ml   CTA NECK: No evidence of significant stenosis or occlusion.  CTA HEAD: Nonopacification of the left carotid terminus and proximal left A1 and M1 segments. Left anterior cerebral artery likely opacified via a patent anterior communicating artery. Some distal reconstitution of the distal left MCA distribution, although is markedly decreased blood vessel density when compared with the right side.  --- End of Report ---    CT Brain Stroke Protocol (03.21.25 @ 02:39) >  IMPRESSION: No acute intracranial hemorrhage. Density in the region of the left carotid terminus concerning for thrombus, with question loss of gray-white differentiation in the left insula which may reflect acute infarct.  --- End of Report --- Preliminary note, official recommendations pending attending review/signature   Seen and examined by Stroke team attending/team, assessment/ plan as discussed with stroke team attending/team as noted.     [facility] Stroke Team  Progress Note     HPI:  89 year old female with PMHx of Afib (on Eliquis), PPM, HTN, HLD, GERD, severe MR/TR, iatrogenic adrenal insufficiency (on prednisone), lymphedema, metastatic melanoma s/p L foot melanoma and lymph node resection, s/p RUE melanoma and R axillary lymph node excision at [facility] (3/18/25) presented to  ED 3/21/25 at 0200 with AMS s/p unwitnessed fall off the toilet in the bathroom. As per son Philip, patient last known well was 10PM on 3/20/25 prior to going to the bathroom. EMS noted patient with R sided weakness, aphasia and facial droop. Upon arrival to  ED patient went into [facility], given Amiodarone 150mg. Initial NIHSS 19, stroke code activated, imaging revealed L M1 occlusion. Neuro IR consulted and patient transferred to [facility] for neuro IR evaluation. Now s/p mechanical thrombectomy with TICI 3 reperfusion.  Patient recently off Eliquis for melanoma removal and restarted day prior to arrival. Admitted to neuro ICU for post procedure monitoring, stroke team consulted.    SUBJECTIVE: No events overnight.  No new neurologic complaints.  ROS reported negative unless otherwise noted.    aspirin  chewable 81 milliGRAM(s) Oral daily  atorvastatin 40 milliGRAM(s) Oral at bedtime  enoxaparin Injectable 40 milliGRAM(s) SubCutaneous <User Schedule>  hydrALAZINE Injectable 10 milliGRAM(s) IV Push every 2 hours PRN  labetalol Injectable 10 milliGRAM(s) IV Push every 2 hours PRN  metoprolol succinate ER 50 milliGRAM(s) Oral daily  polyethylene glycol 3350 17 Gram(s) Oral daily  predniSONE   Tablet 2 milliGRAM(s) Oral at bedtime  predniSONE   Tablet 5 milliGRAM(s) Oral daily  senna 2 Tablet(s) Oral at bedtime      PHYSICAL EXAM:   Vital Signs Last 24 Hrs  T(C): 36.7 (24 Mar 2025 00:08), Max: 37.1 (23 Mar 2025 16:05)  T(F): 98.1 (24 Mar 2025 00:08), Max: 98.7 (23 Mar 2025 16:05)  HR: 96 (24 Mar 2025 08:00) (77 - 104)  BP: 116/72 (24 Mar 2025 08:00) (102/68 - 131/72)  BP(mean): 85 (24 Mar 2025 08:00) (76 - 96)  RR: 25 (24 Mar 2025 08:00) (14 - 25)  SpO2: 97% (24 Mar 2025 08:00) (93% - 100%)    Parameters below as of 24 Mar 2025 08:00  Patient On (Oxygen Delivery Method): nasal cannula  O2 Flow (L/min): 2      General: No acute distress.   HEENT: Head normocephalic, atraumatic. Conjunctivae clear w/o exudates or hemorrhage. Sclera non-icteric  Neck:  Trachea midline. No JVD.  Cardiac: Irregularly irregular rhythm.   Respiratory:  Chest wall symmetric, nontender.   Abdominal: Soft, nondistended, nontender.   Skin: Skin is warm, dry and intact without rashes or lesions.   Extremities: No edema, SCDs in place bilateral lower extremities.     Detailed Neurologic Exam:    Mental status: Awake, alert, oriented x3; can recall events surrounding current hospitalization. Mild expressive aphasia w/ word finding hesitancy. Able to name objects, repeat sentences without difficulty.     Cranial nerves: Pupils equal and react symmetrically to light.  Extraocular motion is full with no nystagmus. Extraocular movements intact. No visual field deficit to confrontation. There is no ptosis. Slight left facial asymmetry. Palate elevates symmetrically. Tongue is midline. Slight dysarthria.     Motor: There is normal bulk and tone.  There is no tremor.  Strenght is 5-/5 in left upper extremity with slight pronation.   Strength is 5-/5 in left lower extremity.  Strength is 5/5 in right limbs.    Sensation: Sensation intact to light touch in all four extremities     Cerebellar: unable to assess    Gait : deferred      LABS:                        11.5   6.74  )-----------( 116      ( 24 Mar 2025 02:35 )             35.4    03-24    137  |  103  |  22.7[H]  ----------------------------<  103[H]  4.4   |  25.0  |  0.57    Ca    8.4      24 Mar 2025 02:35  Phos  3.4     03-24  Mg     2.0     03-24    TPro  5.3[L]  /  Alb  3.2[L]  /  TBili  0.9  /  DBili  x   /  AST  15  /  ALT  12  /  AlkPhos  38[L]  03-24    Lipid Profile (03.22.25 @ 03:15):  Cholesterol: 136 mg/dL  Triglycerides, Serum: 56 mg/dL  HDL Cholesterol: 66 mg/dL  Non HDL Cholesterol: 70 mg/dL  LDL Cholesterol Calculated: 58 mg/dL  A1C with Estimated Average Glucose in AM (03.22.25 @ 03:15): 5.7 %    RADIOLOGY IMAGING & Additional Studies (Independently reviewed unless otherwise noted):    US Duplex Venous Lower Ext Complete, Bilateral (03.21.25 @ 11:44) >  IMPRESSION: Right common femoral vein could not be seen secondary to an overlying bandage, otherwise no evidence of deep venous thrombosis in the visualized bilateral lower extremity veins.  --- End of Report ---    TTE W or WO Ultrasound Enhancing Agent (03.21.25 @ 11:05) >  CONCLUSIONS:    1. Patient tachycardic during entire study.   2. Left atrium is severely dilated.   3. Left ventricular systolic function is normal with an ejection fraction visually estimated at 65 to 70 %.   4. There is moderate (grade 2) left ventricular diastolic dysfunction, with elevated left ventricular filling pressure.   5. The right atrium is severely dilated.   6. Normal right ventricular cavity size and normal right ventricular systolic function.   7. Moderate mitral regurgitation.   8. Moderate tricuspid regurgitation.   9. Estimated pulmonary artery systolic pressure is 55 mmHg, consistent with moderate pulmonary hypertension.  10. No pericardial effusion seen.    CT Head No Cont (03.21.25 @ 10:14) >  IMPRESSION:  Mild chronic microvascular changes without evidence of an acute transcortical infarction or hemorrhage.  --- End of Report ---    CT Angio Brain Stroke Protocol  w/ IV Cont (03.21.25 @ 02:47) >  CT PERFUSION: Technical limitations: Motion artifact, suboptimal contrast bolus. Core infarction: 5 ml. Penumbra / tissue at risk for active ischemia: 0 ml   CTA NECK: No evidence of significant stenosis or occlusion.  CTA HEAD: Nonopacification of the left carotid terminus and proximal left A1 and M1 segments. Left anterior cerebral artery likely opacified via a patent anterior communicating artery. Some distal reconstitution of the distal left MCA distribution, although is markedly decreased blood vessel density when compared with the right side.  --- End of Report ---    CT Brain Stroke Protocol (03.21.25 @ 02:39) >  IMPRESSION: No acute intracranial hemorrhage. Density in the region of the left carotid terminus concerning for thrombus, with question loss of gray-white differentiation in the left insula which may reflect acute infarct.  --- End of Report ---     [facility] Stroke Team  Progress Note     HPI:  89 year old female with PMHx of Afib (on Eliquis), PPM, HTN, HLD, GERD, severe MR/TR, iatrogenic adrenal insufficiency (on prednisone), lymphedema, metastatic melanoma s/p L foot melanoma and lymph node resection, s/p RUE melanoma and R axillary lymph node excision at [facility] (3/18/25) presented to  ED 3/21/25 at 0200 with AMS s/p unwitnessed fall off the toilet in the bathroom. As per son Philip, patient last known well was 10PM on 3/20/25 prior to going to the bathroom. EMS noted patient with R sided weakness, aphasia and facial droop. Upon arrival to  ED patient went into [facility], given Amiodarone 150mg. Initial NIHSS 19, stroke code activated, imaging revealed L M1 occlusion. Neuro IR consulted and patient transferred to [facility] for neuro IR evaluation. Now s/p mechanical thrombectomy with TICI 3 reperfusion.  Patient recently off Eliquis for melanoma removal and restarted day prior to arrival. Admitted to neuro ICU for post procedure monitoring, stroke team consulted.    SUBJECTIVE: No events overnight.  No new neurologic complaints.  ROS reported negative unless otherwise noted.    aspirin  chewable 81 milliGRAM(s) Oral daily  atorvastatin 40 milliGRAM(s) Oral at bedtime  enoxaparin Injectable 40 milliGRAM(s) SubCutaneous <User Schedule>  hydrALAZINE Injectable 10 milliGRAM(s) IV Push every 2 hours PRN  labetalol Injectable 10 milliGRAM(s) IV Push every 2 hours PRN  metoprolol succinate ER 50 milliGRAM(s) Oral daily  polyethylene glycol 3350 17 Gram(s) Oral daily  predniSONE   Tablet 2 milliGRAM(s) Oral at bedtime  predniSONE   Tablet 5 milliGRAM(s) Oral daily  senna 2 Tablet(s) Oral at bedtime      PHYSICAL EXAM:   Vital Signs Last 24 Hrs  T(C): 36.7 (24 Mar 2025 00:08), Max: 37.1 (23 Mar 2025 16:05)  T(F): 98.1 (24 Mar 2025 00:08), Max: 98.7 (23 Mar 2025 16:05)  HR: 96 (24 Mar 2025 08:00) (77 - 104)  BP: 116/72 (24 Mar 2025 08:00) (102/68 - 131/72)  BP(mean): 85 (24 Mar 2025 08:00) (76 - 96)  RR: 25 (24 Mar 2025 08:00) (14 - 25)  SpO2: 97% (24 Mar 2025 08:00) (93% - 100%)    Parameters below as of 24 Mar 2025 08:00  Patient On (Oxygen Delivery Method): nasal cannula  O2 Flow (L/min): 2      General: No acute distress.   HEENT: Head normocephalic, atraumatic. Conjunctivae clear w/o exudates or hemorrhage. Sclera non-icteric  Neck:  Trachea midline. No JVD.  Cardiac: Irregularly irregular rhythm.   Respiratory:  Chest wall symmetric, nontender.   Abdominal: Soft, nondistended, nontender.   Skin: Skin is warm, dry and intact without rashes or lesions.   Extremities: No edema, SCDs in place bilateral lower extremities.     Detailed Neurologic Exam:    Mental status: Awake, alert, oriented x3; can recall events surrounding current hospitalization. Mild expressive aphasia w/ word finding hesitancy. Able to name objects, repeat sentences without difficulty.     Cranial nerves: Pupils equal and react symmetrically to light.  Extraocular motion is full with no nystagmus. Extraocular movements intact. No visual field deficit to confrontation. There is no ptosis. Slight left facial asymmetry. Palate elevates symmetrically. Tongue is midline. Slight dysarthria.     Motor: There is normal bulk and tone.  There is no tremor.  Strenght is 5-/5 in left upper extremity with slight pronation.   Strength is 5-/5 in left lower extremity.  Strength is 5/5 in right limbs.    Sensation: Sensation intact to light touch in all four extremities     Cerebellar: unable to assess    Gait : deferred      LABS:                        11.5   6.74  )-----------( 116      ( 24 Mar 2025 02:35 )             35.4    03-24    137  |  103  |  22.7[H]  ----------------------------<  103[H]  4.4   |  25.0  |  0.57    Ca    8.4      24 Mar 2025 02:35  Phos  3.4     03-24  Mg     2.0     03-24    TPro  5.3[L]  /  Alb  3.2[L]  /  TBili  0.9  /  DBili  x   /  AST  15  /  ALT  12  /  AlkPhos  38[L]  03-24    Lipid Profile (03.22.25 @ 03:15):  Cholesterol: 136 mg/dL  Triglycerides, Serum: 56 mg/dL  HDL Cholesterol: 66 mg/dL  Non HDL Cholesterol: 70 mg/dL  LDL Cholesterol Calculated: 58 mg/dL  A1C with Estimated Average Glucose in AM (03.22.25 @ 03:15): 5.7 %    RADIOLOGY IMAGING & Additional Studies (Independently reviewed unless otherwise noted):    US Duplex Venous Lower Ext Complete, Bilateral (03.21.25 @ 11:44) >  IMPRESSION: Right common femoral vein could not be seen secondary to an overlying bandage, otherwise no evidence of deep venous thrombosis in the visualized bilateral lower extremity veins.  --- End of Report ---    TTE W or WO Ultrasound Enhancing Agent (03.21.25 @ 11:05) >  CONCLUSIONS:    1. Patient tachycardic during entire study.   2. Left atrium is severely dilated.   3. Left ventricular systolic function is normal with an ejection fraction visually estimated at 65 to 70 %.   4. There is moderate (grade 2) left ventricular diastolic dysfunction, with elevated left ventricular filling pressure.   5. The right atrium is severely dilated.   6. Normal right ventricular cavity size and normal right ventricular systolic function.   7. Moderate mitral regurgitation.   8. Moderate tricuspid regurgitation.   9. Estimated pulmonary artery systolic pressure is 55 mmHg, consistent with moderate pulmonary hypertension.  10. No pericardial effusion seen.    CT Head No Cont (03.21.25 @ 10:14) >  IMPRESSION:  Mild chronic microvascular changes without evidence of an acute transcortical infarction or hemorrhage.  --- End of Report ---    CT Angio Brain Stroke Protocol  w/ IV Cont (03.21.25 @ 02:47) >  CT PERFUSION: Technical limitations: Motion artifact, suboptimal contrast bolus. Core infarction: 5 ml. Penumbra / tissue at risk for active ischemia: 0 ml   CTA NECK: No evidence of significant stenosis or occlusion.  CTA HEAD: Nonopacification of the left carotid terminus and proximal left A1 and M1 segments. Left anterior cerebral artery likely opacified via a patent anterior communicating artery. Some distal reconstitution of the distal left MCA distribution, although is markedly decreased blood vessel density when compared with the right side.  --- End of Report ---    CT Brain Stroke Protocol (03.21.25 @ 02:39) >  IMPRESSION: No acute intracranial hemorrhage. Density in the region of the left carotid terminus concerning for thrombus, with question loss of gray-white differentiation in the left insula which may reflect acute infarct.  --- End of Report ---

## 2025-03-24 NOTE — DIETITIAN INITIAL EVALUATION ADULT - ORAL INTAKE PTA/DIET HISTORY
Spoke with pt and family member at bedside. Pt reports eating well PTA; denies any recent weight changes.   Pt had swallow evaluation; diet advanced to Easy to chew with mod thick liquids. Pt is tolerating well, appetite fair at this time. Food preferences being obtained and provided daily.

## 2025-03-24 NOTE — DIETITIAN INITIAL EVALUATION ADULT - NS FNS DIET ORDER
Diet, Easy to Chew:   DASH/TLC {Sodium & Cholesterol Restricted} (DASH)  Moderately Thick Liquids (MODTHICKLIQS) (03-21-25 @ 16:15) [Active]

## 2025-03-24 NOTE — DIETITIAN INITIAL EVALUATION ADULT - OTHER INFO
Pt is a 89 year old female with PMHx of Afib, PPM, HTN, hx of RUE melanoma removal with R axillary lymph node removal at [facility] 4 days PTA. Presented to  ED on 3/21/25 with AMS s/p unwitnessed fall off the toilet. Per EMS noted with R sided weakness, aphasia and facial droop. Upon arrival to  ED patient went into [facility]. Stroke code activated, CT imagining found revealing L M1 occlusion. Pt transferred to [facility] eval, s/p cerebral angiogram for mechanical thrombectomy of L M1 occlusion,    Admitted to NSICU for close monitoring post thrombectomy.

## 2025-03-24 NOTE — DISCHARGE NOTE PROVIDER - NSDCMRMEDTOKEN_GEN_ALL_CORE_FT
Centrum oral tablet: 1 tab(s) orally once a day  Eliquis 5 mg oral tablet: 1 tab(s) orally 2 times a day Resume tonight 3/6/25  Farxiga 10 mg oral tablet: 1 tab(s) orally once a day resume at next scheduled dose.  Lasix 20 mg oral tablet: 1 tab(s) orally once a day  metoprolol tartrate 100 mg oral tablet: 1 tab(s) orally 2 times a day  predniSONE 1 mg oral tablet: 2 tab(s) orally once a day (in the evening)  predniSONE 5 mg oral tablet: 1 tab(s) orally once a day (in the morning)  PreserVision AREDS 2 oral capsule: 1 cap(s) orally 2 times a day  simvastatin 10 mg oral tablet: 1 tab(s) orally once a day (at bedtime)   aspirin 81 mg oral tablet, chewable: 1 tab(s) orally once a day Please stop taking once you start Eliquis on 3/27/25.  Centrum oral tablet: 1 tab(s) orally once a day  Eliquis 5 mg oral tablet: 1 tab(s) orally 2 times a day Please resume 3/27/25.  Farxiga 10 mg oral tablet: 1 tab(s) orally once a day resume at next scheduled dose.  metoprolol succinate 50 mg oral tablet, extended release: 1 tab(s) orally once a day  predniSONE 1 mg oral tablet: 2 tab(s) orally once a day (in the evening)  predniSONE 5 mg oral tablet: 1 tab(s) orally once a day (in the morning)  PreserVision AREDS 2 oral capsule: 1 cap(s) orally 2 times a day  simvastatin 10 mg oral tablet: 1 tab(s) orally once a day (at bedtime)   aspirin 81 mg oral tablet, chewable: 1 tab(s) orally once a day Please stop taking once you start Eliquis on 3/27/25.  Centrum oral tablet: 1 tab(s) orally once a day  Eliquis 5 mg oral tablet: 1 tab(s) orally 2 times a day Please resume 3/27/25.  Farxiga 10 mg oral tablet: 1 tab(s) orally once a day resume at next scheduled dose.  metoprolol succinate 50 mg oral tablet, extended release: 1 tab(s) orally once a day  Occupational Therapy: Acute stroke. Evaluate and treat. I63.  Physical Therapy: Acute stroke. Evaluate and treat. I63.  predniSONE 1 mg oral tablet: 2 tab(s) orally once a day (in the evening)  predniSONE 5 mg oral tablet: 1 tab(s) orally once a day (in the morning)  PreserVision AREDS 2 oral capsule: 1 cap(s) orally 2 times a day  simvastatin 10 mg oral tablet: 1 tab(s) orally once a day (at bedtime)  Speech Therapy: Acute stroke. Evaluate and treat. I63.

## 2025-03-24 NOTE — OCCUPATIONAL THERAPY INITIAL EVALUATION ADULT - DIAGNOSIS, OT EVAL
89 year old Female s/p cerebral angiogram for mechanical thrombectomy of L M1 occlusion, TICI 3 (one pass) on 3/21/25. Admitted to NSICU for close monitoring post thrombectomy. Accepted for transfer stroke service on 3/22/25. Pending MRI to assess for infarct burden. Etiology likely cardioembolic in the setting of afib and off a/c for surgical procedure.

## 2025-03-24 NOTE — DISCHARGE NOTE PROVIDER - HOSPITAL COURSE
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 Presented to  ED on 3/21/25 at 0200 with AMS s/p unwitnessed fall off the toilet in the bathroom. Of note recently taken off Eliquis for melanoma removal and restarted 3/20/25, last dose 7PM. Per EMS noted with R sided weakness, aphasia and facial droop. Upon arrival to  ED patient went into Vtach. Initial NIHSS 19, stroke code activated, CT imagining found revealing L M1 occlusion. Pt transferred to [facility] EDUARDO eval, s/p cerebral angiogram for mechanical thrombectomy of L M1 occlusion, TICI 3 (one pass) on 3/21/25. Admitted to NSICU for close monitoring post thrombectomy. Accepted for transfer stroke service on 3/22/25.     IMPRESSION: Left M1 occlusion s/p thrombectomy TICI 3 on 3/21/25. ***********Pending MRI to assess for infarct burden.***************   Etiology likely cardioembolic in the setting of afib and off a/c for surgical procedure.     Hospital course notable for:    ***************** Pending repeat CTH 3/24 to assess for stroke burden given MRI still not done ************    -Dysphagia screen --> regular / mildly thick liquids dysphagia diet as of 3/24 and Video swallow study    -Holding AC for now until assess stroke burden    -A1C 5.7; pre-DM, f/u with PCP    -AF w/ RVR: cardiology consulted for rate control: Metoprolol 50mg daily for rate control    -PPM interrogated w/ findings of brief episodes of pAF with RVR to 170s, all episodes occurred on 3/7/25.     -Thrombocytopenia, f/u with PCP    -Adrenal insufficiency, continue home prednisone (5mg daily/2mg qHS)      CORE MEASURES:       Admission NIHSS: 17     Tenecteplase : [] YES [X] NO     LD/A1C: 58/5.7     Depression Screen - if depression hx and/or present     Statin Therapy: lipitor 40mg     Dysphagia Screen: [X] PASS [] FAIL     Smoking in the past 12 months [] YES [X] NO     Afib [X] YES [] NO     Diabetes [] YES [X] NO     Stroke Education [] YES [] NO [X] pending      On _____, the patient and/or their family member/caretaker were provided with a brief summary and overview of the hospital course including admission date, diagnosis, surgery / procedure, complications if applicable, discharge instructions including but not limited to: incision care, signs/symptoms to look out for after discharge, home medications, new medications, patient / family education/training and instructions for follow up. Any concerns were addressed, and all questions were answered.    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 Presented to  ED on 3/21/25 at 0200 with AMS s/p unwitnessed fall off the toilet in the bathroom. Of note recently taken off Eliquis for melanoma removal and restarted 3/20/25, last dose 7PM. Per EMS noted with R sided weakness, aphasia and facial droop. Upon arrival to  ED patient went into Vtach. Initial NIHSS 19, stroke code activated, CT imagining found revealing L M1 occlusion. Pt transferred to [facility]UARDO eval, s/p cerebral angiogram for mechanical thrombectomy of L M1 occlusion, TICI 3 (one pass) on 3/21/25. Admitted to NSICU for close monitoring post thrombectomy. Accepted for transfer stroke service on 3/22/25. MRI revealed acute/subacute left-sided MCA, left lenticulostriate, and left-sided anterior choroidal artery distribution infarcts.    IMPRESSION: Left M1 occlusion s/p thrombectomy TICI 3 on 3/21/25. Multiple Left-sided MCA infarcts. Etiology likely cardioembolic in the setting of afib and off a/c for surgical procedure.     Hospital course notable for:    -AF w/ RVR: cardiology consulted for rate control: Metoprolol 50mg daily for rate control    -PPM interrogated w/ findings of brief episodes of pAF with RVR to 170s, all episodes occurred on 3/7/25.     -Holding AC for now until assess stroke burden. Plan to reinitiate AC (Eliquis 5mg BID) on 3/27/25.    -Dysphagia screen --> regular / mildly thick liquids. *******MBS 3/25***********    -A1C 5.7; pre-DM, f/u with PCP    -Thrombocytopenia, f/u with PCP    -Adrenal insufficiency, continue home prednisone (5mg daily/2mg qHS)      CORE MEASURES:       Admission NIHSS: 17     Tenecteplase : [] YES [X] NO     LD/A1C: 58/5.7     Depression Screen - if depression hx and/or present     Statin Therapy: lipitor 40mg     Dysphagia Screen: [X] PASS [] FAIL     Smoking in the past 12 months [] YES [X] NO     Afib [X] YES [] NO     Diabetes [] YES [X] NO     Stroke Education [x] YES [] NO      On 3/25/25, the patient and/or their family member/caretaker were provided with a brief summary and overview of the hospital course including admission date, diagnosis, surgery / procedure, complications if applicable, discharge instructions including but not limited to: incision care, signs/symptoms to look out for after discharge, home medications, new medications, patient / family education/training and instructions for follow up. Any concerns were addressed, and all questions were answered.    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 Presented to  ED on 3/21/25 at 0200 with AMS s/p unwitnessed fall off the toilet in the bathroom. Of note recently taken off Eliquis for melanoma removal and restarted 3/20/25, last dose 7PM. Per EMS noted with R sided weakness, aphasia and facial droop. Upon arrival to  ED patient went into Vtach. Initial NIHSS 19, stroke code activated, CT imagining found revealing L M1 occlusion. Pt transferred to [facility] EDUARDO eval, s/p cerebral angiogram for mechanical thrombectomy of L M1 occlusion, TICI 3 (one pass) on 3/21/25. Admitted to NSICU for close monitoring post thrombectomy. Accepted for transfer stroke service on 3/22/25. MRI revealed acute/subacute left-sided MCA, left lenticulostriate, and left-sided anterior choroidal artery distribution infarcts.    IMPRESSION: Left M1 occlusion s/p thrombectomy TICI 3 on 3/21/25. Multiple Left-sided MCA infarcts. Etiology likely cardioembolic in the setting of afib and off a/c for surgical procedure.     Hospital course notable for:    -AF w/ RVR: cardiology consulted for rate control: Metoprolol 50mg daily for rate control    -PPM interrogated w/ findings of brief episodes of pAF with RVR to 170s, all episodes occurred on 3/7/25.     -Holding AC for now until assess stroke burden. Plan to reinitiate AC (Eliquis 5mg BID) on 3/27/25.    -Dysphagia screen --> regular / mildly thick liquids. MBS 3/25/25 cleared for regular diet, thin liquids.     -A1C 5.7; pre-DM, f/u with PCP    -Thrombocytopenia, f/u with PCP    -Adrenal insufficiency, continue home prednisone (5mg daily/2mg qHS)      CORE MEASURES:       Admission NIHSS: 17     Tenecteplase : [] YES [X] NO     LD/A1C: 58/5.7     Depression Screen - if depression hx and/or present     Statin Therapy: continue home regimen simvastatin 10mg qhs     Dysphagia Screen: [X] PASS [] FAIL     Smoking in the past 12 months [] YES [X] NO     Afib [X] YES [] NO     Diabetes [] YES [X] NO     Stroke Education [x] YES [] NO      On 3/25/25, the patient and/or their family member/caretaker were provided with a brief summary and overview of the hospital course including admission date, diagnosis, surgery / procedure, complications if applicable, discharge instructions including but not limited to: incision care, signs/symptoms to look out for after discharge, home medications, new medications, patient / family education/training and instructions for follow up. Any concerns were addressed, and all questions were answered.

## 2025-03-24 NOTE — OCCUPATIONAL THERAPY INITIAL EVALUATION ADULT - PERTINENT HX OF CURRENT PROBLEM, REHAB EVAL
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 Presented to  ED on 3/21/25 with AMS s/p unwitnessed fall off the toilet. Of note recently taken off Eliquis for melanoma removal and restarted 3/20/25, last dose 7PM. Per EMS noted with R sided weakness, aphasia and facial droop. Upon arrival to  ED pt went into Vtach. Initial NIHSS 19, stroke code activated, CT imagining found revealing L M1 occlusion. Pt transferred to [facility] EDUARDO webb.

## 2025-03-24 NOTE — OCCUPATIONAL THERAPY INITIAL EVALUATION ADULT - ADDITIONAL COMMENTS
Pt has a shower with doors and no grab bars. Pt owns a transport w/c, RW and cane. Pt is R handed and drives. Pt independent PTA without device.

## 2025-03-24 NOTE — PROGRESS NOTE ADULT - SUBJECTIVE AND OBJECTIVE BOX
CC: Patient being seen for rehabilitation follow up.  Significant improvement in aphasia.  Patient able to speak and follow commands with less difficulty.     FUNCTIONAL PROGRESS  3/22 SLP  Speech Language Pathology Recommendations: 1. Continue Easy to chew solids, moderately thick fluids via small, single cup sips2. STRICT aspiration precautions 3. Oral care 4. Upright with PO 5. Assist with PO: slow rate of intake, small bites 6. Will follow to re-assess swallow & for speech tx as schedule permits 7. Speech tx services are RX following discharge     Need re-evals for AR    VITALS  T(C): 37.1 (03-24-25 @ 08:42), Max: 37.1 (03-23-25 @ 16:05)  HR: 99 (03-24-25 @ 09:00) (77 - 104)  BP: 124/88 (03-24-25 @ 09:00) (102/68 - 131/72)  RR: 18 (03-24-25 @ 09:00) (14 - 25)  SpO2: 94% (03-24-25 @ 09:00) (93% - 100%)  Wt(kg): --    MEDICATIONS   aspirin  chewable 81 milliGRAM(s) daily  atorvastatin 40 milliGRAM(s) at bedtime  enoxaparin Injectable 40 milliGRAM(s) <User Schedule>  hydrALAZINE Injectable 10 milliGRAM(s) every 2 hours PRN  labetalol Injectable 10 milliGRAM(s) every 2 hours PRN  metoprolol succinate ER 50 milliGRAM(s) daily  polyethylene glycol 3350 17 Gram(s) daily  predniSONE   Tablet 2 milliGRAM(s) at bedtime  predniSONE   Tablet 5 milliGRAM(s) daily  senna 2 Tablet(s) at bedtime      RECENT LABS/IMAGING  - Reviewed Today                        11.5   6.74  )-----------( 116      ( 24 Mar 2025 02:35 )             35.4     03-24    137  |  103  |  22.7[H]  ----------------------------<  103[H]  4.4   |  25.0  |  0.57    Ca    8.4      24 Mar 2025 02:35  Phos  3.4     03-24  Mg     2.0     03-24    TPro  5.3[L]  /  Alb  3.2[L]  /  TBili  0.9  /  DBili  x   /  AST  15  /  ALT  12  /  AlkPhos  38[L]  03-24      Urinalysis Basic - ( 24 Mar 2025 02:35 )    Color: x / Appearance: x / SG: x / pH: x  Gluc: 103 mg/dL / Ketone: x  / Bili: x / Urobili: x   Blood: x / Protein: x / Nitrite: x   Leuk Esterase: x / RBC: x / WBC x   Sq Epi: x / Non Sq Epi: x / Bacteria: x            CT PERFUSION 3/21 - Technical limitations: Motion artifact, suboptimal contrast bolus. Core infarction: 5 ml  Penumbra / tissue at risk for active ischemia: 0 ml    CTA NECK 3/21 - No evidence of significant stenosis or occlusion.    CTA HEAD 3/21 - Nonopacification of the left carotid terminus and proximal left A1 and M1 segments. Left anterior cerebral artery likely opacified via a patent anterior communicating artery. Some distal reconstitution of the distal left MCA distribution, although is markedly decreased blood vessel density when compared with the right side.    HEAD CT 3/21 - Mild chronic microvascular changes without evidence of an acute transcortical infarction or hemorrhage.    TTE 3/21 - 1. Patient tachycardic during entire study.   2. Left atrium is severely dilated.   3. Left ventricular systolic function is normal with an ejection fraction visually estimated at 65 to 70 %.   4. There is moderate (grade 2) left ventricular diastolic dysfunction, with elevated left ventricular filling pressure.   5. The right atrium is severely dilated.   6. Normal right ventricular cavity size and normal right ventricular systolic function.   7. Moderate mitral regurgitation.   8. Moderate tricuspid regurgitation.   9. Estimated pulmonary artery systolic pressure is 55 mmHg, consistent with moderate pulmonary hypertension.  10. No pericardial effusion seen.      ----------------------------------------------------------------------------------------  PHYSICAL EXAM  Constitutional - NAD, Appears Comfortable  Extremities - No C/C/E, No calf tenderness   Neurologic Exam -                    Cognitive - AAO to self      Communication - Intermittently nods head, Limited attempt to verbalize     Cranial Nerves - Right lip droop, Right field cut?, Able to turn head right     FUNCTIONAL MOTOR EXAM -                     LEFT    UE - ShAB 4/5, EF 4/5, EE 4/5, WE 4/5,  4/5                    RIGHT UE - ShAB 2/5, EF 3/5, EE 4/5, WE 3/5,  4/5                    LEFT    LE - HF 4/5, KE 4/5, DF 5/5                    RIGHT LE - HF 4/5, KE 4/5, DF 3/5  Psychiatric - Calm  ----------------------------------------------------------------------------------------  ASSESSMENT/PLAN  89yFemale with functional deficits after an acute CVA  Acute left CVA s/p thrombectomy TICI3 - ASA, Lipitor  HTN - Toprol, Hydralazine/Labetalol IV  Adrenal Insufficiency - Prednisone   DVT PPX - SCDs, Lovenox  Rehab/Impaired mobility and function - Patient continues to require hospitalization for the above diagnoses and ongoing active management of comorbid complications that are substantially posing a threat to bodily function, functional ability and quality of life.     RECOMMEND - OOB daily  Pending MRI.      When medically optimized, based on the patient's diagnosis, current functional status and potential for progress, recommend ACUTE inpatient rehabilitation for the functional deficits consisting of 3 hours of therapy/day & 24 hour RN/daily PMR physician for active comorbid medical management. Patient will be able to tolerate 3 hours a day.    Goals for acute inpatient rehab are to achieve modified independence with bed mobility, modified independence with transfers and modified independence with ambulation of 100' over an LOS of 14 days.    Will need a PT and OT follow-up within 48 hrs discharge.

## 2025-03-24 NOTE — OCCUPATIONAL THERAPY INITIAL EVALUATION ADULT - NS ASR FOLLOW COMMAND OT EVAL
cognitive impairments, delayed processing, problem solving and sequencing. Pt with R inattention and impaired safety awareness/obstacle negotiation noted/100% of the time

## 2025-03-24 NOTE — DISCHARGE NOTE PROVIDER - NPI NUMBER (FOR SYSADMIN USE ONLY) :
[[id number]],[[id number]] [[id number]],[UNKNOWN],[[id number]] [[id number]],[[id number]],[UNKNOWN],[[id number]]

## 2025-03-24 NOTE — DISCHARGE NOTE PROVIDER - NSDCACTIVITY_GEN_ALL_CORE
Do not drive or operate machinery/Do not make important decisions/No heavy lifting/straining Do not drive or operate machinery/Do not make important decisions/No heavy lifting/straining/Activity as tolerated

## 2025-03-24 NOTE — DISCHARGE NOTE PROVIDER - CARE PROVIDER_API CALL
Sona Mcclure  Neurology  [address] [place] [id number]  Phone: [phone]  Fax: [phone]  Follow Up Time: 1 month    Your Primary Care Provider,   Phone: (   )    -  Fax: (   )    -  Follow Up Time: 2 weeks   Sona Mcclure  Neurology  [address] [place] [id number]  Phone: [phone]  Fax: [phone]  Follow Up Time: 1 month    Your Primary Care Provider,   Phone: (   )    -  Fax: (   )    -  Follow Up Time: 2 weeks    Theo Dai  Neurosurgery  [address] [place] [id number]  Phone: [phone]  Fax: [phone]  Follow Up Time: 1 month   Sona Mcclure  Neurology  [address] [place] [address]  Phone: [phone]  Fax: [phone]  Follow Up Time: 1 month    Theo Dai  Neurosurgery  [address] [place] [id number]  Phone: [phone]  Fax: [phone]  Follow Up Time: 1 month    Your Primary Care Provider,   Phone: (   )    -  Fax: (   )    -  Follow Up Time: 2 weeks    Ronald Shepard  Cardiovascular Disease  [address] [place] [address]  Phone: [phone]  Fax: [phone]  Follow Up Time: 2 weeks

## 2025-03-24 NOTE — PROGRESS NOTE ADULT - ASSESSMENT
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 Presented to  ED on 3/21/25 at 0200 with AMS s/p unwitnessed fall off the toilet in the bathroom. Of note recently taken off Eliquis for melanoma removal and restarted 3/20/25, last dose 7PM. Per EMS noted with R sided weakness, aphasia and facial droop. Upon arrival to  ED patient went into Vtach. Initial NIHSS 19, stroke code activated, CT imagining found revealing L M1 occlusion. Pt transferred to [facility] EDUARDO eval, s/p cerebral angiogram for mechanical thrombectomy of L M1 occlusion, TICI 3 (one pass) on 3/21/25. Admitted to NSICU for close monitoring post thrombectomy. Accepted for transfer stroke service on 3/22/25.     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 Pending MRI to assess for infarct burden. Etiology likely cardioembolic in the setting of afib and off a/c for surgical procedure.     PLAN: PRELIMINARY PENDING VISIT    NEURO:   -Neurologically w/ slight expressive aphasia/dysarthria  -Continue close monitoring for neurologic deterioration    -Stroke neuro checks q 4hrs  -CTH as above  -MRI Brain pending - has PPM, need to coordinate with MRI   -TTE as above  -LE duplex negative for DVT   -Dysphagia screen: passed; ordered for easy to chew/moderately thick liquids dysphagia diet  -Physical therapy/OT/Speech eval/treatment.   #Stroke prevention:  -SBP goal ; avoid rapid fluctuations/hypotension; midodrine discontinued; continues on metoprolol 50mg daily  -ANTITHROMBOTIC THERAPY: Aspirin 81mg daily for now; hold AC until MRI for stroke delineation  -LDL 58; lipitor 40mg; titrate statin to LDL goal less than 70  -HA1C 5.7; tight glucose control; pre-diabetic, follow up with PMD on discharge for repeat testing, continue lifestyle/diet modification    CARDIOVASCULAR:  #Afib  #HTN  #PPM  -Cardiology consulted for atrial fibrillation w/ RVR; per consult note can titrate up to home dose of 100mg BID if needed for rate control.   -Metoprolol 50mg daily for rate control  -PPM interrogated w/ findings of brief episodes of pAF with RVR to 170s, all episodes occurred on 3/7/25. Device MRI contingent.   -TTE results as above  -cardiac monitoring w/ telemetry                 HEMATOLOGY:  #Thrombocytopenia  H/H 11.5/35.4, Platelets 116  -monitor platelets  -LE duplex negative for DVT   -patient should have all age and risk appropriate malignancy screenings with PCP or sooner if clinically suspected      DVT ppx: Heparin s.c [] LMWH [X]     PULMONARY:   -CXR on admission negative  -protecting airway, saturating well on room air    RENAL:   BUN/Cr 22.7/0.57  -monitor BUN/Cr  -monitor urine output  -maintain adequate hydration       Na Goal:  135-145     Rodriguez: N/A    ID:   -afebrile  -no leukocytosis  -monitor for si/sx of infection     OTHER:  condition and plan of care d/w patient, questions and concerns addressed.     DISPOSITION: Rehab or home depending on PT eval once stable and workup is complete      CORE MEASURES:       Admission NIHSS: 17     Tenecteplase : [] YES [X] NO     LD/A1C: 58/5.7     Depression Screen- if depression hx and/or present     Statin Therapy: lipitor 40mg     Dysphagia Screen: [X] PASS [] FAIL     Smoking in the past 12 months [] YES [X] NO     Afib [X] YES [] NO     Diabetes [] YES [X] NO     Stroke Education [] YES [] NO [X] pending    Obtain screening lower extremity venous ultrasound in patients who meet 1 or more of the following criteria as patient is high risk for DVT/PE on admission:   [] History of DVT/PE  []Hypercoagulable states (Factor V Leiden, Cancer, OCP, etc. 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 Presented to  ED on 3/21/25 at 0200 with AMS s/p unwitnessed fall off the toilet in the bathroom. Of note recently taken off Eliquis for melanoma removal and restarted 3/20/25, last dose 7PM. Per EMS noted with R sided weakness, aphasia and facial droop. Upon arrival to  ED patient went into Vtach. Initial NIHSS 19, stroke code activated, CT imagining found revealing L M1 occlusion. Pt transferred to [facility] EDUARDO eval, s/p cerebral angiogram for mechanical thrombectomy of L M1 occlusion, TICI 3 (one pass) on 3/21/25. Admitted to NSICU for close monitoring post thrombectomy. Accepted for transfer stroke service on 3/22/25.     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 Pending MRI to assess for infarct burden. Etiology likely cardioembolic in the setting of afib and off a/c for surgical procedure.     PLAN: PRELIMINARY PENDING VISIT    NEURO:   -Neurologically w/ slight expressive aphasia/dysarthria  -Continue close monitoring for neurologic deterioration    -Stroke neuro checks q 4hrs  -CTH as above  -MRI Brain pending - has PPM, need to coordinate with MRI   -TTE as above  -LE duplex negative for DVT   -Dysphagia screen: passed; ordered for easy to chew/moderately thick liquids dysphagia diet  -Physical therapy/OT/Speech eval/treatment.   #Stroke prevention:  -SBP goal ; avoid rapid fluctuations/hypotension; midodrine discontinued; continues on metoprolol 50mg daily  -ANTITHROMBOTIC THERAPY: Aspirin 81mg daily for now; hold AC until MRI for stroke delineation  -LDL 58; lipitor 40mg; titrate statin to LDL goal less than 70  -HA1C 5.7; tight glucose control; pre-diabetic, follow up with PMD on discharge for repeat testing, continue lifestyle/diet modification    CARDIOVASCULAR:  #Afib  #HTN  #PPM  -Cardiology consulted for atrial fibrillation w/ RVR; per consult note can titrate up to home dose of 100mg BID if needed for rate control.   -Metoprolol 50mg daily for rate control  -PPM interrogated w/ findings of brief episodes of pAF with RVR to 170s, all episodes occurred on 3/7/25. Device MRI contingent.   -TTE results as above  -cardiac monitoring w/ telemetry                 HEMATOLOGY:  #Thrombocytopenia  H/H 11.5/35.4, Platelets 116  -monitor platelets  -LE duplex negative for DVT   -patient should have all age and risk appropriate malignancy screenings with PCP or sooner if clinically suspected      DVT ppx: Heparin s.c [] LMWH [X]     PULMONARY:   -CXR on admission negative  -protecting airway, saturating well on room air    RENAL:   #Adrenal insufficiency  BUN/Cr 22.7/0.57  -continue home prednisone (5mg daily/2mg qHS)  -monitor BUN/Cr  -monitor urine output  -maintain adequate hydration       Na Goal:  135-145     Rodriguez: N/A    ID:   -afebrile  -no leukocytosis  -monitor for si/sx of infection     OTHER:  condition and plan of care d/w patient, questions and concerns addressed.     DISPOSITION: Rehab or home depending on PT eval once stable and workup is complete      CORE MEASURES:       Admission NIHSS: 17     Tenecteplase : [] YES [X] NO     LD/A1C: 58/5.7     Depression Screen- if depression hx and/or present     Statin Therapy: lipitor 40mg     Dysphagia Screen: [X] PASS [] FAIL     Smoking in the past 12 months [] YES [X] NO     Afib [X] YES [] NO     Diabetes [] YES [X] NO     Stroke Education [] YES [] NO [X] pending    Obtain screening lower extremity venous ultrasound in patients who meet 1 or more of the following criteria as patient is high risk for DVT/PE on admission:   [] History of DVT/PE  []Hypercoagulable states (Factor V Leiden, Cancer, OCP, etc. 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 Presented to  ED on 3/21/25 at 0200 with AMS s/p unwitnessed fall off the toilet in the bathroom. Of note recently taken off Eliquis for melanoma removal and restarted 3/20/25, last dose 7PM. Per EMS noted with R sided weakness, aphasia and facial droop. Upon arrival to  ED patient went into Vtach. Initial NIHSS 19, stroke code activated, CT imagining found revealing L M1 occlusion. Pt transferred to [facility] EDUARDO eval, s/p cerebral angiogram for mechanical thrombectomy of L M1 occlusion, TICI 3 (one pass) on 3/21/25. Admitted to NSICU for close monitoring post thrombectomy. Accepted for transfer stroke service on 3/22/25.     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 Pending MRI to assess for infarct burden. Etiology likely cardioembolic in the setting of afib and off a/c for surgical procedure.     PLAN:     NEURO:   -Neurologically w/ slight expressive aphasia/dysarthria  -Continue close monitoring for neurologic deterioration    -Stroke neuro checks q 4hrs  -CTH as above  -MRI Brain pending - has PPM, need to coordinate with MRI  -Plan for repeat CTH today to evaluate for interval change as still pending MRI Brain  -TTE as above  -LE duplex negative for DVT   -Dysphagia screen: passed; ST recommending regular/mildy thick liquids dysphagia diet as of 3/24 and Video swallow study  -Physical therapy/OT/Speech eval/treatment.   #Stroke prevention:  -SBP goal ; avoid rapid fluctuations/hypotension; midodrine discontinued; continues on metoprolol 50mg daily  -ANTITHROMBOTIC THERAPY: Aspirin 81mg daily for now; hold AC until MRI for stroke delineation  -LDL 58; lipitor 40mg; titrate statin to LDL goal less than 70  -HA1C 5.7; tight glucose control; pre-diabetic, follow up with PMD on discharge for repeat testing, continue lifestyle/diet modification    CARDIOVASCULAR:  #Afib  #HTN  #PPM  -Cardiology consulted for atrial fibrillation w/ RVR; per consult note can titrate up to home dose of 100mg BID if needed for rate control.   -Metoprolol 50mg daily for rate control  -PPM interrogated w/ findings of brief episodes of pAF with RVR to 170s, all episodes occurred on 3/7/25. Device MRI contingent.   -TTE results as above  -cardiac monitoring w/ telemetry                 HEMATOLOGY:  #Thrombocytopenia  H/H 11.5/35.4, Platelets 116  -monitor platelets  -LE duplex negative for DVT   -patient should have all age and risk appropriate malignancy screenings with PCP or sooner if clinically suspected      DVT ppx: Heparin s.c [] LMWH [X]     PULMONARY:   -CXR on admission negative  -protecting airway, saturating well on room air    RENAL:   #Adrenal insufficiency  BUN/Cr 22.7/0.57  -continue home prednisone (5mg daily/2mg qHS)  -monitor BUN/Cr  -monitor urine output  -maintain adequate hydration       Na Goal:  135-145     Rodriguez: N/A    ID:   -afebrile  -no leukocytosis  -monitor for si/sx of infection     OTHER:  condition and plan of care d/w patient, questions and concerns addressed.     DISPOSITION: Rehab or home depending on PT eval once stable and workup is complete      CORE MEASURES:       Admission NIHSS: 17     Tenecteplase : [] YES [X] NO     LD/A1C: 58/5.7     Depression Screen- if depression hx and/or present     Statin Therapy: lipitor 40mg     Dysphagia Screen: [X] PASS [] FAIL     Smoking in the past 12 months [] YES [X] NO     Afib [X] YES [] NO     Diabetes [] YES [X] NO     Stroke Education [] YES [] NO [X] pending    Obtain screening lower extremity venous ultrasound in patients who meet 1 or more of the following criteria as patient is high risk for DVT/PE on admission:   [] History of DVT/PE  []Hypercoagulable states (Factor V Leiden, Cancer, OCP, etc. 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 Presented to  ED on 3/21/25 at 0200 with AMS s/p unwitnessed fall off the toilet in the bathroom. Of note recently taken off Eliquis for melanoma removal and restarted 3/20/25, last dose 7PM. Per EMS noted with R sided weakness, aphasia and facial droop. Upon arrival to  ED patient went into Vtach. Initial NIHSS 19, stroke code activated, CT imagining found revealing L M1 occlusion. Pt transferred to [facility] EDUARDO eval, s/p cerebral angiogram for mechanical thrombectomy of L M1 occlusion, TICI 3 (one pass) on 3/21/25. Admitted to NSICU for close monitoring post thrombectomy. Accepted for transfer stroke service on 3/22/25.     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 Pending MRI to assess for infarct burden. Etiology likely cardioembolic in the setting of afib and off a/c for surgical procedure.     PLAN:     NEURO:   -Neurologically w/ slight expressive aphasia/dysarthria, mild R hemiparesis  -Continue close monitoring for neurologic deterioration    -Stroke neuro checks q 4hrs  -CTH as above  -MRI Brain pending - has PPM, need to coordinate with MRI  -Plan for repeat CTH today to evaluate for interval change as still pending MRI Brain  -TTE as above  -LE duplex negative for DVT   -Dysphagia screen: passed; ST recommending regular/mildy thick liquids dysphagia diet as of 3/24 and Video swallow study  -Physical therapy/OT/Speech eval/treatment.   #Stroke prevention:  -SBP goal ; avoid rapid fluctuations/hypotension; midodrine discontinued; continues on metoprolol 50mg daily  -ANTITHROMBOTIC THERAPY: Aspirin 81mg daily for now; hold AC until MRI for stroke delineation  -LDL 58; lipitor 40mg; titrate statin to LDL goal less than 70  -HA1C 5.7; tight glucose control; pre-diabetic, follow up with PMD on discharge for repeat testing, continue lifestyle/diet modification    CARDIOVASCULAR:  #Afib  #HTN  #PPM  -Cardiology consulted for atrial fibrillation w/ RVR; per consult note can titrate up to home dose of 100mg BID if needed for rate control.   -Metoprolol 50mg daily for rate control  -PPM interrogated w/ findings of brief episodes of pAF with RVR to 170s, all episodes occurred on 3/7/25. Device MRI contingent.   -TTE results as above  -cardiac monitoring w/ telemetry                 HEMATOLOGY:  #Thrombocytopenia  H/H 11.5/35.4, Platelets 116  -monitor platelets  -LE duplex negative for DVT   -patient should have all age and risk appropriate malignancy screenings with PCP or sooner if clinically suspected      DVT ppx: Heparin s.c [] LMWH [X]     PULMONARY:   -CXR on admission negative  -protecting airway, saturating well on room air    RENAL:   #Adrenal insufficiency  BUN/Cr 22.7/0.57  -continue home prednisone (5mg daily/2mg qHS)  -monitor BUN/Cr  -monitor urine output  -maintain adequate hydration       Na Goal:  135-145     Rodriguez: N/A    ID:   -afebrile  -no leukocytosis  -monitor for si/sx of infection     OTHER:  condition and plan of care d/w patient, questions and concerns addressed.     DISPOSITION: Rehab or home depending on PT eval once stable and workup is complete      CORE MEASURES:       Admission NIHSS: 17     Tenecteplase : [] YES [X] NO     LD/A1C: 58/5.7     Depression Screen- if depression hx and/or present     Statin Therapy: lipitor 40mg     Dysphagia Screen: [X] PASS [] FAIL     Smoking in the past 12 months [] YES [X] NO     Afib [X] YES [] NO     Diabetes [] YES [X] NO     Stroke Education [] YES [] NO [X] pending    Obtain screening lower extremity venous ultrasound in patients who meet 1 or more of the following criteria as patient is high risk for DVT/PE on admission:   [] History of DVT/PE  []Hypercoagulable states (Factor V Leiden, Cancer, OCP, etc. )  []Prolonged immobility (hemiplegia/hemiparesis/post operative or any other extended immobilization)  [] Transferred from outside facility (Rehab or Long term care)  [] Age </= to 50  [X] stroke

## 2025-03-24 NOTE — DIETITIAN INITIAL EVALUATION ADULT - PERTINENT LABORATORY DATA
03-24    137  |  103  |  22.7[H]  ----------------------------<  103[H]  4.4   |  25.0  |  0.57    Ca    8.4      24 Mar 2025 02:35  Phos  3.4     03-24  Mg     2.0     03-24    TPro  5.3[L]  /  Alb  3.2[L]  /  TBili  0.9  /  DBili  x   /  AST  15  /  ALT  12  /  AlkPhos  38[L]  03-24  A1C with Estimated Average Glucose Result: 5.7 % (03-22-25 @ 03:15)

## 2025-03-24 NOTE — OCCUPATIONAL THERAPY INITIAL EVALUATION ADULT - IMPAIRED TRANSFERS: SIT/STAND, REHAB EVAL
100
decreased carry over of safety for transfers/impaired balance/cognition/impaired coordination/decreased strength

## 2025-03-24 NOTE — DISCHARGE NOTE PROVIDER - CARE PROVIDERS DIRECT ADDRESSES
,[email or web address],DirectAddress_Unknown ,[email or web address],[email or web address],[email or web address],DirectAddress_Unknown,[email or web address]

## 2025-03-24 NOTE — DISCHARGE NOTE PROVIDER - NSDCCPTREATMENT_GEN_ALL_CORE_FT
PRINCIPAL PROCEDURE  Procedure: Thrombectomy, mechanical, artery  Findings and Treatment: Left M1 occlusion

## 2025-03-24 NOTE — DIETITIAN INITIAL EVALUATION ADULT - PERTINENT MEDS FT
MEDICATIONS  (STANDING):  aspirin  chewable 81 milliGRAM(s) Oral daily  atorvastatin 40 milliGRAM(s) Oral at bedtime  enoxaparin Injectable 40 milliGRAM(s) SubCutaneous <User Schedule>  metoprolol succinate ER 50 milliGRAM(s) Oral daily  polyethylene glycol 3350 17 Gram(s) Oral daily  predniSONE   Tablet 2 milliGRAM(s) Oral at bedtime  senna 2 Tablet(s) Oral at bedtime    MEDICATIONS  (PRN):  hydrALAZINE Injectable 10 milliGRAM(s) IV Push every 2 hours PRN SBP>140  labetalol Injectable 10 milliGRAM(s) IV Push every 2 hours PRN SBP>140

## 2025-03-24 NOTE — OCCUPATIONAL THERAPY INITIAL EVALUATION ADULT - GENERAL OBSERVATIONS, REHAB EVAL
Pt left as received seated in bedside chair, NAD, +IV, +Tele//BP, +chair alarm, +b/l VCB, on RA A&Ox4 with son and daughter at bedside. Pt c/o 0/10 pain. Pt agreeable to OT evaluation

## 2025-03-24 NOTE — DISCHARGE NOTE PROVIDER - PROVIDER TOKENS
PROVIDER:[TOKEN:[[id number]],FOLLOWUP:[1 month]],FREE:[LAST:[Your Primary Care Provider],PHONE:[(   )    -],FAX:[(   )    -],FOLLOWUP:[2 weeks]] PROVIDER:[TOKEN:[[id number]],FOLLOWUP:[1 month]],FREE:[LAST:[Your Primary Care Provider],PHONE:[(   )    -],FAX:[(   )    -],FOLLOWUP:[2 weeks]],PROVIDER:[TOKEN:[[id number]],FOLLOWUP:[1 month]] PROVIDER:[TOKEN:[[id number]],FOLLOWUP:[1 month]],PROVIDER:[TOKEN:[[id number]],FOLLOWUP:[1 month]],FREE:[LAST:[Your Primary Care Provider],PHONE:[(   )    -],FAX:[(   )    -],FOLLOWUP:[2 weeks]],PROVIDER:[TOKEN:[[id number]],FOLLOWUP:[2 weeks]]

## 2025-03-24 NOTE — OCCUPATIONAL THERAPY INITIAL EVALUATION ADULT - LIVES WITH, PROFILE
Pt lives with son in a 2-story house with 1 RASHIDA and 2 steps to living room +additional 12 steps with handrail to bed/bath. Pt can stay main level bedroom but only has access to 1/2 bath will need to go upstairs to shower. Pt son is retired able to assist 24/7/children

## 2025-03-24 NOTE — DISCHARGE NOTE PROVIDER - NSDCFUSCHEDAPPT_GEN_ALL_CORE_FT
Perez Russo  [facility] Physician [facility]  S[facility]ON[facility] [address] [facility]  Scheduled Appointment: 04/02/2025

## 2025-03-24 NOTE — DISCHARGE NOTE PROVIDER - NSDCCPCAREPLAN_GEN_ALL_CORE_FT
PRINCIPAL DISCHARGE DIAGNOSIS  Diagnosis: Acute ischemic stroke  Assessment and Plan of Treatment: You were admitted to the hospital because you had a ischemic stroke.   You have these risks factors of strokes:  -atrial fibrillation  -high blood pressure (hypertension)  -pre-diabetes  -high cholesterol (also called hyperlipidemia)  For secondary stroke prevention please take your medications as prescribed. If you run low on your medication, please contact your doctor before you run out.  You will follow up outpatient with the vascular neurology team, the office will call you within 3 days of discharge to schedule an appointment.  If you do not hear from the office please call the phone number provided.    Please see all regularly scheduled providers as prior to your admission. Please see your primary care doctor within one week of discharge.  In addition discuss with your primary care provider regarding candidacy for routine malignancy screenings.   Adjustments to your regular tasks may be difficult after you've had a stroke, but you can utilize  new ways/assistance as needed to manage your daily activities based on the recommendations of your physical, occupational, speech and language team if applicable.    Call 911 if you or someone you know experiences the following symptoms of stroke (can be remembered by BE FAST):  •Balance: Dizziness, loss of balance, or a sense of falling  •Eyes: Sudden double vision, loss of vision, or blurred vision  •Face: drooping of one side of the face  •Arm: arm weakness or drifting  •Speech:  Sudden trouble talking or slurred speech, trouble understanding others  •Time: Time to call for an ambulance fast!     PRINCIPAL DISCHARGE DIAGNOSIS  Diagnosis: Acute ischemic stroke  Assessment and Plan of Treatment: You were admitted to the hospital because you had a ischemic stroke.   You have these risks factors of strokes:  -atrial fibrillation  -high blood pressure (hypertension)  -pre-diabetes  -high cholesterol (also called hyperlipidemia)  For secondary stroke prevention please take your medications as prescribed. If you run low on your medication, please contact your doctor before you run out.  You will follow up outpatient with the vascular neurology team, the office will call you within 3 days of discharge to schedule an appointment.  If you do not hear from the office please call the phone number provided.    Please see all regularly scheduled providers as prior to your admission. Please see your primary care doctor within one week of discharge.  In addition discuss with your primary care provider regarding candidacy for routine malignancy screenings.   Adjustments to your regular tasks may be difficult after you've had a stroke, but you can utilize  new ways/assistance as needed to manage your daily activities based on the recommendations of your physical, occupational, speech and language team if applicable.    Call 911 if you or someone you know experiences the following symptoms of stroke (can be remembered by BE FAST):  •Balance: Dizziness, loss of balance, or a sense of falling  •Eyes: Sudden double vision, loss of vision, or blurred vision  •Face: drooping of one side of the face  •Arm: arm weakness or drifting  •Speech:  Sudden trouble talking or slurred speech, trouble understanding others  •Time: Time to call for an ambulance fast!  Please take your last dose of Aspirin on 3/26.  Please start taking Eliquis 5mg twice a day on 3/27.

## 2025-03-25 VITALS
OXYGEN SATURATION: 95 % | DIASTOLIC BLOOD PRESSURE: 59 MMHG | TEMPERATURE: 98 F | HEART RATE: 92 BPM | SYSTOLIC BLOOD PRESSURE: 102 MMHG | RESPIRATION RATE: 18 BRPM

## 2025-03-25 LAB
ANION GAP SERPL CALC-SCNC: 10 MMOL/L — SIGNIFICANT CHANGE UP (ref 5–17)
BUN SERPL-MCNC: 21.9 MG/DL — HIGH (ref 8–20)
CALCIUM SERPL-MCNC: 8.9 MG/DL — SIGNIFICANT CHANGE UP (ref 8.4–10.5)
CHLORIDE SERPL-SCNC: 103 MMOL/L — SIGNIFICANT CHANGE UP (ref 96–108)
CO2 SERPL-SCNC: 27 MMOL/L — SIGNIFICANT CHANGE UP (ref 22–29)
CREAT SERPL-MCNC: 0.52 MG/DL — SIGNIFICANT CHANGE UP (ref 0.5–1.3)
EGFR: 89 ML/MIN/1.73M2 — SIGNIFICANT CHANGE UP
EGFR: 89 ML/MIN/1.73M2 — SIGNIFICANT CHANGE UP
FSP PPP-MCNC: >=5 <20 UG/ML
GLUCOSE SERPL-MCNC: 96 MG/DL — SIGNIFICANT CHANGE UP (ref 70–99)
HCT VFR BLD CALC: 37.2 % — SIGNIFICANT CHANGE UP (ref 34.5–45)
HGB BLD-MCNC: 12 G/DL — SIGNIFICANT CHANGE UP (ref 11.5–15.5)
MAGNESIUM SERPL-MCNC: 1.8 MG/DL — SIGNIFICANT CHANGE UP (ref 1.6–2.6)
MCHC RBC-ENTMCNC: 31.1 PG — SIGNIFICANT CHANGE UP (ref 27–34)
MCHC RBC-ENTMCNC: 32.3 G/DL — SIGNIFICANT CHANGE UP (ref 32–36)
MCV RBC AUTO: 96.4 FL — SIGNIFICANT CHANGE UP (ref 80–100)
NRBC # BLD AUTO: 0 K/UL — SIGNIFICANT CHANGE UP (ref 0–0)
NRBC # FLD: 0 K/UL — SIGNIFICANT CHANGE UP (ref 0–0)
NRBC BLD AUTO-RTO: 0 /100 WBCS — SIGNIFICANT CHANGE UP (ref 0–0)
PHOSPHATE SERPL-MCNC: 3.1 MG/DL — SIGNIFICANT CHANGE UP (ref 2.4–4.7)
PLATELET # BLD AUTO: 137 K/UL — LOW (ref 150–400)
PMV BLD: 10.5 FL — SIGNIFICANT CHANGE UP (ref 7–13)
POTASSIUM SERPL-MCNC: 3.8 MMOL/L — SIGNIFICANT CHANGE UP (ref 3.5–5.3)
POTASSIUM SERPL-SCNC: 3.8 MMOL/L — SIGNIFICANT CHANGE UP (ref 3.5–5.3)
RBC # BLD: 3.86 M/UL — SIGNIFICANT CHANGE UP (ref 3.8–5.2)
RBC # FLD: 13.8 % — SIGNIFICANT CHANGE UP (ref 10.3–14.5)
SODIUM SERPL-SCNC: 140 MMOL/L — SIGNIFICANT CHANGE UP (ref 135–145)
WBC # BLD: 6.37 K/UL — SIGNIFICANT CHANGE UP (ref 3.8–10.5)
WBC # FLD AUTO: 6.37 K/UL — SIGNIFICANT CHANGE UP (ref 3.8–10.5)

## 2025-03-25 PROCEDURE — 74230 X-RAY XM SWLNG FUNCJ C+: CPT | Mod: 26

## 2025-03-25 PROCEDURE — 74230 X-RAY XM SWLNG FUNCJ C+: CPT

## 2025-03-25 PROCEDURE — 85027 COMPLETE CBC AUTOMATED: CPT

## 2025-03-25 PROCEDURE — C1894: CPT

## 2025-03-25 PROCEDURE — 71045 X-RAY EXAM CHEST 1 VIEW: CPT

## 2025-03-25 PROCEDURE — 71045 X-RAY EXAM CHEST 1 VIEW: CPT | Mod: 26

## 2025-03-25 PROCEDURE — 85384 FIBRINOGEN ACTIVITY: CPT

## 2025-03-25 PROCEDURE — C1757: CPT

## 2025-03-25 PROCEDURE — 61645 PERQ ART M-THROMBECT &/NFS: CPT

## 2025-03-25 PROCEDURE — 85362 FIBRIN DEGRADATION PRODUCTS: CPT

## 2025-03-25 PROCEDURE — 93306 TTE W/DOPPLER COMPLETE: CPT

## 2025-03-25 PROCEDURE — 99233 SBSQ HOSP IP/OBS HIGH 50: CPT

## 2025-03-25 PROCEDURE — 97116 GAIT TRAINING THERAPY: CPT

## 2025-03-25 PROCEDURE — 97530 THERAPEUTIC ACTIVITIES: CPT

## 2025-03-25 PROCEDURE — 97163 PT EVAL HIGH COMPLEX 45 MIN: CPT

## 2025-03-25 PROCEDURE — 84443 ASSAY THYROID STIM HORMONE: CPT

## 2025-03-25 PROCEDURE — C1887: CPT

## 2025-03-25 PROCEDURE — 85025 COMPLETE CBC W/AUTO DIFF WBC: CPT

## 2025-03-25 PROCEDURE — 83735 ASSAY OF MAGNESIUM: CPT

## 2025-03-25 PROCEDURE — 70551 MRI BRAIN STEM W/O DYE: CPT | Mod: MC

## 2025-03-25 PROCEDURE — 87640 STAPH A DNA AMP PROBE: CPT

## 2025-03-25 PROCEDURE — 87641 MR-STAPH DNA AMP PROBE: CPT

## 2025-03-25 PROCEDURE — C1769: CPT

## 2025-03-25 PROCEDURE — 93970 EXTREMITY STUDY: CPT

## 2025-03-25 PROCEDURE — 92611 MOTION FLUOROSCOPY/SWALLOW: CPT

## 2025-03-25 PROCEDURE — 83036 HEMOGLOBIN GLYCOSYLATED A1C: CPT

## 2025-03-25 PROCEDURE — 84100 ASSAY OF PHOSPHORUS: CPT

## 2025-03-25 PROCEDURE — C1760: CPT

## 2025-03-25 PROCEDURE — 92507 TX SP LANG VOICE COMM INDIV: CPT

## 2025-03-25 PROCEDURE — 92523 SPEECH SOUND LANG COMPREHEN: CPT

## 2025-03-25 PROCEDURE — 85730 THROMBOPLASTIN TIME PARTIAL: CPT

## 2025-03-25 PROCEDURE — 85610 PROTHROMBIN TIME: CPT

## 2025-03-25 PROCEDURE — 86850 RBC ANTIBODY SCREEN: CPT

## 2025-03-25 PROCEDURE — 74018 RADEX ABDOMEN 1 VIEW: CPT

## 2025-03-25 PROCEDURE — 97167 OT EVAL HIGH COMPLEX 60 MIN: CPT

## 2025-03-25 PROCEDURE — 70551 MRI BRAIN STEM W/O DYE: CPT | Mod: 26

## 2025-03-25 PROCEDURE — 86901 BLOOD TYPING SEROLOGIC RH(D): CPT

## 2025-03-25 PROCEDURE — 86900 BLOOD TYPING SEROLOGIC ABO: CPT

## 2025-03-25 PROCEDURE — 92610 EVALUATE SWALLOWING FUNCTION: CPT

## 2025-03-25 PROCEDURE — C9399: CPT

## 2025-03-25 PROCEDURE — 92526 ORAL FUNCTION THERAPY: CPT

## 2025-03-25 PROCEDURE — 80061 LIPID PANEL: CPT

## 2025-03-25 PROCEDURE — 80048 BASIC METABOLIC PNL TOTAL CA: CPT

## 2025-03-25 PROCEDURE — 80053 COMPREHEN METABOLIC PANEL: CPT

## 2025-03-25 PROCEDURE — 70450 CT HEAD/BRAIN W/O DYE: CPT | Mod: MC

## 2025-03-25 PROCEDURE — 36415 COLL VENOUS BLD VENIPUNCTURE: CPT

## 2025-03-25 RX ORDER — ASPIRIN 325 MG
1 TABLET ORAL
Qty: 0 | Refills: 0 | DISCHARGE
Start: 2025-03-25 | End: 2025-03-26

## 2025-03-25 RX ORDER — METOPROLOL SUCCINATE 50 MG/1
1 TABLET, EXTENDED RELEASE ORAL
Qty: 30 | Refills: 0
Start: 2025-03-25 | End: 2025-04-23

## 2025-03-25 RX ADMIN — PREDNISONE 5 MILLIGRAM(S): 20 TABLET ORAL at 05:42

## 2025-03-25 RX ADMIN — POLYETHYLENE GLYCOL 3350 17 GRAM(S): 17 POWDER, FOR SOLUTION ORAL at 11:22

## 2025-03-25 RX ADMIN — Medication 81 MILLIGRAM(S): at 11:22

## 2025-03-25 RX ADMIN — METOPROLOL SUCCINATE 50 MILLIGRAM(S): 50 TABLET, EXTENDED RELEASE ORAL at 05:42

## 2025-03-25 NOTE — PROGRESS NOTE ADULT - SUBJECTIVE AND OBJECTIVE BOX
CC: Patient being seen for rehabilitation follow up.  Patient feels well.  Making progress with function and now plan is home.     FUNCTIONAL PROGRESS  3/24 SLP  SLP Treatment: Linguistic  SLP Treatment: Linguistic Rehab Effort: good  SLP Treatment: Linguistic Treatment Detail: Informal re-assessment completed. Pt with mild deficits in receptive language (improved yes/no reliability & command following continue to be noted) & overall mild to moderate expressive language deficits (fair to good awareness). Ongoing difficulty with confrontation naming, although much improved from initial eval (pt achieved 70% this encounter & benefits from phonemic cues). Mild dysarthria with reduced vocal intensity noted. Carryover tasks discussed with family with an understanding verbalized. Will follow    3/24 PT  Sit-Stand Transfer Training  Transfer Training Sit-to-Stand Transfer: weight-bearing as tolerated   rolling walker;  supervsion;  1 person assist  Transfer Training Stand-to-Sit Transfer: supervsion;  1 person assist;  weight-bearing as tolerated   rolling walker  Sit-to-Stand Transfer Training Transfer Safety Analysis: cognitive, decreased safety awareness;  verbal cues needed for hand position ;  rolling walker    Gait Training  Gait Training: supervsion;  weight-bearing as tolerated   rolling walker;  100 feet  Gait Analysis: 2-point gait   right sided inattention, verbal cues needed for obstacle negociation. ;  100 feet;  rolling walker    Stair Training  Physical Assist/Nonphysical Assist: supervision  Weight-Bearing Restrictions: weight-bearing as tolerated  Assistive Device: right rail up;  step stool in rinaldi; Per pt's son, pt can stay on main level and avoid 12 steps inside.   Number of Stairs: 3   3/24 OT  Bathing Training:     · Level of [place]	minimum assist (75% patients effort)    Upper Body Dressing Training:     · Level of [place]	contact guard    Lower Body Dressing Training:     · Level of [place]	contact guard; minimum assist (75% patients effort)    Toilet Hygiene Training:     · Level of [place]	contact guard    Grooming Training:     · Level of [place]	contact guard    Eating/Self-Feeding Training:     · Level of [place]	independent    VITALS  T(C): 36.6 (03-25-25 @ 08:00), Max: 37 (03-24-25 @ 21:53)  HR: 92 (03-25-25 @ 08:00) (87 - 109)  BP: 123/69 (03-25-25 @ 08:00) (104/58 - 142/84)  RR: 18 (03-25-25 @ 08:00) (18 - 24)  SpO2: 96% (03-25-25 @ 08:00) (93% - 100%)  Wt(kg): --    MEDICATIONS   aspirin  chewable 81 milliGRAM(s) daily  atorvastatin 40 milliGRAM(s) at bedtime  enoxaparin Injectable 40 milliGRAM(s) <User Schedule>  hydrALAZINE Injectable 10 milliGRAM(s) every 2 hours PRN  labetalol Injectable 10 milliGRAM(s) every 2 hours PRN  metoprolol succinate ER 50 milliGRAM(s) daily  polyethylene glycol 3350 17 Gram(s) daily  predniSONE   Tablet 2 milliGRAM(s) at bedtime  predniSONE   Tablet 5 milliGRAM(s) daily  senna 2 Tablet(s) at bedtime      RECENT LABS/IMAGING  - Reviewed Today                        12.0   6.37  )-----------( 137      ( 25 Mar 2025 07:54 )             37.2     03-25    140  |  103  |  21.9[H]  ----------------------------<  96  3.8   |  27.0  |  0.52    Ca    8.9      25 Mar 2025 07:54  Phos  3.1     03-25  Mg     1.8     03-25    TPro  5.3[L]  /  Alb  3.2[L]  /  TBili  0.9  /  DBili  x   /  AST  15  /  ALT  12  /  AlkPhos  38[L]  03-24      Urinalysis Basic - ( 25 Mar 2025 07:54 )    Color: x / Appearance: x / SG: x / pH: x  Gluc: 96 mg/dL / Ketone: x  / Bili: x / Urobili: x   Blood: x / Protein: x / Nitrite: x   Leuk Esterase: x / RBC: x / WBC x   Sq Epi: x / Non Sq Epi: x / Bacteria: x              CT PERFUSION 3/21 - Technical limitations: Motion artifact, suboptimal contrast bolus. Core infarction: 5 ml  Penumbra / tissue at risk for active ischemia: 0 ml    CTA NECK 3/21 - No evidence of significant stenosis or occlusion.    CTA HEAD 3/21 - Nonopacification of the left carotid terminus and proximal left A1 and M1 segments. Left anterior cerebral artery likely opacified via a patent anterior communicating artery. Some distal reconstitution of the distal left MCA distribution, although is markedly decreased blood vessel density when compared with the right side.    HEAD CT 3/21 - Mild chronic microvascular changes without evidence of an acute transcortical infarction or hemorrhage.    TTE 3/21 - 1. Patient tachycardic during entire study.   2. Left atrium is severely dilated.   3. Left ventricular systolic function is normal with an ejection fraction visually estimated at 65 to 70 %.   4. There is moderate (grade 2) left ventricular diastolic dysfunction, with elevated left ventricular filling pressure.   5. The right atrium is severely dilated.   6. Normal right ventricular cavity size and normal right ventricular systolic function.   7. Moderate mitral regurgitation.   8. Moderate tricuspid regurgitation.   9. Estimated pulmonary artery systolic pressure is 55 mmHg, consistent with moderate pulmonary hypertension.  10. No pericardial effusion seen.    HEAD CT 3/24 - 1.  Subacute left MCA infarct, without acute intracranial hemorrhage.  ----------------------------------------------------------------------------------------  PHYSICAL EXAM  Constitutional - NAD, Appears Comfortable  Extremities - No C/C/E, No calf tenderness   Neurologic Exam -                    Cognitive - AAO to self      Communication - Intermittently nods head, Limited attempt to verbalize     Cranial Nerves - Right lip droop, Right field cut?, Able to turn head right     FUNCTIONAL MOTOR EXAM -                     LEFT    UE - ShAB 4/5, EF 4/5, EE 4/5, WE 4/5,  4/5                    RIGHT UE - ShAB 3/5, EF 3/5, EE 4/5, WE 3/5,  4/5                    LEFT    LE - HF 4/5, KE 4/5, DF 5/5                    RIGHT LE - HF 4/5, KE 4/5, DF 3/5  Psychiatric - Calm  ----------------------------------------------------------------------------------------  ASSESSMENT/PLAN  89yFemale with functional deficits after an acute CVA  Acute left CVA s/p thrombectomy TICI3 - ASA, Lipitor  HTN - Toprol, Recommend DC Hydralazine/Labetalol IV  Adrenal Insufficiency - Prednisone   DVT PPX - SCDs, Lovenox  Rehab/Impaired mobility and function - Patient continues to require hospitalization for the above diagnoses and ongoing active management of comorbid complications that are substantially posing a threat to bodily function, functional ability and quality of life.     RECOMMEND - OOB daily  Pending MRI.     Patient making functional progress for DC HOME.

## 2025-03-25 NOTE — CHART NOTE - NSCHARTNOTEFT_GEN_A_CORE
Patient requires a commode due to having a stroke. Patient is confined to one room without a bathroom

## 2025-03-25 NOTE — CHART NOTE - NSCHARTNOTESELECT_GEN_ALL_CORE
Cardiology/Event Note
Downgrade Note/Transfer Note
NeuroInterventional Surgery Pre Procedure Note/Event Note
Nutrition Services
NeuroInterventional Surgery Post Procedure Note/Event Note
neuro/Event Note

## 2025-03-25 NOTE — SWALLOW VFSS/MBS ASSESSMENT ADULT - DIAGNOSTIC IMPRESSIONS
Mild oral dysphagia across trialed consistencies with reduced lingual strength & coordination, HOWEVER, overall functional given age.  Functional pharyngeal stage of swallow for assessed trials, Trace to mild valleculae stasis noted, however overall functional. Study negative for penetration &/or aspiration.     OF NOTE, pt with baseline throat clearing & noted post intake for thin fluids: no PO observed in airway

## 2025-03-25 NOTE — PROGRESS NOTE ADULT - ASSESSMENT
INCOMPLETE NOTE *********    ASSESSMENT:   89 year old female with PMHx of chronic Afib (on Eliquis), PPM, HTN, HLD, GERD, severe MR/TR, iatrogenic adrenal insufficiency (on prednisone), lymphedema, metastatic melanoma s/p Left foot melanoma and lymph node resection, s/p RUE melanoma and Right axillary lymph node excision at [facility] (3/18/25) presented to  ED 3/21/25 at 0200 with AMS s/p unwitnessed fall off the toilet in the bathroom. As per son Philip, patient last known well was 10PM on 3/20/25 prior to going to the bathroom. EMS noted patient with Right sided weakness, aphasia and facial droop. Upon arrival to  ED patient went into [facility], given Amiodarone 150mg. Initial NIHSS 19, stroke code activated, imaging revealed Left M1 occlusion. Neuro IR consulted and patient transferred to [facility] for neuro IR evaluation. Now s/p mechanical thrombectomy with TICI 3 reperfusion.  Patient recently off Eliquis for melanoma removal and restarted day prior to arrival. Admitted to neuro ICU for post procedure monitoring, stroke team consulted.    IMPRESSION: Left M1 occlusion s/p thrombectomy TICI 3 on 3/21/25. Left MCA infarct noted on CTH. Pending MRI to assess for infarct burden. Etiology likely cardioembolic in the setting of afib and off a/c for surgical procedure.     NEURO: #Left M1 occlusion #Left MCA stroke  -Neurologically w/ slight expressive aphasia/dysarthria, mild Right hemiparesis  -Continue close monitoring for neurologic deterioration    -Stroke neuro checks q 4hrs  -Initial CTH as above  -Repeat CTH obtained in the interim pending MRI; subacute Left MCA infarct  -??MRI Brain pending - has PPM, need to coordinate with MRI??  -TTE as above  -LE duplex negative for DVT   -Dysphagia screen: passed; ST recommending regular/mildy thick liquids dysphagia diet as of 3/24/25 and ? video swallow study  -Physical therapy/OT/Speech eval/treatment.   -SBP goal ; avoid rapid fluctuations/hypotension; midodrine discontinued; continues on metoprolol 50mg daily  -ANTITHROMBOTIC THERAPY: Aspirin 81mg daily for now; hold AC until MRI for stroke delineation  -LDL 58; lipitor 40mg; titrate statin to LDL goal less than 70  -HA1C 5.7; tight glucose control; pre-diabetic, follow up with PMD on discharge for repeat testing, continue lifestyle/diet modification    CARDIOVASCULAR: #Afib #HTN #PPM  -Cardiology consulted for atrial fibrillation w/ RVR; per consult note can titrate up to home dose of 100mg BID if needed for rate control.   -Continue Metoprolol 50mg daily for rate control  -PPM interrogated w/ findings of brief episodes of pAF with RVR to 170s, all episodes occurred on 3/7/25. Device MRI contingent.   -Cardiac monitoring w/ telemetry                 HEMATOLOGY: #Thrombocytopenia  H/H 11.5/35.4, Platelets 116  -Monitor platelets  -LE duplex negative for DVT   -Patient should have all age and risk appropriate malignancy screenings with PCP or sooner if clinically suspected   -DVT ppx: Heparin s.c [] LMWH [X]     PULMONARY:   -CXR on admission negative  -protecting airway, saturating well on room air    RENAL: #Adrenal insufficiency  BUN/Cr 22.7/0.57  -continue home prednisone (5mg daily/2mg qHS)  -monitor BUN/Cr  -monitor urine output  -maintain adequate hydration    -Na Goal:  135-145  -Rodriguez: N/A    ID:   -afebrile  -no leukocytosis  -monitor for si/sx of infection     OTHER: Condition and plan of care d/w patient, questions and concerns addressed.     DISPOSITION: Rehab or home depending on PT eval once stable and workup is complete      CORE MEASURES:    -Admission NIHSS: 17  -Tenecteplase : [] YES [x] NO  -LDL/A1C: 58/5.7  -Depression Screen - if depression hx and/or present  -Statin Therapy: lipitor 40mg  -Dysphagia Screen: [x] PASS [] FAIL  -Stroke Education [x] YES [] NO  -Smoking in the past 12 months [] YES [x] NO  -Afib [x] YES [] NO  -Diabetes [] YES [x] NO    If patient has Diabetes, are they prescribed cardioprotective antihyperglycemic medication (GLP-1 receptor agonist or SGLT-2 inhibitor) on discharge [] YES [] NO    If NO:  [] Patient/Family refusal after risk/benefit discussion  [] Allergy/Intolerance  [] Not appropriate at this time, need to follow up with PCP or Endocrinology outpatient to consider initiation after further evaluation of clinical status  [] HgA1C > 9% indicating poor glycemic control and therefore would not be appropriate at this time  [] Needs prior authorization - needs follow up with PCP or endocrine to obtain approval  [] SGLT-2 inhibitor not appropriate given recent ketoacidosis  [] History of bladder cancer (SGLT-2 contraindicated)  [] History or Family History of medullary thyroid tumor (GLP-1 receptor agonist contraindicated)  [] History of pancreatitis (GLP-1 receptor agonist contraindicated)    Obtain screening lower extremity venous ultrasound in patients who meet 1 or more of the following criteria as patient is high risk for DVT/PE on admission:   [] History of DVT/PE  []Hypercoagulable states (Factor V Leiden, Cancer, OCP, etc. )  []Prolonged immobility (hemiplegia/hemiparesis/post operative or any other extended immobilization)  [] Transferred from outside facility (Rehab or Long term care)  [] Age </= to 50  [X] Stroke ASSESSMENT:   89 year old female with PMHx of chronic Afib (on Eliquis), PPM, HTN, HLD, GERD, severe MR/TR, iatrogenic adrenal insufficiency (on prednisone), lymphedema, metastatic melanoma s/p Left foot melanoma and lymph node resection, s/p RUE melanoma and [facility]) presented to  ED 3/21/25 at 0200 with AMS s/p unwitnessed fall off the toilet in the bathroom. As per son Philip, patient last known well was 10PM on 3/20/25 prior to going to the bathroom. EMS noted patient with Right sided weakness, aphasia and facial droop. Upon arrival to  ED patient went into [facility], given Amiodarone 150mg. Initial NIHSS 19, stroke code activated, imaging revealed Left M1 occlusion. Neuro IR consulted and patient transferred to [facility] for neuro IR evaluation. Now s/p mechanical thrombectomy with TICI 3 reperfusion.  Patient recently off Eliquis for melanoma removal and restarted day prior to arrival. Admitted to neuro ICU for post procedure monitoring, stroke team consulted.    IMPRESSION: Left M1 occlusion s/p thrombectomy TICI 3 on 3/21/25. Left MCA infarct noted on CTH. Pending MRI to assess for infarct burden. Etiology likely cardioembolic in the setting of afib and off a/c for surgical procedure.     NEURO: #Left M1 occlusion #Left MCA stroke  -Neurologically w/ slight expressive aphasia/dysarthria, mild Right hemiparesis  -Continue close monitoring for neurologic deterioration    -Stroke neuro checks q 4hrs  -Initial CTH as above. Repeat CTH stable  -MRI head as above: Left MCA infarcts  -SBP goal ; avoid rapid fluctuations/hypotension; midodrine discontinued; continues on metoprolol 50mg daily  -ANTITHROMBOTIC THERAPY: plan for AC reinitiation with Eliquis 5mg BID on 3/27/25; continue ASA for now, d/c at time of AC resumption.   -LDL 58; lipitor 40mg; titrate statin to LDL goal less than 70  -HA1C 5.7; tight glucose control; pre-diabetic, follow up with PMD on discharge for repeat testing, continue lifestyle/diet modification  -Dysphagia screen: passed; MBS 3/25/25 clear for regular diet, thin liquids  -Physical therapy/OT/Speech eval/treatment.     CARDIOVASCULAR: #Afib #HTN #PPM  -Cardiology consulted for atrial fibrillation w/ RVR; per consult note can titrate up to home dose of 100mg BID if needed for rate control.   -Continue Metoprolol 50mg daily for rate control  -PPM interrogated w/ findings of brief episodes of pAF with RVR to 170s, all episodes occurred on 3/7/25. Device MRI contingent.   -Cardiac monitoring w/ telemetry            HEMATOLOGY: #Thrombocytopenia  H/H 11.5/35.4, Platelets 116  -Monitor platelets  -LE duplex negative for DVT   -Patient should have all age and risk appropriate malignancy screenings with PCP or sooner if clinically suspected   -DVT ppx: Heparin s.c [] LMWH [X]     PULMONARY:   -CXR on admission negative  -protecting airway, saturating well on room air    RENAL: #Adrenal insufficiency  BUN/Cr 22.7/0.57  -continue home prednisone (5mg daily/2mg qHS)  -monitor BUN/Cr  -monitor urine output  -maintain adequate hydration    -Na Goal:  135-145  -Rodriguez: N/A    ID:   -afebrile  -no leukocytosis  -monitor for si/sx of infection     OTHER: Condition and plan of care d/w patient, questions and concerns addressed.     DISPOSITION: Home with home care once stable and workup is complete      CORE MEASURES:    -Admission NIHSS: 17  -Tenecteplase : [] YES [x] NO  -LDL/A1C: 58/5.7  -Depression Screen - if depression hx and/or present  -Statin Therapy: lipitor 40mg  -Dysphagia Screen: [x] PASS [] FAIL  -Stroke Education [x] YES [] NO  -Smoking in the past 12 months [] YES [x] NO  -Afib [x] YES [] NO  -Diabetes [] YES [x] NO    If patient has Diabetes, are they prescribed cardioprotective antihyperglycemic medication (GLP-1 receptor agonist or SGLT-2 inhibitor) on discharge [] YES [] NO    If NO:  [] Patient/Family refusal after risk/benefit discussion  [] Allergy/Intolerance  [] Not appropriate at this time, need to follow up with PCP or Endocrinology outpatient to consider initiation after further evaluation of clinical status  [] HgA1C > 9% indicating poor glycemic control and therefore would not be appropriate at this time  [] Needs prior authorization - needs follow up with PCP or endocrine to obtain approval  [] SGLT-2 inhibitor not appropriate given recent ketoacidosis  [] History of bladder cancer (SGLT-2 contraindicated)  [] History or Family History of medullary thyroid tumor (GLP-1 receptor agonist contraindicated)  [] History of pancreatitis (GLP-1 receptor agonist contraindicated)    Obtain screening lower extremity venous ultrasound in patients who meet 1 or more of the following criteria as patient is high risk for DVT/PE on admission:   [] History of DVT/PE  []Hypercoagulable states (Factor V Leiden, Cancer, OCP, etc. )  []Prolonged immobility (hemiplegia/hemiparesis/post operative or any other extended immobilization)  [] Transferred from outside facility (Rehab or Long term care)  [] Age </= to 50  [X] Stroke

## 2025-03-25 NOTE — SWALLOW VFSS/MBS ASSESSMENT ADULT - SLP PERTINENT HISTORY OF CURRENT PROBLEM
Pt seen at bedside this admission for dysphagia. Last seen 3/24 with RX for regular diet, mildly thick fluids & MBS to objectively assess swallow

## 2025-03-25 NOTE — SWALLOW VFSS/MBS ASSESSMENT ADULT - SLP GENERAL OBSERVATIONS
Pt received & seen seated upright via stretcher, alert, aphasic, dysarthric, baseline throat clearing observed, pleasant/cooperative, on room air, 0/10 pain pre/post

## 2025-03-25 NOTE — SWALLOW VFSS/MBS ASSESSMENT ADULT - COMMENTS
As per MD note: "89 year old female with PMHx of Afib (on Eliquis), PPM, HTN, HLD, GERD, severe MR/TR, iatrogenic adrenal insufficiency (on prednisone), lymphedema, metastatic melanoma s/p Left foot melanoma and lymph node resection, s/p RUE melanoma and Right axillary lymph node excision at [facility] (3/18/25) presented to  ED 3/21/25 at 0200 with AMS s/p unwitnessed fall off the toilet in the bathroom. As per son Philip, patient last known well was 10PM on 3/20/25 prior to going to the bathroom. EMS noted patient with Right sided weakness, aphasia and facial droop. Upon arrival to  ED [facility], given Amiodarone 150mg. Initial NIHSS 19, stroke code activated, imaging revealed Left M1 occlusion. Neuro IR consulted and patient transferred to [facility] for neuro IR evaluation. Now s/p mechanical thrombectomy with TICI 3 reperfusion.  Patient recently off Eliquis for melanoma removal and restarted day prior to arrival."

## 2025-03-25 NOTE — SWALLOW VFSS/MBS ASSESSMENT ADULT - ORAL PHASE
Uncontrolled bolus / spillover in sunny-pharynx Uncontrolled bolus / spillover in sunny-pharynx/Uncontrolled bolus / spillover in hypopharynx

## 2025-03-25 NOTE — CHART NOTE - NSCHARTNOTEFT_GEN_A_CORE
Patient is here with embolic CVA from stopping AC for a planned left foot melanoma removal. S/p thrombectomy. MRI head today without hemorrhagic transformation. Once neuro allows for AC resumption please resume. No further cardiac testing planned. Outpatient follow up with Dr Floridalma Murguia.    Signing off

## 2025-03-25 NOTE — PROGRESS NOTE ADULT - SUBJECTIVE AND OBJECTIVE BOX
Preliminary note, official recommendations pending attending review/signature   Seen and examined by Stroke team attending/team, assessment/ plan as discussed with stroke team attending/team as noted.     [facility] Stroke Team  Progress Note     HPI:  89 year old female with PMHx of Afib (on Eliquis), PPM, HTN, HLD, GERD, severe MR/TR, iatrogenic adrenal insufficiency (on prednisone), lymphedema, metastatic melanoma s/p L foot melanoma and lymph node resection, s/p RUE melanoma and R axillary lymph node excision at [facility] (3/18/25) presented to  ED 3/21/25 at 0200 with AMS s/p unwitnessed fall off the toilet in the bathroom. As per son Philip, patient last known well was 10PM on 3/20/25 prior to going to the bathroom. EMS noted patient with R sided weakness, aphasia and facial droop. Upon arrival to  ED patient went into [facility], given Amiodarone 150mg. Initial NIHSS 19, stroke code activated, imaging revealed L M1 occlusion. Neuro IR consulted and patient transferred to [facility] for neuro IR evaluation. Now s/p mechanical thrombectomy with TICI 3 reperfusion.  Patient recently off Eliquis for melanoma removal and restarted day prior to arrival. Admitted to neuro ICU for post procedure monitoring, stroke team consulted.    SUBJECTIVE: No events overnight.  No new neurologic complaints.  ROS reported negative unless otherwise noted.    aspirin  chewable 81 milliGRAM(s) Oral daily  atorvastatin 40 milliGRAM(s) Oral at bedtime  enoxaparin Injectable 40 milliGRAM(s) SubCutaneous <User Schedule>  hydrALAZINE Injectable 10 milliGRAM(s) IV Push every 2 hours PRN  labetalol Injectable 10 milliGRAM(s) IV Push every 2 hours PRN  metoprolol succinate ER 50 milliGRAM(s) Oral daily  polyethylene glycol 3350 17 Gram(s) Oral daily  predniSONE   Tablet 2 milliGRAM(s) Oral at bedtime  predniSONE   Tablet 5 milliGRAM(s) Oral daily  senna 2 Tablet(s) Oral at bedtime      PHYSICAL EXAM:   Vital Signs Last 24 Hrs  T(C): 36.5 (25 Mar 2025 04:27), Max: 37.1 (24 Mar 2025 08:42)  T(F): 97.7 (25 Mar 2025 04:27), Max: 98.7 (24 Mar 2025 08:42)  HR: 109 (25 Mar 2025 04:27) (87 - 109)  BP: 142/84 (25 Mar 2025 04:27) (104/58 - 142/84)  BP(mean): 82 (24 Mar 2025 21:00) (66 - 100)  RR: 18 (25 Mar 2025 04:27) (18 - 25)  SpO2: 93% (25 Mar 2025 04:27) (93% - 100%)    Parameters below as of 25 Mar 2025 04:27  Patient On (Oxygen Delivery Method): nasal cannula      EXAM PENDING      General: No acute distress.   HEENT: Head normocephalic, atraumatic. Conjunctivae clear w/o exudates or hemorrhage. Sclera non-icteric  Neck:  Trachea midline. No JVD.  Cardiac: Irregularly irregular rhythm.   Respiratory:  Chest wall symmetric, nontender.   Abdominal: Soft, nondistended, nontender.   Skin: Skin is warm, dry and intact without rashes or lesions.   Extremities: No edema, SCDs in place bilateral lower extremities.     Detailed Neurologic Exam:    Mental status: Awake, alert, oriented x3; can recall events surrounding current hospitalization. Mild expressive aphasia w/ word finding hesitancy. Able to name objects, repeat sentences without difficulty.     Cranial nerves: Pupils equal and react symmetrically to light.  Extraocular motion is full with no nystagmus. Extraocular movements intact. No visual field deficit to confrontation. There is no ptosis. Slight left facial asymmetry. Palate elevates symmetrically. Tongue is midline. Slight dysarthria.     Motor: There is normal bulk and tone.  There is no tremor.  Strenght is 5-/5 in left upper extremity with slight pronation.   Strength is 5-/5 in left lower extremity.  Strength is 5/5 in right limbs.    Sensation: Sensation intact to light touch in all four extremities     Cerebellar: unable to assess    Gait : deferred    LABS:                        11.5   6.74  )-----------( 116      ( 24 Mar 2025 02:35 )             35.4    03-24    137  |  103  |  22.7[H]  ----------------------------<  103[H]  4.4   |  25.0  |  0.57    Ca    8.4      24 Mar 2025 02:35  Phos  3.4     03-24  Mg     2.0     03-24    TPro  5.3[L]  /  Alb  3.2[L]  /  TBili  0.9  /  DBili  x   /  AST  15  /  ALT  12  /  AlkPhos  38[L]  03-24        ipid Profile (03.22.25 @ 03:15):  Cholesterol: 136 mg/dL  Triglycerides, Serum: 56 mg/dL  HDL Cholesterol: 66 mg/dL  Non HDL Cholesterol: 70 mg/dL  LDL Cholesterol Calculated: 58 mg/dL  A1C with Estimated Average Glucose in AM (03.22.25 @ 03:15): 5.7 %    RADIOLOGY IMAGING & Additional Studies (Independently reviewed unless otherwise noted):    NEURO-IMAGING   CT Head No Cont (03.24.25 @ 14:35)   1.  Subacute left MCA infarct, without acute intracranial hemorrhage.    CT Head No Cont (03.21.25 @ 10:14)   IMPRESSION:  Mild chronic microvascular changes without evidence of an acute transcortical infarction or hemorrhage.     (03.21.25 @ 02:47)   CT PERFUSION: Technical limitations: Motion artifact, suboptimal contrast bolus. Core infarction: 5 ml. Penumbra / tissue at risk for active ischemia: 0 ml   CTA NECK: No evidence of significant stenosis or occlusion.  CTA HEAD: Nonopacification of the left carotid terminus and proximal left A1 and M1 segments. Left anterior cerebral artery likely opacified via a patent anterior communicating artery. Some distal reconstitution of the distal left MCA distribution, although is markedly decreased blood vessel density when compared with the right side.      CT Brain Stroke Protocol (03.21.25 @ 02:39)   IMPRESSION: No acute intracranial hemorrhage. Density in the region of the left carotid terminus concerning for thrombus, with question loss of gray-white differentiation in the left insula which may reflect acute infarct.  --  ULTRASOUND  US Duplex Venous Lower Ext Complete, Bilateral (03.21.25 @ 11:44)   IMPRESSION: Right common femoral vein could not be seen secondary to an overlying bandage, otherwise no evidence of deep venous thrombosis in the visualized bilateral lower extremity veins.  --  CARDIOLOGY  TTE W or WO Ultrasound Enhancing Agent (03.21.25 @ 11:05)   CONCLUSIONS:    1. Patient tachycardic during entire study.   2. Left atrium is severely dilated.   3. Left ventricular systolic function is normal with an ejection fraction visually estimated at 65 to 70 %.   4. There is moderate (grade 2) left ventricular diastolic dysfunction, with elevated left ventricular filling pressure.   5. The right atrium is severely dilated.   6. Normal right ventricular cavity size and normal right ventricular systolic function.   7. Moderate mitral regurgitation.   8. Moderate tricuspid regurgitation.   9. Estimated pulmonary artery systolic pressure is 55 mmHg, consistent with moderate pulmonary hypertension.  10. No pericardial effusion seen.    --  XRAY  Xray Abdomen 1 View PORTABLE -Urgent (Xray Abdomen 1 View PORTABLE -Urgent .) (03.21.25 @ 09:06)   No acute radiographic abdominal findings.     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 Stable   chest findings. No fracture.     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 Stable   chest findings. No fracture.           Preliminary note, official recommendations pending attending review/signature   Seen and examined by Stroke team attending/team, assessment/ plan as discussed with stroke team attending/team as noted.     [facility] Stroke Team  Progress Note     INCOMPLETE NOTE *********    HPI:  89 year old female with PMHx of Afib (on Eliquis), PPM, HTN, HLD, GERD, severe MR/TR, iatrogenic adrenal insufficiency (on prednisone), lymphedema, metastatic melanoma s/p Left foot melanoma and lymph node resection, s/p RUE melanoma and Right axillary lymph node excision at [facility] (3/18/25) presented to  ED 3/21/25 at 0200 with AMS s/p unwitnessed fall off the toilet in the bathroom. As per son Philip, patient last known well was 10PM on 3/20/25 prior to going to the bathroom. EMS noted patient with Right sided weakness, aphasia and facial droop. Upon arrival to  ED patient went into [facility], given Amiodarone 150mg. Initial NIHSS 19, stroke code activated, imaging revealed Left M1 occlusion. Neuro IR consulted and patient transferred to [facility] for neuro IR evaluation. Now s/p mechanical thrombectomy with TICI 3 reperfusion.  Patient recently off Eliquis for melanoma removal and restarted day prior to arrival. Admitted to neuro ICU for post procedure monitoring, stroke team consulted.    SUBJECTIVE: No events overnight.  No new neurologic complaints.  ROS reported negative unless otherwise noted.    aspirin  chewable 81 milliGRAM(s) Oral daily  atorvastatin 40 milliGRAM(s) Oral at bedtime  enoxaparin Injectable 40 milliGRAM(s) SubCutaneous <User Schedule>  hydrALAZINE Injectable 10 milliGRAM(s) IV Push every 2 hours PRN  labetalol Injectable 10 milliGRAM(s) IV Push every 2 hours PRN  metoprolol succinate ER 50 milliGRAM(s) Oral daily  polyethylene glycol 3350 17 Gram(s) Oral daily  predniSONE   Tablet 2 milliGRAM(s) Oral at bedtime  predniSONE   Tablet 5 milliGRAM(s) Oral daily  senna 2 Tablet(s) Oral at bedtime      PHYSICAL EXAM:   Vital Signs Last 24 Hrs  T(C): 36.5 (25 Mar 2025 04:27), Max: 37.1 (24 Mar 2025 08:42)  T(F): 97.7 (25 Mar 2025 04:27), Max: 98.7 (24 Mar 2025 08:42)  HR: 109 (25 Mar 2025 04:27) (87 - 109)  BP: 142/84 (25 Mar 2025 04:27) (104/58 - 142/84)  BP(mean): 82 (24 Mar 2025 21:00) (66 - 100)  RR: 18 (25 Mar 2025 04:27) (18 - 25)  SpO2: 93% (25 Mar 2025 04:27) (93% - 100%)    Parameters below as of 25 Mar 2025 04:27  Patient On (Oxygen Delivery Method): nasal cannula      EXAM PENDING    General: No acute distress.   HEENT: Head normocephalic, atraumatic. Conjunctivae clear w/o exudates or hemorrhage. Sclera non-icteric  Neck:  Trachea midline. No JVD.  Cardiac: Irregularly irregular rhythm.   Respiratory:  Chest wall symmetric, nontender.   Abdominal: Soft, nondistended, nontender.   Skin: Skin is warm, dry and intact without rashes or lesions.   Extremities: No edema, SCDs in place bilateral lower extremities.     Detailed Neurologic Exam:    Mental status: Awake, alert, oriented x3; can recall events surrounding current hospitalization. Mild expressive aphasia w/ word finding hesitancy. Able to name objects, repeat sentences without difficulty.     Cranial nerves: Pupils equal and react symmetrically to light.  Extraocular motion is full with no nystagmus. Extraocular movements intact. No visual field deficit to confrontation. There is no ptosis. Slight left facial asymmetry. Palate elevates symmetrically. Tongue is midline. Slight dysarthria.     Motor: There is normal bulk and tone.  There is no tremor.  Strenght is 5-/5 in left upper extremity with slight pronation.   Strength is 5-/5 in left lower extremity.  Strength is 5/5 in right limbs.    Sensation: Sensation intact to light touch in all four extremities     Cerebellar: unable to assess    Gait : deferred    LABS:                        11.5   6.74  )-----------( 116      ( 24 Mar 2025 02:35 )             35.4    03-24    137  |  103  |  22.7[H]  ----------------------------<  103[H]  4.4   |  25.0  |  0.57    Ca    8.4      24 Mar 2025 02:35  Phos  3.4     03-24  Mg     2.0     03-24    TPro  5.3[L]  /  Alb  3.2[L]  /  TBili  0.9  /  DBili  x   /  AST  15  /  ALT  12  /  AlkPhos  38[L]  03-24    Lipid Profile (03.22.25 @ 03:15):  Cholesterol: 136 mg/dL  Triglycerides, Serum: 56 mg/dL  HDL Cholesterol: 66 mg/dL  Non HDL Cholesterol: 70 mg/dL  LDL Cholesterol Calculated: 58 mg/dL  A1C with Estimated Average Glucose in AM (03.22.25 @ 03:15): 5.7 %    RADIOLOGY IMAGING & Additional Studies (Independently reviewed unless otherwise noted):    NEURO-IMAGING   CT Head No Cont (03.24.25 @ 14:35)   1.  Subacute left MCA infarct, without acute intracranial hemorrhage.    CT Head No Cont (03.21.25 @ 10:14)   IMPRESSION:  Mild chronic microvascular changes without evidence of an acute transcortical infarction or hemorrhage.     (03.21.25 @ 02:47)   CT PERFUSION: Technical limitations: Motion artifact, suboptimal contrast bolus. Core infarction: 5 ml. Penumbra / tissue at risk for active ischemia: 0 ml   CTA NECK: No evidence of significant stenosis or occlusion.  CTA HEAD: Nonopacification of the left carotid terminus and proximal left A1 and M1 segments. Left anterior cerebral artery likely opacified via a patent anterior communicating artery. Some distal reconstitution of the distal left MCA distribution, although is markedly decreased blood vessel density when compared with the right side.    CT Brain Stroke Protocol (03.21.25 @ 02:39)   IMPRESSION: No acute intracranial hemorrhage. Density in the region of the left carotid terminus concerning for thrombus, with question loss of gray-white differentiation in the left insula which may reflect acute infarct.  --  ULTRASOUND  US Duplex Venous Lower Ext Complete, Bilateral (03.21.25 @ 11:44)   IMPRESSION: Right common femoral vein could not be seen secondary to an overlying bandage, otherwise no evidence of deep venous thrombosis in the visualized bilateral lower extremity veins.  --  CARDIOLOGY  TTE W or WO Ultrasound Enhancing Agent (03.21.25 @ 11:05)   CONCLUSIONS:    1. Patient tachycardic during entire study.   2. Left atrium is severely dilated.   3. Left ventricular systolic function is normal with an ejection fraction visually estimated at 65 to 70 %.   4. There is moderate (grade 2) left ventricular diastolic dysfunction, with elevated left ventricular filling pressure.   5. The right atrium is severely dilated.   6. Normal right ventricular cavity size and normal right ventricular systolic function.   7. Moderate mitral regurgitation.   8. Moderate tricuspid regurgitation.   9. Estimated pulmonary artery systolic pressure is 55 mmHg, consistent with moderate pulmonary hypertension.  10. No pericardial effusion seen.  --  XRAY  Xray Abdomen 1 View PORTABLE -Urgent (Xray Abdomen 1 View PORTABLE -Urgent .) (03.21.25 @ 09:06)   No acute radiographic abdominal findings.     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 Stable   chest findings. No fracture.     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 Stable   chest findings. No fracture. Preliminary note, official recommendations pending attending review/signature   Seen and examined by Stroke team attending/team, assessment/ plan as discussed with stroke team attending/team as noted.     [facility] Stroke Team  Progress Note     HPI:  89 year old female with PMHx of Afib (on Eliquis), PPM, HTN, HLD, GERD, severe MR/TR, iatrogenic adrenal insufficiency (on prednisone), lymphedema, metastatic melanoma s/p Left foot melanoma and lymph node resection, s/p RUE melanoma and Right axillary lymph node excision at [facility] (3/18/25) presented to  ED 3/21/25 at 0200 with AMS s/p unwitnessed fall off the toilet in the bathroom. As per son Philip, patient last known well was 10PM on 3/20/25 prior to going to the bathroom. EMS noted patient with Right sided weakness, aphasia and facial droop. Upon arrival to  ED patient went into [facility], given Amiodarone 150mg. Initial NIHSS 19, stroke code activated, imaging revealed Left M1 occlusion. Neuro IR consulted and patient transferred to [facility] for neuro IR evaluation. Now s/p mechanical thrombectomy with TICI 3 reperfusion.  Patient recently off Eliquis for melanoma removal and restarted day prior to arrival. Admitted to neuro ICU for post procedure monitoring, stroke team consulted.    SUBJECTIVE: No events overnight.  No new neurologic complaints.  ROS reported negative unless otherwise noted.    aspirin  chewable 81 milliGRAM(s) Oral daily  atorvastatin 40 milliGRAM(s) Oral at bedtime  enoxaparin Injectable 40 milliGRAM(s) SubCutaneous <User Schedule>  hydrALAZINE Injectable 10 milliGRAM(s) IV Push every 2 hours PRN  labetalol Injectable 10 milliGRAM(s) IV Push every 2 hours PRN  metoprolol succinate ER 50 milliGRAM(s) Oral daily  polyethylene glycol 3350 17 Gram(s) Oral daily  predniSONE   Tablet 2 milliGRAM(s) Oral at bedtime  predniSONE   Tablet 5 milliGRAM(s) Oral daily  senna 2 Tablet(s) Oral at bedtime      PHYSICAL EXAM:   Vital Signs Last 24 Hrs  T(C): 36.5 (25 Mar 2025 04:27), Max: 37.1 (24 Mar 2025 08:42)  T(F): 97.7 (25 Mar 2025 04:27), Max: 98.7 (24 Mar 2025 08:42)  HR: 109 (25 Mar 2025 04:27) (87 - 109)  BP: 142/84 (25 Mar 2025 04:27) (104/58 - 142/84)  BP(mean): 82 (24 Mar 2025 21:00) (66 - 100)  RR: 18 (25 Mar 2025 04:27) (18 - 25)  SpO2: 93% (25 Mar 2025 04:27) (93% - 100%)    Parameters below as of 25 Mar 2025 04:27  Patient On (Oxygen Delivery Method): nasal cannula    General: No acute distress.   HEENT: Head normocephalic, atraumatic. Conjunctivae clear w/o exudates or hemorrhage. Sclera non-icteric  Neck:  Trachea midline. No JVD.  Cardiac: Irregularly irregular rhythm.   Respiratory:  Chest wall symmetric, nontender.   Abdominal: Soft, nondistended, nontender.   Skin: Skin is warm, dry and intact without rashes or lesions.   Extremities: No edema, SCDs in place bilateral lower extremities.     Detailed Neurologic Exam:    Mental status: Awake, alert, oriented x3; can recall events surrounding current hospitalization. Mild expressive aphasia w/ word finding hesitancy. Able to name objects, repeat sentences without difficulty.     Cranial nerves: Pupils equal and react symmetrically to light.  Extraocular motion is full with no nystagmus. Extraocular movements intact. No visual field deficit to confrontation. There is no ptosis. Slight left facial asymmetry. Palate elevates symmetrically. Tongue is midline. Slight dysarthria.     Motor: There is normal bulk and tone.  There is no tremor.  Strenght is 5-/5 in left upper extremity with slight pronation.   Strength is 5-/5 in left lower extremity.  Strength is 4+/5 Right elbow, 5-/5 Right knee ; mild RUE drift    Sensation: Sensation intact to light touch in all four extremities     Cerebellar: unable to assess    Gait : deferred    LABS:                        11.5   6.74  )-----------( 116      ( 24 Mar 2025 02:35 )             35.4    03-24    137  |  103  |  22.7[H]  ----------------------------<  103[H]  4.4   |  25.0  |  0.57    Ca    8.4      24 Mar 2025 02:35  Phos  3.4     03-24  Mg     2.0     03-24    TPro  5.3[L]  /  Alb  3.2[L]  /  TBili  0.9  /  DBili  x   /  AST  15  /  ALT  12  /  AlkPhos  38[L]  03-24    Lipid Profile (03.22.25 @ 03:15):  Cholesterol: 136 mg/dL  Triglycerides, Serum: 56 mg/dL  HDL Cholesterol: 66 mg/dL  Non HDL Cholesterol: 70 mg/dL  LDL Cholesterol Calculated: 58 mg/dL  A1C with Estimated Average Glucose in AM (03.22.25 @ 03:15): 5.7 %    RADIOLOGY IMAGING & Additional Studies (Independently reviewed unless otherwise noted):    NEURO-IMAGING   MR Head No Cont (03.25.25 @ 09:13)  IMPRESSION: Evolving acute/subacute left-sided MCA, left   lenticulostriate, and left-sided anterior choroidal artery distribution   infarcts with associated cytotoxic edema and without hemorrhagic   transformation.    CT Head No Cont (03.24.25 @ 14:35)   1.  Subacute left MCA infarct, without acute intracranial hemorrhage.    CT Head No Cont (03.21.25 @ 10:14)   IMPRESSION:  Mild chronic microvascular changes without evidence of an acute transcortical infarction or hemorrhage.     (03.21.25 @ 02:47)   CT PERFUSION: Technical limitations: Motion artifact, suboptimal contrast bolus. Core infarction: 5 ml. Penumbra / tissue at risk for active ischemia: 0 ml   CTA NECK: No evidence of significant stenosis or occlusion.  CTA HEAD: Nonopacification of the left carotid terminus and proximal left A1 and M1 segments. Left anterior cerebral artery likely opacified via a patent anterior communicating artery. Some distal reconstitution of the distal left MCA distribution, although is markedly decreased blood vessel density when compared with the right side.    CT Brain Stroke Protocol (03.21.25 @ 02:39)   IMPRESSION: No acute intracranial hemorrhage. Density in the region of the left carotid terminus concerning for thrombus, with question loss of gray-white differentiation in the left insula which may reflect acute infarct.  --  ULTRASOUND  US Duplex Venous Lower Ext Complete, Bilateral (03.21.25 @ 11:44)   IMPRESSION: Right common femoral vein could not be seen secondary to an overlying bandage, otherwise no evidence of deep venous thrombosis in the visualized bilateral lower extremity veins.  --  CARDIOLOGY  TTE W or WO Ultrasound Enhancing Agent (03.21.25 @ 11:05)   CONCLUSIONS:    1. Patient tachycardic during entire study.   2. Left atrium is severely dilated.   3. Left ventricular systolic function is normal with an ejection fraction visually estimated at 65 to 70 %.   4. There is moderate (grade 2) left ventricular diastolic dysfunction, with elevated left ventricular filling pressure.   5. The right atrium is severely dilated.   6. Normal right ventricular cavity size and normal right ventricular systolic function.   7. Moderate mitral regurgitation.   8. Moderate tricuspid regurgitation.   9. Estimated pulmonary artery systolic pressure is 55 mmHg, consistent with moderate pulmonary hypertension.  10. No pericardial effusion seen.  --  XRAY  Xray Abdomen 1 View PORTABLE -Urgent (Xray Abdomen 1 View PORTABLE -Urgent .) (03.21.25 @ 09:06)   No acute radiographic abdominal findings.     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 Stable   chest findings. No fracture.     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 Stable   chest findings. No fracture.   [facility] Stroke Team  Progress Note     HPI:  89 year old female with PMHx of Afib (on Eliquis), PPM, HTN, HLD, GERD, severe MR/TR, iatrogenic adrenal insufficiency (on prednisone), lymphedema, metastatic melanoma s/p Left foot melanoma and lymph node resection, s/p RUE melanoma and Right axillary lymph node excision at [facility] (3/18/25) presented to  ED 3/21/25 at 0200 with AMS s/p unwitnessed fall off the toilet in the bathroom. As per son Philip, patient last known well was 10PM on 3/20/25 prior to going to the bathroom. EMS noted patient with Right sided weakness, aphasia and facial droop. Upon arrival to  ED patient went into [facility], given Amiodarone 150mg. Initial NIHSS 19, stroke code activated, imaging revealed Left M1 occlusion. Neuro IR consulted and patient transferred to [facility] for neuro IR evaluation. Now s/p mechanical thrombectomy with TICI 3 reperfusion.  Patient recently off Eliquis for melanoma removal and restarted day prior to arrival. Admitted to neuro ICU for post procedure monitoring, stroke team consulted.    SUBJECTIVE: No events overnight.  No new neurologic complaints.  ROS reported negative unless otherwise noted.    aspirin  chewable 81 milliGRAM(s) Oral daily  atorvastatin 40 milliGRAM(s) Oral at bedtime  enoxaparin Injectable 40 milliGRAM(s) SubCutaneous <User Schedule>  hydrALAZINE Injectable 10 milliGRAM(s) IV Push every 2 hours PRN  labetalol Injectable 10 milliGRAM(s) IV Push every 2 hours PRN  metoprolol succinate ER 50 milliGRAM(s) Oral daily  polyethylene glycol 3350 17 Gram(s) Oral daily  predniSONE   Tablet 2 milliGRAM(s) Oral at bedtime  predniSONE   Tablet 5 milliGRAM(s) Oral daily  senna 2 Tablet(s) Oral at bedtime      PHYSICAL EXAM:   Vital Signs Last 24 Hrs  T(C): 36.5 (25 Mar 2025 04:27), Max: 37.1 (24 Mar 2025 08:42)  T(F): 97.7 (25 Mar 2025 04:27), Max: 98.7 (24 Mar 2025 08:42)  HR: 109 (25 Mar 2025 04:27) (87 - 109)  BP: 142/84 (25 Mar 2025 04:27) (104/58 - 142/84)  BP(mean): 82 (24 Mar 2025 21:00) (66 - 100)  RR: 18 (25 Mar 2025 04:27) (18 - 25)  SpO2: 93% (25 Mar 2025 04:27) (93% - 100%)    Parameters below as of 25 Mar 2025 04:27  Patient On (Oxygen Delivery Method): nasal cannula    General: No acute distress.   HEENT: Head normocephalic, atraumatic. Conjunctivae clear w/o exudates or hemorrhage. Sclera non-icteric  Neck:  Trachea midline. No JVD.  Cardiac: Irregularly irregular rhythm.   Respiratory:  Chest wall symmetric, nontender.   Abdominal: Soft, nondistended, nontender.   Skin: Skin is warm, dry and intact without rashes or lesions.   Extremities: No edema, SCDs in place bilateral lower extremities.     Detailed Neurologic Exam:    Mental status: Awake, alert, oriented x3; can recall events surrounding current hospitalization. Mild expressive aphasia w/ word finding hesitancy. Able to name objects, repeat sentences without difficulty.     Cranial nerves: Pupils equal and react symmetrically to light.  Extraocular motion is full with no nystagmus. Extraocular movements intact. No visual field deficit to confrontation. There is no ptosis. Slight left facial asymmetry. Palate elevates symmetrically. Tongue is midline. Slight dysarthria.     Motor: There is normal bulk and tone.  There is no tremor.  Strenght is 5-/5 in left upper extremity with slight pronation.   Strength is 5-/5 in left lower extremity.  Strength is 4+/5 Right elbow, 5-/5 Right knee ; mild RUE drift    Sensation: Sensation intact to light touch in all four extremities     Cerebellar: unable to assess    Gait : deferred    LABS:                        11.5   6.74  )-----------( 116      ( 24 Mar 2025 02:35 )             35.4    03-24    137  |  103  |  22.7[H]  ----------------------------<  103[H]  4.4   |  25.0  |  0.57    Ca    8.4      24 Mar 2025 02:35  Phos  3.4     03-24  Mg     2.0     03-24    TPro  5.3[L]  /  Alb  3.2[L]  /  TBili  0.9  /  DBili  x   /  AST  15  /  ALT  12  /  AlkPhos  38[L]  03-24    Lipid Profile (03.22.25 @ 03:15):  Cholesterol: 136 mg/dL  Triglycerides, Serum: 56 mg/dL  HDL Cholesterol: 66 mg/dL  Non HDL Cholesterol: 70 mg/dL  LDL Cholesterol Calculated: 58 mg/dL  A1C with Estimated Average Glucose in AM (03.22.25 @ 03:15): 5.7 %    RADIOLOGY IMAGING & Additional Studies (Independently reviewed unless otherwise noted):    NEURO-IMAGING   MR Head No Cont (03.25.25 @ 09:13)  IMPRESSION: Evolving acute/subacute left-sided MCA, left   lenticulostriate, and left-sided anterior choroidal artery distribution   infarcts with associated cytotoxic edema and without hemorrhagic   transformation.    CT Head No Cont (03.24.25 @ 14:35)   1.  Subacute left MCA infarct, without acute intracranial hemorrhage.    CT Head No Cont (03.21.25 @ 10:14)   IMPRESSION:  Mild chronic microvascular changes without evidence of an acute transcortical infarction or hemorrhage.     (03.21.25 @ 02:47)   CT PERFUSION: Technical limitations: Motion artifact, suboptimal contrast bolus. Core infarction: 5 ml. Penumbra / tissue at risk for active ischemia: 0 ml   CTA NECK: No evidence of significant stenosis or occlusion.  CTA HEAD: Nonopacification of the left carotid terminus and proximal left A1 and M1 segments. Left anterior cerebral artery likely opacified via a patent anterior communicating artery. Some distal reconstitution of the distal left MCA distribution, although is markedly decreased blood vessel density when compared with the right side.    CT Brain Stroke Protocol (03.21.25 @ 02:39)   IMPRESSION: No acute intracranial hemorrhage. Density in the region of the left carotid terminus concerning for thrombus, with question loss of gray-white differentiation in the left insula which may reflect acute infarct.  --  ULTRASOUND  US Duplex Venous Lower Ext Complete, Bilateral (03.21.25 @ 11:44)   IMPRESSION: Right common femoral vein could not be seen secondary to an overlying bandage, otherwise no evidence of deep venous thrombosis in the visualized bilateral lower extremity veins.  --  CARDIOLOGY  TTE W or WO Ultrasound Enhancing Agent (03.21.25 @ 11:05)   CONCLUSIONS:    1. Patient tachycardic during entire study.   2. Left atrium is severely dilated.   3. Left ventricular systolic function is normal with an ejection fraction visually estimated at 65 to 70 %.   4. There is moderate (grade 2) left ventricular diastolic dysfunction, with elevated left ventricular filling pressure.   5. The right atrium is severely dilated.   6. Normal right ventricular cavity size and normal right ventricular systolic function.   7. Moderate mitral regurgitation.   8. Moderate tricuspid regurgitation.   9. Estimated pulmonary artery systolic pressure is 55 mmHg, consistent with moderate pulmonary hypertension.  10. No pericardial effusion seen.  --  XRAY  Xray Abdomen 1 View PORTABLE -Urgent (Xray Abdomen 1 View PORTABLE -Urgent .) (03.21.25 @ 09:06)   No acute radiographic abdominal findings.     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 Stable   chest findings. No fracture.     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 Stable   chest findings. No fracture.

## 2025-03-25 NOTE — SWALLOW VFSS/MBS ASSESSMENT ADULT - CONSISTENCIES ADMINISTERED
Airway  Date/Time: 5/12/2022 7:14 AM  Airway not difficult    General Information and Staff    Patient location during procedure: OR  CRNA/CAA: Ebony Carrera CRNA    Indications and Patient Condition  Indications for airway management: airway protection    Preoxygenated: yes  Mask difficulty assessment: 1 - vent by mask    Final Airway Details  Final airway type: endotracheal airway      Successful airway: ETT  Cuffed: yes   Successful intubation technique: direct laryngoscopy  Blade: Resendiz  Blade size: 2  ETT size (mm): 7.5  Cormack-Lehane Classification: grade I - full view of glottis  Placement verified by: chest auscultation and capnometry   Measured from: lips  ETT/EBT  to lips (cm): 21  Number of attempts at approach: 1  Assessment: lips, teeth, and gum same as pre-op and atraumatic intubation    Additional Comments  Placed by srna franz             pureed regular solid thin liquid mildly thick

## 2025-03-26 ENCOUNTER — INPATIENT (INPATIENT)
Facility: HOSPITAL | Age: 89
LOS: 12 days | Discharge: NURSING, REHAB OR LONG-TERM CARE | DRG: 23 | End: 2025-04-08
Attending: SPECIALIST | Admitting: PSYCHIATRY & NEUROLOGY
Payer: MEDICARE

## 2025-03-26 ENCOUNTER — RESULT REVIEW (OUTPATIENT)
Age: 89
End: 2025-03-26

## 2025-03-26 ENCOUNTER — EMERGENCY (EMERGENCY)
Facility: HOSPITAL | Age: 89
LOS: 0 days | Discharge: ANOTHER HOSPITAL | End: 2025-03-26
Attending: STUDENT IN AN ORGANIZED HEALTH CARE EDUCATION/TRAINING PROGRAM
Payer: MEDICARE

## 2025-03-26 VITALS
HEART RATE: 100 BPM | RESPIRATION RATE: 16 BRPM | SYSTOLIC BLOOD PRESSURE: 127 MMHG | HEIGHT: 65 IN | OXYGEN SATURATION: 99 % | WEIGHT: 139.99 LBS | DIASTOLIC BLOOD PRESSURE: 65 MMHG

## 2025-03-26 VITALS
HEART RATE: 112 BPM | RESPIRATION RATE: 18 BRPM | OXYGEN SATURATION: 95 % | SYSTOLIC BLOOD PRESSURE: 133 MMHG | TEMPERATURE: 98 F | DIASTOLIC BLOOD PRESSURE: 106 MMHG

## 2025-03-26 VITALS
OXYGEN SATURATION: 100 % | SYSTOLIC BLOOD PRESSURE: 157 MMHG | RESPIRATION RATE: 18 BRPM | DIASTOLIC BLOOD PRESSURE: 116 MMHG | HEIGHT: 65 IN | TEMPERATURE: 98 F | WEIGHT: 130.07 LBS | HEART RATE: 87 BPM

## 2025-03-26 DIAGNOSIS — I63.9 CEREBRAL INFARCTION, UNSPECIFIED: ICD-10-CM

## 2025-03-26 DIAGNOSIS — I48.91 UNSPECIFIED ATRIAL FIBRILLATION: ICD-10-CM

## 2025-03-26 DIAGNOSIS — Z98.49 CATARACT EXTRACTION STATUS, UNSPECIFIED EYE: Chronic | ICD-10-CM

## 2025-03-26 DIAGNOSIS — Z98.890 OTHER SPECIFIED POSTPROCEDURAL STATES: Chronic | ICD-10-CM

## 2025-03-26 DIAGNOSIS — Z90.89 ACQUIRED ABSENCE OF OTHER ORGANS: Chronic | ICD-10-CM

## 2025-03-26 DIAGNOSIS — Z88.0 ALLERGY STATUS TO PENICILLIN: ICD-10-CM

## 2025-03-26 LAB
ALBUMIN SERPL ELPH-MCNC: 3.1 G/DL — LOW (ref 3.3–5)
ALP SERPL-CCNC: 44 U/L — SIGNIFICANT CHANGE UP (ref 40–120)
ALT FLD-CCNC: 30 U/L — SIGNIFICANT CHANGE UP (ref 12–78)
ANION GAP SERPL CALC-SCNC: 13 MMOL/L — SIGNIFICANT CHANGE UP (ref 5–17)
ANION GAP SERPL CALC-SCNC: 3 MMOL/L — LOW (ref 5–17)
APPEARANCE UR: CLEAR — SIGNIFICANT CHANGE UP
APTT BLD: 27.2 SEC — SIGNIFICANT CHANGE UP (ref 24.5–35.6)
APTT BLD: 27.4 SEC — SIGNIFICANT CHANGE UP (ref 24.5–35.6)
APTT BLD: 60 SEC — HIGH (ref 24.5–35.6)
AST SERPL-CCNC: 25 U/L — SIGNIFICANT CHANGE UP (ref 15–37)
BACTERIA # UR AUTO: NEGATIVE /HPF — SIGNIFICANT CHANGE UP
BASOPHILS # BLD AUTO: 0.05 K/UL — SIGNIFICANT CHANGE UP (ref 0–0.2)
BASOPHILS NFR BLD AUTO: 0.9 % — SIGNIFICANT CHANGE UP (ref 0–2)
BILIRUB SERPL-MCNC: 1.2 MG/DL — SIGNIFICANT CHANGE UP (ref 0.2–1.2)
BILIRUB UR-MCNC: NEGATIVE — SIGNIFICANT CHANGE UP
BLD GP AB SCN SERPL QL: SIGNIFICANT CHANGE UP
BUN SERPL-MCNC: 17.7 MG/DL — SIGNIFICANT CHANGE UP (ref 8–20)
BUN SERPL-MCNC: 20 MG/DL — SIGNIFICANT CHANGE UP (ref 7–23)
CALCIUM SERPL-MCNC: 8.4 MG/DL — SIGNIFICANT CHANGE UP (ref 8.4–10.5)
CALCIUM SERPL-MCNC: 9.3 MG/DL — SIGNIFICANT CHANGE UP (ref 8.5–10.1)
CHLORIDE SERPL-SCNC: 103 MMOL/L — SIGNIFICANT CHANGE UP (ref 96–108)
CHLORIDE SERPL-SCNC: 108 MMOL/L — SIGNIFICANT CHANGE UP (ref 96–108)
CO2 SERPL-SCNC: 23 MMOL/L — SIGNIFICANT CHANGE UP (ref 22–29)
CO2 SERPL-SCNC: 27 MMOL/L — SIGNIFICANT CHANGE UP (ref 22–31)
COLOR SPEC: YELLOW — SIGNIFICANT CHANGE UP
CREAT SERPL-MCNC: 0.58 MG/DL — SIGNIFICANT CHANGE UP (ref 0.5–1.3)
CREAT SERPL-MCNC: 0.61 MG/DL — SIGNIFICANT CHANGE UP (ref 0.5–1.3)
DIFF PNL FLD: NEGATIVE — SIGNIFICANT CHANGE UP
EGFR: 86 ML/MIN/1.73M2 — SIGNIFICANT CHANGE UP
EGFR: 86 ML/MIN/1.73M2 — SIGNIFICANT CHANGE UP
EGFR: 87 ML/MIN/1.73M2 — SIGNIFICANT CHANGE UP
EGFR: 87 ML/MIN/1.73M2 — SIGNIFICANT CHANGE UP
EOSINOPHIL # BLD AUTO: 0.14 K/UL — SIGNIFICANT CHANGE UP (ref 0–0.5)
EOSINOPHIL NFR BLD AUTO: 2.5 % — SIGNIFICANT CHANGE UP (ref 0–6)
GLUCOSE SERPL-MCNC: 104 MG/DL — HIGH (ref 70–99)
GLUCOSE SERPL-MCNC: 98 MG/DL — SIGNIFICANT CHANGE UP (ref 70–99)
GLUCOSE UR QL: NEGATIVE MG/DL — SIGNIFICANT CHANGE UP
HCT VFR BLD CALC: 38 % — SIGNIFICANT CHANGE UP (ref 34.5–45)
HCT VFR BLD CALC: 39.9 % — SIGNIFICANT CHANGE UP (ref 34.5–45)
HGB BLD-MCNC: 12.1 G/DL — SIGNIFICANT CHANGE UP (ref 11.5–15.5)
HGB BLD-MCNC: 12.9 G/DL — SIGNIFICANT CHANGE UP (ref 11.5–15.5)
IMM GRANULOCYTES # BLD AUTO: 0.07 K/UL — SIGNIFICANT CHANGE UP (ref 0–0.07)
IMM GRANULOCYTES NFR BLD AUTO: 1.3 % — HIGH (ref 0–0.9)
INR BLD: 0.97 RATIO — SIGNIFICANT CHANGE UP (ref 0.85–1.16)
INR BLD: 1.04 RATIO — SIGNIFICANT CHANGE UP (ref 0.85–1.16)
KETONES UR-MCNC: ABNORMAL MG/DL
LACTATE SERPL-SCNC: 0.8 MMOL/L — SIGNIFICANT CHANGE UP (ref 0.5–2)
LEUKOCYTE ESTERASE UR-ACNC: NEGATIVE — SIGNIFICANT CHANGE UP
LYMPHOCYTES # BLD AUTO: 1.34 K/UL — SIGNIFICANT CHANGE UP (ref 1–3.3)
LYMPHOCYTES NFR BLD AUTO: 24.1 % — SIGNIFICANT CHANGE UP (ref 13–44)
MAGNESIUM SERPL-MCNC: 1.7 MG/DL — SIGNIFICANT CHANGE UP (ref 1.6–2.6)
MCHC RBC-ENTMCNC: 30.7 PG — SIGNIFICANT CHANGE UP (ref 27–34)
MCHC RBC-ENTMCNC: 30.9 PG — SIGNIFICANT CHANGE UP (ref 27–34)
MCHC RBC-ENTMCNC: 31.8 G/DL — LOW (ref 32–36)
MCHC RBC-ENTMCNC: 32.3 G/DL — SIGNIFICANT CHANGE UP (ref 32–36)
MCV RBC AUTO: 95.5 FL — SIGNIFICANT CHANGE UP (ref 80–100)
MCV RBC AUTO: 96.4 FL — SIGNIFICANT CHANGE UP (ref 80–100)
MONOCYTES # BLD AUTO: 0.64 K/UL — SIGNIFICANT CHANGE UP (ref 0–0.9)
MONOCYTES NFR BLD AUTO: 11.5 % — SIGNIFICANT CHANGE UP (ref 2–14)
MRSA PCR RESULT.: SIGNIFICANT CHANGE UP
NEUTROPHILS # BLD AUTO: 3.33 K/UL — SIGNIFICANT CHANGE UP (ref 1.8–7.4)
NEUTROPHILS NFR BLD AUTO: 59.7 % — SIGNIFICANT CHANGE UP (ref 43–77)
NITRITE UR-MCNC: NEGATIVE — SIGNIFICANT CHANGE UP
NRBC # BLD AUTO: 0 K/UL — SIGNIFICANT CHANGE UP (ref 0–0)
NRBC # BLD AUTO: 0 K/UL — SIGNIFICANT CHANGE UP (ref 0–0)
NRBC # FLD: 0 K/UL — SIGNIFICANT CHANGE UP (ref 0–0)
NRBC # FLD: 0 K/UL — SIGNIFICANT CHANGE UP (ref 0–0)
NRBC BLD AUTO-RTO: 0 /100 WBCS — SIGNIFICANT CHANGE UP (ref 0–0)
NRBC BLD AUTO-RTO: 0 /100 WBCS — SIGNIFICANT CHANGE UP (ref 0–0)
PH UR: 6 — SIGNIFICANT CHANGE UP (ref 5–8)
PHOSPHATE SERPL-MCNC: 3.4 MG/DL — SIGNIFICANT CHANGE UP (ref 2.4–4.7)
PLATELET # BLD AUTO: 125 K/UL — LOW (ref 150–400)
PLATELET # BLD AUTO: 135 K/UL — LOW (ref 150–400)
PMV BLD: 10.4 FL — SIGNIFICANT CHANGE UP (ref 7–13)
PMV BLD: 10.6 FL — SIGNIFICANT CHANGE UP (ref 7–13)
POTASSIUM SERPL-MCNC: 3.9 MMOL/L — SIGNIFICANT CHANGE UP (ref 3.5–5.3)
POTASSIUM SERPL-MCNC: 4 MMOL/L — SIGNIFICANT CHANGE UP (ref 3.5–5.3)
POTASSIUM SERPL-SCNC: 3.9 MMOL/L — SIGNIFICANT CHANGE UP (ref 3.5–5.3)
POTASSIUM SERPL-SCNC: 4 MMOL/L — SIGNIFICANT CHANGE UP (ref 3.5–5.3)
PROCALCITONIN SERPL-MCNC: 1.01 NG/ML — HIGH (ref 0.02–0.1)
PROT SERPL-MCNC: 6.2 GM/DL — SIGNIFICANT CHANGE UP (ref 6–8.3)
PROT UR-MCNC: 30 MG/DL
PROTHROM AB SERPL-ACNC: 11.5 SEC — SIGNIFICANT CHANGE UP (ref 9.9–13.4)
PROTHROM AB SERPL-ACNC: 11.8 SEC — SIGNIFICANT CHANGE UP (ref 9.9–13.4)
RAPID RVP RESULT: SIGNIFICANT CHANGE UP
RBC # BLD: 3.94 M/UL — SIGNIFICANT CHANGE UP (ref 3.8–5.2)
RBC # BLD: 4.18 M/UL — SIGNIFICANT CHANGE UP (ref 3.8–5.2)
RBC # FLD: 13.8 % — SIGNIFICANT CHANGE UP (ref 10.3–14.5)
RBC # FLD: 13.8 % — SIGNIFICANT CHANGE UP (ref 10.3–14.5)
RBC CASTS # UR COMP ASSIST: 2 /HPF — SIGNIFICANT CHANGE UP (ref 0–4)
S AUREUS DNA NOSE QL NAA+PROBE: SIGNIFICANT CHANGE UP
SARS-COV-2 RNA SPEC QL NAA+PROBE: SIGNIFICANT CHANGE UP
SODIUM SERPL-SCNC: 138 MMOL/L — SIGNIFICANT CHANGE UP (ref 135–145)
SODIUM SERPL-SCNC: 139 MMOL/L — SIGNIFICANT CHANGE UP (ref 135–145)
SP GR SPEC: >1.03 — HIGH (ref 1–1.03)
SQUAMOUS # UR AUTO: 0 /HPF — SIGNIFICANT CHANGE UP (ref 0–5)
TROPONIN I, HIGH SENSITIVITY RESULT: 13.56 NG/L — SIGNIFICANT CHANGE UP
UROBILINOGEN FLD QL: 0.2 MG/DL — SIGNIFICANT CHANGE UP (ref 0.2–1)
WBC # BLD: 4.9 K/UL — SIGNIFICANT CHANGE UP (ref 3.8–10.5)
WBC # BLD: 5.57 K/UL — SIGNIFICANT CHANGE UP (ref 3.8–10.5)
WBC # FLD AUTO: 4.9 K/UL — SIGNIFICANT CHANGE UP (ref 3.8–10.5)
WBC # FLD AUTO: 5.57 K/UL — SIGNIFICANT CHANGE UP (ref 3.8–10.5)
WBC UR QL: 3 /HPF — SIGNIFICANT CHANGE UP (ref 0–5)

## 2025-03-26 PROCEDURE — 93010 ELECTROCARDIOGRAM REPORT: CPT

## 2025-03-26 PROCEDURE — 86900 BLOOD TYPING SEROLOGIC ABO: CPT

## 2025-03-26 PROCEDURE — 93970 EXTREMITY STUDY: CPT | Mod: 26

## 2025-03-26 PROCEDURE — 85610 PROTHROMBIN TIME: CPT

## 2025-03-26 PROCEDURE — 70496 CT ANGIOGRAPHY HEAD: CPT

## 2025-03-26 PROCEDURE — 36415 COLL VENOUS BLD VENIPUNCTURE: CPT

## 2025-03-26 PROCEDURE — 0042T: CPT

## 2025-03-26 PROCEDURE — 85730 THROMBOPLASTIN TIME PARTIAL: CPT

## 2025-03-26 PROCEDURE — 61645 PERQ ART M-THROMBECT &/NFS: CPT | Mod: RT

## 2025-03-26 PROCEDURE — 84484 ASSAY OF TROPONIN QUANT: CPT

## 2025-03-26 PROCEDURE — 82962 GLUCOSE BLOOD TEST: CPT

## 2025-03-26 PROCEDURE — 71045 X-RAY EXAM CHEST 1 VIEW: CPT | Mod: 26

## 2025-03-26 PROCEDURE — 80053 COMPREHEN METABOLIC PANEL: CPT

## 2025-03-26 PROCEDURE — 70498 CT ANGIOGRAPHY NECK: CPT

## 2025-03-26 PROCEDURE — 99291 CRITICAL CARE FIRST HOUR: CPT

## 2025-03-26 PROCEDURE — 93005 ELECTROCARDIOGRAM TRACING: CPT

## 2025-03-26 PROCEDURE — 86850 RBC ANTIBODY SCREEN: CPT

## 2025-03-26 PROCEDURE — 70450 CT HEAD/BRAIN W/O DYE: CPT | Mod: XU

## 2025-03-26 PROCEDURE — 86901 BLOOD TYPING SEROLOGIC RH(D): CPT

## 2025-03-26 PROCEDURE — 70498 CT ANGIOGRAPHY NECK: CPT | Mod: 26

## 2025-03-26 PROCEDURE — 85025 COMPLETE CBC W/AUTO DIFF WBC: CPT

## 2025-03-26 PROCEDURE — 70450 CT HEAD/BRAIN W/O DYE: CPT | Mod: 26,XU

## 2025-03-26 PROCEDURE — 70496 CT ANGIOGRAPHY HEAD: CPT | Mod: 26

## 2025-03-26 PROCEDURE — 99223 1ST HOSP IP/OBS HIGH 75: CPT

## 2025-03-26 PROCEDURE — 93306 TTE W/DOPPLER COMPLETE: CPT | Mod: 26

## 2025-03-26 RX ORDER — IPRATROPIUM BROMIDE AND ALBUTEROL SULFATE .5; 2.5 MG/3ML; MG/3ML
3 SOLUTION RESPIRATORY (INHALATION) EVERY 6 HOURS
Refills: 0 | Status: DISCONTINUED | OUTPATIENT
Start: 2025-03-26 | End: 2025-03-31

## 2025-03-26 RX ORDER — METHYLPREDNISOLONE ACETATE 80 MG/ML
40 INJECTION, SUSPENSION INTRA-ARTICULAR; INTRALESIONAL; INTRAMUSCULAR; SOFT TISSUE ONCE
Refills: 0 | Status: COMPLETED | OUTPATIENT
Start: 2025-03-26 | End: 2025-03-26

## 2025-03-26 RX ORDER — ACETAMINOPHEN 500 MG/5ML
1000 LIQUID (ML) ORAL ONCE
Refills: 0 | Status: COMPLETED | OUTPATIENT
Start: 2025-03-26 | End: 2025-03-26

## 2025-03-26 RX ORDER — METHYLPREDNISOLONE ACETATE 80 MG/ML
40 INJECTION, SUSPENSION INTRA-ARTICULAR; INTRALESIONAL; INTRAMUSCULAR; SOFT TISSUE EVERY 6 HOURS
Refills: 0 | Status: DISCONTINUED | OUTPATIENT
Start: 2025-03-26 | End: 2025-03-27

## 2025-03-26 RX ORDER — LABETALOL HYDROCHLORIDE 200 MG/1
10 TABLET, FILM COATED ORAL EVERY 4 HOURS
Refills: 0 | Status: DISCONTINUED | OUTPATIENT
Start: 2025-03-26 | End: 2025-03-27

## 2025-03-26 RX ORDER — HEPARIN SODIUM 1000 [USP'U]/ML
900 INJECTION INTRAVENOUS; SUBCUTANEOUS
Qty: 25000 | Refills: 0 | Status: DISCONTINUED | OUTPATIENT
Start: 2025-03-26 | End: 2025-03-28

## 2025-03-26 RX ADMIN — HEPARIN SODIUM 9 UNIT(S)/HR: 1000 INJECTION INTRAVENOUS; SUBCUTANEOUS at 19:41

## 2025-03-26 RX ADMIN — Medication 1000 MILLIGRAM(S): at 17:28

## 2025-03-26 RX ADMIN — METHYLPREDNISOLONE ACETATE 40 MILLIGRAM(S): 80 INJECTION, SUSPENSION INTRA-ARTICULAR; INTRALESIONAL; INTRAMUSCULAR; SOFT TISSUE at 18:44

## 2025-03-26 RX ADMIN — Medication 65 MILLILITER(S): at 13:40

## 2025-03-26 RX ADMIN — IPRATROPIUM BROMIDE AND ALBUTEROL SULFATE 3 MILLILITER(S): .5; 2.5 SOLUTION RESPIRATORY (INHALATION) at 21:14

## 2025-03-26 RX ADMIN — Medication 400 MILLIGRAM(S): at 16:28

## 2025-03-26 RX ADMIN — HEPARIN SODIUM 9 UNIT(S)/HR: 1000 INJECTION INTRAVENOUS; SUBCUTANEOUS at 13:39

## 2025-03-26 RX ADMIN — Medication 1 APPLICATION(S): at 16:29

## 2025-03-26 NOTE — H&P ADULT - ASSESSMENT
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     HX L MCA s/p thrombectomy  Rt m2 occlusions/p thrombectomy   hx afib ( eliquis on HOLD)      Neuro:  	Q1 hour Neuro checks, Q1 hour Vitals (as per thrombectomy protocol)   	HOB 30 degrees, Neck midline position  	Maintain normothermia, PO acetaminophen for temp>38 C or pain  	Monitor wound                  CTH in am 8:30  	Pain management: with Acetaminophen   	Sedation: avoid over sedation   	Turn and Position Q2  /  Activity ad nette, with assistance  	  CV:  	SBP Goal 110-150 and PRN medications as needed  	Access: PIV  	  Pulm:  	Mode: CPAP with PS  FiO2: 40  PEEP: 12  PS: 6                    will wean and possible extubate if patient tolerates   	Supplemental O2 PRN to maintain Spo2>92%  	Chest PT, OOB, Pulmonary Toilet    GI:  	Nutrition: NPO   	GI prophylaxis: pantoprazole   	Bowel regimen: senna/ miralax  	  Gu:  	NS @65                  Rodriguez   	I&O Q1 hour  	Monitor Electrolytes & Renal Function    Heme:  	Monitor H&H  	Hep gtt @9 PTT 40-60                   cont ASA 81 mg once oral access   	Chemical DVT prophylaxis:   		  	Mechanical DVT Prophylaxis: Maintain B/L LE sequential compression devices  	Lower Extremity Doppler   		*  	  ID:  	Monitor WBC and Temperature  	Antibiotic Regimen:    		*  	UA send in IR f/u  		*    Endo                  FS q 6 hrs   	Monitor BGL, maintain <180  	ANAM   		100-150 no correction  		150-200  2units  		200-250  4units  		250-300	 6units  		300-350  8units  		350-400  10units  		400+	12units    Case D/W Dr. Mann

## 2025-03-26 NOTE — ED ADULT NURSE NOTE - OBJECTIVE STATEMENT
Pt BIBEMS from home c/o left-sided facial droop, noted by son upon waking at 7:30am. LKW 2:30am. Pt seen in [facility] on 3/21 for a stroke and had a clot retrieval at [facility]. FS 90 in triage. Dr. Che evaluated in triage. Code stroke called at 8:32. Pt taken directly to CT. Results showed right mca clot. Pt transferred to [facility].  Pt upon initial assessment is alert upon verbal assessment. Pt is able to understand but not communicate. See ED Stroke Flow Sheet.

## 2025-03-26 NOTE — STROKE CODE NOTE - NIH STROKE SCALE: 11. EXTINCTION AND INATTENTION, QM
(1) Visual, tactile, auditory, spatial, or personal inattention or extinction to bilateral simultaneous stimulation in one of the sensory modalities
Never

## 2025-03-26 NOTE — CHART NOTE - NSCHARTNOTEFT_GEN_A_CORE
Neurointerventional Surgery Post Procedure Note    Procedure: Mechanical thrombectomy    Pre- Procedure Diagnosis: R M2 occlusion  Post- Procedure Diagnosis: S/p mechanical thrombectomy for R m2 occlusion, revascularization TICI 3, 1 pass    : Dr. Alex MD  Nurse Practitioner: Jesica Ahmadi NP ; Kenisha Simon PA-C  Nurse: Cortez Sawyer  Anesthesiologist: Dr. Channing CRNA [facility]  Radiology Tech: Kenneth Damon Billy S  Fellow: Dewey Issa MD    Sheath: 6 [place] Sheath    I/Os: estimated blood loss less than 30cc,  IV fluids cc,  Urine output cc, Contrast: Omnipaque 240 24 cc, ABX ancef 2 g    Vitals: 129/60 BP   95 HR  100 Spo2  %    Preliminary Report:  Under a 6 [place] sheath via the right groin under general anesthesia via right internal carotid artery, a mechanical thrombectomy was performed and reveals R M2 occlusion with revascularization TICI 3, 1 pass. ( Official note to follow).    Patient tolerated procedure well.  Patient remains hemodynamically stable.  Results were discussed with patient, patient's family and Neurosurgery.  Right groin sheath was discontinued at 11:18 AM and closed with starclose. Hemostasis was obtained with approximately 12 minutes of manual compression.     No active bleeding, no hematoma, no ecchymosis.   Quick clot and safeguard balloon dressing applied at   Patient transferred to NSICU in stable condition. Neurointerventional Surgery Post Procedure Note    Procedure: Mechanical thrombectomy    Pre- Procedure Diagnosis: R M2 occlusion  Post- Procedure Diagnosis: S/p mechanical thrombectomy for R m2 occlusion, revascularization TICI 3, 1 pass    : Dr. Alex MD  Nurse Practitioner: Jesica Ahmadi NP ; Kenisha Simon PA-C  Nurse: Cortez Sawyer  Anesthesiologist: Dr. Channing CRNA [facility]  Radiology Tech: Kenneth Damon Billy S  Fellow: Dewey Issa MD    Sheath: 6 [place] Sheath    I/Os: estimated blood loss less than 30cc, IV fluids 500cc,  Urine output 250cc, Contrast: Omnipaque 240 24 cc, ABX ancef 2 g    Vitals: 129/60 BP   95 HR  100 Spo2  %    Preliminary Report:  Under a 6 [place] sheath via the right groin under general anesthesia via right internal carotid artery, a mechanical thrombectomy was performed and reveals R M2 occlusion with revascularization TICI 3, 1 pass. ( Official note to follow).    Patient tolerated procedure well.  Patient remains hemodynamically stable.  Results were discussed with patient, patient's family and Neurosurgery.  Right groin sheath was discontinued at 11:18 AM and closed with starclose. Hemostasis was obtained with approximately 12 minutes of manual compression.     No active bleeding, no hematoma, no ecchymosis.   Quick clot and safeguard balloon dressing applied at   Patient transferred to NSICU in stable condition.

## 2025-03-26 NOTE — ED ADULT NURSE NOTE - CHIEF COMPLAINT QUOTE
Pt BIBEMS from home c/o left-sided facial droop, noted by son upon waking at 7:30am. LKW 2:30am. Pt seen in ED on 3/21 for a stroke and had a clot retrieval at [facility]. FS 90 in triage. Dr. Che evaluated in triage. Code stroke called at 8:32. Pt taken directly to CT.

## 2025-03-26 NOTE — PATIENT PROFILE ADULT - FUNCTIONAL ASSESSMENT - DAILY ACTIVITY 4.
Mrati with [facility] calling stating she would like to talk with the Nurse regarding patient lab work and results. Please call [phone]. Thank you   1 = Total assistance

## 2025-03-26 NOTE — ED PROVIDER NOTE - NEUROLOGICAL, MLM
alert; not able to state name, place or time; left sided facial droop; 4/5 strength in left upper extremity

## 2025-03-26 NOTE — H&P ADULT - NSHPLABSRESULTS_GEN_ALL_CORE
.  LABS:                         12.1   4.90  )-----------( 125      ( 26 Mar 2025 12:35 )             38.0     03-26    138  |  108  |  20  ----------------------------<  104[H]  4.0   |  27  |  0.61    Ca    9.3      26 Mar 2025 08:44  Phos  3.1     03-25  Mg     1.8     03-25    TPro  6.2  /  Alb  3.1[L]  /  TBili  1.2  /  DBili  x   /  AST  25  /  ALT  30  /  AlkPhos  44  03-26    PT/INR - ( 26 Mar 2025 12:35 )   PT: 11.8 sec;   INR: 1.04 ratio         PTT - ( 26 Mar 2025 12:35 )  PTT:27.4 sec  Urinalysis Basic - ( 26 Mar 2025 12:35 )    Color: Yellow / Appearance: Clear / SG: >1.030 / pH: x  Gluc: x / Ketone: Trace mg/dL  / Bili: Negative / Urobili: 0.2 mg/dL   Blood: x / Protein: 30 mg/dL / Nitrite: Negative   Leuk Esterase: Negative / RBC: x / WBC x   Sq Epi: x / Non Sq Epi: x / Bacteria: x            RADIOLOGY, EKG & ADDITIONAL TESTS: Reviewed.

## 2025-03-26 NOTE — ED ADULT TRIAGE NOTE - CHIEF COMPLAINT QUOTE
Pt BIBEMS from home c/o left-sided facial droop, noted by son upon waking at 7:30am. LKW 2:30am. Pt seen in ED on 3/21 for a stroke and had a clot retrieval at [facility]. FS 90 in triage. Dr. Che evaluated in triage. Code stroke called at 8:32. Pt taken directly to CT.

## 2025-03-26 NOTE — PATIENT PROFILE ADULT - FALL HARM RISK - HARM RISK INTERVENTIONS

## 2025-03-26 NOTE — CHART NOTE - NSCHARTNOTEFT_GEN_A_CORE
Neurointerventional Surgery  Pre-Procedure Note     HPI: 89 y/o female with PMHx of Afib (on Eliquis), PPM, HTN, HLD, GERD, severe MR/TR, iatrogenic adrenal insufficiency (on prednisone), lymphedema, metastatic melanoma s/p L foot melanoma and lymph node resection, s/p RUE melanoma and R axillary lymph node excision at [facility] (3/18/25) presented to  ED 3/21 with AMS s/p unwitnessed fall off the toilet in the bathroom. EMS on prior admission noted patient with R sided weakness, aphasia and facial droop, stroke code activated, revealing L M1 occlusion. S/p cerebral angiogram for mechanical thrombectomy of L M1 occlusion, TICI 3 (one pass) on 3/21/25. Pt currently on Farxiga 10 mg daily and eliquis has been held since 3/17/25 @7 pm with prior plan to resume 3/27/25. Pt now being transferred from  with R M2 occlusion, LKW @ 230 AM. 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 No aphasia or difficulty speaking. No dysarthria. Able to name objects and their function.  * Cranial Nerves: Cnii-Cnxii grossly intact. PERRL, EOMI, tongue midline, no gaze deviation  * Motor: RUE 5/5, LUE 5/5, RLE 5/5, LLE 5/5, no drift or dysmetria  * Sensory: Sensation intact to light touch  * Reflexes: not assessed   Cardiovascular: Regular rate and rhythm.  Eyes: See neurologic examination with detailed examination of eyes.  ENT: No JVD, Trachea Midline  Respiratory: non labored breathing   Gastrointestinal: Soft, nontender, nondistended.  Genitourinary: [ ] Rodriguez, [ x ] No Rodriguez.   Musculoskeletal: No muscle wasting noted, (See neurologic assessment for full muscle strength assessment) .  Skin:  no wounds, no redness, no abrasions noted  Hematologic / Lymph / Immunologic: No bleeding from IV sites or wounds      NIH SS:  DATE:  TIME:  1A: Level of consciousness (0-3): 0  1B: Questions (0-2): 0    1C: Commands (0-2): 0  2: Gaze (0-2): 0  3: Visual fields (0-3): 0  4: Facial palsy (0-3): 0  MOTOR:  5A: Left arm motor drift (0-4): 0  5B: Right arm motor drift (0-4): 0  6A: Left leg motor drift (0-4): 0  6B: Right leg motor drift (0-4): 0  7: Limb ataxia (0-2): 0  SENSORY:  8: Sensation (0-2): 0  SPEECH:  9: Language (0-3): 0  10: Dysarthria (0-2): 0  EXTINCTION:  11: Extinction/inattention (0-2): 0    TOTAL SCORE:       Labs:                         12.9   5.57  )-----------( 135      ( 26 Mar 2025 08:44 )             39.9       03-26    138  |  108  |  20  ----------------------------<  104[H]  4.0   |  27  |  0.61    Ca    9.3      26 Mar 2025 08:44  Phos  3.1     03-25  Mg     1.8     03-25    TPro  6.2  /  Alb  3.1[L]  /  TBili  1.2  /  DBili  x   /  AST  25  /  ALT  30  /  AlkPhos  44  03-26          PT/INR - ( 26 Mar 2025 08:44 )   PT: 11.5 sec;   INR: 0.97 ratio         PTT - ( 26 Mar 2025 08:44 )  PTT:27.2 sec      Assessment/Plan:   This is a 89y  year old Female  presents with R M2 occlusion.  Patient presents to neuro-IR for mechanical thrombectomy.     Procedure, goals, risks, benefits and alternatives  were discussed with patient's son, Philip Romano and (patient's family).  All questions were answered.  Risks include but are not limited to stroke, vessel injury, hemorrhage, and or groin hematoma.  Patient demonstrates understanding  of all risks involved with this procedure and wishes to continue.   Appropriate  consent was obtained from patient's son, Philip Romano (#[phone]) and consent is in the patient's chart. Neurointerventional Surgery  Pre-Procedure Note     HPI: 89 y/o female with PMHx of Afib (on Eliquis), PPM, HTN, HLD, GERD, severe MR/TR, iatrogenic adrenal insufficiency (on prednisone), lymphedema, metastatic melanoma s/p L foot melanoma and lymph node resection, s/p RUE melanoma and R axillary lymph node excision at [facility] (3/18/25) presented to  ED 3/21 with AMS s/p unwitnessed fall off the toilet in the bathroom. EMS on prior admission noted patient with R sided weakness, aphasia and facial droop, stroke code activated, revealing L M1 occlusion. S/p cerebral angiogram for mechanical thrombectomy of L M1 occlusion, TICI 3 (one pass) on 3/21/25. Pt currently on Farxiga 10 mg daily and eliquis has been held since 3/17/25 @7 pm with prior plan to resume 3/27/25. Pt now being transferred from  with R M2 occlusion, LKW @ 230 AM. Patient awoke at 7:30 AM this morning with left-sided facial droop left upper extremity weakness and aphasia. NIHSS 17 on arrival.       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, including shoulder      Lower back pain      S/P tonsillectomy and adenoidectomy      H/O cataract extraction  both eyes      History of melanoma excision  left foot      History of left inguinal hernia repair  mesh      H/O cardiac catheterization  3/15, no  blockages          Social History:   Social History:    FAMILY HISTORY:  Family history of leukemia (Sibling)        Current Medications:       Physical Exam:  General: patient seen laying supine in bed in NAD  Neuro: AAOx0, not FC, OE spontaneously, global aphasia, L facial droop, L pronator drift,  RUE anti-gracity, LUE atleast 3/5, b/l LE w/d to noxious  HEENT: PERRL, EOMI  Neck: supple  Cardiac: RRR, S1S2  Pulmonary: chest rise symmetric  Abdomen: soft, nontender, nondistended  Ext: perfusing well  Skin: warm, dry          Constitutional: NAD, lying in bed  Neuro  * Mental Status:  GCS 15: Awake, alert, oriented to conversation. No aphasia or difficulty speaking. No dysarthria. Able to name objects and their function.  * Cranial Nerves: Cnii-Cnxii grossly intact. PERRL, EOMI, tongue midline, no gaze deviation  * Motor: RUE 5/5, LUE 5/5, RLE 5/5, LLE 5/5, no drift or dysmetria  * Sensory: Sensation intact to light touch  * Reflexes: not assessed   Cardiovascular: Regular rate and rhythm.  Eyes: See neurologic examination with detailed examination of eyes.  ENT: No JVD, Trachea Midline  Respiratory: non labored breathing   Gastrointestinal: Soft, nontender, nondistended.  Genitourinary: [ ] Rodriguez, [ x ] No Rodriguez.   Musculoskeletal: No muscle wasting noted, (See neurologic assessment for full muscle strength assessment) .  Skin:  no wounds, no redness, no abrasions noted  Hematologic / Lymph / Immunologic: No bleeding from IV sites or wounds      NIH SS:  DATE:  TIME:  1A: Level of consciousness (0-3): 0  1B: Questions (0-2): 0    1C: Commands (0-2): 0  2: Gaze (0-2): 0  3: Visual fields (0-3): 0  4: Facial palsy (0-3): 0  MOTOR:  5A: Left arm motor drift (0-4): 0  5B: Right arm motor drift (0-4): 0  6A: Left leg motor drift (0-4): 0  6B: Right leg motor drift (0-4): 0  7: Limb ataxia (0-2): 0  SENSORY:  8: Sensation (0-2): 0  SPEECH:  9: Language (0-3): 0  10: Dysarthria (0-2): 0  EXTINCTION:  11: Extinction/inattention (0-2): 0    TOTAL SCORE:       Labs:                         12.9   5.57  )-----------( 135      ( 26 Mar 2025 08:44 )             39.9       03-26    138  |  108  |  20  ----------------------------<  104[H]  4.0   |  27  |  0.61    Ca    9.3      26 Mar 2025 08:44  Phos  3.1     03-25  Mg     1.8     03-25    TPro  6.2  /  Alb  3.1[L]  /  TBili  1.2  /  DBili  x   /  AST  25  /  ALT  30  /  AlkPhos  44  03-26          PT/INR - ( 26 Mar 2025 08:44 )   PT: 11.5 sec;   INR: 0.97 ratio         PTT - ( 26 Mar 2025 08:44 )  PTT:27.2 sec      Assessment/Plan:   This is a 89y  year old Female  presents with R M2 occlusion.  Patient presents to neuro-IR for mechanical thrombectomy.     Procedure, goals, risks, benefits and alternatives  were discussed with patient's son, Philip Romano and (patient's family).  All questions were answered.  Risks include but are not limited to stroke, vessel injury, hemorrhage, and or groin hematoma.  Patient demonstrates understanding  of all risks involved with this procedure and wishes to continue.   Appropriate  consent was obtained from patient's son, Philip Romano (#[phone]) and consent is in the patient's chart. Neurointerventional Surgery  Pre-Procedure Note     HPI: 88 y/o female with PMHx of Afib (on Eliquis), PPM, HTN, HLD, GERD, severe MR/TR, iatrogenic adrenal insufficiency (on prednisone), lymphedema, metastatic melanoma s/p L foot melanoma and lymph node resection, s/p RUE melanoma and R axillary lymph node excision at [facility] (3/18/25) presented to  ED 3/21 with AMS s/p unwitnessed fall off the toilet in the bathroom. EMS on prior admission noted patient with R sided weakness, aphasia and facial droop, stroke code activated, revealing L M1 occlusion. S/p cerebral angiogram for mechanical thrombectomy of L M1 occlusion, TICI 3 (one pass) on 3/21/25. Pt currently on Farxiga 10 mg daily and eliquis has been held since 3/17/25 @7 pm with prior plan to resume 3/27/25. Pt now being transferred from  with R M2 occlusion, LKW @ 230 AM. Patient awoke at 7:30 AM this morning with left-sided facial droop left upper extremity weakness and aphasia. NIHSS 17 on arrival.       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, including shoulder      Lower back pain      S/P tonsillectomy and adenoidectomy      H/O cataract extraction  both eyes      History of melanoma excision  left foot      History of left inguinal hernia repair  mesh      H/O cardiac catheterization  3/15, no  blockages          Social History:   Social History:    FAMILY HISTORY:  Family history of leukemia (Sibling)        Current Medications:       Physical Exam:  General: patient seen laying supine in bed in NAD  Neuro: AAOx0, not FC, OE spontaneously, global aphasia, L facial droop, L pronator drift,  RUE anti-gravity, LUE atleast 3/5, b/l LE w/d briskly to noxious  HEENT: PERRL 3 mm, R gaze   Neck: supple  Cardiac: RRR, S1S2  Pulmonary: chest rise symmetric  Abdomen: soft, nontender, nondistended  Ext: perfusing well  Skin: warm, dry      NIH SS: 17  DATE: 3/26/25  TIME: 10:28 AM  1A: Level of consciousness (0-3): 2  1B: Questions (0-2): 2  1C: Commands (0-2): 2  2: Gaze (0-2): 1  3: Visual fields (0-3): 2  4: Facial palsy (0-3): 1  MOTOR:  5A: Left arm motor drift (0-4): 1  5B: Right arm motor drift (0-4): 0  6A: Left leg motor drift (0-4): 2  6B: Right leg motor drift (0-4): 2  7: Limb ataxia (0-2): 0  SENSORY:  8: Sensation (0-2): 0  SPEECH:  9: Language (0-3): 3  10: Dysarthria (0-2): 2  EXTINCTION:  11: Extinction/inattention (0-2): 0    TOTAL SCORE: 17      Labs:                         12.9   5.57  )-----------( 135      ( 26 Mar 2025 08:44 )             39.9       03-26    138  |  108  |  20  ----------------------------<  104[H]  4.0   |  27  |  0.61    Ca    9.3      26 Mar 2025 08:44  Phos  3.1     03-25  Mg     1.8     03-25    TPro  6.2  /  Alb  3.1[L]  /  TBili  1.2  /  DBili  x   /  AST  25  /  ALT  30  /  AlkPhos  44  03-26          PT/INR - ( 26 Mar 2025 08:44 )   PT: 11.5 sec;   INR: 0.97 ratio         PTT - ( 26 Mar 2025 08:44 )  PTT:27.2 sec      Assessment/Plan:   This is a 89y  year old Female  presents with R M2 occlusion.  Patient presents to neuro-IR for mechanical thrombectomy.     Procedure, goals, risks, benefits and alternatives  were discussed with patient's son, Philip Romano and (patient's family).  All questions were answered.  Risks include but are not limited to stroke, vessel injury, hemorrhage, and or groin hematoma.  Patient demonstrates understanding  of all risks involved with this procedure and wishes to continue.   Appropriate  consent was obtained from patient's son, Philip Romano (#[phone]) and consent is in the patient's chart.

## 2025-03-26 NOTE — ED ADULT NURSE NOTE - CAS EDN DISCHARGE ASSESSMENT
Ambrocio DAVE MD PGY3: 65 F hx HTN, HLD here for palpitations x 2 days. Patient has not been having any chest pain, nausea, vomiting, diarrhea or diaphoresis. No lihgtheadedness or syncope. Simply says that she has a fluttering feeling in her chest and wanted to come in. Was seen at  earlier where she had an EKG and UA that showed trace blood. No SOB.
as per upon admission/Patient baseline mental status

## 2025-03-26 NOTE — H&P ADULT - HISTORY OF PRESENT ILLNESS
89 year old female with PMHx of Afib (on Eliquis), PPM, HTN, hx of RUE melanoma removal with R axillary lymph node removal, recent admission 3/21- 3/25 for lt MCA occlusion s/p thrombectomy presents as transfer from [facility] after she was found at 7:30 with lt facial and left sided weakness.  am.  Upon arrival to  ED she was noted to have lt sided facial droop; aphasic; left arm weakness; no TNK as out of window.  code stroke activated;  CT imagining found patient with new rt mca; patient transferred to [facility] for thrombectomy. Pt transferred to [facility] EDUARDO eval, s/p cerebral angiogram for mechanical thrombectomy of Rt M2 occlusion, TICI 3 (one pass). She is admitted to NSICU for close monitoring post thrombectomy. [facility] up, FC in no acute distress.      NIH SS: 10  DATE:  TIME: 12:48  1A: Level of consciousness (0-3): 1  1B: Questions (0-2): 1   1C: Commands (0-2): 0  2: Gaze (0-2): 0  3: Visual fields (0-3): 0  4: Facial palsy (0-3): 0  MOTOR:  5A: Left arm motor drift (0-4): 1  5B: Right arm motor drift (0-4): 0  6A: Left leg motor drift (0-4): 3  6B: Right leg motor drift (0-4): 2  7: Limb ataxia (0-2): 0  SENSORY:  8: Sensation (0-2): 0  SPEECH:  9: Language (0-3): 3  10: Dysarthria (0-2): 0  EXTINCTION:  11: Extinction/inattention (0-2): 0    TOTAL SCORE: 10

## 2025-03-26 NOTE — CONSULT NOTE ADULT - ASSESSMENT
89 year old female with PMHx of Afib (off Eliquis), recent LMCA territory stroke s/p LM1 thrombectomy, with residual minimal aphasia, MRS 1, PPM, HTN, HLD, GERD, severe MR/TR, iatrogenic adrenal insufficiency (on prednisone), lymphedema, metastatic melanoma s/p Left foot melanoma and lymph node resection, s/p RUE melanoma and Right axillary lymph node excision at [facility] (3/18/25) who presented to  ED today with L sided weakness and aphasia. CT neg, CTA showing RM2 occlusion.  Transferred here for thrombectomy, TICI 3 achieved after 1 pass.      Ischemic Infarct s/p thrombectomy with TICI 3 recanalization   - 24 hour post-thrombectomy NIHSS: pending   - close monitoring and frequent neurochecks for signs of neurologic deterioration   - check MRI brain without contrast   - STAT head CT for any neurologic change   - goal BP as per Neuro-IR   - perform fingersticks every 6 hours,  insulin sliding scale prn  - continue IV fluids   - maintain normovolemia, normoglycemia, normonatremia, and normothermia   - start chewable Aspirin, please give one dose of 325mg and then 81mg thereafter   - continue statin with a goal LDL under 70   - check lipid panel, hemoglobin A1c   - check transthoracic echocardiogram without bubble   - consult PT/OT/SLT/PM&R           89 year old female with PMHx of Afib (off Eliquis), recent LMCA territory stroke s/p LM1 thrombectomy, with residual minimal aphasia, MRS 1, PPM, HTN, HLD, GERD, severe MR/TR, iatrogenic adrenal insufficiency (on prednisone), lymphedema, metastatic melanoma s/p Left foot melanoma and lymph node resection, s/p RUE melanoma and Right axillary lymph node excision at [facility] (3/18/25) who presented to  ED today with L sided weakness and aphasia. CT neg, CTA showing RM2 occlusion.  Transferred here for thrombectomy, TICI 3 achieved after 1 pass.      Ischemic Infarct s/p thrombectomy with TICI 3 recanalization   - 24 hour post-thrombectomy NIHSS: pending   - close monitoring and frequent neurochecks for signs of neurologic deterioration   - Extubation per ICU  - STAT head CT for any neurologic change   - goal BP as per Neuro-IR   - perform fingersticks every 6 hours,  insulin sliding scale prn  - maintain normovolemia, normoglycemia, normonatremia, and normothermia   - Started on IV heparin given recurrent embolic events.  R/A/B discussed with son and daughter at bedside.    - continue statin with a goal LDL under 70   - consult PT/OT/SLP/PMR    Will follow

## 2025-03-26 NOTE — CONSULT NOTE ADULT - SUBJECTIVE AND OBJECTIVE BOX
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 (3/18/25) who presented to  ED today with L sided weakness and aphasia. CT neg, CTA showing RM2 occlusion.  Transferred here for thrombectomy.       Of note, patient discharged yesterday. Given size of stroke, plans to restart    89 year old female with PMHx of Afib (on Eliquis), PPM, HTN, hx of RUE melanoma removal with R axillary lymph node removal, recent admission 3/21- 3/25 for lt MCA occlusion s/p thrombectomy presents as transfer from [facility] after she was found at 7:30 with lt facial and left sided weakness.  am.  Upon arrival to  ED she was noted to have lt sided facial droop; aphasic; left arm weakness; no TNK as out of window.  code stroke activated;  CT imagining found patient with new rt mca; patient transferred to [facility] for thrombectomy. Pt transferred to [facility] EDUARDO eval, s/p cerebral angiogram for mechanical thrombectomy of Rt M2 occlusion, TICI 3 (one pass). She is admitted to NSICU for close monitoring post thrombectomy. [facility], FC in no acute distress.      NIH SS: 10  DATE:  TIME: 12:48  1A: Level of consciousness (0-3): 1  1B: Questions (0-2): 1   1C: Commands (0-2): 0  2: Gaze (0-2): 0  3: Visual fields (0-3): 0  4: Facial palsy (0-3): 0  MOTOR:  5A: Left arm motor drift (0-4): 1  5B: Right arm motor drift (0-4): 0  6A: Left leg motor drift (0-4): 3  6B: Right leg motor drift (0-4): 2  7: Limb ataxia (0-2): 0  SENSORY:  8: Sensation (0-2): 0  SPEECH:  9: Language (0-3): 3  10: Dysarthria (0-2): 0  EXTINCTION:  11: Extinction/inattention (0-2): 0    TOTAL SCORE: 10     (26 Mar 2025 12:28)  The patient is a 89y Female who presented with    Stroke risk factors:    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 1000 milliLiter(s) (65 mL/Hr) IV Continuous <Continuous>    MEDICATIONS  (PRN):  hydrALAZINE Injectable 10 milliGRAM(s) IV Push four times a day PRN SBP >150  labetalol Injectable 10 milliGRAM(s) IV Push every 4 hours PRN Systolic blood pressure > 150      Allergies    penicillins (Hives)    Intolerances        SOCIAL HISTORY:  no tob,   no alcohol   no drugs    FAMILY HISTORY:  Family history of leukemia (Sibling)          ROS: 14 point ROS negative other than what is present in HPI or below    Vital Signs Last 24 Hrs  T(C): 37.5 (26 Mar 2025 13:15), Max: 37.5 (26 Mar 2025 13:15)  T(F): 99.5 (26 Mar 2025 13:15), Max: 99.5 (26 Mar 2025 13:15)  HR: 100 (26 Mar 2025 13:15) (87 - 112)  BP: 121/62 (26 Mar 2025 13:15) (111/73 - 165/96)  BP(mean): 77 (26 Mar 2025 13:15) (77 - 145)  RR: 21 (26 Mar 2025 13:15) (16 - 23)  SpO2: 97% (26 Mar 2025 13:15) (85% - 100%)    Parameters below as of 26 Mar 2025 12:10  Patient On (Oxygen Delivery Method): ventilator          Physical Exam:  General: No acute distress.   HEENT: Head normocephalic, atraumatic. Conjunctivae clear w/o exudates or hemorrhage. Sclera non-icteric. Nares are patent bilaterally. Mucous membranes pink and moist.  No tonsillar swelling or exudates.    Neck: Supple, no adenopathy. Trachea midline. No JVD.  Cardiac: Normal rate and rhythm. S1, S2 auscultated. No murmurs, gallops, or rubs.     Respiratory: Lung sounds clear in all fields. Chest wall symmetric, nontender.   Abdominal: Soft, nondistended, nontender. Bowel sounds normoactive x 4 quadrants. No masses, hepatomegaly, or splenomegaly.   Skin: Skin is warm, dry and intact without rashes or lesions. Appropriate color for ethnicity. Nailbeds pink with no cyanosis or clubbing.   Extremities: No edema, 2+ peripheral pulses in b/l upper and b/l lower extremities. Full range of motion in all joints.     Detailed Neurologic Exam:    Mental status: The patient is awake and alert and has normal attention span.  The patient is fully oriented in 3 spheres. The patient is oriented to current events. The patient is able to name objects, follow commands, repeat sentences.    Cranial nerves: Pupils equal and react symmetrically to light. There is no visual field deficit to confrontation. Extraocular motion is full with no nystagmus. There is no ptosis. Facial sensation is intact. Facial musculature is symmetric. Palate elevates symmetrically. Tongue is midline.    Motor: There is normal bulk and tone.  There is no tremor.  Strength is 5/5 in the right arm and leg.   Strength is 5/5 in the left arm and leg.    Sensation: Intact to light touch and pin in 4 extremities    Reflexes: 1-2+ throughout and plantar responses are flexor.    Cerebellar: There is no dysmetria on finger to nose testing.    Gait : deferred    [facility] SS:  DATE:  TIME:  1A: Level of consciousness (0-3):   1B: Questions (0-2):   1C: Commands (0-2):   2: Gaze (0-2):   3: Visual fields (0-3):   4: Facial palsy (0-3):   MOTOR:  5A: Left arm motor drift (0-4):   5B: Right arm motor drift (0-4):   6A: Left leg motor drift (0-4):   6B: Right leg motor drift (0-4):   7: Limb ataxia (0-2):   SENSORY:  8: Sensation (0-2):   SPEECH:  9: Language (0-3):   10: Dysarthria (0-2):   EXTINCTION:  11: Extinction/inattention (0-2):     TOTAL SCORE:     prehospital mRS=      LABS:                         12.1   4.90  )-----------( 125      ( 26 Mar 2025 12:35 )             38.0       03-26    139  |  103  |  17.7  ----------------------------<  98  3.9   |  23.0  |  0.58    Ca    8.4      26 Mar 2025 12:35  Phos  3.4     03-26  Mg     1.7     03-26    TPro  6.2  /  Alb  3.1[L]  /  TBili  1.2  /  DBili  x   /  AST  25  /  ALT  30  /  AlkPhos  44  03-26      PT/INR - ( 26 Mar 2025 12:35 )   PT: 11.8 sec;   INR: 1.04 ratio         PTT - ( 26 Mar 2025 12:35 )  PTT:27.4 sec    03-22 Chol 136 LDL -- HDL 66 Trig 56    A1C:         RADIOLOGY & ADDITIONAL STUDIES (independently reviewed unless otherwise noted):  CT head:  CTA head: no aneurysm, AVM, LVO or sig stenosis in COW  CTA neck: no sig carotid or vertebral stenosis  CT Perfusion head - CBF<30% volume 0ml, Tmax>6s volume =0ml                              [facility] Stroke Attending Note  CC: Stroke  HPI: 89 year old female with PMHx of Afib (off Eliquis), recent LMCA territory stroke s/p LM1 thrombectomy, with residual minimal aphasia, MRS 1, PPM, HTN, HLD, GERD, severe MR/TR, iatrogenic adrenal insufficiency (on prednisone), lymphedema, metastatic melanoma s/p Left foot melanoma and lymph node resection, s/p RUE melanoma and Right axillary lymph node excision at [facility] (3/18/25) who presented to  ED today with L sided weakness and aphasia. CT neg, CTA showing RM2 occlusion.  Transferred here for thrombectomy, TICI 3 achieved after 1 pass.      Of note, patient discharged yesterday. Given size of stroke, plan was to restart Eliquis 3/27 (5 days post-stroke).         NIH SS: 10  DATE:  TIME: 12:48  1A: Level of consciousness (0-3): 1  1B: Questions (0-2): 1   1C: Commands (0-2): 0  2: Gaze (0-2): 0  3: Visual fields (0-3): 0  4: Facial palsy (0-3): 0  MOTOR:  5A: Left arm motor drift (0-4): 1  5B: Right arm motor drift (0-4): 0  6A: Left leg motor drift (0-4): 3  6B: Right leg motor drift (0-4): 2  7: Limb ataxia (0-2): 0  SENSORY:  8: Sensation (0-2): 0  SPEECH:  9: Language (0-3): 3  10: Dysarthria (0-2): 0  EXTINCTION:  11: Extinction/inattention (0-2): 0    TOTAL SCORE: 10     (26 Mar 2025 12:28)  The patient is a 89y Female who presented with    Stroke risk factors:    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 1000 milliLiter(s) (65 mL/Hr) IV Continuous <Continuous>    MEDICATIONS  (PRN):  hydrALAZINE Injectable 10 milliGRAM(s) IV Push four times a day PRN SBP >150  labetalol Injectable 10 milliGRAM(s) IV Push every 4 hours PRN Systolic blood pressure > 150      Allergies    penicillins (Hives)    Intolerances        SOCIAL HISTORY:  no tob,   no alcohol   no drugs    FAMILY HISTORY:  Family history of leukemia (Sibling)          ROS: 14 point ROS negative other than what is present in HPI or below    Vital Signs Last 24 Hrs  T(C): 37.5 (26 Mar 2025 13:15), Max: 37.5 (26 Mar 2025 13:15)  T(F): 99.5 (26 Mar 2025 13:15), Max: 99.5 (26 Mar 2025 13:15)  HR: 100 (26 Mar 2025 13:15) (87 - 112)  BP: 121/62 (26 Mar 2025 13:15) (111/73 - 165/96)  BP(mean): 77 (26 Mar 2025 13:15) (77 - 145)  RR: 21 (26 Mar 2025 13:15) (16 - 23)  SpO2: 97% (26 Mar 2025 13:15) (85% - 100%)    Parameters below as of 26 Mar 2025 12:10  Patient On (Oxygen Delivery Method): ventilator          Physical Exam:  General: No acute distress.   HEENT: Head normocephalic, atraumatic. Conjunctivae clear w/o exudates or hemorrhage. Sclera non-icteric. Nares are patent bilaterally. Mucous membranes pink and moist.  No tonsillar swelling or exudates.    Neck: Supple, no adenopathy. Trachea midline. No JVD.  Cardiac: Normal rate and rhythm. S1, S2 auscultated. No murmurs, gallops, or rubs.     Respiratory: Lung sounds clear in all fields. Chest wall symmetric, nontender.   Abdominal: Soft, nondistended, nontender. Bowel sounds normoactive x 4 quadrants. No masses, hepatomegaly, or splenomegaly.   Skin: Skin is warm, dry and intact without rashes or lesions. Appropriate color for ethnicity. Nailbeds pink with no cyanosis or clubbing.   Extremities: No edema, 2+ peripheral pulses in b/l upper and b/l lower extremities. Full range of motion in all joints.     Detailed Neurologic Exam:    Mental status: The patient is awake and alert and has normal attention span.  The patient is fully oriented in 3 spheres. The patient is oriented to current events. The patient is able to name objects, follow commands, repeat sentences.    Cranial nerves: Pupils equal and react symmetrically to light. There is no visual field deficit to confrontation. Extraocular motion is full with no nystagmus. There is no ptosis. Facial sensation is intact. Facial musculature is symmetric. Palate elevates symmetrically. Tongue is midline.    Motor: There is normal bulk and tone.  There is no tremor.  Strength is 5/5 in the right arm and leg.   Strength is 5/5 in the left arm and leg.    Sensation: Intact to light touch and pin in 4 extremities    Reflexes: 1-2+ throughout and plantar responses are flexor.    Cerebellar: There is no dysmetria on finger to nose testing.    Gait : deferred    [facility] SS:  DATE:  TIME:  1A: Level of consciousness (0-3):   1B: Questions (0-2):   1C: Commands (0-2):   2: Gaze (0-2):   3: Visual fields (0-3):   4: Facial palsy (0-3):   MOTOR:  5A: Left arm motor drift (0-4):   5B: Right arm motor drift (0-4):   6A: Left leg motor drift (0-4):   6B: Right leg motor drift (0-4):   7: Limb ataxia (0-2):   SENSORY:  8: Sensation (0-2):   SPEECH:  9: Language (0-3):   10: Dysarthria (0-2):   EXTINCTION:  11: Extinction/inattention (0-2):     TOTAL SCORE:     prehospital mRS=1      LABS:                         12.1   4.90  )-----------( 125      ( 26 Mar 2025 12:35 )             38.0       03-26    139  |  103  |  17.7  ----------------------------<  98  3.9   |  23.0  |  0.58    Ca    8.4      26 Mar 2025 12:35  Phos  3.4     03-26  Mg     1.7     03-26    TPro  6.2  /  Alb  3.1[L]  /  TBili  1.2  /  DBili  x   /  AST  25  /  ALT  30  /  AlkPhos  44  03-26      PT/INR - ( 26 Mar 2025 12:35 )   PT: 11.8 sec;   INR: 1.04 ratio         PTT - ( 26 Mar 2025 12:35 )  PTT:27.4 sec    03-22 Chol 136 LDL -- HDL 66 Trig 56    A1C:         RADIOLOGY & ADDITIONAL STUDIES (independently reviewed unless otherwise noted):  CT head:  CTA head: no aneurysm, AVM, LVO or sig stenosis in COW  CTA neck: no sig carotid or vertebral stenosis  CT Perfusion head - CBF<30% volume 0ml, Tmax>6s volume =0ml                              [facility] Stroke Attending Note  CC: Stroke  HPI: 89 year old female with PMHx of Afib (off Eliquis), recent LMCA territory stroke s/p LM1 thrombectomy, with residual minimal aphasia, MRS 1, PPM, HTN, HLD, GERD, severe MR/TR, iatrogenic adrenal insufficiency (on prednisone), lymphedema, metastatic melanoma s/p Left foot melanoma and lymph node resection, s/p RUE melanoma and Right axillary lymph node excision at [facility] (3/18/25) who presented to  ED today with L sided weakness and aphasia. CT neg, CTA showing RM2 occlusion.  Transferred here for thrombectomy, TICI 3 achieved after 1 pass.      Of note, patient discharged yesterday. Given size of stroke, plan was to restart Eliquis 3/27 (5 days post-stroke).            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 1000 milliLiter(s) (65 mL/Hr) IV Continuous <Continuous>    MEDICATIONS  (PRN):  hydrALAZINE Injectable 10 milliGRAM(s) IV Push four times a day PRN SBP >150  labetalol Injectable 10 milliGRAM(s) IV Push every 4 hours PRN Systolic blood pressure > 150      Allergies    penicillins (Hives)    Intolerances        SOCIAL HISTORY:  no tob,   no alcohol   no drugs    FAMILY HISTORY:  Family history of leukemia (Sibling)          ROS: 14 point ROS negative other than what is present in HPI or below    Vital Signs Last 24 Hrs  T(C): 37.5 (26 Mar 2025 13:15), Max: 37.5 (26 Mar 2025 13:15)  T(F): 99.5 (26 Mar 2025 13:15), Max: 99.5 (26 Mar 2025 13:15)  HR: 100 (26 Mar 2025 13:15) (87 - 112)  BP: 121/62 (26 Mar 2025 13:15) (111/73 - 165/96)  BP(mean): 77 (26 Mar 2025 13:15) (77 - 145)  RR: 21 (26 Mar 2025 13:15) (16 - 23)  SpO2: 97% (26 Mar 2025 13:15) (85% - 100%)    Parameters below as of 26 Mar 2025 12:10  Patient On (Oxygen Delivery Method): ventilator          Physical Exam:  General: No acute distress.   HEENT: Head normocephalic, atraumatic. Conjunctivae clear w/o exudates or hemorrhage. Sclera non-icteric. Intubated  Neck: Supple, no adenopathy. Trachea midline. No JVD.  Cardiac: Normal rate and rhythm. S1, S2 auscultated. No murmurs, gallops, or rubs.     Respiratory: Lung sounds clear in all fields. Chest wall symmetric, nontender.   Abdominal: Soft, nondistended, nontender. Bowel sounds normoactive x 4 quadrants. No masses, hepatomegaly, or splenomegaly.   Skin: Skin is warm, dry and intact without rashes or lesions. Appropriate color for ethnicity. Nailbeds pink with no cyanosis or clubbing.   Extremities: No edema, 2+ peripheral pulses in b/l upper and b/l lower extremities. Full range of motion in all joints.     Detailed Neurologic Exam:    Mental status: The patient is awake and alert and has normal attention span.  The patient follows all commands    Cranial nerves: Pupils equal and react symmetrically to light. There is no visual field deficit to confrontation. Extraocular motion is full with no nystagmus. There is no ptosis. Facial sensation is intact. Facial musculature is symmetric. Tongue is midline.    Motor: There is normal bulk and tone.  There is no tremor.  Strength is 4+/5 in the right arm and leg.   Strength is 4+/5 in the left arm and leg.    Sensation: Intact to light touch and pin in 4 extremities    Reflexes: 1-2+ throughout and plantar responses are flexor.    Cerebellar: There is no dysmetria on finger to nose testing.    Gait : deferred    [facility] SS:  DATE:  TIME:  1A: Level of consciousness (0-3):   1B: Questions (0-2):   1C: Commands (0-2):   2: Gaze (0-2):   3: Visual fields (0-3):   4: Facial palsy (0-3):   MOTOR:  5A: Left arm motor drift (0-4):   5B: Right arm motor drift (0-4):   6A: Left leg motor drift (0-4): 1  6B: Right leg motor drift (0-4):   7: Limb ataxia (0-2):   SENSORY:  8: Sensation (0-2):   SPEECH:  9: Language (0-3): Cannot test due to ET  10: Dysarthria (0-2): Cannot test due to ET  EXTINCTION:  11: Extinction/inattention (0-2):     TOTAL SCORE: 1    prehospital mRS=1      LABS:                         12.1   4.90  )-----------( 125      ( 26 Mar 2025 12:35 )             38.0       03-26    139  |  103  |  17.7  ----------------------------<  98  3.9   |  23.0  |  0.58    Ca    8.4      26 Mar 2025 12:35  Phos  3.4     03-26  Mg     1.7     03-26    TPro  6.2  /  Alb  3.1[L]  /  TBili  1.2  /  DBili  x   /  AST  25  /  ALT  30  /  AlkPhos  44  03-26      PT/INR - ( 26 Mar 2025 12:35 )   PT: 11.8 sec;   INR: 1.04 ratio         PTT - ( 26 Mar 2025 12:35 )  PTT:27.4 sec    03-22 Chol 136 LDL -- HDL 66 Trig 56    A1C:         RADIOLOGY & ADDITIONAL STUDIES (independently reviewed unless otherwise noted):  < from: CT Angio Neck Stroke Protocol w/ IV Cont (03.26.25 @ 08:52) >  .   RIGHT CAROTID SYSTEM:    Atherosclerotic plaque carotid bulb without    hemodynamically significant stenosis. Tandem lesion distal internal   carotid artery at the C1 level has developed since 3/21/2025, possible   interval arterial dissection, with associated luminal narrowing   approximately 30%    2.   LEFT CAROTID SYSTEM:     Atherosclerotic plaque carotid bulb without    hemodynamically significant stenosis.    3.   VERTEBRAL CIRCULATION:    Patent.    4.  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 Right MCA occlusion   in its proximal M2 segment is an interval finding since 3/21/2025. Left   MCA remains patent with luminal irregularity and low-grade narrowing   following recent thrombectomy.    5.  POSTERIOR INTRACRANIAL CIRCULATION:    Patent.    6. BRAIN PERFUSION:   No acute infarction of the brain is convincingly   demonstrated.  However, broad region of apparent ischemia corresponds to   the right MCA distribution correlating with acute embolic occlusion on CT   angiography    7. Heterogeneous enhancement within the principal dural sinuses may be   secondary to the early phase of venous opacification on this arterial   phase examination. The appearance is similar to 3/21/2025. Consider   supplemental evaluation with MR venogram    < end of copied text >

## 2025-03-26 NOTE — ED ADULT NURSE NOTE - NSFALLHARMRISKINTERV_ED_ALL_ED
Assistance OOB with selected safe patient handling equipment if applicable/Assistance with ambulation/Communicate risk of Fall with Harm to all staff, patient, and family/Monitor gait and stability/Monitor for mental status changes and reorient to person, place, and time, as needed/Provide visual cue: red socks, yellow wristband, yellow gown, etc/Reinforce activity limits and safety measures with patient and family/Toileting schedule using arm’s reach rule for commode and bathroom/Use of alarms - bed, stretcher, chair and/or video monitoring/Bed in lowest position, wheels locked, appropriate side rails in place/Call bell, personal items and telephone in reach/Instruct patient to call for assistance before getting out of bed/chair/stretcher/Non-slip footwear applied when patient is off stretcher/[place] to call system/Physically safe environment - no spills, clutter or unnecessary equipment/Purposeful Proactive Rounding/Room/bathroom lighting operational, light cord in reach

## 2025-03-26 NOTE — STROKE CODE NOTE - IV ALTEPLASE EXCL REL HIDDEN
Next Appt: With Rheumatology (Katie Burkett MD)  07/03/2024 at 10:20 AM    12/28/2023 last visit     11/9/2022 last eye exam    Please call for most recent eye exam. [phone]     show

## 2025-03-26 NOTE — STROKE CODE NOTE - NSMDCONSULT QTN_Y FT
Diagnosis: gouty arthritis affecting L > R forefoot.    Plan:   - Start a brief course of prednisone ( 20 mg per day for 2 days, 15 mg per day for 2 days, 10 mg per day for 2 days, and then off)  - Continue allopurinol 400 mg daily; gouty flares may still occur with recent changes in diuretic/cardiovascular medication. May need to consider substituting febuxostat for allopurinol depending on kidney function.  - NSAIDS to be avoided  - Aranesp today.  
Patient is a 89y old  Female who presents with a chief complaint of altered mental status and dysarthria and left sided weakness    HPI: Patient is an 89-year-old female with PMH of afib and recent L-MCA ischemic stroke on 3/21/2025, s/p MT at [facility] with dramatic improvement and near complete resolution of of aphasia and Rt sided weakness. However, she was BIBEMS for confusion and inability to speak. the LKW is unclear but she was seen by her son going to the bathroom at 230AM.  patient awoke at 7:30 AM this morning with left-sided facial droop left upper extremity weakness and aphasia.  Patient is not currently on Eliquis and was plan to resume tomorrow as per discharge instructions.      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      Vital Signs Last 24 Hrs  T(C): 36.7 (26 Mar 2025 09:55), Max: 36.8 (25 Mar 2025 12:30)  T(F): 98.1 (26 Mar 2025 09:55), Max: 98.2 (25 Mar 2025 12:30)  HR: 112 (26 Mar 2025 09:55) (87 - 112)  BP: 133/106 (26 Mar 2025 09:55) (102/59 - 165/96)  BP(mean): 116 (26 Mar 2025 09:35) (73 - 145)  RR: 18 (26 Mar 2025 09:55) (18 - 19)  SpO2: 95% (26 Mar 2025 09:55) (93% - 100%)    Parameters below as of 26 Mar 2025 09:55  Patient On (Oxygen Delivery Method): nasal cannula  O2 Flow (L/min): 2          Constitutional: awake and alert.  HEENT: PERRLA, EOMI,   Neck: Supple.  Respiratory: Breath sounds are clear bilaterally  Cardiovascular: S1 and S2, regular / irregular rhythm  Gastrointestinal: soft, nontender  Extremities:  no edema  Vascular: Caritid Bruit - no  Musculoskeletal: no joint swelling/tenderness, no abnormal movements  Skin: No rashes    Neurological exam:  HF: A but aphasic and slurred   CN: LUCILA, EOMI, Left facial droop  Motor: left UE 4/5 Left LE 3+/ 5  Sens: diminished on the left . severe neglect   No ataxia             Labs:   03-26    138  |  108  |  20  ----------------------------<  104[H]  4.0   |  27  |  0.61    Ca    9.3      26 Mar 2025 08:44  Phos  3.1     03-25  Mg     1.8     03-25    TPro  6.2  /  Alb  3.1[L]  /  TBili  1.2  /  DBili  x   /  AST  25  /  ALT  30  /  AlkPhos  44  03-26    03-22 Chol 136 LDL -- HDL 66 Trig 56                          12.9   5.57  )-----------( 135      ( 26 Mar 2025 08:44 )             39.9       Radiology:  - CT Head: No acute. subacute left MCA stroke with cortical sparing   CTA: R-MCA/M2 occlusion     A/P:89 y/o with new onset acute stroke and R-MCA occlusion likely embolic 2/2 afib      No IV tpa given because: outside window, recent stroke last week    PLAN  Transfer to [facility] for MT. patient got accepted     Total Critical Care Time spent: 120 minute

## 2025-03-26 NOTE — ED PROVIDER NOTE - CLINICAL SUMMARY MEDICAL DECISION MAKING FREE TEXT BOX
89 yo female with recent stroke on 3/21/25; 88 yo female with recent stroke on 3/21/25; left sided facial droop; aphasic; left arm weakness; no TNK as patient last seen normal at 2:30am this morning; code stroke activated; patient with new rt mca; transfer to [facility] for thrombectomy- accepted by neuro IR team.

## 2025-03-26 NOTE — ED PROVIDER NOTE - OBJECTIVE STATEMENT
Patient is an 89-year-old female with recent stroke on 3/21/2025 status post thrombectomy for left M1 occlusion.  Patient last seen normal at 230 this morning by son walking to the bathroom per EMS report.  patient awoke at 7:30 AM this morning with left-sided facial droop left upper extremity weakness and aphasia.  Patient is not currently on Eliquis and was plan to resume tomorrow as per discharge instructions.  Patient required my immediate response.

## 2025-03-27 LAB
A1C WITH ESTIMATED AVERAGE GLUCOSE RESULT: 5.5 % — SIGNIFICANT CHANGE UP (ref 4–5.6)
ANION GAP SERPL CALC-SCNC: 11 MMOL/L — SIGNIFICANT CHANGE UP (ref 5–17)
APTT BLD: 44 SEC — HIGH (ref 24.5–35.6)
APTT BLD: 45.3 SEC — HIGH (ref 24.5–35.6)
APTT BLD: 62.1 SEC — HIGH (ref 24.5–35.6)
APTT BLD: 82.9 SEC — HIGH (ref 24.5–35.6)
BUN SERPL-MCNC: 21.2 MG/DL — HIGH (ref 8–20)
CALCIUM SERPL-MCNC: 8.3 MG/DL — LOW (ref 8.4–10.5)
CHLORIDE SERPL-SCNC: 102 MMOL/L — SIGNIFICANT CHANGE UP (ref 96–108)
CHOLEST SERPL-MCNC: 145 MG/DL — SIGNIFICANT CHANGE UP
CO2 SERPL-SCNC: 24 MMOL/L — SIGNIFICANT CHANGE UP (ref 22–29)
CREAT SERPL-MCNC: 0.56 MG/DL — SIGNIFICANT CHANGE UP (ref 0.5–1.3)
CULTURE RESULTS: NO GROWTH — SIGNIFICANT CHANGE UP
EGFR: 88 ML/MIN/1.73M2 — SIGNIFICANT CHANGE UP
EGFR: 88 ML/MIN/1.73M2 — SIGNIFICANT CHANGE UP
ESTIMATED AVERAGE GLUCOSE: 111 MG/DL — SIGNIFICANT CHANGE UP (ref 68–114)
GLUCOSE SERPL-MCNC: 161 MG/DL — HIGH (ref 70–99)
HCT VFR BLD CALC: 35.3 % — SIGNIFICANT CHANGE UP (ref 34.5–45)
HDLC SERPL-MCNC: 74 MG/DL — SIGNIFICANT CHANGE UP
HGB BLD-MCNC: 11.3 G/DL — LOW (ref 11.5–15.5)
LDLC SERPL-MCNC: 59 MG/DL — SIGNIFICANT CHANGE UP
LIPID PNL WITH DIRECT LDL SERPL: 59 MG/DL — SIGNIFICANT CHANGE UP
MAGNESIUM SERPL-MCNC: 1.7 MG/DL — SIGNIFICANT CHANGE UP (ref 1.6–2.6)
MCHC RBC-ENTMCNC: 30.7 PG — SIGNIFICANT CHANGE UP (ref 27–34)
MCHC RBC-ENTMCNC: 32 G/DL — SIGNIFICANT CHANGE UP (ref 32–36)
MCV RBC AUTO: 95.9 FL — SIGNIFICANT CHANGE UP (ref 80–100)
NONHDLC SERPL-MCNC: 71 MG/DL — SIGNIFICANT CHANGE UP
NRBC # BLD AUTO: 0 K/UL — SIGNIFICANT CHANGE UP (ref 0–0)
NRBC # FLD: 0 K/UL — SIGNIFICANT CHANGE UP (ref 0–0)
NRBC BLD AUTO-RTO: 0 /100 WBCS — SIGNIFICANT CHANGE UP (ref 0–0)
PHOSPHATE SERPL-MCNC: 4.3 MG/DL — SIGNIFICANT CHANGE UP (ref 2.4–4.7)
PLATELET # BLD AUTO: 116 K/UL — LOW (ref 150–400)
PMV BLD: 10.3 FL — SIGNIFICANT CHANGE UP (ref 7–13)
POTASSIUM SERPL-MCNC: 4.3 MMOL/L — SIGNIFICANT CHANGE UP (ref 3.5–5.3)
POTASSIUM SERPL-SCNC: 4.3 MMOL/L — SIGNIFICANT CHANGE UP (ref 3.5–5.3)
RBC # BLD: 3.68 M/UL — LOW (ref 3.8–5.2)
RBC # FLD: 13.8 % — SIGNIFICANT CHANGE UP (ref 10.3–14.5)
SODIUM SERPL-SCNC: 137 MMOL/L — SIGNIFICANT CHANGE UP (ref 135–145)
SPECIMEN SOURCE: SIGNIFICANT CHANGE UP
SURGICAL PATHOLOGY STUDY: SIGNIFICANT CHANGE UP
TRIGL SERPL-MCNC: 55 MG/DL — SIGNIFICANT CHANGE UP
TSH SERPL-MCNC: 0.64 UIU/ML — SIGNIFICANT CHANGE UP (ref 0.27–4.2)
WBC # BLD: 7.72 K/UL — SIGNIFICANT CHANGE UP (ref 3.8–10.5)
WBC # FLD AUTO: 7.72 K/UL — SIGNIFICANT CHANGE UP (ref 3.8–10.5)

## 2025-03-27 PROCEDURE — 99223 1ST HOSP IP/OBS HIGH 75: CPT | Mod: GC

## 2025-03-27 PROCEDURE — 99291 CRITICAL CARE FIRST HOUR: CPT

## 2025-03-27 PROCEDURE — 36002 PSEUDOANEURYSM INJECTION TRT: CPT | Mod: RT

## 2025-03-27 PROCEDURE — 99221 1ST HOSP IP/OBS SF/LOW 40: CPT

## 2025-03-27 PROCEDURE — 93926 LOWER EXTREMITY STUDY: CPT | Mod: 26,59,RT

## 2025-03-27 PROCEDURE — 70450 CT HEAD/BRAIN W/O DYE: CPT | Mod: 26

## 2025-03-27 PROCEDURE — 93926 LOWER EXTREMITY STUDY: CPT | Mod: 26,RT

## 2025-03-27 PROCEDURE — 76942 ECHO GUIDE FOR BIOPSY: CPT | Mod: 26

## 2025-03-27 PROCEDURE — 99233 SBSQ HOSP IP/OBS HIGH 50: CPT

## 2025-03-27 RX ORDER — BUMETANIDE 1 MG/1
2 TABLET ORAL ONCE
Refills: 0 | Status: COMPLETED | OUTPATIENT
Start: 2025-03-27 | End: 2025-03-27

## 2025-03-27 RX ORDER — MAGNESIUM SULFATE 500 MG/ML
2 SYRINGE (ML) INJECTION ONCE
Refills: 0 | Status: COMPLETED | OUTPATIENT
Start: 2025-03-27 | End: 2025-03-27

## 2025-03-27 RX ORDER — PREDNISONE 20 MG/1
2 TABLET ORAL
Refills: 0 | Status: DISCONTINUED | OUTPATIENT
Start: 2025-03-28 | End: 2025-03-29

## 2025-03-27 RX ORDER — PREDNISONE 20 MG/1
2 TABLET ORAL AT BEDTIME
Refills: 0 | Status: DISCONTINUED | OUTPATIENT
Start: 2025-03-27 | End: 2025-03-27

## 2025-03-27 RX ORDER — ATORVASTATIN CALCIUM 80 MG/1
10 TABLET, FILM COATED ORAL AT BEDTIME
Refills: 0 | Status: DISCONTINUED | OUTPATIENT
Start: 2025-03-27 | End: 2025-03-29

## 2025-03-27 RX ORDER — LABETALOL HYDROCHLORIDE 200 MG/1
10 TABLET, FILM COATED ORAL EVERY 4 HOURS
Refills: 0 | Status: DISCONTINUED | OUTPATIENT
Start: 2025-03-27 | End: 2025-04-02

## 2025-03-27 RX ORDER — SENNA 187 MG
2 TABLET ORAL AT BEDTIME
Refills: 0 | Status: DISCONTINUED | OUTPATIENT
Start: 2025-03-27 | End: 2025-03-29

## 2025-03-27 RX ORDER — METOPROLOL SUCCINATE 50 MG/1
25 TABLET, EXTENDED RELEASE ORAL
Refills: 0 | Status: DISCONTINUED | OUTPATIENT
Start: 2025-03-27 | End: 2025-03-27

## 2025-03-27 RX ORDER — PREDNISONE 20 MG/1
2 TABLET ORAL DAILY
Refills: 0 | Status: DISCONTINUED | OUTPATIENT
Start: 2025-03-27 | End: 2025-03-27

## 2025-03-27 RX ORDER — APIXABAN 2.5 MG/1
1 TABLET, FILM COATED ORAL
Qty: 60 | Refills: 0
Start: 2025-03-27 | End: 2025-04-25

## 2025-03-27 RX ORDER — METOPROLOL SUCCINATE 50 MG/1
25 TABLET, EXTENDED RELEASE ORAL
Refills: 0 | Status: DISCONTINUED | OUTPATIENT
Start: 2025-03-27 | End: 2025-03-29

## 2025-03-27 RX ORDER — POLYETHYLENE GLYCOL 3350 17 G/17G
17 POWDER, FOR SOLUTION ORAL DAILY
Refills: 0 | Status: DISCONTINUED | OUTPATIENT
Start: 2025-03-27 | End: 2025-03-29

## 2025-03-27 RX ORDER — PREDNISONE 20 MG/1
5 TABLET ORAL DAILY
Refills: 0 | Status: DISCONTINUED | OUTPATIENT
Start: 2025-03-28 | End: 2025-03-29

## 2025-03-27 RX ADMIN — HEPARIN SODIUM 6.5 UNIT(S)/HR: 1000 INJECTION INTRAVENOUS; SUBCUTANEOUS at 18:26

## 2025-03-27 RX ADMIN — METHYLPREDNISOLONE ACETATE 40 MILLIGRAM(S): 80 INJECTION, SUSPENSION INTRA-ARTICULAR; INTRALESIONAL; INTRAMUSCULAR; SOFT TISSUE at 06:03

## 2025-03-27 RX ADMIN — PREDNISONE 2 MILLIGRAM(S): 20 TABLET ORAL at 18:15

## 2025-03-27 RX ADMIN — METOPROLOL SUCCINATE 25 MILLIGRAM(S): 50 TABLET, EXTENDED RELEASE ORAL at 17:20

## 2025-03-27 RX ADMIN — Medication 25 GRAM(S): at 06:42

## 2025-03-27 RX ADMIN — IPRATROPIUM BROMIDE AND ALBUTEROL SULFATE 3 MILLILITER(S): .5; 2.5 SOLUTION RESPIRATORY (INHALATION) at 20:42

## 2025-03-27 RX ADMIN — METHYLPREDNISOLONE ACETATE 40 MILLIGRAM(S): 80 INJECTION, SUSPENSION INTRA-ARTICULAR; INTRALESIONAL; INTRAMUSCULAR; SOFT TISSUE at 00:40

## 2025-03-27 RX ADMIN — Medication 2 TABLET(S): at 21:38

## 2025-03-27 RX ADMIN — BUMETANIDE 2 MILLIGRAM(S): 1 TABLET ORAL at 15:48

## 2025-03-27 RX ADMIN — IPRATROPIUM BROMIDE AND ALBUTEROL SULFATE 3 MILLILITER(S): .5; 2.5 SOLUTION RESPIRATORY (INHALATION) at 05:13

## 2025-03-27 RX ADMIN — HEPARIN SODIUM 7 UNIT(S)/HR: 1000 INJECTION INTRAVENOUS; SUBCUTANEOUS at 02:47

## 2025-03-27 RX ADMIN — IPRATROPIUM BROMIDE AND ALBUTEROL SULFATE 3 MILLILITER(S): .5; 2.5 SOLUTION RESPIRATORY (INHALATION) at 08:16

## 2025-03-27 RX ADMIN — ATORVASTATIN CALCIUM 10 MILLIGRAM(S): 80 TABLET, FILM COATED ORAL at 21:38

## 2025-03-27 RX ADMIN — HEPARIN SODIUM 6.5 UNIT(S)/HR: 1000 INJECTION INTRAVENOUS; SUBCUTANEOUS at 09:25

## 2025-03-27 RX ADMIN — POLYETHYLENE GLYCOL 3350 17 GRAM(S): 17 POWDER, FOR SOLUTION ORAL at 12:08

## 2025-03-27 RX ADMIN — Medication 1 APPLICATION(S): at 12:11

## 2025-03-27 NOTE — DISCHARGE NOTE NURSING/CASE MANAGEMENT/SOCIAL WORK - PATIENT PORTAL LINK FT
You can access the FollowMyHealth Patient Portal offered by [facility] by registering at the following website: [email or web address]. By joining [employer]’s FollowMyHealth portal, you will also be able to view your health information using other applications (apps) compatible with our system.

## 2025-03-27 NOTE — CHART NOTE - NSCHARTNOTEFT_GEN_A_CORE
Patient was noted to have right groin hematoma s/p thrombectomy.      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 Vascular was consulted, they recommended to hold Heparin gtt and IR consult for thrombin injection. However, patient had 2 recent strokes, and is at high risk for further strokes, therefore Dr. Johnson wants to continue Heparin gtt.    IR was consulted for thrombin injection, IR PA called back recommending to hold pressure for 20 min and repeat Arterial duplex.     On exam:   RLE  hematoma present, + pulses and leg is warm, will continue with vascular checks.      will apply 20 min pressure and repeat arterial duplex.         Case D/w Dr Hernandez and IR

## 2025-03-27 NOTE — CONSULT NOTE ADULT - ATTENDING COMMENTS
Patient seen and examined. Case discussed with Dr. Dasilva and agree with recommendations outlined above. I have personally reviewed the imaging and labs listed above.    Rehab/Impaired mobility and function -  Patient continues to require hospitalization for the above diagnoses and ongoing active management of comorbid complications that are substantially posing a threat to bodily function, functional ability and quality of life.    RECOMMEND - OOB daily when medically stable    Pending functional assessment for rehab needs.

## 2025-03-27 NOTE — CONSULT NOTE ADULT - SUBJECTIVE AND OBJECTIVE BOX
HPI:  89 year old female with PMHx of Afib (on Eliquis), PPM, HTN, hx of RUE melanoma removal with R axillary lymph node removal, recent admission 3/21- 3/25 for lt MCA occlusion s/p thrombectomy presents as transfer from [facility] after she was found at 7:30 with lt facial and left sided weakness.  am.  Upon arrival to  ED she was noted to have lt sided facial droop; aphasic; left arm weakness; no TNK as out of window.  code stroke activated;  CT imagining found patient with new rt mca; patient transferred to [facility] for thrombectomy. Pt transferred to [facility] EDUARDO eval, s/p cerebral angiogram for mechanical thrombectomy of Rt M2 occlusion, TICI 3 (one pass). She is admitted to NSICU for close monitoring post thrombectomy. Patient seen at bedside remains on [facility] vent, waking up, FC in no acute distress.     Duplex noted for partially thrombosed PSA arising for the R CFA. IR consulted for thrombin injection.      [facility] SS: 10  DATE:  TIME: 12:48  1A: Level of consciousness (0-3): 1  1B: Questions (0-2): 1   1C: Commands (0-2): 0  2: Gaze (0-2): 0  3: Visual fields (0-3): 0  4: Facial palsy (0-3): 0  MOTOR:  5A: Left arm motor drift (0-4): 1  5B: Right arm motor drift (0-4): 0  6A: Left leg motor drift (0-4): 3  6B: Right leg motor drift (0-4): 2  7: Limb ataxia (0-2): 0  SENSORY:  8: Sensation (0-2): 0  SPEECH:  9: Language (0-3): 3  10: Dysarthria (0-2): 0  EXTINCTION:  11: Extinction/inattention (0-2): 0    TOTAL SCORE: 10     (26 Mar 2025 12:28)      ============================================================================  Medications:  Home Medications:  aspirin 81 mg oral tablet, chewable: 1 tab(s) orally once a day Please stop taking once you start Eliquis on 3/27/25. (26 Mar 2025 13:28)  Centrum oral tablet: 1 tab(s) orally once a day (26 Mar 2025 13:28)  Farxiga 10 mg oral tablet: 1 tab(s) orally once a day resume at next scheduled dose. (26 Mar 2025 13:28)  predniSONE 1 mg oral tablet: 2 tab(s) orally once a day (in the evening) (26 Mar 2025 13:28)  predniSONE 5 mg oral tablet: 1 tab(s) orally once a day (in the morning) (26 Mar 2025 13:28)  PreserVision AREDS 2 oral capsule: 1 cap(s) orally 2 times a day (26 Mar 2025 13:28)  simvastatin 10 mg oral tablet: 1 tab(s) orally once a day (at bedtime) (26 Mar 2025 13:28)    MEDICATIONS  (STANDING):  albuterol/ipratropium for Nebulization 3 milliLiter(s) Nebulizer every 6 hours  chlorhexidine 2% Cloths 1 Application(s) Topical daily  heparin  Infusion 900 Unit(s)/Hr (7 mL/Hr) IV Continuous <Continuous>  methylPREDNISolone sodium succinate Injectable 40 milliGRAM(s) IV Push every 6 hours  sodium chloride 0.9%. 1000 milliLiter(s) (65 mL/Hr) IV Continuous <Continuous>    MEDICATIONS  (PRN):  hydrALAZINE Injectable 10 milliGRAM(s) IV Push four times a day PRN SBP >150  labetalol Injectable 10 milliGRAM(s) IV Push every 4 hours PRN Systolic blood pressure > 150      Allergies:   penicillins (Hives)    ============================================================================  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, including shoulder      Lower back pain      S/P tonsillectomy and adenoidectomy      H/O cataract extraction  both eyes      History of melanoma excision  left foot      History of left inguinal hernia repair  mesh      H/O cardiac catheterization  3/15, no  blockages          FAMILY HISTORY:  Family history of leukemia (Sibling)        Social History:      ============================================================================  Vitals:  Vital Signs Last 24 Hrs  T(C): 36.9 (27 Mar 2025 08:30), Max: 38.3 (26 Mar 2025 16:30)  T(F): 98.4 (27 Mar 2025 08:30), Max: 100.9 (26 Mar 2025 16:30)  HR: 95 (27 Mar 2025 08:30) (75 - 112)  BP: 130/72 (27 Mar 2025 08:30) (103/57 - 165/96)  BP(mean): 90 (27 Mar 2025 08:30) (69 - 145)  RR: 18 (27 Mar 2025 08:30) (16 - 26)  SpO2: 100% (27 Mar 2025 08:30) (85% - 100%)    Parameters below as of 27 Mar 2025 08:30  Patient On (Oxygen Delivery Method): nasal cannula  O2 Flow (L/min): 3    Labs:                        11.3   7.72  )-----------( 116      ( 27 Mar 2025 02:00 )             35.3     03-27    137  |  102  |  21.2[H]  ----------------------------<  161[H]  4.3   |  24.0  |  0.56    Ca    8.3[L]      27 Mar 2025 02:00  Phos  4.3     03-27  Mg     1.7     03-27    TPro  6.2  /  Alb  3.1[L]  /  TBili  1.2  /  DBili  x   /  AST  25  /  ALT  30  /  AlkPhos  44  03-26    PT/INR - ( 26 Mar 2025 12:35 )   PT: 11.8 sec;   INR: 1.04 ratio         PTT - ( 27 Mar 2025 02:00 )  PTT:82.9 sec    Imaging:   Pertinent Imaging Reviewed.    ============================================================================

## 2025-03-27 NOTE — CONSULT NOTE ADULT - SUBJECTIVE AND OBJECTIVE BOX
HPI:  89yF was admitted on 03-26 for Right M2 occlusion.          Imaging Reviewed Today: ****      ----------------------------------------------------------------------------------------  CHIEF COMPLAINT*****************          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, including shoulder    Lower back pain    S/P tonsillectomy and adenoidectomy    H/O cataract extraction    History of melanoma excision    History of left inguinal hernia repair    H/O cardiac catheterization          LABS  REVIEWED    CBC Full  -  ( 27 Mar 2025 02:00 )  WBC Count : 7.72 K/uL  RBC Count : 3.68 M/uL  Hemoglobin : 11.3 g/dL  Hematocrit : 35.3 %  Platelet Count - Automated : 116 K/uL  Mean Cell Volume : 95.9 fl  Mean Cell Hemoglobin : 30.7 pg  Mean Cell Hemoglobin Concentration : 32.0 g/dL  Auto Neutrophil # : x  Auto Lymphocyte # : x  Auto Monocyte # : x  Auto Eosinophil # : x  Auto Basophil # : x  Auto Neutrophil % : x  Auto Lymphocyte % : x  Auto Monocyte % : x  Auto Eosinophil % : x  Auto Basophil % : x    03-27    137  |  102  |  21.2[H]  ----------------------------<  161[H]  4.3   |  24.0  |  0.56    Ca    8.3[L]      27 Mar 2025 02:00  Phos  4.3     03-27  Mg     1.7     03-27    TPro  6.2  /  Alb  3.1[L]  /  TBili  1.2  /  DBili  x   /  AST  25  /  ALT  30  /  AlkPhos  44  03-26    Urinalysis Basic - ( 27 Mar 2025 02:00 )    Color: x / Appearance: x / SG: x / pH: x  Gluc: 161 mg/dL / Ketone: x  / Bili: x / Urobili: x   Blood: x / Protein: x / Nitrite: x   Leuk Esterase: x / RBC: x / WBC x   Sq Epi: x / Non Sq Epi: x / Bacteria: x        ALLERGIES  penicillins (Hives)      MEDICATIONS   albuterol/ipratropium for Nebulization 3 milliLiter(s) Nebulizer every 6 hours  chlorhexidine 2% Cloths 1 Application(s) Topical daily  heparin  Infusion 900 Unit(s)/Hr IV Continuous <Continuous>  hydrALAZINE Injectable 10 milliGRAM(s) IV Push four times a day PRN  labetalol Injectable 10 milliGRAM(s) IV Push every 4 hours PRN  methylPREDNISolone sodium succinate Injectable 40 milliGRAM(s) IV Push every 6 hours  sodium chloride 0.9%. 1000 milliLiter(s) IV Continuous <Continuous>        ----------------------------------------------------------------------------------------  FUNCTIONAL HISTORY  Lives with son, 1 or 2 RASHIDA depending on entrance  Independent    CURRENT FUNCTIONAL STATUS*********      ----------------------------------------------------------------------------------------  PHYSICAL EXAM ************  Constitutional - NAD, Comfortable  HEENT - NCAT, EOMI  Neck - Supple, No limited ROM  Chest - Breathing comfortably, No wheezing  Cardiovascular - S1S2   Abdomen - Soft   Extremities - No C/C/E, No calf tenderness   Neurologic Exam -                    Cognitive - AAO to self, place, date, year, situation     Communication - Fluent, No dysarthria     Cranial Nerves - CN 2-12 intact     FUNCTIONAL MOTOR EXAM - No focal deficits                    LEFT    UE - ShAB 5/5, EF 5/5, EE 5/5, WE 5/5,  5/5                    RIGHT UE - ShAB 5/5, EF 5/5, EE 5/5, WE 5/5,  5/5                    LEFT    LE - HF 5/5, KE 5/5, DF 5/5, PF 5/5                    RIGHT LE - HF 5/5, KE 5/5, DF 5/5, PF 5/5        Sensory - Intact to LT     Reflexes - DTR Intact, No primitive reflexive     Coordination - FTN intact     OculoVestibular - No saccades, No nystagmus, VOR         Balance - WNL Static  Psychiatric - Mood stable, Affect WNL  ----------------------------------------------------------------------------------------  ASSESSMENT/PLAN  89yFemale with functional deficits after Right M2 occlusion.  Right M2 occlusion - s/p thrombectomy  Right Femoral Artery Pseudoaneurysm - *****  Pain - Tylenol  DVT PPX - SCDs  Impaired Mobility/Function/Rehab Recommendations -  HPI:  89yF was admitted on 03-26 for Right M2 occlusion.          Imaging Reviewed Today:     CT Brain Stroke 3/26 - 1. No intracranial hemorrhage is appreciated.  2. No intracranial mass lesion is appreciated.  3. Left basal ganglia subacute infarction, was acute at the time of   stroke code 3/21/2025, demonstrates expected evolution. Consider MR for   evaluation of infarct extension  4. MR may provide higher sensitivity imaging evaluation for the clinical   indication of acute infarction.    CT ANGIO BRAIN AND NECK STROKE 3/26 -   1.   RIGHT CAROTID SYSTEM:    Atherosclerotic plaque carotid bulb without    hemodynamically significant stenosis. Tandem lesion distal internal   carotid artery at the C1 level has developed since 3/21/2025, possible   interval arterial dissection, with associated luminal narrowing   approximately 30%  2.   LEFT CAROTID SYSTEM:     Atherosclerotic plaque carotid bulb without    hemodynamically significant stenosis.  3.   VERTEBRAL CIRCULATION:    Patent.  4.  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 Right MCA occlusion   in its proximal M2 segment is an interval finding since 3/21/2025. Left   MCA remains patent with luminal irregularity and low-grade narrowing   following recent thrombectomy.  5.  POSTERIOR INTRACRANIAL CIRCULATION:    Patent.  6. BRAIN PERFUSION:   No acute infarction of the brain is convincingly   demonstrated.  However, broad region of apparent ischemia corresponds to   the right MCA distribution correlating with acute embolic occlusion on CT   angiography  7. Heterogeneous enhancement within the principal dural sinuses may be   secondary to the early phase of venous opacification on this arterial   phase examination. The appearance is similar to 3/21/2025. Consider   supplemental evaluation with MR venogram    IR NEURO 3/26 - POSTOPERATIVE DIAGNOSIS: Large distal upper division M3 trunk occlusion.   TICI 3 reperfusion post endovascular thrombectomy    DUPLEX NIKA LE 3/26 - No evidence of deep venous thrombosis in either lower extremity.    DUPLEX R ARTERIAL LE 3/26 -   A 1.7 cm right CFA pseudoaneurysm.  A 2.5 cm adjacent hematoma.    CT HEAD 3/27 -   IMPRESSION: Age-appropriate involutional and ischemic gliotic changes. No   hemorrhage. No significant change since 3/26/2025.      ----------------------------------------------------------------------------------------  CHIEF COMPLAINT*****************          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, including shoulder    Lower back pain    S/P tonsillectomy and adenoidectomy    H/O cataract extraction    History of melanoma excision    History of left inguinal hernia repair    H/O cardiac catheterization          LABS  REVIEWED    CBC Full  -  ( 27 Mar 2025 02:00 )  WBC Count : 7.72 K/uL  RBC Count : 3.68 M/uL  Hemoglobin : 11.3 g/dL  Hematocrit : 35.3 %  Platelet Count - Automated : 116 K/uL  Mean Cell Volume : 95.9 fl  Mean Cell Hemoglobin : 30.7 pg  Mean Cell Hemoglobin Concentration : 32.0 g/dL  Auto Neutrophil # : x  Auto Lymphocyte # : x  Auto Monocyte # : x  Auto Eosinophil # : x  Auto Basophil # : x  Auto Neutrophil % : x  Auto Lymphocyte % : x  Auto Monocyte % : x  Auto Eosinophil % : x  Auto Basophil % : x    03-27    137  |  102  |  21.2[H]  ----------------------------<  161[H]  4.3   |  24.0  |  0.56    Ca    8.3[L]      27 Mar 2025 02:00  Phos  4.3     03-27  Mg     1.7     03-27    TPro  6.2  /  Alb  3.1[L]  /  TBili  1.2  /  DBili  x   /  AST  25  /  ALT  30  /  AlkPhos  44  03-26    Urinalysis Basic - ( 27 Mar 2025 02:00 )    Color: x / Appearance: x / SG: x / pH: x  Gluc: 161 mg/dL / Ketone: x  / Bili: x / Urobili: x   Blood: x / Protein: x / Nitrite: x   Leuk Esterase: x / RBC: x / WBC x   Sq Epi: x / Non Sq Epi: x / Bacteria: x        ALLERGIES  penicillins (Hives)      MEDICATIONS   albuterol/ipratropium for Nebulization 3 milliLiter(s) Nebulizer every 6 hours  chlorhexidine 2% Cloths 1 Application(s) Topical daily  heparin  Infusion 900 Unit(s)/Hr IV Continuous <Continuous>  hydrALAZINE Injectable 10 milliGRAM(s) IV Push four times a day PRN  labetalol Injectable 10 milliGRAM(s) IV Push every 4 hours PRN  methylPREDNISolone sodium succinate Injectable 40 milliGRAM(s) IV Push every 6 hours  sodium chloride 0.9%. 1000 milliLiter(s) IV Continuous <Continuous>        ----------------------------------------------------------------------------------------  FUNCTIONAL HISTORY  Lives with son, 1 or 2 RASHIDA depending on entrance  Independent    CURRENT FUNCTIONAL STATUS - not yet evaluated by PT/OT/SLP      ----------------------------------------------------------------------------------------  PHYSICAL EXAM ************  Constitutional - NAD, Comfortable  HEENT - NCAT, EOMI  Neck - Supple, No limited ROM  Chest - Breathing comfortably, No wheezing  Cardiovascular - S1S2   Abdomen - Soft   Extremities - No C/C/E, No calf tenderness   Neurologic Exam -                    Cognitive - AAO to self, place, date, year, situation     Communication - Fluent, No dysarthria     Cranial Nerves - CN 2-12 intact     FUNCTIONAL MOTOR EXAM - No focal deficits                    LEFT    UE - ShAB 5/5, EF 5/5, EE 5/5, WE 5/5,  5/5                    RIGHT UE - ShAB 5/5, EF 5/5, EE 5/5, WE 5/5,  5/5                    LEFT    LE - HF 5/5, KE 5/5, DF 5/5, PF 5/5                    RIGHT LE - HF 5/5, KE 5/5, DF 5/5, PF 5/5        Sensory - Intact to LT     Reflexes - DTR Intact, No primitive reflexive     Coordination - FTN intact     OculoVestibular - No saccades, No nystagmus, VOR         Balance - WNL Static  Psychiatric - Mood stable, Affect WNL  ----------------------------------------------------------------------------------------  ASSESSMENT/PLAN  89yFemale with functional deficits after Right M2 occlusion.  Right M2 occlusion - s/p thrombectomy  Right Femoral Artery Pseudoaneurysm - Management per IR, RN held pressure for 20 minutes 3/27  HTN, AFIB - Hydralazine/Labetalol PRN  SOB - Duoneb  Adrenal Insufficiency - Methylprednisolone  Pain - Tylenol  DVT PPX - SCDs, Heparin infusion  Impaired Mobility/Function/Rehab Recommendations -  HPI:  89yF was admitted on 03-26 for Right M2 occlusion.          Imaging Reviewed Today:     CT Brain Stroke 3/26 - 1. No intracranial hemorrhage is appreciated.  2. No intracranial mass lesion is appreciated.  3. Left basal ganglia subacute infarction, was acute at the time of   stroke code 3/21/2025, demonstrates expected evolution. Consider MR for   evaluation of infarct extension  4. MR may provide higher sensitivity imaging evaluation for the clinical   indication of acute infarction.    CT ANGIO BRAIN AND NECK STROKE 3/26 -   1.   RIGHT CAROTID SYSTEM:    Atherosclerotic plaque carotid bulb without    hemodynamically significant stenosis. Tandem lesion distal internal   carotid artery at the C1 level has developed since 3/21/2025, possible   interval arterial dissection, with associated luminal narrowing   approximately 30%  2.   LEFT CAROTID SYSTEM:     Atherosclerotic plaque carotid bulb without    hemodynamically significant stenosis.  3.   VERTEBRAL CIRCULATION:    Patent.  4.  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 Right MCA occlusion   in its proximal M2 segment is an interval finding since 3/21/2025. Left   MCA remains patent with luminal irregularity and low-grade narrowing   following recent thrombectomy.  5.  POSTERIOR INTRACRANIAL CIRCULATION:    Patent.  6. BRAIN PERFUSION:   No acute infarction of the brain is convincingly   demonstrated.  However, broad region of apparent ischemia corresponds to   the right MCA distribution correlating with acute embolic occlusion on CT   angiography  7. Heterogeneous enhancement within the principal dural sinuses may be   secondary to the early phase of venous opacification on this arterial   phase examination. The appearance is similar to 3/21/2025. Consider   supplemental evaluation with MR venogram    IR NEURO 3/26 - POSTOPERATIVE DIAGNOSIS: Large distal upper division M3 trunk occlusion.   TICI 3 reperfusion post endovascular thrombectomy    DUPLEX NIKA LE 3/26 - No evidence of deep venous thrombosis in either lower extremity.    DUPLEX R ARTERIAL LE 3/26 -   A 1.7 cm right CFA pseudoaneurysm.  A 2.5 cm adjacent hematoma.    CT HEAD 3/27 -   IMPRESSION: Age-appropriate involutional and ischemic gliotic changes. No   hemorrhage. No significant change since 3/26/2025.      ----------------------------------------------------------------------------------------  CHIEF COMPLAINT*****************          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, including shoulder    Lower back pain    S/P tonsillectomy and adenoidectomy    H/O cataract extraction    History of melanoma excision    History of left inguinal hernia repair    H/O cardiac catheterization          LABS  REVIEWED    CBC Full  -  ( 27 Mar 2025 02:00 )  WBC Count : 7.72 K/uL  RBC Count : 3.68 M/uL  Hemoglobin : 11.3 g/dL  Hematocrit : 35.3 %  Platelet Count - Automated : 116 K/uL  Mean Cell Volume : 95.9 fl  Mean Cell Hemoglobin : 30.7 pg  Mean Cell Hemoglobin Concentration : 32.0 g/dL  Auto Neutrophil # : x  Auto Lymphocyte # : x  Auto Monocyte # : x  Auto Eosinophil # : x  Auto Basophil # : x  Auto Neutrophil % : x  Auto Lymphocyte % : x  Auto Monocyte % : x  Auto Eosinophil % : x  Auto Basophil % : x    03-27    137  |  102  |  21.2[H]  ----------------------------<  161[H]  4.3   |  24.0  |  0.56    Ca    8.3[L]      27 Mar 2025 02:00  Phos  4.3     03-27  Mg     1.7     03-27    TPro  6.2  /  Alb  3.1[L]  /  TBili  1.2  /  DBili  x   /  AST  25  /  ALT  30  /  AlkPhos  44  03-26    Urinalysis Basic - ( 27 Mar 2025 02:00 )    Color: x / Appearance: x / SG: x / pH: x  Gluc: 161 mg/dL / Ketone: x  / Bili: x / Urobili: x   Blood: x / Protein: x / Nitrite: x   Leuk Esterase: x / RBC: x / WBC x   Sq Epi: x / Non Sq Epi: x / Bacteria: x        ALLERGIES  penicillins (Hives)      MEDICATIONS   albuterol/ipratropium for Nebulization 3 milliLiter(s) Nebulizer every 6 hours  chlorhexidine 2% Cloths 1 Application(s) Topical daily  heparin  Infusion 900 Unit(s)/Hr IV Continuous <Continuous>  hydrALAZINE Injectable 10 milliGRAM(s) IV Push four times a day PRN  labetalol Injectable 10 milliGRAM(s) IV Push every 4 hours PRN  methylPREDNISolone sodium succinate Injectable 40 milliGRAM(s) IV Push every 6 hours  sodium chloride 0.9%. 1000 milliLiter(s) IV Continuous <Continuous>        ----------------------------------------------------------------------------------------  FUNCTIONAL HISTORY  Lives with son, 1 or 2 RASHIDA depending on entrance  Independent    CURRENT FUNCTIONAL STATUS - not yet evaluated by PT/OT/SLP      ----------------------------------------------------------------------------------------  PHYSICAL EXAM ************  Constitutional - NAD, Comfortable  HEENT - NCAT, EOMI  Neck - Supple, No limited ROM  Chest - Breathing comfortably, No wheezing  Cardiovascular - S1S2   Abdomen - Soft   Extremities - No C/C/E, No calf tenderness   Neurologic Exam -                    Cognitive - AAO to self, place, date, year, situation     Communication - Fluent, No dysarthria     Cranial Nerves - CN 2-12 intact     FUNCTIONAL MOTOR EXAM - No focal deficits                    LEFT    UE - ShAB 5/5, EF 5/5, EE 5/5, WE 5/5,  5/5                    RIGHT UE - ShAB 5/5, EF 5/5, EE 5/5, WE 5/5,  5/5                    LEFT    LE - HF 5/5, KE 5/5, DF 5/5, PF 5/5                    RIGHT LE - HF 5/5, KE 5/5, DF 5/5, PF 5/5        Sensory - Intact to LT     Reflexes - DTR Intact, No primitive reflexive     Coordination - FTN intact     OculoVestibular - No saccades, No nystagmus, VOR         Balance - WNL Static  Psychiatric - Mood stable, Affect WNL  ----------------------------------------------------------------------------------------  ASSESSMENT/PLAN  89yFemale with functional deficits after Right M2 occlusion.  Right M2 occlusion - s/p thrombectomy  Right Femoral Artery Pseudoaneurysm - Management per IR, RN held pressure for 20 minutes 3/27; repeat US pending.    HTN, AFIB - Hydralazine/Labetalol PRN  SOB - Duoneb  Adrenal Insufficiency - Methylprednisolone  Oropharyngeal Dysphagia - NPO  Pain - Tylenol  DVT PPX - SCDs, Heparin infusion  Impaired Mobility/Function/Rehab Recommendations - Patient requires continued hospitalization for above reasons    Mobilization - Recommend OOB to chair as able to.  Encourage PROM, AROM, and initiation of PT/OT evaluations, with respect to any limitations of activity 2/2 R sided pseudoaneurysm.      Dysphagia - Recommend SLP swallow evaluation, patient was on thickened liquids on recent admission last week.      Disposition - To be determined with dynamic clinical course, pending therapy evaluations.   HPI:  89yF was admitted on 03-26 for Right M2 occlusion.          Imaging Reviewed Today:     CT Brain Stroke 3/26 - 1. No intracranial hemorrhage is appreciated.  2. No intracranial mass lesion is appreciated.  3. Left basal ganglia subacute infarction, was acute at the time of   stroke code 3/21/2025, demonstrates expected evolution. Consider MR for   evaluation of infarct extension  4. MR may provide higher sensitivity imaging evaluation for the clinical   indication of acute infarction.    CT ANGIO BRAIN AND NECK STROKE 3/26 -   1.   RIGHT CAROTID SYSTEM:    Atherosclerotic plaque carotid bulb without    hemodynamically significant stenosis. Tandem lesion distal internal   carotid artery at the C1 level has developed since 3/21/2025, possible   interval arterial dissection, with associated luminal narrowing   approximately 30%  2.   LEFT CAROTID SYSTEM:     Atherosclerotic plaque carotid bulb without    hemodynamically significant stenosis.  3.   VERTEBRAL CIRCULATION:    Patent.  4.  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 Right MCA occlusion   in its proximal M2 segment is an interval finding since 3/21/2025. Left   MCA remains patent with luminal irregularity and low-grade narrowing   following recent thrombectomy.  5.  POSTERIOR INTRACRANIAL CIRCULATION:    Patent.  6. BRAIN PERFUSION:   No acute infarction of the brain is convincingly   demonstrated.  However, broad region of apparent ischemia corresponds to   the right MCA distribution correlating with acute embolic occlusion on CT   angiography  7. Heterogeneous enhancement within the principal dural sinuses may be   secondary to the early phase of venous opacification on this arterial   phase examination. The appearance is similar to 3/21/2025. Consider   supplemental evaluation with MR venogram    IR NEURO 3/26 - POSTOPERATIVE DIAGNOSIS: Large distal upper division M3 trunk occlusion.   TICI 3 reperfusion post endovascular thrombectomy    DUPLEX NIKA LE 3/26 - No evidence of deep venous thrombosis in either lower extremity.    DUPLEX R ARTERIAL LE 3/26 -   A 1.7 cm right CFA pseudoaneurysm.  A 2.5 cm adjacent hematoma.    CT HEAD 3/27 -   IMPRESSION: Age-appropriate involutional and ischemic gliotic changes. No   hemorrhage. No significant change since 3/26/2025.      ----------------------------------------------------------------------------------------  CHIEF COMPLAINT  Ms Romano is fatigued, and her mouth is dry.  She is looking forward to the swallow evaluation this morning so that she can hopefully resume a PO diet.  She feels well overall, though  she does find the right groin pressure to be uncomfortable.  She is accompanied by her son.            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, including shoulder    Lower back pain    S/P tonsillectomy and adenoidectomy    H/O cataract extraction    History of melanoma excision    History of left inguinal hernia repair    H/O cardiac catheterization          LABS  REVIEWED    CBC Full  -  ( 27 Mar 2025 02:00 )  WBC Count : 7.72 K/uL  RBC Count : 3.68 M/uL  Hemoglobin : 11.3 g/dL  Hematocrit : 35.3 %  Platelet Count - Automated : 116 K/uL  Mean Cell Volume : 95.9 fl  Mean Cell Hemoglobin : 30.7 pg  Mean Cell Hemoglobin Concentration : 32.0 g/dL  Auto Neutrophil # : x  Auto Lymphocyte # : x  Auto Monocyte # : x  Auto Eosinophil # : x  Auto Basophil # : x  Auto Neutrophil % : x  Auto Lymphocyte % : x  Auto Monocyte % : x  Auto Eosinophil % : x  Auto Basophil % : x    03-27    137  |  102  |  21.2[H]  ----------------------------<  161[H]  4.3   |  24.0  |  0.56    Ca    8.3[L]      27 Mar 2025 02:00  Phos  4.3     03-27  Mg     1.7     03-27    TPro  6.2  /  Alb  3.1[L]  /  TBili  1.2  /  DBili  x   /  AST  25  /  ALT  30  /  AlkPhos  44  03-26    Urinalysis Basic - ( 27 Mar 2025 02:00 )    Color: x / Appearance: x / SG: x / pH: x  Gluc: 161 mg/dL / Ketone: x  / Bili: x / Urobili: x   Blood: x / Protein: x / Nitrite: x   Leuk Esterase: x / RBC: x / WBC x   Sq Epi: x / Non Sq Epi: x / Bacteria: x        ALLERGIES  penicillins (Hives)      MEDICATIONS   albuterol/ipratropium for Nebulization 3 milliLiter(s) Nebulizer every 6 hours  chlorhexidine 2% Cloths 1 Application(s) Topical daily  heparin  Infusion 900 Unit(s)/Hr IV Continuous <Continuous>  hydrALAZINE Injectable 10 milliGRAM(s) IV Push four times a day PRN  labetalol Injectable 10 milliGRAM(s) IV Push every 4 hours PRN  methylPREDNISolone sodium succinate Injectable 40 milliGRAM(s) IV Push every 6 hours  sodium chloride 0.9%. 1000 milliLiter(s) IV Continuous <Continuous>        ----------------------------------------------------------------------------------------  FUNCTIONAL HISTORY  Lives with son, 1 or 2 RASHIDA depending on entrance  Independent    CURRENT FUNCTIONAL STATUS - not yet evaluated by PT/OT/SLP      ----------------------------------------------------------------------------------------  PHYSICAL EXAM   Constitutional - NAD, Comfortable  HEENT - NCAT, xerostomia  Chest - Breathing comfortably  Cardiovascular - Afib, Extremities warm and well perfused.    Abdomen - Non-Distended  Neurologic Exam -                    Cognitive - AAO to self, place, date, year, NOT TO situation     Communication - Fluent, Mild Dysarthria     Cranial Nerves - EOMI, Mild L sided facial droop, shoulder elevation strong NIKA, tongue protrusion midline.      FUNCTIONAL MOTOR EXAM -                     LEFT    UE - ShAB 5/5, EF 4/5, EE 4/5, WE 4/5,  4/5                    Unable to perform remainder of PE secondary to team holding pressure on right groin site.      Sensory - Intact to LT  Psychiatric - Mood stable, Affect WNL  ----------------------------------------------------------------------------------------  ASSESSMENT/PLAN  89yFemale with functional deficits after Right M2 occlusion.  Right M2 occlusion - s/p thrombectomy  Right Femoral Artery Pseudoaneurysm - Management per IR, RN held pressure for 20 minutes 3/27; repeat US pending.    HTN, AFIB - Hydralazine/Labetalol PRN  SOB - Duoneb  Adrenal Insufficiency - Methylprednisolone  Oropharyngeal Dysphagia - NPO  Pain - Tylenol  DVT PPX - SCDs, Heparin infusion  Impaired Mobility/Function/Rehab Recommendations - Patient requires continued hospitalization for above reasons    Mobilization - Recommend OOB to chair as able to.  Encourage PROM, AROM, and initiation of PT/OT evaluations, with respect to any limitations of activity 2/2 R sided pseudoaneurysm.      Dysphagia - Recommend SLP swallow evaluation, patient was on thickened liquids on recent admission last week.      Disposition - To be determined with dynamic clinical course, pending therapy evaluations and medical stabilization.  Repeat Right Femoral Arterial US pending.  HPI:  89yF was admitted on 03-26 for Right M2 occlusion.  Patient was recently admitted and discharge for a left MCA occlusion s/p thrombectomy and DC home on 3/25. Was readmitted at  for left sided weakness and helicoptered to [facility] for management.           Imaging Reviewed Today:     CT Brain Stroke 3/26 - 1. No intracranial hemorrhage is appreciated.  2. No intracranial mass lesion is appreciated.  3. Left basal ganglia subacute infarction, was acute at the time of   stroke code 3/21/2025, demonstrates expected evolution. Consider MR for   evaluation of infarct extension  4. MR may provide higher sensitivity imaging evaluation for the clinical   indication of acute infarction.    CT ANGIO BRAIN AND NECK STROKE 3/26 -   1.   RIGHT CAROTID SYSTEM:    Atherosclerotic plaque carotid bulb without    hemodynamically significant stenosis. Tandem lesion distal internal   carotid artery at the C1 level has developed since 3/21/2025, possible   interval arterial dissection, with associated luminal narrowing   approximately 30%  2.   LEFT CAROTID SYSTEM:     Atherosclerotic plaque carotid bulb without    hemodynamically significant stenosis.  3.   VERTEBRAL CIRCULATION:    Patent.  4.  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 Right MCA occlusion   in its proximal M2 segment is an interval finding since 3/21/2025. Left   MCA remains patent with luminal irregularity and low-grade narrowing   following recent thrombectomy.  5.  POSTERIOR INTRACRANIAL CIRCULATION:    Patent.  6. BRAIN PERFUSION:   No acute infarction of the brain is convincingly   demonstrated.  However, broad region of apparent ischemia corresponds to   the right MCA distribution correlating with acute embolic occlusion on CT   angiography  7. Heterogeneous enhancement within the principal dural sinuses may be   secondary to the early phase of venous opacification on this arterial   phase examination. The appearance is similar to 3/21/2025. Consider   supplemental evaluation with MR venogram    IR NEURO 3/26 - POSTOPERATIVE DIAGNOSIS: Large distal upper division M3 trunk occlusion.   TICI 3 reperfusion post endovascular thrombectomy    DUPLEX NIKA LE 3/26 - No evidence of deep venous thrombosis in either lower extremity.    DUPLEX R ARTERIAL LE 3/26 -   A 1.7 cm right CFA pseudoaneurysm.  A 2.5 cm adjacent hematoma.    CT HEAD 3/27 -   IMPRESSION: Age-appropriate involutional and ischemic gliotic changes. No   hemorrhage. No significant change since 3/26/2025.      ----------------------------------------------------------------------------------------  CHIEF COMPLAINT  Ms Romano is fatigued, and her mouth is dry.  She is looking forward to the swallow evaluation this morning so that she can hopefully resume a PO diet.  She feels well overall, though  she does find the right groin pressure to be uncomfortable.  She is accompanied by her son.            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, including shoulder    Lower back pain    S/P tonsillectomy and adenoidectomy    H/O cataract extraction    History of melanoma excision    History of left inguinal hernia repair    H/O cardiac catheterization          LABS  REVIEWED    CBC Full  -  ( 27 Mar 2025 02:00 )  WBC Count : 7.72 K/uL  RBC Count : 3.68 M/uL  Hemoglobin : 11.3 g/dL  Hematocrit : 35.3 %  Platelet Count - Automated : 116 K/uL  Mean Cell Volume : 95.9 fl  Mean Cell Hemoglobin : 30.7 pg  Mean Cell Hemoglobin Concentration : 32.0 g/dL  Auto Neutrophil # : x  Auto Lymphocyte # : x  Auto Monocyte # : x  Auto Eosinophil # : x  Auto Basophil # : x  Auto Neutrophil % : x  Auto Lymphocyte % : x  Auto Monocyte % : x  Auto Eosinophil % : x  Auto Basophil % : x    03-27    137  |  102  |  21.2[H]  ----------------------------<  161[H]  4.3   |  24.0  |  0.56    Ca    8.3[L]      27 Mar 2025 02:00  Phos  4.3     03-27  Mg     1.7     03-27    TPro  6.2  /  Alb  3.1[L]  /  TBili  1.2  /  DBili  x   /  AST  25  /  ALT  30  /  AlkPhos  44  03-26    Urinalysis Basic - ( 27 Mar 2025 02:00 )    Color: x / Appearance: x / SG: x / pH: x  Gluc: 161 mg/dL / Ketone: x  / Bili: x / Urobili: x   Blood: x / Protein: x / Nitrite: x   Leuk Esterase: x / RBC: x / WBC x   Sq Epi: x / Non Sq Epi: x / Bacteria: x        ALLERGIES  penicillins (Hives)      MEDICATIONS   albuterol/ipratropium for Nebulization 3 milliLiter(s) Nebulizer every 6 hours  chlorhexidine 2% Cloths 1 Application(s) Topical daily  heparin  Infusion 900 Unit(s)/Hr IV Continuous <Continuous>  hydrALAZINE Injectable 10 milliGRAM(s) IV Push four times a day PRN  labetalol Injectable 10 milliGRAM(s) IV Push every 4 hours PRN  methylPREDNISolone sodium succinate Injectable 40 milliGRAM(s) IV Push every 6 hours  sodium chloride 0.9%. 1000 milliLiter(s) IV Continuous <Continuous>        ----------------------------------------------------------------------------------------  FUNCTIONAL HISTORY  Lives with son, 1 or 2 RASHIDA depending on entrance  Independent    CURRENT FUNCTIONAL STATUS - not yet evaluated by PT/OT/SLP      ----------------------------------------------------------------------------------------  PHYSICAL EXAM   Constitutional - NAD, Comfortable  HEENT - NCAT, xerostomia  Chest - Breathing comfortably  Cardiovascular - Afib, Extremities warm and well perfused.    Abdomen - Non-Distended  Neurologic Exam -                    Cognitive - AAO to self, place, date, year, NOT TO situation     Communication - Fluent, Mild Dysarthria     Cranial Nerves - EOMI, Mild L sided facial droop, shoulder elevation strong NIKA, tongue protrusion midline.      FUNCTIONAL MOTOR EXAM -                     LEFT    UE - ShAB 5/5, EF 4/5, EE 4/5, WE 4/5,  4/5                    Unable to perform remainder of PE secondary to team holding pressure on right groin site.      Sensory - Intact to LT  Psychiatric - Mood stable, Affect WNL  ----------------------------------------------------------------------------------------  ASSESSMENT/PLAN  89yFemale with functional deficits after Right M2 occlusion.  Right M2 occlusion - s/p thrombectomy  Right Femoral Artery Pseudoaneurysm - Management per IR, RN held pressure for 20 minutes 3/27; repeat US pending.    HTN, AFIB - Hydralazine/Labetalol PRN  SOB - Duoneb  Adrenal Insufficiency - Methylprednisolone  Oropharyngeal Dysphagia - NPO  Pain - Tylenol  DVT PPX - SCDs, Heparin infusion  Impaired Mobility/Function/Rehab Recommendations - Patient requires continued hospitalization for above reasons    Mobilization - Recommend OOB to chair as able to.  Encourage PROM, AROM, and initiation of PT/OT evaluations, with respect to any limitations of activity 2/2 R sided pseudoaneurysm.      Dysphagia - Recommend SLP swallow evaluation, patient was on thickened liquids on recent admission last week.      Disposition - To be determined with dynamic clinical course, pending therapy evaluations and medical stabilization.  Repeat Right Femoral Arterial US pending.

## 2025-03-27 NOTE — PROGRESS NOTE ADULT - ASSESSMENT
89F with acute CVA, R MCA occlusion, s/p TICI 3 thrombectomy 03/26. Suspected coagulopathy due to malignant dz.  PMH of recent 03/21/2025 L MCA stroke s/p thrombectomy, h/o Afib, HTN, metastatic melanoma, iatrogenic AI on Prednisone.  Anticoagulated, on Heparin gtt.  R fem artery pseudoaneurysm, concern for dissection.    Plan:  frequent neurochecks   MRI w/wo contrast  stroke core meaures  SBP goal 100-160, PRN meds, off Levo gtt now  maintain Osats>92%, incentive spirometry  diet as tolerated, bowel regimen  monitor UOP, elytes, TOV  cont heparin gtt, goal PTT 40-60 as bridge to DOAC (pending MRI)  cont home Simvastatin  Vascular re-eval, CTA abd/pelvis/thighs  maintain -180, ISS; restart dome Prednisone regimen  VTE ppx with SCDs, on Heparin gtt

## 2025-03-27 NOTE — PROGRESS NOTE ADULT - SUBJECTIVE AND OBJECTIVE BOX
Preliminary note, official recommendations pending attending review/signature   Seen and examined by Stroke team attending/team, assessment/ plan as discussed with stroke team attending/team as noted.     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  am.  Upon arrival to  ED she was noted to have lt sided facial droop; aphasic; left arm weakness; no TNK as out of window.  code stroke activated;  CT imagining found patient with new rt mca; patient transferred to [facility] for thrombectomy. Pt transferred to [facility] EDUARDO eval, s/p cerebral angiogram for mechanical thrombectomy of Rt M2 occlusion, TICI 3 (one pass). She is admitted to NSICU for close monitoring post thrombectomy. Patient seen at bedside remains on [facility] vent, waking up, FC in no acute distress.   Admission NIHSS  Pre-MRS  ICH Score (if applicable)    SUBJECTIVE: No events overnight.  No new neurologic complaints.  ROS reported negative unless otherwise noted.     MEDICATIONS:   albuterol/ipratropium for Nebulization 3 milliLiter(s) Nebulizer every 6 hours  atorvastatin 10 milliGRAM(s) Oral at bedtime  buMETAnide Injectable 2 milliGRAM(s) IV Push once  chlorhexidine 2% Cloths 1 Application(s) Topical daily  heparin  Infusion 900 Unit(s)/Hr IV Continuous <Continuous>  hydrALAZINE Injectable 10 milliGRAM(s) IV Push four times a day PRN  labetalol Injectable 10 milliGRAM(s) IV Push every 4 hours PRN  metoprolol tartrate 25 milliGRAM(s) Oral two times a day  polyethylene glycol 3350 17 Gram(s) Oral daily  predniSONE   Tablet 2 milliGRAM(s) Oral daily  senna 2 Tablet(s) Oral at bedtime  sodium chloride 0.9%. 1000 milliLiter(s) IV Continuous <Continuous>      PHYSICAL EXAM:   Vital Signs Last 24 Hrs  T(C): 37.3 (27 Mar 2025 12:00), Max: 38.3 (26 Mar 2025 16:30)  T(F): 99.1 (27 Mar 2025 12:00), Max: 100.9 (26 Mar 2025 16:30)  HR: 102 (27 Mar 2025 12:00) (75 - 110)  BP: 120/63 (27 Mar 2025 12:00) (103/57 - 138/75)  BP(mean): 81 (27 Mar 2025 12:00) (69 - 108)  RR: 20 (27 Mar 2025 12:00) (17 - 26)  SpO2: 99% (27 Mar 2025 12:00) (97% - 100%)    Parameters below as of 27 Mar 2025 08:30  Patient On (Oxygen Delivery Method): nasal cannula  O2 Flow (L/min): 3      General: No acute distress.   HEENT: Head normocephalic, atraumatic. Conjunctivae clear w/o exudates or hemorrhage. Sclera non-icteric. Nares are patent bilaterally. Mucous membranes pink and moist.  No tonsillar swelling or exudates.    Neck: Supple, no adenopathy. Trachea midline. No JVD.  Cardiac: Normal rate and rhythm. S1, S2 auscultated. No murmurs, gallops, or rubs.     Respiratory: Lung sounds clear in all fields. Chest wall symmetric, nontender.   Abdominal: Soft, nondistended, nontender. Bowel sounds normoactive x 4 quadrants. No masses, hepatomegaly, or splenomegaly.   Skin: Skin is warm, dry and intact without rashes or lesions. Appropriate color for ethnicity. Nailbeds pink with no cyanosis or clubbing.   Extremities: No edema, 2+ peripheral pulses in b/l upper and b/l lower extremities. Full range of motion in all joints.     NEUROLOGICAL EXAM:  Mental status: Awake, alert, oriented x3, speech fluent, follows commands, no neglect, normal memory   Cranial Nerves: No facial asymmetry, no nystagmus, no dysarthria,  tongue midline  Motor exam: Normal tone, no drift, 5/5 RUE, 5/5 RLE, 5/5 LUE, 5/5 LLE, normal fine finger movements.  Sensation: Intact to light touch   Coordination/ Gait: No dysmetria, gait not tested    LABS:                        11.3   7.72  )-----------( 116      ( 27 Mar 2025 02:00 )             35.3    03-27    137  |  102  |  21.2[H]  ----------------------------<  161[H]  4.3   |  24.0  |  0.56    Ca    8.3[L]      27 Mar 2025 02:00  Phos  4.3     03-27  Mg     1.7     03-27    TPro  6.2  /  Alb  3.1[L]  /  TBili  1.2  /  DBili  x   /  AST  25  /  ALT  30  /  AlkPhos  44  03-26  PT/INR - ( 26 Mar 2025 12:35 )   PT: 11.8 sec;   INR: 1.04 ratio         PTT - ( 27 Mar 2025 08:55 )  PTT:62.1 sec      03-27 Chol 145 HDL 74 Trig 55, 03-22 Chol 136 LDL -- HDL 66 Trig 56    LDL: 59  A1C: 5.5     IMAGING:    CT Head No Cont (03.27.25 @ 05:43)  IMPRESSION: Age-appropriate involutional and ischemic gliotic changes. No   hemorrhage. No significant change since 3/26/2025.     CT Angio Neck Stroke Protocol w/ IV Cont (03.26.25 @ 08:52)   .   RIGHT CAROTID SYSTEM:    Atherosclerotic plaque carotid bulb without    hemodynamically significant stenosis. Tandem lesion distal internal   carotid artery at the C1 level has developed since 3/21/2025, possible   interval arterial dissection, with associated luminal narrowing   approximately 30%    2.   LEFT CAROTID SYSTEM:     Atherosclerotic plaque carotid bulb without    hemodynamically significant stenosis.    3.   VERTEBRAL CIRCULATION:    Patent.    4.  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 Right MCA occlusion   in its proximal M2 segment is an interval finding since 3/21/2025. Left   MCA remains patent with luminal irregularity and low-grade narrowing   following recent thrombectomy.    5.  POSTERIOR INTRACRANIAL CIRCULATION:    Patent.    6. BRAIN PERFUSION:   No acute infarction of the brain is convincingly   demonstrated.  However, broad region of apparent ischemia corresponds to   the right MCA distribution correlating with acute embolic occlusion on CT   angiography    7. Heterogeneous enhancement within the principal dural sinuses may be   secondary to the early phase of venous opacification on this arterial   phase examination. The appearance is similar to 3/21/2025. Consider   supplemental evaluation with MR venogram    US Duplex Arterial Lower Ext Ltd, Right (03.27.25 @ 04:09)   IMPRESSION:    A 1.7 cm right CFA pseudoaneurysm.  A 2.5 cm adjacent hematoma.   Preliminary note, official recommendations pending attending review/signature   Seen and examined by Stroke team attending/team, assessment/ plan as discussed with stroke team attending/team as noted.     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  am.  Upon arrival to  ED she was noted to have lt sided facial droop; aphasic; left arm weakness; no tenectaplase as out of window.  code stroke activated;  CT imagining found patient with new rt mca; patient transferred to [facility] for thrombectomy. Pt transferred to [facility] EDUARDO eval, s/p cerebral angiogram for mechanical thrombectomy of Rt M2 occlusion, TICI 3 (one pass) on 3/26/25. Admitted to NSICU for post procedure monitoring and started on heparin     SUBJECTIVE: No events overnight.  No new neurologic complaints.  ROS reported negative unless otherwise noted.     MEDICATIONS:   albuterol/ipratropium for Nebulization 3 milliLiter(s) Nebulizer every 6 hours  atorvastatin 10 milliGRAM(s) Oral at bedtime  buMETAnide Injectable 2 milliGRAM(s) IV Push once  chlorhexidine 2% Cloths 1 Application(s) Topical daily  heparin  Infusion 900 Unit(s)/Hr IV Continuous <Continuous>  hydrALAZINE Injectable 10 milliGRAM(s) IV Push four times a day PRN  labetalol Injectable 10 milliGRAM(s) IV Push every 4 hours PRN  metoprolol tartrate 25 milliGRAM(s) Oral two times a day  polyethylene glycol 3350 17 Gram(s) Oral daily  predniSONE   Tablet 2 milliGRAM(s) Oral daily  senna 2 Tablet(s) Oral at bedtime  sodium chloride 0.9%. 1000 milliLiter(s) IV Continuous <Continuous>      PHYSICAL EXAM:   Vital Signs Last 24 Hrs  T(C): 37.3 (27 Mar 2025 12:00), Max: 38.3 (26 Mar 2025 16:30)  T(F): 99.1 (27 Mar 2025 12:00), Max: 100.9 (26 Mar 2025 16:30)  HR: 102 (27 Mar 2025 12:00) (75 - 110)  BP: 120/63 (27 Mar 2025 12:00) (103/57 - 138/75)  BP(mean): 81 (27 Mar 2025 12:00) (69 - 108)  RR: 20 (27 Mar 2025 12:00) (17 - 26)  SpO2: 99% (27 Mar 2025 12:00) (97% - 100%)    Parameters below as of 27 Mar 2025 08:30  Patient On (Oxygen Delivery Method): nasal cannula  O2 Flow (L/min): 3    Detailed Neurologic Exam:    Mental status: The patient is awake and alert and has normal attention span.  The patient follows all commands    Cranial nerves: Pupils equal and react symmetrically to light. There is no visual field deficit to confrontation. Extraocular motion is full with no nystagmus. There is no ptosis. Facial sensation is intact. Facial musculature is symmetric. Tongue is midline.    Motor: There is normal bulk and tone.  There is no tremor.  Strength is 5-/5 in the right arm 5/5 right leg.   left arm drift Strength is 4+/5 in the left arm and 5/5 left leg.    Sensation: Intact to light touch and pin in 4 extremities    Cerebellar: There is no dysmetria on finger to nose testing.    Gait : deferred      LABS:                        11.3   7.72  )-----------( 116      ( 27 Mar 2025 02:00 )             35.3    03-27    137  |  102  |  21.2[H]  ----------------------------<  161[H]  4.3   |  24.0  |  0.56    Ca    8.3[L]      27 Mar 2025 02:00  Phos  4.3     03-27  Mg     1.7     03-27    TPro  6.2  /  Alb  3.1[L]  /  TBili  1.2  /  DBili  x   /  AST  25  /  ALT  30  /  AlkPhos  44  03-26  PT/INR - ( 26 Mar 2025 12:35 )   PT: 11.8 sec;   INR: 1.04 ratio         PTT - ( 27 Mar 2025 08:55 )  PTT:62.1 sec      03-27 Chol 145 HDL 74 Trig 55, 03-22 Chol 136 LDL -- HDL 66 Trig 56    LDL: 59  A1C: 5.5     IMAGING:    CT Head No Cont (03.27.25 @ 05:43)  IMPRESSION: Age-appropriate involutional and ischemic gliotic changes. No   hemorrhage. No significant change since 3/26/2025.     CT Angio Neck Stroke Protocol w/ IV Cont (03.26.25 @ 08:52)   .   RIGHT CAROTID SYSTEM:    Atherosclerotic plaque carotid bulb without    hemodynamically significant stenosis. Tandem lesion distal internal   carotid artery at the C1 level has developed since 3/21/2025, possible   interval arterial dissection, with associated luminal narrowing   approximately 30%    2.   LEFT CAROTID SYSTEM:     Atherosclerotic plaque carotid bulb without    hemodynamically significant stenosis.    3.   VERTEBRAL CIRCULATION:    Patent.    4.  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 Right MCA occlusion   in its proximal M2 segment is an interval finding since 3/21/2025. Left   MCA remains patent with luminal irregularity and low-grade narrowing   following recent thrombectomy.    5.  POSTERIOR INTRACRANIAL CIRCULATION:    Patent.    6. BRAIN PERFUSION:   No acute infarction of the brain is convincingly   demonstrated.  However, broad region of apparent ischemia corresponds to   the right MCA distribution correlating with acute embolic occlusion on CT   angiography    7. Heterogeneous enhancement within the principal dural sinuses may be   secondary to the early phase of venous opacification on this arterial   phase examination. The appearance is similar to 3/21/2025. Consider   supplemental evaluation with MR venogram    US Duplex Arterial Lower Ext Ltd, Right (03.27.25 @ 04:09)   IMPRESSION:    A 1.7 cm right CFA pseudoaneurysm.  A 2.5 cm adjacent hematoma.   Preliminary note, official recommendations pending attending review/signature   Seen and examined by Stroke team attending/team, assessment/ plan as discussed with stroke team attending/team as noted.     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  am.  Upon arrival to  ED she was noted to have lt sided facial droop; aphasic; left arm weakness; no tenectaplase as out of window.  code stroke activated;  CT imagining found patient with new rt mca; patient transferred to [facility] for thrombectomy. Pt transferred to [facility] EDUARDO eval, s/p cerebral angiogram for mechanical thrombectomy of Rt M2 occlusion, TICI 3 (one pass) on 3/26/25. Admitted to NSICU for post procedure monitoring and started on heparin     SUBJECTIVE: No events overnight.  No new neurologic complaints.  ROS reported negative unless otherwise noted.     MEDICATIONS:   albuterol/ipratropium for Nebulization 3 milliLiter(s) Nebulizer every 6 hours  atorvastatin 10 milliGRAM(s) Oral at bedtime  buMETAnide Injectable 2 milliGRAM(s) IV Push once  chlorhexidine 2% Cloths 1 Application(s) Topical daily  heparin  Infusion 900 Unit(s)/Hr IV Continuous <Continuous>  hydrALAZINE Injectable 10 milliGRAM(s) IV Push four times a day PRN  labetalol Injectable 10 milliGRAM(s) IV Push every 4 hours PRN  metoprolol tartrate 25 milliGRAM(s) Oral two times a day  polyethylene glycol 3350 17 Gram(s) Oral daily  predniSONE   Tablet 2 milliGRAM(s) Oral daily  senna 2 Tablet(s) Oral at bedtime  sodium chloride 0.9%. 1000 milliLiter(s) IV Continuous <Continuous>      PHYSICAL EXAM:   Vital Signs Last 24 Hrs  T(C): 37.3 (27 Mar 2025 12:00), Max: 38.3 (26 Mar 2025 16:30)  T(F): 99.1 (27 Mar 2025 12:00), Max: 100.9 (26 Mar 2025 16:30)  HR: 102 (27 Mar 2025 12:00) (75 - 110)  BP: 120/63 (27 Mar 2025 12:00) (103/57 - 138/75)  BP(mean): 81 (27 Mar 2025 12:00) (69 - 108)  RR: 20 (27 Mar 2025 12:00) (17 - 26)  SpO2: 99% (27 Mar 2025 12:00) (97% - 100%)    Parameters below as of 27 Mar 2025 08:30  Patient On (Oxygen Delivery Method): nasal cannula  O2 Flow (L/min): 3    Detailed Neurologic Exam:    Mental status: The patient is awake and alert and has normal attention span.  The patient follows all commands    Cranial nerves: Pupils equal and react symmetrically to light. There is no visual field deficit to confrontation. Extraocular motion is full with no nystagmus. There is no ptosis. Facial sensation is intact. Facial musculature is symmetric. Tongue is midline.    Motor: There is normal bulk and tone.  There is no tremor.  Strength is 5-/5 in the right arm 5/5 right leg.   left arm drift Strength is 4+/5 in the left arm and 5/5 left leg.    Sensation: Intact to light touch and pin in 4 extremities    Cerebellar: There is no dysmetria on finger to nose testing.    Gait : deferred      NIH SS:  DATE: 3/27/25  TIME: 1030  1A: Level of consciousness (0-3): 0  1B: Questions (0-2): 0  1C: Commands (0-2): 0  2: Gaze (0-2): 0  3: Visual fields (0-3): 0   4: Facial palsy (0-3): 1  MOTOR:  5A: Left arm motor drift (0-4): 1  5B: Right arm motor drift (0-4): 0   6A: Left leg motor drift (0-4): 0  6B: Right leg motor drift (0-4):   7: Limb ataxia (0-2): 0  SENSORY:  8: Sensation (0-2): 0  SPEECH:  9: Language (0-3): 0  10: Dysarthria (0-2): 0  EXTINCTION:  11: Extinction/inattention (0-2): 0     TOTAL SCORE: 2    prehospital mRS= 2        LABS:                        11.3   7.72  )-----------( 116      ( 27 Mar 2025 02:00 )             35.3    03-27    137  |  102  |  21.2[H]  ----------------------------<  161[H]  4.3   |  24.0  |  0.56    Ca    8.3[L]      27 Mar 2025 02:00  Phos  4.3     03-27  Mg     1.7     03-27    TPro  6.2  /  Alb  3.1[L]  /  TBili  1.2  /  DBili  x   /  AST  25  /  ALT  30  /  AlkPhos  44  03-26  PT/INR - ( 26 Mar 2025 12:35 )   PT: 11.8 sec;   INR: 1.04 ratio         PTT - ( 27 Mar 2025 08:55 )  PTT:62.1 sec      03-27 Chol 145 HDL 74 Trig 55, 03-22 Chol 136 LDL -- HDL 66 Trig 56    LDL: 59  A1C: 5.5     IMAGING:    CT Head No Cont (03.27.25 @ 05:43)  IMPRESSION: Age-appropriate involutional and ischemic gliotic changes. No   hemorrhage. No significant change since 3/26/2025.     CT Angio Neck Stroke Protocol w/ IV Cont (03.26.25 @ 08:52)   .   RIGHT CAROTID SYSTEM:    Atherosclerotic plaque carotid bulb without    hemodynamically significant stenosis. Tandem lesion distal internal   carotid artery at the C1 level has developed since 3/21/2025, possible   interval arterial dissection, with associated luminal narrowing   approximately 30%    2.   LEFT CAROTID SYSTEM:     Atherosclerotic plaque carotid bulb without    hemodynamically significant stenosis.    3.   VERTEBRAL CIRCULATION:    Patent.    4.  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 Right MCA occlusion   in its proximal M2 segment is an interval finding since 3/21/2025. Left   MCA remains patent with luminal irregularity and low-grade narrowing   following recent thrombectomy.    5.  POSTERIOR INTRACRANIAL CIRCULATION:    Patent.    6. BRAIN PERFUSION:   No acute infarction of the brain is convincingly   demonstrated.  However, broad region of apparent ischemia corresponds to   the right MCA distribution correlating with acute embolic occlusion on CT   angiography    7. Heterogeneous enhancement within the principal dural sinuses may be   secondary to the early phase of venous opacification on this arterial   phase examination. The appearance is similar to 3/21/2025. Consider   supplemental evaluation with MR venogram    US Duplex Arterial Lower Ext Ltd, Right (03.27.25 @ 04:09)   IMPRESSION:    A 1.7 cm right CFA pseudoaneurysm.  A 2.5 cm adjacent hematoma.     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  am.  Upon arrival to  ED she was noted to have lt sided facial droop; aphasic; left arm weakness; no tenectaplase as out of window.  code stroke activated;  CT imagining found patient with new rt mca; patient transferred to [facility] for thrombectomy. Pt transferred to [facility] EDUARDO eval, s/p cerebral angiogram for mechanical thrombectomy of Rt M2 occlusion, TICI 3 (one pass) on 3/26/25. Admitted to NSICU for post procedure monitoring and started on heparin     SUBJECTIVE: No events overnight.  No new neurologic complaints.  ROS reported negative unless otherwise noted.     MEDICATIONS:   albuterol/ipratropium for Nebulization 3 milliLiter(s) Nebulizer every 6 hours  atorvastatin 10 milliGRAM(s) Oral at bedtime  buMETAnide Injectable 2 milliGRAM(s) IV Push once  chlorhexidine 2% Cloths 1 Application(s) Topical daily  heparin  Infusion 900 Unit(s)/Hr IV Continuous <Continuous>  hydrALAZINE Injectable 10 milliGRAM(s) IV Push four times a day PRN  labetalol Injectable 10 milliGRAM(s) IV Push every 4 hours PRN  metoprolol tartrate 25 milliGRAM(s) Oral two times a day  polyethylene glycol 3350 17 Gram(s) Oral daily  predniSONE   Tablet 2 milliGRAM(s) Oral daily  senna 2 Tablet(s) Oral at bedtime  sodium chloride 0.9%. 1000 milliLiter(s) IV Continuous <Continuous>      PHYSICAL EXAM:   Vital Signs Last 24 Hrs  T(C): 37.3 (27 Mar 2025 12:00), Max: 38.3 (26 Mar 2025 16:30)  T(F): 99.1 (27 Mar 2025 12:00), Max: 100.9 (26 Mar 2025 16:30)  HR: 102 (27 Mar 2025 12:00) (75 - 110)  BP: 120/63 (27 Mar 2025 12:00) (103/57 - 138/75)  BP(mean): 81 (27 Mar 2025 12:00) (69 - 108)  RR: 20 (27 Mar 2025 12:00) (17 - 26)  SpO2: 99% (27 Mar 2025 12:00) (97% - 100%)    Parameters below as of 27 Mar 2025 08:30  Patient On (Oxygen Delivery Method): nasal cannula  O2 Flow (L/min): 3    Detailed Neurologic Exam:    Mental status: The patient is awake and alert and has normal attention span.  The patient follows all commands    Cranial nerves: Pupils equal and react symmetrically to light. There is no visual field deficit to confrontation. Extraocular motion is full with no nystagmus. There is no ptosis. Facial sensation is intact. Facial musculature is symmetric. Tongue is midline.    Motor: There is normal bulk and tone.  There is no tremor.  Strength is 5-/5 in the right arm 5/5 right leg.   left arm drift Strength is 4+/5 in the left arm and 5/5 left leg.    Sensation: Intact to light touch and pin in 4 extremities    Cerebellar: There is no dysmetria on finger to nose testing.    Gait : deferred      NIH SS:  DATE: 3/27/25  TIME: 1030  1A: Level of consciousness (0-3): 0  1B: Questions (0-2): 0  1C: Commands (0-2): 0  2: Gaze (0-2): 0  3: Visual fields (0-3): 0   4: Facial palsy (0-3): 1  MOTOR:  5A: Left arm motor drift (0-4): 1  5B: Right arm motor drift (0-4): 0   6A: Left leg motor drift (0-4): 0  6B: Right leg motor drift (0-4):   7: Limb ataxia (0-2): 0  SENSORY:  8: Sensation (0-2): 0  SPEECH:  9: Language (0-3): 0  10: Dysarthria (0-2): 0  EXTINCTION:  11: Extinction/inattention (0-2): 0     TOTAL SCORE: 2    prehospital mRS= 2        LABS:                        11.3   7.72  )-----------( 116      ( 27 Mar 2025 02:00 )             35.3    03-27    137  |  102  |  21.2[H]  ----------------------------<  161[H]  4.3   |  24.0  |  0.56    Ca    8.3[L]      27 Mar 2025 02:00  Phos  4.3     03-27  Mg     1.7     03-27    TPro  6.2  /  Alb  3.1[L]  /  TBili  1.2  /  DBili  x   /  AST  25  /  ALT  30  /  AlkPhos  44  03-26  PT/INR - ( 26 Mar 2025 12:35 )   PT: 11.8 sec;   INR: 1.04 ratio         PTT - ( 27 Mar 2025 08:55 )  PTT:62.1 sec      03-27 Chol 145 HDL 74 Trig 55, 03-22 Chol 136 LDL -- HDL 66 Trig 56    LDL: 59  A1C: 5.5     IMAGING:    CT Head No Cont (03.27.25 @ 05:43)  IMPRESSION: Age-appropriate involutional and ischemic gliotic changes. No   hemorrhage. No significant change since 3/26/2025.     CT Angio Neck Stroke Protocol w/ IV Cont (03.26.25 @ 08:52)   .   RIGHT CAROTID SYSTEM:    Atherosclerotic plaque carotid bulb without    hemodynamically significant stenosis. Tandem lesion distal internal   carotid artery at the C1 level has developed since 3/21/2025, possible   interval arterial dissection, with associated luminal narrowing   approximately 30%    2.   LEFT CAROTID SYSTEM:     Atherosclerotic plaque carotid bulb without    hemodynamically significant stenosis.    3.   VERTEBRAL CIRCULATION:    Patent.    4.  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 Right MCA occlusion   in its proximal M2 segment is an interval finding since 3/21/2025. Left   MCA remains patent with luminal irregularity and low-grade narrowing   following recent thrombectomy.    5.  POSTERIOR INTRACRANIAL CIRCULATION:    Patent.    6. BRAIN PERFUSION:   No acute infarction of the brain is convincingly   demonstrated.  However, broad region of apparent ischemia corresponds to   the right MCA distribution correlating with acute embolic occlusion on CT   angiography    7. Heterogeneous enhancement within the principal dural sinuses may be   secondary to the early phase of venous opacification on this arterial   phase examination. The appearance is similar to 3/21/2025. Consider   supplemental evaluation with MR venogram    US Duplex Arterial Lower Ext Ltd, Right (03.27.25 @ 04:09)   IMPRESSION:    A 1.7 cm right CFA pseudoaneurysm.  A 2.5 cm adjacent hematoma.

## 2025-03-27 NOTE — SWALLOW BEDSIDE ASSESSMENT ADULT - SLP GENERAL OBSERVATIONS
Pt recd a&ox4, upright in bed, mild dysarthria, 4L NC in place, NAD, 0/10 pain scale pre/post Pt recd a&ox4, upright in bed, mild dysarthria, 4L NC in place, NAD, 0/10 pain scale pre/post, family @ the bedside

## 2025-03-27 NOTE — PROCEDURE NOTE - PROCEDURE FINDINGS AND DETAILS
Right femoral vein pseudoaneurysm injected with 100units of thrombin and pressure held for 10min post injection. PSA appears thrombosed.

## 2025-03-27 NOTE — CONSULT NOTE ADULT - ASSESSMENT
Patient is a 89 year-old Female admitted for M2 occlusion s/p thrombectomy. IR consulted for thrombin injection of R CFA PSA. Spoke with neurosurgery team, no manual pressure held post duplex. Recommend manual pressure be held for at least 20 minutes and to repeat right lower extremity duplex afterwards. If PSA still patent after above can consider thrombin injection.     Please call extension [phone] with any questions, concerns or issues regarding above.

## 2025-03-27 NOTE — PROGRESS NOTE ADULT - SUBJECTIVE AND OBJECTIVE BOX
HPI:  89 year old female with PMHx of Afib (on Eliquis), PPM, HTN, hx of RUE melanoma removal with R axillary lymph node removal, recent admission 3/21- 3/25 for lt MCA occlusion s/p thrombectomy presents as transfer from [facility] after she was found at 7:30 with lt facial and left sided weakness.  am.  Upon arrival to  ED she was noted to have lt sided facial droop; aphasic; left arm weakness; no TNK as out of window.  code stroke activated;  CT imagining found patient with new rt mca; patient transferred to [facility] for thrombectomy. Pt transferred to [facility] EDUARDO eval, s/p cerebral angiogram for mechanical thrombectomy of Rt M2 occlusion, TICI 3 (one pass). She is admitted to NSICU for close monitoring post thrombectomy. [facility] seen [facility], waking up, FC in no acute distress.      NIH SS: 10  DATE:  TIME: 12:48  1A: Level of consciousness (0-3): 1  1B: Questions (0-2): 1   1C: Commands (0-2): 0  2: Gaze (0-2): 0  3: Visual fields (0-3): 0  4: Facial palsy (0-3): 0  MOTOR:  5A: Left arm motor drift (0-4): 1  5B: Right arm motor drift (0-4): 0  6A: Left leg motor drift (0-4): 3  6B: Right leg motor drift (0-4): 2  7: Limb ataxia (0-2): 0  SENSORY:  8: Sensation (0-2): 0  SPEECH:  9: Language (0-3): 3  10: Dysarthria (0-2): 0  EXTINCTION:  11: Extinction/inattention (0-2): 0    TOTAL SCORE: 10     (26 Mar 2025 12:28)    O/n events: none reported;   art sono with signs of R fem pseudoaneurysm. Underwent thrombin injection by IR.      ICU Vital Signs Last 24 Hrs  T(C): 37.4 (27 Mar 2025 16:30), Max: 38.3 (26 Mar 2025 17:30)  T(F): 99.3 (27 Mar 2025 16:30), Max: 100.9 (26 Mar 2025 17:30)  HR: 110 (27 Mar 2025 16:30) (75 - 110)  BP: 122/66 (27 Mar 2025 16:30) (103/57 - 138/75)  BP(mean): 81 (27 Mar 2025 16:30) (69 - 93)  ABP: --  ABP(mean): --  RR: 20 (27 Mar 2025 16:30) (17 - 26)  SpO2: 100% (27 Mar 2025 16:30) (98% - 100%)    O2 Parameters below as of 27 Mar 2025 08:30  Patient On (Oxygen Delivery Method): nasal cannula  O2 Flow (L/min): 3      Exam:  AAOx3, face symmetric, comprehension intact, speech clear, PERRLA, EOMI, SHAH spont and strongly RUE/RLE/LLE 5/5, LUE 4/5 + drift.  S1S2 present, no murmurs  CTAB  Abd soft, NT, ND  No peripheral swelling    R groin with no signs of active bleeding, small palpable hematoma as well as small pulsating mass; peripheral pulse present.

## 2025-03-27 NOTE — PROGRESS NOTE ADULT - ASSESSMENT
89 year old female with PMHx of Afib (off Eliquis), recent left MCA territory stroke s/p LM1 thrombectomy, with residual minimal aphasia, MRS 1, PPM, HTN, HLD, GERD, severe MR/TR, iatrogenic adrenal insufficiency (on prednisone), lymphedema, metastatic melanoma s/p Left foot melanoma and lymph node resection, s/p RUE melanoma and Right axillary lymph node excision at [facility] (3/18/25) who presented to  ED 3/26/36 with L sided weakness and aphasia. CT neg, CTA showing RM2 occlusion.  Transferred to [facility]UARDO intervention. S/P thrombectomy, TICI 3 achieved after 1 pass admitted to NSICU for post procedure monitoring     IMPRESSION: new      # Ischemic Infarct s/p thrombectomy with TICI 3 recanalization   - 24 hour post-thrombectomy NIHSS: pending   - close monitoring and frequent neurochecks for signs of neurologic deterioration   - Extubation per ICU  - STAT head CT for any neurologic change   - goal BP as per Neuro-IR   - perform fingersticks every 6 hours,  insulin sliding scale prn  - maintain normovolemia, normoglycemia, normonatremia, and normothermia   - Started on IV heparin given recurrent embolic events.  R/A/B discussed with son and daughter at bedside.    - continue statin with a goal LDL under 70   - consult PT/OT/SLP/PMR    Will follow           89 year old female with PMHx of Afib (off Eliquis), recent left MCA territory stroke s/p LM1 thrombectomy, with residual minimal aphasia, MRS 1, PPM, HTN, HLD, GERD, severe MR/TR, iatrogenic adrenal insufficiency (on prednisone), lymphedema, metastatic melanoma s/p Left foot melanoma and lymph node resection, s/p RUE melanoma and Right axillary lymph node excision at [facility] (3/18/25) who presented to  ED 3/26/36 with L sided weakness and aphasia. CT neg, CTA showing RM2 occlusion.  Transferred to [facility] EDUARDO intervention. S/P thrombectomy, TICI 3 achieved after 1 pass admitted to NSICU for post procedure monitoring started on IV Heparin     IMPRESSION: New Right M2 occlusion s/p thrombectomy, recent left MCA stroke 3/21/25. Etiology cardioembolic in the setting of Afib off anticoagulation for surgical procedure       # Ischemic Infarct s/p thrombectomy with TICI 3 recanalization   - 24 hour post-thrombectomy NIHSS: 2  - close monitoring and frequent neurochecks for signs of neurologic deterioration   - STAT head CT for any neurologic change   - goal BP as per Neuro-IR   - perform fingersticks every 6 hours,  insulin sliding scale prn  - maintain normovolemia, normoglycemia, normonatremia, and normothermia   - Started on IV heparin given recurrent embolic events.  R/A/B discussed with son and daughter at bedside.    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 CT neg, CTA showing RM2 occlusion.  Transferred to [facility] EDUARDO intervention. S/P thrombectomy, TICI 3 achieved after 1 pass admitted to NSICU for post procedure monitoring started on IV Heparin     IMPRESSION: New Right M2 occlusion s/p thrombectomy, recent left MCA stroke 3/21/25. Etiology cardioembolic in the setting of Afib off anticoagulation for surgical procedure     ASSESSMENT:   # Ischemic Infarct s/p thrombectomy with TICI 3 recanalization   - 24 hour post-thrombectomy NIHSS: 2  - close monitoring and frequent neurochecks for signs of neurologic deterioration   - STAT head CT for any neurologic change   - goal BP as per Neuro-IR   - perform fingersticks every 6 hours,  insulin sliding scale prn  - maintain normovolemia, normoglycemia, normonatremia, and normothermia   - continue iV heparin given recurrent embolic events.  R/A/B discussed with family  - continue statin with a goal LDL under 70   - consult PT/OT/SLP/PMR    Rest of care per NSICU team   Stroke neuro to follow       CORE MEASURES:       Admission NIHSS: 10     Tenecteplase : [] YES [x] NO     LDL/HDL/A1C:  59 //  74  //  5.5      Depression Screen- if depression hx and/or present     Statin Therapy: continue home dose statin      Dysphagia Screen: [x] PASS [] FAIL     Smoking in the past 12 months [] YES [x] NO     Afib [x] YES [] NO     Diabetes [] YES [x] NO     Stroke Education [x] YES [] NO

## 2025-03-27 NOTE — CONSULT NOTE ADULT - SUBJECTIVE AND OBJECTIVE BOX
Vascular Attending:        HPI:  89 year old female with PMHx of Afib (on Eliquis), PPM, HTN, hx of RUE melanoma removal with R axillary lymph node removal, recent admission 3/21- 3/25 for lt MCA occlusion s/p thrombectomy presents as transfer from [facility] after she was found at 7:30 with lt facial and left sided weakness.  am.  Upon arrival to  ED she was noted to have lt sided facial droop; aphasic; left arm weakness; no TNK as out of window.  code stroke activated;  CT imagining found patient with new rt mca; patient transferred to [facility] for thrombectomy. Pt transferred to [facility] EDUARDO eval, s/p cerebral angiogram for mechanical thrombectomy of Rt M2 occlusion, TICI 3 (one pass). She is admitted to NSICU for close monitoring post thrombectomy. Patient seen at bedside [facility], waking up, FC in no acute distress.      NIH SS: 10  DATE:  TIME: 12:48  1A: Level of consciousness (0-3): 1  1B: Questions (0-2): 1   1C: Commands (0-2): 0  2: Gaze (0-2): 0  3: Visual fields (0-3): 0  4: Facial palsy (0-3): 0  MOTOR:  5A: Left arm motor drift (0-4): 1  5B: Right arm motor drift (0-4): 0  6A: Left leg motor drift (0-4): 3  6B: Right leg motor drift (0-4): 2  7: Limb ataxia (0-2): 0  SENSORY:  8: Sensation (0-2): 0  SPEECH:  9: Language (0-3): 3  10: Dysarthria (0-2): 0  EXTINCTION:  11: Extinction/inattention (0-2): 0    TOTAL SCORE: 10     (26 Mar 2025 12:28)      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, including shoulder      Lower back pain      S/P tonsillectomy and adenoidectomy      H/O cataract extraction  both eyes      History of melanoma excision  left foot      History of left inguinal hernia repair  mesh      H/O cardiac catheterization  3/15, no  blockages          REVIEW OF SYSTEMS      General:	    Skin/Breast:  	  Ophthalmologic:  	  ENMT:	    Respiratory and Thorax:  	  Cardiovascular:	    Gastrointestinal:	    Genitourinary:	    Musculoskeletal:	    Neurological:	    Psychiatric:	    Hematology/Lymphatics:	    Endocrine:	    Allergic/Immunologic:	    MEDICATIONS  (STANDING):  albuterol/ipratropium for Nebulization 3 milliLiter(s) Nebulizer every 6 hours  chlorhexidine 2% Cloths 1 Application(s) Topical daily  heparin  Infusion 900 Unit(s)/Hr (7 mL/Hr) IV Continuous <Continuous>  magnesium sulfate  IVPB 2 Gram(s) IV Intermittent once  methylPREDNISolone sodium succinate Injectable 40 milliGRAM(s) IV Push every 6 hours  sodium chloride 0.9%. 1000 milliLiter(s) (65 mL/Hr) IV Continuous <Continuous>    MEDICATIONS  (PRN):  hydrALAZINE Injectable 10 milliGRAM(s) IV Push four times a day PRN SBP >150  labetalol Injectable 10 milliGRAM(s) IV Push every 4 hours PRN Systolic blood pressure > 150      Allergies    penicillins (Hives)    Intolerances        SOCIAL HISTORY:      Vital Signs Last 24 Hrs  T(C): 37 (27 Mar 2025 05:30), Max: 38.3 (26 Mar 2025 16:30)  T(F): 98.6 (27 Mar 2025 05:30), Max: 100.9 (26 Mar 2025 16:30)  HR: 104 (27 Mar 2025 05:30) (75 - 112)  BP: 113/55 (27 Mar 2025 05:30) (103/57 - 165/96)  BP(mean): 70 (27 Mar 2025 05:30) (70 - 145)  RR: 18 (27 Mar 2025 05:30) (16 - 26)  SpO2: 98% (27 Mar 2025 05:30) (85% - 100%)    Parameters below as of 27 Mar 2025 04:30  Patient On (Oxygen Delivery Method): mask, aerosol        PHYSICAL EXAM:      Constitutional:    Eyes:    ENMT:    Neck:    Breasts:    Back:    Respiratory:    Cardiovascular:    Gastrointestinal:    Genitourinary:    Rectal:    Extremities:    Vascular:    Neurological:    Skin:    Lymph Nodes:    Musculoskeletal:    Psychiatric:      Pulses:   Right:                                                                          Left:  FEM [ ]2+ [ ]1+ [ ]doppler                                             FEM [ ]2+ [ ]1+ [ ]doppler    POP [ ]2+ [ ]1+ [ ]doppler                                             POP [ ]2+ [ ]1+ [ ]doppler    DP [ ]2+ [ ]1+ [ ]doppler                                                DP [ ]2+ [ ]1+ [ ]doppler  PT[ ]2+ [ ]1+ [ ]doppler                                                  PT [ ]2+ [ ]1+ [ ]doppler      LABS:                        11.3   7.72  )-----------( 116      ( 27 Mar 2025 02:00 )             35.3     03-27    137  |  102  |  21.2[H]  ----------------------------<  161[H]  4.3   |  24.0  |  0.56    Ca    8.3[L]      27 Mar 2025 02:00  Phos  4.3     03-27  Mg     1.7     03-27    TPro  6.2  /  Alb  3.1[L]  /  TBili  1.2  /  DBili  x   /  AST  25  /  ALT  30  /  AlkPhos  44  03-26    PT/INR - ( 26 Mar 2025 12:35 )   PT: 11.8 sec;   INR: 1.04 ratio         PTT - ( 27 Mar 2025 02:00 )  PTT:82.9 sec  Urinalysis Basic - ( 27 Mar 2025 02:00 )    Color: x / Appearance: x / SG: x / pH: x  Gluc: 161 mg/dL / Ketone: x  / Bili: x / Urobili: x   Blood: x / Protein: x / Nitrite: x   Leuk Esterase: x / RBC: x / WBC x   Sq Epi: x / Non Sq Epi: x / Bacteria: x        RADIOLOGY & ADDITIONAL STUDIES    Impression and Plan: Vascular Attending:  any     Vascular Surgery HPI:  Admission HPI reviewed  Access related right CFA pseudoaneurysm  s/p Neuroendovascular intervention for MCA thrombus  currently of active A/C  patient unable to participate with HPI, Son at bedside       HPI:  89 year old female with PMHx of Afib (on Eliquis), PPM, HTN, hx of RUE melanoma removal with R axillary lymph node removal, recent admission 3/21- 3/25 for lt MCA occlusion s/p thrombectomy presents as transfer from [facility] after she was found at 7:30 with lt facial and left sided weakness.  am.  Upon arrival to  ED she was noted to have lt sided facial droop; aphasic; left arm weakness; no TNK as out of window.  code stroke activated;  CT imagining found patient with new rt mca; patient transferred to [facility] for thrombectomy. Pt transferred to [facility] EDUARDO eval, s/p cerebral angiogram for mechanical thrombectomy of Rt M2 occlusion, TICI 3 (one pass). She is admitted to NSICU for close monitoring post thrombectomy. [facility], FC in no acute distress.      NIH SS: 10  DATE:  TIME: 12:48  1A: Level of consciousness (0-3): 1  1B: Questions (0-2): 1   1C: Commands (0-2): 0  2: Gaze (0-2): 0  3: Visual fields (0-3): 0  4: Facial palsy (0-3): 0  MOTOR:  5A: Left arm motor drift (0-4): 1  5B: Right arm motor drift (0-4): 0  6A: Left leg motor drift (0-4): 3  6B: Right leg motor drift (0-4): 2  7: Limb ataxia (0-2): 0  SENSORY:  8: Sensation (0-2): 0  SPEECH:  9: Language (0-3): 3  10: Dysarthria (0-2): 0  EXTINCTION:  11: Extinction/inattention (0-2): 0    TOTAL SCORE: 10     (26 Mar 2025 12:28)      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, including shoulder      Lower back pain      S/P tonsillectomy and adenoidectomy      H/O cataract extraction  both eyes      History of melanoma excision  left foot      History of left inguinal hernia repair  mesh      H/O cardiac catheterization  3/15, no  blockages          REVIEW OF SYSTEMS: see HPI       General:	    Skin/Breast:  	  Ophthalmologic:  	  ENMT:	    Respiratory and Thorax:  	  Cardiovascular:	    Gastrointestinal:	    Genitourinary:	    Musculoskeletal:	    Neurological:	    Psychiatric:	    Hematology/Lymphatics:	    Endocrine:	    Allergic/Immunologic:	    MEDICATIONS  (STANDING):  albuterol/ipratropium for Nebulization 3 milliLiter(s) Nebulizer every 6 hours  chlorhexidine 2% Cloths 1 Application(s) Topical daily  heparin  Infusion 900 Unit(s)/Hr (7 mL/Hr) IV Continuous <Continuous>  magnesium sulfate  IVPB 2 Gram(s) IV Intermittent once  methylPREDNISolone sodium succinate Injectable 40 milliGRAM(s) IV Push every 6 hours  sodium chloride 0.9%. 1000 milliLiter(s) (65 mL/Hr) IV Continuous <Continuous>    MEDICATIONS  (PRN):  hydrALAZINE Injectable 10 milliGRAM(s) IV Push four times a day PRN SBP >150  labetalol Injectable 10 milliGRAM(s) IV Push every 4 hours PRN Systolic blood pressure > 150      Allergies    penicillins (Hives)    Intolerances        SOCIAL HISTORY: non contributory       Vital Signs Last 24 Hrs  T(C): 37 (27 Mar 2025 05:30), Max: 38.3 (26 Mar 2025 16:30)  T(F): 98.6 (27 Mar 2025 05:30), Max: 100.9 (26 Mar 2025 16:30)  HR: 104 (27 Mar 2025 05:30) (75 - 112)  BP: 113/55 (27 Mar 2025 05:30) (103/57 - 165/96)  BP(mean): 70 (27 Mar 2025 05:30) (70 - 145)  RR: 18 (27 Mar 2025 05:30) (16 - 26)  SpO2: 98% (27 Mar 2025 05:30) (85% - 100%)    Parameters below as of 27 Mar 2025 04:30  Patient On (Oxygen Delivery Method): mask, aerosol        PHYSICAL EXAM:      Constitutional: ill appearing     Eyes:    ENMT:    Neck:     Breasts:    Back:    Respiratory: supplemental oxygenation     Cardiovascular: rrr via peripheral palpation     Gastrointestinal: soft, no flank ecchymosis     Genitourinary: not examined     Rectal: not examined     Extremities: warm bilaterally, no significant edema, right groin with slight ecchymosis    Vascular: palpable bilateral lower ext pedals     Neurological: patient unable to fully participate             Pulses:   Right:                                                                          Left:  FEM [ x]2+ [ ]1+ [ ]doppler                                             FEM [x ]2+ [ ]1+ [ ]doppler    POP [ ]2+ [ ]1+ [ ]doppler                                             POP [ ]2+ [ ]1+ [ ]doppler    DP [x ]2+ [ ]1+ [ ]doppler                                                DP [x ]2+ [ ]1+ [ ]doppler  PT[ ]2+ [ ]1+ [ ]doppler                                                  PT [ ]2+ [ ]1+ [ ]doppler      LABS:                        11.3   7.72  )-----------( 116      ( 27 Mar 2025 02:00 )             35.3     03-27    137  |  102  |  21.2[H]  ----------------------------<  161[H]  4.3   |  24.0  |  0.56    Ca    8.3[L]      27 Mar 2025 02:00  Phos  4.3     03-27  Mg     1.7     03-27    TPro  6.2  /  Alb  3.1[L]  /  TBili  1.2  /  DBili  x   /  AST  25  /  ALT  30  /  AlkPhos  44  03-26    PT/INR - ( 26 Mar 2025 12:35 )   PT: 11.8 sec;   INR: 1.04 ratio         PTT - ( 27 Mar 2025 02:00 )  PTT:82.9 sec  Urinalysis Basic - ( 27 Mar 2025 02:00 )    Color: x / Appearance: x / SG: x / pH: x  Gluc: 161 mg/dL / Ketone: x  / Bili: x / Urobili: x   Blood: x / Protein: x / Nitrite: x   Leuk Esterase: x / RBC: x / WBC x   Sq Epi: x / Non Sq Epi: x / Bacteria: x        RADIOLOGY & ADDITIONAL STUDIES    < from: US Duplex Arterial Lower Ext Ltd, Right (03.27.25 @ 04:09) >    ACC: [id number] EXAM:  US DPLX LWR EXT ARTS LTD RT   ORDERED BY: CESAR ROBERTSON     PROCEDURE DATE:  03/27/2025          INTERPRETATION:  Right groin ultrasound    INDICATION: Evaluate for pseudoaneurysm or hematoma    TECHNIQUE: Grayscale, color Doppler and spectral Doppler of the right   groin was obtained.    FINDINGS:    There is a 1.7 x 1.2 x 1.5 cm partially thrombosed pseudoaneurysm arising   from the right common femoral artery. The neck measures 1.3 cm in length   and 0.4 cm in width.    There is a 2.5 x 0.9 x 1.2 cm hematoma.    IMPRESSION:    A 1.7 cm right CFA pseudoaneurysm.  A 2.5 cm adjacent hematoma.    --- End of Report ---            GAGE KOHLER MD; Attending Radiologist  This document has been electronically signed. Mar 27 2025  5:28AM    < end of copied text >      Impression and Plan:    access related right cfa psuedoaneurysm  uncomplicated  stable distal perfusion    -- contact Interventional radiology for pseudoaneurysm injection  - will follow patient's progress Vascular Attending:  Julia WHITNEY     Vascular Surgery HPI:  Admission HPI reviewed  Access related right CFA pseudoaneurysm  s/p Neuroendovascular intervention for MCA thrombus  currently of active A/C  patient unable to participate with HPI, Son at bedside       HPI:  89 year old female with PMHx of Afib (on Eliquis), PPM, HTN, hx of RUE melanoma removal with R axillary lymph node removal, recent admission 3/21- 3/25 for lt MCA occlusion s/p thrombectomy presents as transfer from [facility] after she was found at 7:30 with lt facial and left sided weakness.  am.  Upon arrival to  ED she was noted to have lt sided facial droop; aphasic; left arm weakness; no TNK as out of window.  code stroke activated;  CT imagining found patient with new rt mca; patient transferred to [facility] for thrombectomy. Pt transferred to [facility] EDUARDO eval, s/p cerebral angiogram for mechanical thrombectomy of Rt M2 occlusion, TICI 3 (one pass). She is admitted to NSICU for close monitoring post thrombectomy. [facility], FC in no acute distress.      NIH SS: 10  DATE:  TIME: 12:48  1A: Level of consciousness (0-3): 1  1B: Questions (0-2): 1   1C: Commands (0-2): 0  2: Gaze (0-2): 0  3: Visual fields (0-3): 0  4: Facial palsy (0-3): 0  MOTOR:  5A: Left arm motor drift (0-4): 1  5B: Right arm motor drift (0-4): 0  6A: Left leg motor drift (0-4): 3  6B: Right leg motor drift (0-4): 2  7: Limb ataxia (0-2): 0  SENSORY:  8: Sensation (0-2): 0  SPEECH:  9: Language (0-3): 3  10: Dysarthria (0-2): 0  EXTINCTION:  11: Extinction/inattention (0-2): 0    TOTAL SCORE: 10     (26 Mar 2025 12:28)      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, including shoulder      Lower back pain      S/P tonsillectomy and adenoidectomy      H/O cataract extraction  both eyes      History of melanoma excision  left foot      History of left inguinal hernia repair  mesh      H/O cardiac catheterization  3/15, no  blockages          REVIEW OF SYSTEMS: see HPI       General:	    Skin/Breast:  	  Ophthalmologic:  	  ENMT:	    Respiratory and Thorax:  	  Cardiovascular:	    Gastrointestinal:	    Genitourinary:	    Musculoskeletal:	    Neurological:	    Psychiatric:	    Hematology/Lymphatics:	    Endocrine:	    Allergic/Immunologic:	    MEDICATIONS  (STANDING):  albuterol/ipratropium for Nebulization 3 milliLiter(s) Nebulizer every 6 hours  chlorhexidine 2% Cloths 1 Application(s) Topical daily  heparin  Infusion 900 Unit(s)/Hr (7 mL/Hr) IV Continuous <Continuous>  magnesium sulfate  IVPB 2 Gram(s) IV Intermittent once  methylPREDNISolone sodium succinate Injectable 40 milliGRAM(s) IV Push every 6 hours  sodium chloride 0.9%. 1000 milliLiter(s) (65 mL/Hr) IV Continuous <Continuous>    MEDICATIONS  (PRN):  hydrALAZINE Injectable 10 milliGRAM(s) IV Push four times a day PRN SBP >150  labetalol Injectable 10 milliGRAM(s) IV Push every 4 hours PRN Systolic blood pressure > 150      Allergies    penicillins (Hives)    Intolerances        SOCIAL HISTORY: non contributory       Vital Signs Last 24 Hrs  T(C): 37 (27 Mar 2025 05:30), Max: 38.3 (26 Mar 2025 16:30)  T(F): 98.6 (27 Mar 2025 05:30), Max: 100.9 (26 Mar 2025 16:30)  HR: 104 (27 Mar 2025 05:30) (75 - 112)  BP: 113/55 (27 Mar 2025 05:30) (103/57 - 165/96)  BP(mean): 70 (27 Mar 2025 05:30) (70 - 145)  RR: 18 (27 Mar 2025 05:30) (16 - 26)  SpO2: 98% (27 Mar 2025 05:30) (85% - 100%)    Parameters below as of 27 Mar 2025 04:30  Patient On (Oxygen Delivery Method): mask, aerosol        PHYSICAL EXAM:      Constitutional: ill appearing     Eyes:    ENMT:    Neck:     Breasts:    Back:    Respiratory: supplemental oxygenation     Cardiovascular: rrr via peripheral palpation     Gastrointestinal: soft, no flank ecchymosis     Genitourinary: not examined     Rectal: not examined     Extremities: warm bilaterally, no significant edema, right groin with slight ecchymosis    Vascular: palpable bilateral lower ext pedals     Neurological: patient unable to fully participate             Pulses:   Right:                                                                          Left:  FEM [ x]2+ [ ]1+ [ ]doppler                                             FEM [x ]2+ [ ]1+ [ ]doppler    POP [ ]2+ [ ]1+ [ ]doppler                                             POP [ ]2+ [ ]1+ [ ]doppler    DP [x ]2+ [ ]1+ [ ]doppler                                                DP [x ]2+ [ ]1+ [ ]doppler  PT[ ]2+ [ ]1+ [ ]doppler                                                  PT [ ]2+ [ ]1+ [ ]doppler      LABS:                        11.3   7.72  )-----------( 116      ( 27 Mar 2025 02:00 )             35.3     03-27    137  |  102  |  21.2[H]  ----------------------------<  161[H]  4.3   |  24.0  |  0.56    Ca    8.3[L]      27 Mar 2025 02:00  Phos  4.3     03-27  Mg     1.7     03-27    TPro  6.2  /  Alb  3.1[L]  /  TBili  1.2  /  DBili  x   /  AST  25  /  ALT  30  /  AlkPhos  44  03-26    PT/INR - ( 26 Mar 2025 12:35 )   PT: 11.8 sec;   INR: 1.04 ratio         PTT - ( 27 Mar 2025 02:00 )  PTT:82.9 sec  Urinalysis Basic - ( 27 Mar 2025 02:00 )    Color: x / Appearance: x / SG: x / pH: x  Gluc: 161 mg/dL / Ketone: x  / Bili: x / Urobili: x   Blood: x / Protein: x / Nitrite: x   Leuk Esterase: x / RBC: x / WBC x   Sq Epi: x / Non Sq Epi: x / Bacteria: x        RADIOLOGY & ADDITIONAL STUDIES    < from: US Duplex Arterial Lower Ext Ltd, Right (03.27.25 @ 04:09) >    ACC: [id number] EXAM:  US DPLX LWR EXT ARTS LTD RT   ORDERED BY: CESAR ROBERTSON     PROCEDURE DATE:  03/27/2025          INTERPRETATION:  Right groin ultrasound    INDICATION: Evaluate for pseudoaneurysm or hematoma    TECHNIQUE: Grayscale, color Doppler and spectral Doppler of the right   groin was obtained.    FINDINGS:    There is a 1.7 x 1.2 x 1.5 cm partially thrombosed pseudoaneurysm arising   from the right common femoral artery. The neck measures 1.3 cm in length   and 0.4 cm in width.    There is a 2.5 x 0.9 x 1.2 cm hematoma.    IMPRESSION:    A 1.7 cm right CFA pseudoaneurysm.  A 2.5 cm adjacent hematoma.    --- End of Report ---            GAGE KOHLER MD; Attending Radiologist  This document has been electronically signed. Mar 27 2025  5:28AM    < end of copied text >      Impression and Plan:    access related right cfa pseudoaneurysm  uncomplicated  stable distal perfusion    -- contact Interventional radiology for pseudoaneurysm injection  - will follow patient's progress

## 2025-03-27 NOTE — PROCEDURE NOTE - PLAN
patient to keep RLE straight for 4 hours. Repeat formal duplex in AM to evaluate if PSA remains thrombosed.

## 2025-03-27 NOTE — DISCHARGE NOTE NURSING/CASE MANAGEMENT/SOCIAL WORK - NSDCVIVACCINE_GEN_ALL_CORE_FT
Tdap; 06-Sep-2019 14:05; Jesica Davies (SHELDON); [employer]; [id number] (Exp. Date: 07-Jun-2021); IntraMuscular; Deltoid Left.; 0.5 milliLiter(s); VIS (VIS Published: 09-May-2013, VIS Presented: 06-Sep-2019);

## 2025-03-27 NOTE — CONSULT NOTE ADULT - ASSESSMENT
89 y.o Female with PMHx of A-fib, HTN, Melanoma who was recently admitted for stroke with Left MCA occlusion s/p thrombectomy, now readmitted for left sided weakness and left facial droop. Found with new Right MCA occlusion s/p thrombectomy. 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 She is following with endocrinologist Dr. Antonio Gaines at [place]. Home regimen consist of Prednisone 5 mg in AM and 2 mg PM. During this admission patient received stress dose of IV Solumedrol. Now patient is recovering well and transferred to her home dose of Prednisone.    # Hx of iatrogenic AI  Patent is hemodynamically and biochemically stable  Continue current Prednisone regimen 5 mg in AM and 2 mg in PM  Monitor BP and electrolytes  Consider stress dose IV steroids (Hydrocortisone) if hemodynamically unstable or worsening current condition.  F/u with endocrinology outpatient.

## 2025-03-27 NOTE — DISCHARGE NOTE NURSING/CASE MANAGEMENT/SOCIAL WORK - FINANCIAL ASSISTANCE
[facility] provides services at a reduced cost to those who are determined to be eligible through [facility]’s financial assistance program. Information regarding [facility]’s financial assistance program can be found by going to [email or web address] or by calling [phone].

## 2025-03-27 NOTE — CONSULT NOTE ADULT - SUBJECTIVE AND OBJECTIVE BOX
HPI:  89 y.o Female with PMHx of A-fib, HTN, Melanoma who was recently admitted for stroke with Left MCA occlusion s/p thrombectomy, now readmitted for left sided weakness and left facial droop. Found with new Right MCA occlusion s/p thrombectomy. 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 She is following with endocrinologist Dr. Antonio Gaines at [place]. Home regimen consist of Prednisone 5 mg in AM and 2 mg PM. During this admission patient receive stress dose of IV Solumedrol. Now patient is recovering well and transferred to her home dose of Prednisone.      REVIEW OF SYSTEMS:    CONSTITUTIONAL: No fever, weight loss, or fatigue  EYES: No eye pain, visual disturbances, or discharge  ENMT:  No difficulty hearing, tinnitus, vertigo; No sinus or throat pain  NECK: No pain or stiffness  RESPIRATORY: No cough, wheezing, chills or hemoptysis; No shortness of breath  CARDIOVASCULAR: No chest pain, palpitations, dizziness, or leg swelling  GASTROINTESTINAL: No abdominal or epigastric pain. No nausea, vomiting, or hematemesis; No diarrhea or constipation. No melena or hematochezia.  NEUROLOGICAL: No headaches, memory loss, loss of strength, numbness, or tremors  SKIN: No itching, burning, rashes, or lesions   MUSCULOSKELETAL: No joint pain or swelling; No muscle, back, or extremity pain  PSYCHIATRIC: No depression, anxiety, mood swings, or difficulty sleeping    penicillins (Hives)    MEDICATIONS  (STANDING):  albuterol/ipratropium for Nebulization 3 milliLiter(s) Nebulizer every 6 hours  atorvastatin 10 milliGRAM(s) Oral at bedtime  chlorhexidine 2% Cloths 1 Application(s) Topical daily  heparin  Infusion 900 Unit(s)/Hr (6.5 mL/Hr) IV Continuous <Continuous>  metoprolol tartrate 25 milliGRAM(s) Oral two times a day  polyethylene glycol 3350 17 Gram(s) Oral daily  predniSONE   Tablet 2 milliGRAM(s) Oral daily  senna 2 Tablet(s) Oral at bedtime  sodium chloride 0.9%. 1000 milliLiter(s) (65 mL/Hr) IV Continuous <Continuous>    MEDICATIONS  (PRN):  hydrALAZINE Injectable 10 milliGRAM(s) IV Push four times a day PRN SBP >150  labetalol Injectable 10 milliGRAM(s) IV Push every 4 hours PRN Systolic blood pressure > 150      Vital Signs Last 24 Hrs  T(C): 37.4 (27 Mar 2025 16:00), Max: 38.3 (26 Mar 2025 17:00)  T(F): 99.3 (27 Mar 2025 16:00), Max: 100.9 (26 Mar 2025 17:00)  HR: 97 (27 Mar 2025 16:00) (75 - 110)  BP: 108/56 (27 Mar 2025 16:00) (103/57 - 138/75)  BP(mean): 72 (27 Mar 2025 16:00) (69 - 93)  RR: 20 (27 Mar 2025 16:00) (17 - 26)  SpO2: 100% (27 Mar 2025 16:00) (97% - 100%)    Parameters below as of 27 Mar 2025 08:30  Patient On (Oxygen Delivery Method): nasal cannula  O2 Flow (L/min): 3    Weight (kg): 63.5 (03-26-25 @ 12:10), 59 (03-26-25 @ 08:35), 60 (03-21-25 @ 06:17), 80.7 (03-21-25 @ 02:55)    Physical Exam:    Constitutional: NAD  HEENT: EOMI  Neck: trachea midline, no thyroid enlargement  Respiratory: CTAB, normal respirations  Cardiovascular: S1 and S2, RRR  Gastrointestinal: BS+, soft, ntnd  Extremities: No peripheral edema  Neurological: AOx3, no gross neurologic deficit  Skin: warm and dry    LABS  03-27    137  |  102  |  21.2[H]  ----------------------------<  161[H]  4.3   |  24.0  |  0.56    Ca    8.3[L]      27 Mar 2025 02:00  Phos  4.3     03-27  Mg     1.7     03-27    TPro  6.2  /  Alb  3.1[L]  /  TBili  1.2  /  DBili  x   /  AST  25  /  ALT  30  /  AlkPhos  44  03-26                          11.3   7.72  )-----------( 116      ( 27 Mar 2025 02:00 )             35.3     HDL Cholesterol: 74 mg/dL (03-27-25 @ 02:00)  Triglycerides, Serum: 55 mg/dL (03-27-25 @ 02:00)  Cholesterol: 145 mg/dL (03-27-25 @ 02:00)  HDL Cholesterol: 66 mg/dL (03-22-25 @ 03:15)  Triglycerides, Serum: 56 mg/dL (03-22-25 @ 03:15)  Cholesterol: 136 mg/dL (03-22-25 @ 03:15)    A1C with Estimated Average Glucose Result: 5.5 % (03-27-25 @ 02:00)  Thyroid Stimulating Hormone, Serum: 0.64 uIU/mL (03-27-25 @ 02:00)  A1C with Estimated Average Glucose Result: 5.7 % (03-22-25 @ 03:15)  Thyroid Stimulating Hormone, Serum: 0.22 uIU/mL (03-22-25 @ 03:15)    Albumin: 3.1 g/dL (03-26-25 @ 08:44)  Aspartate Aminotransferase (AST/SGOT): 25 U/L (03-26-25 @ 08:44)  Alanine Aminotransferase (ALT/SGPT): 30 U/L (03-26-25 @ 08:44)  Alkaline Phosphatase: 44 U/L (03-26-25 @ 08:44)

## 2025-03-28 LAB
ANION GAP SERPL CALC-SCNC: 11 MMOL/L — SIGNIFICANT CHANGE UP (ref 5–17)
APTT BLD: 43.4 SEC — HIGH (ref 24.5–35.6)
BUN SERPL-MCNC: 30.5 MG/DL — HIGH (ref 8–20)
CALCIUM SERPL-MCNC: 8.4 MG/DL — SIGNIFICANT CHANGE UP (ref 8.4–10.5)
CHLORIDE SERPL-SCNC: 101 MMOL/L — SIGNIFICANT CHANGE UP (ref 96–108)
CO2 SERPL-SCNC: 24 MMOL/L — SIGNIFICANT CHANGE UP (ref 22–29)
CREAT SERPL-MCNC: 0.56 MG/DL — SIGNIFICANT CHANGE UP (ref 0.5–1.3)
EGFR: 88 ML/MIN/1.73M2 — SIGNIFICANT CHANGE UP
EGFR: 88 ML/MIN/1.73M2 — SIGNIFICANT CHANGE UP
GLUCOSE SERPL-MCNC: 113 MG/DL — HIGH (ref 70–99)
HCT VFR BLD CALC: 33.5 % — LOW (ref 34.5–45)
HGB BLD-MCNC: 10.9 G/DL — LOW (ref 11.5–15.5)
MAGNESIUM SERPL-MCNC: 2.1 MG/DL — SIGNIFICANT CHANGE UP (ref 1.6–2.6)
MCHC RBC-ENTMCNC: 30.7 PG — SIGNIFICANT CHANGE UP (ref 27–34)
MCHC RBC-ENTMCNC: 32.5 G/DL — SIGNIFICANT CHANGE UP (ref 32–36)
MCV RBC AUTO: 94.4 FL — SIGNIFICANT CHANGE UP (ref 80–100)
NRBC # BLD AUTO: 0 K/UL — SIGNIFICANT CHANGE UP (ref 0–0)
NRBC # FLD: 0 K/UL — SIGNIFICANT CHANGE UP (ref 0–0)
NRBC BLD AUTO-RTO: 0 /100 WBCS — SIGNIFICANT CHANGE UP (ref 0–0)
PHOSPHATE SERPL-MCNC: 2.6 MG/DL — SIGNIFICANT CHANGE UP (ref 2.4–4.7)
PLATELET # BLD AUTO: 126 K/UL — LOW (ref 150–400)
PMV BLD: 10.7 FL — SIGNIFICANT CHANGE UP (ref 7–13)
POTASSIUM SERPL-MCNC: 4.2 MMOL/L — SIGNIFICANT CHANGE UP (ref 3.5–5.3)
POTASSIUM SERPL-SCNC: 4.2 MMOL/L — SIGNIFICANT CHANGE UP (ref 3.5–5.3)
RBC # BLD: 3.55 M/UL — LOW (ref 3.8–5.2)
RBC # FLD: 13.9 % — SIGNIFICANT CHANGE UP (ref 10.3–14.5)
SODIUM SERPL-SCNC: 136 MMOL/L — SIGNIFICANT CHANGE UP (ref 135–145)
WBC # BLD: 7.87 K/UL — SIGNIFICANT CHANGE UP (ref 3.8–10.5)
WBC # FLD AUTO: 7.87 K/UL — SIGNIFICANT CHANGE UP (ref 3.8–10.5)

## 2025-03-28 PROCEDURE — 36002 PSEUDOANEURYSM INJECTION TRT: CPT | Mod: RT

## 2025-03-28 PROCEDURE — 99233 SBSQ HOSP IP/OBS HIGH 50: CPT | Mod: 57,25

## 2025-03-28 PROCEDURE — 99233 SBSQ HOSP IP/OBS HIGH 50: CPT | Mod: GC

## 2025-03-28 PROCEDURE — 99233 SBSQ HOSP IP/OBS HIGH 50: CPT

## 2025-03-28 PROCEDURE — 71260 CT THORAX DX C+: CPT | Mod: 26

## 2025-03-28 PROCEDURE — 76942 ECHO GUIDE FOR BIOPSY: CPT | Mod: 26

## 2025-03-28 PROCEDURE — 99291 CRITICAL CARE FIRST HOUR: CPT

## 2025-03-28 PROCEDURE — 93926 LOWER EXTREMITY STUDY: CPT | Mod: 26,RT

## 2025-03-28 PROCEDURE — 99232 SBSQ HOSP IP/OBS MODERATE 35: CPT

## 2025-03-28 PROCEDURE — 74177 CT ABD & PELVIS W/CONTRAST: CPT | Mod: 26

## 2025-03-28 RX ORDER — HEPARIN SODIUM 1000 [USP'U]/ML
650 INJECTION INTRAVENOUS; SUBCUTANEOUS
Qty: 25000 | Refills: 0 | Status: DISCONTINUED | OUTPATIENT
Start: 2025-03-28 | End: 2025-03-28

## 2025-03-28 RX ORDER — POTASSIUM PHOSPHATE, MONOBASIC POTASSIUM PHOSPHATE, DIBASIC INJECTION, 236; 224 MG/ML; MG/ML
15 SOLUTION, CONCENTRATE INTRAVENOUS ONCE
Refills: 0 | Status: COMPLETED | OUTPATIENT
Start: 2025-03-28 | End: 2025-03-28

## 2025-03-28 RX ORDER — MELATONIN 5 MG
5 TABLET ORAL AT BEDTIME
Refills: 0 | Status: DISCONTINUED | OUTPATIENT
Start: 2025-03-28 | End: 2025-03-29

## 2025-03-28 RX ORDER — HEPARIN SODIUM 1000 [USP'U]/ML
650 INJECTION INTRAVENOUS; SUBCUTANEOUS
Qty: 25000 | Refills: 0 | Status: DISCONTINUED | OUTPATIENT
Start: 2025-03-28 | End: 2025-03-31

## 2025-03-28 RX ORDER — ACETAMINOPHEN 500 MG/5ML
1000 LIQUID (ML) ORAL ONCE
Refills: 0 | Status: COMPLETED | OUTPATIENT
Start: 2025-03-28 | End: 2025-03-28

## 2025-03-28 RX ORDER — ACETAMINOPHEN 500 MG/5ML
650 LIQUID (ML) ORAL EVERY 6 HOURS
Refills: 0 | Status: DISCONTINUED | OUTPATIENT
Start: 2025-03-28 | End: 2025-03-29

## 2025-03-28 RX ORDER — TRAMADOL HYDROCHLORIDE 50 MG/1
25 TABLET, FILM COATED ORAL ONCE
Refills: 0 | Status: DISCONTINUED | OUTPATIENT
Start: 2025-03-28 | End: 2025-03-28

## 2025-03-28 RX ADMIN — Medication 5 MILLIGRAM(S): at 21:01

## 2025-03-28 RX ADMIN — PREDNISONE 5 MILLIGRAM(S): 20 TABLET ORAL at 05:36

## 2025-03-28 RX ADMIN — PREDNISONE 2 MILLIGRAM(S): 20 TABLET ORAL at 21:01

## 2025-03-28 RX ADMIN — Medication 1000 MILLIGRAM(S): at 15:00

## 2025-03-28 RX ADMIN — IPRATROPIUM BROMIDE AND ALBUTEROL SULFATE 3 MILLILITER(S): .5; 2.5 SOLUTION RESPIRATORY (INHALATION) at 04:06

## 2025-03-28 RX ADMIN — METOPROLOL SUCCINATE 25 MILLIGRAM(S): 50 TABLET, EXTENDED RELEASE ORAL at 17:06

## 2025-03-28 RX ADMIN — IPRATROPIUM BROMIDE AND ALBUTEROL SULFATE 3 MILLILITER(S): .5; 2.5 SOLUTION RESPIRATORY (INHALATION) at 20:26

## 2025-03-28 RX ADMIN — POTASSIUM PHOSPHATE, MONOBASIC POTASSIUM PHOSPHATE, DIBASIC INJECTION, 62.5 MILLIMOLE(S): 236; 224 SOLUTION, CONCENTRATE INTRAVENOUS at 05:36

## 2025-03-28 RX ADMIN — Medication 2 TABLET(S): at 21:01

## 2025-03-28 RX ADMIN — Medication 400 MILLIGRAM(S): at 14:30

## 2025-03-28 RX ADMIN — TRAMADOL HYDROCHLORIDE 25 MILLIGRAM(S): 50 TABLET, FILM COATED ORAL at 16:24

## 2025-03-28 RX ADMIN — METOPROLOL SUCCINATE 25 MILLIGRAM(S): 50 TABLET, EXTENDED RELEASE ORAL at 05:36

## 2025-03-28 RX ADMIN — HEPARIN SODIUM 6.5 UNIT(S)/HR: 1000 INJECTION INTRAVENOUS; SUBCUTANEOUS at 18:10

## 2025-03-28 RX ADMIN — ATORVASTATIN CALCIUM 10 MILLIGRAM(S): 80 TABLET, FILM COATED ORAL at 21:01

## 2025-03-28 NOTE — DISCHARGE NOTE PROVIDER - HOSPITAL COURSE
89 year old female with PMHx of Afib (off Eliquis), recent left MCA territory stroke s/p LM1 thrombectomy, with residual minimal aphasia, MRS 1, PPM, HTN, HLD, GERD, severe MR/TR, iatrogenic adrenal insufficiency (on prednisone), lymphedema, metastatic melanoma s/p Left foot melanoma and lymph node resection, s/p RUE melanoma and Right axillary lymph node excision at [facility] (3/18/25) who presented to  ED 3/26/36 with Lt sided weakness and aphasia. CT neg, CTA showed a  Rt M2 occlusion.  Transferred to [facility] intervention. S/P thrombectomy, TICI 3 achieved after 1 pass admitted to NSICU for post procedure monitoring started on IV Heparin.On the early morning of 3/29/25 pt received medications around 6am by RN report and subsequently was appreciated to be aphasic with right sided weakness.  CTH/CTA/CTP ordered. CTH negative for acute hemorrhage. CTA + new Left MCA M2 occulusion.       IMPRESSION:   Recent left MCA cerebral infarction s/p Left MCA M1 thrombectomy on 3/21/2025. TICI 3 recanalization. Patient subsequently discharged and returned 3/26/25 with Rt MCA  m2 occlusion, revascularization TICI 3. On 3/29/25 patient found to have acute Left M2 MCA occlusion s/p thrombectomy TICI 0 -- distal M4 branch. MRI brain demonstrated acute bilateral multifocal cerebral infarctions. There is subacute left basal ganglia infarct.  Etiology likely cardioembolic in the setting of atrial fibrillation and underlying hypercoaguability from malignancy.     Hospital course notable for:  -Patient seen by vascular surgery due to R groin pseudoaneurysm s/p L MCA thrombectomy. Given thrombin injection x2. Follow up duplex demonstrated pseudoaneurysm, without flow into PSA; likely resolved per vascular. Recommendations for outpatient follow up as needed.   -Hematology consulted due to concern for hypercoaguable state, anemia, hx of melanoma. CBC trended, overall stable. Hypercoaguable serology panel sent, negative for abnormalities. Agree w/ plan for patient to resume eliquis. Follow up outpatient for further monitoring/testing  -Urinary retention, rivas catheter placed, removed for TOV 4/1-4/2, however replaced again 4/3/25. Started on flomax. Recommend repeat trial of void at rehab once patient ambulatory, consider outpatient urology follow up.   -UA (+), started on PO macrobid. Urine culture pending at time of discharge ***  -Endocrine consulted due to hx of adrenal insufficiency;  started on hydrocortisone during admission, recommendations to resume patient's home regimen of prednisone 5mg in AM and 2mg in PM upon hospital dc.   -Incidentally found new 1.7 cm cystic lesion at the pancreatic body on CT a/p on 3/28/25. GI informed, no inpatient workup needed. Follow up outpatient with Dr. Maurisio Haas.    Patient resumed Eliquis 5mg daily for secondary stroke prevention. Also re-started on home regimen of atorvastatin 10mg daily; LDL-59, nonatherosclerotic etiology of stroke, high intensity statin not indicated at this time.     PT/OT evaluated patient, recommended dispo to acute rehab. Patient should follow up with neurology/neuro IR, cardiology, endocrinology, hematology, GI, primary care, +/- urology and vascular surgery after discharge.     **LAST UPDATED 4/3/25***    CORE MEASURES:       Admission NIHSS: 10     Tenecteplase : [] YES [x] NO     LDL/HDL/A1C:  59 //  74  //  5.5      Depression Screen- if depression hx and/or present     Statin Therapy: Atorvastatin 10mg daily      Dysphagia Screen: [x] PASS [] FAIL     Smoking in the past 12 months [] YES [x] NO     Afib [x] YES [] NO     Diabetes [] YES [x] NO     Stroke Education [x] YES [] NO  Diabetes [] YES [x] NO     89 year old female with PMHx of Afib (off Eliquis), recent left MCA territory stroke s/p LM1 thrombectomy, with residual minimal aphasia, MRS 1, PPM, HTN, HLD, GERD, severe MR/TR, iatrogenic adrenal insufficiency (on prednisone), lymphedema, metastatic melanoma s/p Left foot melanoma and lymph node resection, s/p RUE melanoma and Right axillary lymph node excision at [facility] (3/18/25) who presented to  ED 3/26/36 with Lt sided weakness and aphasia. CT neg, CTA showed a  Rt M2 occlusion.  Transferred to [facility] intervention. S/P thrombectomy, TICI 3 achieved after 1 pass admitted to NSICU for post procedure monitoring started on IV Heparin.On the early morning of 3/29/25 pt received medications around 6am by RN report and subsequently was appreciated to be aphasic with right sided weakness.  CTH/CTA/CTP ordered. CTH negative for acute hemorrhage. CTA + new Left MCA M2 occulusion.       IMPRESSION:   Recent left MCA cerebral infarction s/p Left MCA M1 thrombectomy on 3/21/2025. TICI 3 recanalization. Patient subsequently discharged and returned 3/26/25 with Rt MCA  m2 occlusion, revascularization TICI 3. On 3/29/25 patient found to have acute Left M2 MCA occlusion s/p thrombectomy TICI 0 -- distal M4 branch. MRI brain demonstrated acute bilateral multifocal cerebral infarctions. There is subacute left basal ganglia infarct.  Etiology likely cardioembolic in the setting of atrial fibrillation and underlying hypercoaguability from malignancy.     Hospital course notable for:  -Patient seen by vascular surgery due to R groin pseudoaneurysm s/p L MCA thrombectomy. Given thrombin injection x2. Follow up duplex demonstrated pseudoaneurysm, without flow into PSA; likely resolved per vascular. Recommendations for outpatient follow up as needed.   -Hematology consulted due to concern for hypercoagulable state, anemia, hx of melanoma. CBC trended, overall stable. Hypercoagulable serology panel sent, negative for abnormalities. Agree w/ plan for patient to resume eliquis. Follow up outpatient for further monitoring/testing  -Urinary retention, rivas catheter placed, removed for TOV 4/1-4/2, however replaced again 4/3/25. Started on flomax. Recommend repeat trial of void at rehab once patient ambulatory, consider outpatient urology follow up.   -Endocrine consulted due to hx of adrenal insufficiency;  started on hydrocortisone during admission, recommendations to resume patient's home regimen of prednisone 5mg in AM and 2mg in PM upon hospital dc.   -Incidentally found new 1.7 cm cystic lesion at the pancreatic body on CT a/p on 3/28/25. GI informed, no inpatient workup needed. Follow up outpatient with Dr. Maurisio Haas.  -Patient started on cefepime and vancomycin on 4/4/25 due to fever of unknown origin. ID consulted, Vancomycin discontinued after blood cultures resulted negative. ID recommendations to continue with IV cefepime until 4/10/25.     Patient resumed Eliquis 5mg daily for secondary stroke prevention. Also re-started on home regimen of atorvastatin 10mg daily; LDL-59, nonatherosclerotic etiology of stroke, high intensity statin not indicated at this time.     PT/OT evaluated patient, recommended dispo to acute rehab. Patient should follow up with neurology/neuro IR, cardiology, endocrinology, hematology, GI, primary care, +/- urology and vascular surgery after discharge.     CORE MEASURES:       Admission NIHSS: 10     Tenecteplase : [] YES [x] NO     LDL/HDL/A1C:  59 //  74  //  5.5      Depression Screen- if depression hx and/or present     Statin Therapy: Atorvastatin 10mg daily      Dysphagia Screen: [x] PASS [] FAIL     Smoking in the past 12 months [] YES [x] NO     Afib [x] YES [] NO     Diabetes [] YES [x] NO     Stroke Education [x] YES [] NO  Diabetes [] YES [x] NO

## 2025-03-28 NOTE — DIETITIAN INITIAL EVALUATION ADULT - OTHER INFO
Pt is a 89 year old female with PMHx of Afib, recent left MCA territory stroke s/p LM1 thrombectomy, with residual minimal aphasia, MRS 1, PPM, HTN, HLD, GERD, severe MR/TR, iatrogenic adrenal insufficiency (on prednisone), lymphedema, metastatic melanoma s/p Left foot melanoma and lymph node resection, s/p RUE melanoma and Right axillary lymph node excision at [facility] (3/18/25) who presented to  ED 3/26/36 with L sided weakness and aphasia. CT neg, CTA showing RM2 occlusion.  Transferred to [facility] intervention. S/P thrombectomy, TICI 3 achieved after 1 pass admitted to NSICU for post procedure monitoring started on IV Heparin   Pt admitted with New Right M2 occlusion s/p thrombectomy, recent left MCA stroke 3/21/25.

## 2025-03-28 NOTE — PROGRESS NOTE ADULT - ASSESSMENT
89F with PMHx of A-fib, HTN, Melanoma who was recently admitted for stroke with Left MCA occlusion s/p thrombectomy, now readmitted for left sided weakness and left facial droop. Found with new Right MCA occlusion s/p thrombectomy. Endocrinology consult requested for management of adrenal insufficiency Tye has history of iatrogenic AI after immunotherapy treatment for Melanoma in 2019. She is following with endocrinologist Dr. Antonio Gaines at [place]. Home regimen consist of Prednisone 5 mg in AM and 2 mg PM.     1. Iatrogenic adrenal insuffciency  - Remains hemodynamically stable  - Continue current Prednisone regimen 5 mg in AM and 2 mg in PM  - Has pseudoaneurysm and may need intervention, if going for procedure/to OR --> give stress dose steroids (IV hydrocortisone 100mg q8 hr)  - If stress dose steroids are restarted, she can be tapered quickly back down to home dose of prednisone  - Monitor BP and electrolytes

## 2025-03-28 NOTE — DISCHARGE NOTE PROVIDER - NSDCCPCAREPLAN_GEN_ALL_CORE_FT
PRINCIPAL DISCHARGE DIAGNOSIS  Diagnosis: Acute ischemic stroke  Assessment and Plan of Treatment: You were admitted to the hospital because you had a ischemic stroke.   You have these risks factors of strokes:   -atrial fibrillation, please take your medications as prescribed- do not skip doses and follow with your cardiologist.   -high blood pressure (Hypertension), Maintain a journal record of your blood pressure twice a day to show your primary care/cardiologist .   -high cholesterol (also called hyperlipidemia) - please see nutritionist and take your statin therapy if you were prescribed one.  Your liver function will need to be monitored.   -Hypercoagulable state - please follow up with hematology and/or rheumatology   -still undefined - will continue to be evaluated and monitored as an outpatient  For secondary stroke prevention please take your medications as prescribed. If you run low on your medication, please contact your doctor before you run out.  You will follow up outpatient with the vascular neurology team, the office will call you within 3 days of discharge to schedule an appointment.  If you do not hear from the office please call the phone number provided.    Please see all regularly scheduled providers as prior to your admission. Please see your primary care doctor within one week of discharge.  In addition discuss with your primary care provider regarding candidacy for routine malignancy screenings.   Adjustments to your regular tasks may be difficult after you've had a stroke, but you can utilize  new ways/assistance as needed to manage your daily activities based on the recommendations of your physical, occupational, speech and language team if applicable.         SECONDARY DISCHARGE DIAGNOSES  Diagnosis: Paroxysmal atrial fibrillation  Assessment and Plan of Treatment:     Diagnosis: Hypertension  Assessment and Plan of Treatment:     Diagnosis: Hyperlipidemia  Assessment and Plan of Treatment:     Diagnosis: Adrenal insufficiency  Assessment and Plan of Treatment:     Diagnosis: History of melanoma  Assessment and Plan of Treatment:     Diagnosis: Anemia, unspecified  Assessment and Plan of Treatment:     Diagnosis: Pancreatic cyst  Assessment and Plan of Treatment: Incidentally found new 1.7 cm cystic lesion at the pancreatic body on CT a/p on 3/28/25. GI informed, no inpatient workup needed. Follow up outpatient with Dr. Maurisio Haas.     PRINCIPAL DISCHARGE DIAGNOSIS  Diagnosis: Acute ischemic stroke  Assessment and Plan of Treatment: You were admitted to the hospital because you had a ischemic stroke.   You have these risks factors of strokes:   -atrial fibrillation, please take your medications as prescribed- do not skip doses and follow with your cardiologist.   -high blood pressure (Hypertension), Maintain a journal record of your blood pressure twice a day to show your primary care/cardiologist .   -high cholesterol (also called hyperlipidemia) - please see nutritionist and take your statin therapy if you were prescribed one.  Your liver function will need to be monitored.   -Hypercoagulable state - please follow up with hematology and/or rheumatology   -still undefined - will continue to be evaluated and monitored as an outpatient  For secondary stroke prevention please take your medications as prescribed. If you run low on your medication, please contact your doctor before you run out.  You will follow up outpatient with the vascular neurology team, the office will call you within 3 days of discharge to schedule an appointment.  If you do not hear from the office please call the phone number provided.    Please see all regularly scheduled providers as prior to your admission. Please see your primary care doctor within one week of discharge.  In addition discuss with your primary care provider regarding candidacy for routine malignancy screenings.   Adjustments to your regular tasks may be difficult after you've had a stroke, but you can utilize  new ways/assistance as needed to manage your daily activities based on the recommendations of your physical, occupational, speech and language team if applicable.         SECONDARY DISCHARGE DIAGNOSES  Diagnosis: Paroxysmal atrial fibrillation  Assessment and Plan of Treatment:     Diagnosis: Hypertension  Assessment and Plan of Treatment:     Diagnosis: Hyperlipidemia  Assessment and Plan of Treatment:     Diagnosis: Adrenal insufficiency  Assessment and Plan of Treatment:     Diagnosis: History of melanoma  Assessment and Plan of Treatment:     Diagnosis: Anemia, unspecified  Assessment and Plan of Treatment:     Diagnosis: Pancreatic cyst  Assessment and Plan of Treatment: Incidentally found new 1.7 cm cystic lesion at the pancreatic body on CT a/p on 3/28/25. GI informed, no inpatient workup needed. Follow up outpatient with Dr. Maurisio Haas.    Diagnosis: Urinary retention  Assessment and Plan of Treatment:

## 2025-03-28 NOTE — PROCEDURE NOTE - ADDITIONAL PROCEDURE DETAILS
Battery: 3.5 years remaining  No recent events recorded.     RV lead: [id number] [employer] SN:[id number] DOI:12/1/2009    This device is conditionally approved for use with MRI. MRI department will contact the appropriate vendor for reprogramming during scan. Order form completed, faxed, emailed and placed in the chart. Please recall EP if you have any further questions.

## 2025-03-28 NOTE — PROCEDURE NOTE - GENERAL PROCEDURE DETAILS
injected 1.3cc thrombin under US guidance, pseudo appeared closed, minimal flow through neck, 5 min pressure held without further flow in neck or pseudo, pressure dressing applied injected 1.5cc thrombin under US guidance, pseudo appeared closed, minimal flow through neck, 5 min pressure held without further flow in neck or pseudo, pressure dressing applied

## 2025-03-28 NOTE — DISCHARGE NOTE PROVIDER - NSDCQMANTITHROMCONTRAOTHER_GEN_A_CORE_FT
Patient on anticoagulation for secondary stroke prevention. No indication for additional antiplatelet from a neurologic standpoint.  Patient on Eliquis 5mg Daily for secondary stroke prevention. No indication for additional antiplatelet from vascular neurology standpoint.

## 2025-03-28 NOTE — DISCHARGE NOTE PROVIDER - CARE PROVIDERS DIRECT ADDRESSES
,[email or web address]

## 2025-03-28 NOTE — DISCHARGE NOTE PROVIDER - PROVIDER TOKENS
PROVIDER:[TOKEN:[[id number]],FOLLOWUP:[1 month]] PROVIDER:[TOKEN:[[id number]],FOLLOWUP:[1 month]],PROVIDER:[TOKEN:[[id number]],FOLLOWUP:[1 month]],PROVIDER:[TOKEN:[[id number]],FOLLOWUP:[1 month]],PROVIDER:[TOKEN:[[id number]],FOLLOWUP:[1 month]],PROVIDER:[TOKEN:[[id number]],FOLLOWUP:[2 weeks]],FREE:[LAST:[Your Primary Care Provider],PHONE:[(   )    -],FAX:[(   )    -],FOLLOWUP:[1 week]]

## 2025-03-28 NOTE — DISCHARGE NOTE PROVIDER - CARE PROVIDER_API CALL
Maurisio Haas  Gastroenterology  [address]  [place] [address]  Phone: [phone]  Fax: [phone]  Follow Up Time: 1 month   Maurisio Haas  Gastroenterology  [address], [address]  [place] [address]  Phone: [phone]  Fax: [phone]  Follow Up Time: 1 month    Celina Carlin  Hematology/Oncology  [address] [place] [address]  Phone: [phone]  Fax: [phone]  Follow Up Time: 1 month    Juan Diego Johnson  Vascular Neurology  [address] [place] [address]  Phone: [phone]  Fax: [phone]  Follow Up Time: 1 month    Antonio Gaines  Endocrinology/Metab/Diabetes  [address], [address]  [place] [address]  Phone: [phone]  Fax: [phone]  Follow Up Time: 1 month    Milo Murguia  Cardiovascular Disease  [address] [place] [address]  Phone: [phone]  Fax: [phone]  Follow Up Time: 2 weeks    Your Primary Care Provider,   Phone: (   )    -  Fax: (   )    -  Follow Up Time: 1 week

## 2025-03-28 NOTE — PROGRESS NOTE ADULT - ASSESSMENT
89F with acute CVA, R MCA occlusion, s/p TICI 3 thrombectomy 03/26. Suspected coagulopathy due to malignant dz.  PMH of recent 03/21/2025 L MCA stroke s/p thrombectomy, h/o Afib, HTN, metastatic melanoma, iatrogenic AI on Prednisone.  New 1.7 cm cystic lesion at the pancreatic body.   Anticoagulated, on Heparin gtt.  Normocytic anemia, combination of postop, dilutional; chronic thrombocytopenia.  R fem artery pseudoaneurysm, concern for dissection.    Plan:  cont neurochecks   MRI brain w/wo contrast  add Melatonin q hs  SBP goal 100-160, PRN meds  maintain Osats>92%, incentive spirometry  diet as tolerated, bowel regimen  monitor UOP, e-lytes, TOV  cont heparin gtt, goal PTT 40-60 as bridge to DOAC (pending MRI), temporary pause for Vascular intervention today, cleared by [facility]  cont home Simvastatin  Vascular involved - plan for repeat thrombin injection, re-eval after for possible open intervention  maintain -180, ISS; cont home Prednisone regimen  VTE ppx with SCDs, on Heparin gtt

## 2025-03-28 NOTE — DIETITIAN INITIAL EVALUATION ADULT - PERTINENT MEDS FT
MEDICATIONS  (STANDING):  metoprolol tartrate 25 milliGRAM(s) Oral two times a day  polyethylene glycol 3350 17 Gram(s) Oral daily  predniSONE   Tablet 5 milliGRAM(s) Oral daily  senna 2 Tablet(s) Oral at bedtime  sodium chloride 0.9%. 1000 milliLiter(s) (65 mL/Hr) IV Continuous <Continuous>    MEDICATIONS  (PRN):  hydrALAZINE Injectable 10 milliGRAM(s) IV Push four times a day PRN SBP >160  labetalol Injectable 10 milliGRAM(s) IV Push every 4 hours PRN Systolic blood pressure > 160

## 2025-03-28 NOTE — PROCEDURE NOTE - ADDITIONAL PROCEDURE DETAILS
palpable R DP pulse pre/post intervention Informed consent was obtained from the family. All risks and benefits including risk for peripheral embolization and failure of procedure were discussed in detail. The right groin was prepped and draped in the usual sterile fashion using chlorexidine. The vascular lab was present and obtained pre-injection images. Time out was performed. Subsequently with infiltrated local anesthesia in the right groin with 1% lidocaine. We accessed the pseudoaneurysm under ultrasound guidance. We injected 1.5cc of thrombin under color doppler until complete resolution of the pseudoaneurysm was achieved. We also performed ultrasound guided pressure after injection. Post-injection images were obtained showing resolution of pseudoaneurysm. A pressure dressing was applied. The patient tolerated the procedure well. She has palpable DP pulses at the end of the procedure same as before. We will obtain repeat duplex in a.m.

## 2025-03-28 NOTE — PROGRESS NOTE ADULT - ASSESSMENT
Patient is a 89 year-old Female admitted for M2 occlusion s/p thrombectomy. Patient underwent thrombin injection of pseudoaneurysm(PSA) yesterday in IR. Repeat Duplex reviewed this AM, PSA still patent. Discussed with Dr. Oseguera, PSA with wide short neck and appear to be from the right superficial femoral artery not the RCFA. Recommend reconsulting vascular surgery for further evaluation of PSA.     Please call extension [phone] with any questions, concerns or issues regarding above.

## 2025-03-28 NOTE — PROCEDURE NOTE - INTERROGATION NOTE: VOLTAGE
[FreeTextEntry1] : Willy is a 61-year-old female with history of hypertension, hyperlipidemia, palpitations, obesity,  KEVIN using CPAP, thyroid nodule, MAZARIEGOS, atypical chest pain, nonobstructive cath May 2022 .\par \par Patient has dyspnea on exertion unchanged.  Cardiovascular review of symptoms is negative for exertional chest pain, dyspnea, palpitations, dizziness or syncope.  No PND or orthopnea leg edema.  No bleeding or black stool.\par \par Patient is walking 15 minutes with chronic left knee pain prior arthroscopy. \par \par Patient has recent sinus infection completed 2 courses of antibiotic therapy and steroid taper. \par \par Lexiscan Myoview stress test Sept 2021 LVEF 69% with normal wall motion, small anterior apical defect in the presence of breast attenuation normal wall motion likely artifact, nonischemic EKG response, no chest pain, ventricular bigeminy with baseline sinus rhythm.\par \par Echocardiogram February 2021, LVEF 65%, normal Doppler, mild pHTN.\par \par Carotid Doppler February 2021, mild nonobstructive plaque.\par \par Zio patch 1 week heart monitor August 2021, sinus rhythm with average heart rate 82, brief SVT up to 193 bpm, rare PVCs\par \par Echocardiogram December 2019, LVEF 60 to 65%, mild MR and TR, mild pulmonary hypertension.\par \par Exercise Myoview stress test November 2018, LVEF 66%, normal perfusion, "cleft seen, no ischemia.\par \par \par 
[FreeTextEntry1] : Willy is a 61-year-old female with history of hypertension, hyperlipidemia, palpitations, obesity,  KEVIN using CPAP, thyroid nodule, MAZARIEGOS, atypical chest pain, nonobstructive cath May 2022 .\par \par Patient has dyspnea on exertion unchanged.  Cardiovascular review of symptoms is negative for exertional chest pain, dyspnea, palpitations, dizziness or syncope.  No PND or orthopnea leg edema.  No bleeding or black stool.\par \par Patient is walking 15 minutes with chronic left knee pain prior arthroscopy. \par \par Patient has recent sinus infection completed 2 courses of antibiotic therapy and steroid taper. \par \par Lexiscan Myoview stress test Sept 2021 LVEF 69% with normal wall motion, small anterior apical defect in the presence of breast attenuation normal wall motion likely artifact, nonischemic EKG response, no chest pain, ventricular bigeminy with baseline sinus rhythm.\par \par Echocardiogram February 2021, LVEF 65%, normal Doppler, mild pHTN.\par \par Carotid Doppler February 2021, mild nonobstructive plaque.\par \par Zio patch 1 week heart monitor August 2021, sinus rhythm with average heart rate 82, brief SVT up to 193 bpm, rare PVCs\par \par Echocardiogram December 2019, LVEF 60 to 65%, mild MR and TR, mild pulmonary hypertension.\par \par Exercise Myoview stress test November 2018, LVEF 66%, normal perfusion, "cleft seen, no ischemia.\par \par \par 
2.98

## 2025-03-28 NOTE — DISCHARGE NOTE PROVIDER - NSDCMRMEDTOKEN_GEN_ALL_CORE_FT
aspirin 81 mg oral tablet, chewable: 1 tab(s) orally once a day Please stop taking once you start Eliquis on 3/27/25.  Centrum oral tablet: 1 tab(s) orally once a day  Eliquis 5 mg oral tablet: 1 tab(s) orally 2 times a day Please resume 3/27/25.  Farxiga 10 mg oral tablet: 1 tab(s) orally once a day resume at next scheduled dose.  metoprolol succinate 50 mg oral tablet, extended release: 1 tab(s) orally once a day  predniSONE 1 mg oral tablet: 2 tab(s) orally once a day (in the evening)  predniSONE 5 mg oral tablet: 1 tab(s) orally once a day (in the morning)  PreserVision AREDS 2 oral capsule: 1 cap(s) orally 2 times a day  simvastatin 10 mg oral tablet: 1 tab(s) orally once a day (at bedtime)   apixaban 5 mg oral tablet: 1 tab(s) orally every 12 hours  cefepime 2 g intravenous injection: 2 gram(s) intravenous every 8 hours  Farxiga 10 mg oral tablet: 1 tab(s) orally once a day resume at next scheduled dose.  Lopressor 50 mg oral tablet: 1 tab(s) orally 3 times a day  Multiple Vitamins oral tablet: 1 tab(s) orally once a day  polyethylene glycol 3350 oral powder for reconstitution: 17 gram(s) orally once a day  predniSONE 1 mg oral tablet: 2 tab(s) orally once a day (in the evening)  predniSONE 5 mg oral tablet: 1 tab(s) orally once a day (in the morning)  senna leaf extract oral tablet: 2 tab(s) orally once a day (at bedtime)  simvastatin 10 mg oral tablet: 1 tab(s) orally once a day (at bedtime)  tamsulosin 0.4 mg oral capsule: 1 cap(s) orally once a day (at bedtime)

## 2025-03-28 NOTE — PROGRESS NOTE ADULT - SUBJECTIVE AND OBJECTIVE BOX
Patient seen and examined at bedside. No acute events overnight, no complaints    Vitals:  Vital Signs Last 24 Hrs  T(C): 37.1 (28 Mar 2025 08:00), Max: 37.5 (27 Mar 2025 19:00)  T(F): 98.8 (28 Mar 2025 08:00), Max: 99.5 (27 Mar 2025 19:00)  HR: 99 (28 Mar 2025 12:00) (85 - 110)  BP: 117/68 (28 Mar 2025 12:00) (99/61 - 146/77)  BP(mean): 82 (28 Mar 2025 12:00) (71 - 103)  RR: 22 (28 Mar 2025 12:00) (12 - 26)  SpO2: 94% (28 Mar 2025 12:00) (94% - 100%)    Parameters below as of 28 Mar 2025 12:00  Patient On (Oxygen Delivery Method): room air        Labs:  03-28    136  |  101  |  30.5[H]  ----------------------------<  113[H]  4.2   |  24.0  |  0.56    Ca    8.4      28 Mar 2025 04:15  Phos  2.6     03-28  Mg     2.1     03-28                              10.9   7.87  )-----------( 126      ( 28 Mar 2025 04:15 )             33.5       Exam:  Gen: pt lying in bed, alert, in NAD  Resp: unlabored  CVS: RRR  Abd: soft, NT, ND, R groin small hematoma, no pulsatile mass appreciated, ecchymosis to R groin and R medial thigh, soft without skin necrosis  Ext: moving all extremities spontaneously, sensation intact, palpable pedal pulses

## 2025-03-28 NOTE — PROGRESS NOTE ADULT - ASSESSMENT
Patient is an 89yoF with PMH Afib (on eliquis), HTN, PPM, recent stroke who presented as a transfer from OSH with acute R M2 occlusion, now s/p cerebral angio on 3/26 via R femoral using 6Fr sheath. Imaging post procedure showing 1.7x1.2 x 1.5cm partially thrombosed PSA. Manual pressure held yesterday, repeat duplex similar, IR performed thrombin injection, CTA obtained showing persistent R femoral pseudoaneurysm.    Plan:  -will perform bedside thrombin injection today  -lay flat x24h  -hold AC if possible from neuro standpoint  -pressure dressing x24h  -repeat R groin duplex tomorrow 3/29  -continue care per NSICU/primary team  -seen and discussed with attending surgeon

## 2025-03-28 NOTE — DIETITIAN INITIAL EVALUATION ADULT - ORAL INTAKE PTA/DIET HISTORY
Pt sleeping soundly during visit; spoke with pt's RN who reports pt did not sleep overnight; so asked to wake. Pt is currently NPO for possible procedure later today noted.  Weight stable from previous admission noted.

## 2025-03-28 NOTE — DIETITIAN INITIAL EVALUATION ADULT - PERTINENT LABORATORY DATA
03-28    136  |  101  |  30.5[H]  ----------------------------<  113[H]  4.2   |  24.0  |  0.56    Ca    8.4      28 Mar 2025 04:15  Phos  2.6     03-28  Mg     2.1     03-28    A1C with Estimated Average Glucose Result: 5.5 % (03-27-25 @ 02:00)  A1C with Estimated Average Glucose Result: 5.7 % (03-22-25 @ 03:15)

## 2025-03-28 NOTE — CHART NOTE - NSCHARTNOTEFT_GEN_A_CORE
Vascular team re-evaluated right groin at 5:30PM. Discussed with DWAIN Barrera to restart heparin gtt now given risk vs benefit.  - heparin gtt restarted 5:30PM  - will follow vascular recs- bedrest x 24hrs Vascular team re-evaluated right groin at 5:30PM. Discussed with DWAIN Barrera to restart heparin gtt now given risk vs benefit.  - heparin gtt restarted 5:30PM  - will follow vascular recs- bedrest x 24hrs and f/u repeat arterial ultrasound in AM

## 2025-03-28 NOTE — PROGRESS NOTE ADULT - SUBJECTIVE AND OBJECTIVE BOX
HPI:  89 year old female with PMHx of Afib (on Eliquis), PPM, HTN, hx of RUE melanoma removal with R axillary lymph node removal, recent admission 3/21- 3/25 for lt MCA occlusion s/p thrombectomy presents as transfer from [facility] after she was found at 7:30 with lt facial and left sided weakness.  am.  Upon arrival to  ED she was noted to have lt sided facial droop; aphasic; left arm weakness; no TNK as out of window.  code stroke activated;  CT imagining found patient with new rt mca; patient transferred to [facility] for thrombectomy. Pt transferred to [facility] EDUARDO eval, s/p cerebral angiogram for mechanical thrombectomy of Rt M2 occlusion, TICI 3 (one pass). She is admitted to NSICU for close monitoring post thrombectomy. [facility] up, FC in no acute distress.      NIH SS: 10  DATE:  TIME: 12:48  1A: Level of consciousness (0-3): 1  1B: Questions (0-2): 1   1C: Commands (0-2): 0  2: Gaze (0-2): 0  3: Visual fields (0-3): 0  4: Facial palsy (0-3): 0  MOTOR:  5A: Left arm motor drift (0-4): 1  5B: Right arm motor drift (0-4): 0  6A: Left leg motor drift (0-4): 3  6B: Right leg motor drift (0-4): 2  7: Limb ataxia (0-2): 0  SENSORY:  8: Sensation (0-2): 0  SPEECH:  9: Language (0-3): 3  10: Dysarthria (0-2): 0  EXTINCTION:  11: Extinction/inattention (0-2): 0    TOTAL SCORE: 10     (26 Mar 2025 12:28)    O/n events: none reported.  Repeat sono with persistent pseudoaneurysm.       ICU Vital Signs Last 24 Hrs  T(C): 37.1 (28 Mar 2025 08:00), Max: 37.5 (27 Mar 2025 13:00)  T(F): 98.8 (28 Mar 2025 08:00), Max: 99.5 (27 Mar 2025 13:00)  HR: 99 (28 Mar 2025 12:00) (85 - 110)  BP: 117/68 (28 Mar 2025 12:00) (99/61 - 146/77)  BP(mean): 82 (28 Mar 2025 12:00) (71 - 103)  ABP: --  ABP(mean): --  RR: 22 (28 Mar 2025 12:00) (12 - 26)  SpO2: 94% (28 Mar 2025 12:00) (94% - 100%)    O2 Parameters below as of 28 Mar 2025 12:00  Patient On (Oxygen Delivery Method): room air            Exam: no changes.  AAOx3, face symmetric, comprehension intact, speech clear, PERRLA, EOMI, SHAH spont and strongly with no drift.  S1S2 present, no murmurs  CTAB  Abd soft, NT, ND  No peripheral swelling    R groin with no signs of active bleeding, non-tender, soft hematoma medial aspect of R thigh, palpable small pulsating mass; peripheral pulse present.

## 2025-03-28 NOTE — PROGRESS NOTE ADULT - ASSESSMENT
INCOMPLETE NOTE ***************************************  89 year old female with PMHx of Afib (off Eliquis), recent left MCA territory stroke s/p LM1 thrombectomy, with residual minimal aphasia, MRS 1, PPM, HTN, HLD, GERD, severe MR/TR, iatrogenic adrenal insufficiency (on prednisone), lymphedema, metastatic melanoma s/p Left foot melanoma and lymph node resection, s/p RUE melanoma and Right axillary lymph node excision at [facility] (3/18/25) who presented to  ED 3/26/36 with L sided weakness and aphasia. CT neg, CTA showing RM2 occlusion.  Transferred to [facility] EDUARDO intervention. S/P thrombectomy, TICI 3 achieved after 1 pass admitted to NSICU for post procedure monitoring started on IV Heparin     IMPRESSION: New Right M2 occlusion s/p thrombectomy, recent left MCA stroke 3/21/25. Etiology cardioembolic in the setting of Afib off anticoagulation for surgical procedure         ASSESSMENT:   # Ischemic Infarct s/p thrombectomy with TICI 3 recanalization   - 24 hour post-thrombectomy NIHSS: 2  - close monitoring and frequent neurochecks for signs of neurologic deterioration   - STAT head CT for any neurologic change   - goal BP as per Neuro-IR   - perform fingersticks every 6 hours,  insulin sliding scale prn  - maintain normovolemia, normoglycemia, normonatremia, and normothermia   - continue iV heparin given recurrent embolic events.  R/A/B discussed with family  - continue statin with a goal LDL under 70   - consult PT/OT/SLP/PMR    Rest of care per NSICU team   Stroke neuro to follow       CORE MEASURES:       Admission NIHSS: 10     Tenecteplase : [] YES [x] NO     LDL/HDL/A1C:  59 //  74  //  5.5      Depression Screen- if depression hx and/or present     Statin Therapy: continue home dose statin      Dysphagia Screen: [x] PASS [] FAIL     Smoking in the past 12 months [] YES [x] NO     Afib [x] YES [] NO     Diabetes [] YES [x] NO     Stroke Education [x] YES [] NO     89 year old female with PMHx of Afib (off Eliquis), recent left MCA territory stroke s/p LM1 thrombectomy, with residual minimal aphasia, MRS 1, PPM, HTN, HLD, GERD, severe MR/TR, iatrogenic adrenal insufficiency (on prednisone), lymphedema, metastatic melanoma s/p Left foot melanoma and lymph node resection, s/p RUE melanoma and Right axillary lymph node excision at [facility] (3/18/25) who presented to  ED 3/26/36 with L sided weakness and aphasia. CT neg, CTA showing RM2 occlusion.  Transferred to [facility] EDUARDO intervention. S/P thrombectomy, TICI 3 achieved after 1 pass admitted to NSICU for post procedure monitoring started on IV Heparin     IMPRESSION: New Right M2 occlusion s/p thrombectomy, recent left MCA stroke 3/21/25. Etiology cardioembolic in the setting of Afib off anticoagulation for surgical procedure         ASSESSMENT:   # Ischemic Infarct s/p thrombectomy with TICI 3 recanalization   - 24 hour post-thrombectomy NIHSS: 2  - close monitoring and frequent neuro checks for signs of neurologic deterioration   - STAT head CT for any neurologic change   - goal BP as per Neuro-IR   - perform fingersticks every 6 hours,  insulin sliding scale prn  - maintain normovolemia, normoglycemia, normo-natremia, and normothermia   -Vascular consulted for pseudoaneurysm; recommended holding heparin for 6 hrs and repeating thrombin injection   -Rpt Right lower ext. US duplex pending   -MRI brain with and without pending   - continue statin with a goal LDL under 70   - consult PT/OT/SLP/PMR when able     Rest of care per NSICU team   Stroke neuro to follow       CORE MEASURES:       Admission NIHSS: 10     Tenecteplase : [] YES [x] NO     LDL/HDL/A1C:  59 //  74  //  5.5      Depression Screen- if depression hx and/or present     Statin Therapy: continue home dose statin      Dysphagia Screen: [x] PASS [] FAIL     Smoking in the past 12 months [] YES [x] NO     Afib [x] YES [] NO     Diabetes [] YES [x] NO     Stroke Education [x] YES [] NO     89 year old female with PMHx of Afib (off Eliquis), recent left MCA territory stroke s/p LM1 thrombectomy, with residual minimal aphasia, MRS 1, PPM, HTN, HLD, GERD, severe MR/TR, iatrogenic adrenal insufficiency (on prednisone), lymphedema, metastatic melanoma s/p Left foot melanoma and lymph node resection, s/p RUE melanoma and Right axillary lymph node excision at [facility] (3/18/25) who presented to  ED 3/26/36 with L sided weakness and aphasia. CT neg, CTA showing RM2 occlusion.  Transferred to [facility] EDUARDO intervention. S/P thrombectomy, TICI 3 achieved after 1 pass admitted to NSICU for post procedure monitoring started on IV Heparin     IMPRESSION: New Right M2 occlusion s/p thrombectomy, recent left MCA stroke 3/21/25. Etiology cardioembolic in the setting of Afib off anticoagulation for surgical procedure         ASSESSMENT:   # Ischemic Infarct s/p thrombectomy with TICI 3 recanalization   - 24 hour post-thrombectomy NIHSS: 2  - close monitoring and frequent neuro checks for signs of neurologic deterioration   - STAT head CT for any neurologic change   - goal BP as per Neuro-IR   - perform fingersticks every 6 hours,  insulin sliding scale prn  - maintain normovolemia, normoglycemia, normo-natremia, and normothermia   -Vascular consulted for pseudoaneurysm; recommended  repeating thrombin injection and holding heparin.  D/w Neuro IR team as well.  Family explained the R/A/B of holding  -Rpt Right lower ext. US duplex pending   -MRI brain with and without pending   - continue statin with a goal LDL under 70   - consult PT/OT/SLP/PMR when able       Stroke neuro to follow       CORE MEASURES:       Admission NIHSS: 10     Tenecteplase : [] YES [x] NO     LDL/HDL/A1C:  59 //  74  //  5.5      Depression Screen- if depression hx and/or present     Statin Therapy: continue home dose statin      Dysphagia Screen: [x] PASS [] FAIL     Smoking in the past 12 months [] YES [x] NO     Afib [x] YES [] NO     Diabetes [] YES [x] NO     Stroke Education [x] YES [] NO

## 2025-03-28 NOTE — PROCEDURE NOTE - GENERAL PROCEDURE NAME
thrombin injection R femoral artery ultrasound guided thrombin injection Right femoral artery pseudoaneurysm

## 2025-03-28 NOTE — PROGRESS NOTE ADULT - SUBJECTIVE AND OBJECTIVE BOX
Ms Romano is seen resting in bed, ultrasound of her right femoral artery being performed.  She is accompanied by her son and daughter.  She is in good spirits though quite fatigued as she was awakened multiple times for several imaging studies to be performed overnight.  No major concerns at this time otherwise.       FUNCTIONAL PROGRESS  No PT/OT performed yet 2/2 concern for R Fem Artery Dissection/Pseudoaneurysm    3/27 SLP - Regular solids w/ thin liquids  allow for swallow between intakes; alternate food with liquid; maintain upright posture during/after eating for 30 mins; oral hygiene; position upright (90 degrees); small sips/bites      VITALS  T(C): 37.1 (03-28-25 @ 08:00), Max: 37.5 (03-27-25 @ 19:00)  HR: 99 (03-28-25 @ 12:00) (85 - 110)  BP: 117/68 (03-28-25 @ 12:00) (99/61 - 146/77)  RR: 22 (03-28-25 @ 12:00) (12 - 26)  SpO2: 94% (03-28-25 @ 12:00) (94% - 100%)  Wt(kg): --    MEDICATIONS   albuterol/ipratropium for Nebulization 3 milliLiter(s) every 6 hours  atorvastatin 10 milliGRAM(s) at bedtime  chlorhexidine 2% Cloths 1 Application(s) daily  hydrALAZINE Injectable 10 milliGRAM(s) four times a day PRN  labetalol Injectable 10 milliGRAM(s) every 4 hours PRN  melatonin 5 milliGRAM(s) at bedtime  metoprolol tartrate 25 milliGRAM(s) two times a day  polyethylene glycol 3350 17 Gram(s) daily  predniSONE   Tablet 2 milliGRAM(s) <User Schedule>  predniSONE   Tablet 5 milliGRAM(s) daily  senna 2 Tablet(s) at bedtime  sodium chloride 0.9%. 1000 milliLiter(s) <Continuous>      RECENT LABS/IMAGING  - Reviewed Today                        10.9   7.87  )-----------( 126      ( 28 Mar 2025 04:15 )             33.5     03-28    136  |  101  |  30.5[H]  ----------------------------<  113[H]  4.2   |  24.0  |  0.56    Ca    8.4      28 Mar 2025 04:15  Phos  2.6     03-28  Mg     2.1     03-28      PTT - ( 28 Mar 2025 04:15 )  PTT:43.4 sec  Urinalysis Basic - ( 28 Mar 2025 04:15 )    Color: x / Appearance: x / SG: x / pH: x  Gluc: 113 mg/dL / Ketone: x  / Bili: x / Urobili: x   Blood: x / Protein: x / Nitrite: x   Leuk Esterase: x / RBC: x / WBC x   Sq Epi: x / Non Sq Epi: x / Bacteria: x              Imaging Reviewed Today:     CT Brain Stroke 3/26 - 1. No intracranial hemorrhage is appreciated.  2. No intracranial mass lesion is appreciated.  3. Left basal ganglia subacute infarction, was acute at the time of   stroke code 3/21/2025, demonstrates expected evolution. Consider MR for   evaluation of infarct extension  4. MR may provide higher sensitivity imaging evaluation for the clinical   indication of acute infarction.    CT ANGIO BRAIN AND NECK STROKE 3/26 -   1.   RIGHT CAROTID SYSTEM:    Atherosclerotic plaque carotid bulb without    hemodynamically significant stenosis. Tandem lesion distal internal   carotid artery at the C1 level has developed since 3/21/2025, possible   interval arterial dissection, with associated luminal narrowing   approximately 30%  2.   LEFT CAROTID SYSTEM:     Atherosclerotic plaque carotid bulb without    hemodynamically significant stenosis.  3.   VERTEBRAL CIRCULATION:    Patent.  4.  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 Right MCA occlusion   in its proximal M2 segment is an interval finding since 3/21/2025. Left   MCA remains patent with luminal irregularity and low-grade narrowing   following recent thrombectomy.  5.  POSTERIOR INTRACRANIAL CIRCULATION:    Patent.  6. BRAIN PERFUSION:   No acute infarction of the brain is convincingly   demonstrated.  However, broad region of apparent ischemia corresponds to   the right MCA distribution correlating with acute embolic occlusion on CT   angiography  7. Heterogeneous enhancement within the principal dural sinuses may be   secondary to the early phase of venous opacification on this arterial   phase examination. The appearance is similar to 3/21/2025. Consider   supplemental evaluation with MR venogram    IR NEURO 3/26 - POSTOPERATIVE DIAGNOSIS: Large distal upper division M3 trunk occlusion.   TICI 3 reperfusion post endovascular thrombectomy    DUPLEX NIKA LE 3/26 - No evidence of deep venous thrombosis in either lower extremity.    DUPLEX R ARTERIAL LE 3/26 -   A 1.7 cm right CFA pseudoaneurysm.  A 2.5 cm adjacent hematoma.    CT HEAD 3/27 -   IMPRESSION: Age-appropriate involutional and ischemic gliotic changes. No   hemorrhage. No significant change since 3/26/2025.    DUPLEX ARTERIAL LOWER EXT, R 3/27 -   Slightly decreased size of partially thrombosed pseudoaneurysm arising   from the right common femoral artery/proximal right femoral artery.    Possible dissection flap in the right common femoral artery.    INTERVENTIONAL IR 3/27 - Right femoral vein pseudoaneurysm injected with 100units of thrombin and pressure held for 10min post injection. PSA appears thrombosed.    CT C/A/P w/contrast 3/27 -  No evidence of metastatic disease.  Severe cardiomegaly.  Pseudoaneurysm again seen off the proximal aspect of the right femoral   artery. Correlate with arterial ultrasound performed on the same day.  New 1.7 cm cystic lesion at the pancreatic body.          ----------------------------------------------------------------------------------------  PHYSICAL EXAM   Constitutional - NAD, Comfortable  HEENT - NCAT, xerostomia  Chest - Breathing comfortably  Cardiovascular - Afib, Extremities warm and well perfused.    Abdomen - Non-Distended  Neurologic Exam -                    Cognitive - AAO to self, place, date, year, NOT TO situation     Communication - Fluent, Mild Dysarthria     Cranial Nerves - EOMI, Mild L sided facial droop, shoulder elevation strong NIKA, tongue protrusion midline.      FUNCTIONAL MOTOR EXAM -                     LEFT    UE - ShAB 5/5, EF 4/5, EE 4/5, WE 4/5,  4/5                    Unable to perform remainder of PE secondary to team holding pressure on right groin site.      Sensory - Intact to LT  Psychiatric - Mood stable, Affect WNL  ----------------------------------------------------------------------------------------  ASSESSMENT/PLAN  89yFemale with functional deficits after Right M2 occlusion.  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    HTN, AFIB - Lopressor, Hydralazine/Labetalol PRN  SOB - Duoneb  Adrenal Insufficiency - Prednisone  Oropharyngeal Dysphagia - NPO  Pain - Tylenol  DVT PPX - SCDs  Impaired Mobility/Function/Rehab Recommendations - Patient requires continued hospitalization for above reasons    Mobilization - Recommend OOB to chair as able to.  Encourage PROM, AROM, and initiation of PT/OT evaluations, with respect to any limitations of activity 2/2 R sided pseudoaneurysm.      Disposition - To be determined with dynamic clinical course, pending therapy evaluations and medical stabilization.  Repeat Right Femoral Arterial US read pending.  Concern for dissection in Right Femoral Artery, workup ongoing

## 2025-03-28 NOTE — DISCHARGE NOTE PROVIDER - NSFOLLOWUPCLINICS_GEN_ALL_ED_FT
[place]  Urology  [address] [place] [address]  Phone: [phone]  Fax:   Follow Up Time: 1 month    [facility]  Vascular Surgery  [address] [place] [address]  Phone: [phone]  Fax:   Follow Up Time: 1 month

## 2025-03-28 NOTE — DISCHARGE NOTE PROVIDER - NSDCFUSCHEDAPPT_GEN_ALL_CORE_FT
Perez Russo  [facility] Physician [facility]  SURGON[facility] [address] [facility]  Scheduled Appointment: 04/02/2025

## 2025-03-28 NOTE — PROGRESS NOTE ADULT - SUBJECTIVE AND OBJECTIVE BOX
INTERVAL EVENTS:  Follow up adrenal insufficiency    MEDICATIONS  (STANDING):  albuterol/ipratropium for Nebulization 3 milliLiter(s) Nebulizer every 6 hours  atorvastatin 10 milliGRAM(s) Oral at bedtime  chlorhexidine 2% Cloths 1 Application(s) Topical daily  heparin  Infusion 900 Unit(s)/Hr (6.5 mL/Hr) IV Continuous <Continuous>  metoprolol tartrate 25 milliGRAM(s) Oral two times a day  polyethylene glycol 3350 17 Gram(s) Oral daily  predniSONE   Tablet 2 milliGRAM(s) Oral <User Schedule>  predniSONE   Tablet 5 milliGRAM(s) Oral daily  senna 2 Tablet(s) Oral at bedtime  sodium chloride 0.9%. 1000 milliLiter(s) (65 mL/Hr) IV Continuous <Continuous>    MEDICATIONS  (PRN):  hydrALAZINE Injectable 10 milliGRAM(s) IV Push four times a day PRN SBP >160  labetalol Injectable 10 milliGRAM(s) IV Push every 4 hours PRN Systolic blood pressure > 160      Allergies  penicillins (Hives)    Vital Signs Last 24 Hrs  T(C): 37.1 (28 Mar 2025 08:00), Max: 37.5 (27 Mar 2025 13:00)  T(F): 98.8 (28 Mar 2025 08:00), Max: 99.5 (27 Mar 2025 13:00)  HR: 95 (28 Mar 2025 09:00) (85 - 110)  BP: 99/61 (28 Mar 2025 09:00) (99/61 - 146/77)  BP(mean): 72 (28 Mar 2025 09:00) (70 - 103)  RR: 23 (28 Mar 2025 09:00) (12 - 26)  SpO2: 100% (28 Mar 2025 09:00) (98% - 100%)    Parameters below as of 28 Mar 2025 09:00  Patient On (Oxygen Delivery Method): room air    PHYSICAL EXAM:  Constitutional: NAD, well-groomed, well-developed  HEENT: PERRLA, EOMI, no exophalmos  Neck: No LAD, No JVD, trachea midline, no thyroid enlargement  Back: Normal spine flexure, No CVA tenderness  Respiratory: CTAB  Cardiovascular: S1 and S2, RRR, no M/G/R  Gastrointestinal: BS+, soft, no organomeglag or mass  Extremities: No peripheral edema, no pedal lesions  Vascular: 2+ peripheral pulses  Neurological: A/O x 3, no focal deficits  Psychiatric: Normal mood, normal affect  Musculoskeletal: 5/5 strength b/l upper and lower extremities  Skin: No rashes, no acanthosis    LABS:                        10.9   7.87  )-----------( 126      ( 28 Mar 2025 04:15 )             33.5     03-28    136  |  101  |  30.5[H]  ----------------------------<  113[H]  4.2   |  24.0  |  0.56    Ca    8.4      28 Mar 2025 04:15  Phos  2.6     03-28  Mg     2.1     03-28      Urinalysis Basic - ( 28 Mar 2025 04:15 )    Color: x / Appearance: x / SG: x / pH: x  Gluc: 113 mg/dL / Ketone: x  / Bili: x / Urobili: x   Blood: x / Protein: x / Nitrite: x   Leuk Esterase: x / RBC: x / WBC x   Sq Epi: x / Non Sq Epi: x / Bacteria: x    Thyroid Stimulating Hormone, Serum: 0.64 uIU/mL (03-27-25 @ 02:00)  Thyroid Stimulating Hormone, Serum: 0.22 uIU/mL (03-22-25 @ 03:15)   INTERVAL EVENTS:  Follow up adrenal insufficiency. Pt has no acute complaints at time of exam.  Pseudoaneurysm remains intact which needs further evaluation for possible intervention.    MEDICATIONS  (STANDING):  albuterol/ipratropium for Nebulization 3 milliLiter(s) Nebulizer every 6 hours  atorvastatin 10 milliGRAM(s) Oral at bedtime  chlorhexidine 2% Cloths 1 Application(s) Topical daily  heparin  Infusion 900 Unit(s)/Hr (6.5 mL/Hr) IV Continuous <Continuous>  metoprolol tartrate 25 milliGRAM(s) Oral two times a day  polyethylene glycol 3350 17 Gram(s) Oral daily  predniSONE   Tablet 2 milliGRAM(s) Oral <User Schedule>  predniSONE   Tablet 5 milliGRAM(s) Oral daily  senna 2 Tablet(s) Oral at bedtime  sodium chloride 0.9%. 1000 milliLiter(s) (65 mL/Hr) IV Continuous <Continuous>    MEDICATIONS  (PRN):  hydrALAZINE Injectable 10 milliGRAM(s) IV Push four times a day PRN SBP >160  labetalol Injectable 10 milliGRAM(s) IV Push every 4 hours PRN Systolic blood pressure > 160      Allergies  penicillins (Hives)    Vital Signs Last 24 Hrs  T(C): 37.1 (28 Mar 2025 08:00), Max: 37.5 (27 Mar 2025 13:00)  T(F): 98.8 (28 Mar 2025 08:00), Max: 99.5 (27 Mar 2025 13:00)  HR: 95 (28 Mar 2025 09:00) (85 - 110)  BP: 99/61 (28 Mar 2025 09:00) (99/61 - 146/77)  BP(mean): 72 (28 Mar 2025 09:00) (70 - 103)  RR: 23 (28 Mar 2025 09:00) (12 - 26)  SpO2: 100% (28 Mar 2025 09:00) (98% - 100%)    Parameters below as of 28 Mar 2025 09:00  Patient On (Oxygen Delivery Method): room air    PHYSICAL EXAM:  General: No apparent distress  Respiratory: Lungs clear bilaterally  Cardiac: +S1, S2, no m/r/g  GI: +BS, soft, non tender, non distended  Extremities: No peripheral edema  Neuro: A+O X3    LABS:                        10.9   7.87  )-----------( 126      ( 28 Mar 2025 04:15 )             33.5     03-28    136  |  101  |  30.5[H]  ----------------------------<  113[H]  4.2   |  24.0  |  0.56    Ca    8.4      28 Mar 2025 04:15  Phos  2.6     03-28  Mg     2.1     03-28      Urinalysis Basic - ( 28 Mar 2025 04:15 )    Color: x / Appearance: x / SG: x / pH: x  Gluc: 113 mg/dL / Ketone: x  / Bili: x / Urobili: x   Blood: x / Protein: x / Nitrite: x   Leuk Esterase: x / RBC: x / WBC x   Sq Epi: x / Non Sq Epi: x / Bacteria: x    Thyroid Stimulating Hormone, Serum: 0.64 uIU/mL (03-27-25 @ 02:00)  Thyroid Stimulating Hormone, Serum: 0.22 uIU/mL (03-22-25 @ 03:15)

## 2025-03-28 NOTE — CHART NOTE - NSCHARTNOTEFT_GEN_A_CORE
GI was called for incidental finding of new 1.7 cm cystic lesion at the pancreatic body found on CT a/p on 3/28/25.   Discussed with GI attending Dr. Maurisio Haas, no need for any inpatient follow up.    - Can follow up outpatient with Dr. Maurisio Haas

## 2025-03-28 NOTE — PROGRESS NOTE ADULT - SUBJECTIVE AND OBJECTIVE BOX
INCOMPLETE NOTE *********************************************  Preliminary note, official recommendations pending attending review/signature   Seen and examined by Stroke team attending/team, assessment/ plan as discussed with stroke team attending/team as noted.     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  am.  Upon arrival to  ED she was noted to have lt sided facial droop; aphasic; left arm weakness; no tenectaplase as out of window.  code stroke activated;  CT imagining found patient with new rt mca; patient transferred to [facility] for thrombectomy. Pt transferred to [facility] EDUARDO eval, s/p cerebral angiogram for mechanical thrombectomy of Rt M2 occlusion, TICI 3 (one pass) on 3/26/25. Admitted to NSICU for post procedure monitoring and started on heparin     SUBJECTIVE: No events overnight.  No new neurologic complaints.  ROS reported negative unless otherwise noted.    albuterol/ipratropium for Nebulization 3 milliLiter(s) Nebulizer every 6 hours  atorvastatin 10 milliGRAM(s) Oral at bedtime  chlorhexidine 2% Cloths 1 Application(s) Topical daily  heparin  Infusion 900 Unit(s)/Hr IV Continuous <Continuous>  hydrALAZINE Injectable 10 milliGRAM(s) IV Push four times a day PRN  labetalol Injectable 10 milliGRAM(s) IV Push every 4 hours PRN  metoprolol tartrate 25 milliGRAM(s) Oral two times a day  polyethylene glycol 3350 17 Gram(s) Oral daily  predniSONE   Tablet 2 milliGRAM(s) Oral <User Schedule>  predniSONE   Tablet 5 milliGRAM(s) Oral daily  senna 2 Tablet(s) Oral at bedtime  sodium chloride 0.9%. 1000 milliLiter(s) IV Continuous <Continuous>      PHYSICAL EXAM:   Vital Signs Last 24 Hrs  T(C): 37.1 (28 Mar 2025 07:00), Max: 37.5 (27 Mar 2025 13:00)  T(F): 98.8 (28 Mar 2025 07:00), Max: 99.5 (27 Mar 2025 13:00)  HR: 85 (28 Mar 2025 07:00) (85 - 110)  BP: 110/62 (28 Mar 2025 07:00) (108/56 - 146/77)  BP(mean): 75 (28 Mar 2025 07:00) (69 - 103)  RR: 17 (28 Mar 2025 07:00) (12 - 26)  SpO2: 100% (28 Mar 2025 07:00) (98% - 100%)    Parameters below as of 28 Mar 2025 04:00  Patient On (Oxygen Delivery Method): nasal cannula  O2 Flow (L/min): 2    EXAM PENDING ***************************************************    Detailed Neurologic Exam:  Right femoral vein pseudoaneurysm s/p thrombin injection.       Mental status: The patient is awake and alert and has normal attention span.  The patient follows all commands    Cranial nerves: Pupils equal and react symmetrically to light. There is no visual field deficit to confrontation. Extraocular motion is full with no nystagmus. There is no ptosis. Facial sensation is intact. Facial musculature is symmetric. Tongue is midline.    Motor: There is normal bulk and tone.  There is no tremor.  Strength is 5-/5 in the right arm 5/5 right leg.   left arm drift Strength is 4+/5 in the left arm and 5/5 left leg.    Sensation: Intact to light touch and pin in 4 extremities    Cerebellar: There is no dysmetria on finger to nose testing.    Gait : deferred      LABS:                        10.9   7.87  )-----------( 126      ( 28 Mar 2025 04:15 )             33.5    03-28    136  |  101  |  30.5[H]  ----------------------------<  113[H]  4.2   |  24.0  |  0.56    Ca    8.4      28 Mar 2025 04:15  Phos  2.6     03-28  Mg     2.1     03-28    TPro  6.2  /  Alb  3.1[L]  /  TBili  1.2  /  DBili  x   /  AST  25  /  ALT  30  /  AlkPhos  44  03-26  PT/INR - ( 26 Mar 2025 12:35 )   PT: 11.8 sec;   INR: 1.04 ratio         PTT - ( 28 Mar 2025 04:15 )  PTT:43.4 sec      03-27 Chol 145 LDL -- HDL 74 Trig 55, 03-22 Chol 136 LDL -- HDL 66 Trig 56    A1C:    IMAGING: Reviewed by me.      CT Head No Cont (03.27.25 @ 05:43)  IMPRESSION: Age-appropriate involutional and ischemic gliotic changes. No   hemorrhage. No significant change since 3/26/2025.     CT Angio Neck Stroke Protocol w/ IV Cont (03.26.25 @ 08:52)   .   RIGHT CAROTID SYSTEM:    Atherosclerotic plaque carotid bulb without    hemodynamically significant stenosis. Tandem lesion distal internal   carotid artery at the C1 level has developed since 3/21/2025, possible   interval arterial dissection, with associated luminal narrowing   approximately 30%    2.   LEFT CAROTID SYSTEM:     Atherosclerotic plaque carotid bulb without    hemodynamically significant stenosis.    3.   VERTEBRAL CIRCULATION:    Patent.    4.  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 Right MCA occlusion   in its proximal M2 segment is an interval finding since 3/21/2025. Left   MCA remains patent with luminal irregularity and low-grade narrowing   following recent thrombectomy.    5.  POSTERIOR INTRACRANIAL CIRCULATION:    Patent.    6. BRAIN PERFUSION:   No acute infarction of the brain is convincingly   demonstrated.  However, broad region of apparent ischemia corresponds to   the right MCA distribution correlating with acute embolic occlusion on CT   angiography    7. Heterogeneous enhancement within the principal dural sinuses may be   secondary to the early phase of venous opacification on this arterial   phase examination. The appearance is similar to 3/21/2025. Consider   supplemental evaluation with MR venogram    US Duplex Arterial Lower Ext Ltd, Right (03.27.25 @ 04:09)   IMPRESSION:    A 1.7 cm right CFA pseudoaneurysm.  A 2.5 cm adjacent hematoma.     Preliminary note, official recommendations pending attending review/signature   Seen and examined by Stroke team attending/team, assessment/ plan as discussed with stroke team attending/team as noted.     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  am.  Upon arrival to  ED she was noted to have lt sided facial droop; aphasic; left arm weakness; no tenectaplase as out of window.  code stroke activated;  CT imagining found patient with new rt mca; patient transferred to [facility] for thrombectomy. Pt transferred to [facility] EDUARDO eval, s/p cerebral angiogram for mechanical thrombectomy of Rt M2 occlusion, TICI 3 (one pass) on 3/26/25. Admitted to NSICU for post procedure monitoring and started on heparin     SUBJECTIVE: No events overnight.  No new neurologic complaints.  ROS reported negative unless otherwise noted.    albuterol/ipratropium for Nebulization 3 milliLiter(s) Nebulizer every 6 hours  atorvastatin 10 milliGRAM(s) Oral at bedtime  chlorhexidine 2% Cloths 1 Application(s) Topical daily  heparin  Infusion 900 Unit(s)/Hr IV Continuous <Continuous>  hydrALAZINE Injectable 10 milliGRAM(s) IV Push four times a day PRN  labetalol Injectable 10 milliGRAM(s) IV Push every 4 hours PRN  metoprolol tartrate 25 milliGRAM(s) Oral two times a day  polyethylene glycol 3350 17 Gram(s) Oral daily  predniSONE   Tablet 2 milliGRAM(s) Oral <User Schedule>  predniSONE   Tablet 5 milliGRAM(s) Oral daily  senna 2 Tablet(s) Oral at bedtime  sodium chloride 0.9%. 1000 milliLiter(s) IV Continuous <Continuous>      PHYSICAL EXAM:   Vital Signs Last 24 Hrs  T(C): 37.1 (28 Mar 2025 07:00), Max: 37.5 (27 Mar 2025 13:00)  T(F): 98.8 (28 Mar 2025 07:00), Max: 99.5 (27 Mar 2025 13:00)  HR: 85 (28 Mar 2025 07:00) (85 - 110)  BP: 110/62 (28 Mar 2025 07:00) (108/56 - 146/77)  BP(mean): 75 (28 Mar 2025 07:00) (69 - 103)  RR: 17 (28 Mar 2025 07:00) (12 - 26)  SpO2: 100% (28 Mar 2025 07:00) (98% - 100%)    Parameters below as of 28 Mar 2025 04:00  Patient On (Oxygen Delivery Method): nasal cannula  O2 Flow (L/min): 2        Detailed Neurologic Exam:    Right common femoral artery pseudoaneurysm s/p thrombin injection.       Mental status: The patient was sleepy, but arouse awake and alert and has normal attention span.  The patient follows all commands    Cranial nerves: Pupils equal and react symmetrically to light. There is no visual field deficit to confrontation. Extraocular motion is full with no nystagmus. There is no ptosis. Facial sensation is intact. Facial musculature is symmetric. Tongue is midline.    Motor: There is normal bulk and tone.  There is no tremor.  Strength is 5-/5 in the right arm 5/5 right leg.   left arm drift Strength is 4+/5 in the left arm and 5/5 left leg.    Sensation: Intact to light touch and pin in 4 extremities    Cerebellar: There is no dysmetria on finger to nose testing.    Gait : deferred      LABS:                        10.9   7.87  )-----------( 126      ( 28 Mar 2025 04:15 )             33.5    03-28    136  |  101  |  30.5[H]  ----------------------------<  113[H]  4.2   |  24.0  |  0.56    Ca    8.4      28 Mar 2025 04:15  Phos  2.6     03-28  Mg     2.1     03-28    TPro  6.2  /  Alb  3.1[L]  /  TBili  1.2  /  DBili  x   /  AST  25  /  ALT  30  /  AlkPhos  44  03-26  PT/INR - ( 26 Mar 2025 12:35 )   PT: 11.8 sec;   INR: 1.04 ratio         PTT - ( 28 Mar 2025 04:15 )  PTT:43.4 sec      03-27 Chol 145 LDL -- HDL 74 Trig 55, 03-22 Chol 136 LDL -- HDL 66 Trig 56    A1C:    IMAGING: Reviewed by me.      CT Head No Cont (03.27.25 @ 05:43)  IMPRESSION: Age-appropriate involutional and ischemic gliotic changes. No   hemorrhage. No significant change since 3/26/2025.     CT Angio Neck Stroke Protocol w/ IV Cont (03.26.25 @ 08:52)   .   RIGHT CAROTID SYSTEM:    Atherosclerotic plaque carotid bulb without    hemodynamically significant stenosis. Tandem lesion distal internal   carotid artery at the C1 level has developed since 3/21/2025, possible   interval arterial dissection, with associated luminal narrowing   approximately 30%    2.   LEFT CAROTID SYSTEM:     Atherosclerotic plaque carotid bulb without    hemodynamically significant stenosis.    3.   VERTEBRAL CIRCULATION:    Patent.    4.  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 Right MCA occlusion   in its proximal M2 segment is an interval finding since 3/21/2025. Left   MCA remains patent with luminal irregularity and low-grade narrowing   following recent thrombectomy.    5.  POSTERIOR INTRACRANIAL CIRCULATION:    Patent.    6. BRAIN PERFUSION:   No acute infarction of the brain is convincingly   demonstrated.  However, broad region of apparent ischemia corresponds to   the right MCA distribution correlating with acute embolic occlusion on CT   angiography    7. Heterogeneous enhancement within the principal dural sinuses may be   secondary to the early phase of venous opacification on this arterial   phase examination. The appearance is similar to 3/21/2025. Consider   supplemental evaluation with MR venogram    US Duplex Arterial Lower Ext Ltd, Right (03.27.25 @ 04:09)   IMPRESSION:    A 1.7 cm right CFA pseudoaneurysm.  A 2.5 cm adjacent hematoma.       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  am.  Upon arrival to  ED she was noted to have lt sided facial droop; aphasic; left arm weakness; no tenectaplase as out of window.  code stroke activated;  CT imagining found patient with new rt mca; patient transferred to [facility] for thrombectomy. Pt transferred to [facility] EDUARDO eval, s/p cerebral angiogram for mechanical thrombectomy of Rt M2 occlusion, TICI 3 (one pass) on 3/26/25. Admitted to NSICU for post procedure monitoring and started on heparin     SUBJECTIVE: No events overnight.  No new neurologic complaints.  ROS reported negative unless otherwise noted.    albuterol/ipratropium for Nebulization 3 milliLiter(s) Nebulizer every 6 hours  atorvastatin 10 milliGRAM(s) Oral at bedtime  chlorhexidine 2% Cloths 1 Application(s) Topical daily  heparin  Infusion 900 Unit(s)/Hr IV Continuous <Continuous>  hydrALAZINE Injectable 10 milliGRAM(s) IV Push four times a day PRN  labetalol Injectable 10 milliGRAM(s) IV Push every 4 hours PRN  metoprolol tartrate 25 milliGRAM(s) Oral two times a day  polyethylene glycol 3350 17 Gram(s) Oral daily  predniSONE   Tablet 2 milliGRAM(s) Oral <User Schedule>  predniSONE   Tablet 5 milliGRAM(s) Oral daily  senna 2 Tablet(s) Oral at bedtime  sodium chloride 0.9%. 1000 milliLiter(s) IV Continuous <Continuous>      PHYSICAL EXAM:   Vital Signs Last 24 Hrs  T(C): 37.1 (28 Mar 2025 07:00), Max: 37.5 (27 Mar 2025 13:00)  T(F): 98.8 (28 Mar 2025 07:00), Max: 99.5 (27 Mar 2025 13:00)  HR: 85 (28 Mar 2025 07:00) (85 - 110)  BP: 110/62 (28 Mar 2025 07:00) (108/56 - 146/77)  BP(mean): 75 (28 Mar 2025 07:00) (69 - 103)  RR: 17 (28 Mar 2025 07:00) (12 - 26)  SpO2: 100% (28 Mar 2025 07:00) (98% - 100%)    Parameters below as of 28 Mar 2025 04:00  Patient On (Oxygen Delivery Method): nasal cannula  O2 Flow (L/min): 2        Detailed Neurologic Exam:    Right common femoral artery pseudoaneurysm s/p thrombin injection.       Mental status: The patient was sleepy, but arouse awake and alert and has normal attention span.  The patient follows all commands    Cranial nerves: Pupils equal and react symmetrically to light. There is no visual field deficit to confrontation. Extraocular motion is full with no nystagmus. There is no ptosis. Facial sensation is intact. Facial musculature is symmetric. Tongue is midline.    Motor: There is normal bulk and tone.  There is no tremor.  Strength is 5-/5 in the right arm 5/5 right leg.   left arm drift Strength is 4+/5 in the left arm and 5/5 left leg.    Sensation: Intact to light touch and pin in 4 extremities    Cerebellar: There is no dysmetria on finger to nose testing.    Gait : deferred      LABS:                        10.9   7.87  )-----------( 126      ( 28 Mar 2025 04:15 )             33.5    03-28    136  |  101  |  30.5[H]  ----------------------------<  113[H]  4.2   |  24.0  |  0.56    Ca    8.4      28 Mar 2025 04:15  Phos  2.6     03-28  Mg     2.1     03-28    TPro  6.2  /  Alb  3.1[L]  /  TBili  1.2  /  DBili  x   /  AST  25  /  ALT  30  /  AlkPhos  44  03-26  PT/INR - ( 26 Mar 2025 12:35 )   PT: 11.8 sec;   INR: 1.04 ratio         PTT - ( 28 Mar 2025 04:15 )  PTT:43.4 sec      03-27 Chol 145 LDL -- HDL 74 Trig 55, 03-22 Chol 136 LDL -- HDL 66 Trig 56    A1C:    IMAGING: Reviewed by me.      CT Head No Cont (03.27.25 @ 05:43)  IMPRESSION: Age-appropriate involutional and ischemic gliotic changes. No   hemorrhage. No significant change since 3/26/2025.     CT Angio Neck Stroke Protocol w/ IV Cont (03.26.25 @ 08:52)   .   RIGHT CAROTID SYSTEM:    Atherosclerotic plaque carotid bulb without    hemodynamically significant stenosis. Tandem lesion distal internal   carotid artery at the C1 level has developed since 3/21/2025, possible   interval arterial dissection, with associated luminal narrowing   approximately 30%    2.   LEFT CAROTID SYSTEM:     Atherosclerotic plaque carotid bulb without    hemodynamically significant stenosis.    3.   VERTEBRAL CIRCULATION:    Patent.    4.  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 Right MCA occlusion   in its proximal M2 segment is an interval finding since 3/21/2025. Left   MCA remains patent with luminal irregularity and low-grade narrowing   following recent thrombectomy.    5.  POSTERIOR INTRACRANIAL CIRCULATION:    Patent.    6. BRAIN PERFUSION:   No acute infarction of the brain is convincingly   demonstrated.  However, broad region of apparent ischemia corresponds to   the right MCA distribution correlating with acute embolic occlusion on CT   angiography    7. Heterogeneous enhancement within the principal dural sinuses may be   secondary to the early phase of venous opacification on this arterial   phase examination. The appearance is similar to 3/21/2025. Consider   supplemental evaluation with MR venogram    US Duplex Arterial Lower Ext Ltd, Right (03.27.25 @ 04:09)   IMPRESSION:    A 1.7 cm right CFA pseudoaneurysm.  A 2.5 cm adjacent hematoma.

## 2025-03-29 LAB
ACANTHOCYTES BLD QL SMEAR: SLIGHT — SIGNIFICANT CHANGE UP
ALBUMIN SERPL ELPH-MCNC: 3.5 G/DL — SIGNIFICANT CHANGE UP (ref 3.3–5.2)
ALP SERPL-CCNC: 48 U/L — SIGNIFICANT CHANGE UP (ref 40–120)
ALT FLD-CCNC: 21 U/L — SIGNIFICANT CHANGE UP
ANION GAP SERPL CALC-SCNC: 11 MMOL/L — SIGNIFICANT CHANGE UP (ref 5–17)
ANION GAP SERPL CALC-SCNC: 12 MMOL/L — SIGNIFICANT CHANGE UP (ref 5–17)
ANISOCYTOSIS BLD QL: SLIGHT — SIGNIFICANT CHANGE UP
APTT BLD: 32.1 SEC — SIGNIFICANT CHANGE UP (ref 24.5–35.6)
APTT BLD: 39.1 SEC — HIGH (ref 24.5–35.6)
APTT BLD: 52.6 SEC — HIGH (ref 24.5–35.6)
APTT BLD: 57.4 SEC — HIGH (ref 24.5–35.6)
AST SERPL-CCNC: 19 U/L — SIGNIFICANT CHANGE UP
BASE EXCESS BLDA CALC-SCNC: 3.4 MMOL/L — HIGH (ref -2–3)
BASOPHILS # BLD AUTO: 0.02 K/UL — SIGNIFICANT CHANGE UP (ref 0–0.2)
BASOPHILS # BLD MANUAL: 0 K/UL — SIGNIFICANT CHANGE UP (ref 0–0.2)
BASOPHILS NFR BLD AUTO: 0.2 % — SIGNIFICANT CHANGE UP (ref 0–2)
BASOPHILS NFR BLD MANUAL: 0 % — SIGNIFICANT CHANGE UP (ref 0–2)
BILIRUB SERPL-MCNC: 0.8 MG/DL — SIGNIFICANT CHANGE UP (ref 0.4–2)
BLD GP AB SCN SERPL QL: SIGNIFICANT CHANGE UP
BLOOD GAS COMMENTS ARTERIAL: SIGNIFICANT CHANGE UP
BUN SERPL-MCNC: 20.4 MG/DL — HIGH (ref 8–20)
BUN SERPL-MCNC: 23.6 MG/DL — HIGH (ref 8–20)
CALCIUM SERPL-MCNC: 8.5 MG/DL — SIGNIFICANT CHANGE UP (ref 8.4–10.5)
CALCIUM SERPL-MCNC: 9 MG/DL — SIGNIFICANT CHANGE UP (ref 8.4–10.5)
CHLORIDE SERPL-SCNC: 101 MMOL/L — SIGNIFICANT CHANGE UP (ref 96–108)
CHLORIDE SERPL-SCNC: 102 MMOL/L — SIGNIFICANT CHANGE UP (ref 96–108)
CO2 SERPL-SCNC: 23 MMOL/L — SIGNIFICANT CHANGE UP (ref 22–29)
CO2 SERPL-SCNC: 24 MMOL/L — SIGNIFICANT CHANGE UP (ref 22–29)
CREAT SERPL-MCNC: 0.48 MG/DL — LOW (ref 0.5–1.3)
CREAT SERPL-MCNC: 0.52 MG/DL — SIGNIFICANT CHANGE UP (ref 0.5–1.3)
EGFR: 89 ML/MIN/1.73M2 — SIGNIFICANT CHANGE UP
EGFR: 89 ML/MIN/1.73M2 — SIGNIFICANT CHANGE UP
EGFR: 91 ML/MIN/1.73M2 — SIGNIFICANT CHANGE UP
EGFR: 91 ML/MIN/1.73M2 — SIGNIFICANT CHANGE UP
EOSINOPHIL # BLD AUTO: 0.05 K/UL — SIGNIFICANT CHANGE UP (ref 0–0.5)
EOSINOPHIL # BLD MANUAL: 0 K/UL — SIGNIFICANT CHANGE UP (ref 0–0.5)
EOSINOPHIL NFR BLD AUTO: 0.6 % — SIGNIFICANT CHANGE UP (ref 0–6)
EOSINOPHIL NFR BLD MANUAL: 0 % — SIGNIFICANT CHANGE UP (ref 0–6)
GAS PNL BLDA: SIGNIFICANT CHANGE UP
GIANT PLATELETS BLD QL SMEAR: PRESENT
GLUCOSE SERPL-MCNC: 114 MG/DL — HIGH (ref 70–99)
GLUCOSE SERPL-MCNC: 97 MG/DL — SIGNIFICANT CHANGE UP (ref 70–99)
HCO3 BLDA-SCNC: 28 MMOL/L — SIGNIFICANT CHANGE UP (ref 21–28)
HCT VFR BLD CALC: 33.4 % — LOW (ref 34.5–45)
HCT VFR BLD CALC: 37.5 % — SIGNIFICANT CHANGE UP (ref 34.5–45)
HGB BLD-MCNC: 10.7 G/DL — LOW (ref 11.5–15.5)
HGB BLD-MCNC: 11.8 G/DL — SIGNIFICANT CHANGE UP (ref 11.5–15.5)
HOROWITZ INDEX BLDA+IHG-RTO: SIGNIFICANT CHANGE UP
IMM GRANULOCYTES # BLD AUTO: 0.06 K/UL — SIGNIFICANT CHANGE UP (ref 0–0.07)
IMM GRANULOCYTES NFR BLD AUTO: 0.7 % — SIGNIFICANT CHANGE UP (ref 0–0.9)
INR BLD: 0.97 RATIO — SIGNIFICANT CHANGE UP (ref 0.85–1.16)
LYMPHOCYTES # BLD AUTO: 0.35 K/UL — LOW (ref 1–3.3)
LYMPHOCYTES # BLD MANUAL: 0.31 K/UL — LOW (ref 1–3.3)
LYMPHOCYTES NFR BLD AUTO: 3.9 % — LOW (ref 13–44)
LYMPHOCYTES NFR BLD MANUAL: 3.5 % — LOW (ref 13–44)
MACROCYTES BLD QL: SLIGHT — SIGNIFICANT CHANGE UP
MAGNESIUM SERPL-MCNC: 2 MG/DL — SIGNIFICANT CHANGE UP (ref 1.6–2.6)
MAGNESIUM SERPL-MCNC: 2 MG/DL — SIGNIFICANT CHANGE UP (ref 1.8–2.6)
MANUAL NEUTROPHIL BANDS #: 0.08 K/UL — SIGNIFICANT CHANGE UP (ref 0–0.84)
MANUAL REACTIVE LYMPHOCYTES #: 0.08 K/UL — SIGNIFICANT CHANGE UP (ref 0–0.63)
MCHC RBC-ENTMCNC: 30.6 PG — SIGNIFICANT CHANGE UP (ref 27–34)
MCHC RBC-ENTMCNC: 31.1 PG — SIGNIFICANT CHANGE UP (ref 27–34)
MCHC RBC-ENTMCNC: 31.5 G/DL — LOW (ref 32–36)
MCHC RBC-ENTMCNC: 32 G/DL — SIGNIFICANT CHANGE UP (ref 32–36)
MCV RBC AUTO: 97.1 FL — SIGNIFICANT CHANGE UP (ref 80–100)
MCV RBC AUTO: 97.4 FL — SIGNIFICANT CHANGE UP (ref 80–100)
MONOCYTES # BLD AUTO: 0.21 K/UL — SIGNIFICANT CHANGE UP (ref 0–0.9)
MONOCYTES # BLD MANUAL: 0.08 K/UL — SIGNIFICANT CHANGE UP (ref 0–0.9)
MONOCYTES NFR BLD AUTO: 2.3 % — SIGNIFICANT CHANGE UP (ref 2–14)
MONOCYTES NFR BLD MANUAL: 0.9 % — LOW (ref 2–14)
NEUTROPHILS # BLD AUTO: 8.3 K/UL — HIGH (ref 1.8–7.4)
NEUTROPHILS # BLD MANUAL: 8.43 K/UL — HIGH (ref 1.8–7.4)
NEUTROPHILS NFR BLD AUTO: 92.3 % — HIGH (ref 43–77)
NEUTROPHILS NFR BLD MANUAL: 93.8 % — HIGH (ref 43–77)
NEUTS BAND # BLD: 0.9 % — SIGNIFICANT CHANGE UP (ref 0–8)
NEUTS BAND NFR BLD: 0.9 % — SIGNIFICANT CHANGE UP (ref 0–8)
NRBC # BLD AUTO: 0 K/UL — SIGNIFICANT CHANGE UP (ref 0–0)
NRBC # BLD AUTO: 0 K/UL — SIGNIFICANT CHANGE UP (ref 0–0)
NRBC # FLD: 0 K/UL — SIGNIFICANT CHANGE UP (ref 0–0)
NRBC # FLD: 0 K/UL — SIGNIFICANT CHANGE UP (ref 0–0)
NRBC BLD AUTO-RTO: 0 /100 WBCS — SIGNIFICANT CHANGE UP (ref 0–0)
NRBC BLD AUTO-RTO: 0 /100 WBCS — SIGNIFICANT CHANGE UP (ref 0–0)
PCO2 BLDA: 43 MMHG — SIGNIFICANT CHANGE UP (ref 32–45)
PH BLDA: 7.42 — SIGNIFICANT CHANGE UP (ref 7.35–7.45)
PHOSPHATE SERPL-MCNC: 3 MG/DL — SIGNIFICANT CHANGE UP (ref 2.4–4.7)
PHOSPHATE SERPL-MCNC: 3.2 MG/DL — SIGNIFICANT CHANGE UP (ref 2.4–4.7)
PLAT MORPH BLD: NORMAL — SIGNIFICANT CHANGE UP
PLATELET # BLD AUTO: 115 K/UL — LOW (ref 150–400)
PLATELET # BLD AUTO: 150 K/UL — SIGNIFICANT CHANGE UP (ref 150–400)
PMV BLD: 10.3 FL — SIGNIFICANT CHANGE UP (ref 7–13)
PMV BLD: 10.8 FL — SIGNIFICANT CHANGE UP (ref 7–13)
PO2 BLDA: 163 MMHG — HIGH (ref 83–108)
POIKILOCYTOSIS BLD QL AUTO: SLIGHT — SIGNIFICANT CHANGE UP
POLYCHROMASIA BLD QL SMEAR: SLIGHT — SIGNIFICANT CHANGE UP
POTASSIUM SERPL-MCNC: 4.6 MMOL/L — SIGNIFICANT CHANGE UP (ref 3.5–5.3)
POTASSIUM SERPL-MCNC: 4.6 MMOL/L — SIGNIFICANT CHANGE UP (ref 3.5–5.3)
POTASSIUM SERPL-SCNC: 4.6 MMOL/L — SIGNIFICANT CHANGE UP (ref 3.5–5.3)
POTASSIUM SERPL-SCNC: 4.6 MMOL/L — SIGNIFICANT CHANGE UP (ref 3.5–5.3)
PROT SERPL-MCNC: 5.9 G/DL — LOW (ref 6.6–8.7)
PROTHROM AB SERPL-ACNC: 11.2 SEC — SIGNIFICANT CHANGE UP (ref 9.9–13.4)
RBC # BLD: 3.44 M/UL — LOW (ref 3.8–5.2)
RBC # BLD: 3.85 M/UL — SIGNIFICANT CHANGE UP (ref 3.8–5.2)
RBC # FLD: 14.2 % — SIGNIFICANT CHANGE UP (ref 10.3–14.5)
RBC # FLD: 14.2 % — SIGNIFICANT CHANGE UP (ref 10.3–14.5)
RBC BLD AUTO: ABNORMAL
SAO2 % BLDA: 98.5 % — HIGH (ref 94–98)
SODIUM SERPL-SCNC: 136 MMOL/L — SIGNIFICANT CHANGE UP (ref 135–145)
SODIUM SERPL-SCNC: 137 MMOL/L — SIGNIFICANT CHANGE UP (ref 135–145)
VARIANT LYMPHS # BLD: 0.9 % — SIGNIFICANT CHANGE UP (ref 0–6)
VARIANT LYMPHS NFR BLD MANUAL: 0.9 % — SIGNIFICANT CHANGE UP (ref 0–6)
WBC # BLD: 5.49 K/UL — SIGNIFICANT CHANGE UP (ref 3.8–10.5)
WBC # BLD: 8.99 K/UL — SIGNIFICANT CHANGE UP (ref 3.8–10.5)
WBC # FLD AUTO: 5.49 K/UL — SIGNIFICANT CHANGE UP (ref 3.8–10.5)
WBC # FLD AUTO: 8.99 K/UL — SIGNIFICANT CHANGE UP (ref 3.8–10.5)

## 2025-03-29 PROCEDURE — 99233 SBSQ HOSP IP/OBS HIGH 50: CPT | Mod: 25

## 2025-03-29 PROCEDURE — 99291 CRITICAL CARE FIRST HOUR: CPT

## 2025-03-29 PROCEDURE — 70450 CT HEAD/BRAIN W/O DYE: CPT | Mod: 26,XU

## 2025-03-29 PROCEDURE — 70450 CT HEAD/BRAIN W/O DYE: CPT | Mod: 26,59,77

## 2025-03-29 PROCEDURE — 93926 LOWER EXTREMITY STUDY: CPT | Mod: 26,RT

## 2025-03-29 PROCEDURE — 75630 X-RAY AORTA LEG ARTERIES: CPT | Mod: 26

## 2025-03-29 PROCEDURE — 0042T: CPT

## 2025-03-29 PROCEDURE — 71045 X-RAY EXAM CHEST 1 VIEW: CPT | Mod: 26,76

## 2025-03-29 PROCEDURE — 70498 CT ANGIOGRAPHY NECK: CPT | Mod: 26

## 2025-03-29 PROCEDURE — 76380 CAT SCAN FOLLOW-UP STUDY: CPT | Mod: 26

## 2025-03-29 PROCEDURE — 99233 SBSQ HOSP IP/OBS HIGH 50: CPT

## 2025-03-29 PROCEDURE — 36224 PLACE CATH CAROTD ART: CPT | Mod: LT

## 2025-03-29 PROCEDURE — 70496 CT ANGIOGRAPHY HEAD: CPT | Mod: 26

## 2025-03-29 RX ORDER — DEXMEDETOMIDINE HYDROCHLORIDE IN SODIUM CHLORIDE 4 UG/ML
0.2 INJECTION INTRAVENOUS
Qty: 400 | Refills: 0 | Status: DISCONTINUED | OUTPATIENT
Start: 2025-03-29 | End: 2025-03-30

## 2025-03-29 RX ORDER — PROPOFOL 10 MG/ML
10 INJECTION, EMULSION INTRAVENOUS
Qty: 1000 | Refills: 0 | Status: DISCONTINUED | OUTPATIENT
Start: 2025-03-29 | End: 2025-03-29

## 2025-03-29 RX ORDER — PHENYLEPHRINE HCL IN 0.9% NACL 0.5 MG/5ML
0.5 SYRINGE (ML) INTRAVENOUS
Qty: 40 | Refills: 0 | Status: DISCONTINUED | OUTPATIENT
Start: 2025-03-29 | End: 2025-03-30

## 2025-03-29 RX ORDER — ACETAMINOPHEN 500 MG/5ML
650 LIQUID (ML) ORAL EVERY 6 HOURS
Refills: 0 | Status: DISCONTINUED | OUTPATIENT
Start: 2025-03-29 | End: 2025-04-01

## 2025-03-29 RX ORDER — ATORVASTATIN CALCIUM 80 MG/1
10 TABLET, FILM COATED ORAL AT BEDTIME
Refills: 0 | Status: DISCONTINUED | OUTPATIENT
Start: 2025-03-29 | End: 2025-04-01

## 2025-03-29 RX ORDER — MELATONIN 5 MG
5 TABLET ORAL AT BEDTIME
Refills: 0 | Status: DISCONTINUED | OUTPATIENT
Start: 2025-03-29 | End: 2025-04-01

## 2025-03-29 RX ORDER — METOPROLOL SUCCINATE 50 MG/1
25 TABLET, EXTENDED RELEASE ORAL
Refills: 0 | Status: DISCONTINUED | OUTPATIENT
Start: 2025-03-29 | End: 2025-04-01

## 2025-03-29 RX ORDER — SENNA 187 MG
2 TABLET ORAL AT BEDTIME
Refills: 0 | Status: DISCONTINUED | OUTPATIENT
Start: 2025-03-29 | End: 2025-04-01

## 2025-03-29 RX ORDER — MIDAZOLAM IN 0.9 % SOD.CHLORID 1 MG/ML
4 PLASTIC BAG, INJECTION (ML) INTRAVENOUS ONCE
Refills: 0 | Status: DISCONTINUED | OUTPATIENT
Start: 2025-03-29 | End: 2025-03-29

## 2025-03-29 RX ORDER — POLYETHYLENE GLYCOL 3350 17 G/17G
17 POWDER, FOR SOLUTION ORAL
Refills: 0 | Status: DISCONTINUED | OUTPATIENT
Start: 2025-03-29 | End: 2025-04-01

## 2025-03-29 RX ORDER — POLYETHYLENE GLYCOL 3350 17 G/17G
17 POWDER, FOR SOLUTION ORAL
Refills: 0 | Status: DISCONTINUED | OUTPATIENT
Start: 2025-03-29 | End: 2025-03-29

## 2025-03-29 RX ORDER — HYDROCORTISONE 20 MG
100 TABLET ORAL EVERY 8 HOURS
Refills: 0 | Status: DISCONTINUED | OUTPATIENT
Start: 2025-03-29 | End: 2025-03-31

## 2025-03-29 RX ADMIN — Medication 1000 MILLILITER(S): at 17:17

## 2025-03-29 RX ADMIN — HEPARIN SODIUM 7.5 UNIT(S)/HR: 1000 INJECTION INTRAVENOUS; SUBCUTANEOUS at 00:54

## 2025-03-29 RX ADMIN — PROPOFOL 3.81 MICROGRAM(S)/KG/MIN: 10 INJECTION, EMULSION INTRAVENOUS at 10:00

## 2025-03-29 RX ADMIN — Medication 1 APPLICATION(S): at 05:23

## 2025-03-29 RX ADMIN — METOPROLOL SUCCINATE 25 MILLIGRAM(S): 50 TABLET, EXTENDED RELEASE ORAL at 06:11

## 2025-03-29 RX ADMIN — Medication 15 MILLILITER(S): at 15:04

## 2025-03-29 RX ADMIN — Medication 65 MILLILITER(S): at 21:14

## 2025-03-29 RX ADMIN — Medication 2 TABLET(S): at 21:17

## 2025-03-29 RX ADMIN — Medication 40 MILLIGRAM(S): at 15:04

## 2025-03-29 RX ADMIN — METOPROLOL SUCCINATE 25 MILLIGRAM(S): 50 TABLET, EXTENDED RELEASE ORAL at 17:17

## 2025-03-29 RX ADMIN — Medication 4 MILLIGRAM(S): at 09:41

## 2025-03-29 RX ADMIN — IPRATROPIUM BROMIDE AND ALBUTEROL SULFATE 3 MILLILITER(S): .5; 2.5 SOLUTION RESPIRATORY (INHALATION) at 14:42

## 2025-03-29 RX ADMIN — Medication 65 MILLILITER(S): at 17:17

## 2025-03-29 RX ADMIN — HEPARIN SODIUM 8 UNIT(S)/HR: 1000 INJECTION INTRAVENOUS; SUBCUTANEOUS at 09:41

## 2025-03-29 RX ADMIN — Medication 65 MILLILITER(S): at 09:43

## 2025-03-29 RX ADMIN — Medication 11.9 MICROGRAM(S)/KG/MIN: at 09:41

## 2025-03-29 RX ADMIN — Medication 100 MILLIGRAM(S): at 15:04

## 2025-03-29 RX ADMIN — Medication 100 MILLIGRAM(S): at 23:52

## 2025-03-29 RX ADMIN — ATORVASTATIN CALCIUM 10 MILLIGRAM(S): 80 TABLET, FILM COATED ORAL at 21:16

## 2025-03-29 NOTE — CHART NOTE - NSCHARTNOTEFT_GEN_A_CORE
Groin post-procedure check:     -- Left groin soft with no hematoma or ecchymosis.      Vivek Issa MD  [facility]

## 2025-03-29 NOTE — PROGRESS NOTE ADULT - SUBJECTIVE AND OBJECTIVE BOX
INCOMPLETE NOTE ****************************************  Preliminary note, official recommendations pending attending review/signature   Seen and examined by Stroke team attending/team, assessment/ plan as discussed with stroke team attending/team as noted.     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  am.  Upon arrival to  ED she was noted to have lt sided facial droop; aphasic; left arm weakness; no tenectaplase as out of window.  code stroke activated;  CT imagining found patient with new rt mca; patient transferred to [facility] for thrombectomy. Pt transferred to [facility] EDUARDO eval, s/p cerebral angiogram for mechanical thrombectomy of Rt M2 occlusion, TICI 3 (one pass) on 3/26/25. Admitted to NSICU for post procedure monitoring and started on heparin     SUBJECTIVE: Noted for change in exam - acutely aphasic.  No new neurologic complaints.  ROS reported negative unless otherwise noted.    acetaminophen     Tablet .. 650 milliGRAM(s) Oral every 6 hours PRN  albuterol/ipratropium for Nebulization 3 milliLiter(s) Nebulizer every 6 hours  atorvastatin 10 milliGRAM(s) Oral at bedtime  chlorhexidine 2% Cloths 1 Application(s) Topical daily  heparin  Infusion 650 Unit(s)/Hr IV Continuous <Continuous>  hydrALAZINE Injectable 10 milliGRAM(s) IV Push four times a day PRN  labetalol Injectable 10 milliGRAM(s) IV Push every 4 hours PRN  melatonin 5 milliGRAM(s) Oral at bedtime  metoprolol tartrate 25 milliGRAM(s) Oral two times a day  polyethylene glycol 3350 17 Gram(s) Oral daily  predniSONE   Tablet 2 milliGRAM(s) Oral <User Schedule>  predniSONE   Tablet 5 milliGRAM(s) Oral daily  senna 2 Tablet(s) Oral at bedtime  sodium chloride 0.9%. 1000 milliLiter(s) IV Continuous <Continuous>      PHYSICAL EXAM:   Vital Signs Last 24 Hrs  T(C): 36.7 (29 Mar 2025 07:00), Max: 37.4 (28 Mar 2025 17:00)  T(F): 98.1 (29 Mar 2025 07:00), Max: 99.3 (28 Mar 2025 17:00)  HR: 81 (29 Mar 2025 07:00) (80 - 105)  BP: 115/75 (29 Mar 2025 07:00) (99/61 - 133/78)  BP(mean): 88 (29 Mar 2025 07:00) (70 - 98)  RR: 27 (29 Mar 2025 07:00) (15 - 27)  SpO2: 97% (29 Mar 2025 07:00) (94% - 100%)    Parameters below as of 29 Mar 2025 04:00  Patient On (Oxygen Delivery Method): room air    EXAM PENDING *********************************************************    Detailed Neurologic Exam:    Right common femoral artery pseudoaneurysm s/p thrombin injection.       Mental status: The patient was sleepy, but arouse awake and alert and has normal attention span.  The patient follows all commands    Cranial nerves: Pupils equal and react symmetrically to light. There is no visual field deficit to confrontation. Extraocular motion is full with no nystagmus. There is no ptosis. Facial sensation is intact. Facial musculature is symmetric. Tongue is midline.    Motor: There is normal bulk and tone.  There is no tremor.  Strength is 5-/5 in the right arm 5/5 right leg.   left arm drift Strength is 4+/5 in the left arm and 5/5 left leg.    Sensation: Intact to light touch and pin in 4 extremities    Cerebellar: There is no dysmetria on finger to nose testing.    Gait : deferred      LABS:                        10.7   5.49  )-----------( 115      ( 29 Mar 2025 06:00 )             33.4    03-29    136  |  101  |  23.6[H]  ----------------------------<  97  4.6   |  24.0  |  0.48[L]    Ca    8.5      29 Mar 2025 06:00  Phos  3.0     03-29  Mg     2.0     03-29    PTT - ( 29 Mar 2025 06:00 )  PTT:39.1 sec      03-27 Chol 145 LDL -- HDL 74 Trig 55, 03-22 Chol 136 LDL -- HDL 66 Trig 56    A1C:    IMAGING: Reviewed by me.       CT Angio Neck Stroke Protocol w/ IV Cont (03.29.25 @ 06:56)   IMPRESSION:    CT PERFUSION:  Signal abnormality on T-Max/mean transit time data sets at the left   frontal lobe suggest territory-and-risk within the left MCA distribution,   corresponding to angiographic findings below. Cerebral blood flow and   cerebral blood volume data sets remain within normal limits.    Neurointerventional/neurosurgical consultation recommended. MRI may be   considered for further characterization.    CTA NECK:  No evidence of significant stenosis or occlusion.    Previously identified narrowing of the right cervical ICA with   questionable arterial dissection appears less conspicuous on the current   exam, suggesting at least partial recanalization. MRA (dissection   protocol) considered for further evaluation.    CTA HEAD:  Acute left M2 occlusion identified.    Critical findings above were discussed directly by telephone with   ordering provider, Aric PINEDA, on 3/29/2025 at 7:11 AM by Dr. Louie Worthy with read back confirmation.        CT Head No Cont (03.27.25 @ 05:43)  IMPRESSION: Age-appropriate involutional and ischemic gliotic changes. No   hemorrhage. No significant change since 3/26/2025.     CT Angio Neck Stroke Protocol w/ IV Cont (03.26.25 @ 08:52)   .   RIGHT CAROTID SYSTEM:    Atherosclerotic plaque carotid bulb without    hemodynamically significant stenosis. Tandem lesion distal internal   carotid artery at the C1 level has developed since 3/21/2025, possible   interval arterial dissection, with associated luminal narrowing   approximately 30%    2.   LEFT CAROTID SYSTEM:     Atherosclerotic plaque carotid bulb without    hemodynamically significant stenosis.    3.   VERTEBRAL CIRCULATION:    Patent.    4.  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 Right MCA occlusion   in its proximal M2 segment is an interval finding since 3/21/2025. Left   MCA remains patent with luminal irregularity and low-grade narrowing   following recent thrombectomy.    5.  POSTERIOR INTRACRANIAL CIRCULATION:    Patent.    6. BRAIN PERFUSION:   No acute infarction of the brain is convincingly   demonstrated.  However, broad region of apparent ischemia corresponds to   the right MCA distribution correlating with acute embolic occlusion on CT   angiography    7. Heterogeneous enhancement within the principal dural sinuses may be   secondary to the early phase of venous opacification on this arterial   phase examination. The appearance is similar to 3/21/2025. Consider   supplemental evaluation with MR venogram    US Duplex Arterial Lower Ext Ltd, Right (03.27.25 @ 04:09)   IMPRESSION:    A 1.7 cm right CFA pseudoaneurysm.  A 2.5 cm adjacent hematoma.         INCOMPLETE NOTE ****************************************  Preliminary note, official recommendations pending attending review/signature   Seen and examined by Stroke team attending/team, assessment/ plan as discussed with stroke team attending/team as noted.     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  am.  Upon arrival to  ED she was noted to have lt sided facial droop; aphasic; left arm weakness; no tenectaplase as out of window.  code stroke activated;  CT imagining found patient with new rt mca; patient transferred to [facility] for thrombectomy. Pt transferred to [facility]UARDO eval, s/p cerebral angiogram for mechanical thrombectomy of Rt M2 occlusion, TICI 3 (one pass) on 3/26/25. Admitted to NSICU for post procedure monitoring and started on heparin     SUBJECTIVE: Noted for change in exam - acutely aphasic.  No new neurologic complaints.  ROS reported negative unless otherwise noted.    acetaminophen     Tablet .. 650 milliGRAM(s) Oral every 6 hours PRN  albuterol/ipratropium for Nebulization 3 milliLiter(s) Nebulizer every 6 hours  atorvastatin 10 milliGRAM(s) Oral at bedtime  chlorhexidine 2% Cloths 1 Application(s) Topical daily  heparin  Infusion 650 Unit(s)/Hr IV Continuous <Continuous>  hydrALAZINE Injectable 10 milliGRAM(s) IV Push four times a day PRN  labetalol Injectable 10 milliGRAM(s) IV Push every 4 hours PRN  melatonin 5 milliGRAM(s) Oral at bedtime  metoprolol tartrate 25 milliGRAM(s) Oral two times a day  polyethylene glycol 3350 17 Gram(s) Oral daily  predniSONE   Tablet 2 milliGRAM(s) Oral <User Schedule>  predniSONE   Tablet 5 milliGRAM(s) Oral daily  senna 2 Tablet(s) Oral at bedtime  sodium chloride 0.9%. 1000 milliLiter(s) IV Continuous <Continuous>      PHYSICAL EXAM:   Vital Signs Last 24 Hrs  T(C): 36.7 (29 Mar 2025 07:00), Max: 37.4 (28 Mar 2025 17:00)  T(F): 98.1 (29 Mar 2025 07:00), Max: 99.3 (28 Mar 2025 17:00)  HR: 81 (29 Mar 2025 07:00) (80 - 105)  BP: 115/75 (29 Mar 2025 07:00) (99/61 - 133/78)  BP(mean): 88 (29 Mar 2025 07:00) (70 - 98)  RR: 27 (29 Mar 2025 07:00) (15 - 27)  SpO2: 97% (29 Mar 2025 07:00) (94% - 100%)    Parameters below as of 29 Mar 2025 04:00  Patient On (Oxygen Delivery Method): room air    EXAM PENDING *********************************************************    Detailed Neurologic Exam:    Limited neurological exam due to intubated/sedation     - Right groin site - e    Mental status: The patient was lethargic, intubated and sedated. Easily rousable, - not attending to examiner fully. The patient not following commands    Cranial nerves: Pupils equal and react symmetrically to light. There is no visual field deficit to confrontation. Extraocular motion is full with no nystagmus. There is no ptosis. Facial sensation is intact. Facial musculature is symmetric. Tongue is midline.    Motor: There is normal bulk and tone.    Strength is 5/5 in the right arm with trace movement in the  right leg.   left arm drift Strength is 5/5 in the left arm and trace movement in the left leg.  ( likely due to sedation post angio)     Sensation: Intact to light touch     Cerebellar: There is no dysmetria on finger to nose testing.    Gait : deferred      [facility] SS:   DATE: 03/29/25  TIME:1131  1A: Level of consciousness (0-3): 1  1B: Questions (0-2): 1  1C: Commands (0-2): 2  2: Gaze (0-2):   3: Visual fields (0-3):   4: Facial palsy (0-3): Cannot test due to ET  MOTOR:  5A: Left arm motor drift (0-4):   5B: Right arm motor drift (0-4):   6A: Left leg motor drift (0-4): 2  6B: Right leg motor drift (0-4): 2  7: Limb ataxia (0-2):   SENSORY:  8: Sensation (0-2):   SPEECH:  9: Language (0-3): 3  10: Dysarthria (0-2): Cannot test due to ET  EXTINCTION:  11: Extinction/inattention (0-2):     TOTAL SCORE: 11        LABS:                        10.7   5.49  )-----------( 115      ( 29 Mar 2025 06:00 )             33.4    03-29    136  |  101  |  23.6[H]  ----------------------------<  97  4.6   |  24.0  |  0.48[L]    Ca    8.5      29 Mar 2025 06:00  Phos  3.0     03-29  Mg     2.0     03-29    PTT - ( 29 Mar 2025 06:00 )  PTT:39.1 sec      03-27 Chol 145 LDL -- HDL 74 Trig 55, 03-22 Chol 136 LDL -- HDL 66 Trig 56    A1C:        IMAGING: Reviewed by me.       CT Angio Neck Stroke Protocol w/ IV Cont (03.29.25 @ 06:56)   IMPRESSION:    CT PERFUSION:  Signal abnormality on T-Max/mean transit time data sets at the left   frontal lobe suggest territory-and-risk within the left MCA distribution,   corresponding to angiographic findings below. Cerebral blood flow and   cerebral blood volume data sets remain within normal limits.    Neurointerventional/neurosurgical consultation recommended. MRI may be   considered for further characterization.    CTA NECK:  No evidence of significant stenosis or occlusion.    Previously identified narrowing of the right cervical ICA with   questionable arterial dissection appears less conspicuous on the current   exam, suggesting at least partial recanalization. MRA (dissection   protocol) considered for further evaluation.    CTA HEAD:  Acute left M2 occlusion identified.    Critical findings above were discussed directly by telephone with   ordering provider, Aric PINEDA, on 3/29/2025 at 7:11 AM by Dr. Louie Worthy with read back confirmation.        CT Head No Cont (03.27.25 @ 05:43)  IMPRESSION: Age-appropriate involutional and ischemic gliotic changes. No   hemorrhage. No significant change since 3/26/2025.     CT Angio Neck Stroke Protocol w/ IV Cont (03.26.25 @ 08:52)   .   RIGHT CAROTID SYSTEM:    Atherosclerotic plaque carotid bulb without    hemodynamically significant stenosis. Tandem lesion distal internal   carotid artery at the C1 level has developed since 3/21/2025, possible   interval arterial dissection, with associated luminal narrowing   approximately 30%    2.   LEFT CAROTID SYSTEM:     Atherosclerotic plaque carotid bulb without    hemodynamically significant stenosis.    3.   VERTEBRAL CIRCULATION:    Patent.    4.  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 Right MCA occlusion   in its proximal M2 segment is an interval finding since 3/21/2025. Left   MCA remains patent with luminal irregularity and low-grade narrowing   following recent thrombectomy.    5.  POSTERIOR INTRACRANIAL CIRCULATION:    Patent.    6. BRAIN PERFUSION:   No acute infarction of the brain is convincingly   demonstrated.  However, broad region of apparent ischemia corresponds to   the right MCA distribution correlating with acute embolic occlusion on CT   angiography    7. Heterogeneous enhancement within the principal dural sinuses may be   secondary to the early phase of venous opacification on this arterial   phase examination. The appearance is similar to 3/21/2025. Consider   supplemental evaluation with MR venogram    US Duplex Arterial Lower Ext Ltd, Right (03.27.25 @ 04:09)   IMPRESSION:    A 1.7 cm right CFA pseudoaneurysm.  A 2.5 cm adjacent hematoma.         Preliminary note, official recommendations pending attending review/signature   Seen and examined by Stroke team attending/team, assessment/ plan as discussed with stroke team attending/team as noted.     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  am.  Upon arrival to  ED she was noted to have lt sided facial droop; aphasic; left arm weakness; no tenectaplase as out of window.  code stroke activated;  CT imagining found patient with new rt mca; patient transferred to [facility] for thrombectomy. Pt transferred to [facility]UARDO eval, s/p cerebral angiogram for mechanical thrombectomy of Rt M2 occlusion, TICI 3 (one pass) on 3/26/25. Admitted to NSICU for post procedure monitoring and started on heparin     SUBJECTIVE: Noted for change in exam - acutely aphasic.  No new neurologic complaints.  ROS reported negative unless otherwise noted.    acetaminophen     Tablet .. 650 milliGRAM(s) Oral every 6 hours PRN  albuterol/ipratropium for Nebulization 3 milliLiter(s) Nebulizer every 6 hours  atorvastatin 10 milliGRAM(s) Oral at bedtime  chlorhexidine 2% Cloths 1 Application(s) Topical daily  heparin  Infusion 650 Unit(s)/Hr IV Continuous <Continuous>  hydrALAZINE Injectable 10 milliGRAM(s) IV Push four times a day PRN  labetalol Injectable 10 milliGRAM(s) IV Push every 4 hours PRN  melatonin 5 milliGRAM(s) Oral at bedtime  metoprolol tartrate 25 milliGRAM(s) Oral two times a day  polyethylene glycol 3350 17 Gram(s) Oral daily  predniSONE   Tablet 2 milliGRAM(s) Oral <User Schedule>  predniSONE   Tablet 5 milliGRAM(s) Oral daily  senna 2 Tablet(s) Oral at bedtime  sodium chloride 0.9%. 1000 milliLiter(s) IV Continuous <Continuous>      PHYSICAL EXAM:   Vital Signs Last 24 Hrs  T(C): 36.7 (29 Mar 2025 07:00), Max: 37.4 (28 Mar 2025 17:00)  T(F): 98.1 (29 Mar 2025 07:00), Max: 99.3 (28 Mar 2025 17:00)  HR: 81 (29 Mar 2025 07:00) (80 - 105)  BP: 115/75 (29 Mar 2025 07:00) (99/61 - 133/78)  BP(mean): 88 (29 Mar 2025 07:00) (70 - 98)  RR: 27 (29 Mar 2025 07:00) (15 - 27)  SpO2: 97% (29 Mar 2025 07:00) (94% - 100%)    Parameters below as of 29 Mar 2025 04:00  Patient On (Oxygen Delivery Method): room air        Detailed Neurologic Exam:    Limited neurological exam due to intubated/sedation     - Right groin site - e    Mental status: The patient was lethargic, intubated and sedated. Easily rousable, - not attending to examiner fully. The patient not following commands    Cranial nerves: Pupils equal and react symmetrically to light. There is no visual field deficit to confrontation. Extraocular motion is full with no nystagmus. There is no ptosis. Facial sensation is intact. Facial musculature is symmetric. Tongue is midline.    Motor: There is normal bulk and tone.    Strength is 5/5 in the right arm with trace movement in the  right leg.   left arm drift Strength is 5/5 in the left arm and trace movement in the left leg.  ( likely due to sedation post angio)     Sensation: Intact to light touch     Cerebellar: There is no dysmetria on finger to nose testing.    Gait : deferred      [facility] SS:   DATE: 03/29/25  TIME:1131  1A: Level of consciousness (0-3): 1  1B: Questions (0-2): 1  1C: Commands (0-2): 2  2: Gaze (0-2):   3: Visual fields (0-3):   4: Facial palsy (0-3): Cannot test due to ET  MOTOR:  5A: Left arm motor drift (0-4):   5B: Right arm motor drift (0-4):   6A: Left leg motor drift (0-4): 2  6B: Right leg motor drift (0-4): 2  7: Limb ataxia (0-2):   SENSORY:  8: Sensation (0-2):   SPEECH:  9: Language (0-3): 3  10: Dysarthria (0-2): Cannot test due to ET  EXTINCTION:  11: Extinction/inattention (0-2):     TOTAL SCORE: 11        LABS:                        10.7   5.49  )-----------( 115      ( 29 Mar 2025 06:00 )             33.4    03-29    136  |  101  |  23.6[H]  ----------------------------<  97  4.6   |  24.0  |  0.48[L]    Ca    8.5      29 Mar 2025 06:00  Phos  3.0     03-29  Mg     2.0     03-29    PTT - ( 29 Mar 2025 06:00 )  PTT:39.1 sec      03-27 Chol 145 LDL -- HDL 74 Trig 55, 03-22 Chol 136 LDL -- HDL 66 Trig 56    A1C:        IMAGING: Reviewed by me.       CT Angio Neck Stroke Protocol w/ IV Cont (03.29.25 @ 06:56)   IMPRESSION:    CT PERFUSION:  Signal abnormality on T-Max/mean transit time data sets at the left   frontal lobe suggest territory-and-risk within the left MCA distribution,   corresponding to angiographic findings below. Cerebral blood flow and   cerebral blood volume data sets remain within normal limits.    Neurointerventional/neurosurgical consultation recommended. MRI may be   considered for further characterization.    CTA NECK:  No evidence of significant stenosis or occlusion.    Previously identified narrowing of the right cervical ICA with   questionable arterial dissection appears less conspicuous on the current   exam, suggesting at least partial recanalization. MRA (dissection   protocol) considered for further evaluation.    CTA HEAD:  Acute left M2 occlusion identified.    Critical findings above were discussed directly by telephone with   ordering provider, Aric PINEDA, on 3/29/2025 at 7:11 AM by Dr. Louie Worthy with read back confirmation.        CT Head No Cont (03.27.25 @ 05:43)  IMPRESSION: Age-appropriate involutional and ischemic gliotic changes. No   hemorrhage. No significant change since 3/26/2025.     CT Angio Neck Stroke Protocol w/ IV Cont (03.26.25 @ 08:52)   .   RIGHT CAROTID SYSTEM:    Atherosclerotic plaque carotid bulb without    hemodynamically significant stenosis. Tandem lesion distal internal   carotid artery at the C1 level has developed since 3/21/2025, possible   interval arterial dissection, with associated luminal narrowing   approximately 30%    2.   LEFT CAROTID SYSTEM:     Atherosclerotic plaque carotid bulb without    hemodynamically significant stenosis.    3.   VERTEBRAL CIRCULATION:    Patent.    4.  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 Right MCA occlusion   in its proximal M2 segment is an interval finding since 3/21/2025. Left   MCA remains patent with luminal irregularity and low-grade narrowing   following recent thrombectomy.    5.  POSTERIOR INTRACRANIAL CIRCULATION:    Patent.    6. BRAIN PERFUSION:   No acute infarction of the brain is convincingly   demonstrated.  However, broad region of apparent ischemia corresponds to   the right MCA distribution correlating with acute embolic occlusion on CT   angiography    7. Heterogeneous enhancement within the principal dural sinuses may be   secondary to the early phase of venous opacification on this arterial   phase examination. The appearance is similar to 3/21/2025. Consider   supplemental evaluation with MR venogram    US Duplex Arterial Lower Ext Ltd, Right (03.27.25 @ 04:09)   IMPRESSION:    A 1.7 cm right CFA pseudoaneurysm.  A 2.5 cm adjacent hematoma.

## 2025-03-29 NOTE — PROGRESS NOTE ADULT - SUBJECTIVE AND OBJECTIVE BOX
Interval Events:  no overnight events  follow up on Adrenal Insuficiency    Patient seen and examined at bedside. Sedated and intubated. Early this morning she became acutely aphasic with right side weakness.     MEDICATIONS  (STANDING):  albuterol/ipratropium for Nebulization 3 milliLiter(s) Nebulizer every 6 hours  atorvastatin 10 milliGRAM(s) Oral at bedtime  chlorhexidine 0.12% Liquid 15 milliLiter(s) Oral Mucosa every 12 hours  chlorhexidine 2% Cloths 1 Application(s) Topical daily  heparin  Infusion 650 Unit(s)/Hr (8 mL/Hr) IV Continuous <Continuous>  hydrocortisone sodium succinate Injectable 100 milliGRAM(s) IV Push every 8 hours  melatonin 5 milliGRAM(s) Oral at bedtime  metoprolol tartrate 25 milliGRAM(s) Oral two times a day  pantoprazole  Injectable 40 milliGRAM(s) IV Push daily  phenylephrine    Infusion 0.5 MICROgram(s)/kG/Min (11.9 mL/Hr) IV Continuous <Continuous>  polyethylene glycol 3350 17 Gram(s) Oral daily  propofol Infusion 10 MICROgram(s)/kG/Min (3.81 mL/Hr) IV Continuous <Continuous>  senna 2 Tablet(s) Oral at bedtime  sodium chloride 0.9% Bolus 250 milliLiter(s) IV Bolus once  sodium chloride 0.9%. 1000 milliLiter(s) (65 mL/Hr) IV Continuous <Continuous>    MEDICATIONS  (PRN):  acetaminophen     Tablet .. 650 milliGRAM(s) Oral every 6 hours PRN Mild Pain (1 - 3)  hydrALAZINE Injectable 10 milliGRAM(s) IV Push four times a day PRN SBP >160  labetalol Injectable 10 milliGRAM(s) IV Push every 4 hours PRN Systolic blood pressure > 160      Vital Signs Last 24 Hrs  T(C): 37.4 (29 Mar 2025 15:00), Max: 37.4 (28 Mar 2025 17:00)  T(F): 99.3 (29 Mar 2025 15:00), Max: 99.3 (28 Mar 2025 17:00)  HR: 88 (29 Mar 2025 15:00) (80 - 126)  BP: 140/62 (29 Mar 2025 15:00) (103/60 - 168/84)  BP(mean): 82 (29 Mar 2025 15:00) (70 - 158)  RR: 12 (29 Mar 2025 15:00) (10 - 27)  SpO2: 100% (29 Mar 2025 15:00) (95% - 100%)    Parameters below as of 29 Mar 2025 14:44  Patient On (Oxygen Delivery Method): ventilator      Weight (kg): 63.5 (03-26-25 @ 12:10), 59 (03-26-25 @ 08:35)    Physical Exam:    Constitutional: Sedated, intubated  HEENT: EOMI, no exophalmos  Neck: trachea midline  Respiratory: B/l air entry, no auxiliary sounds  Cardiovascular: S1 and S2, RRR  Gastrointestinal: BS+, soft  Extremities: No peripheral edema  Neurological: Sedated intubated  Skin: warm and dry    LABS  03-29    137  |  102  |  20.4[H]  ----------------------------<  114[H]  4.6   |  23.0  |  0.52    Ca    9.0      29 Mar 2025 11:45  Phos  3.2     03-29  Mg     2.0     03-29    TPro  5.9[L]  /  Alb  3.5  /  TBili  0.8  /  DBili  x   /  AST  19  /  ALT  21  /  AlkPhos  48  03-29                          11.8   8.99  )-----------( 150      ( 29 Mar 2025 11:45 )             37.5     HDL Cholesterol: 74 mg/dL (03-27-25 @ 02:00)  Triglycerides, Serum: 55 mg/dL (03-27-25 @ 02:00)  Cholesterol: 145 mg/dL (03-27-25 @ 02:00)    A1C with Estimated Average Glucose Result: 5.5 % (03-27-25 @ 02:00)  Thyroid Stimulating Hormone, Serum: 0.64 uIU/mL (03-27-25 @ 02:00)  A1C with Estimated Average Glucose Result: 5.7 % (03-22-25 @ 03:15)  Thyroid Stimulating Hormone, Serum: 0.22 uIU/mL (03-22-25 @ 03:15)    Alanine Aminotransferase (ALT/SGPT): 21 U/L (03-29-25 @ 11:45)  Alkaline Phosphatase: 48 U/L (03-29-25 @ 11:45)  Albumin: 3.5 g/dL (03-29-25 @ 11:45)  Aspartate Aminotransferase (AST/SGOT): 19 U/L (03-29-25 @ 11:45)

## 2025-03-29 NOTE — PROGRESS NOTE ADULT - ASSESSMENT
89F with PMHx of A-fib, HTN, Melanoma who was recently admitted for stroke with Left MCA occlusion s/p thrombectomy, now readmitted for left sided weakness and left facial droop. Found with new Right MCA occlusion s/p thrombectomy. Endocrinology consult requested for management of adrenal insufficiency Tye has history of iatrogenic AI after immunotherapy treatment for Melanoma in 2019. She is following with endocrinologist Dr. Antonio Gaines at [facility]. Home regimen consist of Prednisone 5 mg in AM and 2 mg PM.     1. Iatrogenic adrenal insuffciency  - Patient become acutely aphasic and with right sided weakens this morning  - Intubated on mechanical ventilation  - Start stress dose Hydrocortisone 100 mg IV q8H.  - Monitor BP and electrolytes

## 2025-03-29 NOTE — PROGRESS NOTE ADULT - ASSESSMENT
89F with recurrent strokes, now with acute L MCA occlusion s/p TICI 0 intervention showing L M4 occlusion;  recent CVA due to R MCA occlusion, s/p TICI 3 thrombectomy 03/26. Suspected coagulopathy due to malignant dz.  PMH of recent 03/21/2025 L MCA stroke s/p thrombectomy.  High-risk Afib, on AC.  PMH of HTN, melanoma (s/p RUE lesion and Right axillary lymph node excision 03/18/25), AI on Prednisone.  New 1.7 cm cystic lesion at the pancreatic body.   Anticoagulated, on Heparin gtt.  Normocytic anemia, combination of postop, dilutional; chronic thrombocytopenia.  R fem artery pseudoaneurysm.    Plan:  cont neurochecks   pending MRI brain w/wo contrast  cont Melatonin q hs  SBP goal 100-160, PRN meds  maintain Osats>92%, normocapnia, obtain CXR, ABG, vent support - wean as tolerated, possible extubation today  NPO except meds for now, IV fludis, PPI for ulcer ppx  monitor UOP, e-lytes, TOV  cont heparin gtt, goal PTT 40-60;  cont home statin  Vascular involved - failed repeat thrombin injection, possible open intervention later - pending course  maintain -180, ISS; steroids - switch to stress regimen Hydrocortisone, d/c Prednisone  VTE ppx with SCDs, on Heparin gtt     89F with recurrent strokes, now with acute L MCA occlusion s/p TICI 0 intervention showing L M4 occlusion;  recent CVA due to R MCA occlusion, s/p TICI 3 thrombectomy 03/26. Suspected coagulopathy due to malignant dz.  PMH of recent 03/21/2025 L MCA stroke s/p thrombectomy.  High-risk Afib, on AC.  PMH of HTN, melanoma (s/p RUE lesion and Right axillary lymph node excision 03/18/25), AI on Prednisone.  New 1.7 cm cystic lesion at the pancreatic body.   Anticoagulated, on Heparin gtt.  Normocytic anemia, combination of postop, dilutional; chronic thrombocytopenia.  R fem artery pseudoaneurysm.    Plan:  cont neurochecks   sedation with Propofol gtt, aim for RASS 0 to -2  pending MRI brain w/wo contrast  cont Melatonin q hs  SBP goal 130-160 per Stroke, Tereso gtt - titrate accordingly  maintain Osats>92%, normocapnia, obtain CXR, ABG, vent support - wean as tolerated, possible extubation today  NPO except meds for now, IV fludis, PPI for ulcer ppx  monitor UOP, e-lytes, TOV  cont heparin gtt, goal PTT 40-60;  cont home statin  Vascular involved - failed repeat thrombin injection, possible open intervention later - pending course  maintain -180, ISS; steroids - switch to stress regimen Hydrocortisone IV, d/c Prednisone  VTE ppx with SCDs, on Heparin gtt

## 2025-03-29 NOTE — PROGRESS NOTE ADULT - ASSESSMENT
INCOMPLETE NOTE *************************************************  89 year old female with PMHx of Afib (off Eliquis), recent left MCA territory stroke s/p LM1 thrombectomy, with residual minimal aphasia, MRS 1, PPM, HTN, HLD, GERD, severe MR/TR, iatrogenic adrenal insufficiency (on prednisone), lymphedema, metastatic melanoma s/p Left foot melanoma and lymph node resection, s/p RUE melanoma and Right axillary lymph node excision at [facility] (3/18/25) who presented to  ED 3/26/36 with L sided weakness and aphasia. CT neg, CTA showing RM2 occlusion.  Transferred to [facility]ARD[facility] intervention. S/P thrombectomy, TICI 3 achieved after 1 pass admitted to NSICU for post procedure monitoring started on IV Heparin     03/29  Patient was noted for acute neurological change - severe aphasia patient unable to provide further history or complaints. CTH/CTA/CTP ordered. CTH negative for acute hemorrhage. CTA + new Left M2 occulusion. Dr. Johnson made aware, planned for mechanical thrombectomy.    IMPRESSION:   New Left M2 occlusion (03/29) s/p thrombectomy ******  Right M2 occlusion s/p thrombectomy (03/26), recent left MCA stroke 3/21/25. Etiology cardioembolic in the setting of Afib off anticoagulation for right common femoral pseudoaneurysm   *****        ASSESSMENT:   # Ischemic Infarct s/p thrombectomy with TICI 3 recanalization   - 24 hour post-thrombectomy NIHSS: 2  - close monitoring and frequent neuro checks for signs of neurologic deterioration   - STAT head CT for any neurologic change   - goal BP as per Neuro-IR   - perform fingersticks every 6 hours,  insulin sliding scale prn  - maintain normovolemia, normoglycemia, normo-natremia, and normothermia   -Vascular consulted for pseudoaneurysm; recommended  repeating thrombin injection and holding heparin.  D/w Neuro IR team as well.  Family explained the R/A/B of holding heparin  -Rpt Right lower ext. US duplex pending   -MRI brain with and without pending   - continue statin with a goal LDL under 70   - consult PT/OT/SLP/PMR when able       Stroke neuro to follow       CORE MEASURES:       Admission NIHSS: 10     Tenecteplase : [] YES [x] NO     LDL/HDL/A1C:  59 //  74  //  5.5      Depression Screen- if depression hx and/or present     Statin Therapy: continue home dose statin      Dysphagia Screen: [x] PASS [] FAIL     Smoking in the past 12 months [] YES [x] NO     Afib [x] YES [] NO     Diabetes [] YES [x] NO     Stroke Education [x] YES [] NO         89 year old female with PMHx of Afib (off Eliquis), recent left MCA territory stroke s/p LM1 thrombectomy, with residual minimal aphasia, MRS 1, PPM, HTN, HLD, GERD, severe MR/TR, iatrogenic adrenal insufficiency (on prednisone), lymphedema, metastatic melanoma s/p Left foot melanoma and lymph node resection, s/p RUE melanoma and Right axillary lymph node excision at [facility] (3/18/25) who presented to  ED 3/26/36 with L sided weakness and aphasia. CT neg, CTA showing RM2 occlusion.  Transferred to [facility]UARD[facility] intervention. S/P thrombectomy, TICI 3 achieved after 1 pass admitted to NSICU for post procedure monitoring started on IV Heparin     03/29  Patient was noted for acute neurological change - severe aphasia patient unable to provide further history or complaints. CTH/CTA/CTP ordered. CTH negative for acute hemorrhage. CTA + new Left M2 occulusion. Dr. Johnson made aware, planned for mechanical thrombectomy.    IMPRESSION:   New Left M2 occlusion (03/29) s/p thrombectomy TICI 0 -- distal M4  Right M2 occlusion s/p thrombectomy (03/26), recent left MCA stroke 3/21/25. Etiology cardioembolic in the setting of Afib off anticoagulation for right common femoral pseudoaneurysm           ASSESSMENT:   # Ischemic Infarct s/p thrombectomy with TICI 0 recanalization   - NIHSS: 16  - close monitoring and frequent neuro checks for signs of neurologic deterioration   - STAT head CT for any neurologic change   - goal BP as per primary   - perform fingersticks every 6 hours,  insulin sliding scale prn  - maintain normovolemia, normoglycemia, normo-natremia, and normothermia   -Vascular consulted for pseudoaneurysm; failed repeat thrombin injection and rpt LE duplex   -Patient back on Hep gtt   -MRI brain with and without pending   - continue statin with a goal LDL under 70   - consult PT/ OT/ SLP/ PMR when able       Stroke neuro to follow       CORE MEASURES:       Admission NIHSS: 10     Tenecteplase : [] YES [x] NO     LDL/HDL/A1C:  59 //  74  //  5.5      Depression Screen- if depression hx and/or present     Statin Therapy: continue home dose statin      Dysphagia Screen: [x] PASS [] FAIL     Smoking in the past 12 months [] YES [x] NO     Afib [x] YES [] NO     Diabetes [] YES [x] NO     Stroke Education [x] YES [] NO

## 2025-03-29 NOTE — CHART NOTE - NSCHARTNOTEFT_GEN_A_CORE
Interventional Neuro- Radiology   Procedure Note     Procedure: Selective Cerebral Angiography   Pre- Procedure Diagnosis: Acute left M2 occlusion identified.  Post- Procedure Diagnosis: Acute left M4 occlusion TICI 0, attempted thrombectomy.     : Dr. Alex MD    First Assist: Dr. Dewey Issa     Physician Assistant: Tabatha Haji PA-C    RN: Benson Thompson     Anesthesia:  (general anesthesia) MALVIN Key     Sheath:   8 Fr BMX   I/Os:  Fluids: 500cc  Rodriguez: 200cc   Contrast: 40cc   Estimated Blood Loss: < 50cc      Antibiotics:   2G ancef  x 1     Vitals: 149/94, , intubated  t 36.8    Preliminary Report:  Under general anesthesia, using a 8 Fr BMX to the left  groin examination  via selective cerebral angiography demonstrates left MCA distal M4 branch occlusion, TICI 0 ( Official note to follow).    Patient tolerated procedure well, vital signs as noted above,  monitor for change in neurological status, now s/p sedation- Propofol, Jethro, fentanyl , s/p IV steroid therapy per anesthesia   Results discussed with ICU, family.   Groin sheath d/c'ed 0855,  6Fr Vascade closure device utilized, at , manual compression held to hemostasis, no active bleeding, no hematoma, soft throughout, + pedal pulses, quick clot and safeguard balloon dressing applied at 0910am.  STAT labs, CBC/BMP/PTT  1200 hours 03/29/24   Patient transferred to PACU/ ICU. Interventional Neuro- Radiology   Procedure Note     Procedure: Selective Cerebral Angiography   Pre- Procedure Diagnosis: Acute left M2 occlusion identified.  Post- Procedure Diagnosis: Acute left M4 occlusion TICI 0, attempted thrombectomy.     : Dr. Alex MD    First Assist: Dr. Dewey Issa     Physician Assistant: Tabatha Haji PA-C    RN: Benson Thompson     Anesthesia:  (general anesthesia) MALVIN Key     Sheath:   8 Fr BMX   I/Os:  Fluids: 500cc  Rodriguez: 200cc   Contrast: 40cc   Estimated Blood Loss: < 50cc      Antibiotics:   2G ancef  x 1     Vitals: 149/94, , intubated  t 36.8    Preliminary Report:  Under general anesthesia, using a 8 Fr BMX to the left  groin examination  via selective cerebral angiography demonstrates left MCA distal M4 branch occlusion, TICI 0 ( Official note to follow).    Patient tolerated procedure well, vital signs as noted above,  monitor for change in neurological status, now s/p sedation- Propofol, Jethro, fentanyl , s/p IV steroid therapy per anesthesia   Groin sheath d/c'd 0855,  6Fr Vascade closure device utilized, at , manual compression held to hemostasis, no active bleeding, no hematoma, soft throughout, + pedal pulses, quick clot and safeguard balloon dressing applied at 0910am.  STAT labs, CBC/BMP/PTT 1200 hours 03/29/24   Per anesthesia pt to remain intubated   Anticoagulation with Heparin gtt.   Patient transferred to ICU.  Condition, plan of care d/w ICU team and family. Interventional Neuro- Radiology   Procedure Note     Procedure: Selective Cerebral Angiography   Pre- Procedure Diagnosis: Acute left M2 occlusion identified.  Post- Procedure Diagnosis: Acute left M4 occlusion TICI 0, attempted thrombectomy.     : Dr. Alex MD    First Assist: Dr. Dewey Issa     Physician Assistant: Tabatha Haji PA-C    RN: Benson Thompson     Anesthesia:  (general anesthesia) MALVIN Key     Sheath:   8 Fr BMX   I/Os:  Fluids: 500cc  Rodriguez: 200cc   Contrast: 40cc   Estimated Blood Loss: < 50cc      Antibiotics:   2G ancef  x 1     Vitals: 149/94, , intubated  t 36.8    Preliminary Report:  Under general anesthesia, using a 8 Fr BMX to the left  groin examination  via selective cerebral angiography demonstrates left MCA distal M4 branch occlusion, TICI 0 ( Official note to follow).    Patient tolerated procedure well, vital signs as noted above,  monitor for change in neurological status, now s/p sedation- Propofol, Jethro, fentanyl , s/p IV steroid therapy per anesthesia   Groin sheath d/c'd 0855,  6Fr Vascade closure device utilized, at , manual compression held to hemostasis, no active bleeding, no hematoma, soft throughout, + pedal pulses, quick clot and safeguard balloon dressing applied at 0910am.  STAT labs, CBC/BMP/PTT 1200 hours 03/29/24   Per anesthesia pt to remain intubated   Anticoagulation with Heparin gtt.   Patient transferred to ICU.  SBP goal 130-160mmHg for now   Condition, plan of care d/w ICU team and family.

## 2025-03-29 NOTE — PROGRESS NOTE ADULT - SUBJECTIVE AND OBJECTIVE BOX
HPI:  89 year old female with PMHx of Afib (on Eliquis), PPM, HTN, hx of RUE melanoma removal with R axillary lymph node removal, recent admission 3/21- 3/25 for lt MCA occlusion s/p thrombectomy presents as transfer from [facility] after she was found at 7:30 with lt facial and left sided weakness.  am.  Upon arrival to  ED she was noted to have lt sided facial droop; aphasic; left arm weakness; no TNK as out of window.  code stroke activated;  CT imagining found patient with new rt mca; patient transferred to [facility] for thrombectomy. Pt transferred to [facility] EDUARDO eval, s/p cerebral angiogram for mechanical thrombectomy of Rt M2 occlusion, TICI 3 (one pass). She is admitted to NSICU for close monitoring post thrombectomy. [facility], FC in no acute distress.      NIH SS: 10  DATE:  TIME: 12:48  1A: Level of consciousness (0-3): 1  1B: Questions (0-2): 1   1C: Commands (0-2): 0  2: Gaze (0-2): 0  3: Visual fields (0-3): 0  4: Facial palsy (0-3): 0  MOTOR:  5A: Left arm motor drift (0-4): 1  5B: Right arm motor drift (0-4): 0  6A: Left leg motor drift (0-4): 3  6B: Right leg motor drift (0-4): 2  7: Limb ataxia (0-2): 0  SENSORY:  8: Sensation (0-2): 0  SPEECH:  9: Language (0-3): 3  10: Dysarthria (0-2): 0  EXTINCTION:  11: Extinction/inattention (0-2): 0  TOTAL SCORE: 10 (26 Mar 2025 12:28)    O/n events: with acute aphasia this am, CTA with L M2 occlusion, underwent emergent endovascular intervention showing L M4 occlusion, TICI 0.        ICU Vital Signs Last 24 Hrs  T(C): 36.8 (29 Mar 2025 11:10), Max: 37.4 (28 Mar 2025 17:00)  T(F): 98.2 (29 Mar 2025 11:10), Max: 99.3 (28 Mar 2025 17:00)  HR: 97 (29 Mar 2025 11:10) (80 - 126)  BP: 159/94 (29 Mar 2025 11:10) (103/60 - 167/95)  BP(mean): 109 (29 Mar 2025 11:10) (70 - 127)  ABP: --  ABP(mean): --  RR: 19 (29 Mar 2025 11:10) (12 - 27)  SpO2: 100% (29 Mar 2025 11:10) (94% - 100%)    O2 Parameters below as of 29 Mar 2025 09:40  Patient On (Oxygen Delivery Method): ventilator    O2 Concentration (%): 50          Exam: recovering from anesthesia/paralytics.  No EO to voice, no FC, PERRL, 0/5 throughout.  S1S2 present.  CTAB  Abd soft, NT, ND  No peripheral swelling    R groin with no signs of active bleeding, non-tender, soft hematoma medial aspect of R thigh, palpable small pulsating mass;   L groin with SafeGuard in place, no hematoma;  peripheral pulse present.

## 2025-03-29 NOTE — CHART NOTE - NSCHARTNOTEFT_GEN_A_CORE
Interventional Neuro Radiology  Pre-Procedure Note     HPI:  89 year old female with PMHx of Afib (on Eliquis), PPM, HTN, hx of RUE melanoma removal with R axillary lymph node removal, recent admission 3/21- 3/25, 2025 for lt MCA occlusion s/p thrombectomy presented as transfer from [facility] after she was found at 7:30 3/26/25 with lt facial and left sided weakness.  am morning prior.  Upon arrival to  ED she was noted to have lt sided facial droop; aphasic; left arm weakness; no TNK as out of window/recent infarction.  code stroke activated;  CT imagining found patient with new rt mca; patient transferred to [facility] for thrombectomy. Pt transferred to [facility] EDUARDO eval, s/p cerebral angiogram for mechanical thrombectomy of Rt M2 occlusion, TICI 3 (one pass). She was admitted to NSICU for close monitoring post thrombectomy.  On the early morning of 3/29/25 pt received medications around 6am by RN report and subsequently was appreciated to be aphasic with right sided weakness.       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 650 milliGRAM(s) Oral every 6 hours PRN  albuterol/ipratropium for Nebulization 3 milliLiter(s) Nebulizer every 6 hours  atorvastatin 10 milliGRAM(s) Oral at bedtime  chlorhexidine 2% Cloths 1 Application(s) Topical daily  heparin  Infusion 650 Unit(s)/Hr IV Continuous <Continuous>  hydrALAZINE Injectable 10 milliGRAM(s) IV Push four times a day PRN  labetalol Injectable 10 milliGRAM(s) IV Push every 4 hours PRN  melatonin 5 milliGRAM(s) Oral at bedtime  metoprolol tartrate 25 milliGRAM(s) Oral two times a day  polyethylene glycol 3350 17 Gram(s) Oral daily  predniSONE   Tablet 2 milliGRAM(s) Oral <User Schedule>  predniSONE   Tablet 5 milliGRAM(s) Oral daily  senna 2 Tablet(s) Oral at bedtime  sodium chloride 0.9%. 1000 milliLiter(s) IV Continuous <Continuous>      Labs:                         10.7   5.49  )-----------( 115      ( 29 Mar 2025 06:00 )             33.4       03-29    136  |  101  |  23.6[H]  ----------------------------<  97  4.6   |  24.0  |  0.48[L]    Ca    8.5      29 Mar 2025 06:00  Phos  3.0     03-29  Mg     2.0     03-29      Blood Bank: 03-26-25  --  A POS  --      Detailed Neurologic Exam:    Mental status: The patient is sleeping, becomes awake and alert, followed single command once, then does no longer follow correctly, the patient attempts to generate speech, mouths words, not oriented to person, place, time.  The patient is not able to repeat or ID objects.     Cranial nerves: slight right facial palsy, severe dysarthria nearly anarthric , Pupils equal and react symmetrically to light. There is no visual field deficit to confrontation. Extraocular motion is full with no nystagmus.      Motor: There is normal bulk and tone.  There is no tremor.  Strength is 4/5 in the right arm and 3/5 right leg. Drifts.   Strength is 5/5 in the left arm and 4/5 left leg.    Sensation: Intact to light touch and pin in 4 extremities    Cerebellar: There is no dysmetria on finger to nose testing.    Gait : deferred    [facility] SS:  DATE: 03/29/205  TIME: 0800  1A: Level of consciousness (0-3): 1  1B: Questions (0-2): 2  1C: Commands (0-2):  2  2: Gaze (0-2):  0  3: Visual fields (0-3): 0  4: Facial palsy (0-3): 1  MOTOR:  5A: Left arm motor drift (0-4): 0  5B: Right arm motor drift (0-4): 1  6A: Left leg motor drift (0-4): 1  6B: Right leg motor drift (0-4): 2  7: Limb ataxia (0-2): 0  SENSORY:  8: Sensation (0-2): 0  SPEECH:  9: Language (0-3): 3  10: Dysarthria (0-2):2  EXTINCTION:  11: Extinction/inattention (0-2): 1    TOTAL SCORE:  16    Assessment/Plan:   This is a 89y/o woman post mechanical thrombectomy x 2 for bilateral MCA occlusion in the setting of Atrial fibrillation now with acute onset aphasia early morning of 6/29/25. Patient presents to neuro-IR for selective cerebral angiography/mechanical thrombectomy of acute left MCA occlusion.   Procedure/ risks/ benefits/ goals/ alternatives were explained. Risks include but are not limited to stroke/ vessel injury/ hemorrhage/ groin hematoma. All questions answered and concerns addressed. Informed content obtained from patients son who verbalizes /expresses full understanding. Consent placed in chart.

## 2025-03-29 NOTE — CHART NOTE - NSCHARTNOTEFT_GEN_A_CORE
NSICU PA EVENT NOTE:     Called by RN at 0620 patient with acute change in neuro exam. Patient's LKW was 0600. Patient became acutely aphasic. Patient seen and examined at bedside. Given severe aphasia patient unable to provide further history or complaints. CTH/CTA/CTP ordered. CTH negative for acute hemorrhage. CTA + LM2 occulusion. Dr. Johnson made aware, planned for mechanical thrombectomy. Cannot receive TNK 2/2 heparin gtt. NIHSS 16. Family made aware of change in exam. Dr. Hernandez notified     1A: Level of consciousness       0= Alert; keenly responsive       1B: Ask month and age       +2= Aphasic    1C: "Blink eyes" and "Squeeze Hands"       +2= Performs 0 tasks    2: Horizontal EOMs       +1= Partial gaze palsy: can be overcome    3: Visual fields       0= No visual loss    4: Facial palsy (use grimace if obtunded)       0= Normal symmetry     5A: Left arm motor drift (count out loud and use fingers to show count)       0= No drift x 10 seconds    5B: Right arm motor drift       0= No drift x 10 seconds    6A: Left leg motor drift       +2= Some effort against gravity    6B: Right leg motor drift       +3= No effort against gravity    7: Limb ataxia (FNF/heel-shin)       0= Does not understand    8: Sensation       0= Normal, no sensory loss    9: Language/aphasia- describe the scene (on edwin); name the items; read the sentences (on edwin)       +3= Mute/global aphasia: no usable speech/auditory comprehension      10: Dysarthria- read the words       +2= Severe dysarthria: unintelligible slurring or out of proportion to dysphasia      11: Extinction/inattention       +1= Extinction to b/l simultaneous stimulation    NIHSS=16

## 2025-03-30 LAB
ANION GAP SERPL CALC-SCNC: 16 MMOL/L — SIGNIFICANT CHANGE UP (ref 5–17)
APTT BLD: 54.2 SEC — HIGH (ref 24.5–35.6)
BUN SERPL-MCNC: 22.4 MG/DL — HIGH (ref 8–20)
CALCIUM SERPL-MCNC: 8.5 MG/DL — SIGNIFICANT CHANGE UP (ref 8.4–10.5)
CHLORIDE SERPL-SCNC: 102 MMOL/L — SIGNIFICANT CHANGE UP (ref 96–108)
CO2 SERPL-SCNC: 19 MMOL/L — LOW (ref 22–29)
CREAT SERPL-MCNC: 0.48 MG/DL — LOW (ref 0.5–1.3)
EGFR: 91 ML/MIN/1.73M2 — SIGNIFICANT CHANGE UP
EGFR: 91 ML/MIN/1.73M2 — SIGNIFICANT CHANGE UP
GLUCOSE BLDC GLUCOMTR-MCNC: 138 MG/DL — HIGH (ref 70–99)
GLUCOSE BLDC GLUCOMTR-MCNC: 144 MG/DL — HIGH (ref 70–99)
GLUCOSE BLDC GLUCOMTR-MCNC: 149 MG/DL — HIGH (ref 70–99)
GLUCOSE SERPL-MCNC: 132 MG/DL — HIGH (ref 70–99)
HCT VFR BLD CALC: 36.1 % — SIGNIFICANT CHANGE UP (ref 34.5–45)
HGB BLD-MCNC: 11.5 G/DL — SIGNIFICANT CHANGE UP (ref 11.5–15.5)
MAGNESIUM SERPL-MCNC: 2 MG/DL — SIGNIFICANT CHANGE UP (ref 1.6–2.6)
MCHC RBC-ENTMCNC: 30.7 PG — SIGNIFICANT CHANGE UP (ref 27–34)
MCHC RBC-ENTMCNC: 31.9 G/DL — LOW (ref 32–36)
MCV RBC AUTO: 96.5 FL — SIGNIFICANT CHANGE UP (ref 80–100)
NRBC # BLD AUTO: 0 K/UL — SIGNIFICANT CHANGE UP (ref 0–0)
NRBC # FLD: 0 K/UL — SIGNIFICANT CHANGE UP (ref 0–0)
NRBC BLD AUTO-RTO: 0 /100 WBCS — SIGNIFICANT CHANGE UP (ref 0–0)
PHOSPHATE SERPL-MCNC: 3.4 MG/DL — SIGNIFICANT CHANGE UP (ref 2.4–4.7)
PLATELET # BLD AUTO: 135 K/UL — LOW (ref 150–400)
PMV BLD: 10.9 FL — SIGNIFICANT CHANGE UP (ref 7–13)
POTASSIUM SERPL-MCNC: 4.5 MMOL/L — SIGNIFICANT CHANGE UP (ref 3.5–5.3)
POTASSIUM SERPL-SCNC: 4.5 MMOL/L — SIGNIFICANT CHANGE UP (ref 3.5–5.3)
RBC # BLD: 3.74 M/UL — LOW (ref 3.8–5.2)
RBC # FLD: 14.1 % — SIGNIFICANT CHANGE UP (ref 10.3–14.5)
SODIUM SERPL-SCNC: 136 MMOL/L — SIGNIFICANT CHANGE UP (ref 135–145)
WBC # BLD: 10.38 K/UL — SIGNIFICANT CHANGE UP (ref 3.8–10.5)
WBC # FLD AUTO: 10.38 K/UL — SIGNIFICANT CHANGE UP (ref 3.8–10.5)

## 2025-03-30 PROCEDURE — 99291 CRITICAL CARE FIRST HOUR: CPT

## 2025-03-30 PROCEDURE — 99233 SBSQ HOSP IP/OBS HIGH 50: CPT

## 2025-03-30 RX ORDER — PHENYLEPHRINE HCL IN 0.9% NACL 0.5 MG/5ML
0.5 SYRINGE (ML) INTRAVENOUS
Qty: 40 | Refills: 0 | Status: DISCONTINUED | OUTPATIENT
Start: 2025-03-30 | End: 2025-03-30

## 2025-03-30 RX ORDER — ACETAMINOPHEN 500 MG/5ML
1000 LIQUID (ML) ORAL ONCE
Refills: 0 | Status: COMPLETED | OUTPATIENT
Start: 2025-03-30 | End: 2025-03-30

## 2025-03-30 RX ORDER — METOPROLOL SUCCINATE 50 MG/1
2.5 TABLET, EXTENDED RELEASE ORAL ONCE
Refills: 0 | Status: COMPLETED | OUTPATIENT
Start: 2025-03-30 | End: 2025-03-30

## 2025-03-30 RX ADMIN — IPRATROPIUM BROMIDE AND ALBUTEROL SULFATE 3 MILLILITER(S): .5; 2.5 SOLUTION RESPIRATORY (INHALATION) at 20:56

## 2025-03-30 RX ADMIN — Medication 1000 MILLILITER(S): at 17:00

## 2025-03-30 RX ADMIN — METOPROLOL SUCCINATE 25 MILLIGRAM(S): 50 TABLET, EXTENDED RELEASE ORAL at 05:37

## 2025-03-30 RX ADMIN — Medication 400 MILLIGRAM(S): at 22:20

## 2025-03-30 RX ADMIN — ATORVASTATIN CALCIUM 10 MILLIGRAM(S): 80 TABLET, FILM COATED ORAL at 21:14

## 2025-03-30 RX ADMIN — HEPARIN SODIUM 8 UNIT(S)/HR: 1000 INJECTION INTRAVENOUS; SUBCUTANEOUS at 17:59

## 2025-03-30 RX ADMIN — Medication 1000 MILLILITER(S): at 13:07

## 2025-03-30 RX ADMIN — Medication 1000 MILLIGRAM(S): at 22:45

## 2025-03-30 RX ADMIN — Medication 65 MILLILITER(S): at 16:49

## 2025-03-30 RX ADMIN — Medication 100 MILLIGRAM(S): at 15:34

## 2025-03-30 RX ADMIN — Medication 2 TABLET(S): at 22:14

## 2025-03-30 RX ADMIN — IPRATROPIUM BROMIDE AND ALBUTEROL SULFATE 3 MILLILITER(S): .5; 2.5 SOLUTION RESPIRATORY (INHALATION) at 00:11

## 2025-03-30 RX ADMIN — HEPARIN SODIUM 8 UNIT(S)/HR: 1000 INJECTION INTRAVENOUS; SUBCUTANEOUS at 05:38

## 2025-03-30 RX ADMIN — Medication 40 MILLIGRAM(S): at 12:04

## 2025-03-30 RX ADMIN — Medication 15 MILLILITER(S): at 05:37

## 2025-03-30 RX ADMIN — IPRATROPIUM BROMIDE AND ALBUTEROL SULFATE 3 MILLILITER(S): .5; 2.5 SOLUTION RESPIRATORY (INHALATION) at 15:20

## 2025-03-30 RX ADMIN — Medication 100 MILLIGRAM(S): at 06:12

## 2025-03-30 RX ADMIN — Medication 1 APPLICATION(S): at 05:40

## 2025-03-30 RX ADMIN — IPRATROPIUM BROMIDE AND ALBUTEROL SULFATE 3 MILLILITER(S): .5; 2.5 SOLUTION RESPIRATORY (INHALATION) at 04:41

## 2025-03-30 RX ADMIN — POLYETHYLENE GLYCOL 3350 17 GRAM(S): 17 POWDER, FOR SOLUTION ORAL at 16:49

## 2025-03-30 RX ADMIN — IPRATROPIUM BROMIDE AND ALBUTEROL SULFATE 3 MILLILITER(S): .5; 2.5 SOLUTION RESPIRATORY (INHALATION) at 08:27

## 2025-03-30 RX ADMIN — METOPROLOL SUCCINATE 25 MILLIGRAM(S): 50 TABLET, EXTENDED RELEASE ORAL at 16:49

## 2025-03-30 RX ADMIN — Medication 100 MILLIGRAM(S): at 23:48

## 2025-03-30 RX ADMIN — METOPROLOL SUCCINATE 2.5 MILLIGRAM(S): 50 TABLET, EXTENDED RELEASE ORAL at 03:32

## 2025-03-30 RX ADMIN — Medication 11.9 MICROGRAM(S)/KG/MIN: at 06:12

## 2025-03-30 RX ADMIN — POLYETHYLENE GLYCOL 3350 17 GRAM(S): 17 POWDER, FOR SOLUTION ORAL at 05:37

## 2025-03-30 NOTE — PROGRESS NOTE ADULT - SUBJECTIVE AND OBJECTIVE BOX
HPI:  89 year old female with PMHx of Afib (on Eliquis), PPM, HTN, hx of RUE melanoma removal with R axillary lymph node removal, recent admission 3/21- 3/25 for lt MCA occlusion s/p thrombectomy presents as transfer from [facility] after she was found at 7:30 with lt facial and left sided weakness.  am.  Upon arrival to  ED she was noted to have lt sided facial droop; aphasic; left arm weakness; no TNK as out of window.  code stroke activated;  CT imagining found patient with new rt mca; patient transferred to [facility] for thrombectomy. Pt transferred to [facility] EDUARDO eval, s/p cerebral angiogram for mechanical thrombectomy of Rt M2 occlusion, TICI 3 (one pass). She is admitted to NSICU for close monitoring post thrombectomy. [facility], FC in no acute distress.      NIH SS: 10  DATE:  TIME: 12:48  1A: Level of consciousness (0-3): 1  1B: Questions (0-2): 1   1C: Commands (0-2): 0  2: Gaze (0-2): 0  3: Visual fields (0-3): 0  4: Facial palsy (0-3): 0  MOTOR:  5A: Left arm motor drift (0-4): 1  5B: Right arm motor drift (0-4): 0  6A: Left leg motor drift (0-4): 3  6B: Right leg motor drift (0-4): 2  7: Limb ataxia (0-2): 0  SENSORY:  8: Sensation (0-2): 0  SPEECH:  9: Language (0-3): 3  10: Dysarthria (0-2): 0  EXTINCTION:  11: Extinction/inattention (0-2): 0  TOTAL SCORE: 10 (26 Mar 2025 12:28)    O/n events: none reported.  More awake, alert this am, following some commands, lifting head off the bed, minimal secretions; tolerated SBT; successfully extubated this am.        ICU Vital Signs Last 24 Hrs  T(C): 37.6 (30 Mar 2025 10:30), Max: 37.8 (29 Mar 2025 21:15)  T(F): 99.7 (30 Mar 2025 10:30), Max: 100 (29 Mar 2025 21:15)  HR: 119 (30 Mar 2025 10:30) (79 - 134)  BP: 143/62 (30 Mar 2025 10:30) (105/78 - 164/151)  BP(mean): 85 (30 Mar 2025 10:30) (74 - 158)  ABP: --  ABP(mean): --  RR: 18 (30 Mar 2025 10:30) (10 - 26)  SpO2: 100% (30 Mar 2025 10:30) (98% - 100%)    O2 Parameters below as of 30 Mar 2025 11:10    O2 Flow (L/min): 10  O2 Concentration (%): 40          Exam: post-extubation;  AAOx2 with choices, non-verbal, limited comprehension - follows some simple commands but mostly mimics, trace R facial, PERRLA, EOMI, motor - effort dependent, generalized weakness noted - LUE 4/5, LLE 3/5 with drift, RUE 4-/5, RLE 2/5.  S1S2 present.  CTAB  Abd soft, NT, ND  No peripheral swelling    R groin with no signs of active bleeding, non-tender, soft hematoma medial aspect of R thigh, palpable small hematoma in the groin;   L groin with no hematoma, non-tender;  peripheral pulse present, skin warm.

## 2025-03-30 NOTE — PROGRESS NOTE ADULT - ASSESSMENT
89F with recurrent strokes, now with acute L MCA occlusion s/p TICI 0 intervention showing L M4 occlusion;  recent CVA due to R MCA occlusion, s/p TICI 3 thrombectomy 03/26. Nature of strokes is multifactorial - suspected coagulopathy due to malignant dz, Afib, BL atrial dilatation.  PMH of recent 03/21/2025 L MCA stroke s/p thrombectomy.  High-risk Afib, on AC.  PMH of HTN, melanoma (s/p RUE lesion and Right axillary lymph node excision 03/18/25), AI on Prednisone.  1.7 cm cystic lesion at the pancreatic body.   Anticoagulated, on Heparin gtt.  Normocytic anemia, combination of postop, dilutional; chronic thrombocytopenia.  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 Hemodynamically stable.    Plan:  cont neurochecks   pending MRI brain w/wo contrast  cont Melatonin q hs  SBP goal - liberalize 110-160 (as remains aphasic with SBP 150s), titrate Tereso gtt accordingly  maintain Osats>92%, incentive spirometry as able  NPO except meds for now, S/S eval, maintain NGT for now, cont IV fludis, PPI for ulcer ppx as on steroids  monitor UOP, e-lytes  cont heparin gtt, goal PTT 40-60  cont home statin  Vascular involved - failed repeat thrombin injection, possible open intervention later - pending repeat Duplex report and overall course  maintain -180, ISS; steroids - on stress regimen Hydrocortisone IV, cont for 2 more days  VTE ppx with SCDs, on Heparin gtt  GOC discussion - DNR/DNI status confirmed, if resp support needed - will use HFNC or NIPPV; family at the bedside.

## 2025-03-30 NOTE — AIRWAY REMOVAL NOTE  ADULT & PEDS - REASON ARTIFICAL AIRWAY REMOVED:
reason for artificial airway has resolved
reason for artificial airway has resolved
Silver Nitrate Text: The wound bed was treated with silver nitrate after the biopsy was performed.

## 2025-03-31 LAB
ANION GAP SERPL CALC-SCNC: 12 MMOL/L — SIGNIFICANT CHANGE UP (ref 5–17)
APTT BLD: 57.3 SEC — HIGH (ref 24.5–35.6)
BUN SERPL-MCNC: 26.4 MG/DL — HIGH (ref 8–20)
CALCIUM SERPL-MCNC: 8.6 MG/DL — SIGNIFICANT CHANGE UP (ref 8.4–10.5)
CHLORIDE SERPL-SCNC: 105 MMOL/L — SIGNIFICANT CHANGE UP (ref 96–108)
CO2 SERPL-SCNC: 21 MMOL/L — LOW (ref 22–29)
CREAT SERPL-MCNC: 0.45 MG/DL — LOW (ref 0.5–1.3)
CULTURE RESULTS: SIGNIFICANT CHANGE UP
EGFR: 92 ML/MIN/1.73M2 — SIGNIFICANT CHANGE UP
EGFR: 92 ML/MIN/1.73M2 — SIGNIFICANT CHANGE UP
GLUCOSE SERPL-MCNC: 138 MG/DL — HIGH (ref 70–99)
HCT VFR BLD CALC: 31.7 % — LOW (ref 34.5–45)
HGB BLD-MCNC: 10.2 G/DL — LOW (ref 11.5–15.5)
MAGNESIUM SERPL-MCNC: 1.9 MG/DL — SIGNIFICANT CHANGE UP (ref 1.8–2.6)
MCHC RBC-ENTMCNC: 30.9 PG — SIGNIFICANT CHANGE UP (ref 27–34)
MCHC RBC-ENTMCNC: 32.2 G/DL — SIGNIFICANT CHANGE UP (ref 32–36)
MCV RBC AUTO: 96.1 FL — SIGNIFICANT CHANGE UP (ref 80–100)
NRBC # BLD AUTO: 0 K/UL — SIGNIFICANT CHANGE UP (ref 0–0)
NRBC # FLD: 0 K/UL — SIGNIFICANT CHANGE UP (ref 0–0)
NRBC BLD AUTO-RTO: 0 /100 WBCS — SIGNIFICANT CHANGE UP (ref 0–0)
PHOSPHATE SERPL-MCNC: 2.7 MG/DL — SIGNIFICANT CHANGE UP (ref 2.4–4.7)
PLATELET # BLD AUTO: 109 K/UL — LOW (ref 150–400)
PMV BLD: 10.3 FL — SIGNIFICANT CHANGE UP (ref 7–13)
POTASSIUM SERPL-MCNC: 4.2 MMOL/L — SIGNIFICANT CHANGE UP (ref 3.5–5.3)
POTASSIUM SERPL-SCNC: 4.2 MMOL/L — SIGNIFICANT CHANGE UP (ref 3.5–5.3)
RBC # BLD: 3.3 M/UL — LOW (ref 3.8–5.2)
RBC # FLD: 14.1 % — SIGNIFICANT CHANGE UP (ref 10.3–14.5)
SODIUM SERPL-SCNC: 138 MMOL/L — SIGNIFICANT CHANGE UP (ref 135–145)
SPECIMEN SOURCE: SIGNIFICANT CHANGE UP
WBC # BLD: 6.64 K/UL — SIGNIFICANT CHANGE UP (ref 3.8–10.5)
WBC # FLD AUTO: 6.64 K/UL — SIGNIFICANT CHANGE UP (ref 3.8–10.5)

## 2025-03-31 PROCEDURE — 99233 SBSQ HOSP IP/OBS HIGH 50: CPT

## 2025-03-31 PROCEDURE — 99291 CRITICAL CARE FIRST HOUR: CPT

## 2025-03-31 PROCEDURE — 70553 MRI BRAIN STEM W/O & W/DYE: CPT | Mod: 26

## 2025-03-31 PROCEDURE — 99223 1ST HOSP IP/OBS HIGH 75: CPT

## 2025-03-31 PROCEDURE — 93926 LOWER EXTREMITY STUDY: CPT | Mod: 26,RT

## 2025-03-31 PROCEDURE — 99231 SBSQ HOSP IP/OBS SF/LOW 25: CPT | Mod: GC

## 2025-03-31 RX ORDER — FUROSEMIDE 10 MG/ML
10 INJECTION INTRAMUSCULAR; INTRAVENOUS ONCE
Refills: 0 | Status: COMPLETED | OUTPATIENT
Start: 2025-03-31 | End: 2025-03-31

## 2025-03-31 RX ORDER — SOD PHOS DI, MONO/K PHOS MONO 250 MG
1 TABLET ORAL ONCE
Refills: 0 | Status: COMPLETED | OUTPATIENT
Start: 2025-03-31 | End: 2025-03-31

## 2025-03-31 RX ORDER — HYDROCORTISONE 20 MG
50 TABLET ORAL EVERY 8 HOURS
Refills: 0 | Status: DISCONTINUED | OUTPATIENT
Start: 2025-03-31 | End: 2025-04-02

## 2025-03-31 RX ORDER — HEPARIN SODIUM 1000 [USP'U]/ML
900 INJECTION INTRAVENOUS; SUBCUTANEOUS
Qty: 25000 | Refills: 0 | Status: DISCONTINUED | OUTPATIENT
Start: 2025-03-31 | End: 2025-04-01

## 2025-03-31 RX ADMIN — METOPROLOL SUCCINATE 25 MILLIGRAM(S): 50 TABLET, EXTENDED RELEASE ORAL at 17:37

## 2025-03-31 RX ADMIN — Medication 1 APPLICATION(S): at 06:07

## 2025-03-31 RX ADMIN — Medication 100 MILLIGRAM(S): at 06:07

## 2025-03-31 RX ADMIN — ATORVASTATIN CALCIUM 10 MILLIGRAM(S): 80 TABLET, FILM COATED ORAL at 22:05

## 2025-03-31 RX ADMIN — IPRATROPIUM BROMIDE AND ALBUTEROL SULFATE 3 MILLILITER(S): .5; 2.5 SOLUTION RESPIRATORY (INHALATION) at 02:49

## 2025-03-31 RX ADMIN — Medication 65 MILLILITER(S): at 06:28

## 2025-03-31 RX ADMIN — Medication 50 MILLIGRAM(S): at 22:05

## 2025-03-31 RX ADMIN — Medication 100 MILLIGRAM(S): at 16:00

## 2025-03-31 RX ADMIN — FUROSEMIDE 10 MILLIGRAM(S): 10 INJECTION INTRAMUSCULAR; INTRAVENOUS at 10:50

## 2025-03-31 RX ADMIN — HEPARIN SODIUM 9 UNIT(S)/HR: 1000 INJECTION INTRAVENOUS; SUBCUTANEOUS at 19:05

## 2025-03-31 RX ADMIN — IPRATROPIUM BROMIDE AND ALBUTEROL SULFATE 3 MILLILITER(S): .5; 2.5 SOLUTION RESPIRATORY (INHALATION) at 08:37

## 2025-03-31 RX ADMIN — Medication 1 PACKET(S): at 06:07

## 2025-03-31 RX ADMIN — METOPROLOL SUCCINATE 25 MILLIGRAM(S): 50 TABLET, EXTENDED RELEASE ORAL at 06:07

## 2025-03-31 RX ADMIN — HEPARIN SODIUM 8 UNIT(S)/HR: 1000 INJECTION INTRAVENOUS; SUBCUTANEOUS at 04:31

## 2025-03-31 RX ADMIN — HEPARIN SODIUM 8 UNIT(S)/HR: 1000 INJECTION INTRAVENOUS; SUBCUTANEOUS at 16:00

## 2025-03-31 NOTE — PROGRESS NOTE ADULT - SUBJECTIVE AND OBJECTIVE BOX
HPI:  89 year old female with PMHx of Afib (on Eliquis), PPM, HTN, hx of RUE melanoma removal with R axillary lymph node removal, recent admission 3/21- 3/25 for lt MCA occlusion s/p thrombectomy presents as transfer from [facility] after she was found at 7:30 with lt facial and left sided weakness.  am.  Upon arrival to  ED she was noted to have lt sided facial droop; aphasic; left arm weakness; no TNK as out of window.  code stroke activated;  CT imagining found patient with new rt mca; patient transferred to [facility] for thrombectomy. Pt transferred to [facility] EDUARDO eval, s/p cerebral angiogram for mechanical thrombectomy of Rt M2 occlusion, TICI 3 (one pass). She is admitted to NSICU for close monitoring post thrombectomy. [facility] waking up, FC in no acute distress.      NIH SS: 10  DATE:  TIME: 12:48  1A: Level of consciousness (0-3): 1  1B: Questions (0-2): 1   1C: Commands (0-2): 0  2: Gaze (0-2): 0  3: Visual fields (0-3): 0  4: Facial palsy (0-3): 0  MOTOR:  5A: Left arm motor drift (0-4): 1  5B: Right arm motor drift (0-4): 0  6A: Left leg motor drift (0-4): 3  6B: Right leg motor drift (0-4): 2  7: Limb ataxia (0-2): 0  SENSORY:  8: Sensation (0-2): 0  SPEECH:  9: Language (0-3): 3  10: Dysarthria (0-2): 0  EXTINCTION:  11: Extinction/inattention (0-2): 0  TOTAL SCORE: 10 (26 Mar 2025 12:28)    O/n events: none reported.  More awake, alert this am, following more verbal commands, attempts to speak.      ICU Vital Signs Last 24 Hrs  T(C): 37.1 (31 Mar 2025 08:00), Max: 37.7 (30 Mar 2025 10:45)  T(F): 98.8 (31 Mar 2025 08:00), Max: 99.9 (30 Mar 2025 10:45)  HR: 96 (31 Mar 2025 08:39) (85 - 125)  BP: 113/55 (31 Mar 2025 08:00) (110/63 - 143/62)  BP(mean): 71 (31 Mar 2025 08:00) (67 - 94)  ABP: --  ABP(mean): --  RR: 15 (31 Mar 2025 08:00) (15 - 25)  SpO2: 96% (31 Mar 2025 08:39) (95% - 100%)    O2 Parameters below as of 31 Mar 2025 08:39  Patient On (Oxygen Delivery Method): room air        Exam:   AAOx2 with choices, limited verbal output, limited comprehension - follows some simple verbal commands but mostly mimics, face symmetric, PERRLA, EOMI, motor - effort dependent, generalized weakness noted - LUE 5/5, LLE 4-/5, RUE 5/5 with 4-/5 HG, RLE 4/5; no drift noted.  S1S2 present, holosystolic murmur present  Mild bibasilar crackles.  Abd soft, NT, ND  No peripheral swelling    R groin with no signs of active bleeding, non-tender, soft hematoma medial aspect of R thigh, palpable small hematoma in the groin;   L groin with no hematoma, non-tender;  peripheral pulse present, skin warm.

## 2025-03-31 NOTE — CONSULT NOTE ADULT - ASSESSMENT
INCOMPLETE      89 year old female with PMHx of Afib (off Eliquis), recent left MCA territory stroke s/p LM1 thrombectomy, with residual minimal aphasia, MRS 1, PPM, HTN, HLD, GERD, severe MR/TR, iatrogenic adrenal insufficiency (on prednisone), lymphedema, metastatic melanoma s/p Left foot melanoma and lymph node resection, s/p RUE melanoma and Right axillary lymph node excision at [facility] (3/18/25) who presented to  ED 3/26/36 with Lt sided weakness and aphasia. CT neg, CTA showed a  Rt M2 occlusion.  Transferred to [facility] EDUARDO intervention. S/P thrombectomy, TICI 3 achieved after 1 pass admitted to NSICU for post procedure monitoring started on IV Heparin.On the early morning of 3/29/25 pt received medications around 6am by RN report and subsequently was appreciated to be aphasic with right sided weakness.  CTH/CTA/CTP ordered. CTH negative for acute hemorrhage. CTA + new Left MCA M2 occulusion.Recent left MCA cerebral infarction s/p Left MCA M1 thrombectomy on 3/21/2025. TICI 3 recanalization. Patient subsequently discharged and returned 3/26/25 with Rt MCA  m2 occlusion, revascularization TICI 3. On 3/29/25 patient found to have acute Left M2 MCA occlusion s/p thrombectomy TICI 0 -- distal M4 branch. Etiology likely cardioembolic in the setting of atrial fibrillation.         -Would send hypercoagulable workup including factor V leiden, prothrombin, antiphospholipid antibodies, and lupus anticoagulant.  With regard to long-term a/c, can transition to doac from heparin gtt as per neuro and would follow up with outpatient hematologist Dr Kimbrough.           89 year old female with PMHx of Afib (off Eliquis), recent left MCA territory stroke s/p LM1 thrombectomy, with residual minimal aphasia, MRS 1, PPM, HTN, HLD, GERD, severe MR/TR, iatrogenic adrenal insufficiency (on prednisone), lymphedema, metastatic melanoma s/p Left foot melanoma and lymph node resection, s/p RUE melanoma and Right axillary lymph node excision at [facility] (3/18/25) who presented to  ED 3/26/36 with Lt sided weakness and aphasia. CT neg, CTA showed a  Rt M2 occlusion.  Transferred to [facility] EDUARDO intervention. S/P thrombectomy, TICI 3 achieved after 1 pass admitted to NSICU for post procedure monitoring started on IV Heparin.On the early morning of 3/29/25 pt received medications around 6am by RN report and subsequently was appreciated to be aphasic with right sided weakness.  CTH/CTA/CTP ordered. CTH negative for acute hemorrhage. CTA + new Left MCA M2 occulusion.Recent left MCA cerebral infarction s/p Left MCA M1 thrombectomy on 3/21/2025. TICI 3 recanalization. Patient subsequently discharged and returned 3/26/25 with Rt MCA  m2 occlusion, revascularization TICI 3. On 3/29/25 patient found to have acute Left M2 MCA occlusion s/p thrombectomy TICI 0 -- distal M4 branch. Etiology likely cardioembolic in the setting of atrial fibrillation.         -Would send hypercoagulable workup including factor V leiden, prothrombin, antiphospholipid antibodies, and lupus anticoagulant.  With regard to long-term a/c, can transition to doac from heparin gtt as per neuro and would follow up with outpatient hematologist Dr Kimbrough.

## 2025-03-31 NOTE — CHART NOTE - NSCHARTNOTEFT_GEN_A_CORE
NSCU Transfer Note    Transfer from: NSCU    Transfer to: (  ) Medicine    (  ) Telemetry    (  ) RCU   ( ) Neurosurgery                               (  ) Palliative    ( x ) Stroke Unit    (  ) MICU    (  ) __________________    Accepting Physician: Dr. 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 CT neg, CTA showing RM2 occlusion.  Transferred to [facility] intervention. S/P thrombectomy, TICI 3 achieved after 1 pass admitted to NSICU for post procedure monitoring started on IV Heparin gtt. Pt w. right common femoral pseudoaneurysm, vascular consulted and heparin gtt held x 6 hours for thrombin injection. On 3/29 pt w/ acute neurological change - severe aphasia - CTH/CTA/CTP ordered and CTA +new left M2 occlusion, returned to IR for mechanical thrombectomy TICI 0, distal L M4 clot. Pt extubated 3/20 to RA, remains therapeutic on Heparin gtt. Vascular following for pseudoaneurysm, pending RLE arterial duplex .       Vital Signs Last 24 Hrs  T(C): 37.1 (31 Mar 2025 08:00), Max: 37.7 (30 Mar 2025 10:45)  T(F): 98.8 (31 Mar 2025 08:00), Max: 99.9 (30 Mar 2025 10:45)  HR: 96 (31 Mar 2025 08:39) (85 - 125)  BP: 113/55 (31 Mar 2025 08:00) (110/63 - 143/62)  BP(mean): 71 (31 Mar 2025 08:00) (67 - 94)  RR: 15 (31 Mar 2025 08:00) (15 - 25)  SpO2: 96% (31 Mar 2025 08:39) (95% - 100%)    Parameters below as of 31 Mar 2025 08:39  Patient On (Oxygen Delivery Method): room air        I&O's Summary    30 Mar 2025 07:01  -  31 Mar 2025 07:00  --------------------------------------------------------  IN: 2380.6 mL / OUT: 795 mL / NET: 1585.6 mL        Physical Exam:   AAOX1-2 (nods appropriately to choices), OE spontaneously, PERRL, EOMI, mixed aphasia, mimicking >FC, RUE HG 4/5 otherwise b/l UE 5/5, no pronator drift, b/l LE 4-/5, sensation grossly intact throughout.   S1S2 present.  Chest rise symmetric  Abd soft, NT, ND.  No peripheral swelling.  RLE groin ecchymosis expanding posterior thigh, soft and outlined w/ marker to monitor    LABS:                               10.2   6.64  )-----------( 109      ( 31 Mar 2025 03:52 )             31.7       03-31    138  |  105  |  26.4[H]  ----------------------------<  138[H]  4.2   |  21.0[L]  |  0.45[L]    Ca    8.6      31 Mar 2025 03:52  Phos  2.7     03-31  Mg     1.9     03-31    TPro  5.9[L]  /  Alb  3.5  /  TBili  0.8  /  DBili  x   /  AST  19  /  ALT  21  /  AlkPhos  48  03-29      PT/INR - ( 29 Mar 2025 11:45 )   PT: 11.2 sec;   INR: 0.97 ratio         PTT - ( 31 Mar 2025 03:52 )  PTT:57.3 sec    ABG - ( 29 Mar 2025 17:23 )  pH, Arterial: 7.420 pH, Blood: x     /  pCO2: 43    /  pO2: 163   / HCO3: 28    / Base Excess: 3.4   /  SaO2: 98.5            Imaging:  < from: CT Head No Cont (03.29.25 @ 06:54) >    IMPRESSION:  Patchy hypoattenuation related to evolving acute/subacute bilateral MCA   territory infarcts. No intracranial hemorrhage or midline shift present.    Findings above were discussed directly by telephone by the ED radiologist   on call, Louie Worthy M.D., with the ordering provider, MIRIAM Corbett, at   approximately 7:01 AM on 3/29/2025.    --- End of Report ---    < end of copied text >    < from: CT Angio Brain Stroke Protocol  w/ IV Cont (03.29.25 @ 06:56) >      IMPRESSION:    CT PERFUSION:  Signal abnormality on T-Max/mean transit time data sets at the left   frontal lobe suggest territory-and-risk within the left MCA distribution,   corresponding to angiographic findings below. Cerebral blood flow and   cerebral blood volume data sets remain within normal limits.    Neurointerventional/neurosurgical consultation recommended. MRI may be   considered for further characterization.    CTA NECK:  No evidence of significant stenosis or occlusion.    Previously identified narrowing of the right cervical ICA with   questionable arterial dissection appears less conspicuous on the current   exam, suggesting at least partial recanalization. MRA (dissection   protocol) considered for further evaluation.    CTA HEAD:  Acute left M2 occlusion identified.    Critical findings above were discussed directly by telephone with   ordering provider, Aric PINEDA, on 3/29/2025 at 7:11 AM by Dr. Louie Worthy with read back confirmation.    --- End of Report ---        < end of copied text >    < from: CT Head No Cont (03.29.25 @ 20:56) >    IMPRESSION: No acute intracranial hemorrhage, mass effect, or shift of   the midline structures.    Evolving acute left frontal lobe superior division MCA infarct without   hemorrhagic transformation.    --- End of Report ---    < end of copied text >        ASSESSMENT:   89F with recurrent strokes, now with acute L MCA occlusion s/p TICI 0 intervention showing L M4 occlusion;  recent CVA due to R MCA occlusion, s/p TICI 3 thrombectomy 03/26. 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 Hemodynamically stable.    Plan:   Neuro   - Neuro/vitals q2h   - MR brain w/ and w/o - complete pending read  - C/w melatonin qhs     Cards   - - 160  - Continue metoprolol d/t afib     Pulm   - RA   - Encourage IS if pt able     GI   - S&S pending   - npo, +NGT if fails S&S start TF   - Protonix dc'd   - continue w/ bowel regimen: senna, miralax     Renal   - Voiding, low UO continue to monitor   - S/p lasix 10 IV (3/31)     Endo   - Hydrocortisone 100 mg q8h x 3 days   - Continue home prednisone s/p stress dose    Heme  - Continue heparin gtt @8 ml/hr   - Daily ptt  - Transition back to [facility]?    ID   - afebrile     Dispo: pending PT/OT eval   D/w Dr. Hernandez NSCU Transfer Note    Transfer from: NSCU    Transfer to: (  ) Medicine    (  ) Telemetry    (  ) RCU   ( ) Neurosurgery                               (  ) Palliative    ( x ) Stroke Unit    (  ) MICU    (  ) __________________    Accepting Physician: Dr. Lopez    Signout given to: Stroke service    HPI / NSCU COURSE:  89 year old female with PMHx of Afib (off Eliquis), recent left MCA territory stroke s/p LM1 thrombectomy, with residual minimal aphasia, MRS 1, PPM, HTN, HLD, GERD, severe MR/TR, iatrogenic adrenal insufficiency (on prednisone), lymphedema, metastatic melanoma s/p Left foot melanoma and lymph node resection, s/p RUE melanoma and Right axillary lymph node excision at [facility] (3/18/25) who presented to  ED 3/26/36 with L sided weakness and aphasia. CT neg, CTA showing RM2 occlusion.  Transferred to [facility] intervention. S/P thrombectomy, TICI 3 achieved after 1 pass admitted to NSICU for post procedure monitoring started on IV Heparin gtt. Pt w. right common femoral pseudoaneurysm, vascular consulted and heparin gtt held x 6 hours for thrombin injection. On 3/29 pt w/ acute neurological change - severe aphasia - CTH/CTA/CTP ordered and CTA +new left M2 occlusion, returned to IR for mechanical thrombectomy TICI 0, distal L M4 clot. Pt extubated 3/20 to RA, remains therapeutic on Heparin gtt. Vascular following for pseudoaneurysm, pending RLE arterial duplex .       Vital Signs Last 24 Hrs  T(C): 37.1 (31 Mar 2025 08:00), Max: 37.7 (30 Mar 2025 10:45)  T(F): 98.8 (31 Mar 2025 08:00), Max: 99.9 (30 Mar 2025 10:45)  HR: 96 (31 Mar 2025 08:39) (85 - 125)  BP: 113/55 (31 Mar 2025 08:00) (110/63 - 143/62)  BP(mean): 71 (31 Mar 2025 08:00) (67 - 94)  RR: 15 (31 Mar 2025 08:00) (15 - 25)  SpO2: 96% (31 Mar 2025 08:39) (95% - 100%)    Parameters below as of 31 Mar 2025 08:39  Patient On (Oxygen Delivery Method): room air        I&O's Summary    30 Mar 2025 07:01  -  31 Mar 2025 07:00  --------------------------------------------------------  IN: 2380.6 mL / OUT: 795 mL / NET: 1585.6 mL        Physical Exam:   AAOX1-2 (nods appropriately to choices), OE spontaneously, PERRL, EOMI, mixed aphasia, mimicking >FC, RUE HG 4/5 otherwise b/l UE 5/5, no pronator drift, b/l LE 4-/5, sensation grossly intact throughout.   S1S2 present.  Chest rise symmetric  Abd soft, NT, ND.  No peripheral swelling.  RLE groin ecchymosis expanding posterior thigh, soft and outlined w/ marker to monitor    LABS:                               10.2   6.64  )-----------( 109      ( 31 Mar 2025 03:52 )             31.7       03-31    138  |  105  |  26.4[H]  ----------------------------<  138[H]  4.2   |  21.0[L]  |  0.45[L]    Ca    8.6      31 Mar 2025 03:52  Phos  2.7     03-31  Mg     1.9     03-31    TPro  5.9[L]  /  Alb  3.5  /  TBili  0.8  /  DBili  x   /  AST  19  /  ALT  21  /  AlkPhos  48  03-29      PT/INR - ( 29 Mar 2025 11:45 )   PT: 11.2 sec;   INR: 0.97 ratio         PTT - ( 31 Mar 2025 03:52 )  PTT:57.3 sec    ABG - ( 29 Mar 2025 17:23 )  pH, Arterial: 7.420 pH, Blood: x     /  pCO2: 43    /  pO2: 163   / HCO3: 28    / Base Excess: 3.4   /  SaO2: 98.5            Imaging:  < from: CT Head No Cont (03.29.25 @ 06:54) >    IMPRESSION:  Patchy hypoattenuation related to evolving acute/subacute bilateral MCA   territory infarcts. No intracranial hemorrhage or midline shift present.    Findings above were discussed directly by telephone by the ED radiologist   on call, Louie Worthy M.D., with the ordering provider, MIRIAM Corbett, at   approximately 7:01 AM on 3/29/2025.    --- End of Report ---    < end of copied text >    < from: CT Angio Brain Stroke Protocol  w/ IV Cont (03.29.25 @ 06:56) >      IMPRESSION:    CT PERFUSION:  Signal abnormality on T-Max/mean transit time data sets at the left   frontal lobe suggest territory-and-risk within the left MCA distribution,   corresponding to angiographic findings below. Cerebral blood flow and   cerebral blood volume data sets remain within normal limits.    Neurointerventional/neurosurgical consultation recommended. MRI may be   considered for further characterization.    CTA NECK:  No evidence of significant stenosis or occlusion.    Previously identified narrowing of the right cervical ICA with   questionable arterial dissection appears less conspicuous on the current   exam, suggesting at least partial recanalization. MRA (dissection   protocol) considered for further evaluation.    CTA HEAD:  Acute left M2 occlusion identified.    Critical findings above were discussed directly by telephone with   ordering provider, Aric PINEDA, on 3/29/2025 at 7:11 AM by Dr. Louie Worthy with read back confirmation.    --- End of Report ---        < end of copied text >    < from: CT Head No Cont (03.29.25 @ 20:56) >    IMPRESSION: No acute intracranial hemorrhage, mass effect, or shift of   the midline structures.    Evolving acute left frontal lobe superior division MCA infarct without   hemorrhagic transformation.    --- End of Report ---    < end of copied text >        ASSESSMENT:   89F with recurrent strokes, now with acute L MCA occlusion s/p TICI 0 intervention showing L M4 occlusion;  recent CVA due to R MCA occlusion, s/p TICI 3 thrombectomy 03/26. 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 Hemodynamically stable.    Plan:   Neuro   - Neuro/vitals q2h   - MR brain w/ and w/o - complete pending read  - C/w melatonin qhs     Cards   - - 160  - Continue metoprolol d/t afib     Pulm   - RA   - Encourage IS if pt able     GI   - S&S pending   - npo, +NGT if fails S&S start TF   - Protonix dc'd   - continue w/ bowel regimen: senna, miralax     Renal   - Voiding, low UO continue to monitor   - S/p lasix 10 IV (3/31)     Endo   - Hydrocortisone 100 mg q8h x 3 days   - Continue home prednisone s/p stress dose    Heme  - Continue heparin gtt @8 ml/hr   - Daily ptt  - Transition back to [facility]?    ID   - afebrile     Dispo: pending PT/OT eval   D/w Dr. Hernandez    ----------------------------------------------    ATTENDING ATTESTATION    Remains clinically stable; MRI with expected ischemic changes.    Updates to AM plan:   - AC: will continue with Heparin gtt for now; Hematology eval requested for optimal long-term AC regimen (DOAC vs. LMWH).  - Steroids: on a taper over 4 days, plan to resume home regimen with PO Prednisone on 04/04.    Stable to be transferred to SDU.

## 2025-03-31 NOTE — PROGRESS NOTE ADULT - SUBJECTIVE AND OBJECTIVE BOX
Preliminary note, official recommendations pending attending review/signature   Seen and examined by Stroke team attending/team, assessment/ plan as discussed with stroke team attending/team as noted.     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  am morning prior.  Upon arrival to  ED she was noted to have lt sided facial droop; aphasic; left arm weakness; no TNK as out of window/recent infarction.  code stroke activated;  CT imagining found patient with new rt mca; patient transferred to [facility] for thrombectomy. Pt transferred to [facility] EDUARDO eval, s/p cerebral angiogram for mechanical thrombectomy of Rt M2 occlusion, TICI 3 (one pass). She was admitted to NSICU for close monitoring post thrombectomy.       SUBJECTIVE: No events overnight.  No new neurologic complaints.  ROS reported negative unless otherwise noted.    acetaminophen     Tablet .. 650 milliGRAM(s) Oral every 6 hours PRN  atorvastatin 10 milliGRAM(s) Oral at bedtime  chlorhexidine 2% Cloths 1 Application(s) Topical daily  furosemide   Injectable 10 milliGRAM(s) IV Push once  heparin  Infusion 650 Unit(s)/Hr IV Continuous <Continuous>  hydrALAZINE Injectable 10 milliGRAM(s) IV Push four times a day PRN  hydrocortisone sodium succinate Injectable 100 milliGRAM(s) IV Push every 8 hours  labetalol Injectable 10 milliGRAM(s) IV Push every 4 hours PRN  melatonin 5 milliGRAM(s) Oral at bedtime  metoprolol tartrate 25 milliGRAM(s) Oral two times a day  pantoprazole  Injectable 40 milliGRAM(s) IV Push daily  polyethylene glycol 3350 17 Gram(s) Oral two times a day  senna 2 Tablet(s) Oral at bedtime      PHYSICAL EXAM:   Vital Signs Last 24 Hrs  T(C): 37.1 (31 Mar 2025 08:00), Max: 37.7 (30 Mar 2025 10:45)  T(F): 98.8 (31 Mar 2025 08:00), Max: 99.9 (30 Mar 2025 10:45)  HR: 96 (31 Mar 2025 08:39) (85 - 125)  BP: 113/55 (31 Mar 2025 08:00) (110/63 - 143/62)  BP(mean): 71 (31 Mar 2025 08:00) (67 - 94)  RR: 15 (31 Mar 2025 08:00) (15 - 25)  SpO2: 96% (31 Mar 2025 08:39) (95% - 100%)    Parameters below as of 31 Mar 2025 08:39  Patient On (Oxygen Delivery Method): room air    EXAM PENDING     General: No acute distress.   HEENT: Head normocephalic, atraumatic. Conjunctivae clear w/o exudates or hemorrhage. Sclera non-icteric. Nares are patent bilaterally. Mucous membranes pink and moist.  No tonsillar swelling or exudates.    Neck: Supple, no adenopathy. Trachea midline. No JVD.  Cardiac: Normal rate and rhythm. S1, S2 auscultated. No murmurs, gallops, or rubs.     Respiratory: Lung sounds clear in all fields. Chest wall symmetric, nontender.   Abdominal: Soft, nondistended, nontender. Bowel sounds normoactive x 4 quadrants. No masses, hepatomegaly, or splenomegaly.   Skin: Skin is warm, dry and intact without rashes or lesions. Appropriate color for ethnicity. Nailbeds pink with no cyanosis or clubbing.   Extremities: No edema, 2+ peripheral pulses in b/l upper and b/l lower extremities. Full range of motion in all joints.     Detailed Neurologic Exam:    Mental status: The patient is sleeping, becomes awake and alert, followed single command once, then does no longer follow correctly, the patient attempts to generate speech, mouths words, not oriented to person, place, time.  The patient is not able to repeat or ID objects.     Cranial nerves: slight right facial palsy, severe dysarthria nearly anarthric , Pupils equal and react symmetrically to light. There is no visual field deficit to confrontation. Extraocular motion is full with no nystagmus.      Motor: There is normal bulk and tone.  There is no tremor.  Strength is 4/5 in the right arm and 3/5 right leg. Drifts.   Strength is 5/5 in the left arm and 4/5 left leg.    Sensation: Intact to light touch and pin in 4 extremities    Cerebellar: There is no dysmetria on finger to nose testing.    Gait : deferred    LABS:                        10.2   6.64  )-----------( 109      ( 31 Mar 2025 03:52 )             31.7    03-31    138  |  105  |  26.4[H]  ----------------------------<  138[H]  4.2   |  21.0[L]  |  0.45[L]    Ca    8.6      31 Mar 2025 03:52  Phos  2.7     03-31  Mg     1.9     03-31    TPro  5.9[L]  /  Alb  3.5  /  TBili  0.8  /  DBili  x   /  AST  19  /  ALT  21  /  AlkPhos  48  03-29  PT/INR - ( 29 Mar 2025 11:45 )   PT: 11.2 sec;   INR: 0.97 ratio         PTT - ( 31 Mar 2025 03:52 )  PTT:57.3 sec      03-27 Chol 145 LDL - 59- HDL 74 Trig 55, 03-22 Chol 136 LDL -- HDL 66 Trig 56    A1C: 5.5    IMAGING: Reviewed by me.   Neuro-Imaging    IR Neuro (03.29.25 @ 09:10)   Supervision and Interpretation:  1. Interval recanalization of the left MCA M2 occlusion seen byCT   angiography with persistent posterior frontal distal M4 branch occlusion.   Attempted thrombectomy was unsuccessful because the branch was too small   to be accessed by the red 43 thrombectomy catheter.    CT Head No Cont (03.29.25 @ 20:56)   IMPRESSION: No acute intracranial hemorrhage, mass effect, or shift of   the midline structures.  Evolving acute left frontal lobe superior division MCA infarct without   hemorrhagic transformation.    CT Head No Cont (03.29.25 @ 06:54)   Patchy hypoattenuation related to evolving acute/subacute bilateral MCA   territory infarcts. No intracranial hemorrhage or midline shift present.      CT Angio Head/Neck Stroke Protocol w/ IV Cont, CT Perfusion (03.29.25 @ 06:56)   IMPRESSION:  CT PERFUSION:  Signal abnormality on T-Max/mean transit time data sets at the left   frontal lobe suggest territory-and-risk within the left MCA distribution,   corresponding to angiographic findings below. Cerebral blood flow and   cerebral blood volume data sets remain within normal limits.  Neurointerventional/neurosurgical consultation recommended. MRI may be   considered for further characterization.  CTA NECK:  No evidence of significant stenosis or occlusion.  Previously identified narrowing of the right cervical ICA with   questionable arterial dissection appears less conspicuous on the current   exam, suggesting at least partial recanalization. MRA (dissection   protocol) considered for further evaluation.  CTA HEAD:  Acute left M2 occlusion identified.  Critical findings above were discussed directly by telephone with   ordering provider, Aric PINEDA, on 3/29/2025 at 7:11 AM by Dr. Louie Worthy with read back confirmation.    CT Head No Cont (03.27.25 @ 05:43)  IMPRESSION: Age-appropriate involutional and ischemic gliotic changes. No   hemorrhage. No significant change since 3/26/2025.     CT Head No Cont (03.27.25 @ 05:43)   IMPRESSION: Age-appropriate involutional and ischemic gliotic changes. No   hemorrhage. No significant change since 3/26/2025.     CT Angio Head/Neck Stroke Protocol w/ IV Cont (03.26.25 @ 08:52)   .   RIGHT CAROTID SYSTEM:    Atherosclerotic plaque carotid bulb without    hemodynamically significant stenosis. Tandem lesion distal internal   carotid artery at the C1 level has developed since 3/21/2025, possible   interval arterial dissection, with associated luminal narrowing   approximately 30%  2.   LEFT CAROTID SYSTEM:     Atherosclerotic plaque carotid bulb without    hemodynamically significant stenosis.  3.   VERTEBRAL CIRCULATION:    Patent.  4.  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 Right MCA occlusion   in its proximal M2 segment is an interval finding since 3/21/2025. Left   MCA remains patent with luminal irregularity and low-grade narrowing   following recent thrombectomy.  5.  POSTERIOR INTRACRANIAL CIRCULATION:    Patent.  6. BRAIN PERFUSION:   No acute infarction of the brain is convincingly   demonstrated.  However, broad region of apparent ischemia corresponds to   the right MCA distribution correlating with acute embolic occlusion on CT   angiography  7. Heterogeneous enhancement within the principal dural sinuses may be   secondary to the early phase of venous opacification on this arterial   phase examination. The appearance is similar to 3/21/2025. Consider   supplemental evaluation with MR venogram    ULTRASOUND   US Pseudoaneurysm Injection (03.28.25 @ 09:41)   Successful thrombin and ultrasound-guided compression of the right groin   pseudoaneurysm.      US Duplex Arterial Lower Ext Ltd, Right (03.27.25 @ 04:09)   IMPRESSION:  A 1.7 cm right CFA pseudoaneurysm.  A 2.5 cm adjacent hematoma.             Preliminary note, official recommendations pending attending review/signature   Seen and examined by Stroke team attending/team, assessment/ plan as discussed with stroke team attending/team as noted.     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  am morning prior.  Upon arrival to  ED she was noted to have lt sided facial droop; aphasic; left arm weakness; no TNK as out of window/recent infarction.  code stroke activated;  CT imagining found patient with new rt mca; patient transferred to [facility] for thrombectomy. Pt transferred to [facility] EDUARDO eval, s/p cerebral angiogram for mechanical thrombectomy of Rt M2 occlusion, TICI 3 (one pass). She was admitted to NSICU for close monitoring post thrombectomy.       SUBJECTIVE: No events overnight.  No new neurologic complaints.  ROS reported negative unless otherwise noted.    acetaminophen     Tablet .. 650 milliGRAM(s) Oral every 6 hours PRN  atorvastatin 10 milliGRAM(s) Oral at bedtime  chlorhexidine 2% Cloths 1 Application(s) Topical daily  furosemide   Injectable 10 milliGRAM(s) IV Push once  heparin  Infusion 650 Unit(s)/Hr IV Continuous <Continuous>  hydrALAZINE Injectable 10 milliGRAM(s) IV Push four times a day PRN  hydrocortisone sodium succinate Injectable 100 milliGRAM(s) IV Push every 8 hours  labetalol Injectable 10 milliGRAM(s) IV Push every 4 hours PRN  melatonin 5 milliGRAM(s) Oral at bedtime  metoprolol tartrate 25 milliGRAM(s) Oral two times a day  pantoprazole  Injectable 40 milliGRAM(s) IV Push daily  polyethylene glycol 3350 17 Gram(s) Oral two times a day  senna 2 Tablet(s) Oral at bedtime      PHYSICAL EXAM:   Vital Signs Last 24 Hrs  T(C): 37.1 (31 Mar 2025 08:00), Max: 37.7 (30 Mar 2025 10:45)  T(F): 98.8 (31 Mar 2025 08:00), Max: 99.9 (30 Mar 2025 10:45)  HR: 96 (31 Mar 2025 08:39) (85 - 125)  BP: 113/55 (31 Mar 2025 08:00) (110/63 - 143/62)  BP(mean): 71 (31 Mar 2025 08:00) (67 - 94)  RR: 15 (31 Mar 2025 08:00) (15 - 25)  SpO2: 96% (31 Mar 2025 08:39) (95% - 100%)    Parameters below as of 31 Mar 2025 08:39  Patient On (Oxygen Delivery Method): room air       General: No acute distress.   HEENT: Head normocephalic, atraumatic. Conjunctivae clear w/o exudates or hemorrhage.    Cardiac: irregular rate and rhythm.    Respiratory: Lung sounds clear in all fields. Chest wall symmetric, nontender. no audible wheezes, respirations unlabored    Abdominal: Soft, nondistended, nontender. Bowel sounds normoactive x 4 quadrants.    Skin: Skin is warm, dry     Extremities: No edema, left groin site with no active bleeding, no hematoma, soft , NTTP     Detailed Neurologic Exam:    Mental status: The patient is awake and alert, followed single commands at times , the patient is able to state name, not oriented to person, place, time.       Cranial nerves: slight right facial palsy, moderate-severe dysarthria nearly anarthric , Pupils equal and react symmetrically to light. There is no visual field deficit to confrontation. Extraocular motion is full with no nystagmus.      Motor: There is normal bulk and tone.  There is no tremor.  Strength is 4/5 in the right arm and 5/5 right leg. Drifts.   Strength is 5/5 in the left arm and 4/5 left leg.    Sensation: Intact to light touch and pin in 4 extremities    Cerebellar: There is no dysmetria on finger to nose testing.    Gait : deferred    LABS:                        10.2   6.64  )-----------( 109      ( 31 Mar 2025 03:52 )             31.7    03-31    138  |  105  |  26.4[H]  ----------------------------<  138[H]  4.2   |  21.0[L]  |  0.45[L]    Ca    8.6      31 Mar 2025 03:52  Phos  2.7     03-31  Mg     1.9     03-31    TPro  5.9[L]  /  Alb  3.5  /  TBili  0.8  /  DBili  x   /  AST  19  /  ALT  21  /  AlkPhos  48  03-29  PT/INR - ( 29 Mar 2025 11:45 )   PT: 11.2 sec;   INR: 0.97 ratio         PTT - ( 31 Mar 2025 03:52 )  PTT:57.3 sec      03-27 Chol 145 LDL - 59- HDL 74 Trig 55, 03-22 Chol 136 LDL -- HDL 66 Trig 56    A1C: 5.5    IMAGING: Reviewed by me.   Neuro-Imaging    MR Head w/wo IV Cont (03.31.25 @ 10:09)   IMPRESSION:  New acute infarctions within the LEFT lateral frontal   cortex, LEFT occipital cortex, RIGHT insular cortex and scattered RIGHT   frontal, parietal, temporal and occipital cortex suggesting embolic   etiology. Subacute infarction within the LEFT basal ganglia. Mild   periventricular chronic white matter ischemia.    IR Neuro (03.29.25 @ 09:10)   Supervision and Interpretation:  1. Interval recanalization of the left MCA M2 occlusion seen byCT   angiography with persistent posterior frontal distal M4 branch occlusion.   Attempted thrombectomy was unsuccessful because the branch was too small   to be accessed by the red 43 thrombectomy catheter.    CT Head No Cont (03.29.25 @ 20:56)   IMPRESSION: No acute intracranial hemorrhage, mass effect, or shift of   the midline structures.  Evolving acute left frontal lobe superior division MCA infarct without   hemorrhagic transformation.    CT Head No Cont (03.29.25 @ 06:54)   Patchy hypoattenuation related to evolving acute/subacute bilateral MCA   territory infarcts. No intracranial hemorrhage or midline shift present.      CT Angio Head/Neck Stroke Protocol w/ IV Cont, CT Perfusion (03.29.25 @ 06:56)   IMPRESSION:  CT PERFUSION:  Signal abnormality on T-Max/mean transit time data sets at the left   frontal lobe suggest territory-and-risk within the left MCA distribution,   corresponding to angiographic findings below. Cerebral blood flow and   cerebral blood volume data sets remain within normal limits.  Neurointerventional/neurosurgical consultation recommended. MRI may be   considered for further characterization.  CTA NECK:  No evidence of significant stenosis or occlusion.  Previously identified narrowing of the right cervical ICA with   questionable arterial dissection appears less conspicuous on the current   exam, suggesting at least partial recanalization. MRA (dissection   protocol) considered for further evaluation.  CTA HEAD:  Acute left M2 occlusion identified.  Critical findings above were discussed directly by telephone with   ordering provider, Aric PINEDA, on 3/29/2025 at 7:11 AM by Dr. Louie Worthy with read back confirmation.    CT Head No Cont (03.27.25 @ 05:43)  IMPRESSION: Age-appropriate involutional and ischemic gliotic changes. No   hemorrhage. No significant change since 3/26/2025.     CT Head No Cont (03.27.25 @ 05:43)   IMPRESSION: Age-appropriate involutional and ischemic gliotic changes. No   hemorrhage. No significant change since 3/26/2025.     CT Angio Head/Neck Stroke Protocol w/ IV Cont (03.26.25 @ 08:52)   .   RIGHT CAROTID SYSTEM:    Atherosclerotic plaque carotid bulb without    hemodynamically significant stenosis. Tandem lesion distal internal   carotid artery at the C1 level has developed since 3/21/2025, possible   interval arterial dissection, with associated luminal narrowing   approximately 30%  2.   LEFT CAROTID SYSTEM:     Atherosclerotic plaque carotid bulb without    hemodynamically significant stenosis.  3.   VERTEBRAL CIRCULATION:    Patent.  4.  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 Right MCA occlusion   in its proximal M2 segment is an interval finding since 3/21/2025. Left   MCA remains patent with luminal irregularity and low-grade narrowing   following recent thrombectomy.  5.  POSTERIOR INTRACRANIAL CIRCULATION:    Patent.  6. BRAIN PERFUSION:   No acute infarction of the brain is convincingly   demonstrated.  However, broad region of apparent ischemia corresponds to   the right MCA distribution correlating with acute embolic occlusion on CT   angiography  7. Heterogeneous enhancement within the principal dural sinuses may be   secondary to the early phase of venous opacification on this arterial   phase examination. The appearance is similar to 3/21/2025. Consider   supplemental evaluation with MR venogram    ULTRASOUND   US Pseudoaneurysm Injection (03.28.25 @ 09:41)   Successful thrombin and ultrasound-guided compression of the right groin   pseudoaneurysm.      US Duplex Arterial Lower Ext Ltd, Right (03.27.25 @ 04:09)   IMPRESSION:  A 1.7 cm right CFA pseudoaneurysm.  A 2.5 cm adjacent hematoma.    CARDIOLOGY   TTE W or WO Ultrasound Enhancing Agent (03.26.25 @ 18:15)    1. Technically difficult image quality.   2. Left ventricular cavity is normal in size. Left ventricular systolic function is hyperdynamic with an ejectionfraction visually estimated at 65 to 70 %.   3. Mild left ventricular hypertrophy.   4. Normal right ventricular cavity size and normal right ventricular systolic function.   5. Left atrium is severely dilated.   6. The right atrium is severely dilated.                     Seen and examined by Stroke team attending/team, assessment/ plan as discussed with stroke team attending/team as noted.     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  am morning prior.  Upon arrival to  ED she was noted to have lt sided facial droop; aphasic; left arm weakness; no TNK as out of window/recent infarction.  code stroke activated;  CT imagining found patient with new rt mca; patient transferred to [facility] for thrombectomy. Pt transferred to [facility] EDUARDO eval, s/p cerebral angiogram for mechanical thrombectomy of Rt M2 occlusion, TICI 3 (one pass). She was admitted to NSICU for close monitoring post thrombectomy.       SUBJECTIVE: No events overnight.  No new neurologic complaints.  ROS reported negative unless otherwise noted.    acetaminophen     Tablet .. 650 milliGRAM(s) Oral every 6 hours PRN  atorvastatin 10 milliGRAM(s) Oral at bedtime  chlorhexidine 2% Cloths 1 Application(s) Topical daily  furosemide   Injectable 10 milliGRAM(s) IV Push once  heparin  Infusion 650 Unit(s)/Hr IV Continuous <Continuous>  hydrALAZINE Injectable 10 milliGRAM(s) IV Push four times a day PRN  hydrocortisone sodium succinate Injectable 100 milliGRAM(s) IV Push every 8 hours  labetalol Injectable 10 milliGRAM(s) IV Push every 4 hours PRN  melatonin 5 milliGRAM(s) Oral at bedtime  metoprolol tartrate 25 milliGRAM(s) Oral two times a day  pantoprazole  Injectable 40 milliGRAM(s) IV Push daily  polyethylene glycol 3350 17 Gram(s) Oral two times a day  senna 2 Tablet(s) Oral at bedtime      PHYSICAL EXAM:   Vital Signs Last 24 Hrs  T(C): 37.1 (31 Mar 2025 08:00), Max: 37.7 (30 Mar 2025 10:45)  T(F): 98.8 (31 Mar 2025 08:00), Max: 99.9 (30 Mar 2025 10:45)  HR: 96 (31 Mar 2025 08:39) (85 - 125)  BP: 113/55 (31 Mar 2025 08:00) (110/63 - 143/62)  BP(mean): 71 (31 Mar 2025 08:00) (67 - 94)  RR: 15 (31 Mar 2025 08:00) (15 - 25)  SpO2: 96% (31 Mar 2025 08:39) (95% - 100%)    Parameters below as of 31 Mar 2025 08:39  Patient On (Oxygen Delivery Method): room air       General: No acute distress.   HEENT: Head normocephalic, atraumatic. Conjunctivae clear w/o exudates or hemorrhage.    Cardiac: irregular rate and rhythm.    Respiratory: Lung sounds clear in all fields. Chest wall symmetric, nontender. no audible wheezes, respirations unlabored    Abdominal: Soft, nondistended, nontender. Bowel sounds normoactive x 4 quadrants.    Skin: Skin is warm, dry     Extremities: No edema, left groin site with no active bleeding, no hematoma, soft , NTTP     Detailed Neurologic Exam:    Mental status: The patient is awake and alert, followed single commands at times , the patient is able to state name, not oriented to person, place, time.       Cranial nerves: slight right facial palsy, moderate-severe dysarthria nearly anarthric , Pupils equal and react symmetrically to light. There is no visual field deficit to confrontation. Extraocular motion is full with no nystagmus.      Motor: There is normal bulk and tone.  There is no tremor.  Strength is 4/5 in the right arm and 5/5 right leg. Drifts.   Strength is 5/5 in the left arm and 4/5 left leg.    Sensation: Intact to light touch and pin in 4 extremities    Cerebellar: There is no dysmetria on finger to nose testing.    Gait : deferred    LABS:                        10.2   6.64  )-----------( 109      ( 31 Mar 2025 03:52 )             31.7    03-31    138  |  105  |  26.4[H]  ----------------------------<  138[H]  4.2   |  21.0[L]  |  0.45[L]    Ca    8.6      31 Mar 2025 03:52  Phos  2.7     03-31  Mg     1.9     03-31    TPro  5.9[L]  /  Alb  3.5  /  TBili  0.8  /  DBili  x   /  AST  19  /  ALT  21  /  AlkPhos  48  03-29  PT/INR - ( 29 Mar 2025 11:45 )   PT: 11.2 sec;   INR: 0.97 ratio         PTT - ( 31 Mar 2025 03:52 )  PTT:57.3 sec      03-27 Chol 145 LDL - 59- HDL 74 Trig 55, 03-22 Chol 136 LDL -- HDL 66 Trig 56    A1C: 5.5    IMAGING: Reviewed by me.   Neuro-Imaging    MR Head w/wo IV Cont (03.31.25 @ 10:09)   IMPRESSION:  New acute infarctions within the LEFT lateral frontal   cortex, LEFT occipital cortex, RIGHT insular cortex and scattered RIGHT   frontal, parietal, temporal and occipital cortex suggesting embolic   etiology. Subacute infarction within the LEFT basal ganglia. Mild   periventricular chronic white matter ischemia.    IR Neuro (03.29.25 @ 09:10)   Supervision and Interpretation:  1. Interval recanalization of the left MCA M2 occlusion seen byCT   angiography with persistent posterior frontal distal M4 branch occlusion.   Attempted thrombectomy was unsuccessful because the branch was too small   to be accessed by the red 43 thrombectomy catheter.    CT Head No Cont (03.29.25 @ 20:56)   IMPRESSION: No acute intracranial hemorrhage, mass effect, or shift of   the midline structures.  Evolving acute left frontal lobe superior division MCA infarct without   hemorrhagic transformation.    CT Head No Cont (03.29.25 @ 06:54)   Patchy hypoattenuation related to evolving acute/subacute bilateral MCA   territory infarcts. No intracranial hemorrhage or midline shift present.      CT Angio Head/Neck Stroke Protocol w/ IV Cont, CT Perfusion (03.29.25 @ 06:56)   IMPRESSION:  CT PERFUSION:  Signal abnormality on T-Max/mean transit time data sets at the left   frontal lobe suggest territory-and-risk within the left MCA distribution,   corresponding to angiographic findings below. Cerebral blood flow and   cerebral blood volume data sets remain within normal limits.  Neurointerventional/neurosurgical consultation recommended. MRI may be   considered for further characterization.  CTA NECK:  No evidence of significant stenosis or occlusion.  Previously identified narrowing of the right cervical ICA with   questionable arterial dissection appears less conspicuous on the current   exam, suggesting at least partial recanalization. MRA (dissection   protocol) considered for further evaluation.  CTA HEAD:  Acute left M2 occlusion identified.  Critical findings above were discussed directly by telephone with   ordering provider, Aric PINEDA, on 3/29/2025 at 7:11 AM by Dr. Louie Worthy with read back confirmation.    CT Head No Cont (03.27.25 @ 05:43)  IMPRESSION: Age-appropriate involutional and ischemic gliotic changes. No   hemorrhage. No significant change since 3/26/2025.     CT Head No Cont (03.27.25 @ 05:43)   IMPRESSION: Age-appropriate involutional and ischemic gliotic changes. No   hemorrhage. No significant change since 3/26/2025.     CT Angio Head/Neck Stroke Protocol w/ IV Cont (03.26.25 @ 08:52)   .   RIGHT CAROTID SYSTEM:    Atherosclerotic plaque carotid bulb without    hemodynamically significant stenosis. Tandem lesion distal internal   carotid artery at the C1 level has developed since 3/21/2025, possible   interval arterial dissection, with associated luminal narrowing   approximately 30%  2.   LEFT CAROTID SYSTEM:     Atherosclerotic plaque carotid bulb without    hemodynamically significant stenosis.  3.   VERTEBRAL CIRCULATION:    Patent.  4.  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 Right MCA occlusion   in its proximal M2 segment is an interval finding since 3/21/2025. Left   MCA remains patent with luminal irregularity and low-grade narrowing   following recent thrombectomy.  5.  POSTERIOR INTRACRANIAL CIRCULATION:    Patent.  6. BRAIN PERFUSION:   No acute infarction of the brain is convincingly   demonstrated.  However, broad region of apparent ischemia corresponds to   the right MCA distribution correlating with acute embolic occlusion on CT   angiography  7. Heterogeneous enhancement within the principal dural sinuses may be   secondary to the early phase of venous opacification on this arterial   phase examination. The appearance is similar to 3/21/2025. Consider   supplemental evaluation with MR venogram    ULTRASOUND   US Pseudoaneurysm Injection (03.28.25 @ 09:41)   Successful thrombin and ultrasound-guided compression of the right groin   pseudoaneurysm.      US Duplex Arterial Lower Ext Ltd, Right (03.27.25 @ 04:09)   IMPRESSION:  A 1.7 cm right CFA pseudoaneurysm.  A 2.5 cm adjacent hematoma.    CARDIOLOGY   TTE W or WO Ultrasound Enhancing Agent (03.26.25 @ 18:15)    1. Technically difficult image quality.   2. Left ventricular cavity is normal in size. Left ventricular systolic function is hyperdynamic with an ejectionfraction visually estimated at 65 to 70 %.   3. Mild left ventricular hypertrophy.   4. Normal right ventricular cavity size and normal right ventricular systolic function.   5. Left atrium is severely dilated.   6. The right atrium is severely dilated.

## 2025-03-31 NOTE — SWALLOW BEDSIDE ASSESSMENT ADULT - SLP GENERAL OBSERVATIONS
Pt received & seen seated upright OOB in chair, awake/alert, NGT, on room air with 02 sats: 96-07%, aphasic, family present, 0/10 pain pre/post Pt received & seen seated upright OOB in chair, awake/alert, NGT, on room air with 02 sats: 96-97%, aphasic, family present, 0/10 pain pre/post

## 2025-03-31 NOTE — PROGRESS NOTE ADULT - ASSESSMENT
89F with PMHx of A-fib, HTN, Melanoma who was recently admitted for stroke with Left MCA occlusion s/p thrombectomy, now readmitted for left sided weakness and left facial droop. Found with new Right MCA occlusion s/p thrombectomy. On 3/29, had acute neurological change with severe aphasia, +new left M2 occlusion, returned to IR for mechanical thrombectomy. Endocrinology consult requested for management of adrenal insufficiency. Has history of iatrogenic AI after immunotherapy treatment for melanoma in 2019. She is following with endocrinologist Dr. Antonio Gaines at [place]. Home regimen consist of Prednisone 5 mg in AM and 2 mg PM.     1. Iatrogenic adrenal insufficiency  - Hemodynamically stable  - Decrease to IV hydrocortisone 50mg q8 hr  - Will taper back down to home prednisone dose as tolerated (5 mg in AM and 2 mg in PM)  - Monitor BP and electrolytes    2. L MCA stroke s/p thrombectomy  - Neuro checks  - MRI pending

## 2025-03-31 NOTE — PROGRESS NOTE ADULT - ASSESSMENT
89 year old female with PMHx of Afib (off Eliquis), recent left MCA territory stroke s/p LM1 thrombectomy, with residual minimal aphasia, MRS 1, PPM, HTN, HLD, GERD, severe MR/TR, iatrogenic adrenal insufficiency (on prednisone), lymphedema, metastatic melanoma s/p Left foot melanoma and lymph node resection, s/p RUE melanoma and Right axillary lymph node excision at [facility] (3/18/25) who presented to  ED 3/26/36 with Lt sided weakness and aphasia. CT neg, CTA showed a  Rt M2 occlusion.  Transferred to [facility]UARD[facility] intervention. S/P thrombectomy, TICI 3 achieved after 1 pass admitted to NSICU for post procedure monitoring started on IV Heparin.On the early morning of 3/29/25 pt received medications around 6am by RN report and subsequently was appreciated to be aphasic with right sided weakness.  CTH/CTA/CTP ordered. CTH negative for acute hemorrhage. CTA + new Left MCA M2 occulusion.       IMPRESSION:   Recent left MCA cerebral infarction s/p Left MCA M1 thrombectomy on 3/21/2025. TICI 3 recanalization. Patient subsequently discharged and returned 3/26/25 with Rt MCA  m2 occlusion, revascularization TICI 3. On 3/29/25 patient found to have acute Left M2 MCA occlusion s/p thrombectomy TICI 0 -- distal M4 branch. Etiology likely cardioembolic in the setting of atrial fibrillation.       ASSESSMENT:     NEURO:   -Neurologically ---   -Continue close monitoring for neurologic deterioration    - Stroke neuro checks q _   - Permissive HTN or  SBP goal   -ANTITHROMBOTIC THERAPY:   -titrate statin to LDL goal less than 70  -MRI Brain w/o, MRA Head w/o and Neck w/contrast  -Dysphagia screen: pass/fail   -Physical therapy/OT/Speech eval/treatment.       -CARDIOVASCULAR: check TTE, cardiac monitoring w/ telemetry for now, further evaluation pending findings of noted workup                              -HEMATOLOGY: H/H _, Platelets __, patient should have all age and risk appropriate malignancy screenings with PCP or sooner if clinically suspected      DVT ppx: Heparin s.c [] LMWH []     PULMONARY: CXR ___ , protecting airway, saturating well     RENAL: BUN/Cr _, monitor urine output, maintain adequate hydration       Na Goal:  135-145     Rodriguez:    ID: afebrile, no leukocytosis, monitor for si/sx of infection     OTHER:  condition and plan of care d/w patient, questions and concerns addressed.     DISPOSITION: Rehab or home depending on PT eval once stable and workup is complete      CORE MEASURES:       Admission NIHSS: 10     Tenecteplase : [] YES [x] NO     LDL/HDL/A1C:  59 //  74  //  5.5      Depression Screen- if depression hx and/or present     Statin Therapy: continue home dose statin      Dysphagia Screen: [x] PASS [] FAIL     Smoking in the past 12 months [] YES [x] NO     Afib [x] YES [] NO     Diabetes [] YES [x] NO     Stroke Education [x] YES [] NO  Diabetes [] YES [] NO      Obtain screening lower extremity venous ultrasound in patients who meet 1 or more of the following criteria as patient is high risk for DVT/PE on admission:   [] History of DVT/PE  []Hypercoagulable states (Factor V Leiden, Cancer, OCP, etc. )  []Prolonged immobility (hemiplegia/hemiparesis/post operative or any other extended immobilization)  [] Transferred from outside facility (Rehab or Long term care)  [] Age </= to 50  []Stroke     89 year old female with PMHx of Afib (off Eliquis), recent left MCA territory stroke s/p LM1 thrombectomy, with residual minimal aphasia, MRS 1, PPM, HTN, HLD, GERD, severe MR/TR, iatrogenic adrenal insufficiency (on prednisone), lymphedema, metastatic melanoma s/p Left foot melanoma and lymph node resection, s/p RUE melanoma and Right axillary lymph node excision at [facility] (3/18/25) who presented to  ED 3/26/36 with Lt sided weakness and aphasia. CT neg, CTA showed a  Rt M2 occlusion.  Transferred to [facility]ARD[facility] intervention. S/P thrombectomy, TICI 3 achieved after 1 pass admitted to NSICU for post procedure monitoring started on IV Heparin.On the early morning of 3/29/25 pt received medications around 6am by RN report and subsequently was appreciated to be aphasic with right sided weakness.  CTH/CTA/CTP ordered. CTH negative for acute hemorrhage. CTA + new Left MCA M2 occulusion.       IMPRESSION:   Recent left MCA cerebral infarction s/p Left MCA M1 thrombectomy on 3/21/2025. TICI 3 recanalization. Patient subsequently discharged and returned 3/26/25 with Rt MCA  m2 occlusion, revascularization TICI 3. On 3/29/25 patient found to have acute Left M2 MCA occlusion s/p thrombectomy TICI 0 -- distal M4 branch. MRI brain demonstrated acute bilateral multifocal cerebral infarctions. There is subacute left basal ganglia infarct.  Etiology likely cardioembolic in the setting of atrial fibrillation.       ASSESSMENT:     NEURO:   -Neurologically with gradually improving speech vs. most recent occlusion   -Continue close monitoring for neurologic deterioration    - Stroke neuro checks q2 hours    -  SBP goal 130-160mmhg for now avoiding rapid fluctuations and hypotension    -ANTITHROMBOTIC THERAPY:  Heparin gtt, no bolus, PTT goal currently 40-60- further titration based on clinical course and hematology input   -continue home statin regimen if applicable as LDL < goal of 70   -MRI imaging as noted   -Dysphagia screen: failed, NGT in place, SLP following for further advancement as applicable   -Physical therapy/OT/Speech eval/treatment.   -Stroke education     -CARDIOVASCULAR: TTE as noted , cardiac monitoring w/ telemetry for now, further evaluation pending findings of noted workup, continue rate control                           -HEMATOLOGY: H/H with mild anemia, no active bleeding noted, Platelets 109- monitor thrombocytosis closely, patient should have all age and risk appropriate malignancy screenings with PCP or sooner if clinically suspected , hematology consulted- final recommendations pending , hypercoagulable panel suggested      DVT ppx: Heparin gtt     PULMONARY:  protecting airway, saturating well     RENAL: BUN slightly elevated, Cr within range, monitor urine output, maintain adequate hydration       Na Goal:  135-145     Rodriguez: as noted     ID: afebrile, no leukocytosis, monitor for si/sx of infection     ENDO: 1. Iatrogenic adrenal insufficiency  - Hemodynamically stable  - Decrease to IV hydrocortisone 50mg q8 hr  - Will taper back down to home prednisone dose as tolerated (5 mg in AM and 2 mg in PM)  - Monitor BP and electrolytes    VASCULAR: signing off , pseudo appears to be occluded . please call with any issues .    OTHER:  condition and plan of care d/w patient, family, questions and concerns addressed.     DISPOSITION: Rehab once stable and workup is complete      CORE MEASURES:       Admission NIHSS: 10     Tenecteplase : [] YES [x] NO     LDL/HDL/A1C:  59 //  74  //  5.5      Depression Screen- if depression hx and/or present     Statin Therapy: continue home dose statin      Dysphagia Screen: [x] PASS [] FAIL     Smoking in the past 12 months [] YES [x] NO     Afib [x] YES [] NO     Diabetes [] YES [x] NO     Stroke Education [x] YES [] NO  Diabetes [] YES [x] NO      Obtain screening lower extremity venous ultrasound in patients who meet 1 or more of the following criteria as patient is high risk for DVT/PE on admission:   [] History of DVT/PE  []Hypercoagulable states (Factor V Leiden, Cancer, OCP, etc. )  []Prolonged immobility (hemiplegia/hemiparesis/post operative or any other extended immobilization)  [] Transferred from outside facility (Rehab or Long term care)  [] Age </= to 50  [x]Stroke

## 2025-03-31 NOTE — PROGRESS NOTE ADULT - SUBJECTIVE AND OBJECTIVE BOX
SURGERY PROGRESS NOTE    Subjective:   Patient examined at bedside this AM. Reports     Objective:  Vital Signs  T(C): 36.9 (03-31 @ 07:00), Max: 37.7 (03-30 @ 07:45)  HR: 85 (03-31 @ 07:00) (85 - 125)  BP: 111/63 (03-31 @ 07:00) (110/63 - 152/97)  RR: 20 (03-31 @ 07:00) (15 - 25)  SpO2: 100% (03-31 @ 07:00) (95% - 100%)  03-30-25 @ 07:01  -  03-31-25 @ 07:00  --------------------------------------------------------  IN:  Total IN: 0 mL    OUT:    Indwelling Catheter - Urethral (mL): 695 mL  Total OUT: 695 mL    Total NET: -695 mL          Physical Exam:  General: alert and oriented, NAD  Resp: airway patent, respirations unlabored  CVS: regular rate and rhythm  Abdomen: soft, nontender, nondistended  Extremities: no edema  Skin: warm, dry, appropriate color    Labs:                        10.2   6.64  )-----------( 109      ( 31 Mar 2025 03:52 )             31.7   03-31    138  |  105  |  26.4[H]  ----------------------------<  138[H]  4.2   |  21.0[L]  |  0.45[L]    Ca    8.6      31 Mar 2025 03:52  Phos  2.7     03-31  Mg     1.9     03-31    TPro  5.9[L]  /  Alb  3.5  /  TBili  0.8  /  DBili  x   /  AST  19  /  ALT  21  /  AlkPhos  48  03-29    CAPILLARY BLOOD GLUCOSE  POCT Blood Glucose.: 149 mg/dL (30 Mar 2025 23:51)  POCT Blood Glucose.: 138 mg/dL (30 Mar 2025 17:55)  POCT Blood Glucose.: 144 mg/dL (30 Mar 2025 12:59) VASCULAR SURGERY PROGRESS NOTE    Subjective: Patient examined at bedside this AM. Reports       Objective:  Vital Signs  T(C): 36.9 (03-31 @ 07:00), Max: 37.7 (03-30 @ 07:45)  HR: 85 (03-31 @ 07:00) (85 - 125)  BP: 111/63 (03-31 @ 07:00) (110/63 - 152/97)  RR: 20 (03-31 @ 07:00) (15 - 25)  SpO2: 100% (03-31 @ 07:00) (95% - 100%)      03-30-25 @ 07:01  -  03-31-25 @ 07:00  --------------------------------------------------------  IN:  Total IN: 0 mL    OUT:    Indwelling Catheter - Urethral (mL): 695 mL  Total OUT: 695 mL    Total NET: -695 mL      Physical Exam:  General: alert and oriented, NAD  Resp: airway patent, respirations unlabored  CVS: sinus tachycardia on monitor  Abdomen: soft, nontender, nondistended  Groin: soft, no palpable hematoma, ecchymosis layering in dependent region of groin  Extremities: no edema  Skin: warm, dry, appropriate color      Labs:                        10.2   6.64  )-----------( 109      ( 31 Mar 2025 03:52 )             31.7   03-31    138  |  105  |  26.4[H]  ----------------------------<  138[H]  4.2   |  21.0[L]  |  0.45[L]    Ca    8.6      31 Mar 2025 03:52  Phos  2.7     03-31  Mg     1.9     03-31    TPro  5.9[L]  /  Alb  3.5  /  TBili  0.8  /  DBili  x   /  AST  19  /  ALT  21  /  AlkPhos  48  03-29      CAPILLARY BLOOD GLUCOSE  POCT Blood Glucose.: 149 mg/dL (30 Mar 2025 23:51)  POCT Blood Glucose.: 138 mg/dL (30 Mar 2025 17:55)  POCT Blood Glucose.: 144 mg/dL (30 Mar 2025 12:59)

## 2025-03-31 NOTE — PROGRESS NOTE ADULT - ASSESSMENT
Assessment: 89yo Female admitted s/p a L [facility]     Plan:   -   - Dispo:  Assessment: 88yo Female admitted s/p a L MCA thrombectomy with neuro IR, procedure c/b R common femoral artery pseudoaneurysm. Now s/p thrombin injection x2. Follow up duplex redemonstrates pseudoaneurysm, this time without flow into PSA. Likely resolved, will follow up radiology report.    Plan:   - f/u radiology report  - if PSA excluded, will sign off  - Remainder of care per NSICU team

## 2025-03-31 NOTE — CONSULT NOTE ADULT - SUBJECTIVE AND OBJECTIVE BOX
REASON FOR CONSULTATION:     HPI:  89 year old female with PMHx of Afib (on Eliquis), PPM, HTN, hx of RUE melanoma removal with R axillary lymph node removal, recent admission 3/21- 3/25 for lt MCA occlusion s/p thrombectomy presents as transfer from [facility] after she was found at 7:30 with lt facial and left sided weakness.  am.  Upon arrival to  ED she was noted to have lt sided facial droop; aphasic; left arm weakness; no TNK as out of window.  code stroke activated;  CT imagining found patient with new rt mca; patient transferred to [facility] for thrombectomy. Pt transferred to [facility] EDUARDO eval, s/p cerebral angiogram for mechanical thrombectomy of Rt M2 occlusion, TICI 3 (one pass). She is admitted to NSICU for close monitoring post thrombectomy. [facility], FC in no acute distress. recent CVA due to R MCA occlusion, s/p TICI 3 thrombectomy 03/26. 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 Hemodynamically stable.       NIH SS: 10  DATE:  TIME: 12:48  1A: Level of consciousness (0-3): 1  1B: Questions (0-2): 1   1C: Commands (0-2): 0  2: Gaze (0-2): 0  3: Visual fields (0-3): 0  4: Facial palsy (0-3): 0  MOTOR:  5A: Left arm motor drift (0-4): 1  5B: Right arm motor drift (0-4): 0  6A: Left leg motor drift (0-4): 3  6B: Right leg motor drift (0-4): 2  7: Limb ataxia (0-2): 0  SENSORY:  8: Sensation (0-2): 0  SPEECH:  9: Language (0-3): 3  10: Dysarthria (0-2): 0  EXTINCTION:  11: Extinction/inattention (0-2): 0    TOTAL SCORE: 10     (26 Mar 2025 12:28)      REVIEW OF SYSTEMS:  Constitutional, Eyes, ENT, Cardiovascular, Respiratory, Gastrointestinal, Genitourinary, Musculoskeletal, Integumentary, Neurological, Psychiatric, Endocrine, Heme/Lymph, and Allergic/Immunologic review of systems are otherwise negative except as noted in the HPI.    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, including shoulder      Lower back pain      S/P tonsillectomy and adenoidectomy      H/O cataract extraction  both eyes      History of melanoma excision  left foot      History of left inguinal hernia repair  mesh      H/O cardiac catheterization  3/15, no  blockages          FAMILY HISTORY:  Family history of leukemia (Sibling)        SOCIAL HISTORY:    Allergies    penicillins (Hives)    Intolerances        MEDICATIONS  (STANDING):  atorvastatin 10 milliGRAM(s) Oral at bedtime  chlorhexidine 2% Cloths 1 Application(s) Topical daily  heparin  Infusion 650 Unit(s)/Hr (8 mL/Hr) IV Continuous <Continuous>  hydrocortisone sodium succinate Injectable 100 milliGRAM(s) IV Push every 8 hours  melatonin 5 milliGRAM(s) Oral at bedtime  metoprolol tartrate 25 milliGRAM(s) Oral two times a day  pantoprazole  Injectable 40 milliGRAM(s) IV Push daily  polyethylene glycol 3350 17 Gram(s) Oral two times a day  senna 2 Tablet(s) Oral at bedtime    MEDICATIONS  (PRN):  acetaminophen     Tablet .. 650 milliGRAM(s) Oral every 6 hours PRN Mild Pain (1 - 3)  hydrALAZINE Injectable 10 milliGRAM(s) IV Push four times a day PRN SBP >160  labetalol Injectable 10 milliGRAM(s) IV Push every 4 hours PRN Systolic blood pressure > 160      Vital Signs Last 24 Hrs  T(C): 37 (31 Mar 2025 14:00), Max: 37.7 (30 Mar 2025 18:00)  T(F): 98.6 (31 Mar 2025 14:00), Max: 99.9 (30 Mar 2025 18:00)  HR: 115 (31 Mar 2025 14:00) (85 - 115)  BP: 143/71 (31 Mar 2025 14:00) (110/63 - 143/71)  BP(mean): 90 (31 Mar 2025 14:00) (67 - 94)  RR: 20 (31 Mar 2025 14:00) (15 - 25)  SpO2: 97% (31 Mar 2025 14:00) (96% - 100%)    Parameters below as of 31 Mar 2025 14:00  Patient On (Oxygen Delivery Method): room air        PHYSICAL EXAM:    GENERAL: NAD, well-groomed, well-developed  HEAD:  Atraumatic, Normocephalic  EYES: EOMI, PERRLA, conjunctiva and sclera clear  ENMT: No tonsillar erythema, exudates, or enlargement; Moist mucous membranes, Good dentition, No lesions  NECK: Supple, No JVD, Normal thyroid  NERVOUS SYSTEM:  Alert & Oriented X3, Good concentration; Motor Strength 5/5 B/L upper and lower extremities; DTRs 2+ intact and symmetric  CHEST/LUNG: Clear to auscultation bilaterally; No rales, rhonchi, wheezing, or rubs  HEART: Regular rate and rhythm; No murmurs, rubs, or gallops  ABDOMEN: Soft, Nontender, Nondistended; Bowel sounds present  EXTREMITIES:  2+ Peripheral Pulses, No clubbing, cyanosis, or edema  LYMPH: No lymphadenopathy noted  SKIN: No rashes or lesions      LABS:                        10.2   6.64  )-----------( 109      ( 31 Mar 2025 03:52 )             31.7     03-31    138  |  105  |  26.4[H]  ----------------------------<  138[H]  4.2   |  21.0[L]  |  0.45[L]    Ca    8.6      31 Mar 2025 03:52  Phos  2.7     03-31  Mg     1.9     03-31      PTT - ( 31 Mar 2025 03:52 )  PTT:57.3 sec  Urinalysis Basic - ( 31 Mar 2025 03:52 )    Color: x / Appearance: x / SG: x / pH: x  Gluc: 138 mg/dL / Ketone: x  / Bili: x / Urobili: x   Blood: x / Protein: x / Nitrite: x   Leuk Esterase: x / RBC: x / WBC x   Sq Epi: x / Non Sq Epi: x / Bacteria: x          RADIOLOGY & ADDITIONAL STUDIES:    PATHOLOGY:     REASON FOR CONSULTATION:     HPI:  89 year old female with PMHx of Afib (on Eliquis), PPM, HTN, hx of RUE melanoma removal with R axillary lymph node removal, recent admission 3/21- 3/25 for lt MCA occlusion s/p thrombectomy presents as transfer from [facility] after she was found at 7:30 with lt facial and left sided weakness.  am.  Upon arrival to  ED she was noted to have lt sided facial droop; aphasic; left arm weakness; no TNK as out of window.  code stroke activated;  CT imagining found patient with new rt mca; patient transferred to [facility] for thrombectomy. Pt transferred to [facility] EDUARDO eval, s/p cerebral angiogram for mechanical thrombectomy of Rt M2 occlusion, TICI 3 (one pass). She is admitted to NSICU for close monitoring post thrombectomy. [facility], FC in no acute distress. recent CVA due to R MCA occlusion, s/p TICI 3 thrombectomy 03/26. 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 Hemodynamically stable. Strong family history of clots.        NIH SS: 10  DATE:  TIME: 12:48  1A: Level of consciousness (0-3): 1  1B: Questions (0-2): 1   1C: Commands (0-2): 0  2: Gaze (0-2): 0  3: Visual fields (0-3): 0  4: Facial palsy (0-3): 0  MOTOR:  5A: Left arm motor drift (0-4): 1  5B: Right arm motor drift (0-4): 0  6A: Left leg motor drift (0-4): 3  6B: Right leg motor drift (0-4): 2  7: Limb ataxia (0-2): 0  SENSORY:  8: Sensation (0-2): 0  SPEECH:  9: Language (0-3): 3  10: Dysarthria (0-2): 0  EXTINCTION:  11: Extinction/inattention (0-2): 0    TOTAL SCORE: 10     (26 Mar 2025 12:28)      REVIEW OF SYSTEMS:  Constitutional, Eyes, ENT, Cardiovascular, Respiratory, Gastrointestinal, Genitourinary, Musculoskeletal, Integumentary, Neurological, Psychiatric, Endocrine, Heme/Lymph, and Allergic/Immunologic review of systems are otherwise negative except as noted in the HPI.    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, including shoulder      Lower back pain      S/P tonsillectomy and adenoidectomy      H/O cataract extraction  both eyes      History of melanoma excision  left foot      History of left inguinal hernia repair  mesh      H/O cardiac catheterization  3/15, no  blockages          FAMILY HISTORY:  Family history of leukemia (Sibling)        SOCIAL HISTORY:    Allergies    penicillins (Hives)    Intolerances        MEDICATIONS  (STANDING):  atorvastatin 10 milliGRAM(s) Oral at bedtime  chlorhexidine 2% Cloths 1 Application(s) Topical daily  heparin  Infusion 650 Unit(s)/Hr (8 mL/Hr) IV Continuous <Continuous>  hydrocortisone sodium succinate Injectable 100 milliGRAM(s) IV Push every 8 hours  melatonin 5 milliGRAM(s) Oral at bedtime  metoprolol tartrate 25 milliGRAM(s) Oral two times a day  pantoprazole  Injectable 40 milliGRAM(s) IV Push daily  polyethylene glycol 3350 17 Gram(s) Oral two times a day  senna 2 Tablet(s) Oral at bedtime    MEDICATIONS  (PRN):  acetaminophen     Tablet .. 650 milliGRAM(s) Oral every 6 hours PRN Mild Pain (1 - 3)  hydrALAZINE Injectable 10 milliGRAM(s) IV Push four times a day PRN SBP >160  labetalol Injectable 10 milliGRAM(s) IV Push every 4 hours PRN Systolic blood pressure > 160      Vital Signs Last 24 Hrs  T(C): 37 (31 Mar 2025 14:00), Max: 37.7 (30 Mar 2025 18:00)  T(F): 98.6 (31 Mar 2025 14:00), Max: 99.9 (30 Mar 2025 18:00)  HR: 115 (31 Mar 2025 14:00) (85 - 115)  BP: 143/71 (31 Mar 2025 14:00) (110/63 - 143/71)  BP(mean): 90 (31 Mar 2025 14:00) (67 - 94)  RR: 20 (31 Mar 2025 14:00) (15 - 25)  SpO2: 97% (31 Mar 2025 14:00) (96% - 100%)    Parameters below as of 31 Mar 2025 14:00  Patient On (Oxygen Delivery Method): room air        PHYSICAL EXAM:    GENERAL: NAD, well-groomed, well-developed  HEAD:  Atraumatic, Normocephalic  EYES: EOMI, PERRLA, conjunctiva and sclera clear  ENMT: No tonsillar erythema, exudates, or enlargement; Moist mucous membranes, Good dentition, No lesions  NECK: Supple, No JVD, Normal thyroid  NERVOUS SYSTEM:  Alert & Oriented X3, Good concentration; Motor Strength 5/5 B/L upper and lower extremities; DTRs 2+ intact and symmetric  CHEST/LUNG: Clear to auscultation bilaterally; No rales, rhonchi, wheezing, or rubs  HEART: Regular rate and rhythm; No murmurs, rubs, or gallops  ABDOMEN: Soft, Nontender, Nondistended; Bowel sounds present  EXTREMITIES:  2+ Peripheral Pulses, No clubbing, cyanosis, or edema  LYMPH: No lymphadenopathy noted  SKIN: No rashes or lesions      LABS:                        10.2   6.64  )-----------( 109      ( 31 Mar 2025 03:52 )             31.7     03-31    138  |  105  |  26.4[H]  ----------------------------<  138[H]  4.2   |  21.0[L]  |  0.45[L]    Ca    8.6      31 Mar 2025 03:52  Phos  2.7     03-31  Mg     1.9     03-31      PTT - ( 31 Mar 2025 03:52 )  PTT:57.3 sec  Urinalysis Basic - ( 31 Mar 2025 03:52 )    Color: x / Appearance: x / SG: x / pH: x  Gluc: 138 mg/dL / Ketone: x  / Bili: x / Urobili: x   Blood: x / Protein: x / Nitrite: x   Leuk Esterase: x / RBC: x / WBC x   Sq Epi: x / Non Sq Epi: x / Bacteria: x          RADIOLOGY & ADDITIONAL STUDIES:    PATHOLOGY:

## 2025-03-31 NOTE — PROGRESS NOTE ADULT - SUBJECTIVE AND OBJECTIVE BOX
CC: Patient being seen for rehabilitation follow up.  Patient is s/p thrombin on 3/28 and resumed heparin drip.  Patient also had an acute change on 3/29. Patient found to have left M2 occlusion and is s/p MT of left M4 with TICI 0.  Patient with NGT.  Son from [place] is at bedside.     VITALS  T(C): 37.1 (03-31-25 @ 08:00), Max: 37.7 (03-30-25 @ 10:45)  HR: 96 (03-31-25 @ 08:39) (85 - 125)  BP: 113/55 (03-31-25 @ 08:00) (110/63 - 151/110)  RR: 15 (03-31-25 @ 08:00) (15 - 25)  SpO2: 96% (03-31-25 @ 08:39) (95% - 100%)  Wt(kg): --    MEDICATIONS   acetaminophen     Tablet .. 650 milliGRAM(s) every 6 hours PRN  atorvastatin 10 milliGRAM(s) at bedtime  chlorhexidine 2% Cloths 1 Application(s) daily  heparin  Infusion 650 Unit(s)/Hr <Continuous>  hydrALAZINE Injectable 10 milliGRAM(s) four times a day PRN  hydrocortisone sodium succinate Injectable 100 milliGRAM(s) every 8 hours  labetalol Injectable 10 milliGRAM(s) every 4 hours PRN  melatonin 5 milliGRAM(s) at bedtime  metoprolol tartrate 25 milliGRAM(s) two times a day  pantoprazole  Injectable 40 milliGRAM(s) daily  polyethylene glycol 3350 17 Gram(s) two times a day  senna 2 Tablet(s) at bedtime  sodium chloride 0.9%. 1000 milliLiter(s) <Continuous>      RECENT LABS/IMAGING  - Reviewed Today                        10.2   6.64  )-----------( 109      ( 31 Mar 2025 03:52 )             31.7     03-31    138  |  105  |  26.4[H]  ----------------------------<  138[H]  4.2   |  21.0[L]  |  0.45[L]    Ca    8.6      31 Mar 2025 03:52  Phos  2.7     03-31  Mg     1.9     03-31    TPro  5.9[L]  /  Alb  3.5  /  TBili  0.8  /  DBili  x   /  AST  19  /  ALT  21  /  AlkPhos  48  03-29    PT/INR - ( 29 Mar 2025 11:45 )   PT: 11.2 sec;   INR: 0.97 ratio         PTT - ( 31 Mar 2025 03:52 )  PTT:57.3 sec  Urinalysis Basic - ( 31 Mar 2025 03:52 )    Color: x / Appearance: x / SG: x / pH: x  Gluc: 138 mg/dL / Ketone: x  / Bili: x / Urobili: x   Blood: x / Protein: x / Nitrite: x   Leuk Esterase: x / RBC: x / WBC x   Sq Epi: x / Non Sq Epi: x / Bacteria: x            CT Brain Stroke 3/26 - 1. No intracranial hemorrhage is appreciated.2. No intracranial mass lesion is appreciated.  3. Left basal ganglia subacute infarction, was acute at the time of stroke code 3/21/2025, demonstrates expected evolution. Consider MR for evaluation of infarct extension4. MR may provide higher sensitivity imaging evaluation for the clinical   indication of acute infarction.    CT ANGIO BRAIN AND NECK STROKE 3/26 - 1.   RIGHT CAROTID SYSTEM:    Atherosclerotic plaque carotid bulb without    hemodynamically significant stenosis. Tandem lesion distal internal carotid artery at the C1 level has developed since 3/21/2025, possible interval arterial dissection, with associated luminal narrowing approximately 30%  2.   LEFT CAROTID SYSTEM:     Atherosclerotic plaque carotid bulb without  hemodynamically significant stenosis.  3.   VERTEBRAL CIRCULATION:    Patent.4.  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 Right MCA occlusion in its proximal M2 segment is an interval finding since 3/21/2025. Left MCA remains patent with luminal irregularity and low-grade narrowing following recent thrombectomy.5.  POSTERIOR INTRACRANIAL CIRCULATION:    Patent.  6. BRAIN PERFUSION:   No acute infarction of the brain is convincingly demonstrated.  However, broad region of apparent ischemia corresponds to the right MCA distribution correlating with acute embolic occlusion on CT angiography  7. Heterogeneous enhancement within the principal dural sinuses may be secondary to the early phase of venous opacification on this arterial phase examination. The appearance is similar to 3/21/2025. Consider supplemental evaluation with MR venogram    IR NEURO 3/26 - POSTOPERATIVE DIAGNOSIS: Large distal upper division M3 trunk occlusion. TICI 3 reperfusion post endovascular thrombectomy    DUPLEX NIKA LE 3/26 - No evidence of deep venous thrombosis in either lower extremity.    DUPLEX R ARTERIAL LE 3/26 - A 1.7 cm right CFA pseudoaneurysm.A 2.5 cm adjacent hematoma.    CT HEAD 3/27 - Age-appropriate involutional and ischemic gliotic changes. No hemorrhage. No significant change since 3/26/2025.    DUPLEX ARTERIAL LOWER EXT, R 3/27 - Slightly decreased size of partially thrombosed pseudoaneurysm arising from the right common femoral artery/proximal right femoral artery.Possible dissection flap in the right common femoral artery.    INTERVENTIONAL IR 3/27 - Right femoral vein pseudoaneurysm injected with 100units of thrombin and pressure held for 10min post injection. PSA appears thrombosed.    CT C/A/P w/contrast 3/27 -No evidence of metastatic disease.Severe cardiomegaly.Pseudoaneurysm again seen off the proximal aspect of the right femoral artery. Correlate with arterial ultrasound performed on the same day. New 1.7 cm cystic lesion at the pancreatic body.    HEAD CT 3/29 - No acute intracranial hemorrhage, mass effect, or shift of the midline structures. Evolving acute left frontal lobe superior division MCA infarct without hemorrhagic transformation.  ----------------------------------------------------------------------------------------  PHYSICAL EXAM   Constitutional - NAD, Comfortable   Neurologic Exam -                    Cognitive - AAO to self, PART situation     Communication - Delayed processing, Dysarthria     Cranial Nerves - Left lip droop     FUNCTIONAL MOTOR EXAM - Limited exam due to fatigue, appears nonfocal     Sensory - Intact to LT  Psychiatric - Fatigued  ----------------------------------------------------------------------------------------  ASSESSMENT/PLAN  89yFemale with functional deficits 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  Bilateral CVA occlusions s/p 3 thrombectomies - Lipitor  Right Femoral Artery Pseudoaneurysm - Stable  HTN, AFIB - Lopressor, Hydralazine/Labetalol PRN  Adrenal Insufficiency - Solu-cortef   Oropharyngeal Dysphagia s/p NGT - NPO  Pain - Tylenol  DVT PPX - SCDs, Heparin   Rehab/Impaired mobility and function - Patient continues to require hospitalization for the above diagnoses and ongoing active management of comorbid complications that are substantially posing a threat to bodily function, functional ability and quality of life.     RECOMMEND OOB  PENDING MRI BRAIN.  Pending functional assessments.    Spoke with son, that although patient was able to recover from prior, has had 2 additional CVAs and ability to recover may be limited.   Will assess for ability to tolerate and progress.    Will continue to follow.

## 2025-03-31 NOTE — SWALLOW BEDSIDE ASSESSMENT ADULT - PHARYNGEAL PHASE
Delayed cough post oral intake multiple swallows with varied throat clearing initially, however not observed as number of trials progressed/Within functional limits Within functional limits

## 2025-03-31 NOTE — PROGRESS NOTE ADULT - ASSESSMENT
89F with recurrent strokes, now with acute L MCA occlusion s/p TICI 0 intervention showing L M4 occlusion;  recent CVA due to R MCA occlusion, s/p TICI 3 thrombectomy 03/26. Nature of strokes is multifactorial - suspected coagulopathy due to malignant dz, embolic in the setting of Afib, BL atrial dilatation.  PMH of recent 03/21/2025 L MCA stroke s/p thrombectomy.  High-risk Afib, on AC. Severe BL atrial dilatation, mod TR and MR, mod pHTN.  PMH of HTN, melanoma (s/p RUE lesion and Right axillary lymph node excision 03/18/25), AI on Prednisone.  1.7 cm cystic lesion at the pancreatic body.   Anticoagulated, on Heparin gtt.  Normocytic anemia, combination of postop, dilutional; chronic thrombocytopenia.  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 Hemodynamically stable.    Plan:  cont neurochecks   pending MRI brain w/wo contrast  cont Melatonin q hs  SBP goal 110-160, avoid significant BP fluctuations; weaned off Tereso gtt  maintain Osats>92%, incentive spirometry as able, chest PT, aspiration precautions; d/c nebs  NPO except meds for now, S/S eval, maintain NGT for now, if fails S/S - plan to start TF; cont PPI for ulcer ppx as on steroids  monitor UOP, e-lytes; d/c IV fluids, dose of Lasix 10 IVp once  cont heparin gtt, goal PTT 40-60  cont home statin  Vascular involved - failed repeat thrombin injection, pending repeat Duplex today and Vascular re-eval and recs  maintain -180, ISS; steroids - on stress regimen Hydrocortisone IV, cont for another 24 hrs   VTE ppx with SCDs, on Heparin gtt  GOC discussion - DNR/DNI status confirmed, if resp support needed - will use HFNC or NIPPV; family at the bedside.

## 2025-03-31 NOTE — PROGRESS NOTE ADULT - SUBJECTIVE AND OBJECTIVE BOX
INTERVAL EVENTS:  Follow up adrenal insufficiency     MEDICATIONS  (STANDING):  atorvastatin 10 milliGRAM(s) Oral at bedtime  chlorhexidine 2% Cloths 1 Application(s) Topical daily  heparin  Infusion 650 Unit(s)/Hr (8 mL/Hr) IV Continuous <Continuous>  hydrocortisone sodium succinate Injectable 100 milliGRAM(s) IV Push every 8 hours  melatonin 5 milliGRAM(s) Oral at bedtime  metoprolol tartrate 25 milliGRAM(s) Oral two times a day  pantoprazole  Injectable 40 milliGRAM(s) IV Push daily  polyethylene glycol 3350 17 Gram(s) Oral two times a day  senna 2 Tablet(s) Oral at bedtime    MEDICATIONS  (PRN):  acetaminophen     Tablet .. 650 milliGRAM(s) Oral every 6 hours PRN Mild Pain (1 - 3)  hydrALAZINE Injectable 10 milliGRAM(s) IV Push four times a day PRN SBP >160  labetalol Injectable 10 milliGRAM(s) IV Push every 4 hours PRN Systolic blood pressure > 160    Allergies  penicillins (Hives)    Vital Signs Last 24 Hrs  T(C): 37.1 (31 Mar 2025 10:00), Max: 37.7 (30 Mar 2025 14:00)  T(F): 98.8 (31 Mar 2025 10:00), Max: 99.9 (30 Mar 2025 14:00)  HR: 100 (31 Mar 2025 10:00) (85 - 113)  BP: 119/60 (31 Mar 2025 10:00) (110/63 - 130/62)  BP(mean): 77 (31 Mar 2025 10:00) (67 - 94)  RR: 18 (31 Mar 2025 10:00) (15 - 25)  SpO2: 98% (31 Mar 2025 10:00) (95% - 100%)    Parameters below as of 31 Mar 2025 10:00  Patient On (Oxygen Delivery Method): room air    PHYSICAL EXAM:  General: No apparent distress  Respiratory: Lungs clear bilaterally  Cardiac: +S1, S2, no m/r/g  GI: +BS, soft, non tender, non distended  Extremities: No peripheral edema  Neuro: Awake    LABS:                        10.2   6.64  )-----------( 109      ( 31 Mar 2025 03:52 )             31.7     03-31    138  |  105  |  26.4[H]  ----------------------------<  138[H]  4.2   |  21.0[L]  |  0.45[L]    Ca    8.6      31 Mar 2025 03:52  Phos  2.7     03-31  Mg     1.9     03-31    Urinalysis Basic - ( 31 Mar 2025 03:52 )    Color: x / Appearance: x / SG: x / pH: x  Gluc: 138 mg/dL / Ketone: x  / Bili: x / Urobili: x   Blood: x / Protein: x / Nitrite: x   Leuk Esterase: x / RBC: x / WBC x   Sq Epi: x / Non Sq Epi: x / Bacteria: x    POCT Blood Glucose.: 149 mg/dL (03-30-25 @ 23:51)  POCT Blood Glucose.: 138 mg/dL (03-30-25 @ 17:55)  POCT Blood Glucose.: 144 mg/dL (03-30-25 @ 12:59)    Thyroid Stimulating Hormone, Serum: 0.64 uIU/mL (03-27-25 @ 02:00)  Thyroid Stimulating Hormone, Serum: 0.22 uIU/mL (03-22-25 @ 03:15)

## 2025-04-01 LAB
ANION GAP SERPL CALC-SCNC: 10 MMOL/L — SIGNIFICANT CHANGE UP (ref 5–17)
ANION GAP SERPL CALC-SCNC: 11 MMOL/L — SIGNIFICANT CHANGE UP (ref 5–17)
APTT 50/50 2HOUR INCUB: 42.5 SEC — HIGH (ref 24.5–36.6)
APTT BLD: 39.7 SEC — HIGH (ref 24.5–36.6)
APTT BLD: 51.5 SEC — HIGH (ref 24.5–35.6)
APTT BLD: 56.1 SEC — HIGH (ref 24.5–35.6)
APTT BLD: 60.2 SEC — HIGH (ref 24.5–35.6)
B2 GLYCOPROT1 IGA SER QL: 9.3 U/ML — SIGNIFICANT CHANGE UP
B2 GLYCOPROT1 IGG SER-ACNC: <1.4 U/ML — SIGNIFICANT CHANGE UP
B2 GLYCOPROT1 IGM SER-ACNC: 13.9 U/ML — SIGNIFICANT CHANGE UP
BUN SERPL-MCNC: 32.5 MG/DL — HIGH (ref 8–20)
BUN SERPL-MCNC: 39.7 MG/DL — HIGH (ref 8–20)
CALCIUM SERPL-MCNC: 8.8 MG/DL — SIGNIFICANT CHANGE UP (ref 8.4–10.5)
CALCIUM SERPL-MCNC: 9 MG/DL — SIGNIFICANT CHANGE UP (ref 8.4–10.5)
CARDIOLIPIN IGM SER-MCNC: 13.7 MPL U/ML — SIGNIFICANT CHANGE UP
CARDIOLIPIN IGM SER-MCNC: <1.6 GPL U/ML — SIGNIFICANT CHANGE UP
CHLORIDE SERPL-SCNC: 105 MMOL/L — SIGNIFICANT CHANGE UP (ref 96–108)
CHLORIDE SERPL-SCNC: 105 MMOL/L — SIGNIFICANT CHANGE UP (ref 96–108)
CO2 SERPL-SCNC: 23 MMOL/L — SIGNIFICANT CHANGE UP (ref 22–29)
CO2 SERPL-SCNC: 26 MMOL/L — SIGNIFICANT CHANGE UP (ref 22–29)
CREAT SERPL-MCNC: 0.53 MG/DL — SIGNIFICANT CHANGE UP (ref 0.5–1.3)
CREAT SERPL-MCNC: 0.54 MG/DL — SIGNIFICANT CHANGE UP (ref 0.5–1.3)
DEPRECATED CARDIOLIPIN IGA SER: 7.8 APL U/ML — SIGNIFICANT CHANGE UP
EGFR: 88 ML/MIN/1.73M2 — SIGNIFICANT CHANGE UP
EGFR: 88 ML/MIN/1.73M2 — SIGNIFICANT CHANGE UP
EGFR: 89 ML/MIN/1.73M2 — SIGNIFICANT CHANGE UP
EGFR: 89 ML/MIN/1.73M2 — SIGNIFICANT CHANGE UP
GLUCOSE SERPL-MCNC: 122 MG/DL — HIGH (ref 70–99)
GLUCOSE SERPL-MCNC: 133 MG/DL — HIGH (ref 70–99)
HCT VFR BLD CALC: 33.6 % — LOW (ref 34.5–45)
HCT VFR BLD CALC: 36 % — SIGNIFICANT CHANGE UP (ref 34.5–45)
HCYS SERPL-MCNC: 11.7 UMOL/L — SIGNIFICANT CHANGE UP
HGB BLD-MCNC: 10.8 G/DL — LOW (ref 11.5–15.5)
HGB BLD-MCNC: 11.8 G/DL — SIGNIFICANT CHANGE UP (ref 11.5–15.5)
MAGNESIUM SERPL-MCNC: 1.9 MG/DL — SIGNIFICANT CHANGE UP (ref 1.6–2.6)
MCHC RBC-ENTMCNC: 30.6 PG — SIGNIFICANT CHANGE UP (ref 27–34)
MCHC RBC-ENTMCNC: 31.3 PG — SIGNIFICANT CHANGE UP (ref 27–34)
MCHC RBC-ENTMCNC: 32.1 G/DL — SIGNIFICANT CHANGE UP (ref 32–36)
MCHC RBC-ENTMCNC: 32.8 G/DL — SIGNIFICANT CHANGE UP (ref 32–36)
MCV RBC AUTO: 95.2 FL — SIGNIFICANT CHANGE UP (ref 80–100)
MCV RBC AUTO: 95.5 FL — SIGNIFICANT CHANGE UP (ref 80–100)
NRBC # BLD AUTO: 0 K/UL — SIGNIFICANT CHANGE UP (ref 0–0)
NRBC # BLD AUTO: 0 K/UL — SIGNIFICANT CHANGE UP (ref 0–0)
NRBC # FLD: 0 K/UL — SIGNIFICANT CHANGE UP (ref 0–0)
NRBC # FLD: 0 K/UL — SIGNIFICANT CHANGE UP (ref 0–0)
NRBC BLD AUTO-RTO: 0 /100 WBCS — SIGNIFICANT CHANGE UP (ref 0–0)
NRBC BLD AUTO-RTO: 0 /100 WBCS — SIGNIFICANT CHANGE UP (ref 0–0)
PAT CTL 2H: 43.6 SEC — HIGH (ref 24.5–36.6)
PHOSPHATE SERPL-MCNC: 2.7 MG/DL — SIGNIFICANT CHANGE UP (ref 2.4–4.7)
PLATELET # BLD AUTO: 127 K/UL — LOW (ref 150–400)
PLATELET # BLD AUTO: 155 K/UL — SIGNIFICANT CHANGE UP (ref 150–400)
PMV BLD: 10.4 FL — SIGNIFICANT CHANGE UP (ref 7–13)
PMV BLD: 10.6 FL — SIGNIFICANT CHANGE UP (ref 7–13)
POTASSIUM SERPL-MCNC: 3.9 MMOL/L — SIGNIFICANT CHANGE UP (ref 3.5–5.3)
POTASSIUM SERPL-MCNC: 3.9 MMOL/L — SIGNIFICANT CHANGE UP (ref 3.5–5.3)
POTASSIUM SERPL-SCNC: 3.9 MMOL/L — SIGNIFICANT CHANGE UP (ref 3.5–5.3)
POTASSIUM SERPL-SCNC: 3.9 MMOL/L — SIGNIFICANT CHANGE UP (ref 3.5–5.3)
RBC # BLD: 3.53 M/UL — LOW (ref 3.8–5.2)
RBC # BLD: 3.77 M/UL — LOW (ref 3.8–5.2)
RBC # FLD: 14.3 % — SIGNIFICANT CHANGE UP (ref 10.3–14.5)
RBC # FLD: 14.3 % — SIGNIFICANT CHANGE UP (ref 10.3–14.5)
SODIUM SERPL-SCNC: 139 MMOL/L — SIGNIFICANT CHANGE UP (ref 135–145)
SODIUM SERPL-SCNC: 141 MMOL/L — SIGNIFICANT CHANGE UP (ref 135–145)
WBC # BLD: 10.78 K/UL — HIGH (ref 3.8–10.5)
WBC # BLD: 9.62 K/UL — SIGNIFICANT CHANGE UP (ref 3.8–10.5)
WBC # FLD AUTO: 10.78 K/UL — HIGH (ref 3.8–10.5)
WBC # FLD AUTO: 9.62 K/UL — SIGNIFICANT CHANGE UP (ref 3.8–10.5)

## 2025-04-01 PROCEDURE — 99233 SBSQ HOSP IP/OBS HIGH 50: CPT

## 2025-04-01 PROCEDURE — 99232 SBSQ HOSP IP/OBS MODERATE 35: CPT

## 2025-04-01 RX ORDER — ATORVASTATIN CALCIUM 80 MG/1
10 TABLET, FILM COATED ORAL AT BEDTIME
Refills: 0 | Status: DISCONTINUED | OUTPATIENT
Start: 2025-04-01 | End: 2025-04-04

## 2025-04-01 RX ORDER — MELATONIN 5 MG
5 TABLET ORAL AT BEDTIME
Refills: 0 | Status: DISCONTINUED | OUTPATIENT
Start: 2025-04-01 | End: 2025-04-04

## 2025-04-01 RX ORDER — TAMSULOSIN HYDROCHLORIDE 0.4 MG/1
0.4 CAPSULE ORAL AT BEDTIME
Refills: 0 | Status: DISCONTINUED | OUTPATIENT
Start: 2025-04-01 | End: 2025-04-04

## 2025-04-01 RX ORDER — ACETAMINOPHEN 500 MG/5ML
650 LIQUID (ML) ORAL EVERY 6 HOURS
Refills: 0 | Status: DISCONTINUED | OUTPATIENT
Start: 2025-04-01 | End: 2025-04-04

## 2025-04-01 RX ORDER — HEPARIN SODIUM 1000 [USP'U]/ML
950 INJECTION INTRAVENOUS; SUBCUTANEOUS
Qty: 25000 | Refills: 0 | Status: DISCONTINUED | OUTPATIENT
Start: 2025-04-01 | End: 2025-04-02

## 2025-04-01 RX ORDER — METOPROLOL SUCCINATE 50 MG/1
25 TABLET, EXTENDED RELEASE ORAL
Refills: 0 | Status: DISCONTINUED | OUTPATIENT
Start: 2025-04-01 | End: 2025-04-04

## 2025-04-01 RX ORDER — SENNA 187 MG
2 TABLET ORAL AT BEDTIME
Refills: 0 | Status: DISCONTINUED | OUTPATIENT
Start: 2025-04-01 | End: 2025-04-04

## 2025-04-01 RX ORDER — POLYETHYLENE GLYCOL 3350 17 G/17G
17 POWDER, FOR SOLUTION ORAL
Refills: 0 | Status: DISCONTINUED | OUTPATIENT
Start: 2025-04-01 | End: 2025-04-04

## 2025-04-01 RX ADMIN — Medication 40 MILLIGRAM(S): at 17:35

## 2025-04-01 RX ADMIN — Medication 50 MILLILITER(S): at 11:36

## 2025-04-01 RX ADMIN — TAMSULOSIN HYDROCHLORIDE 0.4 MILLIGRAM(S): 0.4 CAPSULE ORAL at 21:12

## 2025-04-01 RX ADMIN — Medication 50 MILLIGRAM(S): at 14:19

## 2025-04-01 RX ADMIN — HEPARIN SODIUM 9.5 UNIT(S)/HR: 1000 INJECTION INTRAVENOUS; SUBCUTANEOUS at 02:37

## 2025-04-01 RX ADMIN — Medication 1 APPLICATION(S): at 05:32

## 2025-04-01 RX ADMIN — METOPROLOL SUCCINATE 25 MILLIGRAM(S): 50 TABLET, EXTENDED RELEASE ORAL at 05:26

## 2025-04-01 RX ADMIN — ATORVASTATIN CALCIUM 10 MILLIGRAM(S): 80 TABLET, FILM COATED ORAL at 21:13

## 2025-04-01 RX ADMIN — METOPROLOL SUCCINATE 25 MILLIGRAM(S): 50 TABLET, EXTENDED RELEASE ORAL at 17:35

## 2025-04-01 RX ADMIN — Medication 50 MILLIGRAM(S): at 21:12

## 2025-04-01 RX ADMIN — Medication 50 MILLIGRAM(S): at 05:25

## 2025-04-01 NOTE — PROGRESS NOTE ADULT - SUBJECTIVE AND OBJECTIVE BOX
CC: Patient being seen for rehabilitation follow up.  Patient feels well.  Having intermittent cough with NGT.  Son at bedside.  Was out of bed yesterday.  Able to state a few more words.    FUNCTIONAL PROGRESS  3/31 SLP  Speech Language Pathology Recommendations: 1. Regular diet, mildly thick fluids as tolerated 2. STRICT aspiration precautions 3. Oral care 4. Upright with PO 5. Assist with PO: slow rate of intake, small bites 6. Will follow to re-assess swallow & for completion of speech/language evaluation as schedule permits     VITALS  T(C): 37.3 (04-01-25 @ 08:00), Max: 37.5 (03-31-25 @ 20:00)  HR: 97 (04-01-25 @ 08:00) (88 - 115)  BP: 131/88 (04-01-25 @ 08:00) (119/60 - 143/71)  RR: 21 (04-01-25 @ 08:00) (15 - 21)  SpO2: 94% (04-01-25 @ 08:00) (94% - 100%)  Wt(kg): --    MEDICATIONS   acetaminophen     Tablet .. 650 milliGRAM(s) every 6 hours PRN  atorvastatin 10 milliGRAM(s) at bedtime  chlorhexidine 2% Cloths 1 Application(s) daily  heparin  Infusion 950 Unit(s)/Hr <Continuous>  hydrALAZINE Injectable 10 milliGRAM(s) four times a day PRN  hydrocortisone sodium succinate Injectable 50 milliGRAM(s) every 8 hours  labetalol Injectable 10 milliGRAM(s) every 4 hours PRN  melatonin 5 milliGRAM(s) at bedtime  metoprolol tartrate 25 milliGRAM(s) two times a day  pantoprazole  Injectable 40 milliGRAM(s) daily  polyethylene glycol 3350 17 Gram(s) two times a day  senna 2 Tablet(s) at bedtime  sodium chloride 0.9%. 1000 milliLiter(s) <Continuous>      RECENT LABS/IMAGING  - Reviewed Today                        10.8   9.62  )-----------( 127      ( 01 Apr 2025 01:10 )             33.6     04-01    139  |  105  |  32.5[H]  ----------------------------<  133[H]  3.9   |  23.0  |  0.53    Ca    9.0      01 Apr 2025 01:10  Phos  2.7     04-01  Mg     1.9     04-01      PTT - ( 01 Apr 2025 08:15 )  PTT:60.2 sec  Urinalysis Basic - ( 01 Apr 2025 01:10 )    Color: x / Appearance: x / SG: x / pH: x  Gluc: 133 mg/dL / Ketone: x  / Bili: x / Urobili: x   Blood: x / Protein: x / Nitrite: x   Leuk Esterase: x / RBC: x / WBC x   Sq Epi: x / Non Sq Epi: x / Bacteria: x              CT Brain Stroke 3/26 - 1. No intracranial hemorrhage is appreciated.2. No intracranial mass lesion is appreciated.  3. Left basal ganglia subacute infarction, was acute at the time of stroke code 3/21/2025, demonstrates expected evolution. Consider MR for evaluation of infarct extension4. MR may provide higher sensitivity imaging evaluation for the clinical   indication of acute infarction.    CT ANGIO BRAIN AND NECK STROKE 3/26 - 1.   RIGHT CAROTID SYSTEM:    Atherosclerotic plaque carotid bulb without    hemodynamically significant stenosis. Tandem lesion distal internal carotid artery at the C1 level has developed since 3/21/2025, possible interval arterial dissection, with associated luminal narrowing approximately 30%  2.   LEFT CAROTID SYSTEM:     Atherosclerotic plaque carotid bulb without  hemodynamically significant stenosis.  3.   VERTEBRAL CIRCULATION:    Patent.4.  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 Right MCA occlusion in its proximal M2 segment is an interval finding since 3/21/2025. Left MCA remains patent with luminal irregularity and low-grade narrowing following recent thrombectomy.5.  POSTERIOR INTRACRANIAL CIRCULATION:    Patent.  6. BRAIN PERFUSION:   No acute infarction of the brain is convincingly demonstrated.  However, broad region of apparent ischemia corresponds to the right MCA distribution correlating with acute embolic occlusion on CT angiography  7. Heterogeneous enhancement within the principal dural sinuses may be secondary to the early phase of venous opacification on this arterial phase examination. The appearance is similar to 3/21/2025. Consider supplemental evaluation with MR venogram    IR NEURO 3/26 - POSTOPERATIVE DIAGNOSIS: Large distal upper division M3 trunk occlusion. TICI 3 reperfusion post endovascular thrombectomy    DUPLEX NIKA LE 3/26 - No evidence of deep venous thrombosis in either lower extremity.    DUPLEX R ARTERIAL LE 3/26 - A 1.7 cm right CFA pseudoaneurysm.A 2.5 cm adjacent hematoma.    CT HEAD 3/27 - Age-appropriate involutional and ischemic gliotic changes. No hemorrhage. No significant change since 3/26/2025.    DUPLEX ARTERIAL LOWER EXT, R 3/27 - Slightly decreased size of partially thrombosed pseudoaneurysm arising from the right common femoral artery/proximal right femoral artery.Possible dissection flap in the right common femoral artery.    INTERVENTIONAL IR 3/27 - Right femoral vein pseudoaneurysm injected with 100units of thrombin and pressure held for 10min post injection. PSA appears thrombosed.    CT C/A/P w/contrast 3/27 -No evidence of metastatic disease.Severe cardiomegaly.Pseudoaneurysm again seen off the proximal aspect of the right femoral artery. Correlate with arterial ultrasound performed on the same day. New 1.7 cm cystic lesion at the pancreatic body.    HEAD CT 3/29 - No acute intracranial hemorrhage, mass effect, or shift of the midline structures. Evolving acute left frontal lobe superior division MCA infarct without hemorrhagic transformation.    MRI HEAD 3/31 -  New acute infarctions within the LEFT lateral frontal cortex, LEFT occipital cortex, RIGHT insular cortex and scattered RIGHT frontal, parietal, temporal and occipital cortex suggesting embolic etiology. Subacute infarction within the LEFT basal ganglia. Mild periventricular chronic white matter ischemia.  ----------------------------------------------------------------------------------------  PHYSICAL EXAM   Constitutional - NAD, Comfortable   Neurologic Exam -                    Cognitive - AAO to self, PART situation     Communication - Expressive deficits, Delayed processing, Dysarthria     Cranial Nerves - Left lip droop     FUNCTIONAL MOTOR EXAM -                      LEFT    UE - ShAB 3/5, EF 3/5, EE 3/5,  4/5                    RIGHT UE - ShAB 2/5, EF 3/5, EE 2/5,  4/5                    LEFT    LE - HF 1/5, KE 3/5, DF 3/5                     RIGHT LE - HF 3/5, KE 3/5, DF 3/5       Sensory - Intact to LT  Psychiatric - Fatigued  ----------------------------------------------------------------------------------------  ASSESSMENT/PLAN  89yFemale with functional deficits 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  Acute Bilateral CVA occlusions s/p 3 thrombectomies - HEPARIN DRIP, Lipitor  Right Femoral Artery Pseudoaneurysm - Stable  HTN, AFIB - Lopressor, Hydralazine/Labetalol PRN  Adrenal Insufficiency - Solu-cortef   Oropharyngeal Dysphagia s/p NGT - NPO  Pain - Tylenol  DVT PPX - SCDs, Heparin   Rehab/Impaired mobility and function - Patient continues to require hospitalization for the above diagnoses and ongoing active management of comorbid complications that are substantially posing a threat to bodily function, functional ability and quality of life.     RECOMMEND OOB  Pending functional assessments.    Expect will need AR.   Son aware.

## 2025-04-01 NOTE — PHYSICAL THERAPY INITIAL EVALUATION ADULT - IMPAIRMENTS CONTRIBUTING TO GAIT DEVIATIONS, PT EVAL
verbal cues needed for direction, safety and assist for RW management and 2*2 impaired balance/impaired balance/cognition/impaired postural control/decreased strength

## 2025-04-01 NOTE — SPEECH LANGUAGE PATHOLOGY EVALUATION - SLP PERTINENT HISTORY OF CURRENT PROBLEM
89 year old female with PMHx of Afib (off Eliquis), recent left MCA territory stroke s/p LM1 thrombectomy, with residual minimal aphasia, MRS 1, PPM, HTN, HLD, GERD, severe MR/TR, iatrogenic adrenal insufficiency (on prednisone), lymphedema, metastatic melanoma s/p Left foot melanoma and lymph node resection, s/p RUE melanoma and Right axillary lymph node excision at [facility] (3/18/25) who presented to  ED 3/26/36 with Lt sided weakness and aphasia. CT neg, CTA showed a  Rt M2 occlusion.  Transferred to [facility] intervention. S/P thrombectomy, TICI 3 achieved after 1 pass admitted to NSICU for post procedure monitoring started on IV Heparin.On the early morning of 3/29/25 pt received medications around 6am by RN report and subsequently was appreciated to be aphasic with right sided weakness.  CTH/CTA/CTP ordered. CTH negative for acute hemorrhage. CTA + new Left MCA M2 occulusion.

## 2025-04-01 NOTE — PHYSICAL THERAPY INITIAL EVALUATION ADULT - COORDINATION ASSESSED, REHAB EVAL
no dysmetria noted however, verbal cues and hand over hand demonstration needed 2*2 ? apraxia (R>L) and cognitive deficits/finger to nose

## 2025-04-01 NOTE — OCCUPATIONAL THERAPY INITIAL EVALUATION ADULT - PERTINENT HX OF CURRENT PROBLEM, REHAB EVAL
Pt is s/p cerebral angiogram for mechanical thrombectomy TICI 3; subacute left basal ganglia infarct; transferred from [facility] with left facial and left sided weakness

## 2025-04-01 NOTE — PROGRESS NOTE ADULT - NS ATTEND AMEND GEN_ALL_CORE FT
Patient is hemodynamically stable. Change to PO hydrocortisone 25mg TID. Can be back on home dose tomorrow

## 2025-04-01 NOTE — PHYSICAL THERAPY INITIAL EVALUATION ADULT - DIAGNOSIS, PT EVAL
functional debility 2*2 right sided inattention, poor coordination, apraxia, and ? cognitive deficits s/p neuro event

## 2025-04-01 NOTE — SPEECH LANGUAGE PATHOLOGY EVALUATION - SLP DIAGNOSIS
Mod receptive, mod-severe expressive deficits. Pt with a nonfluent aphasic impacting ability to independently communicate wants/needs. Motor speech with a mild dysarthric speech at the word level, though does not impact overall intelligibility.

## 2025-04-01 NOTE — PHYSICAL THERAPY INITIAL EVALUATION ADULT - GENERAL OBSERVATIONS, REHAB EVAL
Pt received supine in bed + telemetry//BP + IV + Venous Compression Boots + rivas + NGT. Pt c/o 0/10 pain, pt agreeable to PT. Pt received seated in bedside chair + telemetry//BP + IV + Venous Compression Boots + rivas + NGT. Pt c/o 0/10 pain, pt agreeable to PT.

## 2025-04-01 NOTE — OCCUPATIONAL THERAPY INITIAL EVALUATION ADULT - LIVES WITH, PROFILE
son, in a private house with 1 step to enter, 2 steps to livingroom and 14 steps inside with railing/children

## 2025-04-01 NOTE — PHYSICAL THERAPY INITIAL EVALUATION ADULT - REFERRAL TO ANOTHER SERVICE NEEDED, PT EVAL
How Severe Is Your Skin Lesion?: moderate
Has Your Skin Lesion Been Treated?: not been treated
Is This A New Presentation, Or A Follow-Up?: Skin Lesion
occupational therapy/social work/speech language pathology

## 2025-04-01 NOTE — PROGRESS NOTE ADULT - SUBJECTIVE AND OBJECTIVE BOX
INTERVAL EVENTS:  Follow up adrenal insufficiency.  Sitting in chair eating lunch, has no acute complaints.    MEDICATIONS  (STANDING):  atorvastatin 10 milliGRAM(s) Oral at bedtime  chlorhexidine 2% Cloths 1 Application(s) Topical daily  heparin  Infusion 950 Unit(s)/Hr (9.5 mL/Hr) IV Continuous <Continuous>  hydrocortisone sodium succinate Injectable 50 milliGRAM(s) IV Push every 8 hours  melatonin 5 milliGRAM(s) Oral at bedtime  metoprolol tartrate 25 milliGRAM(s) Oral two times a day  pantoprazole    Tablet 40 milliGRAM(s) Oral before breakfast  polyethylene glycol 3350 17 Gram(s) Oral two times a day  senna 2 Tablet(s) Oral at bedtime  sodium chloride 0.9%. 1000 milliLiter(s) (50 mL/Hr) IV Continuous <Continuous>  tamsulosin 0.4 milliGRAM(s) Oral at bedtime    MEDICATIONS  (PRN):  acetaminophen     Tablet .. 650 milliGRAM(s) Oral every 6 hours PRN Temp greater or equal to 38C (100.4F), Mild Pain (1 - 3)  hydrALAZINE Injectable 10 milliGRAM(s) IV Push four times a day PRN SBP >160  labetalol Injectable 10 milliGRAM(s) IV Push every 4 hours PRN Systolic blood pressure > 160    Allergies  penicillins (Hives)    Vital Signs Last 24 Hrs  T(C): 37.3 (01 Apr 2025 08:00), Max: 37.5 (31 Mar 2025 20:00)  T(F): 99.1 (01 Apr 2025 08:00), Max: 99.5 (31 Mar 2025 20:00)  HR: 95 (01 Apr 2025 12:00) (88 - 115)  BP: 118/91 (01 Apr 2025 12:00) (118/91 - 143/71)  BP(mean): 97 (01 Apr 2025 12:00) (80 - 106)  RR: 18 (01 Apr 2025 12:00) (17 - 22)  SpO2: 99% (01 Apr 2025 12:00) (93% - 100%)    Parameters below as of 01 Apr 2025 12:00  Patient On (Oxygen Delivery Method): room air    PHYSICAL EXAM:  General: No apparent distress  Respiratory: Lungs clear bilaterally  Cardiac: +S1, S2, no m/r/g  GI: +BS, soft, non tender, non distended  Extremities: No peripheral edema  Neuro: A+O X3    LABS:                        10.8   9.62  )-----------( 127      ( 01 Apr 2025 01:10 )             33.6     04-01    139  |  105  |  32.5[H]  ----------------------------<  133[H]  3.9   |  23.0  |  0.53    Ca    9.0      01 Apr 2025 01:10  Phos  2.7     04-01  Mg     1.9     04-01      Urinalysis Basic - ( 01 Apr 2025 01:10 )    Color: x / Appearance: x / SG: x / pH: x  Gluc: 133 mg/dL / Ketone: x  / Bili: x / Urobili: x   Blood: x / Protein: x / Nitrite: x   Leuk Esterase: x / RBC: x / WBC x   Sq Epi: x / Non Sq Epi: x / Bacteria: x    Thyroid Stimulating Hormone, Serum: 0.64 uIU/mL (03-27-25 @ 02:00)  Thyroid Stimulating Hormone, Serum: 0.22 uIU/mL (03-22-25 @ 03:15)

## 2025-04-01 NOTE — PHYSICAL THERAPY INITIAL EVALUATION ADULT - PERTINENT HX OF CURRENT PROBLEM, REHAB EVAL
89 year old female with PMHx of Afib (on Eliquis), PPM, HTN, hx of RUE melanoma removal with R axillary lymph node removal, recent admission 3/21- 3/25 for lt MCA occlusion s/p thrombectomy presents as transfer from [facility] after she was found at 7:30 with lt facial and left sided weakness. Upon arrival to  ED she was noted to have lt sided facial droop; aphasic; left arm weakness; no TNK as out of window. code stroke activated;  CT imagining found patient with new right mca occlusion. Pt transferred to [facility], now s/p cerebral angiogram for mechanical thrombectomy of Rt M2 occlusion, TICI 3. Pt evaluated in NSICU.

## 2025-04-01 NOTE — OCCUPATIONAL THERAPY INITIAL EVALUATION ADULT - DIAGNOSIS, OT EVAL
Right MCA embolism; Left lateral frontal, occipital, and insular cortex infarct; Right M2 occlusion s/p thrombectomy

## 2025-04-01 NOTE — CHART NOTE - NSCHARTNOTEFT_GEN_A_CORE
Notified by nurse that PTT was 56.1  PTT goal 50-70, however would like on the higher range   Increased rate to 9.5ml/hour   PTT in 6 hours

## 2025-04-01 NOTE — PROGRESS NOTE ADULT - ASSESSMENT
89F with PMHx of A-fib, HTN, Melanoma who was recently admitted for stroke with Left MCA occlusion s/p thrombectomy, now readmitted for left sided weakness and left facial droop. Found with new Right MCA occlusion s/p thrombectomy. On 3/29, had acute neurological change with severe aphasia, +new left M2 occlusion, returned to IR for mechanical thrombectomy. Endocrinology consult requested for management of adrenal insufficiency. Has history of iatrogenic AI after immunotherapy treatment for melanoma in 2019. She is following with endocrinologist Dr. Antonio Gaines at [place]. Home regimen consist of Prednisone 5 mg in AM and 2 mg PM.     1. Iatrogenic adrenal insufficiency  - Hemodynamically stable  - Change to PO hydrocortisone 25mg TID  - If tolerating, tomorrow can resume home regimen of prednisone 5 mg in AM (8:00) and 2 mg in PM (16:00)  - Monitor BP and electrolytes    2. L MCA stroke s/p thrombectomy  - Neuro checks  - MRI with new acute infarctions within the L lateral frontal cortex, L occipital cortex, R insular cortex and scattered R frontal, parietal, temporal and occipital cortex suggesting embolic etiology  - Care per neuro ICU

## 2025-04-01 NOTE — PROGRESS NOTE ADULT - ASSESSMENT
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 CT neg, CTA showed a  Rt M2 occlusion.  Transferred to [facility]UARD[facility] intervention. S/P thrombectomy, TICI 3 achieved after 1 pass admitted to NSICU for post procedure monitoring started on IV Heparin.On the early morning of 3/29/25 pt received medications around 6am by RN report and subsequently was appreciated to be aphasic with right sided weakness.  CTH/CTA/CTP ordered. CTH negative for acute hemorrhage. CTA + new Left MCA M2 occulusion.       IMPRESSION:   Recent left MCA cerebral infarction s/p Left MCA M1 thrombectomy on 3/21/2025. TICI 3 recanalization. Patient subsequently discharged and returned 3/26/25 with Rt MCA  m2 occlusion, revascularization TICI 3. On 3/29/25 patient found to have acute Left M2 MCA occlusion s/p thrombectomy TICI 0 -- distal M4 branch. MRI brain demonstrated acute bilateral multifocal cerebral infarctions. There is subacute left basal ganglia infarct.  Etiology likely cardioembolic in the setting of atrial fibrillation.       ASSESSMENT:     NEURO:   -Neurologically with gradually improving speech   -Continue close monitoring for neurologic deterioration    - Stroke neuro checks q2 hours    -  SBP goal 110-160mmhg for now avoiding rapid fluctuations and hypotension , neurologically tolerating at this time, eventual long term age/risk specific normotension    -ANTITHROMBOTIC THERAPY:  Heparin gtt, no bolus, PTT goal currently 50-70  further titration based on clinical course and follow up serial labs.   -continue home statin regimen if applicable as LDL < goal of 70   -MRI imaging as noted   -Dysphagia screen: failed, NGT to be removed, currently on regular/mildly thick, SLP follow up at bedside notes tolerating this diet and to continue   -Physical therapy/OT/Speech eval/treatment.   -Stroke education     -CARDIOVASCULAR: TTE as noted , cardiac monitoring w/ telemetry for now, further evaluation pending findings of noted workup, continue rate control and titrate regimen accordingly                            -HEMATOLOGY: H/H with mild anemia, repeat CBC today, no active bleeding noted, Platelets 127- monitor thrombocytosis closely, patient should have all age and risk appropriate malignancy screenings with PCP or sooner if clinically suspected ,   -hematology consulted- -Would send hypercoagulable workup including factor V leiden, prothrombin, antiphospholipid antibodies, and lupus anticoagulant.  With regard to long-term a/c, can transition to doac from heparin gtt as per neuro and would follow up with outpatient hematologist Dr Kimbrough.   - anemia profile      DVT ppx: Heparin gtt     PULMONARY:  protecting airway, saturating well , encourage mobility and incentive spirometry as tolerated     RENAL: BUN  elevated, Cr within range, monitor urine output, maintain adequate hydration , IVF in place, repeat BMP today      Na Goal:  135-145     Rodriguez: as noted , TOV pending     ID: afebrile, no leukocytosis, monitor for si/sx of infection     ENDO: 1. Iatrogenic adrenal insufficiency  - Hemodynamically stable  - Decrease to IV hydrocortisone 50mg q8 hr  - Will taper back down to home prednisone dose as tolerated (5 mg in AM and 2 mg in PM)  - Monitor BP and electrolytes    VASCULAR: signing off , pseudo appears to be occluded . please call with any issues .    OTHER:  condition and plan of care d/w patient, family, questions and concerns addressed.     DISPOSITION: Acute Rehab once stable and workup is complete, per CM no auth is needed once patient is stable. Anticipate for 4/3/25 pending clinical course and noted pending workup.       CORE MEASURES:       Admission NIHSS: 10     Tenecteplase : [] YES [x] NO     LDL/HDL/A1C:  59 //  74  //  5.5      Depression Screen- if depression hx and/or present     Statin Therapy: continue home dose statin      Dysphagia Screen: [x] PASS [] FAIL     Smoking in the past 12 months [] YES [x] NO     Afib [x] YES [] NO     Diabetes [] YES [x] NO     Stroke Education [x] YES [] NO  Diabetes [] YES [x] NO      Obtain screening lower extremity venous ultrasound in patients who meet 1 or more of the following criteria as patient is high risk for DVT/PE on admission:   [] History of DVT/PE  []Hypercoagulable states (Factor V Leiden, Cancer, OCP, etc. )  []Prolonged immobility (hemiplegia/hemiparesis/post operative or any other extended immobilization)  [] Transferred from outside facility (Rehab or Long term care)  [] Age </= to 50  [x]Stroke     89 year old female with PMHx of Afib (off Eliquis), recent left MCA territory stroke s/p LM1 thrombectomy, with residual minimal aphasia, MRS 1, PPM, HTN, HLD, GERD, severe MR/TR, iatrogenic adrenal insufficiency (on prednisone), lymphedema, metastatic melanoma s/p Left foot melanoma and lymph node resection, s/p RUE melanoma and Right axillary lymph node excision at  (3/18/25) who presented to  ED 3/26/36 with Lt sided weakness and aphasia. CT neg, CTA showed a  Rt M2 occlusion.  Transferred to [facility] intervention. S/P thrombectomy, TICI 3 achieved after 1 pass admitted to NSICU for post procedure monitoring started on IV Heparin.On the early morning of 3/29/25 pt received medications around 6am by RN report and subsequently was appreciated to be aphasic with right sided weakness.  CTH/CTA/CTP ordered. CTH negative for acute hemorrhage. CTA + new Left MCA M2 occulusion.       IMPRESSION:   Recent left MCA cerebral infarction s/p Left MCA M1 thrombectomy on 3/21/2025. TICI 3 recanalization. Patient subsequently discharged and returned 3/26/25 with Rt MCA  m2 occlusion, revascularization TICI 3. On 3/29/25 patient found to have acute Left M2 MCA occlusion s/p thrombectomy TICI 0 -- distal M4 branch. MRI brain demonstrated acute bilateral multifocal cerebral infarctions. There is subacute left basal ganglia infarct.  Etiology likely cardioembolic in the setting of atrial fibrillation.       ASSESSMENT:     NEURO: Bihemispheric embolic infarcts s/p Thrombectomy x 3, Aphasia, R hemiparesis  -Neurologically with gradually improving speech   -Continue close monitoring for neurologic deterioration    - Stroke neuro checks q2 hours    -  SBP goal 110-160mmhg for now avoiding rapid fluctuations and hypotension , neurologically tolerating at this time, eventual long term age/risk specific normotension    -ANTITHROMBOTIC THERAPY:  Heparin gtt, no bolus, PTT goal currently 50-70  further titration based on clinical course and follow up serial labs.   -continue home statin regimen if applicable as LDL < goal of 70   -MRI imaging as noted   -Dysphagia screen: failed, NGT to be removed, currently on regular/mildly thick, SLP follow up at bedside notes tolerating this diet and to continue   -Physical therapy/OT/Speech eval/treatment.   -Stroke education     -CARDIOVASCULAR: Afib  - TTE as noted , cardiac monitoring w/ telemetry for now, further evaluation pending findings of noted workup, continue rate control and titrate regimen accordingly                            -HEMATOLOGY: H/H with mild anemia, repeat CBC today, no active bleeding noted, Platelets 127- monitor thrombocytosis closely, patient should have all age and risk appropriate malignancy screenings with PCP or sooner if clinically suspected ,   -hematology consulted- -Would send hypercoagulable workup including factor V leiden, prothrombin, antiphospholipid antibodies, and lupus anticoagulant.  With regard to long-term a/c, can transition to doac from heparin gtt as per neuro and would follow up with outpatient hematologist Dr Kimbrough.   - anemia profile      DVT ppx: Heparin gtt     PULMONARY:  protecting airway, saturating well , encourage mobility and incentive spirometry as tolerated     RENAL: BUN  elevated, Cr within range, monitor urine output, maintain adequate hydration , IVF in place, repeat BMP today      Na Goal:  135-145     Rodriguez: as noted , TOV pending     ID: afebrile, no leukocytosis, monitor for si/sx of infection     ENDO: 1. Iatrogenic adrenal insufficiency  - Hemodynamically stable  - Decrease to IV hydrocortisone 50mg q8 hr  - Will taper back down to home prednisone dose as tolerated (5 mg in AM and 2 mg in PM)  - Monitor BP and electrolytes    VASCULAR: signing off , pseudo appears to be occluded . please call with any issues .    OTHER:  condition and plan of care d/w patient, family, questions and concerns addressed.     DISPOSITION: Acute Rehab once stable and workup is complete, per CM no auth is needed once patient is stable. Anticipate for 4/3/25 pending clinical course and noted pending workup.       CORE MEASURES:       Admission NIHSS: 10     Tenecteplase : [] YES [x] NO     LDL/HDL/A1C:  59 //  74  //  5.5      Depression Screen- if depression hx and/or present     Statin Therapy: continue home dose statin      Dysphagia Screen: [x] PASS [] FAIL     Smoking in the past 12 months [] YES [x] NO     Afib [x] YES [] NO     Diabetes [] YES [x] NO     Stroke Education [x] YES [] NO  Diabetes [] YES [x] NO      Obtain screening lower extremity venous ultrasound in patients who meet 1 or more of the following criteria as patient is high risk for DVT/PE on admission:   [] History of DVT/PE  []Hypercoagulable states (Factor V Leiden, Cancer, OCP, etc. )  []Prolonged immobility (hemiplegia/hemiparesis/post operative or any other extended immobilization)  [] Transferred from outside facility (Rehab or Long term care)  [] Age </= to 50  [x]Stroke

## 2025-04-01 NOTE — PHYSICAL THERAPY INITIAL EVALUATION ADULT - SENSORY TESTS
Pt able to scan room unprompted however, multiple verbal cues and demonstration needed to track eyes on command bilaterally, + Right eye 1 beat nystagmus noted in right eye with right gaze (+) acuity in central, inferior and right fields, impaired superior and left field. (+) aphasia/word finding difficulty

## 2025-04-01 NOTE — PROGRESS NOTE ADULT - SUBJECTIVE AND OBJECTIVE BOX
Preliminary note, official recommendations pending attending review/signature   Seen and examined by Stroke team attending/team, assessment/ plan as discussed with stroke team attending/team as noted.     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  am morning prior.  Upon arrival to  ED she was noted to have lt sided facial droop; aphasic; left arm weakness; no TNK as out of window/recent infarction.  code stroke activated;  CT imagining found patient with new rt mca; patient transferred to [facility] for thrombectomy. Pt transferred to [facility] EDUARDO eval, s/p cerebral angiogram for mechanical thrombectomy of Rt M2 occlusion, TICI 3 (one pass). She was admitted to NSICU for close monitoring post thrombectomy.       SUBJECTIVE: No events overnight.  No new neurologic complaints.  ROS reported negative unless otherwise noted.    acetaminophen     Tablet .. 650 milliGRAM(s) Oral every 6 hours PRN  atorvastatin 10 milliGRAM(s) Oral at bedtime  chlorhexidine 2% Cloths 1 Application(s) Topical daily  heparin  Infusion 950 Unit(s)/Hr IV Continuous <Continuous>  hydrALAZINE Injectable 10 milliGRAM(s) IV Push four times a day PRN  hydrocortisone sodium succinate Injectable 50 milliGRAM(s) IV Push every 8 hours  labetalol Injectable 10 milliGRAM(s) IV Push every 4 hours PRN  melatonin 5 milliGRAM(s) Oral at bedtime  metoprolol tartrate 25 milliGRAM(s) Oral two times a day  pantoprazole    Tablet 40 milliGRAM(s) Oral before breakfast  polyethylene glycol 3350 17 Gram(s) Oral two times a day  senna 2 Tablet(s) Oral at bedtime  sodium chloride 0.9%. 1000 milliLiter(s) IV Continuous <Continuous>  tamsulosin 0.4 milliGRAM(s) Oral at bedtime      PHYSICAL EXAM:   Vital Signs Last 24 Hrs  T(C): 37.3 (01 Apr 2025 08:00), Max: 37.5 (31 Mar 2025 20:00)  T(F): 99.1 (01 Apr 2025 08:00), Max: 99.5 (31 Mar 2025 20:00)  HR: 95 (01 Apr 2025 12:00) (88 - 115)  BP: 118/91 (01 Apr 2025 12:00) (118/91 - 143/71)  BP(mean): 97 (01 Apr 2025 12:00) (80 - 106)  RR: 18 (01 Apr 2025 12:00) (17 - 22)  SpO2: 99% (01 Apr 2025 12:00) (93% - 100%)    Parameters below as of 01 Apr 2025 12:00  Patient On (Oxygen Delivery Method): room air           General: No acute distress.   HEENT: Head normocephalic, atraumatic. Conjunctivae clear w/o exudates or hemorrhage.    Cardiac: irregular rate and rhythm.    Respiratory: Lung sounds clear in all fields. Chest wall symmetric, nontender. no audible wheezes, respirations unlabored    Abdominal: Soft, nondistended, nontender. Bowel sounds normoactive x 4 quadrants.    Skin: Skin is warm, dry     Extremities: No edema, right/left groin site with no active bleeding, no hematoma, soft , NTTP     Detailed Neurologic Exam:    Mental status: The patient is awake and alert, followed single commands at times , the patient is able to state name,  interactive, does participate with some conversation, some difficulty repeating     Cranial nerves: slight right facial palsy, moderate-severe dysarthria nearly anarthric , Pupils equal and react symmetrically to light. There is no visual field deficit to confrontation. Extraocular motion is full with no nystagmus.      Motor: There is normal bulk and tone.  There is no tremor.  Strength is 4/5 in the right arm and 5/5 right leg. Drifts.   Strength is 5/5 in the left arm and 4/5 left leg.    Sensation: Intact to light touch and pin in 4 extremities    Cerebellar: There is no dysmetria on finger to nose testing.    Gait : deferred      LABS:                        10.8   9.62  )-----------( 127      ( 01 Apr 2025 01:10 )             33.6    04-01    139  |  105  |  32.5[H]  ----------------------------<  133[H]  3.9   |  23.0  |  0.53    Ca    9.0      01 Apr 2025 01:10  Phos  2.7     04-01  Mg     1.9     04-01    PTT - ( 01 Apr 2025 08:15 )  PTT:60.2 sec      03-27 Chol 145 LDL - 59- HDL 74 Trig 55, 03-22 Chol 136 LDL -- HDL 66 Trig 56    A1C: 5.5    IMAGING: Reviewed by me.   Neuro-Imaging    MR Head w/wo IV Cont (03.31.25 @ 10:09)   IMPRESSION:  New acute infarctions within the LEFT lateral frontal   cortex, LEFT occipital cortex, RIGHT insular cortex and scattered RIGHT   frontal, parietal, temporal and occipital cortex suggesting embolic   etiology. Subacute infarction within the LEFT basal ganglia. Mild   periventricular chronic white matter ischemia.    IR Neuro (03.29.25 @ 09:10)   Supervision and Interpretation:  1. Interval recanalization of the left MCA M2 occlusion seen byCT   angiography with persistent posterior frontal distal M4 branch occlusion.   Attempted thrombectomy was unsuccessful because the branch was too small   to be accessed by the red 43 thrombectomy catheter.    CT Head No Cont (03.29.25 @ 20:56)   IMPRESSION: No acute intracranial hemorrhage, mass effect, or shift of   the midline structures.  Evolving acute left frontal lobe superior division MCA infarct without   hemorrhagic transformation.    CT Head No Cont (03.29.25 @ 06:54)   Patchy hypoattenuation related to evolving acute/subacute bilateral MCA   territory infarcts. No intracranial hemorrhage or midline shift present.      CT Angio Head/Neck Stroke Protocol w/ IV Cont, CT Perfusion (03.29.25 @ 06:56)   IMPRESSION:  CT PERFUSION:  Signal abnormality on T-Max/mean transit time data sets at the left   frontal lobe suggest territory-and-risk within the left MCA distribution,   corresponding to angiographic findings below. Cerebral blood flow and   cerebral blood volume data sets remain within normal limits.  Neurointerventional/neurosurgical consultation recommended. MRI may be   considered for further characterization.  CTA NECK:  No evidence of significant stenosis or occlusion.  Previously identified narrowing of the right cervical ICA with   questionable arterial dissection appears less conspicuous on the current   exam, suggesting at least partial recanalization. MRA (dissection   protocol) considered for further evaluation.  CTA HEAD:  Acute left M2 occlusion identified.  Critical findings above were discussed directly by telephone with   ordering provider, Aric PINEDA, on 3/29/2025 at 7:11 AM by Dr. Louie Worthy with read back confirmation.    CT Head No Cont (03.27.25 @ 05:43)  IMPRESSION: Age-appropriate involutional and ischemic gliotic changes. No   hemorrhage. No significant change since 3/26/2025.     CT Head No Cont (03.27.25 @ 05:43)   IMPRESSION: Age-appropriate involutional and ischemic gliotic changes. No   hemorrhage. No significant change since 3/26/2025.     CT Angio Head/Neck Stroke Protocol w/ IV Cont (03.26.25 @ 08:52)   .   RIGHT CAROTID SYSTEM:    Atherosclerotic plaque carotid bulb without    hemodynamically significant stenosis. Tandem lesion distal internal   carotid artery at the C1 level has developed since 3/21/2025, possible   interval arterial dissection, with associated luminal narrowing   approximately 30%  2.   LEFT CAROTID SYSTEM:     Atherosclerotic plaque carotid bulb without    hemodynamically significant stenosis.  3.   VERTEBRAL CIRCULATION:    Patent.  4.  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 Right MCA occlusion   in its proximal M2 segment is an interval finding since 3/21/2025. Left   MCA remains patent with luminal irregularity and low-grade narrowing   following recent thrombectomy.  5.  POSTERIOR INTRACRANIAL CIRCULATION:    Patent.  6. BRAIN PERFUSION:   No acute infarction of the brain is convincingly   demonstrated.  However, broad region of apparent ischemia corresponds to   the right MCA distribution correlating with acute embolic occlusion on CT   angiography  7. Heterogeneous enhancement within the principal dural sinuses may be   secondary to the early phase of venous opacification on this arterial   phase examination. The appearance is similar to 3/21/2025. Consider   supplemental evaluation with MR venogram    ULTRASOUND   US Pseudoaneurysm Injection (03.28.25 @ 09:41)   Successful thrombin and ultrasound-guided compression of the right groin   pseudoaneurysm.      US Duplex Arterial Lower Ext Ltd, Right (03.27.25 @ 04:09)   IMPRESSION:  A 1.7 cm right CFA pseudoaneurysm.  A 2.5 cm adjacent hematoma.    CARDIOLOGY   TTE W or WO Ultrasound Enhancing Agent (03.26.25 @ 18:15)    1. Technically difficult image quality.   2. Left ventricular cavity is normal in size. Left ventricular systolic function is hyperdynamic with an ejectionfraction visually estimated at 65 to 70 %.   3. Mild left ventricular hypertrophy.   4. Normal right ventricular cavity size and normal right ventricular systolic function.   5. Left atrium is severely dilated.   6. The right atrium is severely dilated.             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  am morning prior.  Upon arrival to  ED she was noted to have lt sided facial droop; aphasic; left arm weakness; no TNK as out of window/recent infarction.  code stroke activated;  CT imagining found patient with new rt mca; patient transferred to [facility] for thrombectomy. Pt transferred to [facility] EDUARDO eval, s/p cerebral angiogram for mechanical thrombectomy of Rt M2 occlusion, TICI 3 (one pass). She was admitted to NSICU for close monitoring post thrombectomy.       SUBJECTIVE: No events overnight.  No new neurologic complaints.  ROS reported negative unless otherwise noted.    acetaminophen     Tablet .. 650 milliGRAM(s) Oral every 6 hours PRN  atorvastatin 10 milliGRAM(s) Oral at bedtime  chlorhexidine 2% Cloths 1 Application(s) Topical daily  heparin  Infusion 950 Unit(s)/Hr IV Continuous <Continuous>  hydrALAZINE Injectable 10 milliGRAM(s) IV Push four times a day PRN  hydrocortisone sodium succinate Injectable 50 milliGRAM(s) IV Push every 8 hours  labetalol Injectable 10 milliGRAM(s) IV Push every 4 hours PRN  melatonin 5 milliGRAM(s) Oral at bedtime  metoprolol tartrate 25 milliGRAM(s) Oral two times a day  pantoprazole    Tablet 40 milliGRAM(s) Oral before breakfast  polyethylene glycol 3350 17 Gram(s) Oral two times a day  senna 2 Tablet(s) Oral at bedtime  sodium chloride 0.9%. 1000 milliLiter(s) IV Continuous <Continuous>  tamsulosin 0.4 milliGRAM(s) Oral at bedtime      PHYSICAL EXAM:   Vital Signs Last 24 Hrs  T(C): 37.3 (01 Apr 2025 08:00), Max: 37.5 (31 Mar 2025 20:00)  T(F): 99.1 (01 Apr 2025 08:00), Max: 99.5 (31 Mar 2025 20:00)  HR: 95 (01 Apr 2025 12:00) (88 - 115)  BP: 118/91 (01 Apr 2025 12:00) (118/91 - 143/71)  BP(mean): 97 (01 Apr 2025 12:00) (80 - 106)  RR: 18 (01 Apr 2025 12:00) (17 - 22)  SpO2: 99% (01 Apr 2025 12:00) (93% - 100%)    Parameters below as of 01 Apr 2025 12:00  Patient On (Oxygen Delivery Method): room air           General: No acute distress.   HEENT: Head normocephalic, atraumatic. Conjunctivae clear w/o exudates or hemorrhage.    Cardiac: irregular rate and rhythm.    Respiratory: Lung sounds clear in all fields. Chest wall symmetric, nontender. no audible wheezes, respirations unlabored    Abdominal: Soft, nondistended, nontender. Bowel sounds normoactive x 4 quadrants.    Skin: Skin is warm, dry     Extremities: No edema, right/left groin site with no active bleeding, no hematoma, soft , NTTP     Detailed Neurologic Exam:    Mental status: The patient is awake and alert, followed single commands at times , the patient is able to state name,  interactive, does participate with some conversation, some difficulty repeating     Cranial nerves: slight right facial palsy, moderate-severe dysarthria nearly anarthric , Pupils equal and react symmetrically to light. There is no visual field deficit to confrontation. Extraocular motion is full with no nystagmus.      Motor: There is normal bulk and tone.  There is no tremor.  Strength is 4/5 in the right arm and 5/5 right leg. Drifts.   Strength is 5/5 in the left arm and 4/5 left leg.    Sensation: Intact to light touch and pin in 4 extremities    Cerebellar: There is no dysmetria on finger to nose testing.    Gait : deferred      LABS:                        10.8   9.62  )-----------( 127      ( 01 Apr 2025 01:10 )             33.6    04-01    139  |  105  |  32.5[H]  ----------------------------<  133[H]  3.9   |  23.0  |  0.53    Ca    9.0      01 Apr 2025 01:10  Phos  2.7     04-01  Mg     1.9     04-01    PTT - ( 01 Apr 2025 08:15 )  PTT:60.2 sec      03-27 Chol 145 LDL - 59- HDL 74 Trig 55, 03-22 Chol 136 LDL -- HDL 66 Trig 56    A1C: 5.5    IMAGING: Reviewed by me.   Neuro-Imaging    MR Head w/wo IV Cont (03.31.25 @ 10:09)   IMPRESSION:  New acute infarctions within the LEFT lateral frontal   cortex, LEFT occipital cortex, RIGHT insular cortex and scattered RIGHT   frontal, parietal, temporal and occipital cortex suggesting embolic   etiology. Subacute infarction within the LEFT basal ganglia. Mild   periventricular chronic white matter ischemia.    IR Neuro (03.29.25 @ 09:10)   Supervision and Interpretation:  1. Interval recanalization of the left MCA M2 occlusion seen byCT   angiography with persistent posterior frontal distal M4 branch occlusion.   Attempted thrombectomy was unsuccessful because the branch was too small   to be accessed by the red 43 thrombectomy catheter.    CT Head No Cont (03.29.25 @ 20:56)   IMPRESSION: No acute intracranial hemorrhage, mass effect, or shift of   the midline structures.  Evolving acute left frontal lobe superior division MCA infarct without   hemorrhagic transformation.    CT Head No Cont (03.29.25 @ 06:54)   Patchy hypoattenuation related to evolving acute/subacute bilateral MCA   territory infarcts. No intracranial hemorrhage or midline shift present.      CT Angio Head/Neck Stroke Protocol w/ IV Cont, CT Perfusion (03.29.25 @ 06:56)   IMPRESSION:  CT PERFUSION:  Signal abnormality on T-Max/mean transit time data sets at the left   frontal lobe suggest territory-and-risk within the left MCA distribution,   corresponding to angiographic findings below. Cerebral blood flow and   cerebral blood volume data sets remain within normal limits.  Neurointerventional/neurosurgical consultation recommended. MRI may be   considered for further characterization.  CTA NECK:  No evidence of significant stenosis or occlusion.  Previously identified narrowing of the right cervical ICA with   questionable arterial dissection appears less conspicuous on the current   exam, suggesting at least partial recanalization. MRA (dissection   protocol) considered for further evaluation.  CTA HEAD:  Acute left M2 occlusion identified.  Critical findings above were discussed directly by telephone with   ordering provider, Aric PINEDA, on 3/29/2025 at 7:11 AM by Dr. Louie Worthy with read back confirmation.    CT Head No Cont (03.27.25 @ 05:43)  IMPRESSION: Age-appropriate involutional and ischemic gliotic changes. No   hemorrhage. No significant change since 3/26/2025.     CT Head No Cont (03.27.25 @ 05:43)   IMPRESSION: Age-appropriate involutional and ischemic gliotic changes. No   hemorrhage. No significant change since 3/26/2025.     CT Angio Head/Neck Stroke Protocol w/ IV Cont (03.26.25 @ 08:52)   .   RIGHT CAROTID SYSTEM:    Atherosclerotic plaque carotid bulb without    hemodynamically significant stenosis. Tandem lesion distal internal   carotid artery at the C1 level has developed since 3/21/2025, possible   interval arterial dissection, with associated luminal narrowing   approximately 30%  2.   LEFT CAROTID SYSTEM:     Atherosclerotic plaque carotid bulb without    hemodynamically significant stenosis.  3.   VERTEBRAL CIRCULATION:    Patent.  4.  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 Right MCA occlusion   in its proximal M2 segment is an interval finding since 3/21/2025. Left   MCA remains patent with luminal irregularity and low-grade narrowing   following recent thrombectomy.  5.  POSTERIOR INTRACRANIAL CIRCULATION:    Patent.  6. BRAIN PERFUSION:   No acute infarction of the brain is convincingly   demonstrated.  However, broad region of apparent ischemia corresponds to   the right MCA distribution correlating with acute embolic occlusion on CT   angiography  7. Heterogeneous enhancement within the principal dural sinuses may be   secondary to the early phase of venous opacification on this arterial   phase examination. The appearance is similar to 3/21/2025. Consider   supplemental evaluation with MR venogram    ULTRASOUND   US Pseudoaneurysm Injection (03.28.25 @ 09:41)   Successful thrombin and ultrasound-guided compression of the right groin   pseudoaneurysm.      US Duplex Arterial Lower Ext Ltd, Right (03.27.25 @ 04:09)   IMPRESSION:  A 1.7 cm right CFA pseudoaneurysm.  A 2.5 cm adjacent hematoma.    CARDIOLOGY   TTE W or WO Ultrasound Enhancing Agent (03.26.25 @ 18:15)    1. Technically difficult image quality.   2. Left ventricular cavity is normal in size. Left ventricular systolic function is hyperdynamic with an ejectionfraction visually estimated at 65 to 70 %.   3. Mild left ventricular hypertrophy.   4. Normal right ventricular cavity size and normal right ventricular systolic function.   5. Left atrium is severely dilated.   6. The right atrium is severely dilated.

## 2025-04-01 NOTE — PHYSICAL THERAPY INITIAL EVALUATION ADULT - ADDITIONAL COMMENTS
90 history per son at bedside Pt lives with son (retired, able to assist) in a 2-story house with 1 step to enter + 2 steps to living room and 14 inside with +rail to bed/bath (only 1/2 bath on first floor). Pt was independent without devices prior to admit. +. Pt owns a transport w/c, RW and SAC.

## 2025-04-01 NOTE — SPEECH LANGUAGE PATHOLOGY EVALUATION - SLP GENERAL OBSERVATIONS
Pt recd awake/upright OOB to chair, A&A Ox2 w/cues, aphasic, 0/10 pain pre/post, tolerating RA NAD, NGT, son present for encounter

## 2025-04-01 NOTE — OCCUPATIONAL THERAPY INITIAL EVALUATION ADULT - NS ASR FOLLOW COMMAND OT EVAL
pt with possible apraxia/75% of the time/able to follow single-step instructions/aphasia/oral apraxia

## 2025-04-02 ENCOUNTER — APPOINTMENT (OUTPATIENT)
Dept: SURGICAL ONCOLOGY | Facility: CLINIC | Age: 89
End: 2025-04-02

## 2025-04-02 LAB
AMORPH CRY # UR COMP ASSIST: PRESENT
ANION GAP SERPL CALC-SCNC: 12 MMOL/L — SIGNIFICANT CHANGE UP (ref 5–17)
APPEARANCE UR: ABNORMAL
APTT BLD: 68.1 SEC — HIGH (ref 24.5–35.6)
AT III ACT/NOR PPP CHRO: 60 % — LOW (ref 85–135)
AT III AG PPP IA-MCNC: 20 MG/DL — SIGNIFICANT CHANGE UP (ref 19–31)
BACTERIA # UR AUTO: ABNORMAL /HPF
BILIRUB UR-MCNC: ABNORMAL
BUN SERPL-MCNC: 40.9 MG/DL — HIGH (ref 8–20)
CALCIUM SERPL-MCNC: 8.4 MG/DL — SIGNIFICANT CHANGE UP (ref 8.4–10.5)
CAST: 1 /LPF — SIGNIFICANT CHANGE UP (ref 0–4)
CHLORIDE SERPL-SCNC: 108 MMOL/L — SIGNIFICANT CHANGE UP (ref 96–108)
CO2 SERPL-SCNC: 22 MMOL/L — SIGNIFICANT CHANGE UP (ref 22–29)
COLOR SPEC: SIGNIFICANT CHANGE UP
CREAT SERPL-MCNC: 0.51 MG/DL — SIGNIFICANT CHANGE UP (ref 0.5–1.3)
DIFF PNL FLD: ABNORMAL
DRVVT RATIO: 0.88 RATIO — SIGNIFICANT CHANGE UP (ref 0–1.21)
DRVVT SCREEN TO CONFIRM RATIO: SIGNIFICANT CHANGE UP
EGFR: 90 ML/MIN/1.73M2 — SIGNIFICANT CHANGE UP
EGFR: 90 ML/MIN/1.73M2 — SIGNIFICANT CHANGE UP
FERRITIN SERPL-MCNC: 293 NG/ML — SIGNIFICANT CHANGE UP (ref 13–330)
FOLATE SERPL-MCNC: 16.9 NG/ML — SIGNIFICANT CHANGE UP
GLUCOSE SERPL-MCNC: 139 MG/DL — HIGH (ref 70–99)
GLUCOSE UR QL: NEGATIVE MG/DL — SIGNIFICANT CHANGE UP
HCT VFR BLD CALC: 31.9 % — LOW (ref 34.5–45)
HGB BLD-MCNC: 10.5 G/DL — LOW (ref 11.5–15.5)
IMMATURE RETICULOCYTE FRACTION %: 11.6 % — SIGNIFICANT CHANGE UP
IRON SATN MFR SERPL: 21 % — SIGNIFICANT CHANGE UP (ref 14–50)
IRON SATN MFR SERPL: 53 UG/DL — SIGNIFICANT CHANGE UP (ref 37–145)
KETONES UR-MCNC: ABNORMAL MG/DL
LDH SERPL L TO P-CCNC: 280 U/L — HIGH (ref 98–192)
LEUKOCYTE ESTERASE UR-ACNC: ABNORMAL
MCHC RBC-ENTMCNC: 31.4 PG — SIGNIFICANT CHANGE UP (ref 27–34)
MCHC RBC-ENTMCNC: 32.9 G/DL — SIGNIFICANT CHANGE UP (ref 32–36)
MCV RBC AUTO: 95.5 FL — SIGNIFICANT CHANGE UP (ref 80–100)
NITRITE UR-MCNC: NEGATIVE — SIGNIFICANT CHANGE UP
NORMALIZED SCT PPP-RTO: 0.96 RATIO — SIGNIFICANT CHANGE UP (ref 0–1.16)
NORMALIZED SCT PPP-RTO: SIGNIFICANT CHANGE UP
NRBC # BLD AUTO: 0 K/UL — SIGNIFICANT CHANGE UP (ref 0–0)
NRBC # FLD: 0 K/UL — SIGNIFICANT CHANGE UP (ref 0–0)
NRBC BLD AUTO-RTO: 0 /100 WBCS — SIGNIFICANT CHANGE UP (ref 0–0)
PH UR: 5.5 — SIGNIFICANT CHANGE UP (ref 5–8)
PLATELET # BLD AUTO: 143 K/UL — LOW (ref 150–400)
PMV BLD: 10.7 FL — SIGNIFICANT CHANGE UP (ref 7–13)
POTASSIUM SERPL-MCNC: 3.8 MMOL/L — SIGNIFICANT CHANGE UP (ref 3.5–5.3)
POTASSIUM SERPL-SCNC: 3.8 MMOL/L — SIGNIFICANT CHANGE UP (ref 3.5–5.3)
PROT C ACT/NOR PPP: 122 % — SIGNIFICANT CHANGE UP (ref 74–150)
PROT UR-MCNC: 30 MG/DL
RBC # BLD: 3.34 M/UL — LOW (ref 3.8–5.2)
RBC # BLD: 3.35 M/UL — LOW (ref 3.8–5.2)
RBC # FLD: 14.4 % — SIGNIFICANT CHANGE UP (ref 10.3–14.5)
RBC CASTS # UR COMP ASSIST: 3 /HPF — SIGNIFICANT CHANGE UP (ref 0–4)
RETICS #: 55.3 K/UL — SIGNIFICANT CHANGE UP (ref 25–125)
RETICS/RBC NFR: 1.7 % — SIGNIFICANT CHANGE UP (ref 0.5–2.5)
RETICULOCYTE HEMOGLOBIN EQUIVALENT: 36.6 PG — SIGNIFICANT CHANGE UP (ref 30.6–40.7)
SODIUM SERPL-SCNC: 142 MMOL/L — SIGNIFICANT CHANGE UP (ref 135–145)
SP GR SPEC: 1.03 — SIGNIFICANT CHANGE UP (ref 1–1.03)
SQUAMOUS # UR AUTO: 0 /HPF — SIGNIFICANT CHANGE UP (ref 0–5)
TIBC SERPL-MCNC: 250 UG/DL — SIGNIFICANT CHANGE UP (ref 220–430)
TRANSFERRIN SERPL-MCNC: 175 MG/DL — LOW (ref 192–382)
UROBILINOGEN FLD QL: 1 MG/DL — SIGNIFICANT CHANGE UP (ref 0.2–1)
VIT B12 SERPL-MCNC: 1142 PG/ML — SIGNIFICANT CHANGE UP (ref 232–1245)
WBC # BLD: 8.75 K/UL — SIGNIFICANT CHANGE UP (ref 3.8–10.5)
WBC # FLD AUTO: 8.75 K/UL — SIGNIFICANT CHANGE UP (ref 3.8–10.5)
WBC UR QL: 210 /HPF — HIGH (ref 0–5)

## 2025-04-02 PROCEDURE — 99232 SBSQ HOSP IP/OBS MODERATE 35: CPT

## 2025-04-02 PROCEDURE — 99233 SBSQ HOSP IP/OBS HIGH 50: CPT

## 2025-04-02 PROCEDURE — 70450 CT HEAD/BRAIN W/O DYE: CPT | Mod: 26

## 2025-04-02 RX ORDER — HYDROCORTISONE 20 MG
25 TABLET ORAL THREE TIMES A DAY
Refills: 0 | Status: DISCONTINUED | OUTPATIENT
Start: 2025-04-02 | End: 2025-04-03

## 2025-04-02 RX ORDER — APIXABAN 2.5 MG/1
5 TABLET, FILM COATED ORAL
Refills: 0 | Status: DISCONTINUED | OUTPATIENT
Start: 2025-04-02 | End: 2025-04-04

## 2025-04-02 RX ORDER — NITROFURANTOIN MACROCRYSTAL 100 MG
100 CAPSULE ORAL
Refills: 0 | Status: DISCONTINUED | OUTPATIENT
Start: 2025-04-02 | End: 2025-04-04

## 2025-04-02 RX ADMIN — Medication 500 MILLILITER(S): at 08:46

## 2025-04-02 RX ADMIN — HEPARIN SODIUM 9.5 UNIT(S)/HR: 1000 INJECTION INTRAVENOUS; SUBCUTANEOUS at 04:14

## 2025-04-02 RX ADMIN — METOPROLOL SUCCINATE 25 MILLIGRAM(S): 50 TABLET, EXTENDED RELEASE ORAL at 17:41

## 2025-04-02 RX ADMIN — Medication 75 MILLILITER(S): at 21:20

## 2025-04-02 RX ADMIN — Medication 50 MILLIGRAM(S): at 05:30

## 2025-04-02 RX ADMIN — Medication 100 MILLIGRAM(S): at 18:14

## 2025-04-02 RX ADMIN — Medication 1 APPLICATION(S): at 05:29

## 2025-04-02 RX ADMIN — Medication 40 MILLIGRAM(S): at 05:27

## 2025-04-02 RX ADMIN — Medication 2 TABLET(S): at 21:21

## 2025-04-02 RX ADMIN — METOPROLOL SUCCINATE 25 MILLIGRAM(S): 50 TABLET, EXTENDED RELEASE ORAL at 05:27

## 2025-04-02 RX ADMIN — APIXABAN 5 MILLIGRAM(S): 2.5 TABLET, FILM COATED ORAL at 17:41

## 2025-04-02 RX ADMIN — ATORVASTATIN CALCIUM 10 MILLIGRAM(S): 80 TABLET, FILM COATED ORAL at 21:24

## 2025-04-02 RX ADMIN — Medication 25 MILLIGRAM(S): at 14:03

## 2025-04-02 RX ADMIN — TAMSULOSIN HYDROCHLORIDE 0.4 MILLIGRAM(S): 0.4 CAPSULE ORAL at 21:24

## 2025-04-02 RX ADMIN — Medication 25 MILLIGRAM(S): at 21:24

## 2025-04-02 NOTE — PROGRESS NOTE ADULT - SUBJECTIVE AND OBJECTIVE BOX
INTERVAL EVENTS:  Follow up adrenal insufficiency. Pt feels well, no acute complaints at time of exam.      MEDICATIONS  (STANDING):  atorvastatin 10 milliGRAM(s) Oral at bedtime  chlorhexidine 2% Cloths 1 Application(s) Topical daily  heparin  Infusion 950 Unit(s)/Hr (9.5 mL/Hr) IV Continuous <Continuous>  hydrocortisone 25 milliGRAM(s) Oral three times a day  melatonin 5 milliGRAM(s) Oral at bedtime  metoprolol tartrate 25 milliGRAM(s) Oral two times a day  pantoprazole    Tablet 40 milliGRAM(s) Oral before breakfast  polyethylene glycol 3350 17 Gram(s) Oral two times a day  senna 2 Tablet(s) Oral at bedtime  sodium chloride 0.9%. 1000 milliLiter(s) (50 mL/Hr) IV Continuous <Continuous>  tamsulosin 0.4 milliGRAM(s) Oral at bedtime    MEDICATIONS  (PRN):  acetaminophen     Tablet .. 650 milliGRAM(s) Oral every 6 hours PRN Temp greater or equal to 38C (100.4F), Mild Pain (1 - 3)    Allergies  penicillins (Hives)    Vital Signs Last 24 Hrs  T(C): 36.8 (02 Apr 2025 07:56), Max: 36.8 (02 Apr 2025 07:56)  T(F): 98.3 (02 Apr 2025 07:56), Max: 98.3 (02 Apr 2025 07:56)  HR: 82 (02 Apr 2025 08:00) (80 - 100)  BP: 107/53 (02 Apr 2025 08:00) (107/53 - 144/66)  BP(mean): 67 (02 Apr 2025 08:00) (67 - 97)  RR: 19 (02 Apr 2025 08:00) (15 - 24)  SpO2: 99% (02 Apr 2025 08:00) (93% - 100%)    Parameters below as of 02 Apr 2025 04:00  Patient On (Oxygen Delivery Method): room air    PHYSICAL EXAM:  General: No apparent distress  Respiratory: Lungs clear bilaterally  Cardiac: +S1, S2, no m/r/g  GI: +BS, soft, non tender, non distended  Extremities: No peripheral edema  Neuro: A+O X3    LABS:                        10.5   8.75  )-----------( 143      ( 02 Apr 2025 02:35 )             31.9     04-02    142  |  108  |  40.9[H]  ----------------------------<  139[H]  3.8   |  22.0  |  0.51    Ca    8.4      02 Apr 2025 02:35  Phos  2.7     04-01  Mg     1.9     04-01      Urinalysis Basic - ( 02 Apr 2025 02:35 )    Color: x / Appearance: x / SG: x / pH: x  Gluc: 139 mg/dL / Ketone: x  / Bili: x / Urobili: x   Blood: x / Protein: x / Nitrite: x   Leuk Esterase: x / RBC: x / WBC x   Sq Epi: x / Non Sq Epi: x / Bacteria: x    Thyroid Stimulating Hormone, Serum: 0.64 uIU/mL (03-27-25 @ 02:00)  Thyroid Stimulating Hormone, Serum: 0.22 uIU/mL (03-22-25 @ 03:15)

## 2025-04-02 NOTE — PROGRESS NOTE ADULT - SUBJECTIVE AND OBJECTIVE BOX
CC: Patient being seen for rehabilitation follow up.  Patient more alert and attempting to converse.  NGT has been removed.  Eating this AM.  Son at bedside.  Discussed AR.     FUNCTIONAL PROGRESS  4/2 SLP  Speech Language Pathology Recommendations: 1. Regular diet, thin fluids as tolerated 2. STRICT aspiration precautions 3. Oral care 4. Upright with PO 5. Assist with PO: slow rate of intake, small bites/sips only  6. will follow  7. Rx intensive SLP intervention following discharge     4/1 PT  Transfer: Sit to Stand:     · Level of [place]	moderate assist (50% patients effort)  · Physical Assist/Nonphysical Assist	1 person assist  · Weight-Bearing Restrictions	weight-bearing as tolerated  · Assistive Device	rolling walker    Transfer: Stand to Sit:     · Level of [place]	moderate assist (50% patients effort)  · Physical Assist/Nonphysical Assist	1 person assist  · Weight-Bearing Restrictions	weight-bearing as tolerated  · Assistive Device	rolling walker    Sit/Stand Transfer Safety Analysis:     · Transfer Safety Concerns Noted	decreased safety awareness; decreased weight-shifting ability  · Impairments Contributing to Impaired Transfers	impaired balance; impaired postural control; decreased strength; cognition; impaired coordination    Gait Skills:     · Level of [place]	minimum assist (75% patients effort)  · Physical Assist/Nonphysical Assist	1 person + 1 person to manage equipment; 2nd assist for line management  · Weight-Bearing Restrictions	weight-bearing as tolerated  · Assistive Device	rolling walker  · Gait Distance	15 feet x2  4/1 OT  Bathing Training:     · Level of [place]	moderate assist (50% patients effort); maximum assist (25% patients effort); seated  · Physical Assist/Nonphysical Assist	1 person assist    Upper Body Dressing Training:     · Level of [place]	moderate assist (50% patients effort); to don gown  · Physical Assist/Nonphysical Assist	1 person assist    Lower Body Dressing Training:     · Level of [place]	maximum assist (25% patients effort); to don socks    Toilet Hygiene Training:     · Level of [place]	dependent (less than 25% patients effort); secondary to catheter rivas    Grooming Training:     · Level of [place]	moderate assist (50% patients effort)  · Physical Assist/Nonphysical Assist	1 person assist    Eating/Self-Feeding Training:     · Level of [place]	not observed      VITALS  T(C): 36.8 (04-02-25 @ 07:56), Max: 36.8 (04-02-25 @ 07:56)  HR: 82 (04-02-25 @ 08:00) (80 - 100)  BP: 107/53 (04-02-25 @ 08:00) (107/53 - 144/66)  RR: 19 (04-02-25 @ 08:00) (15 - 24)  SpO2: 99% (04-02-25 @ 08:00) (93% - 100%)  Wt(kg): --    MEDICATIONS   acetaminophen     Tablet .. 650 milliGRAM(s) every 6 hours PRN  atorvastatin 10 milliGRAM(s) at bedtime  chlorhexidine 2% Cloths 1 Application(s) daily  heparin  Infusion 950 Unit(s)/Hr <Continuous>  hydrocortisone 25 milliGRAM(s) three times a day  melatonin 5 milliGRAM(s) at bedtime  metoprolol tartrate 25 milliGRAM(s) two times a day  pantoprazole    Tablet 40 milliGRAM(s) before breakfast  polyethylene glycol 3350 17 Gram(s) two times a day  senna 2 Tablet(s) at bedtime  sodium chloride 0.9%. 1000 milliLiter(s) <Continuous>  tamsulosin 0.4 milliGRAM(s) at bedtime      RECENT LABS/IMAGING  - Reviewed Today                        10.5   8.75  )-----------( 143      ( 02 Apr 2025 02:35 )             31.9     04-02    142  |  108  |  40.9[H]  ----------------------------<  139[H]  3.8   |  22.0  |  0.51    Ca    8.4      02 Apr 2025 02:35  Phos  2.7     04-01  Mg     1.9     04-01      PTT - ( 02 Apr 2025 02:35 )  PTT:68.1 sec  Urinalysis Basic - ( 02 Apr 2025 02:35 )    Color: x / Appearance: x / SG: x / pH: x  Gluc: 139 mg/dL / Ketone: x  / Bili: x / Urobili: x   Blood: x / Protein: x / Nitrite: x   Leuk Esterase: x / RBC: x / WBC x   Sq Epi: x / Non Sq Epi: x / Bacteria: x            CT Brain Stroke 3/26 - 1. No intracranial hemorrhage is appreciated.2. No intracranial mass lesion is appreciated.  3. Left basal ganglia subacute infarction, was acute at the time of stroke code 3/21/2025, demonstrates expected evolution. Consider MR for evaluation of infarct extension4. MR may provide higher sensitivity imaging evaluation for the clinical   indication of acute infarction.    CT ANGIO BRAIN AND NECK STROKE 3/26 - 1.   RIGHT CAROTID SYSTEM:    Atherosclerotic plaque carotid bulb without    hemodynamically significant stenosis. Tandem lesion distal internal carotid artery at the C1 level has developed since 3/21/2025, possible interval arterial dissection, with associated luminal narrowing approximately 30%  2.   LEFT CAROTID SYSTEM:     Atherosclerotic plaque carotid bulb without  hemodynamically significant stenosis.  3.   VERTEBRAL CIRCULATION:    Patent.4.  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 Right MCA occlusion in its proximal M2 segment is an interval finding since 3/21/2025. Left MCA remains patent with luminal irregularity and low-grade narrowing following recent thrombectomy.5.  POSTERIOR INTRACRANIAL CIRCULATION:    Patent.  6. BRAIN PERFUSION:   No acute infarction of the brain is convincingly demonstrated.  However, broad region of apparent ischemia corresponds to the right MCA distribution correlating with acute embolic occlusion on CT angiography  7. Heterogeneous enhancement within the principal dural sinuses may be secondary to the early phase of venous opacification on this arterial phase examination. The appearance is similar to 3/21/2025. Consider supplemental evaluation with MR venogram    IR NEURO 3/26 - POSTOPERATIVE DIAGNOSIS: Large distal upper division M3 trunk occlusion. TICI 3 reperfusion post endovascular thrombectomy    DUPLEX NIKA LE 3/26 - No evidence of deep venous thrombosis in either lower extremity.    DUPLEX R ARTERIAL LE 3/26 - A 1.7 cm right CFA pseudoaneurysm.A 2.5 cm adjacent hematoma.    CT HEAD 3/27 - Age-appropriate involutional and ischemic gliotic changes. No hemorrhage. No significant change since 3/26/2025.    DUPLEX ARTERIAL LOWER EXT, R 3/27 - Slightly decreased size of partially thrombosed pseudoaneurysm arising from the right common femoral artery/proximal right femoral artery.Possible dissection flap in the right common femoral artery.    INTERVENTIONAL IR 3/27 - Right femoral vein pseudoaneurysm injected with 100units of thrombin and pressure held for 10min post injection. PSA appears thrombosed.    CT C/A/P w/contrast 3/27 -No evidence of metastatic disease.Severe cardiomegaly.Pseudoaneurysm again seen off the proximal aspect of the right femoral artery. Correlate with arterial ultrasound performed on the same day. New 1.7 cm cystic lesion at the pancreatic body.    HEAD CT 3/29 - No acute intracranial hemorrhage, mass effect, or shift of the midline structures. Evolving acute left frontal lobe superior division MCA infarct without hemorrhagic transformation.    MRI HEAD 3/31 -  New acute infarctions within the LEFT lateral frontal cortex, LEFT occipital cortex, RIGHT insular cortex and scattered RIGHT frontal, parietal, temporal and occipital cortex suggesting embolic etiology. Subacute infarction within the LEFT basal ganglia. Mild periventricular chronic white matter ischemia.  ----------------------------------------------------------------------------------------  PHYSICAL EXAM   Constitutional - NAD, Comfortable   Neurologic Exam -                    Cognitive - AAO to self, PART situation     Communication - Expressive deficits, Delayed processing, Dysarthria     Cranial Nerves - Left lip droop     FUNCTIONAL MOTOR EXAM -                      LEFT    UE - ShAB 3/5, EF 3/5, EE 3/5,  4/5                    RIGHT UE - ShAB 2/5, EF 3/5, EE 2/5,  4/5                    LEFT    LE - HF 2/5, KE 3/5, DF 3/5                     RIGHT LE - HF 3/5, KE 3/5, DF 3/5       Sensory - Intact to LT  Psychiatric - Fatigued  ----------------------------------------------------------------------------------------  ASSESSMENT/PLAN  89yFemale with functional deficits 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  Acute Bilateral CVA occlusions s/p 3 thrombectomies - HEPARIN DRIP, Lipitor  Right Femoral Artery Pseudoaneurysm - Stable  HTN, AFIB - Lopressor, Hydralazine/Labetalol PRN  Adrenal Insufficiency - Solu-cortef   Pharyngeal Dysphagia - MILDLY THICK Liquids  Pain - Tylenol  DVT PPX - SCDs, Heparin   Rehab/Impaired mobility and function - Patient continues to require hospitalization for the above diagnoses and ongoing active management of comorbid complications that are substantially posing a threat to bodily function, functional ability and quality of life.     RECOMMEND OOB  Pending head CT    When medically optimized, based on the patient's diagnosis, current functional status and potential for progress, recommend ACUTE inpatient rehabilitation for the functional deficits consisting of 3 hours of therapy/day & 24 hour RN/daily PMR physician for active comorbid medical management. Patient will be able to tolerate 3 hours a day.    Goals for acute inpatient rehab are to achieve modified independence with bed mobility, modified independence with transfers and modified independence with ambulation of 50'over an LOS of 14 days.    Will need a PT and OT follow-up within 48 hrs discharge.

## 2025-04-02 NOTE — PROGRESS NOTE ADULT - SUBJECTIVE AND OBJECTIVE BOX
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  am morning prior.  Upon arrival to  ED she was noted to have lt sided facial droop; aphasic; left arm weakness; no TNK as out of window/recent infarction.  code stroke activated;  CT imagining found patient with new rt mca; patient transferred to [facility] for thrombectomy. Pt transferred to [facility] EDUARDO eval, s/p cerebral angiogram for mechanical thrombectomy of Rt M2 occlusion, TICI 3 (one pass). She was admitted to NSICU for close monitoring post thrombectomy.       SUBJECTIVE: No events overnight.  No new neurologic complaints.  ROS reported negative unless otherwise noted.    acetaminophen     Tablet .. 650 milliGRAM(s) Oral every 6 hours PRN  atorvastatin 10 milliGRAM(s) Oral at bedtime  chlorhexidine 2% Cloths 1 Application(s) Topical daily  heparin  Infusion 950 Unit(s)/Hr IV Continuous <Continuous>  hydrALAZINE Injectable 10 milliGRAM(s) IV Push four times a day PRN  hydrocortisone sodium succinate Injectable 50 milliGRAM(s) IV Push every 8 hours  labetalol Injectable 10 milliGRAM(s) IV Push every 4 hours PRN  melatonin 5 milliGRAM(s) Oral at bedtime  metoprolol tartrate 25 milliGRAM(s) Oral two times a day  pantoprazole    Tablet 40 milliGRAM(s) Oral before breakfast  polyethylene glycol 3350 17 Gram(s) Oral two times a day  senna 2 Tablet(s) Oral at bedtime  sodium chloride 0.9%. 1000 milliLiter(s) IV Continuous <Continuous>  tamsulosin 0.4 milliGRAM(s) Oral at bedtime      PHYSICAL EXAM:   Vital Signs Last 24 Hrs  T(C): 36.8 (02 Apr 2025 07:56), Max: 36.8 (02 Apr 2025 07:56)  T(F): 98.3 (02 Apr 2025 07:56), Max: 98.3 (02 Apr 2025 07:56)  HR: 80 (02 Apr 2025 06:00) (80 - 100)  BP: 112/73 (02 Apr 2025 06:00) (112/73 - 144/66)  BP(mean): 83 (02 Apr 2025 06:00) (73 - 97)  RR: 16 (02 Apr 2025 06:00) (15 - 24)  SpO2: 98% (02 Apr 2025 06:00) (93% - 100%)    Parameters below as of 02 Apr 2025 04:00  Patient On (Oxygen Delivery Method): room air    PENDING EXAM     General: No acute distress.   HEENT: Head normocephalic, atraumatic. Conjunctivae clear w/o exudates or hemorrhage.    Cardiac: irregular rate and rhythm.    Respiratory: Lung sounds clear in all fields. Chest wall symmetric, nontender. no audible wheezes, respirations unlabored    Abdominal: Soft, nondistended, nontender. Bowel sounds normoactive x 4 quadrants.    Skin: Skin is warm, dry     Extremities: No edema, right/left groin site with no active bleeding, no hematoma, soft , NTTP     Detailed Neurologic Exam:    Mental status: The patient is awake and alert, followed single commands at times , the patient is able to state name,  interactive, does participate with some conversation, some difficulty repeating     Cranial nerves: slight right facial palsy, moderate-severe dysarthria nearly anarthric , Pupils equal and react symmetrically to light. There is no visual field deficit to confrontation. Extraocular motion is full with no nystagmus.      Motor: There is normal bulk and tone.  There is no tremor.  Strength is 4/5 in the right arm and 5/5 right leg. Drifts.   Strength is 5/5 in the left arm and 4/5 left leg.    Sensation: Intact to light touch and pin in 4 extremities    Cerebellar: There is no dysmetria on finger to nose testing.    Gait : deferred      LABS:                                   10.5   8.75  )-----------( 143      ( 02 Apr 2025 02:35 )             31.9   sec  04-02    142  |  108  |  40.9[H]  ----------------------------<  139[H]  3.8   |  22.0  |  0.51    Ca    8.4      02 Apr 2025 02:35  Phos  2.7     04-01  Mg     1.9     04-01 03-27 Chol 145 LDL - 59- HDL 74 Trig 55, 03-22 Chol 136 LDL -- HDL 66 Trig 56    A1C: 5.5    IMAGING: Reviewed by me.   Neuro-Imaging    MR Head w/wo IV Cont (03.31.25 @ 10:09)   IMPRESSION:  New acute infarctions within the LEFT lateral frontal   cortex, LEFT occipital cortex, RIGHT insular cortex and scattered RIGHT   frontal, parietal, temporal and occipital cortex suggesting embolic   etiology. Subacute infarction within the LEFT basal ganglia. Mild   periventricular chronic white matter ischemia.    IR Neuro (03.29.25 @ 09:10)   Supervision and Interpretation:  1. Interval recanalization of the left MCA M2 occlusion seen byCT   angiography with persistent posterior frontal distal M4 branch occlusion.   Attempted thrombectomy was unsuccessful because the branch was too small   to be accessed by the red 43 thrombectomy catheter.    CT Head No Cont (03.29.25 @ 20:56)   IMPRESSION: No acute intracranial hemorrhage, mass effect, or shift of   the midline structures.  Evolving acute left frontal lobe superior division MCA infarct without   hemorrhagic transformation.    CT Head No Cont (03.29.25 @ 06:54)   Patchy hypoattenuation related to evolving acute/subacute bilateral MCA   territory infarcts. No intracranial hemorrhage or midline shift present.      CT Angio Head/Neck Stroke Protocol w/ IV Cont, CT Perfusion (03.29.25 @ 06:56)   IMPRESSION:  CT PERFUSION:  Signal abnormality on T-Max/mean transit time data sets at the left   frontal lobe suggest territory-and-risk within the left MCA distribution,   corresponding to angiographic findings below. Cerebral blood flow and   cerebral blood volume data sets remain within normal limits.  Neurointerventional/neurosurgical consultation recommended. MRI may be   considered for further characterization.  CTA NECK:  No evidence of significant stenosis or occlusion.  Previously identified narrowing of the right cervical ICA with   questionable arterial dissection appears less conspicuous on the current   exam, suggesting at least partial recanalization. MRA (dissection   protocol) considered for further evaluation.  CTA HEAD:  Acute left M2 occlusion identified.  Critical findings above were discussed directly by telephone with   ordering provider, Aric PINEDA, on 3/29/2025 at 7:11 AM by Dr. Louie Worthy with read back confirmation.    CT Head No Cont (03.27.25 @ 05:43)  IMPRESSION: Age-appropriate involutional and ischemic gliotic changes. No   hemorrhage. No significant change since 3/26/2025.     CT Head No Cont (03.27.25 @ 05:43)   IMPRESSION: Age-appropriate involutional and ischemic gliotic changes. No   hemorrhage. No significant change since 3/26/2025.     CT Angio Head/Neck Stroke Protocol w/ IV Cont (03.26.25 @ 08:52)   .   RIGHT CAROTID SYSTEM:    Atherosclerotic plaque carotid bulb without    hemodynamically significant stenosis. Tandem lesion distal internal   carotid artery at the C1 level has developed since 3/21/2025, possible   interval arterial dissection, with associated luminal narrowing   approximately 30%  2.   LEFT CAROTID SYSTEM:     Atherosclerotic plaque carotid bulb without    hemodynamically significant stenosis.  3.   VERTEBRAL CIRCULATION:    Patent.  4.  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 Right MCA occlusion   in its proximal M2 segment is an interval finding since 3/21/2025. Left   MCA remains patent with luminal irregularity and low-grade narrowing   following recent thrombectomy.  5.  POSTERIOR INTRACRANIAL CIRCULATION:    Patent.  6. BRAIN PERFUSION:   No acute infarction of the brain is convincingly   demonstrated.  However, broad region of apparent ischemia corresponds to   the right MCA distribution correlating with acute embolic occlusion on CT   angiography  7. Heterogeneous enhancement within the principal dural sinuses may be   secondary to the early phase of venous opacification on this arterial   phase examination. The appearance is similar to 3/21/2025. Consider   supplemental evaluation with MR venogram    ULTRASOUND   US Pseudoaneurysm Injection (03.28.25 @ 09:41)   Successful thrombin and ultrasound-guided compression of the right groin   pseudoaneurysm.      US Duplex Arterial Lower Ext Ltd, Right (03.27.25 @ 04:09)   IMPRESSION:  A 1.7 cm right CFA pseudoaneurysm.  A 2.5 cm adjacent hematoma.    CARDIOLOGY   TTE W or WO Ultrasound Enhancing Agent (03.26.25 @ 18:15)    1. Technically difficult image quality.   2. Left ventricular cavity is normal in size. Left ventricular systolic function is hyperdynamic with an ejectionfraction visually estimated at 65 to 70 %.   3. Mild left ventricular hypertrophy.   4. Normal right ventricular cavity size and normal right ventricular systolic function.   5. Left atrium is severely dilated.   6. The right atrium is severely dilated.             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  am morning prior.  Upon arrival to  ED she was noted to have lt sided facial droop; aphasic; left arm weakness; no TNK as out of window/recent infarction.  code stroke activated;  CT imagining found patient with new rt mca; patient transferred to [facility] for thrombectomy. Pt transferred to [facility] EDUARDO eval, s/p cerebral angiogram for mechanical thrombectomy of Rt M2 occlusion, TICI 3 (one pass). She was admitted to NSICU for close monitoring post thrombectomy.       SUBJECTIVE: straight cath overnight. No events overnight.  No new neurologic complaints.  ROS reported negative unless otherwise noted.    acetaminophen     Tablet .. 650 milliGRAM(s) Oral every 6 hours PRN  atorvastatin 10 milliGRAM(s) Oral at bedtime  chlorhexidine 2% Cloths 1 Application(s) Topical daily  heparin  Infusion 950 Unit(s)/Hr IV Continuous <Continuous>  hydrALAZINE Injectable 10 milliGRAM(s) IV Push four times a day PRN  hydrocortisone sodium succinate Injectable 50 milliGRAM(s) IV Push every 8 hours  labetalol Injectable 10 milliGRAM(s) IV Push every 4 hours PRN  melatonin 5 milliGRAM(s) Oral at bedtime  metoprolol tartrate 25 milliGRAM(s) Oral two times a day  pantoprazole    Tablet 40 milliGRAM(s) Oral before breakfast  polyethylene glycol 3350 17 Gram(s) Oral two times a day  senna 2 Tablet(s) Oral at bedtime  sodium chloride 0.9%. 1000 milliLiter(s) IV Continuous <Continuous>  tamsulosin 0.4 milliGRAM(s) Oral at bedtime      PHYSICAL EXAM:   Vital Signs Last 24 Hrs  T(C): 36.8 (02 Apr 2025 07:56), Max: 36.8 (02 Apr 2025 07:56)  T(F): 98.3 (02 Apr 2025 07:56), Max: 98.3 (02 Apr 2025 07:56)  HR: 80 (02 Apr 2025 06:00) (80 - 100)  BP: 112/73 (02 Apr 2025 06:00) (112/73 - 144/66)  BP(mean): 83 (02 Apr 2025 06:00) (73 - 97)  RR: 16 (02 Apr 2025 06:00) (15 - 24)  SpO2: 98% (02 Apr 2025 06:00) (93% - 100%)    Parameters below as of 02 Apr 2025 04:00  Patient On (Oxygen Delivery Method): room air         General: No acute distress.   HEENT: Head normocephalic, atraumatic. Conjunctivae clear w/o exudates or hemorrhage.    Cardiac: irregular rate and rhythm.    Respiratory: Lung sounds clear in all fields. Chest wall symmetric, nontender. no audible wheezes, respirations unlabored    Abdominal: Soft, nondistended, nontender. Bowel sounds normoactive x 4 quadrants.    Skin: Skin is warm, dry     Extremities: No edema, right/left groin site with no active bleeding, no hematoma, soft , NTTP     Detailed Neurologic Exam:    Mental status: The patient is awake and alert, followed single commands at times , the patient is able to state name simple objects, writing on note pad,  interactive, does participate with some conversation, able to sing ABC's and Happy Birthday.     Cranial nerves: slight right facial palsy, moderate-severe dysarthria nearly anarthric , Pupils equal and react symmetrically to light. There is no visual field deficit to confrontation. Extraocular motion is full with no nystagmus.      Motor: There is normal bulk and tone.  There is no tremor.  Strength is 4+/5 proximal 4/5 distal in the right arm and 5/5 right leg. Drifts.   Strength is 5/5 in the left arm and 4/5 left leg.  Sensation: Intact to light touch and pin in 4 extremities  Cerebellar: There is no dysmetria on finger to nose testing.                                                                                                    Gait : deferred      LABS:                                   10.5   8.75  )-----------( 143      ( 02 Apr 2025 02:35 )             31.9   sec  04-02    142  |  108  |  40.9[H]  ----------------------------<  139[H]  3.8   |  22.0  |  0.51    Ca    8.4      02 Apr 2025 02:35  Phos  2.7     04-01  Mg     1.9     04-01 03-27 Chol 145 LDL - 59- HDL 74 Trig 55, 03-22 Chol 136 LDL -- HDL 66 Trig 56    A1C: 5.5    IMAGING: Reviewed by me.   Neuro-Imaging    CT Head No Cont (04.02.25 @ 09:41)   IMPRESSION: Subacute infarcts are again seen bilaterally as described   above    Acute infarct is now seen involving the left basal ganglia region.    MR Head w/wo IV Cont (03.31.25 @ 10:09)   IMPRESSION:  New acute infarctions within the LEFT lateral frontal   cortex, LEFT occipital cortex, RIGHT insular cortex and scattered RIGHT   frontal, parietal, temporal and occipital cortex suggesting embolic   etiology. Subacute infarction within the LEFT basal ganglia. Mild   periventricular chronic white matter ischemia.    IR Neuro (03.29.25 @ 09:10)   Supervision and Interpretation:  1. Interval recanalization of the left MCA M2 occlusion seen byCT   angiography with persistent posterior frontal distal M4 branch occlusion.   Attempted thrombectomy was unsuccessful because the branch was too small   to be accessed by the red 43 thrombectomy catheter.    CT Head No Cont (03.29.25 @ 20:56)   IMPRESSION: No acute intracranial hemorrhage, mass effect, or shift of   the midline structures.  Evolving acute left frontal lobe superior division MCA infarct without   hemorrhagic transformation.    CT Head No Cont (03.29.25 @ 06:54)   Patchy hypoattenuation related to evolving acute/subacute bilateral MCA   territory infarcts. No intracranial hemorrhage or midline shift present.      CT Angio Head/Neck Stroke Protocol w/ IV Cont, CT Perfusion (03.29.25 @ 06:56)   IMPRESSION:  CT PERFUSION:  Signal abnormality on T-Max/mean transit time data sets at the left   frontal lobe suggest territory-and-risk within the left MCA distribution,   corresponding to angiographic findings below. Cerebral blood flow and   cerebral blood volume data sets remain within normal limits.  Neurointerventional/neurosurgical consultation recommended. MRI may be   considered for further characterization.  CTA NECK:  No evidence of significant stenosis or occlusion.  Previously identified narrowing of the right cervical ICA with   questionable arterial dissection appears less conspicuous on the current   exam, suggesting at least partial recanalization. MRA (dissection   protocol) considered for further evaluation.  CTA HEAD:  Acute left M2 occlusion identified.  Critical findings above were discussed directly by telephone with   ordering provider, Aric PINEDA, on 3/29/2025 at 7:11 AM by Dr. Louie Worthy with read back confirmation.    CT Head No Cont (03.27.25 @ 05:43)  IMPRESSION: Age-appropriate involutional and ischemic gliotic changes. No   hemorrhage. No significant change since 3/26/2025.     CT Head No Cont (03.27.25 @ 05:43)   IMPRESSION: Age-appropriate involutional and ischemic gliotic changes. No   hemorrhage. No significant change since 3/26/2025.     CT Angio Head/Neck Stroke Protocol w/ IV Cont (03.26.25 @ 08:52)   .   RIGHT CAROTID SYSTEM:    Atherosclerotic plaque carotid bulb without    hemodynamically significant stenosis. Tandem lesion distal internal   carotid artery at the C1 level has developed since 3/21/2025, possible   interval arterial dissection, with associated luminal narrowing   approximately 30%  2.   LEFT CAROTID SYSTEM:     Atherosclerotic plaque carotid bulb without    hemodynamically significant stenosis.  3.   VERTEBRAL CIRCULATION:    Patent.  4.  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 Right MCA occlusion   in its proximal M2 segment is an interval finding since 3/21/2025. Left   MCA remains patent with luminal irregularity and low-grade narrowing   following recent thrombectomy.  5.  POSTERIOR INTRACRANIAL CIRCULATION:    Patent.  6. BRAIN PERFUSION:   No acute infarction of the brain is convincingly   demonstrated.  However, broad region of apparent ischemia corresponds to   the right MCA distribution correlating with acute embolic occlusion on CT   angiography  7. Heterogeneous enhancement within the principal dural sinuses may be   secondary to the early phase of venous opacification on this arterial   phase examination. The appearance is similar to 3/21/2025. Consider   supplemental evaluation with MR venogram    ULTRASOUND   US Pseudoaneurysm Injection (03.28.25 @ 09:41)   Successful thrombin and ultrasound-guided compression of the right groin   pseudoaneurysm.      US Duplex Arterial Lower Ext Ltd, Right (03.27.25 @ 04:09)   IMPRESSION:  A 1.7 cm right CFA pseudoaneurysm.  A 2.5 cm adjacent hematoma.    CARDIOLOGY   TTE W or WO Ultrasound Enhancing Agent (03.26.25 @ 18:15)    1. Technically difficult image quality.   2. Left ventricular cavity is normal in size. Left ventricular systolic function is hyperdynamic with an ejectionfraction visually estimated at 65 to 70 %.   3. Mild left ventricular hypertrophy.   4. Normal right ventricular cavity size and normal right ventricular systolic function.   5. Left atrium is severely dilated.   6. The right atrium is severely dilated.

## 2025-04-02 NOTE — PROGRESS NOTE ADULT - ASSESSMENT
89 year old female with PMHx of Afib (off Eliquis), recent left MCA territory stroke s/p LM1 thrombectomy, with residual minimal aphasia, MRS 1, PPM, HTN, HLD, GERD, severe MR/TR, iatrogenic adrenal insufficiency (on prednisone), lymphedema, metastatic melanoma s/p Left foot melanoma and lymph node resection, s/p RUE melanoma and Right axillary lymph node excision at [facility] (3/18/25) who presented to  ED 3/26/36 with Lt sided weakness and aphasia. CT neg, CTA showed a  Rt M2 occlusion.  Transferred to [facility] EDUARDO intervention. S/P thrombectomy, TICI 3 achieved after 1 pass admitted to NSICU for post procedure monitoring started on IV Heparin.On the early morning of 3/29/25 pt received medications around 6am by RN report and subsequently was appreciated to be aphasic with right sided weakness.  CTH/CTA/CTP ordered. CTH negative for acute hemorrhage. CTA + new Left MCA M2 occulusion.       IMPRESSION:   Recent left MCA cerebral infarction s/p Left MCA M1 thrombectomy on 3/21/2025. TICI 3 recanalization. Patient subsequently discharged and returned 3/26/25 with Rt MCA  m2 occlusion, revascularization TICI 3. On 3/29/25 patient found to have acute Left M2 MCA occlusion s/p thrombectomy TICI 0 -- distal M4 branch. MRI brain demonstrated acute bilateral multifocal cerebral infarctions. There is subacute left basal ganglia infarct.  Etiology likely cardioembolic in the setting of atrial fibrillation.       ASSESSMENT:     NEURO: Bihemispheric embolic infarcts s/p Thrombectomy x 3, Aphasia, R hemiparesis  -Neurologically with gradually improving speech   -Continue close monitoring for neurologic deterioration    - Stroke neuro checks q2 hours    -  SBP goal 110-160mmhg for now avoiding rapid fluctuations and hypotension , neurologically tolerating at this time, eventual long term age/risk specific normotension    -ANTITHROMBOTIC THERAPY:  Heparin gtt, no bolus, PTT goal currently 50-70  further titration based on clinical course and follow up serial labs.   -continue home statin regimen if applicable as LDL < goal of 70   -MRI imaging as noted   -Dysphagia screen: failed, NGT removed 4/1/25, currently on regular/mildly thick, SLP follow up at bedside notes tolerating this diet and to continue   -Physical therapy/OT/Speech eval/treatment.   -Stroke education     -CARDIOVASCULAR: Afib  - TTE as noted , cardiac monitoring w/ telemetry for now, further evaluation pending findings of noted workup, continue rate control and titrate regimen accordingly                            -HEMATOLOGY: H/H with mild anemia, no active bleeding noted, Platelets 143- monitor thrombocytosis closely, patient should have all age and risk appropriate malignancy screenings with PCP or sooner if clinically suspected ,   -hematology consulted- -hypercoagulable workup sent, including factor V leiden, prothrombin, antiphospholipid antibodies, and lupus anticoagulant.  With regard to long-term a/c, can transition to doac from heparin gtt as per neuro and would follow up with outpatient hematologist Dr Kimbrough.   - anemia profile      DVT ppx: Heparin gtt     PULMONARY:  protecting airway, saturating well , encourage mobility and incentive spirometry as tolerated     RENAL: BUN  elevated, Cr within range, monitor urine output, maintain adequate hydration , IVF in place, 500cc bolus implemented on 4/2/25     Na Goal:  135-145     Rodriguez: as noted , TOV pending. Straight cath x2.      ID: afebrile, no leukocytosis, monitor for si/sx of infection     ENDO: 1. Iatrogenic adrenal insufficiency  - Hemodynamically stable  - Decrease to hydrocortisone 25mg PO q8 hr  - Will taper back down to home prednisone dose as tolerated (5 mg in AM and 2 mg in PM)  - Monitor BP and electrolytes    VASCULAR: signing off , pseudo appears to be occluded . please call with any issues .    OTHER:  condition and plan of care d/w patient, family, questions and concerns addressed.     DISPOSITION: Acute Rehab once stable and workup is complete, per CM no auth is needed once patient is stable. Anticipate for 4/3/25 pending clinical course and noted pending workup.       CORE MEASURES:       Admission NIHSS: 10     Tenecteplase : [] YES [x] NO     LDL/HDL/A1C:  59 //  74  //  5.5      Depression Screen- if depression hx and/or present     Statin Therapy: continue home dose statin      Dysphagia Screen: [x] PASS [] FAIL     Smoking in the past 12 months [] YES [x] NO     Afib [x] YES [] NO     Diabetes [] YES [x] NO     Stroke Education [x] YES [] NO  Diabetes [] YES [x] NO      Obtain screening lower extremity venous ultrasound in patients who meet 1 or more of the following criteria as patient is high risk for DVT/PE on admission:   [] History of DVT/PE  []Hypercoagulable states (Factor V Leiden, Cancer, OCP, etc. )  []Prolonged immobility (hemiplegia/hemiparesis/post operative or any other extended immobilization)  [] Transferred from outside facility (Rehab or Long term care)  [] Age </= to 50  [x]Stroke     89 year old female with PMHx of Afib (off Eliquis), recent left MCA territory stroke s/p LM1 thrombectomy, with residual minimal aphasia, MRS 1, PPM, HTN, HLD, GERD, severe MR/TR, iatrogenic adrenal insufficiency (on prednisone), lymphedema, metastatic melanoma s/p Left foot melanoma and lymph node resection, s/p RUE melanoma and Right axillary lymph node excision at [facility] (3/18/25) who presented to  ED 3/26/36 with Lt sided weakness and aphasia. CT neg, CTA showed a  Rt M2 occlusion.  Transferred to [facility] EDUARDO intervention. S/P thrombectomy, TICI 3 achieved after 1 pass admitted to NSICU for post procedure monitoring started on IV Heparin.On the early morning of 3/29/25 pt received medications around 6am by RN report and subsequently was appreciated to be aphasic with right sided weakness.  CTH/CTA/CTP ordered. CTH negative for acute hemorrhage. CTA + new Left MCA M2 occulusion.       IMPRESSION:   Recent left MCA cerebral infarction s/p Left MCA M1 thrombectomy on 3/21/2025. TICI 3 recanalization. Patient subsequently discharged and returned 3/26/25 with Rt MCA  m2 occlusion, revascularization TICI 3. On 3/29/25 patient found to have acute Left M2 MCA occlusion s/p thrombectomy TICI 0 -- distal M4 branch. MRI brain demonstrated acute bilateral multifocal cerebral infarctions. There is subacute left basal ganglia infarct.  Etiology likely cardioembolic in the setting of atrial fibrillation.         NEURO: Bihemispheric embolic infarcts s/p Thrombectomy x 3, Aphasia, R hemiparesis  -Neurologically with gradually improving speech   -Continue close monitoring for neurologic deterioration    - Stroke neuro checks q4 hours    -  SBP goal 110-160mmhg for now avoiding rapid fluctuations and hypotension , neurologically tolerating at this time, eventual long term age/risk specific normotension    -ANTITHROMBOTIC THERAPY:  Heparin gtt, no bolus, PTT goal currently 50-70  further titration based on clinical course and follow up serial labs. Goal to transition to Eliquis 4/2/25 at 6pm as CT Head on 4/2/25 with no significant change from prior study.   -continue home statin regimen if applicable as LDL < goal of 70   -MRI imaging as noted   -Dysphagia screen: failed, NGT removed 4/1/25, currently on regular/mildly thick, SLP follow up at bedside notes tolerating this diet and to continue   -Physical therapy/OT/Speech eval/treatment.   -Stroke education     -CARDIOVASCULAR: Afib  - TTE as noted , cardiac monitoring w/ telemetry for now, further evaluation pending findings of noted workup, continue rate control and titrate regimen accordingly                            -HEMATOLOGY: H/H with mild anemia, no active bleeding noted, Platelets 143- monitor thrombocytosis closely, patient should have all age and risk appropriate malignancy screenings with PCP or sooner if clinically suspected ,   -hematology consulted- -hypercoagulable workup sent, including factor V leiden, prothrombin, antiphospholipid antibodies, and lupus anticoagulant.  With regard to long-term a/c, can transition to doac from heparin gtt as per neuro and would follow up with outpatient hematologist Dr Kimbrough.   - anemia profile Iron total 53, %Sat 21, TIBC 250, ferritin 293, transferrin 175.      DVT ppx: Heparin gtt     PULMONARY:  protecting airway, saturating well , encourage mobility and incentive spirometry as tolerated     RENAL: BUN  elevated, Cr within range, monitor urine output, maintain adequate hydration , IVF in place, 500cc bolus implemented on 4/2/25     Na Goal:  135-145     Rodriguez: as noted , TOV pending. Straight cath x2.      ID: afebrile, no leukocytosis, monitor for si/sx of infection     ENDO: 1. Iatrogenic adrenal insufficiency  - Hemodynamically stable  - Decrease to hydrocortisone 25mg PO q8 hr  - Will taper back down to home prednisone dose as tolerated (5 mg in AM and 2 mg in PM)  - Monitor BP and electrolytes    VASCULAR: signing off , pseudo appears to be occluded . please call with any issues .    OTHER:  condition and plan of care d/w patient, family, questions and concerns addressed.     DISPOSITION: Acute Rehab once stable and workup is complete, per CM no auth is needed once patient is stable. Anticipate for 4/3/25 pending clinical course and noted pending workup.       CORE MEASURES:       Admission NIHSS: 10     Tenecteplase : [] YES [x] NO     LDL/HDL/A1C:  59 //  74  //  5.5      Depression Screen- if depression hx and/or present     Statin Therapy: continue home dose statin      Dysphagia Screen: [x] PASS [] FAIL     Smoking in the past 12 months [] YES [x] NO     Afib [x] YES [] NO     Diabetes [] YES [x] NO     Stroke Education [x] YES [] NO  Diabetes [] YES [x] NO      Obtain screening lower extremity venous ultrasound in patients who meet 1 or more of the following criteria as patient is high risk for DVT/PE on admission:   [] History of DVT/PE  []Hypercoagulable states (Factor V Leiden, Cancer, OCP, etc. )  []Prolonged immobility (hemiplegia/hemiparesis/post operative or any other extended immobilization)  [] Transferred from outside facility (Rehab or Long term care)  [] Age </= to 50  [x]Stroke     89 year old female with PMHx of Afib (off Eliquis), recent left MCA territory stroke s/p LM1 thrombectomy, with residual minimal aphasia, MRS 1, PPM, HTN, HLD, GERD, severe MR/TR, iatrogenic adrenal insufficiency (on prednisone), lymphedema, metastatic melanoma s/p Left foot melanoma and lymph node resection, s/p RUE melanoma and Right axillary lymph node excision at [facility] (3/18/25) who presented to  ED 3/26/36 with Lt sided weakness and aphasia. CT neg, CTA showed a  Rt M2 occlusion.  Transferred to [facility] EDUARDO intervention. S/P thrombectomy, TICI 3 achieved after 1 pass admitted to NSICU for post procedure monitoring started on IV Heparin.On the early morning of 3/29/25 pt received medications around 6am by RN report and subsequently was appreciated to be aphasic with right sided weakness.  CTH/CTA/CTP ordered. CTH negative for acute hemorrhage. CTA + new Left MCA M2 occulusion.       IMPRESSION:   Recent left MCA cerebral infarction s/p Left MCA M1 thrombectomy on 3/21/2025. TICI 3 recanalization. Patient subsequently discharged and returned 3/26/25 with Rt MCA  m2 occlusion, revascularization TICI 3. On 3/29/25 patient found to have acute Left M2 MCA occlusion s/p thrombectomy TICI 0 -- distal M4 branch. MRI brain demonstrated acute bilateral multifocal cerebral infarctions. There is subacute left basal ganglia infarct.  Etiology likely cardioembolic in the setting of atrial fibrillation and underlying hypercoaguability from malignancy.         NEURO: Bihemispheric embolic infarcts s/p Thrombectomy x 3, Aphasia, R hemiparesis  -Neurologically with gradually improving speech   -Continue close monitoring for neurologic deterioration    - Stroke neuro checks q4 hours    -  SBP goal 110-160mmhg for now avoiding rapid fluctuations and hypotension , neurologically tolerating at this time, eventual long term age/risk specific normotension    -ANTITHROMBOTIC THERAPY:  Heparin gtt, no bolus, PTT goal currently 50-70  further titration based on clinical course and follow up serial labs. Goal to transition to Eliquis 4/2/25 at 6pm as CT Head on 4/2/25 with no significant change from prior study.   -continue home statin regimen if applicable as LDL < goal of 70   -MRI imaging as noted   -Dysphagia screen: failed, NGT removed 4/1/25, currently on regular/mildly thick, SLP follow up at bedside notes tolerating this diet and to continue   -Physical therapy/OT/Speech eval/treatment.   -Stroke education     -CARDIOVASCULAR: Afib  - TTE as noted , cardiac monitoring w/ telemetry for now, further evaluation pending findings of noted workup, continue rate control and titrate regimen accordingly                            -HEMATOLOGY: H/H with mild anemia, no active bleeding noted, Platelets 143- monitor thrombocytosis closely, patient should have all age and risk appropriate malignancy screenings with PCP or sooner if clinically suspected ,   -hematology consulted- -hypercoagulable workup sent, including factor V leiden, prothrombin, antiphospholipid antibodies, and lupus anticoagulant.  With regard to long-term a/c, can transition to doac from heparin gtt as per neuro and would follow up with outpatient hematologist Dr Kimbrough.   - anemia profile Iron total 53, %Sat 21, TIBC 250, ferritin 293, transferrin 175.      DVT ppx: Heparin gtt     PULMONARY:  protecting airway, saturating well , encourage mobility and incentive spirometry as tolerated     RENAL: BUN  elevated, Cr within range, monitor urine output, maintain adequate hydration , IVF in place, 500cc bolus implemented on 4/2/25     Na Goal:  135-145     Rodriguez: as noted , TOV pending. Straight cath x2.      ID: afebrile, no leukocytosis, monitor for si/sx of infection     ENDO: 1. Iatrogenic adrenal insufficiency  - Hemodynamically stable  - Decrease to hydrocortisone 25mg PO q8 hr  - Will taper back down to home prednisone dose as tolerated (5 mg in AM and 2 mg in PM)  - Monitor BP and electrolytes    VASCULAR: signing off , pseudo appears to be occluded . please call with any issues .    OTHER:  condition and plan of care d/w patient, family, questions and concerns addressed.     DISPOSITION: Acute Rehab once stable and workup is complete, per CM no auth is needed once patient is stable. Anticipate for 4/3/25 pending clinical course and noted pending workup.       CORE MEASURES:       Admission NIHSS: 10     Tenecteplase : [] YES [x] NO     LDL/HDL/A1C:  59 //  74  //  5.5      Depression Screen- if depression hx and/or present     Statin Therapy: continue home dose statin      Dysphagia Screen: [x] PASS [] FAIL     Smoking in the past 12 months [] YES [x] NO     Afib [x] YES [] NO     Diabetes [] YES [x] NO     Stroke Education [x] YES [] NO  Diabetes [] YES [x] NO      Obtain screening lower extremity venous ultrasound in patients who meet 1 or more of the following criteria as patient is high risk for DVT/PE on admission:   [] History of DVT/PE  []Hypercoagulable states (Factor V Leiden, Cancer, OCP, etc. )  []Prolonged immobility (hemiplegia/hemiparesis/post operative or any other extended immobilization)  [] Transferred from outside facility (Rehab or Long term care)  [] Age </= to 50  [x]Stroke

## 2025-04-02 NOTE — PROGRESS NOTE ADULT - NS ATTEND AMEND GEN_ALL_CORE FT
Hemodynamically stable, continue on HC 25 po q8 and tomorrow can start home dose of prednisone. Advise on sick day dosing on discharge papers Hemodynamically stable, continue on HC 25 po q8 and tomorrow can start home dose of prednisone. Advise on sick day dosing on discharge papers. No further recs, will sign off, please recall as needed     Thank you for the consult

## 2025-04-02 NOTE — PROGRESS NOTE ADULT - ASSESSMENT
89F with PMHx of A-fib, HTN, Melanoma who was recently admitted for stroke with Left MCA occlusion s/p thrombectomy, now readmitted for left sided weakness and left facial droop. Found with new Right MCA occlusion s/p thrombectomy. On 3/29, had acute neurological change with severe aphasia, +new left M2 occlusion, returned to IR for mechanical thrombectomy. Endocrinology consult requested for management of adrenal insufficiency. Has history of iatrogenic AI after immunotherapy treatment for melanoma in 2019. She is following with endocrinologist Dr. Antonio Gaines at [place]. Home regimen consist of Prednisone 5 mg in AM and 2 mg PM.     1. Iatrogenic adrenal insufficiency  - Hemodynamically stable  - Continue PO hydrocortisone 25mg TID, tomorrow resume home regimen of prednisone 5 mg in AM (8:00) and 2 mg in PM (16:00)  - Monitor BP and electrolytes  - Can be discharged on prior home regimen of prednisone and follow up with her outpatient endocrinologist  - Reconsult PRN    2. L MCA stroke s/p thrombectomy  - Neuro checks  - MRI with new acute infarctions within the L lateral frontal cortex, L occipital cortex, R insular cortex and scattered R frontal, parietal, temporal and occipital cortex suggesting embolic etiology  - Care per neuro ICU

## 2025-04-03 LAB
ALBUMIN SERPL ELPH-MCNC: 2.9 G/DL — LOW (ref 3.3–5.2)
ALP SERPL-CCNC: 38 U/L — LOW (ref 40–120)
ALT FLD-CCNC: 17 U/L — SIGNIFICANT CHANGE UP
ANION GAP SERPL CALC-SCNC: 13 MMOL/L — SIGNIFICANT CHANGE UP (ref 5–17)
APTT BLD: 26.1 SEC — SIGNIFICANT CHANGE UP (ref 24.5–35.6)
AST SERPL-CCNC: 13 U/L — SIGNIFICANT CHANGE UP
BILIRUB SERPL-MCNC: 0.7 MG/DL — SIGNIFICANT CHANGE UP (ref 0.4–2)
BUN SERPL-MCNC: 36.8 MG/DL — HIGH (ref 8–20)
CALCIUM SERPL-MCNC: 8.2 MG/DL — LOW (ref 8.4–10.5)
CHLORIDE SERPL-SCNC: 109 MMOL/L — HIGH (ref 96–108)
CO2 SERPL-SCNC: 20 MMOL/L — LOW (ref 22–29)
CREAT SERPL-MCNC: 0.57 MG/DL — SIGNIFICANT CHANGE UP (ref 0.5–1.3)
EGFR: 87 ML/MIN/1.73M2 — SIGNIFICANT CHANGE UP
EGFR: 87 ML/MIN/1.73M2 — SIGNIFICANT CHANGE UP
GLUCOSE SERPL-MCNC: 124 MG/DL — HIGH (ref 70–99)
HCT VFR BLD CALC: 31.8 % — LOW (ref 34.5–45)
HGB BLD-MCNC: 10.3 G/DL — LOW (ref 11.5–15.5)
MAGNESIUM SERPL-MCNC: 1.8 MG/DL — SIGNIFICANT CHANGE UP (ref 1.6–2.6)
MCHC RBC-ENTMCNC: 30.7 PG — SIGNIFICANT CHANGE UP (ref 27–34)
MCHC RBC-ENTMCNC: 32.4 G/DL — SIGNIFICANT CHANGE UP (ref 32–36)
MCV RBC AUTO: 94.6 FL — SIGNIFICANT CHANGE UP (ref 80–100)
NRBC # BLD AUTO: 0.02 K/UL — HIGH (ref 0–0)
NRBC # FLD: 0.02 K/UL — HIGH (ref 0–0)
NRBC BLD AUTO-RTO: 0 /100 WBCS — SIGNIFICANT CHANGE UP (ref 0–0)
PHOSPHATE SERPL-MCNC: 2.6 MG/DL — SIGNIFICANT CHANGE UP (ref 2.4–4.7)
PLATELET # BLD AUTO: 146 K/UL — LOW (ref 150–400)
PMV BLD: 10.5 FL — SIGNIFICANT CHANGE UP (ref 7–13)
POTASSIUM SERPL-MCNC: 3.5 MMOL/L — SIGNIFICANT CHANGE UP (ref 3.5–5.3)
POTASSIUM SERPL-SCNC: 3.5 MMOL/L — SIGNIFICANT CHANGE UP (ref 3.5–5.3)
PROT S FREE PPP-ACNC: 84 % — SIGNIFICANT CHANGE UP (ref 63–140)
PROT SERPL-MCNC: 5 G/DL — LOW (ref 6.6–8.7)
RBC # BLD: 3.36 M/UL — LOW (ref 3.8–5.2)
RBC # FLD: 14.5 % — SIGNIFICANT CHANGE UP (ref 10.3–14.5)
SODIUM SERPL-SCNC: 142 MMOL/L — SIGNIFICANT CHANGE UP (ref 135–145)
WBC # BLD: 7.04 K/UL — SIGNIFICANT CHANGE UP (ref 3.8–10.5)
WBC # FLD AUTO: 7.04 K/UL — SIGNIFICANT CHANGE UP (ref 3.8–10.5)

## 2025-04-03 PROCEDURE — 99233 SBSQ HOSP IP/OBS HIGH 50: CPT

## 2025-04-03 PROCEDURE — 99233 SBSQ HOSP IP/OBS HIGH 50: CPT | Mod: GC

## 2025-04-03 RX ORDER — PREDNISONE 20 MG/1
5 TABLET ORAL
Refills: 0 | Status: DISCONTINUED | OUTPATIENT
Start: 2025-04-03 | End: 2025-04-04

## 2025-04-03 RX ORDER — MAGNESIUM SULFATE 500 MG/ML
1 SYRINGE (ML) INJECTION ONCE
Refills: 0 | Status: COMPLETED | OUTPATIENT
Start: 2025-04-03 | End: 2025-04-03

## 2025-04-03 RX ORDER — PREDNISONE 20 MG/1
2 TABLET ORAL
Refills: 0 | Status: DISCONTINUED | OUTPATIENT
Start: 2025-04-03 | End: 2025-04-04

## 2025-04-03 RX ADMIN — Medication 100 GRAM(S): at 08:00

## 2025-04-03 RX ADMIN — Medication 75 MILLILITER(S): at 06:29

## 2025-04-03 RX ADMIN — Medication 2 TABLET(S): at 22:18

## 2025-04-03 RX ADMIN — Medication 25 MILLIGRAM(S): at 06:31

## 2025-04-03 RX ADMIN — TAMSULOSIN HYDROCHLORIDE 0.4 MILLIGRAM(S): 0.4 CAPSULE ORAL at 22:18

## 2025-04-03 RX ADMIN — Medication 100 MILLIGRAM(S): at 06:32

## 2025-04-03 RX ADMIN — Medication 1 APPLICATION(S): at 06:33

## 2025-04-03 RX ADMIN — METOPROLOL SUCCINATE 25 MILLIGRAM(S): 50 TABLET, EXTENDED RELEASE ORAL at 06:30

## 2025-04-03 RX ADMIN — APIXABAN 5 MILLIGRAM(S): 2.5 TABLET, FILM COATED ORAL at 17:57

## 2025-04-03 RX ADMIN — Medication 40 MILLIGRAM(S): at 06:30

## 2025-04-03 RX ADMIN — METOPROLOL SUCCINATE 25 MILLIGRAM(S): 50 TABLET, EXTENDED RELEASE ORAL at 17:57

## 2025-04-03 RX ADMIN — Medication 40 MILLIEQUIVALENT(S): at 08:00

## 2025-04-03 RX ADMIN — Medication 100 MILLIGRAM(S): at 17:57

## 2025-04-03 RX ADMIN — ATORVASTATIN CALCIUM 10 MILLIGRAM(S): 80 TABLET, FILM COATED ORAL at 22:19

## 2025-04-03 RX ADMIN — PREDNISONE 2 MILLIGRAM(S): 20 TABLET ORAL at 15:57

## 2025-04-03 RX ADMIN — APIXABAN 5 MILLIGRAM(S): 2.5 TABLET, FILM COATED ORAL at 06:30

## 2025-04-03 RX ADMIN — Medication 5 MILLIGRAM(S): at 22:18

## 2025-04-03 RX ADMIN — PREDNISONE 5 MILLIGRAM(S): 20 TABLET ORAL at 08:11

## 2025-04-03 RX ADMIN — Medication 75 MILLILITER(S): at 08:10

## 2025-04-03 NOTE — CHART NOTE - NSCHARTNOTEFT_GEN_A_CORE
Nutrition note:  Aware pt had repeat swallow evaluation yesterday 4/2; diet upgraded to Regular with thin liquids this morning 4/3 noted. Visited pt during Breakfast; pt ate fairly well ~50% and tolerating thin liquids. RD to continue to monitor pt and tolerance to PO diet. Will remain available PRN.

## 2025-04-03 NOTE — PROGRESS NOTE ADULT - SUBJECTIVE AND OBJECTIVE BOX
Preliminary note, official recommendations pending attending review/signature   Seen and examined by Stroke team attending/team, assessment/ plan as discussed with stroke team attending/team as noted.     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  am morning prior.  Upon arrival to  ED she was noted to have lt sided facial droop; aphasic; left arm weakness; no TNK as out of window/recent infarction.  code stroke activated;  CT imagining found patient with new rt mca; patient transferred to [facility] for thrombectomy. Pt transferred to [facility] EDUARDO eval, s/p cerebral angiogram for mechanical thrombectomy of Rt M2 occlusion, TICI 3 (one pass). She was admitted to NSICU for close monitoring post thrombectomy.       SUBJECTIVE: No events overnight.  No new neurologic complaints.  ROS reported negative unless otherwise noted.    acetaminophen     Tablet .. 650 milliGRAM(s) Oral every 6 hours PRN  apixaban 5 milliGRAM(s) Oral two times a day  atorvastatin 10 milliGRAM(s) Oral at bedtime  chlorhexidine 2% Cloths 1 Application(s) Topical daily  magnesium sulfate  IVPB 1 Gram(s) IV Intermittent once  melatonin 5 milliGRAM(s) Oral at bedtime  metoprolol tartrate 25 milliGRAM(s) Oral two times a day  nitrofurantoin monohydrate/macrocrystals (MACROBID) 100 milliGRAM(s) Oral two times a day  pantoprazole    Tablet 40 milliGRAM(s) Oral before breakfast  polyethylene glycol 3350 17 Gram(s) Oral two times a day  potassium chloride   Powder 40 milliEquivalent(s) Oral once  predniSONE   Tablet 5 milliGRAM(s) Oral <User Schedule>  predniSONE   Tablet 2 milliGRAM(s) Oral <User Schedule>  senna 2 Tablet(s) Oral at bedtime  sodium chloride 0.9%. 1000 milliLiter(s) IV Continuous <Continuous>  tamsulosin 0.4 milliGRAM(s) Oral at bedtime      PHYSICAL EXAM:   Vital Signs Last 24 Hrs  T(C): 36.3 (03 Apr 2025 00:00), Max: 36.8 (02 Apr 2025 07:56)  T(F): 97.4 (03 Apr 2025 00:00), Max: 98.3 (02 Apr 2025 07:56)  HR: 99 (03 Apr 2025 06:00) (81 - 115)  BP: 150/85 (03 Apr 2025 06:00) (93/81 - 156/83)  BP(mean): 103 (03 Apr 2025 06:00) (67 - 105)  RR: 19 (03 Apr 2025 06:00) (15 - 23)  SpO2: 99% (03 Apr 2025 06:00) (99% - 100%)    Parameters below as of 03 Apr 2025 06:00  Patient On (Oxygen Delivery Method): room air    EXAM PENDING     General: No acute distress.   HEENT: Head normocephalic, atraumatic. Conjunctivae clear w/o exudates or hemorrhage.    Cardiac: irregular rate and rhythm.    Respiratory: Lung sounds clear in all fields. Chest wall symmetric, nontender. no audible wheezes, respirations unlabored    Abdominal: Soft, nondistended, nontender. Bowel sounds normoactive x 4 quadrants.    Skin: Skin is warm, dry     Extremities: No edema, right/left groin site with no active bleeding, no hematoma, soft , NTTP     Detailed Neurologic Exam:    Mental status: The patient is awake and alert, followed single commands at times , the patient is able to state name simple objects, writing on note pad,  interactive, does participate with some conversation, able to sing ABC's and Happy Birthday.     Cranial nerves: slight right facial palsy, moderate-severe dysarthria nearly anarthric , Pupils equal and react symmetrically to light. There is no visual field deficit to confrontation. Extraocular motion is full with no nystagmus.      Motor: There is normal bulk and tone.  There is no tremor.  Strength is 4+/5 proximal 4/5 distal in the right arm and 5/5 right leg. Drifts.   Strength is 5/5 in the left arm and 4/5 left leg.  Sensation: Intact to light touch and pin in 4 extremities  Cerebellar: There is no dysmetria on finger to nose testing.                                                                                                    Gait : deferred      LABS:                        10.3   7.04  )-----------( 146      ( 03 Apr 2025 03:20 )             31.8    04-03    142  |  109[H]  |  36.8[H]  ----------------------------<  124[H]  3.5   |  20.0[L]  |  0.57    Ca    8.2[L]      03 Apr 2025 03:20  Phos  2.6     04-03  Mg     1.8     04-03    TPro  5.0[L]  /  Alb  2.9[L]  /  TBili  0.7  /  DBili  x   /  AST  13  /  ALT  17  /  AlkPhos  38[L]  04-03  PTT - ( 03 Apr 2025 03:20 )  PTT:26.1 sec        03-27 Chol 145 LDL - 59- HDL 74 Trig 55, 03-22 Chol 136 LDL -- HDL 66 Trig 56    A1C: 5.5    IMAGING: Reviewed by me.   Neuro-Imaging    CT Head No Cont (04.02.25 @ 09:41)   IMPRESSION: Subacute infarcts are again seen bilaterally as described   above    Acute infarct is now seen involving the left basal ganglia region.    MR Head w/wo IV Cont (03.31.25 @ 10:09)   IMPRESSION:  New acute infarctions within the LEFT lateral frontal   cortex, LEFT occipital cortex, RIGHT insular cortex and scattered RIGHT   frontal, parietal, temporal and occipital cortex suggesting embolic   etiology. Subacute infarction within the LEFT basal ganglia. Mild   periventricular chronic white matter ischemia.    IR Neuro (03.29.25 @ 09:10)   Supervision and Interpretation:  1. Interval recanalization of the left MCA M2 occlusion seen byCT   angiography with persistent posterior frontal distal M4 branch occlusion.   Attempted thrombectomy was unsuccessful because the branch was too small   to be accessed by the red 43 thrombectomy catheter.    CT Head No Cont (03.29.25 @ 20:56)   IMPRESSION: No acute intracranial hemorrhage, mass effect, or shift of   the midline structures.  Evolving acute left frontal lobe superior division MCA infarct without   hemorrhagic transformation.    CT Head No Cont (03.29.25 @ 06:54)   Patchy hypoattenuation related to evolving acute/subacute bilateral MCA   territory infarcts. No intracranial hemorrhage or midline shift present.      CT Angio Head/Neck Stroke Protocol w/ IV Cont, CT Perfusion (03.29.25 @ 06:56)   IMPRESSION:  CT PERFUSION:  Signal abnormality on T-Max/mean transit time data sets at the left   frontal lobe suggest territory-and-risk within the left MCA distribution,   corresponding to angiographic findings below. Cerebral blood flow and   cerebral blood volume data sets remain within normal limits.  Neurointerventional/neurosurgical consultation recommended. MRI may be   considered for further characterization.  CTA NECK:  No evidence of significant stenosis or occlusion.  Previously identified narrowing of the right cervical ICA with   questionable arterial dissection appears less conspicuous on the current   exam, suggesting at least partial recanalization. MRA (dissection   protocol) considered for further evaluation.  CTA HEAD:  Acute left M2 occlusion identified.  Critical findings above were discussed directly by telephone with   ordering provider, Aric PINEDA, on 3/29/2025 at 7:11 AM by Dr. Louie Worthy with read back confirmation.    CT Head No Cont (03.27.25 @ 05:43)  IMPRESSION: Age-appropriate involutional and ischemic gliotic changes. No   hemorrhage. No significant change since 3/26/2025.     CT Head No Cont (03.27.25 @ 05:43)   IMPRESSION: Age-appropriate involutional and ischemic gliotic changes. No   hemorrhage. No significant change since 3/26/2025.     CT Angio Head/Neck Stroke Protocol w/ IV Cont (03.26.25 @ 08:52)   .   RIGHT CAROTID SYSTEM:    Atherosclerotic plaque carotid bulb without    hemodynamically significant stenosis. Tandem lesion distal internal   carotid artery at the C1 level has developed since 3/21/2025, possible   interval arterial dissection, with associated luminal narrowing   approximately 30%  2.   LEFT CAROTID SYSTEM:     Atherosclerotic plaque carotid bulb without    hemodynamically significant stenosis.  3.   VERTEBRAL CIRCULATION:    Patent.  4.  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 Right MCA occlusion   in its proximal M2 segment is an interval finding since 3/21/2025. Left   MCA remains patent with luminal irregularity and low-grade narrowing   following recent thrombectomy.  5.  POSTERIOR INTRACRANIAL CIRCULATION:    Patent.  6. BRAIN PERFUSION:   No acute infarction of the brain is convincingly   demonstrated.  However, broad region of apparent ischemia corresponds to   the right MCA distribution correlating with acute embolic occlusion on CT   angiography  7. Heterogeneous enhancement within the principal dural sinuses may be   secondary to the early phase of venous opacification on this arterial   phase examination. The appearance is similar to 3/21/2025. Consider   supplemental evaluation with MR venogram    ULTRASOUND   US Pseudoaneurysm Injection (03.28.25 @ 09:41)   Successful thrombin and ultrasound-guided compression of the right groin   pseudoaneurysm.      US Duplex Arterial Lower Ext Ltd, Right (03.27.25 @ 04:09)   IMPRESSION:  A 1.7 cm right CFA pseudoaneurysm.  A 2.5 cm adjacent hematoma.    CARDIOLOGY   TTE W or WO Ultrasound Enhancing Agent (03.26.25 @ 18:15)    1. Technically difficult image quality.   2. Left ventricular cavity is normal in size. Left ventricular systolic function is hyperdynamic with an ejectionfraction visually estimated at 65 to 70 %.   3. Mild left ventricular hypertrophy.   4. Normal right ventricular cavity size and normal right ventricular systolic function.   5. Left atrium is severely dilated.   6. The right atrium is severely dilated.           Preliminary note, official recommendations pending attending review/signature   Seen and examined by Stroke team attending/team, assessment/ plan as discussed with stroke team attending/team as noted.     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  am morning prior.  Upon arrival to  ED she was noted to have lt sided facial droop; aphasic; left arm weakness; no TNK as out of window/recent infarction.  code stroke activated;  CT imagining found patient with new rt mca; patient transferred to [facility] for thrombectomy. Pt transferred to [facility] EDUARDO eval, s/p cerebral angiogram for mechanical thrombectomy of Rt M2 occlusion, TICI 3 (one pass). She was admitted to NSICU for close monitoring post thrombectomy.       SUBJECTIVE: No events overnight.  No new neurologic complaints.  ROS reported negative unless otherwise noted.    acetaminophen     Tablet .. 650 milliGRAM(s) Oral every 6 hours PRN  apixaban 5 milliGRAM(s) Oral two times a day  atorvastatin 10 milliGRAM(s) Oral at bedtime  chlorhexidine 2% Cloths 1 Application(s) Topical daily  magnesium sulfate  IVPB 1 Gram(s) IV Intermittent once  melatonin 5 milliGRAM(s) Oral at bedtime  metoprolol tartrate 25 milliGRAM(s) Oral two times a day  nitrofurantoin monohydrate/macrocrystals (MACROBID) 100 milliGRAM(s) Oral two times a day  pantoprazole    Tablet 40 milliGRAM(s) Oral before breakfast  polyethylene glycol 3350 17 Gram(s) Oral two times a day  potassium chloride   Powder 40 milliEquivalent(s) Oral once  predniSONE   Tablet 5 milliGRAM(s) Oral <User Schedule>  predniSONE   Tablet 2 milliGRAM(s) Oral <User Schedule>  senna 2 Tablet(s) Oral at bedtime  sodium chloride 0.9%. 1000 milliLiter(s) IV Continuous <Continuous>  tamsulosin 0.4 milliGRAM(s) Oral at bedtime    PHYSICAL EXAM:   Vital Signs Last 24 Hrs  T(C): 36.3 (03 Apr 2025 00:00), Max: 36.8 (02 Apr 2025 07:56)  T(F): 97.4 (03 Apr 2025 00:00), Max: 98.3 (02 Apr 2025 07:56)  HR: 99 (03 Apr 2025 06:00) (81 - 115)  BP: 150/85 (03 Apr 2025 06:00) (93/81 - 156/83)  BP(mean): 103 (03 Apr 2025 06:00) (67 - 105)  RR: 19 (03 Apr 2025 06:00) (15 - 23)  SpO2: 99% (03 Apr 2025 06:00) (99% - 100%)    Parameters below as of 03 Apr 2025 06:00  Patient On (Oxygen Delivery Method): room air    EXAM PENDING   General: No acute distress.   HEENT: Head normocephalic, atraumatic. Conjunctivae clear w/o exudates or hemorrhage.    Cardiac: irregular rate and rhythm.    Respiratory: Lung sounds clear in all fields. Chest wall symmetric, nontender. no audible wheezes, respirations unlabored    Abdominal: Soft, nondistended, nontender. Bowel sounds normoactive x 4 quadrants.    Skin: Skin is warm, dry     Extremities: No edema, right/left groin site with no active bleeding, no hematoma, soft , NTTP     Detailed Neurologic Exam:    Mental status: The patient is awake and alert, followed single commands at times , the patient is able to state name simple objects, writing on note pad,  interactive, does participate with some conversation, able to sing ABC's and Happy Birthday.     Cranial nerves: slight right facial palsy, moderate-severe dysarthria nearly anarthric , Pupils equal and react symmetrically to light. There is no visual field deficit to confrontation. Extraocular motion is full with no nystagmus.      Motor: There is normal bulk and tone.  There is no tremor.  Strength is 4+/5 proximal 4/5 distal in the right arm and 5/5 right leg. Drifts.   Strength is 5/5 in the left arm and 4/5 left leg.  Sensation: Intact to light touch and pin in 4 extremities  Cerebellar: There is no dysmetria on finger to nose testing.                                                                                                    Gait : deferred      LABS:                        10.3   7.04  )-----------( 146      ( 03 Apr 2025 03:20 )             31.8    04-03    142  |  109[H]  |  36.8[H]  ----------------------------<  124[H]  3.5   |  20.0[L]  |  0.57    Ca    8.2[L]      03 Apr 2025 03:20  Phos  2.6     04-03  Mg     1.8     04-03    TPro  5.0[L]  /  Alb  2.9[L]  /  TBili  0.7  /  DBili  x   /  AST  13  /  ALT  17  /  AlkPhos  38[L]  04-03  PTT - ( 03 Apr 2025 03:20 )  PTT:26.1 sec        03-27 Chol 145 LDL - 59- HDL 74 Trig 55, 03-22 Chol 136 LDL -- HDL 66 Trig 56    A1C: 5.5    IMAGING: Reviewed by me.   Neuro-Imaging    CT Head No Cont (04.02.25 @ 09:41)   IMPRESSION: Subacute infarcts are again seen bilaterally as described   above    Acute infarct is now seen involving the left basal ganglia region.    MR Head w/wo IV Cont (03.31.25 @ 10:09)   IMPRESSION:  New acute infarctions within the LEFT lateral frontal   cortex, LEFT occipital cortex, RIGHT insular cortex and scattered RIGHT   frontal, parietal, temporal and occipital cortex suggesting embolic   etiology. Subacute infarction within the LEFT basal ganglia. Mild   periventricular chronic white matter ischemia.    IR Neuro (03.29.25 @ 09:10)   Supervision and Interpretation:  1. Interval recanalization of the left MCA M2 occlusion seen byCT   angiography with persistent posterior frontal distal M4 branch occlusion.   Attempted thrombectomy was unsuccessful because the branch was too small   to be accessed by the red 43 thrombectomy catheter.    CT Head No Cont (03.29.25 @ 20:56)   IMPRESSION: No acute intracranial hemorrhage, mass effect, or shift of   the midline structures.  Evolving acute left frontal lobe superior division MCA infarct without   hemorrhagic transformation.    CT Head No Cont (03.29.25 @ 06:54)   Patchy hypoattenuation related to evolving acute/subacute bilateral MCA   territory infarcts. No intracranial hemorrhage or midline shift present.    CT Angio Head/Neck Stroke Protocol w/ IV Cont, CT Perfusion (03.29.25 @ 06:56)   IMPRESSION:  CT PERFUSION:  Signal abnormality on T-Max/mean transit time data sets at the left   frontal lobe suggest territory-and-risk within the left MCA distribution,   corresponding to angiographic findings below. Cerebral blood flow and   cerebral blood volume data sets remain within normal limits.  Neurointerventional/neurosurgical consultation recommended. MRI may be   considered for further characterization.  CTA NECK:  No evidence of significant stenosis or occlusion.  Previously identified narrowing of the right cervical ICA with   questionable arterial dissection appears less conspicuous on the current   exam, suggesting at least partial recanalization. MRA (dissection   protocol) considered for further evaluation.  CTA HEAD:  Acute left M2 occlusion identified.  Critical findings above were discussed directly by telephone with   ordering provider, Aric PINEDA, on 3/29/2025 at 7:11 AM by Dr. Louie Worthy with read back confirmation.    CT Head No Cont (03.27.25 @ 05:43)  IMPRESSION: Age-appropriate involutional and ischemic gliotic changes. No   hemorrhage. No significant change since 3/26/2025.    CT Head No Cont (03.27.25 @ 05:43)   IMPRESSION: Age-appropriate involutional and ischemic gliotic changes. No   hemorrhage. No significant change since 3/26/2025.    CT Angio Head/Neck Stroke Protocol w/ IV Cont (03.26.25 @ 08:52)   1. RIGHT CAROTID SYSTEM:    Atherosclerotic plaque carotid bulb without    hemodynamically significant stenosis. Tandem lesion distal internal   carotid artery at the C1 level has developed since 3/21/2025, possible   interval arterial dissection, with associated luminal narrowing   approximately 30%  2.   LEFT CAROTID SYSTEM:     Atherosclerotic plaque carotid bulb without    hemodynamically significant stenosis.  3.   VERTEBRAL CIRCULATION:    Patent.  4.  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 Right MCA occlusion   in its proximal M2 segment is an interval finding since 3/21/2025. Left   MCA remains patent with luminal irregularity and low-grade narrowing   following recent thrombectomy.  5.  POSTERIOR INTRACRANIAL CIRCULATION:    Patent.  6. BRAIN PERFUSION:   No acute infarction of the brain is convincingly   demonstrated.  However, broad region of apparent ischemia corresponds to   the right MCA distribution correlating with acute embolic occlusion on CT   angiography  7. Heterogeneous enhancement within the principal dural sinuses may be   secondary to the early phase of venous opacification on this arterial   phase examination. The appearance is similar to 3/21/2025. Consider   supplemental evaluation with MR venogram    ULTRASOUND   US Pseudoaneurysm Injection (03.28.25 @ 09:41)   Successful thrombin and ultrasound-guided compression of the right groin   pseudoaneurysm.    US Duplex Arterial Lower Ext Ltd, Right (03.27.25 @ 04:09)   IMPRESSION:  A 1.7 cm right CFA pseudoaneurysm.  A 2.5 cm adjacent hematoma.    CARDIOLOGY   TTE W or WO Ultrasound Enhancing Agent (03.26.25 @ 18:15)    1. Technically difficult image quality.   2. Left ventricular cavity is normal in size. Left ventricular systolic function is hyperdynamic with an ejectionfraction visually estimated at 65 to 70 %.   3. Mild left ventricular hypertrophy.   4. Normal right ventricular cavity size and normal right ventricular systolic function.   5. Left atrium is severely dilated.   6. The right atrium is severely dilated. Preliminary note, official recommendations pending attending review/signature   Seen and examined by Stroke team attending/team, assessment/ plan as discussed with stroke team attending/team as noted.     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  am morning prior.  Upon arrival to  ED she was noted to have lt sided facial droop; aphasic; left arm weakness; no TNK as out of window/recent infarction.  code stroke activated;  CT imagining found patient with new rt mca; patient transferred to [facility] for thrombectomy. Pt transferred to [facility] EDUARDO eval, s/p cerebral angiogram for mechanical thrombectomy of Rt M2 occlusion, TICI 3 (one pass). She was admitted to NSICU for close monitoring post thrombectomy.       SUBJECTIVE: No events overnight.  No new neurologic complaints.  ROS reported negative unless otherwise noted.    acetaminophen     Tablet .. 650 milliGRAM(s) Oral every 6 hours PRN  apixaban 5 milliGRAM(s) Oral two times a day  atorvastatin 10 milliGRAM(s) Oral at bedtime  chlorhexidine 2% Cloths 1 Application(s) Topical daily  magnesium sulfate  IVPB 1 Gram(s) IV Intermittent once  melatonin 5 milliGRAM(s) Oral at bedtime  metoprolol tartrate 25 milliGRAM(s) Oral two times a day  nitrofurantoin monohydrate/macrocrystals (MACROBID) 100 milliGRAM(s) Oral two times a day  pantoprazole    Tablet 40 milliGRAM(s) Oral before breakfast  polyethylene glycol 3350 17 Gram(s) Oral two times a day  potassium chloride   Powder 40 milliEquivalent(s) Oral once  predniSONE   Tablet 5 milliGRAM(s) Oral <User Schedule>  predniSONE   Tablet 2 milliGRAM(s) Oral <User Schedule>  senna 2 Tablet(s) Oral at bedtime  sodium chloride 0.9%. 1000 milliLiter(s) IV Continuous <Continuous>  tamsulosin 0.4 milliGRAM(s) Oral at bedtime    PHYSICAL EXAM:   Vital Signs Last 24 Hrs  T(C): 36.3 (03 Apr 2025 00:00), Max: 36.8 (02 Apr 2025 07:56)  T(F): 97.4 (03 Apr 2025 00:00), Max: 98.3 (02 Apr 2025 07:56)  HR: 99 (03 Apr 2025 06:00) (81 - 115)  BP: 150/85 (03 Apr 2025 06:00) (93/81 - 156/83)  BP(mean): 103 (03 Apr 2025 06:00) (67 - 105)  RR: 19 (03 Apr 2025 06:00) (15 - 23)  SpO2: 99% (03 Apr 2025 06:00) (99% - 100%)    Parameters below as of 03 Apr 2025 06:00  Patient On (Oxygen Delivery Method): room air    Exam  General: No acute distress.   HEENT: Head normocephalic, atraumatic. Conjunctivae clear w/o exudates or hemorrhage.    Cardiac: irregular rate and rhythm.    Respiratory: Lung sounds clear in all fields. Chest wall symmetric, nontender. no audible wheezes, respirations unlabored    Abdominal: Soft, nondistended, nontender. Bowel sounds normoactive x 4 quadrants.    Skin: Skin is warm, dry     Extremities: No edema, right/left groin site with no active bleeding, no hematoma, soft , NTTP     Detailed Neurologic Exam:  Mental status: The patient is awake and alert, followed single commands at times, the patient is able to state name simple objects, writing on note pad,  interactive, does participate with some conversation, able to sing ABC's and Happy Birthday.   Cranial nerves: slight right facial palsy, moderate-severe dysarthria nearly anarthric , Pupils equal and react symmetrically to light. There is no visual field deficit to confrontation. Extraocular motion is full with no nystagmus.    Motor: There is normal bulk and tone.  There is no tremor.  Strength is 4+/5 proximal 4/5 distal in the right arm and 5/5 right leg. Drifts.   Strength is 5/5 in the left arm and 4/5 left leg.  Sensation: Intact to light touch and pin in 4 extremities  Cerebellar: There is no dysmetria on finger to nose testing.                                                                                                    Gait : deferred      LABS:                        10.3   7.04  )-----------( 146      ( 03 Apr 2025 03:20 )             31.8    04-03    142  |  109[H]  |  36.8[H]  ----------------------------<  124[H]  3.5   |  20.0[L]  |  0.57    Ca    8.2[L]      03 Apr 2025 03:20  Phos  2.6     04-03  Mg     1.8     04-03    TPro  5.0[L]  /  Alb  2.9[L]  /  TBili  0.7  /  DBili  x   /  AST  13  /  ALT  17  /  AlkPhos  38[L]  04-03  PTT - ( 03 Apr 2025 03:20 )  PTT:26.1 sec        03-27 Chol 145 LDL - 59- HDL 74 Trig 55, 03-22 Chol 136 LDL -- HDL 66 Trig 56    A1C: 5.5    IMAGING: Reviewed by me.   Neuro-Imaging    CT Head No Cont (04.02.25 @ 09:41)   IMPRESSION: Subacute infarcts are again seen bilaterally as described   above    Acute infarct is now seen involving the left basal ganglia region.    MR Head w/wo IV Cont (03.31.25 @ 10:09)   IMPRESSION:  New acute infarctions within the LEFT lateral frontal   cortex, LEFT occipital cortex, RIGHT insular cortex and scattered RIGHT   frontal, parietal, temporal and occipital cortex suggesting embolic   etiology. Subacute infarction within the LEFT basal ganglia. Mild   periventricular chronic white matter ischemia.    IR Neuro (03.29.25 @ 09:10)   Supervision and Interpretation:  1. Interval recanalization of the left MCA M2 occlusion seen byCT   angiography with persistent posterior frontal distal M4 branch occlusion.   Attempted thrombectomy was unsuccessful because the branch was too small   to be accessed by the red 43 thrombectomy catheter.    CT Head No Cont (03.29.25 @ 20:56)   IMPRESSION: No acute intracranial hemorrhage, mass effect, or shift of   the midline structures.  Evolving acute left frontal lobe superior division MCA infarct without   hemorrhagic transformation.    CT Head No Cont (03.29.25 @ 06:54)   Patchy hypoattenuation related to evolving acute/subacute bilateral MCA   territory infarcts. No intracranial hemorrhage or midline shift present.    CT Angio Head/Neck Stroke Protocol w/ IV Cont, CT Perfusion (03.29.25 @ 06:56)   IMPRESSION:  CT PERFUSION:  Signal abnormality on T-Max/mean transit time data sets at the left   frontal lobe suggest territory-and-risk within the left MCA distribution,   corresponding to angiographic findings below. Cerebral blood flow and   cerebral blood volume data sets remain within normal limits.  Neurointerventional/neurosurgical consultation recommended. MRI may be   considered for further characterization.  CTA NECK:  No evidence of significant stenosis or occlusion.  Previously identified narrowing of the right cervical ICA with   questionable arterial dissection appears less conspicuous on the current   exam, suggesting at least partial recanalization. MRA (dissection   protocol) considered for further evaluation.  CTA HEAD:  Acute left M2 occlusion identified.  Critical findings above were discussed directly by telephone with   ordering provider, Aric PINEDA, on 3/29/2025 at 7:11 AM by Dr. Louie Worthy with read back confirmation.    CT Head No Cont (03.27.25 @ 05:43)  IMPRESSION: Age-appropriate involutional and ischemic gliotic changes. No   hemorrhage. No significant change since 3/26/2025.    CT Head No Cont (03.27.25 @ 05:43)   IMPRESSION: Age-appropriate involutional and ischemic gliotic changes. No   hemorrhage. No significant change since 3/26/2025.    CT Angio Head/Neck Stroke Protocol w/ IV Cont (03.26.25 @ 08:52)   1. RIGHT CAROTID SYSTEM:    Atherosclerotic plaque carotid bulb without    hemodynamically significant stenosis. Tandem lesion distal internal   carotid artery at the C1 level has developed since 3/21/2025, possible   interval arterial dissection, with associated luminal narrowing   approximately 30%  2.   LEFT CAROTID SYSTEM:     Atherosclerotic plaque carotid bulb without    hemodynamically significant stenosis.  3.   VERTEBRAL CIRCULATION:    Patent.  4.  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 Right MCA occlusion   in its proximal M2 segment is an interval finding since 3/21/2025. Left   MCA remains patent with luminal irregularity and low-grade narrowing   following recent thrombectomy.  5.  POSTERIOR INTRACRANIAL CIRCULATION:    Patent.  6. BRAIN PERFUSION:   No acute infarction of the brain is convincingly   demonstrated.  However, broad region of apparent ischemia corresponds to   the right MCA distribution correlating with acute embolic occlusion on CT   angiography  7. Heterogeneous enhancement within the principal dural sinuses may be   secondary to the early phase of venous opacification on this arterial   phase examination. The appearance is similar to 3/21/2025. Consider   supplemental evaluation with MR venogram    ULTRASOUND   US Pseudoaneurysm Injection (03.28.25 @ 09:41)   Successful thrombin and ultrasound-guided compression of the right groin   pseudoaneurysm.    US Duplex Arterial Lower Ext Ltd, Right (03.27.25 @ 04:09)   IMPRESSION:  A 1.7 cm right CFA pseudoaneurysm.  A 2.5 cm adjacent hematoma.    CARDIOLOGY   TTE W or WO Ultrasound Enhancing Agent (03.26.25 @ 18:15)    1. Technically difficult image quality.   2. Left ventricular cavity is normal in size. Left ventricular systolic function is hyperdynamic with an ejectionfraction visually estimated at 65 to 70 %.   3. Mild left ventricular hypertrophy.   4. Normal right ventricular cavity size and normal right ventricular systolic function.   5. Left atrium is severely dilated.   6. The right atrium is severely dilated.     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  am morning prior.  Upon arrival to  ED she was noted to have lt sided facial droop; aphasic; left arm weakness; no TNK as out of window/recent infarction.  code stroke activated;  CT imagining found patient with new rt mca; patient transferred to [facility] for thrombectomy. Pt transferred to [facility] EDUARDO eval, s/p cerebral angiogram for mechanical thrombectomy of Rt M2 occlusion, TICI 3 (one pass). She was admitted to NSICU for close monitoring post thrombectomy.       SUBJECTIVE: No events overnight.  No new neurologic complaints.  ROS reported negative unless otherwise noted.    acetaminophen     Tablet .. 650 milliGRAM(s) Oral every 6 hours PRN  apixaban 5 milliGRAM(s) Oral two times a day  atorvastatin 10 milliGRAM(s) Oral at bedtime  chlorhexidine 2% Cloths 1 Application(s) Topical daily  magnesium sulfate  IVPB 1 Gram(s) IV Intermittent once  melatonin 5 milliGRAM(s) Oral at bedtime  metoprolol tartrate 25 milliGRAM(s) Oral two times a day  nitrofurantoin monohydrate/macrocrystals (MACROBID) 100 milliGRAM(s) Oral two times a day  pantoprazole    Tablet 40 milliGRAM(s) Oral before breakfast  polyethylene glycol 3350 17 Gram(s) Oral two times a day  potassium chloride   Powder 40 milliEquivalent(s) Oral once  predniSONE   Tablet 5 milliGRAM(s) Oral <User Schedule>  predniSONE   Tablet 2 milliGRAM(s) Oral <User Schedule>  senna 2 Tablet(s) Oral at bedtime  sodium chloride 0.9%. 1000 milliLiter(s) IV Continuous <Continuous>  tamsulosin 0.4 milliGRAM(s) Oral at bedtime    PHYSICAL EXAM:   Vital Signs Last 24 Hrs  T(C): 36.3 (03 Apr 2025 00:00), Max: 36.8 (02 Apr 2025 07:56)  T(F): 97.4 (03 Apr 2025 00:00), Max: 98.3 (02 Apr 2025 07:56)  HR: 99 (03 Apr 2025 06:00) (81 - 115)  BP: 150/85 (03 Apr 2025 06:00) (93/81 - 156/83)  BP(mean): 103 (03 Apr 2025 06:00) (67 - 105)  RR: 19 (03 Apr 2025 06:00) (15 - 23)  SpO2: 99% (03 Apr 2025 06:00) (99% - 100%)    Parameters below as of 03 Apr 2025 06:00  Patient On (Oxygen Delivery Method): room air    Exam  General: No acute distress.   HEENT: Head normocephalic, atraumatic. Conjunctivae clear w/o exudates or hemorrhage.    Cardiac: irregular rate and rhythm.    Respiratory: Lung sounds clear in all fields. Chest wall symmetric, nontender. no audible wheezes, respirations unlabored    Abdominal: Soft, nondistended, nontender. Bowel sounds normoactive x 4 quadrants.    Skin: Skin is warm, dry     Extremities: No edema, right/left groin site with no active bleeding, no hematoma, soft , NTTP     Detailed Neurologic Exam:  Mental status: The patient is awake and alert, followed single commands at times, the patient is able to state name simple objects, writing on note pad,  interactive, does participate with some conversation, able to sing ABC's and Happy Birthday.   Cranial nerves: slight right facial palsy, moderate-severe dysarthria nearly anarthric , Pupils equal and react symmetrically to light. There is no visual field deficit to confrontation. Extraocular motion is full with no nystagmus.    Motor: There is normal bulk and tone.  There is no tremor.  Strength is 4+/5 proximal 4/5 distal in the right arm and 5/5 right leg. Drifts.   Strength is 5/5 in the left arm and 4/5 left leg.  Sensation: Intact to light touch and pin in 4 extremities  Cerebellar: There is no dysmetria on finger to nose testing.                                                                                                    Gait : deferred      LABS:                        10.3   7.04  )-----------( 146      ( 03 Apr 2025 03:20 )             31.8    04-03    142  |  109[H]  |  36.8[H]  ----------------------------<  124[H]  3.5   |  20.0[L]  |  0.57    Ca    8.2[L]      03 Apr 2025 03:20  Phos  2.6     04-03  Mg     1.8     04-03    TPro  5.0[L]  /  Alb  2.9[L]  /  TBili  0.7  /  DBili  x   /  AST  13  /  ALT  17  /  AlkPhos  38[L]  04-03  PTT - ( 03 Apr 2025 03:20 )  PTT:26.1 sec        03-27 Chol 145 LDL - 59- HDL 74 Trig 55, 03-22 Chol 136 LDL -- HDL 66 Trig 56    A1C: 5.5    IMAGING: Reviewed by me.   Neuro-Imaging    CT Head No Cont (04.02.25 @ 09:41)   IMPRESSION: Subacute infarcts are again seen bilaterally as described   above    Acute infarct is now seen involving the left basal ganglia region.    MR Head w/wo IV Cont (03.31.25 @ 10:09)   IMPRESSION:  New acute infarctions within the LEFT lateral frontal   cortex, LEFT occipital cortex, RIGHT insular cortex and scattered RIGHT   frontal, parietal, temporal and occipital cortex suggesting embolic   etiology. Subacute infarction within the LEFT basal ganglia. Mild   periventricular chronic white matter ischemia.    IR Neuro (03.29.25 @ 09:10)   Supervision and Interpretation:  1. Interval recanalization of the left MCA M2 occlusion seen byCT   angiography with persistent posterior frontal distal M4 branch occlusion.   Attempted thrombectomy was unsuccessful because the branch was too small   to be accessed by the red 43 thrombectomy catheter.    CT Head No Cont (03.29.25 @ 20:56)   IMPRESSION: No acute intracranial hemorrhage, mass effect, or shift of   the midline structures.  Evolving acute left frontal lobe superior division MCA infarct without   hemorrhagic transformation.    CT Head No Cont (03.29.25 @ 06:54)   Patchy hypoattenuation related to evolving acute/subacute bilateral MCA   territory infarcts. No intracranial hemorrhage or midline shift present.    CT Angio Head/Neck Stroke Protocol w/ IV Cont, CT Perfusion (03.29.25 @ 06:56)   IMPRESSION:  CT PERFUSION:  Signal abnormality on T-Max/mean transit time data sets at the left   frontal lobe suggest territory-and-risk within the left MCA distribution,   corresponding to angiographic findings below. Cerebral blood flow and   cerebral blood volume data sets remain within normal limits.  Neurointerventional/neurosurgical consultation recommended. MRI may be   considered for further characterization.  CTA NECK:  No evidence of significant stenosis or occlusion.  Previously identified narrowing of the right cervical ICA with   questionable arterial dissection appears less conspicuous on the current   exam, suggesting at least partial recanalization. MRA (dissection   protocol) considered for further evaluation.  CTA HEAD:  Acute left M2 occlusion identified.  Critical findings above were discussed directly by telephone with   ordering provider, Aric PINEDA, on 3/29/2025 at 7:11 AM by Dr. Louie Worthy with read back confirmation.    CT Head No Cont (03.27.25 @ 05:43)  IMPRESSION: Age-appropriate involutional and ischemic gliotic changes. No   hemorrhage. No significant change since 3/26/2025.    CT Head No Cont (03.27.25 @ 05:43)   IMPRESSION: Age-appropriate involutional and ischemic gliotic changes. No   hemorrhage. No significant change since 3/26/2025.    CT Angio Head/Neck Stroke Protocol w/ IV Cont (03.26.25 @ 08:52)   1. RIGHT CAROTID SYSTEM:    Atherosclerotic plaque carotid bulb without    hemodynamically significant stenosis. Tandem lesion distal internal   carotid artery at the C1 level has developed since 3/21/2025, possible   interval arterial dissection, with associated luminal narrowing   approximately 30%  2.   LEFT CAROTID SYSTEM:     Atherosclerotic plaque carotid bulb without    hemodynamically significant stenosis.  3.   VERTEBRAL CIRCULATION:    Patent.  4.  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 Right MCA occlusion   in its proximal M2 segment is an interval finding since 3/21/2025. Left   MCA remains patent with luminal irregularity and low-grade narrowing   following recent thrombectomy.  5.  POSTERIOR INTRACRANIAL CIRCULATION:    Patent.  6. BRAIN PERFUSION:   No acute infarction of the brain is convincingly   demonstrated.  However, broad region of apparent ischemia corresponds to   the right MCA distribution correlating with acute embolic occlusion on CT   angiography  7. Heterogeneous enhancement within the principal dural sinuses may be   secondary to the early phase of venous opacification on this arterial   phase examination. The appearance is similar to 3/21/2025. Consider   supplemental evaluation with MR venogram    ULTRASOUND   US Pseudoaneurysm Injection (03.28.25 @ 09:41)   Successful thrombin and ultrasound-guided compression of the right groin   pseudoaneurysm.    US Duplex Arterial Lower Ext Ltd, Right (03.27.25 @ 04:09)   IMPRESSION:  A 1.7 cm right CFA pseudoaneurysm.  A 2.5 cm adjacent hematoma.    CARDIOLOGY   TTE W or WO Ultrasound Enhancing Agent (03.26.25 @ 18:15)    1. Technically difficult image quality.   2. Left ventricular cavity is normal in size. Left ventricular systolic function is hyperdynamic with an ejectionfraction visually estimated at 65 to 70 %.   3. Mild left ventricular hypertrophy.   4. Normal right ventricular cavity size and normal right ventricular systolic function.   5. Left atrium is severely dilated.   6. The right atrium is severely dilated.

## 2025-04-03 NOTE — PROGRESS NOTE ADULT - SUBJECTIVE AND OBJECTIVE BOX
Ms Romano is seen reclined in bed, accompanied by two of her sons.  She is very pleasant and friendly, but is unable to accurately state where she is.  When given options she is able to accurately guess, but she is unable to proactively produce and express the location, or several other words on examination.  She denies any pain and states that she slept well.  Her sons reinforce that they want [facility] for her as she has been there before.      FUNCTIONAL PROGRESS  4/2 SLP - Speech Language Pathology Recommendations: 1. Regular diet, thin fluids as tolerated 2. STRICT aspiration precautions 3. Oral care 4. Upright with PO 5. Assist with PO: slow rate of intake, small bites/sips only  6. will follow  7. Rx intensive SLP intervention following discharge     NO PT/OT since 4/1    VITALS  T(C): 37.1 (04-03-25 @ 10:00), Max: 37.1 (04-03-25 @ 10:00)  HR: 93 (04-03-25 @ 10:00) (81 - 115)  BP: 116/71 (04-03-25 @ 10:00) (93/81 - 156/83)  RR: 20 (04-03-25 @ 10:00) (15 - 23)  SpO2: 100% (04-03-25 @ 10:00) (92% - 100%)  Wt(kg): --    MEDICATIONS   acetaminophen     Tablet .. 650 milliGRAM(s) every 6 hours PRN  apixaban 5 milliGRAM(s) two times a day  atorvastatin 10 milliGRAM(s) at bedtime  chlorhexidine 2% Cloths 1 Application(s) daily  melatonin 5 milliGRAM(s) at bedtime  metoprolol tartrate 25 milliGRAM(s) two times a day  nitrofurantoin monohydrate/macrocrystals (MACROBID) 100 milliGRAM(s) two times a day  pantoprazole    Tablet 40 milliGRAM(s) before breakfast  polyethylene glycol 3350 17 Gram(s) two times a day  predniSONE   Tablet 5 milliGRAM(s) <User Schedule>  predniSONE   Tablet 2 milliGRAM(s) <User Schedule>  senna 2 Tablet(s) at bedtime  sodium chloride 0.9%. 1000 milliLiter(s) <Continuous>  tamsulosin 0.4 milliGRAM(s) at bedtime      RECENT LABS/IMAGING  - Reviewed Today                        10.3   7.04  )-----------( 146      ( 03 Apr 2025 03:20 )             31.8     04-03    142  |  109[H]  |  36.8[H]  ----------------------------<  124[H]  3.5   |  20.0[L]  |  0.57    Ca    8.2[L]      03 Apr 2025 03:20  Phos  2.6     04-03  Mg     1.8     04-03    TPro  5.0[L]  /  Alb  2.9[L]  /  TBili  0.7  /  DBili  x   /  AST  13  /  ALT  17  /  AlkPhos  38[L]  04-03    PTT - ( 03 Apr 2025 03:20 )  PTT:26.1 sec  Urinalysis Basic - ( 03 Apr 2025 03:20 )    Color: x / Appearance: x / SG: x / pH: x  Gluc: 124 mg/dL / Ketone: x  / Bili: x / Urobili: x   Blood: x / Protein: x / Nitrite: x   Leuk Esterase: x / RBC: x / WBC x   Sq Epi: x / Non Sq Epi: x / Bacteria: x            CT Brain Stroke 3/26 - 1. No intracranial hemorrhage is appreciated.2. No intracranial mass lesion is appreciated.  3. Left basal ganglia subacute infarction, was acute at the time of stroke code 3/21/2025, demonstrates expected evolution. Consider MR for evaluation of infarct extension4. MR may provide higher sensitivity imaging evaluation for the clinical   indication of acute infarction.    CT ANGIO BRAIN AND NECK STROKE 3/26 - 1.   RIGHT CAROTID SYSTEM:    Atherosclerotic plaque carotid bulb without    hemodynamically significant stenosis. Tandem lesion distal internal carotid artery at the C1 level has developed since 3/21/2025, possible interval arterial dissection, with associated luminal narrowing approximately 30%  2.   LEFT CAROTID SYSTEM:     Atherosclerotic plaque carotid bulb without  hemodynamically significant stenosis.  3.   VERTEBRAL CIRCULATION:    Patent.4.  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 Right MCA occlusion in its proximal M2 segment is an interval finding since 3/21/2025. Left MCA remains patent with luminal irregularity and low-grade narrowing following recent thrombectomy.5.  POSTERIOR INTRACRANIAL CIRCULATION:    Patent.  6. BRAIN PERFUSION:   No acute infarction of the brain is convincingly demonstrated.  However, broad region of apparent ischemia corresponds to the right MCA distribution correlating with acute embolic occlusion on CT angiography  7. Heterogeneous enhancement within the principal dural sinuses may be secondary to the early phase of venous opacification on this arterial phase examination. The appearance is similar to 3/21/2025. Consider supplemental evaluation with MR venogram    IR NEURO 3/26 - POSTOPERATIVE DIAGNOSIS: Large distal upper division M3 trunk occlusion. TICI 3 reperfusion post endovascular thrombectomy    DUPLEX NIKA LE 3/26 - No evidence of deep venous thrombosis in either lower extremity.    DUPLEX R ARTERIAL LE 3/26 - A 1.7 cm right CFA pseudoaneurysm.A 2.5 cm adjacent hematoma.    CT HEAD 3/27 - Age-appropriate involutional and ischemic gliotic changes. No hemorrhage. No significant change since 3/26/2025.    DUPLEX ARTERIAL LOWER EXT, R 3/27 - Slightly decreased size of partially thrombosed pseudoaneurysm arising from the right common femoral artery/proximal right femoral artery.Possible dissection flap in the right common femoral artery.    INTERVENTIONAL IR 3/27 - Right femoral vein pseudoaneurysm injected with 100units of thrombin and pressure held for 10min post injection. PSA appears thrombosed.    CT C/A/P w/contrast 3/27 -No evidence of metastatic disease.Severe cardiomegaly.Pseudoaneurysm again seen off the proximal aspect of the right femoral artery. Correlate with arterial ultrasound performed on the same day. New 1.7 cm cystic lesion at the pancreatic body.    HEAD CT 3/29 - No acute intracranial hemorrhage, mass effect, or shift of the midline structures. Evolving acute left frontal lobe superior division MCA infarct without hemorrhagic transformation.    MRI HEAD 3/31 -  New acute infarctions within the LEFT lateral frontal cortex, LEFT occipital cortex, RIGHT insular cortex and scattered RIGHT frontal, parietal, temporal and occipital cortex suggesting embolic etiology. Subacute infarction within the LEFT basal ganglia. Mild periventricular chronic white matter ischemia.  ----------------------------------------------------------------------------------------  PHYSICAL EXAM   Constitutional - NAD, Comfortable   Neurologic Exam -                    Cognitive - AAO to self, PART situation     Communication - Expressive deficits, Delayed processing, Dysarthria     Cranial Nerves - Left lip droop     FUNCTIONAL MOTOR EXAM -  Requires extra time with commands to perform MMT                    LEFT    UE - ShAB 3/5, EF 3/5, EE 3/5,  4/5                    RIGHT UE - ShAB 2/5, EF 3/5, EE 3/5,  4/5                    LEFT    LE - HF 2/5, KE 3/5, DF 3/5                     RIGHT LE - HF 3/5, KE 3/5, DF 3/5       Sensory - Intact to LT  Psychiatric - Fatigued  ----------------------------------------------------------------------------------------  ASSESSMENT/PLAN  89yFemale with functional deficits 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  Acute Bilateral CVA occlusions s/p 3 thrombectomies - Eliquis, Lipitor  Right Femoral Artery Pseudoaneurysm - Stable  HTN, AFIB - Lopressor, Hydralazine/Labetalol PRN  Aphasia - RECOMMEND NAMENDA 5mg BID  Adrenal Insufficiency - Solu-cortef   Pharyngeal Dysphagia - Regular Diet, Thin Liquids  Pain - Tylenol  DVT PPX - SCDs, Eliquis  Rehab/Impaired mobility and function - Patient continues to require hospitalization for the above diagnoses     Mobilization - Recommend PT/OT return to work with Ms Romano.  Encourage family to actively and passively range her joints and to encourage her to sit in the chair OOB as often as possible.  HOB elevated to >30 degrees, turn Q2 hours.    Aphasia - Recommend SLP return to work with [facility].  Encourage family to continue to work with her, communicate with her, and help her to work on communication strategies    Disposition - Ms Romano is appropriate for acute rehabilitation center pending medical stability.  She will likely require Head CT after tranisitioning to [facility].  Medical clearance pending.   Ms Romano is seen reclined in bed, accompanied by two of her sons.  She is very pleasant and friendly, but is unable to accurately state where she is.  When given options she is able to accurately guess, but she is unable to proactively produce and express the location, or several other words on examination.  She denies any pain and states that she slept well.  Her sons reinforce that they want [facility] for her as she has been there before.      FUNCTIONAL PROGRESS  4/2 SLP - Speech Language Pathology Recommendations: 1. Regular diet, thin fluids as tolerated 2. STRICT aspiration precautions 3. Oral care 4. Upright with PO 5. Assist with PO: slow rate of intake, small bites/sips only  6. will follow  7. Rx intensive SLP intervention following discharge     NO PT/OT since 4/1    VITALS  T(C): 37.1 (04-03-25 @ 10:00), Max: 37.1 (04-03-25 @ 10:00)  HR: 93 (04-03-25 @ 10:00) (81 - 115)  BP: 116/71 (04-03-25 @ 10:00) (93/81 - 156/83)  RR: 20 (04-03-25 @ 10:00) (15 - 23)  SpO2: 100% (04-03-25 @ 10:00) (92% - 100%)  Wt(kg): --    MEDICATIONS   acetaminophen     Tablet .. 650 milliGRAM(s) every 6 hours PRN  apixaban 5 milliGRAM(s) two times a day  atorvastatin 10 milliGRAM(s) at bedtime  chlorhexidine 2% Cloths 1 Application(s) daily  melatonin 5 milliGRAM(s) at bedtime  metoprolol tartrate 25 milliGRAM(s) two times a day  nitrofurantoin monohydrate/macrocrystals (MACROBID) 100 milliGRAM(s) two times a day  pantoprazole    Tablet 40 milliGRAM(s) before breakfast  polyethylene glycol 3350 17 Gram(s) two times a day  predniSONE   Tablet 5 milliGRAM(s) <User Schedule>  predniSONE   Tablet 2 milliGRAM(s) <User Schedule>  senna 2 Tablet(s) at bedtime  sodium chloride 0.9%. 1000 milliLiter(s) <Continuous>  tamsulosin 0.4 milliGRAM(s) at bedtime      RECENT LABS/IMAGING  - Reviewed Today                        10.3   7.04  )-----------( 146      ( 03 Apr 2025 03:20 )             31.8     04-03    142  |  109[H]  |  36.8[H]  ----------------------------<  124[H]  3.5   |  20.0[L]  |  0.57    Ca    8.2[L]      03 Apr 2025 03:20  Phos  2.6     04-03  Mg     1.8     04-03    TPro  5.0[L]  /  Alb  2.9[L]  /  TBili  0.7  /  DBili  x   /  AST  13  /  ALT  17  /  AlkPhos  38[L]  04-03    PTT - ( 03 Apr 2025 03:20 )  PTT:26.1 sec  Urinalysis Basic - ( 03 Apr 2025 03:20 )    Color: x / Appearance: x / SG: x / pH: x  Gluc: 124 mg/dL / Ketone: x  / Bili: x / Urobili: x   Blood: x / Protein: x / Nitrite: x   Leuk Esterase: x / RBC: x / WBC x   Sq Epi: x / Non Sq Epi: x / Bacteria: x            CT Brain Stroke 3/26 - 1. No intracranial hemorrhage is appreciated.2. No intracranial mass lesion is appreciated.  3. Left basal ganglia subacute infarction, was acute at the time of stroke code 3/21/2025, demonstrates expected evolution. Consider MR for evaluation of infarct extension4. MR may provide higher sensitivity imaging evaluation for the clinical   indication of acute infarction.    CT ANGIO BRAIN AND NECK STROKE 3/26 - 1.   RIGHT CAROTID SYSTEM:    Atherosclerotic plaque carotid bulb without    hemodynamically significant stenosis. Tandem lesion distal internal carotid artery at the C1 level has developed since 3/21/2025, possible interval arterial dissection, with associated luminal narrowing approximately 30%  2.   LEFT CAROTID SYSTEM:     Atherosclerotic plaque carotid bulb without  hemodynamically significant stenosis.  3.   VERTEBRAL CIRCULATION:    Patent.4.  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 Right MCA occlusion in its proximal M2 segment is an interval finding since 3/21/2025. Left MCA remains patent with luminal irregularity and low-grade narrowing following recent thrombectomy.5.  POSTERIOR INTRACRANIAL CIRCULATION:    Patent.  6. BRAIN PERFUSION:   No acute infarction of the brain is convincingly demonstrated.  However, broad region of apparent ischemia corresponds to the right MCA distribution correlating with acute embolic occlusion on CT angiography  7. Heterogeneous enhancement within the principal dural sinuses may be secondary to the early phase of venous opacification on this arterial phase examination. The appearance is similar to 3/21/2025. Consider supplemental evaluation with MR venogram    IR NEURO 3/26 - POSTOPERATIVE DIAGNOSIS: Large distal upper division M3 trunk occlusion. TICI 3 reperfusion post endovascular thrombectomy    DUPLEX NIKA LE 3/26 - No evidence of deep venous thrombosis in either lower extremity.    DUPLEX R ARTERIAL LE 3/26 - A 1.7 cm right CFA pseudoaneurysm.A 2.5 cm adjacent hematoma.    CT HEAD 3/27 - Age-appropriate involutional and ischemic gliotic changes. No hemorrhage. No significant change since 3/26/2025.    DUPLEX ARTERIAL LOWER EXT, R 3/27 - Slightly decreased size of partially thrombosed pseudoaneurysm arising from the right common femoral artery/proximal right femoral artery.Possible dissection flap in the right common femoral artery.    INTERVENTIONAL IR 3/27 - Right femoral vein pseudoaneurysm injected with 100units of thrombin and pressure held for 10min post injection. PSA appears thrombosed.    CT C/A/P w/contrast 3/27 -No evidence of metastatic disease.Severe cardiomegaly.Pseudoaneurysm again seen off the proximal aspect of the right femoral artery. Correlate with arterial ultrasound performed on the same day. New 1.7 cm cystic lesion at the pancreatic body.    HEAD CT 3/29 - No acute intracranial hemorrhage, mass effect, or shift of the midline structures. Evolving acute left frontal lobe superior division MCA infarct without hemorrhagic transformation.    MRI HEAD 3/31 -  New acute infarctions within the LEFT lateral frontal cortex, LEFT occipital cortex, RIGHT insular cortex and scattered RIGHT frontal, parietal, temporal and occipital cortex suggesting embolic etiology. Subacute infarction within the LEFT basal ganglia. Mild periventricular chronic white matter ischemia.    HEAD CT 4/2 -  IMPRESSION: Subacute infarcts are again seen bilaterally as described   above.  Acute infarct is now seen involving the left basal ganglia region.    ----------------------------------------------------------------------------------------  PHYSICAL EXAM   Constitutional - NAD, Comfortable   Neurologic Exam -                    Cognitive - AAO to self, PART situation     Communication - Expressive deficits, Delayed processing, Dysarthria     Cranial Nerves - Left lip droop     FUNCTIONAL MOTOR EXAM -  Requires extra time with commands to perform MMT                    LEFT    UE - ShAB 3/5, EF 3/5, EE 3/5,  4/5                    RIGHT UE - ShAB 2/5, EF 3/5, EE 3/5,  4/5                    LEFT    LE - HF 2/5, KE 3/5, DF 3/5                     RIGHT LE - HF 3/5, KE 3/5, DF 3/5       Sensory - Intact to LT  Psychiatric - Fatigued  ----------------------------------------------------------------------------------------  ASSESSMENT/PLAN  89yFemale with functional deficits 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  Acute Bilateral CVA occlusions s/p 3 thrombectomies - Eliquis, Lipitor  Right Femoral Artery Pseudoaneurysm - Stable  HTN, AFIB - Lopressor, Hydralazine/Labetalol PRN  Aphasia - RECOMMEND NAMENDA 5mg BID  Adrenal Insufficiency - Solu-cortef   Pharyngeal Dysphagia - Regular Diet, Thin Liquids  Pain - Tylenol  DVT PPX - SCDs, Eliquis  Rehab/Impaired mobility and function - Patient continues to require hospitalization for the above diagnoses     Mobilization - Recommend PT/OT return to work with Ms Romano.  Encourage family to actively and passively range her joints and to encourage her to sit in the chair OOB as often as possible.  HOB elevated to >30 degrees, turn Q2 hours.    Aphasia - Recommend SLP return to work with Herl[facility].  Encourage family to continue to work with her, communicate with her, and help her to work on communication strategies    Disposition - Ms Romano is appropriate for acute rehabilitation center pending medical stability.  She will likely require Head CT after tranisitioning to [facility].  Medical clearance pending.

## 2025-04-03 NOTE — PROGRESS NOTE ADULT - ASSESSMENT
89 year old female with PMHx of Afib (off Eliquis), recent left MCA territory stroke s/p LM1 thrombectomy, with residual minimal aphasia, MRS 1, PPM, HTN, HLD, GERD, severe MR/TR, iatrogenic adrenal insufficiency (on prednisone), lymphedema, metastatic melanoma s/p Left foot melanoma and lymph node resection, s/p RUE melanoma and Right axillary lymph node excision at [facility] (3/18/25) who presented to  ED 3/26/36 with Lt sided weakness and aphasia. CT neg, CTA showed a  Rt M2 occlusion.  Transferred to [facility] intervention. S/P thrombectomy, TICI 3 achieved after 1 pass admitted to NSICU for post procedure monitoring started on IV Heparin.On the early morning of 3/29/25 pt received medications around 6am by RN report and subsequently was appreciated to be aphasic with right sided weakness.  CTH/CTA/CTP ordered. CTH negative for acute hemorrhage. CTA + new Left MCA M2 occulusion.       IMPRESSION:   Recent left MCA cerebral infarction s/p Left MCA M1 thrombectomy on 3/21/2025. TICI 3 recanalization. Patient subsequently discharged and returned 3/26/25 with Rt MCA  m2 occlusion, revascularization TICI 3. On 3/29/25 patient found to have acute Left M2 MCA occlusion s/p thrombectomy TICI 0 -- distal M4 branch. MRI brain demonstrated acute bilateral multifocal cerebral infarctions. There is subacute left basal ganglia infarct.  Etiology likely cardioembolic in the setting of atrial fibrillation and underlying hypercoaguability from malignancy.       NEURO: Bihemispheric embolic infarcts s/p Thrombectomy x 3, Aphasia, R hemiparesis  -Neurologically with gradually improving speech   -Continue close monitoring for neurologic deterioration    -Stroke neuro checks q4 hours    -SBP goal 110-160mmhg for now avoiding rapid fluctuations and hypotension , neurologically tolerating at this time, eventual long term age/risk specific normotension   -ANTITHROMBOTIC THERAPY:  Heparin gtt, no bolus, PTT goal currently 50-70  further titration based on clinical course and follow up serial labs. Transitioned to Eliquis 4/2/25 at 6pm as CT Head on 4/2/25 with no significant change from prior study.   -continue home statin regimen if applicable as LDL < goal of 70   -MRI imaging as noted   -Dysphagia screen: failed, NGT removed 4/1/25, currently on regular/mildly thick, SLP follow up at bedside notes tolerating this diet and to continue   -Physical therapy/OT/Speech eval/treatment.   -Stroke education     CARDIOVASCULAR: Afib  - TTE as noted , cardiac monitoring w/ telemetry for now, further evaluation pending findings of noted workup, continue rate control and titrate regimen accordingly                            HEMATOLOGY: H/H with mild anemia, no active bleeding noted, Platelets 146- monitor thrombocytosis closely, patient should have all age and risk appropriate malignancy screenings with PCP or sooner if clinically suspected ,   -hematology consulted- hypercoagulable workup sent, including factor V leiden, prothrombin, antiphospholipid antibodies, and lupus anticoagulant.  With regard to long-term a/c, can transition to doac from heparin gtt as per neuro and would follow up with outpatient hematologist Dr Kimbrough.   -anemia profile Iron total 53, %Sat 21, TIBC 250, ferritin 293, transferrin 175.      DVT ppx: Heparin gtt     PULMONARY:  protecting airway, saturating well , encourage mobility and incentive spirometry as tolerated     RENAL: BUN elevated, Cr within range, monitor urine output, maintain adequate hydration, IVF in place, 500cc bolus implemented on 4/2/25     Na Goal:  135-145     Rivas: as noted, s/p TOV pending. Required straight caths afterwards. Rivas replaced 4/3. Will discharge to rehab with rivas. Can attempt TOV again in rehab.    ID: afebrile, no leukocytosis, UA +, started on macrobid 4/2 for 5 day course.     ENDO: 1. Iatrogenic adrenal insufficiency  - Hemodynamically stable  - Hydrocortisone recs per endocrine, discontinued today  - Prednisone tapered back down to home dose (5 mg in AM and 2 mg in PM)  - Monitor BP and electrolytes    VASCULAR: signing off, pseudoaneurysm appears to be occluded. Please call with any issues.    OTHER: condition and plan of care d/w patient, family, questions and concerns addressed.     DISPOSITION: Acute Rehab once stable and workup is complete, per CM no auth is needed once patient is stable. Anticipate for 4/3/25 pending clinical course and noted pending workup.     CORE MEASURES:       Admission NIHSS: 10     Tenecteplase : [] YES [x] NO     LDL/HDL/A1C:  59 //  74  //  5.5      Depression Screen- if depression hx and/or present     Statin Therapy: continue home dose statin      Dysphagia Screen: [x] PASS [] FAIL     Smoking in the past 12 months [] YES [x] NO     Afib [x] YES [] NO     Diabetes [] YES [x] NO     Stroke Education [x] YES [] NO  Diabetes [] YES [x] NO    Obtain screening lower extremity venous ultrasound in patients who meet 1 or more of the following criteria as patient is high risk for DVT/PE on admission:   [] History of DVT/PE  []Hypercoagulable states (Factor V Leiden, Cancer, OCP, etc. )  []Prolonged immobility (hemiplegia/hemiparesis/post operative or any other extended immobilization)  [] Transferred from outside facility (Rehab or Long term care)  [] Age </= to 50  [x]Stroke

## 2025-04-04 ENCOUNTER — RESULT REVIEW (OUTPATIENT)
Age: 89
End: 2025-04-04

## 2025-04-04 DIAGNOSIS — I50.33 ACUTE ON CHRONIC DIASTOLIC (CONGESTIVE) HEART FAILURE: ICD-10-CM

## 2025-04-04 LAB
ALBUMIN SERPL ELPH-MCNC: 2.9 G/DL — LOW (ref 3.3–5.2)
ALBUMIN SERPL ELPH-MCNC: 2.9 G/DL — LOW (ref 3.3–5.2)
ALP SERPL-CCNC: 41 U/L — SIGNIFICANT CHANGE UP (ref 40–120)
ALP SERPL-CCNC: 43 U/L — SIGNIFICANT CHANGE UP (ref 40–120)
ALT FLD-CCNC: 15 U/L — SIGNIFICANT CHANGE UP
ALT FLD-CCNC: 15 U/L — SIGNIFICANT CHANGE UP
ANION GAP SERPL CALC-SCNC: 13 MMOL/L — SIGNIFICANT CHANGE UP (ref 5–17)
ANION GAP SERPL CALC-SCNC: 14 MMOL/L — SIGNIFICANT CHANGE UP (ref 5–17)
APCR PPP: 2.58 RATIO — SIGNIFICANT CHANGE UP
AST SERPL-CCNC: 13 U/L — SIGNIFICANT CHANGE UP
AST SERPL-CCNC: 14 U/L — SIGNIFICANT CHANGE UP
BASOPHILS # BLD AUTO: 0.02 K/UL — SIGNIFICANT CHANGE UP (ref 0–0.2)
BASOPHILS NFR BLD AUTO: 0.3 % — SIGNIFICANT CHANGE UP (ref 0–2)
BILIRUB SERPL-MCNC: 0.8 MG/DL — SIGNIFICANT CHANGE UP (ref 0.4–2)
BILIRUB SERPL-MCNC: 1 MG/DL — SIGNIFICANT CHANGE UP (ref 0.4–2)
BUN SERPL-MCNC: 22.3 MG/DL — HIGH (ref 8–20)
BUN SERPL-MCNC: 29.3 MG/DL — HIGH (ref 8–20)
CALCIUM SERPL-MCNC: 7.7 MG/DL — LOW (ref 8.4–10.5)
CALCIUM SERPL-MCNC: 8 MG/DL — LOW (ref 8.4–10.5)
CHLORIDE SERPL-SCNC: 101 MMOL/L — SIGNIFICANT CHANGE UP (ref 96–108)
CHLORIDE SERPL-SCNC: 108 MMOL/L — SIGNIFICANT CHANGE UP (ref 96–108)
CO2 SERPL-SCNC: 20 MMOL/L — LOW (ref 22–29)
CO2 SERPL-SCNC: 22 MMOL/L — SIGNIFICANT CHANGE UP (ref 22–29)
CREAT SERPL-MCNC: 0.52 MG/DL — SIGNIFICANT CHANGE UP (ref 0.5–1.3)
CREAT SERPL-MCNC: 0.58 MG/DL — SIGNIFICANT CHANGE UP (ref 0.5–1.3)
CULTURE RESULTS: SIGNIFICANT CHANGE UP
EGFR: 87 ML/MIN/1.73M2 — SIGNIFICANT CHANGE UP
EGFR: 87 ML/MIN/1.73M2 — SIGNIFICANT CHANGE UP
EGFR: 89 ML/MIN/1.73M2 — SIGNIFICANT CHANGE UP
EGFR: 89 ML/MIN/1.73M2 — SIGNIFICANT CHANGE UP
EOSINOPHIL # BLD AUTO: 0.3 K/UL — SIGNIFICANT CHANGE UP (ref 0–0.5)
EOSINOPHIL NFR BLD AUTO: 4.4 % — SIGNIFICANT CHANGE UP (ref 0–6)
GLUCOSE BLDC GLUCOMTR-MCNC: 124 MG/DL — HIGH (ref 70–99)
GLUCOSE BLDC GLUCOMTR-MCNC: 147 MG/DL — HIGH (ref 70–99)
GLUCOSE SERPL-MCNC: 104 MG/DL — HIGH (ref 70–99)
GLUCOSE SERPL-MCNC: 127 MG/DL — HIGH (ref 70–99)
HCT VFR BLD CALC: 33.8 % — LOW (ref 34.5–45)
HCT VFR BLD CALC: 35.7 % — SIGNIFICANT CHANGE UP (ref 34.5–45)
HGB BLD-MCNC: 11.3 G/DL — LOW (ref 11.5–15.5)
HGB BLD-MCNC: 11.9 G/DL — SIGNIFICANT CHANGE UP (ref 11.5–15.5)
IMM GRANULOCYTES # BLD AUTO: 0.13 K/UL — HIGH (ref 0–0.07)
IMM GRANULOCYTES NFR BLD AUTO: 1.9 % — HIGH (ref 0–0.9)
LACTATE SERPL-SCNC: 1.3 MMOL/L — SIGNIFICANT CHANGE UP (ref 0.5–2)
LACTATE SERPL-SCNC: 1.6 MMOL/L — SIGNIFICANT CHANGE UP (ref 0.5–2)
LYMPHOCYTES # BLD AUTO: 0.15 K/UL — LOW (ref 1–3.3)
LYMPHOCYTES NFR BLD AUTO: 2.2 % — LOW (ref 13–44)
MAGNESIUM SERPL-MCNC: 1.9 MG/DL — SIGNIFICANT CHANGE UP (ref 1.6–2.6)
MCHC RBC-ENTMCNC: 31.9 PG — SIGNIFICANT CHANGE UP (ref 27–34)
MCHC RBC-ENTMCNC: 32 PG — SIGNIFICANT CHANGE UP (ref 27–34)
MCHC RBC-ENTMCNC: 33.3 G/DL — SIGNIFICANT CHANGE UP (ref 32–36)
MCHC RBC-ENTMCNC: 33.4 G/DL — SIGNIFICANT CHANGE UP (ref 32–36)
MCV RBC AUTO: 95.5 FL — SIGNIFICANT CHANGE UP (ref 80–100)
MCV RBC AUTO: 96 FL — SIGNIFICANT CHANGE UP (ref 80–100)
MONOCYTES # BLD AUTO: 0.4 K/UL — SIGNIFICANT CHANGE UP (ref 0–0.9)
MONOCYTES NFR BLD AUTO: 5.8 % — SIGNIFICANT CHANGE UP (ref 2–14)
NEUTROPHILS # BLD AUTO: 5.88 K/UL — SIGNIFICANT CHANGE UP (ref 1.8–7.4)
NEUTROPHILS NFR BLD AUTO: 85.4 % — HIGH (ref 43–77)
NRBC # BLD AUTO: 0.03 K/UL — HIGH (ref 0–0)
NRBC # BLD AUTO: 0.05 K/UL — HIGH (ref 0–0)
NRBC # FLD: 0.03 K/UL — HIGH (ref 0–0)
NRBC # FLD: 0.05 K/UL — HIGH (ref 0–0)
NRBC BLD AUTO-RTO: 0 /100 WBCS — SIGNIFICANT CHANGE UP (ref 0–0)
NRBC BLD AUTO-RTO: 0 /100 WBCS — SIGNIFICANT CHANGE UP (ref 0–0)
NT-PROBNP SERPL-SCNC: 2076 PG/ML — HIGH (ref 0–300)
NT-PROBNP SERPL-SCNC: 3239 PG/ML — HIGH (ref 0–300)
PHOSPHATE SERPL-MCNC: 2.1 MG/DL — LOW (ref 2.4–4.7)
PLATELET # BLD AUTO: 142 K/UL — LOW (ref 150–400)
PLATELET # BLD AUTO: 146 K/UL — LOW (ref 150–400)
PMV BLD: 10.5 FL — SIGNIFICANT CHANGE UP (ref 7–13)
PMV BLD: 10.7 FL — SIGNIFICANT CHANGE UP (ref 7–13)
POTASSIUM SERPL-MCNC: 3.5 MMOL/L — SIGNIFICANT CHANGE UP (ref 3.5–5.3)
POTASSIUM SERPL-MCNC: 3.6 MMOL/L — SIGNIFICANT CHANGE UP (ref 3.5–5.3)
POTASSIUM SERPL-SCNC: 3.5 MMOL/L — SIGNIFICANT CHANGE UP (ref 3.5–5.3)
POTASSIUM SERPL-SCNC: 3.6 MMOL/L — SIGNIFICANT CHANGE UP (ref 3.5–5.3)
PROCALCITONIN SERPL-MCNC: 0.37 NG/ML — HIGH (ref 0.02–0.1)
PROT SERPL-MCNC: 5.1 G/DL — LOW (ref 6.6–8.7)
PROT SERPL-MCNC: 5.2 G/DL — LOW (ref 6.6–8.7)
RAPID RVP RESULT: SIGNIFICANT CHANGE UP
RBC # BLD: 3.54 M/UL — LOW (ref 3.8–5.2)
RBC # BLD: 3.72 M/UL — LOW (ref 3.8–5.2)
RBC # FLD: 14.5 % — SIGNIFICANT CHANGE UP (ref 10.3–14.5)
RBC # FLD: 14.8 % — HIGH (ref 10.3–14.5)
SARS-COV-2 RNA SPEC QL NAA+PROBE: SIGNIFICANT CHANGE UP
SODIUM SERPL-SCNC: 137 MMOL/L — SIGNIFICANT CHANGE UP (ref 135–145)
SODIUM SERPL-SCNC: 141 MMOL/L — SIGNIFICANT CHANGE UP (ref 135–145)
SPECIMEN SOURCE: SIGNIFICANT CHANGE UP
TROPONIN T, HIGH SENSITIVITY RESULT: 34 NG/L — SIGNIFICANT CHANGE UP (ref 0–51)
TROPONIN T, HIGH SENSITIVITY RESULT: 40 NG/L — SIGNIFICANT CHANGE UP (ref 0–51)
TROPONIN T, HIGH SENSITIVITY RESULT: 55 NG/L — HIGH (ref 0–51)
WBC # BLD: 6.88 K/UL — SIGNIFICANT CHANGE UP (ref 3.8–10.5)
WBC # BLD: 8.07 K/UL — SIGNIFICANT CHANGE UP (ref 3.8–10.5)
WBC # FLD AUTO: 6.88 K/UL — SIGNIFICANT CHANGE UP (ref 3.8–10.5)
WBC # FLD AUTO: 8.07 K/UL — SIGNIFICANT CHANGE UP (ref 3.8–10.5)

## 2025-04-04 PROCEDURE — 71275 CT ANGIOGRAPHY CHEST: CPT | Mod: 26

## 2025-04-04 PROCEDURE — 70450 CT HEAD/BRAIN W/O DYE: CPT | Mod: 26

## 2025-04-04 PROCEDURE — 99291 CRITICAL CARE FIRST HOUR: CPT

## 2025-04-04 PROCEDURE — 99223 1ST HOSP IP/OBS HIGH 75: CPT

## 2025-04-04 PROCEDURE — G0545: CPT

## 2025-04-04 PROCEDURE — 75635 CT ANGIO ABDOMINAL ARTERIES: CPT | Mod: 26

## 2025-04-04 PROCEDURE — 99222 1ST HOSP IP/OBS MODERATE 55: CPT

## 2025-04-04 PROCEDURE — 71045 X-RAY EXAM CHEST 1 VIEW: CPT | Mod: 26

## 2025-04-04 PROCEDURE — 99233 SBSQ HOSP IP/OBS HIGH 50: CPT

## 2025-04-04 PROCEDURE — 99233 SBSQ HOSP IP/OBS HIGH 50: CPT | Mod: GC

## 2025-04-04 PROCEDURE — 93306 TTE W/DOPPLER COMPLETE: CPT | Mod: 26

## 2025-04-04 PROCEDURE — 93010 ELECTROCARDIOGRAM REPORT: CPT

## 2025-04-04 RX ORDER — VANCOMYCIN HCL IN 5 % DEXTROSE 1.5G/250ML
PLASTIC BAG, INJECTION (ML) INTRAVENOUS
Refills: 0 | Status: DISCONTINUED | OUTPATIENT
Start: 2025-04-04 | End: 2025-04-04

## 2025-04-04 RX ORDER — MAGNESIUM SULFATE 500 MG/ML
2 SYRINGE (ML) INJECTION ONCE
Refills: 0 | Status: COMPLETED | OUTPATIENT
Start: 2025-04-04 | End: 2025-04-04

## 2025-04-04 RX ORDER — INSULIN LISPRO 100 U/ML
INJECTION, SOLUTION INTRAVENOUS; SUBCUTANEOUS EVERY 6 HOURS
Refills: 0 | Status: DISCONTINUED | OUTPATIENT
Start: 2025-04-04 | End: 2025-04-05

## 2025-04-04 RX ORDER — IPRATROPIUM BROMIDE AND ALBUTEROL SULFATE .5; 2.5 MG/3ML; MG/3ML
3 SOLUTION RESPIRATORY (INHALATION) ONCE
Refills: 0 | Status: COMPLETED | OUTPATIENT
Start: 2025-04-04 | End: 2025-04-04

## 2025-04-04 RX ORDER — VANCOMYCIN HCL IN 5 % DEXTROSE 1.5G/250ML
750 PLASTIC BAG, INJECTION (ML) INTRAVENOUS EVERY 12 HOURS
Refills: 0 | Status: DISCONTINUED | OUTPATIENT
Start: 2025-04-04 | End: 2025-04-05

## 2025-04-04 RX ORDER — METOPROLOL SUCCINATE 50 MG/1
5 TABLET, EXTENDED RELEASE ORAL EVERY 6 HOURS
Refills: 0 | Status: DISCONTINUED | OUTPATIENT
Start: 2025-04-04 | End: 2025-04-05

## 2025-04-04 RX ORDER — FUROSEMIDE 10 MG/ML
10 INJECTION INTRAMUSCULAR; INTRAVENOUS ONCE
Refills: 0 | Status: COMPLETED | OUTPATIENT
Start: 2025-04-04 | End: 2025-04-04

## 2025-04-04 RX ORDER — GLUCAGON 3 MG/1
1 POWDER NASAL ONCE
Refills: 0 | Status: DISCONTINUED | OUTPATIENT
Start: 2025-04-04 | End: 2025-04-08

## 2025-04-04 RX ORDER — IPRATROPIUM BROMIDE AND ALBUTEROL SULFATE .5; 2.5 MG/3ML; MG/3ML
3 SOLUTION RESPIRATORY (INHALATION) EVERY 6 HOURS
Refills: 0 | Status: DISCONTINUED | OUTPATIENT
Start: 2025-04-04 | End: 2025-04-04

## 2025-04-04 RX ORDER — DEXTROSE 50 % IN WATER 50 %
15 SYRINGE (ML) INTRAVENOUS ONCE
Refills: 0 | Status: DISCONTINUED | OUTPATIENT
Start: 2025-04-04 | End: 2025-04-08

## 2025-04-04 RX ORDER — METOPROLOL SUCCINATE 50 MG/1
5 TABLET, EXTENDED RELEASE ORAL ONCE
Refills: 0 | Status: COMPLETED | OUTPATIENT
Start: 2025-04-04 | End: 2025-04-04

## 2025-04-04 RX ORDER — ALBUTEROL SULFATE 2.5 MG/3ML
2 VIAL, NEBULIZER (ML) INHALATION EVERY 4 HOURS
Refills: 0 | Status: DISCONTINUED | OUTPATIENT
Start: 2025-04-04 | End: 2025-04-04

## 2025-04-04 RX ORDER — IPRATROPIUM BROMIDE AND ALBUTEROL SULFATE .5; 2.5 MG/3ML; MG/3ML
3 SOLUTION RESPIRATORY (INHALATION) EVERY 4 HOURS
Refills: 0 | Status: DISCONTINUED | OUTPATIENT
Start: 2025-04-04 | End: 2025-04-05

## 2025-04-04 RX ORDER — DEXTROSE 50 % IN WATER 50 %
12.5 SYRINGE (ML) INTRAVENOUS ONCE
Refills: 0 | Status: DISCONTINUED | OUTPATIENT
Start: 2025-04-04 | End: 2025-04-08

## 2025-04-04 RX ORDER — DEXTROSE 50 % IN WATER 50 %
25 SYRINGE (ML) INTRAVENOUS ONCE
Refills: 0 | Status: DISCONTINUED | OUTPATIENT
Start: 2025-04-04 | End: 2025-04-08

## 2025-04-04 RX ORDER — BISACODYL 5 MG
10 TABLET, DELAYED RELEASE (ENTERIC COATED) ORAL DAILY
Refills: 0 | Status: DISCONTINUED | OUTPATIENT
Start: 2025-04-04 | End: 2025-04-05

## 2025-04-04 RX ORDER — ACETAMINOPHEN 500 MG/5ML
1000 LIQUID (ML) ORAL ONCE
Refills: 0 | Status: COMPLETED | OUTPATIENT
Start: 2025-04-04 | End: 2025-04-04

## 2025-04-04 RX ORDER — SODIUM CHLORIDE 9 G/1000ML
1000 INJECTION, SOLUTION INTRAVENOUS
Refills: 0 | Status: DISCONTINUED | OUTPATIENT
Start: 2025-04-04 | End: 2025-04-08

## 2025-04-04 RX ORDER — CEFEPIME 2 G/20ML
2000 INJECTION, POWDER, FOR SOLUTION INTRAVENOUS EVERY 8 HOURS
Refills: 0 | Status: DISCONTINUED | OUTPATIENT
Start: 2025-04-04 | End: 2025-04-04

## 2025-04-04 RX ORDER — ENOXAPARIN SODIUM 100 MG/ML
63 INJECTION SUBCUTANEOUS EVERY 12 HOURS
Refills: 0 | Status: DISCONTINUED | OUTPATIENT
Start: 2025-04-04 | End: 2025-04-05

## 2025-04-04 RX ORDER — HYDROCORTISONE 20 MG
100 TABLET ORAL EVERY 8 HOURS
Refills: 0 | Status: DISCONTINUED | OUTPATIENT
Start: 2025-04-04 | End: 2025-04-05

## 2025-04-04 RX ORDER — VANCOMYCIN HCL IN 5 % DEXTROSE 1.5G/250ML
750 PLASTIC BAG, INJECTION (ML) INTRAVENOUS EVERY 12 HOURS
Refills: 0 | Status: DISCONTINUED | OUTPATIENT
Start: 2025-04-04 | End: 2025-04-04

## 2025-04-04 RX ORDER — HYDROCORTISONE 20 MG
25 TABLET ORAL ONCE
Refills: 0 | Status: COMPLETED | OUTPATIENT
Start: 2025-04-04 | End: 2025-04-04

## 2025-04-04 RX ORDER — VANCOMYCIN HCL IN 5 % DEXTROSE 1.5G/250ML
750 PLASTIC BAG, INJECTION (ML) INTRAVENOUS ONCE
Refills: 0 | Status: COMPLETED | OUTPATIENT
Start: 2025-04-04 | End: 2025-04-04

## 2025-04-04 RX ORDER — CEFEPIME 2 G/20ML
2000 INJECTION, POWDER, FOR SOLUTION INTRAVENOUS EVERY 8 HOURS
Refills: 0 | Status: DISCONTINUED | OUTPATIENT
Start: 2025-04-04 | End: 2025-04-08

## 2025-04-04 RX ADMIN — CEFEPIME 2000 MILLIGRAM(S): 2 INJECTION, POWDER, FOR SOLUTION INTRAVENOUS at 02:30

## 2025-04-04 RX ADMIN — Medication 400 MILLIGRAM(S): at 07:01

## 2025-04-04 RX ADMIN — Medication 100 MILLIGRAM(S): at 13:30

## 2025-04-04 RX ADMIN — ENOXAPARIN SODIUM 63 MILLIGRAM(S): 100 INJECTION SUBCUTANEOUS at 17:52

## 2025-04-04 RX ADMIN — Medication 500 MILLILITER(S): at 10:19

## 2025-04-04 RX ADMIN — Medication 1 APPLICATION(S): at 05:07

## 2025-04-04 RX ADMIN — CEFEPIME 2000 MILLIGRAM(S): 2 INJECTION, POWDER, FOR SOLUTION INTRAVENOUS at 21:59

## 2025-04-04 RX ADMIN — Medication 100 MILLIGRAM(S): at 21:01

## 2025-04-04 RX ADMIN — Medication 63.75 MILLIMOLE(S): at 03:06

## 2025-04-04 RX ADMIN — Medication 250 MILLIGRAM(S): at 10:39

## 2025-04-04 RX ADMIN — FUROSEMIDE 10 MILLIGRAM(S): 10 INJECTION INTRAMUSCULAR; INTRAVENOUS at 06:18

## 2025-04-04 RX ADMIN — APIXABAN 5 MILLIGRAM(S): 2.5 TABLET, FILM COATED ORAL at 06:20

## 2025-04-04 RX ADMIN — Medication 25 MILLIGRAM(S): at 10:17

## 2025-04-04 RX ADMIN — Medication 40 MILLIGRAM(S): at 12:12

## 2025-04-04 RX ADMIN — IPRATROPIUM BROMIDE AND ALBUTEROL SULFATE 3 MILLILITER(S): .5; 2.5 SOLUTION RESPIRATORY (INHALATION) at 14:39

## 2025-04-04 RX ADMIN — IPRATROPIUM BROMIDE AND ALBUTEROL SULFATE 3 MILLILITER(S): .5; 2.5 SOLUTION RESPIRATORY (INHALATION) at 03:50

## 2025-04-04 RX ADMIN — Medication 1000 MILLIGRAM(S): at 01:41

## 2025-04-04 RX ADMIN — IPRATROPIUM BROMIDE AND ALBUTEROL SULFATE 3 MILLILITER(S): .5; 2.5 SOLUTION RESPIRATORY (INHALATION) at 21:26

## 2025-04-04 RX ADMIN — Medication 250 MILLIGRAM(S): at 21:01

## 2025-04-04 RX ADMIN — IPRATROPIUM BROMIDE AND ALBUTEROL SULFATE 3 MILLILITER(S): .5; 2.5 SOLUTION RESPIRATORY (INHALATION) at 08:38

## 2025-04-04 RX ADMIN — METOPROLOL SUCCINATE 5 MILLIGRAM(S): 50 TABLET, EXTENDED RELEASE ORAL at 17:52

## 2025-04-04 RX ADMIN — IPRATROPIUM BROMIDE AND ALBUTEROL SULFATE 3 MILLILITER(S): .5; 2.5 SOLUTION RESPIRATORY (INHALATION) at 11:41

## 2025-04-04 RX ADMIN — CEFEPIME 2000 MILLIGRAM(S): 2 INJECTION, POWDER, FOR SOLUTION INTRAVENOUS at 13:30

## 2025-04-04 RX ADMIN — Medication 400 MILLIGRAM(S): at 01:00

## 2025-04-04 RX ADMIN — FUROSEMIDE 10 MILLIGRAM(S): 10 INJECTION INTRAMUSCULAR; INTRAVENOUS at 02:55

## 2025-04-04 RX ADMIN — METOPROLOL SUCCINATE 5 MILLIGRAM(S): 50 TABLET, EXTENDED RELEASE ORAL at 07:00

## 2025-04-04 RX ADMIN — Medication 75 MILLILITER(S): at 02:41

## 2025-04-04 RX ADMIN — Medication 25 GRAM(S): at 03:06

## 2025-04-04 RX ADMIN — Medication 1000 MILLIGRAM(S): at 08:01

## 2025-04-04 NOTE — PROVIDER CONTACT NOTE (OTHER) - ACTION/TREATMENT ORDERED:
Pan cultured,  Stat labs,   O2 applied,  Stat chest Xray  Stroke neurology ACPs at bedside.
MIRIAM Corbett at bedside to assess. Immediately take to CT scan.

## 2025-04-04 NOTE — CONSULT NOTE ADULT - CONSULT REQUESTED DATE/TIME
04-Apr-2025 12:25
27-Mar-2025 06:21
04-Apr-2025 08:56
04-Apr-2025 09:50
27-Mar-2025 07:46
27-Mar-2025 08:58
26-Mar-2025 13:39
31-Mar-2025 15:00
27-Mar-2025 15:00

## 2025-04-04 NOTE — CONSULT NOTE ADULT - REASON FOR ADMISSION
Rt M2 occlusion s/p thrombectomy

## 2025-04-04 NOTE — PROGRESS NOTE ADULT - SUBJECTIVE AND OBJECTIVE BOX
Ms Romano was febrile last night and is now being treated for a possible pneumonia as well as is being diuresed.  She desaturated to 70% last night with increased WOB and has been on supplemental oxygen.    Yesterday she did well in PT.      FUNCTIONAL PROGRESS  4/3 PT -     Sit-Stand Transfer Training  Transfer Training Sit-to-Stand Transfer: minimum assist (75% patient effort);  1 person assist;  weight-bearing as tolerated   rolling walker  Transfer Training Stand-to-Sit Transfer: minimum assist (75% patient effort);  1 person assist;  weight-bearing as tolerated   rolling walker  Sit-to-Stand Transfer Training Transfer Safety Analysis: decreased balance;  decreased weight-shifting ability;  decreased strength;  impaired balance;  impaired coordination;  impaired postural control;  cognitive, decreased safety awareness;  rolling walker    Gait Training  Gait Training: minimum assist (75% patient effort);  1 person + 1 person to manage equipment;  2nd assist needed 2*2 pt having BM while ambulating ;  weight-bearing as tolerated   rolling walker;  15 feet;  x2  Gait Analysis: 2-point gait   decreased mayo;  shuffling;  decreased step length;  impaired coordination;  impaired balance;  impaired postural control;  decreased RLE clearance/heel strike, decreased step length RLE, verbal cues for upright posture with poor carryover, verbal cues for safety and manual assist needed for RW management. Impaired balance noted bentley on turns ;  cognitive, decreased safety awareness;  15 feet;  x2;  rolling walker        VITALS  T(C): 38.1 (04-04-25 @ 09:00), Max: 38.8 (04-04-25 @ 07:30)  HR: 94 (04-04-25 @ 09:00) (78 - 125)  BP: 88/51 (04-04-25 @ 09:00) (81/43 - 162/90)  RR: 22 (04-04-25 @ 09:00) (15 - 27)  SpO2: 97% (04-04-25 @ 09:00) (91% - 100%)  Wt(kg): --    MEDICATIONS   albuterol/ipratropium for Nebulization 3 milliLiter(s) every 4 hours  apixaban 5 milliGRAM(s) two times a day  atorvastatin 10 milliGRAM(s) at bedtime  cefepime  Injectable. 2000 milliGRAM(s) every 8 hours  chlorhexidine 2% Cloths 1 Application(s) daily  hydrocortisone sodium succinate Injectable 25 milliGRAM(s) once  melatonin 5 milliGRAM(s) at bedtime  pantoprazole  Injectable 40 milliGRAM(s) daily  polyethylene glycol 3350 17 Gram(s) two times a day  predniSONE   Tablet 5 milliGRAM(s) <User Schedule>  predniSONE   Tablet 2 milliGRAM(s) <User Schedule>  senna 2 Tablet(s) at bedtime  sodium chloride 0.9% Bolus 500 milliLiter(s) once  tamsulosin 0.4 milliGRAM(s) at bedtime  vancomycin  IVPB 750 milliGRAM(s) once  vancomycin  IVPB 750 milliGRAM(s) every 12 hours  vancomycin  IVPB         RECENT LABS/IMAGING  - Reviewed Today                        11.3   8.07  )-----------( 146      ( 04 Apr 2025 01:50 )             33.8     04-04    141  |  108  |  29.3[H]  ----------------------------<  104[H]  3.6   |  20.0[L]  |  0.52    Ca    8.0[L]      04 Apr 2025 01:50  Phos  2.1     04-04  Mg     1.9     04-04    TPro  5.2[L]  /  Alb  2.9[L]  /  TBili  0.8  /  DBili  x   /  AST  13  /  ALT  15  /  AlkPhos  41  04-04    PTT - ( 03 Apr 2025 03:20 )  PTT:26.1 sec  Urinalysis Basic - ( 04 Apr 2025 01:50 )    Color: x / Appearance: x / SG: x / pH: x  Gluc: 104 mg/dL / Ketone: x  / Bili: x / Urobili: x   Blood: x / Protein: x / Nitrite: x   Leuk Esterase: x / RBC: x / WBC x   Sq Epi: x / Non Sq Epi: x / Bacteria: x            CT Brain Stroke 3/26 - 1. No intracranial hemorrhage is appreciated.2. No intracranial mass lesion is appreciated.  3. Left basal ganglia subacute infarction, was acute at the time of stroke code 3/21/2025, demonstrates expected evolution. Consider MR for evaluation of infarct extension4. MR may provide higher sensitivity imaging evaluation for the clinical   indication of acute infarction.    CT ANGIO BRAIN AND NECK STROKE 3/26 - 1.   RIGHT CAROTID SYSTEM:    Atherosclerotic plaque carotid bulb without    hemodynamically significant stenosis. Tandem lesion distal internal carotid artery at the C1 level has developed since 3/21/2025, possible interval arterial dissection, with associated luminal narrowing approximately 30%  2.   LEFT CAROTID SYSTEM:     Atherosclerotic plaque carotid bulb without  hemodynamically significant stenosis.  3.   VERTEBRAL CIRCULATION:    Patent.4.  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 Right MCA occlusion in its proximal M2 segment is an interval finding since 3/21/2025. Left MCA remains patent with luminal irregularity and low-grade narrowing following recent thrombectomy.5.  POSTERIOR INTRACRANIAL CIRCULATION:    Patent.  6. BRAIN PERFUSION:   No acute infarction of the brain is convincingly demonstrated.  However, broad region of apparent ischemia corresponds to the right MCA distribution correlating with acute embolic occlusion on CT angiography  7. Heterogeneous enhancement within the principal dural sinuses may be secondary to the early phase of venous opacification on this arterial phase examination. The appearance is similar to 3/21/2025. Consider supplemental evaluation with MR venogram    IR NEURO 3/26 - POSTOPERATIVE DIAGNOSIS: Large distal upper division M3 trunk occlusion. TICI 3 reperfusion post endovascular thrombectomy    DUPLEX NIKA LE 3/26 - No evidence of deep venous thrombosis in either lower extremity.    DUPLEX R ARTERIAL LE 3/26 - A 1.7 cm right CFA pseudoaneurysm.A 2.5 cm adjacent hematoma.    CT HEAD 3/27 - Age-appropriate involutional and ischemic gliotic changes. No hemorrhage. No significant change since 3/26/2025.    DUPLEX ARTERIAL LOWER EXT, R 3/27 - Slightly decreased size of partially thrombosed pseudoaneurysm arising from the right common femoral artery/proximal right femoral artery.Possible dissection flap in the right common femoral artery.    INTERVENTIONAL IR 3/27 - Right femoral vein pseudoaneurysm injected with 100units of thrombin and pressure held for 10min post injection. PSA appears thrombosed.    CT C/A/P w/contrast 3/27 -No evidence of metastatic disease.Severe cardiomegaly.Pseudoaneurysm again seen off the proximal aspect of the right femoral artery. Correlate with arterial ultrasound performed on the same day. New 1.7 cm cystic lesion at the pancreatic body.    HEAD CT 3/29 - No acute intracranial hemorrhage, mass effect, or shift of the midline structures. Evolving acute left frontal lobe superior division MCA infarct without hemorrhagic transformation.    MRI HEAD 3/31 -  New acute infarctions within the LEFT lateral frontal cortex, LEFT occipital cortex, RIGHT insular cortex and scattered RIGHT frontal, parietal, temporal and occipital cortex suggesting embolic etiology. Subacute infarction within the LEFT basal ganglia. Mild periventricular chronic white matter ischemia.    HEAD CT 4/2 -  IMPRESSION: Subacute infarcts are again seen bilaterally as described   above.  Acute infarct is now seen involving the left basal ganglia region.      ----------------------------------------------------------------------------------------  PHYSICAL EXAM   Constitutional - NAD, Comfortable   Neurologic Exam -                    Cognitive - AAO to self, PART situation     Communication - Expressive deficits, Delayed processing, Dysarthria     Cranial Nerves - Left lip droop     FUNCTIONAL MOTOR EXAM -  Requires extra time with commands to perform MMT                    LEFT    UE - ShAB 3/5, EF 3/5, EE 3/5,  4/5                    RIGHT UE - ShAB 2/5, EF 3/5, EE 3/5,  4/5                    LEFT    LE - HF 2/5, KE 3/5, DF 3/5                     RIGHT LE - HF 3/5, KE 3/5, DF 3/5       Sensory - Intact to LT  Psychiatric - Fatigued  ----------------------------------------------------------------------------------------  ASSESSMENT/PLAN  89yFemale with functional deficits 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  Acute Bilateral CVA occlusions s/p 3 thrombectomies - Eliquis, Lipitor  Right Femoral Artery Pseudoaneurysm - Stable  Coughing, Fever, Hypoxic Respiratory Failure/PNA vs Volume overload - Supplemental Oxygen, Lasix, Cefepime, CT C/A/P planned  HTN, AFIB - Lopressor, Hydralazine/Labetalol PRN  Aphasia - RECOMMEND NAMENDA 5mg BID  Adrenal Insufficiency - Solu-cortef   Pharyngeal Dysphagia - Regular Diet, Thin Liquids  Pain - Tylenol  DVT PPX - [facility], Eliqu  Rehab/Impaired mobility and function - Patient continues to require hospitalization for the above diagnoses     Mobilization - Recommend PT/OT continue to work with Ms Romano.  Encourage family to actively and passively range her joints and to encourage her to sit in the chair OOB as often as possible.  HOB elevated to >30 degrees, turn Q2 hours.    Aphasia - Recommend SLP return to work with Herlinda.  Encourage family to continue to work with her, communicate with her, and help her to work on communication strategies    Disposition - Ms Romano is appropriate for acute rehabilitation center pending medical stability.  She will likely require Head CT after tranisitioning to [facility].  She has new oxygen desaturation and possible volume overload, fever, ongoing workup.  Medical clearance pending.

## 2025-04-04 NOTE — CONSULT NOTE ADULT - SUBJECTIVE AND OBJECTIVE BOX
[facility]  INFECTIOUS DISEASES at [place] / [place] / [place]  =======================================================                              Osmin Christianson MD                              Professor Emeritus:  Dr Piter Keane MD            Diplomates American Board of Internal Medicine & Infectious Diseases                                   Tel  [phone] Fax [phone]                                  Hospital Consult line:  [phone]  =======================================================      [id number]  SHARON HULL    History obtained from the chart. Unable to obtain medical history from patient due to medical condition     CC: Patient is a 89y old  Female who presents with a chief complaint of Rt M2 occlusion s/p thrombectomy (03 Apr 2025 11:08)    89 year old female with PMHx of Afib (off Eliquis), PPM, HTN, HLD, GERD, severe MR/TR, iatrogenic adrenal insufficiency (on prednisone), lymphedema, metastatic melanoma s/p Left foot melanoma and lymph node resection, s/p RUE melanoma and Right axillary lymph node excision at [facility] (3/18/25). Patient with a recent admission 3/21 -3/25/25  when she initially presented to [facility] and was transferred to [facility] as a stroke code. At [facility] hospital course significant for s/p cerebral angiogram for mechanical thrombectomy of L M1 occlusion, TICI 3 (one pass) on 3/21/25, admission to NSICU. Patient was discharged and presented back to [facility] 3/26 with L sided weakness and aphasia transferred to [facility] for stroke code and is now  s/p cerebral angiogram for mechanical thrombectomy of Rt M2 occlusion, TICI 3 (one pass) on 3/26/25. Has been in NSICU since. 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 Patient also noted to have incidental finding of new 1.7 cm cystic lesion at the pancreatic body found on CT a/p on 3/28/25 and was recommended outpatient GI follow up. On 3/29 patient was noted to have acute change in neuro exam became acutely aphasic. New radiology reporting + LM2 occulusion. Patient went to Neuro IR and is s/p thrombectomy of left M4 with TICI 0. Was on Nitrofurantoin for a UTI 4/2 - 4/4. 4/4 early AM patient noted with fever and sepsis work up was done, started on Cefepime, remained febrile this AM. ID input requested.       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 anicteric   NECK: Supple.   LUNGS: Decreased Basal BS B/L   HEART: RRR  ABDOMEN: Soft, NT, ND.  +BS.    : No CVA tenderness  EXTREMITIES: Trace edema.  MSK: No joint swelling  NEUROLOGIC: barely responsive   SKIN: Ecchymosis       Vitals:  T(F): 100.8 (04 Apr 2025 08:45), Max: 101.8 (04 Apr 2025 07:30)  HR: 110 (04 Apr 2025 08:52)  BP: 87/59 (04 Apr 2025 08:45)  RR: 26 (04 Apr 2025 08:45)  SpO2: 96% (04 Apr 2025 08:52) (91% - 100%)  temp max in last 48H T(F): , Max: 101.8 (04-04-25 @ 07:30)    Current Antibiotics:  cefepime  Injectable. 2000 milliGRAM(s) IV Push every 8 hours    Other medications:  albuterol/ipratropium for Nebulization 3 milliLiter(s) Nebulizer every 4 hours  apixaban 5 milliGRAM(s) Oral two times a day  atorvastatin 10 milliGRAM(s) Oral at bedtime  chlorhexidine 2% Cloths 1 Application(s) Topical daily  melatonin 5 milliGRAM(s) Oral at bedtime  pantoprazole  Injectable 40 milliGRAM(s) IV Push daily  polyethylene glycol 3350 17 Gram(s) Oral two times a day  predniSONE   Tablet 5 milliGRAM(s) Oral <User Schedule>  predniSONE   Tablet 2 milliGRAM(s) Oral <User Schedule>  senna 2 Tablet(s) Oral at bedtime  sodium chloride 0.9%. 1000 milliLiter(s) IV Continuous <Continuous>  tamsulosin 0.4 milliGRAM(s) Oral at bedtime                            11.3   8.07  )-----------( 146      ( 04 Apr 2025 01:50 )             33.8     04-04    141  |  108  |  29.3[H]  ----------------------------<  104[H]  3.6   |  20.0[L]  |  0.52    Ca    8.0[L]      04 Apr 2025 01:50  Phos  2.1     04-04  Mg     1.9     04-04    TPro  5.2[L]  /  Alb  2.9[L]  /  TBili  0.8  /  DBili  x   /  AST  13  /  ALT  15  /  AlkPhos  41  04-04    RECENT CULTURES:  04-04 @ 00:55    RVP  NotDetec    04-03 @ 00:00 Clean Catch Clean Catch (Midstream)     >=3 organisms. Probable collection contamination.    03-26 @ 17:20 Blood Blood     No growth at 5 days    03-26 @ 16:47 Clean Catch Clean Catch (Midstream)     No growth      WBC Count: 8.07 K/uL (04-04-25 @ 01:50)  WBC Count: 7.04 K/uL (04-03-25 @ 03:20)  WBC Count: 8.75 K/uL (04-02-25 @ 02:35)  WBC Count: 10.78 K/uL (04-01-25 @ 16:00)  WBC Count: 9.62 K/uL (04-01-25 @ 01:10)  WBC Count: 6.64 K/uL (03-31-25 @ 03:52)    Creatinine: 0.52 mg/dL (04-04-25 @ 01:50)  Creatinine: 0.57 mg/dL (04-03-25 @ 03:20)  Creatinine: 0.51 mg/dL (04-02-25 @ 02:35)  Creatinine: 0.54 mg/dL (04-01-25 @ 16:00)  Creatinine: 0.53 mg/dL (04-01-25 @ 01:10)  Creatinine: 0.45 mg/dL (03-31-25 @ 03:52)    Procalcitonin: 1.01 ng/mL (03-26-25 @ 17:20)     SARS-CoV-2: NotDetec (04-04-25 @ 00:55)  SARS-CoV-2: NotDetec (03-26-25 @ 17:20)    Urinalysis + Microscopic Examination (04.02.25 @ 14:00)    pH Urine: 5.5   Urine Appearance: Cloudy   Color: Dark Yellow   Specific Gravity: 1.030   Protein, Urine: 30 mg/dL   Glucose Qualitative, Urine: Negative mg/dL   Ketone - Urine: Trace mg/dL   Blood, Urine: Large   Bilirubin: Small   Urobilinogen: 1.0 mg/dL   Leukocyte Esterase Concentration: Moderate   Nitrite: Negative   White Blood Cell - Urine: 210 /HPF   Red Blood Cell - Urine: 3 /HPF   Bacteria: Many /HPF   Cast: 1 /LPF   Epithelial Cells: 0 /HPF   Amorphous Salts Crystals: Present

## 2025-04-04 NOTE — CONSULT NOTE ADULT - CONSULT REQUESTED BY NAME
Jayson Clarke
Jayson Clarke
MIRIAM Oliver
Dr Lopez
neuro icu
Dr Mcclure
Stroke Neuro
Igor Lopez
Dr. Clarke

## 2025-04-04 NOTE — PROGRESS NOTE ADULT - SUBJECTIVE AND OBJECTIVE BOX
Preliminary note, official recommendations pending attending review/signature   Seen and examined by Stroke team attending/team, assessment/ plan as discussed with stroke team attending/team as noted.     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  am morning prior.  Upon arrival to  ED she was noted to have lt sided facial droop; aphasic; left arm weakness; no TNK as out of window/recent infarction.  code stroke activated;  CT imagining found patient with new rt mca; patient transferred to [facility] for thrombectomy. Pt transferred to [facility] EDUARDO eval, s/p cerebral angiogram for mechanical thrombectomy of Rt M2 occlusion, TICI 3 (one pass). She was admitted to NSICU for close monitoring post thrombectomy.       SUBJECTIVE:+ lethargy, hypoxia, and hypotension.  ROS reported negative unless otherwise noted.     MEDICATIONS:   albuterol/ipratropium for Nebulization 3 milliLiter(s) Nebulizer every 4 hours  apixaban 5 milliGRAM(s) Oral two times a day  bisacodyl Suppository 10 milliGRAM(s) Rectal daily PRN  cefepime  Injectable. 2000 milliGRAM(s) IV Push every 8 hours  chlorhexidine 2% Cloths 1 Application(s) Topical daily  dextrose 5%. 1000 milliLiter(s) IV Continuous <Continuous>  dextrose 5%. 1000 milliLiter(s) IV Continuous <Continuous>  dextrose 50% Injectable 25 Gram(s) IV Push once  dextrose 50% Injectable 12.5 Gram(s) IV Push once  dextrose 50% Injectable 25 Gram(s) IV Push once  dextrose Oral Gel 15 Gram(s) Oral once PRN  glucagon  Injectable 1 milliGRAM(s) IntraMuscular once  hydrocortisone sodium succinate Injectable 100 milliGRAM(s) IV Push every 8 hours  insulin lispro (ADMELOG) corrective regimen sliding scale   SubCutaneous every 6 hours  pantoprazole  Injectable 40 milliGRAM(s) IV Push daily  vancomycin  IVPB 750 milliGRAM(s) IV Intermittent every 12 hours  vancomycin  IVPB          PHYSICAL EXAM:   Vital Signs Last 24 Hrs  T(C): 37.6 (04 Apr 2025 11:00), Max: 38.8 (04 Apr 2025 07:30)  T(F): 99.7 (04 Apr 2025 11:00), Max: 101.8 (04 Apr 2025 07:30)  HR: 99 (04 Apr 2025 11:00) (78 - 125)  BP: 98/73 (04 Apr 2025 11:00) (81/43 - 162/90)  BP(mean): 80 (04 Apr 2025 11:00) (51 - 119)  RR: 20 (04 Apr 2025 11:00) (17 - 27)  SpO2: 97% (04 Apr 2025 11:00) (91% - 100%)    Parameters below as of 04 Apr 2025 08:52  Patient On (Oxygen Delivery Method): nasal cannula      General: lethargic   HEENT: Head normocephalic, atraumatic. Conjunctivae clear w/o exudates or hemorrhage.    Cardiac: irregular rate and rhythm.    Respiratory: Lung sounds clear in all fields. Chest wall symmetric, nontender. no audible wheezes, respirations unlabored    Abdominal: Soft, nondistended, nontender. Bowel sounds normoactive x 4 quadrants.    Skin: Skin is warm, dry     Extremities: No edema, right/left groin site with no active bleeding, no hematoma, soft , NTTP     Detailed Neurologic Exam:  Mental status: The patient is lethargic arouses to tactile stimuli requires frequent prompting able to follow single commands at times, the patient is able to name objects with choices    Cranial nerves: slight right facial palsy, moderate-severe dysarthria nearly anarthric hypophonic, Pupils equal and react symmetrically to light. There is no visual field deficit to confrontation. Extraocular motion is full with no nystagmus.    Motor: There is normal bulk and tone.  There is no tremor.  Strength is 4+/5 proximal 4/5 distal in the right arm and 5/5 right leg. Drifts.   Strength is 5/5 in the left arm and 4/5 left leg.  Sensation: Intact to light touch and pin in 4 extremities  Cerebellar: There is no dysmetria on finger to nose testing.                                                                                                    Gait : deferred      LABS:                        11.3   8.07  )-----------( 146      ( 04 Apr 2025 01:50 )             33.8    04-04    141  |  108  |  29.3[H]  ----------------------------<  104[H]  3.6   |  20.0[L]  |  0.52    Ca    8.0[L]      04 Apr 2025 01:50  Phos  2.1     04-04  Mg     1.9     04-04    TPro  5.2[L]  /  Alb  2.9[L]  /  TBili  0.8  /  DBili  x   /  AST  13  /  ALT  15  /  AlkPhos  41  04-04  PTT - ( 03 Apr 2025 03:20 )  PTT:26.1 sec      03-27 Chol 145 HDL 74 Trig 55, 03-22 Chol 136 HDL 66 Trig 56    LDL: 59  A1C: 5.5    IMAGING: Neuro-Imaging    CT Head No Cont (04.02.25 @ 09:41)   IMPRESSION: Subacute infarcts are again seen bilaterally as described   above    Acute infarct is now seen involving the left basal ganglia region.    MR Head w/wo IV Cont (03.31.25 @ 10:09)   IMPRESSION:  New acute infarctions within the LEFT lateral frontal   cortex, LEFT occipital cortex, RIGHT insular cortex and scattered RIGHT   frontal, parietal, temporal and occipital cortex suggesting embolic   etiology. Subacute infarction within the LEFT basal ganglia. Mild   periventricular chronic white matter ischemia.    IR Neuro (03.29.25 @ 09:10)   Supervision and Interpretation:  1. Interval recanalization of the left MCA M2 occlusion seen byCT   angiography with persistent posterior frontal distal M4 branch occlusion.   Attempted thrombectomy was unsuccessful because the branch was too small   to be accessed by the red 43 thrombectomy catheter.    CT Head No Cont (03.29.25 @ 20:56)   IMPRESSION: No acute intracranial hemorrhage, mass effect, or shift of   the midline structures.  Evolving acute left frontal lobe superior division MCA infarct without   hemorrhagic transformation.    CT Head No Cont (03.29.25 @ 06:54)   Patchy hypoattenuation related to evolving acute/subacute bilateral MCA   territory infarcts. No intracranial hemorrhage or midline shift present.    CT Angio Head/Neck Stroke Protocol w/ IV Cont, CT Perfusion (03.29.25 @ 06:56)   IMPRESSION:  CT PERFUSION:  Signal abnormality on T-Max/mean transit time data sets at the left   frontal lobe suggest territory-and-risk within the left MCA distribution,   corresponding to angiographic findings below. Cerebral blood flow and   cerebral blood volume data sets remain within normal limits.  Neurointerventional/neurosurgical consultation recommended. MRI may be   considered for further characterization.  CTA NECK:  No evidence of significant stenosis or occlusion.  Previously identified narrowing of the right cervical ICA with   questionable arterial dissection appears less conspicuous on the current   exam, suggesting at least partial recanalization. MRA (dissection   protocol) considered for further evaluation.  CTA HEAD:  Acute left M2 occlusion identified.  Critical findings above were discussed directly by telephone with   ordering provider, Aric PINEDA, on 3/29/2025 at 7:11 AM by Dr. Louie oWrthy with read back confirmation.    CT Head No Cont (03.27.25 @ 05:43)  IMPRESSION: Age-appropriate involutional and ischemic gliotic changes. No   hemorrhage. No significant change since 3/26/2025.    CT Head No Cont (03.27.25 @ 05:43)   IMPRESSION: Age-appropriate involutional and ischemic gliotic changes. No   hemorrhage. No significant change since 3/26/2025.    CT Angio Head/Neck Stroke Protocol w/ IV Cont (03.26.25 @ 08:52)   1. RIGHT CAROTID SYSTEM:    Atherosclerotic plaque carotid bulb without    hemodynamically significant stenosis. Tandem lesion distal internal   carotid artery at the C1 level has developed since 3/21/2025, possible   interval arterial dissection, with associated luminal narrowing   approximately 30%  2.   LEFT CAROTID SYSTEM:     Atherosclerotic plaque carotid bulb without    hemodynamically significant stenosis.  3.   VERTEBRAL CIRCULATION:    Patent.  4.  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 Right MCA occlusion   in its proximal M2 segment is an interval finding since 3/21/2025. Left   MCA remains patent with luminal irregularity and low-grade narrowing   following recent thrombectomy.  5.  POSTERIOR INTRACRANIAL CIRCULATION:    Patent.  6. BRAIN PERFUSION:   No acute infarction of the brain is convincingly   demonstrated.  However, broad region of apparent ischemia corresponds to   the right MCA distribution correlating with acute embolic occlusion on CT   angiography  7. Heterogeneous enhancement within the principal dural sinuses may be   secondary to the early phase of venous opacification on this arterial   phase examination. The appearance is similar to 3/21/2025. Consider   supplemental evaluation with MR venogram    ULTRASOUND   US Pseudoaneurysm Injection (03.28.25 @ 09:41)   Successful thrombin and ultrasound-guided compression of the right groin   pseudoaneurysm.    US Duplex Arterial Lower Ext Ltd, Right (03.27.25 @ 04:09)   IMPRESSION:  A 1.7 cm right CFA pseudoaneurysm.  A 2.5 cm adjacent hematoma.    CARDIOLOGY   TTE W or WO Ultrasound Enhancing Agent (03.26.25 @ 18:15)    1. Technically difficult image quality.   2. Left ventricular cavity is normal in size. Left ventricular systolic function is hyperdynamic with an ejectionfraction visually estimated at 65 to 70 %.   3. Mild left ventricular hypertrophy.   4. Normal right ventricular cavity size and normal right ventricular systolic function.   5. Left atrium is severely dilated.   6. The right atrium is severely dilated. Preliminary note, official recommendations pending attending review/signature   Seen and examined by Stroke team attending/team, assessment/ plan as discussed with stroke team attending/team as noted.     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  am morning prior.  Upon arrival to  ED she was noted to have lt sided facial droop; aphasic; left arm weakness; no TNK as out of window/recent infarction.  code stroke activated;  CT imagining found patient with new rt mca; patient transferred to [facility] for thrombectomy. Pt transferred to [facility] EDUARDO eval, s/p cerebral angiogram for mechanical thrombectomy of Rt M2 occlusion, TICI 3 (one pass). She was admitted to NSICU for close monitoring post thrombectomy.       SUBJECTIVE:+ lethargy, hypoxia, and hypotension.  ROS reported negative unless otherwise noted.     MEDICATIONS:   albuterol/ipratropium for Nebulization 3 milliLiter(s) Nebulizer every 4 hours  apixaban 5 milliGRAM(s) Oral two times a day  bisacodyl Suppository 10 milliGRAM(s) Rectal daily PRN  cefepime  Injectable. 2000 milliGRAM(s) IV Push every 8 hours  chlorhexidine 2% Cloths 1 Application(s) Topical daily  dextrose 5%. 1000 milliLiter(s) IV Continuous <Continuous>  dextrose 5%. 1000 milliLiter(s) IV Continuous <Continuous>  dextrose 50% Injectable 25 Gram(s) IV Push once  dextrose 50% Injectable 12.5 Gram(s) IV Push once  dextrose 50% Injectable 25 Gram(s) IV Push once  dextrose Oral Gel 15 Gram(s) Oral once PRN  glucagon  Injectable 1 milliGRAM(s) IntraMuscular once  hydrocortisone sodium succinate Injectable 100 milliGRAM(s) IV Push every 8 hours  insulin lispro (ADMELOG) corrective regimen sliding scale   SubCutaneous every 6 hours  pantoprazole  Injectable 40 milliGRAM(s) IV Push daily  vancomycin  IVPB 750 milliGRAM(s) IV Intermittent every 12 hours  vancomycin  IVPB          PHYSICAL EXAM:   Vital Signs Last 24 Hrs  T(C): 37.6 (04 Apr 2025 11:00), Max: 38.8 (04 Apr 2025 07:30)  T(F): 99.7 (04 Apr 2025 11:00), Max: 101.8 (04 Apr 2025 07:30)  HR: 99 (04 Apr 2025 11:00) (78 - 125)  BP: 98/73 (04 Apr 2025 11:00) (81/43 - 162/90)  BP(mean): 80 (04 Apr 2025 11:00) (51 - 119)  RR: 20 (04 Apr 2025 11:00) (17 - 27)  SpO2: 97% (04 Apr 2025 11:00) (91% - 100%)    Parameters below as of 04 Apr 2025 08:52  Patient On (Oxygen Delivery Method): nasal cannula      General: lethargic   HEENT: Head normocephalic, atraumatic. Conjunctivae clear w/o exudates or hemorrhage.    Cardiac: irregular rate and rhythm.    Respiratory: Lung sounds clear in all fields. Chest wall symmetric, nontender. no audible wheezes, respirations unlabored    Abdominal: Soft, nondistended, nontender. Bowel sounds normoactive x 4 quadrants.    Skin: Skin is warm, dry     Extremities: No edema, right/left groin site with no active bleeding, no hematoma, soft , NTTP     Detailed Neurologic Exam:  Mental status: The patient is lethargic arouses to tactile stimuli requires frequent prompting able to follow single commands at times, the patient is able to name objects with choices    Cranial nerves: slight right facial palsy, moderate-severe dysarthria nearly anarthric hypophonic, Pupils equal and react symmetrically to light. There is no visual field deficit to confrontation. Extraocular motion is full with no nystagmus.    Motor: There is normal bulk and tone.  There is no tremor.  Strength is 4+/5 proximal 4/5 distal in the right arm and 5/5 right leg. Drifts.   Strength is 5/5 in the left arm and 4/5 left leg.  Sensation: Intact to light touch and pin in 4 extremities  Cerebellar: There is no dysmetria on finger to nose testing.                                                                                                    Gait : deferred      LABS:                        11.3   8.07  )-----------( 146      ( 04 Apr 2025 01:50 )             33.8    04-04    141  |  108  |  29.3[H]  ----------------------------<  104[H]  3.6   |  20.0[L]  |  0.52    Ca    8.0[L]      04 Apr 2025 01:50  Phos  2.1     04-04  Mg     1.9     04-04    TPro  5.2[L]  /  Alb  2.9[L]  /  TBili  0.8  /  DBili  x   /  AST  13  /  ALT  15  /  AlkPhos  41  04-04  PTT - ( 03 Apr 2025 03:20 )  PTT:26.1 sec      03-27 Chol 145 HDL 74 Trig 55, 03-22 Chol 136 HDL 66 Trig 56    LDL: 59  A1C: 5.5    IMAGING: Neuro-Imaging  CT Head No Cont (04.04.25 @ 10:00)   IMPRESSION: Stable subacute bilateral infarctions, most pronounced in   left basal ganglia. Trace left frontal lobe petechial hemorrhage versus   spared cortex. Continued follow-up is recommended.      CT Head No Cont (04.02.25 @ 09:41)   IMPRESSION: Subacute infarcts are again seen bilaterally as described   above    Acute infarct is now seen involving the left basal ganglia region.    MR Head w/wo IV Cont (03.31.25 @ 10:09)   IMPRESSION:  New acute infarctions within the LEFT lateral frontal   cortex, LEFT occipital cortex, RIGHT insular cortex and scattered RIGHT   frontal, parietal, temporal and occipital cortex suggesting embolic   etiology. Subacute infarction within the LEFT basal ganglia. Mild   periventricular chronic white matter ischemia.    IR Neuro (03.29.25 @ 09:10)   Supervision and Interpretation:  1. Interval recanalization of the left MCA M2 occlusion seen byCT   angiography with persistent posterior frontal distal M4 branch occlusion.   Attempted thrombectomy was unsuccessful because the branch was too small   to be accessed by the red 43 thrombectomy catheter.    CT Head No Cont (03.29.25 @ 20:56)   IMPRESSION: No acute intracranial hemorrhage, mass effect, or shift of   the midline structures.  Evolving acute left frontal lobe superior division MCA infarct without   hemorrhagic transformation.    CT Head No Cont (03.29.25 @ 06:54)   Patchy hypoattenuation related to evolving acute/subacute bilateral MCA   territory infarcts. No intracranial hemorrhage or midline shift present.    CT Angio Head/Neck Stroke Protocol w/ IV Cont, CT Perfusion (03.29.25 @ 06:56)   IMPRESSION:  CT PERFUSION:  Signal abnormality on T-Max/mean transit time data sets at the left   frontal lobe suggest territory-and-risk within the left MCA distribution,   corresponding to angiographic findings below. Cerebral blood flow and   cerebral blood volume data sets remain within normal limits.  Neurointerventional/neurosurgical consultation recommended. MRI may be   considered for further characterization.  CTA NECK:  No evidence of significant stenosis or occlusion.  Previously identified narrowing of the right cervical ICA with   questionable arterial dissection appears less conspicuous on the current   exam, suggesting at least partial recanalization. MRA (dissection   protocol) considered for further evaluation.  CTA HEAD:  Acute left M2 occlusion identified.  Critical findings above were discussed directly by telephone with   ordering provider, Aric PINEDA, on 3/29/2025 at 7:11 AM by Dr. Louie Worthy with read back confirmation.    CT Head No Cont (03.27.25 @ 05:43)  IMPRESSION: Age-appropriate involutional and ischemic gliotic changes. No   hemorrhage. No significant change since 3/26/2025.    CT Head No Cont (03.27.25 @ 05:43)   IMPRESSION: Age-appropriate involutional and ischemic gliotic changes. No   hemorrhage. No significant change since 3/26/2025.    CT Angio Head/Neck Stroke Protocol w/ IV Cont (03.26.25 @ 08:52)   1. RIGHT CAROTID SYSTEM:    Atherosclerotic plaque carotid bulb without    hemodynamically significant stenosis. Tandem lesion distal internal   carotid artery at the C1 level has developed since 3/21/2025, possible   interval arterial dissection, with associated luminal narrowing   approximately 30%  2.   LEFT CAROTID SYSTEM:     Atherosclerotic plaque carotid bulb without    hemodynamically significant stenosis.  3.   VERTEBRAL CIRCULATION:    Patent.  4.  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 Right MCA occlusion   in its proximal M2 segment is an interval finding since 3/21/2025. Left   MCA remains patent with luminal irregularity and low-grade narrowing   following recent thrombectomy.  5.  POSTERIOR INTRACRANIAL CIRCULATION:    Patent.  6. BRAIN PERFUSION:   No acute infarction of the brain is convincingly   demonstrated.  However, broad region of apparent ischemia corresponds to   the right MCA distribution correlating with acute embolic occlusion on CT   angiography  7. Heterogeneous enhancement within the principal dural sinuses may be   secondary to the early phase of venous opacification on this arterial   phase examination. The appearance is similar to 3/21/2025. Consider   supplemental evaluation with MR venogram    CT   CT Angio Chest PE Protocol w/ IV Cont (04.04.25 @ 10:02)   IMPRESSION: No pulmonary embolus is noted.        ULTRASOUND   US Pseudoaneurysm Injection (03.28.25 @ 09:41)   Successful thrombin and ultrasound-guided compression of the right groin   pseudoaneurysm.    US Duplex Arterial Lower Ext Ltd, Right (03.27.25 @ 04:09)   IMPRESSION:  A 1.7 cm right CFA pseudoaneurysm.  A 2.5 cm adjacent hematoma.    CARDIOLOGY   TTE W or WO Ultrasound Enhancing Agent (03.26.25 @ 18:15)    1. Technically difficult image quality.   2. Left ventricular cavity is normal in size. Left ventricular systolic function is hyperdynamic with an ejectionfraction visually estimated at 65 to 70 %.   3. Mild left ventricular hypertrophy.   4. Normal right ventricular cavity size and normal right ventricular systolic function.   5. Left atrium is severely dilated.   6. The right atrium is severely dilated.   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  am morning prior.  Upon arrival to  ED she was noted to have lt sided facial droop; aphasic; left arm weakness; no TNK as out of window/recent infarction.  code stroke activated;  CT imagining found patient with new rt mca; patient transferred to [facility] for thrombectomy. Pt transferred to [facility] EDUARDO eval, s/p cerebral angiogram for mechanical thrombectomy of Rt M2 occlusion, TICI 3 (one pass). She was admitted to NSICU for close monitoring post thrombectomy.       SUBJECTIVE:+ lethargy, hypoxia, and hypotension.  ROS reported negative unless otherwise noted.     MEDICATIONS:   albuterol/ipratropium for Nebulization 3 milliLiter(s) Nebulizer every 4 hours  apixaban 5 milliGRAM(s) Oral two times a day  bisacodyl Suppository 10 milliGRAM(s) Rectal daily PRN  cefepime  Injectable. 2000 milliGRAM(s) IV Push every 8 hours  chlorhexidine 2% Cloths 1 Application(s) Topical daily  dextrose 5%. 1000 milliLiter(s) IV Continuous <Continuous>  dextrose 5%. 1000 milliLiter(s) IV Continuous <Continuous>  dextrose 50% Injectable 25 Gram(s) IV Push once  dextrose 50% Injectable 12.5 Gram(s) IV Push once  dextrose 50% Injectable 25 Gram(s) IV Push once  dextrose Oral Gel 15 Gram(s) Oral once PRN  glucagon  Injectable 1 milliGRAM(s) IntraMuscular once  hydrocortisone sodium succinate Injectable 100 milliGRAM(s) IV Push every 8 hours  insulin lispro (ADMELOG) corrective regimen sliding scale   SubCutaneous every 6 hours  pantoprazole  Injectable 40 milliGRAM(s) IV Push daily  vancomycin  IVPB 750 milliGRAM(s) IV Intermittent every 12 hours  vancomycin  IVPB          PHYSICAL EXAM:   Vital Signs Last 24 Hrs  T(C): 37.6 (04 Apr 2025 11:00), Max: 38.8 (04 Apr 2025 07:30)  T(F): 99.7 (04 Apr 2025 11:00), Max: 101.8 (04 Apr 2025 07:30)  HR: 99 (04 Apr 2025 11:00) (78 - 125)  BP: 98/73 (04 Apr 2025 11:00) (81/43 - 162/90)  BP(mean): 80 (04 Apr 2025 11:00) (51 - 119)  RR: 20 (04 Apr 2025 11:00) (17 - 27)  SpO2: 97% (04 Apr 2025 11:00) (91% - 100%)    Parameters below as of 04 Apr 2025 08:52  Patient On (Oxygen Delivery Method): nasal cannula      General: lethargic   HEENT: Head normocephalic, atraumatic. Conjunctivae clear w/o exudates or hemorrhage.    Cardiac: irregular rate and rhythm.    Respiratory: Lung sounds clear in all fields. Chest wall symmetric, nontender. no audible wheezes, respirations unlabored    Abdominal: Soft, nondistended, nontender. Bowel sounds normoactive x 4 quadrants.    Skin: Skin is warm, dry     Extremities: No edema, right/left groin site with no active bleeding, no hematoma, soft , NTTP     Detailed Neurologic Exam:  Mental status: The patient is lethargic arouses to tactile stimuli requires frequent prompting able to follow single commands at times, the patient is able to name objects with choices    Cranial nerves: slight right facial palsy, moderate-severe dysarthria nearly anarthric hypophonic, Pupils equal and react symmetrically to light. There is no visual field deficit to confrontation. Extraocular motion is full with no nystagmus.    Motor: There is normal bulk and tone.  There is no tremor.  Strength is 4+/5 proximal 4/5 distal in the right arm and 5/5 right leg. Drifts.   Strength is 5/5 in the left arm and 4/5 left leg.  Sensation: Intact to light touch and pin in 4 extremities  Cerebellar: There is no dysmetria on finger to nose testing.                                                                                                    Gait : deferred      LABS:                        11.3   8.07  )-----------( 146      ( 04 Apr 2025 01:50 )             33.8    04-04    141  |  108  |  29.3[H]  ----------------------------<  104[H]  3.6   |  20.0[L]  |  0.52    Ca    8.0[L]      04 Apr 2025 01:50  Phos  2.1     04-04  Mg     1.9     04-04    TPro  5.2[L]  /  Alb  2.9[L]  /  TBili  0.8  /  DBili  x   /  AST  13  /  ALT  15  /  AlkPhos  41  04-04  PTT - ( 03 Apr 2025 03:20 )  PTT:26.1 sec      03-27 Chol 145 HDL 74 Trig 55, 03-22 Chol 136 HDL 66 Trig 56    LDL: 59  A1C: 5.5    IMAGING: Neuro-Imaging  CT Head No Cont (04.04.25 @ 10:00)   IMPRESSION: Stable subacute bilateral infarctions, most pronounced in   left basal ganglia. Trace left frontal lobe petechial hemorrhage versus   spared cortex. Continued follow-up is recommended.      CT Head No Cont (04.02.25 @ 09:41)   IMPRESSION: Subacute infarcts are again seen bilaterally as described   above    Acute infarct is now seen involving the left basal ganglia region.    MR Head w/wo IV Cont (03.31.25 @ 10:09)   IMPRESSION:  New acute infarctions within the LEFT lateral frontal   cortex, LEFT occipital cortex, RIGHT insular cortex and scattered RIGHT   frontal, parietal, temporal and occipital cortex suggesting embolic   etiology. Subacute infarction within the LEFT basal ganglia. Mild   periventricular chronic white matter ischemia.    IR Neuro (03.29.25 @ 09:10)   Supervision and Interpretation:  1. Interval recanalization of the left MCA M2 occlusion seen byCT   angiography with persistent posterior frontal distal M4 branch occlusion.   Attempted thrombectomy was unsuccessful because the branch was too small   to be accessed by the red 43 thrombectomy catheter.    CT Head No Cont (03.29.25 @ 20:56)   IMPRESSION: No acute intracranial hemorrhage, mass effect, or shift of   the midline structures.  Evolving acute left frontal lobe superior division MCA infarct without   hemorrhagic transformation.    CT Head No Cont (03.29.25 @ 06:54)   Patchy hypoattenuation related to evolving acute/subacute bilateral MCA   territory infarcts. No intracranial hemorrhage or midline shift present.    CT Angio Head/Neck Stroke Protocol w/ IV Cont, CT Perfusion (03.29.25 @ 06:56)   IMPRESSION:  CT PERFUSION:  Signal abnormality on T-Max/mean transit time data sets at the left   frontal lobe suggest territory-and-risk within the left MCA distribution,   corresponding to angiographic findings below. Cerebral blood flow and   cerebral blood volume data sets remain within normal limits.  Neurointerventional/neurosurgical consultation recommended. MRI may be   considered for further characterization.  CTA NECK:  No evidence of significant stenosis or occlusion.  Previously identified narrowing of the right cervical ICA with   questionable arterial dissection appears less conspicuous on the current   exam, suggesting at least partial recanalization. MRA (dissection   protocol) considered for further evaluation.  CTA HEAD:  Acute left M2 occlusion identified.  Critical findings above were discussed directly by telephone with   ordering provider, Aric PINEDA, on 3/29/2025 at 7:11 AM by Dr. Louie Worthy with read back confirmation.    CT Head No Cont (03.27.25 @ 05:43)  IMPRESSION: Age-appropriate involutional and ischemic gliotic changes. No   hemorrhage. No significant change since 3/26/2025.    CT Head No Cont (03.27.25 @ 05:43)   IMPRESSION: Age-appropriate involutional and ischemic gliotic changes. No   hemorrhage. No significant change since 3/26/2025.    CT Angio Head/Neck Stroke Protocol w/ IV Cont (03.26.25 @ 08:52)   1. RIGHT CAROTID SYSTEM:    Atherosclerotic plaque carotid bulb without    hemodynamically significant stenosis. Tandem lesion distal internal   carotid artery at the C1 level has developed since 3/21/2025, possible   interval arterial dissection, with associated luminal narrowing   approximately 30%  2.   LEFT CAROTID SYSTEM:     Atherosclerotic plaque carotid bulb without    hemodynamically significant stenosis.  3.   VERTEBRAL CIRCULATION:    Patent.  4.  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 Right MCA occlusion   in its proximal M2 segment is an interval finding since 3/21/2025. Left   MCA remains patent with luminal irregularity and low-grade narrowing   following recent thrombectomy.  5.  POSTERIOR INTRACRANIAL CIRCULATION:    Patent.  6. BRAIN PERFUSION:   No acute infarction of the brain is convincingly   demonstrated.  However, broad region of apparent ischemia corresponds to   the right MCA distribution correlating with acute embolic occlusion on CT   angiography  7. Heterogeneous enhancement within the principal dural sinuses may be   secondary to the early phase of venous opacification on this arterial   phase examination. The appearance is similar to 3/21/2025. Consider   supplemental evaluation with MR venogram    CT   CT Angio Chest PE Protocol w/ IV Cont (04.04.25 @ 10:02)   IMPRESSION: No pulmonary embolus is noted.        ULTRASOUND   US Pseudoaneurysm Injection (03.28.25 @ 09:41)   Successful thrombin and ultrasound-guided compression of the right groin   pseudoaneurysm.    US Duplex Arterial Lower Ext Ltd, Right (03.27.25 @ 04:09)   IMPRESSION:  A 1.7 cm right CFA pseudoaneurysm.  A 2.5 cm adjacent hematoma.    CARDIOLOGY   TTE W or WO Ultrasound Enhancing Agent (03.26.25 @ 18:15)    1. Technically difficult image quality.   2. Left ventricular cavity is normal in size. Left ventricular systolic function is hyperdynamic with an ejectionfraction visually estimated at 65 to 70 %.   3. Mild left ventricular hypertrophy.   4. Normal right ventricular cavity size and normal right ventricular systolic function.   5. Left atrium is severely dilated.   6. The right atrium is severely dilated.

## 2025-04-04 NOTE — CHART NOTE - NSCHARTNOTEFT_GEN_A_CORE
Source: Patient [ ]  Family [x ]   other [x ]  EMR and staff     Current Diet: Diet, NPO (04-04-25 @ 10:22)    Current Weight:   4/4: 74.7 kg   3/30: 68.8 kg   3/26: 69.6 kg     % Weight Change: Unsure of accuracy of weights; will continue to monitor weights for trends.     Pertinent Medications: MEDICATIONS  (STANDING):  apixaban 5 milliGRAM(s) Oral two times a day  cefepime  Injectable. 2000 milliGRAM(s) IV Push every 8 hours  hydrocortisone sodium succinate Injectable 100 milliGRAM(s) IV Push every 8 hours  insulin lispro (ADMELOG) corrective regimen sliding scale   SubCutaneous every 6 hours  pantoprazole  Injectable 40 milliGRAM(s) IV Push daily    MEDICATIONS  (PRN):  bisacodyl Suppository 10 milliGRAM(s) Rectal daily PRN Constipation  dextrose Oral Gel 15 Gram(s) Oral once PRN Blood Glucose LESS THAN 70 milliGRAM(s)/deciliter    Pertinent Labs: CBC Full  -  ( 04 Apr 2025 01:50 )  WBC Count : 8.07 K/uL  RBC Count : 3.54 M/uL  Hemoglobin : 11.3 g/dL  Hematocrit : 33.8 %  Platelet Count - Automated : 146 K/uL  Mean Cell Volume : 95.5 fl  Mean Cell Hemoglobin : 31.9 pg  Mean Cell Hemoglobin Concentration : 33.4 g/dL    Skin: Surgical site R groin    Nutrition focused physical exam conducted - found signs of malnutrition [ ]absent [x ]present    Subcutaneous fat loss: [x ] Orbital fat pads region, [x ]Buccal fat region, [ x]Triceps region,  [ ]Ribs region    Muscle wasting: [x ]Temples region, [x ]Clavicle region, [x ]Shoulder region, [ ]Scapula region, [ ]Interosseous region,  [ ]thigh region, [ ]Calf region    Estimated Needs:   [x ] no change since previous assessment  [ ] recalculated:     Hospital Course:   Pt is a 89 year old female with PMHx of Afib (off Eliquis), 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 CT neg, CTA showed a  Rt M2 occlusion.  Transferred to [facility]UARDO intervention. S/P thrombectomy, TICI 3 achieved after 1 pass admitted to NSICU for post procedure monitoring started on IV Heparin. On 3/29/25 pt received medications around 6am by RN report and subsequently was found to be aphasic with right sided weakness.  CTH/CTA/CTP ordered. CTH negative for acute hemorrhage. CTA + new Left MCA M2 occulusion.   Pt now being treated for possible PNA.     Current Nutrition Diagnosis:  Pt remains at high nutrition risk secondary to moderate (acute) protein calorie malnutrition related to inability to meet sufficient protein energy needs in setting of Rt M2 occlusion, new Left MCA M2 occulusion, now being treated for PNA as evidenced by meeting < 75% estimated energy > 7days, physical signs of mild muscle/fat loss. Pt lethargic, sleeping during visit. Spoke with nursing staff; pt was made NPO at this time. Recommendations below:     Recommendations:   1. RX: MVI and Vit. C daily (500mg).   2. Check weight daily to monitor trends   3. Consider repeat swallow evaluation prior to re-starting PO diet     Monitoring and Evaluation:   [x ] PO intake [x ] Tolerance to diet prescription [X] Weights  [X] Follow up per protocol [X] Labs: Hide Include Location In Plan Question?: No Detail Level: Simple

## 2025-04-04 NOTE — CONSULT NOTE ADULT - SUBJECTIVE AND OBJECTIVE BOX
[facility]                                              CARDIOLOGY AT [facility]                                                   [address], [place] [address]                                             Telephone: [phone]. Fax:[phone]                                                       CARDIOLOGY CONSULTATION NOTE                                                                                             History obtained by: Patient and medical record   Community Cardiologist: None    obtained: Yes [  ] No [  ]  Available out pt records reviewed: Yes [  ] No [  ]    Chief complaint:    Patient is a 89y old  Female who presents with a chief complaint of Rt M2 occlusion s/p thrombectomy (2025 09:23)      HPI:  89 year old female with PMHx of Afib (on Eliquis), PPM, HTN, hx of RUE melanoma removal with R axillary lymph node removal, recent admission 3/21- 3/25 for lt MCA occlusion s/p thrombectomy presents as transfer from [facility] after she was found at 7:30 with lt facial and left sided weakness.  am.  Upon arrival to  ED she was noted to have lt sided facial droop; aphasic; left arm weakness; no TNK as out of window.  code stroke activated;  CT imagining found patient with new rt mca; patient transferred to [facility] for thrombectomy. Pt transferred to [facility] EUDARDO eval, s/p cerebral angiogram for mechanical thrombectomy of Rt M2 occlusion, TICI 3 (one pass). She is admitted to NSICU for close monitoring post thrombectomy. Patient seen at bedside remains on mech vent, waking up, FC in no acute distress.      NIH SS: 10  DATE:  TIME: 12:48  1A: Level of consciousness (0-3): 1  1B: Questions (0-2): 1   1C: Commands (0-2): 0  2: Gaze (0-2): 0  3: Visual fields (0-3): 0  4: Facial palsy (0-3): 0  MOTOR:  5A: Left arm motor drift (0-4): 1  5B: Right arm motor drift (0-4): 0  6A: Left leg motor drift (0-4): 3  6B: Right leg motor drift (0-4): 2  7: Limb ataxia (0-2): 0  SENSORY:  8: Sensation (0-2): 0  SPEECH:  9: Language (0-3): 3  10: Dysarthria (0-2): 0  EXTINCTION:  11: Extinction/inattention (0-2): 0    TOTAL SCORE: 10     (26 Mar 2025 12:28)      Review of symptoms:   Cardiac:  No chest pain. No dyspnea. No palpitations.  Respiratory: no cough. No dyspnea  Gastrointestinal: No diarrhea. No abdominal pain. No bleeding.   Neuro: No focal neuro complaints.  All other ROS negative unless otherwise listed above    PHYSICAL EXAM:  Appearance: Comfortable. No acute distress  HEENT:  Atraumatic. Normocephalic.  Normal oral mucosa  Neurologic: A & O x 3, no gross focal deficits.  Cardiovascular: RRR S1 S2, No murmur, no rubs/gallops. No JVD  Respiratory: Lungs clear to auscultation, unlabored   Gastrointestinal:  Soft, Non-tender, + BS  Lower Extremities: 2+ Peripheral Pulses, No clubbing, cyanosis, or edema  Psychiatry: Patient is calm. No agitation.   Skin: warm and dry.    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, including shoulder    Lower back pain        PAST SURGICAL HISTORY  S/P tonsillectomy and adenoidectomy    H/O cataract extraction    History of melanoma excision    History of left inguinal hernia repair    H/O cardiac catheterization        SUBSTANCE USE HISTORY  Denies current and previous substance use [  ]   CIGARETTES -   ALCOHOL -   DRUGS -     FAMILY HISTORY:  Family history of leukemia (Sibling)        CARDIAC SPECIFIC FAMILY HX   No KNOWN family history of Cardiovascular disease, CAD, or sudden death in first degree relatives unless specified below  Family History of Cardiovascular Disease:  [  ]   Coronary Artery Disease in first degree relative:  [  ]   Sudden Cardiac Death in First degree relative: [  ]    HOME MEDICATIONS:  aspirin 81 mg oral tablet, chewable: 1 tab(s) orally once a day Please stop taking once you start Eliquis on 3/27/25. (26 Mar 2025 13:28)  Centrum oral tablet: 1 tab(s) orally once a day (26 Mar 2025 13:28)  Farxiga 10 mg oral tablet: 1 tab(s) orally once a day resume at next scheduled dose. (26 Mar 2025 13:28)  predniSONE 1 mg oral tablet: 2 tab(s) orally once a day (in the evening) (26 Mar 2025 13:28)  predniSONE 5 mg oral tablet: 1 tab(s) orally once a day (in the morning) (26 Mar 2025 13:28)  PreserVision AREDS 2 oral capsule: 1 cap(s) orally 2 times a day (26 Mar 2025 13:28)  simvastatin 10 mg oral tablet: 1 tab(s) orally once a day (at bedtime) (26 Mar 2025 13:28)      CURRENT CARDIAC MEDICATIONS:      CURRENT OTHER MEDICATIONS:  albuterol/ipratropium for Nebulization 3 milliLiter(s) Nebulizer every 4 hours  melatonin 5 milliGRAM(s) Oral at bedtime  pantoprazole  Injectable 40 milliGRAM(s) IV Push daily  polyethylene glycol 3350 17 Gram(s) Oral two times a day  senna 2 Tablet(s) Oral at bedtime  apixaban 5 milliGRAM(s) Oral two times a day  atorvastatin 10 milliGRAM(s) Oral at bedtime  cefepime  Injectable. 2000 milliGRAM(s) IV Push every 8 hours  chlorhexidine 2% Cloths 1 Application(s) Topical daily  hydrocortisone sodium succinate Injectable 25 milliGRAM(s) IV Push once, Stop order after: 1 Doses  predniSONE   Tablet 5 milliGRAM(s) Oral <User Schedule>  predniSONE   Tablet 2 milliGRAM(s) Oral <User Schedule>  sodium chloride 0.9% Bolus 500 milliLiter(s) IV Bolus once, Stop order after: 1 Doses  tamsulosin 0.4 milliGRAM(s) Oral at bedtime  vancomycin  IVPB      vancomycin  IVPB 750 milliGRAM(s) IV Intermittent once, Stop order after: 1 Doses  vancomycin  IVPB 750 milliGRAM(s) IV Intermittent every 12 hours, Stop order after: 7 Days      ALLERGIES:   penicillins (Hives)      VITAL SIGNS:  T(C): 38.1 (25 @ 09:00), Max: 38.8 (25 @ 07:30)  T(F): 100.6 (25 @ 09:00), Max: 101.8 (25 @ 07:30)  HR: 94 (25 @ 09:00) (78 - 125)  BP: 88/51 (25 @ 09:00) (81/43 - 162/90)  RR: 22 (25 @ 09:00) (15 - 27)  SpO2: 97% (25 @ 09:00) (91% - 100%)    INTAKE AND OUTPUT:      @ 07:01  -   @ 07:00  --------------------------------------------------------  IN: 2624.2 mL / OUT: 2680 mL / NET: -55.8 mL        LABS:                            11.3   8.07  )-----------( 146      ( 2025 01:50 )             33.8         141  |  108  |  29.3[H]  ----------------------------<  104[H]  3.6   |  20.0[L]  |  0.52    Ca    8.0[L]      2025 01:50  Phos  2.1     -  Mg     1.9         TPro  5.2[L]  /  Alb  2.9[L]  /  TBili  0.8  /  DBili  x   /  AST  13  /  ALT  15  /  AlkPhos  41  04-04    PTT - ( 2025 03:20 )  PTT:26.1 sec  Urinalysis Basic - ( 2025 01:50 )    Color: x / Appearance: x / SG: x / pH: x  Gluc: 104 mg/dL / Ketone: x  / Bili: x / Urobili: x   Blood: x / Protein: x / Nitrite: x   Leuk Esterase: x / RBC: x / WBC x   Sq Epi: x / Non Sq Epi: x / Bacteria: x              RADIOLOGY IMAGIN Lead ECG:   Provider's Contact #: [phone] (25 @ 08:59) [Ordered.]  CT Angio Chest PE Protocol w/ IV Cont: Urgent   Indication: r/o PE  Transport: Stretcher-Crib  Exam in Progress  Provider's Contact #: [phone] (25 @ 08:01) [Ordered.]

## 2025-04-04 NOTE — CONSULT NOTE ADULT - PROBLEM SELECTOR RECOMMENDATION 9
- called to r/o heart failure  - echo shows EF 65-70%, has biatrial dilation which is severe but LV and RV systolic function is normal   - pt has pBNP is not deviated from baseline 6/27/23 pBNP is 2076--> 3239  - while it is mildly elevated, pt does not not have JVD, leg edema, nor pulmonary edema  - CXR shows RML consolidation, pt was recently intubated.   - pt is febrile, hypotensive, recent intubation, concern for VAP   - pt had concentrated urine, and BUN/creatinine ration 80:1 which is pre-renal secondary to dehydration   - pt was given IVF, BUN/creatinine ratio is still 60:1, improving but still not normal. doubt that the patient is volume overloaded.   - marked hypotension likely distributive process in setting of infection while already dehydrated. would not hold IVF, pt has no evidence supporting heart failure. treat underlying PNA  - CT chest has no evidence of PE and has bilateral atelectasis.   - can give PRN IV lasix if needed.

## 2025-04-04 NOTE — CONSULT NOTE ADULT - PROVIDER SPECIALTY LIST ADULT
Endocrinology
Hospitalist
Infectious Disease
Neurology
Brain Injury Medicine
Heme/Onc
Vascular Surgery
Cardiology
Intervent Radiology

## 2025-04-04 NOTE — CONSULT NOTE ADULT - SUBJECTIVE AND OBJECTIVE BOX
Patient is a 89y old  Female who presents with a chief complaint of Rt M2 occlusion s/p thrombectomy (04 Apr 2025 09:49)      HPI:    The patient is an 89 year old female with a past medical history of Afib (on Eliquis), PPM, HTN, hx of RUE melanoma removal with R axillary lymph node removal, recent admission 3/21- 3/25 for lt MCA occlusion s/p thrombectomy presents as transfer from [facility] after she was found at 7:30 with lt facial and left sided weakness.  am.  Upon arrival to  ED she was noted to have lt sided facial droop; aphasic; left arm weakness; no TNK as out of window.  Code stroke activated;  CT imagining found patient with new rt mca; patient transferred to [facility] for thrombectomy. Pt transferred to [facility] EDUARDO eval, s/p cerebral angiogram for mechanical thrombectomy of Rt M2 occlusion, TICI 3 (one pass). She is admitted to NSICU for close monitoring post thrombectomy. Post procedure course complicated by groin site pseudoaneurysm requiring thrombin injection by IR. IV heparin was held. Repeat duplex showed persistent pseudoaneurysm requiring repeat injection on 3/29. Code stroke was called on 3/29 for altered mental status.   CTH/CTA/CTP ordered and CTA +new left M2 occlusion. ot. Pt extubated 3/20 to RA, passed swallow evaluation. Patient has a history of iatrogenic AI, endocrine was consulted and initially started on stress dose steroids weaned to PO prednisone. Noted to have leukocytosis on 4/2 with a positive UA, started on Po Macrobid.Medicine now consulted for persistent fever    Patient seen and examined, family at bedside. Patient lethargic on 2 liters NC.       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, including shoulder  Lower back pain      S/P tonsillectomy and adenoidectomy  H/O cataract extractionboth eyes  History of melanoma excision  History of left inguinal hernia repair  mesh  H/O cardiac catheterization  3/15, no  blockages    Social History:  Tabacco -  Denies   ETOH - Denies   Illicit drug abuse - denies    FAMILY HISTORY:  Family history of leukemia (Sibling)        Allergies    penicillins (Hives)    Intolerances        HOME MEDICATIONS :     · 	Eliquis 5 mg oral tablet: 1 tab(s) orally 2 times a day   · 	metoprolol succinate 50 mg oral tablet, extended release: 1 tab(s) orally once a day  · 	Farxiga 10 mg oral tablet: 1 tab(s) orally once a day resume at next scheduled dose.  · 	PreserVision AREDS 2 oral capsule: 1 cap(s) orally 2 times a day  · 	simvastatin 10 mg oral tablet: 1 tab(s) orally once a day (at bedtime)  · 	Centrum oral tablet: 1 tab(s) orally once a day  · 	predniSONE 1 mg oral tablet: 2 tab(s) orally once a day (in the evening)  · 	predniSONE 5 mg oral tablet: 1 tab(s) orally once a day (in the morning)    REVIEW OF SYSTEMS:    Unable to obtain due to mentation    MEDICATIONS  (STANDING):  albuterol/ipratropium for Nebulization 3 milliLiter(s) Nebulizer every 4 hours  apixaban 5 milliGRAM(s) Oral two times a day  cefepime  Injectable. 2000 milliGRAM(s) IV Push every 8 hours  chlorhexidine 2% Cloths 1 Application(s) Topical daily  dextrose 5%. 1000 milliLiter(s) (50 mL/Hr) IV Continuous <Continuous>  dextrose 5%. 1000 milliLiter(s) (100 mL/Hr) IV Continuous <Continuous>  dextrose 50% Injectable 25 Gram(s) IV Push once  dextrose 50% Injectable 12.5 Gram(s) IV Push once  dextrose 50% Injectable 25 Gram(s) IV Push once  glucagon  Injectable 1 milliGRAM(s) IntraMuscular once  hydrocortisone sodium succinate Injectable 100 milliGRAM(s) IV Push every 8 hours  insulin lispro (ADMELOG) corrective regimen sliding scale   SubCutaneous every 6 hours  pantoprazole  Injectable 40 milliGRAM(s) IV Push daily  vancomycin  IVPB      vancomycin  IVPB 750 milliGRAM(s) IV Intermittent every 12 hours    MEDICATIONS  (PRN):  bisacodyl Suppository 10 milliGRAM(s) Rectal daily PRN Constipation  dextrose Oral Gel 15 Gram(s) Oral once PRN Blood Glucose LESS THAN 70 milliGRAM(s)/deciliter      Vital Signs Last 24 Hrs  T(C): 37.6 (04 Apr 2025 11:00), Max: 38.8 (04 Apr 2025 07:30)  T(F): 99.7 (04 Apr 2025 11:00), Max: 101.8 (04 Apr 2025 07:30)  HR: 99 (04 Apr 2025 11:00) (78 - 125)  BP: 98/73 (04 Apr 2025 11:00) (81/43 - 162/90)  BP(mean): 80 (04 Apr 2025 11:00) (51 - 119)  RR: 20 (04 Apr 2025 11:00) (17 - 27)  SpO2: 97% (04 Apr 2025 11:00) (91% - 100%)    Parameters below as of 04 Apr 2025 08:52  Patient On (Oxygen Delivery Method): nasal cannula        PHYSICAL EXAM:    GENERAL: NAD, Lethargic   CHEST/LUNG: Bibasilar rhonchi   ART: Regular rate and rhythm; No murmurs, rubs, or gallops  ABDOMEN: Soft, Nontender, Nondistended; Bowel sounds present  + Rodriguez   EXTREMITIES:  2+ Peripheral Pulses, No clubbing, cyanosis, or edema ,   LYMPH: No lymphadenopathy noted  SKIN: No rashes or lesions    LABS:                        11.3   8.07  )-----------( 146      ( 04 Apr 2025 01:50 )             33.8     04-04    141  |  108  |  29.3[H]  ----------------------------<  104[H]  3.6   |  20.0[L]  |  0.52    Ca    8.0[L]      04 Apr 2025 01:50  Phos  2.1     04-04  Mg     1.9     04-04    TPro  5.2[L]  /  Alb  2.9[L]  /  TBili  0.8  /  DBili  x   /  AST  13  /  ALT  15  /  AlkPhos  41  04-04    PTT - ( 03 Apr 2025 03:20 )  PTT:26.1 sec  Urinalysis Basic - ( 04 Apr 2025 01:50 )    Color: x / Appearance: x / SG: x / pH: x  Gluc: 104 mg/dL / Ketone: x  / Bili: x / Urobili: x   Blood: x / Protein: x / Nitrite: x   Leuk Esterase: x / RBC: x / WBC x   Sq Epi: x / Non Sq Epi: x / Bacteria: x

## 2025-04-04 NOTE — CHART NOTE - NSCHARTNOTEFT_GEN_A_CORE
EVENT: Called by RN for hypotension, cardiac and lethargy     OBJECTIVE:  Vital Signs Last 24 Hrs  T(C): 38.2 (04 Apr 2025 08:45), Max: 38.8 (04 Apr 2025 07:30)  T(F): 100.8 (04 Apr 2025 08:45), Max: 101.8 (04 Apr 2025 07:30)  HR: 110 (04 Apr 2025 08:52) (78 - 125)  BP: 87/59 (04 Apr 2025 08:45) (81/43 - 162/90)  BP(mean): 68 (04 Apr 2025 08:45) (51 - 119)  RR: 26 (04 Apr 2025 08:45) (15 - 27)  SpO2: 96% (04 Apr 2025 08:52) (91% - 100%)    Parameters below as of 04 Apr 2025 08:52  Patient On (Oxygen Delivery Method): nasal cannula        FOCUSED PHYSICAL EXAM:     LABS:                        11.3   8.07  )-----------( 146      ( 04 Apr 2025 01:50 )             33.8     04-04    141  |  108  |  29.3[H]  ----------------------------<  104[H]  3.6   |  20.0[L]  |  0.52    Ca    8.0[L]      04 Apr 2025 01:50  Phos  2.1     04-04  Mg     1.9     04-04    TPro  5.2[L]  /  Alb  2.9[L]  /  TBili  0.8  /  DBili  x   /  AST  13  /  ALT  15  /  AlkPhos  41  04-04      EKG:   IMGAGING:    ASSESSMENT:  HPI:  89 year old female with PMHx of Afib (on Eliquis), PPM, HTN, hx of RUE melanoma removal with R axillary lymph node removal, recent admission 3/21- 3/25 for lt MCA occlusion s/p thrombectomy presents as transfer from [facility] after she was found at 7:30 with lt facial and left sided weakness.  am.  Upon arrival to  ED she was noted to have lt sided facial droop; aphasic; left arm weakness; no TNK as out of window.  code stroke activated;  CT imagining found patient with new rt mca; patient transferred to [facility] for thrombectomy. Pt transferred to [facility] EDUARDO eval, s/p cerebral angiogram for mechanical thrombectomy of Rt M2 occlusion, TICI 3 (one pass). She is admitted to NSICU for close monitoring post thrombectomy. Patient seen at bedside remains on mech vent, waking up, FC in no acute distress.         PLAN:     FOLLOW UP / RESULT: EVENT: Called by RN for hypotension, and lethargy     OBJECTIVE:  Vital Signs Last 24 Hrs  T(C): 38.2 (04 Apr 2025 08:45), Max: 38.8 (04 Apr 2025 07:30)  T(F): 100.8 (04 Apr 2025 08:45), Max: 101.8 (04 Apr 2025 07:30)  HR: 110 (04 Apr 2025 08:52) (78 - 125)  BP: 87/59 (04 Apr 2025 08:45) (81/43 - 162/90)  BP(mean): 68 (04 Apr 2025 08:45) (51 - 119)  RR: 26 (04 Apr 2025 08:45) (15 - 27)  SpO2: 96% (04 Apr 2025 08:52) (91% - 100%)    Parameters below as of 04 Apr 2025 08:52  Patient On (Oxygen Delivery Method): nasal cannula        FOCUSED PHYSICAL EXAM:   General: frail, lethargic   HEENT: Head normocephalic, atraumatic. Conjunctivae clear w/o exudates or hemorrhage.    Cardiac: irregular rate and rhythm.    Respiratory: coarse BS B/L   Abdominal: Soft, nondistended, nontender. Bowel sounds normoactive x 4 quadrants.    Skin: Skin is warm, dry     Extremities: No edema, right/left groin site with no active bleeding, no hematoma, soft , NTTP no s/s infection   Neuro: arouses to tactile stimuli, hypophonic participates in exam but requires encouragement. Follows commands correct answers with choices       LABS:                        11.3   8.07  )-----------( 146      ( 04 Apr 2025 01:50 )             33.8     04-04    141  |  108  |  29.3[H]  ----------------------------<  104[H]  3.6   |  20.0[L]  |  0.52    Ca    8.0[L]      04 Apr 2025 01:50  Phos  2.1     04-04  Mg     1.9     04-04    TPro  5.2[L]  /  Alb  2.9[L]  /  TBili  0.8  /  DBili  x   /  AST  13  /  ALT  15  /  AlkPhos  41  04-04    HPI:  89 year old female with PMHx of Afib (on Eliquis), PPM, HTN, hx of RUE melanoma removal with R axillary lymph node removal, recent admission 3/21- 3/25 for lt MCA occlusion s/p thrombectomy presents as transfer from [facility] after she was found at 7:30 with lt facial and left sided weakness.  am.  Upon arrival to  ED she was noted to have lt sided facial droop; aphasic; left arm weakness; no TNK as out of window.  code stroke activated;  CT imagining found patient with new rt mca; patient transferred to [facility] for thrombectomy. Pt transferred to [facility] EDUARDO eval, s/p cerebral angiogram for mechanical thrombectomy of Rt M2 occlusion, TICI 3 (one pass). She is admitted to NSICU for close monitoring post thrombectomy. Patient seen at bedside [facility], waking up, FC in no acute distress.     ASSESSMENT/PLAN:     FOLLOW UP / RESULT: EVENT: Called by RN for hypotension, and lethargy     OBJECTIVE:  Vital Signs Last 24 Hrs  T(C): 38.2 (04 Apr 2025 08:45), Max: 38.8 (04 Apr 2025 07:30)  T(F): 100.8 (04 Apr 2025 08:45), Max: 101.8 (04 Apr 2025 07:30)  HR: 110 (04 Apr 2025 08:52) (78 - 125)  BP: 87/59 (04 Apr 2025 08:45) (81/43 - 162/90)  BP(mean): 68 (04 Apr 2025 08:45) (51 - 119)  RR: 26 (04 Apr 2025 08:45) (15 - 27)  SpO2: 96% (04 Apr 2025 08:52) (91% - 100%)    Parameters below as of 04 Apr 2025 08:52  Patient On (Oxygen Delivery Method): nasal cannula        FOCUSED PHYSICAL EXAM:   General: frail, lethargic   HEENT: Head normocephalic, atraumatic. Conjunctivae clear w/o exudates or hemorrhage.    Cardiac: irregular rate and rhythm.    Respiratory: coarse BS B/L   Abdominal: Soft, nondistended, nontender. Bowel sounds normoactive x 4 quadrants.    Skin: Skin is warm, dry     Extremities: No edema, right/left groin site with no active bleeding, no hematoma, soft , NTTP no s/s infection   Neuro: arouses to tactile stimuli, hypophonic participates in exam but requires encouragement. Follows commands correct answers with choices       LABS:                        11.3   8.07  )-----------( 146      ( 04 Apr 2025 01:50 )             33.8     04-04    141  |  108  |  29.3[H]  ----------------------------<  104[H]  3.6   |  20.0[L]  |  0.52    Ca    8.0[L]      04 Apr 2025 01:50  Phos  2.1     04-04  Mg     1.9     04-04    TPro  5.2[L]  /  Alb  2.9[L]  /  TBili  0.8  /  DBili  x   /  AST  13  /  ALT  15  /  AlkPhos  41  04-04    HPI:  89 year old female with PMHx of Afib (off Eliquis), recent left MCA territory stroke s/p LM1 thrombectomy, with residual minimal aphasia, MRS 1, PPM, HTN, HLD, GERD, severe MR/TR, iatrogenic adrenal insufficiency (on prednisone), lymphedema, metastatic melanoma s/p Left foot melanoma and lymph node resection, s/p RUE melanoma and Right axillary lymph node excision at [facility] (3/18/25) who presented to  ED 3/26/36 with Lt sided weakness and aphasia. CT neg, CTA showed a  Rt M2 occlusion.  Transferred to [facility] EDUARDO intervention. S/P thrombectomy, TICI 3 achieved after 1 pass admitted to NSICU for post procedure monitoring started on IV Heparin. On the early morning of 3/29/25 pt received medications around 6am by RN report and subsequently was appreciated to be aphasic with right sided weakness.  CTH/CTA/CTP ordered. CTH negative for acute hemorrhage. CTA + new Left MCA M2 occulusion.      ASSESSMENT/PLAN:   # HYPOTENSION   - concern for PNA vs CHF   f/u official CXR reading, ID consulted for Abx guidance   cardio consulted  S/p IV Lasix at 0600  hx of adrenal insufficiency   PO steroids changed to IV in the setting of AMS    started on IVF @50mL /hr   strict i&os   Medicine consulted     # AMS/LETHARGY   - f/u CTH URGENT   - f/u stat labs  - supp 02 PRN

## 2025-04-04 NOTE — PROVIDER CONTACT NOTE (OTHER) - SITUATION
Patient noted with increased work of breathing and Tmax of 38.9.
Pt s/p cerebral infarct now aphasic and neuro assessment has changed.

## 2025-04-04 NOTE — CONSULT NOTE ADULT - CONSULT REASON
CVA, hypercoag workup
Medicine Co management
pseudoaneurysm
thrombin injection for RCFA pseudoaneurysm
Adrenal insufficiency
Stroke
r/o Heart failure
Rt M2 occlusion s/p thrombectomy
Fever

## 2025-04-04 NOTE — CONSULT NOTE ADULT - ASSESSMENT
89 year old female with PMHx of Afib (off Eliquis), PPM, HTN, HLD, GERD, severe MR/TR, iatrogenic adrenal insufficiency (on prednisone), lymphedema, metastatic melanoma s/p Left foot melanoma and lymph node resection, s/p RUE melanoma and Right axillary lymph node excision at [facility] (3/18/25). Patient with a recent admission 3/21 -3/25/25  when she initially presented to [facility] and was transferred to [facility] as a stroke code. At [facility] hospital course significant for s/p cerebral angiogram for mechanical thrombectomy of L M1 occlusion, TICI 3 (one pass) on 3/21/25, admission to NSICU. Patient was discharged and presented back to [facility] 3/26 with L sided weakness and aphasia transferred to [facility] for stroke code and is now  s/p cerebral angiogram for mechanical thrombectomy of Rt M2 occlusion, TICI 3 (one pass) on 3/26/25. Has been in NSICU since. 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 Patient also noted to have incidental finding of new 1.7 cm cystic lesion at the pancreatic body found on CT a/p on 3/28/25 and was recommended outpatient GI follow up. On 3/29 patient was noted to have acute change in neuro exam became acutely aphasic. New radiology reporting + LM2 occulusion. Patient went to Neuro IR and is s/p thrombectomy of left M4 with TICI 0. Was on Nitrofurantoin for a UTI 4/2 - 4/4. 4/4 early AM patient noted with fever and sepsis work up was done, started on Cefepime, remained febrile this AM. ID input requested.

## 2025-04-04 NOTE — PROGRESS NOTE ADULT - ATTENDING COMMENTS
Patient seen and examined. Case discussed with Dr. Dasilva and agree with recommendations outlined above. I have personally reviewed the imaging and labs listed above.    Rehab/Impaired mobility and function -  Patient continues to require hospitalization for the above diagnoses and ongoing active management of comorbid complications that are substantially posing a threat to bodily function, functional ability and quality of life.    RECOMMEND - OOB daily    Medically being optimized.  Will need functional assessments as being managed for vascular complications.  Son and daughter aware of home vs. needing rehab pending progress.
Patient seen and examined. Case discussed with Dr. Dasilva and agree with recommendations outlined above. I have personally reviewed the imaging and labs listed above.    Patient clinically worsened.  Reviewed head CT with Dinah Jacques.   Having hypotension episodes.  Discussed with 2 sons and daughter in room about follow up and need for medical stabilization.   Remain hopeful for AR GC.
Patient with iatrogenic right femoral pseudo. Had thrombin injection by IR yesterday however on duplex today there is residual active component. CT shows pseudo coming of the proximal SFA. We discussed options of redo injection, open repair and stenting with the family. She is recovering from stroke therefore the least invasive option is preferable. We decided to proceed with an attempt for redo thrombin injection. Risks and benefits including peripheral embolization were discussed in detail. Family consented to proceed. Will hold hep gtt as long as possible per neuro.
signing off , pseudo appears to be occluded . please call with any issues

## 2025-04-04 NOTE — CONSULT NOTE ADULT - NS ATTEND AMEND GEN_ALL_CORE FT
examined, breathing comfortably supine in bed on NC with sats 100%.  CT chest onl;y mild b/l effusions.   Agree with PRN IV Lasix.   Has had what appears to be more than one embolic CVA, suggest cont Eliquis uninterruptedly. ( stopped briefly recently for procedures)   Call back as needed. examined, breathing comfortably supine in bed on NC with sats 100%.  CT chest only mild b/l effusions.   Agree with PRN IV Lasix.   Has had what appears to be more than one embolic CVA, suggest cont Eliquis uninterruptedly. ( stopped briefly recently for procedures)   Call back as needed.

## 2025-04-04 NOTE — CHART NOTE - NSCHARTNOTEFT_GEN_A_CORE
NURSE called reporting pt is  febrile and is congested, sat O2 in 70's, started on 2liters O2 increased into 90's, noted pt to be coughing. Assessing pt warm to touch, and lung with rhonchi and upper airway congestion.  Ordered Stat chest Xray, RVP, DUONEBS, and Ofimev. Pending results. NURSE called reporting pt is  febrile and is congested, sat O2 in 70's, started on 2liters O2 increased into 90's, noted pt to be coughing. Assessing pt warm to touch, and lung with rhonchi and upper airway congestion.  Ordered Stat chest Xray, RVP, DUONEBS, and Ofimev, Labs and Cefepime.

## 2025-04-04 NOTE — PROVIDER CONTACT NOTE (OTHER) - ASSESSMENT
Pt no longer speaking to RN and not following all commands. Pt weaker on right side. MIRIAM Corbett immediately called to bedside.
Tmax 38.9  Tachypneic  Desaturation with new O2 requirements; 3 l NC.

## 2025-04-04 NOTE — CHART NOTE - NSCHARTNOTEFT_GEN_A_CORE
Spoke to son in room and reporting pt was in ED at one point for "Fluid on heart". HX of CHF sees cardio and takes Lasix 20mg at home every other day.   Fluids stopped and  Lasix given, pt putting out more urine at this time. will f/u in 2hr 89 year old female with PMHx of Afib (off Eliquis), recent left MCA territory stroke s/p LM1 thrombectomy, with residual minimal aphasia, MRS 1, PPM, HTN, HLD, GERD, severe MR/TR, iatrogenic adrenal insufficiency (on prednisone), lymphedema, metastatic melanoma s/p Left foot melanoma and lymph node resection, s/p RUE melanoma and Right axillary lymph node excision at [facility] (3/18/25) who presented to  ED 3/26/36 with Lt sided weakness and aphasia. CT neg, CTA showed a  Rt M2 occlusion.  Transferred to [facility] EDUARDO intervention. S/P thrombectomy, TICI 3 achieved after 1 pass admitted to NSICU for post procedure monitoring started on IV Heparin.On the early morning of 3/29/25 pt received medications around 6am by RN report and subsequently was appreciated to be aphasic with right sided weakness.  CTH/CTA/CTP ordered. CTH negative for acute hemorrhage. CTA + new Left MCA M2 occulusion.         Patient was noted with increased work of breathing, Tmax 38.9   Desaturation to 70 %, was palced on supplemental O2 2L - up to 95%  CXR, RVP, Duoneb, Blood Cx2, CBC, CMP, Lactate         General: No acute distress.   HEENT: Head normocephalic, atraumatic. Conjunctivae clear w/o exudates or hemorrhage.    Cardiac: irregular rate and rhythm.    Respiratory: Crackles noted in the left lung   Abdominal: Soft, nondistended, nontender. Bowel sounds normoactive x 4 quadrants.    Skin: Skin is warm, dry     Extremities: No edema, right/left groin site with no active bleeding, no hematoma, soft , NTTP     Detailed Neurologic Exam:  Mental status: The patient is lethargic, requiring repeated verbals stimuli to stay awake, inattentive to examiner; patient is able to state name simple objects, more spontaneous speech than with questions  Cranial nerves: slight right facial palsy, moderate-severe dysarthria nearly anarthric , Pupils equal and react symmetrically to light. There is no visual field deficit to confrontation. Extraocular motion is full with no nystagmus.    Motor: There is normal bulk and tone.  There is no tremor.  Strength is 4+/5 proximal 4/5 distal in the right arm and 5/5 right leg. Drifts.   Strength is 5/5 in the left arm and 4/5 left leg.  Sensation: Intact to light touch  Cerebellar: There is no dysmetria on finger to nose testing.                                                                                                    Gait : deferred        PLAN  Pt was lethargic, Spoke to son in room and reporting pt was in ED at one point for "Fluid on heart".   HX of CHF sees cardio and takes Lasix 20mg at home every other day.     Started on Cefepime 2g q8hr   Stat EKG, BNP, trops, CKMB for ventricular pacing noted on tele   Changed to Q2hr status.   CXR enlarged heart – IV lasix 10 mg x 2, IVF was discontinued for now   Cardiology was called; will f/u in AM      AM dose of Eliquis was administered   Lopressor and pepcid changed to IV   Continue to monitor      Will f/u during AM rounds

## 2025-04-04 NOTE — CONSULT NOTE ADULT - ASSESSMENT
The patient is an 89 year old female with a past medical history of Afib (on Eliquis), PPM, HTN, hx of RUE melanoma removal with R axillary lymph node removal, recent admission 3/21- 3/25 for lt MCA occlusion s/p thrombectomy presents as transfer from [facility] after she was found at 7:30 with lt facial and left sided weakness.  am.  Upon arrival to  ED she was noted to have lt sided facial droop; aphasic; left arm weakness; no TNK as out of window.  Code stroke activated;  CT imagining found patient with new rt mca; patient transferred to [facility] for thrombectomy. Pt transferred to [facility] EDUARDO eval, s/p cerebral angiogram for mechanical thrombectomy of Rt M2 occlusion, TICI 3 (one pass). She is admitted to NSICU for close monitoring post thrombectomy. Post procedure course complicated by groin site pseudoaneurysm requiring thrombin injection by IR. IV heparin was held. Repeat duplex showed persistent pseudoaneurysm requiring repeat injection on 3/29. Code stroke was called on 3/29 for altered mental status.   CTH/CTA/CTP ordered and CTA +new left M2 occlusion. ot. Pt extubated 3/20 to RA, passed swallow evaluation. Patient has a history of iatrogenic AI, endocrine was consulted and initially started on stress dose steroids weaned to PO prednisone. Noted to have leukocytosis on 4/2 with a positive UA, started on Po Macrobid.Medicine now consulted for persistent fever    Assessment/Plan:    1. Severe sepsis with Acute metabolic encephalopathy with Acute hypoxic respiratory failure   - noted Fever, tachycardia, hypotension and AMS  - On 2 Liters NC   - On chronic prednisone, Start Stress dose Solucortef  - IV Bolus NS 500cc x 1 now; Stat Lactate ordered   - Given IV lasix 10mg x 2 this morning; hold further diuresis   - Stat CBC with Diff, CMP  - UA on 4/2 positive, was started on macorbid, Blood culture negative.   Urine culture with contamination  - 4/4 COVID/RVP negative  - Check Stat CTA chest abdomen and pelvis  - DC macrobid  - Started on Cefepime, Add Vancomycin for empiric coverage  - Vitals Q2 hours    2. Acute on chronic diastolic CHF  - Given 10mg of IV Lasix x 2 this morning  - Discussed with cardiology at bedside, hold further diuresis for today  - Repeat TTE  - Follow up CT chest  - Will re-eval for further diuresis in AM   - Elevated Troponin suspsected demand ischemia in the setting of sepsis. Repeat troponin at 1pm    3. Multiple MCA stroke  - Status post thrombectomy  - Eliquis changed to Lovenox BID while NPo  - Statin held while NPO    4., Iatrogenic AI  - On chronic prednisone  - Hold PO prednisone  - Start Solu cortef in the setting of severe sepsis    5. Acute urinary retention   _Failed TOV, Rodriguez was replaced on 4/3     6. Afib  - Eliquis change to Lovenox while NPO  =- Metoprolol held for hypotension    VTE- Change Eliquis to Lovenox BID while NPO   -
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
## 2025-04-04 NOTE — PROGRESS NOTE ADULT - ASSESSMENT
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 CT neg, CTA showed a  Rt M2 occlusion.  Transferred to [facility] intervention. S/P thrombectomy, TICI 3 achieved after 1 pass started on IV Heparin and monitoring in NSICU post procedure. Hospital course complicated by right hemiparesis and aphasia on 3/29/25. CTH negative for acute hemorrhage, CTA h/n + new Left MCA M2 occulusion and subsequently underwent 3rd thrombectomy      IMPRESSION:   Recent left MCA cerebral infarction s/p Left MCA M1 thrombectomy on 3/21/2025. TICI 3 recanalization. Patient subsequently discharged and returned 3/26/25 with Rt MCA  m2 occlusion, revascularization TICI 3. On 3/29/25 patient found to have acute Left M2 MCA occlusion s/p thrombectomy TICI 0 -- distal M4 branch. MRI brain demonstrated acute bilateral multifocal cerebral infarctions. There is subacute left basal ganglia infarct.  Etiology likely cardioembolic in the setting of atrial fibrillation and underlying hypercoaguability from malignancy.       NEURO: Bihemispheric embolic infarcts s/p Thrombectomy x 3, Aphasia, R hemiparesis  -Neurologically with gradually improving speech   -Continue close monitoring for neurologic deterioration    -Stroke neuro checks q4 hours    -SBP goal 110-160mmhg for now avoiding rapid fluctuations and hypotension , neurologically tolerating at this time, eventual long term age/risk specific normotension   -ANTITHROMBOTIC THERAPY:  Heparin gtt, no bolus, PTT goal currently 50-70  further titration based on clinical course and follow up serial labs. Transitioned to Eliquis 4/2/25 at 6pm as CT Head on 4/2/25 with no significant change from prior study.   -continue home statin regimen if applicable as LDL < goal of 70   -MRI imaging as noted   -Dysphagia screen: failed, NGT removed 4/1/25, currently on regular/mildly thick, SLP follow up at bedside notes tolerating this diet and to continue   -Physical therapy/OT/Speech eval/treatment.   -Stroke education     CARDIOVASCULAR: Afib  - TTE as noted , cardiac monitoring w/ telemetry for now, further evaluation pending findings of noted workup, continue rate control and titrate regimen accordingly                            HEMATOLOGY: H/H with mild anemia, no active bleeding noted, Platelets 146- monitor thrombocytosis closely, patient should have all age and risk appropriate malignancy screenings with PCP or sooner if clinically suspected ,   -hematology consulted- hypercoagulable workup sent, including factor V leiden, prothrombin, antiphospholipid antibodies, and lupus anticoagulant.  With regard to long-term a/c, can transition to doac from heparin gtt as per neuro and would follow up with outpatient hematologist Dr Kimbrough.   -anemia profile Iron total 53, %Sat 21, TIBC 250, ferritin 293, transferrin 175.      DVT ppx: Heparin gtt     PULMONARY:  protecting airway, saturating well , encourage mobility and incentive spirometry as tolerated     RENAL: BUN elevated, Cr within range, monitor urine output, maintain adequate hydration, IVF in place, 500cc bolus implemented on 4/2/25     Na Goal:  135-145     Rivas: as noted, s/p TOV pending. Required straight caths afterwards. Rivsa replaced 4/3. Will discharge to rehab with rivas. Can attempt TOV again in rehab.    ID: afebrile, no leukocytosis, UA +, started on macrobid 4/2 for 5 day course.     ENDO: 1. Iatrogenic adrenal insufficiency  - Hemodynamically stable  - Hydrocortisone recs per endocrine, discontinued today  - Prednisone tapered back down to home dose (5 mg in AM and 2 mg in PM)  - Monitor BP and electrolytes    VASCULAR: signing off, pseudoaneurysm appears to be occluded. Please call with any issues.    OTHER: condition and plan of care d/w patient, family, questions and concerns addressed.     DISPOSITION: Acute Rehab once stable and workup is complete, per CM no auth is needed once patient is stable. Anticipate for 4/3/25 pending clinical course and noted pending workup.     CORE MEASURES:       Admission NIHSS: 10     Tenecteplase : [] YES [x] NO     LDL/HDL/A1C:  59 //  74  //  5.5      Depression Screen- if depression hx and/or present     Statin Therapy: continue home dose statin      Dysphagia Screen: [x] PASS [] FAIL     Smoking in the past 12 months [] YES [x] NO     Afib [x] YES [] NO     Diabetes [] YES [x] NO     Stroke Education [x] YES [] NO  Diabetes [] YES [x] NO    Obtain screening lower extremity venous ultrasound in patients who meet 1 or more of the following criteria as patient is high risk for DVT/PE on admission:   [] History of DVT/PE  []Hypercoagulable states (Factor V Leiden, Cancer, OCP, etc. )  []Prolonged immobility (hemiplegia/hemiparesis/post operative or any other extended immobilization)  [] Transferred from outside facility (Rehab or Long term care)  [] Age </= to 50  [x]Stroke 89 year old female with PMHx of Afib (off Eliquis), recent left MCA territory stroke s/p left M1 thrombectomy, with residual minimal aphasia, MRS 1, PPM, HTN, HLD, GERD, severe MR/TR, iatrogenic adrenal insufficiency (on prednisone), lymphedema, metastatic melanoma s/p Left foot melanoma and lymph node resection, s/p RUE melanoma and Right axillary lymph node excision at [facility] (3/18/25) who presented to  ED 3/26/36 with Lt sided weakness and aphasia. CT neg, CTA showed a  Rt M2 occlusion.  Transferred to [facility] intervention. S/P thrombectomy, TICI 3 achieved after 1 pass started on IV Heparin and monitoring in NSICU post procedure. Hospital course complicated by right hemiparesis and aphasia on 3/29/25. CTH negative for acute hemorrhage, CTA h/n + new Left MCA M2 occulusion and subsequently underwent 3rd thrombectomy on 3/29.   4/4: noted with hypoxia, hypotension and increased lethargy. Concern for sepsis 2/2 ?aspiration pna started on Cefepime + zosyn ID, medicine, and cardio consulted.    IMPRESSION:   Recent left MCA cerebral infarction s/p Left MCA M1 thrombectomy on 3/21/2025. TICI 3 recanalization. Patient subsequently discharged and returned 3/26/25 with Rt MCA  m2 occlusion, revascularization TICI 3. On 3/29/25 patient found to have acute Left M2 MCA occlusion s/p thrombectomy TICI 0 -- distal M4 branch. MRI brain demonstrated acute bilateral multifocal cerebral infarctions. There is subacute left basal ganglia infarct.  Etiology likely cardioembolic in the setting of atrial fibrillation and underlying hypercoagulability from malignancy.       NEURO: Bihemispheric embolic infarcts s/p Thrombectomy x 3, Aphasia, R hemiparesis  -Neurologically with gradually improving speech   -Continue close monitoring for neurologic deterioration    -Stroke neuro checks q4 hours    -SBP goal 110-160mmhg for now avoiding rapid fluctuations and hypotension , neurologically tolerating at this time, eventual long term age/risk specific normotension   -ANTITHROMBOTIC THERAPY:  Heparin gtt, no bolus, PTT goal currently 50-70  further titration based on clinical course and follow up serial labs. Transitioned to Eliquis 4/2/25 at 6pm as CT Head on 4/2/25 with no significant change from prior study.   -continue home statin regimen if applicable as LDL < goal of 70   -MRI imaging as noted   -Dysphagia screen: failed, NGT removed 4/1/25, currently on regular/mildly thick, SLP follow up at bedside notes tolerating this diet and to continue   -Physical therapy/OT/Speech eval/treatment.   -Stroke education     CARDIOVASCULAR: Afib  - TTE as noted , cardiac monitoring w/ telemetry for now, further evaluation pending findings of noted workup, continue rate control and titrate regimen accordingly                            HEMATOLOGY: H/H with mild anemia, no active bleeding noted, Platelets 146- monitor thrombocytosis closely, patient should have all age and risk appropriate malignancy screenings with PCP or sooner if clinically suspected ,   -hematology consulted- hypercoagulable workup sent, including factor V leiden, prothrombin, antiphospholipid antibodies, and lupus anticoagulant.  With regard to long-term a/c, can transition to doac from heparin gtt as per neuro and would follow up with outpatient hematologist Dr Kimbrough.   -anemia profile Iron total 53, %Sat 21, TIBC 250, ferritin 293, transferrin 175.      DVT ppx: Heparin gtt     PULMONARY:  protecting airway, saturating well , encourage mobility and incentive spirometry as tolerated     RENAL: BUN elevated, Cr within range, monitor urine output, maintain adequate hydration, IVF in place, 500cc bolus implemented on 4/2/25     Na Goal:  135-145     Rivas: as noted, s/p TOV pending. Required straight caths afterwards. Rivas replaced 4/3. Will discharge to rehab with rivas. Can attempt TOV again in rehab.    ID: afebrile, no leukocytosis, UA +, started on macrobid 4/2 for 5 day course.     ENDO: 1. Iatrogenic adrenal insufficiency  - Hemodynamically stable  - Hydrocortisone recs per endocrine, discontinued today  - Prednisone tapered back down to home dose (5 mg in AM and 2 mg in PM)  - Monitor BP and electrolytes    VASCULAR: signing off, pseudoaneurysm appears to be occluded. Please call with any issues.    OTHER: condition and plan of care d/w patient, family, questions and concerns addressed.     DISPOSITION: Acute Rehab once stable and workup is complete, per CM no auth is needed once patient is stable. Anticipate for 4/3/25 pending clinical course and noted pending workup.     CORE MEASURES:       Admission NIHSS: 10     Tenecteplase : [] YES [x] NO     LDL/HDL/A1C:  59 //  74  //  5.5      Depression Screen- if depression hx and/or present     Statin Therapy: continue home dose statin      Dysphagia Screen: [x] PASS [] FAIL     Smoking in the past 12 months [] YES [x] NO     Afib [x] YES [] NO     Diabetes [] YES [x] NO     Stroke Education [x] YES [] NO  Diabetes [] YES [x] NO    Obtain screening lower extremity venous ultrasound in patients who meet 1 or more of the following criteria as patient is high risk for DVT/PE on admission:   [] History of DVT/PE  []Hypercoagulable states (Factor V Leiden, Cancer, OCP, etc. )  []Prolonged immobility (hemiplegia/hemiparesis/post operative or any other extended immobilization)  [] Transferred from outside facility (Rehab or Long term care)  [] Age </= to 50  [x]Stroke 89 year old female with PMHx of Afib (off Eliquis), recent left MCA territory stroke s/p left M1 thrombectomy, with residual minimal aphasia, MRS 1, PPM, HTN, HLD, GERD, severe MR/TR, iatrogenic adrenal insufficiency (on prednisone), lymphedema, metastatic melanoma s/p Left foot melanoma and lymph node resection, s/p RUE melanoma and Right axillary lymph node excision at [facility] (3/18/25) who presented to  ED 3/26/36 with Lt sided weakness and aphasia. CT neg, CTA showed a  Rt M2 occlusion.  Transferred to [facility] intervention. S/P thrombectomy, TICI 3 achieved after 1 pass started on IV Heparin and monitoring in NSICU post procedure. Hospital course complicated by right hemiparesis and aphasia on 3/29/25. CTH negative for acute hemorrhage, CTA h/n + new Left MCA M2 occulusion and subsequently underwent 3rd thrombectomy on 3/29.   4/4: noted with hypoxia, hypotension and increased lethargy. Concern for sepsis 2/2 ?aspiration pna started on Cefepime + zosyn ID, medicine, and cardio consulted.    IMPRESSION:   Recent left MCA cerebral infarction s/p Left MCA M1 thrombectomy on 3/21/2025. TICI 3 recanalization. Patient subsequently discharged and returned 3/26/25 with Rt MCA  m2 occlusion, revascularization TICI 3. On 3/29/25 patient found to have acute Left M2 MCA occlusion s/p thrombectomy TICI 0 -- distal M4 branch. MRI brain demonstrated acute bilateral multifocal cerebral infarctions. There is subacute left basal ganglia infarct.  Etiology likely cardioembolic in the setting of atrial fibrillation and underlying hypercoagulability from malignancy.       NEURO: Bihemispheric embolic infarcts s/p Thrombectomy x 3, Aphasia, R hemiparesis  -Neurologically with gradually improving speech   -Continue close monitoring for neurologic deterioration    -Stroke neuro checks q4 hours    -SBP goal 110-160mmhg for now avoiding rapid fluctuations and hypotension , neurologically tolerating at this time, eventual long term age/risk specific normotension   -ANTITHROMBOTIC THERAPY:  Heparin gtt, no bolus, PTT goal currently 50-70  further titration based on clinical course and follow up serial labs. Transitioned to Eliquis 4/2/25 at 6pm as CT Head on 4/2/25 with no significant change from prior study.   -continue home statin regimen if applicable as LDL < goal of 70   -MRI imaging as noted   -Dysphagia screen: failed, NGT removed 4/1/25, currently on regular/mildly thick, SLP follow up at bedside notes tolerating this diet and to continue   -Physical therapy/OT/Speech eval/treatment.   -Stroke education     CARDIOVASCULAR:   -Afib  - TTE as noted , cardiac monitoring w/ telemetry for now, further evaluation pending findings of noted workup, continue rate control and titrate regimen accordingly             - cardio consulted for concern for fluid overload   - s/p IV Lasix overnight  -pending repeat Echo with Definity                   HEMATOLOGY: H/H with mild anemia, no active bleeding noted, Platelets 146- monitor thrombocytosis closely, patient should have all age and risk appropriate malignancy screenings with PCP or sooner if clinically suspected ,   -hematology consulted- hypercoagulable workup sent, including factor V leiden, prothrombin, antiphospholipid antibodies, and lupus anticoagulant.  With regard to long-term a/c, can transition to doac from heparin gtt as per neuro and would follow up with outpatient hematologist Dr Kimbrough.   -anemia profile Iron total 53, %Sat 21, TIBC 250, ferritin 293, transferrin 175.      DVT ppx: SCD's + Eliquis BID     PULMONARY:    - Hypoxic overnight   -tecting airway, saturating well , encourage mobility and incentive spirometry as tolerated     RENAL: BUN elevated, Cr within range, monitor urine output, maintain adequate hydration, IVF in place, 500cc bolus implemented on 4/2/25     Na Goal:  135-145     Rivas: as noted, s/p TOV pending. Required straight caths afterwards. Rivas replaced 4/3. Will discharge to rehab with rivas. Can attempt TOV again in rehab.    ID: afebrile, no leukocytosis, UA +, started on macrobid 4/2 for 5 day course.     ENDO: 1. Iatrogenic adrenal insufficiency  - Hemodynamically stable  - Hydrocortisone recs per endocrine, discontinued today  - Prednisone tapered back down to home dose (5 mg in AM and 2 mg in PM)  - Monitor BP and electrolytes    VASCULAR: signing off, pseudoaneurysm appears to be occluded. Please call with any issues.    OTHER: condition and plan of care d/w patient, family, questions and concerns addressed.     DISPOSITION: Acute Rehab once stable and workup is complete, per CM no auth is needed once patient is stable. Anticipate for 4/3/25 pending clinical course and noted pending workup.     CORE MEASURES:       Admission NIHSS: 10     Tenecteplase : [] YES [x] NO     LDL/HDL/A1C:  59 //  74  //  5.5      Depression Screen- if depression hx and/or present     Statin Therapy: continue home dose statin      Dysphagia Screen: [x] PASS [] FAIL     Smoking in the past 12 months [] YES [x] NO     Afib [x] YES [] NO     Diabetes [] YES [x] NO     Stroke Education [x] YES [] NO  Diabetes [] YES [x] NO    Obtain screening lower extremity venous ultrasound in patients who meet 1 or more of the following criteria as patient is high risk for DVT/PE on admission:   [] History of DVT/PE  []Hypercoagulable states (Factor V Leiden, Cancer, OCP, etc. )  []Prolonged immobility (hemiplegia/hemiparesis/post operative or any other extended immobilization)  [] Transferred from outside facility (Rehab or Long term care)  [] Age </= to 50  [x]Stroke 89 year old female with PMHx of Afib (off Eliquis), recent left MCA territory stroke s/p left M1 thrombectomy, with residual minimal aphasia, MRS 1, PPM, HTN, HLD, GERD, severe MR/TR, iatrogenic adrenal insufficiency (on prednisone), lymphedema, metastatic melanoma s/p Left foot melanoma and lymph node resection, s/p RUE melanoma and Right axillary lymph node excision at [facility] (3/18/25) who presented to  ED 3/26/36 with Lt sided weakness and aphasia. CT neg, CTA showed a  Rt M2 occlusion.  Transferred to [facility] intervention. S/P thrombectomy, TICI 3 achieved after 1 pass started on IV Heparin and monitoring in NSICU post procedure. Hospital course complicated by right hemiparesis and aphasia on 3/29/25. CTH negative for acute hemorrhage, CTA h/n + new Left MCA M2 occulusion and subsequently underwent 3rd thrombectomy on 3/29.   4/4: noted with hypoxia, hypotension and increased lethargy. Concern for sepsis 2/2 ?aspiration pna started on Cefepime + vanco ID, medicine, and cardio consulted.    IMPRESSION:   Recent left MCA cerebral infarction s/p Left MCA M1 thrombectomy on 3/21/2025. TICI 3 recanalization. Patient subsequently discharged and returned 3/26/25 with Rt MCA  m2 occlusion, revascularization TICI 3. On 3/29/25 patient found to have acute Left M2 MCA occlusion s/p thrombectomy TICI 0 -- distal M4 branch. MRI brain demonstrated acute bilateral multifocal cerebral infarctions. There is subacute left basal ganglia infarct.  Etiology likely cardioembolic in the setting of atrial fibrillation and underlying hypercoagulability from malignancy.       NEURO: Bihemispheric embolic infarcts s/p Thrombectomy x 3, Aphasia, R hemiparesis  -Neurologically with gradually improving speech   -Continue close monitoring for neurologic deterioration    -Stroke neuro checks q4 hours    -SBP goal 110-160mmhg for now avoiding rapid fluctuations and hypotension , neurologically tolerating at this time, eventual long term age/risk specific normotension   -ANTITHROMBOTIC THERAPY:  Heparin gtt, no bolus, PTT goal currently 50-70  further titration based on clinical course and follow up serial labs. Transitioned to Eliquis 4/2/25 at 6pm as CT Head on 4/2/25 with no significant change from prior study.   -continue home statin regimen if applicable as LDL < goal of 70   -MRI imaging as noted   -Dysphagia screen: diet changed to NPO in the setting of lethargy  -Physical therapy/OT/Speech eval/treatment.   -Stroke education     CARDIOVASCULAR:   -hypotension overnight  -Afib  - TTE as noted , cardiac monitoring w/ telemetry for now, further evaluation pending findings of noted workup, continue rate control and titrate regimen accordingly             - cardio consulted for concern for fluid overload   - s/p IV Lasix overnight  -pending repeat Echo with Definity                   HEMATOLOGY: H/H with mild anemia, no active bleeding noted, Platelets 146- monitor thrombocytosis closely, patient should have all age and risk appropriate malignancy screenings with PCP or sooner if clinically suspected ,   -hematology consulted- hypercoagulable workup sent, including factor V leiden, prothrombin, antiphospholipid antibodies, and lupus anticoagulant.  With regard to long-term a/c, can transition to doac from heparin gtt as per neuro and would follow up with outpatient hematologist Dr Kimbrough.   -anemia profile Iron total 53, %Sat 21, TIBC 250, ferritin 293, transferrin 175.      DVT ppx: SCD's + eliquis changed to lovenox subq BID in the setting of NPO     PULMONARY:    -Hypoxic overnight   -CTA as above   -concern for aspiration PNA   -started on cefepime + vanco 4/4/25   -Procal 0.37  -supp 02 PRN  - encourage mobility and incentive spirometry as tolerated     RENAL: BUN elevated, Cr within range, monitor urine output, maintain adequate hydration,   - S/P fluid bolus and gentle maintenance fluids   - improving   - avoid nephrotoxins   - trend BMP     Na Goal:  135-145     Rivas replaced 4/3. Will discharge to rehab with rivas. Can attempt TOV again in rehab at a later date.    ID: + febrile this AM  -no leukocytosis, UA +, initially started on macrobid and D/C'd on 4/4   -started on cefepime + vanco 4/4  -ID consulted   -f/u Cultures     ENDO: 1. 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    OTHER: condition and plan of care d/w patient, family, questions and concerns addressed.     DISPOSITION: Acute Rehab once stable and workup is complete, per CM no auth is needed once patient is stable.   CORE MEASURES:       Admission NIHSS: 10     Tenecteplase : [] YES [x] NO     LDL/HDL/A1C:  59 //  74  //  5.5      Depression Screen- if depression hx and/or present     Statin Therapy: continue home dose statin      Dysphagia Screen: [x] PASS [] FAIL     Smoking in the past 12 months [] YES [x] NO     Afib [x] YES [] NO     Diabetes [] YES [x] NO     Stroke Education [x] YES [] NO  Diabetes [] YES [x] NO    Obtain screening lower extremity venous ultrasound in patients who meet 1 or more of the following criteria as patient is high risk for DVT/PE on admission:   [] History of DVT/PE  []Hypercoagulable states (Factor V Leiden, Cancer, OCP, etc. )  []Prolonged immobility (hemiplegia/hemiparesis/post operative or any other extended immobilization)  [] Transferred from outside facility (Rehab or Long term care)  [] Age </= to 50  [x]Stroke 89 year old female with PMHx of Afib (off Eliquis), recent left MCA territory stroke s/p left M1 thrombectomy, with residual minimal aphasia, MRS 1, PPM, HTN, HLD, GERD, severe MR/TR, iatrogenic adrenal insufficiency (on prednisone), lymphedema, metastatic melanoma s/p Left foot melanoma and lymph node resection, s/p RUE melanoma and Right axillary lymph node excision at [facility] (3/18/25) who presented to  ED 3/26/36 with Lt sided weakness and aphasia. CT neg, CTA showed a  Rt M2 occlusion.  Transferred to [facility]ARD[facility] intervention. S/P thrombectomy, TICI 3 achieved after 1 pass started on IV Heparin and monitoring in NSICU post procedure. Hospital course complicated by right hemiparesis and aphasia on 3/29/25. CTH negative for acute hemorrhage, CTA h/n + new Left MCA M2 occulusion and subsequently underwent 3rd thrombectomy on 3/29.   4/4: noted with hypoxia, hypotension and increased lethargy. Concern for sepsis 2/2 ?aspiration pna started on Cefepime + vanco ID, medicine, and cardio consulted.    IMPRESSION:   Recent left MCA cerebral infarction s/p Left MCA M1 thrombectomy on 3/21/2025. TICI 3 recanalization. Patient subsequently discharged and returned 3/26/25 with Rt MCA  m2 occlusion, revascularization TICI 3. On 3/29/25 patient found to have acute Left M2 MCA occlusion s/p thrombectomy TICI 0 -- distal M4 branch. MRI brain demonstrated acute bilateral multifocal cerebral infarctions. There is subacute left basal ganglia infarct.  Etiology likely cardioembolic in the setting of atrial fibrillation and underlying hypercoagulability from malignancy.       NEURO: Bihemispheric embolic infarcts s/p Thrombectomy x 3, Aphasia, R hemiparesis, Metabolic encephalopathy  -Neurologically with gradually improving speech   -Continue close monitoring for neurologic deterioration    -Stroke neuro checks q4 hours    -SBP goal 110-160mmhg for now avoiding rapid fluctuations and hypotension , neurologically tolerating at this time, eventual long term age/risk specific normotension   -ANTITHROMBOTIC THERAPY:  Heparin gtt, no bolus, PTT goal currently 50-70  further titration based on clinical course and follow up serial labs. Transitioned to Eliquis 4/2/25 at 6pm as CT Head on 4/2/25 with no significant change from prior study.   -continue home statin regimen if applicable as LDL < goal of 70   -MRI imaging as noted   -Dysphagia screen: diet changed to NPO in the setting of lethargy  -Physical therapy/OT/Speech eval/treatment.   -Stroke education     CARDIOVASCULAR: Hypotension,   -hypotension overnight  -Afib  - TTE as noted , cardiac monitoring w/ telemetry for now, further evaluation pending findings of noted workup, continue rate control and titrate regimen accordingly             - cardio consulted for concern for fluid overload   - s/p IV Lasix overnight  -pending repeat Echo with Definity                   HEMATOLOGY: H/H with mild anemia, no active bleeding noted, Platelets 146- monitor thrombocytosis closely, patient should have all age and risk appropriate malignancy screenings with PCP or sooner if clinically suspected ,   -hematology consulted- hypercoagulable workup sent, including factor V leiden, prothrombin, antiphospholipid antibodies, and lupus anticoagulant.  With regard to long-term a/c, can transition to doac from heparin gtt as per neuro and would follow up with outpatient hematologist Dr Kimbrough.   -anemia profile Iron total 53, %Sat 21, TIBC 250, ferritin 293, transferrin 175.      DVT ppx: SCD's + eliquis changed to lovenox subq BID in the setting of NPO     PULMONARY:   Suspected aspiration PNA   -Hypoxic overnight   -CTA as above    aspiration PNA   -started on cefepime + vanco 4/4/25   -Procal 0.37  -supp 02 PRN  - encourage mobility and incentive spirometry as tolerated     RENAL: BUN elevated, Cr within range, monitor urine output, maintain adequate hydration,   - S/P fluid bolus and gentle maintenance fluids   - improving   - avoid nephrotoxins   - trend BMP     Na Goal:  135-145     Rivas replaced 4/3. Will discharge to rehab with [facility]. Can attempt TOV again in rehab at a later date.    ID:  UTI, Aspiration PNA as above  + febrile this AM  -no leukocytosis, UA +, initially started on macrobid and D/C'd on 4/4   -started on cefepime + vanco 4/4  -ID consulted   -f/u Cultures     ENDO: 1. 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    OTHER: condition and plan of care d/w patient, family, questions and concerns addressed.     DISPOSITION: Acute Rehab once stable and workup is complete, per CM no auth is needed once patient is stable.   CORE MEASURES:       Admission NIHSS: 10     Tenecteplase : [] YES [x] NO     LDL/HDL/A1C:  59 //  74  //  5.5      Depression Screen- if depression hx and/or present     Statin Therapy: continue home dose statin      Dysphagia Screen: [x] PASS [] FAIL     Smoking in the past 12 months [] YES [x] NO     Afib [x] YES [] NO     Diabetes [] YES [x] NO     Stroke Education [x] YES [] NO  Diabetes [] YES [x] NO    Obtain screening lower extremity venous ultrasound in patients who meet 1 or more of the following criteria as patient is high risk for DVT/PE on admission:   [] History of DVT/PE  []Hypercoagulable states (Factor V Leiden, Cancer, OCP, etc. )  []Prolonged immobility (hemiplegia/hemiparesis/post operative or any other extended immobilization)  [] Transferred from outside facility (Rehab or Long term care)  [] Age </= to 50  [x]Stroke 89 year old female with PMHx of Afib (off Eliquis), recent left MCA territory stroke s/p left M1 thrombectomy, with residual minimal aphasia, MRS 1, PPM, HTN, HLD, GERD, severe MR/TR, iatrogenic adrenal insufficiency (on prednisone), lymphedema, metastatic melanoma s/p Left foot melanoma and lymph node resection, s/p RUE melanoma and Right axillary lymph node excision at [facility] (3/18/25) who presented to  ED 3/26/36 with Lt sided weakness and aphasia. CT neg, CTA showed a  Rt M2 occlusion.  Transferred to [facility] intervention. S/P thrombectomy, TICI 3 achieved after 1 pass started on IV Heparin and monitoring in NSICU post procedure. Hospital course complicated by right hemiparesis and aphasia on 3/29/25. CTH negative for acute hemorrhage, CTA h/n + new Left MCA M2 occulusion and subsequently underwent 3rd thrombectomy on 3/29.   4/4: noted with hypoxia, hypotension and increased lethargy. Concern for sepsis 2/2 ?aspiration pna started on Cefepime + vanco ID, medicine, and cardio consulted.    IMPRESSION:   Recent left MCA cerebral infarction s/p Left MCA M1 thrombectomy on 3/21/2025. TICI 3 recanalization. Patient subsequently discharged and returned 3/26/25 with Rt MCA  m2 occlusion, revascularization TICI 3. On 3/29/25 patient found to have acute Left M2 MCA occlusion s/p thrombectomy TICI 0 -- distal M4 branch. MRI brain demonstrated acute bilateral multifocal cerebral infarctions. There is subacute left basal ganglia infarct.  Etiology likely cardioembolic in the setting of atrial fibrillation and underlying hypercoagulability from malignancy.       NEURO: Bihemispheric embolic infarcts s/p Thrombectomy x 3, Aphasia, R hemiparesis, Metabolic encephalopathy  -Neurologically with gradually improving speech   -Continue close monitoring for neurologic deterioration    -Stroke neuro checks q4 hours    -SBP goal 110-160mmhg for now avoiding rapid fluctuations and hypotension , neurologically tolerating at this time, eventual long term age/risk specific normotension   -ANTITHROMBOTIC THERAPY:  Heparin gtt, no bolus, PTT goal currently 50-70  further titration based on clinical course and follow up serial labs. Transitioned to Eliquis 4/2/25 at 6pm as CT Head on 4/2/25 with no significant change from prior study.   -continue home statin regimen if applicable as LDL < goal of 70   -MRI imaging as noted   -Dysphagia screen: diet changed to NPO in the setting of lethargy  -Physical therapy/OT/Speech eval/treatment.   -Stroke education     CARDIOVASCULAR: Hypotension,   -hypotension overnight  -Afib  - TTE as noted , cardiac monitoring w/ telemetry for now, further evaluation pending findings of noted workup, continue rate control and titrate regimen accordingly             - cardio consulted for concern for fluid overload   - s/p IV Lasix overnight  -pending repeat Echo with Definity                   HEMATOLOGY: H/H with mild anemia, no active bleeding noted, Platelets 146- monitor thrombocytosis closely, patient should have all age and risk appropriate malignancy screenings with PCP or sooner if clinically suspected ,   -hematology consulted- hypercoagulable workup sent, including factor V leiden, prothrombin, antiphospholipid antibodies, and lupus anticoagulant.  With regard to long-term a/c, can transition to doac from heparin gtt as per neuro and would follow up with outpatient hematologist Dr Kimbrough.   -anemia profile Iron total 53, %Sat 21, TIBC 250, ferritin 293, transferrin 175.      DVT ppx: SCD's + eliquis changed to lovenox subq BID in the setting of NPO     PULMONARY:   Suspected aspiration PNA   -Hypoxic overnight   -CTA as above B/L near complete atelectasis    aspiration PNA   -started on cefepime + vanco 4/4/25   -Procal 0.37  -supp 02 PRN  - encourage mobility and incentive spirometry as tolerated     RENAL: BUN elevated, Cr within range, monitor urine output, maintain adequate hydration,   - S/P fluid bolus and gentle maintenance fluids   - improving   - avoid nephrotoxins   - trend BMP     Na Goal:  135-145     Rivas replaced 4/3. Will discharge to rehab with rivas. Can attempt TOV again in rehab at a later date.    ID:  UTI, Aspiration PNA as above  + febrile this AM  -no leukocytosis, UA +, initially started on macrobid and D/C'd on 4/4   -started on cefepime + vanco 4/4  -ID consulted   -f/u Cultures     ENDO: 1. 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    OTHER: condition and plan of care d/w patient, family, questions and concerns addressed.     DISPOSITION: Acute Rehab once stable and workup is complete, per CM no auth is needed once patient is stable.   CORE MEASURES:       Admission NIHSS: 10     Tenecteplase : [] YES [x] NO     LDL/HDL/A1C:  59 //  74  //  5.5      Depression Screen- if depression hx and/or present     Statin Therapy: continue home dose statin      Dysphagia Screen: [x] PASS [] FAIL     Smoking in the past 12 months [] YES [x] NO     Afib [x] YES [] NO     Diabetes [] YES [x] NO     Stroke Education [x] YES [] NO  Diabetes [] YES [x] NO    Obtain screening lower extremity venous ultrasound in patients who meet 1 or more of the following criteria as patient is high risk for DVT/PE on admission:   [] History of DVT/PE  []Hypercoagulable states (Factor V Leiden, Cancer, OCP, etc. )  []Prolonged immobility (hemiplegia/hemiparesis/post operative or any other extended immobilization)  [] Transferred from outside facility (Rehab or Long term care)  [] Age </= to 50  [x]Stroke

## 2025-04-04 NOTE — CONSULT NOTE ADULT - ASSESSMENT
89 year old female with PMHx of Afib (off Eliquis), PPM, HTN, HLD, GERD, severe MR/TR, iatrogenic adrenal insufficiency (on prednisone), lymphedema, metastatic melanoma s/p Left foot melanoma and lymph node resection, s/p RUE melanoma and Right axillary lymph node excision at [facility] (3/18/25). Patient with a recent admission 3/21 -3/25/25  when she initially presented to [facility] and was transferred to [facility] as a stroke code. At [facility] hospital course significant for s/p cerebral angiogram for mechanical thrombectomy of L M1 occlusion, TICI 3 (one pass) on 3/21/25, admission to NSICU. Patient was discharged and presented back to [facility] 3/26 with L sided weakness and aphasia transferred to [facility] for stroke code and is now  s/p cerebral angiogram for mechanical thrombectomy of Rt M2 occlusion, TICI 3 (one pass) on 3/26/25. Has been in NSICU since. 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 Patient also noted to have incidental finding of new 1.7 cm cystic lesion at the pancreatic body found on CT a/p on 3/28/25 and was recommended outpatient GI follow up. On 3/29 patient was noted to have acute change in neuro exam became acutely aphasic. New radiology reporting + LM2 occulusion. Patient went to Neuro IR and is s/p thrombectomy of left M4 with TICI 0. Was on Nitrofurantoin for a UTI 4/2 - 4/4. 4/4 early AM patient noted with fever and sepsis work up was done, started on Cefepime, remained febrile this AM. ID input requested.       Fever  Acute hypoxic respiratroy failure  Hypotension   Multiple CVA  s/p cerebral angiogram for mechanical thrombectomy of L M1 occlusion, TICI 3 (one pass) on 3/21/25  s/p cerebral angiogram for mechanical thrombectomy of Rt M2 occlusion, TICI 3 (one pass) on 3/26/25  s/p thrombectomy of left M4 with TICI 0 3/29/25  R fem artery pseudoaneurysm  s/p thrombin injection on 03/27/25  s/p bedside thrombin injection 3/28/25  A fib  PPM present       - Blood cultures 3/26 no growth   - Repeat blood cultures Pending  - RVP/COVID 19 PCR 4/4 negative   - UA 4/2 with some pyuria   - Urine Cx 3/26 no growth 4/3 contaminated   - CXR 4/4 reviewed and compared to 3/29, not much change noted.   - Check CT Chest/Abd/Pelvis with contrast  - Should be evaluated for DARIUSZ given multiple CVA  - Procalcitonin level1.01 (3/26) repeat pendnig   - Continue Cefepime  - Start Vancomycin  - Monitor trough, will order next trough  - Monitor for Vancomycin toxicity   - Vancomycin required at this time pending culture results  - Monitor Cr while on Vancomycin, will order Cr for AM  - Hold off on PICC/Midline for now unless needed for reasons other than infectious diseases  - Follow up cultures  - Trend Fever  - Trend WBC      Thank you for allowing me to participate in the care of your patient.   Will Follow    Discussed treatment plan with: Neurology Team and clinical pharmacy

## 2025-04-05 LAB
ALBUMIN SERPL ELPH-MCNC: 2.6 G/DL — LOW (ref 3.3–5.2)
ALP SERPL-CCNC: 41 U/L — SIGNIFICANT CHANGE UP (ref 40–120)
ALT FLD-CCNC: 15 U/L — SIGNIFICANT CHANGE UP
ANION GAP SERPL CALC-SCNC: 12 MMOL/L — SIGNIFICANT CHANGE UP (ref 5–17)
AST SERPL-CCNC: 16 U/L — SIGNIFICANT CHANGE UP
BILIRUB SERPL-MCNC: 0.7 MG/DL — SIGNIFICANT CHANGE UP (ref 0.4–2)
BUN SERPL-MCNC: 21.1 MG/DL — HIGH (ref 8–20)
CALCIUM SERPL-MCNC: 7.8 MG/DL — LOW (ref 8.4–10.5)
CHLORIDE SERPL-SCNC: 103 MMOL/L — SIGNIFICANT CHANGE UP (ref 96–108)
CO2 SERPL-SCNC: 22 MMOL/L — SIGNIFICANT CHANGE UP (ref 22–29)
CREAT SERPL-MCNC: 0.43 MG/DL — LOW (ref 0.5–1.3)
EGFR: 93 ML/MIN/1.73M2 — SIGNIFICANT CHANGE UP
EGFR: 93 ML/MIN/1.73M2 — SIGNIFICANT CHANGE UP
GLUCOSE BLDC GLUCOMTR-MCNC: 125 MG/DL — HIGH (ref 70–99)
GLUCOSE BLDC GLUCOMTR-MCNC: 131 MG/DL — HIGH (ref 70–99)
GLUCOSE BLDC GLUCOMTR-MCNC: 132 MG/DL — HIGH (ref 70–99)
GLUCOSE BLDC GLUCOMTR-MCNC: 134 MG/DL — HIGH (ref 70–99)
GLUCOSE BLDC GLUCOMTR-MCNC: 207 MG/DL — HIGH (ref 70–99)
GLUCOSE SERPL-MCNC: 121 MG/DL — HIGH (ref 70–99)
HCT VFR BLD CALC: 31.3 % — LOW (ref 34.5–45)
HGB BLD-MCNC: 10.4 G/DL — LOW (ref 11.5–15.5)
MAGNESIUM SERPL-MCNC: 1.8 MG/DL — SIGNIFICANT CHANGE UP (ref 1.6–2.6)
MCHC RBC-ENTMCNC: 32.7 PG — SIGNIFICANT CHANGE UP (ref 27–34)
MCHC RBC-ENTMCNC: 33.2 G/DL — SIGNIFICANT CHANGE UP (ref 32–36)
MCV RBC AUTO: 98.4 FL — SIGNIFICANT CHANGE UP (ref 80–100)
NRBC # BLD AUTO: 0 K/UL — SIGNIFICANT CHANGE UP (ref 0–0)
NRBC # FLD: 0 K/UL — SIGNIFICANT CHANGE UP (ref 0–0)
NRBC BLD AUTO-RTO: 0 /100 WBCS — SIGNIFICANT CHANGE UP (ref 0–0)
NT-PROBNP SERPL-SCNC: 1917 PG/ML — HIGH (ref 0–300)
PHOSPHATE SERPL-MCNC: 4.5 MG/DL — SIGNIFICANT CHANGE UP (ref 2.4–4.7)
PLATELET # BLD AUTO: 134 K/UL — LOW (ref 150–400)
PMV BLD: 10.8 FL — SIGNIFICANT CHANGE UP (ref 7–13)
POTASSIUM SERPL-MCNC: 3.7 MMOL/L — SIGNIFICANT CHANGE UP (ref 3.5–5.3)
POTASSIUM SERPL-SCNC: 3.7 MMOL/L — SIGNIFICANT CHANGE UP (ref 3.5–5.3)
PROT SERPL-MCNC: 4.9 G/DL — LOW (ref 6.6–8.7)
RBC # BLD: 3.18 M/UL — LOW (ref 3.8–5.2)
RBC # FLD: 14.8 % — HIGH (ref 10.3–14.5)
SODIUM SERPL-SCNC: 137 MMOL/L — SIGNIFICANT CHANGE UP (ref 135–145)
VANCOMYCIN TROUGH SERPL-MCNC: 8.4 UG/ML — LOW (ref 10–20)
WBC # BLD: 6.94 K/UL — SIGNIFICANT CHANGE UP (ref 3.8–10.5)
WBC # FLD AUTO: 6.94 K/UL — SIGNIFICANT CHANGE UP (ref 3.8–10.5)

## 2025-04-05 PROCEDURE — 99233 SBSQ HOSP IP/OBS HIGH 50: CPT

## 2025-04-05 PROCEDURE — 99232 SBSQ HOSP IP/OBS MODERATE 35: CPT

## 2025-04-05 PROCEDURE — G0545: CPT

## 2025-04-05 RX ORDER — TAMSULOSIN HYDROCHLORIDE 0.4 MG/1
0.4 CAPSULE ORAL AT BEDTIME
Refills: 0 | Status: DISCONTINUED | OUTPATIENT
Start: 2025-04-05 | End: 2025-04-08

## 2025-04-05 RX ORDER — INSULIN LISPRO 100 U/ML
INJECTION, SOLUTION INTRAVENOUS; SUBCUTANEOUS
Refills: 0 | Status: DISCONTINUED | OUTPATIENT
Start: 2025-04-05 | End: 2025-04-08

## 2025-04-05 RX ORDER — SENNA 187 MG
2 TABLET ORAL AT BEDTIME
Refills: 0 | Status: DISCONTINUED | OUTPATIENT
Start: 2025-04-05 | End: 2025-04-08

## 2025-04-05 RX ORDER — ATORVASTATIN CALCIUM 80 MG/1
10 TABLET, FILM COATED ORAL AT BEDTIME
Refills: 0 | Status: DISCONTINUED | OUTPATIENT
Start: 2025-04-05 | End: 2025-04-08

## 2025-04-05 RX ORDER — HYDROCORTISONE 20 MG
100 TABLET ORAL
Refills: 0 | Status: DISCONTINUED | OUTPATIENT
Start: 2025-04-05 | End: 2025-04-06

## 2025-04-05 RX ORDER — VANCOMYCIN HCL IN 5 % DEXTROSE 1.5G/250ML
1000 PLASTIC BAG, INJECTION (ML) INTRAVENOUS EVERY 12 HOURS
Refills: 0 | Status: DISCONTINUED | OUTPATIENT
Start: 2025-04-05 | End: 2025-04-05

## 2025-04-05 RX ORDER — MAGNESIUM SULFATE 500 MG/ML
1 SYRINGE (ML) INJECTION ONCE
Refills: 0 | Status: COMPLETED | OUTPATIENT
Start: 2025-04-05 | End: 2025-04-05

## 2025-04-05 RX ORDER — METOPROLOL SUCCINATE 50 MG/1
12.5 TABLET, EXTENDED RELEASE ORAL ONCE
Refills: 0 | Status: COMPLETED | OUTPATIENT
Start: 2025-04-05 | End: 2025-04-05

## 2025-04-05 RX ORDER — POLYETHYLENE GLYCOL 3350 17 G/17G
17 POWDER, FOR SOLUTION ORAL DAILY
Refills: 0 | Status: DISCONTINUED | OUTPATIENT
Start: 2025-04-05 | End: 2025-04-08

## 2025-04-05 RX ORDER — METOPROLOL SUCCINATE 50 MG/1
12.5 TABLET, EXTENDED RELEASE ORAL
Refills: 0 | Status: DISCONTINUED | OUTPATIENT
Start: 2025-04-05 | End: 2025-04-06

## 2025-04-05 RX ORDER — IPRATROPIUM BROMIDE AND ALBUTEROL SULFATE .5; 2.5 MG/3ML; MG/3ML
3 SOLUTION RESPIRATORY (INHALATION) EVERY 6 HOURS
Refills: 0 | Status: DISCONTINUED | OUTPATIENT
Start: 2025-04-05 | End: 2025-04-07

## 2025-04-05 RX ORDER — ACETAMINOPHEN 500 MG/5ML
650 LIQUID (ML) ORAL EVERY 6 HOURS
Refills: 0 | Status: DISCONTINUED | OUTPATIENT
Start: 2025-04-05 | End: 2025-04-08

## 2025-04-05 RX ORDER — APIXABAN 2.5 MG/1
5 TABLET, FILM COATED ORAL EVERY 12 HOURS
Refills: 0 | Status: DISCONTINUED | OUTPATIENT
Start: 2025-04-05 | End: 2025-04-08

## 2025-04-05 RX ADMIN — TAMSULOSIN HYDROCHLORIDE 0.4 MILLIGRAM(S): 0.4 CAPSULE ORAL at 21:34

## 2025-04-05 RX ADMIN — METOPROLOL SUCCINATE 12.5 MILLIGRAM(S): 50 TABLET, EXTENDED RELEASE ORAL at 14:00

## 2025-04-05 RX ADMIN — Medication 100 MILLIEQUIVALENT(S): at 08:16

## 2025-04-05 RX ADMIN — IPRATROPIUM BROMIDE AND ALBUTEROL SULFATE 3 MILLILITER(S): .5; 2.5 SOLUTION RESPIRATORY (INHALATION) at 14:05

## 2025-04-05 RX ADMIN — METOPROLOL SUCCINATE 12.5 MILLIGRAM(S): 50 TABLET, EXTENDED RELEASE ORAL at 18:03

## 2025-04-05 RX ADMIN — IPRATROPIUM BROMIDE AND ALBUTEROL SULFATE 3 MILLILITER(S): .5; 2.5 SOLUTION RESPIRATORY (INHALATION) at 00:22

## 2025-04-05 RX ADMIN — Medication 40 MILLIGRAM(S): at 14:00

## 2025-04-05 RX ADMIN — Medication 1 APPLICATION(S): at 05:25

## 2025-04-05 RX ADMIN — ENOXAPARIN SODIUM 63 MILLIGRAM(S): 100 INJECTION SUBCUTANEOUS at 05:25

## 2025-04-05 RX ADMIN — IPRATROPIUM BROMIDE AND ALBUTEROL SULFATE 3 MILLILITER(S): .5; 2.5 SOLUTION RESPIRATORY (INHALATION) at 04:09

## 2025-04-05 RX ADMIN — Medication 100 MILLIGRAM(S): at 18:03

## 2025-04-05 RX ADMIN — METOPROLOL SUCCINATE 5 MILLIGRAM(S): 50 TABLET, EXTENDED RELEASE ORAL at 00:59

## 2025-04-05 RX ADMIN — CEFEPIME 2000 MILLIGRAM(S): 2 INJECTION, POWDER, FOR SOLUTION INTRAVENOUS at 05:24

## 2025-04-05 RX ADMIN — APIXABAN 5 MILLIGRAM(S): 2.5 TABLET, FILM COATED ORAL at 18:03

## 2025-04-05 RX ADMIN — IPRATROPIUM BROMIDE AND ALBUTEROL SULFATE 3 MILLILITER(S): .5; 2.5 SOLUTION RESPIRATORY (INHALATION) at 21:12

## 2025-04-05 RX ADMIN — ATORVASTATIN CALCIUM 10 MILLIGRAM(S): 80 TABLET, FILM COATED ORAL at 21:34

## 2025-04-05 RX ADMIN — CEFEPIME 2000 MILLIGRAM(S): 2 INJECTION, POWDER, FOR SOLUTION INTRAVENOUS at 14:00

## 2025-04-05 RX ADMIN — Medication 100 MILLIGRAM(S): at 05:24

## 2025-04-05 RX ADMIN — CEFEPIME 2000 MILLIGRAM(S): 2 INJECTION, POWDER, FOR SOLUTION INTRAVENOUS at 21:34

## 2025-04-05 RX ADMIN — Medication 100 GRAM(S): at 08:25

## 2025-04-05 NOTE — PROGRESS NOTE ADULT - SUBJECTIVE AND OBJECTIVE BOX
CC: Follow up    INTERVAL HPI/OVERNIGHT EVENTS: Patient seen and examined, awake and alert this morning. Answering her name  able to recognize her son. Afebrile. BP stable.       Vital Signs Last 24 Hrs  T(C): 36.7 (2025 07:50), Max: 38.1 (2025 09:00)  T(F): 98 (2025 07:50), Max: 100.6 (2025 09:00)  HR: 98 (2025 06:00) (80 - 136)  BP: 100/57 (2025 06:00) (88/51 - 120/54)  BP(mean): 68 (2025 06:00) (61 - 88)  RR: 20 (2025 06:00) (18 - 22)  SpO2: 100% (2025 06:00) (96% - 100%)    Parameters below as of 2025 06:00  Patient On (Oxygen Delivery Method): nasal cannula  O2 Flow (L/min): 4      PHYSICAL EXAM:    GENERAL: NAD, AOx3  HEAD:  Atraumatic, Normocephalic  EYES: conjunctiva and sclera clear  CHEST/LUNG: Bibasilar crackles   HEART: IRRR; No murmurs, rubs, or gallops  ABDOMEN: Soft, Nontender, Nondistended; Bowel sounds present  + Rodriguez   EXTREMITIES:  2+ Peripheral Pulses, No clubbing, cyanosis, or edema        MEDICATIONS  (STANDING):  albuterol/ipratropium for Nebulization 3 milliLiter(s) Nebulizer every 4 hours  cefepime  Injectable. 2000 milliGRAM(s) IV Push every 8 hours  chlorhexidine 2% Cloths 1 Application(s) Topical daily  dextrose 5%. 1000 milliLiter(s) (50 mL/Hr) IV Continuous <Continuous>  dextrose 5%. 1000 milliLiter(s) (100 mL/Hr) IV Continuous <Continuous>  dextrose 50% Injectable 25 Gram(s) IV Push once  dextrose 50% Injectable 12.5 Gram(s) IV Push once  dextrose 50% Injectable 25 Gram(s) IV Push once  enoxaparin Injectable 63 milliGRAM(s) SubCutaneous every 12 hours  glucagon  Injectable 1 milliGRAM(s) IntraMuscular once  hydrocortisone sodium succinate Injectable 100 milliGRAM(s) IV Push every 8 hours  insulin lispro (ADMELOG) corrective regimen sliding scale   SubCutaneous every 6 hours  metoprolol tartrate Injectable 5 milliGRAM(s) IV Push every 6 hours  pantoprazole  Injectable 40 milliGRAM(s) IV Push daily  vancomycin  IVPB 1000 milliGRAM(s) IV Intermittent every 12 hours    MEDICATIONS  (PRN):  bisacodyl Suppository 10 milliGRAM(s) Rectal daily PRN Constipation  dextrose Oral Gel 15 Gram(s) Oral once PRN Blood Glucose LESS THAN 70 milliGRAM(s)/deciliter      Allergies    penicillins (Hives)    Intolerances          LABS:                          10.4   6.94  )-----------( 134      ( 2025 05:34 )             31.3     04-05    137  |  103  |  21.1[H]  ----------------------------<  121[H]  3.7   |  22.0  |  0.43[L]    Ca    7.8[L]      2025 05:34  Phos  4.5     04-05  Mg     1.8     04-05    TPro  4.9[L]  /  Alb  2.6[L]  /  TBili  0.7  /  DBili  x   /  AST  16  /  ALT  15  /  AlkPhos  41  04-05      Urinalysis Basic - ( 2025 05:34 )    Color: x / Appearance: x / SG: x / pH: x  Gluc: 121 mg/dL / Ketone: x  / Bili: x / Urobili: x   Blood: x / Protein: x / Nitrite: x   Leuk Esterase: x / RBC: x / WBC x   Sq Epi: x / Non Sq Epi: x / Bacteria: x        RADIOLOGY & ADDITIONAL TESTS:

## 2025-04-05 NOTE — PROGRESS NOTE ADULT - ASSESSMENT
89 year old female with PMHx of Afib (off Eliquis), PPM, HTN, HLD, GERD, severe MR/TR, iatrogenic adrenal insufficiency (on prednisone), lymphedema, metastatic melanoma s/p Left foot melanoma and lymph node resection, s/p RUE melanoma and Right axillary lymph node excision at [facility] (3/18/25). Patient with a recent admission 3/21 -3/25/25  when she initially presented to [facility] and was transferred to [facility] as a stroke code. At [facility] hospital course significant for s/p cerebral angiogram for mechanical thrombectomy of L M1 occlusion, TICI 3 (one pass) on 3/21/25, admission to NSICU. Patient was discharged and presented back to [facility] 3/26 with L sided weakness and aphasia transferred to [facility] for stroke code and is now  s/p cerebral angiogram for mechanical thrombectomy of Rt M2 occlusion, TICI 3 (one pass) on 3/26/25. Has been in NSICU since. 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 Patient also noted to have incidental finding of new 1.7 cm cystic lesion at the pancreatic body found on CT a/p on 3/28/25 and was recommended outpatient GI follow up. On 3/29 patient was noted to have acute change in neuro exam became acutely aphasic. New radiology reporting + LM2 occulusion. Patient went to Neuro IR and is s/p thrombectomy of left M4 with TICI 0. Was on Nitrofurantoin for a UTI 4/2 - 4/4. 4/4 early AM patient noted with fever and sepsis work up was done, started on Cefepime, remained febrile this AM. ID input requested.       Fever  Acute hypoxic respiratory failure  Hypotension   Multiple CVA  s/p cerebral angiogram for mechanical thrombectomy of L M1 occlusion, TICI 3 (one pass) on 3/21/25  s/p cerebral angiogram for mechanical thrombectomy of Rt M2 occlusion, TICI 3 (one pass) on 3/26/25  s/p thrombectomy of left M4 with TICI 0 3/29/25  R fem artery pseudoaneurysm  s/p thrombin injection on 03/27/25  s/p bedside thrombin injection 3/28/25  A fib  PPM present       - Blood cultures 3/26, 4/4  no growth   - RVP/COVID 19 PCR 4/4 negative   - UA 4/2 with some pyuria   - Urine Cx 3/26 no growth 4/3 contaminated   - CXR 4/4 reviewed and compared to 3/29, not much change noted.   - CT Chest with contrast reporting no PE, + Atelectasis   - Should be evaluated for DARIUSZ given multiple CVA  - Procalcitonin level  1.01 (3/26) --> 0.37 (4/4)  - Continue Cefepime  - D/C Vancomycin  - Hold off on PICC/Midline for now unless needed for reasons other than infectious diseases  - Follow up cultures  - Trend Fever  - Trend WBC      Thank you for allowing me to participate in the care of your patient.   Will Follow    Discussed treatment plan with: Neurology Team and clinical pharmacy

## 2025-04-05 NOTE — SWALLOW BEDSIDE ASSESSMENT ADULT - PHARYNGEAL PHASE
Within functional limits No overt s/s aspiration across both mildly thick and thin liquid trials. Pt with report of mildly thick liquid as "easier" and "better"  Will therefore trial mildly thick liquids with plan to reassess at bedside./Within functional limits

## 2025-04-05 NOTE — PROGRESS NOTE ADULT - SUBJECTIVE AND OBJECTIVE BOX
[facility]  INFECTIOUS DISEASES at [place] / [place] / [place]  =======================================================                              Osmin Christianson MD                              Professor Emeritus:  Dr Piter Keane MD            Diplomates American Board of Internal Medicine & Infectious Diseases                                   Tel  [phone] Fax [phone]                                  Hospital Consult line:  [phone]  =======================================================      [place] [id number]    Follow up: Fever     No fevers     Allergies:  penicillins (Hives)       REVIEW OF SYSTEMS:  CONSTITUTIONAL:  No Fever or chills  HEENT:   No diplopia or blurred vision.  No earache, sore throat or runny nose.  CARDIOVASCULAR:  No Chest Pain  RESPIRATORY:  No cough, shortness of breath  GASTROINTESTINAL:  No nausea, vomiting or diarrhea.  GENITOURINARY:  No dysuria, frequency or urgency. No Blood in urine  MUSCULOSKELETAL:  no joint aches, no muscle pain  SKIN:  No change in skin, hair or nails.  NEUROLOGIC:  No Headaches      Physical Exam:  GEN: NAD  HEENT: normocephalic and atraumatic. EOMI. PERRL.    NECK: Supple.   LUNGS: CTA B/L.  HEART: RRR  ABDOMEN: Soft, NT, ND.  +BS.    : No CVA tenderness  EXTREMITIES: Without  edema.  MSK: No joint swelling  NEUROLOGIC: No Focal Deficits   SKIN: No rash      Vitals:  T(F): 98 (05 Apr 2025 07:50), Max: 100 (04 Apr 2025 10:00)  HR: 108 (05 Apr 2025 08:00)  BP: 119/70 (05 Apr 2025 08:00)  RR: 20 (05 Apr 2025 08:00)  SpO2: 100% (05 Apr 2025 08:00) (96% - 100%)  temp max in last 48H T(F): , Max: 101.8 (04-04-25 @ 07:30)    Current Antibiotics:  cefepime  Injectable. 2000 milliGRAM(s) IV Push every 8 hours  vancomycin  IVPB 1000 milliGRAM(s) IV Intermittent every 12 hours    Other medications:  albuterol/ipratropium for Nebulization 3 milliLiter(s) Nebulizer every 6 hours  chlorhexidine 2% Cloths 1 Application(s) Topical daily  dextrose 5%. 1000 milliLiter(s) IV Continuous <Continuous>  dextrose 5%. 1000 milliLiter(s) IV Continuous <Continuous>  dextrose 50% Injectable 25 Gram(s) IV Push once  dextrose 50% Injectable 12.5 Gram(s) IV Push once  dextrose 50% Injectable 25 Gram(s) IV Push once  enoxaparin Injectable 63 milliGRAM(s) SubCutaneous every 12 hours  glucagon  Injectable 1 milliGRAM(s) IntraMuscular once  hydrocortisone sodium succinate Injectable 100 milliGRAM(s) IV Push two times a day  insulin lispro (ADMELOG) corrective regimen sliding scale   SubCutaneous every 6 hours  metoprolol tartrate Injectable 5 milliGRAM(s) IV Push every 6 hours  pantoprazole  Injectable 40 milliGRAM(s) IV Push daily                 10.4   6.94  )-----------( 134      ( 05 Apr 2025 05:34 )             31.3     04-05    137  |  103  |  21.1[H]  ----------------------------<  121[H]  3.7   |  22.0  |  0.43[L]    Ca    7.8[L]      05 Apr 2025 05:34  Phos  4.5     04-05  Mg     1.8     04-05    TPro  4.9[L]  /  Alb  2.6[L]  /  TBili  0.7  /  DBili  x   /  AST  16  /  ALT  15  /  AlkPhos  41  04-05    RECENT CULTURES:  04-04 @ 01:50 Blood Blood     No growth at 24 hours    04-04 @ 00:55    RVP  NotDetec    04-03 @ 00:00 Clean Catch Clean Catch (Midstream)     >=3 organisms. Probable collection contamination.    03-26 @ 17:20 Blood Blood     No growth at 5 days    03-26 @ 16:47 Clean Catch Clean Catch (Midstream)     No growth      WBC Count: 6.94 K/uL (04-05-25 @ 05:34)  WBC Count: 6.88 K/uL (04-04-25 @ 12:19)  WBC Count: 8.07 K/uL (04-04-25 @ 01:50)  WBC Count: 7.04 K/uL (04-03-25 @ 03:20)  WBC Count: 8.75 K/uL (04-02-25 @ 02:35)  WBC Count: 10.78 K/uL (04-01-25 @ 16:00)  WBC Count: 9.62 K/uL (04-01-25 @ 01:10)    Creatinine: 0.43 mg/dL (04-05-25 @ 05:34)  Creatinine: 0.58 mg/dL (04-04-25 @ 12:19)  Creatinine: 0.52 mg/dL (04-04-25 @ 01:50)  Creatinine: 0.57 mg/dL (04-03-25 @ 03:20)  Creatinine: 0.51 mg/dL (04-02-25 @ 02:35)  Creatinine: 0.54 mg/dL (04-01-25 @ 16:00)  Creatinine: 0.53 mg/dL (04-01-25 @ 01:10)    Procalcitonin: 0.37 ng/mL (04-04-25 @ 08:10)  Procalcitonin: 1.01 ng/mL (03-26-25 @ 17:20)     SARS-CoV-2: NotDetec (04-04-25 @ 00:55)  SARS-CoV-2: NotDetec (03-26-25 @ 17:20)    Vancomycin Level, Trough: 8.4 ug/mL (04-05-25 @ 05:34)        < from: CT Angio Chest PE Protocol w/ IV Cont (04.04.25 @ 10:02) >  ACC: [id number] EXAM:  CT ANGIO CHEST PULM ART [facility]   ORDERED BY: JUAN RAMON FIELDS     PROCEDURE DATE:  04/04/2025      INTERPRETATION:  Clinical information: Evaluate for pulmonary embolus.   Exam is compared to previous study of 3/28/2025.    CTangiogram of the chest was obtained following administration of   intravenous contrast. Approximately 1 25 cc of Omnipaque 350 was   administered and 25 cc was discarded. Coronal, sagittal and MIP images   were submitted for review.    No hilar or mediastinal adenopathy is noted.    Heart is markedly enlarged in size. Calcification the coronary arteries   noted. Pacemaker is noted in place. No pericardial effusion is noted.   Pulmonary arteries are normal in caliber. No filling defects are noted.    Mucous/secretions are noted within the left lower lobe bronchus.   Near-complete atelectasis of both lower lobes is noted. Small bilateral   pleural effusions are noted.    Below the diaphragm, visualized portions of the abdomen are unremarkable.    Degenerative changes of the spine are noted.    IMPRESSION: No pulmonary embolus is noted.    --- End of Report ---    < end of copied text >

## 2025-04-05 NOTE — PROGRESS NOTE ADULT - ASSESSMENT
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  am.  Upon arrival to  ED she was noted to have lt sided facial droop; aphasic; left arm weakness; no TNK as out of window.  Code stroke activated;  CT imagining found patient with new rt mca; patient transferred to [facility] for thrombectomy. Pt transferred to [facility] EDUARDO eval, s/p cerebral angiogram for mechanical thrombectomy of Rt M2 occlusion, TICI 3 (one pass). She is admitted to NSICU for close monitoring post thrombectomy. Post procedure course complicated by groin site pseudoaneurysm requiring thrombin injection by IR. IV heparin was held. Repeat duplex showed persistent pseudoaneurysm requiring repeat injection on 3/29. Code stroke was called on 3/29 for altered mental status.   CTH/CTA/CTP ordered and CTA +new left M2 occlusion. ot. Pt extubated 3/20 to RA, passed swallow evaluation. Patient has a history of iatrogenic AI, endocrine was consulted and initially started on stress dose steroids weaned to PO prednisone. Noted to have leukocytosis on 4/2 with a positive UA, started on Po Macrobid.Medicine now consulted for persistent fever    Assessment/Plan:    1. Severe sepsis with Acute metabolic encephalopathy with Acute hypoxic respiratory failure   - Improving  - Suspected secondary to gram negative pneumonia   - BP stable, Afebrile  - Awake and alert x 3  - ON 4 liters NC, Continue Chest PT, ambulation out of bed Incentive spirometry, Change Duoneb to Q6 hours  Wean O2 as tolerated   - On chronic prednisone which is held for now; Continue Solucortef 100mg taper from Q8 to BID today  - UA on 4/2 positive, was started on macorbid, Blood culture negative.   Urine culture with contamination  - 4/4 COVID/RVP negative  - CTA of the chest negative for PE, Mucous/secretions are noted within the left lower lobe bronchus. Near-complete atelectasis of both lower lobes is noted. Small bilateral pleural effusions are noted.  - Continue IV Cefepime and Vancomycin prelim blood cultures negative thus far. MRSA PCR negative  - If blood cultures remain negative DC Vancomycin ID following   - Vitals Q2 hours    2. Acute on chronic diastolic CHF  - Given 10mg of IV Lasix x 2 yesterday  - CT chest with small bilateral pleural effusions, suspected atelectasis   - Follow up TTE reviewed, negative for thrombus   - Elevated Troponin suspsected demand ischemia in the setting of sepsis. No WMA abnormality noted on TTE    3. Multiple MCA stroke  - Status post thrombectomy  - Eliquis changed to Lovenox BID while NPo; ST re evaluation ordered. Once diet resumed can resume ELiquis PO   - Statin held while NPO    4., Iatrogenic AI  - On chronic prednisone  - Hold PO prednisone  - On Solu cortef in the setting of severe sepsis      5. Acute urinary retention   _Failed TOV, Rodriguez was replaced on 4/3     6. Afib  - Eliquis change to Lovenox while NPO  =- Metoprolol held for hypotension can resume PO metoprolol 12.5mg OD once passes ST eval     VTE- Change Eliquis to Lovenox BID while NPO       ST re evaluation ordered, once passes swallow evaluation. PO Medications can be resumed   -

## 2025-04-05 NOTE — PHARMACOTHERAPY INTERVENTION NOTE - COMMENTS
Ordered vanco level for 4/6 AM
As per policy, ordered vancomycin pre-3rd level for 4/5 with AM labs to help further assist with vancomycin dosing/monitoring.

## 2025-04-05 NOTE — SWALLOW BEDSIDE ASSESSMENT ADULT - SLP PERTINENT HISTORY OF CURRENT PROBLEM
Pt well known to this service, s/p multiple bedside swallow evaluations and MBS during prior admission. Pt last seen at bedside 4/2 w rx for regular/thin liquid diet. Pt with development of coughing, congestion, febrile on 4/4, low 02 sats necessitating use of 02 via nc. CXR 4/4 indicates clear lungs, concern for PNA vs CHF. Pt made NPO pending further evaluation by this service
As per MD note: "HPI: 89 year old female with PMHx of Afib (off Eliquis), recent LMCA territory stroke s/p LM1 thrombectomy, with residual minimal aphasia, MRS 1, PPM, HTN, HLD, GERD, severe MR/TR, iatrogenic adrenal insufficiency (on prednisone), lymphedema, metastatic melanoma s/p Left foot melanoma and lymph node resection, s/p RUE melanoma and Right axillary lymph node excision at [facility] (3/18/25) who presented to  ED today with L sided weakness and aphasia. CT neg, CTA showing RM2 occlusion.  Transferred here for thrombectomy, TICI 3 achieved after 1 pass. "
Pt known to this service from recent admission. MBS completed on 3/25/25 & recommendation made for regular diet w/ thin liquids. Pt again seen on 3/27 at bedside s/p new CVA with RX for regular diet, thin fluids. New order received as pt with subsequent new CVA

## 2025-04-05 NOTE — SWALLOW BEDSIDE ASSESSMENT ADULT - SWALLOW EVAL: RECOMMENDED FEEDING/EATING TECHNIQUES
crush medication (when feasible)/no straws/oral hygiene/position upright (90 degrees)/small sips/bites
allow for swallow between intakes/alternate food with liquid/maintain upright posture during/after eating for 30 mins/oral hygiene/position upright (90 degrees)/small sips/bites
allow for swallow between intakes/crush medication (when feasible)/maintain upright posture during/after eating for 30 mins/oral hygiene/position upright (90 degrees)/small sips/bites

## 2025-04-05 NOTE — PROGRESS NOTE ADULT - SUBJECTIVE AND OBJECTIVE BOX
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  am morning prior.  Upon arrival to  ED she was noted to have lt sided facial droop; aphasic; left arm weakness; no TNK as out of window/recent infarction.  code stroke activated;  CT imagining found patient with new rt mca; patient transferred to [facility] for thrombectomy. Pt transferred to [facility] EDUARDO eval, s/p cerebral angiogram for mechanical thrombectomy of Rt M2 occlusion, TICI 3 (one pass). She was admitted to NSICU for close monitoring post thrombectomy.Hospital course complicated by right hemiparesis and aphasia on 3/29/25. CTH negative for acute hemorrhage, CTA h/n + new Left MCA M2 occulusion and subsequently underwent 3rd thrombectomy on 3/29.     4/4: noted with hypoxia, hypotension and increased lethargy. Concern for sepsis 2/2 ?aspiration pna started on Cefepime + vanco ID, medicine, and cardio consulted.     SUBJECTIVE: No events overnight.  No new neurologic complaints. Unable to obtain complete ROS secondary to aphasia    albuterol/ipratropium for Nebulization 3 milliLiter(s) Nebulizer every 6 hours  bisacodyl Suppository 10 milliGRAM(s) Rectal daily PRN  cefepime  Injectable. 2000 milliGRAM(s) IV Push every 8 hours  chlorhexidine 2% Cloths 1 Application(s) Topical daily  dextrose 5%. 1000 milliLiter(s) IV Continuous <Continuous>  dextrose 5%. 1000 milliLiter(s) IV Continuous <Continuous>  dextrose 50% Injectable 25 Gram(s) IV Push once  dextrose 50% Injectable 12.5 Gram(s) IV Push once  dextrose 50% Injectable 25 Gram(s) IV Push once  dextrose Oral Gel 15 Gram(s) Oral once PRN  enoxaparin Injectable 63 milliGRAM(s) SubCutaneous every 12 hours  glucagon  Injectable 1 milliGRAM(s) IntraMuscular once  hydrocortisone sodium succinate Injectable 100 milliGRAM(s) IV Push two times a day  insulin lispro (ADMELOG) corrective regimen sliding scale   SubCutaneous every 6 hours  metoprolol tartrate Injectable 5 milliGRAM(s) IV Push every 6 hours  pantoprazole  Injectable 40 milliGRAM(s) IV Push daily  vancomycin  IVPB 1000 milliGRAM(s) IV Intermittent every 12 hours      PHYSICAL EXAM:   Vital Signs Last 24 Hrs  T(C): 36.7 (05 Apr 2025 07:50), Max: 37.8 (04 Apr 2025 10:00)  T(F): 98 (05 Apr 2025 07:50), Max: 100 (04 Apr 2025 10:00)  HR: 108 (05 Apr 2025 08:00) (80 - 136)  BP: 119/70 (05 Apr 2025 08:00) (93/49 - 120/54)  BP(mean): 84 (05 Apr 2025 08:00) (63 - 88)  RR: 20 (05 Apr 2025 08:00) (18 - 22)  SpO2: 100% (05 Apr 2025 08:00) (96% - 100%)    Parameters below as of 05 Apr 2025 08:00  Patient On (Oxygen Delivery Method): nasal cannula  O2 Flow (L/min): 4    COMPLETE EXAM PENDING   General: No acute distress.   HEENT: Head normocephalic, atraumatic. Conjunctivae clear w/o exudates or hemorrhage.    Cardiac: irregular rate and rhythm.    Respiratory: Lung sounds clear in all fields.    Abdominal: Soft, nondistended, nontender.   Skin: Skin is warm, dry     Extremities: No edema    Detailed Neurologic Exam:  Mental status: The patient is lethargic arouses to tactile stimuli requires frequent prompting able to follow single commands at times, the patient is able to name objects with choices    Cranial nerves: slight right facial palsy, moderate-severe dysarthria nearly anarthric hypophonic, Pupils equal and react symmetrically to light. There is no visual field deficit to confrontation. Extraocular motion is full with no nystagmus.    Motor: There is normal bulk and tone.  There is no tremor.  Strength is 4+/5 proximal 4/5 distal in the right arm and 5/5 right leg. Drifts.   Strength is 5/5 in the left arm and 4/5 left leg.  Sensation: Intact to light touch and pin in 4 extremities  Cerebellar: There is no dysmetria on finger to nose testing.                                                                                                    Gait : deferred    LABS:                        10.4   6.94  )-----------( 134      ( 05 Apr 2025 05:34 )             31.3    04-05    137  |  103  |  21.1[H]  ----------------------------<  121[H]  3.7   |  22.0  |  0.43[L]    Ca    7.8[L]      05 Apr 2025 05:34  Phos  4.5     04-05  Mg     1.8     04-05    TPro  4.9[L]  /  Alb  2.6[L]  /  TBili  0.7  /  DBili  x   /  AST  16  /  ALT  15  /  AlkPhos  41  04-05      LDL: 59  A1C: 5.5    IMAGING: Neuro-Imaging  CT Head No Cont (04.04.25 @ 10:00)   IMPRESSION: Stable subacute bilateral infarctions, most pronounced in   left basal ganglia. Trace left frontal lobe petechial hemorrhage versus   spared cortex. Continued follow-up is recommended.    CT Head No Cont (04.02.25 @ 09:41)   IMPRESSION: Subacute infarcts are again seen bilaterally as described   above    Acute infarct is now seen involving the left basal ganglia region.    MR Head w/wo IV Cont (03.31.25 @ 10:09)   IMPRESSION:  New acute infarctions within the LEFT lateral frontal   cortex, LEFT occipital cortex, RIGHT insular cortex and scattered RIGHT   frontal, parietal, temporal and occipital cortex suggesting embolic   etiology. Subacute infarction within the LEFT basal ganglia. Mild   periventricular chronic white matter ischemia.    IR Neuro (03.29.25 @ 09:10)   Supervision and Interpretation:  1. Interval recanalization of the left MCA M2 occlusion seen byCT   angiography with persistent posterior frontal distal M4 branch occlusion.   Attempted thrombectomy was unsuccessful because the branch was too small   to be accessed by the red 43 thrombectomy catheter.    CT Head No Cont (03.29.25 @ 20:56)   IMPRESSION: No acute intracranial hemorrhage, mass effect, or shift of   the midline structures.  Evolving acute left frontal lobe superior division MCA infarct without   hemorrhagic transformation.    CT Head No Cont (03.29.25 @ 06:54)   Patchy hypoattenuation related to evolving acute/subacute bilateral MCA   territory infarcts. No intracranial hemorrhage or midline shift present.    CT Angio Head/Neck Stroke Protocol w/ IV Cont, CT Perfusion (03.29.25 @ 06:56)   IMPRESSION:  CT PERFUSION:  Signal abnormality on T-Max/mean transit time data sets at the left   frontal lobe suggest territory-and-risk within the left MCA distribution,   corresponding to angiographic findings below. Cerebral blood flow and   cerebral blood volume data sets remain within normal limits.  Neurointerventional/neurosurgical consultation recommended. MRI may be   considered for further characterization.  CTA NECK:  No evidence of significant stenosis or occlusion.  Previously identified narrowing of the right cervical ICA with   questionable arterial dissection appears less conspicuous on the current   exam, suggesting at least partial recanalization. MRA (dissection   protocol) considered for further evaluation.  CTA HEAD:  Acute left M2 occlusion identified.  Critical findings above were discussed directly by telephone with   ordering provider, Aric PINEDA, on 3/29/2025 at 7:11 AM by Dr. Louie Worthy with read back confirmation.    CT Head No Cont (03.27.25 @ 05:43)  IMPRESSION: Age-appropriate involutional and ischemic gliotic changes. No   hemorrhage. No significant change since 3/26/2025.    CT Head No Cont (03.27.25 @ 05:43)   IMPRESSION: Age-appropriate involutional and ischemic gliotic changes. No   hemorrhage. No significant change since 3/26/2025.    CT Angio Head/Neck Stroke Protocol w/ IV Cont (03.26.25 @ 08:52)   1. RIGHT CAROTID SYSTEM:    Atherosclerotic plaque carotid bulb without    hemodynamically significant stenosis. Tandem lesion distal internal   carotid artery at the C1 level has developed since 3/21/2025, possible   interval arterial dissection, with associated luminal narrowing   approximately 30%  2.   LEFT CAROTID SYSTEM:     Atherosclerotic plaque carotid bulb without    hemodynamically significant stenosis.  3.   VERTEBRAL CIRCULATION:    Patent.  4.  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 Right MCA occlusion   in its proximal M2 segment is an interval finding since 3/21/2025. Left   MCA remains patent with luminal irregularity and low-grade narrowing   following recent thrombectomy.  5.  POSTERIOR INTRACRANIAL CIRCULATION:    Patent.  6. BRAIN PERFUSION:   No acute infarction of the brain is convincingly   demonstrated.  However, broad region of apparent ischemia corresponds to   the right MCA distribution correlating with acute embolic occlusion on CT   angiography  7. Heterogeneous enhancement within the principal dural sinuses may be   secondary to the early phase of venous opacification on this arterial   phase examination. The appearance is similar to 3/21/2025. Consider   supplemental evaluation with MR venogram  --  CT   CT Angio Chest PE Protocol w/ IV Cont (04.04.25 @ 10:02)   IMPRESSION: No pulmonary embolus is noted.  ---  ULTRASOUND   US Pseudoaneurysm Injection (03.28.25 @ 09:41)   Successful thrombin and ultrasound-guided compression of the right groin   pseudoaneurysm.    US Duplex Arterial Lower Ext Ltd, Right (03.27.25 @ 04:09)   IMPRESSION:  A 1.7 cm right CFA pseudoaneurysm.  A 2.5 cm adjacent hematoma.  --  CARDIOLOGY   TTE W or WO Ultrasound Enhancing Agent (03.26.25 @ 18:15)    1. Technically difficult image quality.   2. Left ventricular cavity is normal in size. Left ventricular systolic function is hyperdynamic with an ejectionfraction visually estimated at 65 to 70 %.   3. Mild left ventricular hypertrophy.   4. Normal right ventricular cavity size and normal right ventricular systolic function.   5. Left atrium is severely dilated.   6. The right atrium is severely dilated.     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  am morning prior.  Upon arrival to  ED she was noted to have lt sided facial droop; aphasic; left arm weakness; no TNK as out of window/recent infarction.  code stroke activated;  CT imagining found patient with new rt mca; patient transferred to [facility] for thrombectomy. Pt transferred to [facility] EDUARDO eval, s/p cerebral angiogram for mechanical thrombectomy of Rt M2 occlusion, TICI 3 (one pass). She was admitted to NSICU for close monitoring post thrombectomy.Hospital course complicated by right hemiparesis and aphasia on 3/29/25. CTH negative for acute hemorrhage, CTA h/n + new Left MCA M2 occulusion and subsequently underwent 3rd thrombectomy on 3/29.     4/4: noted with hypoxia, hypotension and increased lethargy. Concern for sepsis 2/2 ?aspiration pna started on Cefepime + vanco ID, medicine, and cardio consulted.     SUBJECTIVE: No events overnight.  No new neurologic complaints. Unable to obtain complete ROS secondary to aphasia    albuterol/ipratropium for Nebulization 3 milliLiter(s) Nebulizer every 6 hours  bisacodyl Suppository 10 milliGRAM(s) Rectal daily PRN  cefepime  Injectable. 2000 milliGRAM(s) IV Push every 8 hours  chlorhexidine 2% Cloths 1 Application(s) Topical daily  dextrose 5%. 1000 milliLiter(s) IV Continuous <Continuous>  dextrose 5%. 1000 milliLiter(s) IV Continuous <Continuous>  dextrose 50% Injectable 25 Gram(s) IV Push once  dextrose 50% Injectable 12.5 Gram(s) IV Push once  dextrose 50% Injectable 25 Gram(s) IV Push once  dextrose Oral Gel 15 Gram(s) Oral once PRN  enoxaparin Injectable 63 milliGRAM(s) SubCutaneous every 12 hours  glucagon  Injectable 1 milliGRAM(s) IntraMuscular once  hydrocortisone sodium succinate Injectable 100 milliGRAM(s) IV Push two times a day  insulin lispro (ADMELOG) corrective regimen sliding scale   SubCutaneous every 6 hours  metoprolol tartrate Injectable 5 milliGRAM(s) IV Push every 6 hours  pantoprazole  Injectable 40 milliGRAM(s) IV Push daily  vancomycin  IVPB 1000 milliGRAM(s) IV Intermittent every 12 hours      PHYSICAL EXAM:   Vital Signs Last 24 Hrs  T(C): 36.7 (05 Apr 2025 07:50), Max: 37.8 (04 Apr 2025 10:00)  T(F): 98 (05 Apr 2025 07:50), Max: 100 (04 Apr 2025 10:00)  HR: 108 (05 Apr 2025 08:00) (80 - 136)  BP: 119/70 (05 Apr 2025 08:00) (93/49 - 120/54)  BP(mean): 84 (05 Apr 2025 08:00) (63 - 88)  RR: 20 (05 Apr 2025 08:00) (18 - 22)  SpO2: 100% (05 Apr 2025 08:00) (96% - 100%)    Parameters below as of 05 Apr 2025 08:00  Patient On (Oxygen Delivery Method): nasal cannula  O2 Flow (L/min): 4    General: No acute distress.   HEENT: Head normocephalic, atraumatic. Conjunctivae clear w/o exudates or hemorrhage.    Cardiac: irregular rate and rhythm.    Respiratory: Lung sounds clear in all fields.    Abdominal: Soft, nondistended, nontender.   Skin: Skin is warm, dry     Extremities: No edema    Detailed Neurologic Exam:  Mental status: Awake, alert, oriented x3 w/ choices; expressive aphasia w/ word finding difficulty    Cranial nerves: slight right facial palsy, mild to moderate dysarthria, Pupils equal and react symmetrically to light. There is no visual field deficit to confrontation. Extraocular motion is full with no nystagmus.    Motor: There is normal bulk and tone.  There is no tremor.  Strength is 4+/5 proximal 4/5 distal in the right arm and 5/5 right leg. Drifts.   Strength is 5/5 in the left arm and 4/5 left leg.  Sensation: Intact to light touch and pin in 4 extremities  Cerebellar: There is no dysmetria on finger to nose testing.                                                                                                    Gait : deferred    LABS:                        10.4   6.94  )-----------( 134      ( 05 Apr 2025 05:34 )             31.3    04-05    137  |  103  |  21.1[H]  ----------------------------<  121[H]  3.7   |  22.0  |  0.43[L]    Ca    7.8[L]      05 Apr 2025 05:34  Phos  4.5     04-05  Mg     1.8     04-05    TPro  4.9[L]  /  Alb  2.6[L]  /  TBili  0.7  /  DBili  x   /  AST  16  /  ALT  15  /  AlkPhos  41  04-05      LDL: 59  A1C: 5.5    IMAGING: Neuro-Imaging  CT Head No Cont (04.04.25 @ 10:00)   IMPRESSION: Stable subacute bilateral infarctions, most pronounced in   left basal ganglia. Trace left frontal lobe petechial hemorrhage versus   spared cortex. Continued follow-up is recommended.    CT Head No Cont (04.02.25 @ 09:41)   IMPRESSION: Subacute infarcts are again seen bilaterally as described   above    Acute infarct is now seen involving the left basal ganglia region.    MR Head w/wo IV Cont (03.31.25 @ 10:09)   IMPRESSION:  New acute infarctions within the LEFT lateral frontal   cortex, LEFT occipital cortex, RIGHT insular cortex and scattered RIGHT   frontal, parietal, temporal and occipital cortex suggesting embolic   etiology. Subacute infarction within the LEFT basal ganglia. Mild   periventricular chronic white matter ischemia.    IR Neuro (03.29.25 @ 09:10)   Supervision and Interpretation:  1. Interval recanalization of the left MCA M2 occlusion seen byCT   angiography with persistent posterior frontal distal M4 branch occlusion.   Attempted thrombectomy was unsuccessful because the branch was too small   to be accessed by the red 43 thrombectomy catheter.    CT Head No Cont (03.29.25 @ 20:56)   IMPRESSION: No acute intracranial hemorrhage, mass effect, or shift of   the midline structures.  Evolving acute left frontal lobe superior division MCA infarct without   hemorrhagic transformation.    CT Head No Cont (03.29.25 @ 06:54)   Patchy hypoattenuation related to evolving acute/subacute bilateral MCA   territory infarcts. No intracranial hemorrhage or midline shift present.    CT Angio Head/Neck Stroke Protocol w/ IV Cont, CT Perfusion (03.29.25 @ 06:56)   IMPRESSION:  CT PERFUSION:  Signal abnormality on T-Max/mean transit time data sets at the left   frontal lobe suggest territory-and-risk within the left MCA distribution,   corresponding to angiographic findings below. Cerebral blood flow and   cerebral blood volume data sets remain within normal limits.  Neurointerventional/neurosurgical consultation recommended. MRI may be   considered for further characterization.  CTA NECK:  No evidence of significant stenosis or occlusion.  Previously identified narrowing of the right cervical ICA with   questionable arterial dissection appears less conspicuous on the current   exam, suggesting at least partial recanalization. MRA (dissection   protocol) considered for further evaluation.  CTA HEAD:  Acute left M2 occlusion identified.  Critical findings above were discussed directly by telephone with   ordering provider, Aric PINEDA, on 3/29/2025 at 7:11 AM by Dr. Louie Worthy with read back confirmation.    CT Head No Cont (03.27.25 @ 05:43)  IMPRESSION: Age-appropriate involutional and ischemic gliotic changes. No   hemorrhage. No significant change since 3/26/2025.    CT Head No Cont (03.27.25 @ 05:43)   IMPRESSION: Age-appropriate involutional and ischemic gliotic changes. No   hemorrhage. No significant change since 3/26/2025.    CT Angio Head/Neck Stroke Protocol w/ IV Cont (03.26.25 @ 08:52)   1. RIGHT CAROTID SYSTEM:    Atherosclerotic plaque carotid bulb without    hemodynamically significant stenosis. Tandem lesion distal internal   carotid artery at the C1 level has developed since 3/21/2025, possible   interval arterial dissection, with associated luminal narrowing   approximately 30%  2.   LEFT CAROTID SYSTEM:     Atherosclerotic plaque carotid bulb without    hemodynamically significant stenosis.  3.   VERTEBRAL CIRCULATION:    Patent.  4.  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 Right MCA occlusion   in its proximal M2 segment is an interval finding since 3/21/2025. Left   MCA remains patent with luminal irregularity and low-grade narrowing   following recent thrombectomy.  5.  POSTERIOR INTRACRANIAL CIRCULATION:    Patent.  6. BRAIN PERFUSION:   No acute infarction of the brain is convincingly   demonstrated.  However, broad region of apparent ischemia corresponds to   the right MCA distribution correlating with acute embolic occlusion on CT   angiography  7. Heterogeneous enhancement within the principal dural sinuses may be   secondary to the early phase of venous opacification on this arterial   phase examination. The appearance is similar to 3/21/2025. Consider   supplemental evaluation with MR venogram  --  CT   CT Angio Chest PE Protocol w/ IV Cont (04.04.25 @ 10:02)   IMPRESSION: No pulmonary embolus is noted.  ---  ULTRASOUND   US Pseudoaneurysm Injection (03.28.25 @ 09:41)   Successful thrombin and ultrasound-guided compression of the right groin   pseudoaneurysm.    US Duplex Arterial Lower Ext Ltd, Right (03.27.25 @ 04:09)   IMPRESSION:  A 1.7 cm right CFA pseudoaneurysm.  A 2.5 cm adjacent hematoma.  --  CARDIOLOGY   TTE W or WO Ultrasound Enhancing Agent (03.26.25 @ 18:15)    1. Technically difficult image quality.   2. Left ventricular cavity is normal in size. Left ventricular systolic function is hyperdynamic with an ejectionfraction visually estimated at 65 to 70 %.   3. Mild left ventricular hypertrophy.   4. Normal right ventricular cavity size and normal right ventricular systolic function.   5. Left atrium is severely dilated.   6. The right atrium is severely dilated.     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 after she was found at 7:30 3/26/25 with lt facial and left sided weakness.  am morning prior.  Upon arrival to  ED she was noted to have lt sided facial droop; aphasic; left arm weakness; no TNK as out of window/recent infarction.  code stroke activated;  CT imagining found patient with new rt mca; patient transferred to [facility] for thrombectomy. Pt transferred to [facility] EDUARDO eval, s/p cerebral angiogram for mechanical thrombectomy of Rt M2 occlusion, TICI 3 (one pass). She was admitted to NSICU for close monitoring post thrombectomy.Hospital course complicated by right hemiparesis and aphasia on 3/29/25. CTH negative for acute hemorrhage, CTA h/n + new Left MCA M2 occulusion and subsequently underwent 3rd thrombectomy on 3/29.     4/4: noted with hypoxia, hypotension and increased lethargy. Concern for sepsis 2/2 ?aspiration pna started on Cefepime + vanco ID, medicine, and cardio consulted.     SUBJECTIVE: No events overnight.  No new neurologic complaints. Unable to obtain complete ROS secondary to aphasia    albuterol/ipratropium for Nebulization 3 milliLiter(s) Nebulizer every 6 hours  bisacodyl Suppository 10 milliGRAM(s) Rectal daily PRN  cefepime  Injectable. 2000 milliGRAM(s) IV Push every 8 hours  chlorhexidine 2% Cloths 1 Application(s) Topical daily  dextrose 5%. 1000 milliLiter(s) IV Continuous <Continuous>  dextrose 5%. 1000 milliLiter(s) IV Continuous <Continuous>  dextrose 50% Injectable 25 Gram(s) IV Push once  dextrose 50% Injectable 12.5 Gram(s) IV Push once  dextrose 50% Injectable 25 Gram(s) IV Push once  dextrose Oral Gel 15 Gram(s) Oral once PRN  enoxaparin Injectable 63 milliGRAM(s) SubCutaneous every 12 hours  glucagon  Injectable 1 milliGRAM(s) IntraMuscular once  hydrocortisone sodium succinate Injectable 100 milliGRAM(s) IV Push two times a day  insulin lispro (ADMELOG) corrective regimen sliding scale   SubCutaneous every 6 hours  metoprolol tartrate Injectable 5 milliGRAM(s) IV Push every 6 hours  pantoprazole  Injectable 40 milliGRAM(s) IV Push daily  vancomycin  IVPB 1000 milliGRAM(s) IV Intermittent every 12 hours      PHYSICAL EXAM:   Vital Signs Last 24 Hrs  T(C): 36.7 (05 Apr 2025 07:50), Max: 37.8 (04 Apr 2025 10:00)  T(F): 98 (05 Apr 2025 07:50), Max: 100 (04 Apr 2025 10:00)  HR: 108 (05 Apr 2025 08:00) (80 - 136)  BP: 119/70 (05 Apr 2025 08:00) (93/49 - 120/54)  BP(mean): 84 (05 Apr 2025 08:00) (63 - 88)  RR: 20 (05 Apr 2025 08:00) (18 - 22)  SpO2: 100% (05 Apr 2025 08:00) (96% - 100%)    Parameters below as of 05 Apr 2025 08:00  Patient On (Oxygen Delivery Method): nasal cannula  O2 Flow (L/min): 4    General: No acute distress.   HEENT: Head normocephalic, atraumatic. Conjunctivae clear w/o exudates or hemorrhage.    Cardiac: irregular rate and rhythm.    Respiratory: Lung sounds clear in all fields.    Abdominal: Soft, nondistended, nontender.   Skin: Skin is warm, dry     Extremities: No edema    Detailed Neurologic Exam:  Mental status: Awake, alert, oriented x3 w/ choices; expressive aphasia w/ word finding difficulty    Cranial nerves: slight right facial palsy, mild to moderate dysarthria, Pupils equal and react symmetrically to light. There is no visual field deficit to confrontation. Extraocular motion is full with no nystagmus.    Motor: There is normal bulk and tone.  There is no tremor.  Strength is 4+/5 proximal 4/5 distal in the right arm and 5/5 right leg. Drifts.   Strength is 5/5 in the left arm and 4/5 left leg.  Sensation: Intact to light touch and pin in 4 extremities  Cerebellar: There is no dysmetria on finger to nose testing.                                                                                                    Gait : deferred    LABS:                        10.4   6.94  )-----------( 134      ( 05 Apr 2025 05:34 )             31.3    04-05    137  |  103  |  21.1[H]  ----------------------------<  121[H]  3.7   |  22.0  |  0.43[L]    Ca    7.8[L]      05 Apr 2025 05:34  Phos  4.5     04-05  Mg     1.8     04-05    TPro  4.9[L]  /  Alb  2.6[L]  /  TBili  0.7  /  DBili  x   /  AST  16  /  ALT  15  /  AlkPhos  41  04-05      LDL: 59  A1C: 5.5    IMAGING: Neuro-Imaging  CT Head No Cont (04.04.25 @ 10:00)   IMPRESSION: Stable subacute bilateral infarctions, most pronounced in   left basal ganglia. Trace left frontal lobe petechial hemorrhage versus   spared cortex. Continued follow-up is recommended.    CT Head No Cont (04.02.25 @ 09:41)   IMPRESSION: Subacute infarcts are again seen bilaterally as described   above    Acute infarct is now seen involving the left basal ganglia region.    MR Head w/wo IV Cont (03.31.25 @ 10:09)   IMPRESSION:  New acute infarctions within the LEFT lateral frontal   cortex, LEFT occipital cortex, RIGHT insular cortex and scattered RIGHT   frontal, parietal, temporal and occipital cortex suggesting embolic   etiology. Subacute infarction within the LEFT basal ganglia. Mild   periventricular chronic white matter ischemia.    IR Neuro (03.29.25 @ 09:10)   Supervision and Interpretation:  1. Interval recanalization of the left MCA M2 occlusion seen byCT   angiography with persistent posterior frontal distal M4 branch occlusion.   Attempted thrombectomy was unsuccessful because the branch was too small   to be accessed by the red 43 thrombectomy catheter.    CT Head No Cont (03.29.25 @ 20:56)   IMPRESSION: No acute intracranial hemorrhage, mass effect, or shift of   the midline structures.  Evolving acute left frontal lobe superior division MCA infarct without   hemorrhagic transformation.    CT Head No Cont (03.29.25 @ 06:54)   Patchy hypoattenuation related to evolving acute/subacute bilateral MCA   territory infarcts. No intracranial hemorrhage or midline shift present.    CT Angio Head/Neck Stroke Protocol w/ IV Cont, CT Perfusion (03.29.25 @ 06:56)   IMPRESSION:  CT PERFUSION:  Signal abnormality on T-Max/mean transit time data sets at the left   frontal lobe suggest territory-and-risk within the left MCA distribution,   corresponding to angiographic findings below. Cerebral blood flow and   cerebral blood volume data sets remain within normal limits.  Neurointerventional/neurosurgical consultation recommended. MRI may be   considered for further characterization.  CTA NECK:  No evidence of significant stenosis or occlusion.  Previously identified narrowing of the right cervical ICA with   questionable arterial dissection appears less conspicuous on the current   exam, suggesting at least partial recanalization. MRA (dissection   protocol) considered for further evaluation.  CTA HEAD:  Acute left M2 occlusion identified.  Critical findings above were discussed directly by telephone with   ordering provider, Aric PINEDA, on 3/29/2025 at 7:11 AM by Dr. Louie Worthy with read back confirmation.    CT Head No Cont (03.27.25 @ 05:43)  IMPRESSION: Age-appropriate involutional and ischemic gliotic changes. No   hemorrhage. No significant change since 3/26/2025.    CT Head No Cont (03.27.25 @ 05:43)   IMPRESSION: Age-appropriate involutional and ischemic gliotic changes. No   hemorrhage. No significant change since 3/26/2025.    CT Angio Head/Neck Stroke Protocol w/ IV Cont (03.26.25 @ 08:52)   1. RIGHT CAROTID SYSTEM:    Atherosclerotic plaque carotid bulb without    hemodynamically significant stenosis. Tandem lesion distal internal   carotid artery at the C1 level has developed since 3/21/2025, possible   interval arterial dissection, with associated luminal narrowing   approximately 30%  2.   LEFT CAROTID SYSTEM:     Atherosclerotic plaque carotid bulb without    hemodynamically significant stenosis.  3.   VERTEBRAL CIRCULATION:    Patent.  4.  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 Right MCA occlusion   in its proximal M2 segment is an interval finding since 3/21/2025. Left   MCA remains patent with luminal irregularity and low-grade narrowing   following recent thrombectomy.  5.  POSTERIOR INTRACRANIAL CIRCULATION:    Patent.  6. BRAIN PERFUSION:   No acute infarction of the brain is convincingly   demonstrated.  However, broad region of apparent ischemia corresponds to   the right MCA distribution correlating with acute embolic occlusion on CT   angiography  7. Heterogeneous enhancement within the principal dural sinuses may be   secondary to the early phase of venous opacification on this arterial   phase examination. The appearance is similar to 3/21/2025. Consider   supplemental evaluation with MR venogram  --  CT   CT Angio Chest PE Protocol w/ IV Cont (04.04.25 @ 10:02)   IMPRESSION: No pulmonary embolus is noted.  ---  ULTRASOUND   US Pseudoaneurysm Injection (03.28.25 @ 09:41)   Successful thrombin and ultrasound-guided compression of the right groin   pseudoaneurysm.    US Duplex Arterial Lower Ext Ltd, Right (03.27.25 @ 04:09)   IMPRESSION:  A 1.7 cm right CFA pseudoaneurysm.  A 2.5 cm adjacent hematoma.  --  CARDIOLOGY   TTE W or WO Ultrasound Enhancing Agent (03.26.25 @ 18:15)    1. Technically difficult image quality.   2. Left ventricular cavity is normal in size. Left ventricular systolic function is hyperdynamic with an ejectionfraction visually estimated at 65 to 70 %.   3. Mild left ventricular hypertrophy.   4. Normal right ventricular cavity size and normal right ventricular systolic function.   5. Left atrium is severely dilated.   6. The right atrium is severely dilated.

## 2025-04-05 NOTE — SWALLOW BEDSIDE ASSESSMENT ADULT - COMMENTS
As per MD note: "89 year old female with PMHx of Afib (off Eliquis), recent left MCA territory stroke s/p LM1 thrombectomy, with residual minimal aphasia, MRS 1, PPM, HTN, HLD, GERD, severe MR/TR, iatrogenic adrenal insufficiency (on prednisone), lymphedema, metastatic melanoma s/p Left foot melanoma and lymph node resection, s/p RUE melanoma and Right axillary lymph node excision at [facility] (3/18/25) who presented to  ED 3/26/36 with Lt sided weakness and aphasia. CT neg, CTA showed a  Rt M2 occlusion.  Transferred to [facility]UARDO intervention. S/P thrombectomy, TICI 3 achieved after 1 pass admitted to NSICU for post procedure monitoring started on IV Heparin.On the early morning of 3/29/25 pt received medications around 6am by RN report and subsequently was appreciated to be aphasic with right sided weakness.  CTH/CTA/CTP ordered. CTH negative for acute hemorrhage. CTA + new Left MCA M2 occulusion."
Pt known to this service from recent admission. MBS completed on 3/25/25 & recommendation made for regular diet w/ thin liquids, please see full reports for details.
The patient is an 89 year old female with a past medical history of Afib (on Eliquis), PPM, HTN, hx of RUE melanoma removal with R axillary lymph node removal, recent admission 3/21- 3/25 for lt MCA occlusion s/p thrombectomy presents as transfer from [facility] after she was found at 7:30 with lt facial and left sided weakness.  am.  Upon arrival to  ED she was noted to have lt sided facial droop; aphasic; left arm weakness; no TNK as out of window.  Code stroke activated;  CT imagining found patient with new rt mca; patient transferred to [facility] for thrombectomy. Pt transferred to [facility]UARDO eval, s/p cerebral angiogram for mechanical thrombectomy of Rt M2 occlusion, TICI 3 (one pass). She is admitted to NSICU for close monitoring post thrombectomy. Post procedure course complicated by groin site pseudoaneurysm requiring thrombin injection by IR. IV heparin was held. Repeat duplex showed persistent pseudoaneurysm requiring repeat injection on 3/29. Code stroke was called on 3/29 for altered mental status.   CTH/CTA/CTP ordered and CTA +new left M2 occlusion. ot. Pt extubated 3/20 to RA, passed swallow evaluation. Patient has a history of iatrogenic AI, endocrine was consulted and initially started on stress dose steroids weaned to PO prednisone. Noted to have leukocytosis on 4/2 with a positive UA, started on Po Macrobid.Medicine now consulted for persistent fever. Severe sepsis with Acute metabolic encephalopathy with Acute hypoxic respiratory failure   - Improving  - Suspected secondary to gram negative pneumonia   4/4 COVID/RVP negative  - CTA of the chest negative for PE, Mucous/secretions are noted within the left lower lobe bronchus. Near-complete atelectasis of both lower lobes is noted. Small bilateral pleural effusions are noted

## 2025-04-05 NOTE — PROGRESS NOTE ADULT - ASSESSMENT
COMPLETE A/P PENDING   ASSESSMENT: 89 year old female with PMHx of Afib (off Eliquis), recent left MCA territory stroke s/p left M1 thrombectomy, with residual minimal aphasia, MRS 1, PPM, HTN, HLD, GERD, severe MR/TR, iatrogenic adrenal insufficiency (on prednisone), lymphedema, metastatic melanoma s/p Left foot melanoma and lymph node resection, s/p RUE melanoma and Right axillary lymph node excision at [facility] (3/18/25) who presented to  ED 3/26/36 with Lt sided weakness and aphasia. CT neg, CTA showed a  Rt M2 occlusion.  Transferred to [facility]UARDO intervention. S/P thrombectomy, TICI 3 achieved after 1 pass started on IV Heparin and monitoring in NSICU post procedure. Hospital course complicated by right hemiparesis and aphasia on 3/29/25. CTH negative for acute hemorrhage, CTA h/n + new Left MCA M2 occulusion and subsequently underwent 3rd thrombectomy on 3/29.     4/4: noted with hypoxia, hypotension and increased lethargy. Concern for sepsis 2/2 ?aspiration pna started on Cefepime + vanco ID, medicine, and cardio consulted. Made NPO due to concern for aspiration.     IMPRESSION:   Recent left MCA cerebral infarction s/p Left MCA M1 thrombectomy on 3/21/2025. TICI 3 recanalization. Patient subsequently discharged and returned 3/26/25 with Rt MCA  m2 occlusion, revascularization TICI 3. On 3/29/25 patient found to have acute Left M2 MCA occlusion s/p thrombectomy TICI 0 -- distal M4 branch. MRI brain demonstrated acute bilateral multifocal cerebral infarctions. There is subacute left basal ganglia infarct.  Etiology likely cardioembolic in the setting of atrial fibrillation and underlying hypercoagulability from malignancy.       NEURO: Bihemispheric embolic infarcts s/p Thrombectomy x 3, Aphasia, R hemiparesis, Metabolic encephalopathy  -Neurologically ***  -Continue close monitoring for neurologic deterioration    -Stroke neuro checks q4 hours    -SBP goal 110-160mmhg for now avoiding rapid fluctuations and hypotension , neurologically tolerating at this time, eventual long term age/risk specific normotension   -ANTITHROMBOTIC THERAPY:  Currently on full dose lovenox secondary to NPO status. Pending SLP re-eval for PO access   -continue home statin regimen if applicable as LDL < goal of 70   -MRI imaging as noted   -Dysphagia screen: diet changed to NPO in the setting of lethargy. Pending SLP re-eval  -Physical therapy/OT/Speech eval/treatment.   -Stroke education     CARDIOVASCULAR: Hypotension, Afib  - TTE as noted , cardiac monitoring w/ telemetry for now, further evaluation pending findings of noted workup, continue rate control and titrate regimen accordingly             -Cardio consulted for concern for fluid overload, s/p IV lasix; BNP improved, hold off on additional lasix for now. May resume IVF if necessary for dehydration/IFEANYI.   -Patient w/ episodes of tachycardia this AM (110s), however IV metoprolol held due to concern for hypotension; pending SLP re-eval for PO access, once established re-initiate metoprolol 25mg BID                 HEMATOLOGY:   -H/H 10.4/31.3,, no active bleeding noted, Platelets 134- monitor thrombocytosis closely  -patient should have all age and risk appropriate malignancy screenings with PCP or sooner if clinically suspected ,   -hematology consulted- hypercoagulable workup sent, including factor V leiden, prothrombin, antiphospholipid antibodies, and lupus anticoagulant.  With regard to long-term a/c, can transition to doac from heparin gtt as per neuro and would follow up with outpatient hematologist Dr Kimbrough.   -CT PE study ordered 4/4/25 due to hypoxia, negative.   -anemia profile Iron total 53, %Sat 21, TIBC 250, ferritin 293, transferrin 175.   -DVT ppx: SCD's + lovenox sq     PULMONARY:   -Started On Vanc+cefepime 4/4/25 for suspected aspiration PNA. Per ID recs vanco d/c'd 4/5/25    -CTA as above B/L near complete atelectasis , findings concerning for aspiration PNA   -Procal 0.37  -supp 02 PRN  - encourage mobility and incentive spirometry as tolerated     RENAL:   -BUN elevated but overall stable; Cr within range, monitor urine output, maintain adequate hydration,   - avoid nephrotoxins   - trend BMP  -Na Goal:  135-145  -Lyte replenishment prn; keep K >4.0 and Mag >2.0 in setting of atrial fibrillation   -Rivas replaced 4/3. Will discharge to rehab with rivas. Can attempt TOV again in rehab at a later date.    ID:  UTI, Aspiration PNA   -no leukocytosis  -UA +, initially on macrobid 4/3/25 switched to Vanco+Cefeprime 4/4/25. Vanco dc'd per ID recs 4/5/25   -Viral PCR negative   -Blood culture sent 4/4/25, negative   -ID following     ENDO: 1. Iatrogenic adrenal insufficiency  - placed back on IV Solucortef in the setting of lethargy  - Monitor BP and electrolytes    VASCULAR:   -signed off, pseudoaneurysm appears to be occluded.    OTHER:   -condition and plan of care d/w patient, family, questions and concerns addressed.     DISPOSITION: Acute Rehab once stable and workup is complete, per CM no auth is needed once patient is stable.   CORE MEASURES:       Admission NIHSS: 10     Tenecteplase : [] YES [x] NO     LDL/HDL/A1C:  59 //  74  //  5.5      Depression Screen- if depression hx and/or present     Statin Therapy: continue home dose statin      Dysphagia Screen: [x] PASS [] FAIL     Smoking in the past 12 months [] YES [x] NO     Afib [x] YES [] NO     Diabetes [] YES [x] NO     Stroke Education [x] YES [] NO  Diabetes [] YES [x] NO    Obtain screening lower extremity venous ultrasound in patients who meet 1 or more of the following criteria as patient is high risk for DVT/PE on admission:   [] History of DVT/PE  []Hypercoagulable states (Factor V Leiden, Cancer, OCP, etc. )  []Prolonged immobility (hemiplegia/hemiparesis/post operative or any other extended immobilization)  [] Transferred from outside facility (Rehab or Long term care)  [] Age </= to 50  [x]Stroke     ASSESSMENT: 89 year old female with PMHx of Afib (off Eliquis), recent left MCA territory stroke s/p left M1 thrombectomy, with residual minimal aphasia, MRS 1, PPM, HTN, HLD, GERD, severe MR/TR, iatrogenic adrenal insufficiency (on prednisone), lymphedema, metastatic melanoma s/p Left foot melanoma and lymph node resection, s/p RUE melanoma and Right axillary lymph node excision at [facility] (3/18/25) who presented to  ED 3/26/36 with Lt sided weakness and aphasia. CT neg, CTA showed a  Rt M2 occlusion.  Transferred to [facility]ARD[facility] intervention. S/P thrombectomy, TICI 3 achieved after 1 pass started on IV Heparin and monitoring in NSICU post procedure. Hospital course complicated by right hemiparesis and aphasia on 3/29/25. CTH negative for acute hemorrhage, CTA h/n + new Left MCA M2 occulusion and subsequently underwent 3rd thrombectomy on 3/29.     4/4: noted with hypoxia, hypotension and increased lethargy. Concern for sepsis 2/2 ?aspiration pna started on Cefepime + vanco ID, medicine, and cardio consulted. Made NPO due to concern for aspiration.     IMPRESSION:   Recent left MCA cerebral infarction s/p Left MCA M1 thrombectomy on 3/21/2025. TICI 3 recanalization. Patient subsequently discharged and returned 3/26/25 with Rt MCA  m2 occlusion, revascularization TICI 3. On 3/29/25 patient found to have acute Left M2 MCA occlusion s/p thrombectomy TICI 0 -- distal M4 branch. MRI brain demonstrated acute bilateral multifocal cerebral infarctions. There is subacute left basal ganglia infarct.  Etiology likely cardioembolic in the setting of atrial fibrillation and underlying hypercoagulability from malignancy.       NEURO: Bihemispheric embolic infarcts s/p Thrombectomy x 3, Aphasia, R hemiparesis  -Neurologically w/ improvement in mental status compared to prior exam   -Continue close monitoring for neurologic deterioration    -Stroke neuro checks q4 hours    -SBP goal 110-160mmhg for now avoiding rapid fluctuations and hypotension , neurologically tolerating at this time, eventual long term age/risk specific normotension   -ANTITHROMBOTIC THERAPY:  Started back on Eliquis 4/2/25, temporarily transitioned to sq lovenox 4/4/25 due to NPO status; will now resume Eliquis  5mg BID dosing as of 4/5/25.   -continue home statin regimen if applicable as LDL < goal of 70   -MRI imaging as noted   -Dysphagia screen: diet changed to NPO in the setting of lethargy. per SLP re-evaluation patient passed for regular diet w/ mildly thick liquids.   -Physical therapy/OT/Speech eval/treatment.   -Stroke education     CARDIOVASCULAR: Hypotension, Afib  - TTE as noted , cardiac monitoring w/ telemetry for now, further evaluation pending findings of noted workup, continue rate control and titrate regimen accordingly             -Cardio consulted for concern for fluid overload, s/p IV lasix; BNP improved, hold off on additional lasix for now. May resume IVF if necessary for dehydration/IFEANYI.   -Patient w/ episodes of tachycardia this AM (110s), however IV metoprolol held due to concern for hypotension; transitioned back to PO metoprolol this AM after SLP re-eval.                  HEMATOLOGY:   -H/H 10.4/31.3,, no active bleeding noted, Platelets 134- monitor thrombocytosis closely  -patient should have all age and risk appropriate malignancy screenings with PCP or sooner if clinically suspected ,   -hematology consulted- hypercoagulable workup sent, including factor V leiden, prothrombin, antiphospholipid antibodies, and lupus anticoagulant.  With regard to long-term a/c, can transition to doac from heparin gtt as per neuro and would follow up with outpatient hematologist Dr Kimbrough.   -CT PE study ordered 4/4/25 due to hypoxia, negative.   -anemia profile Iron total 53, %Sat 21, TIBC 250, ferritin 293, transferrin 175.   -DVT ppx: SCD's +Eliquis     PULMONARY:   -Started On Vanc+cefepime 4/4/25 for suspected aspiration PNA. Per ID recs vanco d/c'd 4/5/25    -CTA as above B/L near complete atelectasis , findings concerning for aspiration PNA   -Procal 0.37  -supp 02 PRN  - encourage mobility and incentive spirometry as tolerated     RENAL:   -BUN elevated but overall stable; Cr within range, monitor urine output, maintain adequate hydration,   - avoid nephrotoxins   - trend BMP  -Na Goal:  135-145  -Lyte replenishment prn; keep K >4.0 and Mag >2.0 in setting of atrial fibrillation   -Rivas replaced 4/3. Will discharge to rehab with rivas. Can attempt TOV again in rehab at a later date.    ID:  UTI, Aspiration PNA   -no leukocytosis  -UA +, initially on macrobid 4/3/25 switched to Vanco+Cefeprime 4/4/25. Vanco dc'd per ID recs 4/5/25   -Viral PCR negative   -Blood culture sent 4/4/25, negative   -ID following     ENDO: 1. Iatrogenic adrenal insufficiency  - placed back on IV Solucortef in the setting of lethargy  - Monitor BP and electrolytes    VASCULAR:   -signed off, pseudoaneurysm appears to be occluded.    OTHER:   -condition and plan of care d/w patient, family, questions and concerns addressed.     DISPOSITION: Acute Rehab once stable and workup is complete, per CM no auth is needed once patient is stable.   CORE MEASURES:       Admission NIHSS: 10     Tenecteplase : [] YES [x] NO     LDL/HDL/A1C:  59 //  74  //  5.5      Depression Screen- if depression hx and/or present     Statin Therapy: continue home dose statin      Dysphagia Screen: [x] PASS [] FAIL     Smoking in the past 12 months [] YES [x] NO     Afib [x] YES [] NO     Diabetes [] YES [x] NO     Stroke Education [x] YES [] NO  Diabetes [] YES [x] NO    Obtain screening lower extremity venous ultrasound in patients who meet 1 or more of the following criteria as patient is high risk for DVT/PE on admission:   [] History of DVT/PE  []Hypercoagulable states (Factor V Leiden, Cancer, OCP, etc. )  []Prolonged immobility (hemiplegia/hemiparesis/post operative or any other extended immobilization)  [] Transferred from outside facility (Rehab or Long term care)  [] Age </= to 50  [x]Stroke

## 2025-04-05 NOTE — SWALLOW BEDSIDE ASSESSMENT ADULT - SWALLOW EVAL: PATIENT/FAMILY GOALS STATEMENT
none stated 2* aphasia
none stated
Pt's family @ the bedside, all questions answered w/ verbalized understanding

## 2025-04-05 NOTE — SWALLOW BEDSIDE ASSESSMENT ADULT - SLP GENERAL OBSERVATIONS
Pt recd OOB in chair, A&A, 0x3 (via yes/no 2* aphasia), following simpe commands,aphasia,  occasional single and two-word utterances, son at bedside +02 via nc, Sp02 99% baseline and throughout, pain 0/10 pre/post

## 2025-04-05 NOTE — SWALLOW BEDSIDE ASSESSMENT ADULT - SWALLOW EVAL: DIAGNOSIS
Jose-pharyngeal swallow clinically assessed to be WFL for all consistencies trialed w/ no overt s/s of aspiration noted. Intermittent coughing observed t/o evaluation which is consistent w/ baseline and not suspected to be related to PO intake.
Oral and pharyngeal stages of swallow appear grossly WFL. Pt with subjective report of improved oral management with mildly thick liquids, reporting swallow feels "better, easier"
Mild oral dysphagia for thin fluids, impacted by cognition, otherwise WFL. Pharyngeal dysphagia suspected for thin fluids with +overt s/s aspiration noted post intake. No overt s/s aspiration observed with other trialed PO

## 2025-04-05 NOTE — SWALLOW BEDSIDE ASSESSMENT ADULT - SWALLOW EVAL: RECOMMENDED DIET
Regular solids, Mildly thick liquids as tolerated
Regular diet, mildly thick fluids
Regular solids w/ thin liquids

## 2025-04-06 LAB
ALBUMIN SERPL ELPH-MCNC: 2.8 G/DL — LOW (ref 3.3–5.2)
ALP SERPL-CCNC: 37 U/L — LOW (ref 40–120)
ALT FLD-CCNC: 14 U/L — SIGNIFICANT CHANGE UP
ANION GAP SERPL CALC-SCNC: 12 MMOL/L — SIGNIFICANT CHANGE UP (ref 5–17)
AST SERPL-CCNC: 12 U/L — SIGNIFICANT CHANGE UP
BILIRUB SERPL-MCNC: 0.7 MG/DL — SIGNIFICANT CHANGE UP (ref 0.4–2)
BUN SERPL-MCNC: 30.5 MG/DL — HIGH (ref 8–20)
CALCIUM SERPL-MCNC: 8.1 MG/DL — LOW (ref 8.4–10.5)
CHLORIDE SERPL-SCNC: 103 MMOL/L — SIGNIFICANT CHANGE UP (ref 96–108)
CO2 SERPL-SCNC: 23 MMOL/L — SIGNIFICANT CHANGE UP (ref 22–29)
CREAT SERPL-MCNC: 0.5 MG/DL — SIGNIFICANT CHANGE UP (ref 0.5–1.3)
EGFR: 90 ML/MIN/1.73M2 — SIGNIFICANT CHANGE UP
EGFR: 90 ML/MIN/1.73M2 — SIGNIFICANT CHANGE UP
GLUCOSE BLDC GLUCOMTR-MCNC: 145 MG/DL — HIGH (ref 70–99)
GLUCOSE BLDC GLUCOMTR-MCNC: 145 MG/DL — HIGH (ref 70–99)
GLUCOSE BLDC GLUCOMTR-MCNC: 159 MG/DL — HIGH (ref 70–99)
GLUCOSE BLDC GLUCOMTR-MCNC: 182 MG/DL — HIGH (ref 70–99)
GLUCOSE SERPL-MCNC: 133 MG/DL — HIGH (ref 70–99)
HCT VFR BLD CALC: 30.9 % — LOW (ref 34.5–45)
HGB BLD-MCNC: 10.2 G/DL — LOW (ref 11.5–15.5)
MAGNESIUM SERPL-MCNC: 2.1 MG/DL — SIGNIFICANT CHANGE UP (ref 1.6–2.6)
MCHC RBC-ENTMCNC: 31.7 PG — SIGNIFICANT CHANGE UP (ref 27–34)
MCHC RBC-ENTMCNC: 33 G/DL — SIGNIFICANT CHANGE UP (ref 32–36)
MCV RBC AUTO: 96 FL — SIGNIFICANT CHANGE UP (ref 80–100)
NRBC # BLD AUTO: 0 K/UL — SIGNIFICANT CHANGE UP (ref 0–0)
NRBC # FLD: 0 K/UL — SIGNIFICANT CHANGE UP (ref 0–0)
NRBC BLD AUTO-RTO: 0 /100 WBCS — SIGNIFICANT CHANGE UP (ref 0–0)
PHOSPHATE SERPL-MCNC: 2.3 MG/DL — LOW (ref 2.4–4.7)
PLATELET # BLD AUTO: 135 K/UL — LOW (ref 150–400)
PMV BLD: 10.6 FL — SIGNIFICANT CHANGE UP (ref 7–13)
POTASSIUM SERPL-MCNC: 3.5 MMOL/L — SIGNIFICANT CHANGE UP (ref 3.5–5.3)
POTASSIUM SERPL-SCNC: 3.5 MMOL/L — SIGNIFICANT CHANGE UP (ref 3.5–5.3)
PROCALCITONIN SERPL-MCNC: 0.22 NG/ML — HIGH (ref 0.02–0.1)
PROT SERPL-MCNC: 5.1 G/DL — LOW (ref 6.6–8.7)
RBC # BLD: 3.22 M/UL — LOW (ref 3.8–5.2)
RBC # FLD: 14.6 % — HIGH (ref 10.3–14.5)
SODIUM SERPL-SCNC: 138 MMOL/L — SIGNIFICANT CHANGE UP (ref 135–145)
WBC # BLD: 8.06 K/UL — SIGNIFICANT CHANGE UP (ref 3.8–10.5)
WBC # FLD AUTO: 8.06 K/UL — SIGNIFICANT CHANGE UP (ref 3.8–10.5)

## 2025-04-06 PROCEDURE — 99233 SBSQ HOSP IP/OBS HIGH 50: CPT

## 2025-04-06 PROCEDURE — 93971 EXTREMITY STUDY: CPT | Mod: 26,RT

## 2025-04-06 PROCEDURE — 95819 EEG AWAKE AND ASLEEP: CPT | Mod: 26

## 2025-04-06 PROCEDURE — 99232 SBSQ HOSP IP/OBS MODERATE 35: CPT

## 2025-04-06 PROCEDURE — G0545: CPT

## 2025-04-06 RX ORDER — HYDROCORTISONE 20 MG
50 TABLET ORAL
Refills: 0 | Status: DISCONTINUED | OUTPATIENT
Start: 2025-04-06 | End: 2025-04-07

## 2025-04-06 RX ORDER — SOD PHOS DI, MONO/K PHOS MONO 250 MG
1 TABLET ORAL ONCE
Refills: 0 | Status: COMPLETED | OUTPATIENT
Start: 2025-04-06 | End: 2025-04-06

## 2025-04-06 RX ORDER — METOPROLOL SUCCINATE 50 MG/1
25 TABLET, EXTENDED RELEASE ORAL
Refills: 0 | Status: DISCONTINUED | OUTPATIENT
Start: 2025-04-06 | End: 2025-04-07

## 2025-04-06 RX ORDER — METOPROLOL SUCCINATE 50 MG/1
25 TABLET, EXTENDED RELEASE ORAL
Refills: 0 | Status: DISCONTINUED | OUTPATIENT
Start: 2025-04-06 | End: 2025-04-06

## 2025-04-06 RX ADMIN — INSULIN LISPRO 1: 100 INJECTION, SOLUTION INTRAVENOUS; SUBCUTANEOUS at 13:10

## 2025-04-06 RX ADMIN — Medication 100 MILLIGRAM(S): at 05:37

## 2025-04-06 RX ADMIN — ATORVASTATIN CALCIUM 10 MILLIGRAM(S): 80 TABLET, FILM COATED ORAL at 21:29

## 2025-04-06 RX ADMIN — POLYETHYLENE GLYCOL 3350 17 GRAM(S): 17 POWDER, FOR SOLUTION ORAL at 13:06

## 2025-04-06 RX ADMIN — IPRATROPIUM BROMIDE AND ALBUTEROL SULFATE 3 MILLILITER(S): .5; 2.5 SOLUTION RESPIRATORY (INHALATION) at 08:14

## 2025-04-06 RX ADMIN — APIXABAN 5 MILLIGRAM(S): 2.5 TABLET, FILM COATED ORAL at 05:36

## 2025-04-06 RX ADMIN — APIXABAN 5 MILLIGRAM(S): 2.5 TABLET, FILM COATED ORAL at 18:34

## 2025-04-06 RX ADMIN — CEFEPIME 2000 MILLIGRAM(S): 2 INJECTION, POWDER, FOR SOLUTION INTRAVENOUS at 13:11

## 2025-04-06 RX ADMIN — Medication 1 PACKET(S): at 10:23

## 2025-04-06 RX ADMIN — METOPROLOL SUCCINATE 25 MILLIGRAM(S): 50 TABLET, EXTENDED RELEASE ORAL at 10:23

## 2025-04-06 RX ADMIN — CEFEPIME 2000 MILLIGRAM(S): 2 INJECTION, POWDER, FOR SOLUTION INTRAVENOUS at 21:29

## 2025-04-06 RX ADMIN — METOPROLOL SUCCINATE 25 MILLIGRAM(S): 50 TABLET, EXTENDED RELEASE ORAL at 18:34

## 2025-04-06 RX ADMIN — CEFEPIME 2000 MILLIGRAM(S): 2 INJECTION, POWDER, FOR SOLUTION INTRAVENOUS at 05:37

## 2025-04-06 RX ADMIN — IPRATROPIUM BROMIDE AND ALBUTEROL SULFATE 3 MILLILITER(S): .5; 2.5 SOLUTION RESPIRATORY (INHALATION) at 15:03

## 2025-04-06 RX ADMIN — Medication 50 MILLIGRAM(S): at 18:34

## 2025-04-06 RX ADMIN — IPRATROPIUM BROMIDE AND ALBUTEROL SULFATE 3 MILLILITER(S): .5; 2.5 SOLUTION RESPIRATORY (INHALATION) at 04:43

## 2025-04-06 RX ADMIN — METOPROLOL SUCCINATE 12.5 MILLIGRAM(S): 50 TABLET, EXTENDED RELEASE ORAL at 05:36

## 2025-04-06 RX ADMIN — INSULIN LISPRO 1: 100 INJECTION, SOLUTION INTRAVENOUS; SUBCUTANEOUS at 17:33

## 2025-04-06 RX ADMIN — TAMSULOSIN HYDROCHLORIDE 0.4 MILLIGRAM(S): 0.4 CAPSULE ORAL at 21:29

## 2025-04-06 RX ADMIN — Medication 1 APPLICATION(S): at 05:36

## 2025-04-06 RX ADMIN — IPRATROPIUM BROMIDE AND ALBUTEROL SULFATE 3 MILLILITER(S): .5; 2.5 SOLUTION RESPIRATORY (INHALATION) at 22:42

## 2025-04-06 RX ADMIN — Medication 40 MILLIGRAM(S): at 13:06

## 2025-04-06 NOTE — EEG REPORT - NS EEG TEXT BOX
[facility]   COMPREHENSIVE EPILEPSY CENTER   REPORT OF ROUTINE EEG W/ Video     [facility]: [address] [place] [address] [address], [place] [address], Ph#: [phone]  [facility]:  [facility] [place] [place] [address], Ph#: [phone]  [facility]: [address] [place] [address], Ph#: [phone]    Patient Name: SHARON HULL  Age and : 89y (36)  MRN #: [id number]  Location: [place] [id number]   Referring Physician: Igor Lopez    Study Date: 25    _____________________________________________________________  TECHNICAL INFORMATION    Placement and Labeling of Electrodes:  The EEG was performed utilizing 20 channels referential EEG connections (coronal over temporal over parasagittal montage) using all standard 10-20 electrode placements with EKG.  Recording was at a sampling rate of 256 samples per second per channel.  Time synchronized digital video recording was done simultaneously with EEG recording.  A low light infrared camera was used for low light recording.  Terrence and seizure detection algorithms were utilized.    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 2000 milliGRAM(s) IV Push every 8 hours  chlorhexidine 2% Cloths 1 Application(s) Topical daily  dextrose 5%. 1000 milliLiter(s) (100 mL/Hr) IV Continuous <Continuous>  dextrose 5%. 1000 milliLiter(s) (50 mL/Hr) IV Continuous <Continuous>  dextrose 50% Injectable 25 Gram(s) IV Push once  dextrose 50% Injectable 12.5 Gram(s) IV Push once  dextrose 50% Injectable 25 Gram(s) IV Push once  glucagon  Injectable 1 milliGRAM(s) IntraMuscular once  hydrocortisone sodium succinate Injectable 50 milliGRAM(s) IV Push two times a day  insulin lispro (ADMELOG) corrective regimen sliding scale   SubCutaneous three times a day before meals  metoprolol tartrate 25 milliGRAM(s) Oral two times a day  pantoprazole  Injectable 40 milliGRAM(s) IV Push daily  polyethylene glycol 3350 17 Gram(s) Oral daily  senna 2 Tablet(s) Oral at bedtime  tamsulosin 0.4 milliGRAM(s) Oral at bedtime    _____________________________________________________________  STUDY INTERPRETATION    Findings: The background was continuous, spontaneously variable and reactive. During wakefulness, the posterior dominant rhythm consisted of symmetric, 7Hz activity, with amplitude to 30 uV, that attenuated to eye opening.  Low amplitude frontal beta was noted in wakefulness.    Background Slowing:  Excess diffuse polymorphic delta slowing.    Focal Slowing:   None were present.    Sleep Background:  Drowsiness was characterized by fragmentation, attenuation, and slowing of the background activity.    Sleep was characterized by the presence of vertex waves, symmetric sleep spindles and K-complexes.    Other Non-Epileptiform Findings:  None were present.    Interictal Epileptiform Activity:   None were present.    Events:  Clinical events: None recorded.  Seizures: None recorded.    Activation Procedures:   Hyperventilation was not performed.    Photic stimulation was not performed.     Artifacts:  Intermittent myogenic and movement artifacts were noted.    _____________________________________________________________  EEG SUMMARY/CLASSIFICATION    Abnormal EEG    - Mild generalized slowing.    _____________________________________________________________  EEG IMPRESSION/CLINICAL CORRELATE    Abnormal EEG study.  Mild nonspecific diffuse or multifocal cerebral dysfunction.   No epileptiform pattern or seizure seen.    Harlan Graves MD  EEG/Epilepsy Attending

## 2025-04-06 NOTE — PROGRESS NOTE ADULT - SUBJECTIVE AND OBJECTIVE BOX
CC: Follow up     INTERVAL HPI/OVERNIGHT EVENTS: Patient seen and examined, tapered down to 2 liters NC. Afebrile overnight. Afib with RVR this morning HR in the 120s.   Denies sob or chest pain. Sitting up comfortably in bed      Vital Signs Last 24 Hrs  T(C): 36.6 (06 Apr 2025 08:30), Max: 37.1 (06 Apr 2025 00:09)  T(F): 97.9 (06 Apr 2025 08:30), Max: 98.7 (06 Apr 2025 00:09)  HR: 121 (06 Apr 2025 10:00) (102 - 128)  BP: 123/67 (06 Apr 2025 10:00) (110/71 - 134/83)  BP(mean): 77 (06 Apr 2025 10:00) (75 - 102)  RR: 17 (06 Apr 2025 10:00) (16 - 19)  SpO2: 96% (06 Apr 2025 10:00) (95% - 100%)    Parameters below as of 06 Apr 2025 10:00  Patient On (Oxygen Delivery Method): room air        PHYSICAL EXAM:    GENERAL: NAD  HEAD:  Atraumatic, Normocephalic  NECK: Supple  NERVOUS SYSTEM:  Awake, alert, oriented x3  expressive aphasia w/ word finding difficulty. Following commands appropriately   CHEST/LUNG: Clear to auscultation bilaterally; No rales, rhonchi, wheezing, or rubs  HEART: IRRR No murmurs, rubs, or gallops  ABDOMEN: Soft, Nontender, Nondistended; Bowel sounds present  + rivas   EXTREMITIES:  2+ Peripheral Pulses, No clubbing, cyanosis, or edema        MEDICATIONS  (STANDING):  albuterol/ipratropium for Nebulization 3 milliLiter(s) Nebulizer every 6 hours  apixaban 5 milliGRAM(s) Oral every 12 hours  atorvastatin 10 milliGRAM(s) Oral at bedtime  cefepime  Injectable. 2000 milliGRAM(s) IV Push every 8 hours  chlorhexidine 2% Cloths 1 Application(s) Topical daily  dextrose 5%. 1000 milliLiter(s) (50 mL/Hr) IV Continuous <Continuous>  dextrose 5%. 1000 milliLiter(s) (100 mL/Hr) IV Continuous <Continuous>  dextrose 50% Injectable 25 Gram(s) IV Push once  dextrose 50% Injectable 12.5 Gram(s) IV Push once  dextrose 50% Injectable 25 Gram(s) IV Push once  glucagon  Injectable 1 milliGRAM(s) IntraMuscular once  hydrocortisone sodium succinate Injectable 50 milliGRAM(s) IV Push two times a day  insulin lispro (ADMELOG) corrective regimen sliding scale   SubCutaneous three times a day before meals  metoprolol tartrate 25 milliGRAM(s) Oral two times a day  pantoprazole  Injectable 40 milliGRAM(s) IV Push daily  polyethylene glycol 3350 17 Gram(s) Oral daily  senna 2 Tablet(s) Oral at bedtime  tamsulosin 0.4 milliGRAM(s) Oral at bedtime    MEDICATIONS  (PRN):  acetaminophen     Tablet .. 650 milliGRAM(s) Oral every 6 hours PRN Temp greater or equal to 38C (100.4F), Mild Pain (1 - 3)  dextrose Oral Gel 15 Gram(s) Oral once PRN Blood Glucose LESS THAN 70 milliGRAM(s)/deciliter      Allergies    penicillins (Hives)    Intolerances          LABS:                          10.2   8.06  )-----------( 135      ( 06 Apr 2025 07:40 )             30.9     04-06    138  |  103  |  30.5[H]  ----------------------------<  133[H]  3.5   |  23.0  |  0.50    Ca    8.1[L]      06 Apr 2025 07:40  Phos  2.3     04-06  Mg     2.1     04-06    TPro  5.1[L]  /  Alb  2.8[L]  /  TBili  0.7  /  DBili  x   /  AST  12  /  ALT  14  /  AlkPhos  37[L]  04-06      Urinalysis Basic - ( 06 Apr 2025 07:40 )    Color: x / Appearance: x / SG: x / pH: x  Gluc: 133 mg/dL / Ketone: x  / Bili: x / Urobili: x   Blood: x / Protein: x / Nitrite: x   Leuk Esterase: x / RBC: x / WBC x   Sq Epi: x / Non Sq Epi: x / Bacteria: x        RADIOLOGY & ADDITIONAL TESTS:

## 2025-04-06 NOTE — PROGRESS NOTE ADULT - ASSESSMENT
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 [place] (3/18/25) who presented to  ED 3/26/36 with Lt sided weakness and aphasia. CT neg, CTA showed a  Rt M2 occlusion.  Transferred to [facility] intervention. S/P thrombectomy, TICI 3 achieved after 1 pass started on IV Heparin and monitoring in NSICU post procedure. Hospital course complicated by right hemiparesis and aphasia on 3/29/25. CTH negative for acute hemorrhage, CTA h/n + new Left MCA M2 occulusion and subsequently underwent 3rd thrombectomy on 3/29.     4/4: noted with hypoxia, hypotension and increased lethargy. Concern for sepsis 2/2 ?aspiration pna started on Cefepime + vanco ID, medicine, and cardio consulted. Made NPO due to concern for aspiration.     IMPRESSION:   Recent left MCA cerebral infarction s/p Left MCA M1 thrombectomy on 3/21/2025. TICI 3 recanalization. Patient subsequently discharged and returned 3/26/25 with Rt MCA  m2 occlusion, revascularization TICI 3. On 3/29/25 patient found to have acute Left M2 MCA occlusion s/p thrombectomy TICI 0 -- distal M4 branch. MRI brain demonstrated acute bilateral multifocal cerebral infarctions. There is subacute left basal ganglia infarct.  Etiology likely cardioembolic in the setting of atrial fibrillation and underlying hypercoagulability from malignancy.       NEURO: Bihemispheric embolic infarcts s/p Thrombectomy x 3, Aphasia, R hemiparesis  -Neurologically w/ improvement in mental status compared to prior exam   -Continue close monitoring for neurologic deterioration    -Stroke neuro checks q4 hours    -SBP goal 110-160mmhg for now avoiding rapid fluctuations and hypotension , neurologically tolerating at this time, eventual long term age/risk specific normotension   -ANTITHROMBOTIC THERAPY:  Started back on Eliquis 4/2/25, temporarily transitioned to sq lovenox 4/4/25 due to NPO status; will now resume Eliquis  5mg BID dosing as of 4/5/25.   -continue home statin regimen if applicable as LDL < goal of 70   -MRI imaging as noted   -Dysphagia screen: diet changed to NPO in the setting of lethargy. per SLP re-evaluation patient passed for regular diet w/ mildly thick liquids.   -Physical therapy/OT/Speech eval/treatment.   -Stroke education     CARDIOVASCULAR: Hypotension, Afib  - TTE as noted , cardiac monitoring w/ telemetry for now, further evaluation pending findings of noted workup, continue rate control and titrate regimen accordingly             -Cardio consulted for concern for fluid overload, s/p IV lasix; BNP improved, hold off on additional lasix for now. May resume IVF if necessary for dehydration/IFEANYI.   -Patient w/ episodes of tachycardia 4/5/25 (110s), however IV metoprolol held due to concern for hypotension; transitioned back to PO metoprolol  after SLP re-eval.                  HEMATOLOGY:   -H/H 10.4/31.3,, no active bleeding noted, Platelets 134- monitor thrombocytosis closely  -patient should have all age and risk appropriate malignancy screenings with PCP or sooner if clinically suspected ,   -hematology consulted- hypercoagulable workup sent, including factor V leiden, prothrombin, antiphospholipid antibodies, and lupus anticoagulant.  With regard to long-term a/c, can transition to doac from heparin gtt as per neuro and would follow up with outpatient hematologist Dr Kimbrough.   -CT PE study ordered 4/4/25 due to hypoxia, negative.   -anemia profile Iron total 53, %Sat 21, TIBC 250, ferritin 293, transferrin 175.   -DVT ppx: SCD's +Eliquis     PULMONARY:   -Started On Vanc+cefepime 4/4/25 for suspected aspiration PNA. Per ID recs vanco d/c'd 4/5/25    -CTA as above B/L near complete atelectasis , findings concerning for aspiration PNA   -Procal 0.37  -supp 02 PRN  - encourage mobility and incentive spirometry as tolerated     RENAL:   -BUN elevated but overall stable; Cr within range, monitor urine output, maintain adequate hydration,   - avoid nephrotoxins   - trend BMP  -Na Goal:  135-145  -Lyte replenishment prn; keep K >4.0 and Mag >2.0 in setting of atrial fibrillation   -Rivas replaced 4/3. Will discharge to rehab with rivas. Can attempt TOV again in rehab at a later date.    ID:  UTI, Aspiration PNA   -no leukocytosis  -UA +, initially on macrobid 4/3/25 switched to Vanco+Cefeprime 4/4/25. Vanco dc'd per ID recs 4/5/25   -Viral PCR negative   -Blood culture sent 4/4/25, negative   -ID following     ENDO: 1. Iatrogenic adrenal insufficiency  - placed back on IV Solucortef in the setting of lethargy  - Monitor BP and electrolytes    VASCULAR:   -signed off, pseudoaneurysm appears to be occluded.    OTHER:   -condition and plan of care d/w patient, family, questions and concerns addressed.     DISPOSITION: Acute Rehab once stable and workup is complete, per CM no auth is needed once patient is stable.   CORE MEASURES:       Admission NIHSS: 10     Tenecteplase : [] YES [x] NO     LDL/HDL/A1C:  59 //  74  //  5.5      Depression Screen- if depression hx and/or present     Statin Therapy: continue home dose statin      Dysphagia Screen: [x] PASS [] FAIL     Smoking in the past 12 months [] YES [x] NO     Afib [x] YES [] NO     Diabetes [] YES [x] NO     Stroke Education [x] YES [] NO  Diabetes [] YES [x] NO    Obtain screening lower extremity venous ultrasound in patients who meet 1 or more of the following criteria as patient is high risk for DVT/PE on admission:   [] History of DVT/PE  []Hypercoagulable states (Factor V Leiden, Cancer, OCP, etc. )  []Prolonged immobility (hemiplegia/hemiparesis/post operative or any other extended immobilization)  [] Transferred from outside facility (Rehab or Long term care)  [] Age </= to 50  [x]Stroke     ASSESSMENT: 89 year old female with PMHx of Afib (off Eliquis), recent left MCA territory stroke s/p left M1 thrombectomy, with residual minimal aphasia, MRS 1, PPM, HTN, HLD, GERD, severe MR/TR, iatrogenic adrenal insufficiency (on prednisone), lymphedema, metastatic melanoma s/p Left foot melanoma and lymph node resection, s/p RUE melanoma and Right axillary lymph node excision at [facility] (3/18/25) who presented to  ED 3/26/36 with Lt sided weakness and aphasia. CT neg, CTA showed a  Rt M2 occlusion.  Transferred to [facility] intervention. S/P thrombectomy, TICI 3 achieved after 1 pass started on IV Heparin and monitoring in NSICU post procedure. Hospital course complicated by right hemiparesis and aphasia on 3/29/25. CTH negative for acute hemorrhage, CTA h/n + new Left MCA M2 occulusion and subsequently underwent 3rd thrombectomy on 3/29.     4/4: noted with hypoxia, hypotension and increased lethargy. Concern for sepsis 2/2 ?aspiration pna started on Cefepime + vanco ID, medicine, and cardio consulted. Made NPO due to concern for aspiration.       IMPRESSION:   Recent left MCA cerebral infarction s/p Left MCA M1 thrombectomy on 3/21/2025. TICI 3 recanalization. Patient subsequently discharged and returned 3/26/25 with Rt MCA  m2 occlusion, revascularization TICI 3. On 3/29/25 patient found to have acute Left M2 MCA occlusion s/p thrombectomy TICI 0 -- distal M4 branch. MRI brain demonstrated acute bilateral multifocal cerebral infarctions. There is subacute left basal ganglia infarct.  Etiology likely cardioembolic in the setting of atrial fibrillation and underlying hypercoagulability from malignancy.       NEURO: Bihemispheric embolic infarcts s/p Thrombectomy x 3, Aphasia, R hemiparesis  -Neurologically w/ improvement in mental status compared to prior exam. Episode of staring on 4/6/25 with delayed response.   -Spot EEG pending  -Continue close monitoring for neurologic deterioration    -Stroke neuro checks q4 hours    -SBP goal 110-160mmhg for now avoiding rapid fluctuations and hypotension , neurologically tolerating at this time, eventual long term age/risk specific normotension   -ANTITHROMBOTIC THERAPY:  Continue Eliquis  5mg BID dosing as of 4/5/25.   -continue home statin regimen if applicable as LDL < goal of 70   -MRI imaging as noted   -Dysphagia screen: diet changed to NPO in the setting of lethargy. per SLP re-evaluation patient passed for regular diet w/ mildly thick liquids.   -Physical therapy/OT/Speech eval/treatment.   -Stroke education     CARDIOVASCULAR: Hypotension, Afib  - TTE as noted , cardiac monitoring w/ telemetry for now, further evaluation pending findings of noted workup, continue rate control and titrate regimen accordingly        -DARIUSZ not indicated at this time, patient on proper management at this time.        -Cardio consulted for concern for fluid overload, s/p IV lasix; BNP improved, hold off on additional lasix for now. IVF on hold at this time.   -Patient w/ episodes of tachycardia 4/5/25 (110s), however IV metoprolol held due to concern for hypotension; transitioned back to PO metoprolol  after SLP re-eval. 4/6 tachycardia persists. PO metoprolol increased to 25mg PO BID.                  HEMATOLOGY:   -H/H 10.2/30.9, no active bleeding noted, Platelets 135- monitor thrombocytosis closely  -patient should have all age and risk appropriate malignancy screenings with PCP or sooner if clinically suspected ,   -hematology consulted- hypercoagulable workup sent, including factor V leiden, prothrombin, antiphospholipid antibodies, and lupus anticoagulant.  With regard to long-term a/c, can transition to doac from heparin gtt as per neuro and would follow up with outpatient hematologist Dr Kimbrough.   -CT PE study ordered 4/4/25 due to hypoxia, negative.   -anemia profile Iron total 53, %Sat 21, TIBC 250, ferritin 293, transferrin 175.   -DVT ppx: SCD's +Eliquis     PULMONARY:   -Started On Vanc+cefepime 4/4/25 for suspected aspiration PNA. Per ID recs vanco d/c'd 4/5/25    -CTA as above B/L near complete atelectasis , findings concerning for aspiration PNA   -Procal 0.37  -supp 02 PRN  - encourage mobility and incentive spirometry as tolerated     RENAL:   -BUN elevated but overall stable; Cr within range, monitor urine output, maintain adequate hydration,   - avoid nephrotoxins   - trend BMP  -Na Goal:  135-145  -Lyte replenishment prn; keep K >4.0 and Mag >2.0 in setting of atrial fibrillation   -Rivas replaced 4/3. Will discharge to rehab with riv[facility]. Can attempt TOV again in rehab at a later date.    ID:  UTI, Aspiration PNA   -no leukocytosis  -UA +, initially on macrobid 4/3/25 switched to Vanco+Cefeprime 4/4/25. Vanco dc'd per ID recs 4/5/25   -Viral PCR negative   -Blood culture sent 4/4/25, negative   -ID following     ENDO: 1. Iatrogenic adrenal insufficiency  - Solucortef IV BID, OD on 4/7 and plan to switch to home regimen on 4/8  - Monitor BP and electrolytes    VASCULAR:   -signed off, pseudoaneurysm appears to be occluded.    SKIN:   -RUE edema and erythema. Pink banded- US RUE pending    OTHER:   -condition and plan of care d/w patient, family, questions and concerns addressed.     DISPOSITION: Acute Rehab once stable and workup is complete, per CM no auth is needed once patient is stable.   CORE MEASURES:       Admission NIHSS: 10     Tenecteplase : [] YES [x] NO     LDL/HDL/A1C:  59 //  74  //  5.5      Depression Screen- if depression hx and/or present     Statin Therapy: continue home dose statin      Dysphagia Screen: [x] PASS [] FAIL     Smoking in the past 12 months [] YES [x] NO     Afib [x] YES [] NO     Diabetes [] YES [x] NO     Stroke Education [x] YES [] NO  Diabetes [] YES [x] NO    Obtain screening lower extremity venous ultrasound in patients who meet 1 or more of the following criteria as patient is high risk for DVT/PE on admission:   [] History of DVT/PE  []Hypercoagulable states (Factor V Leiden, Cancer, OCP, etc. )  []Prolonged immobility (hemiplegia/hemiparesis/post operative or any other extended immobilization)  [] Transferred from outside facility (Rehab or Long term care)  [] Age </= to 50  [x]Stroke     ASSESSMENT: 89 year old female with PMHx of Afib (off Eliquis), recent left MCA territory stroke s/p left M1 thrombectomy, with residual minimal aphasia, MRS 1, PPM, HTN, HLD, GERD, severe MR/TR, iatrogenic adrenal insufficiency (on prednisone), lymphedema, metastatic melanoma s/p Left foot melanoma and lymph node resection, s/p RUE melanoma and Right axillary lymph node excision at [facility] (3/18/25) who presented to  ED 3/26/36 with Lt sided weakness and aphasia. CT neg, CTA showed a  Rt M2 occlusion.  Transferred to [facility] intervention. S/P thrombectomy, TICI 3 achieved after 1 pass started on IV Heparin and monitoring in NSICU post procedure. Hospital course complicated by right hemiparesis and aphasia on 3/29/25. CTH negative for acute hemorrhage, CTA h/n + new Left MCA M2 occulusion and subsequently underwent 3rd thrombectomy on 3/29.     IMPRESSION:   Recent left MCA cerebral infarction s/p Left MCA M1 thrombectomy on 3/21/2025. TICI 3 recanalization. Patient subsequently discharged and returned 3/26/25 with Rt MCA  m2 occlusion, revascularization TICI 3. On 3/29/25 patient found to have acute Left M2 MCA occlusion s/p thrombectomy TICI 0 -- distal M4 branch. MRI brain demonstrated acute bilateral multifocal cerebral infarctions. There is subacute left basal ganglia infarct.  Etiology likely cardioembolic in the setting of atrial fibrillation and underlying hypercoagulability from malignancy.       NEURO: Bihemispheric embolic infarcts s/p Thrombectomy x 3, Aphasia, R hemiparesis  -Neurologically w/ improvement in mental status compared to prior exam. Episode of staring on 4/6/25 with delayed response.   -Spot EEG pending  -Continue close monitoring for neurologic deterioration    -Stroke neuro checks q4 hours    -SBP goal 110-160mmhg for now avoiding rapid fluctuations and hypotension , neurologically tolerating at this time, eventual long term age/risk specific normotension   -ANTITHROMBOTIC THERAPY:  Continue Eliquis  5mg BID dosing as of 4/5/25.   -continue home statin regimen if applicable as LDL < goal of 70   -MRI imaging as noted   -Dysphagia screen: diet changed to NPO in the setting of lethargy. per SLP re-evaluation patient passed for regular diet w/ mildly thick liquids.   -Physical therapy/OT/Speech eval/treatment.   -Stroke education     CARDIOVASCULAR: Hypotension, Afib  - TTE as noted , cardiac monitoring w/ telemetry for now, further evaluation pending findings of noted workup, continue rate control and titrate regimen accordingly        -DARIUSZ not indicated at this time, patient on proper management at this time.        -Cardio consulted for concern for fluid overload on 4/4 post epsiode of hypoxia/hypotension, s/p IV lasix; BNP improved, hold off on additional lasix for now. IVF on hold at this time.   -Patient w/ episodes of tachycardia 4/5/25 (110s), however IV metoprolol held due to concern for hypotension; transitioned back to PO metoprolol  after SLP re-eval. 4/6 tachycardia persists. PO metoprolol increased to 25mg PO BID.                  HEMATOLOGY:   -H/H 10.2/30.9, no active bleeding noted, Platelets 135- monitor thrombocytosis closely  -patient should have all age and risk appropriate malignancy screenings with PCP or sooner if clinically suspected ,   -hematology consulted- hypercoagulable workup sent, including factor V leiden, prothrombin, antiphospholipid antibodies, and lupus anticoagulant.  With regard to long-term a/c, can transition to doac from heparin gtt as per neuro and would follow up with outpatient hematologist Dr Kimbrough.   -CT PE study ordered 4/4/25 due to hypoxia, negative.   -anemia profile Iron total 53, %Sat 21, TIBC 250, ferritin 293, transferrin 175.   -DVT ppx: SCD's +Eliquis     PULMONARY:   -Started On Vanc+cefepime 4/4/25 for suspected aspiration PNA. Per ID recs vanco d/c'd 4/5/25    -CTA as above B/L near complete atelectasis , findings concerning for aspiration PNA   -Procal 0.37  -supp 02 PRN  - encourage mobility and incentive spirometry as tolerated     RENAL:   -BUN elevated but overall stable; Cr within range, monitor urine output, maintain adequate hydration,   - avoid nephrotoxins   - trend BMP  -Na Goal:  135-145  -Lyte replenishment prn; keep K >4.0 and Mag >2.0 in setting of atrial fibrillation   -Rivas replaced 4/3. Will discharge to rehab with rivas. Can attempt TOV again in rehab at a later date.    ID:  UTI, Aspiration PNA   -no leukocytosis  -UA +, initially on macrobid 4/3/25 switched to Vanco+Cefeprime 4/4/25. Vanco dc'd per ID recs 4/5/25   -Viral PCR negative   -Blood culture sent 4/4/25, negative   -ID following     ENDO: 1. Iatrogenic adrenal insufficiency  - Solucortef IV BID, OD on 4/7 and plan to switch to home regimen on 4/8  - Monitor BP and electrolytes    VASCULAR:   -signed off, pseudoaneurysm appears to be occluded.    SKIN:   -RUE edema and erythema. Pink banded- US RUE pending    OTHER:   -condition and plan of care d/w patient, family, questions and concerns addressed.     DISPOSITION: Acute Rehab once stable and workup is complete, per CM no auth is needed once patient is stable.   CORE MEASURES:       Admission NIHSS: 10     Tenecteplase : [] YES [x] NO     LDL/HDL/A1C:  59 //  74  //  5.5      Depression Screen- if depression hx and/or present     Statin Therapy: continue home dose statin      Dysphagia Screen: [x] PASS [] FAIL     Smoking in the past 12 months [] YES [x] NO     Afib [x] YES [] NO     Diabetes [] YES [x] NO     Stroke Education [x] YES [] NO  Diabetes [] YES [x] NO    Obtain screening lower extremity venous ultrasound in patients who meet 1 or more of the following criteria as patient is high risk for DVT/PE on admission:   [] History of DVT/PE  []Hypercoagulable states (Factor V Leiden, Cancer, OCP, etc. )  []Prolonged immobility (hemiplegia/hemiparesis/post operative or any other extended immobilization)  [] Transferred from outside facility (Rehab or Long term care)  [] Age </= to 50  [x]Stroke     ASSESSMENT: 89 year old female with PMHx of Afib (off Eliquis), recent left MCA territory stroke s/p left M1 thrombectomy, with residual minimal aphasia, MRS 1, PPM, HTN, HLD, GERD, severe MR/TR, iatrogenic adrenal insufficiency (on prednisone), lymphedema, metastatic melanoma s/p Left foot melanoma and lymph node resection, s/p RUE melanoma and Right axillary lymph node excision at [facility] (3/18/25) who presented to  ED 3/26/36 with Lt sided weakness and aphasia. CT neg, CTA showed a  Rt M2 occlusion.  Transferred to [facility] intervention. S/P thrombectomy, TICI 3 achieved after 1 pass started on IV Heparin and monitoring in NSICU post procedure. Hospital course complicated by right hemiparesis and aphasia on 3/29/25. CTH negative for acute hemorrhage, CTA h/n + new Left MCA M2 occulusion and subsequently underwent 3rd cerebral angiogram with attempted thrombectomy. TICI0-distal M4 Branch on 3/29/25.     IMPRESSION:   Recent left MCA cerebral infarction s/p Left MCA M1 thrombectomy on 3/21/2025. TICI 3 recanalization. Patient subsequently discharged and returned 3/26/25 with Rt MCA  m2 occlusion, revascularization TICI 3. On 3/29/25 patient found to have acute Left M2 MCA occlusion s/p thrombectomy TICI 0 -- distal M4 branch. MRI brain demonstrated acute bilateral multifocal cerebral infarctions. There is subacute left basal ganglia infarct.  Etiology likely cardioembolic in the setting of atrial fibrillation and underlying hypercoagulability from malignancy.       NEURO: Bihemispheric embolic infarcts s/p Thrombectomy x 3, Aphasia, R hemiparesis  -Neurologically w/ improvement in mental status compared to prior exam. Episode of staring on 4/6/25 with delayed response.   -Spot EEG pending  -Continue close monitoring for neurologic deterioration    -Stroke neuro checks q4 hours    -SBP goal 110-160mmhg for now avoiding rapid fluctuations and hypotension , neurologically tolerating at this time, eventual long term age/risk specific normotension   -ANTITHROMBOTIC THERAPY:  Continue Eliquis  5mg BID dosing as of 4/5/25.   -continue home statin regimen if applicable as LDL < goal of 70   -MRI imaging as noted   -Dysphagia screen: diet changed to NPO in the setting of lethargy. per SLP re-evaluation patient passed for regular diet w/ mildly thick liquids.   -Physical therapy/OT/Speech eval/treatment.   -Stroke education     CARDIOVASCULAR: Hypotension, Afib  - TTE as noted , cardiac monitoring w/ telemetry for now, further evaluation pending findings of noted workup, continue rate control and titrate regimen accordingly        -DARIUSZ not indicated at this time, patient on proper management at this time.        -Cardio consulted for concern for fluid overload on 4/4 post epsiode of hypoxia/hypotension, s/p IV lasix; BNP improved, hold off on additional lasix for now. IVF on hold at this time.   -Patient w/ episodes of tachycardia 4/5/25 (110s), however IV metoprolol held due to concern for hypotension; transitioned back to PO metoprolol  after SLP re-eval. 4/6 tachycardia persists. PO metoprolol increased to 25mg PO BID.                  HEMATOLOGY:   -H/H 10.2/30.9, no active bleeding noted, Platelets 135- monitor thrombocytosis closely  -patient should have all age and risk appropriate malignancy screenings with PCP or sooner if clinically suspected ,   -hematology consulted- hypercoagulable workup sent, including factor V leiden, prothrombin, antiphospholipid antibodies, and lupus anticoagulant.  With regard to long-term a/c, can transition to doac from heparin gtt as per neuro and would follow up with outpatient hematologist Dr Kimbrough.   -CT PE study ordered 4/4/25 due to hypoxia, negative.   -anemia profile Iron total 53, %Sat 21, TIBC 250, ferritin 293, transferrin 175.   -DVT ppx: SCD's +Eliquis     PULMONARY:   -Started On Vanc+cefepime 4/4/25 for suspected aspiration PNA. Per ID recs vanco d/c'd 4/5/25    -CTA as above B/L near complete atelectasis , findings concerning for aspiration PNA   -Procal 0.37  -supp 02 PRN  - encourage mobility and incentive spirometry as tolerated     RENAL:   -BUN elevated but overall stable; Cr within range, monitor urine output, maintain adequate hydration,   - avoid nephrotoxins   - trend BMP  -Na Goal:  135-145  -Lyte replenishment prn; keep K >4.0 and Mag >2.0 in setting of atrial fibrillation   -Rivas replaced 4/3. Will discharge to rehab with rivas. Can attempt TOV again in rehab at a later date.    ID:  UTI, Aspiration PNA   -no leukocytosis  -UA +, initially on macrobid 4/3/25 switched to Vanco+Cefeprime 4/4/25. Vanco dc'd per ID recs 4/5/25   -Viral PCR negative   -Blood culture sent 4/4/25, negative   -ID following     ENDO: 1. Iatrogenic adrenal insufficiency  - Solucortef IV BID, OD on 4/7 and plan to switch to home regimen on 4/8  - Monitor BP and electrolytes    VASCULAR:   -signed off, pseudoaneurysm appears to be occluded.    SKIN:   -RUE edema and erythema. Pink banded- US RUE pending    OTHER:   -condition and plan of care d/w patient, family, questions and concerns addressed.     DISPOSITION: Acute Rehab once stable and workup is complete, per CM no auth is needed once patient is stable.   CORE MEASURES:       Admission NIHSS: 10     Tenecteplase : [] YES [x] NO     LDL/HDL/A1C:  59 //  74  //  5.5      Depression Screen- if depression hx and/or present     Statin Therapy: continue home dose statin      Dysphagia Screen: [x] PASS [] FAIL     Smoking in the past 12 months [] YES [x] NO     Afib [x] YES [] NO     Diabetes [] YES [x] NO     Stroke Education [x] YES [] NO  Diabetes [] YES [x] NO    Obtain screening lower extremity venous ultrasound in patients who meet 1 or more of the following criteria as patient is high risk for DVT/PE on admission:   [] History of DVT/PE  []Hypercoagulable states (Factor V Leiden, Cancer, OCP, etc. )  []Prolonged immobility (hemiplegia/hemiparesis/post operative or any other extended immobilization)  [] Transferred from outside facility (Rehab or Long term care)  [] Age </= to 50  [x]Stroke

## 2025-04-06 NOTE — PROGRESS NOTE ADULT - SUBJECTIVE AND OBJECTIVE BOX
[facility]  INFECTIOUS DISEASES at [place] / [place] / [place]  =======================================================                              Osmin Christianson MD                              Professor Emeritus:  Dr Piter Keane MD            Diplomates American Board of Internal Medicine & Infectious Diseases                                   Tel  [phone] Fax [phone]                                  Hospital Consult line:  [phone]  =======================================================      [place] [id number]    Follow up: Fever     No fevers     Allergies:  penicillins (Hives)       REVIEW OF SYSTEMS:  CONSTITUTIONAL:  No Fever or chills  HEENT:   No diplopia or blurred vision.  No earache, sore throat or runny nose.  CARDIOVASCULAR:  No Chest Pain  RESPIRATORY:  No cough, shortness of breath  GASTROINTESTINAL:  No nausea, vomiting or diarrhea.  GENITOURINARY:  No dysuria, frequency or urgency. No Blood in urine  MUSCULOSKELETAL:  no joint aches, no muscle pain  SKIN:  No change in skin, hair or nails.  NEUROLOGIC:  No Headaches      Physical Exam:  GEN: NAD  HEENT: normocephalic and atraumatic. EOMI. PERRL.    NECK: Supple.   LUNGS: CTA B/L.  HEART: RRR  ABDOMEN: Soft, NT, ND.  +BS.    : No CVA tenderness  EXTREMITIES: Without  edema.  MSK: No joint swelling  NEUROLOGIC: No Focal Deficits   SKIN: No rash      Vitals:  T(F): 98.3 (06 Apr 2025 04:08), Max: 98.7 (06 Apr 2025 00:09)  HR: 118 (06 Apr 2025 06:00)  BP: 123/76 (06 Apr 2025 06:00)  RR: 16 (06 Apr 2025 06:00)  SpO2: 96% (06 Apr 2025 06:00) (95% - 100%)  temp max in last 48H T(F): , Max: 100.9 (04-04-25 @ 08:30)    Current Antibiotics:  cefepime  Injectable. 2000 milliGRAM(s) IV Push every 8 hours    Other medications:  albuterol/ipratropium for Nebulization 3 milliLiter(s) Nebulizer every 6 hours  apixaban 5 milliGRAM(s) Oral every 12 hours  atorvastatin 10 milliGRAM(s) Oral at bedtime  chlorhexidine 2% Cloths 1 Application(s) Topical daily  dextrose 5%. 1000 milliLiter(s) IV Continuous <Continuous>  dextrose 5%. 1000 milliLiter(s) IV Continuous <Continuous>  dextrose 50% Injectable 25 Gram(s) IV Push once  dextrose 50% Injectable 12.5 Gram(s) IV Push once  dextrose 50% Injectable 25 Gram(s) IV Push once  glucagon  Injectable 1 milliGRAM(s) IntraMuscular once  hydrocortisone sodium succinate Injectable 100 milliGRAM(s) IV Push two times a day  insulin lispro (ADMELOG) corrective regimen sliding scale   SubCutaneous three times a day before meals  metoprolol tartrate 12.5 milliGRAM(s) Oral two times a day  pantoprazole  Injectable 40 milliGRAM(s) IV Push daily  polyethylene glycol 3350 17 Gram(s) Oral daily  senna 2 Tablet(s) Oral at bedtime  tamsulosin 0.4 milliGRAM(s) Oral at bedtime                            10.2   8.06  )-----------( 135      ( 06 Apr 2025 07:40 )             30.9     04-05    137  |  103  |  21.1[H]  ----------------------------<  121[H]  3.7   |  22.0  |  0.43[L]    Ca    7.8[L]      05 Apr 2025 05:34  Phos  4.5     04-05  Mg     1.8     04-05    TPro  4.9[L]  /  Alb  2.6[L]  /  TBili  0.7  /  DBili  x   /  AST  16  /  ALT  15  /  AlkPhos  41  04-05    RECENT CULTURES:  04-04 @ 01:50 Blood Blood     No growth at 48 Hours    04-04 @ 00:55    RVP  NotDetec    04-03 @ 00:00 Clean Catch Clean Catch (Midstream)     >=3 organisms. Probable collection contamination.    03-26 @ 17:20 Blood Blood     No growth at 5 days    03-26 @ 16:47 Clean Catch Clean Catch (Midstream)     No growth        WBC Count: 8.06 K/uL (04-06-25 @ 07:40)  WBC Count: 6.94 K/uL (04-05-25 @ 05:34)  WBC Count: 6.88 K/uL (04-04-25 @ 12:19)  WBC Count: 8.07 K/uL (04-04-25 @ 01:50)  WBC Count: 7.04 K/uL (04-03-25 @ 03:20)  WBC Count: 8.75 K/uL (04-02-25 @ 02:35)  WBC Count: 10.78 K/uL (04-01-25 @ 16:00)    Creatinine: 0.43 mg/dL (04-05-25 @ 05:34)  Creatinine: 0.58 mg/dL (04-04-25 @ 12:19)  Creatinine: 0.52 mg/dL (04-04-25 @ 01:50)  Creatinine: 0.57 mg/dL (04-03-25 @ 03:20)  Creatinine: 0.51 mg/dL (04-02-25 @ 02:35)  Creatinine: 0.54 mg/dL (04-01-25 @ 16:00)    Procalcitonin: 0.37 ng/mL (04-04-25 @ 08:10)  Procalcitonin: 1.01 ng/mL (03-26-25 @ 17:20)     SARS-CoV-2: NotDetec (04-04-25 @ 00:55)  SARS-CoV-2: NotDetec (03-26-25 @ 17:20)    Vancomycin Level, Trough: 8.4 ug/mL (04-05-25 @ 05:34)            < from: CT Angio Chest PE Protocol w/ IV Cont (04.04.25 @ 10:02) >  ACC: [id number] EXAM:  CT ANGIO CHEST PULM ART [facility]   ORDERED BY: JUAN RAMON FIELDS     PROCEDURE DATE:  04/04/2025      INTERPRETATION:  Clinical information: Evaluate for pulmonary embolus.   Exam is compared to previous study of 3/28/2025.    CTangiogram of the chest was obtained following administration of   intravenous contrast. Approximately 1 25 cc of Omnipaque 350 was   administered and 25 cc was discarded. Coronal, sagittal and MIP images   were submitted for review.    No hilar or mediastinal adenopathy is noted.    Heart is markedly enlarged in size. Calcification the coronary arteries   noted. Pacemaker is noted in place. No pericardial effusion is noted.   Pulmonary arteries are normal in caliber. No filling defects are noted.    Mucous/secretions are noted within the left lower lobe bronchus.   Near-complete atelectasis of both lower lobes is noted. Small bilateral   pleural effusions are noted.    Below the diaphragm, visualized portions of the abdomen are unremarkable.    Degenerative changes of the spine are noted.    IMPRESSION: No pulmonary embolus is noted.    --- End of Report ---    < end of copied text >

## 2025-04-06 NOTE — PROGRESS NOTE ADULT - SUBJECTIVE AND OBJECTIVE BOX
****INCOMPLETE****  Preliminary note, offical recommendations pending attending review/signature   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  am morning prior.  Upon arrival to  ED she was noted to have lt sided facial droop; aphasic; left arm weakness; no TNK as out of window/recent infarction.  code stroke activated;  CT imagining found patient with new rt mca; patient transferred to [facility] for thrombectomy. Pt transferred to [facility] EDUARDO eval, s/p cerebral angiogram for mechanical thrombectomy of Rt M2 occlusion, TICI 3 (one pass). She was admitted to NSICU for close monitoring post thrombectomy.Hospital course complicated by right hemiparesis and aphasia on 3/29/25. CTH negative for acute hemorrhage, CTA h/n + new Left MCA M2 occulusion and subsequently underwent 3rd thrombectomy on 3/29.     4/4: noted with hypoxia, hypotension and increased lethargy. Concern for sepsis 2/2 ?aspiration pna started on Cefepime + vanco ID, medicine, and cardio consulted.     SUBJECTIVE: No events overnight.  No new neurologic complaints. Unable to obtain complete ROS secondary to aphasia    albuterol/ipratropium for Nebulization 3 milliLiter(s) Nebulizer every 6 hours  bisacodyl Suppository 10 milliGRAM(s) Rectal daily PRN  cefepime  Injectable. 2000 milliGRAM(s) IV Push every 8 hours  chlorhexidine 2% Cloths 1 Application(s) Topical daily  dextrose 5%. 1000 milliLiter(s) IV Continuous <Continuous>  dextrose 5%. 1000 milliLiter(s) IV Continuous <Continuous>  dextrose 50% Injectable 25 Gram(s) IV Push once  dextrose 50% Injectable 12.5 Gram(s) IV Push once  dextrose 50% Injectable 25 Gram(s) IV Push once  dextrose Oral Gel 15 Gram(s) Oral once PRN  enoxaparin Injectable 63 milliGRAM(s) SubCutaneous every 12 hours  glucagon  Injectable 1 milliGRAM(s) IntraMuscular once  hydrocortisone sodium succinate Injectable 100 milliGRAM(s) IV Push two times a day  insulin lispro (ADMELOG) corrective regimen sliding scale   SubCutaneous every 6 hours  metoprolol tartrate Injectable 5 milliGRAM(s) IV Push every 6 hours  pantoprazole  Injectable 40 milliGRAM(s) IV Push daily  vancomycin  IVPB 1000 milliGRAM(s) IV Intermittent every 12 hours      PHYSICAL EXAM:   Vital Signs Last 24 Hrs  T(C): 36.8 (06 Apr 2025 04:08), Max: 37.1 (06 Apr 2025 00:09)  T(F): 98.3 (06 Apr 2025 04:08), Max: 98.7 (06 Apr 2025 00:09)  HR: 118 (06 Apr 2025 06:00) (103 - 128)  BP: 123/76 (06 Apr 2025 06:00) (98/60 - 134/83)  BP(mean): 90 (06 Apr 2025 06:00) (73 - 102)  RR: 16 (06 Apr 2025 06:00) (16 - 20)  SpO2: 96% (06 Apr 2025 06:00) (95% - 100%)    Parameters below as of 06 Apr 2025 06:00  Patient On (Oxygen Delivery Method): nasal cannula  O2 Flow (L/min): 2    ***PENDING EXAM****  General: No acute distress.   HEENT: Head normocephalic, atraumatic. Conjunctivae clear w/o exudates or hemorrhage.    Cardiac: irregular rate and rhythm.    Respiratory: Lung sounds clear in all fields.    Abdominal: Soft, nondistended, nontender.   Skin: Skin is warm, dry     Extremities: No edema    Detailed Neurologic Exam:  Mental status: Awake, alert, oriented x3 w/ choices; expressive aphasia w/ word finding difficulty    Cranial nerves: slight right facial palsy, mild to moderate dysarthria, Pupils equal and react symmetrically to light. There is no visual field deficit to confrontation. Extraocular motion is full with no nystagmus.    Motor: There is normal bulk and tone.  There is no tremor.  Strength is 4+/5 proximal 4/5 distal in the right arm and 5/5 right leg. Drifts.   Strength is 5/5 in the left arm and 4/5 left leg.  Sensation: Intact to light touch and pin in 4 extremities  Cerebellar: There is no dysmetria on finger to nose testing.                                                                                                    Gait : deferred    LABS:                                   10.4   6.94  )-----------( 134      ( 05 Apr 2025 05:34 )             31.3   04-05    137  |  103  |  21.1[H]  ----------------------------<  121[H]  3.7   |  22.0  |  0.43[L]    Ca    7.8[L]      05 Apr 2025 05:34  Phos  4.5     04-05  Mg     1.8     04-05    TPro  4.9[L]  /  Alb  2.6[L]  /  TBili  0.7  /  DBili  x   /  AST  16  /  ALT  15  /  AlkPhos  41  04-05      LDL: 59  A1C: 5.5    IMAGING: Neuro-Imaging  CT Head No Cont (04.04.25 @ 10:00)   IMPRESSION: Stable subacute bilateral infarctions, most pronounced in   left basal ganglia. Trace left frontal lobe petechial hemorrhage versus   spared cortex. Continued follow-up is recommended.    CT Head No Cont (04.02.25 @ 09:41)   IMPRESSION: Subacute infarcts are again seen bilaterally as described   above    Acute infarct is now seen involving the left basal ganglia region.    MR Head w/wo IV Cont (03.31.25 @ 10:09)   IMPRESSION:  New acute infarctions within the LEFT lateral frontal   cortex, LEFT occipital cortex, RIGHT insular cortex and scattered RIGHT   frontal, parietal, temporal and occipital cortex suggesting embolic   etiology. Subacute infarction within the LEFT basal ganglia. Mild   periventricular chronic white matter ischemia.    IR Neuro (03.29.25 @ 09:10)   Supervision and Interpretation:  1. Interval recanalization of the left MCA M2 occlusion seen byCT   angiography with persistent posterior frontal distal M4 branch occlusion.   Attempted thrombectomy was unsuccessful because the branch was too small   to be accessed by the red 43 thrombectomy catheter.    CT Head No Cont (03.29.25 @ 20:56)   IMPRESSION: No acute intracranial hemorrhage, mass effect, or shift of   the midline structures.  Evolving acute left frontal lobe superior division MCA infarct without   hemorrhagic transformation.    CT Head No Cont (03.29.25 @ 06:54)   Patchy hypoattenuation related to evolving acute/subacute bilateral MCA   territory infarcts. No intracranial hemorrhage or midline shift present.    CT Angio Head/Neck Stroke Protocol w/ IV Cont, CT Perfusion (03.29.25 @ 06:56)   IMPRESSION:  CT PERFUSION:  Signal abnormality on T-Max/mean transit time data sets at the left   frontal lobe suggest territory-and-risk within the left MCA distribution,   corresponding to angiographic findings below. Cerebral blood flow and   cerebral blood volume data sets remain within normal limits.  Neurointerventional/neurosurgical consultation recommended. MRI may be   considered for further characterization.  CTA NECK:  No evidence of significant stenosis or occlusion.  Previously identified narrowing of the right cervical ICA with   questionable arterial dissection appears less conspicuous on the current   exam, suggesting at least partial recanalization. MRA (dissection   protocol) considered for further evaluation.  CTA HEAD:  Acute left M2 occlusion identified.  Critical findings above were discussed directly by telephone with   ordering provider, Aric PINEDA, on 3/29/2025 at 7:11 AM by Dr. Louie Worthy with read back confirmation.    CT Head No Cont (03.27.25 @ 05:43)  IMPRESSION: Age-appropriate involutional and ischemic gliotic changes. No   hemorrhage. No significant change since 3/26/2025.    CT Head No Cont (03.27.25 @ 05:43)   IMPRESSION: Age-appropriate involutional and ischemic gliotic changes. No   hemorrhage. No significant change since 3/26/2025.    CT Angio Head/Neck Stroke Protocol w/ IV Cont (03.26.25 @ 08:52)   1. RIGHT CAROTID SYSTEM:    Atherosclerotic plaque carotid bulb without    hemodynamically significant stenosis. Tandem lesion distal internal   carotid artery at the C1 level has developed since 3/21/2025, possible   interval arterial dissection, with associated luminal narrowing   approximately 30%  2.   LEFT CAROTID SYSTEM:     Atherosclerotic plaque carotid bulb without    hemodynamically significant stenosis.  3.   VERTEBRAL CIRCULATION:    Patent.  4.  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 Right MCA occlusion   in its proximal M2 segment is an interval finding since 3/21/2025. Left   MCA remains patent with luminal irregularity and low-grade narrowing   following recent thrombectomy.  5.  POSTERIOR INTRACRANIAL CIRCULATION:    Patent.  6. BRAIN PERFUSION:   No acute infarction of the brain is convincingly   demonstrated.  However, broad region of apparent ischemia corresponds to   the right MCA distribution correlating with acute embolic occlusion on CT   angiography  7. Heterogeneous enhancement within the principal dural sinuses may be   secondary to the early phase of venous opacification on this arterial   phase examination. The appearance is similar to 3/21/2025. Consider   supplemental evaluation with MR venogram  --  CT   CT Angio Chest PE Protocol w/ IV Cont (04.04.25 @ 10:02)   IMPRESSION: No pulmonary embolus is noted.  ---  ULTRASOUND   US Pseudoaneurysm Injection (03.28.25 @ 09:41)   Successful thrombin and ultrasound-guided compression of the right groin   pseudoaneurysm.    US Duplex Arterial Lower Ext Ltd, Right (03.27.25 @ 04:09)   IMPRESSION:  A 1.7 cm right CFA pseudoaneurysm.  A 2.5 cm adjacent hematoma.  --  CARDIOLOGY   TTE W or WO Ultrasound Enhancing Agent (03.26.25 @ 18:15)    1. Technically difficult image quality.   2. Left ventricular cavity is normal in size. Left ventricular systolic function is hyperdynamic with an ejectionfraction visually estimated at 65 to 70 %.   3. Mild left ventricular hypertrophy.   4. Normal right ventricular cavity size and normal right ventricular systolic function.   5. Left atrium is severely dilated.   6. The right atrium is severely dilated.       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  am morning prior.  Upon arrival to  ED she was noted to have lt sided facial droop; aphasic; left arm weakness; no TNK as out of window/recent infarction.  code stroke activated;  CT imagining found patient with new rt mca; patient transferred to [facility] for thrombectomy. Pt transferred to [facility] EDUARDO eval, s/p cerebral angiogram for mechanical thrombectomy of Rt M2 occlusion, TICI 3 (one pass). She was admitted to NSICU for close monitoring post thrombectomy.Hospital course complicated by right hemiparesis and aphasia on 3/29/25. CTH negative for acute hemorrhage, CTA h/n + new Left MCA M2 occulusion and subsequently underwent 3rd thrombectomy on 3/29.     4/4: noted with hypoxia, hypotension and increased lethargy. Concern for sepsis 2/2 ?aspiration pna started on Cefepime + vanco ID, medicine, and cardio consulted.     SUBJECTIVE: No events overnight.  No new neurologic complaints. Unable to obtain complete ROS secondary to aphasia    albuterol/ipratropium for Nebulization 3 milliLiter(s) Nebulizer every 6 hours  bisacodyl Suppository 10 milliGRAM(s) Rectal daily PRN  cefepime  Injectable. 2000 milliGRAM(s) IV Push every 8 hours  chlorhexidine 2% Cloths 1 Application(s) Topical daily  dextrose 5%. 1000 milliLiter(s) IV Continuous <Continuous>  dextrose 5%. 1000 milliLiter(s) IV Continuous <Continuous>  dextrose 50% Injectable 25 Gram(s) IV Push once  dextrose 50% Injectable 12.5 Gram(s) IV Push once  dextrose 50% Injectable 25 Gram(s) IV Push once  dextrose Oral Gel 15 Gram(s) Oral once PRN  enoxaparin Injectable 63 milliGRAM(s) SubCutaneous every 12 hours  glucagon  Injectable 1 milliGRAM(s) IntraMuscular once  hydrocortisone sodium succinate Injectable 100 milliGRAM(s) IV Push two times a day  insulin lispro (ADMELOG) corrective regimen sliding scale   SubCutaneous every 6 hours  metoprolol tartrate Injectable 5 milliGRAM(s) IV Push every 6 hours  pantoprazole  Injectable 40 milliGRAM(s) IV Push daily  vancomycin  IVPB 1000 milliGRAM(s) IV Intermittent every 12 hours      PHYSICAL EXAM:   Vital Signs Last 24 Hrs  T(C): 36.8 (06 Apr 2025 04:08), Max: 37.1 (06 Apr 2025 00:09)  T(F): 98.3 (06 Apr 2025 04:08), Max: 98.7 (06 Apr 2025 00:09)  HR: 118 (06 Apr 2025 06:00) (103 - 128)  BP: 123/76 (06 Apr 2025 06:00) (98/60 - 134/83)  BP(mean): 90 (06 Apr 2025 06:00) (73 - 102)  RR: 16 (06 Apr 2025 06:00) (16 - 20)  SpO2: 96% (06 Apr 2025 06:00) (95% - 100%)    Parameters below as of 06 Apr 2025 06:00  Patient On (Oxygen Delivery Method): nasal cannula  O2 Flow (L/min): 2      General: No acute distress.   HEENT: Head normocephalic, atraumatic. Conjunctivae clear w/o exudates or hemorrhage.    Cardiac: irregular rate and rhythm.    Respiratory: Lung sounds clear in all fields.    Abdominal: Soft, nondistended, nontender.   Skin: + RUE swelling and warm to touch. Skin is warm, dry     Extremities: No edema    Detailed Neurologic Exam:  Mental status: Awake, alert, oriented x3 w/ choices; expressive aphasia w/ word finding difficulty    Cranial nerves: slight right facial palsy, mild to moderate dysarthria, Pupils equal and react symmetrically to light. There is no visual field deficit to confrontation. Extraocular motion is full with no nystagmus.    Motor: There is normal bulk and tone.  There is no tremor.  Strength is 4+/5 proximal 4/5 distal in the right arm and 5/5 right leg. Drifts.   Strength is 5/5 in the left arm and 4/5 left leg.  Sensation: Intact to light touch and pin in 4 extremities  Cerebellar: There is no dysmetria on finger to nose testing.                                                                                                    Gait : deferred    LABS:                        10.2   8.06  )-----------( 135      ( 06 Apr 2025 07:40 )             30.9   04-06    138  |  103  |  30.5[H]  ----------------------------<  133[H]  3.5   |  23.0  |  0.50    Ca    8.1[L]      06 Apr 2025 07:40  Phos  2.3     04-06  Mg     2.1     04-06    TPro  5.1[L]  /  Alb  2.8[L]  /  TBili  0.7  /  DBili  x   /  AST  12  /  ALT  14  /  AlkPhos  37[L]  04-06       LDL: 59  A1C: 5.5    IMAGING: Neuro-Imaging  CT Head No Cont (04.04.25 @ 10:00)   IMPRESSION: Stable subacute bilateral infarctions, most pronounced in   left basal ganglia. Trace left frontal lobe petechial hemorrhage versus   spared cortex. Continued follow-up is recommended.    CT Head No Cont (04.02.25 @ 09:41)   IMPRESSION: Subacute infarcts are again seen bilaterally as described   above    Acute infarct is now seen involving the left basal ganglia region.    MR Head w/wo IV Cont (03.31.25 @ 10:09)   IMPRESSION:  New acute infarctions within the LEFT lateral frontal   cortex, LEFT occipital cortex, RIGHT insular cortex and scattered RIGHT   frontal, parietal, temporal and occipital cortex suggesting embolic   etiology. Subacute infarction within the LEFT basal ganglia. Mild   periventricular chronic white matter ischemia.    IR Neuro (03.29.25 @ 09:10)   Supervision and Interpretation:  1. Interval recanalization of the left MCA M2 occlusion seen byCT   angiography with persistent posterior frontal distal M4 branch occlusion.   Attempted thrombectomy was unsuccessful because the branch was too small   to be accessed by the red 43 thrombectomy catheter.    CT Head No Cont (03.29.25 @ 20:56)   IMPRESSION: No acute intracranial hemorrhage, mass effect, or shift of   the midline structures.  Evolving acute left frontal lobe superior division MCA infarct without   hemorrhagic transformation.    CT Head No Cont (03.29.25 @ 06:54)   Patchy hypoattenuation related to evolving acute/subacute bilateral MCA   territory infarcts. No intracranial hemorrhage or midline shift present.    CT Angio Head/Neck Stroke Protocol w/ IV Cont, CT Perfusion (03.29.25 @ 06:56)   IMPRESSION:  CT PERFUSION:  Signal abnormality on T-Max/mean transit time data sets at the left   frontal lobe suggest territory-and-risk within the left MCA distribution,   corresponding to angiographic findings below. Cerebral blood flow and   cerebral blood volume data sets remain within normal limits.  Neurointerventional/neurosurgical consultation recommended. MRI may be   considered for further characterization.  CTA NECK:  No evidence of significant stenosis or occlusion.  Previously identified narrowing of the right cervical ICA with   questionable arterial dissection appears less conspicuous on the current   exam, suggesting at least partial recanalization. MRA (dissection   protocol) considered for further evaluation.  CTA HEAD:  Acute left M2 occlusion identified.  Critical findings above were discussed directly by telephone with   ordering provider, Aric PINEDA, on 3/29/2025 at 7:11 AM by Dr. Louie Worthy with read back confirmation.    CT Head No Cont (03.27.25 @ 05:43)  IMPRESSION: Age-appropriate involutional and ischemic gliotic changes. No   hemorrhage. No significant change since 3/26/2025.    CT Head No Cont (03.27.25 @ 05:43)   IMPRESSION: Age-appropriate involutional and ischemic gliotic changes. No   hemorrhage. No significant change since 3/26/2025.    CT Angio Head/Neck Stroke Protocol w/ IV Cont (03.26.25 @ 08:52)   1. RIGHT CAROTID SYSTEM:    Atherosclerotic plaque carotid bulb without    hemodynamically significant stenosis. Tandem lesion distal internal   carotid artery at the C1 level has developed since 3/21/2025, possible   interval arterial dissection, with associated luminal narrowing   approximately 30%  2.   LEFT CAROTID SYSTEM:     Atherosclerotic plaque carotid bulb without    hemodynamically significant stenosis.  3.   VERTEBRAL CIRCULATION:    Patent.  4.  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 Right MCA occlusion   in its proximal M2 segment is an interval finding since 3/21/2025. Left   MCA remains patent with luminal irregularity and low-grade narrowing   following recent thrombectomy.  5.  POSTERIOR INTRACRANIAL CIRCULATION:    Patent.  6. BRAIN PERFUSION:   No acute infarction of the brain is convincingly   demonstrated.  However, broad region of apparent ischemia corresponds to   the right MCA distribution correlating with acute embolic occlusion on CT   angiography  7. Heterogeneous enhancement within the principal dural sinuses may be   secondary to the early phase of venous opacification on this arterial   phase examination. The appearance is similar to 3/21/2025. Consider   supplemental evaluation with MR venogram  --  CT   CT Angio Chest PE Protocol w/ IV Cont (04.04.25 @ 10:02)   IMPRESSION: No pulmonary embolus is noted.  ---  ULTRASOUND   US Pseudoaneurysm Injection (03.28.25 @ 09:41)   Successful thrombin and ultrasound-guided compression of the right groin   pseudoaneurysm.    US Duplex Arterial Lower Ext Ltd, Right (03.27.25 @ 04:09)   IMPRESSION:  A 1.7 cm right CFA pseudoaneurysm.  A 2.5 cm adjacent hematoma.  --  CARDIOLOGY   TTE W or WO Ultrasound Enhancing Agent (03.26.25 @ 18:15)    1. Technically difficult image quality.   2. Left ventricular cavity is normal in size. Left ventricular systolic function is hyperdynamic with an ejectionfraction visually estimated at 65 to 70 %.   3. Mild left ventricular hypertrophy.   4. Normal right ventricular cavity size and normal right ventricular systolic function.   5. Left atrium is severely dilated.   6. The right atrium is severely dilated.       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  am morning prior.  Upon arrival to  ED she was noted to have lt sided facial droop; aphasic; left arm weakness; no TNK as out of window/recent infarction.  code stroke activated;  CT imagining found patient with new rt mca; patient transferred to [facility] for thrombectomy. Pt transferred to [facility] EDUARDO eval, s/p cerebral angiogram for mechanical thrombectomy of Rt M2 occlusion, TICI 3 (one pass). She was admitted to NSICU for close monitoring post thrombectomy.Hospital course complicated by right hemiparesis and aphasia on 3/29/25. CTH negative for acute hemorrhage, CTA h/n + new Left MCA M2 occulusion and subsequently underwent 3rd thrombectomy on 3/29.     4/4: noted with hypoxia, hypotension and increased lethargy. Concern for sepsis 2/2 ?aspiration pna     SUBJECTIVE: No events overnight.  No new neurologic complaints. Unable to obtain complete ROS secondary to aphasia    albuterol/ipratropium for Nebulization 3 milliLiter(s) Nebulizer every 6 hours  bisacodyl Suppository 10 milliGRAM(s) Rectal daily PRN  cefepime  Injectable. 2000 milliGRAM(s) IV Push every 8 hours  chlorhexidine 2% Cloths 1 Application(s) Topical daily  dextrose 5%. 1000 milliLiter(s) IV Continuous <Continuous>  dextrose 5%. 1000 milliLiter(s) IV Continuous <Continuous>  dextrose 50% Injectable 25 Gram(s) IV Push once  dextrose 50% Injectable 12.5 Gram(s) IV Push once  dextrose 50% Injectable 25 Gram(s) IV Push once  dextrose Oral Gel 15 Gram(s) Oral once PRN  enoxaparin Injectable 63 milliGRAM(s) SubCutaneous every 12 hours  glucagon  Injectable 1 milliGRAM(s) IntraMuscular once  hydrocortisone sodium succinate Injectable 100 milliGRAM(s) IV Push two times a day  insulin lispro (ADMELOG) corrective regimen sliding scale   SubCutaneous every 6 hours  metoprolol tartrate Injectable 5 milliGRAM(s) IV Push every 6 hours  pantoprazole  Injectable 40 milliGRAM(s) IV Push daily  vancomycin  IVPB 1000 milliGRAM(s) IV Intermittent every 12 hours      PHYSICAL EXAM:   Vital Signs Last 24 Hrs  T(C): 36.8 (06 Apr 2025 04:08), Max: 37.1 (06 Apr 2025 00:09)  T(F): 98.3 (06 Apr 2025 04:08), Max: 98.7 (06 Apr 2025 00:09)  HR: 118 (06 Apr 2025 06:00) (103 - 128)  BP: 123/76 (06 Apr 2025 06:00) (98/60 - 134/83)  BP(mean): 90 (06 Apr 2025 06:00) (73 - 102)  RR: 16 (06 Apr 2025 06:00) (16 - 20)  SpO2: 96% (06 Apr 2025 06:00) (95% - 100%)    Parameters below as of 06 Apr 2025 06:00  Patient On (Oxygen Delivery Method): nasal cannula  O2 Flow (L/min): 2      General: No acute distress.   HEENT: Head normocephalic, atraumatic. Conjunctivae clear w/o exudates or hemorrhage.    Cardiac: irregular rate and rhythm.    Respiratory: Lung sounds clear in all fields.    Abdominal: Soft, nondistended, nontender.   Skin: + RUE swelling and warm to touch. Skin is warm, dry     Extremities: No edema    Detailed Neurologic Exam:  Mental status: Awake, alert, oriented x3 w/ choices; expressive aphasia w/ word finding difficulty    Cranial nerves: slight right facial palsy, mild to moderate dysarthria, Pupils equal and react symmetrically to light. There is no visual field deficit to confrontation. Extraocular motion is full with no nystagmus.    Motor: There is normal bulk and tone.  There is no tremor.  Strength is 4+/5 proximal 4/5 distal in the right arm and 5/5 right leg. Drifts.   Strength is 5/5 in the left arm and 4/5 left leg.  Sensation: Intact to light touch and pin in 4 extremities  Cerebellar: There is no dysmetria on finger to nose testing.                                                                                                    Gait : deferred    LABS:                        10.2   8.06  )-----------( 135      ( 06 Apr 2025 07:40 )             30.9   04-06    138  |  103  |  30.5[H]  ----------------------------<  133[H]  3.5   |  23.0  |  0.50    Ca    8.1[L]      06 Apr 2025 07:40  Phos  2.3     04-06  Mg     2.1     04-06    TPro  5.1[L]  /  Alb  2.8[L]  /  TBili  0.7  /  DBili  x   /  AST  12  /  ALT  14  /  AlkPhos  37[L]  04-06       LDL: 59  A1C: 5.5    IMAGING: Neuro-Imaging  CT Head No Cont (04.04.25 @ 10:00)   IMPRESSION: Stable subacute bilateral infarctions, most pronounced in   left basal ganglia. Trace left frontal lobe petechial hemorrhage versus   spared cortex. Continued follow-up is recommended.    CT Head No Cont (04.02.25 @ 09:41)   IMPRESSION: Subacute infarcts are again seen bilaterally as described   above    Acute infarct is now seen involving the left basal ganglia region.    MR Head w/wo IV Cont (03.31.25 @ 10:09)   IMPRESSION:  New acute infarctions within the LEFT lateral frontal   cortex, LEFT occipital cortex, RIGHT insular cortex and scattered RIGHT   frontal, parietal, temporal and occipital cortex suggesting embolic   etiology. Subacute infarction within the LEFT basal ganglia. Mild   periventricular chronic white matter ischemia.    IR Neuro (03.29.25 @ 09:10)   Supervision and Interpretation:  1. Interval recanalization of the left MCA M2 occlusion seen byCT   angiography with persistent posterior frontal distal M4 branch occlusion.   Attempted thrombectomy was unsuccessful because the branch was too small   to be accessed by the red 43 thrombectomy catheter.    CT Head No Cont (03.29.25 @ 20:56)   IMPRESSION: No acute intracranial hemorrhage, mass effect, or shift of   the midline structures.  Evolving acute left frontal lobe superior division MCA infarct without   hemorrhagic transformation.    CT Head No Cont (03.29.25 @ 06:54)   Patchy hypoattenuation related to evolving acute/subacute bilateral MCA   territory infarcts. No intracranial hemorrhage or midline shift present.    CT Angio Head/Neck Stroke Protocol w/ IV Cont, CT Perfusion (03.29.25 @ 06:56)   IMPRESSION:  CT PERFUSION:  Signal abnormality on T-Max/mean transit time data sets at the left   frontal lobe suggest territory-and-risk within the left MCA distribution,   corresponding to angiographic findings below. Cerebral blood flow and   cerebral blood volume data sets remain within normal limits.  Neurointerventional/neurosurgical consultation recommended. MRI may be   considered for further characterization.  CTA NECK:  No evidence of significant stenosis or occlusion.  Previously identified narrowing of the right cervical ICA with   questionable arterial dissection appears less conspicuous on the current   exam, suggesting at least partial recanalization. MRA (dissection   protocol) considered for further evaluation.  CTA HEAD:  Acute left M2 occlusion identified.  Critical findings above were discussed directly by telephone with   ordering provider, Aric PINEDA, on 3/29/2025 at 7:11 AM by Dr. Louie Worthy with read back confirmation.    CT Head No Cont (03.27.25 @ 05:43)  IMPRESSION: Age-appropriate involutional and ischemic gliotic changes. No   hemorrhage. No significant change since 3/26/2025.    CT Head No Cont (03.27.25 @ 05:43)   IMPRESSION: Age-appropriate involutional and ischemic gliotic changes. No   hemorrhage. No significant change since 3/26/2025.    CT Angio Head/Neck Stroke Protocol w/ IV Cont (03.26.25 @ 08:52)   1. RIGHT CAROTID SYSTEM:    Atherosclerotic plaque carotid bulb without    hemodynamically significant stenosis. Tandem lesion distal internal   carotid artery at the C1 level has developed since 3/21/2025, possible   interval arterial dissection, with associated luminal narrowing   approximately 30%  2.   LEFT CAROTID SYSTEM:     Atherosclerotic plaque carotid bulb without    hemodynamically significant stenosis.  3.   VERTEBRAL CIRCULATION:    Patent.  4.  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 Right MCA occlusion   in its proximal M2 segment is an interval finding since 3/21/2025. Left   MCA remains patent with luminal irregularity and low-grade narrowing   following recent thrombectomy.  5.  POSTERIOR INTRACRANIAL CIRCULATION:    Patent.  6. BRAIN PERFUSION:   No acute infarction of the brain is convincingly   demonstrated.  However, broad region of apparent ischemia corresponds to   the right MCA distribution correlating with acute embolic occlusion on CT   angiography  7. Heterogeneous enhancement within the principal dural sinuses may be   secondary to the early phase of venous opacification on this arterial   phase examination. The appearance is similar to 3/21/2025. Consider   supplemental evaluation with MR venogram  --  CT   CT Angio Chest PE Protocol w/ IV Cont (04.04.25 @ 10:02)   IMPRESSION: No pulmonary embolus is noted.  ---  ULTRASOUND   US Pseudoaneurysm Injection (03.28.25 @ 09:41)   Successful thrombin and ultrasound-guided compression of the right groin   pseudoaneurysm.    US Duplex Arterial Lower Ext Ltd, Right (03.27.25 @ 04:09)   IMPRESSION:  A 1.7 cm right CFA pseudoaneurysm.  A 2.5 cm adjacent hematoma.  --  CARDIOLOGY   TTE W or WO Ultrasound Enhancing Agent (03.26.25 @ 18:15)    1. Technically difficult image quality.   2. Left ventricular cavity is normal in size. Left ventricular systolic function is hyperdynamic with an ejectionfraction visually estimated at 65 to 70 %.   3. Mild left ventricular hypertrophy.   4. Normal right ventricular cavity size and normal right ventricular systolic function.   5. Left atrium is severely dilated.   6. The right atrium is severely dilated.     [facility] Stroke Team  Progress Note     HPI:  89 year old female with PMHx of Afib (on Eliquis), PPM, HTN, hx of RUE melanoma removal with R axillary lymph node removal, recent admission 3/21- 3/25, 2025 for lt MCA occlusion s/p thrombectomy presented as transfer from [facility] after she was found at 7:30 3/26/25 with lt facial and left sided weakness.  am morning prior.  Upon arrival to  ED she was noted to have lt sided facial droop; aphasic; left arm weakness; no TNK as out of window/recent infarction.  code stroke activated;  CT imagining found patient with new rt mca; patient transferred to [facility] for thrombectomy. Pt transferred to [facility] EDUARDO eval, s/p cerebral angiogram for mechanical thrombectomy of Rt M2 occlusion, TICI 3 (one pass). She was admitted to NSICU for close monitoring post thrombectomy.Hospital course complicated by right hemiparesis and aphasia on 3/29/25. CTH negative for acute hemorrhage, CTA h/n + new Left MCA M2 occulusion and subsequently underwent 3rd thrombectomy on 3/29.     4/4: noted with hypoxia, hypotension and increased lethargy. Concern for sepsis 2/2 ?aspiration pna     SUBJECTIVE: No events overnight.  No new neurologic complaints. Unable to obtain complete ROS secondary to aphasia    albuterol/ipratropium for Nebulization 3 milliLiter(s) Nebulizer every 6 hours  bisacodyl Suppository 10 milliGRAM(s) Rectal daily PRN  cefepime  Injectable. 2000 milliGRAM(s) IV Push every 8 hours  chlorhexidine 2% Cloths 1 Application(s) Topical daily  dextrose 5%. 1000 milliLiter(s) IV Continuous <Continuous>  dextrose 5%. 1000 milliLiter(s) IV Continuous <Continuous>  dextrose 50% Injectable 25 Gram(s) IV Push once  dextrose 50% Injectable 12.5 Gram(s) IV Push once  dextrose 50% Injectable 25 Gram(s) IV Push once  dextrose Oral Gel 15 Gram(s) Oral once PRN  enoxaparin Injectable 63 milliGRAM(s) SubCutaneous every 12 hours  glucagon  Injectable 1 milliGRAM(s) IntraMuscular once  hydrocortisone sodium succinate Injectable 100 milliGRAM(s) IV Push two times a day  insulin lispro (ADMELOG) corrective regimen sliding scale   SubCutaneous every 6 hours  metoprolol tartrate Injectable 5 milliGRAM(s) IV Push every 6 hours  pantoprazole  Injectable 40 milliGRAM(s) IV Push daily  vancomycin  IVPB 1000 milliGRAM(s) IV Intermittent every 12 hours      PHYSICAL EXAM:   Vital Signs Last 24 Hrs  T(C): 36.8 (06 Apr 2025 04:08), Max: 37.1 (06 Apr 2025 00:09)  T(F): 98.3 (06 Apr 2025 04:08), Max: 98.7 (06 Apr 2025 00:09)  HR: 118 (06 Apr 2025 06:00) (103 - 128)  BP: 123/76 (06 Apr 2025 06:00) (98/60 - 134/83)  BP(mean): 90 (06 Apr 2025 06:00) (73 - 102)  RR: 16 (06 Apr 2025 06:00) (16 - 20)  SpO2: 96% (06 Apr 2025 06:00) (95% - 100%)    Parameters below as of 06 Apr 2025 06:00  Patient On (Oxygen Delivery Method): nasal cannula  O2 Flow (L/min): 2      General: No acute distress.   HEENT: Head normocephalic, atraumatic. Conjunctivae clear w/o exudates or hemorrhage.    Cardiac: irregular rate and rhythm.    Respiratory: Lung sounds clear in all fields.    Abdominal: Soft, nondistended, nontender.   Skin: + RUE swelling and warm to touch. Skin is warm, dry     Extremities: No edema    Detailed Neurologic Exam:  Mental status: Awake, alert, oriented x3 w/ choices; expressive aphasia w/ word finding difficulty    Cranial nerves: slight right facial palsy, mild to moderate dysarthria, Pupils equal and react symmetrically to light. There is no visual field deficit to confrontation. Extraocular motion is full with no nystagmus.    Motor: There is normal bulk and tone.  There is no tremor.  Strength is 4+/5 proximal 4/5 distal in the right arm and 5/5 right leg. Drifts.   Strength is 5/5 in the left arm and 4/5 left leg.  Sensation: Intact to light touch and pin in 4 extremities  Cerebellar: There is no dysmetria on finger to nose testing.                                                                                                    Gait : deferred    LABS:                        10.2   8.06  )-----------( 135      ( 06 Apr 2025 07:40 )             30.9   04-06    138  |  103  |  30.5[H]  ----------------------------<  133[H]  3.5   |  23.0  |  0.50    Ca    8.1[L]      06 Apr 2025 07:40  Phos  2.3     04-06  Mg     2.1     04-06    TPro  5.1[L]  /  Alb  2.8[L]  /  TBili  0.7  /  DBili  x   /  AST  12  /  ALT  14  /  AlkPhos  37[L]  04-06       LDL: 59  A1C: 5.5    IMAGING: Neuro-Imaging  CT Head No Cont (04.04.25 @ 10:00)   IMPRESSION: Stable subacute bilateral infarctions, most pronounced in   left basal ganglia. Trace left frontal lobe petechial hemorrhage versus   spared cortex. Continued follow-up is recommended.    CT Head No Cont (04.02.25 @ 09:41)   IMPRESSION: Subacute infarcts are again seen bilaterally as described   above    Acute infarct is now seen involving the left basal ganglia region.    MR Head w/wo IV Cont (03.31.25 @ 10:09)   IMPRESSION:  New acute infarctions within the LEFT lateral frontal   cortex, LEFT occipital cortex, RIGHT insular cortex and scattered RIGHT   frontal, parietal, temporal and occipital cortex suggesting embolic   etiology. Subacute infarction within the LEFT basal ganglia. Mild   periventricular chronic white matter ischemia.    IR Neuro (03.29.25 @ 09:10)   Supervision and Interpretation:  1. Interval recanalization of the left MCA M2 occlusion seen byCT   angiography with persistent posterior frontal distal M4 branch occlusion.   Attempted thrombectomy was unsuccessful because the branch was too small   to be accessed by the red 43 thrombectomy catheter.    CT Head No Cont (03.29.25 @ 20:56)   IMPRESSION: No acute intracranial hemorrhage, mass effect, or shift of   the midline structures.  Evolving acute left frontal lobe superior division MCA infarct without   hemorrhagic transformation.    CT Head No Cont (03.29.25 @ 06:54)   Patchy hypoattenuation related to evolving acute/subacute bilateral MCA   territory infarcts. No intracranial hemorrhage or midline shift present.    CT Angio Head/Neck Stroke Protocol w/ IV Cont, CT Perfusion (03.29.25 @ 06:56)   IMPRESSION:  CT PERFUSION:  Signal abnormality on T-Max/mean transit time data sets at the left   frontal lobe suggest territory-and-risk within the left MCA distribution,   corresponding to angiographic findings below. Cerebral blood flow and   cerebral blood volume data sets remain within normal limits.  Neurointerventional/neurosurgical consultation recommended. MRI may be   considered for further characterization.  CTA NECK:  No evidence of significant stenosis or occlusion.  Previously identified narrowing of the right cervical ICA with   questionable arterial dissection appears less conspicuous on the current   exam, suggesting at least partial recanalization. MRA (dissection   protocol) considered for further evaluation.  CTA HEAD:  Acute left M2 occlusion identified.  Critical findings above were discussed directly by telephone with   ordering provider, Aric PINEDA, on 3/29/2025 at 7:11 AM by Dr. Louie Worthy with read back confirmation.    CT Head No Cont (03.27.25 @ 05:43)  IMPRESSION: Age-appropriate involutional and ischemic gliotic changes. No   hemorrhage. No significant change since 3/26/2025.    CT Head No Cont (03.27.25 @ 05:43)   IMPRESSION: Age-appropriate involutional and ischemic gliotic changes. No   hemorrhage. No significant change since 3/26/2025.    CT Angio Head/Neck Stroke Protocol w/ IV Cont (03.26.25 @ 08:52)   1. RIGHT CAROTID SYSTEM:    Atherosclerotic plaque carotid bulb without    hemodynamically significant stenosis. Tandem lesion distal internal   carotid artery at the C1 level has developed since 3/21/2025, possible   interval arterial dissection, with associated luminal narrowing   approximately 30%  2.   LEFT CAROTID SYSTEM:     Atherosclerotic plaque carotid bulb without    hemodynamically significant stenosis.  3.   VERTEBRAL CIRCULATION:    Patent.  4.  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 Right MCA occlusion   in its proximal M2 segment is an interval finding since 3/21/2025. Left   MCA remains patent with luminal irregularity and low-grade narrowing   following recent thrombectomy.  5.  POSTERIOR INTRACRANIAL CIRCULATION:    Patent.  6. BRAIN PERFUSION:   No acute infarction of the brain is convincingly   demonstrated.  However, broad region of apparent ischemia corresponds to   the right MCA distribution correlating with acute embolic occlusion on CT   angiography  7. Heterogeneous enhancement within the principal dural sinuses may be   secondary to the early phase of venous opacification on this arterial   phase examination. The appearance is similar to 3/21/2025. Consider   supplemental evaluation with MR venogram  --  CT   CT Angio Chest PE Protocol w/ IV Cont (04.04.25 @ 10:02)   IMPRESSION: No pulmonary embolus is noted.  ---  ULTRASOUND   US Pseudoaneurysm Injection (03.28.25 @ 09:41)   Successful thrombin and ultrasound-guided compression of the right groin   pseudoaneurysm.    US Duplex Arterial Lower Ext Ltd, Right (03.27.25 @ 04:09)   IMPRESSION:  A 1.7 cm right CFA pseudoaneurysm.  A 2.5 cm adjacent hematoma.  --  CARDIOLOGY   TTE W or WO Ultrasound Enhancing Agent (03.26.25 @ 18:15)    1. Technically difficult image quality.   2. Left ventricular cavity is normal in size. Left ventricular systolic function is hyperdynamic with an ejectionfraction visually estimated at 65 to 70 %.   3. Mild left ventricular hypertrophy.   4. Normal right ventricular cavity size and normal right ventricular systolic function.   5. Left atrium is severely dilated.   6. The right atrium is severely dilated.

## 2025-04-06 NOTE — PROGRESS NOTE ADULT - ASSESSMENT
89 year old female with PMHx of Afib (off Eliquis), PPM, HTN, HLD, GERD, severe MR/TR, iatrogenic adrenal insufficiency (on prednisone), lymphedema, metastatic melanoma s/p Left foot melanoma and lymph node resection, s/p RUE melanoma and Right axillary lymph node excision at [facility] (3/18/25). Patient with a recent admission 3/21 -3/25/25  when she initially presented to [facility] and was transferred to [facility] as a stroke code. At [facility] hospital course significant for s/p cerebral angiogram for mechanical thrombectomy of L M1 occlusion, TICI 3 (one pass) on 3/21/25, admission to NSICU. Patient was discharged and presented back to [facility] 3/26 with L sided weakness and aphasia transferred to [facility] for stroke code and is now  s/p cerebral angiogram for mechanical thrombectomy of Rt M2 occlusion, TICI 3 (one pass) on 3/26/25. Has been in NSICU since. 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 Patient also noted to have incidental finding of new 1.7 cm cystic lesion at the pancreatic body found on CT a/p on 3/28/25 and was recommended outpatient GI follow up. On 3/29 patient was noted to have acute change in neuro exam became acutely aphasic. New radiology reporting + LM2 occulusion. Patient went to Neuro IR and is s/p thrombectomy of left M4 with TICI 0. Was on Nitrofurantoin for a UTI 4/2 - 4/4. 4/4 early AM patient noted with fever and sepsis work up was done, started on Cefepime, remained febrile this AM. ID input requested.       Fever  Acute hypoxic respiratory failure  Hypotension   Multiple CVA  s/p cerebral angiogram for mechanical thrombectomy of L M1 occlusion, TICI 3 (one pass) on 3/21/25  s/p cerebral angiogram for mechanical thrombectomy of Rt M2 occlusion, TICI 3 (one pass) on 3/26/25  s/p thrombectomy of left M4 with TICI 0 3/29/25  R fem artery pseudoaneurysm  s/p thrombin injection on 03/27/25  s/p bedside thrombin injection 3/28/25  A fib  PPM present       - Blood cultures 3/26, 4/4  no growth   - RVP/COVID 19 PCR 4/4 negative   - UA 4/2 with some pyuria   - Urine Cx 3/26 no growth 4/3 contaminated   - CXR 4/4 reviewed and compared to 3/29, not much change noted.   - CT Chest with contrast reporting no PE, + Atelectasis   - Should be evaluated for DARIUSZ given multiple CVA  - Procalcitonin level  1.01 (3/26) --> 0.37 (4/4) --> Pending  - Continue Cefepime  - Hold off on PICC/Midline for now unless needed for reasons other than infectious diseases  - Follow up cultures  - Trend Fever  - Trend WBC      Thank you for allowing me to participate in the care of your patient.   Will Follow    Discussed treatment plan with: Neurology Team and clinical pharmacy

## 2025-04-06 NOTE — PROGRESS NOTE ADULT - ASSESSMENT
The patient is an 89 year old female with a past medical history of Afib (on Eliquis), PPM, HTN, hx of RUE melanoma removal with R axillary lymph node removal, recent admission 3/21- 3/25 for lt MCA occlusion s/p thrombectomy presents as transfer from [facility] after she was found at 7:30 with lt facial and left sided weakness.  am.  Upon arrival to  ED she was noted to have lt sided facial droop; aphasic; left arm weakness; no TNK as out of window.  Code stroke activated;  CT imagining found patient with new rt mca; patient transferred to [facility] for thrombectomy. Pt transferred to [facility] EDUARDO eval, s/p cerebral angiogram for mechanical thrombectomy of Rt M2 occlusion, TICI 3 (one pass). She is admitted to NSICU for close monitoring post thrombectomy. Post procedure course complicated by groin site pseudoaneurysm requiring thrombin injection by IR. IV heparin was held. Repeat duplex showed persistent pseudoaneurysm requiring repeat injection on 3/29. Code stroke was called on 3/29 for altered mental status.   CTH/CTA/CTP ordered and CTA +new left M2 occlusion. ot. Pt extubated 3/20 to RA, passed swallow evaluation. Patient has a history of iatrogenic AI, endocrine was consulted and initially started on stress dose steroids weaned to PO prednisone. Noted to have leukocytosis on 4/2 with a positive UA, started on Po Macrobid.Medicine now consulted for persistent fever    Assessment/Plan:    1. Severe sepsis with Acute metabolic encephalopathy with Acute hypoxic respiratory failure   - Improving  - Suspected secondary to gram negative pneumonia   - BP stable, Afebrile  - Awake and alert x 3  - ON 2 liters NC, Continue Chest PT, ambulation out of bed Incentive spirometry,  Duoneb to Q6 hours for today PRN in AM  Wean O2 as tolerated   - Follow up Chest xray in am   - On chronic prednisone which is held for now; Continue Solucortef 100mg taper from BID tod 50 BID today and OD in Am with PO taper of prednisone to baseline dose thereafter   - UA on 4/2 positive, was started on macorbid, Blood culture negative.   Urine culture with contamination  - 4/4 COVID/RVP negative  - CTA of the chest negative for PE, Mucous/secretions are noted within the left lower lobe bronchus. Near-complete atelectasis of both lower lobes is noted. Small bilateral pleural effusions are noted.  - blood cultures negative thus far. MRSA PCR negative; IV Vancomycin discontinued  - On IV Cefepime day 3; ID Following     2. Acute on chronic diastolic CHF  - Given 10mg of IV Lasix x 2 on 4/4   - CT chest with small bilateral pleural effusions, suspected atelectasis   - Follow up TTE reviewed, negative for thrombus   - Elevated Troponin suspected demand ischemia in the setting of sepsis. No WMA abnormality noted on TTE  - Repeat Chest xray in AM     3. Multiple MCA stroke  - Status post thrombectomy  - On Eliquis   - Continue lipitor     4., Iatrogenic AI  - On chronic prednisone  - Hold PO prednisone  - On Solu cortef in the setting of severe sepsis;  100mg taper from BID to 50 BID today and OD in Am with PO taper of prednisone to baseline dose thereafter       5. Acute urinary retention   _Failed TOV, Rodriguez was replaced on 4/3   - Recommend TOV In AM     6. Afib with RVR  - Increase metoprolol to 25mg PO BID today, given stat dose now. If HR remains elevated increase evening dose of 50mg   - Continue Eliquis       VTE- Eliquis      Discharge disposition: Anticipate Acute rehab in 2-3 days

## 2025-04-07 LAB
ANION GAP SERPL CALC-SCNC: 11 MMOL/L — SIGNIFICANT CHANGE UP (ref 5–17)
BUN SERPL-MCNC: 36.9 MG/DL — HIGH (ref 8–20)
CALCIUM SERPL-MCNC: 8.2 MG/DL — LOW (ref 8.4–10.5)
CHLORIDE SERPL-SCNC: 106 MMOL/L — SIGNIFICANT CHANGE UP (ref 96–108)
CO2 SERPL-SCNC: 24 MMOL/L — SIGNIFICANT CHANGE UP (ref 22–29)
CREAT SERPL-MCNC: 0.49 MG/DL — LOW (ref 0.5–1.3)
EGFR: 91 ML/MIN/1.73M2 — SIGNIFICANT CHANGE UP
EGFR: 91 ML/MIN/1.73M2 — SIGNIFICANT CHANGE UP
GLUCOSE BLDC GLUCOMTR-MCNC: 119 MG/DL — HIGH (ref 70–99)
GLUCOSE BLDC GLUCOMTR-MCNC: 126 MG/DL — HIGH (ref 70–99)
GLUCOSE BLDC GLUCOMTR-MCNC: 132 MG/DL — HIGH (ref 70–99)
GLUCOSE BLDC GLUCOMTR-MCNC: 133 MG/DL — HIGH (ref 70–99)
GLUCOSE SERPL-MCNC: 130 MG/DL — HIGH (ref 70–99)
HCT VFR BLD CALC: 32.2 % — LOW (ref 34.5–45)
HGB BLD-MCNC: 10.5 G/DL — LOW (ref 11.5–15.5)
MAGNESIUM SERPL-MCNC: 2.2 MG/DL — SIGNIFICANT CHANGE UP (ref 1.6–2.6)
MCHC RBC-ENTMCNC: 31.9 PG — SIGNIFICANT CHANGE UP (ref 27–34)
MCHC RBC-ENTMCNC: 32.6 G/DL — SIGNIFICANT CHANGE UP (ref 32–36)
MCV RBC AUTO: 97.9 FL — SIGNIFICANT CHANGE UP (ref 80–100)
NRBC # BLD AUTO: 0 K/UL — SIGNIFICANT CHANGE UP (ref 0–0)
NRBC # FLD: 0 K/UL — SIGNIFICANT CHANGE UP (ref 0–0)
NRBC BLD AUTO-RTO: 0 /100 WBCS — SIGNIFICANT CHANGE UP (ref 0–0)
PHOSPHATE SERPL-MCNC: 2.3 MG/DL — LOW (ref 2.4–4.7)
PLATELET # BLD AUTO: 142 K/UL — LOW (ref 150–400)
PMV BLD: 10.8 FL — SIGNIFICANT CHANGE UP (ref 7–13)
POTASSIUM SERPL-MCNC: 3.6 MMOL/L — SIGNIFICANT CHANGE UP (ref 3.5–5.3)
POTASSIUM SERPL-SCNC: 3.6 MMOL/L — SIGNIFICANT CHANGE UP (ref 3.5–5.3)
RBC # BLD: 3.29 M/UL — LOW (ref 3.8–5.2)
RBC # FLD: 14.6 % — HIGH (ref 10.3–14.5)
SODIUM SERPL-SCNC: 141 MMOL/L — SIGNIFICANT CHANGE UP (ref 135–145)
T3 SERPL-MCNC: 50 NG/DL — LOW (ref 80–200)
T4 AB SER-ACNC: 5.8 UG/DL — SIGNIFICANT CHANGE UP (ref 4.5–12)
TSH SERPL-MCNC: 0.36 UIU/ML — SIGNIFICANT CHANGE UP (ref 0.27–4.2)
WBC # BLD: 7.81 K/UL — SIGNIFICANT CHANGE UP (ref 3.8–10.5)
WBC # FLD AUTO: 7.81 K/UL — SIGNIFICANT CHANGE UP (ref 3.8–10.5)

## 2025-04-07 PROCEDURE — 71045 X-RAY EXAM CHEST 1 VIEW: CPT | Mod: 26

## 2025-04-07 PROCEDURE — G0545: CPT

## 2025-04-07 PROCEDURE — 99232 SBSQ HOSP IP/OBS MODERATE 35: CPT

## 2025-04-07 PROCEDURE — 99233 SBSQ HOSP IP/OBS HIGH 50: CPT

## 2025-04-07 RX ORDER — METOPROLOL SUCCINATE 50 MG/1
50 TABLET, EXTENDED RELEASE ORAL
Refills: 0 | Status: DISCONTINUED | OUTPATIENT
Start: 2025-04-07 | End: 2025-04-08

## 2025-04-07 RX ORDER — LEVALBUTEROL HYDROCHLORIDE 1.25 MG/3ML
0.63 SOLUTION RESPIRATORY (INHALATION) EVERY 6 HOURS
Refills: 0 | Status: DISCONTINUED | OUTPATIENT
Start: 2025-04-07 | End: 2025-04-08

## 2025-04-07 RX ORDER — PREDNISONE 20 MG/1
2 TABLET ORAL DAILY
Refills: 0 | Status: DISCONTINUED | OUTPATIENT
Start: 2025-04-08 | End: 2025-04-08

## 2025-04-07 RX ORDER — PREDNISONE 20 MG/1
5 TABLET ORAL DAILY
Refills: 0 | Status: DISCONTINUED | OUTPATIENT
Start: 2025-04-08 | End: 2025-04-08

## 2025-04-07 RX ORDER — POTASSIUM PHOSPHATE, MONOBASIC POTASSIUM PHOSPHATE, DIBASIC INJECTION, 236; 224 MG/ML; MG/ML
15 SOLUTION, CONCENTRATE INTRAVENOUS ONCE
Refills: 0 | Status: COMPLETED | OUTPATIENT
Start: 2025-04-07 | End: 2025-04-07

## 2025-04-07 RX ADMIN — Medication 2 TABLET(S): at 21:28

## 2025-04-07 RX ADMIN — CEFEPIME 2000 MILLIGRAM(S): 2 INJECTION, POWDER, FOR SOLUTION INTRAVENOUS at 21:28

## 2025-04-07 RX ADMIN — METOPROLOL SUCCINATE 25 MILLIGRAM(S): 50 TABLET, EXTENDED RELEASE ORAL at 05:52

## 2025-04-07 RX ADMIN — LEVALBUTEROL HYDROCHLORIDE 0.63 MILLIGRAM(S): 1.25 SOLUTION RESPIRATORY (INHALATION) at 08:18

## 2025-04-07 RX ADMIN — LEVALBUTEROL HYDROCHLORIDE 0.63 MILLIGRAM(S): 1.25 SOLUTION RESPIRATORY (INHALATION) at 04:47

## 2025-04-07 RX ADMIN — METOPROLOL SUCCINATE 50 MILLIGRAM(S): 50 TABLET, EXTENDED RELEASE ORAL at 17:59

## 2025-04-07 RX ADMIN — Medication 40 MILLIGRAM(S): at 13:29

## 2025-04-07 RX ADMIN — CEFEPIME 2000 MILLIGRAM(S): 2 INJECTION, POWDER, FOR SOLUTION INTRAVENOUS at 13:28

## 2025-04-07 RX ADMIN — POLYETHYLENE GLYCOL 3350 17 GRAM(S): 17 POWDER, FOR SOLUTION ORAL at 13:28

## 2025-04-07 RX ADMIN — TAMSULOSIN HYDROCHLORIDE 0.4 MILLIGRAM(S): 0.4 CAPSULE ORAL at 21:28

## 2025-04-07 RX ADMIN — CEFEPIME 2000 MILLIGRAM(S): 2 INJECTION, POWDER, FOR SOLUTION INTRAVENOUS at 05:52

## 2025-04-07 RX ADMIN — LEVALBUTEROL HYDROCHLORIDE 0.63 MILLIGRAM(S): 1.25 SOLUTION RESPIRATORY (INHALATION) at 21:52

## 2025-04-07 RX ADMIN — Medication 1 APPLICATION(S): at 05:52

## 2025-04-07 RX ADMIN — APIXABAN 5 MILLIGRAM(S): 2.5 TABLET, FILM COATED ORAL at 05:52

## 2025-04-07 RX ADMIN — POTASSIUM PHOSPHATE, MONOBASIC POTASSIUM PHOSPHATE, DIBASIC INJECTION, 62.5 MILLIMOLE(S): 236; 224 SOLUTION, CONCENTRATE INTRAVENOUS at 10:22

## 2025-04-07 RX ADMIN — LEVALBUTEROL HYDROCHLORIDE 0.63 MILLIGRAM(S): 1.25 SOLUTION RESPIRATORY (INHALATION) at 14:51

## 2025-04-07 RX ADMIN — Medication 50 MILLIGRAM(S): at 05:52

## 2025-04-07 RX ADMIN — APIXABAN 5 MILLIGRAM(S): 2.5 TABLET, FILM COATED ORAL at 17:59

## 2025-04-07 RX ADMIN — ATORVASTATIN CALCIUM 10 MILLIGRAM(S): 80 TABLET, FILM COATED ORAL at 21:28

## 2025-04-07 NOTE — PROGRESS NOTE ADULT - ASSESSMENT
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 CT neg, CTA showed a  Rt M2 occlusion.  Transferred to [facility] intervention. S/P thrombectomy, TICI 3 achieved after 1 pass started on IV Heparin and monitoring in NSICU post procedure. Hospital course complicated by right hemiparesis and aphasia on 3/29/25. CTH negative for acute hemorrhage, CTA h/n + new Left MCA M2 occulusion and subsequently underwent 3rd thrombectomy on 3/29.     4/4: noted with hypoxia, hypotension and increased lethargy. Concern for sepsis 2/2 ?aspiration pna started on Cefepime + vanco ID, medicine, and cardio consulted. Made NPO due to concern for aspiration.       IMPRESSION:   Recent left MCA cerebral infarction s/p Left MCA M1 thrombectomy on 3/21/2025. TICI 3 recanalization. Patient subsequently discharged and returned 3/26/25 with Rt MCA  m2 occlusion, revascularization TICI 3. On 3/29/25 patient found to have acute Left M2 MCA occlusion s/p thrombectomy TICI 0 -- distal M4 branch. MRI brain demonstrated acute bilateral multifocal cerebral infarctions. There is subacute left basal ganglia infarct.  Etiology likely cardioembolic in the setting of atrial fibrillation and underlying hypercoagulability from malignancy.       NEURO: Bihemispheric embolic infarcts s/p Thrombectomy x 3, Aphasia, R hemiparesis  -Neurologically w/ improvement in mental status compared to prior exam. Episode of staring on 4/6/25 with delayed response.   -Spot EEG completed on 4/6/25 with no epileptiform pattern or seizure  -Continue close monitoring for neurologic deterioration    -Stroke neuro checks q4 hours    -SBP goal 110-160mmhg for now avoiding rapid fluctuations and hypotension , neurologically tolerating at this time, eventual long term age/risk specific normotension   -ANTITHROMBOTIC THERAPY:  Continue Eliquis  5mg BID dosing as of 4/5/25.   -continue home statin regimen if applicable as LDL < goal of 70   -MRI imaging as noted   -Dysphagia screen: diet changed to NPO in the setting of lethargy. per SLP re-evaluation patient passed for regular diet w/ mildly thick liquids.   -Physical therapy/OT/Speech eval/treatment.   -Stroke education     CARDIOVASCULAR: Hypotension, Afib  - TTE as noted , cardiac monitoring w/ telemetry for now, further evaluation pending findings of noted workup, continue rate control and titrate regimen accordingly        -DARIUSZ not indicated at this time, patient on proper management at this time.        -Cardio consulted for concern for fluid overload, s/p IV lasix; BNP improved, hold off on additional lasix for now. IVF on hold at this time.   -Patient w/ episodes of tachycardia 4/5-4/6 (110s), however IV metoprolol held due to concern for hypotension; transitioned back to PO metoprolol  after SLP re-eval. 4/6 tachycardia persists. PO metoprolol increased to 25mg PO BID.                  HEMATOLOGY:   -H/H 10.5/32.2, no active bleeding noted, Platelets 142- monitor thrombocytosis closely  -patient should have all age and risk appropriate malignancy screenings with PCP or sooner if clinically suspected ,   -hematology consulted- hypercoagulable workup sent, including factor V leiden, prothrombin, antiphospholipid antibodies, and lupus anticoagulant.  With regard to long-term a/c, can transition to doac from heparin gtt as per neuro and would follow up with outpatient hematologist Dr Kimbrough.   -CT PE study ordered 4/4/25 due to hypoxia, negative.   -anemia profile Iron total 53, %Sat 21, TIBC 250, ferritin 293, transferrin 175.   -DVT ppx: SCD's +Eliquis     PULMONARY:   -Started On Vanc+cefepime 4/4/25 for suspected aspiration PNA. Per ID recs vanco d/c'd 4/5/25    -CTA as above B/L near complete atelectasis , findings concerning for aspiration PNA   -Procal 0.37  -supp 02 PRN  - encourage mobility and incentive spirometry as tolerated     RENAL:   -BUN elevated but overall stable; Cr within range, monitor urine output, maintain adequate hydration,   - avoid nephrotoxins   - trend BMP  -Na Goal:  135-145  -Lyte replenishment prn; keep K >4.0 and Mag >2.0 in setting of atrial fibrillation   -Rivas replaced 4/3. Will discharge to rehab with rivas. Can attempt TOV again in rehab at a later date.    ID:  UTI, Aspiration PNA   -no leukocytosis  -UA +, initially on macrobid 4/3/25 switched to Vanco+Cefeprime 4/4/25. Vanco dc'd per ID recs 4/5/25   -Viral PCR negative   -Blood culture sent 4/4/25, negative   -ID following     ENDO: 1. Iatrogenic adrenal insufficiency  - Solucortef IV BID, OD on 4/7 and plan to switch to home regimen on 4/8  - Monitor BP and electrolytes    VASCULAR:   -signed off, pseudoaneurysm appears to be occluded.    SKIN:   -RUE edema and erythema. Pink banded- US RUE negative for DVT    OTHER:   -condition and plan of care d/w patient, family, questions and concerns addressed.     DISPOSITION: Acute Rehab once stable and workup is complete, per CM no auth is needed once patient is stable.   CORE MEASURES:       Admission NIHSS: 10     Tenecteplase : [] YES [x] NO     LDL/HDL/A1C:  59 /  74  /  5.5      Depression Screen- if depression hx and/or present     Statin Therapy: continue home dose statin      Dysphagia Screen: [x] PASS [] FAIL     Smoking in the past 12 months [] YES [x] NO     Afib [x] YES [] NO     Diabetes [] YES [x] NO     Stroke Education [x] YES [] NO  Diabetes [] YES [x] NO    Obtain screening lower extremity venous ultrasound in patients who meet 1 or more of the following criteria as patient is high risk for DVT/PE on admission:   [] History of DVT/PE  []Hypercoagulable states (Factor V Leiden, Cancer, OCP, etc. )  []Prolonged immobility (hemiplegia/hemiparesis/post operative or any other extended immobilization)  [] Transferred from outside facility (Rehab or Long term care)  [] Age </= to 50  [x]Stroke     ASSESSMENT: 89 year old female with PMHx of Afib (off Eliquis), recent left MCA territory stroke s/p left M1 thrombectomy, with residual minimal aphasia, MRS 1, PPM, HTN, HLD, GERD, severe MR/TR, iatrogenic adrenal insufficiency (on prednisone), lymphedema, metastatic melanoma s/p Left foot melanoma and lymph node resection, s/p RUE melanoma and Right axillary lymph node excision at [facility] (3/18/25) who presented to  ED 3/26/36 with Lt sided weakness and aphasia. CT neg, CTA showed a  Rt M2 occlusion.  Transferred to [facility] intervention. S/P thrombectomy, TICI 3 achieved after 1 pass started on IV Heparin and monitoring in NSICU post procedure. Hospital course complicated by right hemiparesis and aphasia on 3/29/25. CTH negative for acute hemorrhage, CTA h/n + new Left MCA M2 occulusion and subsequently underwent 3rd thrombectomy on 3/29.     IMPRESSION:   Recent left MCA cerebral infarction s/p Left MCA M1 thrombectomy on 3/21/2025. TICI 3 recanalization. Patient subsequently discharged and returned 3/26/25 with Rt MCA  m2 occlusion, revascularization TICI 3. On 3/29/25 patient found to have acute Left M2 MCA occlusion s/p thrombectomy TICI 0 -- distal M4 branch. MRI brain demonstrated acute bilateral multifocal cerebral infarctions. There is subacute left basal ganglia infarct.  Etiology likely cardioembolic in the setting of atrial fibrillation and underlying hypercoagulability from malignancy.       NEURO: Bihemispheric embolic infarcts s/p Thrombectomy x 3, Aphasia, R hemiparesis  -Neurologically w/ improvement in mental status compared to prior exam. Episode of staring on 4/6/25 with delayed response.   -Spot EEG completed on 4/6/25 with no epileptiform pattern or seizure  -Continue close monitoring for neurologic deterioration    -Stroke neuro checks q4 hours    -SBP goal 110-160mmhg for now avoiding rapid fluctuations and hypotension , neurologically tolerating at this time, eventual long term age/risk specific normotension   -ANTITHROMBOTIC THERAPY:  Continue Eliquis  5mg BID dosing as of 4/5/25.   -continue home statin regimen if applicable as LDL < goal of 70   -MRI imaging as noted   -Dysphagia screen: diet changed to NPO in the setting of lethargy. per SLP re-evaluation patient passed for regular diet w/ mildly thick liquids.   -Physical therapy/OT/Speech eval/treatment.   -Stroke education     CARDIOVASCULAR: Hypotension, Afib  - TTE as noted , cardiac monitoring w/ telemetry for now, further evaluation pending findings of noted workup, continue rate control and titrate regimen accordingly        -DARIUSZ not indicated at this time, patient on proper management at this time.        -Cardio consulted for concern for fluid overload on 4/4 post epsiode of hypoxia/hypotension, s/p IV lasix; BNP improved, hold off on additional lasix for now. IVF on hold at this time.   -Patient w/ episodes of tachycardia 4/5/25 (110s), however IV metoprolol held due to concern for hypotension; transitioned back to PO metoprolol  after SLP re-eval. 4/6 tachycardia persists. PO metoprolol increased to 25mg PO BID.   -4/7/25 metoprolol increased to 50mg PO BID                HEMATOLOGY:   -H/H 10.5/32.2, no active bleeding noted, Platelets 142- monitor thrombocytosis closely  -patient should have all age and risk appropriate malignancy screenings with PCP or sooner if clinically suspected ,   -hematology consulted- hypercoagulable workup sent, including factor V leiden, prothrombin, antiphospholipid antibodies, and lupus anticoagulant.  With regard to long-term a/c, can transition to doac from heparin gtt as per neuro and would follow up with outpatient hematologist Dr Kimbrough.   -CT PE study ordered 4/4/25 due to hypoxia, negative.   -anemia profile Iron total 53, %Sat 21, TIBC 250, ferritin 293, transferrin 175.   -DVT ppx: SCD's +Eliquis     PULMONARY:   -Started On Vanc+cefepime 4/4/25 for suspected aspiration PNA. Per ID recs vanco d/c'd 4/5/25    -CTA as above B/L near complete atelectasis , findings concerning for aspiration PNA   -Procal 0.37  -supp 02 PRN  - encourage mobility and incentive spirometry as tolerated     RENAL:   -BUN elevated but overall stable; Cr within range, monitor urine output, maintain adequate hydration,   - avoid nephrotoxins   - trend BMP  -Na Goal:  135-145  -Lyte replenishment prn; keep K >4.0 and Mag >2.0 in setting of atrial fibrillation   -Rivas replaced 4/3. Will discharge to rehab with [facility]. Can attempt TOV again in rehab at a later date.    ID:  UTI, Aspiration PNA   -no leukocytosis  -UA +, initially on macrobid 4/3/25 switched to Vanco+Cefeprime 4/4/25. Vanco dc'd per ID recs 4/5/25   -Viral PCR negative   -Blood culture sent 4/4/25, negative   -ID following     ENDO: 1. Iatrogenic adrenal insufficiency  - Solucortef IV BID, OD on 4/7 and plan to switch to home regimen on 4/8  - Monitor BP and electrolytes    VASCULAR:   -signed off, pseudoaneurysm appears to be occluded.    SKIN:   -RUE edema and erythema. Pink banded- US RUE negative for DVT    OTHER:   -condition and plan of care d/w patient, family, questions and concerns addressed.     DISPOSITION: Acute Rehab once stable and workup is complete, per CM no auth is needed once patient is stable.   CORE MEASURES:       Admission NIHSS: 10     Tenecteplase : [] YES [x] NO     LDL/HDL/A1C:  59 /  74  /  5.5      Depression Screen- if depression hx and/or present     Statin Therapy: continue home dose statin      Dysphagia Screen: [x] PASS [] FAIL     Smoking in the past 12 months [] YES [x] NO     Afib [x] YES [] NO     Diabetes [] YES [x] NO     Stroke Education [x] YES [] NO  Diabetes [] YES [x] NO    Obtain screening lower extremity venous ultrasound in patients who meet 1 or more of the following criteria as patient is high risk for DVT/PE on admission:   [] History of DVT/PE  []Hypercoagulable states (Factor V Leiden, Cancer, OCP, etc. )  []Prolonged immobility (hemiplegia/hemiparesis/post operative or any other extended immobilization)  [] Transferred from outside facility (Rehab or Long term care)  [] Age </= to 50  [x]Stroke     ASSESSMENT: 89 year old female with PMHx of Afib (off Eliquis), recent left MCA territory stroke s/p left M1 thrombectomy, with residual minimal aphasia, MRS 1, PPM, HTN, HLD, GERD, severe MR/TR, iatrogenic adrenal insufficiency (on prednisone), lymphedema, metastatic melanoma s/p Left foot melanoma and lymph node resection, s/p RUE melanoma and Right axillary lymph node excision at [facility] (3/18/25) who presented to  ED 3/26/36 with Lt sided weakness and aphasia. CT neg, CTA showed a  Rt M2 occlusion.  Transferred to [facility] intervention. S/P thrombectomy, TICI 3 achieved after 1 pass started on IV Heparin and monitoring in NSICU post procedure. Hospital course complicated by right hemiparesis and aphasia on 3/29/25. CTH negative for acute hemorrhage, CTA h/n + new Left MCA M2 occulusion and subsequently underwent 3rd thrombectomy on 3/29.     IMPRESSION:   Recent left MCA cerebral infarction s/p Left MCA M1 thrombectomy on 3/21/2025. TICI 3 recanalization. Patient subsequently discharged and returned 3/26/25 with Rt MCA  m2 occlusion, revascularization TICI 3. On 3/29/25 patient found to have acute Left M2 MCA occlusion s/p thrombectomy TICI 0 -- distal M4 branch. MRI brain demonstrated acute bilateral multifocal cerebral infarctions. There is subacute left basal ganglia infarct.  Etiology likely cardioembolic in the setting of atrial fibrillation and underlying hypercoagulability from malignancy.       NEURO: Bihemispheric embolic infarcts s/p Thrombectomy x 3, Aphasia, R hemiparesis  -Neurologically w/ improvement in mental status compared to prior exam. Episode of staring on 4/6/25 with delayed response.   -Spot EEG completed on 4/6/25 with no epileptiform pattern or seizure  -Continue close monitoring for neurologic deterioration    -Stroke neuro checks q4 hours    -SBP goal 110-160mmhg for now avoiding rapid fluctuations and hypotension , neurologically tolerating at this time, eventual long term age/risk specific normotension   -ANTITHROMBOTIC THERAPY:  Continue Eliquis  5mg BID dosing as of 4/5/25.   -continue home statin regimen if applicable as LDL < goal of 70   -MRI imaging as noted   -Dysphagia screen: diet changed to NPO in the setting of lethargy. per SLP re-evaluation patient passed for regular diet w/ mildly thick liquids.   -Physical therapy/OT/Speech eval/treatment.   -Stroke education     CARDIOVASCULAR: Hypotension, Afib  - TTE as noted , cardiac monitoring w/ telemetry for now, further evaluation pending findings of noted workup, continue rate control and titrate regimen accordingly        -DARIUSZ not indicated at this time, patient on proper management at this time.        -Cardio consulted for concern for fluid overload on 4/4 post episode of hypoxia/hypotension, s/p IV lasix; BNP improved, hold off on additional lasix for now. IVF on hold at this time.   -Patient w/ episodes of tachycardia 4/5/25 (110s), however IV metoprolol held due to concern for hypotension; transitioned back to PO metoprolol  after SLP re-eval. 4/6 tachycardia persists. PO metoprolol increased to 25mg PO BID.   -4/7/25 metoprolol increased to 50mg PO BID                HEMATOLOGY:   -H/H 10.5/32.2, no active bleeding noted, Platelets 142- monitor thrombocytosis closely  -patient should have all age and risk appropriate malignancy screenings with PCP or sooner if clinically suspected ,   -hematology consulted- hypercoagulable workup sent, including factor V leiden, prothrombin, antiphospholipid antibodies, and lupus anticoagulant.  With regard to long-term a/c, can transition to doac from heparin gtt as per neuro and would follow up with outpatient hematologist Dr Kimbrough.   -CT PE study ordered 4/4/25 due to hypoxia, negative.   -anemia profile Iron total 53, %Sat 21, TIBC 250, ferritin 293, transferrin 175.   -DVT ppx: SCD's +Eliquis     PULMONARY:   -Started On Vanc+cefepime 4/4/25 for suspected aspiration PNA. Per ID recs vanco d/c'd 4/5/25    -CTA as above B/L near complete atelectasis , findings concerning for aspiration PNA   -Procal 0.37  -supp 02 PRN  - encourage mobility and incentive spirometry as tolerated     RENAL:   -BUN elevated but overall stable; Cr within range, monitor urine output, maintain adequate hydration,   - avoid nephrotoxins   - trend BMP  -Na Goal:  135-145  -Lyte replenishment prn; keep K >4.0 and Mag >2.0 in setting of atrial fibrillation   -Rivas replaced 4/3. Will discharge to rehab with [facility]. Can attempt TOV again in rehab at a later date.    ID:  UTI, Aspiration PNA   -no leukocytosis  -UA +, initially on macrobid 4/3/25 switched to Vanco+Cefeprime 4/4/25. Vanco dc'd per ID recs 4/5/25   -Viral PCR negative   -Blood culture sent 4/4/25, negative   -ID following     ENDO: 1. Iatrogenic adrenal insufficiency  - Solucortef IV BID, OD on 4/7 and plan to switch to home regimen on 4/8  - Monitor BP and electrolytes    VASCULAR:   -signed off, pseudoaneurysm appears to be occluded.    SKIN:   -RUE edema and erythema. Pink banded- US RUE negative for DVT    OTHER:   -condition and plan of care d/w patient, family, questions and concerns addressed.     DISPOSITION: Acute Rehab once stable and workup is complete, per CM no auth is needed once patient is stable.     CORE MEASURES:       Admission NIHSS: 10     Tenecteplase : [] YES [x] NO     LDL/HDL/A1C:  59 /  74  /  5.5      Depression Screen- if depression hx and/or present     Statin Therapy: continue home dose statin      Dysphagia Screen: [x] PASS [] FAIL     Smoking in the past 12 months [] YES [x] NO     Afib [x] YES [] NO     Diabetes [] YES [x] NO     Stroke Education [x] YES [] NO  Diabetes [] YES [x] NO    Obtain screening lower extremity venous ultrasound in patients who meet 1 or more of the following criteria as patient is high risk for DVT/PE on admission:   [] History of DVT/PE  []Hypercoagulable states (Factor V Leiden, Cancer, OCP, etc. )  []Prolonged immobility (hemiplegia/hemiparesis/post operative or any other extended immobilization)  [] Transferred from outside facility (Rehab or Long term care)  [] Age </= to 50  [x]Stroke     ASSESSMENT: 89 year old female with PMHx of Afib (off Eliquis), recent left MCA territory stroke s/p left M1 thrombectomy, with residual minimal aphasia, MRS 1, PPM, HTN, HLD, GERD, severe MR/TR, iatrogenic adrenal insufficiency (on prednisone), lymphedema, metastatic melanoma s/p Left foot melanoma and lymph node resection, s/p RUE melanoma and Right axillary lymph node excision at  (3/18/25) who presented to  ED 3/26/36 with Lt sided weakness and aphasia. CT neg, CTA showed a  Rt M2 occlusion.  Transferred to [facility] intervention. S/P thrombectomy, TICI 3 achieved after 1 pass started on IV Heparin and monitoring in NSICU post procedure. Hospital course complicated by right hemiparesis and aphasia on 3/29/25. CTH negative for acute hemorrhage,  CTH negative for acute hemorrhage, CTA h/n + new Left MCA M2 occulusion and subsequently underwent 3rd cerebral angiogram with attempted thrombectomy. TICI0-distal M4 Branch on 3/29/25.     IMPRESSION:   Recent left MCA cerebral infarction s/p Left MCA M1 thrombectomy on 3/21/2025. TICI 3 recanalization. Patient subsequently discharged and returned 3/26/25 with Rt MCA  m2 occlusion, revascularization TICI 3. On 3/29/25 patient found to have acute Left M2 MCA occlusion s/p thrombectomy TICI 0 -- distal M4 branch. MRI brain demonstrated acute bilateral multifocal cerebral infarctions. There is subacute left basal ganglia infarct.  Etiology likely cardioembolic in the setting of atrial fibrillation and underlying hypercoagulability from malignancy.       NEURO: Bihemispheric embolic infarcts s/p Thrombectomy x 3, Aphasia, R hemiparesis  -Neurologically w/ improvement in mental status compared to prior exam. Episode of staring on 4/6/25 with delayed response.   -Spot EEG completed on 4/6/25 with no epileptiform pattern or seizure  -Continue close monitoring for neurologic deterioration    -Stroke neuro checks q4 hours    -SBP goal 110-160mmhg for now avoiding rapid fluctuations and hypotension , neurologically tolerating at this time, eventual long term age/risk specific normotension   -ANTITHROMBOTIC THERAPY:  Continue Eliquis  5mg BID dosing as of 4/5/25.   -continue home statin regimen if applicable as LDL < goal of 70   -MRI imaging as noted   -Dysphagia screen: diet changed to NPO in the setting of lethargy. per SLP re-evaluation patient passed for regular diet w/ mildly thick liquids.   -Physical therapy/OT/Speech eval/treatment.   -Stroke education     CARDIOVASCULAR: Hypotension, Afib  - TTE as noted , cardiac monitoring w/ telemetry for now, further evaluation pending findings of noted workup, continue rate control and titrate regimen accordingly        -DARIUSZ not indicated at this time, patient on proper management at this time.        -Cardio consulted for concern for fluid overload on 4/4 post episode of hypoxia/hypotension, s/p IV lasix; BNP improved, hold off on additional lasix for now. IVF on hold at this time.   -Patient w/ episodes of tachycardia 4/5/25 (110s), however IV metoprolol held due to concern for hypotension; transitioned back to PO metoprolol  after SLP re-eval. 4/6 tachycardia persists. PO metoprolol increased to 25mg PO BID.   -4/7/25 metoprolol increased to 50mg PO BID                HEMATOLOGY:   -H/H 10.5/32.2, no active bleeding noted, Platelets 142- monitor thrombocytosis closely  -patient should have all age and risk appropriate malignancy screenings with PCP or sooner if clinically suspected ,   -hematology consulted- hypercoagulable workup sent, including factor V leiden, prothrombin, antiphospholipid antibodies, and lupus anticoagulant.  With regard to long-term a/c, can transition to doac from heparin gtt as per neuro and would follow up with outpatient hematologist Dr Kimbrough.   -CT PE study ordered 4/4/25 due to hypoxia, negative.   -anemia profile Iron total 53, %Sat 21, TIBC 250, ferritin 293, transferrin 175.   -DVT ppx: SCD's +Eliquis     PULMONARY:   -Started On Vanc+cefepime 4/4/25 for suspected aspiration PNA. Per ID recs vanco d/c'd 4/5/25    -CTA as above B/L near complete atelectasis , findings concerning for aspiration PNA   -Procal 0.37  -supp 02 PRN  - encourage mobility and incentive spirometry as tolerated     RENAL:   -BUN elevated but overall stable; Cr within range, monitor urine output, maintain adequate hydration,   - avoid nephrotoxins   - trend BMP  -Na Goal:  135-145  -Lyte replenishment prn; keep K >4.0 and Mag >2.0 in setting of atrial fibrillation   -Rivas replaced 4/3. Will discharge to rehab with [facility]. Can attempt TOV again in rehab at a later date.    ID:  UTI, Aspiration PNA   -no leukocytosis  -UA +, initially on macrobid 4/3/25 switched to Vanco+Cefeprime 4/4/25. Vanco dc'd per ID recs 4/5/25   -Viral PCR negative   -Blood culture sent 4/4/25, negative   -ID following     ENDO: 1. Iatrogenic adrenal insufficiency  - Solucortef IV BID, OD on 4/7 and plan to switch to home regimen on 4/8  - Monitor BP and electrolytes    VASCULAR:   -signed off, pseudoaneurysm appears to be occluded.    SKIN:   -RUE edema and erythema. Pink banded- US RUE negative for DVT    OTHER:   -condition and plan of care d/w patient, family, questions and concerns addressed.     DISPOSITION: Acute Rehab once stable and workup is complete, per CM no auth is needed once patient is stable.     CORE MEASURES:       Admission NIHSS: 10     Tenecteplase : [] YES [x] NO     LDL/HDL/A1C:  59 /  74  /  5.5      Depression Screen- if depression hx and/or present     Statin Therapy: continue home dose statin      Dysphagia Screen: [x] PASS [] FAIL     Smoking in the past 12 months [] YES [x] NO     Afib [x] YES [] NO     Diabetes [] YES [x] NO     Stroke Education [x] YES [] NO  Diabetes [] YES [x] NO    Obtain screening lower extremity venous ultrasound in patients who meet 1 or more of the following criteria as patient is high risk for DVT/PE on admission:   [] History of DVT/PE  []Hypercoagulable states (Factor V Leiden, Cancer, OCP, etc. )  []Prolonged immobility (hemiplegia/hemiparesis/post operative or any other extended immobilization)  [] Transferred from outside facility (Rehab or Long term care)  [] Age </= to 50  [x]Stroke

## 2025-04-07 NOTE — PROGRESS NOTE ADULT - ASSESSMENT
89 year old female with PMHx of Afib (off Eliquis), PPM, HTN, HLD, GERD, severe MR/TR, iatrogenic adrenal insufficiency (on prednisone), lymphedema, metastatic melanoma s/p Left foot melanoma and lymph node resection, s/p RUE melanoma and Right axillary lymph node excision at [facility] (3/18/25). Patient with a recent admission 3/21 -3/25/25  when she initially presented to [facility] and was transferred to [facility] as a stroke code. At [facility] hospital course significant for s/p cerebral angiogram for mechanical thrombectomy of L M1 occlusion, TICI 3 (one pass) on 3/21/25, admission to NSICU. Patient was discharged and presented back to [facility] 3/26 with L sided weakness and aphasia transferred to [facility] for stroke code and is now  s/p cerebral angiogram for mechanical thrombectomy of Rt M2 occlusion, TICI 3 (one pass) on 3/26/25. Has been in NSICU since. 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 Patient also noted to have incidental finding of new 1.7 cm cystic lesion at the pancreatic body found on CT a/p on 3/28/25 and was recommended outpatient GI follow up. On 3/29 patient was noted to have acute change in neuro exam became acutely aphasic. New radiology reporting + LM2 occulusion. Patient went to Neuro IR and is s/p thrombectomy of left M4 with TICI 0. Was on Nitrofurantoin for a UTI 4/2 - 4/4. 4/4 early AM patient noted with fever and sepsis work up was done, started on Cefepime, remained febrile this AM. ID input requested.       Fever  Acute hypoxic respiratory failure  Hypotension   Multiple CVA  s/p cerebral angiogram for mechanical thrombectomy of L M1 occlusion, TICI 3 (one pass) on 3/21/25  s/p cerebral angiogram for mechanical thrombectomy of Rt M2 occlusion, TICI 3 (one pass) on 3/26/25  s/p thrombectomy of left M4 with TICI 0 3/29/25  R fem artery pseudoaneurysm  s/p thrombin injection on 03/27/25  s/p bedside thrombin injection 3/28/25  A fib  PPM present       - Blood cultures 3/26, 4/4  no growth   - RVP/COVID 19 PCR 4/4 negative   - UA 4/2 with some pyuria   - Urine Cx 3/26 no growth 4/3 contaminated   - CXR 4/4 reviewed and compared to 3/29, not much change noted.   - CT Chest with contrast reporting no PE, + Atelectasis   - No need for DARIUSZ per neurology   - Procalcitonin level  1.01 (3/26) --> 0.37 (4/4) --> 0.22 (4/6)  - Continue Cefepime 2000mg IV [id number] till 4/10/25  - Hold off on PICC/Midline for now unless needed for reasons other than infectious diseases  - Follow up cultures  - Trend Fever  - Trend WBC      Thank you for allowing me to participate in the care of your patient.   Will sign off. Please call PRN.     Discussed treatment plan with: Neurology Team and clinical pharmacy

## 2025-04-07 NOTE — PROGRESS NOTE ADULT - SUBJECTIVE AND OBJECTIVE BOX
[facility]  INFECTIOUS DISEASES at [place] / [place] / [place]  =======================================================                              Osmin Christianson MD                              Professor Emeritus:  Dr Piter Keane MD            Diplomates American Board of Internal Medicine & Infectious Diseases                                   Tel  [phone] Fax [phone]                                  Hospital Consult line:  [phone]  =======================================================      [place] [id number]    Follow up: Fever     No fevers     Allergies:  penicillins (Hives)       REVIEW OF SYSTEMS:  CONSTITUTIONAL:  No Fever or chills  HEENT:   No diplopia or blurred vision.  No earache, sore throat or runny nose.  CARDIOVASCULAR:  No Chest Pain  RESPIRATORY:  No cough, shortness of breath  GASTROINTESTINAL:  No nausea, vomiting or diarrhea.  GENITOURINARY:  No dysuria, frequency or urgency. No Blood in urine  MUSCULOSKELETAL:  no joint aches, no muscle pain  SKIN:  No change in skin, hair or nails.  NEUROLOGIC:  No Headaches      Physical Exam:  GEN: NAD  HEENT: normocephalic and atraumatic. EOMI. PERRL.    NECK: Supple.   LUNGS: CTA B/L.  HEART: RRR  ABDOMEN: Soft, NT, ND.  +BS.    : No CVA tenderness  EXTREMITIES: Without  edema.  MSK: No joint swelling  NEUROLOGIC: No Focal Deficits   SKIN: No rash      Vitals:  T(F): 97.3 (07 Apr 2025 08:01), Max: 98.4 (06 Apr 2025 19:12)  HR: 110 (07 Apr 2025 14:00)  BP: 139/95 (07 Apr 2025 14:00)  RR: 18 (07 Apr 2025 14:00)  SpO2: 98% (07 Apr 2025 14:00) (92% - 100%)  temp max in last 48H T(F): , Max: 98.7 (04-06-25 @ 00:09)    Current Antibiotics:  cefepime  Injectable. 2000 milliGRAM(s) IV Push every 8 hours    Other medications:  apixaban 5 milliGRAM(s) Oral every 12 hours  atorvastatin 10 milliGRAM(s) Oral at bedtime  chlorhexidine 2% Cloths 1 Application(s) Topical daily  dextrose 5%. 1000 milliLiter(s) IV Continuous <Continuous>  dextrose 5%. 1000 milliLiter(s) IV Continuous <Continuous>  dextrose 50% Injectable 25 Gram(s) IV Push once  dextrose 50% Injectable 12.5 Gram(s) IV Push once  dextrose 50% Injectable 25 Gram(s) IV Push once  glucagon  Injectable 1 milliGRAM(s) IntraMuscular once  insulin lispro (ADMELOG) corrective regimen sliding scale   SubCutaneous three times a day before meals  levalbuterol Inhalation 0.63 milliGRAM(s) Inhalation every 6 hours  metoprolol tartrate 50 milliGRAM(s) Oral two times a day  pantoprazole  Injectable 40 milliGRAM(s) IV Push daily  polyethylene glycol 3350 17 Gram(s) Oral daily  senna 2 Tablet(s) Oral at bedtime  tamsulosin 0.4 milliGRAM(s) Oral at bedtime                            10.5   7.81  )-----------( 142      ( 07 Apr 2025 05:38 )             32.2     04-07    141  |  106  |  36.9[H]  ----------------------------<  130[H]  3.6   |  24.0  |  0.49[L]    Ca    8.2[L]      07 Apr 2025 05:38  Phos  2.3     04-07  Mg     2.2     04-07    TPro  5.1[L]  /  Alb  2.8[L]  /  TBili  0.7  /  DBili  x   /  AST  12  /  ALT  14  /  AlkPhos  37[L]  04-06    RECENT CULTURES:  04-04 @ 01:50 Blood Blood     No growth at 72 Hours    04-04 @ 00:55    RVP  NotDetec    04-03 @ 00:00 Clean Catch Clean Catch (Midstream)     >=3 organisms. Probable collection contamination.    03-26 @ 17:20 Blood Blood     No growth at 5 days    03-26 @ 16:47 Clean Catch Clean Catch (Midstream)     No growth      WBC Count: 7.81 K/uL (04-07-25 @ 05:38)  WBC Count: 8.06 K/uL (04-06-25 @ 07:40)  WBC Count: 6.94 K/uL (04-05-25 @ 05:34)  WBC Count: 6.88 K/uL (04-04-25 @ 12:19)  WBC Count: 8.07 K/uL (04-04-25 @ 01:50)  WBC Count: 7.04 K/uL (04-03-25 @ 03:20)    Creatinine: 0.49 mg/dL (04-07-25 @ 05:38)  Creatinine: 0.50 mg/dL (04-06-25 @ 07:40)  Creatinine: 0.43 mg/dL (04-05-25 @ 05:34)  Creatinine: 0.58 mg/dL (04-04-25 @ 12:19)  Creatinine: 0.52 mg/dL (04-04-25 @ 01:50)  Creatinine: 0.57 mg/dL (04-03-25 @ 03:20)    Procalcitonin: 0.22 ng/mL (04-06-25 @ 07:40)  Procalcitonin: 0.37 ng/mL (04-04-25 @ 08:10)  Procalcitonin: 1.01 ng/mL (03-26-25 @ 17:20)     SARS-CoV-2: NotDetec (04-04-25 @ 00:55)  SARS-CoV-2: NotDetec (03-26-25 @ 17:20)    Vancomycin Level, Trough: 8.4 ug/mL (04-05-25 @ 05:34)        < from: CT Angio Chest PE Protocol w/ IV Cont (04.04.25 @ 10:02) >  ACC: [id number] EXAM:  CT ANGIO CHEST PULM ART WAWIC   ORDERED BY: JUAN RAMON FIELDS     PROCEDURE DATE:  04/04/2025      INTERPRETATION:  Clinical information: Evaluate for pulmonary embolus.   Exam is compared to previous study of 3/28/2025.    CTangiogram of the chest was obtained following administration of   intravenous contrast. Approximately 1 25 cc of Omnipaque 350 was   administered and 25 cc was discarded. Coronal, sagittal and MIP images   were submitted for review.    No hilar or mediastinal adenopathy is noted.    Heart is markedly enlarged in size. Calcification the coronary arteries   noted. Pacemaker is noted in place. No pericardial effusion is noted.   Pulmonary arteries are normal in caliber. No filling defects are noted.    Mucous/secretions are noted within the left lower lobe bronchus.   Near-complete atelectasis of both lower lobes is noted. Small bilateral   pleural effusions are noted.    Below the diaphragm, visualized portions of the abdomen are unremarkable.    Degenerative changes of the spine are noted.    IMPRESSION: No pulmonary embolus is noted.    --- End of Report ---    < end of copied text >

## 2025-04-07 NOTE — PROGRESS NOTE ADULT - CRITICAL CARE ATTENDING COMMENT
Pt is critically ill and at high risk of rapid deterioration/death due to abovementioned conditions.   Still requires critical care interventions - ongoing frequent evaluations, interventions and management adjustment by the Attending and ICU team, - and included review of relevant history, clinical examination, review of data and images, discussion of treatment with the multidisciplinary team and any consultants involved in this patient’s care as well as family discussion.
Bihemispheric infarcts, cardioembolic  Neurologically stable  plan as above

## 2025-04-07 NOTE — PROGRESS NOTE ADULT - ASSESSMENT
The patient is an 89 year old female with a past medical history of Afib (on Eliquis), PPM, HTN, hx of RUE melanoma removal with R axillary lymph node removal, recent admission 3/21- 3/25 for lt MCA occlusion s/p thrombectomy presents as transfer from [facility] after she was found at 7:30 with lt facial and left sided weakness.  am.  Upon arrival to  ED she was noted to have lt sided facial droop; aphasic; left arm weakness; no TNK as out of window.  Code stroke activated;  CT imagining found patient with new rt mca; patient transferred to [facility] for thrombectomy. Pt transferred to [facility] EDUARDO eval, s/p cerebral angiogram for mechanical thrombectomy of Rt M2 occlusion, TICI 3 (one pass). She is admitted to NSICU for close monitoring post thrombectomy. Post procedure course complicated by groin site pseudoaneurysm requiring thrombin injection by IR. IV heparin was held. Repeat duplex showed persistent pseudoaneurysm requiring repeat injection on 3/29. Code stroke was called on 3/29 for altered mental status.   CTH/CTA/CTP ordered and CTA +new left M2 occlusion. ot. Pt extubated 3/20 to RA, passed swallow evaluation. Patient has a history of iatrogenic AI, endocrine was consulted and initially started on stress dose steroids weaned to PO prednisone. Noted to have leukocytosis on 4/2 with a positive UA, started on Po Macrobid. Medicine now consulted for persistent fever    Assessment/Plan:    1. Severe sepsis with Acute metabolic encephalopathy with Acute hypoxic respiratory failure   - Improving  - Suspected secondary to gram negative pneumonia   - BP stable, Afebrile  - ON 2 liters NC, Continue Chest PT, ambulation out of bed Incentive spirometry,  Duoneb to Q6 hours prn   Wean O2 as tolerated   - On chronic prednisone which is held for now; Continue Solucortef 100mg taper from BID tod 50 BID today and OD in Am with PO taper of prednisone to baseline dose thereafter   - UA on 4/2 positive, was started on Macrobid Blood culture negative.   Urine culture with contamination  - 4/4 COVID/RVP negative  - CTA of the chest negative for PE, Mucous/secretions are noted within the left lower lobe bronchus. Near-complete atelectasis of both lower lobes is noted. Small bilateral pleural effusions are noted.  - blood cultures negative thus far. MRSA PCR negative; IV Vancomycin discontinued  - On IV Cefepime day 4; ID Following     2. Acute on chronic diastolic CHF  - Given 10mg of IV Lasix x 2 on 4/4   - CT chest with small bilateral pleural effusions, suspected atelectasis   - Follow up TTE reviewed, negative for thrombus   - Elevated Troponin suspected demand ischemia in the setting of sepsis. No WMA abnormality noted on TTE  - Repeat Chest xray in AM     3. Multiple MCA stroke  - Status post thrombectomy  - On Eliquis   - Continue lipitor     4., Iatrogenic AI  - On chronic prednisone  - Hold PO prednisone  - On Solu cortef in the setting of severe sepsis;  100mg taper from BID to 50 BID today and OD in Am with PO taper of prednisone to baseline dose thereafter       5. Acute urinary retention   _Failed TOV, Rodriguez was replaced on 4/3   TOV when more ambulatory     6. Afib with RVR  - Increase metoprolol to 25mg PO BID today, given stat dose now. If HR remains elevated can increase to 50mg TID  - Continue Eliquis       VTE- Eliquis      Discharge disposition: dc planning.

## 2025-04-07 NOTE — PROGRESS NOTE ADULT - SUBJECTIVE AND OBJECTIVE BOX
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  am morning prior.  Upon arrival to  ED she was noted to have lt sided facial droop; aphasic; left arm weakness; no TNK as out of window/recent infarction.  code stroke activated;  CT imagining found patient with new rt mca; patient transferred to [facility] for thrombectomy. Pt transferred to [facility] EDUARDO eval, s/p cerebral angiogram for mechanical thrombectomy of Rt M2 occlusion, TICI 3 (one pass). She was admitted to NSICU for close monitoring post thrombectomy.Hospital course complicated by right hemiparesis and aphasia on 3/29/25. CTH negative for acute hemorrhage, CTA h/n + new Left MCA M2 occulusion and subsequently underwent 3rd thrombectomy on 3/29.     4/4: noted with hypoxia, hypotension and increased lethargy. Concern for sepsis 2/2 ?aspiration pna started on Cefepime + vanco ID, medicine, and cardio consulted.     SUBJECTIVE: No events overnight.  No new neurologic complaints. Unable to obtain complete ROS secondary to aphasia    albuterol/ipratropium for Nebulization 3 milliLiter(s) Nebulizer every 6 hours  bisacodyl Suppository 10 milliGRAM(s) Rectal daily PRN  cefepime  Injectable. 2000 milliGRAM(s) IV Push every 8 hours  chlorhexidine 2% Cloths 1 Application(s) Topical daily  dextrose 5%. 1000 milliLiter(s) IV Continuous <Continuous>  dextrose 5%. 1000 milliLiter(s) IV Continuous <Continuous>  dextrose 50% Injectable 25 Gram(s) IV Push once  dextrose 50% Injectable 12.5 Gram(s) IV Push once  dextrose 50% Injectable 25 Gram(s) IV Push once  dextrose Oral Gel 15 Gram(s) Oral once PRN  enoxaparin Injectable 63 milliGRAM(s) SubCutaneous every 12 hours  glucagon  Injectable 1 milliGRAM(s) IntraMuscular once  hydrocortisone sodium succinate Injectable 100 milliGRAM(s) IV Push two times a day  insulin lispro (ADMELOG) corrective regimen sliding scale   SubCutaneous every 6 hours  metoprolol tartrate Injectable 5 milliGRAM(s) IV Push every 6 hours  pantoprazole  Injectable 40 milliGRAM(s) IV Push daily  vancomycin  IVPB 1000 milliGRAM(s) IV Intermittent every 12 hours      PHYSICAL EXAM:   Vital Signs Last 24 Hrs  T(C): 36.3 (07 Apr 2025 08:01), Max: 36.9 (06 Apr 2025 19:12)  T(F): 97.3 (07 Apr 2025 08:01), Max: 98.4 (06 Apr 2025 19:12)  HR: 111 (07 Apr 2025 06:00) (77 - 123)  BP: 144/76 (07 Apr 2025 06:00) (115/75 - 156/77)  BP(mean): 94 (07 Apr 2025 06:00) (69 - 99)  RR: 16 (07 Apr 2025 06:00) (16 - 18)  SpO2: 100% (07 Apr 2025 06:00) (92% - 100%)    Parameters below as of 07 Apr 2025 06:00  Patient On (Oxygen Delivery Method): nasal cannula  O2 Flow (L/min): 2      Pending Exam  General: No acute distress.   HEENT: Head normocephalic, atraumatic. Conjunctivae clear w/o exudates or hemorrhage.    Cardiac: irregular rate and rhythm.    Respiratory: Lung sounds clear in all fields.    Abdominal: Soft, nondistended, nontender.   Skin: + RUE swelling and warm to touch. Skin is warm, dry     Extremities: No edema    Detailed Neurologic Exam:  Mental status: Awake, alert, oriented x3 w/ choices; expressive aphasia w/ word finding difficulty    Cranial nerves: slight right facial palsy, mild to moderate dysarthria, Pupils equal and react symmetrically to light. There is no visual field deficit to confrontation. Extraocular motion is full with no nystagmus.    Motor: There is normal bulk and tone.  There is no tremor.  Strength is 4+/5 proximal 4/5 distal in the right arm and 5/5 right leg. Drifts.   Strength is 5/5 in the left arm and 4/5 left leg.  Sensation: Intact to light touch and pin in 4 extremities  Cerebellar: There is no dysmetria on finger to nose testing.                                                                                                    Gait : deferred    LABS:                        10.2   8.06  )-----------( 135      ( 06 Apr 2025 07:40 )             30.9   04-06    138  |  103  |  30.5[H]  ----------------------------<  133[H]  3.5   |  23.0  |  0.50    Ca    8.1[L]      06 Apr 2025 07:40  Phos  2.3     04-06  Mg     2.1     04-06    TPro  5.1[L]  /  Alb  2.8[L]  /  TBili  0.7  /  DBili  x   /  AST  12  /  ALT  14  /  AlkPhos  37[L]  04-06       LDL: 59  A1C: 5.5    IMAGING: Neuro-Imaging  EEG  Abnormal EEG study.  Mild nonspecific diffuse or multifocal cerebral dysfunction.   No epileptiform pattern or seizure seen.    CT Head No Cont (04.04.25 @ 10:00)   IMPRESSION: Stable subacute bilateral infarctions, most pronounced in   left basal ganglia. Trace left frontal lobe petechial hemorrhage versus   spared cortex. Continued follow-up is recommended.    CT Head No Cont (04.02.25 @ 09:41)   IMPRESSION: Subacute infarcts are again seen bilaterally as described   above    Acute infarct is now seen involving the left basal ganglia region.    MR Head w/wo IV Cont (03.31.25 @ 10:09)   IMPRESSION:  New acute infarctions within the LEFT lateral frontal   cortex, LEFT occipital cortex, RIGHT insular cortex and scattered RIGHT   frontal, parietal, temporal and occipital cortex suggesting embolic   etiology. Subacute infarction within the LEFT basal ganglia. Mild   periventricular chronic white matter ischemia.    IR Neuro (03.29.25 @ 09:10)   Supervision and Interpretation:  1. Interval recanalization of the left MCA M2 occlusion seen byCT   angiography with persistent posterior frontal distal M4 branch occlusion.   Attempted thrombectomy was unsuccessful because the branch was too small   to be accessed by the red 43 thrombectomy catheter.    CT Head No Cont (03.29.25 @ 20:56)   IMPRESSION: No acute intracranial hemorrhage, mass effect, or shift of   the midline structures.  Evolving acute left frontal lobe superior division MCA infarct without   hemorrhagic transformation.    CT Head No Cont (03.29.25 @ 06:54)   Patchy hypoattenuation related to evolving acute/subacute bilateral MCA   territory infarcts. No intracranial hemorrhage or midline shift present.    CT Angio Head/Neck Stroke Protocol w/ IV Cont, CT Perfusion (03.29.25 @ 06:56)   IMPRESSION:  CT PERFUSION:  Signal abnormality on T-Max/mean transit time data sets at the left   frontal lobe suggest territory-and-risk within the left MCA distribution,   corresponding to angiographic findings below. Cerebral blood flow and   cerebral blood volume data sets remain within normal limits.  Neurointerventional/neurosurgical consultation recommended. MRI may be   considered for further characterization.  CTA NECK:  No evidence of significant stenosis or occlusion.  Previously identified narrowing of the right cervical ICA with   questionable arterial dissection appears less conspicuous on the current   exam, suggesting at least partial recanalization. MRA (dissection   protocol) considered for further evaluation.  CTA HEAD:  Acute left M2 occlusion identified.  Critical findings above were discussed directly by telephone with   ordering provider, Aric PINEDA, on 3/29/2025 at 7:11 AM by Dr. Louie Worthy with read back confirmation.    CT Head No Cont (03.27.25 @ 05:43)  IMPRESSION: Age-appropriate involutional and ischemic gliotic changes. No   hemorrhage. No significant change since 3/26/2025.    CT Head No Cont (03.27.25 @ 05:43)   IMPRESSION: Age-appropriate involutional and ischemic gliotic changes. No   hemorrhage. No significant change since 3/26/2025.    CT Angio Head/Neck Stroke Protocol w/ IV Cont (03.26.25 @ 08:52)   1. RIGHT CAROTID SYSTEM:    Atherosclerotic plaque carotid bulb without    hemodynamically significant stenosis. Tandem lesion distal internal   carotid artery at the C1 level has developed since 3/21/2025, possible   interval arterial dissection, with associated luminal narrowing   approximately 30%  2.   LEFT CAROTID SYSTEM:     Atherosclerotic plaque carotid bulb without    hemodynamically significant stenosis.  3.   VERTEBRAL CIRCULATION:    Patent.  4.  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 Right MCA occlusion   in its proximal M2 segment is an interval finding since 3/21/2025. Left   MCA remains patent with luminal irregularity and low-grade narrowing   following recent thrombectomy.  5.  POSTERIOR INTRACRANIAL CIRCULATION:    Patent.  6. BRAIN PERFUSION:   No acute infarction of the brain is convincingly   demonstrated.  However, broad region of apparent ischemia corresponds to   the right MCA distribution correlating with acute embolic occlusion on CT   angiography  7. Heterogeneous enhancement within the principal dural sinuses may be   secondary to the early phase of venous opacification on this arterial   phase examination. The appearance is similar to 3/21/2025. Consider   supplemental evaluation with MR venogram  --  CT   CT Angio Chest PE Protocol w/ IV Cont (04.04.25 @ 10:02)   IMPRESSION: No pulmonary embolus is noted.  ---  ULTRASOUND   US Duplex Venous Upper Ext Ltd, Right (04.06.25 @ 21:36)   IMPRESSION:    No obvious deep vein thrombosis in the right upper extremity.    US Pseudoaneurysm Injection (03.28.25 @ 09:41)   Successful thrombin and ultrasound-guided compression of the right groin   pseudoaneurysm.    US Duplex Arterial Lower Ext Ltd, Right (03.27.25 @ 04:09)   IMPRESSION:  A 1.7 cm right CFA pseudoaneurysm.  A 2.5 cm adjacent hematoma.  --  CARDIOLOGY   TTE W or WO Ultrasound Enhancing Agent (03.26.25 @ 18:15)    1. Technically difficult image quality.   2. Left ventricular cavity is normal in size. Left ventricular systolic function is hyperdynamic with an ejectionfraction visually estimated at 65 to 70 %.   3. Mild left ventricular hypertrophy.   4. Normal right ventricular cavity size and normal right ventricular systolic function.   5. Left atrium is severely dilated.   6. The right atrium is severely dilated.     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  am morning prior.  Upon arrival to  ED she was noted to have lt sided facial droop; aphasic; left arm weakness; no TNK as out of window/recent infarction.  code stroke activated;  CT imagining found patient with new rt mca; patient transferred to [facility] for thrombectomy. Pt transferred to [facility] EDUARDO eval, s/p cerebral angiogram for mechanical thrombectomy of Rt M2 occlusion, TICI 3 (one pass). She was admitted to NSICU for close monitoring post thrombectomy.Hospital course complicated by right hemiparesis and aphasia on 3/29/25. CTH negative for acute hemorrhage, CTA h/n + new Left MCA M2 occulusion and subsequently underwent 3rd thrombectomy on 3/29.     4/4: noted with hypoxia, hypotension and increased lethargy. Concern for sepsis 2/2 ?aspiration pna started on Cefepime + vanco     SUBJECTIVE: No events overnight.  No new neurologic complaints. Unable to obtain complete ROS secondary to aphasia    albuterol/ipratropium for Nebulization 3 milliLiter(s) Nebulizer every 6 hours  bisacodyl Suppository 10 milliGRAM(s) Rectal daily PRN  cefepime  Injectable. 2000 milliGRAM(s) IV Push every 8 hours  chlorhexidine 2% Cloths 1 Application(s) Topical daily  dextrose 5%. 1000 milliLiter(s) IV Continuous <Continuous>  dextrose 5%. 1000 milliLiter(s) IV Continuous <Continuous>  dextrose 50% Injectable 25 Gram(s) IV Push once  dextrose 50% Injectable 12.5 Gram(s) IV Push once  dextrose 50% Injectable 25 Gram(s) IV Push once  dextrose Oral Gel 15 Gram(s) Oral once PRN  enoxaparin Injectable 63 milliGRAM(s) SubCutaneous every 12 hours  glucagon  Injectable 1 milliGRAM(s) IntraMuscular once  hydrocortisone sodium succinate Injectable 100 milliGRAM(s) IV Push two times a day  insulin lispro (ADMELOG) corrective regimen sliding scale   SubCutaneous every 6 hours  metoprolol tartrate Injectable 5 milliGRAM(s) IV Push every 6 hours  pantoprazole  Injectable 40 milliGRAM(s) IV Push daily  vancomycin  IVPB 1000 milliGRAM(s) IV Intermittent every 12 hours      PHYSICAL EXAM:   Vital Signs Last 24 Hrs  T(C): 36.3 (07 Apr 2025 08:01), Max: 36.9 (06 Apr 2025 19:12)  T(F): 97.3 (07 Apr 2025 08:01), Max: 98.4 (06 Apr 2025 19:12)  HR: 111 (07 Apr 2025 06:00) (77 - 123)  BP: 144/76 (07 Apr 2025 06:00) (115/75 - 156/77)  BP(mean): 94 (07 Apr 2025 06:00) (69 - 99)  RR: 16 (07 Apr 2025 06:00) (16 - 18)  SpO2: 100% (07 Apr 2025 06:00) (92% - 100%)    Parameters below as of 07 Apr 2025 06:00  Patient On (Oxygen Delivery Method): nasal cannula  O2 Flow (L/min): 2      Pending Exam  General: No acute distress.   HEENT: Head normocephalic, atraumatic. Conjunctivae clear w/o exudates or hemorrhage.    Cardiac: irregular rate and rhythm.    Respiratory: Lung sounds clear in all fields.    Abdominal: Soft, nondistended, nontender.   Skin: + RUE swelling and warm to touch. Skin is warm, dry     Extremities: No edema    Detailed Neurologic Exam:  Mental status: Awake, alert, oriented x3 w/ choices; expressive aphasia w/ word finding difficulty    Cranial nerves: slight right facial palsy, mild to moderate dysarthria, Pupils equal and react symmetrically to light. There is no visual field deficit to confrontation. Extraocular motion is full with no nystagmus.    Motor: There is normal bulk and tone.  There is no tremor.  Strength is 4+/5 proximal 4/5 distal in the right arm and 5/5 right leg. Drifts.   Strength is 5/5 in the left arm and 4/5 left leg.  Sensation: Intact to light touch and pin in 4 extremities  Cerebellar: There is no dysmetria on finger to nose testing.                                                                                                    Gait : deferred    LABS:                        10.2   8.06  )-----------( 135      ( 06 Apr 2025 07:40 )             30.9   04-06    138  |  103  |  30.5[H]  ----------------------------<  133[H]  3.5   |  23.0  |  0.50    Ca    8.1[L]      06 Apr 2025 07:40  Phos  2.3     04-06  Mg     2.1     04-06    TPro  5.1[L]  /  Alb  2.8[L]  /  TBili  0.7  /  DBili  x   /  AST  12  /  ALT  14  /  AlkPhos  37[L]  04-06       LDL: 59  A1C: 5.5    IMAGING: Neuro-Imaging  EEG  Abnormal EEG study.  Mild nonspecific diffuse or multifocal cerebral dysfunction.   No epileptiform pattern or seizure seen.    CT Head No Cont (04.04.25 @ 10:00)   IMPRESSION: Stable subacute bilateral infarctions, most pronounced in   left basal ganglia. Trace left frontal lobe petechial hemorrhage versus   spared cortex. Continued follow-up is recommended.    CT Head No Cont (04.02.25 @ 09:41)   IMPRESSION: Subacute infarcts are again seen bilaterally as described   above    Acute infarct is now seen involving the left basal ganglia region.    MR Head w/wo IV Cont (03.31.25 @ 10:09)   IMPRESSION:  New acute infarctions within the LEFT lateral frontal   cortex, LEFT occipital cortex, RIGHT insular cortex and scattered RIGHT   frontal, parietal, temporal and occipital cortex suggesting embolic   etiology. Subacute infarction within the LEFT basal ganglia. Mild   periventricular chronic white matter ischemia.    IR Neuro (03.29.25 @ 09:10)   Supervision and Interpretation:  1. Interval recanalization of the left MCA M2 occlusion seen byCT   angiography with persistent posterior frontal distal M4 branch occlusion.   Attempted thrombectomy was unsuccessful because the branch was too small   to be accessed by the red 43 thrombectomy catheter.    CT Head No Cont (03.29.25 @ 20:56)   IMPRESSION: No acute intracranial hemorrhage, mass effect, or shift of   the midline structures.  Evolving acute left frontal lobe superior division MCA infarct without   hemorrhagic transformation.    CT Head No Cont (03.29.25 @ 06:54)   Patchy hypoattenuation related to evolving acute/subacute bilateral MCA   territory infarcts. No intracranial hemorrhage or midline shift present.    CT Angio Head/Neck Stroke Protocol w/ IV Cont, CT Perfusion (03.29.25 @ 06:56)   IMPRESSION:  CT PERFUSION:  Signal abnormality on T-Max/mean transit time data sets at the left   frontal lobe suggest territory-and-risk within the left MCA distribution,   corresponding to angiographic findings below. Cerebral blood flow and   cerebral blood volume data sets remain within normal limits.  Neurointerventional/neurosurgical consultation recommended. MRI may be   considered for further characterization.  CTA NECK:  No evidence of significant stenosis or occlusion.  Previously identified narrowing of the right cervical ICA with   questionable arterial dissection appears less conspicuous on the current   exam, suggesting at least partial recanalization. MRA (dissection   protocol) considered for further evaluation.  CTA HEAD:  Acute left M2 occlusion identified.  Critical findings above were discussed directly by telephone with   ordering provider, Aric PINEDA, on 3/29/2025 at 7:11 AM by Dr. Louie Worthy with read back confirmation.    CT Head No Cont (03.27.25 @ 05:43)  IMPRESSION: Age-appropriate involutional and ischemic gliotic changes. No   hemorrhage. No significant change since 3/26/2025.    CT Head No Cont (03.27.25 @ 05:43)   IMPRESSION: Age-appropriate involutional and ischemic gliotic changes. No   hemorrhage. No significant change since 3/26/2025.    CT Angio Head/Neck Stroke Protocol w/ IV Cont (03.26.25 @ 08:52)   1. RIGHT CAROTID SYSTEM:    Atherosclerotic plaque carotid bulb without    hemodynamically significant stenosis. Tandem lesion distal internal   carotid artery at the C1 level has developed since 3/21/2025, possible   interval arterial dissection, with associated luminal narrowing   approximately 30%  2.   LEFT CAROTID SYSTEM:     Atherosclerotic plaque carotid bulb without    hemodynamically significant stenosis.  3.   VERTEBRAL CIRCULATION:    Patent.  4.  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 Right MCA occlusion   in its proximal M2 segment is an interval finding since 3/21/2025. Left   MCA remains patent with luminal irregularity and low-grade narrowing   following recent thrombectomy.  5.  POSTERIOR INTRACRANIAL CIRCULATION:    Patent.  6. BRAIN PERFUSION:   No acute infarction of the brain is convincingly   demonstrated.  However, broad region of apparent ischemia corresponds to   the right MCA distribution correlating with acute embolic occlusion on CT   angiography  7. Heterogeneous enhancement within the principal dural sinuses may be   secondary to the early phase of venous opacification on this arterial   phase examination. The appearance is similar to 3/21/2025. Consider   supplemental evaluation with MR venogram  --  CT   CT Angio Chest PE Protocol w/ IV Cont (04.04.25 @ 10:02)   IMPRESSION: No pulmonary embolus is noted.  ---  ULTRASOUND   US Duplex Venous Upper Ext Ltd, Right (04.06.25 @ 21:36)   IMPRESSION:    No obvious deep vein thrombosis in the right upper extremity.    US Pseudoaneurysm Injection (03.28.25 @ 09:41)   Successful thrombin and ultrasound-guided compression of the right groin   pseudoaneurysm.    US Duplex Arterial Lower Ext Ltd, Right (03.27.25 @ 04:09)   IMPRESSION:  A 1.7 cm right CFA pseudoaneurysm.  A 2.5 cm adjacent hematoma.  --  CARDIOLOGY   TTE W or WO Ultrasound Enhancing Agent (03.26.25 @ 18:15)    1. Technically difficult image quality.   2. Left ventricular cavity is normal in size. Left ventricular systolic function is hyperdynamic with an ejectionfraction visually estimated at 65 to 70 %.   3. Mild left ventricular hypertrophy.   4. Normal right ventricular cavity size and normal right ventricular systolic function.   5. Left atrium is severely dilated.   6. The right atrium is severely dilated.       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  am morning prior.  Upon arrival to  ED she was noted to have lt sided facial droop; aphasic; left arm weakness; no TNK as out of window/recent infarction.  code stroke activated;  CT imagining found patient with new rt mca; patient transferred to [facility] for thrombectomy. Pt transferred to [facility] EDUARDO eval, s/p cerebral angiogram for mechanical thrombectomy of Rt M2 occlusion, TICI 3 (one pass). She was admitted to NSICU for close monitoring post thrombectomy.Hospital course complicated by right hemiparesis and aphasia on 3/29/25. CTH negative for acute hemorrhage, CTA h/n + new Left MCA M2 occulusion and subsequently underwent 3rd thrombectomy on 3/29.     4/4: noted with hypoxia, hypotension and increased lethargy. Concern for sepsis 2/2 ?aspiration pna started on Cefepime + vanco     SUBJECTIVE: No events overnight.  No new neurologic complaints. Unable to obtain complete ROS secondary to aphasia    albuterol/ipratropium for Nebulization 3 milliLiter(s) Nebulizer every 6 hours  bisacodyl Suppository 10 milliGRAM(s) Rectal daily PRN  cefepime  Injectable. 2000 milliGRAM(s) IV Push every 8 hours  chlorhexidine 2% Cloths 1 Application(s) Topical daily  dextrose 5%. 1000 milliLiter(s) IV Continuous <Continuous>  dextrose 5%. 1000 milliLiter(s) IV Continuous <Continuous>  dextrose 50% Injectable 25 Gram(s) IV Push once  dextrose 50% Injectable 12.5 Gram(s) IV Push once  dextrose 50% Injectable 25 Gram(s) IV Push once  dextrose Oral Gel 15 Gram(s) Oral once PRN  enoxaparin Injectable 63 milliGRAM(s) SubCutaneous every 12 hours  glucagon  Injectable 1 milliGRAM(s) IntraMuscular once  hydrocortisone sodium succinate Injectable 100 milliGRAM(s) IV Push two times a day  insulin lispro (ADMELOG) corrective regimen sliding scale   SubCutaneous every 6 hours  metoprolol tartrate Injectable 5 milliGRAM(s) IV Push every 6 hours  pantoprazole  Injectable 40 milliGRAM(s) IV Push daily  vancomycin  IVPB 1000 milliGRAM(s) IV Intermittent every 12 hours      PHYSICAL EXAM:   Vital Signs Last 24 Hrs  T(C): 36.3 (07 Apr 2025 08:01), Max: 36.9 (06 Apr 2025 19:12)  T(F): 97.3 (07 Apr 2025 08:01), Max: 98.4 (06 Apr 2025 19:12)  HR: 111 (07 Apr 2025 06:00) (77 - 123)  BP: 144/76 (07 Apr 2025 06:00) (115/75 - 156/77)  BP(mean): 94 (07 Apr 2025 06:00) (69 - 99)  RR: 16 (07 Apr 2025 06:00) (16 - 18)  SpO2: 100% (07 Apr 2025 06:00) (92% - 100%)    Parameters below as of 07 Apr 2025 06:00  Patient On (Oxygen Delivery Method): nasal cannula  O2 Flow (L/min): 2        General: No acute distress.   HEENT: Head normocephalic, atraumatic. Conjunctivae clear w/o exudates or hemorrhage.    Cardiac: irregular rate and rhythm.    Respiratory: Lung sounds clear in all fields.    Abdominal: Soft, nondistended, nontender.   Skin: + RUE swelling and warm to touch. Skin is warm, dry     Extremities: No edema    Detailed Neurologic Exam:  Mental status: Awake, alert, oriented x3 w/ choices; expressive aphasia w/ word finding difficulty    Cranial nerves: slight right facial palsy, mild to moderate dysarthria, Pupils equal and react symmetrically to light. There is no visual field deficit to confrontation. Extraocular motion is full with no nystagmus.    Motor: There is normal bulk and tone.  There is no tremor.  Strength is 4+/5 proximal 4/5 distal in the right arm and 5/5 right leg. Drifts.   Strength is 5/5 in the left arm and 4/5 left leg.  Sensation: Intact to light touch and pin in 4 extremities  Cerebellar: There is no dysmetria on finger to nose testing.                                                                                                    Gait : deferred    LABS:                        10.2   8.06  )-----------( 135      ( 06 Apr 2025 07:40 )             30.9   04-06    138  |  103  |  30.5[H]  ----------------------------<  133[H]  3.5   |  23.0  |  0.50    Ca    8.1[L]      06 Apr 2025 07:40  Phos  2.3     04-06  Mg     2.1     04-06    TPro  5.1[L]  /  Alb  2.8[L]  /  TBili  0.7  /  DBili  x   /  AST  12  /  ALT  14  /  AlkPhos  37[L]  04-06       LDL: 59  A1C: 5.5    IMAGING: Neuro-Imaging  EEG  Abnormal EEG study.  Mild nonspecific diffuse or multifocal cerebral dysfunction.   No epileptiform pattern or seizure seen.    CT Head No Cont (04.04.25 @ 10:00)   IMPRESSION: Stable subacute bilateral infarctions, most pronounced in   left basal ganglia. Trace left frontal lobe petechial hemorrhage versus   spared cortex. Continued follow-up is recommended.    CT Head No Cont (04.02.25 @ 09:41)   IMPRESSION: Subacute infarcts are again seen bilaterally as described   above    Acute infarct is now seen involving the left basal ganglia region.    MR Head w/wo IV Cont (03.31.25 @ 10:09)   IMPRESSION:  New acute infarctions within the LEFT lateral frontal   cortex, LEFT occipital cortex, RIGHT insular cortex and scattered RIGHT   frontal, parietal, temporal and occipital cortex suggesting embolic   etiology. Subacute infarction within the LEFT basal ganglia. Mild   periventricular chronic white matter ischemia.    IR Neuro (03.29.25 @ 09:10)   Supervision and Interpretation:  1. Interval recanalization of the left MCA M2 occlusion seen byCT   angiography with persistent posterior frontal distal M4 branch occlusion.   Attempted thrombectomy was unsuccessful because the branch was too small   to be accessed by the red 43 thrombectomy catheter.    CT Head No Cont (03.29.25 @ 20:56)   IMPRESSION: No acute intracranial hemorrhage, mass effect, or shift of   the midline structures.  Evolving acute left frontal lobe superior division MCA infarct without   hemorrhagic transformation.    CT Head No Cont (03.29.25 @ 06:54)   Patchy hypoattenuation related to evolving acute/subacute bilateral MCA   territory infarcts. No intracranial hemorrhage or midline shift present.    CT Angio Head/Neck Stroke Protocol w/ IV Cont, CT Perfusion (03.29.25 @ 06:56)   IMPRESSION:  CT PERFUSION:  Signal abnormality on T-Max/mean transit time data sets at the left   frontal lobe suggest territory-and-risk within the left MCA distribution,   corresponding to angiographic findings below. Cerebral blood flow and   cerebral blood volume data sets remain within normal limits.  Neurointerventional/neurosurgical consultation recommended. MRI may be   considered for further characterization.  CTA NECK:  No evidence of significant stenosis or occlusion.  Previously identified narrowing of the right cervical ICA with   questionable arterial dissection appears less conspicuous on the current   exam, suggesting at least partial recanalization. MRA (dissection   protocol) considered for further evaluation.  CTA HEAD:  Acute left M2 occlusion identified.  Critical findings above were discussed directly by telephone with   ordering provider, Aric PINEDA, on 3/29/2025 at 7:11 AM by Dr. Louie Worthy with read back confirmation.    CT Head No Cont (03.27.25 @ 05:43)  IMPRESSION: Age-appropriate involutional and ischemic gliotic changes. No   hemorrhage. No significant change since 3/26/2025.    CT Head No Cont (03.27.25 @ 05:43)   IMPRESSION: Age-appropriate involutional and ischemic gliotic changes. No   hemorrhage. No significant change since 3/26/2025.    CT Angio Head/Neck Stroke Protocol w/ IV Cont (03.26.25 @ 08:52)   1. RIGHT CAROTID SYSTEM:    Atherosclerotic plaque carotid bulb without    hemodynamically significant stenosis. Tandem lesion distal internal   carotid artery at the C1 level has developed since 3/21/2025, possible   interval arterial dissection, with associated luminal narrowing   approximately 30%  2.   LEFT CAROTID SYSTEM:     Atherosclerotic plaque carotid bulb without    hemodynamically significant stenosis.  3.   VERTEBRAL CIRCULATION:    Patent.  4.  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 Right MCA occlusion   in its proximal M2 segment is an interval finding since 3/21/2025. Left   MCA remains patent with luminal irregularity and low-grade narrowing   following recent thrombectomy.  5.  POSTERIOR INTRACRANIAL CIRCULATION:    Patent.  6. BRAIN PERFUSION:   No acute infarction of the brain is convincingly   demonstrated.  However, broad region of apparent ischemia corresponds to   the right MCA distribution correlating with acute embolic occlusion on CT   angiography  7. Heterogeneous enhancement within the principal dural sinuses may be   secondary to the early phase of venous opacification on this arterial   phase examination. The appearance is similar to 3/21/2025. Consider   supplemental evaluation with MR venogram  --  CT   CT Angio Chest PE Protocol w/ IV Cont (04.04.25 @ 10:02)   IMPRESSION: No pulmonary embolus is noted.  ---  ULTRASOUND   US Duplex Venous Upper Ext Ltd, Right (04.06.25 @ 21:36)   IMPRESSION:    No obvious deep vein thrombosis in the right upper extremity.    US Pseudoaneurysm Injection (03.28.25 @ 09:41)   Successful thrombin and ultrasound-guided compression of the right groin   pseudoaneurysm.    US Duplex Arterial Lower Ext Ltd, Right (03.27.25 @ 04:09)   IMPRESSION:  A 1.7 cm right CFA pseudoaneurysm.  A 2.5 cm adjacent hematoma.  --  CARDIOLOGY   TTE W or WO Ultrasound Enhancing Agent (03.26.25 @ 18:15)    1. Technically difficult image quality.   2. Left ventricular cavity is normal in size. Left ventricular systolic function is hyperdynamic with an ejectionfraction visually estimated at 65 to 70 %.   3. Mild left ventricular hypertrophy.   4. Normal right ventricular cavity size and normal right ventricular systolic function.   5. Left atrium is severely dilated.   6. The right atrium is severely dilated.

## 2025-04-07 NOTE — PROGRESS NOTE ADULT - SUBJECTIVE AND OBJECTIVE BOX
seen for cva    son at bedside  limited ros as with word findings difficulties  tolerating diet.     MEDICATIONS  (STANDING):  apixaban 5 milliGRAM(s) Oral every 12 hours  atorvastatin 10 milliGRAM(s) Oral at bedtime  cefepime  Injectable. 2000 milliGRAM(s) IV Push every 8 hours  chlorhexidine 2% Cloths 1 Application(s) Topical daily  dextrose 5%. 1000 milliLiter(s) (100 mL/Hr) IV Continuous <Continuous>  dextrose 5%. 1000 milliLiter(s) (50 mL/Hr) IV Continuous <Continuous>  dextrose 50% Injectable 25 Gram(s) IV Push once  dextrose 50% Injectable 12.5 Gram(s) IV Push once  dextrose 50% Injectable 25 Gram(s) IV Push once  glucagon  Injectable 1 milliGRAM(s) IntraMuscular once  hydrocortisone sodium succinate Injectable 50 milliGRAM(s) IV Push two times a day  insulin lispro (ADMELOG) corrective regimen sliding scale   SubCutaneous three times a day before meals  levalbuterol Inhalation 0.63 milliGRAM(s) Inhalation every 6 hours  metoprolol tartrate 50 milliGRAM(s) Oral two times a day  pantoprazole  Injectable 40 milliGRAM(s) IV Push daily  polyethylene glycol 3350 17 Gram(s) Oral daily  senna 2 Tablet(s) Oral at bedtime  tamsulosin 0.4 milliGRAM(s) Oral at bedtime    MEDICATIONS  (PRN):  acetaminophen     Tablet .. 650 milliGRAM(s) Oral every 6 hours PRN Temp greater or equal to 38C (100.4F), Mild Pain (1 - 3)  dextrose Oral Gel 15 Gram(s) Oral once PRN Blood Glucose LESS THAN 70 milliGRAM(s)/deciliter      Allergies    penicillins (Hives)         Vital Signs Last 24 Hrs  T(C): 36.3 (07 Apr 2025 08:01), Max: 36.9 (06 Apr 2025 19:12)  T(F): 97.3 (07 Apr 2025 08:01), Max: 98.4 (06 Apr 2025 19:12)  HR: 110 (07 Apr 2025 10:00) (77 - 123)  BP: 131/117 (07 Apr 2025 10:00) (115/75 - 156/77)  BP(mean): 124 (07 Apr 2025 10:00) (69 - 124)  RR: 18 (07 Apr 2025 10:00) (16 - 18)  SpO2: 99% (07 Apr 2025 10:00) (92% - 100%)    Parameters below as of 07 Apr 2025 10:00  Patient On (Oxygen Delivery Method): nasal cannula  O2 Flow (L/min): 1      PHYSICAL EXAM:    GENERAL: NAD   CHEST/LUNG: Clear to ausculation bilaterally;  HEART: Regular rate and rhythm; S1 S2;  ABDOMEN: Soft,  ; Bowel sounds present  EXTREMITIES: no edema   NERVOUS SYSTEM:  Alert & Oriented X1 self, word finding difficulties gen weakness but moves ext x4 on command and spontaneously     LABS:                        10.5   7.81  )-----------( 142      ( 07 Apr 2025 05:38 )             32.2     04-07    141  |  106  |  36.9[H]  ----------------------------<  130[H]  3.6   |  24.0  |  0.49[L]    Ca    8.2[L]      07 Apr 2025 05:38  Phos  2.3     04-07  Mg     2.2     04-07    TPro  5.1[L]  /  Alb  2.8[L]  /  TBili  0.7  /  DBili  x   /  AST  12  /  ALT  14  /  AlkPhos  37[L]  04-06      Urinalysis Basic - ( 07 Apr 2025 05:38 )    Color: x / Appearance: x / SG: x / pH: x  Gluc: 130 mg/dL / Ketone: x  / Bili: x / Urobili: x   Blood: x / Protein: x / Nitrite: x   Leuk Esterase: x / RBC: x / WBC x   Sq Epi: x / Non Sq Epi: x / Bacteria: x        CAPILLARY BLOOD GLUCOSE      POCT Blood Glucose.: 133 mg/dL (07 Apr 2025 09:16)  POCT Blood Glucose.: 145 mg/dL (06 Apr 2025 21:45)  POCT Blood Glucose.: 159 mg/dL (06 Apr 2025 17:32)  POCT Blood Glucose.: 182 mg/dL (06 Apr 2025 13:04)        RADIOLOGY & ADDITIONAL TESTS:

## 2025-04-07 NOTE — PROGRESS NOTE ADULT - SUBJECTIVE AND OBJECTIVE BOX
CC: Patient being seen for rehabilitation follow up.  Patient feels well.  Family not at bedside.  Reports no pain.     FUNCTIONAL PROGRESS  4/5 SLP  Speech Language Pathology Recommendations: 1. Regular diet, mildly thick fluids as tolerated 2. STRICT aspiration precautions 3. Oral care 4. Upright with PO 5. Assist with PO: slow rate of intake, small bites 6. Consider repeat MBS prior to d/c for eval of thin liquids 7. Rx intensive SLP intervention following d/c, rx input from PMR     4/5 PT  Bed Mobility  Bed Mobility Training Rehab Potential: good, to achieve stated therapy goals  Bed Mobility Training Sit-to-Supine: minimum assist (75% patient effort);  1 person assist  Bed Mobility Training Supine-to-Sit: moderate assist (50% patient effort);  1 person assist  Bed Mobility Training Limitations: decreased ability to use arms for pushing/pulling;  decreased ability to use legs for bridging/pushing;  impaired ability to control trunk for mobility;  decreased strength;  impaired balance    Sit-Stand Transfer Training  Sit-to-Stand Transfer Training Rehab Potential: good, to achieve stated therapy goals  Transfer Training Sit-to-Stand Transfer: minimum assist (75% patient effort);  1 person assist;  rolling walker  Transfer Training Stand-to-Sit Transfer: minimum assist (75% patient effort);  1 person assist;  rolling walker  Sit-to-Stand Transfer Training Transfer Safety Analysis: decreased weight-shifting ability;  decreased strength;  impaired balance    Gait Training  Gait Training Rehab Potential: good, to achieve stated therapy goals  Gait Training: minimum assist (75% patient effort);  1 person assist;  rolling walker;  6ft  Gait Analysis: decreased stride length;  decreased step length;  decreased weight-shifting ability;  decreased strength;  impaired balance  4/5 OT  Bed Mobility  Bed Mobility Training Supine-to-Sit: minimum assist (75% patient effort);  1 person assist  Bed Mobility Training Limitations: decreased strength;  impaired coordination;  impaired motor control    Bed-Chair Transfer Training  Transfer Training Bed-to-Chair Transfer: minimum assist (75% patient effort);  1 person + 1 person to manage equipment  Bed-to-Chair Transfer Training Transfer Safety Analysis: impaired coordination;  impaired motor control;  decreased strength    Sit-Stand Transfer Training  Transfer Training Sit-to-Stand Transfer: minimum assist (75% patient effort);  1 person + 1 person to manage equipment  Transfer Training Stand-to-Sit Transfer: minimum assist (75% patient effort);  1 person + 1 person to manage equipment  Sit-to-Stand Transfer Training Transfer Safety Analysis: impaired motor control;  impaired coordination;  impaired balance;  decreased strength    Therapeutic Exercise  Therapeutic Exercise Detail: Pt performed bilateral UE shoulder flexion     Functional Endurance  Functional Endurance Detail: Pt stood at bedside for 5 minutes with contact guard/minimal assist to maintain static standing balance. Pt with good endurance noted.     Upper Body Dressing Training  Upper Body Dressing Training Assistance: minimum assist (75% patient effort);  moderate assist (50% patient effort);  1 person assist;  to don gown seated at the edge of the bed;  impaired motor control;  impaired coordination;  decreased strength    Toilet Training  Toilet Training Assistance: maximum assist (25% patient effort);  1 person assist;  for perianal hygiene and incontinence of stool;  impaired coordination;  impaired motor control;  decreased strength    VITALS  T(C): 36.3 (04-07-25 @ 08:01), Max: 36.9 (04-06-25 @ 19:12)  HR: 111 (04-07-25 @ 06:00) (77 - 123)  BP: 144/76 (04-07-25 @ 06:00) (115/75 - 156/77)  RR: 16 (04-07-25 @ 06:00) (16 - 18)  SpO2: 100% (04-07-25 @ 06:00) (92% - 100%)  Wt(kg): --    MEDICATIONS   acetaminophen     Tablet .. 650 milliGRAM(s) every 6 hours PRN  apixaban 5 milliGRAM(s) every 12 hours  atorvastatin 10 milliGRAM(s) at bedtime  cefepime  Injectable. 2000 milliGRAM(s) every 8 hours  chlorhexidine 2% Cloths 1 Application(s) daily  dextrose 5%. 1000 milliLiter(s) <Continuous>  dextrose 5%. 1000 milliLiter(s) <Continuous>  dextrose 50% Injectable 25 Gram(s) once  dextrose 50% Injectable 12.5 Gram(s) once  dextrose 50% Injectable 25 Gram(s) once  dextrose Oral Gel 15 Gram(s) once PRN  glucagon  Injectable 1 milliGRAM(s) once  hydrocortisone sodium succinate Injectable 50 milliGRAM(s) two times a day  insulin lispro (ADMELOG) corrective regimen sliding scale   three times a day before meals  levalbuterol Inhalation 0.63 milliGRAM(s) every 6 hours  metoprolol tartrate 25 milliGRAM(s) two times a day  pantoprazole  Injectable 40 milliGRAM(s) daily  polyethylene glycol 3350 17 Gram(s) daily  potassium phosphate IVPB 15 milliMole(s) once  senna 2 Tablet(s) at bedtime  tamsulosin 0.4 milliGRAM(s) at bedtime      RECENT LABS/IMAGING  - Reviewed Today                        10.5   7.81  )-----------( 142      ( 07 Apr 2025 05:38 )             32.2     04-07    141  |  106  |  36.9[H]  ----------------------------<  130[H]  3.6   |  24.0  |  0.49[L]    Ca    8.2[L]      07 Apr 2025 05:38  Phos  2.3     04-07  Mg     2.2     04-07    TPro  5.1[L]  /  Alb  2.8[L]  /  TBili  0.7  /  DBili  x   /  AST  12  /  ALT  14  /  AlkPhos  37[L]  04-06      Urinalysis Basic - ( 07 Apr 2025 05:38 )    Color: x / Appearance: x / SG: x / pH: x  Gluc: 130 mg/dL / Ketone: x  / Bili: x / Urobili: x   Blood: x / Protein: x / Nitrite: x   Leuk Esterase: x / RBC: x / WBC x   Sq Epi: x / Non Sq Epi: x / Bacteria: x              CT Brain Stroke 3/26 - 1. No intracranial hemorrhage is appreciated.2. No intracranial mass lesion is appreciated.  3. Left basal ganglia subacute infarction, was acute at the time of stroke code 3/21/2025, demonstrates expected evolution. Consider MR for evaluation of infarct extension4. MR may provide higher sensitivity imaging evaluation for the clinical   indication of acute infarction.    CT ANGIO BRAIN AND NECK STROKE 3/26 - 1.   RIGHT CAROTID SYSTEM:    Atherosclerotic plaque carotid bulb without    hemodynamically significant stenosis. Tandem lesion distal internal carotid artery at the C1 level has developed since 3/21/2025, possible interval arterial dissection, with associated luminal narrowing approximately 30%  2.   LEFT CAROTID SYSTEM:     Atherosclerotic plaque carotid bulb without  hemodynamically significant stenosis.  3.   VERTEBRAL CIRCULATION:    Patent.4.  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 Right MCA occlusion in its proximal M2 segment is an interval finding since 3/21/2025. Left MCA remains patent with luminal irregularity and low-grade narrowing following recent thrombectomy.5.  POSTERIOR INTRACRANIAL CIRCULATION:    Patent.  6. BRAIN PERFUSION:   No acute infarction of the brain is convincingly demonstrated.  However, broad region of apparent ischemia corresponds to the right MCA distribution correlating with acute embolic occlusion on CT angiography  7. Heterogeneous enhancement within the principal dural sinuses may be secondary to the early phase of venous opacification on this arterial phase examination. The appearance is similar to 3/21/2025. Consider supplemental evaluation with MR venogram    IR NEURO 3/26 - POSTOPERATIVE DIAGNOSIS: Large distal upper division M3 trunk occlusion. TICI 3 reperfusion post endovascular thrombectomy    DUPLEX NIKA LE 3/26 - No evidence of deep venous thrombosis in either lower extremity.    DUPLEX R ARTERIAL LE 3/26 - A 1.7 cm right CFA pseudoaneurysm.A 2.5 cm adjacent hematoma.    CT HEAD 3/27 - Age-appropriate involutional and ischemic gliotic changes. No hemorrhage. No significant change since 3/26/2025.    DUPLEX ARTERIAL LOWER EXT, R 3/27 - Slightly decreased size of partially thrombosed pseudoaneurysm arising from the right common femoral artery/proximal right femoral artery.Possible dissection flap in the right common femoral artery.    INTERVENTIONAL IR 3/27 - Right femoral vein pseudoaneurysm injected with 100units of thrombin and pressure held for 10min post injection. PSA appears thrombosed.    CT C/A/P w/contrast 3/27 -No evidence of metastatic disease.Severe cardiomegaly.Pseudoaneurysm again seen off the proximal aspect of the right femoral artery. Correlate with arterial ultrasound performed on the same day. New 1.7 cm cystic lesion at the pancreatic body.    HEAD CT 3/29 - No acute intracranial hemorrhage, mass effect, or shift of the midline structures. Evolving acute left frontal lobe superior division MCA infarct without hemorrhagic transformation.    MRI HEAD 3/31 -  New acute infarctions within the LEFT lateral frontal cortex, LEFT occipital cortex, RIGHT insular cortex and scattered RIGHT frontal, parietal, temporal and occipital cortex suggesting embolic etiology. Subacute infarction within the LEFT basal ganglia. Mild periventricular chronic white matter ischemia.    HEAD CT 4/2 -  Subacute infarcts are again seen bilaterally as described above.  Acute infarct is now seen involving the left basal ganglia region.    HEAD CT 4/4 - Stable subacute bilateral infarctions, most pronounced in left basal ganglia. Trace left frontal lobe petechial hemorrhage versus spared cortex. Continued follow-up is recommended.    TTE /4 -  1. Left ventricular cavity is normal in size. Left ventricular systolic function is hyperdynamic with an ejection fraction visually estimated at >75 %.   2. There is no evidence of a left ventricular thrombus.   3. Analysis of left ventricular diastolic function and filling pressure is made challenging by the presence of atrial fibrillation.   4. Normal right ventricular cavity size and normal right ventricular systolic function.   5. Left atrium is severely dilated.   6. The right atrium is severely dilated.   7. Device lead is visualized in the right heart.   8. Mild mitral regurgitation.   9. Mild to moderate tricuspid regurgitation.  10. Estimated pulmonary artery systolic pressure is 37 mmHg.  11. The inferior vena cava is dilated measuring 2.23 cm in diameter, (dilated >2.1cm) with normal inspiratory collapse (normal >50%) consistent with mildly elevated right atrial pressure (~8, range 5-10mmHg).  12. Trileaflet aortic valve with normal systolic excursion.  13. Small pericardial effusion.  14. Small bilateral pleural effusion noted.  ----------------------------------------------------------------------------------------  PHYSICAL EXAM   Constitutional - NAD, Comfortable   Neurologic Exam -                    Cognitive - AAO to self, PART situation     Communication - Expressive deficits, Delayed processing, Dysarthria     Cranial Nerves - Left lip droop     FUNCTIONAL MOTOR EXAM -                      LEFT    UE - ShAB 2/5, EF 2/5, EE 3/5,  4/5                    RIGHT UE - ShAB 2/5, EF 2/5, EE 2/5,  4/5                    LEFT    LE - HF 2/5, KE 2/5, DF 3/5                     RIGHT LE - HF 2/5, KE 2/5, DF 3/5       Sensory - Intact to LT  Psychiatric - Fatigued  ----------------------------------------------------------------------------------------  ASSESSMENT/PLAN  [id number] with functional deficits 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  Acute Bilateral CVA occlusions s/p 3 thrombectomies - Eliquis, Lipitor  Right Femoral Artery Pseudoaneurysm - Stable  HTN, AFIB - Lopressor  Adrenal Insufficiency - Solu-cortef   Pharyngeal Dysphagia - MILDLY THICK Liquids  ID - Cefepime  Pain - Tylenol  DVT PPX - SCDs  Rehab/Impaired mobility and function - Patient continues to require hospitalization for the above diagnoses and ongoing active management of comorbid complications that are substantially posing a threat to bodily function, functional ability and quality of life.     RECOMMEND OOB    When medically optimized, based on the patient's diagnosis, current functional status and potential for progress, recommend ACUTE inpatient rehabilitation for the functional deficits consisting of 3 hours of therapy/day & 24 hour RN/daily PMR physician for active comorbid medical management. Patient will be able to tolerate 3 hours a day.    Goals for acute inpatient rehab are to achieve modified independence with bed mobility, modified independence with transfers and modified independence with ambulation of 50'over an LOS of 28 days.    Will need a PT and OT follow-up within 48 hrs discharge.

## 2025-04-08 ENCOUNTER — INPATIENT (INPATIENT)
Facility: HOSPITAL | Age: 89
LOS: 14 days | Discharge: HOME HEALTH | DRG: 56 | End: 2025-04-23
Attending: STUDENT IN AN ORGANIZED HEALTH CARE EDUCATION/TRAINING PROGRAM | Admitting: STUDENT IN AN ORGANIZED HEALTH CARE EDUCATION/TRAINING PROGRAM
Payer: MEDICARE

## 2025-04-08 VITALS
HEIGHT: 64 IN | OXYGEN SATURATION: 93 % | SYSTOLIC BLOOD PRESSURE: 132 MMHG | WEIGHT: 158.29 LBS | RESPIRATION RATE: 16 BRPM | DIASTOLIC BLOOD PRESSURE: 87 MMHG | TEMPERATURE: 98 F | HEART RATE: 61 BPM

## 2025-04-08 VITALS
HEART RATE: 88 BPM | RESPIRATION RATE: 16 BRPM | SYSTOLIC BLOOD PRESSURE: 117 MMHG | DIASTOLIC BLOOD PRESSURE: 83 MMHG | OXYGEN SATURATION: 95 %

## 2025-04-08 DIAGNOSIS — Z90.89 ACQUIRED ABSENCE OF OTHER ORGANS: Chronic | ICD-10-CM

## 2025-04-08 DIAGNOSIS — Z98.890 OTHER SPECIFIED POSTPROCEDURAL STATES: Chronic | ICD-10-CM

## 2025-04-08 DIAGNOSIS — I63.9 CEREBRAL INFARCTION, UNSPECIFIED: ICD-10-CM

## 2025-04-08 DIAGNOSIS — Z98.49 CATARACT EXTRACTION STATUS, UNSPECIFIED EYE: Chronic | ICD-10-CM

## 2025-04-08 LAB
ANION GAP SERPL CALC-SCNC: 11 MMOL/L — SIGNIFICANT CHANGE UP (ref 5–17)
BUN SERPL-MCNC: 35.4 MG/DL — HIGH (ref 8–20)
CALCIUM SERPL-MCNC: 8.4 MG/DL — SIGNIFICANT CHANGE UP (ref 8.4–10.5)
CHLORIDE SERPL-SCNC: 108 MMOL/L — SIGNIFICANT CHANGE UP (ref 96–108)
CO2 SERPL-SCNC: 23 MMOL/L — SIGNIFICANT CHANGE UP (ref 22–29)
CREAT SERPL-MCNC: 0.44 MG/DL — LOW (ref 0.5–1.3)
EGFR: 93 ML/MIN/1.73M2 — SIGNIFICANT CHANGE UP
EGFR: 93 ML/MIN/1.73M2 — SIGNIFICANT CHANGE UP
GLUCOSE BLDC GLUCOMTR-MCNC: 118 MG/DL — HIGH (ref 70–99)
GLUCOSE BLDC GLUCOMTR-MCNC: 93 MG/DL — SIGNIFICANT CHANGE UP (ref 70–99)
GLUCOSE SERPL-MCNC: 95 MG/DL — SIGNIFICANT CHANGE UP (ref 70–99)
HCT VFR BLD CALC: 32.9 % — LOW (ref 34.5–45)
HGB BLD-MCNC: 10.5 G/DL — LOW (ref 11.5–15.5)
MAGNESIUM SERPL-MCNC: 2 MG/DL — SIGNIFICANT CHANGE UP (ref 1.6–2.6)
MCHC RBC-ENTMCNC: 30.8 PG — SIGNIFICANT CHANGE UP (ref 27–34)
MCHC RBC-ENTMCNC: 31.9 G/DL — LOW (ref 32–36)
MCV RBC AUTO: 96.5 FL — SIGNIFICANT CHANGE UP (ref 80–100)
NRBC # BLD AUTO: 0 K/UL — SIGNIFICANT CHANGE UP (ref 0–0)
NRBC # FLD: 0 K/UL — SIGNIFICANT CHANGE UP (ref 0–0)
NRBC BLD AUTO-RTO: 0 /100 WBCS — SIGNIFICANT CHANGE UP (ref 0–0)
PHOSPHATE SERPL-MCNC: 1.8 MG/DL — LOW (ref 2.4–4.7)
PLATELET # BLD AUTO: 152 K/UL — SIGNIFICANT CHANGE UP (ref 150–400)
PMV BLD: 10.7 FL — SIGNIFICANT CHANGE UP (ref 7–13)
POTASSIUM SERPL-MCNC: 3.6 MMOL/L — SIGNIFICANT CHANGE UP (ref 3.5–5.3)
POTASSIUM SERPL-SCNC: 3.6 MMOL/L — SIGNIFICANT CHANGE UP (ref 3.5–5.3)
RBC # BLD: 3.41 M/UL — LOW (ref 3.8–5.2)
RBC # FLD: 15 % — HIGH (ref 10.3–14.5)
SARS-COV-2 RNA SPEC QL NAA+PROBE: SIGNIFICANT CHANGE UP
SODIUM SERPL-SCNC: 142 MMOL/L — SIGNIFICANT CHANGE UP (ref 135–145)
WBC # BLD: 6.34 K/UL — SIGNIFICANT CHANGE UP (ref 3.8–10.5)
WBC # FLD AUTO: 6.34 K/UL — SIGNIFICANT CHANGE UP (ref 3.8–10.5)

## 2025-04-08 PROCEDURE — 94002 VENT MGMT INPAT INIT DAY: CPT

## 2025-04-08 PROCEDURE — 92526 ORAL FUNCTION THERAPY: CPT

## 2025-04-08 PROCEDURE — 87040 BLOOD CULTURE FOR BACTERIA: CPT

## 2025-04-08 PROCEDURE — 80048 BASIC METABOLIC PNL TOTAL CA: CPT

## 2025-04-08 PROCEDURE — 83615 LACTATE (LD) (LDH) ENZYME: CPT

## 2025-04-08 PROCEDURE — 61645 PERQ ART M-THROMBECT &/NFS: CPT

## 2025-04-08 PROCEDURE — 87086 URINE CULTURE/COLONY COUNT: CPT

## 2025-04-08 PROCEDURE — 99232 SBSQ HOSP IP/OBS MODERATE 35: CPT

## 2025-04-08 PROCEDURE — 83605 ASSAY OF LACTIC ACID: CPT

## 2025-04-08 PROCEDURE — 95819 EEG AWAKE AND ASLEEP: CPT

## 2025-04-08 PROCEDURE — C1760: CPT

## 2025-04-08 PROCEDURE — 82803 BLOOD GASES ANY COMBINATION: CPT

## 2025-04-08 PROCEDURE — 93005 ELECTROCARDIOGRAM TRACING: CPT

## 2025-04-08 PROCEDURE — 85301 ANTITHROMBIN III ANTIGEN: CPT

## 2025-04-08 PROCEDURE — 85307 ASSAY ACTIVATED PROTEIN C: CPT

## 2025-04-08 PROCEDURE — 83880 ASSAY OF NATRIURETIC PEPTIDE: CPT

## 2025-04-08 PROCEDURE — 86146 BETA-2 GLYCOPROTEIN ANTIBODY: CPT

## 2025-04-08 PROCEDURE — 97530 THERAPEUTIC ACTIVITIES: CPT

## 2025-04-08 PROCEDURE — 85730 THROMBOPLASTIN TIME PARTIAL: CPT

## 2025-04-08 PROCEDURE — 87640 STAPH A DNA AMP PROBE: CPT

## 2025-04-08 PROCEDURE — 0225U NFCT DS DNA&RNA 21 SARSCOV2: CPT

## 2025-04-08 PROCEDURE — 99222 1ST HOSP IP/OBS MODERATE 55: CPT | Mod: 25

## 2025-04-08 PROCEDURE — 70553 MRI BRAIN STEM W/O & W/DYE: CPT | Mod: MC

## 2025-04-08 PROCEDURE — 74177 CT ABD & PELVIS W/CONTRAST: CPT | Mod: MC

## 2025-04-08 PROCEDURE — 71275 CT ANGIOGRAPHY CHEST: CPT | Mod: MC

## 2025-04-08 PROCEDURE — 84484 ASSAY OF TROPONIN QUANT: CPT

## 2025-04-08 PROCEDURE — 86901 BLOOD TYPING SEROLOGIC RH(D): CPT

## 2025-04-08 PROCEDURE — C1757: CPT

## 2025-04-08 PROCEDURE — 84436 ASSAY OF TOTAL THYROXINE: CPT

## 2025-04-08 PROCEDURE — 83550 IRON BINDING TEST: CPT

## 2025-04-08 PROCEDURE — 81001 URINALYSIS AUTO W/SCOPE: CPT

## 2025-04-08 PROCEDURE — 97116 GAIT TRAINING THERAPY: CPT

## 2025-04-08 PROCEDURE — 74230 X-RAY XM SWLNG FUNCJ C+: CPT | Mod: 26

## 2025-04-08 PROCEDURE — 85610 PROTHROMBIN TIME: CPT

## 2025-04-08 PROCEDURE — C1894: CPT

## 2025-04-08 PROCEDURE — 82746 ASSAY OF FOLIC ACID SERUM: CPT

## 2025-04-08 PROCEDURE — 70450 CT HEAD/BRAIN W/O DYE: CPT | Mod: MC

## 2025-04-08 PROCEDURE — 93926 LOWER EXTREMITY STUDY: CPT

## 2025-04-08 PROCEDURE — 85303 CLOT INHIBIT PROT C ACTIVITY: CPT

## 2025-04-08 PROCEDURE — 86850 RBC ANTIBODY SCREEN: CPT

## 2025-04-08 PROCEDURE — 92610 EVALUATE SWALLOWING FUNCTION: CPT

## 2025-04-08 PROCEDURE — 76000 FLUOROSCOPY <1 HR PHYS/QHP: CPT

## 2025-04-08 PROCEDURE — 71045 X-RAY EXAM CHEST 1 VIEW: CPT

## 2025-04-08 PROCEDURE — 70496 CT ANGIOGRAPHY HEAD: CPT | Mod: MC

## 2025-04-08 PROCEDURE — C1769: CPT

## 2025-04-08 PROCEDURE — 99233 SBSQ HOSP IP/OBS HIGH 50: CPT

## 2025-04-08 PROCEDURE — 94640 AIRWAY INHALATION TREATMENT: CPT

## 2025-04-08 PROCEDURE — 85732 THROMBOPLASTIN TIME PARTIAL: CPT

## 2025-04-08 PROCEDURE — 80061 LIPID PANEL: CPT

## 2025-04-08 PROCEDURE — G0545: CPT

## 2025-04-08 PROCEDURE — 85045 AUTOMATED RETICULOCYTE COUNT: CPT

## 2025-04-08 PROCEDURE — 82962 GLUCOSE BLOOD TEST: CPT

## 2025-04-08 PROCEDURE — 86147 CARDIOLIPIN ANTIBODY EA IG: CPT

## 2025-04-08 PROCEDURE — 82607 VITAMIN B-12: CPT

## 2025-04-08 PROCEDURE — 36224 PLACE CATH CAROTD ART: CPT

## 2025-04-08 PROCEDURE — 83540 ASSAY OF IRON: CPT

## 2025-04-08 PROCEDURE — 84466 ASSAY OF TRANSFERRIN: CPT

## 2025-04-08 PROCEDURE — 83735 ASSAY OF MAGNESIUM: CPT

## 2025-04-08 PROCEDURE — 93971 EXTREMITY STUDY: CPT

## 2025-04-08 PROCEDURE — 85025 COMPLETE CBC W/AUTO DIFF WBC: CPT

## 2025-04-08 PROCEDURE — 93306 TTE W/DOPPLER COMPLETE: CPT

## 2025-04-08 PROCEDURE — 97112 NEUROMUSCULAR REEDUCATION: CPT

## 2025-04-08 PROCEDURE — 74230 X-RAY XM SWLNG FUNCJ C+: CPT

## 2025-04-08 PROCEDURE — 84145 PROCALCITONIN (PCT): CPT

## 2025-04-08 PROCEDURE — 84100 ASSAY OF PHOSPHORUS: CPT

## 2025-04-08 PROCEDURE — 82728 ASSAY OF FERRITIN: CPT

## 2025-04-08 PROCEDURE — 83090 ASSAY OF HOMOCYSTEINE: CPT

## 2025-04-08 PROCEDURE — 85300 ANTITHROMBIN III ACTIVITY: CPT

## 2025-04-08 PROCEDURE — 76942 ECHO GUIDE FOR BIOPSY: CPT

## 2025-04-08 PROCEDURE — 87641 MR-STAPH DNA AMP PROBE: CPT

## 2025-04-08 PROCEDURE — 97110 THERAPEUTIC EXERCISES: CPT

## 2025-04-08 PROCEDURE — 84480 ASSAY TRIIODOTHYRONINE (T3): CPT

## 2025-04-08 PROCEDURE — 85306 CLOT INHIBIT PROT S FREE: CPT

## 2025-04-08 PROCEDURE — C1887: CPT

## 2025-04-08 PROCEDURE — C8929: CPT

## 2025-04-08 PROCEDURE — 0042T: CPT

## 2025-04-08 PROCEDURE — 71260 CT THORAX DX C+: CPT | Mod: MC

## 2025-04-08 PROCEDURE — 36415 COLL VENOUS BLD VENIPUNCTURE: CPT

## 2025-04-08 PROCEDURE — 84443 ASSAY THYROID STIM HORMONE: CPT

## 2025-04-08 PROCEDURE — 80053 COMPREHEN METABOLIC PANEL: CPT

## 2025-04-08 PROCEDURE — C9399: CPT

## 2025-04-08 PROCEDURE — 93970 EXTREMITY STUDY: CPT

## 2025-04-08 PROCEDURE — 85613 RUSSELL VIPER VENOM DILUTED: CPT

## 2025-04-08 PROCEDURE — 85027 COMPLETE CBC AUTOMATED: CPT

## 2025-04-08 PROCEDURE — 86900 BLOOD TYPING SEROLOGIC ABO: CPT

## 2025-04-08 PROCEDURE — 75635 CT ANGIO ABDOMINAL ARTERIES: CPT | Mod: MC

## 2025-04-08 PROCEDURE — 97163 PT EVAL HIGH COMPLEX 45 MIN: CPT

## 2025-04-08 PROCEDURE — 83036 HEMOGLOBIN GLYCOSYLATED A1C: CPT

## 2025-04-08 PROCEDURE — 97535 SELF CARE MNGMENT TRAINING: CPT

## 2025-04-08 PROCEDURE — 70498 CT ANGIOGRAPHY NECK: CPT | Mod: MC

## 2025-04-08 PROCEDURE — 80202 ASSAY OF VANCOMYCIN: CPT

## 2025-04-08 RX ORDER — ATORVASTATIN CALCIUM 80 MG/1
10 TABLET, FILM COATED ORAL AT BEDTIME
Refills: 0 | Status: DISCONTINUED | OUTPATIENT
Start: 2025-04-08 | End: 2025-04-23

## 2025-04-08 RX ORDER — POLYETHYLENE GLYCOL 3350 17 G/17G
17 POWDER, FOR SOLUTION ORAL
Qty: 0 | Refills: 0 | DISCHARGE
Start: 2025-04-08

## 2025-04-08 RX ORDER — CEFEPIME 2 G/20ML
2000 INJECTION, POWDER, FOR SOLUTION INTRAVENOUS EVERY 8 HOURS
Refills: 0 | Status: COMPLETED | OUTPATIENT
Start: 2025-04-08 | End: 2025-04-10

## 2025-04-08 RX ORDER — CEFEPIME 2 G/20ML
2000 INJECTION, POWDER, FOR SOLUTION INTRAVENOUS EVERY 8 HOURS
Refills: 0 | Status: DISCONTINUED | OUTPATIENT
Start: 2025-04-08 | End: 2025-04-08

## 2025-04-08 RX ORDER — APIXABAN 2.5 MG/1
1 TABLET, FILM COATED ORAL
Qty: 0 | Refills: 0 | DISCHARGE
Start: 2025-04-08

## 2025-04-08 RX ORDER — TAMSULOSIN HYDROCHLORIDE 0.4 MG/1
0.4 CAPSULE ORAL AT BEDTIME
Refills: 0 | Status: DISCONTINUED | OUTPATIENT
Start: 2025-04-08 | End: 2025-04-11

## 2025-04-08 RX ORDER — SENNA 187 MG
2 TABLET ORAL
Qty: 0 | Refills: 0 | DISCHARGE
Start: 2025-04-08

## 2025-04-08 RX ORDER — B1/B2/B3/B5/B6/B12/VIT C/FOLIC 500-0.5 MG
1 TABLET ORAL DAILY
Refills: 0 | Status: DISCONTINUED | OUTPATIENT
Start: 2025-04-08 | End: 2025-04-08

## 2025-04-08 RX ORDER — METOPROLOL SUCCINATE 50 MG/1
50 TABLET, EXTENDED RELEASE ORAL THREE TIMES A DAY
Refills: 0 | Status: DISCONTINUED | OUTPATIENT
Start: 2025-04-08 | End: 2025-04-08

## 2025-04-08 RX ORDER — CEFEPIME 2 G/20ML
2 INJECTION, POWDER, FOR SOLUTION INTRAVENOUS
Qty: 0 | Refills: 0 | DISCHARGE
Start: 2025-04-08 | End: 2025-04-10

## 2025-04-08 RX ORDER — B1/B2/B3/B5/B6/B12/VIT C/FOLIC 500-0.5 MG
1 TABLET ORAL
Qty: 0 | Refills: 0 | DISCHARGE
Start: 2025-04-08

## 2025-04-08 RX ORDER — APIXABAN 2.5 MG/1
5 TABLET, FILM COATED ORAL EVERY 12 HOURS
Refills: 0 | Status: DISCONTINUED | OUTPATIENT
Start: 2025-04-08 | End: 2025-04-23

## 2025-04-08 RX ORDER — PREDNISONE 20 MG/1
5 TABLET ORAL
Refills: 0 | Status: DISCONTINUED | OUTPATIENT
Start: 2025-04-08 | End: 2025-04-23

## 2025-04-08 RX ORDER — POTASSIUM PHOSPHATE, MONOBASIC POTASSIUM PHOSPHATE, DIBASIC INJECTION, 236; 224 MG/ML; MG/ML
15 SOLUTION, CONCENTRATE INTRAVENOUS ONCE
Refills: 0 | Status: COMPLETED | OUTPATIENT
Start: 2025-04-08 | End: 2025-04-08

## 2025-04-08 RX ORDER — PREDNISONE 20 MG/1
2 TABLET ORAL
Refills: 0 | Status: DISCONTINUED | OUTPATIENT
Start: 2025-04-08 | End: 2025-04-23

## 2025-04-08 RX ORDER — B1/B2/B3/B5/B6/B12/VIT C/FOLIC 500-0.5 MG
1 TABLET ORAL DAILY
Refills: 0 | Status: DISCONTINUED | OUTPATIENT
Start: 2025-04-09 | End: 2025-04-23

## 2025-04-08 RX ORDER — TAMSULOSIN HYDROCHLORIDE 0.4 MG/1
1 CAPSULE ORAL
Qty: 0 | Refills: 0 | DISCHARGE
Start: 2025-04-08

## 2025-04-08 RX ORDER — ACETAMINOPHEN 500 MG/5ML
650 LIQUID (ML) ORAL EVERY 6 HOURS
Refills: 0 | Status: DISCONTINUED | OUTPATIENT
Start: 2025-04-08 | End: 2025-04-23

## 2025-04-08 RX ORDER — METOPROLOL SUCCINATE 50 MG/1
1 TABLET, EXTENDED RELEASE ORAL
Qty: 0 | Refills: 0 | DISCHARGE
Start: 2025-04-08

## 2025-04-08 RX ORDER — SENNA 187 MG
2 TABLET ORAL AT BEDTIME
Refills: 0 | Status: DISCONTINUED | OUTPATIENT
Start: 2025-04-08 | End: 2025-04-23

## 2025-04-08 RX ORDER — METOPROLOL SUCCINATE 50 MG/1
50 TABLET, EXTENDED RELEASE ORAL THREE TIMES A DAY
Refills: 0 | Status: DISCONTINUED | OUTPATIENT
Start: 2025-04-08 | End: 2025-04-18

## 2025-04-08 RX ORDER — POLYETHYLENE GLYCOL 3350 17 G/17G
17 POWDER, FOR SOLUTION ORAL DAILY
Refills: 0 | Status: DISCONTINUED | OUTPATIENT
Start: 2025-04-09 | End: 2025-04-23

## 2025-04-08 RX ADMIN — CEFEPIME 100 MILLIGRAM(S): 2 INJECTION, POWDER, FOR SOLUTION INTRAVENOUS at 17:26

## 2025-04-08 RX ADMIN — Medication 40 MILLIEQUIVALENT(S): at 09:31

## 2025-04-08 RX ADMIN — PREDNISONE 2 MILLIGRAM(S): 20 TABLET ORAL at 17:28

## 2025-04-08 RX ADMIN — METOPROLOL SUCCINATE 50 MILLIGRAM(S): 50 TABLET, EXTENDED RELEASE ORAL at 21:55

## 2025-04-08 RX ADMIN — METOPROLOL SUCCINATE 50 MILLIGRAM(S): 50 TABLET, EXTENDED RELEASE ORAL at 05:44

## 2025-04-08 RX ADMIN — LEVALBUTEROL HYDROCHLORIDE 0.63 MILLIGRAM(S): 1.25 SOLUTION RESPIRATORY (INHALATION) at 03:56

## 2025-04-08 RX ADMIN — APIXABAN 5 MILLIGRAM(S): 2.5 TABLET, FILM COATED ORAL at 17:28

## 2025-04-08 RX ADMIN — TAMSULOSIN HYDROCHLORIDE 0.4 MILLIGRAM(S): 0.4 CAPSULE ORAL at 21:54

## 2025-04-08 RX ADMIN — Medication 1 APPLICATION(S): at 05:44

## 2025-04-08 RX ADMIN — CEFEPIME 2000 MILLIGRAM(S): 2 INJECTION, POWDER, FOR SOLUTION INTRAVENOUS at 05:44

## 2025-04-08 RX ADMIN — Medication 2 TABLET(S): at 21:55

## 2025-04-08 RX ADMIN — Medication 40 MILLIGRAM(S): at 12:32

## 2025-04-08 RX ADMIN — POLYETHYLENE GLYCOL 3350 17 GRAM(S): 17 POWDER, FOR SOLUTION ORAL at 12:32

## 2025-04-08 RX ADMIN — ATORVASTATIN CALCIUM 10 MILLIGRAM(S): 80 TABLET, FILM COATED ORAL at 21:55

## 2025-04-08 RX ADMIN — APIXABAN 5 MILLIGRAM(S): 2.5 TABLET, FILM COATED ORAL at 05:44

## 2025-04-08 RX ADMIN — POTASSIUM PHOSPHATE, MONOBASIC POTASSIUM PHOSPHATE, DIBASIC INJECTION, 62.5 MILLIMOLE(S): 236; 224 SOLUTION, CONCENTRATE INTRAVENOUS at 09:30

## 2025-04-08 RX ADMIN — CEFEPIME 100 MILLIGRAM(S): 2 INJECTION, POWDER, FOR SOLUTION INTRAVENOUS at 21:56

## 2025-04-08 RX ADMIN — Medication 500 MILLILITER(S): at 09:38

## 2025-04-08 RX ADMIN — Medication 1 TABLET(S): at 12:32

## 2025-04-08 NOTE — PROGRESS NOTE ADULT - SUBJECTIVE AND OBJECTIVE BOX
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  am morning prior.  Upon arrival to  ED she was noted to have lt sided facial droop; aphasic; left arm weakness; no TNK as out of window/recent infarction.  code stroke activated;  CT imagining found patient with new rt mca; patient transferred to [facility] for thrombectomy. Pt transferred to [facility] EDUARDO eval, s/p cerebral angiogram for mechanical thrombectomy of Rt M2 occlusion, TICI 3 (one pass). She was admitted to NSICU for close monitoring post thrombectomy.Hospital course complicated by right hemiparesis and aphasia on 3/29/25. CTH negative for acute hemorrhage, CTA h/n + new Left MCA M2 occulusion and subsequently underwent 3rd thrombectomy on 3/29.     4/4: noted with hypoxia, hypotension and increased lethargy. Concern for sepsis 2/2 ?aspiration pna started on Cefepime + vanco     SUBJECTIVE: No events overnight.  No new neurologic complaints. Unable to obtain complete ROS secondary to aphasia    albuterol/ipratropium for Nebulization 3 milliLiter(s) Nebulizer every 6 hours  bisacodyl Suppository 10 milliGRAM(s) Rectal daily PRN  cefepime  Injectable. 2000 milliGRAM(s) IV Push every 8 hours  chlorhexidine 2% Cloths 1 Application(s) Topical daily  dextrose 5%. 1000 milliLiter(s) IV Continuous <Continuous>  dextrose 5%. 1000 milliLiter(s) IV Continuous <Continuous>  dextrose 50% Injectable 25 Gram(s) IV Push once  dextrose 50% Injectable 12.5 Gram(s) IV Push once  dextrose 50% Injectable 25 Gram(s) IV Push once  dextrose Oral Gel 15 Gram(s) Oral once PRN  enoxaparin Injectable 63 milliGRAM(s) SubCutaneous every 12 hours  glucagon  Injectable 1 milliGRAM(s) IntraMuscular once  hydrocortisone sodium succinate Injectable 100 milliGRAM(s) IV Push two times a day  insulin lispro (ADMELOG) corrective regimen sliding scale   SubCutaneous every 6 hours  metoprolol tartrate Injectable 5 milliGRAM(s) IV Push every 6 hours  pantoprazole  Injectable 40 milliGRAM(s) IV Push daily  vancomycin  IVPB 1000 milliGRAM(s) IV Intermittent every 12 hours      PHYSICAL EXAM:   Vital Signs Last 24 Hrs  T(C): 36.5 (08 Apr 2025 04:02), Max: 36.9 (07 Apr 2025 15:42)  T(F): 97.7 (08 Apr 2025 04:02), Max: 98.4 (07 Apr 2025 15:42)  HR: 113 (08 Apr 2025 06:00) (86 - 113)  BP: 151/82 (08 Apr 2025 06:00) (110/97 - 151/82)  BP(mean): 98 (08 Apr 2025 06:00) (81 - 124)  RR: 18 (08 Apr 2025 06:00) (16 - 18)  SpO2: 93% (08 Apr 2025 06:00) (92% - 100%)    Parameters below as of 08 Apr 2025 06:00  Patient On (Oxygen Delivery Method): room air    PENDING EXAM    General: No acute distress.   HEENT: Head normocephalic, atraumatic. Conjunctivae clear w/o exudates or hemorrhage.    Cardiac: irregular rate and rhythm.    Respiratory: Lung sounds clear in all fields.    Abdominal: Soft, nondistended, nontender.   Skin: + RUE swelling and warm to touch. Skin is warm, dry     Extremities: No edema    Detailed Neurologic Exam:  Mental status: Awake, alert, oriented x3 w/ choices; expressive aphasia w/ word finding difficulty    Cranial nerves: slight right facial palsy, mild to moderate dysarthria, Pupils equal and react symmetrically to light. There is no visual field deficit to confrontation. Extraocular motion is full with no nystagmus.    Motor: There is normal bulk and tone.  There is no tremor.  Strength is 4+/5 proximal 4/5 distal in the right arm and 5/5 right leg. Drifts.   Strength is 5/5 in the left arm and 4/5 left leg.  Sensation: Intact to light touch and pin in 4 extremities  Cerebellar: There is no dysmetria on finger to nose testing.                                                                                                    Gait : deferred    LABS:                        10.2   8.06  )-----------( 135      ( 06 Apr 2025 07:40 )             30.9   04-06    138  |  103  |  30.5[H]  ----------------------------<  133[H]  3.5   |  23.0  |  0.50    Ca    8.1[L]      06 Apr 2025 07:40  Phos  2.3     04-06  Mg     2.1     04-06    TPro  5.1[L]  /  Alb  2.8[L]  /  TBili  0.7  /  DBili  x   /  AST  12  /  ALT  14  /  AlkPhos  37[L]  04-06       LDL: 59  A1C: 5.5    IMAGING: Neuro-Imaging  EEG  Abnormal EEG study.  Mild nonspecific diffuse or multifocal cerebral dysfunction.   No epileptiform pattern or seizure seen.    CT Head No Cont (04.04.25 @ 10:00)   IMPRESSION: Stable subacute bilateral infarctions, most pronounced in   left basal ganglia. Trace left frontal lobe petechial hemorrhage versus   spared cortex. Continued follow-up is recommended.    CT Head No Cont (04.02.25 @ 09:41)   IMPRESSION: Subacute infarcts are again seen bilaterally as described   above    Acute infarct is now seen involving the left basal ganglia region.    MR Head w/wo IV Cont (03.31.25 @ 10:09)   IMPRESSION:  New acute infarctions within the LEFT lateral frontal   cortex, LEFT occipital cortex, RIGHT insular cortex and scattered RIGHT   frontal, parietal, temporal and occipital cortex suggesting embolic   etiology. Subacute infarction within the LEFT basal ganglia. Mild   periventricular chronic white matter ischemia.    IR Neuro (03.29.25 @ 09:10)   Supervision and Interpretation:  1. Interval recanalization of the left MCA M2 occlusion seen byCT   angiography with persistent posterior frontal distal M4 branch occlusion.   Attempted thrombectomy was unsuccessful because the branch was too small   to be accessed by the red 43 thrombectomy catheter.    CT Head No Cont (03.29.25 @ 20:56)   IMPRESSION: No acute intracranial hemorrhage, mass effect, or shift of   the midline structures.  Evolving acute left frontal lobe superior division MCA infarct without   hemorrhagic transformation.    CT Head No Cont (03.29.25 @ 06:54)   Patchy hypoattenuation related to evolving acute/subacute bilateral MCA   territory infarcts. No intracranial hemorrhage or midline shift present.    CT Angio Head/Neck Stroke Protocol w/ IV Cont, CT Perfusion (03.29.25 @ 06:56)   IMPRESSION:  CT PERFUSION:  Signal abnormality on T-Max/mean transit time data sets at the left   frontal lobe suggest territory-and-risk within the left MCA distribution,   corresponding to angiographic findings below. Cerebral blood flow and   cerebral blood volume data sets remain within normal limits.  Neurointerventional/neurosurgical consultation recommended. MRI may be   considered for further characterization.  CTA NECK:  No evidence of significant stenosis or occlusion.  Previously identified narrowing of the right cervical ICA with   questionable arterial dissection appears less conspicuous on the current   exam, suggesting at least partial recanalization. MRA (dissection   protocol) considered for further evaluation.  CTA HEAD:  Acute left M2 occlusion identified.  Critical findings above were discussed directly by telephone with   ordering provider, Aric PINEDA, on 3/29/2025 at 7:11 AM by Dr. Louie Worthy with read back confirmation.    CT Head No Cont (03.27.25 @ 05:43)  IMPRESSION: Age-appropriate involutional and ischemic gliotic changes. No   hemorrhage. No significant change since 3/26/2025.    CT Head No Cont (03.27.25 @ 05:43)   IMPRESSION: Age-appropriate involutional and ischemic gliotic changes. No   hemorrhage. No significant change since 3/26/2025.    CT Angio Head/Neck Stroke Protocol w/ IV Cont (03.26.25 @ 08:52)   1. RIGHT CAROTID SYSTEM:    Atherosclerotic plaque carotid bulb without    hemodynamically significant stenosis. Tandem lesion distal internal   carotid artery at the C1 level has developed since 3/21/2025, possible   interval arterial dissection, with associated luminal narrowing   approximately 30%  2.   LEFT CAROTID SYSTEM:     Atherosclerotic plaque carotid bulb without    hemodynamically significant stenosis.  3.   VERTEBRAL CIRCULATION:    Patent.  4.  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 Right MCA occlusion   in its proximal M2 segment is an interval finding since 3/21/2025. Left   MCA remains patent with luminal irregularity and low-grade narrowing   following recent thrombectomy.  5.  POSTERIOR INTRACRANIAL CIRCULATION:    Patent.  6. BRAIN PERFUSION:   No acute infarction of the brain is convincingly   demonstrated.  However, broad region of apparent ischemia corresponds to   the right MCA distribution correlating with acute embolic occlusion on CT   angiography  7. Heterogeneous enhancement within the principal dural sinuses may be   secondary to the early phase of venous opacification on this arterial   phase examination. The appearance is similar to 3/21/2025. Consider   supplemental evaluation with MR venogram  --  CT   CT Angio Chest PE Protocol w/ IV Cont (04.04.25 @ 10:02)   IMPRESSION: No pulmonary embolus is noted.  ---  ULTRASOUND   US Duplex Venous Upper Ext Ltd, Right (04.06.25 @ 21:36)   IMPRESSION:    No obvious deep vein thrombosis in the right upper extremity.    US Pseudoaneurysm Injection (03.28.25 @ 09:41)   Successful thrombin and ultrasound-guided compression of the right groin   pseudoaneurysm.    US Duplex Arterial Lower Ext Ltd, Right (03.27.25 @ 04:09)   IMPRESSION:  A 1.7 cm right CFA pseudoaneurysm.  A 2.5 cm adjacent hematoma.  --  CARDIOLOGY   TTE W or WO Ultrasound Enhancing Agent (03.26.25 @ 18:15)    1. Technically difficult image quality.   2. Left ventricular cavity is normal in size. Left ventricular systolic function is hyperdynamic with an ejectionfraction visually estimated at 65 to 70 %.   3. Mild left ventricular hypertrophy.   4. Normal right ventricular cavity size and normal right ventricular systolic function.   5. Left atrium is severely dilated.   6. The right atrium is severely dilated.       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  am morning prior.  Upon arrival to  ED she was noted to have lt sided facial droop; aphasic; left arm weakness; no TNK as out of window/recent infarction.  code stroke activated;  CT imagining found patient with new rt mca; patient transferred to [facility] for thrombectomy. Pt transferred to [facility] EDUARDO eval, s/p cerebral angiogram for mechanical thrombectomy of Rt M2 occlusion, TICI 3 (one pass). She was admitted to NSICU for close monitoring post thrombectomy. Hospital course complicated by right hemiparesis and aphasia on 3/29/25. CTH negative for acute hemorrhage, CTA h/n + new Left MCA M2 occulusion and subsequently underwent 3rd thrombectomy on 3/29.     4/4: noted with hypoxia, hypotension and increased lethargy. Concern for sepsis 2/2 ?aspiration pna started on Cefepime + vanco     SUBJECTIVE: No events overnight.  No new neurologic complaints. Unable to obtain complete ROS secondary to aphasia    albuterol/ipratropium for Nebulization 3 milliLiter(s) Nebulizer every 6 hours  bisacodyl Suppository 10 milliGRAM(s) Rectal daily PRN  cefepime  Injectable. 2000 milliGRAM(s) IV Push every 8 hours  chlorhexidine 2% Cloths 1 Application(s) Topical daily  dextrose 5%. 1000 milliLiter(s) IV Continuous <Continuous>  dextrose 5%. 1000 milliLiter(s) IV Continuous <Continuous>  dextrose 50% Injectable 25 Gram(s) IV Push once  dextrose 50% Injectable 12.5 Gram(s) IV Push once  dextrose 50% Injectable 25 Gram(s) IV Push once  dextrose Oral Gel 15 Gram(s) Oral once PRN  enoxaparin Injectable 63 milliGRAM(s) SubCutaneous every 12 hours  glucagon  Injectable 1 milliGRAM(s) IntraMuscular once  hydrocortisone sodium succinate Injectable 100 milliGRAM(s) IV Push two times a day  insulin lispro (ADMELOG) corrective regimen sliding scale   SubCutaneous every 6 hours  metoprolol tartrate Injectable 5 milliGRAM(s) IV Push every 6 hours  pantoprazole  Injectable 40 milliGRAM(s) IV Push daily  vancomycin  IVPB 1000 milliGRAM(s) IV Intermittent every 12 hours      PHYSICAL EXAM:   Vital Signs Last 24 Hrs  T(C): 36.5 (08 Apr 2025 04:02), Max: 36.9 (07 Apr 2025 15:42)  T(F): 97.7 (08 Apr 2025 04:02), Max: 98.4 (07 Apr 2025 15:42)  HR: 113 (08 Apr 2025 06:00) (86 - 113)  BP: 151/82 (08 Apr 2025 06:00) (110/97 - 151/82)  BP(mean): 98 (08 Apr 2025 06:00) (81 - 124)  RR: 18 (08 Apr 2025 06:00) (16 - 18)  SpO2: 93% (08 Apr 2025 06:00) (92% - 100%)    Parameters below as of 08 Apr 2025 06:00  Patient On (Oxygen Delivery Method): room air    General: No acute distress.   HEENT: Head normocephalic, atraumatic. Conjunctivae clear w/o exudates or hemorrhage.    Cardiac: irregular rate and rhythm.    Respiratory: Lung sounds clear in all fields.    Abdominal: Soft, nondistended, nontender.   Skin: + RUE swelling and warm to touch. Skin is warm, dry     Extremities: No edema    Detailed Neurologic Exam:  Mental status: Awake, alert, oriented x3 w/ choices; expressive aphasia w/ word finding difficulty    Cranial nerves: slight right facial palsy, mild to moderate dysarthria, aphasia fluctuating. Pupils equal and react symmetrically to light. There is no visual field deficit to confrontation. Extraocular motion is full with no nystagmus.    Motor: There is normal bulk and tone.  There is no tremor.  Strength is 4+/5 proximal 4/5 distal in the right arm and 5/5 right leg. Drifts.   Strength is 5/5 in the left arm and 4/5 left leg.  Sensation: Intact to light touch and pin in 4 extremities  Cerebellar: There is no dysmetria on finger to nose testing.                                                                                                    Gait : deferred    LABS:                        10.2   8.06  )-----------( 135      ( 06 Apr 2025 07:40 )             30.9   04-06    138  |  103  |  30.5[H]  ----------------------------<  133[H]  3.5   |  23.0  |  0.50    Ca    8.1[L]      06 Apr 2025 07:40  Phos  2.3     04-06  Mg     2.1     04-06    TPro  5.1[L]  /  Alb  2.8[L]  /  TBili  0.7  /  DBili  x   /  AST  12  /  ALT  14  /  AlkPhos  37[L]  04-06       LDL: 59  A1C: 5.5    IMAGING: Neuro-Imaging  EEG  Abnormal EEG study.  Mild nonspecific diffuse or multifocal cerebral dysfunction.   No epileptiform pattern or seizure seen.    CT Head No Cont (04.04.25 @ 10:00)   IMPRESSION: Stable subacute bilateral infarctions, most pronounced in   left basal ganglia. Trace left frontal lobe petechial hemorrhage versus   spared cortex. Continued follow-up is recommended.    CT Head No Cont (04.02.25 @ 09:41)   IMPRESSION: Subacute infarcts are again seen bilaterally as described   above    Acute infarct is now seen involving the left basal ganglia region.    MR Head w/wo IV Cont (03.31.25 @ 10:09)   IMPRESSION:  New acute infarctions within the LEFT lateral frontal   cortex, LEFT occipital cortex, RIGHT insular cortex and scattered RIGHT   frontal, parietal, temporal and occipital cortex suggesting embolic   etiology. Subacute infarction within the LEFT basal ganglia. Mild   periventricular chronic white matter ischemia.    IR Neuro (03.29.25 @ 09:10)   Supervision and Interpretation:  1. Interval recanalization of the left MCA M2 occlusion seen byCT   angiography with persistent posterior frontal distal M4 branch occlusion.   Attempted thrombectomy was unsuccessful because the branch was too small   to be accessed by the red 43 thrombectomy catheter.    CT Head No Cont (03.29.25 @ 20:56)   IMPRESSION: No acute intracranial hemorrhage, mass effect, or shift of   the midline structures.  Evolving acute left frontal lobe superior division MCA infarct without   hemorrhagic transformation.    CT Head No Cont (03.29.25 @ 06:54)   Patchy hypoattenuation related to evolving acute/subacute bilateral MCA   territory infarcts. No intracranial hemorrhage or midline shift present.    CT Angio Head/Neck Stroke Protocol w/ IV Cont, CT Perfusion (03.29.25 @ 06:56)   IMPRESSION:  CT PERFUSION:  Signal abnormality on T-Max/mean transit time data sets at the left   frontal lobe suggest territory-and-risk within the left MCA distribution,   corresponding to angiographic findings below. Cerebral blood flow and   cerebral blood volume data sets remain within normal limits.  Neurointerventional/neurosurgical consultation recommended. MRI may be   considered for further characterization.  CTA NECK:  No evidence of significant stenosis or occlusion.  Previously identified narrowing of the right cervical ICA with   questionable arterial dissection appears less conspicuous on the current   exam, suggesting at least partial recanalization. MRA (dissection   protocol) considered for further evaluation.  CTA HEAD:  Acute left M2 occlusion identified.  Critical findings above were discussed directly by telephone with   ordering provider, Aric PINEDA, on 3/29/2025 at 7:11 AM by Dr. Louie Worthy with read back confirmation.    CT Head No Cont (03.27.25 @ 05:43)  IMPRESSION: Age-appropriate involutional and ischemic gliotic changes. No   hemorrhage. No significant change since 3/26/2025.    CT Head No Cont (03.27.25 @ 05:43)   IMPRESSION: Age-appropriate involutional and ischemic gliotic changes. No   hemorrhage. No significant change since 3/26/2025.    CT Angio Head/Neck Stroke Protocol w/ IV Cont (03.26.25 @ 08:52)   1. RIGHT CAROTID SYSTEM:    Atherosclerotic plaque carotid bulb without    hemodynamically significant stenosis. Tandem lesion distal internal   carotid artery at the C1 level has developed since 3/21/2025, possible   interval arterial dissection, with associated luminal narrowing   approximately 30%  2.   LEFT CAROTID SYSTEM:     Atherosclerotic plaque carotid bulb without    hemodynamically significant stenosis.  3.   VERTEBRAL CIRCULATION:    Patent.  4.  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 Right MCA occlusion   in its proximal M2 segment is an interval finding since 3/21/2025. Left   MCA remains patent with luminal irregularity and low-grade narrowing   following recent thrombectomy.  5.  POSTERIOR INTRACRANIAL CIRCULATION:    Patent.  6. BRAIN PERFUSION:   No acute infarction of the brain is convincingly   demonstrated.  However, broad region of apparent ischemia corresponds to   the right MCA distribution correlating with acute embolic occlusion on CT   angiography  7. Heterogeneous enhancement within the principal dural sinuses may be   secondary to the early phase of venous opacification on this arterial   phase examination. The appearance is similar to 3/21/2025. Consider   supplemental evaluation with MR venogram  --  CT   CT Angio Chest PE Protocol w/ IV Cont (04.04.25 @ 10:02)   IMPRESSION: No pulmonary embolus is noted.  ---  ULTRASOUND   US Duplex Venous Upper Ext Ltd, Right (04.06.25 @ 21:36)   IMPRESSION:    No obvious deep vein thrombosis in the right upper extremity.    US Pseudoaneurysm Injection (03.28.25 @ 09:41)   Successful thrombin and ultrasound-guided compression of the right groin   pseudoaneurysm.    US Duplex Arterial Lower Ext Ltd, Right (03.27.25 @ 04:09)   IMPRESSION:  A 1.7 cm right CFA pseudoaneurysm.  A 2.5 cm adjacent hematoma.  --  CARDIOLOGY   TTE W or WO Ultrasound Enhancing Agent (03.26.25 @ 18:15)    1. Technically difficult image quality.   2. Left ventricular cavity is normal in size. Left ventricular systolic function is hyperdynamic with an ejectionfraction visually estimated at 65 to 70 %.   3. Mild left ventricular hypertrophy.   4. Normal right ventricular cavity size and normal right ventricular systolic function.   5. Left atrium is severely dilated.   6. The right atrium is severely dilated.         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  am morning prior.  Upon arrival to  ED she was noted to have lt sided facial droop; aphasic; left arm weakness; no TNK as out of window/recent infarction.  code stroke activated;  CT imagining found patient with new rt mca; patient transferred to [facility] for thrombectomy. Pt transferred to [facility] EDUARDO eval, s/p cerebral angiogram for mechanical thrombectomy of Rt M2 occlusion, TICI 3 (one pass). She was admitted to NSICU for close monitoring post thrombectomy. Hospital course complicated by right hemiparesis and aphasia on 3/29/25. CTH negative for acute hemorrhage, CTA h/n + new Left MCA M2 occulusion and subsequently underwent 3rd thrombectomy on 3/29.     4/4: noted with hypoxia, hypotension and increased lethargy. Concern for sepsis 2/2 ?aspiration pna started on Cefepime + vanco     SUBJECTIVE: No events overnight.  No new neurologic complaints. Unable to obtain complete ROS secondary to aphasia    albuterol/ipratropium for Nebulization 3 milliLiter(s) Nebulizer every 6 hours  bisacodyl Suppository 10 milliGRAM(s) Rectal daily PRN  cefepime  Injectable. 2000 milliGRAM(s) IV Push every 8 hours  chlorhexidine 2% Cloths 1 Application(s) Topical daily  dextrose 5%. 1000 milliLiter(s) IV Continuous <Continuous>  dextrose 5%. 1000 milliLiter(s) IV Continuous <Continuous>  dextrose 50% Injectable 25 Gram(s) IV Push once  dextrose 50% Injectable 12.5 Gram(s) IV Push once  dextrose 50% Injectable 25 Gram(s) IV Push once  dextrose Oral Gel 15 Gram(s) Oral once PRN  enoxaparin Injectable 63 milliGRAM(s) SubCutaneous every 12 hours  glucagon  Injectable 1 milliGRAM(s) IntraMuscular once  hydrocortisone sodium succinate Injectable 100 milliGRAM(s) IV Push two times a day  insulin lispro (ADMELOG) corrective regimen sliding scale   SubCutaneous every 6 hours  metoprolol tartrate Injectable 5 milliGRAM(s) IV Push every 6 hours  pantoprazole  Injectable 40 milliGRAM(s) IV Push daily  vancomycin  IVPB 1000 milliGRAM(s) IV Intermittent every 12 hours      PHYSICAL EXAM:   Vital Signs Last 24 Hrs  T(C): 36.5 (08 Apr 2025 04:02), Max: 36.9 (07 Apr 2025 15:42)  T(F): 97.7 (08 Apr 2025 04:02), Max: 98.4 (07 Apr 2025 15:42)  HR: 113 (08 Apr 2025 06:00) (86 - 113)  BP: 151/82 (08 Apr 2025 06:00) (110/97 - 151/82)  BP(mean): 98 (08 Apr 2025 06:00) (81 - 124)  RR: 18 (08 Apr 2025 06:00) (16 - 18)  SpO2: 93% (08 Apr 2025 06:00) (92% - 100%)    Parameters below as of 08 Apr 2025 06:00  Patient On (Oxygen Delivery Method): room air    General: No acute distress.   HEENT: Head normocephalic, atraumatic. Conjunctivae clear w/o exudates or hemorrhage.    Cardiac: irregular rate and rhythm.    Respiratory: Lung sounds clear in all fields.    Abdominal: Soft, nondistended, nontender.   Skin: + RUE swelling and warm to touch. Skin is warm, dry     Extremities: No edema    Detailed Neurologic Exam:  Mental status: Awake, alert, oriented x3 w/ choices; expressive aphasia w/ word finding difficulty    Cranial nerves: slight right facial palsy, mild to moderate dysarthria, aphasia fluctuating. Pupils equal and react symmetrically to light. There is no visual field deficit to confrontation. Extraocular motion is full with no nystagmus.    Motor: There is normal bulk and tone.  There is no tremor.  Strength is 4+/5 proximal 4/5 distal in the right arm and 5/5 right leg. Drifts.   Strength is 5/5 in the left arm and 4/5 left leg.  Sensation: Intact to light touch and pin in 4 extremities  Cerebellar: There is no dysmetria on finger to nose testing.                                                                                                    Gait : deferred    LABS:                        10.2   8.06  )-----------( 135      ( 06 Apr 2025 07:40 )             30.9   04-06    138  |  103  |  30.5[H]  ----------------------------<  133[H]  3.5   |  23.0  |  0.50    Ca    8.1[L]      06 Apr 2025 07:40  Phos  2.3     04-06  Mg     2.1     04-06    TPro  5.1[L]  /  Alb  2.8[L]  /  TBili  0.7  /  DBili  x   /  AST  12  /  ALT  14  /  AlkPhos  37[L]  04-06       LDL: 59  A1C: 5.5    IMAGING: Neuro-Imaging  EEG  Abnormal EEG study.  Mild nonspecific diffuse or multifocal cerebral dysfunction.   No epileptiform pattern or seizure seen.    CT Head No Cont (04.04.25 @ 10:00)   IMPRESSION: Stable subacute bilateral infarctions, most pronounced in   left basal ganglia. Trace left frontal lobe petechial hemorrhage versus   spared cortex. Continued follow-up is recommended.    CT Head No Cont (04.02.25 @ 09:41)   IMPRESSION: Subacute infarcts are again seen bilaterally as described   above    Acute infarct is now seen involving the left basal ganglia region.    MR Head w/wo IV Cont (03.31.25 @ 10:09)   IMPRESSION:  New acute infarctions within the LEFT lateral frontal   cortex, LEFT occipital cortex, RIGHT insular cortex and scattered RIGHT   frontal, parietal, temporal and occipital cortex suggesting embolic   etiology. Subacute infarction within the LEFT basal ganglia. Mild   periventricular chronic white matter ischemia.    IR Neuro (03.29.25 @ 09:10)   Supervision and Interpretation:  1. Interval recanalization of the left MCA M2 occlusion seen byCT   angiography with persistent posterior frontal distal M4 branch occlusion.   Attempted thrombectomy was unsuccessful because the branch was too small   to be accessed by the red 43 thrombectomy catheter.    CT Head No Cont (03.29.25 @ 20:56)   IMPRESSION: No acute intracranial hemorrhage, mass effect, or shift of   the midline structures.  Evolving acute left frontal lobe superior division MCA infarct without   hemorrhagic transformation.    CT Head No Cont (03.29.25 @ 06:54)   Patchy hypoattenuation related to evolving acute/subacute bilateral MCA   territory infarcts. No intracranial hemorrhage or midline shift present.    CT Angio Head/Neck Stroke Protocol w/ IV Cont, CT Perfusion (03.29.25 @ 06:56)   IMPRESSION:  CT PERFUSION:  Signal abnormality on T-Max/mean transit time data sets at the left   frontal lobe suggest territory-and-risk within the left MCA distribution,   corresponding to angiographic findings below. Cerebral blood flow and   cerebral blood volume data sets remain within normal limits.  Neurointerventional/neurosurgical consultation recommended. MRI may be   considered for further characterization.  CTA NECK:  No evidence of significant stenosis or occlusion.  Previously identified narrowing of the right cervical ICA with   questionable arterial dissection appears less conspicuous on the current   exam, suggesting at least partial recanalization. MRA (dissection   protocol) considered for further evaluation.  CTA HEAD:  Acute left M2 occlusion identified.  Critical findings above were discussed directly by telephone with   ordering provider, Aric PINEDA, on 3/29/2025 at 7:11 AM by Dr. Louie Worthy with read back confirmation.    CT Head No Cont (03.27.25 @ 05:43)  IMPRESSION: Age-appropriate involutional and ischemic gliotic changes. No   hemorrhage. No significant change since 3/26/2025.    CT Head No Cont (03.27.25 @ 05:43)   IMPRESSION: Age-appropriate involutional and ischemic gliotic changes. No   hemorrhage. No significant change since 3/26/2025.    CT Angio Head/Neck Stroke Protocol w/ IV Cont (03.26.25 @ 08:52)   1. RIGHT CAROTID SYSTEM:    Atherosclerotic plaque carotid bulb without    hemodynamically significant stenosis. Tandem lesion distal internal   carotid artery at the C1 level has developed since 3/21/2025, possible   interval arterial dissection, with associated luminal narrowing   approximately 30%  2.   LEFT CAROTID SYSTEM:     Atherosclerotic plaque carotid bulb without    hemodynamically significant stenosis.  3.   VERTEBRAL CIRCULATION:    Patent.  4.  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 Right MCA occlusion   in its proximal M2 segment is an interval finding since 3/21/2025. Left   MCA remains patent with luminal irregularity and low-grade narrowing   following recent thrombectomy.  5.  POSTERIOR INTRACRANIAL CIRCULATION:    Patent.  6. BRAIN PERFUSION:   No acute infarction of the brain is convincingly   demonstrated.  However, broad region of apparent ischemia corresponds to   the right MCA distribution correlating with acute embolic occlusion on CT   angiography  7. Heterogeneous enhancement within the principal dural sinuses may be   secondary to the early phase of venous opacification on this arterial   phase examination. The appearance is similar to 3/21/2025. Consider   supplemental evaluation with MR venogram  --  CT   CT Angio Chest PE Protocol w/ IV Cont (04.04.25 @ 10:02)   IMPRESSION: No pulmonary embolus is noted.  ---  ULTRASOUND   US Duplex Venous Upper Ext Ltd, Right (04.06.25 @ 21:36)   IMPRESSION:    No obvious deep vein thrombosis in the right upper extremity.    US Pseudoaneurysm Injection (03.28.25 @ 09:41)   Successful thrombin and ultrasound-guided compression of the right groin   pseudoaneurysm.    US Duplex Arterial Lower Ext Ltd, Right (03.27.25 @ 04:09)   IMPRESSION:  A 1.7 cm right CFA pseudoaneurysm.  A 2.5 cm adjacent hematoma.  --  CARDIOLOGY   TTE W or WO Ultrasound Enhancing Agent (03.26.25 @ 18:15)    1. Technically difficult image quality.   2. Left ventricular cavity is normal in size. Left ventricular systolic function is hyperdynamic with an ejectionfraction visually estimated at 65 to 70 %.   3. Mild left ventricular hypertrophy.   4. Normal right ventricular cavity size and normal right ventricular systolic function.   5. Left atrium is severely dilated.   6. The right atrium is severely dilated.

## 2025-04-08 NOTE — H&P ADULT - ASSESSMENT
Assessment/Plan:  SHARON HULL is a 88 y/o F with PMHx of Afib (off Eliquis), PPM, HTN, HLD, GERD, severe MR/TR, iatrogenic adrenal insufficiency (on prednisone), lymphedema, metastatic melanoma s/p left foot melanoma and lymph node resection, s/p RUE melanoma and right axillary lymph node excision at [facility] (3/18/25), recent admission (3/21-3/25/25) for L MCA CVA s/p M1 thrombectomy, presented to  with left sided weakness and aphasia, found to have right MCA CVA and s/p M2 thrombectomy (3/26/25).  Hospital Course complicated by right sided weakness and aphasia, found to have new Left MCA CVA with M2 occlusion and is s/p 3rd thrombectomy (3/29/25). Hospital course also complicated by pseudoaneurysm (s/p vasc surgery consult and thrombin injection x2), urinary retention (s/p rivas, failed TOV 4/2), fever of unknown origin (on IV cefepime until 4/10/25). Patient resumed Eliquis on 4/2/25. Patient now admitted for a multidisciplinary rehab program.       #  Bihemispheric embolic infarcts s/p Thrombectomy x 3  - s/p cerebral angiogram for mechanical thrombectomy of L M1 occlusion, TICI 3 (one pass) on 3/21/25  - s/p cerebral angiogram for mechanical thrombectomy of Rt M2 occlusion, TICI 3 (one pass) on 3/26/25  - s/p thrombectomy of left M4 with TICI 0 3/29/25  - Etiology likely cardioembolic in the setting of atrial fibrillation and underlying hypercoagulability from malignancy  - Aphasia, Right hemiparesis, Dysphagia  - Start comprehensive rehab program of PT/OT/SLP - 3 hours a day, 5 days a week. P&O as needed   - Precautions: Fall, Aspiration  - Eliquis 5 mg BID  - Atorvastatin 10 mg QHS (LDL 58 on 3/22)    # Atrial Fibrillation  - has PPM present  - Eliquis 5 mg BID  - Lopressor 50 mg TID    # Right femoral artery pseudoaneurysm   - s/p thrombin injection x2    # Fever of unknown origin, ?aspiration PNA  - Blood cultures 3/26, 4/4  no growth   - RVP/COVID 19 PCR 4/4 negative   - UA 4/2 with some pyuria   - Urine Cx 3/26 no growth 4/3 contaminated   - CXR 4/4 reviewed and compared to 3/29, not much change noted  - CT Chest with contrast reporting no PE, + Atelectasis   - Per ID, continue Cefepime 2000mg IV [id number] till 4/10/25    # Iatrogenic aortic insufficiency  - prednisone 5 mg in AM (8:00) and 2 mg in PM (16:00) - home regimen  - f/u outpatient endocrinology    # Metastatic Melanoma  -  metastatic melanoma s/p left foot melanoma and lymph node resection, s/p RUE melanoma and right axillary lymph node excision at [facility] (3/18/25)  - follow up with outpatient hematologist Dr Kimbrough    # Pain  - Tylenol PRN  - Avoid sedating medications that may interfere with cognitive recovery    # Mood / Cognition  - Neuropsychology consult PRN    # Sleep  - Maintain quiet hours and a low stim environment.   - Melatonin PRN to maximize participation in therapy during the day    # GI / Bowel  - Senna qHS  - Miralax PRN Daily  - GI ppx: ***    #  / Bladder  # Urinary Retention  - Continue Rivas present on admission, plan for TOV  - Failed TOV on 4/1-4/2, Rivas replaced 4/3  - Flomax 0.4 mg QHS    # Skin / Pressure injury  - Skin assessment on admission performed [  ] :   - Monitor Incisions:    - Pressure Injury/Skin: OOB to chair, PT/OT  - nursing to monitor skin qShift    # Diet/Dysphagia:  - Diet Consistency: ***  - Supplements: ***  - Aspiration Precautions  - SLP consult for swallow function evaluation and treatment  - Nutrition consult    # DVT prophylaxis:   - *****  - SCDs  - LE Doppler on ***       Outpatient Follow-up:  ** Specialty and Name of physician      Code Status/Emergency Contact:      ---------------    Goals: Safe discharge to home  Estimated Length of Stay: 10-14 days  Rehab Potential: Good  Medical Prognosis: Good  Estimated Disposition: Home with home care      PRESCREEN COMPARISON:  I have reviewed the prescreen information and I have found no relevant changes between the preadmission screening and my post admission evaluation.    RATIONALE FOR INPATIENT ADMISSION: Patient demonstrates the following:  [X] Medically appropriate for rehabilitation admission  [X] Has attainable rehab goals with an appropriate initial discharge plan  [X]Has rehabilitation potential (expected to make a significant improvement within a reasonable period of time)  [X] Requires close medical management by a rehab physician, rehab nursing care, Hospitalist and comprehensive interdisciplinary team (including PT, OT and/or SLP, Prosthetics and Orthotics)     Assessment/Plan:  SHARON HULL is a 89 y/o F with PMHx of Afib (off Eliquis), PPM, HTN, HLD, GERD, severe MR/TR, iatrogenic adrenal insufficiency (on prednisone), lymphedema, metastatic melanoma s/p left foot melanoma and lymph node resection, s/p RUE melanoma and right axillary lymph node excision at [facility] (3/18/25), recent admission (3/21-3/25/25) for L MCA CVA s/p M1 thrombectomy, presented to  with left sided weakness and aphasia, found to have right MCA CVA and s/p M2 thrombectomy (3/26/25).  Hospital Course complicated by right sided weakness and aphasia, found to have new Left MCA CVA with M2 occlusion and is s/p 3rd thrombectomy (3/29/25). Hospital course also complicated by pseudoaneurysm (s/p vasc surgery consult and thrombin injection x2), urinary retention (s/p rivas, failed TOV 4/2), fever of unknown origin (on IV cefepime until 4/10/25). Patient resumed Eliquis on 4/2/25. Patient now admitted for a multidisciplinary rehab program.       #  Bihemispheric embolic infarcts s/p Thrombectomy x 3  - s/p cerebral angiogram for mechanical thrombectomy of L M1 occlusion, TICI 3 (one pass) on 3/21/25  - s/p cerebral angiogram for mechanical thrombectomy of Rt M2 occlusion, TICI 3 (one pass) on 3/26/25  - s/p thrombectomy of left M4 with TICI 0 3/29/25  - Etiology likely cardioembolic in the setting of atrial fibrillation and underlying hypercoagulability from malignancy  - Aphasia, Right hemiparesis, Dysphagia  - Start comprehensive rehab program of PT/OT/SLP - 3 hours a day, 5 days a week. P&O as needed   - Precautions: Fall, Aspiration  - Eliquis 5 mg BID  - Atorvastatin 10 mg QHS (LDL 58 on 3/22)    # Atrial Fibrillation  - has PPM present  - Eliquis 5 mg BID  - Lopressor 50 mg TID    # Right femoral artery pseudoaneurysm   - s/p thrombin injection x2    # Fever of unknown origin, ?aspiration PNA  - Blood cultures 3/26, 4/4  no growth   - RVP/COVID 19 PCR 4/4 negative   - UA 4/2 with some pyuria   - Urine Cx 3/26 no growth 4/3 contaminated   - CXR 4/4 reviewed and compared to 3/29, not much change noted  - CT Chest with contrast reporting no PE, + Atelectasis   - Per ID, continue Cefepime 2000mg IV [id number] till 4/10/25    # Iatrogenic aortic insufficiency  - prednisone 5 mg in AM (8:00) and 2 mg in PM (16:00) - home regimen  - f/u outpatient endocrinology    # Metastatic Melanoma  - metastatic melanoma s/p left foot melanoma and lymph node resection, s/p RUE melanoma and right axillary lymph node excision at [facility] (3/18/25)  - outpatient follow up     # Incidental Pancreatic Cyst  - Incidentally found new 1.7 cm cystic lesion at the pancreatic body on CT a/p on 3/28/25  - GI informed, no inpatient workup needed  - Follow up outpatient with Dr. Maurisio Haas    # Pain  - Tylenol PRN    # Mood / Cognition  - Neuropsychology consult PRN    # Sleep  - Maintain quiet hours and a low stim environment.   - Melatonin PRN to maximize participation in therapy during the day    # GI / Bowel  - Senna qHS  - Miralax Daily  - GI ppx: Protonix 40 mg QD    #  / Bladder  # Urinary Retention  - Continue Rivas present on admission, plan for TOV  - Failed TOV on 4/1-4/2, Rivas replaced 4/3  - Flomax 0.4 mg QHS    # Skin / Pressure injury  - Skin assessment on admission performed [  ] :   - Pressure Injury/Skin: OOB to chair, PT/OT  - nursing to monitor skin qShift    # Diet/Dysphagia:  - Diet Consistency: regular with mildly thick liquids  - Supplement: MVI  - Aspiration Precautions  - SLP consult for swallow function evaluation and treatment  - Nutrition consult    # DVT prophylaxis:   - on Eliquis 5 mg Q12H      Outpatient Follow-up:  Maurisio Haas  Gastroenterology  [address] [facility], [address]  [place] [address]  Phone: [phone]  Fax: [phone]  Follow Up Time: 1 month    Celina Carlin  Hematology/Oncology  [address] [place] [address]  Phone: [phone]  Fax: [phone]  Follow Up Time: 1 month    Juan Diego Johnson  Vascular Neurology  [address] [place] [address]  Phone: [phone]  Fax: [phone]  Follow Up Time: 1 month    Antonio Gaines  Endocrinology/Metab/Diabetes  [address] [facility], [address]  [place] [address]  Phone: [phone]  Fax: [phone]  Follow Up Time: 1 month    Milo MurguiaEthel  Cardiovascular Disease  [address] [place] [address]  Phone: [phone]  Fax: [phone]  Follow Up Time: 2 weeks    Your Primary Care Provider,   Phone: (   )    -  Fax: (   )    -  Follow Up Time: 1 week    [place]  Urology  [address] [place] [address]  Phone: [phone]  Fax:   Follow Up Time: 1 month    [facility] Vascular Surgery  Vascular Surgery  [address] [place] [address]  Phone: [phone]  Fax:     Code Status/Emergency Contact:      ---------------    Goals: Safe discharge to home  Estimated Length of Stay: 10-14 days  Rehab Potential: Good  Medical Prognosis: Good  Estimated Disposition: Home with home care      PRESCREEN COMPARISON:  I have reviewed the prescreen information and I have found no relevant changes between the preadmission screening and my post admission evaluation.    RATIONALE FOR INPATIENT ADMISSION: Patient demonstrates the following:  [X] Medically appropriate for rehabilitation admission  [X] Has attainable rehab goals with an appropriate initial discharge plan  [X]Has rehabilitation potential (expected to make a significant improvement within a reasonable period of time)  [X] Requires close medical management by a rehab physician, rehab nursing care, Hospitalist and comprehensive interdisciplinary team (including PT, OT and/or SLP, Prosthetics and Orthotics)     Assessment/Plan:  SHARON HULL is a 88 y/o F with PMHx of Afib (off Eliquis), PPM, HTN, HLD, GERD, severe MR/TR, iatrogenic adrenal insufficiency (on prednisone), lymphedema, metastatic melanoma s/p left foot melanoma and lymph node resection, s/p RUE melanoma and right axillary lymph node excision at [facility] (3/18/25), recent admission (3/21-3/25/25) for L MCA CVA s/p M1 thrombectomy, presented to  with left sided weakness and aphasia, found to have right MCA CVA and s/p M2 thrombectomy (3/26/25).  Hospital Course complicated by right sided weakness and aphasia, found to have new Left MCA CVA with M2 occlusion and is s/p 3rd thrombectomy (3/29/25). Hospital course also complicated by pseudoaneurysm (s/p vasc surgery consult and thrombin injection x2), urinary retention (s/p rivas, failed TOV 4/2), fever of unknown origin (on IV cefepime until 4/10/25). Patient resumed Eliquis on 4/2/25. Patient now admitted for a multidisciplinary rehab program.       #  Bihemispheric embolic infarcts s/p Thrombectomy x 3  - s/p cerebral angiogram for mechanical thrombectomy of L M1 occlusion, TICI 3 (one pass) on 3/21/25  - s/p cerebral angiogram for mechanical thrombectomy of Rt M2 occlusion, TICI 3 (one pass) on 3/26/25  - s/p thrombectomy of left M4 with TICI 0 3/29/25  - Etiology likely cardioembolic in the setting of atrial fibrillation and underlying hypercoagulability from malignancy  - Aphasia, Right hemiparesis, Dysphagia  - Start comprehensive rehab program of PT/OT/SLP - 3 hours a day, 5 days a week. P&O as needed   - Precautions: Fall, Aspiration  - Eliquis 5 mg BID  - Atorvastatin 10 mg QHS (LDL 58 on 3/22)    # Atrial Fibrillation  - has PPM present  - Eliquis 5 mg BID  - Lopressor 50 mg TID    # Right femoral artery pseudoaneurysm   - s/p thrombin injection x2    # Fever of unknown origin, ?aspiration PNA  - Blood cultures 3/26, 4/4  no growth   - RVP/COVID 19 PCR 4/4 negative   - UA 4/2 with some pyuria   - Urine Cx 3/26 no growth 4/3 contaminated   - CXR 4/4 reviewed and compared to 3/29, not much change noted  - CT Chest with contrast reporting no PE, + Atelectasis   - Per ID, continue Cefepime 2000mg IV [id number] till 4/10/25    # Iatrogenic aortic insufficiency  - prednisone 5 mg in AM (8:00) and 2 mg in PM (16:00) - home regimen  - f/u outpatient endocrinology    # Metastatic Melanoma  - metastatic melanoma s/p left foot melanoma and lymph node resection, s/p RUE melanoma and right axillary lymph node excision at [facility] (3/18/25)  - outpatient follow up     # Incidental Pancreatic Cyst  - Incidentally found new 1.7 cm cystic lesion at the pancreatic body on CT a/p on 3/28/25  - GI informed, no inpatient workup needed  - Follow up outpatient with Dr. Maurisio Haas    # Pain  - Tylenol PRN    # Mood / Cognition  - Neuropsychology consult PRN    # Sleep  - Maintain quiet hours and a low stim environment.   - Melatonin PRN to maximize participation in therapy during the day    # GI / Bowel  - Senna qHS  - Miralax Daily  - GI ppx: Protonix 40 mg QD    #  / Bladder  # Urinary Retention  - Continue Rivas present on admission, plan for TOV  - Failed TOV on 4/1-4/2, Rivas replaced 4/3  - Flomax 0.4 mg QHS    # Skin / Pressure injury  - Skin assessment on admission performed: IAD to sacrum, right posterior thigh ecchymosis, forehead scab s/p biopsy,  left lateral ankle scar with redness towards heel  - Pressure Injury/Skin: OOB to chair, PT/OT  - nursing to monitor skin qShift    # Diet/Dysphagia:  - Diet Consistency: regular with mildly thick liquids  - Supplement: MVI  - Aspiration Precautions  - SLP consult for swallow function evaluation and treatment  - Nutrition consult    # DVT prophylaxis:   - on Eliquis 5 mg Q12H      Outpatient Follow-up:  Maurisio Haas  Gastroenterology  [address] [facility], [address]  [place] [address]  Phone: [phone]  Fax: [phone]  Follow Up Time: 1 month    Celina Carlin  Hematology/Oncology  [address] [place] [address]  Phone: [phone]  Fax: [phone]  Follow Up Time: 1 month    Juan Diego Johnson  Vascular Neurology  [address] [place] [address]  Phone: [phone]  Fax: [phone]  Follow Up Time: 1 month    Antonio Gaines  Endocrinology/Metab/Diabetes  [address] [facility], [address]  [place] [address]  Phone: [phone]  Fax: [phone]  Follow Up Time: 1 month    Milo Murguia  Cardiovascular Disease  [address] [place] [address]  Phone: [phone]  Fax: [phone]  Follow Up Time: 2 weeks    Your Primary Care Provider,   Phone: (   )    -  Fax: (   )    -  Follow Up Time: 1 week    [place]  Urology  [address] [place] [address]  Phone: [phone]  Fax:   Follow Up Time: 1 month    [facility] Vascular Surgery  Vascular Surgery  [address] [place] [address]  Phone: [phone]  Fax:     Code Status/Emergency Contact:      ---------------    Goals: Safe discharge to home  Estimated Length of Stay: 10-14 days  Rehab Potential: Good  Medical Prognosis: Good  Estimated Disposition: Home with home care      PRESCREEN COMPARISON:  I have reviewed the prescreen information and I have found no relevant changes between the preadmission screening and my post admission evaluation.    RATIONALE FOR INPATIENT ADMISSION: Patient demonstrates the following:  [X] Medically appropriate for rehabilitation admission  [X] Has attainable rehab goals with an appropriate initial discharge plan  [X]Has rehabilitation potential (expected to make a significant improvement within a reasonable period of time)  [X] Requires close medical management by a rehab physician, rehab nursing care, Hospitalist and comprehensive interdisciplinary team (including PT, OT and/or SLP, Prosthetics and Orthotics)     Assessment/Plan:  SHARON HULL is a 89 y/o F with PMHx of Afib (off Eliquis), PPM, HTN, HLD, GERD, severe MR/TR, iatrogenic adrenal insufficiency (on prednisone), lymphedema, metastatic melanoma s/p left foot melanoma and lymph node resection, s/p RUE melanoma and right axillary lymph node excision at [facility] (3/18/25), recent admission (3/21-3/25/25) for L MCA CVA s/p M1 thrombectomy, presented to  with left sided weakness and aphasia, found to have right MCA CVA and s/p M2 thrombectomy (3/26/25).  Hospital Course complicated by right sided weakness and aphasia, found to have new Left MCA CVA with M2 occlusion and is s/p 3rd thrombectomy (3/29/25). Hospital course also complicated by pseudoaneurysm (s/p vasc surgery consult and thrombin injection x2), urinary retention (s/p rivas, failed TOV 4/2), fever of unknown origin (on IV cefepime until 4/10/25). Patient resumed Eliquis on 4/2/25. Patient now admitted for a multidisciplinary rehab program with right sided weakness, Transcortical motor aphasia, cognitive deficits, Dysphagia and gait and ADL impairments.       #  Bihemispheric embolic infarcts s/p Thrombectomy x 3  - s/p cerebral angiogram for mechanical thrombectomy of L M1 occlusion, TICI 3 (one pass) on 3/21/25  - s/p cerebral angiogram for mechanical thrombectomy of Rt M2 occlusion, TICI 3 (one pass) on 3/26/25  - s/p thrombectomy of left M4 with TICI 0 3/29/25  - Etiology likely cardioembolic in the setting of atrial fibrillation and underlying hypercoagulability from malignancy  - Aphasia, Right hemiparesis, Dysphagia  - Start comprehensive rehab program of PT/OT/SLP - 3 hours a day, 5 days a week. P&O as needed   - Precautions: Fall, Aspiration  - Eliquis 5 mg BID  - Atorvastatin 10 mg QHS (LDL 58 on 3/22)    # Atrial Fibrillation  - has PPM present  - Eliquis 5 mg BID  - Lopressor 50 mg TID    # Right femoral artery pseudoaneurysm   - s/p thrombin injection x2    # Fever of unknown origin, ?aspiration PNA  - Blood cultures 3/26, 4/4  no growth   - RVP/COVID 19 PCR 4/4 negative   - UA 4/2 with some pyuria   - Urine Cx 3/26 no growth 4/3 contaminated   - CXR 4/4 reviewed and compared to 3/29, not much change noted  - CT Chest with contrast reporting no PE, + Atelectasis   - Per ID, continue Cefepime 2000mg IV [id number] till 4/10/25    # Iatrogenic aortic insufficiency  - prednisone 5 mg in AM (8:00) and 2 mg in PM (16:00) - home regimen  - f/u outpatient endocrinology    # Metastatic Melanoma  - metastatic melanoma s/p left foot melanoma and lymph node resection, s/p RUE melanoma and right axillary lymph node excision at [facility] (3/18/25)  - outpatient follow up     # Incidental Pancreatic Cyst  - Incidentally found new 1.7 cm cystic lesion at the pancreatic body on CT a/p on 3/28/25  - GI informed, no inpatient workup needed  - Follow up outpatient with Dr. Maurisio Haas    # Pain  - Tylenol PRN    # Mood / Cognition  - Neuropsychology consult PRN    # Sleep  - Maintain quiet hours and a low stim environment.   - Melatonin PRN to maximize participation in therapy during the day    # GI / Bowel  - Senna qHS  - Miralax Daily  - GI ppx: Protonix 40 mg QD    #  / Bladder  # Urinary Retention  - Continue Rivas present on admission, plan for TOV  - Failed TOV on 4/1-4/2, Rivas replaced 4/3  - Flomax 0.4 mg QHS    # Skin / Pressure injury  - Skin assessment on admission performed: IAD to sacrum, right posterior thigh ecchymosis, forehead scab s/p biopsy,  left lateral ankle scar with redness towards heel  - Pressure Injury/Skin: OOB to chair, PT/OT  - nursing to monitor skin qShift    # /Dysphagia:  - Diet Consistency: regular with mildly thick liquids  - Supplement: MVI  - Aspiration Precautions  - SLP consult for swallow function evaluation and treatment  - Nutrition consult    # DVT prophylaxis:   - on Eliquis 5 mg Q12H      Outpatient Follow-up:  Maurisio Haas  Gastroenterology  [address] [facility], [address]  [place] [address]  Phone: [phone]  Fax: [phone]  Follow Up Time: 1 month    Celina Carlin  Hematology/Oncology  [address] [place] [address]  Phone: [phone]  Fax: [phone]  Follow Up Time: 1 month    Juan Diego Johnson  Vascular Neurology  [address] [place] [address]  Phone: [phone]  Fax: [phone]  Follow Up Time: 1 month    Antonio Gaines  Endocrinology/Metab/Diabetes  [address] [facility], [address]  [place] [address]  Phone: [phone]  Fax: [phone]  Follow Up Time: 1 month    Milo Murguia  Cardiovascular Disease  [address] [place] [address]  Phone: [phone]  Fax: [phone]  Follow Up Time: 2 weeks    Your Primary Care Provider,   Phone: (   )    -  Fax: (   )    -  Follow Up Time: 1 week    [place]  Urology  [address] [place] [address]  Phone: [phone]  Fax:   Follow Up Time: 1 month    [facility] Vascular Surgery  Vascular Surgery  [address] [place] [address]  Phone: [phone]  Fax:     Code Status/Emergency Contact:      ---------------    Goals: Safe discharge to home  Estimated Length of Stay: 10-14 days  Rehab Potential: Good  Medical Prognosis: Good  Estimated Disposition: Home with home care      PRESCREEN COMPARISON:  I have reviewed the prescreen information and I have found no relevant changes between the preadmission screening and my post admission evaluation.    RATIONALE FOR INPATIENT ADMISSION: Patient demonstrates the following:  [X] Medically appropriate for rehabilitation admission  [X] Has attainable rehab goals with an appropriate initial discharge plan  [X]Has rehabilitation potential (expected to make a significant improvement within a reasonable period of time)  [X] Requires close medical management by a rehab physician, rehab nursing care, Hospitalist and comprehensive interdisciplinary team (including PT, OT and/or SLP, Prosthetics and Orthotics)

## 2025-04-08 NOTE — SWALLOW VFSS/MBS ASSESSMENT ADULT - ROSENBEK'S PENETRATION ASPIRATION SCALE
x 1/4 trials; 1- no airway entry w/remaining trials/(3) contrast remains above the vocal cords, visible residue remains (penetration) during swallow via larger sips; 3- penetration above cords without clearance intermittently w/small controlled size/rate of intake/(5) contrast contacts vocal cords, visible residue remains (penetration) (1) no aspiration, contrast does not enter airway

## 2025-04-08 NOTE — H&P ADULT - NSHPREVIEWOFSYSTEMS_GEN_ALL_CORE
REVIEW OF SYSTEMS  Constitutional: No fever, No Chills, No fatigue  HEENT: No visual disturbances, No difficulty hearing  Pulm: No cough,  No shortness of breath  Cardio: No chest pain, No palpitations  GI:  No abdominal pain, No nausea, No vomiting, No diarrhea, No constipation  : No dysuria, No frequency, No hematuria, (+) urinary retention  Neuro: No headaches, (+) loss of strength, (+) aphasia, No numbness  Skin: No itching, No rashes  MSK: No joint pain, No joint swelling, No muscle pain, No Neck pain, No back pain  Psych:  No depression, No anxiety

## 2025-04-08 NOTE — PROGRESS NOTE ADULT - REASON FOR ADMISSION
Rt M2 occlusion s/p thrombectomy
stroke
CVA
Rt M2 occlusion s/p thrombectomy

## 2025-04-08 NOTE — PROGRESS NOTE ADULT - ASSESSMENT
ASSESSMENT: 89 year old female with PMHx of Afib (off Eliquis), recent left MCA territory stroke s/p left M1 thrombectomy, with residual minimal aphasia, MRS 1, PPM, HTN, HLD, GERD, severe MR/TR, iatrogenic adrenal insufficiency (on prednisone), lymphedema, metastatic melanoma s/p Left foot melanoma and lymph node resection, s/p RUE melanoma and Right axillary lymph node excision at [facility] (3/18/25) who presented to  ED 3/26/36 with Lt sided weakness and aphasia. CT neg, CTA showed a  Rt M2 occlusion.  Transferred to [facility] intervention. S/P thrombectomy, TICI 3 achieved after 1 pass started on IV Heparin and monitoring in NSICU post procedure. Hospital course complicated by right hemiparesis and aphasia on 3/29/25. CTH negative for acute hemorrhage,  CTH negative for acute hemorrhage, CTA h/n + new Left MCA M2 occulusion and subsequently underwent 3rd cerebral angiogram with attempted thrombectomy. TICI0-distal M4 Branch on 3/29/25.     IMPRESSION:   Recent left MCA cerebral infarction s/p Left MCA M1 thrombectomy on 3/21/2025. TICI 3 recanalization. Patient subsequently discharged and returned 3/26/25 with Rt MCA  m2 occlusion, revascularization TICI 3. On 3/29/25 patient found to have acute Left M2 MCA occlusion s/p thrombectomy TICI 0 -- distal M4 branch. MRI brain demonstrated acute bilateral multifocal cerebral infarctions. There is subacute left basal ganglia infarct.  Etiology likely cardioembolic in the setting of atrial fibrillation and underlying hypercoagulability from malignancy.       NEURO: Bihemispheric embolic infarcts s/p Thrombectomy x 3, Aphasia, R hemiparesis  -Neurologically w/ improvement in mental status compared to prior exam. Episode of staring on 4/6/25 with delayed response.   -Spot EEG completed on 4/6/25 with no epileptiform pattern or seizure  -Continue close monitoring for neurologic deterioration    -Stroke neuro checks q4 hours    -SBP goal 110-160mmhg for now avoiding rapid fluctuations and hypotension , neurologically tolerating at this time, eventual long term age/risk specific normotension   -ANTITHROMBOTIC THERAPY:  Continue Eliquis  5mg BID dosing as of 4/5/25.   -continue home statin regimen if applicable as LDL < goal of 70   -MRI imaging as noted   -Dysphagia screen: diet changed to NPO in the setting of lethargy. per SLP re-evaluation patient passed for regular diet w/ mildly thick liquids.   -Physical therapy/OT/Speech eval/treatment.   -Stroke education     CARDIOVASCULAR: Hypotension, Afib  - TTE as noted , cardiac monitoring w/ telemetry for now, further evaluation pending findings of noted workup, continue rate control and titrate regimen accordingly        -DARIUSZ not indicated at this time, patient on proper management at this time.        -Cardio consulted for concern for fluid overload on 4/4 post episode of hypoxia/hypotension, s/p IV lasix; BNP improved, hold off on additional lasix for now. IVF on hold at this time.   -Patient w/ episodes of tachycardia 4/5/25 (110s), however IV metoprolol held due to concern for hypotension; transitioned back to PO metoprolol  after SLP re-eval. 4/6 tachycardia persists. PO metoprolol increased to 25mg PO BID.   -4/7/25 metoprolol increased to 50mg PO BID. 4/8/25 adjusted metorpolol 50mg PO TID as per cardiology reccs.                 HEMATOLOGY:   -H/H 10.5/32.9, no active bleeding noted, Platelets 152- monitor thrombocytosis closely  -patient should have all age and risk appropriate malignancy screenings with PCP or sooner if clinically suspected ,   -hematology consulted- hypercoagulable workup sent, including factor V leiden, prothrombin, antiphospholipid antibodies, and lupus anticoagulant.  With regard to long-term a/c, can transition to doac from heparin gtt as per neuro and would follow up with outpatient hematologist Dr Kimbrough.   -CT PE study ordered 4/4/25 due to hypoxia, negative.   -anemia profile Iron total 53, %Sat 21, TIBC 250, ferritin 293, transferrin 175.   -DVT ppx: SCD's +Eliquis     PULMONARY:   -Started On Vanc+cefepime 4/4/25 for suspected aspiration PNA. Per ID recs vanco d/c'd 4/5/25. Continue Cefepime through 4/10/25    -CTA as above B/L near complete atelectasis , findings concerning for aspiration PNA   -Procal 0.37  -supp 02 PRN  - encourage mobility and incentive spirometry as tolerated     RENAL:   -BUN elevated but overall stable; Cr within range, monitor urine output, maintain adequate hydration,   - avoid nephrotoxins   - trend BMP  -Na Goal:  135-145  -Lyte replenishment prn; keep K >4.0 and Mag >2.0 in setting of atrial fibrillation   -Rivas replaced 4/3. Will discharge to rehab with rivas. Can attempt TOV again in rehab at a later date.    ID:  UTI, Aspiration PNA   -no leukocytosis  -UA +, initially on macrobid 4/3/25 switched to Vanco+Cefeprime 4/4/25. Vanco dc'd per ID recs 4/5/25   -Viral PCR negative   -Blood culture sent 4/4/25, negative   -ID following     ENDO: 1. Iatrogenic adrenal insufficiency  - Solucortef IV BID, OD on 4/7 and plan to switch to home regimen on 4/8  - Monitor BP and electrolytes    VASCULAR:   -signed off, pseudoaneurysm appears to be occluded.    SKIN:   -RUE edema and erythema. Pink banded- US RUE negative for DVT    OTHER:   -condition and plan of care d/w patient, family, questions and concerns addressed.     DISPOSITION: Acute Rehab once stable and workup is complete, per CM no auth is needed once patient is stable.     CORE MEASURES:       Admission NIHSS: 10     Tenecteplase : [] YES [x] NO     LDL/HDL/A1C:  59 /  74  /  5.5      Depression Screen- if depression hx and/or present     Statin Therapy: continue home dose statin      Dysphagia Screen: [x] PASS [] FAIL     Smoking in the past 12 months [] YES [x] NO     Afib [x] YES [] NO     Diabetes [] YES [x] NO     Stroke Education [x] YES [] NO  Diabetes [] YES [x] NO    Obtain screening lower extremity venous ultrasound in patients who meet 1 or more of the following criteria as patient is high risk for DVT/PE on admission:   [] History of DVT/PE  []Hypercoagulable states (Factor V Leiden, Cancer, OCP, etc. )  []Prolonged immobility (hemiplegia/hemiparesis/post operative or any other extended immobilization)  [] Transferred from outside facility (Rehab or Long term care)  [] Age </= to 50  [x]Stroke     ASSESSMENT: 89 year old female with PMHx of Afib (off Eliquis), recent left MCA territory stroke s/p left M1 thrombectomy, with residual minimal aphasia, MRS 1, PPM, HTN, HLD, GERD, severe MR/TR, iatrogenic adrenal insufficiency (on prednisone), lymphedema, metastatic melanoma s/p Left foot melanoma and lymph node resection, s/p RUE melanoma and Right axillary lymph node excision at [facility] (3/18/25) who presented to  ED 3/26/36 with Lt sided weakness and aphasia. CT neg, CTA showed a  Rt M2 occlusion.  Transferred to [facility] intervention. S/P thrombectomy, TICI 3 achieved after 1 pass started on IV Heparin and monitoring in NSICU post procedure. Hospital course complicated by right hemiparesis and aphasia on 3/29/25. CTH negative for acute hemorrhage,  CTH negative for acute hemorrhage, CTA h/n + new Left MCA M2 occulusion and subsequently underwent 3rd cerebral angiogram with attempted thrombectomy. TICI0-distal M4 Branch on 3/29/25.     IMPRESSION:   Recent left MCA cerebral infarction s/p Left MCA M1 thrombectomy on 3/21/2025. TICI 3 recanalization. Patient subsequently discharged and returned 3/26/25 with Rt MCA  m2 occlusion, revascularization TICI 3. On 3/29/25 patient found to have acute Left M2 MCA occlusion s/p thrombectomy TICI 0 -- distal M4 branch. MRI brain demonstrated acute bilateral multifocal cerebral infarctions. There is subacute left basal ganglia infarct.  Etiology likely cardioembolic in the setting of atrial fibrillation and underlying hypercoagulability from malignancy.       NEURO: Bihemispheric embolic infarcts s/p Thrombectomy x 3, Aphasia, R hemiparesis  -Neurologically w/ improvement in mental status compared to prior exam. Episode of staring on 4/6/25 with delayed response.   -Spot EEG completed on 4/6/25 with no epileptiform pattern or seizure  -Continue close monitoring for neurologic deterioration    -Stroke neuro checks q4 hours    -SBP goal 110-160mmhg for now avoiding rapid fluctuations and hypotension , neurologically tolerating at this time, eventual long term age/risk specific normotension   -ANTITHROMBOTIC THERAPY:  Continue Eliquis  5mg BID dosing as of 4/5/25.   -continue home statin regimen if applicable as LDL < goal of 70   -MRI imaging as noted   -Dysphagia screen: diet changed to NPO in the setting of lethargy. per SLP re-evaluation patient passed for regular diet w/ mildly thick liquids.   -Physical therapy/OT/Speech eval/treatment.   -Stroke education     CARDIOVASCULAR: Hypotension, Afib  - TTE as noted , cardiac monitoring w/ telemetry for now, further evaluation pending findings of noted workup, continue rate control and titrate regimen accordingly        -DARIUSZ not indicated at this time, patient on proper management at this time.        -Cardio consulted for concern for fluid overload on 4/4 post episode of hypoxia/hypotension, s/p IV lasix; BNP improved, hold off on additional lasix for now. IVF on hold at this time.   -Patient w/ episodes of tachycardia 4/5/25 (110s), however IV metoprolol held due to concern for hypotension; transitioned back to PO metoprolol  after SLP re-eval. 4/6 tachycardia persists. PO metoprolol increased to 25mg PO BID.   -4/7/25 metoprolol increased to 50mg PO BID. 4/8/25 adjusted metorpolol 50mg PO TID as per cardiology reccs.                 HEMATOLOGY:   -H/H 10.5/32.9, no active bleeding noted, Platelets 152- monitor thrombocytosis closely  -patient should have all age and risk appropriate malignancy screenings with PCP or sooner if clinically suspected ,   -hematology consulted- hypercoagulable workup sent, including factor V leiden, prothrombin, antiphospholipid antibodies, and lupus anticoagulant.  With regard to long-term a/c, can transition to doac from heparin gtt as per neuro and would follow up with outpatient hematologist Dr Kimbrough.   -CT PE study ordered 4/4/25 due to hypoxia, negative.   -anemia profile Iron total 53, %Sat 21, TIBC 250, ferritin 293, transferrin 175.   -DVT ppx: SCD's +Eliquis     PULMONARY:   -Started On Vanc+cefepime 4/4/25 for suspected aspiration PNA. Per ID recs vanco d/c'd 4/5/25. Continue Cefepime 2000mg IV [id number] till 4/10/25  -CTA as above B/L near complete atelectasis , findings concerning for aspiration PNA   -Procal 0.37  -supp 02 PRN  - encourage mobility and incentive spirometry as tolerated     RENAL:   -BUN elevated but overall stable; Cr within range, monitor urine output, maintain adequate hydration,   - avoid nephrotoxins   - trend BMP  -Na Goal:  135-145  -Lyte replenishment prn; keep K >4.0 and Mag >2.0 in setting of atrial fibrillation   -Rivas replaced 4/3. Will discharge to rehab with rivas. Can attempt TOV again in rehab at a later date.    ID:  UTI, Aspiration PNA   -no leukocytosis  -UA +, initially on macrobid 4/3/25 switched to Vanco+Cefeprime 4/4/25. Vanco dc'd per ID recs 4/5/25   -Viral PCR negative   -Blood culture sent 4/4/25, negative   -ID following     ENDO: 1. Iatrogenic adrenal insufficiency  - Solucortef IV BID, OD on 4/7 and plan to switch to home regimen on 4/8  - Monitor BP and electrolytes    VASCULAR:   -signed off, pseudoaneurysm appears to be occluded.    SKIN:   -RUE edema and erythema. Pink banded- US RUE negative for DVT    OTHER:   -condition and plan of care d/w patient, family, questions and concerns addressed.     DISPOSITION: Acute Rehab on 4/8/25    CORE MEASURES:       Admission NIHSS: 10     Tenecteplase : [] YES [x] NO     LDL/HDL/A1C:  59 /  74  /  5.5      Depression Screen- if depression hx and/or present     Statin Therapy: continue home dose statin      Dysphagia Screen: [x] PASS [] FAIL     Smoking in the past 12 months [] YES [x] NO     Afib [x] YES [] NO     Diabetes [] YES [x] NO     Stroke Education [x] YES [] NO  Diabetes [] YES [x] NO    Obtain screening lower extremity venous ultrasound in patients who meet 1 or more of the following criteria as patient is high risk for DVT/PE on admission:   [] History of DVT/PE  []Hypercoagulable states (Factor V Leiden, Cancer, OCP, etc. )  []Prolonged immobility (hemiplegia/hemiparesis/post operative or any other extended immobilization)  [] Transferred from outside facility (Rehab or Long term care)  [] Age </= to 50  [x]Stroke

## 2025-04-08 NOTE — H&P ADULT - HISTORY OF PRESENT ILLNESS
88 y/o F with PMHx of Afib (off Eliquis), PPM, HTN, HLD, GERD, severe MR/TR, iatrogenic adrenal insufficiency (on prednisone), lymphedema, metastatic melanoma s/p left foot melanoma and lymph node resection, s/p RUE melanoma and right axillary lymph node excision at [facility] (3/18/25). Patient with a recent admission 3/21 -3/25/25  when she initially presented to  and was transferred to [facility] as a stroke code. At [facility] patient underwent cerebral angiogram for mechanical thrombectomy of L M1 occlusion, TICI 3 (one pass) on 3/21/25. Patient was discharged and presented back to [facility] 3/26 with L sided weakness and aphasia transferred to [facility] for stroke code and is now s/p cerebral angiogram for mechanical thrombectomy of Rt M2 occlusion, TICI 3 (one pass) on 3/26/25. On the early morning of 3/29/25 pt received medications around 6am by RN report and subsequently was appreciated to be aphasic with right sided weakness. CTH negative for acute hemorrhage. CTA + new Left MCA M2 occulusion and subsequently underwent 3rd thrombectomy on 3/29. MRI brain demonstrated acute bilateral multifocal cerebral infarctions and subacute left basal ganglia infarct.  Etiology likely cardioembolic in the setting of atrial fibrillation and underlying hypercoaguability from malignancy.    Hospital course complicated by right groin pseudoaneurysm s/p L MCA thrombectomy. She was seen by vascular surgery and given thrombin injection x2 and recommended for outpatient follow up as needed. Hematology consulted due to concern for hypercoagulable state, anemia, hx of melanoma. CBC trended, overall stable. Hypercoagulable serology panel sent, negative for abnormalities. Follow up outpatient for further monitoring/testing. Hospital course further complicated by urinary retention and rivas catheter placed. Patient failed TOV 4/1-4/2, rivas replaced 4/3/25 and patient started on flomax.     Endocrine consulted due to hx of adrenal insufficiency;  started on hydrocortisone during admission, recommendations to resume patient's home regimen of prednisone 5mg in AM and 2mg in PM upon hospital dc. Incidentally found new 1.7 cm cystic lesion at the pancreatic body on CT A/P on 3/28/25. GI informed, no inpatient workup needed. Follow up outpatient with Dr. Maurisio Haas. Patient started on cefepime and vancomycin on 4/4/25 due to fever of unknown origin. ID consulted, Vancomycin discontinued after blood cultures resulted negative. ID recommendations to continue with IV cefepime until 4/10/25.     Patient resumed Eliquis 5mg daily (4/2/25) for secondary stroke prevention. Also re-started on home regimen of atorvastatin 10mg daily; LDL-59, nonatherosclerotic etiology of stroke, high intensity statin not indicated at this time. PT/OT evaluated patient, recommended dispo to acute rehab. Patient should follow up with neurology/neuro IR, cardiology, endocrinology, hematology, GI, primary care, +/- urology and vascular surgery after discharge. Patient discharged to [facility] on 4/8/25.   89 y/o F with PMHx of Afib (off Eliquis), PPM, HTN, HLD, GERD, severe MR/TR, iatrogenic adrenal insufficiency (on prednisone), lymphedema, metastatic melanoma s/p left foot melanoma and lymph node resection, s/p RUE melanoma and right axillary lymph node excision at [facility] (3/18/25). Patient with a recent admission 3/21 -3/25/25  when she initially presented to  with code stroke and was transferred to [facility] where she underwent cerebral angiogram for mechanical thrombectomy of L M1 occlusion, TICI 3 (one pass) on 3/21/25. Patient was discharged and presented back to [facility] 3/26 with L sided weakness and aphasia, transferred to [facility] for stroke codem and is now s/p mechanical thrombectomy of Rt M2 occlusion, TICI 3 (one pass) on 3/26/25. On the early morning of 3/29/25 pt aphasic with right sided weakness. CTH negative for acute hemorrhage. CTA + new Left MCA M2 occulusion and patient subsequently underwent 3rd thrombectomy on 3/29. MRI brain demonstrated acute bilateral multifocal cerebral infarctions and subacute left basal ganglia infarct.  Etiology likely cardioembolic in the setting of atrial fibrillation and underlying hypercoaguability from malignancy.    Hospital course complicated by right groin pseudoaneurysm s/p L MCA thrombectomy. She was seen by vascular surgery and given thrombin injection x2 and recommended for outpatient follow up as needed. Hematology consulted due to concern for hypercoagulable state, anemia, hx of melanoma. CBC trended, overall stable. Hypercoagulable serology panel sent, negative for abnormalities. Follow up outpatient for further monitoring/testing. Hospital course further complicated by urinary retention and rivas catheter placed. Failed TOV 4/1-4/2, rivas replaced 4/3/25, and patient started on flomax.     Endocrine consulted due to hx of adrenal insufficiency; initially started on hydrocortisone then transitioned to home regimen of prednisone 5mg in AM and 2mg in PM. Incidentally found new 1.7 cm cystic lesion at the pancreatic body on CT A/P on 3/28/25. GI informed, no inpatient workup needed. Follow up outpatient with Dr. Maurisio Haas. Patient started on cefepime and vancomycin on 4/4/25 due to fever of unknown origin. ID consulted, Vancomycin discontinued after blood cultures resulted negative. ID recommendations to continue with IV cefepime until 4/10/25.     Patient resumed Eliquis 5mg daily (4/2/25) for secondary stroke prevention. Also re-started on home regimen of atorvastatin 10mg daily; LDL - 59, nonatherosclerotic etiology of stroke, high intensity statin not indicated at this time. PT/OT evaluated patient, recommended dispo to acute rehab. Patient should follow up with neurology/neuro IR, cardiology, endocrinology, hematology, GI, primary care, +/- urology and vascular surgery after discharge. Patient discharged to [facility] on 4/8/25.

## 2025-04-08 NOTE — SWALLOW VFSS/MBS ASSESSMENT ADULT - DIAGNOSTIC IMPRESSIONS
Pt presenting w/mild oropharyngeal dysphagia, which is neurogenic in nature due to recent CVA w/thrombectomy.   Oral stage of swallow notable for occasional instances of impulsive size/rate of intake of thin liquids, requiring small and controlled presentation via cup provided for self-administration of trials. Intermittently reduced attention to bolus in oral cavity with mildly delayed oral transfer of regular solids, however considered generally functional for oral intake. Decreased lingual strength/ROM, w/subsequent reduced oral containment with premature pharyngeal entry of trials demonstrated following (solids to the valleculae, thin liquids variably between the sunny/hypopharynx). Piecemeal deglutition w/solids only. No anterior loss or oral stasis.  Pharyngeal phase of swallow characterized by overall intact contractility, pressure generation & UES relaxation. Only trace-min stasis remaining in valleculae, increasing amounts w/consistencies of increased viscosity, which reduces w/cued subsequent dry swallow (however Pt at times w/difficulty for execution due to cognitive limitations), further w/presentation of liquid wash. Pt with reduced hyo-laryngeal elevation/excursion, incomplete epiglottic deflection, and inadequate upper/lower airway protection. Deep penetration during the swallow, contacting the vocal cords without clearance, demonstrated with large/impulsive sips of thin liquids via cup sip. Small & controlled size of thin via cup presented to Pt for self-administration, w/elimination of DEEP airway entry achieved (continued trace penetration ABOVE the vocal cords without clearance persisting in ~50% of trials). Additional trace penetration above the vocal cords during swallow w/initial trial of puree, not reduplicated in remaining trials. Aspiration was NOT visualized over the course of the study.

## 2025-04-08 NOTE — SWALLOW VFSS/MBS ASSESSMENT ADULT - COMMENTS
89 year old female with PMHx of Afib (on Eliquis), PPM, HTN, hx of RUE melanoma removal with R axillary lymph node removal, recent admission 3/21- 3/25, 2025 for lt MCA occlusion s/p thrombectomy presented as transfer from Health system after she was found at 7:30 3/26/25 with lt facial and left sided weakness.  am morning prior.  Upon arrival to  ED she was noted to have lt sided facial droop; aphasic; left arm weakness; no TNK as out of window/recent infarction.  code stroke activated;  CT imagining found patient with new rt mca; patient transferred to [facility] for thrombectomy. Pt transferred to [facility] EDUARDO eval, s/p cerebral angiogram for mechanical thrombectomy of Rt M2 occlusion, TICI 3 (one pass). She was admitted to NSICU for close monitoring post thrombectomy. Hospital course complicated by right hemiparesis and aphasia on 3/29/25. CTH negative for acute hemorrhage, CTA h/n + new Left MCA M2 occulusion and subsequently underwent 3rd thrombectomy on 3/29.

## 2025-04-08 NOTE — H&P ADULT - ATTENDING COMMENTS
Pt. seen with resident on admission.  Agree with documentation above as per resident with amendments made as appropriate. Patient medically stable. Appropriate for acute rehabilitation.        88 y/o F with PMHx of Afib (off Eliquis), PPM, HTN, HLD, GERD, severe MR/TR, iatrogenic adrenal insufficiency (on prednisone), lymphedema, metastatic melanoma s/p left foot melanoma and lymph node resection, s/p RUE melanoma and right axillary lymph node excision at [facility] (3/18/25), recent admission (3/21-3/25/25) for L MCA CVA s/p M1 thrombectomy, presented to  with left sided weakness and aphasia, found to have right MCA CVA and s/p M2 thrombectomy (3/26/25).  Hospital Course complicated by right sided weakness and aphasia, found to have new Left MCA CVA with M2 occlusion and is s/p 3rd thrombectomy (3/29/25). Hospital course also complicated by pseudoaneurysm (s/p vasc surgery consult and thrombin injection x2), urinary retention (s/p rivas, failed TOV 4/2), fever of unknown origin (on IV cefepime until 4/10/25). Patient resumed Eliquis on 4/2/25. Patient now admitted for a multidisciplinary rehab program with right sided weakness, Transcortical motor aphasia, cognitive deficits, Dysphagia and gait and ADL impairments.

## 2025-04-08 NOTE — SWALLOW VFSS/MBS ASSESSMENT ADULT - LARYNGEAL PENETRATION DURING THE SWALLOW - SILENT
observed above cords without clearance occasionally w/small sips; to the cords without clearance w/larger sips Trace

## 2025-04-08 NOTE — PROGRESS NOTE ADULT - TIME BILLING
This includes chart review, patient assessment, discussion with Neurology Team and clinical pharmacy. Antibiotic dosing and management with clinical pharmacy.

## 2025-04-08 NOTE — PROGRESS NOTE ADULT - SUBJECTIVE AND OBJECTIVE BOX
[facility]  INFECTIOUS DISEASES at [place] / [place] / [place]  =======================================================                              Osmin Christianson MD                              Professor Emeritus:  Dr Piter Keane MD            Diplomates American Board of Internal Medicine & Infectious Diseases                                   Tel  [phone] Fax [phone]                                  Hospital Consult line:  [phone]  =======================================================      [place] [id number]    Follow up: Fever     No fevers     Allergies:  penicillins (Hives)       REVIEW OF SYSTEMS:  CONSTITUTIONAL:  No Fever or chills  HEENT:   No diplopia or blurred vision.  No earache, sore throat or runny nose.  CARDIOVASCULAR:  No Chest Pain  RESPIRATORY:  No cough, shortness of breath  GASTROINTESTINAL:  No nausea, vomiting or diarrhea.  GENITOURINARY:  No dysuria, frequency or urgency. No Blood in urine  MUSCULOSKELETAL:  no joint aches, no muscle pain  SKIN:  No change in skin, hair or nails.  NEUROLOGIC:  No Headaches      Physical Exam:  GEN: NAD  HEENT: normocephalic and atraumatic. EOMI. PERRL.    NECK: Supple.   LUNGS: CTA B/L.  HEART: RRR  ABDOMEN: Soft, NT, ND.  +BS.    : No CVA tenderness  EXTREMITIES: Without  edema.  MSK: No joint swelling  NEUROLOGIC: No Focal Deficits   SKIN: No rash      Vitals:  T(F): 97.7 (08 Apr 2025 09:05), Max: 98.4 (07 Apr 2025 15:42)  HR: 90 (08 Apr 2025 08:00)  BP: 131/71 (08 Apr 2025 08:00)  RR: 16 (08 Apr 2025 08:00)  SpO2: 92% (08 Apr 2025 08:00) (92% - 100%)  temp max in last 48H T(F): , Max: 98.4 (04-06-25 @ 19:12)    Current Antibiotics:  cefepime  Injectable. 2000 milliGRAM(s) IV Push every 8 hours    Other medications:  apixaban 5 milliGRAM(s) Oral every 12 hours  atorvastatin 10 milliGRAM(s) Oral at bedtime  chlorhexidine 2% Cloths 1 Application(s) Topical daily  dextrose 5%. 1000 milliLiter(s) IV Continuous <Continuous>  dextrose 5%. 1000 milliLiter(s) IV Continuous <Continuous>  dextrose 50% Injectable 25 Gram(s) IV Push once  dextrose 50% Injectable 12.5 Gram(s) IV Push once  dextrose 50% Injectable 25 Gram(s) IV Push once  glucagon  Injectable 1 milliGRAM(s) IntraMuscular once  insulin lispro (ADMELOG) corrective regimen sliding scale   SubCutaneous three times a day before meals  levalbuterol Inhalation 0.63 milliGRAM(s) Inhalation every 6 hours  metoprolol tartrate 50 milliGRAM(s) Oral three times a day  multivitamin 1 Tablet(s) Oral daily  pantoprazole  Injectable 40 milliGRAM(s) IV Push daily  polyethylene glycol 3350 17 Gram(s) Oral daily  predniSONE   Tablet 5 milliGRAM(s) Oral daily  predniSONE   Tablet 2 milliGRAM(s) Oral daily  senna 2 Tablet(s) Oral at bedtime  tamsulosin 0.4 milliGRAM(s) Oral at bedtime                            10.5   6.34  )-----------( 152      ( 08 Apr 2025 07:18 )             32.9     04-08    142  |  108  |  35.4[H]  ----------------------------<  95  3.6   |  23.0  |  0.44[L]    Ca    8.4      08 Apr 2025 07:18  Phos  1.8     04-08  Mg     2.0     04-08      RECENT CULTURES:  04-04 @ 01:50 Blood Blood     No growth at 4 days    04-04 @ 00:55    RVP  NotDetec    04-03 @ 00:00 Clean Catch Clean Catch (Midstream)     >=3 organisms. Probable collection contamination.    03-26 @ 17:20 Blood Blood     No growth at 5 days    03-26 @ 16:47 Clean Catch Clean Catch (Midstream)     No growth      WBC Count: 6.34 K/uL (04-08-25 @ 07:18)  WBC Count: 7.81 K/uL (04-07-25 @ 05:38)  WBC Count: 8.06 K/uL (04-06-25 @ 07:40)  WBC Count: 6.94 K/uL (04-05-25 @ 05:34)  WBC Count: 6.88 K/uL (04-04-25 @ 12:19)  WBC Count: 8.07 K/uL (04-04-25 @ 01:50)    Creatinine: 0.44 mg/dL (04-08-25 @ 07:18)  Creatinine: 0.49 mg/dL (04-07-25 @ 05:38)  Creatinine: 0.50 mg/dL (04-06-25 @ 07:40)  Creatinine: 0.43 mg/dL (04-05-25 @ 05:34)  Creatinine: 0.58 mg/dL (04-04-25 @ 12:19)  Creatinine: 0.52 mg/dL (04-04-25 @ 01:50)    Procalcitonin: 0.22 ng/mL (04-06-25 @ 07:40)  Procalcitonin: 0.37 ng/mL (04-04-25 @ 08:10)  Procalcitonin: 1.01 ng/mL (03-26-25 @ 17:20)     SARS-CoV-2: NotDetec (04-04-25 @ 00:55)  SARS-CoV-2: NotDetec (03-26-25 @ 17:20)          < from: CT Angio Chest PE Protocol w/ IV Cont (04.04.25 @ 10:02) >  ACC: [id number] EXAM:  CT ANGIO CHEST PULM ART [facility]   ORDERED BY: JUAN RAMON FIELDS     PROCEDURE DATE:  04/04/2025      INTERPRETATION:  Clinical information: Evaluate for pulmonary embolus.   Exam is compared to previous study of 3/28/2025.    CTangiogram of the chest was obtained following administration of   intravenous contrast. Approximately 1 25 cc of Omnipaque 350 was   administered and 25 cc was discarded. Coronal, sagittal and MIP images   were submitted for review.    No hilar or mediastinal adenopathy is noted.    Heart is markedly enlarged in size. Calcification the coronary arteries   noted. Pacemaker is noted in place. No pericardial effusion is noted.   Pulmonary arteries are normal in caliber. No filling defects are noted.    Mucous/secretions are noted within the left lower lobe bronchus.   Near-complete atelectasis of both lower lobes is noted. Small bilateral   pleural effusions are noted.    Below the diaphragm, visualized portions of the abdomen are unremarkable.    Degenerative changes of the spine are noted.    IMPRESSION: No pulmonary embolus is noted.    --- End of Report ---    < end of copied text >

## 2025-04-08 NOTE — PATIENT PROFILE ADULT - FALL HARM RISK - HARM RISK INTERVENTIONS
Assistance with ambulation/Assistance OOB with selected safe patient handling equipment/Communicate Risk of Fall with Harm to all staff/Discuss with provider need for PT consult/Monitor gait and stability/Reinforce activity limits and safety measures with patient and family/Tailored Fall Risk Interventions/Visual Cue: Yellow wristband and red socks/Bed in lowest position, wheels locked, appropriate side rails in place/Call bell, personal items and telephone in reach/Instruct patient to call for assistance before getting out of bed or chair/Non-slip footwear when patient is out of bed/[place] to call system/Physically safe environment - no spills, clutter or unnecessary equipment/Purposeful Proactive Rounding/Room/bathroom lighting operational, light cord in reach

## 2025-04-08 NOTE — SWALLOW VFSS/MBS ASSESSMENT ADULT - SLP GENERAL OBSERVATIONS
Pt recd awake/upright in stretcher in radiology, A&A Ox2 w/cues, aphasic, reduced cognition, 0/10 pain pre/post, tolerating RA NAD

## 2025-04-08 NOTE — SWALLOW VFSS/MBS ASSESSMENT ADULT - ORAL PHASE
within functional limits/Delayed oral transit time/Reduced anterior - posterior transport within functional limits/Reduced anterior - posterior transport/Uncontrolled bolus / spillover in hypopharynx

## 2025-04-08 NOTE — PROGRESS NOTE ADULT - ASSESSMENT
89 year old female with PMHx of Afib (off Eliquis), PPM, HTN, HLD, GERD, severe MR/TR, iatrogenic adrenal insufficiency (on prednisone), lymphedema, metastatic melanoma s/p Left foot melanoma and lymph node resection, s/p RUE melanoma and Right axillary lymph node excision at [facility] (3/18/25). Patient with a recent admission 3/21 -3/25/25  when she initially presented to [facility] and was transferred to [facility] as a stroke code. At [facility] hospital course significant for s/p cerebral angiogram for mechanical thrombectomy of L M1 occlusion, TICI 3 (one pass) on 3/21/25, admission to NSICU. Patient was discharged and presented back to [facility] 3/26 with L sided weakness and aphasia transferred to [facility] for stroke code and is now  s/p cerebral angiogram for mechanical thrombectomy of Rt M2 occlusion, TICI 3 (one pass) on 3/26/25. Has been in NSICU since. 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 Patient also noted to have incidental finding of new 1.7 cm cystic lesion at the pancreatic body found on CT a/p on 3/28/25 and was recommended outpatient GI follow up. On 3/29 patient was noted to have acute change in neuro exam became acutely aphasic. New radiology reporting + LM2 occulusion. Patient went to Neuro IR and is s/p thrombectomy of left M4 with TICI 0. Was on Nitrofurantoin for a UTI 4/2 - 4/4. 4/4 early AM patient noted with fever and sepsis work up was done, started on Cefepime, remained febrile this AM. ID input requested.       Fever  Acute hypoxic respiratory failure  Hypotension   Multiple CVA  s/p cerebral angiogram for mechanical thrombectomy of L M1 occlusion, TICI 3 (one pass) on 3/21/25  s/p cerebral angiogram for mechanical thrombectomy of Rt M2 occlusion, TICI 3 (one pass) on 3/26/25  s/p thrombectomy of left M4 with TICI 0 3/29/25  R fem artery pseudoaneurysm  s/p thrombin injection on 03/27/25  s/p bedside thrombin injection 3/28/25  A fib  PPM present       - Blood cultures 3/26, 4/4  no growth   - RVP/COVID 19 PCR 4/4 negative   - UA 4/2 with some pyuria   - Urine Cx 3/26 no growth 4/3 contaminated   - CXR 4/4 reviewed and compared to 3/29, not much change noted.   - CT Chest with contrast reporting no PE, + Atelectasis   - No need for DARIUSZ per neurology   - Procalcitonin level  1.01 (3/26) --> 0.37 (4/4) --> 0.22 (4/6)  - Continue Cefepime 2000mg IV [id number] till 4/10/25  - Hold off on PICC/Midline for now unless needed for reasons other than infectious diseases  - Follow up cultures  - Trend Fever  - Trend WBC      Thank you for allowing me to participate in the care of your patient.   Will sign off. Please call PRN.     Discussed treatment plan with: Neurology Team and clinical pharmacy

## 2025-04-08 NOTE — PROGRESS NOTE ADULT - PROVIDER SPECIALTY LIST ADULT
Endocrinology
Infectious Disease
NSICU
Neurology
Physiatry
Physiatry
Vascular Surgery
Brain Injury Medicine
Endocrinology
Endocrinology
Hospitalist
Infectious Disease
Infectious Disease
Internal Medicine
Intervent Radiology
Neurology
Physiatry
Brain Injury Medicine
Brain Injury Medicine
Endocrinology
NSICU
Neurology
Vascular Surgery
Endocrinology
Hospitalist
NSICU
Neurology
Infectious Disease
NSICU
NSICU

## 2025-04-08 NOTE — H&P ADULT - NSHPLABSRESULTS_GEN_ALL_CORE
LABS:                        10.5   6.34  )-----------( 152      ( 08 Apr 2025 07:18 )             32.9     04-08    142  |  108  |  35.4[H]  ----------------------------<  95  3.6   |  23.0  |  0.44[L]    Ca    8.4      08 Apr 2025 07:18  Phos  1.8     04-08  Mg     2.0     04-08      CT Angio Neck Stroke Protocol w/ IV Cont (03.21.25 @ 02:48)   CT PERFUSION:  Technical limitations: Motion artifact, suboptimal contrast bolus.  Core infarction: 5 ml  Penumbra / tissue at risk for active ischemia: 0 ml    CTA NECK:  No evidence of significant stenosis or occlusion.    CTA HEAD:  Nonopacification of the left carotid terminus and proximal left A1 and M1   segments. Left anterior cerebral artery likely opacified via a patent   anterior communicating artery. Some distal reconstitution of the distal   left MCA distribution, although is markedly decreased blood vessel   density when compared with the right side.    CT Head No Cont (03.24.25 @ 14:35) Subacute left MCA infarct, without acute intracranial hemorrhage.    MR Head No Cont (03.25.25 @ 09:13)   IMPRESSION: Evolving acute/subacute left-sided MCA, left lenticulostriate, and left-sided anterior choroidal artery distribution infarcts with associated cytotoxic edema and without hemorrhagic transformation.      CT Brain Perfusion Maps Stroke (03.26.25 @ 08:48)   1.   RIGHT CAROTID SYSTEM:    Atherosclerotic plaque carotid bulb without    hemodynamically significant stenosis. Tandem lesion distal internal   carotid artery at the C1 level has developed since 3/21/2025, possible   interval arterial dissection, with associated luminal narrowing   approximately 30%    2.   LEFT CAROTID SYSTEM:     Atherosclerotic plaque carotid bulb without    hemodynamically significant stenosis.    3.   VERTEBRAL CIRCULATION:    Patent.    4.  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 Right MCA occlusion   in its proximal M2 segment is an interval finding since 3/21/2025. Left   MCA remains patent with luminal irregularity and low-grade narrowing   following recent thrombectomy.    5.  POSTERIOR INTRACRANIAL CIRCULATION:    Patent.    6. BRAIN PERFUSION:   No acute infarction of the brain is convincingly   demonstrated.  However, broad region of apparent ischemia corresponds to   the right MCA distribution correlating with acute embolic occlusion on CT   angiography    7. Heterogeneous enhancement within the principal dural sinuses may be   secondary to the early phase of venous opacification on this arterial   phase examination. The appearance is similar to 3/21/2025. Consider   supplemental evaluation with MR venogram        MR Head w/wo IV Cont (03.31.25 @ 10:09) >  New acute infarctions within the LEFT lateral frontal cortex, LEFT occipital cortex, RIGHT insular cortex and scattered RIGHT   frontal, parietal, temporal and occipital cortex suggesting embolic etiology. Subacute infarction within the LEFT basal ganglia. Mild periventricular chronic white matter ischemia.

## 2025-04-08 NOTE — H&P ADULT - NSHPSOCIALHISTORY_GEN_ALL_CORE
Smoking -   EtOH -   Drugs -     Lives with son in a 2-story house with 1 step to enter and 14 up(+rail). independent without devices prior to admit. +driving    CURRENT FUNCTIONAL STATUS (4/7)  Bed Mobility: Sofia  Transfers: Sofia  Gait: 30 ft RW Sofia Smoking - denies  EtOH - one glass of wine daily  Drugs - denies    Lives with son in a 2-story house with 1 step to enter, 2 steps to family room, 10+2 steps to the 2nd floor with left hand rail. independent without devices prior to initial admit. +driving, set up bed on first floor, half bath on main floor, full bath with walk in shower on 2nd floor. owns walker, cane, wheelchair, commode     CURRENT FUNCTIONAL STATUS (4/7)  Bed Mobility: Sofia  Transfers: Sofia  Gait: 30 ft RW Sofia Smoking - denies  EtOH - one glass of wine daily  Drugs - denies    Lives with son in a 2-story house with 1 step to enter, 2 steps to family room, 10+2 steps to the 2nd floor with left hand rail. independent without devices prior to initial admit. +driving, set up bed on first floor, half bath on main floor, full bath with walk in shower on 2nd floor. owns walker, cane, wheelchair, commode   Retired Paraprofessional    CURRENT FUNCTIONAL STATUS (4/7)  Bed Mobility: Sofia  Transfers: Sofia  Gait: 30 ft RW Sofia

## 2025-04-08 NOTE — PROGRESS NOTE ADULT - SUBJECTIVE AND OBJECTIVE BOX
CC: Patient being seen for rehabilitation follow up.  Patient fatigued.  Slept ok.  Reports no pain.   Family not at bedside.     FUNCTIONAL PROGRESS   SLP  Progress Summary: Chart reviewed. POC discussed with neuro team & pt/family. Will plan for MBS 25 to objectively re-assess swallow/safety for diet upgrade to thin fluids, given team concerns with ? aspiration. Will follow.      PT  Bed Mobility  Bed Mobility Training Supine-to-Sit: minimum assist (75% patient effort)    Sit-Stand Transfer Training  Transfer Training Sit-to-Stand Transfer: minimum assist (75% patient effort)  Transfer Training Stand-to-Sit Transfer: minimum assist (75% patient effort)    Gait Training  Gait Trainin feetx2 RW Sofia  Gait Analysis: decreased right step length, decreased upright posture, verbal cues for sequencing, decreased gait velocity, assist for safety     OT  Sit-Stand Transfer Training  Transfer Training Sit-to-Stand Transfer: minimum assist (75% patient effort);  1 person assist;  nonverbal cues (demo/gestures);  verbal cues;  weight-bearing as tolerated   rolling walker;  Cues for sequencing of movement and for safety for proper hand placement prior to/during transfer   Transfer Training Stand-to-Sit Transfer: minimum assist (75% patient effort);  1 person assist;  nonverbal cues (demo/gestures);  verbal cues;  weight-bearing as tolerated   rolling walker;  Cues for sequencing of movement and for safety for proper hand placement prior to/during transfer   Sit-to-Stand Transfer Training Transfer Safety Analysis: decreased weight-shifting ability;  decreased carryover of safe transfer techniques-pt requires cues, did improve through session with less cueing ;  cognitive, decreased safety awareness;  impaired coordination;  impaired balance;  decreased strength;  rolling walker    Toilet Transfer Training  Transfer Training Toilet Transfer: minimum assist (75% patient effort);  1 person assist;  nonverbal cues (demo/gestures);  verbal cues;  commode. Cues for sequencing of movement and for safety for proper hand placement prior to/during transfer ;  weight-bearing as tolerated   rolling walker;  commode  Toilet Transfer Training Transfer Safety Analysis: decreased weight-shifting ability;  decreased strength;  impaired balance;  cognitive, decreased safety awareness;  rolling walker;  commode    Therapeutic Exercise  Therapeutic Exercise Detail: Pt performed sitting balance in bedside chair reaching across midline, reaching foward and reaching laterally to promote AROM, trunk rotation, lateral trunk flexion, hand eye coordination, bilateral integration, crossing midline and to facilitate participation with ADLs. Pt educated on energy conservation techniques and encouraged to take frequent rest breaks. Tolerated well.     Functional Endurance  Functional Endurance Detail: Pt ambulated with RW and min A x 1, +external cues short distances around bed area due to decreased balance and strength. Pt tacy, HR into 140's, RN aware, pt returned to  with seated rest break. Pt educated in energy conservation techniques including proper breathing and activity pacing.     Lower Body Dressing Training  Lower Body Dressing Training Assistance: moderate assist (50% patient effort);  1 person assist;  nonverbal cues (demo/gestures);  verbal cues;  decreased flexibility;  decreased strength;  impaired coordination;  impaired postural control    VITALS  T(C): 36.5 (25 @ 04:02), Max: 36.9 (25 @ 15:42)  HR: 90 (25 @ 08:00) (86 - 113)  BP: 131/71 (25 @ 08:00) (110/97 - 151/82)  RR: 16 (25 @ 08:00) (16 - 18)  SpO2: 92% (25 @ 08:00) (92% - 100%)  Wt(kg): --    MEDICATIONS   acetaminophen     Tablet .. 650 milliGRAM(s) every 6 hours PRN  apixaban 5 milliGRAM(s) every 12 hours  atorvastatin 10 milliGRAM(s) at bedtime  cefepime  Injectable. 2000 milliGRAM(s) every 8 hours  chlorhexidine 2% Cloths 1 Application(s) daily  dextrose 5%. 1000 milliLiter(s) <Continuous>  dextrose 5%. 1000 milliLiter(s) <Continuous>  dextrose 50% Injectable 25 Gram(s) once  dextrose 50% Injectable 12.5 Gram(s) once  dextrose 50% Injectable 25 Gram(s) once  dextrose Oral Gel 15 Gram(s) once PRN  glucagon  Injectable 1 milliGRAM(s) once  insulin lispro (ADMELOG) corrective regimen sliding scale   three times a day before meals  levalbuterol Inhalation 0.63 milliGRAM(s) every 6 hours  metoprolol tartrate 50 milliGRAM(s) three times a day  multivitamin 1 Tablet(s) daily  pantoprazole  Injectable 40 milliGRAM(s) daily  polyethylene glycol 3350 17 Gram(s) daily  predniSONE   Tablet 5 milliGRAM(s) daily  predniSONE   Tablet 2 milliGRAM(s) daily  senna 2 Tablet(s) at bedtime  tamsulosin 0.4 milliGRAM(s) at bedtime      RECENT LABS/IMAGING  - Reviewed Today                        10.5   6.34  )-----------( 152      ( 2025 07:18 )             32.9     04-08    142  |  108  |  35.4[H]  ----------------------------<  95  3.6   |  23.0  |  0.44[L]    Ca    8.4      2025 07:18  Phos  1.8     04-08  Mg     2.0     04-08        Urinalysis Basic - ( 2025 07:18 )    Color: x / Appearance: x / SG: x / pH: x  Gluc: 95 mg/dL / Ketone: x  / Bili: x / Urobili: x   Blood: x / Protein: x / Nitrite: x   Leuk Esterase: x / RBC: x / WBC x   Sq Epi: x / Non Sq Epi: x / Bacteria: x                CT Brain Stroke 3/26 - 1. No intracranial hemorrhage is appreciated.2. No intracranial mass lesion is appreciated.  3. Left basal ganglia subacute infarction, was acute at the time of stroke code 3/21/2025, demonstrates expected evolution. Consider MR for evaluation of infarct extension4. MR may provide higher sensitivity imaging evaluation for the clinical   indication of acute infarction.    CT ANGIO BRAIN AND NECK STROKE 3/26 - 1.   RIGHT CAROTID SYSTEM:    Atherosclerotic plaque carotid bulb without    hemodynamically significant stenosis. Tandem lesion distal internal carotid artery at the C1 level has developed since 3/21/2025, possible interval arterial dissection, with associated luminal narrowing approximately 30%  2.   LEFT CAROTID SYSTEM:     Atherosclerotic plaque carotid bulb without  hemodynamically significant stenosis.  3.   VERTEBRAL CIRCULATION:    Patent.4.  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 Right MCA occlusion in its proximal M2 segment is an interval finding since 3/21/2025. Left MCA remains patent with luminal irregularity and low-grade narrowing following recent thrombectomy.5.  POSTERIOR INTRACRANIAL CIRCULATION:    Patent.  6. BRAIN PERFUSION:   No acute infarction of the brain is convincingly demonstrated.  However, broad region of apparent ischemia corresponds to the right MCA distribution correlating with acute embolic occlusion on CT angiography  7. Heterogeneous enhancement within the principal dural sinuses may be secondary to the early phase of venous opacification on this arterial phase examination. The appearance is similar to 3/21/2025. Consider supplemental evaluation with MR venogram    IR NEURO 3/26 - POSTOPERATIVE DIAGNOSIS: Large distal upper division M3 trunk occlusion. TICI 3 reperfusion post endovascular thrombectomy    DUPLEX NIKA LE 3/26 - No evidence of deep venous thrombosis in either lower extremity.    DUPLEX R ARTERIAL LE 3/26 - A 1.7 cm right CFA pseudoaneurysm.A 2.5 cm adjacent hematoma.    CT HEAD 3/27 - Age-appropriate involutional and ischemic gliotic changes. No hemorrhage. No significant change since 3/26/2025.    DUPLEX ARTERIAL LOWER EXT, R 3/27 - Slightly decreased size of partially thrombosed pseudoaneurysm arising from the right common femoral artery/proximal right femoral artery.Possible dissection flap in the right common femoral artery.    INTERVENTIONAL IR 3/27 - Right femoral vein pseudoaneurysm injected with 100units of thrombin and pressure held for 10min post injection. PSA appears thrombosed.    CT C/A/P w/contrast 3/27 -No evidence of metastatic disease.Severe cardiomegaly.Pseudoaneurysm again seen off the proximal aspect of the right femoral artery. Correlate with arterial ultrasound performed on the same day. New 1.7 cm cystic lesion at the pancreatic body.    HEAD CT 3/29 - No acute intracranial hemorrhage, mass effect, or shift of the midline structures. Evolving acute left frontal lobe superior division MCA infarct without hemorrhagic transformation.    MRI HEAD 3/31 -  New acute infarctions within the LEFT lateral frontal cortex, LEFT occipital cortex, RIGHT insular cortex and scattered RIGHT frontal, parietal, temporal and occipital cortex suggesting embolic etiology. Subacute infarction within the LEFT basal ganglia. Mild periventricular chronic white matter ischemia.    HEAD CT  -  Subacute infarcts are again seen bilaterally as described above.  Acute infarct is now seen involving the left basal ganglia region.    HEAD CT  - Stable subacute bilateral infarctions, most pronounced in left basal ganglia. Trace left frontal lobe petechial hemorrhage versus spared cortex. Continued follow-up is recommended.    TTE /4 -  1. Left ventricular cavity is normal in size. Left ventricular systolic function is hyperdynamic with an ejection fraction visually estimated at >75 %.   2. There is no evidence of a left ventricular thrombus.   3. Analysis of left ventricular diastolic function and filling pressure is made challenging by the presence of atrial fibrillation.   4. Normal right ventricular cavity size and normal right ventricular systolic function.   5. Left atrium is severely dilated.   6. The right atrium is severely dilated.   7. Device lead is visualized in the right heart.   8. Mild mitral regurgitation.   9. Mild to moderate tricuspid regurgitation.  10. Estimated pulmonary artery systolic pressure is 37 mmHg.  11. The inferior vena cava is dilated measuring 2.23 cm in diameter, (dilated >2.1cm) with normal inspiratory collapse (normal >50%) consistent with mildly elevated right atrial pressure (~8, range 5-10mmHg).  12. Trileaflet aortic valve with normal systolic excursion.  13. Small pericardial effusion.  14. Small bilateral pleural effusion noted.    CXR  - Enlarged cardiac silhouette. Hazy right mid and lower opacity and opacification of approximately the  lower half of the left hemithorax, findings which may correspond to bilateral pleural effusions and lower lobe atelectasis reported on the CT scan. Other underlying pathology including pneumonia is not excluded on this image.  ----------------------------------------------------------------------------------------  PHYSICAL EXAM   Constitutional - NAD, Comfortable   Neurologic Exam -                    Cognitive - AAO to self, PART situation     Communication - Expressive deficits, Delayed processing, Dysarthria     Cranial Nerves - Left lip droop     FUNCTIONAL MOTOR EXAM -                     LEFT    UE - ShAB 2/5, EF 3/5, EE 3/5,  4/5                    RIGHT UE - ShAB 2/5, EF 3/5, EE 3/5,  4/5                    LEFT    LE - HF 2/5, KE 2/5, DF 3/5                     RIGHT LE - HF 2/5, KE 2/5, DF 3/5       Sensory - Intact to LT  Psychiatric - Fatigued  ----------------------------------------------------------------------------------------  ASSESSMENT/PLAN  89yFemale with functional deficits 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  Acute Bilateral CVA occlusions s/p 3 thrombectomies - Eliquis, Lipitor  Right Femoral Artery Pseudoaneurysm - Stable  HTN, AFIB - Lopressor  Adrenal Insufficiency - Prednisone  Pharyngeal Dysphagia - MILDLY THICK Liquids  ID - Cefepime  Pain - Tylenol  DVT PPX - SCDs  Rehab/Impaired mobility and function - Patient continues to require hospitalization for the above diagnoses and ongoing active management of comorbid complications that are substantially posing a threat to bodily function, functional ability and quality of life.     RECOMMEND OOB Daily     When medically optimized, based on the patient's diagnosis, current functional status and potential for progress, recommend ACUTE inpatient rehabilitation for the functional deficits consisting of 3 hours of therapy/day & 24 hour RN/daily PMR physician for active comorbid medical management. Patient will be able to tolerate 3 hours a day.    Goals for acute inpatient rehab are to achieve modified independence with bed mobility, modified independence with transfers and modified independence with ambulation of 50'over an LOS of 28 days.    Will need a PT and OT follow-up within 48 hrs discharge.

## 2025-04-09 LAB
ALBUMIN SERPL ELPH-MCNC: 2.3 G/DL — LOW (ref 3.3–5)
ALP SERPL-CCNC: 44 U/L — SIGNIFICANT CHANGE UP (ref 40–120)
ALT FLD-CCNC: 34 U/L — SIGNIFICANT CHANGE UP (ref 10–45)
ANION GAP SERPL CALC-SCNC: 5 MMOL/L — SIGNIFICANT CHANGE UP (ref 5–17)
ANISOCYTOSIS BLD QL: SLIGHT — SIGNIFICANT CHANGE UP
AST SERPL-CCNC: 21 U/L — SIGNIFICANT CHANGE UP (ref 10–40)
BASOPHILS # BLD AUTO: 0.06 K/UL — SIGNIFICANT CHANGE UP (ref 0–0.2)
BASOPHILS NFR BLD AUTO: 1 % — SIGNIFICANT CHANGE UP (ref 0–2)
BILIRUB SERPL-MCNC: 1.2 MG/DL — SIGNIFICANT CHANGE UP (ref 0.2–1.2)
BUN SERPL-MCNC: 24 MG/DL — HIGH (ref 7–23)
CALCIUM SERPL-MCNC: 8.4 MG/DL — SIGNIFICANT CHANGE UP (ref 8.4–10.5)
CHLORIDE SERPL-SCNC: 106 MMOL/L — SIGNIFICANT CHANGE UP (ref 96–108)
CO2 SERPL-SCNC: 27 MMOL/L — SIGNIFICANT CHANGE UP (ref 22–31)
CREAT SERPL-MCNC: 0.48 MG/DL — LOW (ref 0.5–1.3)
CULTURE RESULTS: SIGNIFICANT CHANGE UP
EGFR: 91 ML/MIN/1.73M2 — SIGNIFICANT CHANGE UP
EGFR: 91 ML/MIN/1.73M2 — SIGNIFICANT CHANGE UP
EOSINOPHIL # BLD AUTO: 0.24 K/UL — SIGNIFICANT CHANGE UP (ref 0–0.5)
EOSINOPHIL NFR BLD AUTO: 4 % — SIGNIFICANT CHANGE UP (ref 0–6)
GLUCOSE SERPL-MCNC: 93 MG/DL — SIGNIFICANT CHANGE UP (ref 70–99)
HCT VFR BLD CALC: 32.9 % — LOW (ref 34.5–45)
HGB BLD-MCNC: 10.8 G/DL — LOW (ref 11.5–15.5)
LYMPHOCYTES # BLD AUTO: 0.61 K/UL — LOW (ref 1–3.3)
LYMPHOCYTES # BLD AUTO: 10 % — LOW (ref 13–44)
MANUAL SMEAR VERIFICATION: SIGNIFICANT CHANGE UP
MCHC RBC-ENTMCNC: 32.1 PG — SIGNIFICANT CHANGE UP (ref 27–34)
MCHC RBC-ENTMCNC: 32.8 G/DL — SIGNIFICANT CHANGE UP (ref 32–36)
MCV RBC AUTO: 97.9 FL — SIGNIFICANT CHANGE UP (ref 80–100)
MONOCYTES # BLD AUTO: 0.55 K/UL — SIGNIFICANT CHANGE UP (ref 0–0.9)
MONOCYTES NFR BLD AUTO: 9 % — SIGNIFICANT CHANGE UP (ref 2–14)
MYELOCYTES NFR BLD: 2 % — HIGH (ref 0–0)
NEUTROPHILS # BLD AUTO: 4.5 K/UL — SIGNIFICANT CHANGE UP (ref 1.8–7.4)
NEUTROPHILS NFR BLD AUTO: 74 % — SIGNIFICANT CHANGE UP (ref 43–77)
NRBC # BLD: 0 /100 WBCS — SIGNIFICANT CHANGE UP (ref 0–0)
NRBC BLD-RTO: 0 /100 WBCS — SIGNIFICANT CHANGE UP (ref 0–0)
PLAT MORPH BLD: NORMAL — SIGNIFICANT CHANGE UP
PLATELET # BLD AUTO: 133 K/UL — LOW (ref 150–400)
POTASSIUM SERPL-MCNC: 4.1 MMOL/L — SIGNIFICANT CHANGE UP (ref 3.5–5.3)
POTASSIUM SERPL-SCNC: 4.1 MMOL/L — SIGNIFICANT CHANGE UP (ref 3.5–5.3)
PROT SERPL-MCNC: 5.3 G/DL — LOW (ref 6–8.3)
RBC # BLD: 3.36 M/UL — LOW (ref 3.8–5.2)
RBC # FLD: 14.8 % — HIGH (ref 10.3–14.5)
RBC BLD AUTO: ABNORMAL
SODIUM SERPL-SCNC: 138 MMOL/L — SIGNIFICANT CHANGE UP (ref 135–145)
SPECIMEN SOURCE: SIGNIFICANT CHANGE UP
WBC # BLD: 6.08 K/UL — SIGNIFICANT CHANGE UP (ref 3.8–10.5)
WBC # FLD AUTO: 6.08 K/UL — SIGNIFICANT CHANGE UP (ref 3.8–10.5)

## 2025-04-09 PROCEDURE — 99223 1ST HOSP IP/OBS HIGH 75: CPT

## 2025-04-09 PROCEDURE — 99232 SBSQ HOSP IP/OBS MODERATE 35: CPT

## 2025-04-09 RX ADMIN — PREDNISONE 2 MILLIGRAM(S): 20 TABLET ORAL at 16:58

## 2025-04-09 RX ADMIN — METOPROLOL SUCCINATE 50 MILLIGRAM(S): 50 TABLET, EXTENDED RELEASE ORAL at 22:43

## 2025-04-09 RX ADMIN — CEFEPIME 100 MILLIGRAM(S): 2 INJECTION, POWDER, FOR SOLUTION INTRAVENOUS at 14:06

## 2025-04-09 RX ADMIN — APIXABAN 5 MILLIGRAM(S): 2.5 TABLET, FILM COATED ORAL at 17:46

## 2025-04-09 RX ADMIN — Medication 2 TABLET(S): at 22:43

## 2025-04-09 RX ADMIN — ATORVASTATIN CALCIUM 10 MILLIGRAM(S): 80 TABLET, FILM COATED ORAL at 22:43

## 2025-04-09 RX ADMIN — APIXABAN 5 MILLIGRAM(S): 2.5 TABLET, FILM COATED ORAL at 05:24

## 2025-04-09 RX ADMIN — Medication 1 TABLET(S): at 12:09

## 2025-04-09 RX ADMIN — CEFEPIME 100 MILLIGRAM(S): 2 INJECTION, POWDER, FOR SOLUTION INTRAVENOUS at 05:25

## 2025-04-09 RX ADMIN — CEFEPIME 100 MILLIGRAM(S): 2 INJECTION, POWDER, FOR SOLUTION INTRAVENOUS at 22:01

## 2025-04-09 RX ADMIN — METOPROLOL SUCCINATE 50 MILLIGRAM(S): 50 TABLET, EXTENDED RELEASE ORAL at 05:24

## 2025-04-09 RX ADMIN — Medication 40 MILLIGRAM(S): at 05:24

## 2025-04-09 RX ADMIN — TAMSULOSIN HYDROCHLORIDE 0.4 MILLIGRAM(S): 0.4 CAPSULE ORAL at 22:43

## 2025-04-09 RX ADMIN — PREDNISONE 5 MILLIGRAM(S): 20 TABLET ORAL at 07:55

## 2025-04-09 RX ADMIN — POLYETHYLENE GLYCOL 3350 17 GRAM(S): 17 POWDER, FOR SOLUTION ORAL at 12:10

## 2025-04-09 NOTE — DIETITIAN INITIAL EVALUATION ADULT - ORAL INTAKE PTA/DIET HISTORY
Unable to obtain diet Hx from patient, no family at bedside. Will obtain subjective weight/diet Hx from patient/family PRN. RD visited pt during lunch in restorative dining setting. Pt with fair appetite/intake. No observed issues chewing/swallowing. Pt seen by speech language pathologist recommended regular with thin liquids.     Pt high risk for malnutrition due to hx of chronic illnesses however unable to determine intake & NFPE not performed at this time.

## 2025-04-09 NOTE — CONSULT NOTE ADULT - SUBJECTIVE AND OBJECTIVE BOX
HPI   89 year old female with PMHx of Afib (off Eliquis), s/p PPM, HTN, HLD, GERD, severe MR/TR, iatrogenic adrenal insufficiency (on prednisone), lymphedema, metastatic melanoma s/p left foot melanoma and lymph node resection, s/p RUE melanoma and right axillary lymph node excision at [facility] (3/18/25). Patient with a recent admission 3/21 -3/25/25  when she initially presented to  with code stroke and was transferred to [facility] where she underwent cerebral angiogram for mechanical thrombectomy of L M1 occlusion, TICI 3 (one pass) on 3/21/25. Patient was discharged and presented back to [facility] 3/26 with L sided weakness and aphasia, transferred to [facility] for stroke codem and is now s/p mechanical thrombectomy of Rt M2 occlusion, TICI 3 (one pass) on 3/26/25. On the early morning of 3/29/25 pt aphasic with right sided weakness. CTH negative for acute hemorrhage. CTA + new Left MCA M2 occulusion and patient subsequently underwent 3rd thrombectomy on 3/29. MRI brain demonstrated acute bilateral multifocal cerebral infarctions and subacute left basal ganglia infarct.  Etiology likely cardioembolic in the setting of atrial fibrillation and underlying hypercoaguability from malignancy.    Hospital course complicated by right groin pseudoaneurysm s/p L MCA thrombectomy. She was seen by vascular surgery and given thrombin injection x2 and recommended for outpatient follow up as needed. Hematology consulted due to concern for hypercoagulable state, anemia, hx of melanoma. CBC trended, overall stable. Hypercoagulable serology panel sent, negative for abnormalities. Follow up outpatient for further monitoring/testing. Hospital course further complicated by urinary retention and rivas catheter placed. Failed TOV 4/1-4/2, rivas replaced 4/3/25, and patient started on flomax.     Endocrine consulted due to hx of adrenal insufficiency; initially started on hydrocortisone then transitioned to home regimen of prednisone 5mg in AM and 2mg in PM. Incidentally found new 1.7 cm cystic lesion at the pancreatic body on CT A/P on 3/28/25. GI informed, no inpatient workup needed. Follow up outpatient with Dr. Maurisio Haas. Patient started on cefepime and vancomycin on 4/4/25 due to fever of unknown origin. ID consulted, Vancomycin discontinued after blood cultures resulted negative. ID recommendations to continue with IV cefepime until 4/10/25.     Patient resumed Eliquis 5mg daily (4/2/25) for secondary stroke prevention. Also re-started on home regimen of atorvastatin 10mg daily; LDL - 59, nonatherosclerotic etiology of stroke, high intensity statin not indicated at this time. PT/OT evaluated patient, recommended dispo to acute rehab. Patient should follow up with neurology/neuro IR, cardiology, endocrinology, hematology, GI, primary care, +/- urology and vascular surgery after discharge. Patient discharged to [facility] on 4/8/25.    Patient seen and examined at bedside. No acute events noted.  Patient is unable to provide any history due to aphasia. Answers simple yes/no questions.     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     PAST SURGICAL HISTORY:  H/O cardiac catheterization 3/15, no  blockages  H/O cataract extraction both eyes  History of left inguinal hernia repair mesh  History of melanoma excision left foot  S/P tonsillectomy and adenoidectomy.     SOCIAL HISTORY   Substance use	No  Smoking - denies  EtOH - one glass of wine daily  Drugs - denies    MEDICATIONS  (STANDING):  apixaban 5 milliGRAM(s) Oral every 12 hours  atorvastatin 10 milliGRAM(s) Oral at bedtime  cefepime   IVPB 2000 milliGRAM(s) IV Intermittent every 8 hours  metoprolol tartrate 50 milliGRAM(s) Oral three times a day  multivitamin 1 Tablet(s) Oral daily  pantoprazole    Tablet 40 milliGRAM(s) Oral before breakfast  polyethylene glycol 3350 17 Gram(s) Oral daily  predniSONE   Tablet 5 milliGRAM(s) Oral <User Schedule>  predniSONE   Tablet 2 milliGRAM(s) Oral <User Schedule>  senna 2 Tablet(s) Oral at bedtime  tamsulosin 0.4 milliGRAM(s) Oral at bedtime    MEDICATIONS  (PRN):  acetaminophen     Tablet .. 650 milliGRAM(s) Oral every 6 hours PRN Mild Pain (1 - 3)    Vital Signs Last 24 Hrs  T(F): 97.5 (08 Apr 2025 19:57), Max: 98.2 (08 Apr 2025 14:48)  HR: 56 (09 Apr 2025 14:19) (56 - 106)  BP: 100/56 (09 Apr 2025 14:19) (100/56 - 158/94)  RR: 15 (09 Apr 2025 07:46) (15 - 16)  SpO2: 94% (09 Apr 2025 07:46) (93% - 94%)  I&O's Summary    08 Apr 2025 07:01  -  09 Apr 2025 07:00  --------------------------------------------------------  IN: 100 mL / OUT: 1600 mL / NET: -1500 mL    BMI (kg/m2): 27.2 (04-08-25 @ 14:48)    PHYSICAL EXAM:  GENERAL: NAD  HEENT: NCAT  CHEST/LUNG: Clear to percussion bilaterally; No rales, rhonchi, wheezing  HEART: Irregular, +murmur  ABDOMEN: Soft, Nontender, Nondistended; Bowel sounds present  MUSCULOSKELETAL/EXTREMITIES:  2+ Peripheral Pulses, trace LE edema bilaterally   : Rivas in place with yellow urine   PSYCH: Appropriate affect  NEURO: Awake, alert, aphasic, answer simple yes/no questions, follows commands    I personally reviewed the below data/images/labs:    LABS:                        10.8   6.08  )-----------( 133      ( 09 Apr 2025 05:14 )             32.9       04-09    138  |  106  |  24  ----------------------------<  93  4.1   |  27  |  0.48    Ca    8.4      09 Apr 2025 05:14  Phos  1.8     04-08  Mg     2.0     04-08    TPro  5.3  /  Alb  2.3  /  TBili  1.2  /  DBili  x   /  AST  21  /  ALT  34  /  AlkPhos  44  04-09    03-27 Chol 145 mg/dL LDL -- HDL 74 mg/dL Trig 55 mg/dL  TSH 0.36   TSH with FT4 reflex --  Total T3 50    Urinalysis Basic - ( 09 Apr 2025 05:14 )    Color: x / Appearance: x / SG: x / pH: x  Gluc: 93 mg/dL / Ketone: x  / Bili: x / Urobili: x   Blood: x / Protein: x / Nitrite: x   Leuk Esterase: x / RBC: x / WBC x   Sq Epi: x / Non Sq Epi: x / Bacteria: x    Culture - Blood (collected 04 Apr 2025 01:50)  Source: Blood Blood  Final Report (09 Apr 2025 06:01):    No growth at 5 days    Culture - Urine (collected 03 Apr 2025 00:00)  Source: Clean Catch Clean Catch (Midstream)  Final Report (04 Apr 2025 08:48):    >=3 organisms. Probable collection contamination.    COVID-19 PCR: NotDetec (04-08-25 @ 18:30)    Consultant(s) Notes Reviewed:   Care Discussed with Consultants/Other Providers:  Imaging Personally Reviewed:

## 2025-04-09 NOTE — PROGRESS NOTE ADULT - ASSESSMENT
Assessment/Plan:  SHARON HULL is a 89 y/o F with PMHx of Afib (off Eliquis), PPM, HTN, HLD, GERD, severe MR/TR, iatrogenic adrenal insufficiency (on prednisone), lymphedema, metastatic melanoma s/p left foot melanoma and lymph node resection, s/p RUE melanoma and right axillary lymph node excision at [facility] (3/18/25), recent admission (3/21-3/25/25) for L MCA CVA s/p M1 thrombectomy, presented to  with left sided weakness and aphasia, found to have right MCA CVA and s/p M2 thrombectomy (3/26/25).  Hospital Course complicated by right sided weakness and aphasia, found to have new Left MCA CVA with M2 occlusion and is s/p 3rd thrombectomy (3/29/25). Hospital course also complicated by pseudoaneurysm (s/p vasc surgery consult and thrombin injection x2), urinary retention (s/p rivas, failed TOV 4/2), fever of unknown origin (on IV cefepime until 4/10/25). Patient resumed Eliquis on 4/2/25. Patient now admitted for a multidisciplinary rehab program with right sided weakness, Transcortical motor aphasia, cognitive deficits, Dysphagia and gait and ADL impairments.       #  Bihemispheric embolic infarcts s/p Thrombectomy x 3  - s/p cerebral angiogram for mechanical thrombectomy of L M1 occlusion, TICI 3 (one pass) on 3/21/25  - s/p cerebral angiogram for mechanical thrombectomy of Rt M2 occlusion, TICI 3 (one pass) on 3/26/25  - s/p thrombectomy of left M4 with TICI 0 3/29/25  - Etiology likely cardioembolic in the setting of atrial fibrillation and underlying hypercoagulability from malignancy  - Aphasia, Right hemiparesis, Dysphagia  - Start comprehensive rehab program of PT/OT/SLP - 3 hours a day, 5 days a week. P&O as needed   - Precautions: Fall, Aspiration  - Eliquis 5 mg BID  - Atorvastatin 10 mg QHS (LDL 58 on 3/22)    # Atrial Fibrillation  - has PPM present  - Eliquis 5 mg BID  - Lopressor 50 mg TID    # Right femoral artery pseudoaneurysm   - s/p thrombin injection x2    # Fever of unknown origin, ?aspiration PNA  - Blood cultures 3/26, 4/4  no growth   - RVP/COVID 19 PCR 4/4 negative   - UA 4/2 with some pyuria   - Urine Cx 3/26 no growth 4/3 contaminated   - CXR 4/4 reviewed and compared to 3/29, not much change noted  - CT Chest with contrast reporting no PE, + Atelectasis   - Per ID, continue Cefepime 2000mg IV [id number] till 4/10/25    # Iatrogenic aortic insufficiency  - prednisone 5 mg in AM (8:00) and 2 mg in PM (16:00) - home regimen  - f/u outpatient endocrinology    # Metastatic Melanoma  - metastatic melanoma s/p left foot melanoma and lymph node resection, s/p RUE melanoma and right axillary lymph node excision at [facility] (3/18/25)  - outpatient follow up     # Incidental Pancreatic Cyst  - Incidentally found new 1.7 cm cystic lesion at the pancreatic body on CT a/p on 3/28/25  - GI informed, no inpatient workup needed  - Follow up outpatient with Dr. Maurisio Haas    # Pain  - Tylenol PRN    # Mood / Cognition  - Neuropsychology consult PRN    # Sleep  - Maintain quiet hours and a low stim environment.   - Melatonin PRN to maximize participation in therapy during the day    # GI / Bowel  - Senna qHS  - Miralax Daily  - GI ppx: Protonix 40 mg QD    #  / Bladder  # Urinary Retention  - Continue Rivas present on admission, plan for TOV  - Failed TOV on 4/1-4/2, Rivas replaced 4/3  - Flomax 0.4 mg QHS  --Consider removing rivas 4/10 night for TOV in AM    # Skin / Pressure injury  - Skin assessment on admission performed: IAD to sacrum, right posterior thigh ecchymosis, forehead scab s/p biopsy,  left lateral ankle scar with redness towards heel  - Pressure Injury/Skin: OOB to chair, PT/OT  - nursing to monitor skin qShift    # Dysphagia:  - Diet Consistency: regular with mildly thick liquids  - Supplement: MVI  - Aspiration Precautions  - SLP consult for swallow function evaluation and treatment  - Nutrition consult    # DVT prophylaxis:   - on Eliquis 5 mg Q12H      Outpatient Follow-up:  Maurisio Haas  Gastroenterology  [address], [address]  [place] [address]  Phone: [phone]  Fax: [phone]  Follow Up Time: 1 month    Celina Carlin  Hematology/Oncology  [address] [place] [address]  Phone: [phone]  Fax: [phone]  Follow Up Time: 1 month    Juan Diego Johnson  Vascular Neurology  [address] [place] [address]  Phone: [phone]  Fax: [phone]  Follow Up Time: 1 month    Antonio Gaines  Endocrinology/Metab/Diabetes  [address], [address]  [place] [address]  Phone: [phone]  Fax: [phone]  Follow Up Time: 1 month    Milo Murguia  Cardiovascular Disease  [address] [place] [address]  Phone: [phone]  Fax: [phone]  Follow Up Time: 2 weeks    Your Primary Care Provider,   Phone: (   )    -  Fax: (   )    -  Follow Up Time: 1 week    [place]  Urology  [address] [place] [address]  Phone: [phone]  Fax:   Follow Up Time: 1 month    [facility]  Vascular Surgery  [address] [place] [address]  Phone: [phone]  Fax:     Code Status/Emergency Contact:      ---------------

## 2025-04-09 NOTE — DIETITIAN INITIAL EVALUATION ADULT - HEIGHT FOR BMI (INCHES)
- I will send you a message on [employer] when I am able to look at the results of your tests from today    - Don't forget to return the stool kit mailed to you so we can screen you for colon cancer!    - Make an appointment with our pharmacy downstairs or stop by your preferred pharmacy to discuss obtaining the Shingrix (shingles) vaccine series and a Prevnar (pneumonia)   4

## 2025-04-09 NOTE — PROGRESS NOTE ADULT - SUBJECTIVE AND OBJECTIVE BOX
HPI:  89 y/o F with PMHx of Afib (off Eliquis), PPM, HTN, HLD, GERD, severe MR/TR, iatrogenic adrenal insufficiency (on prednisone), lymphedema, metastatic melanoma s/p left foot melanoma and lymph node resection, s/p RUE melanoma and right axillary lymph node excision at [facility] (3/18/25). Patient with a recent admission 3/21 -3/25/25  when she initially presented to  with code stroke and was transferred to [facility] where she underwent cerebral angiogram for mechanical thrombectomy of L M1 occlusion, TICI 3 (one pass) on 3/21/25. Patient was discharged and presented back to [facility] 3/26 with L sided weakness and aphasia, transferred to [facility] for stroke codem and is now s/p mechanical thrombectomy of Rt M2 occlusion, TICI 3 (one pass) on 3/26/25. On the early morning of 3/29/25 pt aphasic with right sided weakness. CTH negative for acute hemorrhage. CTA + new Left MCA M2 occulusion and patient subsequently underwent 3rd thrombectomy on 3/29. MRI brain demonstrated acute bilateral multifocal cerebral infarctions and subacute left basal ganglia infarct.  Etiology likely cardioembolic in the setting of atrial fibrillation and underlying hypercoaguability from malignancy.    Hospital course complicated by right groin pseudoaneurysm s/p L MCA thrombectomy. She was seen by vascular surgery and given thrombin injection x2 and recommended for outpatient follow up as needed. Hematology consulted due to concern for hypercoagulable state, anemia, hx of melanoma. CBC trended, overall stable. Hypercoagulable serology panel sent, negative for abnormalities. Follow up outpatient for further monitoring/testing. Hospital course further complicated by urinary retention and rivas catheter placed. Failed TOV 4/1-4/2, rivas replaced 4/3/25, and patient started on flomax.     Endocrine consulted due to hx of adrenal insufficiency; initially started on hydrocortisone then transitioned to home regimen of prednisone 5mg in AM and 2mg in PM. Incidentally found new 1.7 cm cystic lesion at the pancreatic body on CT A/P on 3/28/25. GI informed, no inpatient workup needed. Follow up outpatient with Dr. Maurisio Haas. Patient started on cefepime and vancomycin on 4/4/25 due to fever of unknown origin. ID consulted, Vancomycin discontinued after blood cultures resulted negative. ID recommendations to continue with IV cefepime until 4/10/25.     Patient resumed Eliquis 5mg daily (4/2/25) for secondary stroke prevention. Also re-started on home regimen of atorvastatin 10mg daily; LDL - 59, nonatherosclerotic etiology of stroke, high intensity statin not indicated at this time. PT/OT evaluated patient, recommended dispo to acute rehab. Patient should follow up with neurology/neuro IR, cardiology, endocrinology, hematology, GI, primary care, +/- urology and vascular surgery after discharge. Patient discharged to [facility] on 4/8/25.   (08 Apr 2025 12:56)          Subjective:      VITALS  Vital Signs Last 24 Hrs  T(C): 36.4 (08 Apr 2025 19:57), Max: 36.8 (08 Apr 2025 14:48)  T(F): 97.5 (08 Apr 2025 19:57), Max: 98.2 (08 Apr 2025 14:48)  HR: 106 (09 Apr 2025 07:46) (61 - 106)  BP: 158/94 (09 Apr 2025 07:46) (123/66 - 158/94)  BP(mean): --  RR: 15 (09 Apr 2025 07:46) (15 - 16)  SpO2: 94% (09 Apr 2025 07:46) (93% - 94%)    Parameters below as of 09 Apr 2025 07:46  Patient On (Oxygen Delivery Method): room air        REVIEW OF SYMPTOMS  Neurological deficits      PHYSICAL EXAM  Constitutional - NAD, Comfortable  HEENT - NCAT, EOMI  Chest - CTA bilaterally, Breathing comfortably on RA  Cardiovascular - RRR,  warm well perfused  Abdomen - BS+, Soft, NTND  Extremities - No edema, No calf tenderness   Neurologic Exam -                    Cognitive - Awake, Alert, AAO to self, place, date, year, situation     Communication - Fluent, No dysarthria,  Aphasia,      Cranial Nerves - PERRLA, EOMI, VF intact, No facial assymmetry, sensation intact, tongue midline, +dysphagia, +dysarthria     Motor - No focal deficits                    LEFT    UE - ShAB 5/5, EF 5/5, EE 5/5, WE 5/5,  5/5                    RIGHT UE - ShAB 5/5, EF 5/5, EE 5/5, WE 5/5,  5/5                    LEFT    LE - HF 5/5, KE 5/5, DF 5/5, PF 5/5                    RIGHT LE - HF 5/5, KE 5/5, DF 5/5, PF 5/5        Sensory - Intact to LT     Reflexes - DTR Intact, No primitive reflexive     Coordination - FTN intact  Psychiatric - Mood stable, Affect WNL  Skin -   Wounds -      RECENT LABS                        10.8   6.08  )-----------( 133      ( 09 Apr 2025 05:14 )             32.9     04-09    138  |  106  |  24[H]  ----------------------------<  93  4.1   |  27  |  0.48[L]    Ca    8.4      09 Apr 2025 05:14  Phos  1.8     04-08  Mg     2.0     04-08    TPro  5.3[L]  /  Alb  2.3[L]  /  TBili  1.2  /  DBili  x   /  AST  21  /  ALT  34  /  AlkPhos  44  04-09      Urinalysis Basic - ( 09 Apr 2025 05:14 )    Color: x / Appearance: x / SG: x / pH: x  Gluc: 93 mg/dL / Ketone: x  / Bili: x / Urobili: x   Blood: x / Protein: x / Nitrite: x   Leuk Esterase: x / RBC: x / WBC x   Sq Epi: x / Non Sq Epi: x / Bacteria: x          RADIOLOGY/OTHER RESULTS      MEDICATIONS  (STANDING):  apixaban 5 milliGRAM(s) Oral every 12 hours  atorvastatin 10 milliGRAM(s) Oral at bedtime  cefepime   IVPB 2000 milliGRAM(s) IV Intermittent every 8 hours  metoprolol tartrate 50 milliGRAM(s) Oral three times a day  multivitamin 1 Tablet(s) Oral daily  pantoprazole    Tablet 40 milliGRAM(s) Oral before breakfast  polyethylene glycol 3350 17 Gram(s) Oral daily  predniSONE   Tablet 5 milliGRAM(s) Oral <User Schedule>  predniSONE   Tablet 2 milliGRAM(s) Oral <User Schedule>  senna 2 Tablet(s) Oral at bedtime  tamsulosin 0.4 milliGRAM(s) Oral at bedtime    MEDICATIONS  (PRN):  acetaminophen     Tablet .. 650 milliGRAM(s) Oral every 6 hours PRN Mild Pain (1 - 3)         HPI:  89 y/o F with PMHx of Afib (off Eliquis), PPM, HTN, HLD, GERD, severe MR/TR, iatrogenic adrenal insufficiency (on prednisone), lymphedema, metastatic melanoma s/p left foot melanoma and lymph node resection, s/p RUE melanoma and right axillary lymph node excision at [facility] (3/18/25). Patient with a recent admission 3/21 -3/25/25  when she initially presented to  with code stroke and was transferred to [facility] where she underwent cerebral angiogram for mechanical thrombectomy of L M1 occlusion, TICI 3 (one pass) on 3/21/25. Patient was discharged and presented back to [facility] 3/26 with L sided weakness and aphasia, transferred to [facility] for stroke codem and is now s/p mechanical thrombectomy of Rt M2 occlusion, TICI 3 (one pass) on 3/26/25. On the early morning of 3/29/25 pt aphasic with right sided weakness. CTH negative for acute hemorrhage. CTA + new Left MCA M2 occulusion and patient subsequently underwent 3rd thrombectomy on 3/29. MRI brain demonstrated acute bilateral multifocal cerebral infarctions and subacute left basal ganglia infarct.  Etiology likely cardioembolic in the setting of atrial fibrillation and underlying hypercoaguability from malignancy.    Hospital course complicated by right groin pseudoaneurysm s/p L MCA thrombectomy. She was seen by vascular surgery and given thrombin injection x2 and recommended for outpatient follow up as needed. Hematology consulted due to concern for hypercoagulable state, anemia, hx of melanoma. CBC trended, overall stable. Hypercoagulable serology panel sent, negative for abnormalities. Follow up outpatient for further monitoring/testing. Hospital course further complicated by urinary retention and rivas catheter placed. Failed TOV 4/1-4/2, rivas replaced 4/3/25, and patient started on flomax.     Endocrine consulted due to hx of adrenal insufficiency; initially started on hydrocortisone then transitioned to home regimen of prednisone 5mg in AM and 2mg in PM. Incidentally found new 1.7 cm cystic lesion at the pancreatic body on CT A/P on 3/28/25. GI informed, no inpatient workup needed. Follow up outpatient with Dr. Maurisio Haas. Patient started on cefepime and vancomycin on 4/4/25 due to fever of unknown origin. ID consulted, Vancomycin discontinued after blood cultures resulted negative. ID recommendations to continue with IV cefepime until 4/10/25.     Patient resumed Eliquis 5mg daily (4/2/25) for secondary stroke prevention. Also re-started on home regimen of atorvastatin 10mg daily; LDL - 59, nonatherosclerotic etiology of stroke, high intensity statin not indicated at this time. PT/OT evaluated patient, recommended dispo to acute rehab. Patient should follow up with neurology/neuro IR, cardiology, endocrinology, hematology, GI, primary care, +/- urology and vascular surgery after discharge. Patient discharged to [facility] on 4/8/25.   (08 Apr 2025 12:56)      Subjective:  Seen this AM in therapy.  Limited by aphasia.  Appears comfortable.  Tolerating therapy.  Slept during the night as per nursing.        VITALS  Vital Signs Last 24 Hrs  T(C): 36.4 (08 Apr 2025 19:57), Max: 36.8 (08 Apr 2025 14:48)  T(F): 97.5 (08 Apr 2025 19:57), Max: 98.2 (08 Apr 2025 14:48)  HR: 106 (09 Apr 2025 07:46) (61 - 106)  BP: 158/94 (09 Apr 2025 07:46) (123/66 - 158/94)  BP(mean): --  RR: 15 (09 Apr 2025 07:46) (15 - 16)  SpO2: 94% (09 Apr 2025 07:46) (93% - 94%)    Parameters below as of 09 Apr 2025 07:46  Patient On (Oxygen Delivery Method): room air        REVIEW OF SYMPTOMS  Neurological deficits      PHYSICAL EXAM   Gen - NAD, Comfortable  HEENT - NCAT, EOMI  Pulm - breathing comfortably on RA  Cardiovascular - warm, well perfused.   Abdomen - Soft, NT/ND,   Extremities - No edema, No calf tenderness  Neuro-     Cognitive - AAOx3 when given yes/no choices, follows commands     Communication - non-fluent aphasia (able to say yes/no), comprehension intact, repetition intact     Cranial Nerves - EOMI, tongue midline, slight right facial weakness     Motor -                     LEFT    UE - ShAB 5/5, EF 5/5, EE 5/5, WE 5/5,  5/5                    RIGHT UE - ShAB 4/5, EF 4/5, EE 4/5, WE 5/5,  4/5                    LEFT    LE - HF 2/5, KE 5/5, DF 5/5, PF 5/5                    RIGHT LE - HF 2/5, KE 5/5, DF 5/5, PF 5/5        Sensory - Intact to LT     Coordination - FTN intact     Tone - normal  Psychiatric - Mood stable, Affect WNL      RECENT LABS                        10.8   6.08  )-----------( 133      ( 09 Apr 2025 05:14 )             32.9     04-09    138  |  106  |  24[H]  ----------------------------<  93  4.1   |  27  |  0.48[L]    Ca    8.4      09 Apr 2025 05:14  Phos  1.8     04-08  Mg     2.0     04-08    TPro  5.3[L]  /  Alb  2.3[L]  /  TBili  1.2  /  DBili  x   /  AST  21  /  ALT  34  /  AlkPhos  44  04-09      Urinalysis Basic - ( 09 Apr 2025 05:14 )    Color: x / Appearance: x / SG: x / pH: x  Gluc: 93 mg/dL / Ketone: x  / Bili: x / Urobili: x   Blood: x / Protein: x / Nitrite: x   Leuk Esterase: x / RBC: x / WBC x   Sq Epi: x / Non Sq Epi: x / Bacteria: x          RADIOLOGY/OTHER RESULTS      MEDICATIONS  (STANDING):  apixaban 5 milliGRAM(s) Oral every 12 hours  atorvastatin 10 milliGRAM(s) Oral at bedtime  cefepime   IVPB 2000 milliGRAM(s) IV Intermittent every 8 hours  metoprolol tartrate 50 milliGRAM(s) Oral three times a day  multivitamin 1 Tablet(s) Oral daily  pantoprazole    Tablet 40 milliGRAM(s) Oral before breakfast  polyethylene glycol 3350 17 Gram(s) Oral daily  predniSONE   Tablet 5 milliGRAM(s) Oral <User Schedule>  predniSONE   Tablet 2 milliGRAM(s) Oral <User Schedule>  senna 2 Tablet(s) Oral at bedtime  tamsulosin 0.4 milliGRAM(s) Oral at bedtime    MEDICATIONS  (PRN):  acetaminophen     Tablet .. 650 milliGRAM(s) Oral every 6 hours PRN Mild Pain (1 - 3)

## 2025-04-09 NOTE — DIETITIAN INITIAL EVALUATION ADULT - PERTINENT MEDS FT
MEDICATIONS  (STANDING):  apixaban 5 milliGRAM(s) Oral every 12 hours  atorvastatin 10 milliGRAM(s) Oral at bedtime  cefepime   IVPB 2000 milliGRAM(s) IV Intermittent every 8 hours  metoprolol tartrate 50 milliGRAM(s) Oral three times a day  multivitamin 1 Tablet(s) Oral daily  pantoprazole    Tablet 40 milliGRAM(s) Oral before breakfast  polyethylene glycol 3350 17 Gram(s) Oral daily  predniSONE   Tablet 5 milliGRAM(s) Oral <User Schedule>  predniSONE   Tablet 2 milliGRAM(s) Oral <User Schedule>  senna 2 Tablet(s) Oral at bedtime  tamsulosin 0.4 milliGRAM(s) Oral at bedtime    MEDICATIONS  (PRN):  acetaminophen     Tablet .. 650 milliGRAM(s) Oral every 6 hours PRN Mild Pain (1 - 3)

## 2025-04-09 NOTE — DIETITIAN INITIAL EVALUATION ADULT - ADD RECOMMEND
1. Continue Current Nutrition Care Regimen at This Time.   2. Continue to monitor clinical course   3. Reassess for malnutrition

## 2025-04-09 NOTE — DIETITIAN INITIAL EVALUATION ADULT - PERTINENT LABORATORY DATA
04-09    138  |  106  |  24[H]  ----------------------------<  93  4.1   |  27  |  0.48[L]    Ca    8.4      09 Apr 2025 05:14  Phos  1.8     04-08  Mg     2.0     04-08    TPro  5.3[L]  /  Alb  2.3[L]  /  TBili  1.2  /  DBili  x   /  AST  21  /  ALT  34  /  AlkPhos  44  04-09  A1C with Estimated Average Glucose Result: 5.5 % (03-27-25 @ 02:00)  A1C with Estimated Average Glucose Result: 5.7 % (03-22-25 @ 03:15)

## 2025-04-10 LAB
ALBUMIN SERPL ELPH-MCNC: 2.2 G/DL — LOW (ref 3.3–5)
ALP SERPL-CCNC: 46 U/L — SIGNIFICANT CHANGE UP (ref 40–120)
ALT FLD-CCNC: 32 U/L — SIGNIFICANT CHANGE UP (ref 10–45)
ANION GAP SERPL CALC-SCNC: 5 MMOL/L — SIGNIFICANT CHANGE UP (ref 5–17)
AST SERPL-CCNC: 21 U/L — SIGNIFICANT CHANGE UP (ref 10–40)
BILIRUB SERPL-MCNC: 1.2 MG/DL — SIGNIFICANT CHANGE UP (ref 0.2–1.2)
BUN SERPL-MCNC: 17 MG/DL — SIGNIFICANT CHANGE UP (ref 7–23)
CALCIUM SERPL-MCNC: 8.5 MG/DL — SIGNIFICANT CHANGE UP (ref 8.4–10.5)
CHLORIDE SERPL-SCNC: 105 MMOL/L — SIGNIFICANT CHANGE UP (ref 96–108)
CO2 SERPL-SCNC: 27 MMOL/L — SIGNIFICANT CHANGE UP (ref 22–31)
CREAT SERPL-MCNC: 0.58 MG/DL — SIGNIFICANT CHANGE UP (ref 0.5–1.3)
EGFR: 87 ML/MIN/1.73M2 — SIGNIFICANT CHANGE UP
EGFR: 87 ML/MIN/1.73M2 — SIGNIFICANT CHANGE UP
GLUCOSE SERPL-MCNC: 110 MG/DL — HIGH (ref 70–99)
HCT VFR BLD CALC: 35 % — SIGNIFICANT CHANGE UP (ref 34.5–45)
HGB BLD-MCNC: 11.5 G/DL — SIGNIFICANT CHANGE UP (ref 11.5–15.5)
MCHC RBC-ENTMCNC: 31.4 PG — SIGNIFICANT CHANGE UP (ref 27–34)
MCHC RBC-ENTMCNC: 32.9 G/DL — SIGNIFICANT CHANGE UP (ref 32–36)
MCV RBC AUTO: 95.6 FL — SIGNIFICANT CHANGE UP (ref 80–100)
NRBC BLD AUTO-RTO: 0 /100 WBCS — SIGNIFICANT CHANGE UP (ref 0–0)
PLATELET # BLD AUTO: 136 K/UL — LOW (ref 150–400)
POTASSIUM SERPL-MCNC: 4.2 MMOL/L — SIGNIFICANT CHANGE UP (ref 3.5–5.3)
POTASSIUM SERPL-SCNC: 4.2 MMOL/L — SIGNIFICANT CHANGE UP (ref 3.5–5.3)
PROT SERPL-MCNC: 5.3 G/DL — LOW (ref 6–8.3)
RBC # BLD: 3.66 M/UL — LOW (ref 3.8–5.2)
RBC # FLD: 14.7 % — HIGH (ref 10.3–14.5)
SODIUM SERPL-SCNC: 137 MMOL/L — SIGNIFICANT CHANGE UP (ref 135–145)
WBC # BLD: 6.42 K/UL — SIGNIFICANT CHANGE UP (ref 3.8–10.5)
WBC # FLD AUTO: 6.42 K/UL — SIGNIFICANT CHANGE UP (ref 3.8–10.5)

## 2025-04-10 PROCEDURE — 99233 SBSQ HOSP IP/OBS HIGH 50: CPT | Mod: GC

## 2025-04-10 PROCEDURE — 99232 SBSQ HOSP IP/OBS MODERATE 35: CPT

## 2025-04-10 RX ADMIN — Medication 2 TABLET(S): at 21:45

## 2025-04-10 RX ADMIN — PREDNISONE 5 MILLIGRAM(S): 20 TABLET ORAL at 08:01

## 2025-04-10 RX ADMIN — METOPROLOL SUCCINATE 50 MILLIGRAM(S): 50 TABLET, EXTENDED RELEASE ORAL at 21:46

## 2025-04-10 RX ADMIN — APIXABAN 5 MILLIGRAM(S): 2.5 TABLET, FILM COATED ORAL at 17:19

## 2025-04-10 RX ADMIN — Medication 1 TABLET(S): at 14:49

## 2025-04-10 RX ADMIN — APIXABAN 5 MILLIGRAM(S): 2.5 TABLET, FILM COATED ORAL at 05:53

## 2025-04-10 RX ADMIN — METOPROLOL SUCCINATE 50 MILLIGRAM(S): 50 TABLET, EXTENDED RELEASE ORAL at 05:54

## 2025-04-10 RX ADMIN — POLYETHYLENE GLYCOL 3350 17 GRAM(S): 17 POWDER, FOR SOLUTION ORAL at 14:48

## 2025-04-10 RX ADMIN — Medication 40 MILLIGRAM(S): at 06:04

## 2025-04-10 RX ADMIN — TAMSULOSIN HYDROCHLORIDE 0.4 MILLIGRAM(S): 0.4 CAPSULE ORAL at 21:46

## 2025-04-10 RX ADMIN — CEFEPIME 100 MILLIGRAM(S): 2 INJECTION, POWDER, FOR SOLUTION INTRAVENOUS at 05:54

## 2025-04-10 RX ADMIN — ATORVASTATIN CALCIUM 10 MILLIGRAM(S): 80 TABLET, FILM COATED ORAL at 21:46

## 2025-04-10 RX ADMIN — PREDNISONE 2 MILLIGRAM(S): 20 TABLET ORAL at 17:19

## 2025-04-10 RX ADMIN — CEFEPIME 100 MILLIGRAM(S): 2 INJECTION, POWDER, FOR SOLUTION INTRAVENOUS at 14:47

## 2025-04-10 NOTE — PROGRESS NOTE ADULT - ATTENDING COMMENTS
s/p Bi-Hemispheric Embolic Infarcts s/p Thrombectomy x 3  patient seen this AM, eval limited by aphasia  labs/imaging reviewed  completed cefepime today  Patient requires acute inpatient hospitalization for ongoing management of medical comorbidities (s/p embolic infarcts, ongoing aphasia, monitoring of labs, abnormality of gait and mobility), which are limiting functional abilities and optimization of functional independence for discharge s/p Bi-Hemispheric Embolic Infarcts s/p Thrombectomy x 3  patient seen this AM, eval limited by aphasia  labs/imaging reviewed  completed cefepime today  remove rivas tonight for voiding trial  Patient requires acute inpatient hospitalization for ongoing management of medical comorbidities (s/p embolic infarcts, ongoing aphasia, monitoring of labs, acute urinary retention with rivas in place, abnormality of gait and mobility), which are limiting functional abilities and optimization of functional independence for discharge

## 2025-04-10 NOTE — PROGRESS NOTE ADULT - ASSESSMENT
Assessment/Plan:  SHARON HULL is a 88 y/o F with PMHx of Afib (off Eliquis), PPM, HTN, HLD, GERD, severe MR/TR, iatrogenic adrenal insufficiency (on prednisone), lymphedema, metastatic melanoma s/p left foot melanoma and lymph node resection, s/p RUE melanoma and right axillary lymph node excision at [facility] (3/18/25), recent admission (3/21-3/25/25) for L MCA CVA s/p M1 thrombectomy, presented to  with left sided weakness and aphasia, found to have right MCA CVA and s/p M2 thrombectomy (3/26/25).  Hospital Course complicated by right sided weakness and aphasia, found to have new Left MCA CVA with M2 occlusion and is s/p 3rd thrombectomy (3/29/25). Hospital course also complicated by pseudoaneurysm (s/p vasc surgery consult and thrombin injection x2), urinary retention (s/p rivas, failed TOV 4/2), fever of unknown origin (on IV cefepime until 4/10/25). Patient resumed Eliquis on 4/2/25. Patient now admitted for a multidisciplinary rehab program with right sided weakness, Transcortical motor aphasia, cognitive deficits, Dysphagia and gait and ADL impairments.       #  Bihemispheric embolic infarcts s/p Thrombectomy x 3  - s/p cerebral angiogram for mechanical thrombectomy of L M1 occlusion, TICI 3 (one pass) on 3/21/25  - s/p cerebral angiogram for mechanical thrombectomy of Rt M2 occlusion, TICI 3 (one pass) on 3/26/25  - s/p thrombectomy of left M4 with TICI 0 3/29/25  - Etiology likely cardioembolic in the setting of atrial fibrillation and underlying hypercoagulability from malignancy  - Aphasia, Right hemiparesis, Dysphagia  - Start comprehensive rehab program of PT/OT/SLP - 3 hours a day, 5 days a week. P&O as needed   - Precautions: Fall, Aspiration  - Eliquis 5 mg BID  - Atorvastatin 10 mg QHS (LDL 58 on 3/22)    # Atrial Fibrillation  - has PPM present  - Eliquis 5 mg BID  - Lopressor 50 mg TID    # Right femoral artery pseudoaneurysm   - s/p thrombin injection x2    # Fever of unknown origin, ?aspiration PNA  - Blood cultures 3/26, 4/4  no growth   - RVP/COVID 19 PCR 4/4 negative   - UA 4/2 with some pyuria   - Urine Cx 3/26 no growth 4/3 contaminated   - CXR 4/4 reviewed and compared to 3/29, not much change noted  - CT Chest with contrast reporting no PE, + Atelectasis   - Per ID, continue Cefepime 2000mg IV [id number] till 4/10/25    # Iatrogenic aortic insufficiency  - prednisone 5 mg in AM (8:00) and 2 mg in PM (16:00) - home regimen  - f/u outpatient endocrinology    # Metastatic Melanoma  - metastatic melanoma s/p left foot melanoma and lymph node resection, s/p RUE melanoma and right axillary lymph node excision at [facility] (3/18/25)  - outpatient follow up     # Incidental Pancreatic Cyst  - Incidentally found new 1.7 cm cystic lesion at the pancreatic body on CT a/p on 3/28/25  - GI informed, no inpatient workup needed  - Follow up outpatient with Dr. Maurisio Haas    # Pain  - Tylenol PRN    # Mood / Cognition  - Neuropsychology consult PRN    # Sleep  - Maintain quiet hours and a low stim environment.   - Melatonin PRN to maximize participation in therapy during the day    # GI / Bowel  - Senna qHS  - Miralax Daily  - GI ppx: Protonix 40 mg QD    #  / Bladder  # Urinary Retention  - Continue Rivas present on admission, plan for TOV  - Failed TOV on 4/1-4/2, Rivas replaced 4/3  - Flomax 0.4 mg QHS  --Consider removing rivas 4/10 night for TOV in AM    # Skin / Pressure injury  - Skin assessment on admission performed: IAD to sacrum, right posterior thigh ecchymosis, forehead scab s/p biopsy,  left lateral ankle scar with redness towards heel  - Pressure Injury/Skin: OOB to chair, PT/OT  - nursing to monitor skin qShift    # Dysphagia:  - Diet Consistency: regular with mildly thick liquids  - Supplement: MVI  - Aspiration Precautions  - SLP consult for swallow function evaluation and treatment  - Nutrition consult    # DVT prophylaxis:   - on Eliquis 5 mg Q12H      Outpatient Follow-up:  Maurisio Haas  Gastroenterology  [address], [address]  [place] [address]  Phone: [phone]  Fax: [phone]  Follow Up Time: 1 month    Celina Carlin  Hematology/Oncology  [address] [place] [address]  Phone: [phone]  Fax: [phone]  Follow Up Time: 1 month    Juan Diego Johnson  Vascular Neurology  [address] [place] [address]  Phone: [phone]  Fax: [phone]  Follow Up Time: 1 month    Antonio Gaines  Endocrinology/Metab/Diabetes  [address], [address]  [place] [address]  Phone: [phone]  Fax: [phone]  Follow Up Time: 1 month    Milo Murguia  Cardiovascular Disease  [address] [place] [address]  Phone: [phone]  Fax: [phone]  Follow Up Time: 2 weeks    Your Primary Care Provider,   Phone: (   )    -  Fax: (   )    -  Follow Up Time: 1 week    [place]  Urology  [address] [place] [address]  Phone: [phone]  Fax:   Follow Up Time: 1 month    [facility] Vascular Surgery  Vascular Surgery  [address] [place] [address]  Phone: [phone]  Fax:     Code Status/Emergency Contact:      ---------------

## 2025-04-10 NOTE — PROGRESS NOTE ADULT - SUBJECTIVE AND OBJECTIVE BOX
HPI:  88 y/o F with PMHx of Afib (off Eliquis), PPM, HTN, HLD, GERD, severe MR/TR, iatrogenic adrenal insufficiency (on prednisone), lymphedema, metastatic melanoma s/p left foot melanoma and lymph node resection, s/p RUE melanoma and right axillary lymph node excision at [facility] (3/18/25). Patient with a recent admission 3/21 -3/25/25  when she initially presented to  with code stroke and was transferred to [facility] where she underwent cerebral angiogram for mechanical thrombectomy of L M1 occlusion, TICI 3 (one pass) on 3/21/25. Patient was discharged and presented back to [facility] 3/26 with L sided weakness and aphasia, transferred to [facility] for stroke codem and is now s/p mechanical thrombectomy of Rt M2 occlusion, TICI 3 (one pass) on 3/26/25. On the early morning of 3/29/25 pt aphasic with right sided weakness. CTH negative for acute hemorrhage. CTA + new Left MCA M2 occulusion and patient subsequently underwent 3rd thrombectomy on 3/29. MRI brain demonstrated acute bilateral multifocal cerebral infarctions and subacute left basal ganglia infarct.  Etiology likely cardioembolic in the setting of atrial fibrillation and underlying hypercoaguability from malignancy.    Hospital course complicated by right groin pseudoaneurysm s/p L MCA thrombectomy. She was seen by vascular surgery and given thrombin injection x2 and recommended for outpatient follow up as needed. Hematology consulted due to concern for hypercoagulable state, anemia, hx of melanoma. CBC trended, overall stable. Hypercoagulable serology panel sent, negative for abnormalities. Follow up outpatient for further monitoring/testing. Hospital course further complicated by urinary retention and rivas catheter placed. Failed TOV 4/1-4/2, rivas replaced 4/3/25, and patient started on flomax.     Endocrine consulted due to hx of adrenal insufficiency; initially started on hydrocortisone then transitioned to home regimen of prednisone 5mg in AM and 2mg in PM. Incidentally found new 1.7 cm cystic lesion at the pancreatic body on CT A/P on 3/28/25. GI informed, no inpatient workup needed. Follow up outpatient with Dr. Maurisio Haas. Patient started on cefepime and vancomycin on 4/4/25 due to fever of unknown origin. ID consulted, Vancomycin discontinued after blood cultures resulted negative. ID recommendations to continue with IV cefepime until 4/10/25.     Patient resumed Eliquis 5mg daily (4/2/25) for secondary stroke prevention. Also re-started on home regimen of atorvastatin 10mg daily; LDL - 59, nonatherosclerotic etiology of stroke, high intensity statin not indicated at this time. PT/OT evaluated patient, recommended dispo to acute rehab. Patient should follow up with neurology/neuro IR, cardiology, endocrinology, hematology, GI, primary care, +/- urology and vascular surgery after discharge. Patient discharged to [facility] on 4/8/25.   (08 Apr 2025 12:56)      Subjective: Seen this AM. Limited by aphasia, able to respond to yes/no. Appears comfortable. Denies any pain. Slept well. Tolerating therapy.      VITALS  Vital Signs Last 24 Hrs  T(C): 36.6 (10 Apr 2025 08:28), Max: 36.6 (10 Apr 2025 08:28)  T(F): 97.8 (10 Apr 2025 08:28), Max: 97.8 (10 Apr 2025 08:28)  HR: 83 (10 Apr 2025 08:28) (56 - 105)  BP: 125/76 (10 Apr 2025 08:28) (100/56 - 140/93)  BP(mean): --  RR: 16 (10 Apr 2025 08:28) (14 - 16)  SpO2: 96% (10 Apr 2025 08:28) (91% - 96%)      REVIEW OF SYMPTOMS  Neurological deficits      PHYSICAL EXAM   Gen - NAD, Comfortable  HEENT - NCAT, EOMI  Pulm - breathing comfortably on RA  Cardiovascular - warm, well perfused.   Abdomen - Soft, NT/ND,   Extremities - No edema, No calf tenderness  Neuro-     Cognitive - AAOx3 when given yes/no choices, follows commands     Communication - non-fluent aphasia (able to say yes/no), comprehension intact, repetition intact     Cranial Nerves - EOMI, tongue midline, slight right facial weakness     Motor -                     LEFT    UE - ShAB 5/5, EF 5/5, EE 5/5, WE 5/5,  5/5                    RIGHT UE - ShAB 4/5, EF 4/5, EE 4/5, WE 5/5,  4/5                    LEFT    LE - HF 2/5, KE 5/5, DF 5/5, PF 5/5                    RIGHT LE - HF 2/5, KE 3/5, DF 5/5, PF 5/5        Sensory - Intact to LT     Coordination - FTN intact     Tone - normal  Psychiatric - Mood stable, Affect WNL      RECENT LABS               11.5   6.42  )-----------( 136      ( 10 Apr 2025 07:06 )             35.0     04-10    137  |  105  |  17  ----------------------------<  110[H]  4.2   |  27  |  0.58    Ca    8.5      10 Apr 2025 07:06    TPro  5.3[L]  /  Alb  2.2[L]  /  TBili  1.2  /  DBili  x   /  AST  21  /  ALT  32  /  AlkPhos  46  04-10      MEDICATIONS  (STANDING):  apixaban 5 milliGRAM(s) Oral every 12 hours  atorvastatin 10 milliGRAM(s) Oral at bedtime  cefepime   IVPB 2000 milliGRAM(s) IV Intermittent every 8 hours  metoprolol tartrate 50 milliGRAM(s) Oral three times a day  multivitamin 1 Tablet(s) Oral daily  pantoprazole    Tablet 40 milliGRAM(s) Oral before breakfast  polyethylene glycol 3350 17 Gram(s) Oral daily  predniSONE   Tablet 5 milliGRAM(s) Oral <User Schedule>  predniSONE   Tablet 2 milliGRAM(s) Oral <User Schedule>  senna 2 Tablet(s) Oral at bedtime  tamsulosin 0.4 milliGRAM(s) Oral at bedtime    MEDICATIONS  (PRN):  acetaminophen     Tablet .. 650 milliGRAM(s) Oral every 6 hours PRN Mild Pain (1 - 3)

## 2025-04-10 NOTE — PROGRESS NOTE ADULT - SUBJECTIVE AND OBJECTIVE BOX
Interval History  Patient seen and examined at bedside. No acute events noted.  Patient is unable to provide history due to aphasia. Answers simple yes/no questions. ROS negative     ALLERGIES:  penicillins (Hives)    MEDICATIONS  (STANDING):  apixaban 5 milliGRAM(s) Oral every 12 hours  atorvastatin 10 milliGRAM(s) Oral at bedtime  cefepime   IVPB 2000 milliGRAM(s) IV Intermittent every 8 hours  metoprolol tartrate 50 milliGRAM(s) Oral three times a day  multivitamin 1 Tablet(s) Oral daily  pantoprazole    Tablet 40 milliGRAM(s) Oral before breakfast  polyethylene glycol 3350 17 Gram(s) Oral daily  predniSONE   Tablet 5 milliGRAM(s) Oral <User Schedule>  predniSONE   Tablet 2 milliGRAM(s) Oral <User Schedule>  senna 2 Tablet(s) Oral at bedtime  tamsulosin 0.4 milliGRAM(s) Oral at bedtime    MEDICATIONS  (PRN):  acetaminophen     Tablet .. 650 milliGRAM(s) Oral every 6 hours PRN Mild Pain (1 - 3)    Vital Signs Last 24 Hrs  T(F): 97.8 (10 Apr 2025 08:28), Max: 97.8 (10 Apr 2025 08:28)  HR: 83 (10 Apr 2025 08:28) (83 - 105)  BP: 125/76 (10 Apr 2025 08:28) (125/76 - 140/93)  RR: 16 (10 Apr 2025 08:28) (14 - 16)  SpO2: 96% (10 Apr 2025 08:28) (91% - 96%)  I&O's Summary    09 Apr 2025 07:01  -  10 Apr 2025 07:00  --------------------------------------------------------  IN: 0 mL / OUT: 3500 mL / NET: -3500 mL    BMI (kg/m2): 27.2 (04-08-25 @ 14:48)    PHYSICAL EXAM:  GENERAL: NAD  HEENT: NCAT  CHEST/LUNG: Clear to percussion bilaterally; No rales, rhonchi, wheezing  HEART: Irregular, +murmur  ABDOMEN: Soft, Nontender, Nondistended; Bowel sounds present  MUSCULOSKELETAL/EXTREMITIES:  2+ Peripheral Pulses, trace LE edema bilaterally   : Rodriguez in place with yellow urine   PSYCH: Appropriate affect  NEURO: Awake, alert, aphasic, answer simple yes/no questions, follows commands    I personally reviewed the below data/images/labs:    LABS:                        11.5   6.42  )-----------( 136      ( 10 Apr 2025 07:06 )             35.0       04-10    137  |  105  |  17  ----------------------------<  110  4.2   |  27  |  0.58    Ca    8.5      10 Apr 2025 07:06  Phos  1.8     04-08  Mg     2.0     04-08    TPro  5.3  /  Alb  2.2  /  TBili  1.2  /  DBili  x   /  AST  21  /  ALT  32  /  AlkPhos  46  04-10     03-27 Chol 145 mg/dL LDL -- HDL 74 mg/dL Trig 55 mg/dL    Urinalysis Basic - ( 10 Apr 2025 07:06 )    Color: x / Appearance: x / SG: x / pH: x  Gluc: 110 mg/dL / Ketone: x  / Bili: x / Urobili: x   Blood: x / Protein: x / Nitrite: x   Leuk Esterase: x / RBC: x / WBC x   Sq Epi: x / Non Sq Epi: x / Bacteria: x    Culture - Blood (collected 04 Apr 2025 01:50)  Source: Blood Blood  Final Report (09 Apr 2025 06:01):    No growth at 5 days    COVID-19 PCR: NotDetec (04-08-25 @ 18:30)    Consultant(s) Notes Reviewed:   Care Discused with Consultants/Other Providers:  Imaging Personally Reviewed:

## 2025-04-10 NOTE — PROGRESS NOTE ADULT - ASSESSMENT
89 year old female with PMHx of Afib (off Eliquis), PPM, HTN, HLD, GERD, severe MR/TR, iatrogenic adrenal insufficiency (on prednisone), lymphedema, metastatic melanoma s/p left foot melanoma and lymph node resection, s/p RUE melanoma and right axillary lymph node excision at [facility] (3/18/25), recent admission (3/21-3/25/25) for L MCA CVA s/p M1 thrombectomy, presented to  with left sided weakness and aphasia, found to have right MCA CVA and s/p M2 thrombectomy (3/26/25).  Hospital Course complicated by right sided weakness and aphasia, found to have new Left MCA CVA with M2 occlusion and is s/p 3rd thrombectomy (3/29/25). Hospital course also complicated by pseudoaneurysm (s/p vasc surgery consult and thrombin injection x2), urinary retention (s/p rivas, failed TOV 4/2), fever of unknown origin (on IV cefepime until 4/10/25). Patient resumed Eliquis on 4/2/25. Patient now admitted for a multidisciplinary rehab program with right sided weakness, Transcortical motor aphasia, cognitive deficits, Dysphagia and gait and ADL impairments.     #  Bi-Hemispheric Embolic Infarcts s/p Thrombectomy x 3  # Aphasia, Right hemiparesis, Dysphagia  - s/p cerebral angiogram for mechanical thrombectomy of L M1 occlusion, TICI 3 (one pass) on 3/21/25  - s/p cerebral angiogram for mechanical thrombectomy of Rt M2 occlusion, TICI 3 (one pass) on 3/26/25  - s/p thrombectomy of left M4 with TICI 0 3/29/25  - MRI brain 3/31 showed New acute infarctions within the LEFT lateral frontal cortex, LEFT occipital cortex, RIGHT insular cortex and scattered RIGHT frontal, parietal, temporal and occipital cortex suggesting embolic etiology. Subacute infarction within the LEFT basal ganglia. Mild periventricular chronic white matter ischemia.  - Etiology likely cardioembolic in the setting of atrial fibrillation and underlying hypercoagulability from malignancy  - Continue Eliquis 5 mg BID  - Continue Atorvastatin 10 mg QHS (LDL 58 on 3/22)  - Precautions: Fall, Aspiration  - Comprehensive rehab program of PT/OT/SLP  - Outpatient follow up with neurology     # Chronic Atrial Fibrillation  # Status Post PPM   - Continue metoprolol  - Continue Eliquis     # Right femoral artery pseudoaneurysm   - s/p thrombin injection x2  - Outpatient follow up     # Fever of unknown origin, ?Aspiration PNA  - Blood cultures 3/26, 4/4  no growth   - RVP/COVID 19 PCR 4/4 negative   - UA 4/2 with pyuria   - Urine Cx 4/3 contaminated   - CTA chest 4/4 showed no PE, Mucous/secretions are noted within the left lower lobe bronchus. Near-complete atelectasis of both lower lobes is noted. Small bilateral pleural effusions are noted.   - Per ID, continue Cefepime 2000mg IV [id number] till 4/10/25   - Incentive spirometry  - Will monitor    # Iatrogenic Adrenal insufficiency  - Prednisone 5 mg in AM (8:00) and 2 mg in PM (16:00) - home regimen  - f/u outpatient endocrinology    # Metastatic Melanoma  - metastatic melanoma s/p left foot melanoma and lymph node resection, s/p RUE melanoma and right axillary lymph node excision at [facility] (3/18/25)  - Outpatient follow up     # Incidental Pancreatic Cyst  - Incidentally found new 1.7 cm cystic lesion at the pancreatic body on CT a/p on 3/28/25  - GI informed, no inpatient workup needed  - Follow up outpatient with Dr. Maurisio Haas    #GI PPx  - Continue Protonix 40 mg QD    # Urinary Retention  - Continue Rivas present on admission  - Failed TOV on 4/1-4/2, Rivas replaced 4/3  - Flomax 0.4 mg QHS  - TOV per rehab team     # DVT prophylaxis:   - on Eliquis 5 mg Q12H    Discussed with rehab team

## 2025-04-11 PROCEDURE — 99233 SBSQ HOSP IP/OBS HIGH 50: CPT | Mod: GC

## 2025-04-11 PROCEDURE — 99232 SBSQ HOSP IP/OBS MODERATE 35: CPT

## 2025-04-11 RX ORDER — BETHANECHOL CHLORIDE 50 MG
25 TABLET ORAL THREE TIMES A DAY
Refills: 0 | Status: DISCONTINUED | OUTPATIENT
Start: 2025-04-11 | End: 2025-04-23

## 2025-04-11 RX ADMIN — APIXABAN 5 MILLIGRAM(S): 2.5 TABLET, FILM COATED ORAL at 17:22

## 2025-04-11 RX ADMIN — Medication 1 TABLET(S): at 11:12

## 2025-04-11 RX ADMIN — APIXABAN 5 MILLIGRAM(S): 2.5 TABLET, FILM COATED ORAL at 05:17

## 2025-04-11 RX ADMIN — METOPROLOL SUCCINATE 50 MILLIGRAM(S): 50 TABLET, EXTENDED RELEASE ORAL at 21:40

## 2025-04-11 RX ADMIN — ATORVASTATIN CALCIUM 10 MILLIGRAM(S): 80 TABLET, FILM COATED ORAL at 21:39

## 2025-04-11 RX ADMIN — Medication 40 MILLIGRAM(S): at 05:17

## 2025-04-11 RX ADMIN — Medication 2 TABLET(S): at 21:39

## 2025-04-11 RX ADMIN — Medication 25 MILLIGRAM(S): at 15:36

## 2025-04-11 RX ADMIN — METOPROLOL SUCCINATE 50 MILLIGRAM(S): 50 TABLET, EXTENDED RELEASE ORAL at 05:17

## 2025-04-11 RX ADMIN — PREDNISONE 5 MILLIGRAM(S): 20 TABLET ORAL at 08:14

## 2025-04-11 RX ADMIN — PREDNISONE 2 MILLIGRAM(S): 20 TABLET ORAL at 15:41

## 2025-04-11 RX ADMIN — METOPROLOL SUCCINATE 50 MILLIGRAM(S): 50 TABLET, EXTENDED RELEASE ORAL at 15:39

## 2025-04-11 RX ADMIN — Medication 25 MILLIGRAM(S): at 21:40

## 2025-04-11 NOTE — PROGRESS NOTE ADULT - ASSESSMENT
Assessment/Plan:  SHARON HULL is a 88 y/o F with PMHx of Afib (off Eliquis), PPM, HTN, HLD, GERD, severe MR/TR, iatrogenic adrenal insufficiency (on prednisone), lymphedema, metastatic melanoma s/p left foot melanoma and lymph node resection, s/p RUE melanoma and right axillary lymph node excision at [facility] (3/18/25), recent admission (3/21-3/25/25) for L MCA CVA s/p M1 thrombectomy, presented to  with left sided weakness and aphasia, found to have right MCA CVA and s/p M2 thrombectomy (3/26/25).  Hospital Course complicated by right sided weakness and aphasia, found to have new Left MCA CVA with M2 occlusion and is s/p 3rd thrombectomy (3/29/25). Hospital course also complicated by pseudoaneurysm (s/p vasc surgery consult and thrombin injection x2), urinary retention (s/p rivas, failed TOV 4/2), fever of unknown origin (on IV cefepime until 4/10/25). Patient resumed Eliquis on 4/2/25. Patient now admitted for a multidisciplinary rehab program with right sided weakness, Transcortical motor aphasia, cognitive deficits, Dysphagia and gait and ADL impairments.       #  Bihemispheric embolic infarcts s/p Thrombectomy x 3  - s/p cerebral angiogram for mechanical thrombectomy of L M1 occlusion, TICI 3 (one pass) on 3/21/25  - s/p cerebral angiogram for mechanical thrombectomy of Rt M2 occlusion, TICI 3 (one pass) on 3/26/25  - s/p thrombectomy of left M4 with TICI 0 3/29/25  - Etiology likely cardioembolic in the setting of atrial fibrillation and underlying hypercoagulability from malignancy  - Aphasia, Right hemiparesis, Dysphagia  - Start comprehensive rehab program of PT/OT/SLP - 3 hours a day, 5 days a week. P&O as needed   - Precautions: Fall, Aspiration  - Eliquis 5 mg BID  - Atorvastatin 10 mg QHS (LDL 58 on 3/22)    # Atrial Fibrillation  - has PPM present  - Eliquis 5 mg BID  - Lopressor 50 mg TID    # Right femoral artery pseudoaneurysm   - s/p thrombin injection x2    # Fever of unknown origin, ?aspiration PNA  - Blood cultures 3/26, 4/4  no growth   - RVP/COVID 19 PCR 4/4 negative   - UA 4/2 with some pyuria   - Urine Cx 3/26 no growth 4/3 contaminated   - CXR 4/4 reviewed and compared to 3/29, not much change noted  - CT Chest with contrast reporting no PE, + Atelectasis   - Per ID, continue Cefepime 2000mg IV [id number] till 4/10/25-- completed.     # Iatrogenic adrenal insuffiencey   - prednisone 5 mg in AM (8:00) and 2 mg in PM (16:00) - home regimen  - f/u outpatient endocrinology    # Metastatic Melanoma  - metastatic melanoma s/p left foot melanoma and lymph node resection, s/p RUE melanoma and right axillary lymph node excision at [facility] (3/18/25)  - outpatient follow up     # Incidental Pancreatic Cyst  - Incidentally found new 1.7 cm cystic lesion at the pancreatic body on CT a/p on 3/28/25  - GI informed, no inpatient workup needed  - Follow up outpatient with Dr. Maurisio Haas    # Pain  - Tylenol PRN    # Mood / Cognition  - Neuropsychology consult PRN  - recreational therapy placed 4/11    # Sleep  - Maintain quiet hours and a low stim environment.   - Melatonin PRN to maximize participation in therapy during the day    # GI / Bowel  - Senna qHS  - Miralax Daily  - GI ppx: Protonix 40 mg QD    #  / Bladder  # Urinary Retention  - Continue Rivas present on admission, plan for TOV  - Failed TOV on 4/1-4/2, Rivas replaced 4/3  - Flomax 0.4 mg QHS dc'd today.  rivas  removed 4/10 night for voiding trial. patient needed to be SC this AM, started on bethanecol 25 mg TID- discussed with hospitalist     # Skin / Pressure injury  - Skin assessment on admission performed: IAD to sacrum, right posterior thigh ecchymosis, forehead scab s/p biopsy,  left lateral ankle scar with redness towards heel  - Pressure Injury/Skin: OOB to chair, PT/OT  - nursing to monitor skin qShift    # Dysphagia:  - Diet Consistency: regular with mildly thick liquids  - Supplement: MVI  - Aspiration Precautions  - SLP consult for swallow function evaluation and treatment  - Nutrition consult    # DVT prophylaxis:   - on Eliquis 5 mg Q12H      Outpatient Follow-up:  Maurisio Haas  Gastroenterology  [address], [address]  [place] [address]  Phone: [phone]  Fax: [phone]  Follow Up Time: 1 month    Celina Carlin  Hematology/Oncology  [address] [place] [address]  Phone: [phone]  Fax: [phone]  Follow Up Time: 1 month    Juan Diego Johnson  Vascular Neurology  [address] [place] [address]  Phone: [phone]  Fax: [phone]  Follow Up Time: 1 month    Antonio Gaines  Endocrinology/Metab/Diabetes  [address], [address]  [place] [address]  Phone: [phone]  Fax: [phone]  Follow Up Time: 1 month    Milo Murguia  Cardiovascular Disease  [address] [place] [address]  Phone: [phone]  Fax: [phone]  Follow Up Time: 2 weeks    Your Primary Care Provider,   Phone: (   )    -  Fax: (   )    -  Follow Up Time: 1 week    [place]  Urology  [address] [place] [address]  Phone: [phone]  Fax:   Follow Up Time: 1 month    [facility] Vascular Surgery  Vascular Surgery  [address] [place] [address]  Phone: [phone]  Fax:     Total time 50 mins-face to face time encounter and counseling the patient, meeting with hospitalist, nursing staff, therapists and social work to discuss medical updates and management, care coordination, reviewing chart and data-including but not limited to labs and imaging

## 2025-04-11 NOTE — PROGRESS NOTE ADULT - SUBJECTIVE AND OBJECTIVE BOX
Interval History  Patient seen and examined at bedside. No acute events noted.  Patient is unable to provide history due to aphasia. Answers simple questions w/ yes/no/okay. ROS are negative.   Rodriguez removed last night, she is retaining urine requiring SC.     ALLERGIES:  penicillins (Hives)    MEDICATIONS  (STANDING):  apixaban 5 milliGRAM(s) Oral every 12 hours  atorvastatin 10 milliGRAM(s) Oral at bedtime  bethanechol 25 milliGRAM(s) Oral three times a day  metoprolol tartrate 50 milliGRAM(s) Oral three times a day  multivitamin 1 Tablet(s) Oral daily  pantoprazole    Tablet 40 milliGRAM(s) Oral before breakfast  polyethylene glycol 3350 17 Gram(s) Oral daily  predniSONE   Tablet 5 milliGRAM(s) Oral <User Schedule>  predniSONE   Tablet 2 milliGRAM(s) Oral <User Schedule>  senna 2 Tablet(s) Oral at bedtime    MEDICATIONS  (PRN):  acetaminophen     Tablet .. 650 milliGRAM(s) Oral every 6 hours PRN Mild Pain (1 - 3)    Vital Signs Last 24 Hrs  T(F): 97.3 (11 Apr 2025 08:05), Max: 98.1 (10 Apr 2025 19:36)  HR: 83 (11 Apr 2025 08:05) (83 - 102)  BP: 111/70 (11 Apr 2025 08:05) (89/74 - 155/78)  RR: 15 (11 Apr 2025 08:05) (15 - 15)  SpO2: 95% (11 Apr 2025 08:05) (95% - 97%)  I&O's Summary    10 Apr 2025 07:01  -  11 Apr 2025 07:00  --------------------------------------------------------  IN: 0 mL / OUT: 1700 mL / NET: -1700 mL    11 Apr 2025 07:01  -  11 Apr 2025 14:17  --------------------------------------------------------  IN: 0 mL / OUT: 500 mL / NET: -500 mL    BMI (kg/m2): 27.2 (04-10-25 @ 14:40)    PHYSICAL EXAM:  GENERAL: NAD  HEENT: NCAT  CHEST/LUNG: Clear to percussion bilaterally; No rales, rhonchi, wheezing  HEART: Irregular, +murmur  ABDOMEN: Soft, Nontender, Nondistended; Bowel sounds present  MUSCULOSKELETAL/EXTREMITIES:  2+ Peripheral Pulses, trace LE edema bilaterally   PSYCH: Appropriate affect  NEURO: Awake, alert, aphasic, answer simple yes/no questions, follows commands    I personally reviewed the below data/images/labs:    LABS:                        11.5   6.42  )-----------( 136      ( 10 Apr 2025 07:06 )             35.0       04-10    137  |  105  |  17  ----------------------------<  110  4.2   |  27  |  0.58    Ca    8.5      10 Apr 2025 07:06    TPro  5.3  /  Alb  2.2  /  TBili  1.2  /  DBili  x   /  AST  21  /  ALT  32  /  AlkPhos  46  04-10     03-27 Chol 145 mg/dL LDL -- HDL 74 mg/dL Trig 55 mg/dL    Urinalysis Basic - ( 10 Apr 2025 07:06 )    Color: x / Appearance: x / SG: x / pH: x  Gluc: 110 mg/dL / Ketone: x  / Bili: x / Urobili: x   Blood: x / Protein: x / Nitrite: x   Leuk Esterase: x / RBC: x / WBC x   Sq Epi: x / Non Sq Epi: x / Bacteria: x      COVID-19 PCR: NotDetec (04-08-25 @ 18:30)      Consultant(s) Notes Reviewed:   Care Discused with Consultants/Other Providers:  Imaging Personally Reviewed:

## 2025-04-11 NOTE — PROGRESS NOTE ADULT - SUBJECTIVE AND OBJECTIVE BOX
HPI:  88 y/o F with PMHx of Afib (off Eliquis), PPM, HTN, HLD, GERD, severe MR/TR, iatrogenic adrenal insufficiency (on prednisone), lymphedema, metastatic melanoma s/p left foot melanoma and lymph node resection, s/p RUE melanoma and right axillary lymph node excision at [facility] (3/18/25). Patient with a recent admission 3/21 -3/25/25  when she initially presented to  with code stroke and was transferred to [facility] where she underwent cerebral angiogram for mechanical thrombectomy of L M1 occlusion, TICI 3 (one pass) on 3/21/25. Patient was discharged and presented back to [facility] 3/26 with L sided weakness and aphasia, transferred to [facility] for stroke codem and is now s/p mechanical thrombectomy of Rt M2 occlusion, TICI 3 (one pass) on 3/26/25. On the early morning of 3/29/25 pt aphasic with right sided weakness. CTH negative for acute hemorrhage. CTA + new Left MCA M2 occulusion and patient subsequently underwent 3rd thrombectomy on 3/29. MRI brain demonstrated acute bilateral multifocal cerebral infarctions and subacute left basal ganglia infarct.  Etiology likely cardioembolic in the setting of atrial fibrillation and underlying hypercoaguability from malignancy.    Hospital course complicated by right groin pseudoaneurysm s/p L MCA thrombectomy. She was seen by vascular surgery and given thrombin injection x2 and recommended for outpatient follow up as needed. Hematology consulted due to concern for hypercoagulable state, anemia, hx of melanoma. CBC trended, overall stable. Hypercoagulable serology panel sent, negative for abnormalities. Follow up outpatient for further monitoring/testing. Hospital course further complicated by urinary retention and rivas catheter placed. Failed TOV 4/1-4/2, rivas replaced 4/3/25, and patient started on flomax.     Endocrine consulted due to hx of adrenal insufficiency; initially started on hydrocortisone then transitioned to home regimen of prednisone 5mg in AM and 2mg in PM. Incidentally found new 1.7 cm cystic lesion at the pancreatic body on CT A/P on 3/28/25. GI informed, no inpatient workup needed. Follow up outpatient with Dr. Maurisio Haas. Patient started on cefepime and vancomycin on 4/4/25 due to fever of unknown origin. ID consulted, Vancomycin discontinued after blood cultures resulted negative. ID recommendations to continue with IV cefepime until 4/10/25.     Patient resumed Eliquis 5mg daily (4/2/25) for secondary stroke prevention. Also re-started on home regimen of atorvastatin 10mg daily; LDL - 59, nonatherosclerotic etiology of stroke, high intensity statin not indicated at this time. PT/OT evaluated patient, recommended dispo to acute rehab. Patient should follow up with neurology/neuro IR, cardiology, endocrinology, hematology, GI, primary care, +/- urology and vascular surgery after discharge. Patient discharged to [facility] on 4/8/25.   (08 Apr 2025 12:56)      Subjective: Seen this AM. Limited by aphasia-- trouble naming objects. able to respond to simple yes/no quesions.       VITALS  T(C): 36.3 (04-11-25 @ 08:05)  T(F): 97.3 (04-11-25 @ 08:05), Max: 98.1 (04-10-25 @ 19:36)  HR: 83 (04-11-25 @ 08:05) (83 - 102)  BP: 111/70 (04-11-25 @ 08:05) (89/74 - 155/78)  RR:  (15 - 15)  SpO2:  (95% - 97%)      REVIEW OF SYMPTOMS  limited by aphasia    PHYSICAL EXAM   Gen - NAD, Comfortable  HEENT - NCAT, EOMI  Pulm - breathing comfortably on RA  Cardiovascular - warm, well perfused  Abdomen - Soft, NT/ND,   Extremities - No edema, No calf tenderness  Neuro-     Cognitive - AAOx3 when given yes/no choices, follows commands     Communication - non-fluent aphasia      Motor -                     LEFT    UE - ShAB 5/5, EF 5/5, EE 5/5, WE 5/5,  5/5                    RIGHT UE - ShAB 4/5, EF 4/5, EE 4/5, WE 5/5,  4/5                    LEFT    LE - HF 2/5, KE 5/5, DF 5/5, PF 5/5                    RIGHT LE - HF 2/5, KE 3/5, DF 5/5, PF 5/5       Psychiatric - Mood stable, Affect WNL      RECENT LABS               11.5   6.42  )-----------( 136      ( 10 Apr 2025 07:06 )             35.0     04-10    137  |  105  |  17  ----------------------------<  110[H]  4.2   |  27  |  0.58    Ca    8.5      10 Apr 2025 07:06    TPro  5.3[L]  /  Alb  2.2[L]  /  TBili  1.2  /  DBili  x   /  AST  21  /  ALT  32  /  AlkPhos  46  04-10      MEDICATIONS  (STANDING):  apixaban 5 milliGRAM(s) Oral every 12 hours  atorvastatin 10 milliGRAM(s) Oral at bedtime  cefepime   IVPB 2000 milliGRAM(s) IV Intermittent every 8 hours  metoprolol tartrate 50 milliGRAM(s) Oral three times a day  multivitamin 1 Tablet(s) Oral daily  pantoprazole    Tablet 40 milliGRAM(s) Oral before breakfast  polyethylene glycol 3350 17 Gram(s) Oral daily  predniSONE   Tablet 5 milliGRAM(s) Oral <User Schedule>  predniSONE   Tablet 2 milliGRAM(s) Oral <User Schedule>  senna 2 Tablet(s) Oral at bedtime  tamsulosin 0.4 milliGRAM(s) Oral at bedtime    MEDICATIONS  (PRN):  acetaminophen     Tablet .. 650 milliGRAM(s) Oral every 6 hours PRN Mild Pain (1 - 3)

## 2025-04-11 NOTE — PROGRESS NOTE ADULT - ASSESSMENT
89 year old female with PMHx of Afib (off Eliquis), PPM, HTN, HLD, GERD, severe MR/TR, iatrogenic adrenal insufficiency (on prednisone), lymphedema, metastatic melanoma s/p left foot melanoma and lymph node resection, s/p RUE melanoma and right axillary lymph node excision at [facility] (3/18/25), recent admission (3/21-3/25/25) for L MCA CVA s/p M1 thrombectomy, presented to  with left sided weakness and aphasia, found to have right MCA CVA and s/p M2 thrombectomy (3/26/25).  Hospital Course complicated by right sided weakness and aphasia, found to have new Left MCA CVA with M2 occlusion and is s/p 3rd thrombectomy (3/29/25). Hospital course also complicated by pseudoaneurysm (s/p vasc surgery consult and thrombin injection x2), urinary retention (s/p rivas, failed TOV 4/2), fever of unknown origin (on IV cefepime until 4/10/25). Patient resumed Eliquis on 4/2/25. Patient now admitted for a multidisciplinary rehab program with right sided weakness, Transcortical motor aphasia, cognitive deficits, Dysphagia and gait and ADL impairments.     # Bi-Hemispheric Embolic Infarcts s/p Thrombectomy x 3  # Aphasia, Right hemiparesis, Dysphagia  - s/p cerebral angiogram for mechanical thrombectomy of L M1 occlusion, TICI 3 (one pass) on 3/21/25  - s/p cerebral angiogram for mechanical thrombectomy of Rt M2 occlusion, TICI 3 (one pass) on 3/26/25  - s/p thrombectomy of left M4 with TICI 0 3/29/25  - MRI brain 3/31 showed New acute infarctions within the LEFT lateral frontal cortex, LEFT occipital cortex, RIGHT insular cortex and scattered RIGHT frontal, parietal, temporal and occipital cortex suggesting embolic etiology. Subacute infarction within the LEFT basal ganglia. Mild periventricular chronic white matter ischemia.  - Etiology likely cardioembolic in the setting of atrial fibrillation and underlying hypercoagulability from malignancy  - Repeat Echo 4/4 showed EF >75 %, no evidence of LV thrombus, severely dilated LA/RA, mild-mod TR  - Continue Eliquis 5 mg BID  - Continue Atorvastatin 10 mg QHS (LDL 58 on 3/22)  - Precautions: Fall, Aspiration  - Comprehensive rehab program of PT/OT/SLP  - Outpatient follow up with neurology     # Chronic Atrial Fibrillation  # Status Post PPM   - Continue metoprolol  - Continue Eliquis     # Right femoral artery pseudoaneurysm   - s/p thrombin injection x2  - Outpatient follow up     # Fever of unknown origin, ?Aspiration PNA  - Blood cultures 3/26, 4/4  no growth   - RVP/COVID 19 PCR 4/4 negative   - UA 4/2 with pyuria   - Urine Cx 4/3 contaminated   - CTA chest 4/4 showed no PE, Mucous/secretions are noted within the left lower lobe bronchus. Near-complete atelectasis of both lower lobes is noted. Small bilateral pleural effusions are noted.   - Completed course of IV cefepime on 4/10 per ID   - Incentive spirometry  - Will monitor    # Iatrogenic Adrenal insufficiency  - Prednisone 5 mg in AM (8:00) and 2 mg in PM (16:00) - home regimen  - f/u outpatient endocrinology    # Metastatic Melanoma  - metastatic melanoma s/p left foot melanoma and lymph node resection, s/p RUE melanoma and right axillary lymph node excision at [facility] (3/18/25)  - Outpatient follow up     # Incidental Pancreatic Cyst  - Incidentally found new 1.7 cm cystic lesion at the pancreatic body on CT a/p on 3/28/25  - GI informed, no inpatient workup needed  - Follow up outpatient with Dr. Maurisio Haas    #GI PPx  - Continue Protonix 40 mg QD    # Urinary Retention  - Rivas removed 4/10  - TOV ongoing  - Avoid constipation, scheduled toileting   - Flomax discontinued  - Started on Bethanechol 25mg TID 4/11  - Continue serial bladder scans and ISC as needed     # DVT prophylaxis:   - on Eliquis 5 mg Q12H    Discussed with rehab team    89 year old female with PMHx of Afib (off Eliquis), PPM, HTN, HLD, GERD, severe MR/TR, iatrogenic adrenal insufficiency (on prednisone), lymphedema, metastatic melanoma s/p left foot melanoma and lymph node resection, s/p RUE melanoma and right axillary lymph node excision at [facility] (3/18/25), recent admission (3/21-3/25/25) for L MCA CVA s/p M1 thrombectomy, presented to  with left sided weakness and aphasia, found to have right MCA CVA and s/p M2 thrombectomy (3/26/25).  Hospital Course complicated by right sided weakness and aphasia, found to have new Left MCA CVA with M2 occlusion and is s/p 3rd thrombectomy (3/29/25). Hospital course also complicated by pseudoaneurysm (s/p vasc surgery consult and thrombin injection x2), urinary retention (s/p rivas, failed TOV 4/2), fever of unknown origin (on IV cefepime until 4/10/25). Patient resumed Eliquis on 4/2/25. Patient now admitted for a multidisciplinary rehab program with right sided weakness, Transcortical motor aphasia, cognitive deficits, Dysphagia and gait and ADL impairments.     # Bi-Hemispheric Embolic Infarcts s/p Thrombectomy x 3  # Aphasia, Right hemiparesis, Dysphagia  - s/p cerebral angiogram for mechanical thrombectomy of L M1 occlusion, TICI 3 (one pass) on 3/21/25  - s/p cerebral angiogram for mechanical thrombectomy of Rt M2 occlusion, TICI 3 (one pass) on 3/26/25  - s/p thrombectomy of left M4 with TICI 0 3/29/25  - MRI brain 3/31 showed New acute infarctions within the LEFT lateral frontal cortex, LEFT occipital cortex, RIGHT insular cortex and scattered RIGHT frontal, parietal, temporal and occipital cortex suggesting embolic etiology. Subacute infarction within the LEFT basal ganglia. Mild periventricular chronic white matter ischemia.  - Etiology likely cardioembolic in the setting of atrial fibrillation and underlying hypercoagulability from malignancy  - Repeat Echo 4/4 showed EF >75 %, no evidence of LV thrombus, severely dilated LA/RA, mild-mod TR  - Continue Eliquis 5 mg BID  - Continue Atorvastatin 10 mg QHS (LDL 58 on 3/22)  - Precautions: Fall, Aspiration  - Comprehensive rehab program of PT/OT/SLP  - Outpatient follow up with neurology     # Chronic Atrial Fibrillation  # Status Post PPM   - Continue metoprolol  - Continue Eliquis     # Right femoral artery pseudoaneurysm   - s/p thrombin injection x2  - Outpatient follow up     # Fever of unknown origin, ?Aspiration PNA  - Blood cultures 3/26, 4/4  no growth   - RVP/COVID 19 PCR 4/4 negative   - UA 4/2 with pyuria   - Urine Cx 4/3 contaminated   - CTA chest 4/4 showed no PE, Mucous/secretions are noted within the left lower lobe bronchus. Near-complete atelectasis of both lower lobes is noted. Small bilateral pleural effusions are noted.   - Completed course of IV cefepime on 4/10 per ID   - Incentive spirometry  - Will monitor    # Iatrogenic Adrenal insufficiency  - Prednisone 5 mg in AM (8:00) and 2 mg in PM (16:00) - home regimen  - f/u outpatient endocrinology    # Metastatic Melanoma  - metastatic melanoma s/p left foot melanoma and lymph node resection, s/p RUE melanoma and right axillary lymph node excision at [facility] (3/18/25)  - Outpatient follow up     # Incidental Pancreatic Cyst  - Incidentally found new 1.7 cm cystic lesion at the pancreatic body on CT a/p on 3/28/25  - GI informed, no inpatient workup needed  - Follow up outpatient with Dr. Maurisio Haas    #GI PPx  - Continue Protonix 40 mg QD    # Urinary Retention  - Rivas removed 4/10  - TOV ongoing  - Avoid constipation, scheduled toileting   - Flomax discontinued  - Started on Bethanechol 25mg TID 4/11  - Continue serial bladder scans and ISC as needed     # DVT prophylaxis:   # History of Lymphedema   - LE duplex 3/26 negative for DVT  - on Eliquis 5 mg Q12H    Discussed with rehab team

## 2025-04-12 PROCEDURE — 99233 SBSQ HOSP IP/OBS HIGH 50: CPT | Mod: GC

## 2025-04-12 PROCEDURE — 99232 SBSQ HOSP IP/OBS MODERATE 35: CPT

## 2025-04-12 RX ADMIN — PREDNISONE 2 MILLIGRAM(S): 20 TABLET ORAL at 17:39

## 2025-04-12 RX ADMIN — Medication 1 TABLET(S): at 11:55

## 2025-04-12 RX ADMIN — Medication 25 MILLIGRAM(S): at 21:41

## 2025-04-12 RX ADMIN — METOPROLOL SUCCINATE 50 MILLIGRAM(S): 50 TABLET, EXTENDED RELEASE ORAL at 21:41

## 2025-04-12 RX ADMIN — APIXABAN 5 MILLIGRAM(S): 2.5 TABLET, FILM COATED ORAL at 05:13

## 2025-04-12 RX ADMIN — APIXABAN 5 MILLIGRAM(S): 2.5 TABLET, FILM COATED ORAL at 17:39

## 2025-04-12 RX ADMIN — Medication 40 MILLIGRAM(S): at 05:13

## 2025-04-12 RX ADMIN — ATORVASTATIN CALCIUM 10 MILLIGRAM(S): 80 TABLET, FILM COATED ORAL at 21:41

## 2025-04-12 RX ADMIN — Medication 2 TABLET(S): at 21:41

## 2025-04-12 RX ADMIN — POLYETHYLENE GLYCOL 3350 17 GRAM(S): 17 POWDER, FOR SOLUTION ORAL at 11:55

## 2025-04-12 RX ADMIN — Medication 25 MILLIGRAM(S): at 14:40

## 2025-04-12 RX ADMIN — Medication 25 MILLIGRAM(S): at 05:14

## 2025-04-12 RX ADMIN — METOPROLOL SUCCINATE 50 MILLIGRAM(S): 50 TABLET, EXTENDED RELEASE ORAL at 14:40

## 2025-04-12 RX ADMIN — PREDNISONE 5 MILLIGRAM(S): 20 TABLET ORAL at 08:49

## 2025-04-12 RX ADMIN — METOPROLOL SUCCINATE 50 MILLIGRAM(S): 50 TABLET, EXTENDED RELEASE ORAL at 05:13

## 2025-04-12 NOTE — PROGRESS NOTE ADULT - ASSESSMENT
Assessment/Plan:  SHARON HULL is a 89 y/o F with PMHx of Afib (off Eliquis), PPM, HTN, HLD, GERD, severe MR/TR, iatrogenic adrenal insufficiency (on prednisone), lymphedema, metastatic melanoma s/p left foot melanoma and lymph node resection, s/p RUE melanoma and right axillary lymph node excision at [facility] (3/18/25), recent admission (3/21-3/25/25) for L MCA CVA s/p M1 thrombectomy, presented to  with left sided weakness and aphasia, found to have right MCA CVA and s/p M2 thrombectomy (3/26/25).  Hospital Course complicated by right sided weakness and aphasia, found to have new Left MCA CVA with M2 occlusion and is s/p 3rd thrombectomy (3/29/25). Hospital course also complicated by pseudoaneurysm (s/p vasc surgery consult and thrombin injection x2), urinary retention (s/p rivas, failed TOV 4/2), fever of unknown origin (on IV cefepime until 4/10/25). Patient resumed Eliquis on 4/2/25. Patient now admitted for a multidisciplinary rehab program with right sided weakness, Transcortical motor aphasia, cognitive deficits, Dysphagia and gait and ADL impairments.       #  Bihemispheric embolic infarcts s/p Thrombectomy x 3  - s/p cerebral angiogram for mechanical thrombectomy of L M1 occlusion, TICI 3 (one pass) on 3/21/25  - s/p cerebral angiogram for mechanical thrombectomy of Rt M2 occlusion, TICI 3 (one pass) on 3/26/25  - s/p thrombectomy of left M4 with TICI 0 3/29/25  - Etiology likely cardioembolic in the setting of atrial fibrillation and underlying hypercoagulability from malignancy  - Aphasia, Right hemiparesis, Dysphagia  - Start comprehensive rehab program of PT/OT/SLP - 3 hours a day, 5 days a week. P&O as needed   - Precautions: Fall, Aspiration  - Eliquis 5 mg BID  - Atorvastatin 10 mg QHS (LDL 58 on 3/22)    # Atrial Fibrillation  - has PPM present  - Eliquis 5 mg BID  - Lopressor 50 mg TID    # Right femoral artery pseudoaneurysm   - s/p thrombin injection x2    # Fever of unknown origin, ?aspiration PNA  - Blood cultures 3/26, 4/4  no growth   - RVP/COVID 19 PCR 4/4 negative   - UA 4/2 with some pyuria   - Urine Cx 3/26 no growth 4/3 contaminated   - CXR 4/4 reviewed and compared to 3/29, not much change noted  - CT Chest with contrast reporting no PE, + Atelectasis   - Per ID, continue Cefepime 2000mg IV [id number] till 4/10/25-- completed.     # Iatrogenic adrenal insuffiencey   - prednisone 5 mg in AM (8:00) and 2 mg in PM (16:00) - home regimen  - f/u outpatient endocrinology    # Metastatic Melanoma  - metastatic melanoma s/p left foot melanoma and lymph node resection, s/p RUE melanoma and right axillary lymph node excision at [facility] (3/18/25)  - outpatient follow up     # Incidental Pancreatic Cyst  - Incidentally found new 1.7 cm cystic lesion at the pancreatic body on CT a/p on 3/28/25  - GI informed, no inpatient workup needed  - Follow up outpatient with Dr. Maurisio Haas    # Pain  - Tylenol PRN    # Mood / Cognition  - Neuropsychology consult PRN  - recreational therapy    # Sleep  - Maintain quiet hours and a low stim environment.   - Melatonin PRN to maximize participation in therapy during the day    # GI / Bowel  - Senna qHS  - Miralax Daily  - GI ppx: Protonix 40 mg QD    #  / Bladder  # Urinary Retention  - Continue Rivas present on admission, plan for TOV  - Failed TOV on 4/1-4/2, Rivas replaced 4/3  - Flomax 0.4 mg QHS dc'd today.  rivas  removed 4/10 night for voiding trial.  -  started on bethanecol 25 mg TID 4/11-- continuing to need SC with high PVR-- C/w with bethanecol for now, discussed with hospitalist     # Skin / Pressure injury  - Skin assessment on admission performed: IAD to sacrum, right posterior thigh ecchymosis, forehead scab s/p biopsy,  left lateral ankle scar with redness towards heel  - Pressure Injury/Skin: OOB to chair, PT/OT  - nursing to monitor skin qShift    # Dysphagia:  - Diet Consistency: regular with mildly thick liquids  - Supplement: MVI  - Aspiration Precautions  - SLP consult for swallow function evaluation and treatment  - Nutrition consult    # DVT prophylaxis:   - on Eliquis 5 mg Q12H      Outpatient Follow-up:  aMurisio Haas  Gastroenterology  [address], [address]  [place] [address]  Phone: [phone]  Fax: [phone]  Follow Up Time: 1 month    Celina Carlin  Hematology/Oncology  [address] [place] [address]  Phone: [phone]  Fax: [phone]  Follow Up Time: 1 month    Juan Diego Johnson  Vascular Neurology  [address] [place] [address]  Phone: [phone]  Fax: [phone]  Follow Up Time: 1 month    Antonio Gaines  Endocrinology/Metab/Diabetes  [address], [address]  [place] [address]  Phone: [phone]  Fax: [phone]  Follow Up Time: 1 month    Milo Murguia  Cardiovascular Disease  [address] [place] [address]  Phone: [phone]  Fax: [phone]  Follow Up Time: 2 weeks    Your Primary Care Provider,   Phone: (   )    -  Fax: (   )    -  Follow Up Time: 1 week    [employer]  Urology  [address] [place] [address]  Phone: [phone]  Fax:   Follow Up Time: 1 month    [facility]  Vascular Surgery  [address] [place] [address]  Phone: [phone]    Total time 50 mins-face to face time encounter and counseling the patient, meeting with hospitalist, nursing staff to discuss medical updates and management, care coordination, reviewing chart and data-including but not limited to labs and imaging

## 2025-04-12 NOTE — PROGRESS NOTE ADULT - SUBJECTIVE AND OBJECTIVE BOX
HPI:  88 y/o F with PMHx of Afib (off Eliquis), PPM, HTN, HLD, GERD, severe MR/TR, iatrogenic adrenal insufficiency (on prednisone), lymphedema, metastatic melanoma s/p left foot melanoma and lymph node resection, s/p RUE melanoma and right axillary lymph node excision at [facility] (3/18/25). Patient with a recent admission 3/21 -3/25/25  when she initially presented to  with code stroke and was transferred to [facility] where she underwent cerebral angiogram for mechanical thrombectomy of L M1 occlusion, TICI 3 (one pass) on 3/21/25. Patient was discharged and presented back to [facility] 3/26 with L sided weakness and aphasia, transferred to [facility] for stroke codem and is now s/p mechanical thrombectomy of Rt M2 occlusion, TICI 3 (one pass) on 3/26/25. On the early morning of 3/29/25 pt aphasic with right sided weakness. CTH negative for acute hemorrhage. CTA + new Left MCA M2 occulusion and patient subsequently underwent 3rd thrombectomy on 3/29. MRI brain demonstrated acute bilateral multifocal cerebral infarctions and subacute left basal ganglia infarct.  Etiology likely cardioembolic in the setting of atrial fibrillation and underlying hypercoaguability from malignancy.    Hospital course complicated by right groin pseudoaneurysm s/p L MCA thrombectomy. She was seen by vascular surgery and given thrombin injection x2 and recommended for outpatient follow up as needed. Hematology consulted due to concern for hypercoagulable state, anemia, hx of melanoma. CBC trended, overall stable. Hypercoagulable serology panel sent, negative for abnormalities. Follow up outpatient for further monitoring/testing. Hospital course further complicated by urinary retention and rivas catheter placed. Failed TOV 4/1-4/2, rivas replaced 4/3/25, and patient started on flomax.     Endocrine consulted due to hx of adrenal insufficiency; initially started on hydrocortisone then transitioned to home regimen of prednisone 5mg in AM and 2mg in PM. Incidentally found new 1.7 cm cystic lesion at the pancreatic body on CT A/P on 3/28/25. GI informed, no inpatient workup needed. Follow up outpatient with Dr. Maurisio Haas. Patient started on cefepime and vancomycin on 4/4/25 due to fever of unknown origin. ID consulted, Vancomycin discontinued after blood cultures resulted negative. ID recommendations to continue with IV cefepime until 4/10/25.     Patient resumed Eliquis 5mg daily (4/2/25) for secondary stroke prevention. Also re-started on home regimen of atorvastatin 10mg daily; LDL - 59, nonatherosclerotic etiology of stroke, high intensity statin not indicated at this time. PT/OT evaluated patient, recommended dispo to acute rehab. Patient should follow up with neurology/neuro IR, cardiology, endocrinology, hematology, GI, primary care, +/- urology and vascular surgery after discharge. Patient discharged to [facility] on 4/8/25.   (08 Apr 2025 12:56)      Subjective: Seen this AM. Limited by aphasia-- trouble naming objects, says yes she slept.       VITALS  T(C): 36.3 (04-11-25 @ 08:05)  T(F): 97.3 (04-11-25 @ 08:05), Max: 98.1 (04-10-25 @ 19:36)  HR: 83 (04-11-25 @ 08:05) (83 - 102)  BP: 111/70 (04-11-25 @ 08:05) (89/74 - 155/78)  RR:  (15 - 15)  SpO2:  (95% - 97%)      REVIEW OF SYMPTOMS  limited by aphasia    PHYSICAL EXAM   Gen - NAD, Comfortable  HEENT - NCAT, EOMI  Pulm - breathing comfortably on RA  Cardiovascular - warm, well perfused  Abdomen - Soft, NT/ND,   Extremities - No edema, No calf tenderness  Neuro-     Cognitive - AAOx3 when given yes/no choices, follows commands     Communication - non-fluent aphasia      Motor -                     LEFT    UE - ShAB 5/5, EF 5/5, EE 5/5, WE 5/5,  5/5                    RIGHT UE - ShAB 4/5, EF 4/5, EE 4/5, WE 5/5,  4/5                    LEFT    LE - HF 2/5, KE 5/5, DF 5/5, PF 5/5                    RIGHT LE - HF 2/5, KE 3/5, DF 5/5, PF 5/5       Psychiatric - Mood stable, Affect WNL      RECENT LABS               11.5   6.42  )-----------( 136      ( 10 Apr 2025 07:06 )             35.0     04-10    137  |  105  |  17  ----------------------------<  110[H]  4.2   |  27  |  0.58    Ca    8.5      10 Apr 2025 07:06    TPro  5.3[L]  /  Alb  2.2[L]  /  TBili  1.2  /  DBili  x   /  AST  21  /  ALT  32  /  AlkPhos  46  04-10      MEDICATIONS  (STANDING):  apixaban 5 milliGRAM(s) Oral every 12 hours  atorvastatin 10 milliGRAM(s) Oral at bedtime  cefepime   IVPB 2000 milliGRAM(s) IV Intermittent every 8 hours  metoprolol tartrate 50 milliGRAM(s) Oral three times a day  multivitamin 1 Tablet(s) Oral daily  pantoprazole    Tablet 40 milliGRAM(s) Oral before breakfast  polyethylene glycol 3350 17 Gram(s) Oral daily  predniSONE   Tablet 5 milliGRAM(s) Oral <User Schedule>  predniSONE   Tablet 2 milliGRAM(s) Oral <User Schedule>  senna 2 Tablet(s) Oral at bedtime  tamsulosin 0.4 milliGRAM(s) Oral at bedtime    MEDICATIONS  (PRN):  acetaminophen     Tablet .. 650 milliGRAM(s) Oral every 6 hours PRN Mild Pain (1 - 3)         HPI:  88 y/o F with PMHx of Afib (off Eliquis), PPM, HTN, HLD, GERD, severe MR/TR, iatrogenic adrenal insufficiency (on prednisone), lymphedema, metastatic melanoma s/p left foot melanoma and lymph node resection, s/p RUE melanoma and right axillary lymph node excision at [facility] (3/18/25). Patient with a recent admission 3/21 -3/25/25  when she initially presented to  with code stroke and was transferred to [facility] where she underwent cerebral angiogram for mechanical thrombectomy of L M1 occlusion, TICI 3 (one pass) on 3/21/25. Patient was discharged and presented back to [facility] 3/26 with L sided weakness and aphasia, transferred to [facility] for stroke codem and is now s/p mechanical thrombectomy of Rt M2 occlusion, TICI 3 (one pass) on 3/26/25. On the early morning of 3/29/25 pt aphasic with right sided weakness. CTH negative for acute hemorrhage. CTA + new Left MCA M2 occulusion and patient subsequently underwent 3rd thrombectomy on 3/29. MRI brain demonstrated acute bilateral multifocal cerebral infarctions and subacute left basal ganglia infarct.  Etiology likely cardioembolic in the setting of atrial fibrillation and underlying hypercoaguability from malignancy.    Hospital course complicated by right groin pseudoaneurysm s/p L MCA thrombectomy. She was seen by vascular surgery and given thrombin injection x2 and recommended for outpatient follow up as needed. Hematology consulted due to concern for hypercoagulable state, anemia, hx of melanoma. CBC trended, overall stable. Hypercoagulable serology panel sent, negative for abnormalities. Follow up outpatient for further monitoring/testing. Hospital course further complicated by urinary retention and rivas catheter placed. Failed TOV 4/1-4/2, rivas replaced 4/3/25, and patient started on flomax.     Endocrine consulted due to hx of adrenal insufficiency; initially started on hydrocortisone then transitioned to home regimen of prednisone 5mg in AM and 2mg in PM. Incidentally found new 1.7 cm cystic lesion at the pancreatic body on CT A/P on 3/28/25. GI informed, no inpatient workup needed. Follow up outpatient with Dr. Maurisio Haas. Patient started on cefepime and vancomycin on 4/4/25 due to fever of unknown origin. ID consulted, Vancomycin discontinued after blood cultures resulted negative. ID recommendations to continue with IV cefepime until 4/10/25.     Patient resumed Eliquis 5mg daily (4/2/25) for secondary stroke prevention. Also re-started on home regimen of atorvastatin 10mg daily; LDL - 59, nonatherosclerotic etiology of stroke, high intensity statin not indicated at this time. PT/OT evaluated patient, recommended dispo to acute rehab. Patient should follow up with neurology/neuro IR, cardiology, endocrinology, hematology, GI, primary care, +/- urology and vascular surgery after discharge. Patient discharged to [facility] on 4/8/25.   (08 Apr 2025 12:56)      Subjective: Seen this AM. Limited by aphasia-- trouble naming objects, says yes she slept.       VITALS  T(C): 36.6 (04-12-25 @ 08:46)  T(F): 97.8 (04-12-25 @ 08:46), Max: 97.8 (04-12-25 @ 08:46)  HR: 86 (04-12-25 @ 08:46) (62 - 103)  BP: 104/70 (04-12-25 @ 08:46) (104/70 - 158/87)  RR:  (15 - 15)  SpO2:  (94% - 97%)  Wt(kg): --      REVIEW OF SYMPTOMS  limited by aphasia    PHYSICAL EXAM   Gen - NAD, Comfortable  HEENT - NCAT, EOMI  Pulm - breathing comfortably on RA  Cardiovascular - warm, well perfused  Abdomen - Soft, NT/ND,   Extremities - No edema, No calf tenderness  Neuro-     Cognitive - AAOx3 when given yes/no choices, follows commands     Communication - non-fluent aphasia      Motor -                     LEFT    UE - ShAB 5/5, EF 5/5, EE 5/5, WE 5/5,  5/5                    RIGHT UE - ShAB 4/5, EF 4/5, EE 4/5, WE 5/5,  4/5                    LEFT    LE - HF 2/5, KE 5/5, DF 5/5, PF 5/5                    RIGHT LE - HF 2/5, KE 3/5, DF 5/5, PF 5/5       Psychiatric - Mood stable, Affect WNL      RECENT LABS               11.5   6.42  )-----------( 136      ( 10 Apr 2025 07:06 )             35.0     04-10    137  |  105  |  17  ----------------------------<  110[H]  4.2   |  27  |  0.58    Ca    8.5      10 Apr 2025 07:06    TPro  5.3[L]  /  Alb  2.2[L]  /  TBili  1.2  /  DBili  x   /  AST  21  /  ALT  32  /  AlkPhos  46  04-10      MEDICATIONS  (STANDING):  apixaban 5 milliGRAM(s) Oral every 12 hours  atorvastatin 10 milliGRAM(s) Oral at bedtime  cefepime   IVPB 2000 milliGRAM(s) IV Intermittent every 8 hours  metoprolol tartrate 50 milliGRAM(s) Oral three times a day  multivitamin 1 Tablet(s) Oral daily  pantoprazole    Tablet 40 milliGRAM(s) Oral before breakfast  polyethylene glycol 3350 17 Gram(s) Oral daily  predniSONE   Tablet 5 milliGRAM(s) Oral <User Schedule>  predniSONE   Tablet 2 milliGRAM(s) Oral <User Schedule>  senna 2 Tablet(s) Oral at bedtime  tamsulosin 0.4 milliGRAM(s) Oral at bedtime    MEDICATIONS  (PRN):  acetaminophen     Tablet .. 650 milliGRAM(s) Oral every 6 hours PRN Mild Pain (1 - 3)

## 2025-04-12 NOTE — PROGRESS NOTE ADULT - SUBJECTIVE AND OBJECTIVE BOX
Patient is a 89y old  Female who presents with a chief complaint of Left MCA CVA s/p M1 and M2 thrombectomy, Right MCA CVA s/p M2 thrombectomy (12 Apr 2025 11:57)      Subjective and overnight events:  Patient seen and examined at bedside. + urinary retention. no fever, chills, headache, dizziness.  no sob, cp.     ALLERGIES:  penicillins (Hives)    MEDICATIONS  (STANDING):  apixaban 5 milliGRAM(s) Oral every 12 hours  atorvastatin 10 milliGRAM(s) Oral at bedtime  bethanechol 25 milliGRAM(s) Oral three times a day  metoprolol tartrate 50 milliGRAM(s) Oral three times a day  multivitamin 1 Tablet(s) Oral daily  pantoprazole    Tablet 40 milliGRAM(s) Oral before breakfast  polyethylene glycol 3350 17 Gram(s) Oral daily  predniSONE   Tablet 5 milliGRAM(s) Oral <User Schedule>  predniSONE   Tablet 2 milliGRAM(s) Oral <User Schedule>  senna 2 Tablet(s) Oral at bedtime    MEDICATIONS  (PRN):  acetaminophen     Tablet .. 650 milliGRAM(s) Oral every 6 hours PRN Mild Pain (1 - 3)    Vital Signs Last 24 Hrs  T(F): 97.8 (12 Apr 2025 08:46), Max: 97.8 (12 Apr 2025 08:46)  HR: 86 (12 Apr 2025 08:46) (62 - 103)  BP: 104/70 (12 Apr 2025 08:46) (104/70 - 158/87)  RR: 15 (12 Apr 2025 08:46) (15 - 15)  SpO2: 97% (12 Apr 2025 08:46) (94% - 97%)  I&O's Summary    11 Apr 2025 07:01  -  12 Apr 2025 07:00  --------------------------------------------------------  IN: 0 mL / OUT: 1430 mL / NET: -1430 mL      PHYSICAL EXAM:  General: NAD, awake, alert, answering questions appropriately   ENT: MMM  Neck: Supple, No JVD  Lungs: Clear to auscultation bilaterally  Cardio: RRR, S1/S2, No murmurs  Abdomen: Soft, Nontender, Nondistended; Bowel sounds present  Extremities: No calf tenderness, No pitting edema    LABS:                        11.5   6.42  )-----------( 136      ( 10 Apr 2025 07:06 )             35.0     04-10    137  |  105  |  17  ----------------------------<  110  4.2   |  27  |  0.58    Ca    8.5      10 Apr 2025 07:06    TPro  5.3  /  Alb  2.2  /  TBili  1.2  /  DBili  x   /  AST  21  /  ALT  32  /  AlkPhos  46  04-10        03-27 Chol 145 mg/dL LDL -- HDL 74 mg/dL Trig 55 mg/dL        Urinalysis Basic - ( 10 Apr 2025 07:06 )    Color: x / Appearance: x / SG: x / pH: x  Gluc: 110 mg/dL / Ketone: x  / Bili: x / Urobili: x   Blood: x / Protein: x / Nitrite: x   Leuk Esterase: x / RBC: x / WBC x   Sq Epi: x / Non Sq Epi: x / Bacteria: x        COVID-19 PCR: NotDetec (04-08-25 @ 18:30)      RADIOLOGY & ADDITIONAL TESTS:    Care Discussed with Consultants/Other Providers:

## 2025-04-12 NOTE — PROGRESS NOTE ADULT - ASSESSMENT
89 year old female with PMHx of Afib (off Eliquis), PPM, HTN, HLD, GERD, severe MR/TR, iatrogenic adrenal insufficiency (on prednisone), lymphedema, metastatic melanoma s/p left foot melanoma and lymph node resection, s/p RUE melanoma and right axillary lymph node excision at [facility] (3/18/25), recent admission (3/21-3/25/25) for L MCA CVA s/p M1 thrombectomy, presented to  with left sided weakness and aphasia, found to have right MCA CVA and s/p M2 thrombectomy (3/26/25).  Hospital Course complicated by right sided weakness and aphasia, found to have new Left MCA CVA with M2 occlusion and is s/p 3rd thrombectomy (3/29/25). Hospital course also complicated by pseudoaneurysm (s/p vasc surgery consult and thrombin injection x2), urinary retention (s/p rivas, failed TOV 4/2), fever of unknown origin (on IV cefepime until 4/10/25). Patient resumed Eliquis on 4/2/25. Patient now admitted for a multidisciplinary rehab program with right sided weakness, Transcortical motor aphasia, cognitive deficits, Dysphagia and gait and ADL impairments.     # Bi-Hemispheric Embolic Infarcts s/p Thrombectomy x 3  # Aphasia, Right hemiparesis, Dysphagia  - s/p cerebral angiogram for mechanical thrombectomy of L M1 occlusion, TICI 3 (one pass) on 3/21/25  - s/p cerebral angiogram for mechanical thrombectomy of Rt M2 occlusion, TICI 3 (one pass) on 3/26/25  - s/p thrombectomy of left M4 with TICI 0 3/29/25  - MRI brain 3/31 showed New acute infarctions within the LEFT lateral frontal cortex, LEFT occipital cortex, RIGHT insular cortex and scattered RIGHT frontal, parietal, temporal and occipital cortex suggesting embolic etiology. Subacute infarction within the LEFT basal ganglia. Mild periventricular chronic white matter ischemia.  - Etiology likely cardioembolic in the setting of atrial fibrillation and underlying hypercoagulability from malignancy  - Repeat Echo 4/4 showed EF >75 %, no evidence of LV thrombus, severely dilated LA/RA, mild-mod TR  - Continue Eliquis 5 mg BID  - Continue Atorvastatin 10 mg QHS (LDL 58 on 3/22)  - Precautions: Fall, Aspiration  - Comprehensive rehab program of PT/OT/SLP  - Outpatient follow up with neurology     # Chronic Atrial Fibrillation  # Status Post PPM   - Continue metoprolol  - Continue Eliquis 5mg BID    # Right femoral artery pseudoaneurysm   - s/p thrombin injection x2  - Outpatient follow up     # Fever of unknown origin, ?Aspiration PNA- (resolved)  - Blood cultures 3/26, 4/4  no growth   - RVP/COVID 19 PCR 4/4 negative   - Urine Cx 4/3 contaminated   - CTA chest 4/4 showed no PE, Mucous/secretions are noted within the left lower lobe bronchus. Near-complete atelectasis of both lower lobes is noted. Small bilateral pleural effusions are noted.   - Completed course of IV cefepime on 4/10 per ID   - Incentive spirometry  - Will monitor    # Iatrogenic Adrenal insufficiency  - Prednisone 5 mg in AM (8:00) and 2 mg in PM (16:00) - home regimen  - f/u outpatient endocrinology    # Metastatic Melanoma  - metastatic melanoma s/p left foot melanoma and lymph node resection, s/p RUE melanoma and right axillary lymph node excision at [facility] (3/18/25)  - Outpatient follow up     # Incidental Pancreatic Cyst  - Incidentally found new 1.7 cm cystic lesion at the pancreatic body on CT a/p on 3/28/25  - GI informed, no inpatient workup needed  - Follow up outpatient with Dr. Maurisio Haas    #GI PPx  - Continue Protonix 40 mg QD    # Urinary Retention  - Rivas removed 4/10  - TOV ongoing, with persistent retention so far  - Avoid constipation, scheduled toileting   - Started on Bethanechol 25mg TID 4/11  - Continue serial bladder scans and ISC as needed     # DVT prophylaxis:   # History of Lymphedema   - LE duplex 3/26 negative for DVT  - on Eliquis 5 mg Q12H    Discussed with rehab team

## 2025-04-13 PROCEDURE — 99232 SBSQ HOSP IP/OBS MODERATE 35: CPT

## 2025-04-13 PROCEDURE — 99232 SBSQ HOSP IP/OBS MODERATE 35: CPT | Mod: GC

## 2025-04-13 RX ADMIN — ATORVASTATIN CALCIUM 10 MILLIGRAM(S): 80 TABLET, FILM COATED ORAL at 22:11

## 2025-04-13 RX ADMIN — Medication 1 TABLET(S): at 12:17

## 2025-04-13 RX ADMIN — PREDNISONE 5 MILLIGRAM(S): 20 TABLET ORAL at 09:00

## 2025-04-13 RX ADMIN — PREDNISONE 2 MILLIGRAM(S): 20 TABLET ORAL at 17:21

## 2025-04-13 RX ADMIN — Medication 25 MILLIGRAM(S): at 13:56

## 2025-04-13 RX ADMIN — Medication 40 MILLIGRAM(S): at 05:21

## 2025-04-13 RX ADMIN — APIXABAN 5 MILLIGRAM(S): 2.5 TABLET, FILM COATED ORAL at 17:21

## 2025-04-13 RX ADMIN — METOPROLOL SUCCINATE 50 MILLIGRAM(S): 50 TABLET, EXTENDED RELEASE ORAL at 22:11

## 2025-04-13 RX ADMIN — APIXABAN 5 MILLIGRAM(S): 2.5 TABLET, FILM COATED ORAL at 05:21

## 2025-04-13 RX ADMIN — Medication 25 MILLIGRAM(S): at 05:21

## 2025-04-13 RX ADMIN — METOPROLOL SUCCINATE 50 MILLIGRAM(S): 50 TABLET, EXTENDED RELEASE ORAL at 05:21

## 2025-04-13 RX ADMIN — Medication 25 MILLIGRAM(S): at 22:11

## 2025-04-13 NOTE — PROGRESS NOTE ADULT - ASSESSMENT
89 year old female with PMHx of Afib (off Eliquis), PPM, HTN, HLD, GERD, severe MR/TR, iatrogenic adrenal insufficiency (on prednisone), lymphedema, metastatic melanoma s/p left foot melanoma and lymph node resection, s/p RUE melanoma and [facility] ([facility]18/25), recent admission (3/21-3/25/25) for L MCA CVA s/p M1 thrombectomy, presented to  with left sided weakness and aphasia, found to have right MCA CVA and s/p M2 thrombectomy (3/26/25).  Hospital Course complicated by right sided weakness and aphasia, found to have new Left MCA CVA with M2 occlusion and is s/p 3rd thrombectomy (3/29/25). Hospital course also complicated by pseudoaneurysm (s/p vasc surgery consult and thrombin injection x2), urinary retention (s/p rivas, failed TOV 4/2), fever of unknown origin (on IV cefepime until 4/10/25). Patient resumed Eliquis on 4/2/25. Patient now admitted for a multidisciplinary rehab program with right sided weakness, Transcortical motor aphasia, cognitive deficits, Dysphagia and gait and ADL impairments.     # Bi-Hemispheric Embolic Infarcts s/p Thrombectomy x 3  # Aphasia, Right hemiparesis, Dysphagia  - s/p cerebral angiogram for mechanical thrombectomy of L M1 occlusion, TICI 3 (one pass) on 3/21/25  - s/p cerebral angiogram for mechanical thrombectomy of Rt M2 occlusion, TICI 3 (one pass) on 3/26/25  - s/p thrombectomy of left M4 with TICI 0 3/29/25  - MRI brain 3/31 showed New acute infarctions within the LEFT lateral frontal cortex, LEFT occipital cortex, RIGHT insular cortex and scattered RIGHT frontal, parietal, temporal and occipital cortex suggesting embolic etiology. Subacute infarction within the LEFT basal ganglia. Mild periventricular chronic white matter ischemia.  - Etiology likely cardioembolic in the setting of atrial fibrillation and underlying hypercoagulability from malignancy  - Repeat Echo 4/4 showed EF >75 %, no evidence of LV thrombus, severely dilated LA/RA, mild-mod TR  - Continue Eliquis 5 mg BID  - Continue Atorvastatin 10 mg QHS (LDL 58 on 3/22)  - Precautions: Fall, Aspiration  - Comprehensive rehab program of PT/OT/SLP  - Outpatient follow up with neurology     # Chronic Atrial Fibrillation  # Status Post PPM   - Continue metoprolol  - Continue Eliquis 5mg BID    # Right femoral artery pseudoaneurysm   - s/p thrombin injection x2  - Outpatient follow up     # Fever of unknown origin, ?Aspiration PNA- (resolved)  - Blood cultures 3/26, 4/4  no growth   - RVP/COVID 19 PCR 4/4 negative   - Urine Cx 4/3 contaminated   - CTA chest 4/4 showed no PE, Mucous/secretions are noted within the left lower lobe bronchus. Near-complete atelectasis of both lower lobes is noted. Small bilateral pleural effusions are noted.   - Completed course of IV cefepime on 4/10 per ID   - Incentive spirometry  - Will monitor    # Iatrogenic Adrenal insufficiency  - Prednisone 5 mg in AM (8:00) and 2 mg in PM (16:00) - home regimen  - f/u outpatient endocrinology    # Metastatic Melanoma  - metastatic melanoma s/p left foot melanoma and lymph node resection, s/p RUE melanoma and [facility] ([facility]/18/25)  - Outpatient follow up     # Incidental Pancreatic Cyst  - Incidentally found new 1.7 cm cystic lesion at the pancreatic body on CT a/p on 3/28/25  - GI informed, no inpatient workup needed  - Follow up outpatient with Dr. Maurisio Haas    #GI PPx  - Continue Protonix 40 mg QD    # Urinary Retention- improved  - Rivas removed 4/10  - pt now voiding   - Started on Bethanechol 25mg TID 4/11  - Continue serial bladder scans and ISC as needed     # DVT prophylaxis:   # History of Lymphedema   - LE duplex 3/26 negative for DVT  - on Eliquis 5 mg Q12H    Discussed with rehab team

## 2025-04-13 NOTE — PROGRESS NOTE ADULT - SUBJECTIVE AND OBJECTIVE BOX
Patient is a 89y old  Female who presents with a chief complaint of Left MCA CVA s/p M1 and M2 thrombectomy, Right MCA CVA s/p M2 thrombectomy (13 Apr 2025 11:16)      Subjective and overnight events:  Patient seen and examined at bedside. + pt now voiding. no fever, chills, sob, cp, abd pain.      ALLERGIES:  penicillins (Hives)    MEDICATIONS  (STANDING):  apixaban 5 milliGRAM(s) Oral every 12 hours  atorvastatin 10 milliGRAM(s) Oral at bedtime  bethanechol 25 milliGRAM(s) Oral three times a day  metoprolol tartrate 50 milliGRAM(s) Oral three times a day  multivitamin 1 Tablet(s) Oral daily  pantoprazole    Tablet 40 milliGRAM(s) Oral before breakfast  polyethylene glycol 3350 17 Gram(s) Oral daily  predniSONE   Tablet 5 milliGRAM(s) Oral <User Schedule>  predniSONE   Tablet 2 milliGRAM(s) Oral <User Schedule>  senna 2 Tablet(s) Oral at bedtime    MEDICATIONS  (PRN):  acetaminophen     Tablet .. 650 milliGRAM(s) Oral every 6 hours PRN Mild Pain (1 - 3)    Vital Signs Last 24 Hrs  T(F): 97.7 (13 Apr 2025 09:10), Max: 97.8 (12 Apr 2025 19:51)  HR: 79 (13 Apr 2025 14:03) (79 - 96)  BP: 97/62 (13 Apr 2025 14:03) (97/62 - 143/84)  RR: 16 (13 Apr 2025 09:10) (15 - 16)  SpO2: 97% (13 Apr 2025 09:10) (97% - 97%)  I&O's Summary    PHYSICAL EXAM:  General: NAD, Awake alert, answering questions appropriately   ENT: MMM  Neck: Supple, No JVD  Lungs: Clear to auscultation bilaterally  Cardio: RRR, S1/S2, No murmurs  Abdomen: Soft, Nontender, Nondistended; Bowel sounds present  Extremities: No calf tenderness, No pitting edema    LABS:        03-27 Chol 145 mg/dL LDL -- HDL 74 mg/dL Trig 55 mg/dL            COVID-19 PCR: NotDetec (04-08-25 @ 18:30)      RADIOLOGY & ADDITIONAL TESTS:    Care Discussed with Consultants/Other Providers:

## 2025-04-13 NOTE — PROGRESS NOTE ADULT - ASSESSMENT
Assessment/Plan:  SHARON HULL is a 88 y/o F with PMHx of Afib (off Eliquis), PPM, HTN, HLD, GERD, severe MR/TR, iatrogenic adrenal insufficiency (on prednisone), lymphedema, metastatic melanoma s/p left foot melanoma and lymph node resection, s/p RUE melanoma and right axillary lymph node excision at [facility] (3/18/25), recent admission (3/21-3/25/25) for L MCA CVA s/p M1 thrombectomy, presented to  with left sided weakness and aphasia, found to have right MCA CVA and s/p M2 thrombectomy (3/26/25).  Hospital Course complicated by right sided weakness and aphasia, found to have new Left MCA CVA with M2 occlusion and is s/p 3rd thrombectomy (3/29/25). Hospital course also complicated by pseudoaneurysm (s/p vasc surgery consult and thrombin injection x2), urinary retention (s/p rivas, failed TOV 4/2), fever of unknown origin (on IV cefepime until 4/10/25). Patient resumed Eliquis on 4/2/25. Patient now admitted for a multidisciplinary rehab program with right sided weakness, Transcortical motor aphasia, cognitive deficits, Dysphagia and gait and ADL impairments.       #  Bihemispheric embolic infarcts s/p Thrombectomy x 3  - s/p cerebral angiogram for mechanical thrombectomy of L M1 occlusion, TICI 3 (one pass) on 3/21/25  - s/p cerebral angiogram for mechanical thrombectomy of Rt M2 occlusion, TICI 3 (one pass) on 3/26/25  - s/p thrombectomy of left M4 with TICI 0 3/29/25  - Etiology likely cardioembolic in the setting of atrial fibrillation and underlying hypercoagulability from malignancy  - Aphasia, Right hemiparesis, Dysphagia  - Start comprehensive rehab program of PT/OT/SLP - 3 hours a day, 5 days a week. P&O as needed   - Precautions: Fall, Aspiration  - Eliquis 5 mg BID  - Atorvastatin 10 mg QHS (LDL 58 on 3/22)    # Atrial Fibrillation  - has PPM present  - Eliquis 5 mg BID  - Lopressor 50 mg TID    # Right femoral artery pseudoaneurysm   - s/p thrombin injection x2    # Fever of unknown origin, ?aspiration PNA  - Blood cultures 3/26, 4/4  no growth   - RVP/COVID 19 PCR 4/4 negative   - UA 4/2 with some pyuria   - Urine Cx 3/26 no growth 4/3 contaminated   - CXR 4/4 reviewed and compared to 3/29, not much change noted  - CT Chest with contrast reporting no PE, + Atelectasis   - Per ID, continue Cefepime 2000mg IV [id number] till 4/10/25-- completed.     # Iatrogenic adrenal insuffiencey   - prednisone 5 mg in AM (8:00) and 2 mg in PM (16:00) - home regimen  - f/u outpatient endocrinology    # Metastatic Melanoma  - metastatic melanoma s/p left foot melanoma and lymph node resection, s/p RUE melanoma and right axillary lymph node excision at [facility] (3/18/25)  - outpatient follow up     # Incidental Pancreatic Cyst  - Incidentally found new 1.7 cm cystic lesion at the pancreatic body on CT a/p on 3/28/25  - GI informed, no inpatient workup needed  - Follow up outpatient with Dr. Maurisio Haas    # Pain  - Tylenol PRN    # Mood / Cognition  - Neuropsychology consult PRN  - recreational therapy    # Sleep  - Maintain quiet hours and a low stim environment.   - Melatonin PRN to maximize participation in therapy during the day    # GI / Bowel  - Senna qHS  - Miralax Daily  - GI ppx: Protonix 40 mg QD    #  / Bladder  # Urinary Retention  - Continue Rivas present on admission, plan for TOV  - Failed TOV on 4/1-4/2, Rivas replaced 4/3  - Flomax 0.4 mg QHS dc'd today.  rivas  removed 4/10 night for voiding trial.  -  started on bethanecol 25 mg TID 4/11-- did not need SC overnight---     # Skin / Pressure injury  - Skin assessment on admission performed: IAD to sacrum, right posterior thigh ecchymosis, forehead scab s/p biopsy,  left lateral ankle scar with redness towards heel  - Pressure Injury/Skin: OOB to chair, PT/OT  - nursing to monitor skin qShift    # Dysphagia:  - Diet Consistency: regular with mildly thick liquids  - Supplement: MVI  - Aspiration Precautions  - SLP consult for swallow function evaluation and treatment  - Nutrition consult    # DVT prophylaxis:   - on Eliquis 5 mg Q12H      Outpatient Follow-up:  Maurisio Haas  Gastroenterology  [address], [address]  [place] [address]  Phone: [phone]  Fax: [phone]  Follow Up Time: 1 month    Celina Carlin  Hematology/Oncology  [address] [place] [address]  Phone: [phone]  Fax: [phone]  Follow Up Time: 1 month    Juan Diego Johnson  Vascular Neurology  [address] [place] [address]  Phone: [phone]  Fax: [phone]  Follow Up Time: 1 month    Antonio Gaines  Endocrinology/Metab/Diabetes  [address], [address]  [place] [address]  Phone: [phone]  Fax: [phone]  Follow Up Time: 1 month    Milo Murguia  Cardiovascular Disease  [address] [place] [address]  Phone: [phone]  Fax: [phone]  Follow Up Time: 2 weeks    Your Primary Care Provider,   Phone: (   )    -  Fax: (   )    -  Follow Up Time: 1 week    [facility]  Urology  [address] [place] [address]  Phone: [phone]  Fax:   Follow Up Time: 1 month    [facility]  Vascular Surgery  [address] [place] [address]  Phone: [phone]    Total time 36 mins-face to face time encounter and counseling the patient, meeting with hospitalist, nursing staff to discuss medical updates and management, care coordination, reviewing chart and data-including but not limited to labs and imaging

## 2025-04-13 NOTE — PROGRESS NOTE ADULT - SUBJECTIVE AND OBJECTIVE BOX
HPI:  89 y/o F with PMHx of Afib (off Eliquis), PPM, HTN, HLD, GERD, severe MR/TR, iatrogenic adrenal insufficiency (on prednisone), lymphedema, metastatic melanoma s/p left foot melanoma and lymph node resection, s/p RUE melanoma and right axillary lymph node excision at [facility] (3/18/25). Patient with a recent admission 3/21 -3/25/25  when she initially presented to  with code stroke and was transferred to [facility] where she underwent cerebral angiogram for mechanical thrombectomy of L M1 occlusion, TICI 3 (one pass) on 3/21/25. Patient was discharged and presented back to [facility] 3/26 with L sided weakness and aphasia, transferred to [facility] for stroke codem and is now s/p mechanical thrombectomy of Rt M2 occlusion, TICI 3 (one pass) on 3/26/25. On the early morning of 3/29/25 pt aphasic with right sided weakness. CTH negative for acute hemorrhage. CTA + new Left MCA M2 occulusion and patient subsequently underwent 3rd thrombectomy on 3/29. MRI brain demonstrated acute bilateral multifocal cerebral infarctions and subacute left basal ganglia infarct.  Etiology likely cardioembolic in the setting of atrial fibrillation and underlying hypercoaguability from malignancy.    Hospital course complicated by right groin pseudoaneurysm s/p L MCA thrombectomy. She was seen by vascular surgery and given thrombin injection x2 and recommended for outpatient follow up as needed. Hematology consulted due to concern for hypercoagulable state, anemia, hx of melanoma. CBC trended, overall stable. Hypercoagulable serology panel sent, negative for abnormalities. Follow up outpatient for further monitoring/testing. Hospital course further complicated by urinary retention and rivas catheter placed. Failed TOV 4/1-4/2, rivas replaced 4/3/25, and patient started on flomax.     Endocrine consulted due to hx of adrenal insufficiency; initially started on hydrocortisone then transitioned to home regimen of prednisone 5mg in AM and 2mg in PM. Incidentally found new 1.7 cm cystic lesion at the pancreatic body on CT A/P on 3/28/25. GI informed, no inpatient workup needed. Follow up outpatient with Dr. Maurisio Haas. Patient started on cefepime and vancomycin on 4/4/25 due to fever of unknown origin. ID consulted, Vancomycin discontinued after blood cultures resulted negative. ID recommendations to continue with IV cefepime until 4/10/25.     Patient resumed Eliquis 5mg daily (4/2/25) for secondary stroke prevention. Also re-started on home regimen of atorvastatin 10mg daily; LDL - 59, nonatherosclerotic etiology of stroke, high intensity statin not indicated at this time. PT/OT evaluated patient, recommended dispo to acute rehab. Patient should follow up with neurology/neuro IR, cardiology, endocrinology, hematology, GI, primary care, +/- urology and vascular surgery after discharge. Patient discharged to [facility] on 4/8/25.   (08 Apr 2025 12:56)      Subjective: Seen this AM. Limited by aphasia-- trouble naming objects, says yes she slept. Denies pain      VITALS  T(C): 36.5 (04-13-25 @ 09:10)  T(F): 97.7 (04-13-25 @ 09:10), Max: 97.8 (04-12-25 @ 19:51)  HR: 85 (04-13-25 @ 09:10) (85 - 96)  BP: 100/60 (04-13-25 @ 09:10) (100/60 - 143/84)  RR:  (15 - 16)  SpO2:  (97% - 97%)  Wt(kg): --      REVIEW OF SYMPTOMS  limited by aphasia    PHYSICAL EXAM   Gen - NAD, Comfortable  HEENT - NCAT, EOMI  Pulm - breathing comfortably on RA  Cardiovascular - warm, well perfused  Abdomen - Soft, NT/ND,   Extremities - No edema, No calf tenderness  Neuro-     Cognitive - AAOx3 when given yes/no choices, follows commands     Communication - non-fluent aphasia      Motor -                     LEFT    UE - ShAB 5/5, EF 5/5, EE 5/5, WE 5/5,  5/5                    RIGHT UE - ShAB 4/5, EF 4/5, EE 4/5, WE 5/5,  4/5                    LEFT    LE - HF 2/5, KE 5/5, DF 5/5, PF 5/5                    RIGHT LE - HF 2/5, KE 3/5, DF 5/5, PF 5/5       Psychiatric - Mood stable, Affect WNL      RECENT LABS               11.5   6.42  )-----------( 136      ( 10 Apr 2025 07:06 )             35.0     04-10    137  |  105  |  17  ----------------------------<  110[H]  4.2   |  27  |  0.58    Ca    8.5      10 Apr 2025 07:06    TPro  5.3[L]  /  Alb  2.2[L]  /  TBili  1.2  /  DBili  x   /  AST  21  /  ALT  32  /  AlkPhos  46  04-10      MEDICATIONS  (STANDING):  apixaban 5 milliGRAM(s) Oral every 12 hours  atorvastatin 10 milliGRAM(s) Oral at bedtime  cefepime   IVPB 2000 milliGRAM(s) IV Intermittent every 8 hours  metoprolol tartrate 50 milliGRAM(s) Oral three times a day  multivitamin 1 Tablet(s) Oral daily  pantoprazole    Tablet 40 milliGRAM(s) Oral before breakfast  polyethylene glycol 3350 17 Gram(s) Oral daily  predniSONE   Tablet 5 milliGRAM(s) Oral <User Schedule>  predniSONE   Tablet 2 milliGRAM(s) Oral <User Schedule>  senna 2 Tablet(s) Oral at bedtime  tamsulosin 0.4 milliGRAM(s) Oral at bedtime    MEDICATIONS  (PRN):  acetaminophen     Tablet .. 650 milliGRAM(s) Oral every 6 hours PRN Mild Pain (1 - 3)

## 2025-04-14 LAB
ALBUMIN SERPL ELPH-MCNC: 2.6 G/DL — LOW (ref 3.3–5)
ALP SERPL-CCNC: 49 U/L — SIGNIFICANT CHANGE UP (ref 40–120)
ALT FLD-CCNC: 36 U/L — SIGNIFICANT CHANGE UP (ref 10–45)
ANION GAP SERPL CALC-SCNC: 7 MMOL/L — SIGNIFICANT CHANGE UP (ref 5–17)
AST SERPL-CCNC: 21 U/L — SIGNIFICANT CHANGE UP (ref 10–40)
BILIRUB SERPL-MCNC: 1.1 MG/DL — SIGNIFICANT CHANGE UP (ref 0.2–1.2)
BUN SERPL-MCNC: 28 MG/DL — HIGH (ref 7–23)
CALCIUM SERPL-MCNC: 8.9 MG/DL — SIGNIFICANT CHANGE UP (ref 8.4–10.5)
CHLORIDE SERPL-SCNC: 107 MMOL/L — SIGNIFICANT CHANGE UP (ref 96–108)
CO2 SERPL-SCNC: 27 MMOL/L — SIGNIFICANT CHANGE UP (ref 22–31)
CREAT SERPL-MCNC: 0.54 MG/DL — SIGNIFICANT CHANGE UP (ref 0.5–1.3)
EGFR: 88 ML/MIN/1.73M2 — SIGNIFICANT CHANGE UP
EGFR: 88 ML/MIN/1.73M2 — SIGNIFICANT CHANGE UP
GLUCOSE SERPL-MCNC: 86 MG/DL — SIGNIFICANT CHANGE UP (ref 70–99)
HCT VFR BLD CALC: 33.7 % — LOW (ref 34.5–45)
HGB BLD-MCNC: 11.4 G/DL — LOW (ref 11.5–15.5)
MCHC RBC-ENTMCNC: 32.1 PG — SIGNIFICANT CHANGE UP (ref 27–34)
MCHC RBC-ENTMCNC: 33.8 G/DL — SIGNIFICANT CHANGE UP (ref 32–36)
MCV RBC AUTO: 94.9 FL — SIGNIFICANT CHANGE UP (ref 80–100)
NRBC BLD AUTO-RTO: 0 /100 WBCS — SIGNIFICANT CHANGE UP (ref 0–0)
PLATELET # BLD AUTO: 130 K/UL — LOW (ref 150–400)
POTASSIUM SERPL-MCNC: 3.8 MMOL/L — SIGNIFICANT CHANGE UP (ref 3.5–5.3)
POTASSIUM SERPL-SCNC: 3.8 MMOL/L — SIGNIFICANT CHANGE UP (ref 3.5–5.3)
PROT SERPL-MCNC: 5.8 G/DL — LOW (ref 6–8.3)
RBC # BLD: 3.55 M/UL — LOW (ref 3.8–5.2)
RBC # FLD: 14.8 % — HIGH (ref 10.3–14.5)
SODIUM SERPL-SCNC: 141 MMOL/L — SIGNIFICANT CHANGE UP (ref 135–145)
WBC # BLD: 5.25 K/UL — SIGNIFICANT CHANGE UP (ref 3.8–10.5)
WBC # FLD AUTO: 5.25 K/UL — SIGNIFICANT CHANGE UP (ref 3.8–10.5)

## 2025-04-14 PROCEDURE — 99232 SBSQ HOSP IP/OBS MODERATE 35: CPT

## 2025-04-14 PROCEDURE — 99232 SBSQ HOSP IP/OBS MODERATE 35: CPT | Mod: GC

## 2025-04-14 RX ADMIN — METOPROLOL SUCCINATE 50 MILLIGRAM(S): 50 TABLET, EXTENDED RELEASE ORAL at 06:21

## 2025-04-14 RX ADMIN — ATORVASTATIN CALCIUM 10 MILLIGRAM(S): 80 TABLET, FILM COATED ORAL at 21:52

## 2025-04-14 RX ADMIN — Medication 1 TABLET(S): at 11:06

## 2025-04-14 RX ADMIN — Medication 25 MILLIGRAM(S): at 15:01

## 2025-04-14 RX ADMIN — Medication 25 MILLIGRAM(S): at 06:23

## 2025-04-14 RX ADMIN — APIXABAN 5 MILLIGRAM(S): 2.5 TABLET, FILM COATED ORAL at 06:21

## 2025-04-14 RX ADMIN — APIXABAN 5 MILLIGRAM(S): 2.5 TABLET, FILM COATED ORAL at 17:37

## 2025-04-14 RX ADMIN — POLYETHYLENE GLYCOL 3350 17 GRAM(S): 17 POWDER, FOR SOLUTION ORAL at 11:06

## 2025-04-14 RX ADMIN — Medication 40 MILLIGRAM(S): at 06:21

## 2025-04-14 RX ADMIN — PREDNISONE 5 MILLIGRAM(S): 20 TABLET ORAL at 08:29

## 2025-04-14 RX ADMIN — Medication 2 TABLET(S): at 21:54

## 2025-04-14 RX ADMIN — PREDNISONE 2 MILLIGRAM(S): 20 TABLET ORAL at 15:01

## 2025-04-14 RX ADMIN — Medication 25 MILLIGRAM(S): at 21:52

## 2025-04-14 RX ADMIN — METOPROLOL SUCCINATE 50 MILLIGRAM(S): 50 TABLET, EXTENDED RELEASE ORAL at 21:53

## 2025-04-14 NOTE — PROGRESS NOTE ADULT - ASSESSMENT
89 year old female with PMHx of Afib (off Eliquis), PPM, HTN, HLD, GERD, severe MR/TR, iatrogenic adrenal insufficiency (on prednisone), lymphedema, metastatic melanoma s/p left foot melanoma and lymph node resection, s/p RUE melanoma and right axillary lymph node excision at [facility] (3/18/25), recent admission (3/21-3/25/25) for L MCA CVA s/p M1 thrombectomy, presented to  with left sided weakness and aphasia, found to have right MCA CVA and s/p M2 thrombectomy (3/26/25).  Hospital Course complicated by right sided weakness and aphasia, found to have new Left MCA CVA with M2 occlusion and is s/p 3rd thrombectomy (3/29/25). Hospital course also complicated by pseudoaneurysm (s/p vasc surgery consult and thrombin injection x2), urinary retention (s/p rivas, failed TOV 4/2), fever of unknown origin (on IV cefepime until 4/10/25). Patient resumed Eliquis on 4/2/25. Patient now admitted for a multidisciplinary rehab program with right sided weakness, Transcortical motor aphasia, cognitive deficits, Dysphagia and gait and ADL impairments.     # Bi-Hemispheric Embolic Infarcts s/p Thrombectomy x 3  # Aphasia, Right hemiparesis, Dysphagia  - s/p cerebral angiogram for mechanical thrombectomy of L M1 occlusion, TICI 3 (one pass) on 3/21/25  - s/p cerebral angiogram for mechanical thrombectomy of Rt M2 occlusion, TICI 3 (one pass) on 3/26/25  - s/p thrombectomy of left M4 with TICI 0 3/29/25  - MRI brain 3/31 showed New acute infarctions within the LEFT lateral frontal cortex, LEFT occipital cortex, RIGHT insular cortex and scattered RIGHT frontal, parietal, temporal and occipital cortex suggesting embolic etiology. Subacute infarction within the LEFT basal ganglia. Mild periventricular chronic white matter ischemia.  - Etiology likely cardioembolic in the setting of atrial fibrillation and underlying hypercoagulability from malignancy  - Repeat Echo 4/4 showed EF >75 %, no evidence of LV thrombus, severely dilated LA/RA, mild-mod TR  - Continue Eliquis 5 mg BID  - Continue Atorvastatin 10 mg QHS (LDL 58 on 3/22)  - Precautions: Fall, Aspiration  - Comprehensive rehab program of PT/OT/SLP  - Outpatient follow up with neurology    # Chronic Atrial Fibrillation  # Status Post PPM   - Continue metoprolol  - Continue Eliquis 5mg BID    # Right femoral artery pseudoaneurysm   - s/p thrombin injection x2  - Outpatient follow up     # Fever of unknown origin, ?Aspiration PNA- (resolved)  - Blood cultures 3/26, 4/4  no growth   - RVP/COVID 19 PCR 4/4 negative   - Urine Cx 4/3 contaminated   - CTA chest 4/4 showed no PE, Mucous/secretions are noted within the left lower lobe bronchus. Near-complete atelectasis of both lower lobes is noted. Small bilateral pleural effusions are noted.   - Completed course of IV cefepime on 4/10 per ID   - Incentive spirometry  - Will monitor    # Iatrogenic Adrenal insufficiency  - Prednisone 5 mg in AM (8:00) and 2 mg in PM (16:00) - home regimen  - f/u outpatient endocrinology    # Metastatic Melanoma  - metastatic melanoma s/p left foot melanoma and lymph node resection, s/p RUE melanoma and right axillary lymph node excision at [facility] (3/18/25)  - Outpatient follow up     # Incidental Pancreatic Cyst  - Incidentally found new 1.7 cm cystic lesion at the pancreatic body on CT a/p on 3/28/25  - GI informed, no inpatient workup needed  - Follow up outpatient with Dr. Maurisio Haas    #GI PPx  - Continue Protonix 40 mg QD    # Urinary Retention- improved  - Rivas removed 4/10  - pt now voiding   - Started on Bethanechol 25mg TID 4/11  - Continue serial bladder scans and ISC as needed   - encourage toileting schedule    # DVT prophylaxis:   # History of Lymphedema   - LE duplex 3/26 negative for DVT  - on Eliquis 5 mg Q12H    4/14 labs reviewed

## 2025-04-14 NOTE — PROGRESS NOTE ADULT - ASSESSMENT
Assessment/Plan:  SHARON HULL is a 88 y/o F with PMHx of Afib (off Eliquis), PPM, HTN, HLD, GERD, severe MR/TR, iatrogenic adrenal insufficiency (on prednisone), lymphedema, metastatic melanoma s/p left foot melanoma and lymph node resection, s/p RUE melanoma and right axillary lymph node excision at [facility] (3/18/25), recent admission (3/21-3/25/25) for L MCA CVA s/p M1 thrombectomy, presented to  with left sided weakness and aphasia, found to have right MCA CVA and s/p M2 thrombectomy (3/26/25).  Hospital Course complicated by right sided weakness and aphasia, found to have new Left MCA CVA with M2 occlusion and is s/p 3rd thrombectomy (3/29/25). Hospital course also complicated by pseudoaneurysm (s/p vasc surgery consult and thrombin injection x2), urinary retention (s/p rivas, failed TOV 4/2), fever of unknown origin (on IV cefepime until 4/10/25). Patient resumed Eliquis on 4/2/25. Patient now admitted for a multidisciplinary rehab program with right sided weakness, Transcortical motor aphasia, cognitive deficits, Dysphagia and gait and ADL impairments.     #  Bihemispheric embolic infarcts s/p Thrombectomy x 3  - s/p cerebral angiogram for mechanical thrombectomy of L M1 occlusion, TICI 3 (one pass) on 3/21/25  - s/p cerebral angiogram for mechanical thrombectomy of Rt M2 occlusion, TICI 3 (one pass) on 3/26/25  - s/p thrombectomy of left M4 with TICI 0 3/29/25  - Etiology likely cardioembolic in the setting of atrial fibrillation and underlying hypercoagulability from malignancy  - Aphasia, Right hemiparesis, Dysphagia  - Start comprehensive rehab program of PT/OT/SLP - 3 hours a day, 5 days a week. P&O as needed   - Precautions: Fall, Aspiration  - Eliquis 5 mg BID  - Atorvastatin 10 mg QHS (LDL 58 on 3/22)    # Atrial Fibrillation  - has PPM present  - Eliquis 5 mg BID  - Lopressor 50 mg TID    # Right femoral artery pseudoaneurysm   - s/p thrombin injection x2    # Fever of unknown origin, ?aspiration PNA  - Blood cultures 3/26, 4/4  no growth   - RVP/COVID 19 PCR 4/4 negative   - UA 4/2 with some pyuria   - Urine Cx 3/26 no growth 4/3 contaminated   - CXR 4/4 reviewed and compared to 3/29, not much change noted  - CT Chest with contrast reporting no PE, + Atelectasis   - Per ID, continue Cefepime 2000mg IV [id number] till 4/10/25-- completed.     # Iatrogenic adrenal insuffiencey   - prednisone 5 mg in AM (8:00) and 2 mg in PM (16:00) - home regimen  - f/u outpatient endocrinology    # Metastatic Melanoma  - metastatic melanoma s/p left foot melanoma and lymph node resection, s/p RUE melanoma and right axillary lymph node excision at [facility] (3/18/25)  - outpatient follow up     # Incidental Pancreatic Cyst  - Incidentally found new 1.7 cm cystic lesion at the pancreatic body on CT a/p on 3/28/25  - GI informed, no inpatient workup needed  - Follow up outpatient with Dr. Maurisio Haas    # Pain  - Tylenol PRN    # Mood / Cognition  - Neuropsychology consult PRN  - recreational therapy    # Sleep  - Maintain quiet hours and a low stim environment.   - Melatonin PRN to maximize participation in therapy during the day    # GI / Bowel  - Senna qHS  - Miralax Daily  - GI ppx: Protonix 40 mg QD    #  / Bladder  # Urinary Retention  - Continue Rivas present on admission, plan for TOV  - Failed TOV on 4/1-4/2, Rivas replaced 4/3  - Rivas removed 4/10 night for voiding trial  - Flomax 0.4 mg QHS (dc'd 4/11)   - Bethanecol 25 mg TID (started 4/11)  - intermittently requiring SC, continue toileting program Q3H while awake    # Skin / Pressure injury  - Skin assessment on admission performed: IAD to sacrum, right posterior thigh ecchymosis, forehead scab s/p biopsy,  left lateral ankle scar with redness towards heel  - Pressure Injury/Skin: OOB to chair, PT/OT  - nursing to monitor skin qShift    # Dysphagia:  - Diet Consistency: regular with mildly thick liquids  - Supplement: MVI  - Aspiration Precautions  - SLP consult for swallow function evaluation and treatment  - Nutrition consult    # DVT prophylaxis:   - on Eliquis 5 mg Q12H      Outpatient Follow-up:  Maurisio Haas  Gastroenterology  [address], [address]  [place] [address]  Phone: [phone]  Fax: [phone]  Follow Up Time: 1 month    Celina Carlin  Hematology/Oncology  [address] [place] [address]  Phone: [phone]  Fax: [phone]  Follow Up Time: 1 month    Juan Diego Johnson  Vascular Neurology  [address] [place] [address]  Phone: [phone]  Fax: [phone]  Follow Up Time: 1 month    Antonio Gaines  Endocrinology/Metab/Diabetes  [address], [address]  [place] [address]  Phone: [phone]  Fax: [phone]  Follow Up Time: 1 month    Milo Murguia  Cardiovascular Disease  [address] [place] [address]  Phone: [phone]  Fax: [phone]  Follow Up Time: 2 weeks    Your Primary Care Provider,   Phone: (   )    -  Fax: (   )    -  Follow Up Time: 1 week    [place]  Urology  [address] [place] [address]  Phone: [phone]  Fax:   Follow Up Time: 1 month    [facility] Vascular Surgery  Vascular Surgery  [address] [place] [address]  Phone: [phone]    Total time 36 mins-face to face time encounter and counseling the patient, meeting with hospitalist, nursing staff to discuss medical updates and management, care coordination, reviewing chart and data-including but not limited to labs and imaging           Assessment/Plan:  SHARON HULL is a 89 y/o F with PMHx of Afib (off Eliquis), PPM, HTN, HLD, GERD, severe MR/TR, iatrogenic adrenal insufficiency (on prednisone), lymphedema, metastatic melanoma s/p left foot melanoma and lymph node resection, s/p RUE melanoma and right axillary lymph node excision at [facility] (3/18/25), recent admission (3/21-3/25/25) for L MCA CVA s/p M1 thrombectomy, presented to  with left sided weakness and aphasia, found to have right MCA CVA and s/p M2 thrombectomy (3/26/25).  Hospital Course complicated by right sided weakness and aphasia, found to have new Left MCA CVA with M2 occlusion and is s/p 3rd thrombectomy (3/29/25). Hospital course also complicated by pseudoaneurysm (s/p vasc surgery consult and thrombin injection x2), urinary retention (s/p riavs, failed TOV 4/2), fever of unknown origin (on IV cefepime until 4/10/25). Patient resumed Eliquis on 4/2/25. Patient now admitted for a multidisciplinary rehab program with right sided weakness, Transcortical motor aphasia, cognitive deficits, Dysphagia and gait and ADL impairments.     #  Bihemispheric embolic infarcts s/p Thrombectomy x 3  - s/p cerebral angiogram for mechanical thrombectomy of L M1 occlusion, TICI 3 (one pass) on 3/21/25  - s/p cerebral angiogram for mechanical thrombectomy of Rt M2 occlusion, TICI 3 (one pass) on 3/26/25  - s/p thrombectomy of left M4 with TICI 0 3/29/25  - Etiology likely cardioembolic in the setting of atrial fibrillation and underlying hypercoagulability from malignancy  - Aphasia, Right hemiparesis, Dysphagia  - Start comprehensive rehab program of PT/OT/SLP - 3 hours a day, 5 days a week. P&O as needed   - Precautions: Fall, Aspiration  - Eliquis 5 mg BID  - Atorvastatin 10 mg QHS (LDL 58 on 3/22)    # Atrial Fibrillation  - has PPM present  - Eliquis 5 mg BID  - Lopressor 50 mg TID    #Elevated Bun  -Bun 28, Cr 0.54 (4/14)-- encourage oral hydration    # Right femoral artery pseudoaneurysm   - s/p thrombin injection x2    # Fever of unknown origin, ?aspiration PNA  - Blood cultures 3/26, 4/4  no growth   - RVP/COVID 19 PCR 4/4 negative   - UA 4/2 with some pyuria   - Urine Cx 3/26 no growth 4/3 contaminated   - CXR 4/4 reviewed and compared to 3/29, not much change noted  - CT Chest with contrast reporting no PE, + Atelectasis   - Per ID, continue Cefepime 2000mg IV [id number] till 4/10/25-- completed.     # Iatrogenic adrenal insuffiencey   - prednisone 5 mg in AM (8:00) and 2 mg in PM (16:00) - home regimen  - f/u outpatient endocrinology    # Metastatic Melanoma  - metastatic melanoma s/p left foot melanoma and lymph node resection, s/p RUE melanoma and right axillary lymph node excision at [facility] (3/18/25)  - outpatient follow up     # Incidental Pancreatic Cyst  - Incidentally found new 1.7 cm cystic lesion at the pancreatic body on CT a/p on 3/28/25  - GI informed, no inpatient workup needed  - Follow up outpatient with Dr. Maurisio Haas    # Pain  - Tylenol PRN    # Mood / Cognition  - Neuropsychology consult PRN  - recreational therapy    # Sleep  - Maintain quiet hours and a low stim environment.   - Melatonin PRN to maximize participation in therapy during the day    # GI / Bowel  - Senna qHS  - Miralax Daily  - GI ppx: Protonix 40 mg QD    #  / Bladder  # Urinary Retention  - Continue Rivas present on admission, plan for TOV  - Failed TOV on 4/1-4/2, Rivas replaced 4/3  - Rivas removed 4/10 night for voiding trial  - Flomax 0.4 mg QHS (dc'd 4/11)   - Bethanecol 25 mg TID (started 4/11)  - intermittently requiring SC, continue toileting program Q3H while awake    # Skin / Pressure injury  - Skin assessment on admission performed: IAD to sacrum, right posterior thigh ecchymosis, forehead scab s/p biopsy,  left lateral ankle scar with redness towards heel  - Pressure Injury/Skin: OOB to chair, PT/OT  - nursing to monitor skin qShift    # Dysphagia:  - Diet Consistency: regular with mildly thick liquids  - Supplement: MVI  - Aspiration Precautions  - SLP consult for swallow function evaluation and treatment  - Nutrition consult    # DVT prophylaxis:   - on Eliquis 5 mg Q12H      Outpatient Follow-up:  Maurisio Haas  Gastroenterology  [address], [address]  [place] [address]  Phone: [phone]  Fax: [phone]  Follow Up Time: 1 month    Celina Carlin  Hematology/Oncology  [address] [place] [address]  Phone: [phone]  Fax: [phone]  Follow Up Time: 1 month    Juan Diego Johnson  Vascular Neurology  [address] [place] [address]  Phone: [phone]  Fax: [phone]  Follow Up Time: 1 month    Antonio Gaines  Endocrinology/Metab/Diabetes  [address], [address]  [place] [address]  Phone: [phone]  Fax: [phone]  Follow Up Time: 1 month    Milo Murguia  Cardiovascular Disease  [address] [place] [address]  Phone: [phone]  Fax: [phone]  Follow Up Time: 2 weeks    Your Primary Care Provider,   Phone: (   )    -  Fax: (   )    -  Follow Up Time: 1 week    [place]  Urology  [address] [place] [address]  Phone: [phone]  Fax:   Follow Up Time: 1 month    [facility] Vascular Surgery  Vascular Surgery  [address] [place] [address]  Phone: [phone]

## 2025-04-14 NOTE — PROGRESS NOTE ADULT - ATTENDING COMMENTS
patient seen at bedside, smiling, aphasia improving.  follows commands, in good spirits  denies pain, slept  labs reviewed-- elevated Bun-- encourage oral hydration  Total time 36 mins-face to face time encounter and counseling the patient, meeting with hospitalist, nursing staff, therapists and social work to discuss medical updates and management, care coordination, reviewing chart and data

## 2025-04-14 NOTE — PROGRESS NOTE ADULT - SUBJECTIVE AND OBJECTIVE BOX
CC: Patient is a 89y old  Female who presents with a chief complaint of Left MCA CVA s/p M1 and M2 thrombectomy, Right MCA CVA s/p M2 thrombectomy (14 Apr 2025 11:04)      Interval History:    Patient seen and examined at bedside.  No overnight events.  No new complaints.    ALLERGIES:  penicillins (Hives)    MEDICATIONS  (STANDING):  apixaban 5 milliGRAM(s) Oral every 12 hours  atorvastatin 10 milliGRAM(s) Oral at bedtime  bethanechol 25 milliGRAM(s) Oral three times a day  metoprolol tartrate 50 milliGRAM(s) Oral three times a day  multivitamin 1 Tablet(s) Oral daily  pantoprazole    Tablet 40 milliGRAM(s) Oral before breakfast  polyethylene glycol 3350 17 Gram(s) Oral daily  predniSONE   Tablet 5 milliGRAM(s) Oral <User Schedule>  predniSONE   Tablet 2 milliGRAM(s) Oral <User Schedule>  senna 2 Tablet(s) Oral at bedtime    MEDICATIONS  (PRN):  acetaminophen     Tablet .. 650 milliGRAM(s) Oral every 6 hours PRN Mild Pain (1 - 3)    Vital Signs Last 24 Hrs  T(F): 97.4 (14 Apr 2025 08:27), Max: 97.9 (13 Apr 2025 20:40)  HR: 75 (14 Apr 2025 08:27) (75 - 110)  BP: 124/75 (14 Apr 2025 08:27) (97/62 - 151/74)  RR: 16 (14 Apr 2025 08:27) (16 - 16)  SpO2: 99% (14 Apr 2025 08:27) (95% - 99%)  I&O's Summary    BMI (kg/m2): 27.2 (04-11-25 @ 14:16)    PHYSICAL EXAM:  GENERAL: NAD  CHEST/LUNG: No rales, rhonchi, wheezing; normal respiratory effort  HEART: RRR; No murmurs, rubs, or gallops  ABDOMEN: Soft, Nontender, Nondistended; Bowel sounds present  MSK/EXT:  No peripheral edema, 2+ Peripheral Pulses, No clubbing or digital cyanosis  PSYCH: Appropriate affect, Alert & Oriented    LABS:                        11.4   5.25  )-----------( 130      ( 14 Apr 2025 05:29 )             33.7       04-14    141  |  107  |  28  ----------------------------<  86  3.8   |  27  |  0.54    Ca    8.9      14 Apr 2025 05:29    TPro  5.8  /  Alb  2.6  /  TBili  1.1  /  DBili  x   /  AST  21  /  ALT  36  /  AlkPhos  49  04-14                03-27 Chol 145 mg/dL LDL -- HDL 74 mg/dL Trig 55 mg/dL                  Urinalysis Basic - ( 14 Apr 2025 05:29 )    Color: x / Appearance: x / SG: x / pH: x  Gluc: 86 mg/dL / Ketone: x  / Bili: x / Urobili: x   Blood: x / Protein: x / Nitrite: x   Leuk Esterase: x / RBC: x / WBC x   Sq Epi: x / Non Sq Epi: x / Bacteria: x        COVID-19 PCR: NotDetec (04-08-25 @ 18:30)      Care Discussed with Consultants/Other Providers: Yes

## 2025-04-14 NOTE — PROGRESS NOTE ADULT - SUBJECTIVE AND OBJECTIVE BOX
HPI:  88 y/o F with PMHx of Afib (off Eliquis), PPM, HTN, HLD, GERD, severe MR/TR, iatrogenic adrenal insufficiency (on prednisone), lymphedema, metastatic melanoma s/p left foot melanoma and lymph node resection, s/p RUE melanoma and right axillary lymph node excision at [facility] (3/18/25). Patient with a recent admission 3/21 -3/25/25  when she initially presented to  with code stroke and was transferred to [facility] where she underwent cerebral angiogram for mechanical thrombectomy of L M1 occlusion, TICI 3 (one pass) on 3/21/25. Patient was discharged and presented back to [facility] 3/26 with L sided weakness and aphasia, transferred to [facility] for stroke codem and is now s/p mechanical thrombectomy of Rt M2 occlusion, TICI 3 (one pass) on 3/26/25. On the early morning of 3/29/25 pt aphasic with right sided weakness. CTH negative for acute hemorrhage. CTA + new Left MCA M2 occulusion and patient subsequently underwent 3rd thrombectomy on 3/29. MRI brain demonstrated acute bilateral multifocal cerebral infarctions and subacute left basal ganglia infarct.  Etiology likely cardioembolic in the setting of atrial fibrillation and underlying hypercoaguability from malignancy.    Hospital course complicated by right groin pseudoaneurysm s/p L MCA thrombectomy. She was seen by vascular surgery and given thrombin injection x2 and recommended for outpatient follow up as needed. Hematology consulted due to concern for hypercoagulable state, anemia, hx of melanoma. CBC trended, overall stable. Hypercoagulable serology panel sent, negative for abnormalities. Follow up outpatient for further monitoring/testing. Hospital course further complicated by urinary retention and rivas catheter placed. Failed TOV 4/1-4/2, rivas replaced 4/3/25, and patient started on flomax.     Endocrine consulted due to hx of adrenal insufficiency; initially started on hydrocortisone then transitioned to home regimen of prednisone 5mg in AM and 2mg in PM. Incidentally found new 1.7 cm cystic lesion at the pancreatic body on CT A/P on 3/28/25. GI informed, no inpatient workup needed. Follow up outpatient with Dr. Maurisio Haas. Patient started on cefepime and vancomycin on 4/4/25 due to fever of unknown origin. ID consulted, Vancomycin discontinued after blood cultures resulted negative. ID recommendations to continue with IV cefepime until 4/10/25.     Patient resumed Eliquis 5mg daily (4/2/25) for secondary stroke prevention. Also re-started on home regimen of atorvastatin 10mg daily; LDL - 59, nonatherosclerotic etiology of stroke, high intensity statin not indicated at this time. PT/OT evaluated patient, recommended dispo to acute rehab. Patient should follow up with neurology/neuro IR, cardiology, endocrinology, hematology, GI, primary care, +/- urology and vascular surgery after discharge. Patient discharged to [facility] on 4/8/25.   (08 Apr 2025 12:56)      Subjective: Seen this AM. Aphasia improved today. Denies any pain and reports that she slept well. Required  cc at 5 AM this morning, however patient was not toileted beforehand. Reinforced to nursing to toilet patient prior to bladder scan.      VITALS  Vital Signs Last 24 Hrs  T(C): 36.3 (14 Apr 2025 08:27), Max: 36.6 (13 Apr 2025 20:40)  T(F): 97.4 (14 Apr 2025 08:27), Max: 97.9 (13 Apr 2025 20:40)  HR: 75 (14 Apr 2025 08:27) (75 - 110)  BP: 124/75 (14 Apr 2025 08:27) (97/62 - 151/74)  RR: 16 (14 Apr 2025 08:27) (16 - 16)  SpO2: 99% (14 Apr 2025 08:27) (95% - 99%)    REVIEW OF SYMPTOMS  limited by aphasia    PHYSICAL EXAM   Gen - NAD, Comfortable  HEENT - NCAT, EOMI  Pulm - breathing comfortably on RA  Cardiovascular - warm, well perfused  Abdomen - Soft, NT/ND,   Extremities - No edema, No calf tenderness  Neuro-     Cognitive - AAOx3 when given yes/no choices, follows commands     Communication - non-fluent aphasia      Motor -                     LEFT    UE - ShAB 5/5, EF 5/5, EE 5/5, WE 5/5,  5/5                    RIGHT UE - ShAB 4/5, EF 4/5, EE 4/5, WE 5/5,  4/5                    LEFT    LE - HF 2/5, KE 5/5, DF 5/5, PF 5/5                    RIGHT LE - HF 2/5, KE 3/5, DF 5/5, PF 5/5     Psychiatric - Mood stable, Affect WNL      RECENT LABS                        11.4   5.25  )-----------( 130      ( 14 Apr 2025 05:29 )             33.7     04-14    141  |  107  |  28[H]  ----------------------------<  86  3.8   |  27  |  0.54    Ca    8.9      14 Apr 2025 05:29    TPro  5.8[L]  /  Alb  2.6[L]  /  TBili  1.1  /  DBili  x   /  AST  21  /  ALT  36  /  AlkPhos  49  04-14      MEDICATIONS  (STANDING):  apixaban 5 milliGRAM(s) Oral every 12 hours  atorvastatin 10 milliGRAM(s) Oral at bedtime  bethanechol 25 milliGRAM(s) Oral three times a day  metoprolol tartrate 50 milliGRAM(s) Oral three times a day  multivitamin 1 Tablet(s) Oral daily  pantoprazole    Tablet 40 milliGRAM(s) Oral before breakfast  polyethylene glycol 3350 17 Gram(s) Oral daily  predniSONE   Tablet 5 milliGRAM(s) Oral <User Schedule>  predniSONE   Tablet 2 milliGRAM(s) Oral <User Schedule>  senna 2 Tablet(s) Oral at bedtime    MEDICATIONS  (PRN):  acetaminophen     Tablet .. 650 milliGRAM(s) Oral every 6 hours PRN Mild Pain (1 - 3)

## 2025-04-15 PROCEDURE — 99233 SBSQ HOSP IP/OBS HIGH 50: CPT | Mod: GC

## 2025-04-15 RX ADMIN — METOPROLOL SUCCINATE 50 MILLIGRAM(S): 50 TABLET, EXTENDED RELEASE ORAL at 14:12

## 2025-04-15 RX ADMIN — Medication 25 MILLIGRAM(S): at 05:02

## 2025-04-15 RX ADMIN — Medication 25 MILLIGRAM(S): at 21:33

## 2025-04-15 RX ADMIN — Medication 1 TABLET(S): at 14:12

## 2025-04-15 RX ADMIN — METOPROLOL SUCCINATE 50 MILLIGRAM(S): 50 TABLET, EXTENDED RELEASE ORAL at 05:02

## 2025-04-15 RX ADMIN — ATORVASTATIN CALCIUM 10 MILLIGRAM(S): 80 TABLET, FILM COATED ORAL at 21:33

## 2025-04-15 RX ADMIN — PREDNISONE 5 MILLIGRAM(S): 20 TABLET ORAL at 07:39

## 2025-04-15 RX ADMIN — APIXABAN 5 MILLIGRAM(S): 2.5 TABLET, FILM COATED ORAL at 05:02

## 2025-04-15 RX ADMIN — Medication 25 MILLIGRAM(S): at 14:12

## 2025-04-15 RX ADMIN — METOPROLOL SUCCINATE 50 MILLIGRAM(S): 50 TABLET, EXTENDED RELEASE ORAL at 21:34

## 2025-04-15 RX ADMIN — PREDNISONE 2 MILLIGRAM(S): 20 TABLET ORAL at 17:22

## 2025-04-15 RX ADMIN — APIXABAN 5 MILLIGRAM(S): 2.5 TABLET, FILM COATED ORAL at 17:22

## 2025-04-15 RX ADMIN — Medication 2 TABLET(S): at 21:33

## 2025-04-15 RX ADMIN — Medication 40 MILLIGRAM(S): at 06:46

## 2025-04-15 NOTE — PROGRESS NOTE ADULT - SUBJECTIVE AND OBJECTIVE BOX
HPI:  88 y/o F with PMHx of Afib (off Eliquis), PPM, HTN, HLD, GERD, severe MR/TR, iatrogenic adrenal insufficiency (on prednisone), lymphedema, metastatic melanoma s/p left foot melanoma and lymph node resection, s/p RUE melanoma and right axillary lymph node excision at [facility] (3/18/25). Patient with a recent admission 3/21 -3/25/25  when she initially presented to  with code stroke and was transferred to [facility] where she underwent cerebral angiogram for mechanical thrombectomy of L M1 occlusion, TICI 3 (one pass) on 3/21/25. Patient was discharged and presented back to [facility] 3/26 with L sided weakness and aphasia, transferred to [facility] for stroke codem and is now s/p mechanical thrombectomy of Rt M2 occlusion, TICI 3 (one pass) on 3/26/25. On the early morning of 3/29/25 pt aphasic with right sided weakness. CTH negative for acute hemorrhage. CTA + new Left MCA M2 occulusion and patient subsequently underwent 3rd thrombectomy on 3/29. MRI brain demonstrated acute bilateral multifocal cerebral infarctions and subacute left basal ganglia infarct.  Etiology likely cardioembolic in the setting of atrial fibrillation and underlying hypercoaguability from malignancy.    Hospital course complicated by right groin pseudoaneurysm s/p L MCA thrombectomy. She was seen by vascular surgery and given thrombin injection x2 and recommended for outpatient follow up as needed. Hematology consulted due to concern for hypercoagulable state, anemia, hx of melanoma. CBC trended, overall stable. Hypercoagulable serology panel sent, negative for abnormalities. Follow up outpatient for further monitoring/testing. Hospital course further complicated by urinary retention and rivas catheter placed. Failed TOV 4/1-4/2, rivas replaced 4/3/25, and patient started on flomax.     Endocrine consulted due to hx of adrenal insufficiency; initially started on hydrocortisone then transitioned to home regimen of prednisone 5mg in AM and 2mg in PM. Incidentally found new 1.7 cm cystic lesion at the pancreatic body on CT A/P on 3/28/25. GI informed, no inpatient workup needed. Follow up outpatient with Dr. Maurisio Haas. Patient started on cefepime and vancomycin on 4/4/25 due to fever of unknown origin. ID consulted, Vancomycin discontinued after blood cultures resulted negative. ID recommendations to continue with IV cefepime until 4/10/25.     Patient resumed Eliquis 5mg daily (4/2/25) for secondary stroke prevention. Also re-started on home regimen of atorvastatin 10mg daily; LDL - 59, nonatherosclerotic etiology of stroke, high intensity statin not indicated at this time. PT/OT evaluated patient, recommended dispo to acute rehab. Patient should follow up with neurology/neuro IR, cardiology, endocrinology, hematology, GI, primary care, +/- urology and vascular surgery after discharge. Patient discharged to [facility] on 4/8/25.   (08 Apr 2025 12:56)      Subjective/Objective: patient seen this AM after having breakfast, sitting in the wheelchair. denies pain. improving aphasia. Per RN, patient did not need to get straight cath this AM    VITALS  T(C): 36.6 (04-15-25 @ 07:41)  T(F): 97.8 (04-15-25 @ 07:41), Max: 97.8 (04-14-25 @ 20:14)  HR: 71 (04-15-25 @ 07:41) (71 - 97)  BP: 118/79 (04-15-25 @ 07:41) (100/65 - 146/83)  RR:  (14 - 15)  SpO2:  (95% - 98%)    REVIEW OF SYMPTOMS  limited by aphasia    PHYSICAL EXAM   Gen - NAD, Comfortable  HEENT - NCAT, EOMI  Pulm - breathing comfortably on RA  Cardiovascular - warm, well perfused  Abdomen - Soft, NT/ND,   Extremities - No edema, No calf tenderness  Neuro-     Cognitive - AAOx3 when given yes/no choices, follows commands     Communication - non-fluent aphasia      Motor -                     LEFT    UE - ShAB 5/5, EF 5/5, EE 5/5, WE 5/5,  5/5                    RIGHT UE - ShAB 4/5, EF 4/5, EE 4/5, WE 5/5,  4/5                    LEFT    LE - HF 2/5, KE 5/5, DF 5/5, PF 5/5                    RIGHT LE - HF 2/5, KE 3/5, DF 5/5, PF 5/5     Psychiatric - Mood stable, Affect WNL      RECENT LABS                        11.4   5.25  )-----------( 130      ( 14 Apr 2025 05:29 )             33.7     04-14    141  |  107  |  28[H]  ----------------------------<  86  3.8   |  27  |  0.54    Ca    8.9      14 Apr 2025 05:29    TPro  5.8[L]  /  Alb  2.6[L]  /  TBili  1.1  /  DBili  x   /  AST  21  /  ALT  36  /  AlkPhos  49  04-14      MEDICATIONS  (STANDING):  apixaban 5 milliGRAM(s) Oral every 12 hours  atorvastatin 10 milliGRAM(s) Oral at bedtime  bethanechol 25 milliGRAM(s) Oral three times a day  metoprolol tartrate 50 milliGRAM(s) Oral three times a day  multivitamin 1 Tablet(s) Oral daily  pantoprazole    Tablet 40 milliGRAM(s) Oral before breakfast  polyethylene glycol 3350 17 Gram(s) Oral daily  predniSONE   Tablet 5 milliGRAM(s) Oral <User Schedule>  predniSONE   Tablet 2 milliGRAM(s) Oral <User Schedule>  senna 2 Tablet(s) Oral at bedtime    MEDICATIONS  (PRN):  acetaminophen     Tablet .. 650 milliGRAM(s) Oral every 6 hours PRN Mild Pain (1 - 3)

## 2025-04-15 NOTE — PROGRESS NOTE ADULT - ASSESSMENT
Assessment/Plan:  SHARON HULL is a 89 y/o F with PMHx of Afib (off Eliquis), PPM, HTN, HLD, GERD, severe MR/TR, iatrogenic adrenal insufficiency (on prednisone), lymphedema, metastatic melanoma s/p left foot melanoma and lymph node resection, s/p RUE melanoma and right axillary lymph node excision at [facility] (3/18/25), recent admission (3/21-3/25/25) for L MCA CVA s/p M1 thrombectomy, presented to  with left sided weakness and aphasia, found to have right MCA CVA and s/p M2 thrombectomy (3/26/25).  Hospital Course complicated by right sided weakness and aphasia, found to have new Left MCA CVA with M2 occlusion and is s/p 3rd thrombectomy (3/29/25). Hospital course also complicated by pseudoaneurysm (s/p vasc surgery consult and thrombin injection x2), urinary retention (s/p rivas, failed TOV 4/2), fever of unknown origin (on IV cefepime until 4/10/25). Patient resumed Eliquis on 4/2/25. Patient now admitted for a multidisciplinary rehab program with right sided weakness, Transcortical motor aphasia, cognitive deficits, Dysphagia and gait and ADL impairments.     #  Bihemispheric embolic infarcts s/p Thrombectomy x 3  - s/p cerebral angiogram for mechanical thrombectomy of L M1 occlusion, TICI 3 (one pass) on 3/21/25  - s/p cerebral angiogram for mechanical thrombectomy of Rt M2 occlusion, TICI 3 (one pass) on 3/26/25  - s/p thrombectomy of left M4 with TICI 0 3/29/25  - Etiology likely cardioembolic in the setting of atrial fibrillation and underlying hypercoagulability from malignancy  - Aphasia, Right hemiparesis, Dysphagia  - Start comprehensive rehab program of PT/OT/SLP - 3 hours a day, 5 days a week. P&O as needed   - Precautions: Fall, Aspiration  - Eliquis 5 mg BID  - Atorvastatin 10 mg QHS (LDL 58 on 3/22)    # Atrial Fibrillation  - has PPM present  - Eliquis 5 mg BID  - Lopressor 50 mg TID    #Elevated Bun  -Bun 28, Cr 0.54 (4/14)-- encourage oral hydration, repeat with next lab     # Right femoral artery pseudoaneurysm   - s/p thrombin injection x2    # Fever of unknown origin, ?aspiration PNA  - Blood cultures 3/26, 4/4  no growth   - RVP/COVID 19 PCR 4/4 negative   - UA 4/2 with some pyuria   - Urine Cx 3/26 no growth 4/3 contaminated   - CXR 4/4 reviewed and compared to 3/29, not much change noted  - CT Chest with contrast reporting no PE, + Atelectasis   - Continued Cefepime 2000mg IV 4/10/25    # Iatrogenic adrenal insuffiencey   - prednisone 5 mg in AM (8:00) and 2 mg in PM (16:00) - home regimen  - f/u outpatient endocrinology    # Metastatic Melanoma  - metastatic melanoma s/p left foot melanoma and lymph node resection, s/p RUE melanoma and right axillary lymph node excision at [facility] (3/18/25)  - outpatient follow up     # Incidental Pancreatic Cyst  - Incidentally found new 1.7 cm cystic lesion at the pancreatic body on CT a/p on 3/28/25  - GI informed, no inpatient workup needed  - Follow up outpatient with Dr. Maurisio Haas    # Pain  - Tylenol PRN    # Mood / Cognition  - Neuropsychology consult PRN  - recreational therapy    # Sleep  - Maintain quiet hours and a low stim environment.   - Melatonin PRN to maximize participation in therapy during the day    # GI / Bowel  - Senna qHS  - Miralax Daily  - GI ppx: Protonix 40 mg QD    #  / Bladder  # Urinary Retention  - Continue Rivas present on admission, plan for TOV  - Failed TOV on 4/1-4/2, Rivas replaced 4/3  - Rivas removed 4/10 night for voiding trial  - Flomax 0.4 mg QHS (dc'd 4/11)   - Bethanecol 25 mg TID (started 4/11)  - did not need SC overnight or this AM, continue to monitor 4/15    # Skin / Pressure injury  - Skin assessment on admission performed: IAD to sacrum, right posterior thigh ecchymosis, forehead scab s/p biopsy,  left lateral ankle scar with redness towards heel  - Pressure Injury/Skin: OOB to chair, PT/OT  - nursing to monitor skin qShift    # Dysphagia:  - Diet Consistency: regular with mildly thick liquids  - Supplement: MVI  - Aspiration Precautions  - SLP consult for swallow function evaluation and treatment  - Nutrition consult    # DVT prophylaxis:   - on Eliquis 5 mg Q12H      Outpatient Follow-up:  Maurisio Haas  Gastroenterology  [address], [address]  [place] [address]  Phone: [phone]  Fax: [phone]  Follow Up Time: 1 month    Celina Carlin  Hematology/Oncology  [address] [place] [address]  Phone: [phone]  Fax: [phone]  Follow Up Time: 1 month    Juan Diego Johnson  Vascular Neurology  [address] [place] [address]  Phone: [phone]  Fax: [phone]  Follow Up Time: 1 month    Antonio Gaines  Endocrinology/Metab/Diabetes  [address], [address]  [place] [address]  Phone: [phone]  Fax: [phone]  Follow Up Time: 1 month    Milo MurguiaVictoriano  Cardiovascular Disease  [address] [place] [address]  Phone: [phone]  Fax: [phone]  Follow Up Time: 2 weeks    Your Primary Care Provider,   Phone: (   )    -  Fax: (   )    -  Follow Up Time: 1 week    [facility]  Urology  [address] [place] [address]  Phone: [phone]  Fax:   Follow Up Time: 1 month    [facility] Vascular Surgery  Vascular Surgery  [address] [place] [address]  Phone: [phone]    IDT 4/8  SW: lives with son in pvt home, 1 RASHIDA, 2 to family room, 2+10+2 upstairs, independent prior  SLP: reg solids, mild thick liquids, expressive aphasia, mild-mod receptive deficits, not consistently following commands, impaired automatic speech, impaired naming  OT: supervision eating and oral care, maxA toileting hygiene/bathing, modA UBD, max LBD, modA TT  PT: BM modA, transfers modA, ambulation 120 ft with RW modA, 4 steps modA  Goals:   1. ambulate to commode with supervision  2. reliably answer yes/no questions with min cues  Barriers: dysphagia, aphasia, cognition, right inattention, incontinence  TDD: 4/25 home with home care      Total time 50 mins-face to face time encounter and counseling the patient, meeting with hospitalist, nursing staff, therapists and social work to discuss medical updates and management, care coordination, reviewing chart and data-including but not limited to labs and imaging

## 2025-04-15 NOTE — ASSESSMENT
How Severe Are Your Spot(S)?: mild What Is The Reason For Today's Visit?: Full Body Skin Examination [FreeTextEntry1] : History of stage III left heel melanoma s/p WEX 5/2019\par ROXANN\par Normal LDH August 2022 \par Continue dermatologic surveillance with Dr. Olson\par Patient will be undergoing PT for LLE edema \par RTO 6 months after yearly blood work including LDH level  What Is The Reason For Today's Visit? (Being Monitored For X): the development of new lesions

## 2025-04-16 PROCEDURE — 99233 SBSQ HOSP IP/OBS HIGH 50: CPT | Mod: GC

## 2025-04-16 PROCEDURE — 99232 SBSQ HOSP IP/OBS MODERATE 35: CPT

## 2025-04-16 RX ADMIN — Medication 25 MILLIGRAM(S): at 14:35

## 2025-04-16 RX ADMIN — METOPROLOL SUCCINATE 50 MILLIGRAM(S): 50 TABLET, EXTENDED RELEASE ORAL at 05:11

## 2025-04-16 RX ADMIN — Medication 40 MILLIGRAM(S): at 05:11

## 2025-04-16 RX ADMIN — Medication 2 TABLET(S): at 21:36

## 2025-04-16 RX ADMIN — METOPROLOL SUCCINATE 50 MILLIGRAM(S): 50 TABLET, EXTENDED RELEASE ORAL at 21:36

## 2025-04-16 RX ADMIN — Medication 25 MILLIGRAM(S): at 21:36

## 2025-04-16 RX ADMIN — Medication 1 TABLET(S): at 14:35

## 2025-04-16 RX ADMIN — ATORVASTATIN CALCIUM 10 MILLIGRAM(S): 80 TABLET, FILM COATED ORAL at 21:36

## 2025-04-16 RX ADMIN — PREDNISONE 5 MILLIGRAM(S): 20 TABLET ORAL at 07:48

## 2025-04-16 RX ADMIN — POLYETHYLENE GLYCOL 3350 17 GRAM(S): 17 POWDER, FOR SOLUTION ORAL at 14:38

## 2025-04-16 RX ADMIN — APIXABAN 5 MILLIGRAM(S): 2.5 TABLET, FILM COATED ORAL at 17:32

## 2025-04-16 RX ADMIN — PREDNISONE 2 MILLIGRAM(S): 20 TABLET ORAL at 17:32

## 2025-04-16 RX ADMIN — Medication 25 MILLIGRAM(S): at 05:11

## 2025-04-16 RX ADMIN — APIXABAN 5 MILLIGRAM(S): 2.5 TABLET, FILM COATED ORAL at 05:11

## 2025-04-16 RX ADMIN — METOPROLOL SUCCINATE 50 MILLIGRAM(S): 50 TABLET, EXTENDED RELEASE ORAL at 14:38

## 2025-04-16 NOTE — PROGRESS NOTE ADULT - ASSESSMENT
89 year old female with PMHx of Afib (off Eliquis), PPM, HTN, HLD, GERD, severe MR/TR, iatrogenic adrenal insufficiency (on prednisone), lymphedema, metastatic melanoma s/p left foot melanoma and lymph node resection, s/p RUE melanoma and right axillary lymph node excision at [facility] (3/18/25), recent admission (3/21-3/25/25) for L MCA CVA s/p M1 thrombectomy, presented to  with left sided weakness and aphasia, found to have right MCA CVA and s/p M2 thrombectomy (3/26/25).  Hospital Course complicated by right sided weakness and aphasia, found to have new Left MCA CVA with M2 occlusion and is s/p 3rd thrombectomy (3/29/25). Hospital course also complicated by pseudoaneurysm (s/p vasc surgery consult and thrombin injection x2), urinary retention (s/p rivas, failed TOV 4/2), fever of unknown origin (on IV cefepime until 4/10/25). Patient resumed Eliquis on 4/2/25. Patient now admitted for a multidisciplinary rehab program with right sided weakness, Transcortical motor aphasia, cognitive deficits, Dysphagia and gait and ADL impairments.     # Bi-Hemispheric Embolic Infarcts s/p Thrombectomy x 3  # Aphasia, Right hemiparesis, Dysphagia  - s/p cerebral angiogram for mechanical thrombectomy of L M1 occlusion, TICI 3 (one pass) on 3/21/25  - s/p cerebral angiogram for mechanical thrombectomy of Rt M2 occlusion, TICI 3 (one pass) on 3/26/25  - s/p thrombectomy of left M4 with TICI 0 3/29/25  - MRI brain 3/31 showed New acute infarctions within the LEFT lateral frontal cortex, LEFT occipital cortex, RIGHT insular cortex and scattered RIGHT frontal, parietal, temporal and occipital cortex suggesting embolic etiology. Subacute infarction within the LEFT basal ganglia. Mild periventricular chronic white matter ischemia.  - Etiology likely cardioembolic in the setting of atrial fibrillation and underlying hypercoagulability from malignancy  - Repeat Echo 4/4 showed EF >75 %, no evidence of LV thrombus, severely dilated LA/RA, mild-mod TR  - Continue Eliquis 5 mg BID  - Continue Atorvastatin 10 mg QHS (LDL 58 on 3/22)  - Precautions: Fall, Aspiration  - Comprehensive rehab program of PT/OT/SLP  - Outpatient follow up with neurology    # Chronic Atrial Fibrillation  # Status Post PPM   - Continue metoprolol  - Continue Eliquis 5mg BID    # Right femoral artery pseudoaneurysm   - s/p thrombin injection x2  - Outpatient follow up     # Fever of unknown origin, ?Aspiration PNA- (resolved)  - Blood cultures 3/26, 4/4  no growth   - RVP/COVID 19 PCR 4/4 negative   - Urine Cx 4/3 contaminated   - CTA chest 4/4 showed no PE, Mucous/secretions are noted within the left lower lobe bronchus. Near-complete atelectasis of both lower lobes is noted. Small bilateral pleural effusions are noted.   - Completed course of IV cefepime on 4/10 per ID   - Incentive spirometry  - Will monitor    # Iatrogenic Adrenal insufficiency  - Prednisone 5 mg in AM (8:00) and 2 mg in PM (16:00) - home regimen  - f/u outpatient endocrinology    # Metastatic Melanoma  - metastatic melanoma s/p left foot melanoma and lymph node resection, s/p RUE melanoma and right axillary lymph node excision at [facility] (3/18/25)  - Outpatient follow up     # Incidental Pancreatic Cyst  - Incidentally found new 1.7 cm cystic lesion at the pancreatic body on CT a/p on 3/28/25  - GI informed, no inpatient workup needed  - Follow up outpatient with Dr. Maurisio Haas    #GI PPx  - Continue Protonix 40 mg QD    # Urinary Retention- improved  - Rivas removed 4/10  - pt voiding   - Started on Bethanechol 25mg TID 4/11  - Continue serial bladder scans and ISC as needed   - encourage toileting schedule    # DVT prophylaxis:   # History of Lymphedema   - LE duplex 3/26 negative for DVT  - on Eliquis 5 mg Q12H    4/14 labs reviewed   89 year old female with PMHx of Afib (off Eliquis), PPM, HTN, HLD, GERD, severe MR/TR, iatrogenic adrenal insufficiency (on prednisone), lymphedema, metastatic melanoma s/p left foot melanoma and lymph node resection, s/p RUE melanoma and right axillary lymph node excision at [facility] (3/18/25), recent admission (3/21-3/25/25) for L MCA CVA s/p M1 thrombectomy, presented to  with left sided weakness and aphasia, found to have right MCA CVA and s/p M2 thrombectomy (3/26/25).  Hospital Course complicated by right sided weakness and aphasia, found to have new Left MCA CVA with M2 occlusion and is s/p 3rd thrombectomy (3/29/25). Hospital course also complicated by pseudoaneurysm (s/p vasc surgery consult and thrombin injection x2), urinary retention (s/p rivas, failed TOV 4/2), fever of unknown origin (on IV cefepime until 4/10/25). Patient resumed Eliquis on 4/2/25. Patient now admitted for a multidisciplinary rehab program with right sided weakness, Transcortical motor aphasia, cognitive deficits, Dysphagia and gait and ADL impairments.     # Bi-Hemispheric Embolic Infarcts s/p Thrombectomy x 3  # Aphasia, Right hemiparesis, Dysphagia  - s/p cerebral angiogram for mechanical thrombectomy of L M1 occlusion, TICI 3 (one pass) on 3/21/25  - s/p cerebral angiogram for mechanical thrombectomy of Rt M2 occlusion, TICI 3 (one pass) on 3/26/25  - s/p thrombectomy of left M4 with TICI 0 3/29/25  - MRI brain 3/31 showed New acute infarctions within the LEFT lateral frontal cortex, LEFT occipital cortex, RIGHT insular cortex and scattered RIGHT frontal, parietal, temporal and occipital cortex suggesting embolic etiology. Subacute infarction within the LEFT basal ganglia. Mild periventricular chronic white matter ischemia.  - Etiology likely cardioembolic in the setting of atrial fibrillation and underlying hypercoagulability from malignancy  - Repeat Echo 4/4 showed EF >75 %, no evidence of LV thrombus, severely dilated LA/RA, mild-mod TR  - Continue Eliquis 5 mg BID  - Continue Atorvastatin 10 mg QHS (LDL 58 on 3/22)  - Precautions: Fall, Aspiration  - Comprehensive rehab program of PT/OT/SLP  - Outpatient follow up with neurology    # Chronic Atrial Fibrillation  # Status Post PPM   - Continue metoprolol  - Continue Eliquis 5mg BID    # Right femoral artery pseudoaneurysm   - s/p thrombin injection x2  - Outpatient follow up     # Fever of unknown origin, ?Aspiration PNA- (resolved)  - Blood cultures 3/26, 4/4  no growth   - RVP/COVID 19 PCR 4/4 negative   - Urine Cx 4/3 contaminated   - CTA chest 4/4 showed no PE, Mucous/secretions are noted within the left lower lobe bronchus. Near-complete atelectasis of both lower lobes is noted. Small bilateral pleural effusions are noted.   - Completed course of IV cefepime on 4/10 per ID   - Incentive spirometry  - Will monitor    # Iatrogenic Adrenal insufficiency  - Prednisone 5 mg in AM (8:00) and 2 mg in PM (16:00) - home regimen  - f/u outpatient endocrinology    # Metastatic Melanoma  - metastatic melanoma s/p left foot melanoma and lymph node resection, s/p RUE melanoma and right axillary lymph node excision at [facility] (3/18/25)  - Outpatient follow up     # Incidental Pancreatic Cyst  - Incidentally found new 1.7 cm cystic lesion at the pancreatic body on CT a/p on 3/28/25  - GI informed, no inpatient workup needed  - Follow up outpatient with Dr. Maurisio Haas    #GI PPx  - Continue Protonix 40 mg QD    # Urinary Retention- improved  - Rivas removed 4/10  - pt voiding   - Started on Bethanechol 25mg TID 4/11, given that the patient has done well on this dose and continues to void after previous failed TOV. would continue bethanechol at current dose.   - Continue serial bladder scans and ISC as needed   - encourage toileting schedule    # DVT prophylaxis:   # History of Lymphedema   - LE duplex 3/26 negative for DVT  - on Eliquis 5 mg Q12H    4/14 labs reviewed

## 2025-04-16 NOTE — PROGRESS NOTE ADULT - SUBJECTIVE AND OBJECTIVE BOX
CC: Patient is a 89y old  Female who presents with a chief complaint of Left MCA CVA s/p M1 and M2 thrombectomy, Right MCA CVA s/p M2 thrombectomy (15 Apr 2025 10:44)      Interval History:    Patient seen and examined at bedside.  No overnight events.  No new complaints.    ALLERGIES:  penicillins (Hives)    MEDICATIONS  (STANDING):  apixaban 5 milliGRAM(s) Oral every 12 hours  atorvastatin 10 milliGRAM(s) Oral at bedtime  bethanechol 25 milliGRAM(s) Oral three times a day  metoprolol tartrate 50 milliGRAM(s) Oral three times a day  multivitamin 1 Tablet(s) Oral daily  pantoprazole    Tablet 40 milliGRAM(s) Oral before breakfast  polyethylene glycol 3350 17 Gram(s) Oral daily  predniSONE   Tablet 5 milliGRAM(s) Oral <User Schedule>  predniSONE   Tablet 2 milliGRAM(s) Oral <User Schedule>  senna 2 Tablet(s) Oral at bedtime    MEDICATIONS  (PRN):  acetaminophen     Tablet .. 650 milliGRAM(s) Oral every 6 hours PRN Mild Pain (1 - 3)    Vital Signs Last 24 Hrs  T(F): 97.8 (16 Apr 2025 07:50), Max: 97.8 (16 Apr 2025 07:50)  HR: 74 (16 Apr 2025 07:50) (74 - 100)  BP: 115/75 (16 Apr 2025 07:50) (106/70 - 131/78)  RR: 16 (16 Apr 2025 07:50) (15 - 16)  SpO2: 97% (16 Apr 2025 07:50) (97% - 97%)  I&O's Summary    BMI (kg/m2): 27.2 (04-11-25 @ 14:16)    PHYSICAL EXAM:  GENERAL: NAD  CHEST/LUNG: No rales, rhonchi, wheezing; normal respiratory effort  HEART: RRR; No murmurs, rubs, or gallops  ABDOMEN: Soft, Nontender, Nondistended; Bowel sounds present  MSK/EXT:  No peripheral edema, 2+ Peripheral Pulses, No clubbing or digital cyanosis  PSYCH: Appropriate affect, Alert & Oriented    LABS:                        11.4   5.25  )-----------( 130      ( 14 Apr 2025 05:29 )             33.7       04-14    141  |  107  |  28  ----------------------------<  86  3.8   |  27  |  0.54    Ca    8.9      14 Apr 2025 05:29    TPro  5.8  /  Alb  2.6  /  TBili  1.1  /  DBili  x   /  AST  21  /  ALT  36  /  AlkPhos  49  04-14                03-27 Chol 145 mg/dL LDL -- HDL 74 mg/dL Trig 55 mg/dL                  Urinalysis Basic - ( 14 Apr 2025 05:29 )    Color: x / Appearance: x / SG: x / pH: x  Gluc: 86 mg/dL / Ketone: x  / Bili: x / Urobili: x   Blood: x / Protein: x / Nitrite: x   Leuk Esterase: x / RBC: x / WBC x   Sq Epi: x / Non Sq Epi: x / Bacteria: x        COVID-19 PCR: NotDetec (04-08-25 @ 18:30)      Care Discussed with Consultants/Other Providers: Yes

## 2025-04-16 NOTE — PROGRESS NOTE ADULT - ASSESSMENT
Assessment/Plan:  SHARON HULL is a 88 y/o F with PMHx of Afib (off Eliquis), PPM, HTN, HLD, GERD, severe MR/TR, iatrogenic adrenal insufficiency (on prednisone), lymphedema, metastatic melanoma s/p left foot melanoma and lymph node resection, s/p RUE melanoma and right axillary lymph node excision at [facility] (3/18/25), recent admission (3/21-3/25/25) for L MCA CVA s/p M1 thrombectomy, presented to  with left sided weakness and aphasia, found to have right MCA CVA and s/p M2 thrombectomy (3/26/25).  Hospital Course complicated by right sided weakness and aphasia, found to have new Left MCA CVA with M2 occlusion and is s/p 3rd thrombectomy (3/29/25). Hospital course also complicated by pseudoaneurysm (s/p vasc surgery consult and thrombin injection x2), urinary retention (s/p rivas, failed TOV 4/2), fever of unknown origin (on IV cefepime until 4/10/25). Patient resumed Eliquis on 4/2/25. Patient now admitted for a multidisciplinary rehab program with right sided weakness, Transcortical motor aphasia, cognitive deficits, Dysphagia and gait and ADL impairments.     #  Bihemispheric embolic infarcts s/p Thrombectomy x 3  - s/p cerebral angiogram for mechanical thrombectomy of L M1 occlusion, TICI 3 (one pass) on 3/21/25  - s/p cerebral angiogram for mechanical thrombectomy of Rt M2 occlusion, TICI 3 (one pass) on 3/26/25  - s/p thrombectomy of left M4 with TICI 0 3/29/25  - Etiology likely cardioembolic in the setting of atrial fibrillation and underlying hypercoagulability from malignancy  - Aphasia, Right hemiparesis, Dysphagia  - Start comprehensive rehab program of PT/OT/SLP - 3 hours a day, 5 days a week. P&O as needed   - Precautions: Fall, Aspiration  - Eliquis 5 mg BID  - Atorvastatin 10 mg QHS (LDL 58 on 3/22)    # Atrial Fibrillation  - has PPM present  - Eliquis 5 mg BID  - Lopressor 50 mg TID    #Elevated Bun  -Bun 28, Cr 0.54 (4/14)-- encourage oral hydration, repeat with next lab tomorrow     # Right femoral artery pseudoaneurysm   - s/p thrombin injection x2    # Fever of unknown origin, ?aspiration PNA  - Blood cultures 3/26, 4/4  no growth   - RVP/COVID 19 PCR 4/4 negative   - UA 4/2 with some pyuria   - Urine Cx 3/26 no growth 4/3 contaminated   - CXR 4/4 reviewed and compared to 3/29, not much change noted  - CT Chest with contrast reporting no PE, + Atelectasis   - Continued Cefepime 2000mg IV 4/10/25    # Iatrogenic adrenal insuffiencey   - prednisone 5 mg in AM (8:00) and 2 mg in PM (16:00) - home regimen  - f/u outpatient endocrinology    # Metastatic Melanoma  - metastatic melanoma s/p left foot melanoma and lymph node resection, s/p RUE melanoma and right axillary lymph node excision at [facility] (3/18/25)  - outpatient follow up     # Incidental Pancreatic Cyst  - Incidentally found new 1.7 cm cystic lesion at the pancreatic body on CT a/p on 3/28/25  - GI informed, no inpatient workup needed  - Follow up outpatient with Dr. Maurisio Haas    # Pain  - Tylenol PRN    # Mood / Cognition  - Neuropsychology consult PRN  - recreational therapy    # Sleep  - Maintain quiet hours and a low stim environment.   - Melatonin PRN to maximize participation in therapy during the day    # GI / Bowel  - Senna qHS  - Miralax Daily  - GI ppx: Protonix 40 mg QD    #  / Bladder  # Urinary Retention  - Continue Rivas present on admission, plan for TOV  - Failed TOV on 4/1-4/2, Rivas replaced 4/3  - Rivas removed 4/10 night for voiding trial  - Flomax 0.4 mg QHS (dc'd 4/11)   - Bethanecol 25 mg TID (started 4/11)  - have not needed anymore SC, voiding    # Skin / Pressure injury  - Skin assessment on admission performed: IAD to sacrum, right posterior thigh ecchymosis, forehead scab s/p biopsy,  left lateral ankle scar with redness towards heel  - Pressure Injury/Skin: OOB to chair, PT/OT  - nursing to monitor skin qShift    # Dysphagia:  - Diet Consistency: regular with mildly thick liquids  - Supplement: MVI  - Aspiration Precautions  - SLP consult for swallow function evaluation and treatment  - Nutrition consult    # DVT prophylaxis:   - on Eliquis 5 mg Q12H      Outpatient Follow-up:  Maurisio Haas  Gastroenterology  [address], [address]  [place] [address]  Phone: [phone]  Fax: [phone]  Follow Up Time: 1 month    Celina Carlin  Hematology/Oncology  [address] [place] [address]  Phone: [phone]  Fax: [phone]  Follow Up Time: 1 month    Juan Diego Johnson  Vascular Neurology  [address] [place] [address]  Phone: [phone]  Fax: [phone]  Follow Up Time: 1 month    Antonio Gaines  Endocrinology/Metab/Diabetes  [address], [address]  [place] [address]  Phone: [phone]  Fax: [phone]  Follow Up Time: 1 month    Milo Murguia  Cardiovascular Disease  [address] [place] [address]  Phone: [phone]  Fax: [phone]  Follow Up Time: 2 weeks    Your Primary Care Provider,   Phone: (   )    -  Fax: (   )    -  Follow Up Time: 1 week    [place]  Urology  [address] [place] [address]  Phone: [phone]  Fax:   Follow Up Time: 1 month    [facility]  Vascular Surgery  [address] [place] [address]  Phone: [phone]    IDT 4/8  SW: lives with son in pvt home, 1 RASHIDA, 2 to family room, 2+10+2 upstairs, independent prior  SLP: reg solids, mild thick liquids, expressive aphasia, mild-mod receptive deficits, not consistently following commands, impaired automatic speech, impaired naming  OT: supervision eating and oral care, maxA toileting hygiene/bathing, modA UBD, max LBD, modA TT  PT: BM modA, transfers modA, ambulation 120 ft with RW modA, 4 steps modA  Goals:   1. ambulate to commode with supervision  2. reliably answer yes/no questions with min cues  Barriers: dysphagia, aphasia, cognition, right inattention, incontinence  TDD: 4/25 home with home care      Total time 50 mins-face to face time encounter and counseling the patient, meeting with hospitalist, nursing staff, therapists and social work to discuss medical updates and management, care coordination, reviewing chart and data

## 2025-04-16 NOTE — PROGRESS NOTE ADULT - SUBJECTIVE AND OBJECTIVE BOX
HPI:  89 y/o F with PMHx of Afib (off Eliquis), PPM, HTN, HLD, GERD, severe MR/TR, iatrogenic adrenal insufficiency (on prednisone), lymphedema, metastatic melanoma s/p left foot melanoma and lymph node resection, s/p RUE melanoma and right axillary lymph node excision at [facility] (3/18/25). Patient with a recent admission 3/21 -3/25/25  when she initially presented to  with code stroke and was transferred to [facility] where she underwent cerebral angiogram for mechanical thrombectomy of L M1 occlusion, TICI 3 (one pass) on 3/21/25. Patient was discharged and presented back to [facility] 3/26 with L sided weakness and aphasia, transferred to [facility] for stroke codem and is now s/p mechanical thrombectomy of Rt M2 occlusion, TICI 3 (one pass) on 3/26/25. On the early morning of 3/29/25 pt aphasic with right sided weakness. CTH negative for acute hemorrhage. CTA + new Left MCA M2 occulusion and patient subsequently underwent 3rd thrombectomy on 3/29. MRI brain demonstrated acute bilateral multifocal cerebral infarctions and subacute left basal ganglia infarct.  Etiology likely cardioembolic in the setting of atrial fibrillation and underlying hypercoaguability from malignancy.    Hospital course complicated by right groin pseudoaneurysm s/p L MCA thrombectomy. She was seen by vascular surgery and given thrombin injection x2 and recommended for outpatient follow up as needed. Hematology consulted due to concern for hypercoagulable state, anemia, hx of melanoma. CBC trended, overall stable. Hypercoagulable serology panel sent, negative for abnormalities. Follow up outpatient for further monitoring/testing. Hospital course further complicated by urinary retention and rivas catheter placed. Failed TOV 4/1-4/2, rivas replaced 4/3/25, and patient started on flomax.     Endocrine consulted due to hx of adrenal insufficiency; initially started on hydrocortisone then transitioned to home regimen of prednisone 5mg in AM and 2mg in PM. Incidentally found new 1.7 cm cystic lesion at the pancreatic body on CT A/P on 3/28/25. GI informed, no inpatient workup needed. Follow up outpatient with Dr. Maurisio Haas. Patient started on cefepime and vancomycin on 4/4/25 due to fever of unknown origin. ID consulted, Vancomycin discontinued after blood cultures resulted negative. ID recommendations to continue with IV cefepime until 4/10/25.     Patient resumed Eliquis 5mg daily (4/2/25) for secondary stroke prevention. Also re-started on home regimen of atorvastatin 10mg daily; LDL - 59, nonatherosclerotic etiology of stroke, high intensity statin not indicated at this time. PT/OT evaluated patient, recommended dispo to acute rehab. Patient should follow up with neurology/neuro IR, cardiology, endocrinology, hematology, GI, primary care, +/- urology and vascular surgery after discharge. Patient discharged to [facility] on 4/8/25.   (08 Apr 2025 12:56)      Subjective/Objective: patient seen this AM after having breakfast, doing well. Denies pain. Ongoing aphasia-- able to name some objects but unable to describe function of objects.     VITALS  T(C): 36.6 (04-16-25 @ 07:50)  T(F): 97.8 (04-16-25 @ 07:50), Max: 97.8 (04-16-25 @ 07:50)  HR: 74 (04-16-25 @ 07:50) (74 - 100)  BP: 115/75 (04-16-25 @ 07:50) (106/70 - 131/78)  RR:  (15 - 16)  SpO2:  (97% - 97%)      REVIEW OF SYMPTOMS  limited by aphasia    PHYSICAL EXAM   Gen - NAD, Comfortable  HEENT - NCAT, EOMI  Pulm - breathing comfortably on RA  Cardiovascular - warm, well perfused  Abdomen - Soft, NT/ND,   Extremities - No edema, No calf tenderness  Neuro-     Cognitive - AAOx3 when given yes/no choices, follows commands     Communication - non-fluent aphasia      Motor -                     LEFT    UE - ShAB 5/5, EF 5/5, EE 5/5, WE 5/5,  5/5                    RIGHT UE - ShAB 4/5, EF 4/5, EE 4/5, WE 5/5,  4/5                    LEFT    LE - HF 2/5, KE 5/5, DF 5/5, PF 5/5                    RIGHT LE - HF 2/5, KE 3/5, DF 5/5, PF 5/5     Psychiatric - Mood stable, Affect WNL      RECENT LABS                        11.4   5.25  )-----------( 130      ( 14 Apr 2025 05:29 )             33.7     04-14    141  |  107  |  28[H]  ----------------------------<  86  3.8   |  27  |  0.54    Ca    8.9      14 Apr 2025 05:29    TPro  5.8[L]  /  Alb  2.6[L]  /  TBili  1.1  /  DBili  x   /  AST  21  /  ALT  36  /  AlkPhos  49  04-14      MEDICATIONS  (STANDING):  apixaban 5 milliGRAM(s) Oral every 12 hours  atorvastatin 10 milliGRAM(s) Oral at bedtime  bethanechol 25 milliGRAM(s) Oral three times a day  metoprolol tartrate 50 milliGRAM(s) Oral three times a day  multivitamin 1 Tablet(s) Oral daily  pantoprazole    Tablet 40 milliGRAM(s) Oral before breakfast  polyethylene glycol 3350 17 Gram(s) Oral daily  predniSONE   Tablet 5 milliGRAM(s) Oral <User Schedule>  predniSONE   Tablet 2 milliGRAM(s) Oral <User Schedule>  senna 2 Tablet(s) Oral at bedtime    MEDICATIONS  (PRN):  acetaminophen     Tablet .. 650 milliGRAM(s) Oral every 6 hours PRN Mild Pain (1 - 3)

## 2025-04-17 LAB
ALBUMIN SERPL ELPH-MCNC: 2.7 G/DL — LOW (ref 3.3–5)
ALP SERPL-CCNC: 49 U/L — SIGNIFICANT CHANGE UP (ref 40–120)
ALT FLD-CCNC: 33 U/L — SIGNIFICANT CHANGE UP (ref 10–45)
ANION GAP SERPL CALC-SCNC: 9 MMOL/L — SIGNIFICANT CHANGE UP (ref 5–17)
AST SERPL-CCNC: 21 U/L — SIGNIFICANT CHANGE UP (ref 10–40)
BILIRUB SERPL-MCNC: 1 MG/DL — SIGNIFICANT CHANGE UP (ref 0.2–1.2)
BUN SERPL-MCNC: 30 MG/DL — HIGH (ref 7–23)
CALCIUM SERPL-MCNC: 9 MG/DL — SIGNIFICANT CHANGE UP (ref 8.4–10.5)
CHLORIDE SERPL-SCNC: 106 MMOL/L — SIGNIFICANT CHANGE UP (ref 96–108)
CO2 SERPL-SCNC: 26 MMOL/L — SIGNIFICANT CHANGE UP (ref 22–31)
CREAT SERPL-MCNC: 0.61 MG/DL — SIGNIFICANT CHANGE UP (ref 0.5–1.3)
EGFR: 86 ML/MIN/1.73M2 — SIGNIFICANT CHANGE UP
EGFR: 86 ML/MIN/1.73M2 — SIGNIFICANT CHANGE UP
GLUCOSE SERPL-MCNC: 80 MG/DL — SIGNIFICANT CHANGE UP (ref 70–99)
HCT VFR BLD CALC: 33 % — LOW (ref 34.5–45)
HGB BLD-MCNC: 10.9 G/DL — LOW (ref 11.5–15.5)
MCHC RBC-ENTMCNC: 32.5 PG — SIGNIFICANT CHANGE UP (ref 27–34)
MCHC RBC-ENTMCNC: 33 G/DL — SIGNIFICANT CHANGE UP (ref 32–36)
MCV RBC AUTO: 98.5 FL — SIGNIFICANT CHANGE UP (ref 80–100)
NRBC BLD AUTO-RTO: 0 /100 WBCS — SIGNIFICANT CHANGE UP (ref 0–0)
PLATELET # BLD AUTO: 103 K/UL — LOW (ref 150–400)
POTASSIUM SERPL-MCNC: 3.9 MMOL/L — SIGNIFICANT CHANGE UP (ref 3.5–5.3)
POTASSIUM SERPL-SCNC: 3.9 MMOL/L — SIGNIFICANT CHANGE UP (ref 3.5–5.3)
PROT SERPL-MCNC: 5.7 G/DL — LOW (ref 6–8.3)
RBC # BLD: 3.35 M/UL — LOW (ref 3.8–5.2)
RBC # FLD: 14.8 % — HIGH (ref 10.3–14.5)
SODIUM SERPL-SCNC: 141 MMOL/L — SIGNIFICANT CHANGE UP (ref 135–145)
WBC # BLD: 4.73 K/UL — SIGNIFICANT CHANGE UP (ref 3.8–10.5)
WBC # FLD AUTO: 4.73 K/UL — SIGNIFICANT CHANGE UP (ref 3.8–10.5)

## 2025-04-17 PROCEDURE — 99232 SBSQ HOSP IP/OBS MODERATE 35: CPT

## 2025-04-17 PROCEDURE — 99233 SBSQ HOSP IP/OBS HIGH 50: CPT | Mod: GC

## 2025-04-17 RX ADMIN — METOPROLOL SUCCINATE 50 MILLIGRAM(S): 50 TABLET, EXTENDED RELEASE ORAL at 05:22

## 2025-04-17 RX ADMIN — Medication 1 TABLET(S): at 11:22

## 2025-04-17 RX ADMIN — Medication 25 MILLIGRAM(S): at 05:22

## 2025-04-17 RX ADMIN — Medication 25 MILLIGRAM(S): at 22:44

## 2025-04-17 RX ADMIN — METOPROLOL SUCCINATE 50 MILLIGRAM(S): 50 TABLET, EXTENDED RELEASE ORAL at 22:45

## 2025-04-17 RX ADMIN — APIXABAN 5 MILLIGRAM(S): 2.5 TABLET, FILM COATED ORAL at 17:26

## 2025-04-17 RX ADMIN — ATORVASTATIN CALCIUM 10 MILLIGRAM(S): 80 TABLET, FILM COATED ORAL at 22:45

## 2025-04-17 RX ADMIN — PREDNISONE 2 MILLIGRAM(S): 20 TABLET ORAL at 15:03

## 2025-04-17 RX ADMIN — PREDNISONE 5 MILLIGRAM(S): 20 TABLET ORAL at 08:11

## 2025-04-17 RX ADMIN — APIXABAN 5 MILLIGRAM(S): 2.5 TABLET, FILM COATED ORAL at 05:22

## 2025-04-17 RX ADMIN — Medication 2 TABLET(S): at 22:45

## 2025-04-17 RX ADMIN — Medication 40 MILLIGRAM(S): at 05:23

## 2025-04-17 RX ADMIN — Medication 25 MILLIGRAM(S): at 15:03

## 2025-04-17 RX ADMIN — POLYETHYLENE GLYCOL 3350 17 GRAM(S): 17 POWDER, FOR SOLUTION ORAL at 11:22

## 2025-04-17 NOTE — PROGRESS NOTE ADULT - SUBJECTIVE AND OBJECTIVE BOX
HPI:  88 y/o F with PMHx of Afib (off Eliquis), PPM, HTN, HLD, GERD, severe MR/TR, iatrogenic adrenal insufficiency (on prednisone), lymphedema, metastatic melanoma s/p left foot melanoma and lymph node resection, s/p RUE melanoma and right axillary lymph node excision at [facility] (3/18/25). Patient with a recent admission 3/21 -3/25/25  when she initially presented to  with code stroke and was transferred to [facility] where she underwent cerebral angiogram for mechanical thrombectomy of L M1 occlusion, TICI 3 (one pass) on 3/21/25. Patient was discharged and presented back to [facility] 3/26 with L sided weakness and aphasia, transferred to [facility] for stroke codem and is now s/p mechanical thrombectomy of Rt M2 occlusion, TICI 3 (one pass) on 3/26/25. On the early morning of 3/29/25 pt aphasic with right sided weakness. CTH negative for acute hemorrhage. CTA + new Left MCA M2 occulusion and patient subsequently underwent 3rd thrombectomy on 3/29. MRI brain demonstrated acute bilateral multifocal cerebral infarctions and subacute left basal ganglia infarct.  Etiology likely cardioembolic in the setting of atrial fibrillation and underlying hypercoaguability from malignancy.    Hospital course complicated by right groin pseudoaneurysm s/p L MCA thrombectomy. She was seen by vascular surgery and given thrombin injection x2 and recommended for outpatient follow up as needed. Hematology consulted due to concern for hypercoagulable state, anemia, hx of melanoma. CBC trended, overall stable. Hypercoagulable serology panel sent, negative for abnormalities. Follow up outpatient for further monitoring/testing. Hospital course further complicated by urinary retention and rivas catheter placed. Failed TOV 4/1-4/2, rivas replaced 4/3/25, and patient started on flomax.     Endocrine consulted due to hx of adrenal insufficiency; initially started on hydrocortisone then transitioned to home regimen of prednisone 5mg in AM and 2mg in PM. Incidentally found new 1.7 cm cystic lesion at the pancreatic body on CT A/P on 3/28/25. GI informed, no inpatient workup needed. Follow up outpatient with Dr. Maurisio Haas. Patient started on cefepime and vancomycin on 4/4/25 due to fever of unknown origin. ID consulted, Vancomycin discontinued after blood cultures resulted negative. ID recommendations to continue with IV cefepime until 4/10/25.     Patient resumed Eliquis 5mg daily (4/2/25) for secondary stroke prevention. Also re-started on home regimen of atorvastatin 10mg daily; LDL - 59, nonatherosclerotic etiology of stroke, high intensity statin not indicated at this time. PT/OT evaluated patient, recommended dispo to acute rehab. Patient should follow up with neurology/neuro IR, cardiology, endocrinology, hematology, GI, primary care, +/- urology and vascular surgery after discharge. Patient discharged to [facility] on 4/8/25.   (08 Apr 2025 12:56)      Subjective/Objective: Patient seen this AM. Patient states she slept well. Denies any pain. Tolerating therapy well and making improvements.    VITALS  Vital Signs Last 24 Hrs  T(C): 36.4 (16 Apr 2025 19:29), Max: 36.4 (16 Apr 2025 19:29)  T(F): 97.5 (16 Apr 2025 19:29), Max: 97.5 (16 Apr 2025 19:29)  HR: 76 (17 Apr 2025 05:22) (61 - 99)  BP: 126/72 (17 Apr 2025 05:22) (116/60 - 132/72)  RR: 16 (16 Apr 2025 19:29) (16 - 16)  SpO2: 97% (16 Apr 2025 19:29) (97% - 97%)    REVIEW OF SYMPTOMS  limited by aphasia    PHYSICAL EXAM   Gen - NAD, Comfortable  HEENT - NCAT, EOMI  Pulm - breathing comfortably on RA  Cardiovascular - warm, well perfused  Abdomen - Soft, NT/ND,   Extremities - No edema, No calf tenderness  Neuro-     Cognitive - AAOx3 when given yes/no choices, follows commands     Communication - non-fluent aphasia      Motor -                     LEFT    UE - ShAB 5/5, EF 5/5, EE 5/5, WE 5/5,  5/5                    RIGHT UE - ShAB 4/5, EF 4/5, EE 4/5, WE 5/5,  4/5                    LEFT    LE - HF 2/5, KE 5/5, DF 5/5, PF 5/5                    RIGHT LE - HF 2/5, KE 3/5, DF 5/5, PF 5/5     Psychiatric - Mood stable, Affect WNL      RECENT LABS                        10.9   4.73  )-----------( 103      ( 17 Apr 2025 05:56 )             33.0     04-17    141  |  106  |  30[H]  ----------------------------<  80  3.9   |  26  |  0.61    Ca    9.0      17 Apr 2025 05:56    TPro  5.7[L]  /  Alb  2.7[L]  /  TBili  1.0  /  DBili  x   /  AST  21  /  ALT  33  /  AlkPhos  49  04-17      MEDICATIONS  (STANDING):  apixaban 5 milliGRAM(s) Oral every 12 hours  atorvastatin 10 milliGRAM(s) Oral at bedtime  bethanechol 25 milliGRAM(s) Oral three times a day  metoprolol tartrate 50 milliGRAM(s) Oral three times a day  multivitamin 1 Tablet(s) Oral daily  pantoprazole    Tablet 40 milliGRAM(s) Oral before breakfast  polyethylene glycol 3350 17 Gram(s) Oral daily  predniSONE   Tablet 5 milliGRAM(s) Oral <User Schedule>  predniSONE   Tablet 2 milliGRAM(s) Oral <User Schedule>  senna 2 Tablet(s) Oral at bedtime    MEDICATIONS  (PRN):  acetaminophen     Tablet .. 650 milliGRAM(s) Oral every 6 hours PRN Mild Pain (1 - 3)

## 2025-04-17 NOTE — PROGRESS NOTE ADULT - SUBJECTIVE AND OBJECTIVE BOX
Patient is a 89y old  Female who presents with a chief complaint of Left MCA CVA s/p M1 and M2 thrombectomy, Right MCA CVA s/p M2 thrombectomy (17 Apr 2025 10:40)      Subjective and overnight events:  Patient seen and examined at bedside. pt reports feeling well, tolerating therapy. voiding well. no new complaints. no acute event overnight    ALLERGIES:  penicillins (Hives)    MEDICATIONS  (STANDING):  apixaban 5 milliGRAM(s) Oral every 12 hours  atorvastatin 10 milliGRAM(s) Oral at bedtime  bethanechol 25 milliGRAM(s) Oral three times a day  metoprolol tartrate 50 milliGRAM(s) Oral three times a day  multivitamin 1 Tablet(s) Oral daily  pantoprazole    Tablet 40 milliGRAM(s) Oral before breakfast  polyethylene glycol 3350 17 Gram(s) Oral daily  predniSONE   Tablet 5 milliGRAM(s) Oral <User Schedule>  predniSONE   Tablet 2 milliGRAM(s) Oral <User Schedule>  senna 2 Tablet(s) Oral at bedtime    MEDICATIONS  (PRN):  acetaminophen     Tablet .. 650 milliGRAM(s) Oral every 6 hours PRN Mild Pain (1 - 3)    Vital Signs Last 24 Hrs  T(F): 97.6 (17 Apr 2025 10:45), Max: 97.6 (17 Apr 2025 10:45)  HR: 89 (17 Apr 2025 15:01) (75 - 99)  BP: 108/70 (17 Apr 2025 15:01) (108/70 - 132/72)  RR: 16 (17 Apr 2025 10:45) (16 - 16)  SpO2: 95% (17 Apr 2025 10:45) (95% - 97%)  I&O's Summary    16 Apr 2025 07:01  -  17 Apr 2025 07:00  --------------------------------------------------------  IN: 0 mL / OUT: 1 mL / NET: -1 mL      PHYSICAL EXAM:  General: NAD, awake, alert, answering questions   ENT: MMM  Neck: Supple, No JVD  Lungs: Clear to auscultation bilaterally  Cardio: RRR, S1/S2, No murmurs  Abdomen: Soft, Nontender, Nondistended; Bowel sounds present  Extremities: No calf tenderness, No pitting edema    LABS:                        10.9   4.73  )-----------( 103      ( 17 Apr 2025 05:56 )             33.0     04-17    141  |  106  |  30  ----------------------------<  80  3.9   |  26  |  0.61    Ca    9.0      17 Apr 2025 05:56    TPro  5.7  /  Alb  2.7  /  TBili  1.0  /  DBili  x   /  AST  21  /  ALT  33  /  AlkPhos  49  04-17        03-27 Chol 145 mg/dL LDL -- HDL 74 mg/dL Trig 55 mg/dL        Urinalysis Basic - ( 17 Apr 2025 05:56 )    Color: x / Appearance: x / SG: x / pH: x  Gluc: 80 mg/dL / Ketone: x  / Bili: x / Urobili: x   Blood: x / Protein: x / Nitrite: x   Leuk Esterase: x / RBC: x / WBC x   Sq Epi: x / Non Sq Epi: x / Bacteria: x        COVID-19 PCR: NotDetec (04-08-25 @ 18:30)      RADIOLOGY & ADDITIONAL TESTS:    Care Discussed with Consultants/Other Providers:

## 2025-04-17 NOTE — PROGRESS NOTE ADULT - ASSESSMENT
Assessment/Plan:  SHARON HULL is a 88 y/o F with PMHx of Afib (off Eliquis), PPM, HTN, HLD, GERD, severe MR/TR, iatrogenic adrenal insufficiency (on prednisone), lymphedema, metastatic melanoma s/p left foot melanoma and lymph node resection, s/p RUE melanoma and right axillary lymph node excision at [facility] (3/18/25), recent admission (3/21-3/25/25) for L MCA CVA s/p M1 thrombectomy, presented to  with left sided weakness and aphasia, found to have right MCA CVA and s/p M2 thrombectomy (3/26/25).  Hospital Course complicated by right sided weakness and aphasia, found to have new Left MCA CVA with M2 occlusion and is s/p 3rd thrombectomy (3/29/25). Hospital course also complicated by pseudoaneurysm (s/p vasc surgery consult and thrombin injection x2), urinary retention (s/p rivas, failed TOV 4/2), fever of unknown origin (on IV cefepime until 4/10/25). Patient resumed Eliquis on 4/2/25. Patient now admitted for a multidisciplinary rehab program with right sided weakness, Transcortical motor aphasia, cognitive deficits, Dysphagia and gait and ADL impairments.     #  Bihemispheric embolic infarcts s/p Thrombectomy x 3  - s/p cerebral angiogram for mechanical thrombectomy of L M1 occlusion, TICI 3 (one pass) on 3/21/25  - s/p cerebral angiogram for mechanical thrombectomy of Rt M2 occlusion, TICI 3 (one pass) on 3/26/25  - s/p thrombectomy of left M4 with TICI 0 3/29/25  - Etiology likely cardioembolic in the setting of atrial fibrillation and underlying hypercoagulability from malignancy  - Aphasia, Right hemiparesis, Dysphagia  - Start comprehensive rehab program of PT/OT/SLP - 3 hours a day, 5 days a week. P&O as needed   - Precautions: Fall, Aspiration  - Eliquis 5 mg BID  - Atorvastatin 10 mg QHS (LDL 58 on 3/22)    # Atrial Fibrillation  - has PPM present  - Eliquis 5 mg BID  - Lopressor 50 mg TID    #Elevated BUN  - Bun 28, Cr 0.54 (4/14) -> 30/0.61 (4/17)  - Encourage oral hydration    # Right femoral artery pseudoaneurysm   - s/p thrombin injection x2    # Fever of unknown origin, ?aspiration PNA  - Blood cultures 3/26, 4/4  no growth   - RVP/COVID 19 PCR 4/4 negative   - UA 4/2 with some pyuria   - Urine Cx 3/26 no growth 4/3 contaminated   - CXR 4/4 reviewed and compared to 3/29, not much change noted  - CT Chest with contrast reporting no PE, + Atelectasis   - Completed Cefepime    # Iatrogenic adrenal insuffiencey   - prednisone 5 mg in AM (8:00) and 2 mg in PM (16:00) - home regimen  - f/u outpatient endocrinology    # Metastatic Melanoma  - metastatic melanoma s/p left foot melanoma and lymph node resection, s/p RUE melanoma and right axillary lymph node excision at [facility] (3/18/25)  - outpatient follow up     # Incidental Pancreatic Cyst  - Incidentally found new 1.7 cm cystic lesion at the pancreatic body on CT a/p on 3/28/25  - GI informed, no inpatient workup needed  - Follow up outpatient with Dr. Maurisio Haas    # Pain  - Tylenol PRN    # Mood / Cognition  - Neuropsychology consult PRN  - recreational therapy    # Sleep  - Maintain quiet hours and a low stim environment.   - Melatonin PRN to maximize participation in therapy during the day    # GI / Bowel  - Senna qHS  - Miralax Daily  - GI ppx: Protonix 40 mg QD    #  / Bladder  # Urinary Retention  - Continue Rivas present on admission, plan for TOV  - Failed TOV on 4/1-4/2, Rivas replaced 4/3  - Rivas removed 4/10 night for voiding trial  - Flomax 0.4 mg QHS (dc'd 4/11)   - Bethanecol 25 mg TID (started 4/11)  - have not needed anymore SC, voiding    # Skin / Pressure injury  - Skin assessment on admission performed: IAD to sacrum, right posterior thigh ecchymosis, forehead scab s/p biopsy,  left lateral ankle scar with redness towards heel  - Pressure Injury/Skin: OOB to chair, PT/OT  - nursing to monitor skin qShift    # Dysphagia:  - Diet Consistency: regular with mildly thick liquids  - Supplement: MVI  - Aspiration Precautions  - SLP consult for swallow function evaluation and treatment  - Nutrition consult    # DVT prophylaxis:   - on Eliquis 5 mg Q12H      Outpatient Follow-up:  Maurisio Haas  Gastroenterology  [address], [address]  [place] [address]  Phone: [phone]  Fax: [phone]  Follow Up Time: 1 month    Celina Carlin  Hematology/Oncology  [address] [place] [address]  Phone: [phone]  Fax: [phone]  Follow Up Time: 1 month    Juan Diego Johnson  Vascular Neurology  [address] [place] [address]  Phone: [phone]  Fax: [phone]  Follow Up Time: 1 month    Antonio Gaines  Endocrinology/Metab/Diabetes  [address], [address]  [place] [address]  Phone: [phone]  Fax: [phone]  Follow Up Time: 1 month    Milo Murguia  Cardiovascular Disease  [address] [place] [address]  Phone: [phone]  Fax: [phone]  Follow Up Time: 2 weeks    Your Primary Care Provider,   Phone: (   )    -  Fax: (   )    -  Follow Up Time: 1 week    [place]  Urology  [address] [place] [address]  Phone: [phone]  Fax:   Follow Up Time: 1 month    [facility]  Vascular Surgery  [address] [place] [address]  Phone: [phone]    IDT 4/8  SW: lives with son in pvt home, 1 RASHIDA, 2 to family room, 2+10+2 upstairs, independent prior  SLP: reg solids, mild thick liquids, expressive aphasia, mild-mod receptive deficits, not consistently following commands, impaired automatic speech, impaired naming  OT: supervision eating and oral care, maxA toileting hygiene/bathing, modA UBD, max LBD, modA TT  PT: BM modA, transfers modA, ambulation 120 ft with RW modA, 4 steps modA  Goals:   1. ambulate to commode with supervision  2. reliably answer yes/no questions with min cues  Barriers: dysphagia, aphasia, cognition, right inattention, incontinence  TDD: 4/25 home with home care      Total time 50 mins-face to face time encounter and counseling the patient, meeting with hospitalist, nursing staff, therapists and social work to discuss medical updates and management, care coordination, reviewing chart and data     Assessment/Plan:  SHARON HULL is a 88 y/o F with PMHx of Afib (off Eliquis), PPM, HTN, HLD, GERD, severe MR/TR, iatrogenic adrenal insufficiency (on prednisone), lymphedema, metastatic melanoma s/p left foot melanoma and lymph node resection, s/p RUE melanoma and right axillary lymph node excision at [facility] (3/18/25), recent admission (3/21-3/25/25) for L MCA CVA s/p M1 thrombectomy, presented to  with left sided weakness and aphasia, found to have right MCA CVA and s/p M2 thrombectomy (3/26/25).  Hospital Course complicated by right sided weakness and aphasia, found to have new Left MCA CVA with M2 occlusion and is s/p 3rd thrombectomy (3/29/25). Hospital course also complicated by pseudoaneurysm (s/p vasc surgery consult and thrombin injection x2), urinary retention (s/p rivas, failed TOV 4/2), fever of unknown origin (on IV cefepime until 4/10/25). Patient resumed Eliquis on 4/2/25. Patient now admitted for a multidisciplinary rehab program with right sided weakness, Transcortical motor aphasia, cognitive deficits, Dysphagia and gait and ADL impairments.     #  Bihemispheric embolic infarcts s/p Thrombectomy x 3  - s/p cerebral angiogram for mechanical thrombectomy of L M1 occlusion, TICI 3 (one pass) on 3/21/25  - s/p cerebral angiogram for mechanical thrombectomy of Rt M2 occlusion, TICI 3 (one pass) on 3/26/25  - s/p thrombectomy of left M4 with TICI 0 3/29/25  - Etiology likely cardioembolic in the setting of atrial fibrillation and underlying hypercoagulability from malignancy  - Aphasia, Right hemiparesis, Dysphagia  - Start comprehensive rehab program of PT/OT/SLP - 3 hours a day, 5 days a week. P&O as needed   - Precautions: Fall, Aspiration  - Eliquis 5 mg BID  - Atorvastatin 10 mg QHS (LDL 58 on 3/22)    # Atrial Fibrillation  - has PPM present  - Eliquis 5 mg BID  - Lopressor 50 mg TID    #Elevated BUN  - Bun 28, Cr 0.54 (4/14) -> 30/0.61 (4/17)  - Encourage oral hydration, patient is on mildly thickened liquid    # Right femoral artery pseudoaneurysm   - s/p thrombin injection x2    # Fever of unknown origin, ?aspiration PNA  - Blood cultures 3/26, 4/4  no growth   - RVP/COVID 19 PCR 4/4 negative   - UA 4/2 with some pyuria   - Urine Cx 3/26 no growth 4/3 contaminated   - CXR 4/4 reviewed and compared to 3/29, not much change noted  - CT Chest with contrast reporting no PE, + Atelectasis   - Completed Cefepime, afebrile    # Iatrogenic adrenal insuffiencey   - prednisone 5 mg in AM (8:00) and 2 mg in PM (16:00) - home regimen  - f/u outpatient endocrinology    # Metastatic Melanoma  - metastatic melanoma s/p left foot melanoma and lymph node resection, s/p RUE melanoma and right axillary lymph node excision at [facility] (3/18/25)  - outpatient follow up     # Incidental Pancreatic Cyst  - Incidentally found new 1.7 cm cystic lesion at the pancreatic body on CT a/p on 3/28/25  - GI informed, no inpatient workup needed  - Follow up outpatient with Dr. Maurisio Haas    # Pain  - Tylenol PRN    # Mood / Cognition  - Neuropsychology consult PRN  - recreational therapy    # Sleep  - Maintain quiet hours and a low stim environment.   - Melatonin PRN to maximize participation in therapy during the day    # GI / Bowel  - Senna qHS  - Miralax Daily  - GI ppx: Protonix 40 mg QD    #  / Bladder  # Urinary Retention  - Continue Rivas present on admission, plan for TOV  - Failed TOV on 4/1-4/2, Rivas replaced 4/3  - Rivas removed 4/10 night for voiding trial  - Flomax 0.4 mg QHS (dc'd 4/11)   - Bethanecol 25 mg TID (started 4/11)  - have not needed anymore SC, voiding, dc bladder scans    # Skin / Pressure injury  - Skin assessment on admission performed: IAD to sacrum, right posterior thigh ecchymosis, forehead scab s/p biopsy,  left lateral ankle scar with redness towards heel  - Pressure Injury/Skin: OOB to chair, PT/OT  - nursing to monitor skin qShift    # Dysphagia:  - Diet Consistency: regular with mildly thick liquids  - Supplement: MVI  - Aspiration Precautions  - SLP consult for swallow function evaluation and treatment  - Nutrition consult    # DVT prophylaxis:   - on Eliquis 5 mg Q12H      Outpatient Follow-up:  Maurisio aHas  Gastroenterology  [address] [facility], [address]  [place] [address]  Phone: [phone]  Fax: [phone]  Follow Up Time: 1 month    Celina Carlin  Hematology/Oncology  [address] [place] [address]  Phone: [phone]  Fax: [phone]  Follow Up Time: 1 month    Juan Diego Johnson  Vascular Neurology  [address] [place] [address]  Phone: [phone]  Fax: [phone]  Follow Up Time: 1 month    Antonio Gaines  Endocrinology/Metab/Diabetes  [address] [facility] [address], [address]  [place] [address]  Phone: [phone]  Fax: [phone]  Follow Up Time: 1 month    Milo Murguia  Cardiovascular Disease  [address] [place] [address]  Phone: [phone]  Fax: [phone]  Follow Up Time: 2 weeks    Your Primary Care Provider,   Phone: (   )    -  Fax: (   )    -  Follow Up Time: 1 week    [place]  Urology  [address] [place] [address]  Phone: [phone]  Fax:   Follow Up Time: 1 month    [facility] Vascular Surgery  Vascular Surgery  [address] [place] [address]  Phone: [phone]

## 2025-04-17 NOTE — PROGRESS NOTE ADULT - ASSESSMENT
89 year old female with PMHx of Afib (off Eliquis), PPM, HTN, HLD, GERD, severe MR/TR, iatrogenic adrenal insufficiency (on prednisone), lymphedema, metastatic melanoma s/p left foot melanoma and lymph node resection, s/p RUE melanoma and right axillary lymph node excision at [facility] (3/18/25), recent admission (3/21-3/25/25) for L MCA CVA s/p M1 thrombectomy, presented to  with left sided weakness and aphasia, found to have right MCA CVA and s/p M2 thrombectomy (3/26/25).  Hospital Course complicated by right sided weakness and aphasia, found to have new Left MCA CVA with M2 occlusion and is s/p 3rd thrombectomy (3/29/25). Hospital course also complicated by pseudoaneurysm (s/p vasc surgery consult and thrombin injection x2), urinary retention (s/p rivas, failed TOV 4/2), fever of unknown origin (on IV cefepime until 4/10/25). Patient resumed Eliquis on 4/2/25. Patient now admitted for a multidisciplinary rehab program with right sided weakness, Transcortical motor aphasia, cognitive deficits, Dysphagia and gait and ADL impairments.     # Bi-Hemispheric Embolic Infarcts s/p Thrombectomy x 3  # Aphasia, Right hemiparesis, Dysphagia  - s/p cerebral angiogram for mechanical thrombectomy of L M1 occlusion, TICI 3 (one pass) on 3/21/25  - s/p cerebral angiogram for mechanical thrombectomy of Rt M2 occlusion, TICI 3 (one pass) on 3/26/25  - s/p thrombectomy of left M4 with TICI 0 3/29/25  - MRI brain 3/31 showed New acute infarctions within the LEFT lateral frontal cortex, LEFT occipital cortex, RIGHT insular cortex and scattered RIGHT frontal, parietal, temporal and occipital cortex suggesting embolic etiology. Subacute infarction within the LEFT basal ganglia. Mild periventricular chronic white matter ischemia.  - Etiology likely cardioembolic in the setting of atrial fibrillation and underlying hypercoagulability from malignancy  - Repeat Echo 4/4 showed EF >75 %, no evidence of LV thrombus, severely dilated LA/RA, mild-mod TR  - Continue Eliquis 5 mg BID  - Continue Atorvastatin 10 mg QHS (LDL 58 on 3/22)  - Precautions: Fall, Aspiration  - Comprehensive rehab program of PT/OT/SLP  - Outpatient follow up with neurology    # Chronic Atrial Fibrillation  # Status Post PPM   - Continue metoprolol  - Continue Eliquis 5mg BID    # Right femoral artery pseudoaneurysm   - s/p thrombin injection x2  - Outpatient follow up     # Fever of unknown origin, ?Aspiration PNA- (resolved)  - Urine and blood cx negative  - CTA chest 4/4 showed no PE, Mucous/secretions are noted within the left lower lobe bronchus. Near-complete atelectasis of both lower lobes is noted. Small bilateral pleural effusions are noted.   - Completed course of IV cefepime on 4/10 per ID   - Incentive spirometry  - Will monitor    # Iatrogenic Adrenal insufficiency  - Prednisone 5 mg in AM (8:00) and 2 mg in PM (16:00) - home regimen  - f/u outpatient endocrinology    # Metastatic Melanoma  - metastatic melanoma s/p left foot melanoma and lymph node resection, s/p RUE melanoma and right axillary lymph node excision at [facility] (3/18/25)  - Outpatient follow up     # Incidental Pancreatic Cyst  - Incidentally found new 1.7 cm cystic lesion at the pancreatic body on CT a/p on 3/28/25  - GI informed, no inpatient workup needed  - Follow up outpatient with Dr. Maurisio Haas    #GI PPx  - Continue Protonix 40 mg QD    # Urinary Retention- improved  - Rivas removed 4/10  - pt voiding well  - continue with Bethanechol 25mg TID, started on 4/11   - Continue serial bladder scans and ISC as needed   - encourage toileting schedule    # DVT prophylaxis:   # History of Lymphedema   - LE duplex 3/26 negative for DVT  - on Eliquis 5 mg Q12H    4/17 labs reviewed

## 2025-04-17 NOTE — PROGRESS NOTE ADULT - ATTENDING COMMENTS
patient seen and examined, in good spirits, smiling  denies pain  labs reviewed- slightly uptrended BUN, encouraging po hydration, though patient is on mildly thickened liquids   Interdisciplinary team meeting today with speech therapist, occupational therapist, physical therapist, social work and nursing where medical updates provided, progress with therapies and goals discussed. Plan of care reviewed. Team conference note documented on wellsky.  Total time 50 mins-face to face time encounter and counseling the patient, meeting with hospitalist, nursing staff, therapists and social work to discuss medical updates and management, care coordination, reviewing chart and data, team meeting

## 2025-04-18 PROCEDURE — 99232 SBSQ HOSP IP/OBS MODERATE 35: CPT | Mod: GC

## 2025-04-18 PROCEDURE — 99232 SBSQ HOSP IP/OBS MODERATE 35: CPT

## 2025-04-18 RX ORDER — METOPROLOL SUCCINATE 50 MG/1
50 TABLET, EXTENDED RELEASE ORAL
Refills: 0 | Status: DISCONTINUED | OUTPATIENT
Start: 2025-04-18 | End: 2025-04-23

## 2025-04-18 RX ADMIN — Medication 25 MILLIGRAM(S): at 21:47

## 2025-04-18 RX ADMIN — METOPROLOL SUCCINATE 50 MILLIGRAM(S): 50 TABLET, EXTENDED RELEASE ORAL at 21:46

## 2025-04-18 RX ADMIN — APIXABAN 5 MILLIGRAM(S): 2.5 TABLET, FILM COATED ORAL at 05:40

## 2025-04-18 RX ADMIN — ATORVASTATIN CALCIUM 10 MILLIGRAM(S): 80 TABLET, FILM COATED ORAL at 21:47

## 2025-04-18 RX ADMIN — Medication 25 MILLIGRAM(S): at 14:24

## 2025-04-18 RX ADMIN — Medication 1 TABLET(S): at 11:23

## 2025-04-18 RX ADMIN — APIXABAN 5 MILLIGRAM(S): 2.5 TABLET, FILM COATED ORAL at 18:24

## 2025-04-18 RX ADMIN — Medication 2 TABLET(S): at 21:47

## 2025-04-18 RX ADMIN — PREDNISONE 5 MILLIGRAM(S): 20 TABLET ORAL at 08:02

## 2025-04-18 RX ADMIN — POLYETHYLENE GLYCOL 3350 17 GRAM(S): 17 POWDER, FOR SOLUTION ORAL at 11:24

## 2025-04-18 RX ADMIN — Medication 25 MILLIGRAM(S): at 05:40

## 2025-04-18 RX ADMIN — PREDNISONE 2 MILLIGRAM(S): 20 TABLET ORAL at 16:24

## 2025-04-18 RX ADMIN — Medication 40 MILLIGRAM(S): at 05:40

## 2025-04-18 RX ADMIN — METOPROLOL SUCCINATE 50 MILLIGRAM(S): 50 TABLET, EXTENDED RELEASE ORAL at 05:40

## 2025-04-18 NOTE — PROGRESS NOTE ADULT - ASSESSMENT
Assessment/Plan:  SHARON HULL is a 89 y/o F with PMHx of Afib (off Eliquis), PPM, HTN, HLD, GERD, severe MR/TR, iatrogenic adrenal insufficiency (on prednisone), lymphedema, metastatic melanoma s/p left foot melanoma and lymph node resection, s/p RUE melanoma and right axillary lymph node excision at [facility] (3/18/25), recent admission (3/21-3/25/25) for L MCA CVA s/p M1 thrombectomy, presented to  with left sided weakness and aphasia, found to have right MCA CVA and s/p M2 thrombectomy (3/26/25).  Hospital Course complicated by right sided weakness and aphasia, found to have new Left MCA CVA with M2 occlusion and is s/p 3rd thrombectomy (3/29/25). Hospital course also complicated by pseudoaneurysm (s/p vasc surgery consult and thrombin injection x2), urinary retention (s/p rivas, failed TOV 4/2), fever of unknown origin (on IV cefepime until 4/10/25). Patient resumed Eliquis on 4/2/25. Patient now admitted for a multidisciplinary rehab program with right sided weakness, Transcortical motor aphasia, cognitive deficits, Dysphagia and gait and ADL impairments.     #  Bihemispheric embolic infarcts s/p Thrombectomy x 3  - s/p cerebral angiogram for mechanical thrombectomy of L M1 occlusion, TICI 3 (one pass) on 3/21/25  - s/p cerebral angiogram for mechanical thrombectomy of Rt M2 occlusion, TICI 3 (one pass) on 3/26/25  - s/p thrombectomy of left M4 with TICI 0 3/29/25  - Etiology likely cardioembolic in the setting of atrial fibrillation and underlying hypercoagulability from malignancy  - Aphasia, Right hemiparesis, Dysphagia  - Start comprehensive rehab program of PT/OT/SLP - 3 hours a day, 5 days a week. P&O as needed   - Precautions: Fall, Aspiration  - Eliquis 5 mg BID  - Atorvastatin 10 mg QHS (LDL 58 on 3/22)    # Atrial Fibrillation  - has PPM present  - Eliquis 5 mg BID  - Lopressor 50 mg TID    #Elevated BUN  - Bun 28, Cr 0.54 (4/14) -> 30/0.61 (4/17)  - Encourage oral hydration, patient is on mildly thickened liquid    # Right femoral artery pseudoaneurysm   - s/p thrombin injection x2    # Fever of unknown origin, ?aspiration PNA  - Blood cultures 3/26, 4/4  no growth   - RVP/COVID 19 PCR 4/4 negative   - UA 4/2 with some pyuria   - Urine Cx 3/26 no growth 4/3 contaminated   - CXR 4/4 reviewed and compared to 3/29, not much change noted  - CT Chest with contrast reporting no PE, + Atelectasis   - Completed Cefepime, afebrile    # Iatrogenic adrenal insuffiencey   - prednisone 5 mg in AM (8:00) and 2 mg in PM (16:00) - home regimen  - f/u outpatient endocrinology    # Metastatic Melanoma  - metastatic melanoma s/p left foot melanoma and lymph node resection, s/p RUE melanoma and right axillary lymph node excision at [facility] (3/18/25)  - outpatient follow up     # Incidental Pancreatic Cyst  - Incidentally found new 1.7 cm cystic lesion at the pancreatic body on CT a/p on 3/28/25  - GI informed, no inpatient workup needed  - Follow up outpatient with Dr. Maurisio Haas    # Pain  - Tylenol PRN    # Mood / Cognition  - Neuropsychology consult PRN  - recreational therapy    # Sleep  - Maintain quiet hours and a low stim environment.   - Melatonin PRN to maximize participation in therapy during the day    # GI / Bowel  - Senna qHS  - Miralax Daily  - GI ppx: Protonix 40 mg QD    #  / Bladder  # Urinary Retention  - Continue Rivas present on admission, plan for TOV  - Failed TOV on 4/1-4/2, Rivas replaced 4/3  - Rivas removed 4/10 night for voiding trial  - Flomax 0.4 mg QHS (dc'd 4/11)   - Bethanecol 25 mg TID (started 4/11)  - have not needed anymore SC, voiding, dc bladder scans    # Skin / Pressure injury  - Skin assessment on admission performed: IAD to sacrum, right posterior thigh ecchymosis, forehead scab s/p biopsy,  left lateral ankle scar with redness towards heel  - Pressure Injury/Skin: OOB to chair, PT/OT  - nursing to monitor skin qShift    # Dysphagia:  - Diet Consistency: regular with mildly thick liquids  - Supplement: MVI  - Aspiration Precautions  - SLP consult for swallow function evaluation and treatment  - Nutrition consult    # DVT prophylaxis:   - on Eliquis 5 mg Q12H      Outpatient Follow-up:  Maurisio Haas  Gastroenterology  [address], [address]  [place] [address]  Phone: [phone]  Fax: [phone]  Follow Up Time: 1 month    Celina Carlin  Hematology/Oncology  [address] [place] [address]  Phone: [phone]  Fax: [phone]  Follow Up Time: 1 month    Juan Diego Johnson  Vascular Neurology  [address] [place] [address]  Phone: [phone]  Fax: [phone]  Follow Up Time: 1 month    Antonio Gaines  Endocrinology/Metab/Diabetes  [address]  [place] [address]  Phone: [phone]  Fax: [phone]  Follow Up Time: 1 month    Milo Murguia  Cardiovascular Disease  [address] [place] [address]  Phone: [phone]  Fax: [phone]  Follow Up Time: 2 weeks    Your Primary Care Provider,   Phone: (   )    -  Fax: (   )    -  Follow Up Time: 1 week    [place]  Urology  [address] [place] [address]  Phone: [phone]  Fax:   Follow Up Time: 1 month    [facility] Vascular Surgery  Vascular Surgery  [address] [place] [address]  Phone: [phone]               Assessment/Plan:  SHARON HULL is a 89 y/o F with PMHx of Afib (off Eliquis), PPM, HTN, HLD, GERD, severe MR/TR, iatrogenic adrenal insufficiency (on prednisone), lymphedema, metastatic melanoma s/p left foot melanoma and lymph node resection, s/p RUE melanoma and right axillary lymph node excision at [facility] (3/18/25), recent admission (3/21-3/25/25) for L MCA CVA s/p M1 thrombectomy, presented to  with left sided weakness and aphasia, found to have right MCA CVA and s/p M2 thrombectomy (3/26/25).  Hospital Course complicated by right sided weakness and aphasia, found to have new Left MCA CVA with M2 occlusion and is s/p 3rd thrombectomy (3/29/25). Hospital course also complicated by pseudoaneurysm (s/p vasc surgery consult and thrombin injection x2), urinary retention (s/p rivas, failed TOV 4/2), fever of unknown origin (on IV cefepime until 4/10/25). Patient resumed Eliquis on 4/2/25. Patient now admitted for a multidisciplinary rehab program with right sided weakness, Transcortical motor aphasia, cognitive deficits, Dysphagia and gait and ADL impairments.     #  Bihemispheric embolic infarcts s/p Thrombectomy x 3  - s/p cerebral angiogram for mechanical thrombectomy of L M1 occlusion, TICI 3 (one pass) on 3/21/25  - s/p cerebral angiogram for mechanical thrombectomy of Rt M2 occlusion, TICI 3 (one pass) on 3/26/25  - s/p thrombectomy of left M4 with TICI 0 3/29/25  - Etiology likely cardioembolic in the setting of atrial fibrillation and underlying hypercoagulability from malignancy  - Aphasia, Right hemiparesis, Dysphagia  - Start comprehensive rehab program of PT/OT/SLP - 3 hours a day, 5 days a week. P&O as needed   - Precautions: Fall, Aspiration  - Eliquis 5 mg BID  - Atorvastatin 10 mg QHS (LDL 58 on 3/22)    # Atrial Fibrillation  - has PPM present  - Eliquis 5 mg BID  - Lopressor 50 mg TID --> decreased to BID (4/18)  - BP 96/67 - 122/79 (4/18    #Elevated BUN  - Bun 28, Cr 0.54 (4/14) -> 30/0.61 (4/17)  - Encourage oral hydration, patient is on mildly thickened liquid    # Right femoral artery pseudoaneurysm   - s/p thrombin injection x2    # Fever of unknown origin, ?aspiration PNA  - Blood cultures 3/26, 4/4  no growth   - RVP/COVID 19 PCR 4/4 negative   - UA 4/2 with some pyuria   - Urine Cx 3/26 no growth 4/3 contaminated   - CXR 4/4 reviewed and compared to 3/29, not much change noted  - CT Chest with contrast reporting no PE, + Atelectasis   - Completed Cefepime, afebrile    # Iatrogenic adrenal insuffiencey   - prednisone 5 mg in AM (8:00) and 2 mg in PM (16:00) - home regimen  - f/u outpatient endocrinology    # Metastatic Melanoma  - metastatic melanoma s/p left foot melanoma and lymph node resection, s/p RUE melanoma and right axillary lymph node excision at [facility] (3/18/25)  - outpatient follow up     # Incidental Pancreatic Cyst  - Incidentally found new 1.7 cm cystic lesion at the pancreatic body on CT a/p on 3/28/25  - GI informed, no inpatient workup needed  - Follow up outpatient with Dr. Maurisio Haas    # Pain  - Tylenol PRN    # Mood / Cognition  - Neuropsychology consult PRN  - recreational therapy    # Sleep  - Maintain quiet hours and a low stim environment.   - Melatonin PRN to maximize participation in therapy during the day    # GI / Bowel  - Senna qHS  - Miralax Daily  - GI ppx: Protonix 40 mg QD    #  / Bladder  # Urinary Retention  - Continue Rivas present on admission, plan for TOV  - Failed TOV on 4/1-4/2, Rivas replaced 4/3  - Rivas removed 4/10 night for voiding trial  - Flomax 0.4 mg QHS (dc'd 4/11)   - Bethanecol 25 mg TID (started 4/11)  - have not needed anymore SC, voiding, dc bladder scans    # Skin / Pressure injury  - Skin assessment on admission performed: IAD to sacrum, right posterior thigh ecchymosis, forehead scab s/p biopsy,  left lateral ankle scar with redness towards heel  - Pressure Injury/Skin: OOB to chair, PT/OT  - nursing to monitor skin qShift    # Dysphagia:  - Diet Consistency: regular with mildly thick liquids  - Supplement: MVI  - Aspiration Precautions  - SLP consult for swallow function evaluation and treatment  - Nutrition consult    # DVT prophylaxis:   - on Eliquis 5 mg Q12H      Outpatient Follow-up:  Maurisio Haas  Gastroenterology  [address], [address]  [place] [address]  Phone: [phone]  Fax: [phone]  Follow Up Time: 1 month    Celina Carlin  Hematology/Oncology  [address] [place] [address]  Phone: [phone]  Fax: [phone]  Follow Up Time: 1 month    Juan Diego Johnson  Vascular Neurology  [address] [place] [address]  Phone: [phone]  Fax: [phone]  Follow Up Time: 1 month    Antonio Gaines  Endocrinology/Metab/Diabetes  [address]  [place] [address]  Phone: [phone]  Fax: [phone]  Follow Up Time: 1 month    Milo Murguia  Cardiovascular Disease  [address] [place] [address]  Phone: [phone]  Fax: [phone]  Follow Up Time: 2 weeks    Your Primary Care Provider,   Phone: (   )    -  Fax: (   )    -  Follow Up Time: 1 week    [place]  Urology  [address] [place] [address]  Phone: [phone]  Fax:   Follow Up Time: 1 month    [facility] Vascular Surgery  Vascular Surgery  [address] [place] [address]  Phone: [phone]               Assessment/Plan:  SHARON HULL is a 88 y/o F with PMHx of Afib (off Eliquis), PPM, HTN, HLD, GERD, severe MR/TR, iatrogenic adrenal insufficiency (on prednisone), lymphedema, metastatic melanoma s/p left foot melanoma and lymph node resection, s/p RUE melanoma and right axillary lymph node excision at [facility] (3/18/25), recent admission (3/21-3/25/25) for L MCA CVA s/p M1 thrombectomy, presented to  with left sided weakness and aphasia, found to have right MCA CVA and s/p M2 thrombectomy (3/26/25).  Hospital Course complicated by right sided weakness and aphasia, found to have new Left MCA CVA with M2 occlusion and is s/p 3rd thrombectomy (3/29/25). Hospital course also complicated by pseudoaneurysm (s/p vasc surgery consult and thrombin injection x2), urinary retention (s/p rivas, failed TOV 4/2), fever of unknown origin (on IV cefepime until 4/10/25). Patient resumed Eliquis on 4/2/25. Patient now admitted for a multidisciplinary rehab program with right sided weakness, Transcortical motor aphasia, cognitive deficits, Dysphagia and gait and ADL impairments.     #  Bihemispheric embolic infarcts s/p Thrombectomy x 3  - s/p cerebral angiogram for mechanical thrombectomy of L M1 occlusion, TICI 3 (one pass) on 3/21/25  - s/p cerebral angiogram for mechanical thrombectomy of Rt M2 occlusion, TICI 3 (one pass) on 3/26/25  - s/p thrombectomy of left M4 with TICI 0 3/29/25  - Etiology likely cardioembolic in the setting of atrial fibrillation and underlying hypercoagulability from malignancy  - Aphasia, Right hemiparesis, Dysphagia  - Start comprehensive rehab program of PT/OT/SLP - 3 hours a day, 5 days a week. P&O as needed   - Precautions: Fall, Aspiration  - Eliquis 5 mg BID  - Atorvastatin 10 mg QHS (LDL 58 on 3/22)    # Atrial Fibrillation  - has PPM present  - Eliquis 5 mg BID  - Lopressor 50 mg TID --> decreased to BID (4/18) due to BP being on softer side  - BP 96/67 - 122/79 (4/18    #Elevated BUN  - Bun 28, Cr 0.54 (4/14) -> 30/0.61 (4/17)  - Encourage oral hydration, patient is on mildly thickened liquid    # Right femoral artery pseudoaneurysm   - s/p thrombin injection x2    # Fever of unknown origin, ?aspiration PNA  - Blood cultures 3/26, 4/4  no growth   - RVP/COVID 19 PCR 4/4 negative   - UA 4/2 with some pyuria   - Urine Cx 3/26 no growth 4/3 contaminated   - CXR 4/4 reviewed and compared to 3/29, not much change noted  - CT Chest with contrast reporting no PE, + Atelectasis   - Completed Cefepime, afebrile    # Iatrogenic adrenal insuffiencey   - prednisone 5 mg in AM (8:00) and 2 mg in PM (16:00) - home regimen  - f/u outpatient endocrinology    # Metastatic Melanoma  - metastatic melanoma s/p left foot melanoma and lymph node resection, s/p RUE melanoma and right axillary lymph node excision at [facility] (3/18/25)  - outpatient follow up     # Incidental Pancreatic Cyst  - Incidentally found new 1.7 cm cystic lesion at the pancreatic body on CT a/p on 3/28/25  - GI informed, no inpatient workup needed  - Follow up outpatient with Dr. Maurisio Haas    # Pain  - Tylenol PRN    # Mood / Cognition  - Neuropsychology consult PRN  - recreational therapy    # Sleep  - Maintain quiet hours and a low stim environment.   - Melatonin PRN to maximize participation in therapy during the day    # GI / Bowel  - Senna qHS  - Miralax Daily  - GI ppx: Protonix 40 mg QD    #  / Bladder  # Urinary Retention  - Continue Rivas present on admission, plan for TOV  - Failed TOV on 4/1-4/2, Rivas replaced 4/3  - Rivas removed 4/10 night for voiding trial  - Flomax 0.4 mg QHS (dc'd 4/11)   - Bethanecol 25 mg TID (started 4/11)  - have not needed anymore SC, voiding, dc bladder scans    # Skin / Pressure injury  - Skin assessment on admission performed: IAD to sacrum, right posterior thigh ecchymosis, forehead scab s/p biopsy,  left lateral ankle scar with redness towards heel  - Pressure Injury/Skin: OOB to chair, PT/OT  - nursing to monitor skin qShift    # Dysphagia:  - Diet Consistency: regular with mildly thick liquids  - Supplement: MVI  - Aspiration Precautions  - SLP consult for swallow function evaluation and treatment  - Nutrition consult    # DVT prophylaxis:   - on Eliquis 5 mg Q12H      Outpatient Follow-up:  Maurisio Haas  Gastroenterology  [address], [address]  [place] [address]  Phone: [phone]  Fax: [phone]  Follow Up Time: 1 month    Celina Carlin  Hematology/Oncology  [address] [place] [address]  Phone: [phone]  Fax: [phone]  Follow Up Time: 1 month    Juan Diego Johnson  Vascular Neurology  [address] [place] [address]  Phone: [phone]  Fax: [phone]  Follow Up Time: 1 month    Antonio Gaines  Endocrinology/Metab/Diabetes  [address] [facility] [address]  [place] [address]  Phone: [phone]  Fax: [phone]  Follow Up Time: 1 month    Milo Murguia  Cardiovascular Disease  [address] [place] [address]  Phone: [phone]  Fax: [phone]  Follow Up Time: 2 weeks    Your Primary Care Provider,   Phone: (   )    -  Fax: (   )    -  Follow Up Time: 1 week    [place]  Urology  [address] [place] [address]  Phone: [phone]  Fax:   Follow Up Time: 1 month    [facility] Vascular Surgery  Vascular Surgery  [address] [place] [address]  Phone: [phone]

## 2025-04-18 NOTE — PROGRESS NOTE ADULT - ATTENDING COMMENTS
patient seen and examined  denies pain, expressive>receptive deficits  metoprolol decreased due to soft BP, patient is asymptomatic-- discussed with hospitalist  Total time 35 mins-face to face time encounter and counseling the patient, meeting with hospitalist, nursing staff, therapists and social work to discuss medical updates and management, care coordination, reviewing chart and data-including but not limited to labs and imaging

## 2025-04-18 NOTE — PROGRESS NOTE ADULT - SUBJECTIVE AND OBJECTIVE BOX
HPI:  88 y/o F with PMHx of Afib (off Eliquis), PPM, HTN, HLD, GERD, severe MR/TR, iatrogenic adrenal insufficiency (on prednisone), lymphedema, metastatic melanoma s/p left foot melanoma and lymph node resection, s/p RUE melanoma and right axillary lymph node excision at [facility] (3/18/25). Patient with a recent admission 3/21 -3/25/25  when she initially presented to  with code stroke and was transferred to [facility] where she underwent cerebral angiogram for mechanical thrombectomy of L M1 occlusion, TICI 3 (one pass) on 3/21/25. Patient was discharged and presented back to [facility] 3/26 with L sided weakness and aphasia, transferred to [facility] for stroke codem and is now s/p mechanical thrombectomy of Rt M2 occlusion, TICI 3 (one pass) on 3/26/25. On the early morning of 3/29/25 pt aphasic with right sided weakness. CTH negative for acute hemorrhage. CTA + new Left MCA M2 occulusion and patient subsequently underwent 3rd thrombectomy on 3/29. MRI brain demonstrated acute bilateral multifocal cerebral infarctions and subacute left basal ganglia infarct.  Etiology likely cardioembolic in the setting of atrial fibrillation and underlying hypercoaguability from malignancy.    Hospital course complicated by right groin pseudoaneurysm s/p L MCA thrombectomy. She was seen by vascular surgery and given thrombin injection x2 and recommended for outpatient follow up as needed. Hematology consulted due to concern for hypercoagulable state, anemia, hx of melanoma. CBC trended, overall stable. Hypercoagulable serology panel sent, negative for abnormalities. Follow up outpatient for further monitoring/testing. Hospital course further complicated by urinary retention and rivas catheter placed. Failed TOV 4/1-4/2, rivas replaced 4/3/25, and patient started on flomax.     Endocrine consulted due to hx of adrenal insufficiency; initially started on hydrocortisone then transitioned to home regimen of prednisone 5mg in AM and 2mg in PM. Incidentally found new 1.7 cm cystic lesion at the pancreatic body on CT A/P on 3/28/25. GI informed, no inpatient workup needed. Follow up outpatient with Dr. Maurisio Haas. Patient started on cefepime and vancomycin on 4/4/25 due to fever of unknown origin. ID consulted, Vancomycin discontinued after blood cultures resulted negative. ID recommendations to continue with IV cefepime until 4/10/25.     Patient resumed Eliquis 5mg daily (4/2/25) for secondary stroke prevention. Also re-started on home regimen of atorvastatin 10mg daily; LDL - 59, nonatherosclerotic etiology of stroke, high intensity statin not indicated at this time. PT/OT evaluated patient, recommended dispo to acute rehab. Patient should follow up with neurology/neuro IR, cardiology, endocrinology, hematology, GI, primary care, +/- urology and vascular surgery after discharge. Patient discharged to [facility] on 4/8/25.   (08 Apr 2025 12:56)      Subjective/Objective: Patient seen at bedside this AM. Slept well. Denies any pain. Advised patient of plan for discharge Wednesday (4/23). Family training scheduled for today.     VITALS  Vital Signs Last 24 Hrs  T(C): 36.4 (18 Apr 2025 09:47), Max: 36.4 (17 Apr 2025 10:45)  T(F): 97.6 (18 Apr 2025 09:47), Max: 97.6 (17 Apr 2025 10:45)  HR: 76 (18 Apr 2025 09:47) (76 - 94)  BP: 96/67 (18 Apr 2025 09:47) (96/67 - 122/79)  RR: 16 (18 Apr 2025 09:47) (15 - 16)  SpO2: 98% (18 Apr 2025 09:47) (94% - 98%)    REVIEW OF SYMPTOMS  limited by aphasia    PHYSICAL EXAM   Gen - NAD, Comfortable  HEENT - NCAT, EOMI  Pulm - breathing comfortably on RA  Cardiovascular - warm, well perfused  Abdomen - Soft, NT/ND,   Extremities - No edema, No calf tenderness  Neuro-     Cognitive - AAOx3 when given yes/no choices, follows commands     Communication - non-fluent aphasia      Motor -                     LEFT    UE - ShAB 5/5, EF 5/5, EE 5/5, WE 5/5,  5/5                    RIGHT UE - ShAB 4/5, EF 4/5, EE 4/5, WE 5/5,  4/5                    LEFT    LE - HF 2/5, KE 5/5, DF 5/5, PF 5/5                    RIGHT LE - HF 2/5, KE 3/5, DF 5/5, PF 5/5     Psychiatric - Mood stable, Affect WNL      RECENT LABS                        10.9   4.73  )-----------( 103      ( 17 Apr 2025 05:56 )             33.0     04-17    141  |  106  |  30[H]  ----------------------------<  80  3.9   |  26  |  0.61    Ca    9.0      17 Apr 2025 05:56    TPro  5.7[L]  /  Alb  2.7[L]  /  TBili  1.0  /  DBili  x   /  AST  21  /  ALT  33  /  AlkPhos  49  04-17      MEDICATIONS  (STANDING):  apixaban 5 milliGRAM(s) Oral every 12 hours  atorvastatin 10 milliGRAM(s) Oral at bedtime  bethanechol 25 milliGRAM(s) Oral three times a day  metoprolol tartrate 50 milliGRAM(s) Oral three times a day  multivitamin 1 Tablet(s) Oral daily  pantoprazole    Tablet 40 milliGRAM(s) Oral before breakfast  polyethylene glycol 3350 17 Gram(s) Oral daily  predniSONE   Tablet 5 milliGRAM(s) Oral <User Schedule>  predniSONE   Tablet 2 milliGRAM(s) Oral <User Schedule>  senna 2 Tablet(s) Oral at bedtime    MEDICATIONS  (PRN):  acetaminophen     Tablet .. 650 milliGRAM(s) Oral every 6 hours PRN Mild Pain (1 - 3)

## 2025-04-18 NOTE — PROGRESS NOTE ADULT - ASSESSMENT
88 y/o F with PMH of Afib (off Eliquis), PPM, HTN, HLD, GERD, severe MR/TR, iatrogenic adrenal insufficiency (on prednisone), lymphedema, metastatic melanoma s/p left foot melanoma and lymph node resection, s/p RUE melanoma and right axillary lymph node excision at [facility] (3/18/25), recent admission (3/21-3/25/25) for L MCA CVA s/p M1 thrombectomy, presented to  with left sided weakness and aphasia, found to have right MCA CVA and s/p M2 thrombectomy (3/26/25). Hospital course complicated by right sided weakness and aphasia, found to have new Left MCA CVA with M2 occlusion and is s/p 3rd thrombectomy (3/29/25). Hospital course also complicated by pseudoaneurysm (s/p vasc surgery consult and thrombin injection x2), urinary retention (s/p rivas, failed TOV 4/2), fever of unknown origin (on IV cefepime until 4/10/25). Patient resumed Eliquis on 4/2/25. Patient now admitted for a multidisciplinary rehab program with right sided weakness, Transcortical motor aphasia, cognitive deficits, Dysphagia and gait and ADL impairments.     #Bi-Hemispheric Embolic Infarcts s/p Thrombectomy x 3  #Aphasia, Right hemiparesis, Dysphagia  - s/p cerebral angiogram for mechanical thrombectomy of L M1 occlusion, TICI 3 (one pass) on 3/21/25  - s/p cerebral angiogram for mechanical thrombectomy of Rt M2 occlusion, TICI 3 (one pass) on 3/26/25  - s/p thrombectomy of left M4 with TICI 0 3/29/25  - MRI brain 3/31 showed New acute infarctions within the LEFT lateral frontal cortex, LEFT occipital cortex, RIGHT insular cortex and scattered RIGHT frontal, parietal, temporal and occipital cortex suggesting embolic etiology. Subacute infarction within the LEFT basal ganglia. Mild periventricular chronic white matter ischemia.  - Etiology likely cardioembolic in the setting of atrial fibrillation and underlying hypercoagulability from malignancy  - Repeat Echo 4/4 showed EF >75 %, no evidence of LV thrombus, severely dilated LA/RA, mild-mod TR  - Continue Eliquis 5 mg BID  - Continue Atorvastatin 10 mg QHS (LDL 58 on 3/22)  - Precautions: Fall, Aspiration  - Comprehensive rehab program of PT/OT/SLP  - Outpatient follow up with neurology    # Chronic Atrial Fibrillation  # Status Post PPM   - Continue metoprolol - change from 50mg TID to 50mg BID 4/18  - Continue Eliquis 5mg BID    # Right femoral artery pseudoaneurysm   - s/p thrombin injection x2  - Outpatient follow up     # Fever of unknown origin, ?Aspiration PNA- (resolved)  - Urine and blood cx negative  - CTA chest 4/4 showed no PE, Mucous/secretions are noted within the left lower lobe bronchus. Near-complete atelectasis of both lower lobes is noted. Small bilateral pleural effusions are noted.   - Completed course of IV cefepime on 4/10 per ID   - Incentive spirometry  - Will monitor    # Iatrogenic Adrenal insufficiency  - Prednisone 5 mg in AM (8:00) and 2 mg in PM (16:00) - home regimen  - f/u outpatient endocrinology    # Metastatic Melanoma  - metastatic melanoma s/p left foot melanoma and lymph node resection, s/p RUE melanoma and right axillary lymph node excision at [facility] (3/18/25)  - Outpatient follow up     # Incidental Pancreatic Cyst  - Incidentally found new 1.7 cm cystic lesion at the pancreatic body on CT a/p on 3/28/25  - GI informed, no inpatient workup needed  - Follow up outpatient with Dr. Maurisio Haas    #GI PPx  - Continue Protonix 40 mg QD    # Urinary Retention- improved  - Rivas removed 4/10  - pt voiding well  - continue with Bethanechol 25mg TID, started on 4/11   - Continue serial bladder scans and ISC as needed   - encourage toileting schedule    # DVT prophylaxis:   # History of Lymphedema   - LE duplex 3/26 negative for DVT  - on Eliquis 5 mg Q12H

## 2025-04-18 NOTE — PROGRESS NOTE ADULT - SUBJECTIVE AND OBJECTIVE BOX
Patient is a 89y old  Female who presents with a chief complaint of Left MCA CVA s/p M1 and M2 thrombectomy, Right MCA CVA s/p M2 thrombectomy (18 Apr 2025 09:57)      Patient seen and examined at bedside. NAD. denies acute medical complaints.     ALLERGIES:  penicillins (Hives)    MEDICATIONS  (STANDING):  apixaban 5 milliGRAM(s) Oral every 12 hours  atorvastatin 10 milliGRAM(s) Oral at bedtime  bethanechol 25 milliGRAM(s) Oral three times a day  metoprolol tartrate 50 milliGRAM(s) Oral three times a day  multivitamin 1 Tablet(s) Oral daily  pantoprazole    Tablet 40 milliGRAM(s) Oral before breakfast  polyethylene glycol 3350 17 Gram(s) Oral daily  predniSONE   Tablet 5 milliGRAM(s) Oral <User Schedule>  predniSONE   Tablet 2 milliGRAM(s) Oral <User Schedule>  senna 2 Tablet(s) Oral at bedtime    MEDICATIONS  (PRN):  acetaminophen     Tablet .. 650 milliGRAM(s) Oral every 6 hours PRN Mild Pain (1 - 3)    Vital Signs Last 24 Hrs  T(F): 97.6 (18 Apr 2025 09:47), Max: 97.6 (17 Apr 2025 19:19)  HR: 76 (18 Apr 2025 09:47) (76 - 94)  BP: 96/67 (18 Apr 2025 09:47) (96/67 - 122/78)  RR: 16 (18 Apr 2025 09:47) (15 - 16)  SpO2: 98% (18 Apr 2025 09:47) (94% - 98%)  I&O's Summary    17 Apr 2025 07:01  -  18 Apr 2025 07:00  --------------------------------------------------------  IN: 0 mL / OUT: 2 mL / NET: -2 mL          PHYSICAL EXAM:  General: NAD, pleasant  ENT: MMM, no scleral icterus  Neck: Supple, No JVD  Lungs: Clear to auscultation bilaterally, no wheezes, rales, rhonchi  Cardio: RRR, S1/S2  Abdomen: Soft, Nontender, Nondistended; Bowel sounds present  Extremities: No calf tenderness, No pitting edema    LABS:                        10.9   4.73  )-----------( 103      ( 17 Apr 2025 05:56 )             33.0       04-17    141  |  106  |  30  ----------------------------<  80  3.9   |  26  |  0.61    Ca    9.0      17 Apr 2025 05:56    TPro  5.7  /  Alb  2.7  /  TBili  1.0  /  DBili  x   /  AST  21  /  ALT  33  /  AlkPhos  49  04-17                03-27 Chol 145 mg/dL LDL -- HDL 74 mg/dL Trig 55 mg/dL                  Urinalysis Basic - ( 17 Apr 2025 05:56 )    Color: x / Appearance: x / SG: x / pH: x  Gluc: 80 mg/dL / Ketone: x  / Bili: x / Urobili: x   Blood: x / Protein: x / Nitrite: x   Leuk Esterase: x / RBC: x / WBC x   Sq Epi: x / Non Sq Epi: x / Bacteria: x        COVID-19 PCR: NotDetec (04-08-25 @ 18:30)      RADIOLOGY & ADDITIONAL TESTS:    Care Discussed with Consultants/Other Providers: yes, rehab

## 2025-04-19 PROCEDURE — 99232 SBSQ HOSP IP/OBS MODERATE 35: CPT

## 2025-04-19 RX ADMIN — Medication 25 MILLIGRAM(S): at 13:46

## 2025-04-19 RX ADMIN — Medication 1 TABLET(S): at 11:49

## 2025-04-19 RX ADMIN — Medication 2 TABLET(S): at 21:19

## 2025-04-19 RX ADMIN — METOPROLOL SUCCINATE 50 MILLIGRAM(S): 50 TABLET, EXTENDED RELEASE ORAL at 05:17

## 2025-04-19 RX ADMIN — APIXABAN 5 MILLIGRAM(S): 2.5 TABLET, FILM COATED ORAL at 17:19

## 2025-04-19 RX ADMIN — Medication 25 MILLIGRAM(S): at 05:17

## 2025-04-19 RX ADMIN — Medication 40 MILLIGRAM(S): at 05:17

## 2025-04-19 RX ADMIN — PREDNISONE 5 MILLIGRAM(S): 20 TABLET ORAL at 07:57

## 2025-04-19 RX ADMIN — PREDNISONE 2 MILLIGRAM(S): 20 TABLET ORAL at 16:18

## 2025-04-19 RX ADMIN — POLYETHYLENE GLYCOL 3350 17 GRAM(S): 17 POWDER, FOR SOLUTION ORAL at 11:47

## 2025-04-19 RX ADMIN — APIXABAN 5 MILLIGRAM(S): 2.5 TABLET, FILM COATED ORAL at 05:17

## 2025-04-19 RX ADMIN — Medication 25 MILLIGRAM(S): at 21:19

## 2025-04-19 RX ADMIN — ATORVASTATIN CALCIUM 10 MILLIGRAM(S): 80 TABLET, FILM COATED ORAL at 21:19

## 2025-04-19 NOTE — PROGRESS NOTE ADULT - ASSESSMENT
Assessment/Plan:  SHARON HULL is a 89 y/o F with PMHx of Afib (off Eliquis), PPM, HTN, HLD, GERD, severe MR/TR, iatrogenic adrenal insufficiency (on prednisone), lymphedema, metastatic melanoma s/p left foot melanoma and lymph node resection, s/p RUE melanoma and right axillary lymph node excision at [facility] (3/18/25), recent admission (3/21-3/25/25) for L MCA CVA s/p M1 thrombectomy, presented to  with left sided weakness and aphasia, found to have right MCA CVA and s/p M2 thrombectomy (3/26/25).  Hospital Course complicated by right sided weakness and aphasia, found to have new Left MCA CVA with M2 occlusion and is s/p 3rd thrombectomy (3/29/25). Hospital course also complicated by pseudoaneurysm (s/p vasc surgery consult and thrombin injection x2), urinary retention (s/p rivas, failed TOV 4/2), fever of unknown origin (on IV cefepime until 4/10/25). Patient resumed Eliquis on 4/2/25. Patient now admitted for a multidisciplinary rehab program with right sided weakness, Transcortical motor aphasia, cognitive deficits, Dysphagia and gait and ADL impairments.     #  Bihemispheric embolic infarcts s/p Thrombectomy x 3  - s/p cerebral angiogram for mechanical thrombectomy of L M1 occlusion, TICI 3 (one pass) on 3/21/25  - s/p cerebral angiogram for mechanical thrombectomy of Rt M2 occlusion, TICI 3 (one pass) on 3/26/25  - s/p thrombectomy of left M4 with TICI 0 3/29/25  - Etiology likely cardioembolic in the setting of atrial fibrillation and underlying hypercoagulability from malignancy  - Aphasia, Right hemiparesis, Dysphagia  - Start comprehensive rehab program of PT/OT/SLP - 3 hours a day, 5 days a week. P&O as needed   - Precautions: Fall, Aspiration  - Eliquis 5 mg BID  - Atorvastatin 10 mg QHS (LDL 58 on 3/22)    # Atrial Fibrillation  - has PPM present  - Eliquis 5 mg BID  - Lopressor 50 mg TID --> decreased to BID (4/18) due to BP being on softer side  - BP 96/67 - 122/79 (4/18    #Elevated BUN  - Bun 28, Cr 0.54 (4/14) -> 30/0.61 (4/17)  - Encourage oral hydration, patient is on mildly thickened liquid    # Right femoral artery pseudoaneurysm   - s/p thrombin injection x2    # Fever of unknown origin, ?aspiration PNA  - Blood cultures 3/26, 4/4  no growth   - RVP/COVID 19 PCR 4/4 negative   - UA 4/2 with some pyuria   - Urine Cx 3/26 no growth 4/3 contaminated   - CXR 4/4 reviewed and compared to 3/29, not much change noted  - CT Chest with contrast reporting no PE, + Atelectasis   - Completed Cefepime, afebrile    # Iatrogenic adrenal insuffiencey   - prednisone 5 mg in AM (8:00) and 2 mg in PM (16:00) - home regimen  - f/u outpatient endocrinology    # Metastatic Melanoma  - metastatic melanoma s/p left foot melanoma and lymph node resection, s/p RUE melanoma and right axillary lymph node excision at [facility] (3/18/25)  - outpatient follow up     # Incidental Pancreatic Cyst  - Incidentally found new 1.7 cm cystic lesion at the pancreatic body on CT a/p on 3/28/25  - GI informed, no inpatient workup needed  - Follow up outpatient with Dr. Maurisio Haas    # Pain  - Tylenol PRN    # Mood / Cognition  - Neuropsychology consult PRN  - recreational therapy    # Sleep  - Maintain quiet hours and a low stim environment.   - Melatonin PRN to maximize participation in therapy during the day    # GI / Bowel  - Senna qHS  - Miralax Daily  - GI ppx: Protonix 40 mg QD    #  / Bladder  # Urinary Retention  - Continue Rivas present on admission, plan for TOV  - Failed TOV on 4/1-4/2, Rivas replaced 4/3  - Rivas removed 4/10 night for voiding trial  - Flomax 0.4 mg QHS (dc'd 4/11)   - Bethanecol 25 mg TID (started 4/11)  - have not needed anymore SC, voiding, dc bladder scans    # Skin / Pressure injury  - Skin assessment on admission performed: IAD to sacrum, right posterior thigh ecchymosis, forehead scab s/p biopsy,  left lateral ankle scar with redness towards heel  - Pressure Injury/Skin: OOB to chair, PT/OT  - nursing to monitor skin qShift    # Dysphagia:  - Diet Consistency: regular with mildly thick liquids  - Supplement: MVI  - Aspiration Precautions  - SLP consult for swallow function evaluation and treatment  - Nutrition consult    # DVT prophylaxis:   - on Eliquis 5 mg Q12H      Outpatient Follow-up:  Maurisio Haas  Gastroenterology  [address], [address]  [place] [address]  Phone: [phone]  Fax: [phone]  Follow Up Time: 1 month    Celina Carlin  Hematology/Oncology  [address] [place] [address]  Phone: [phone]  Fax: [phone]  Follow Up Time: 1 month    Juan Diego Johnson  Vascular Neurology  [address] [place] [address]  Phone: [phone]  Fax: [phone]  Follow Up Time: 1 month    Antonio Gaines  Endocrinology/Metab/Diabetes  [address], [address]  [place] [address]  Phone: [phone]  Fax: [phone]  Follow Up Time: 1 month    Milo Murguia  Cardiovascular Disease  [address] [place] [address]  Phone: [phone]  Fax: [phone]  Follow Up Time: 2 weeks    Your Primary Care Provider,   Phone: (   )    -  Fax: (   )    -  Follow Up Time: 1 week    [place]  Urology  [address] [place] [address]  Phone: [phone]  Fax:   Follow Up Time: 1 month    [facility]  Vascular Surgery  [address] [place] [address]  Phone: [phone]

## 2025-04-19 NOTE — PROGRESS NOTE ADULT - ASSESSMENT
89 y/o F with PMH of Afib (off Eliquis), PPM, HTN, HLD, GERD, severe MR/TR, iatrogenic adrenal insufficiency (on prednisone), lymphedema, metastatic melanoma s/p left foot melanoma and lymph node resection, s/p RUE melanoma and right axillary lymph node excision at [facility] (3/18/25), recent admission (3/21-3/25/25) for L MCA CVA s/p M1 thrombectomy, presented to  with left sided weakness and aphasia, found to have right MCA CVA and s/p M2 thrombectomy (3/26/25). Hospital course complicated by right sided weakness and aphasia, found to have new Left MCA CVA with M2 occlusion and is s/p 3rd thrombectomy (3/29/25). Hospital course also complicated by pseudoaneurysm (s/p vasc surgery consult and thrombin injection x2), urinary retention (s/p rivas, failed TOV 4/2), fever of unknown origin (on IV cefepime until 4/10/25). Patient resumed Eliquis on 4/2/25. Patient now admitted for a multidisciplinary rehab program with right sided weakness, Transcortical motor aphasia, cognitive deficits, Dysphagia and gait and ADL impairments.    #Bi-Hemispheric Embolic Infarcts s/p Thrombectomy x 3  #Aphasia, Right hemiparesis, Dysphagia  - s/p cerebral angiogram for mechanical thrombectomy of L M1 occlusion, TICI 3 (one pass) on 3/21/25  - s/p cerebral angiogram for mechanical thrombectomy of Rt M2 occlusion, TICI 3 (one pass) on 3/26/25  - s/p thrombectomy of left M4 with TICI 0 3/29/25  - MRI brain 3/31 showed New acute infarctions within the LEFT lateral frontal cortex, LEFT occipital cortex, RIGHT insular cortex and scattered RIGHT frontal, parietal, temporal and occipital cortex suggesting embolic etiology. Subacute infarction within the LEFT basal ganglia. Mild periventricular chronic white matter ischemia.  - Etiology likely cardioembolic in the setting of atrial fibrillation and underlying hypercoagulability from malignancy  - Repeat Echo 4/4 showed EF >75 %, no evidence of LV thrombus, severely dilated LA/RA, mild-mod TR  - Continue Eliquis 5 mg BID  - Continue Atorvastatin 10 mg QHS (LDL 58 on 3/22)  - Precautions: Fall, Aspiration  - Comprehensive rehab program of PT/OT/SLP  - Outpatient follow up with neurology    # Chronic Atrial Fibrillation  # Status Post PPM   - Continue metoprolol - changed from 50mg TID to 50mg BID 4/18 - BPs improving  - Continue Eliquis 5mg BID    # Right femoral artery pseudoaneurysm   - s/p thrombin injection x2  - Outpatient follow up     # Fever of unknown origin, ?Aspiration PNA- (resolved)  - Urine and blood cx negative  - CTA chest 4/4 showed no PE, Mucous/secretions are noted within the left lower lobe bronchus. Near-complete atelectasis of both lower lobes is noted. Small bilateral pleural effusions are noted.   - Completed course of IV cefepime on 4/10 per ID   - Incentive spirometry  - Will monitor    # Iatrogenic Adrenal insufficiency  - Prednisone 5 mg in AM (8:00) and 2 mg in PM (16:00) - home regimen  - f/u outpatient endocrinology    # Metastatic Melanoma  - Metastatic melanoma s/p left foot melanoma and lymph node resection, s/p RUE melanoma and right axillary lymph node excision at [facility] (3/18/25)  - Outpatient follow up     # Incidental Pancreatic Cyst  - Incidentally found new 1.7 cm cystic lesion at the pancreatic body on CT a/p on 3/28/25  - GI informed, no inpatient workup needed  - Follow up outpatient with Dr. Maurisio Haas    #GI PPx  - Continue Protonix 40 mg QD    # Urinary Retention- improved  - Rivas removed 4/10  - pt voiding well  - continue with Bethanechol 25mg TID, started on 4/11   - Continue serial bladder scans and ISC as needed   - encourage toileting schedule    # DVT prophylaxis:   # History of Lymphedema   - LE duplex 3/26 negative for DVT  - on Eliquis 5 mg Q12H

## 2025-04-19 NOTE — PROGRESS NOTE ADULT - SUBJECTIVE AND OBJECTIVE BOX
HPI:  89 y/o F with PMHx of Afib (off Eliquis), PPM, HTN, HLD, GERD, severe MR/TR, iatrogenic adrenal insufficiency (on prednisone), lymphedema, metastatic melanoma s/p left foot melanoma and lymph node resection, s/p RUE melanoma and right axillary lymph node excision at [facility] (3/18/25). Patient with a recent admission 3/21 -3/25/25  when she initially presented to  with code stroke and was transferred to [facility] where she underwent cerebral angiogram for mechanical thrombectomy of L M1 occlusion, TICI 3 (one pass) on 3/21/25. Patient was discharged and presented back to [facility] 3/26 with L sided weakness and aphasia, transferred to [facility] for stroke codem and is now s/p mechanical thrombectomy of Rt M2 occlusion, TICI 3 (one pass) on 3/26/25. On the early morning of 3/29/25 pt aphasic with right sided weakness. CTH negative for acute hemorrhage. CTA + new Left MCA M2 occulusion and patient subsequently underwent 3rd thrombectomy on 3/29. MRI brain demonstrated acute bilateral multifocal cerebral infarctions and subacute left basal ganglia infarct.  Etiology likely cardioembolic in the setting of atrial fibrillation and underlying hypercoaguability from malignancy.    Hospital course complicated by right groin pseudoaneurysm s/p L MCA thrombectomy. She was seen by vascular surgery and given thrombin injection x2 and recommended for outpatient follow up as needed. Hematology consulted due to concern for hypercoagulable state, anemia, hx of melanoma. CBC trended, overall stable. Hypercoagulable serology panel sent, negative for abnormalities. Follow up outpatient for further monitoring/testing. Hospital course further complicated by urinary retention and rivas catheter placed. Failed TOV 4/1-4/2, rivas replaced 4/3/25, and patient started on flomax.     Endocrine consulted due to hx of adrenal insufficiency; initially started on hydrocortisone then transitioned to home regimen of prednisone 5mg in AM and 2mg in PM. Incidentally found new 1.7 cm cystic lesion at the pancreatic body on CT A/P on 3/28/25. GI informed, no inpatient workup needed. Follow up outpatient with Dr. Maurisio Haas. Patient started on cefepime and vancomycin on 4/4/25 due to fever of unknown origin. ID consulted, Vancomycin discontinued after blood cultures resulted negative. ID recommendations to continue with IV cefepime until 4/10/25.     Patient resumed Eliquis 5mg daily (4/2/25) for secondary stroke prevention. Also re-started on home regimen of atorvastatin 10mg daily; LDL - 59, nonatherosclerotic etiology of stroke, high intensity statin not indicated at this time. PT/OT evaluated patient, recommended dispo to acute rehab. Patient should follow up with neurology/neuro IR, cardiology, endocrinology, hematology, GI, primary care, +/- urology and vascular surgery after discharge. Patient discharged to [facility] on 4/8/25.   (08 Apr 2025 12:56)      Subjective/Objective: Patient seen and assessed at bedside. No acute events overnight. Patient feels well today. Denies HA, CP, abdominal pain.    Last Bowel Movement: 16-Apr-2025 (04-19-25 @ 10:55)      Vital Signs Last 24 Hrs  T(C): 36.5 (19 Apr 2025 08:04), Max: 36.7 (18 Apr 2025 21:00)  T(F): 97.7 (19 Apr 2025 08:04), Max: 98 (18 Apr 2025 21:00)  HR: 79 (19 Apr 2025 08:04) (74 - 93)  BP: 125/78 (19 Apr 2025 08:04) (104/71 - 137/71)  BP(mean): --  RR: 14 (19 Apr 2025 08:04) (14 - 16)  SpO2: 98% (19 Apr 2025 08:04) (97% - 98%)    Parameters below as of 19 Apr 2025 08:04  Patient On (Oxygen Delivery Method): room air        REVIEW OF SYMPTOMS  limited by aphasia    PHYSICAL EXAM   Gen - NAD, Comfortable  HEENT - NCAT, EOMI  Pulm - breathing comfortably on RA  Cardiovascular - warm, well perfused  Abdomen - Soft, NT/ND,   Extremities - No edema, No calf tenderness  Neuro-     Cognitive - AAOx3 when given yes/no choices, follows commands     Communication - non-fluent aphasia      Motor -                     LEFT    UE - ShAB 5/5, EF 5/5, EE 5/5, WE 5/5,  5/5                    RIGHT UE - ShAB 4/5, EF 4/5, EE 4/5, WE 5/5,  4/5                    LEFT    LE - HF 2/5, KE 5/5, DF 5/5, PF 5/5                    RIGHT LE - HF 2/5, KE 3/5, DF 5/5, PF 5/5     Psychiatric - Mood stable, Affect WNL      RECENT LABS             CBC, CMP 4/17 reviewed.    MEDICATIONS  (STANDING):  apixaban 5 milliGRAM(s) Oral every 12 hours  atorvastatin 10 milliGRAM(s) Oral at bedtime  bethanechol 25 milliGRAM(s) Oral three times a day  metoprolol tartrate 50 milliGRAM(s) Oral two times a day  multivitamin 1 Tablet(s) Oral daily  pantoprazole    Tablet 40 milliGRAM(s) Oral before breakfast  polyethylene glycol 3350 17 Gram(s) Oral daily  predniSONE   Tablet 5 milliGRAM(s) Oral <User Schedule>  predniSONE   Tablet 2 milliGRAM(s) Oral <User Schedule>  senna 2 Tablet(s) Oral at bedtime    MEDICATIONS  (PRN):  acetaminophen     Tablet .. 650 milliGRAM(s) Oral every 6 hours PRN Mild Pain (1 - 3)

## 2025-04-19 NOTE — PROGRESS NOTE ADULT - SUBJECTIVE AND OBJECTIVE BOX
Patient is a 89y old  Female who presents with a chief complaint of Left MCA CVA s/p M1 and M2 thrombectomy, Right MCA CVA s/p M2 thrombectomy (18 Apr 2025 11:22)      Patient seen and examined at bedside. NAD. denies acute medical complaints.     ALLERGIES:  penicillins (Hives)    MEDICATIONS  (STANDING):  apixaban 5 milliGRAM(s) Oral every 12 hours  atorvastatin 10 milliGRAM(s) Oral at bedtime  bethanechol 25 milliGRAM(s) Oral three times a day  metoprolol tartrate 50 milliGRAM(s) Oral two times a day  multivitamin 1 Tablet(s) Oral daily  pantoprazole    Tablet 40 milliGRAM(s) Oral before breakfast  polyethylene glycol 3350 17 Gram(s) Oral daily  predniSONE   Tablet 5 milliGRAM(s) Oral <User Schedule>  predniSONE   Tablet 2 milliGRAM(s) Oral <User Schedule>  senna 2 Tablet(s) Oral at bedtime    MEDICATIONS  (PRN):  acetaminophen     Tablet .. 650 milliGRAM(s) Oral every 6 hours PRN Mild Pain (1 - 3)    Vital Signs Last 24 Hrs  T(F): 97.7 (19 Apr 2025 08:04), Max: 98 (18 Apr 2025 21:00)  HR: 79 (19 Apr 2025 08:04) (74 - 93)  BP: 125/78 (19 Apr 2025 08:04) (104/71 - 137/71)  RR: 14 (19 Apr 2025 08:04) (14 - 16)  SpO2: 98% (19 Apr 2025 08:04) (97% - 98%)  I&O's Summary    18 Apr 2025 07:01  -  19 Apr 2025 07:00  --------------------------------------------------------  IN: 0 mL / OUT: 2 mL / NET: -2 mL      PHYSICAL EXAM:  General: NAD, pleasant  ENT: MMM, no scleral icterus  Neck: Supple, No JVD  Lungs: Clear to auscultation bilaterally, no wheezes, rales, rhonchi  Cardio: RRR, S1/S2  Abdomen: Soft, Nontender, Nondistended; Bowel sounds present  Extremities: No calf tenderness, No pitting edema      LABS:                        10.9   4.73  )-----------( 103      ( 17 Apr 2025 05:56 )             33.0       04-17    141  |  106  |  30  ----------------------------<  80  3.9   |  26  |  0.61    Ca    9.0      17 Apr 2025 05:56    TPro  5.7  /  Alb  2.7  /  TBili  1.0  /  DBili  x   /  AST  21  /  ALT  33  /  AlkPhos  49  04-17                03-27 Chol 145 mg/dL LDL -- HDL 74 mg/dL Trig 55 mg/dL                  Urinalysis Basic - ( 17 Apr 2025 05:56 )    Color: x / Appearance: x / SG: x / pH: x  Gluc: 80 mg/dL / Ketone: x  / Bili: x / Urobili: x   Blood: x / Protein: x / Nitrite: x   Leuk Esterase: x / RBC: x / WBC x   Sq Epi: x / Non Sq Epi: x / Bacteria: x        COVID-19 PCR: NotDetec (04-08-25 @ 18:30)      RADIOLOGY & ADDITIONAL TESTS:    Care Discussed with Consultants/Other Providers: yes, rehab

## 2025-04-20 PROCEDURE — 99232 SBSQ HOSP IP/OBS MODERATE 35: CPT

## 2025-04-20 RX ORDER — GABAPENTIN 400 MG/1
100 CAPSULE ORAL AT BEDTIME
Refills: 0 | Status: DISCONTINUED | OUTPATIENT
Start: 2025-04-20 | End: 2025-04-20

## 2025-04-20 RX ORDER — BISACODYL 5 MG
10 TABLET, DELAYED RELEASE (ENTERIC COATED) ORAL DAILY
Refills: 0 | Status: DISCONTINUED | OUTPATIENT
Start: 2025-04-20 | End: 2025-04-20

## 2025-04-20 RX ADMIN — Medication 500000 UNIT(S): at 23:16

## 2025-04-20 RX ADMIN — APIXABAN 5 MILLIGRAM(S): 2.5 TABLET, FILM COATED ORAL at 05:31

## 2025-04-20 RX ADMIN — ATORVASTATIN CALCIUM 10 MILLIGRAM(S): 80 TABLET, FILM COATED ORAL at 21:24

## 2025-04-20 RX ADMIN — POLYETHYLENE GLYCOL 3350 17 GRAM(S): 17 POWDER, FOR SOLUTION ORAL at 11:26

## 2025-04-20 RX ADMIN — Medication 500000 UNIT(S): at 17:28

## 2025-04-20 RX ADMIN — Medication 1 TABLET(S): at 11:26

## 2025-04-20 RX ADMIN — APIXABAN 5 MILLIGRAM(S): 2.5 TABLET, FILM COATED ORAL at 17:28

## 2025-04-20 RX ADMIN — METOPROLOL SUCCINATE 50 MILLIGRAM(S): 50 TABLET, EXTENDED RELEASE ORAL at 05:31

## 2025-04-20 RX ADMIN — PREDNISONE 2 MILLIGRAM(S): 20 TABLET ORAL at 15:45

## 2025-04-20 RX ADMIN — Medication 25 MILLIGRAM(S): at 05:31

## 2025-04-20 RX ADMIN — Medication 25 MILLIGRAM(S): at 21:25

## 2025-04-20 RX ADMIN — Medication 500000 UNIT(S): at 13:39

## 2025-04-20 RX ADMIN — Medication 2 TABLET(S): at 21:25

## 2025-04-20 RX ADMIN — PREDNISONE 5 MILLIGRAM(S): 20 TABLET ORAL at 08:09

## 2025-04-20 RX ADMIN — Medication 40 MILLIGRAM(S): at 05:32

## 2025-04-20 RX ADMIN — Medication 25 MILLIGRAM(S): at 13:39

## 2025-04-20 NOTE — PROGRESS NOTE ADULT - SUBJECTIVE AND OBJECTIVE BOX
Patient is a 89y old  Female who presents with a chief complaint of Left MCA CVA s/p M1 and M2 thrombectomy, Right MCA CVA s/p M2 thrombectomy (19 Apr 2025 12:45)      SUBJECTIVE / OVERNIGHT EVENTS:  Pt seen and examined at bedside. No acute events overnight.  Pt denies cp, palpitations, sob, abd pain, N/V, fever, chills.    ROS:  All other review of systems negative    Allergies    penicillins (Hives)    Intolerances        MEDICATIONS  (STANDING):  apixaban 5 milliGRAM(s) Oral every 12 hours  atorvastatin 10 milliGRAM(s) Oral at bedtime  bethanechol 25 milliGRAM(s) Oral three times a day  metoprolol tartrate 50 milliGRAM(s) Oral two times a day  multivitamin 1 Tablet(s) Oral daily  pantoprazole    Tablet 40 milliGRAM(s) Oral before breakfast  polyethylene glycol 3350 17 Gram(s) Oral daily  predniSONE   Tablet 5 milliGRAM(s) Oral <User Schedule>  predniSONE   Tablet 2 milliGRAM(s) Oral <User Schedule>  senna 2 Tablet(s) Oral at bedtime    MEDICATIONS  (PRN):  acetaminophen     Tablet .. 650 milliGRAM(s) Oral every 6 hours PRN Mild Pain (1 - 3)      Vital Signs Last 24 Hrs  T(C): 36.6 (20 Apr 2025 08:12), Max: 36.6 (20 Apr 2025 08:12)  T(F): 97.8 (20 Apr 2025 08:12), Max: 97.8 (20 Apr 2025 08:12)  HR: 75 (20 Apr 2025 08:12) (74 - 94)  BP: 105/71 (20 Apr 2025 08:12) (105/71 - 125/68)  BP(mean): --  RR: 14 (20 Apr 2025 08:12) (14 - 15)  SpO2: 98% (20 Apr 2025 08:12) (95% - 98%)    Parameters below as of 20 Apr 2025 08:12  Patient On (Oxygen Delivery Method): room air      CAPILLARY BLOOD GLUCOSE        I&O's Summary      PHYSICAL EXAM:  GENERAL: NAD, well-developed Elderly female   HEAD:  Atraumatic, Normocephalic  NECK: Supple, No JVD  CHEST/LUNG: Clear to auscultation bilaterally; No wheeze, nonlabored breathing  HEART: Regular rate and rhythm; No murmurs, rubs, or gallops  ABDOMEN: Soft, Nontender, Nondistended; Bowel sounds present  EXTREMITIES:  No clubbing, cyanosis, or edema  PSYCH: calm, appropriate mood    LABS:                    RADIOLOGY & ADDITIONAL TESTS:  Results Reviewed:   Imaging Personally Reviewed:  Electrocardiogram Personally Reviewed:    COORDINATION OF CARE:  Care Discussed with Consultants/Other Providers [Y/N]:  Prior or Outpatient Records Reviewed [Y/N]:

## 2025-04-20 NOTE — PROGRESS NOTE ADULT - ASSESSMENT
88 y/o F with PMH of Afib (off Eliquis), PPM, HTN, HLD, GERD, severe MR/TR, iatrogenic adrenal insufficiency (on prednisone), lymphedema, metastatic melanoma s/p left foot melanoma and lymph node resection, s/p RUE melanoma and right axillary lymph node excision at [facility] (3/18/25), recent admission (3/21-3/25/25) for L MCA CVA s/p M1 thrombectomy, presented to  with left sided weakness and aphasia, found to have right MCA CVA and s/p M2 thrombectomy (3/26/25). Hospital course complicated by right sided weakness and aphasia, found to have new Left MCA CVA with M2 occlusion and is s/p 3rd thrombectomy (3/29/25). Hospital course also complicated by pseudoaneurysm (s/p vasc surgery consult and thrombin injection x2), urinary retention (s/p rivas, failed TOV 4/2), fever of unknown origin (on IV cefepime until 4/10/25). Patient resumed Eliquis on 4/2/25. Patient now admitted for a multidisciplinary rehab program with right sided weakness, Transcortical motor aphasia, cognitive deficits, Dysphagia and gait and ADL impairments.    #Bi-Hemispheric Embolic Infarcts s/p Thrombectomy x 3  #Aphasia, Right hemiparesis, Dysphagia  - s/p cerebral angiogram for mechanical thrombectomy of L M1 occlusion, TICI 3 (one pass) on 3/21/25  - s/p cerebral angiogram for mechanical thrombectomy of Rt M2 occlusion, TICI 3 (one pass) on 3/26/25  - s/p thrombectomy of left M4 with TICI 0 3/29/25  - MRI brain 3/31 showed New acute infarctions within the LEFT lateral frontal cortex, LEFT occipital cortex, RIGHT insular cortex and scattered RIGHT frontal, parietal, temporal and occipital cortex suggesting embolic etiology. Subacute infarction within the LEFT basal ganglia. Mild periventricular chronic white matter ischemia.  - Etiology likely cardioembolic in the setting of atrial fibrillation and underlying hypercoagulability from malignancy  - Repeat Echo 4/4 showed EF >75 %, no evidence of LV thrombus, severely dilated LA/RA, mild-mod TR  - Continue Eliquis 5 mg BID  - Continue Atorvastatin 10 mg QHS (LDL 58 on 3/22)  - Precautions: Fall, Aspiration  - Outpatient follow up with neurology    # Chronic Atrial Fibrillation  # Status Post PPM   - Continue metoprolol - changed from 50mg TID to 50mg BID 4/18 - BPs improving  - Continue Eliquis 5mg BID    # Right femoral artery pseudoaneurysm   - s/p thrombin injection x2  - Outpatient follow up     # Iatrogenic Adrenal insufficiency  - Prednisone 5 mg in AM (8:00) and 2 mg in PM (16:00) - home regimen  - f/u outpatient endocrinology    # Metastatic Melanoma  - Metastatic melanoma s/p left foot melanoma and lymph node resection, s/p RUE melanoma and right axillary lymph node excision at [facility] (3/18/25)  - Outpatient follow up     # Incidental Pancreatic Cyst  - Incidentally found new 1.7 cm cystic lesion at the pancreatic body on CT a/p on 3/28/25  - GI informed, no inpatient workup needed  - Follow up outpatient with Dr. Maurisio Haas    #GI PPx  - Continue Protonix 40 mg QD    # Urinary Retention- improved  - Rivas removed 4/10  - pt voiding well  - continue with Bethanechol 25mg TID, started on 4/11   - Continue serial bladder scans and ISC as needed   - encourage toileting schedule    # DVT prophylaxis:   # History of Lymphedema   - LE duplex 3/26 negative for DVT  - on Eliquis 5 mg Q12H

## 2025-04-20 NOTE — PROGRESS NOTE ADULT - SUBJECTIVE AND OBJECTIVE BOX
HPI:  89 y/o F with PMHx of Afib (off Eliquis), PPM, HTN, HLD, GERD, severe MR/TR, iatrogenic adrenal insufficiency (on prednisone), lymphedema, metastatic melanoma s/p left foot melanoma and lymph node resection, s/p RUE melanoma and right axillary lymph node excision at [facility] (3/18/25). Patient with a recent admission 3/21 -3/25/25  when she initially presented to  with code stroke and was transferred to [facility] where she underwent cerebral angiogram for mechanical thrombectomy of L M1 occlusion, TICI 3 (one pass) on 3/21/25. Patient was discharged and presented back to [facility] 3/26 with L sided weakness and aphasia, transferred to [facility] for stroke codem and is now s/p mechanical thrombectomy of Rt M2 occlusion, TICI 3 (one pass) on 3/26/25. On the early morning of 3/29/25 pt aphasic with right sided weakness. CTH negative for acute hemorrhage. CTA + new Left MCA M2 occulusion and patient subsequently underwent 3rd thrombectomy on 3/29. MRI brain demonstrated acute bilateral multifocal cerebral infarctions and subacute left basal ganglia infarct.  Etiology likely cardioembolic in the setting of atrial fibrillation and underlying hypercoaguability from malignancy.    Hospital course complicated by right groin pseudoaneurysm s/p L MCA thrombectomy. She was seen by vascular surgery and given thrombin injection x2 and recommended for outpatient follow up as needed. Hematology consulted due to concern for hypercoagulable state, anemia, hx of melanoma. CBC trended, overall stable. Hypercoagulable serology panel sent, negative for abnormalities. Follow up outpatient for further monitoring/testing. Hospital course further complicated by urinary retention and rivas catheter placed. Failed TOV 4/1-4/2, rivas replaced 4/3/25, and patient started on flomax.     Endocrine consulted due to hx of adrenal insufficiency; initially started on hydrocortisone then transitioned to home regimen of prednisone 5mg in AM and 2mg in PM. Incidentally found new 1.7 cm cystic lesion at the pancreatic body on CT A/P on 3/28/25. GI informed, no inpatient workup needed. Follow up outpatient with Dr. Maurisio Haas. Patient started on cefepime and vancomycin on 4/4/25 due to fever of unknown origin. ID consulted, Vancomycin discontinued after blood cultures resulted negative. ID recommendations to continue with IV cefepime until 4/10/25.     Patient resumed Eliquis 5mg daily (4/2/25) for secondary stroke prevention. Also re-started on home regimen of atorvastatin 10mg daily; LDL - 59, nonatherosclerotic etiology of stroke, high intensity statin not indicated at this time. PT/OT evaluated patient, recommended dispo to acute rehab. Patient should follow up with neurology/neuro IR, cardiology, endocrinology, hematology, GI, primary care, +/- urology and vascular surgery after discharge. Patient discharged to [facility] on 4/8/25.   (08 Apr 2025 12:56)      Subjective/Objective: Patient seen and assessed at bedside. No acute events overnight. Patient feels well today. Denies HA, CP, abdominal pain.    Last Bowel Movement: 19-Apr-2025 (04-20-25 @ 12:19)      Vital Signs Last 24 Hrs  T(C): 36.6 (20 Apr 2025 08:12), Max: 36.6 (20 Apr 2025 08:12)  T(F): 97.8 (20 Apr 2025 08:12), Max: 97.8 (20 Apr 2025 08:12)  HR: 75 (20 Apr 2025 08:12) (74 - 94)  BP: 105/71 (20 Apr 2025 08:12) (105/71 - 125/68)  BP(mean): --  RR: 14 (20 Apr 2025 08:12) (14 - 15)  SpO2: 98% (20 Apr 2025 08:12) (95% - 98%)    Parameters below as of 20 Apr 2025 08:12  Patient On (Oxygen Delivery Method): room air        REVIEW OF SYMPTOMS  limited by aphasia    PHYSICAL EXAM   Gen - NAD, Comfortable  HEENT - NCAT, EOMI  Pulm - breathing comfortably on RA  Cardiovascular - warm, well perfused  Abdomen - Soft, NT/ND,   Extremities - No edema, No calf tenderness  Neuro-     Cognitive - AAOx3 when given yes/no choices, follows commands     Communication - non-fluent aphasia      Motor -                     LEFT    UE - ShAB 5/5, EF 5/5, EE 5/5, WE 5/5,  5/5                    RIGHT UE - ShAB 4/5, EF 4/5, EE 4/5, WE 5/5,  4/5                    LEFT    LE - HF 2/5, KE 5/5, DF 5/5, PF 5/5                    RIGHT LE - HF 2/5, KE 3/5, DF 5/5, PF 5/5     Psychiatric - Mood stable, Affect WNL      IMAGING:  [facility] 4/4 reviewed.    MEDICATIONS  (STANDING):  apixaban 5 milliGRAM(s) Oral every 12 hours  atorvastatin 10 milliGRAM(s) Oral at bedtime  bethanechol 25 milliGRAM(s) Oral three times a day  metoprolol tartrate 50 milliGRAM(s) Oral two times a day  multivitamin 1 Tablet(s) Oral daily  nystatin    Suspension 036410 Unit(s) Oral four times a day  pantoprazole    Tablet 40 milliGRAM(s) Oral before breakfast  polyethylene glycol 3350 17 Gram(s) Oral daily  predniSONE   Tablet 5 milliGRAM(s) Oral <User Schedule>  predniSONE   Tablet 2 milliGRAM(s) Oral <User Schedule>  senna 2 Tablet(s) Oral at bedtime    MEDICATIONS  (PRN):  acetaminophen     Tablet .. 650 milliGRAM(s) Oral every 6 hours PRN Mild Pain (1 - 3)

## 2025-04-20 NOTE — PROGRESS NOTE ADULT - ASSESSMENT
Assessment/Plan:  SHARON HULL is a 89 y/o F with PMHx of Afib (off Eliquis), PPM, HTN, HLD, GERD, severe MR/TR, iatrogenic adrenal insufficiency (on prednisone), lymphedema, metastatic melanoma s/p left foot melanoma and lymph node resection, s/p RUE melanoma and right axillary lymph node excision at [facility] (3/18/25), recent admission (3/21-3/25/25) for L MCA CVA s/p M1 thrombectomy, presented to  with left sided weakness and aphasia, found to have right MCA CVA and s/p M2 thrombectomy (3/26/25).  Hospital Course complicated by right sided weakness and aphasia, found to have new Left MCA CVA with M2 occlusion and is s/p 3rd thrombectomy (3/29/25). Hospital course also complicated by pseudoaneurysm (s/p vasc surgery consult and thrombin injection x2), urinary retention (s/p rivas, failed TOV 4/2), fever of unknown origin (on IV cefepime until 4/10/25). Patient resumed Eliquis on 4/2/25. Patient now admitted for a multidisciplinary rehab program with right sided weakness, Transcortical motor aphasia, cognitive deficits, Dysphagia and gait and ADL impairments.     #  Bihemispheric embolic infarcts s/p Thrombectomy x 3  - s/p cerebral angiogram for mechanical thrombectomy of L M1 occlusion, TICI 3 (one pass) on 3/21/25  - s/p cerebral angiogram for mechanical thrombectomy of Rt M2 occlusion, TICI 3 (one pass) on 3/26/25  - s/p thrombectomy of left M4 with TICI 0 3/29/25  - Etiology likely cardioembolic in the setting of atrial fibrillation and underlying hypercoagulability from malignancy  - Aphasia, Right hemiparesis, Dysphagia  - Start comprehensive rehab program of PT/OT/SLP - 3 hours a day, 5 days a week. P&O as needed   - Precautions: Fall, Aspiration  - Eliquis 5 mg BID  - Atorvastatin 10 mg QHS (LDL 58 on 3/22)    # Atrial Fibrillation  - has PPM present  - Eliquis 5 mg BID  - Lopressor 50 mg TID --> decreased to BID (4/18) due to BP being on softer side  - BP 96/67 - 122/79 (4/18    #Elevated BUN  - Bun 28, Cr 0.54 (4/14) -> 30/0.61 (4/17)  - Encourage oral hydration, patient is on mildly thickened liquid    # Right femoral artery pseudoaneurysm   - s/p thrombin injection x2    # Fever of unknown origin, ?aspiration PNA  - Blood cultures 3/26, 4/4  no growth   - RVP/COVID 19 PCR 4/4 negative   - UA 4/2 with some pyuria   - Urine Cx 3/26 no growth 4/3 contaminated   - CXR 4/4 reviewed and compared to 3/29, not much change noted  - CT Chest with contrast reporting no PE, + Atelectasis   - Completed Cefepime, afebrile    # Iatrogenic adrenal insuffiencey   - prednisone 5 mg in AM (8:00) and 2 mg in PM (16:00) - home regimen  - f/u outpatient endocrinology    # Metastatic Melanoma  - metastatic melanoma s/p left foot melanoma and lymph node resection, s/p RUE melanoma and right axillary lymph node excision at [facility] (3/18/25)  - outpatient follow up     # Incidental Pancreatic Cyst  - Incidentally found new 1.7 cm cystic lesion at the pancreatic body on CT a/p on 3/28/25  - GI informed, no inpatient workup needed  - Follow up outpatient with Dr. Maurisio Haas    # Pain  - Tylenol PRN    # Mood / Cognition  - Neuropsychology consult PRN  - recreational therapy    # Sleep  - Maintain quiet hours and a low stim environment.   - Melatonin PRN to maximize participation in therapy during the day    # GI / Bowel  - Senna qHS  - Miralax Daily  - GI ppx: Protonix 40 mg QD    #  / Bladder  # Urinary Retention  - Continue Rivas present on admission, plan for TOV  - Failed TOV on 4/1-4/2, Rivas replaced 4/3  - Rivas removed 4/10 night for voiding trial  - Flomax 0.4 mg QHS (dc'd 4/11)   - Bethanecol 25 mg TID (started 4/11)  - have not needed anymore SC, voiding, dc bladder scans    # Skin / Pressure injury  - Skin assessment on admission performed: IAD to sacrum, right posterior thigh ecchymosis, forehead scab s/p biopsy,  left lateral ankle scar with redness towards heel  - Pressure Injury/Skin: OOB to chair, PT/OT  - nursing to monitor skin qShift    # Dysphagia:  - Diet Consistency: regular with mildly thick liquids  - Supplement: MVI  - Aspiration Precautions  - SLP consult for swallow function evaluation and treatment  - Nutrition consult    # DVT prophylaxis:   - on Eliquis 5 mg Q12H      Outpatient Follow-up:  Maurisio Haas  Gastroenterology  [address], [address]  [place] [address]  Phone: [phone]  Fax: [phone]  Follow Up Time: 1 month    Celina Carlin  Hematology/Oncology  [address] [place] [address]  Phone: [phone]  Fax: [phone]  Follow Up Time: 1 month    Juan Diego Johnson  Vascular Neurology  [address] [place] [address]  Phone: [phone]  Fax: [phone]  Follow Up Time: 1 month    Antonio Gaines  Endocrinology/Metab/Diabetes  [address], [address]  [place] [address]  Phone: [phone]  Fax: [phone]  Follow Up Time: 1 month    Milo Murguia  Cardiovascular Disease  [address] [place] [address]  Phone: [phone]  Fax: [phone]  Follow Up Time: 2 weeks    Your Primary Care Provider,   Phone: (   )    -  Fax: (   )    -  Follow Up Time: 1 week    [place]  Urology  [address] [address]  Phone: [phone]  Fax:   Follow Up Time: 1 month    [facility]  Vascular Surgery  [address] [place] [address]  Phone: [phone]

## 2025-04-21 ENCOUNTER — TRANSCRIPTION ENCOUNTER (OUTPATIENT)
Age: 89
End: 2025-04-21

## 2025-04-21 DIAGNOSIS — D69.6 THROMBOCYTOPENIA, UNSPECIFIED: ICD-10-CM

## 2025-04-21 LAB
ALBUMIN SERPL ELPH-MCNC: 2.9 G/DL — LOW (ref 3.3–5)
ALP SERPL-CCNC: 55 U/L — SIGNIFICANT CHANGE UP (ref 40–120)
ALT FLD-CCNC: 34 U/L — SIGNIFICANT CHANGE UP (ref 10–45)
ANION GAP SERPL CALC-SCNC: 5 MMOL/L — SIGNIFICANT CHANGE UP (ref 5–17)
AST SERPL-CCNC: 16 U/L — SIGNIFICANT CHANGE UP (ref 10–40)
BILIRUB SERPL-MCNC: 0.9 MG/DL — SIGNIFICANT CHANGE UP (ref 0.2–1.2)
BUN SERPL-MCNC: 26 MG/DL — HIGH (ref 7–23)
CALCIUM SERPL-MCNC: 9.2 MG/DL — SIGNIFICANT CHANGE UP (ref 8.4–10.5)
CHLORIDE SERPL-SCNC: 107 MMOL/L — SIGNIFICANT CHANGE UP (ref 96–108)
CO2 SERPL-SCNC: 30 MMOL/L — SIGNIFICANT CHANGE UP (ref 22–31)
CREAT SERPL-MCNC: 0.52 MG/DL — SIGNIFICANT CHANGE UP (ref 0.5–1.3)
EGFR: 89 ML/MIN/1.73M2 — SIGNIFICANT CHANGE UP
EGFR: 89 ML/MIN/1.73M2 — SIGNIFICANT CHANGE UP
GLUCOSE SERPL-MCNC: 88 MG/DL — SIGNIFICANT CHANGE UP (ref 70–99)
HCT VFR BLD CALC: 37.7 % — SIGNIFICANT CHANGE UP (ref 34.5–45)
HGB BLD-MCNC: 12.9 G/DL — SIGNIFICANT CHANGE UP (ref 11.5–15.5)
MCHC RBC-ENTMCNC: 33 PG — SIGNIFICANT CHANGE UP (ref 27–34)
MCHC RBC-ENTMCNC: 34.2 G/DL — SIGNIFICANT CHANGE UP (ref 32–36)
MCV RBC AUTO: 96.4 FL — SIGNIFICANT CHANGE UP (ref 80–100)
NRBC BLD AUTO-RTO: 0 /100 WBCS — SIGNIFICANT CHANGE UP (ref 0–0)
PLATELET # BLD AUTO: 94 K/UL — LOW (ref 150–400)
POTASSIUM SERPL-MCNC: 4 MMOL/L — SIGNIFICANT CHANGE UP (ref 3.5–5.3)
POTASSIUM SERPL-SCNC: 4 MMOL/L — SIGNIFICANT CHANGE UP (ref 3.5–5.3)
PROT SERPL-MCNC: 5.9 G/DL — LOW (ref 6–8.3)
RBC # BLD: 3.91 M/UL — SIGNIFICANT CHANGE UP (ref 3.8–5.2)
RBC # FLD: 15 % — HIGH (ref 10.3–14.5)
SODIUM SERPL-SCNC: 142 MMOL/L — SIGNIFICANT CHANGE UP (ref 135–145)
WBC # BLD: 3.35 K/UL — LOW (ref 3.8–10.5)
WBC # FLD AUTO: 3.35 K/UL — LOW (ref 3.8–10.5)

## 2025-04-21 PROCEDURE — 99232 SBSQ HOSP IP/OBS MODERATE 35: CPT

## 2025-04-21 PROCEDURE — 99233 SBSQ HOSP IP/OBS HIGH 50: CPT | Mod: GC

## 2025-04-21 PROCEDURE — 99223 1ST HOSP IP/OBS HIGH 75: CPT

## 2025-04-21 RX ORDER — PREDNISONE 20 MG/1
1 TABLET ORAL
Qty: 30 | Refills: 0
Start: 2025-04-21 | End: 2025-05-20

## 2025-04-21 RX ORDER — POLYETHYLENE GLYCOL 3350 17 G/17G
17 POWDER, FOR SOLUTION ORAL
Qty: 0 | Refills: 0 | DISCHARGE
Start: 2025-04-21

## 2025-04-21 RX ORDER — BETHANECHOL CHLORIDE 50 MG
1 TABLET ORAL
Qty: 90 | Refills: 0
Start: 2025-04-21 | End: 2025-05-20

## 2025-04-21 RX ORDER — APIXABAN 2.5 MG/1
1 TABLET, FILM COATED ORAL
Qty: 60 | Refills: 0
Start: 2025-04-21 | End: 2025-05-20

## 2025-04-21 RX ORDER — PREDNISONE 20 MG/1
2 TABLET ORAL
Qty: 60 | Refills: 0
Start: 2025-04-21 | End: 2025-05-20

## 2025-04-21 RX ORDER — METOPROLOL SUCCINATE 50 MG/1
1 TABLET, EXTENDED RELEASE ORAL
Qty: 60 | Refills: 0
Start: 2025-04-21 | End: 2025-05-20

## 2025-04-21 RX ORDER — ACETAMINOPHEN 500 MG/5ML
2 LIQUID (ML) ORAL
Qty: 0 | Refills: 0 | DISCHARGE
Start: 2025-04-21

## 2025-04-21 RX ORDER — B1/B2/B3/B5/B6/B12/VIT C/FOLIC 500-0.5 MG
1 TABLET ORAL
Qty: 0 | Refills: 0 | DISCHARGE
Start: 2025-04-21

## 2025-04-21 RX ORDER — ATORVASTATIN CALCIUM 80 MG/1
1 TABLET, FILM COATED ORAL
Qty: 30 | Refills: 0
Start: 2025-04-21 | End: 2025-05-20

## 2025-04-21 RX ADMIN — Medication 500000 UNIT(S): at 23:07

## 2025-04-21 RX ADMIN — METOPROLOL SUCCINATE 50 MILLIGRAM(S): 50 TABLET, EXTENDED RELEASE ORAL at 17:57

## 2025-04-21 RX ADMIN — Medication 500000 UNIT(S): at 17:57

## 2025-04-21 RX ADMIN — Medication 500000 UNIT(S): at 11:09

## 2025-04-21 RX ADMIN — APIXABAN 5 MILLIGRAM(S): 2.5 TABLET, FILM COATED ORAL at 17:55

## 2025-04-21 RX ADMIN — METOPROLOL SUCCINATE 50 MILLIGRAM(S): 50 TABLET, EXTENDED RELEASE ORAL at 05:41

## 2025-04-21 RX ADMIN — PREDNISONE 2 MILLIGRAM(S): 20 TABLET ORAL at 16:29

## 2025-04-21 RX ADMIN — PREDNISONE 5 MILLIGRAM(S): 20 TABLET ORAL at 08:13

## 2025-04-21 RX ADMIN — Medication 25 MILLIGRAM(S): at 21:18

## 2025-04-21 RX ADMIN — Medication 20 MILLIGRAM(S): at 11:08

## 2025-04-21 RX ADMIN — Medication 500000 UNIT(S): at 05:41

## 2025-04-21 RX ADMIN — Medication 25 MILLIGRAM(S): at 05:41

## 2025-04-21 RX ADMIN — APIXABAN 5 MILLIGRAM(S): 2.5 TABLET, FILM COATED ORAL at 05:41

## 2025-04-21 RX ADMIN — Medication 40 MILLIGRAM(S): at 05:41

## 2025-04-21 RX ADMIN — ATORVASTATIN CALCIUM 10 MILLIGRAM(S): 80 TABLET, FILM COATED ORAL at 21:18

## 2025-04-21 RX ADMIN — Medication 1 TABLET(S): at 11:08

## 2025-04-21 RX ADMIN — Medication 2 TABLET(S): at 21:18

## 2025-04-21 RX ADMIN — Medication 25 MILLIGRAM(S): at 16:28

## 2025-04-21 NOTE — PROGRESS NOTE ADULT - ASSESSMENT
Assessment/Plan:  SHARON HULL is a 89 y/o F with PMHx of Afib (off Eliquis), PPM, HTN, HLD, GERD, severe MR/TR, iatrogenic adrenal insufficiency (on prednisone), lymphedema, metastatic melanoma s/p left foot melanoma and lymph node resection, s/p RUE melanoma and right axillary lymph node excision at [facility] (3/18/25), recent admission (3/21-3/25/25) for L MCA CVA s/p M1 thrombectomy, presented to  with left sided weakness and aphasia, found to have right MCA CVA and s/p M2 thrombectomy (3/26/25).  Hospital Course complicated by right sided weakness and aphasia, found to have new Left MCA CVA with M2 occlusion and is s/p 3rd thrombectomy (3/29/25). Hospital course also complicated by pseudoaneurysm (s/p vasc surgery consult and thrombin injection x2), urinary retention (s/p rivas, failed TOV 4/2), fever of unknown origin (on IV cefepime until 4/10/25). Patient resumed Eliquis on 4/2/25. Patient now admitted for a multidisciplinary rehab program with right sided weakness, Transcortical motor aphasia, cognitive deficits, Dysphagia and gait and ADL impairments.     #  Bihemispheric embolic infarcts s/p Thrombectomy x 3  - s/p cerebral angiogram for mechanical thrombectomy of L M1 occlusion, TICI 3 (one pass) on 3/21/25  - s/p cerebral angiogram for mechanical thrombectomy of Rt M2 occlusion, TICI 3 (one pass) on 3/26/25  - s/p thrombectomy of left M4 with TICI 0 3/29/25  - Etiology likely cardioembolic in the setting of atrial fibrillation and underlying hypercoagulability from malignancy  - Aphasia, Right hemiparesis, Dysphagia  - Start comprehensive rehab program of PT/OT/SLP - 3 hours a day, 5 days a week. P&O as needed   - Precautions: Fall, Aspiration  - Eliquis 5 mg BID  - Atorvastatin 10 mg QHS (LDL 58 on 3/22)    # Atrial Fibrillation  - has PPM present  - Eliquis 5 mg BID  - Lopressor 50 mg TID --> decreased to BID (4/18) due to BP being on softer side  - /62 - 128/79 (4/21)    #Elevated BUN  - Bun 28, Cr 0.54 (4/14) -> 30/0.61 (4/17) -> 26/0.52 (4/21)  - Encourage oral hydration, patient is on mildly thickened liquid    #Thrombocytopenia  - Platelets downtrending, Plt 94 (4/21)  - Hospitalist aware, consulting heme  - Protonix discontinued    # Right femoral artery pseudoaneurysm   - s/p thrombin injection x2    # Fever of unknown origin, ?aspiration PNA  - Blood cultures 3/26, 4/4  no growth   - RVP/COVID 19 PCR 4/4 negative   - UA 4/2 with some pyuria   - Urine Cx 3/26 no growth 4/3 contaminated   - CXR 4/4 reviewed and compared to 3/29, not much change noted  - CT Chest with contrast reporting no PE, + Atelectasis   - Completed Cefepime, afebrile    # Iatrogenic adrenal insuffiencey   - prednisone 5 mg in AM (8:00) and 2 mg in PM (16:00) - home regimen  - f/u outpatient endocrinology    # Metastatic Melanoma  - metastatic melanoma s/p left foot melanoma and lymph node resection, s/p RUE melanoma and right axillary lymph node excision at [facility] (3/18/25)  - outpatient follow up     # Incidental Pancreatic Cyst  - Incidentally found new 1.7 cm cystic lesion at the pancreatic body on CT a/p on 3/28/25  - GI informed, no inpatient workup needed  - Follow up outpatient with Dr. Maurisio Haas    # Pain  - Tylenol PRN    # Mood / Cognition  - Neuropsychology consult PRN  - recreational therapy    # Sleep  - Maintain quiet hours and a low stim environment.   - Melatonin PRN to maximize participation in therapy during the day    # GI / Bowel  - Senna qHS  - Miralax Daily  - GI ppx: Protonix 40 mg QD    #  / Bladder  # Urinary Retention  - Continue Rivas present on admission, plan for TOV  - Failed TOV on 4/1-4/2, Rivas replaced 4/3  - Rivas removed 4/10 night for voiding trial  - Flomax 0.4 mg QHS (dc'd 4/11)   - Bethanecol 25 mg TID (started 4/11)  - has not needed anymore SC, voiding, bladder scans discontinued    # Skin / Pressure injury  - Skin assessment on admission performed: IAD to sacrum, right posterior thigh ecchymosis, forehead scab s/p biopsy,  left lateral ankle scar with redness towards heel  - Pressure Injury/Skin: OOB to chair, PT/OT  - nursing to monitor skin qShift    # Dysphagia:  - Diet Consistency: regular with mildly thick liquids  - Supplement: MVI  - Aspiration Precautions  - SLP consult for swallow function evaluation and treatment  - Nutrition consult    # DVT prophylaxis:   - on Eliquis 5 mg Q12H      Outpatient Follow-up:  Maurisio Haas  Gastroenterology  [address], [address]  [place] [address]  Phone: [phone]  Fax: [phone]  Follow Up Time: 1 month    Celina Carlin  Hematology/Oncology  [address] [place] [address]  Phone: [phone]  Fax: [phone]  Follow Up Time: 1 month    Juan Diego Johnson  Vascular Neurology  [address] [place] [address]  Phone: [phone]  Fax: [phone]  Follow Up Time: 1 month    Antonio Gaines  Endocrinology/Metab/Diabetes  [address], [address]  [place] [address]  Phone: [phone]  Fax: [phone]  Follow Up Time: 1 month    Milo Murguia  Cardiovascular Disease  [address] [place] [address]  Phone: [phone]  Fax: [phone]  Follow Up Time: 2 weeks    Your Primary Care Provider,   Phone: (   )    -  Fax: (   )    -  Follow Up Time: 1 week    [place]  Urology  [address] [place] [address]  Phone: [phone]  Fax:   Follow Up Time: 1 month    [facility] Vascular Surgery  Vascular Surgery  [address] [place] [address]  Phone: [phone]               Assessment/Plan:  SHARON HULL is a 89 y/o F with PMHx of Afib (off Eliquis), PPM, HTN, HLD, GERD, severe MR/TR, iatrogenic adrenal insufficiency (on prednisone), lymphedema, metastatic melanoma s/p left foot melanoma and lymph node resection, s/p RUE melanoma and right axillary lymph node excision at [facility] (3/18/25), recent admission (3/21-3/25/25) for L MCA CVA s/p M1 thrombectomy, presented to  with left sided weakness and aphasia, found to have right MCA CVA and s/p M2 thrombectomy (3/26/25).  Hospital Course complicated by right sided weakness and aphasia, found to have new Left MCA CVA with M2 occlusion and is s/p 3rd thrombectomy (3/29/25). Hospital course also complicated by pseudoaneurysm (s/p vasc surgery consult and thrombin injection x2), urinary retention (s/p rivas, failed TOV 4/2), fever of unknown origin (on IV cefepime until 4/10/25). Patient resumed Eliquis on 4/2/25. Patient now admitted for a multidisciplinary rehab program with right sided weakness, Transcortical motor aphasia, cognitive deficits, Dysphagia and gait and ADL impairments.     #  Bihemispheric embolic infarcts s/p Thrombectomy x 3  - s/p cerebral angiogram for mechanical thrombectomy of L M1 occlusion, TICI 3 (one pass) on 3/21/25  - s/p cerebral angiogram for mechanical thrombectomy of Rt M2 occlusion, TICI 3 (one pass) on 3/26/25  - s/p thrombectomy of left M4 with TICI 0 3/29/25  - Etiology likely cardioembolic in the setting of atrial fibrillation and underlying hypercoagulability from malignancy  - Aphasia, Right hemiparesis, Dysphagia  - Start comprehensive rehab program of PT/OT/SLP - 3 hours a day, 5 days a week. P&O as needed   - Precautions: Fall, Aspiration  - Eliquis 5 mg BID  - Atorvastatin 10 mg QHS (LDL 58 on 3/22)    # Atrial Fibrillation  - has PPM present  - Eliquis 5 mg BID  - Lopressor 50 mg TID --> decreased to BID (4/18) due to BP being on softer side  - /62 - 128/79 (4/21)    #Elevated BUN  - Bun 28, Cr 0.54 (4/14) -> 30/0.61 (4/17) -> 26/0.52 (4/21)  - Encourage oral hydration, patient is on mildly thickened liquid    #Thrombocytopenia  - Platelets downtrending, Plt 94 (4/21)  - Hospitalist aware, consulting heme  - Protonix discontinued    # Right femoral artery pseudoaneurysm   - s/p thrombin injection x2    # Fever of unknown origin, ?aspiration PNA  - Blood cultures 3/26, 4/4  no growth   - RVP/COVID 19 PCR 4/4 negative   - UA 4/2 with some pyuria   - Urine Cx 3/26 no growth 4/3 contaminated   - CXR 4/4 reviewed and compared to 3/29, not much change noted  - CT Chest with contrast reporting no PE, + Atelectasis   - Completed Cefepime, afebrile    # Iatrogenic adrenal insuffiencey   - prednisone 5 mg in AM (8:00) and 2 mg in PM (16:00) - home regimen  - f/u outpatient endocrinology    # Metastatic Melanoma  - metastatic melanoma s/p left foot melanoma and lymph node resection, s/p RUE melanoma and right axillary lymph node excision at [facility] (3/18/25)  - outpatient follow up     # Incidental Pancreatic Cyst  - Incidentally found new 1.7 cm cystic lesion at the pancreatic body on CT a/p on 3/28/25  - GI informed, no inpatient workup needed  - Follow up outpatient with Dr. Maurisio Haas    # Pain  - Tylenol PRN    # Mood / Cognition  - Neuropsychology consult PRN  - recreational therapy    # Sleep  - Maintain quiet hours and a low stim environment.   - Melatonin PRN to maximize participation in therapy during the day    # GI / Bowel  - Senna qHS  - Miralax Daily  - GI ppx: Protonix 40 mg QD    #  / Bladder  # Urinary Retention  - Continue Rivas present on admission, plan for TOV  - Failed TOV on 4/1-4/2, Rivas replaced 4/3  - Rivas removed 4/10 night for voiding trial  - Flomax 0.4 mg QHS (dc'd 4/11)   - Bethanecol 25 mg TID (started 4/11)  - has not needed anymore SC, voiding, bladder scans discontinued    # Skin / Pressure injury  - Skin assessment on admission performed: IAD to sacrum, right posterior thigh ecchymosis, forehead scab s/p biopsy,  left lateral ankle scar with redness towards heel  - Pressure Injury/Skin: OOB to chair, PT/OT  - nursing to monitor skin qShift    # Dysphagia:  - Diet Consistency: regular with mildly thick liquids  - Supplement: MVI  - Aspiration Precautions  - SLP consult for swallow function evaluation and treatment  - Nutrition consult    # DVT prophylaxis:   - on Eliquis 5 mg Q12H      Outpatient Follow-up:  Maurisio Haas  Gastroenterology  [address], [address]  [place] [address]  Phone: [phone]  Fax: [phone]  Follow Up Time: 1 month    Celina Carlin  Hematology/Oncology  [address] [place] [address]  Phone: [phone]  Fax: [phone]  Follow Up Time: 1 month    Juan Diego Johnson  Vascular Neurology  [address] [place] [address]  Phone: [phone]  Fax: [phone]  Follow Up Time: 1 month    Antonio Gaines  Endocrinology/Metab/Diabetes  [address]  [place] [address]  Phone: [phone]  Fax: [phone]  Follow Up Time: 1 month    Milo Murguia  Cardiovascular Disease  [address] [place] [address]  Phone: [phone]  Fax: [phone]  Follow Up Time: 2 weeks    Your Primary Care Provider,   Phone: (   )    -  Fax: (   )    -  Follow Up Time: 1 week    [place]  Urology  [address] [place] [address]  Phone: [phone]  Fax:   Follow Up Time: 1 month    [facility] Vascular Surgery  Vascular Surgery  [address] [place] [address]  Phone: [phone]

## 2025-04-21 NOTE — DISCHARGE NOTE PROVIDER - HOSPITAL COURSE
HPI:  89 y/o F with PMHx of Afib (off Eliquis), PPM, HTN, HLD, GERD, severe MR/TR, iatrogenic adrenal insufficiency (on prednisone), lymphedema, metastatic melanoma s/p left foot melanoma and lymph node resection, s/p RUE melanoma and right axillary lymph node excision at [facility] (3/18/25). Patient with a recent admission 3/21 -3/25/25  when she initially presented to  with code stroke and was transferred to [facility] where she underwent cerebral angiogram for mechanical thrombectomy of L M1 occlusion, TICI 3 (one pass) on 3/21/25. Patient was discharged and presented back to [facility] 3/26 with L sided weakness and aphasia, transferred to [facility] for stroke codem and is now s/p mechanical thrombectomy of Rt M2 occlusion, TICI 3 (one pass) on 3/26/25. On the early morning of 3/29/25 pt aphasic with right sided weakness. CTH negative for acute hemorrhage. CTA + new Left MCA M2 occulusion and patient subsequently underwent 3rd thrombectomy on 3/29. MRI brain demonstrated acute bilateral multifocal cerebral infarctions and subacute left basal ganglia infarct.  Etiology likely cardioembolic in the setting of atrial fibrillation and underlying hypercoaguability from malignancy.    Hospital course complicated by right groin pseudoaneurysm s/p L MCA thrombectomy. She was seen by vascular surgery and given thrombin injection x2 and recommended for outpatient follow up as needed. Hematology consulted due to concern for hypercoagulable state, anemia, hx of melanoma. CBC trended, overall stable. Hypercoagulable serology panel sent, negative for abnormalities. Follow up outpatient for further monitoring/testing. Hospital course further complicated by urinary retention and rivas catheter placed. Failed TOV 4/1-4/2, rivas replaced 4/3/25, and patient started on flomax.     Endocrine consulted due to hx of adrenal insufficiency; initially started on hydrocortisone then transitioned to home regimen of prednisone 5mg in AM and 2mg in PM. Incidentally found new 1.7 cm cystic lesion at the pancreatic body on CT A/P on 3/28/25. GI informed, no inpatient workup needed. Follow up outpatient with Dr. Maurisio Haas. Patient started on cefepime and vancomycin on 4/4/25 due to fever of unknown origin. ID consulted, Vancomycin discontinued after blood cultures resulted negative. ID recommendations to continue with IV cefepime until 4/10/25.     Patient resumed Eliquis 5mg daily (4/2/25) for secondary stroke prevention. Also re-started on home regimen of atorvastatin 10mg daily; LDL - 59, nonatherosclerotic etiology of stroke, high intensity statin not indicated at this time. PT/OT evaluated patient, recommended dispo to acute rehab. Patient should follow up with neurology/neuro IR, cardiology, endocrinology, hematology, GI, primary care, +/- urology and vascular surgery after discharge. Patient discharged to [facility] on 4/8/25.   (08 Apr 2025 12:56)    REHAB COURSE:  Patient participated in daily therapies and made good functional gains.  Rehab course notable for urinary retention. Rivas was removed on 4/10 and patient started on bethanechol 25 mg TUD. Urinary retention resolved and patient now voiding independently. Patient also noted to have soft BP and Lopressor was decreased to BID. She had elevated BUN which improved with increased oral hydration. Patient's platelets downtrended. Protonix discontinued and heme consulted.    Patient tolerated course of inpatient PT/OT/SLP rehab with significant functional improvements. Patient seen and examined on day of discharge.  Medications, medication side effects, and discharge instructions were reviewed with the patient, who expressed understanding of all information.  Patient was medically and functionally optimized and cleared for discharge.     HPI:  88 y/o F with PMHx of Afib (off Eliquis), PPM, HTN, HLD, GERD, severe MR/TR, iatrogenic adrenal insufficiency (on prednisone), lymphedema, metastatic melanoma s/p left foot melanoma and lymph node resection, s/p RUE melanoma and right axillary lymph node excision at [facility] (3/18/25). Patient with a recent admission 3/21 -3/25/25  when she initially presented to  with code stroke and was transferred to [facility] where she underwent cerebral angiogram for mechanical thrombectomy of L M1 occlusion, TICI 3 (one pass) on 3/21/25. Patient was discharged and presented back to [facility] 3/26 with L sided weakness and aphasia, transferred to [facility] for stroke codem and is now s/p mechanical thrombectomy of Rt M2 occlusion, TICI 3 (one pass) on 3/26/25. On the early morning of 3/29/25 pt aphasic with right sided weakness. CTH negative for acute hemorrhage. CTA + new Left MCA M2 occulusion and patient subsequently underwent 3rd thrombectomy on 3/29. MRI brain demonstrated acute bilateral multifocal cerebral infarctions and subacute left basal ganglia infarct.  Etiology likely cardioembolic in the setting of atrial fibrillation and underlying hypercoaguability from malignancy.    Hospital course complicated by right groin pseudoaneurysm s/p L MCA thrombectomy. She was seen by vascular surgery and given thrombin injection x2 and recommended for outpatient follow up as needed. Hematology consulted due to concern for hypercoagulable state, anemia, hx of melanoma. CBC trended, overall stable. Hypercoagulable serology panel sent, negative for abnormalities. Follow up outpatient for further monitoring/testing. Hospital course further complicated by urinary retention and rivas catheter placed. Failed TOV 4/1-4/2, rivas replaced 4/3/25, and patient started on flomax.     Endocrine consulted due to hx of adrenal insufficiency; initially started on hydrocortisone then transitioned to home regimen of prednisone 5mg in AM and 2mg in PM. Incidentally found new 1.7 cm cystic lesion at the pancreatic body on CT A/P on 3/28/25. GI informed, no inpatient workup needed. Follow up outpatient with Dr. Maurisio Haas. Patient started on cefepime and vancomycin on 4/4/25 due to fever of unknown origin. ID consulted, Vancomycin discontinued after blood cultures resulted negative. ID recommendations to continue with IV cefepime until 4/10/25.     Patient resumed Eliquis 5mg daily (4/2/25) for secondary stroke prevention. Also re-started on home regimen of atorvastatin 10mg daily; LDL - 59, nonatherosclerotic etiology of stroke, high intensity statin not indicated at this time. PT/OT evaluated patient, recommended dispo to acute rehab. Patient should follow up with neurology/neuro IR, cardiology, endocrinology, hematology, GI, primary care, +/- urology and vascular surgery after discharge. Patient discharged to [facility] on 4/8/25.   (08 Apr 2025 12:56)    REHAB COURSE:  Patient participated in daily therapies and made good functional gains.  Rehab course notable for urinary retention. Rivas was removed on 4/10 and patient started on bethanechol 25 mg TUD. Urinary retention resolved and patient now voiding independently. Patient also noted to have soft BP and Lopressor was decreased to BID. She had elevated BUN which improved with increased oral hydration. Patient's platelets downtrended. Protonix discontinued and heme consulted.    Patient tolerated course of inpatient PT/OT/SLP rehab with significant functional improvements. Patient seen and examined on day of discharge.  Medications, medication side effects, and discharge instructions were reviewed with the patient, who expressed understanding of all information.  Patient was medically and functionally optimized and cleared for discharge.      Functional Status on Discharge (IDT 4/17)  SLP: reg solids, mild thick liquids,initiating FWP, able to repeat words and phrases, word find difficulty, able to respond to simple yes/no, breakdown noted with complex yes/no and multistep  OT: CG-Sofia for ADLs and functional transfers, modA footwear   PT: BM/transfers CS-CG, ambulation 150 ft CS and RW, 8 steps with 1HR CG-Sofia HPI:  88 y/o F with PMHx of Afib (off Eliquis), PPM, HTN, HLD, GERD, severe MR/TR, iatrogenic adrenal insufficiency (on prednisone), lymphedema, metastatic melanoma s/p left foot melanoma and lymph node resection, s/p RUE melanoma and right axillary lymph node excision at [facility] (3/18/25). Patient with a recent admission 3/21 -3/25/25  when she initially presented to  with code stroke and was transferred to [facility] where she underwent cerebral angiogram for mechanical thrombectomy of L M1 occlusion, TICI 3 (one pass) on 3/21/25. Patient was discharged and presented back to [facility] 3/26 with L sided weakness and aphasia, transferred to [facility] for stroke codem and is now s/p mechanical thrombectomy of Rt M2 occlusion, TICI 3 (one pass) on 3/26/25. On the early morning of 3/29/25 pt aphasic with right sided weakness. CTH negative for acute hemorrhage. CTA + new Left MCA M2 occulusion and patient subsequently underwent 3rd thrombectomy on 3/29. MRI brain demonstrated acute bilateral multifocal cerebral infarctions and subacute left basal ganglia infarct.  Etiology likely cardioembolic in the setting of atrial fibrillation and underlying hypercoaguability from malignancy.    Hospital course complicated by right groin pseudoaneurysm s/p L MCA thrombectomy. She was seen by vascular surgery and given thrombin injection x2 and recommended for outpatient follow up as needed. Hematology consulted due to concern for hypercoagulable state, anemia, hx of melanoma. CBC trended, overall stable. Hypercoagulable serology panel sent, negative for abnormalities. Follow up outpatient for further monitoring/testing. Hospital course further complicated by urinary retention and rivas catheter placed. Failed TOV 4/1-4/2, rivas replaced 4/3/25, and patient started on flomax.     Endocrine consulted due to hx of adrenal insufficiency; initially started on hydrocortisone then transitioned to home regimen of prednisone 5mg in AM and 2mg in PM. Incidentally found new 1.7 cm cystic lesion at the pancreatic body on CT A/P on 3/28/25. GI informed, no inpatient workup needed. Follow up outpatient with Dr. Maurisio Haas. Patient started on cefepime and vancomycin on 4/4/25 due to fever of unknown origin. ID consulted, Vancomycin discontinued after blood cultures resulted negative. ID recommendations to continue with IV cefepime until 4/10/25.     Patient resumed Eliquis 5mg daily (4/2/25) for secondary stroke prevention. Also re-started on home regimen of atorvastatin 10mg daily; LDL - 59, nonatherosclerotic etiology of stroke, high intensity statin not indicated at this time. PT/OT evaluated patient, recommended dispo to acute rehab. Patient should follow up with neurology/neuro IR, cardiology, endocrinology, hematology, GI, primary care, +/- urology and vascular surgery after discharge. Patient discharged to [facility] on 4/8/25.   (08 Apr 2025 12:56)    REHAB COURSE:  Patient participated in daily therapies and made good functional gains.  Rehab course notable for urinary retention. Rivas was removed on 4/10 and patient started on bethanechol 25 mg TUD. Urinary retention resolved and patient now voiding independently. Patient also noted to have soft BP and Lopressor was decreased to BID. She had elevated BUN which improved with increased oral hydration. Patient's platelets downtrended. Protonix discontinued and heme consulted.    Patient tolerated course of inpatient PT/OT/SLP rehab with significant functional improvements. Patient seen and examined on day of discharge.  Medications, medication side effects, and discharge instructions were reviewed with the patient, who expressed understanding of all information.  Patient was medically and functionally optimized and cleared for discharge.      Functional Status on Discharge --  SLP: reg solids, mild thick liquids,initiating FWP, able to repeat words and phrases, word find difficulty, able to respond to simple yes/no, breakdown noted with complex yes/no and multistep; [facility] [place]-- Recommended to continue Mildly thick liquid  OT: supervision for all self-care/adl's, commode transfer, and contact guard/steadying assist for shower transfer.   PT:Pt ambulated 175 feet with RW and supervision. Intermittent cues for increased right LE step height and length, cues for wayfinding. Performed x4 trials (seated rest breaks between)   - Negotiated 12 steps with left ascending handrail and SAC with cs/cg assist. Cues for sequencing with impaired carryover. Utilized left ascending rail to simulate home setup.

## 2025-04-21 NOTE — CONSULT NOTE ADULT - PROBLEM SELECTOR RECOMMENDATION 2
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  Patient has been off systemic therapy, and expectant oncologic surveillance, until evidence of POD earlier this year.  On the background of atrial fibrillation and recurrent malignancy, patient has developed mesenteric embolic infarcts, s/p thrombectomy x 3 in the L M1, R M2 and L M4.  Will need indefinite anticoagulation.  Will continue neurologic monitoring and ambulatory rehab.  Advised to follow-up at [facility] postdischarge to discuss further systemic therapy.

## 2025-04-21 NOTE — DISCHARGE NOTE PROVIDER - NSFOLLOWUPCLINICS_GEN_ALL_ED_FT
[facility]  Urology  [address] [place] [address]  Phone: [phone]  Fax:     [facility]  Vascular Surgery  [address] [place] [address]  Phone: [phone]  Fax:

## 2025-04-21 NOTE — DISCHARGE NOTE PROVIDER - NSDCFUSCHEDAPPT_GEN_ALL_CORE_FT
Celina Kimbrough  [facility]  Jeremiah Pascal  Scheduled Appointment: 07/10/2025     Sona Mcclure  [facility]  NEUROLOGY [address]  Scheduled Appointment: 05/28/2025    Juan Diego Johnson  [facility]  NEUROLOGY [address]  Scheduled Appointment: 06/09/2025    Celina Kimbrough  [facility]  Jeremiah Pascal  Scheduled Appointment: 07/10/2025

## 2025-04-21 NOTE — CONSULT NOTE ADULT - SUBJECTIVE AND OBJECTIVE BOX
SHARON HULL,  89y Female  MRN: [id number]  ATTENDING: Dr. Nara Canseco      HPI:  89 y/o F with PMHx of Afib (off Eliquis), PPM, HTN, HLD, GERD, severe MR/TR, iatrogenic adrenal insufficiency (on prednisone), lymphedema, metastatic melanoma s/p left foot melanoma and lymph node resection, s/p RUE melanoma and right axillary lymph node excision at [facility] (3/18/25). Patient with a recent admission 3/21 -3/25/25  when she initially presented to  with code stroke and was transferred to [facility] where she underwent cerebral angiogram for mechanical thrombectomy of L M1 occlusion, TICI 3 (one pass) on 3/21/25. Patient was discharged and presented back to [facility] 3/26 with L sided weakness and aphasia, transferred to [facility] for stroke codem and is now s/p mechanical thrombectomy of Rt M2 occlusion, TICI 3 (one pass) on 3/26/25. On the early morning of 3/29/25 pt aphasic with right sided weakness. CTH negative for acute hemorrhage. CTA + new Left MCA M2 occulusion and patient subsequently underwent 3rd thrombectomy on 3/29. MRI brain demonstrated acute bilateral multifocal cerebral infarctions and subacute left basal ganglia infarct.  Etiology likely cardioembolic in the setting of atrial fibrillation and underlying hypercoaguability from malignancy.    Hospital course complicated by right groin pseudoaneurysm s/p L MCA thrombectomy. She was seen by vascular surgery and given thrombin injection x2 and recommended for outpatient follow up as needed. Hematology consulted due to concern for hypercoagulable state, anemia, hx of melanoma. CBC trended, overall stable. Hypercoagulable serology panel sent, negative for abnormalities. Follow up outpatient for further monitoring/testing. Hospital course further complicated by urinary retention and rivas catheter placed. Failed TOV 4/1-4/2, rivas replaced 4/3/25, and patient started on flomax.     Endocrine consulted due to hx of adrenal insufficiency; initially started on hydrocortisone then transitioned to home regimen of prednisone 5mg in AM and 2mg in PM. Incidentally found new 1.7 cm cystic lesion at the pancreatic body on CT A/P on 3/28/25. GI informed, no inpatient workup needed. Follow up outpatient with Dr. Maurisio Haas. Patient started on cefepime and vancomycin on 4/4/25 due to fever of unknown origin. ID consulted, Vancomycin discontinued after blood cultures resulted negative. ID recommendations to continue with IV cefepime until 4/10/25.     Patient resumed Eliquis 5mg daily (4/2/25) for secondary stroke prevention. Also re-started on home regimen of atorvastatin 10mg daily; LDL - 59, nonatherosclerotic etiology of stroke, high intensity statin not indicated at this time. PT/OT evaluated patient, recommended dispo to acute rehab. Patient should follow up with neurology/neuro IR, cardiology, endocrinology, hematology, GI, primary care, +/- urology and vascular surgery after discharge. Patient discharged to [facility] on 4/8/25.   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, including shoulder      Lower back pain      S/P tonsillectomy and adenoidectomy      H/O cataract extraction  both eyes      History of melanoma excision  left foot      History of left inguinal hernia repair  mesh      H/O cardiac catheterization  3/15, no  blockages          MEDICATION:  apixaban 5 milliGRAM(s) Oral every 12 hours  atorvastatin 10 milliGRAM(s) Oral at bedtime  bethanechol 25 milliGRAM(s) Oral three times a day  famotidine    Tablet 20 milliGRAM(s) Oral daily  metoprolol tartrate 50 milliGRAM(s) Oral two times a day  multivitamin 1 Tablet(s) Oral daily  nystatin    Suspension 879929 Unit(s) Oral four times a day  polyethylene glycol 3350 17 Gram(s) Oral daily  predniSONE   Tablet 5 milliGRAM(s) Oral <User Schedule>  predniSONE   Tablet 2 milliGRAM(s) Oral <User Schedule>  senna 2 Tablet(s) Oral at bedtime      ALLERGIES:  penicillins (Hives)      FAMILY HISTORY:  Reviewed, non-contributory: [ ]   Maternal-  Paternal-    SOCIAL HISTORY:  Tobacco: YES [ ]  ; NO [ ]; Former smoker [ ]  Alcohol:   YES [ ]  ; NO [ ]; Social alcohol user [ ]  Occupation/ marital status/ children:    REVIEW SYSTEMS:  Constitutional: no fever, chills, night sweats, no weight loss  HEENT: denies visual changes; no oral ulcers, dysphagia, no epistaxis;   Respiratory: no dyspnea , wheezing, cough, hemoptysis  Cardiovascular: denies acute chest pain, palpitations  GI: no loss of appetite, dark stools, or abdominal tenderness / pain; no change in bowel habits.  Musculoskeletal: no new back pain, bone/ joint pain ,no extremity swelling  Integumentary: denies pruritus; no skin rash, bruises, no  suspicious skin lesions  Neurologic: denies peripheral numbness, no dizziness, no gait problems.  Heme: no reported easy bruisability; no lymph node enlargement    VITALS:  T(C): 36.7, Max: 36.7 (04-21-25 @ 05:38)  T(F): 98, Max: 98 (04-21-25 @ 05:38)  HR: 93 (86 - 94)  BP: 122/67 (122/67 - 128/79)  SpO2: 98% (97% - 98%)    PHYSICAL EXAM:  Constitutional: alert, well developed  HEENT: normocephalic, anicteric sclerae, no mucositis or thrush  Respiratory: bilateral clear to auscultation anteriorly  Cardiovascular : S1, S2 regular, rhythmic, no murmurs, gallops or rubs  Abdomen: soft, distended, + normoactive BS, no palpable HS- megaly  Extremities: no tenderness;  -c/c/e, pulses equal bilaterally  Integumentary: no rashes, scars, or lesions suggestive of malignancy  Neurologic: no gross focal deficits    LABS:  (04-21) WBC: 3.35 K/uL,Hemoglobin: 12.9 g/dL, Hematocrit: 37.7 %,    Platelet: 94 K/uL    (04-21) Na: 142 mmol/L ; K: 4.0 mmol/L ; BUN: 26 mg/dL ; Cr: 0.52 mg/dL.          RADIOLOGY:               SHARON HULL,  89y Female  MRN: [id number]  ATTENDING: Dr. Nara Canseco      HPI:  88 y/o F with PMHx of Afib (off Eliquis), PPM, HTN, HLD, GERD, severe MR/TR, iatrogenic adrenal insufficiency (on prednisone), lymphedema, metastatic melanoma s/p left foot melanoma and lymph node resection, s/p RUE melanoma and right axillary lymph node excision at [facility] (3/18/25). Patient with a recent admission 3/21 -3/25/25  when she initially presented to  with code stroke and was transferred to [facility] where she underwent cerebral angiogram for mechanical thrombectomy of L M1 occlusion, TICI 3 (one pass) on 3/21/25. Patient was discharged and presented back to [facility] 3/26 with L sided weakness and aphasia, transferred to [facility] for stroke codem and is now s/p mechanical thrombectomy of Rt M2 occlusion, TICI 3 (one pass) on 3/26/25. On the early morning of 3/29/25 pt aphasic with right sided weakness. CTH negative for acute hemorrhage. CTA + new Left MCA M2 occulusion and patient subsequently underwent 3rd thrombectomy on 3/29. MRI brain demonstrated acute bilateral multifocal cerebral infarctions and subacute left basal ganglia infarct.  Etiology likely cardioembolic in the setting of atrial fibrillation and underlying hypercoaguability from malignancy.  Hospital course complicated by right groin pseudoaneurysm s/p L MCA thrombectomy. She was seen by vascular surgery and given thrombin injection x2 and recommended for outpatient follow up as needed. Hematology consulted due to concern for hypercoagulable state, anemia, hx of melanoma. CBC trended, overall stable. Hypercoagulable serology panel sent, negative for abnormalities. Follow up outpatient for further monitoring/testing. Hospital course further complicated by urinary retention and rivas catheter placed. Failed TOV 4/1-4/2, rivas replaced 4/3/25, and patient started on flomax.   Endocrine consulted due to hx of adrenal insufficiency; initially started on hydrocortisone then transitioned to home regimen of prednisone 5mg in AM and 2mg in PM. Incidentally found new 1.7 cm cystic lesion at the pancreatic body on CT A/P on 3/28/25. GI informed, no inpatient workup needed. Follow up outpatient with Dr. Maurisio Haas. Patient started on cefepime and vancomycin on 4/4/25 due to fever of unknown origin. ID consulted, Vancomycin discontinued after blood cultures resulted negative. ID recommendations to continue with IV cefepime until 4/10/25.   Patient resumed Eliquis 5mg daily (4/2/25) for secondary stroke prevention. Also re-started on home regimen of atorvastatin 10mg daily; LDL - 59, nonatherosclerotic etiology of stroke, high intensity statin not indicated at this time. PT/OT evaluated patient, recommended dispo to acute rehab. Patient should follow up with neurology/neuro IR, cardiology, endocrinology, hematology, GI, primary care, +/- urology and vascular surgery after discharge. Patient discharged to [facility] on 4/8/25.    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, including shoulder  Lower back pain  S/P tonsillectomy and adenoidectomy  H/O cataract extraction both eyes  History of melanoma excision left foot  History of left inguinal hernia repair mesh  H/O cardiac catheterization 3/15, no  blockages    MEDICATION:  apixaban 5 milliGRAM(s) Oral every 12 hours  atorvastatin 10 milliGRAM(s) Oral at bedtime  bethanechol 25 milliGRAM(s) Oral three times a day  famotidine    Tablet 20 milliGRAM(s) Oral daily  metoprolol tartrate 50 milliGRAM(s) Oral two times a day  multivitamin 1 Tablet(s) Oral daily  nystatin    Suspension 067233 Unit(s) Oral four times a day  polyethylene glycol 3350 17 Gram(s) Oral daily  predniSONE   Tablet 5 milliGRAM(s) Oral <User Schedule>  predniSONE   Tablet 2 milliGRAM(s) Oral <User Schedule>  senna 2 Tablet(s) Oral at bedtime      ALLERGIES:  penicillins (Hives)      FAMILY HISTORY:  Reviewed, non-contributory: [x ]     SOCIAL HISTORY:  Tobacco: YES [ ]  ; NO [x ]; Former smoker [ ]  Alcohol:   YES [ ]  ; NO [ x]; Social alcohol user [ ]  Occupation/ marital status/ children: ; has 1 child    REVIEW SYSTEMS:  Constitutional: no fever, chills, night sweats, no weight loss  HEENT: denies visual changes; no oral ulcers, dysphagia, no epistaxis;   Respiratory: no dyspnea , wheezing, cough, hemoptysis  Cardiovascular: denies acute chest pain, palpitations  GI: no loss of appetite, dark stools, or abdominal tenderness / pain; no change in bowel habits.  Musculoskeletal: no new back pain, bone/ joint pain ,no extremity swelling  Integumentary: denies pruritus; no skin rash, bruises, no  suspicious skin lesions  Neurologic: denies peripheral numbness, no dizziness, no gait problems.  Heme: no reported easy bruisability; no lymph node enlargement    VITALS:  T(C): 36.7, Max: 36.7 (04-21-25 @ 05:38)  T(F): 98, Max: 98 (04-21-25 @ 05:38)  HR: 93 (86 - 94)  BP: 122/67 (122/67 - 128/79)  SpO2: 98% (97% - 98%)    PHYSICAL EXAM:  Constitutional: alert, well developed  HEENT: normocephalic, anicteric sclerae, no mucositis or thrush  Respiratory: bilateral clear to auscultation anteriorly  Cardiovascular : S1, S2 regular, rhythmic, no murmurs, gallops or rubs  Abdomen: soft, distended, + normoactive BS, no palpable HS- megaly  Extremities: no tenderness;  -c/c/e, pulses equal bilaterally  Integumentary: no rashes, scars, or lesions suggestive of malignancy  Neurologic: aphasic; no gross focal deficits    LABS:  (04-21) WBC: 3.35 K/uL,Hemoglobin: 12.9 g/dL, Hematocrit: 37.7 %, Platelet: 94 K/uL  (04-21) Na: 142 mmol/L ; K: 4.0 mmol/L ; BUN: 26 mg/dL ; Cr: 0.52 mg/dL.    RADIOLOGY:  ACC: [id number] EXAM:  CT BRAIN   ORDERED BY: LAUREN N STEINERT     PROCEDURE DATE:  04/04/2025          INTERPRETATION:  CLINICAL INDICATIONS:  lethargy    COMPARISON: Head CT dated 4/2/2025. MRI brain dated 3/31/2025    TECHNIQUE: Noncontrast CT ofthe head. Multiplanar reformations are   submitted.    FINDINGS: Stable subacute bilateral infarctions, most pronounced in left   basal ganglia. Trace left frontal lobe petechial hemorrhage versus spared   cortex on images 33 and 34 of series 4. There is periventricular and   subcortical white matter hypodensity without mass effect, nonspecific,   likely representing mild chronic microvascular ischemic changes. There is   no compelling evidence for an acute transcortical infarction. There is no   evidence of mass, mass effect, midline shift or extra-axial fluid   collection. The lateral ventricles and cortical sulci are age-appropriate   in size and configuration. The orbits, mastoid air cells and visualized   paranasal sinuses are unremarkable. The calvarium is intact. Consider MRI   as clinically warranted.    IMPRESSION: Stable subacute bilateral infarctions, most pronounced in   left basal ganglia. Trace left frontal lobe petechial hemorrhage versus   spared cortex. Continued follow-up is recommended.

## 2025-04-21 NOTE — DISCHARGE NOTE PROVIDER - NSDCFUADDAPPT_GEN_ALL_CORE_FT
Please follow up with your primary care physician (PCP) as soon as possible.    If you are in need of a PCP or a specialist in your area: contact the [facility] Physician Referral Service at [phone].

## 2025-04-21 NOTE — CONSULT NOTE ADULT - PROBLEM SELECTOR RECOMMENDATION 9
During recent hospitalization patient's platelet count decreased below 100,000, downtrending in the 90,000 recently.  Patient has been off systemic immunotherapy (nivolumab) since July 2020, so it is unlikely that the drop in platelet count is due to oncologic treatment, but rather it is a reactive phenomenon during recent hospitalization, possibly drug-induced.  Patient does not have contraindication to anticoagulation, unless further platelet count dropping.  Continue current medication; will follow-up CBC.

## 2025-04-21 NOTE — CONSULT NOTE ADULT - ASSESSMENT
89 year old female with PMHx of Afib (off Eliquis), PPM, HTN, HLD, GERD, severe MR/TR, iatrogenic adrenal insufficiency (on prednisone), lymphedema, metastatic melanoma s/p left foot melanoma and lymph node resection, s/p RUE melanoma and right axillary lymph node excision at [facility] (3/18/25), recent admission (3/21-3/25/25) for L MCA CVA s/p M1 thrombectomy, presented to  with left sided weakness and aphasia, found to have right MCA CVA and s/p M2 thrombectomy (3/26/25).  Hospital Course complicated by right sided weakness and aphasia, found to have new Left MCA CVA with M2 occlusion and is s/p 3rd thrombectomy (3/29/25). Hospital course also complicated by pseudoaneurysm (s/p vasc surgery consult and thrombin injection x2), urinary retention (s/p rivas, failed TOV 4/2), fever of unknown origin (on IV cefepime until 4/10/25). Patient resumed Eliquis on 4/2/25. Patient now admitted for a multidisciplinary rehab program with right sided weakness, Transcortical motor aphasia, cognitive deficits, Dysphagia and gait and ADL impairments.     #  Bi-Hemispheric Embolic Infarcts s/p Thrombectomy x 3  # Aphasia, Right hemiparesis, Dysphagia  - s/p cerebral angiogram for mechanical thrombectomy of L M1 occlusion, TICI 3 (one pass) on 3/21/25  - s/p cerebral angiogram for mechanical thrombectomy of Rt M2 occlusion, TICI 3 (one pass) on 3/26/25  - s/p thrombectomy of left M4 with TICI 0 3/29/25  - MRI brain 3/31 showed New acute infarctions within the LEFT lateral frontal cortex, LEFT occipital cortex, RIGHT insular cortex and scattered RIGHT frontal, parietal, temporal and occipital cortex suggesting embolic etiology. Subacute infarction within the LEFT basal ganglia. Mild periventricular chronic white matter ischemia.  - Etiology likely cardioembolic in the setting of atrial fibrillation and underlying hypercoagulability from malignancy  - Continue Eliquis 5 mg BID  - Continue Atorvastatin 10 mg QHS (LDL 58 on 3/22)  - Precautions: Fall, Aspiration  - Comprehensive rehab program of PT/OT/SLP  - Outpatient follow up with neurology     # Chronic Atrial Fibrillation  # Status Post PPM   - Continue metoprolol  - Continue Eliquis     # Right femoral artery pseudoaneurysm   - s/p thrombin injection x2  - Outpatient follow up     # Fever of unknown origin, ?Aspiration PNA  - Blood cultures 3/26, 4/4  no growth   - RVP/COVID 19 PCR 4/4 negative   - UA 4/2 with some pyuria   - Urine Cx 3/26 no growth 4/3 contaminated   - CXR 4/4 reviewed and compared to 3/29, not much change noted  - CTA chest 4/4 showed no PE, Mucous/secretions are noted within the left lower lobe bronchus.   Near-complete atelectasis of both lower lobes is noted. Small bilateral pleural effusions are noted.   - Per ID, continue Cefepime 2000mg IV [id number] till 4/10/25  - Incentive spirometry  - Will monitor    # Iatrogenic Adrenal insufficiency  - Prednisone 5 mg in AM (8:00) and 2 mg in PM (16:00) - home regimen  - f/u outpatient endocrinology    # Metastatic Melanoma  - metastatic melanoma s/p left foot melanoma and lymph node resection, s/p RUE melanoma and right axillary lymph node excision at [facility] (3/18/25)  - Outpatient follow up     # Incidental Pancreatic Cyst  - Incidentally found new 1.7 cm cystic lesion at the pancreatic body on CT a/p on 3/28/25  - GI informed, no inpatient workup needed  - Follow up outpatient with Dr. Maurisio Haas    #GI PPx  - Continue Protonix 40 mg QD    # Urinary Retention  - Continue Rivas present on admission  - Failed TOV on 4/1-4/2, Rivas replaced 4/3  - Flomax 0.4 mg QHS  - TOV per rehab team     # DVT prophylaxis:   - on Eliquis 5 mg Q12H    Discussed with rehab team   
  #  Bihemispheric embolic infarcts s/p Thrombectomy x 3  - s/p cerebral angiogram for mechanical thrombectomy of L M1 occlusion, TICI 3 (one pass) on 3/21/25  - s/p cerebral angiogram for mechanical thrombectomy of Rt M2 occlusion, TICI 3 (one pass) on 3/26/25  - s/p thrombectomy of left M4 with TICI 0 3/29/25  - Etiology likely cardioembolic in the setting of atrial fibrillation and underlying hypercoagulability from malignancy  - Aphasia, Right hemiparesis, Dysphagia  - Start comprehensive rehab program of PT/OT/SLP - 3 hours a day, 5 days a week. P&O as needed   - Precautions: Fall, Aspiration  - Eliquis 5 mg BID  - Atorvastatin 10 mg QHS (LDL 58 on 3/22)    # Atrial Fibrillation  - has PPM present  - Eliquis 5 mg BID  - Lopressor 50 mg TID --> decreased to BID (4/18) due to BP being on softer side  - /62 - 128/79 (4/21)    #Elevated BUN  - Bun 28, Cr 0.54 (4/14) -> 30/0.61 (4/17) -> 26/0.52 (4/21)  - Encourage oral hydration, patient is on mildly thickened liquid    #Thrombocytopenia  - Platelets downtrending, Plt 94 (4/21)  - Hospitalist aware, consulting heme  - Protonix discontinued

## 2025-04-21 NOTE — CONSULT NOTE ADULT - REASON FOR ADMISSION
Left MCA CVA s/p M1 and M2 thrombectomy, Right MCA CVA s/p M2 thrombectomy
Left MCA CVA s/p M1 and M2 thrombectomy, Right MCA CVA s/p M2 thrombectomy

## 2025-04-21 NOTE — DISCHARGE NOTE PROVIDER - NSDCCPCAREPLAN_GEN_ALL_CORE_FT
PRINCIPAL DISCHARGE DIAGNOSIS  Diagnosis: CVA (cerebrovascular accident)  Assessment and Plan of Treatment: You were admitted to [facility] due to weakness and functional impairments after your stroke. You participated in daily therapies and made functional gains throughout your stay. Please continue taking your medications as prescribed and monitor your blood pressure. If you experience any symptoms of facial drooping, slurred speech, arm or leg weakness, severe headache, vision changes or any worsening symptoms, notify provider immediately and return to ER. Please follow up with neurology, PM&R, and your primary care doctor regarding your recent rehab admission.      SECONDARY DISCHARGE DIAGNOSES  Diagnosis: Urinary retention  Assessment and Plan of Treatment: You experienced difficulty urinating due to your stroke during your hospitalization. You had a rivas catheter, which was removed for trial of voiding. You were also started on bethanechol and your urinary retention resolved. Please continue taking your medication as prescribed and follow up with your primary care provider for further care and management of your urinary retention.    Diagnosis: Atrial fibrillation  Assessment and Plan of Treatment: Your lopressor was decreased from three times per day to twice a day due to low blood pressure. Please continue taking your medication as prescribed and follow up with your PCP for further management.    Diagnosis: Thrombocytopenia  Assessment and Plan of Treatment: Thrombocytopenia tony condition where the blood has low platelet levels. Your platelet levels were mildly decreased during your hospitalization. Protonix was discontinued as this can sometimes contribute. Please follow up with your PCP for repeat blood work and further monitoring.

## 2025-04-21 NOTE — DISCHARGE NOTE PROVIDER - PROVIDER TOKENS
PROVIDER:[TOKEN:[[id number]],FOLLOWUP:[1 month]],PROVIDER:[TOKEN:[[id number]],FOLLOWUP:[2 weeks]],PROVIDER:[TOKEN:[[id number]],FOLLOWUP:[2 weeks]],PROVIDER:[TOKEN:[[id number]],FOLLOWUP:[2 weeks]],PROVIDER:[TOKEN:[[id number]],FOLLOWUP:[2 weeks]],PROVIDER:[TOKEN:[[id number]],FOLLOWUP:[1 month]]

## 2025-04-21 NOTE — DISCHARGE NOTE PROVIDER - DETAILS OF MALNUTRITION DIAGNOSIS/DIAGNOSES
This patient has been assessed with a concern for Malnutrition and was treated during this hospitalization for the following Nutrition diagnosis/diagnoses:     -  04/16/2025: Severe protein-calorie malnutrition

## 2025-04-21 NOTE — PROGRESS NOTE ADULT - ASSESSMENT
89 y/o F with PMH of Afib (off Eliquis), PPM, HTN, HLD, GERD, severe MR/TR, iatrogenic adrenal insufficiency (on prednisone), lymphedema, metastatic melanoma s/p left foot melanoma and lymph node resection, s/p RUE melanoma and right axillary lymph node excision at [facility] (3/18/25), recent admission (3/21-3/25/25) for L MCA CVA s/p M1 thrombectomy, presented to  with left sided weakness and aphasia, found to have right MCA CVA and s/p M2 thrombectomy (3/26/25). Hospital course complicated by right sided weakness and aphasia, found to have new Left MCA CVA with M2 occlusion and is s/p 3rd thrombectomy (3/29/25). Hospital course also complicated by pseudoaneurysm (s/p vasc surgery consult and thrombin injection x2), urinary retention (s/p rivas, failed TOV 4/2), fever of unknown origin (on IV cefepime until 4/10/25). Patient resumed Eliquis on 4/2/25.    #Thrombocytopenia  - Noted platelet count downtrending, 95K today  - Will d/c protonix and switch to pepcid  - Will consult hematology  - Monitor CBC for now    #Bi-Hemispheric Embolic Infarcts s/p Thrombectomy x 3  #Aphasia, Right hemiparesis, Dysphagia  - Etiology likely cardioembolic in the setting of atrial fibrillation and underlying hypercoagulability from malignancy  - Continue Eliquis 5 mg BID  - Continue Atorvastatin 10 mg QHS (LDL 58 on 3/22)    # Chronic Atrial Fibrillation  # Status Post PPM   - Continue metoprolol - changed from 50mg TID to 50mg BID 4/18 - BPs improving  - Continue Eliquis 5mg BID    # Right femoral artery pseudoaneurysm   - s/p thrombin injection x2  - Outpatient follow up     # Iatrogenic Adrenal insufficiency  - Prednisone 5 mg in AM (8:00) and 2 mg in PM (16:00) - home regimen  - f/u outpatient endocrinology    # Metastatic Melanoma  - Metastatic melanoma s/p left foot melanoma and lymph node resection, s/p RUE melanoma and right axillary lymph node excision at [facility] (3/18/25)  - Outpatient follow up     # Incidental Pancreatic Cyst  - Incidentally found new 1.7 cm cystic lesion at the pancreatic body on CT a/p on 3/28/25  - GI informed, no inpatient workup needed  - Follow up outpatient with Dr. Maurisio Haas    #GI PPx  - Will switch to pepcid given thrombocytopenia in setting of protonix    # Urinary Retention- improved  - Rivas removed 4/10  - pt voiding well  - continue with Bethanechol 25mg TID, started on 4/11   - Continue serial bladder scans and ISC as needed   - encourage toileting schedule    DVT prophylaxis: Eliquis 5 mg Q12H

## 2025-04-21 NOTE — PROGRESS NOTE ADULT - ATTENDING COMMENTS
patient seen at bedside, aphasia improving  labs and most recent imaging reviewed-- heme onc consulted for thrombocytopenia, pending recommendations, repeat CBC milton AM ordered  Bun/Cr 26/0.52-- improved compared to previous   Discussed medical updates and plan with nursing and hospitalist on team rounds  Patient requires acute inpatient hospitalization for ongoing management of medical comorbidities (thrombocytopenia, pending heme/onc consult, monitoring of labs--IFEANYI, BP management), which are limiting functional abilities and optimization of functional independence for discharge

## 2025-04-21 NOTE — DISCHARGE NOTE PROVIDER - CARE PROVIDERS DIRECT ADDRESSES
[email or web address]

## 2025-04-21 NOTE — DISCHARGE NOTE PROVIDER - NSDCMRMEDTOKEN_GEN_ALL_CORE_FT
acetaminophen 325 mg oral tablet: 2 tab(s) orally every 6 hours as needed for Pain  apixaban 5 mg oral tablet: 1 tab(s) orally every 12 hours  atorvastatin 10 mg oral tablet: 1 tab(s) orally once a day (at bedtime)  bethanechol 25 mg oral tablet: 1 tab(s) orally 3 times a day  famotidine 20 mg oral tablet: 1 tab(s) orally once a day  metoprolol tartrate 50 mg oral tablet: 1 tab(s) orally 2 times a day  Multiple Vitamins oral tablet: 1 tab(s) orally once a day  polyethylene glycol 3350 oral powder for reconstitution: 17 gram(s) orally once a day  predniSONE 1 mg oral tablet: 2 tab(s) orally once a day at 4 PM  predniSONE 5 mg oral tablet: 1 tab(s) orally once a day at 8 AM

## 2025-04-21 NOTE — PROGRESS NOTE ADULT - SUBJECTIVE AND OBJECTIVE BOX
HPI:  88 y/o F with PMHx of Afib (off Eliquis), PPM, HTN, HLD, GERD, severe MR/TR, iatrogenic adrenal insufficiency (on prednisone), lymphedema, metastatic melanoma s/p left foot melanoma and lymph node resection, s/p RUE melanoma and right axillary lymph node excision at [facility] (3/18/25). Patient with a recent admission 3/21 -3/25/25  when she initially presented to  with code stroke and was transferred to [facility] where she underwent cerebral angiogram for mechanical thrombectomy of L M1 occlusion, TICI 3 (one pass) on 3/21/25. Patient was discharged and presented back to [facility] 3/26 with L sided weakness and aphasia, transferred to [facility] for stroke codem and is now s/p mechanical thrombectomy of Rt M2 occlusion, TICI 3 (one pass) on 3/26/25. On the early morning of 3/29/25 pt aphasic with right sided weakness. CTH negative for acute hemorrhage. CTA + new Left MCA M2 occulusion and patient subsequently underwent 3rd thrombectomy on 3/29. MRI brain demonstrated acute bilateral multifocal cerebral infarctions and subacute left basal ganglia infarct.  Etiology likely cardioembolic in the setting of atrial fibrillation and underlying hypercoaguability from malignancy.    Hospital course complicated by right groin pseudoaneurysm s/p L MCA thrombectomy. She was seen by vascular surgery and given thrombin injection x2 and recommended for outpatient follow up as needed. Hematology consulted due to concern for hypercoagulable state, anemia, hx of melanoma. CBC trended, overall stable. Hypercoagulable serology panel sent, negative for abnormalities. Follow up outpatient for further monitoring/testing. Hospital course further complicated by urinary retention and rivas catheter placed. Failed TOV 4/1-4/2, rivas replaced 4/3/25, and patient started on flomax.     Endocrine consulted due to hx of adrenal insufficiency; initially started on hydrocortisone then transitioned to home regimen of prednisone 5mg in AM and 2mg in PM. Incidentally found new 1.7 cm cystic lesion at the pancreatic body on CT A/P on 3/28/25. GI informed, no inpatient workup needed. Follow up outpatient with Dr. Maurisio Haas. Patient started on cefepime and vancomycin on 4/4/25 due to fever of unknown origin. ID consulted, Vancomycin discontinued after blood cultures resulted negative. ID recommendations to continue with IV cefepime until 4/10/25.     Patient resumed Eliquis 5mg daily (4/2/25) for secondary stroke prevention. Also re-started on home regimen of atorvastatin 10mg daily; LDL - 59, nonatherosclerotic etiology of stroke, high intensity statin not indicated at this time. PT/OT evaluated patient, recommended dispo to acute rehab. Patient should follow up with neurology/neuro IR, cardiology, endocrinology, hematology, GI, primary care, +/- urology and vascular surgery after discharge. Patient discharged to [facility] on 4/8/25.   (08 Apr 2025 12:56)      Subjective/Objective: Patient seen and assessed at bedside. No overnight events and no new complaints.    Vital Signs Last 24 Hrs  T(C): 36.7 (21 Apr 2025 08:45), Max: 36.7 (21 Apr 2025 05:38)  T(F): 98 (21 Apr 2025 08:45), Max: 98 (21 Apr 2025 05:38)  HR: 86 (21 Apr 2025 08:45) (82 - 94)  BP: 128/79 (21 Apr 2025 08:45) (100/62 - 128/79)  RR: 15 (21 Apr 2025 08:45) (14 - 16)  SpO2: 98% (21 Apr 2025 08:45) (97% - 98%)      REVIEW OF SYMPTOMS  limited by aphasia    PHYSICAL EXAM   Gen - NAD, Comfortable  HEENT - NCAT, EOMI  Pulm - breathing comfortably on RA  Cardiovascular - warm, well perfused  Abdomen - Soft, NT/ND,   Extremities - No edema, No calf tenderness  Neuro-     Cognitive - AAOx3 when given yes/no choices, follows commands     Communication - non-fluent aphasia      Motor -                     LEFT    UE - ShAB 5/5, EF 5/5, EE 5/5, WE 5/5,  5/5                    RIGHT UE - ShAB 4/5, EF 4/5, EE 4/5, WE 5/5,  4/5                    LEFT    LE - HF 2/5, KE 5/5, DF 5/5, PF 5/5                    RIGHT LE - HF 2/5, KE 3/5, DF 5/5, PF 5/5     Psychiatric - Mood stable, Affect WNL      RECENT LABS               12.9   3.35  )-----------( 94       ( 21 Apr 2025 05:55 )             37.7     04-21    142  |  107  |  26[H]  ----------------------------<  88  4.0   |  30  |  0.52    Ca    9.2      21 Apr 2025 05:55    TPro  5.9[L]  /  Alb  2.9[L]  /  TBili  0.9  /  DBili  x   /  AST  16  /  ALT  34  /  AlkPhos  55  04-21      MEDICATIONS  (STANDING):  apixaban 5 milliGRAM(s) Oral every 12 hours  atorvastatin 10 milliGRAM(s) Oral at bedtime  bethanechol 25 milliGRAM(s) Oral three times a day  metoprolol tartrate 50 milliGRAM(s) Oral two times a day  multivitamin 1 Tablet(s) Oral daily  nystatin    Suspension 737488 Unit(s) Oral four times a day  polyethylene glycol 3350 17 Gram(s) Oral daily  predniSONE   Tablet 5 milliGRAM(s) Oral <User Schedule>  predniSONE   Tablet 2 milliGRAM(s) Oral <User Schedule>  senna 2 Tablet(s) Oral at bedtime    MEDICATIONS  (PRN):  acetaminophen     Tablet .. 650 milliGRAM(s) Oral every 6 hours PRN Mild Pain (1 - 3)

## 2025-04-21 NOTE — PROGRESS NOTE ADULT - SUBJECTIVE AND OBJECTIVE BOX
Patient is a 89y old  Female who presents with a chief complaint of Left MCA CVA s/p M1 and M2 thrombectomy, Right MCA CVA s/p M2 thrombectomy       Patient seen and examined at bedside.    ALLERGIES:  penicillins (Hives)    MEDICATIONS  (STANDING):  apixaban 5 milliGRAM(s) Oral every 12 hours  atorvastatin 10 milliGRAM(s) Oral at bedtime  bethanechol 25 milliGRAM(s) Oral three times a day  metoprolol tartrate 50 milliGRAM(s) Oral two times a day  multivitamin 1 Tablet(s) Oral daily  nystatin    Suspension 397847 Unit(s) Oral four times a day  polyethylene glycol 3350 17 Gram(s) Oral daily  predniSONE   Tablet 5 milliGRAM(s) Oral <User Schedule>  predniSONE   Tablet 2 milliGRAM(s) Oral <User Schedule>  senna 2 Tablet(s) Oral at bedtime    MEDICATIONS  (PRN):  acetaminophen     Tablet .. 650 milliGRAM(s) Oral every 6 hours PRN Mild Pain (1 - 3)    Vital Signs Last 24 Hrs  T(F): 98 (21 Apr 2025 08:45), Max: 98 (21 Apr 2025 05:38)  HR: 86 (21 Apr 2025 08:45) (82 - 94)  BP: 128/79 (21 Apr 2025 08:45) (100/62 - 128/79)  RR: 15 (21 Apr 2025 08:45) (14 - 16)  SpO2: 98% (21 Apr 2025 08:45) (97% - 98%)  I&O's Summary      PHYSICAL EXAM:  General: NAD, A/O   ENT: MMM, no scleral icterus  Neck: Supple, No JVD, no thyroidomegaly  Lungs: Clear to auscultation bilaterally, no wheezes, no rales, no rhonchi, good inspiratory effort  Cardio: RRR, S1/S2, No murmurs  Abdomen: Soft, Nontender, Nondistended; Bowel sounds present  Extremities: No calf tenderness, No pitting edema, no skin changes    LABS:                        12.9   3.35  )-----------( 94       ( 21 Apr 2025 05:55 )             37.7       04-21    142  |  107  |  26  ----------------------------<  88  4.0   |  30  |  0.52    Ca    9.2      21 Apr 2025 05:55    TPro  5.9  /  Alb  2.9  /  TBili  0.9  /  DBili  x   /  AST  16  /  ALT  34  /  AlkPhos  55  04-21     03-27 Chol 145 mg/dL LDL -- HDL 74 mg/dL Trig 55 mg/dL    Urinalysis Basic - ( 21 Apr 2025 05:55 )    Color: x / Appearance: x / SG: x / pH: x  Gluc: 88 mg/dL / Ketone: x  / Bili: x / Urobili: x   Blood: x / Protein: x / Nitrite: x   Leuk Esterase: x / RBC: x / WBC x   Sq Epi: x / Non Sq Epi: x / Bacteria: x    COVID-19 PCR: [place] (04-08-25 @ 18:30)       done

## 2025-04-21 NOTE — DISCHARGE NOTE PROVIDER - CARE PROVIDER_API CALL
Maurisio Haas  Gastroenterology  [address], [address]  [place] [address]  Phone: [phone]  Fax: [phone]  Follow Up Time: 1 month    Celina Carlin  Hematology/Oncology  [address] [place] [address]  Phone: [phone]  Fax: [phone]  Follow Up Time: 2 weeks    Juan Diego Johnson  Vascular Neurology  [address] [place] [address]  Phone: [phone]  Fax: [phone]  Follow Up Time: 2 weeks    Antonio Gaines  Endocrinology/Metab/Diabetes  [address], [address]  [place] [address]  Phone: [phone]  Fax: [phone]  Follow Up Time: 2 weeks    Milo Murguia  Cardiovascular Disease  [address] [place] [address]  Phone: [phone]  Fax: [phone]  Follow Up Time: 2 weeks    Nara Canseco  Physical/Rehab Medicine  [address] [place] [address]  Phone: [phone]  Fax: [phone]  Follow Up Time: 1 month

## 2025-04-22 LAB
HCT VFR BLD CALC: 35.6 % — SIGNIFICANT CHANGE UP (ref 34.5–45)
HGB BLD-MCNC: 11.5 G/DL — SIGNIFICANT CHANGE UP (ref 11.5–15.5)
MCHC RBC-ENTMCNC: 31.3 PG — SIGNIFICANT CHANGE UP (ref 27–34)
MCHC RBC-ENTMCNC: 32.3 G/DL — SIGNIFICANT CHANGE UP (ref 32–36)
MCV RBC AUTO: 97 FL — SIGNIFICANT CHANGE UP (ref 80–100)
NRBC BLD AUTO-RTO: 0 /100 WBCS — SIGNIFICANT CHANGE UP (ref 0–0)
PLATELET # BLD AUTO: 84 K/UL — LOW (ref 150–400)
RBC # BLD: 3.67 M/UL — LOW (ref 3.8–5.2)
RBC # FLD: 15 % — HIGH (ref 10.3–14.5)
WBC # BLD: 3.84 K/UL — SIGNIFICANT CHANGE UP (ref 3.8–10.5)
WBC # FLD AUTO: 3.84 K/UL — SIGNIFICANT CHANGE UP (ref 3.8–10.5)

## 2025-04-22 PROCEDURE — 99232 SBSQ HOSP IP/OBS MODERATE 35: CPT

## 2025-04-22 PROCEDURE — 74230 X-RAY XM SWLNG FUNCJ C+: CPT | Mod: 26

## 2025-04-22 PROCEDURE — 99233 SBSQ HOSP IP/OBS HIGH 50: CPT

## 2025-04-22 RX ADMIN — Medication 20 MILLIGRAM(S): at 11:58

## 2025-04-22 RX ADMIN — METOPROLOL SUCCINATE 50 MILLIGRAM(S): 50 TABLET, EXTENDED RELEASE ORAL at 05:22

## 2025-04-22 RX ADMIN — Medication 500000 UNIT(S): at 17:03

## 2025-04-22 RX ADMIN — Medication 500000 UNIT(S): at 21:19

## 2025-04-22 RX ADMIN — Medication 25 MILLIGRAM(S): at 05:22

## 2025-04-22 RX ADMIN — Medication 500000 UNIT(S): at 11:58

## 2025-04-22 RX ADMIN — Medication 500000 UNIT(S): at 05:22

## 2025-04-22 RX ADMIN — PREDNISONE 5 MILLIGRAM(S): 20 TABLET ORAL at 07:24

## 2025-04-22 RX ADMIN — PREDNISONE 2 MILLIGRAM(S): 20 TABLET ORAL at 17:03

## 2025-04-22 RX ADMIN — METOPROLOL SUCCINATE 50 MILLIGRAM(S): 50 TABLET, EXTENDED RELEASE ORAL at 17:04

## 2025-04-22 RX ADMIN — POLYETHYLENE GLYCOL 3350 17 GRAM(S): 17 POWDER, FOR SOLUTION ORAL at 11:59

## 2025-04-22 RX ADMIN — Medication 25 MILLIGRAM(S): at 13:21

## 2025-04-22 RX ADMIN — Medication 1 TABLET(S): at 11:58

## 2025-04-22 RX ADMIN — APIXABAN 5 MILLIGRAM(S): 2.5 TABLET, FILM COATED ORAL at 05:22

## 2025-04-22 RX ADMIN — ATORVASTATIN CALCIUM 10 MILLIGRAM(S): 80 TABLET, FILM COATED ORAL at 21:19

## 2025-04-22 RX ADMIN — Medication 2 TABLET(S): at 21:19

## 2025-04-22 RX ADMIN — Medication 25 MILLIGRAM(S): at 21:19

## 2025-04-22 RX ADMIN — APIXABAN 5 MILLIGRAM(S): 2.5 TABLET, FILM COATED ORAL at 17:04

## 2025-04-22 NOTE — PROGRESS NOTE ADULT - PROBLEM SELECTOR PLAN 1
Platelet count at 84K, suspect reactive.   No clinical evidence of active bleeding.  Will remain under expectant hematologic surveillance for now.   Will follow up in ambulatory if discharged in AM.

## 2025-04-22 NOTE — PROGRESS NOTE ADULT - TIME BILLING
Time spent includes direct patient care  (interview and examination of patient), discussion with other providers, support staff and/or patient's family members, review of medical records, ordering diagnostic tests and analyzing results, and documentation.
Time spent includes direct patient care  (interview and examination of patient), discussion with other providers, support staff and/or patient's family members, review of medical records, ordering diagnostic tests and analyzing results, and documentation.
Time spent includes direct patient care (interview and examination of patient), discussion with other providers, support staff and/or patient's family members, review of medical records, ordering diagnostic tests and analyzing results, and documentation
Time spent includes direct patient care (interview and examination of patient), discussion with other providers, support staff and/or patient's family members, review of medical records, ordering diagnostic tests and analyzing results, and documentation
Review and preparation to see patient, examination and assessment of patient, obtaining nursing subjective report as pt. confused,  Discussion of management with following consultants: Hospitalist, ....  ,  Discussion of rehabilitation plan of care during Huddle meeting and TEAMS with SW, Speech, OT, PT and nursing. ..  Discussion with family, .pt's son..., to update on patient status, rehab plan of care, progress in therapy, general prognosis, ELOS, discharge planning.

## 2025-04-22 NOTE — CHART NOTE - NSCHARTNOTEFT_GEN_A_CORE
Nutrition Follow Up Note  Source: Medical Record [X] Patient [x] Family [ ]         Diet: Regular, mildly thick liquids   Pt tolerating diet with fair PO intake, eating 50-75% of meals Per nursing flowsheets. Pt needs assistance with feeding. Encouraged PO intake/obtained food preferences to optimize PO intake. Pt meets the diagnostic criteria for malnutrition. Receptive to adding Magic Cup (provides 290 kcal, 9 g protein) to lunch. Denies nausea, vomiting, diarrhea, constipation. Pt denies chewing/swallowing difficulties on current diet.    Enteral/Parenteral Nutrition: N/A    Current Weight: 158.2 lbs (4-11)    Pertinent Medications: MEDICATIONS  (STANDING):  apixaban 5 milliGRAM(s) Oral every 12 hours  atorvastatin 10 milliGRAM(s) Oral at bedtime  bethanechol 25 milliGRAM(s) Oral three times a day  metoprolol tartrate 50 milliGRAM(s) Oral three times a day  multivitamin 1 Tablet(s) Oral daily  pantoprazole    Tablet 40 milliGRAM(s) Oral before breakfast  polyethylene glycol 3350 17 Gram(s) Oral daily  predniSONE   Tablet 5 milliGRAM(s) Oral <User Schedule>  predniSONE   Tablet 2 milliGRAM(s) Oral <User Schedule>  senna 2 Tablet(s) Oral at bedtime    MEDICATIONS  (PRN):  acetaminophen     Tablet .. 650 milliGRAM(s) Oral every 6 hours PRN Mild Pain (1 - 3)      Pertinent Labs:  04-14 Na141 mmol/L Glu 86 mg/dL K+ 3.8 mmol/L Cr  0.54 mg/dL BUN 28 mg/dL[H] 04-14 Alb 2.6 g/dL[L] 03-27 Chol 145 mg/dL LDL --    HDL 74 mg/dL Trig 55 mg/dL        Skin: incontinence associated dermatitis, Moisture associated dermatitis per nursing flow sheets.     Edema: 1+ R ankle, R foot Per nursing flowsheets     Last BM: on 4-16 Per nursing flowsheets     Estimated Needs:   [X] No Change since Previous Assessment  [ ] Recalculated:     Previous Nutrition Diagnosis:   Increased nutrient needs    Nutrition Diagnosis is [X] Ongoing         New Nutrition Diagnosis: [X] Not Applicable  [ ] Inadequate Protein Energy Intake   [ ] Inadequate Oral Intake   [ ] Excessive Energy Intake   [ ] Increased Nutrient Needs   [ ] Obesity   [ ] Altered GI Function   [ ] Unintended Weight Loss   [ ] Food & Nutrition Related Knowledge Deficit  [ ] Limited Adherence to nutrition related recommendations   [x] Malnutrition  - chronic, severe    Interventions:   1. Recommend adding Magic Cup daily to diet  2. Suggest MVI  3. Encourage PO intake  4. Obtain and honor food preferences as able  5. Follow SLP recommendations    Monitoring & Evaluation:   [X] Weights   [X] PO Intake   [X] Follow Up (Per Protocol)  [X] Tolerance to Diet Prescription   [X] Other: Labs    RD Remains Available.  Gi Villarreal RD
Nutrition Follow Up Note  Hospital Course (Per Electronic Medical Record):   Source: Medical Record [X] Patient [X] Family [X] Nursing Staff [X]     Diet: Regular with mild thickened liquids     Patient noted tolerating diet as per SLP recommendation . Patient receiving thickened liquids due to noted dysphagia , PO intake noted good patient  consuming % as per nursing flow sheets po intake noted to improved since admissions , Labs reviewed , continue current diet modify as per SLP recommendation     Current Weight: no follow up weight     Pertinent Medications: MEDICATIONS  (STANDING):  apixaban 5 milliGRAM(s) Oral every 12 hours  atorvastatin 10 milliGRAM(s) Oral at bedtime  bethanechol 25 milliGRAM(s) Oral three times a day  famotidine    Tablet 20 milliGRAM(s) Oral daily  metoprolol tartrate 50 milliGRAM(s) Oral two times a day  multivitamin 1 Tablet(s) Oral daily  nystatin    Suspension 552596 Unit(s) Oral four times a day  polyethylene glycol 3350 17 Gram(s) Oral daily  predniSONE   Tablet 5 milliGRAM(s) Oral <User Schedule>  predniSONE   Tablet 2 milliGRAM(s) Oral <User Schedule>  senna 2 Tablet(s) Oral at bedtime    MEDICATIONS  (PRN):  acetaminophen     Tablet .. 650 milliGRAM(s) Oral every 6 hours PRN Mild Pain (1 - 3)      Pertinent Labs:  04-21 Na142 mmol/L Glu 88 mg/dL K+ 4.0 mmol/L Cr  0.52 mg/dL BUN 26 mg/dL[H] 04-21 Alb 2.9 g/dL[L] 03-27 Chol 145 mg/dL LDL --    HDL 74 mg/dL Trig 55 mg/dL  Hgb 11.5g/dl, Hct35.6%       Skin: ecchymotic , IAD     Edema: none    Last BM: (4/19) - Senna provided daily     Estimated Needs:   [X] No Change since Previous Assessment      Previous Nutrition Diagnosis: Increased nutrient needs & severe protein calorie malnutrition     Nutrition Diagnosis is [X] addressed , added Magic Cup daily to current diet         Interventions:   1. continue current diet regimen       Monitoring & Evaluation: will monitor:  [X] Weights   [X] PO Intake   [X] Follow Up (Per Protocol)  [X] Tolerance to Diet Prescription       RD to follow as per Nutrition protocol  Sandra Ge RDN

## 2025-04-22 NOTE — PROGRESS NOTE ADULT - SUBJECTIVE AND OBJECTIVE BOX
SHARON HULL, 89y Female  MRN: [id number]  ATTENDING: Nara Canseco    HPI:  88 y/o F with PMHx of Afib (off Eliquis), PPM, HTN, HLD, GERD, severe MR/TR, iatrogenic adrenal insufficiency (on prednisone), lymphedema, metastatic melanoma s/p left foot melanoma and lymph node resection, s/p RUE melanoma and right axillary lymph node excision at [facility] (3/18/25). Patient with a recent admission 3/21 -3/25/25  when she initially presented to  with code stroke and was transferred to [facility] where she underwent cerebral angiogram for mechanical thrombectomy of L M1 occlusion, TICI 3 (one pass) on 3/21/25. Patient was discharged and presented back to [facility] 3/26 with L sided weakness and aphasia, transferred to [facility] for stroke codem and is now s/p mechanical thrombectomy of Rt M2 occlusion, TICI 3 (one pass) on 3/26/25. On the early morning of 3/29/25 pt aphasic with right sided weakness. CTH negative for acute hemorrhage. CTA + new Left MCA M2 occulusion and patient subsequently underwent 3rd thrombectomy on 3/29. MRI brain demonstrated acute bilateral multifocal cerebral infarctions and subacute left basal ganglia infarct.  Etiology likely cardioembolic in the setting of atrial fibrillation and underlying hypercoaguability from malignancy.  Hospital course complicated by right groin pseudoaneurysm s/p L MCA thrombectomy. She was seen by vascular surgery and given thrombin injection x2 and recommended for outpatient follow up as needed. Hematology consulted due to concern for hypercoagulable state, anemia, hx of melanoma. CBC trended, overall stable. Hypercoagulable serology panel sent, negative for abnormalities. Follow up outpatient for further monitoring/testing. Hospital course further complicated by urinary retention and rivas catheter placed. Failed TOV 4/1-4/2, rivas replaced 4/3/25, and patient started on flomax.   Endocrine consulted due to hx of adrenal insufficiency; initially started on hydrocortisone then transitioned to home regimen of prednisone 5mg in AM and 2mg in PM. Incidentally found new 1.7 cm cystic lesion at the pancreatic body on CT A/P on 3/28/25. GI informed, no inpatient workup needed. Follow up outpatient with Dr. Maurisio Haas. Patient started on cefepime and vancomycin on 4/4/25 due to fever of unknown origin. ID consulted, Vancomycin discontinued after blood cultures resulted negative. ID recommendations to continue with IV cefepime until 4/10/25.   Patient resumed Eliquis 5mg daily (4/2/25) for secondary stroke prevention. Also re-started on home regimen of atorvastatin 10mg daily; LDL - 59, nonatherosclerotic etiology of stroke, high intensity statin not indicated at this time. PT/OT evaluated patient, recommended dispo to acute rehab. Patient should follow up with neurology/neuro IR, cardiology, endocrinology, hematology, GI, primary care, +/- urology and vascular surgery after discharge. Patient discharged to [facility] on 4/8/25.    MEDICATIONS:  acetaminophen     Tablet .. 650 milliGRAM(s) Oral every 6 hours PRN  apixaban 5 milliGRAM(s) Oral every 12 hours  atorvastatin 10 milliGRAM(s) Oral at bedtime  bethanechol 25 milliGRAM(s) Oral three times a day  famotidine    Tablet 20 milliGRAM(s) Oral daily  metoprolol tartrate 50 milliGRAM(s) Oral two times a day  multivitamin 1 Tablet(s) Oral daily  nystatin    Suspension 840321 Unit(s) Oral four times a day  polyethylene glycol 3350 17 Gram(s) Oral daily  predniSONE   Tablet 5 milliGRAM(s) Oral <User Schedule>  predniSONE   Tablet 2 milliGRAM(s) Oral <User Schedule>  senna 2 Tablet(s) Oral at bedtime    All other medications reviewed.    SUBJECTIVE:    VITALS:  T(C): 36.4 (04-22-25 @ 07:16), Max: 36.7 (04-22-25 @ 05:20)  T(F): 97.5 (04-22-25 @ 07:16), Max: 98.1 (04-22-25 @ 05:20)  HR: 80 (04-22-25 @ 07:16) (80 - 93)  BP: 124/78 (04-22-25 @ 07:16) (121/69 - 133/77)      PHYSICAL EXAM:  Constitutional: alert, well developed  HEENT: normocephalic, anicteric sclerae, no mucositis or thrush  Respiratory: bilateral clear to auscultation anteriorly  Cardiovascular : S1, S2 regular, rhythmic, no murmurs, gallops or rubs  Abdomen: soft, distended, + normoactive BS, no palpable HS- megaly  Extremities: no tenderness;  -c/c/e, pulses equal bilaterally    LABS:  (04-22) WBC: 3.84 K/uL,Hemoglobin: 11.5 g/dL, Hematocrit: 35.6 %,  Platelet: 84 K/uL    RADIOLOGY:  ACC: [id number] EXAM:  CT BRAIN   ORDERED BY: LAUREN N STEINERT     PROCEDURE DATE:  04/04/2025          INTERPRETATION:  CLINICAL INDICATIONS:  lethargy    COMPARISON: Head CT dated 4/2/2025. MRI brain dated 3/31/2025    TECHNIQUE: Noncontrast CT ofthe head. Multiplanar reformations are   submitted.    FINDINGS: Stable subacute bilateral infarctions, most pronounced in left   basal ganglia. Trace left frontal lobe petechial hemorrhage versus spared   cortex on images 33 and 34 of series 4. There is periventricular and   subcortical white matter hypodensity without mass effect, nonspecific,   likely representing mild chronic microvascular ischemic changes. There is   no compelling evidence for an acute transcortical infarction. There is no   evidence of mass, mass effect, midline shift or extra-axial fluid   collection. The lateral ventricles and cortical sulci are age-appropriate   in size and configuration. The orbits, mastoid air cells and visualized   paranasal sinuses are unremarkable. The calvarium is intact. Consider MRI   as clinically warranted.    IMPRESSION: Stable subacute bilateral infarctions, most pronounced in   left basal ganglia. Trace left frontal lobe petechial hemorrhage versus   spared cortex. Continued follow-up is recommended.         SHARON HULL, 89y Female  MRN: [id number]  ATTENDING: Nara Canseco    HPI:  89 y/o F with PMHx of Afib (off Eliquis), PPM, HTN, HLD, GERD, severe MR/TR, iatrogenic adrenal insufficiency (on prednisone), lymphedema, metastatic melanoma s/p left foot melanoma and lymph node resection, s/p RUE melanoma and right axillary lymph node excision at [facility] (3/18/25). Patient with a recent admission 3/21 -3/25/25  when she initially presented to  with code stroke and was transferred to [facility] where she underwent cerebral angiogram for mechanical thrombectomy of L M1 occlusion, TICI 3 (one pass) on 3/21/25. Patient was discharged and presented back to [facility] 3/26 with L sided weakness and aphasia, transferred to [facility] for stroke codem and is now s/p mechanical thrombectomy of Rt M2 occlusion, TICI 3 (one pass) on 3/26/25. On the early morning of 3/29/25 pt aphasic with right sided weakness. CTH negative for acute hemorrhage. CTA + new Left MCA M2 occulusion and patient subsequently underwent 3rd thrombectomy on 3/29. MRI brain demonstrated acute bilateral multifocal cerebral infarctions and subacute left basal ganglia infarct.  Etiology likely cardioembolic in the setting of atrial fibrillation and underlying hypercoaguability from malignancy.  Hospital course complicated by right groin pseudoaneurysm s/p L MCA thrombectomy. She was seen by vascular surgery and given thrombin injection x2 and recommended for outpatient follow up as needed. Hematology consulted due to concern for hypercoagulable state, anemia, hx of melanoma. CBC trended, overall stable. Hypercoagulable serology panel sent, negative for abnormalities. Follow up outpatient for further monitoring/testing. Hospital course further complicated by urinary retention and rivas catheter placed. Failed TOV 4/1-4/2, rivas replaced 4/3/25, and patient started on flomax.   Endocrine consulted due to hx of adrenal insufficiency; initially started on hydrocortisone then transitioned to home regimen of prednisone 5mg in AM and 2mg in PM. Incidentally found new 1.7 cm cystic lesion at the pancreatic body on CT A/P on 3/28/25. GI informed, no inpatient workup needed. Follow up outpatient with Dr. Maurisio Haas. Patient started on cefepime and vancomycin on 4/4/25 due to fever of unknown origin. ID consulted, Vancomycin discontinued after blood cultures resulted negative. ID recommendations to continue with IV cefepime until 4/10/25.   Patient resumed Eliquis 5mg daily (4/2/25) for secondary stroke prevention. Also re-started on home regimen of atorvastatin 10mg daily; LDL - 59, nonatherosclerotic etiology of stroke, high intensity statin not indicated at this time. PT/OT evaluated patient, recommended dispo to acute rehab. Patient should follow up with neurology/neuro IR, cardiology, endocrinology, hematology, GI, primary care, +/- urology and vascular surgery after discharge. Patient discharged to [facility] on 4/8/25.    MEDICATIONS:  acetaminophen     Tablet .. 650 milliGRAM(s) Oral every 6 hours PRN  apixaban 5 milliGRAM(s) Oral every 12 hours  atorvastatin 10 milliGRAM(s) Oral at bedtime  bethanechol 25 milliGRAM(s) Oral three times a day  famotidine    Tablet 20 milliGRAM(s) Oral daily  metoprolol tartrate 50 milliGRAM(s) Oral two times a day  multivitamin 1 Tablet(s) Oral daily  nystatin    Suspension 731199 Unit(s) Oral four times a day  polyethylene glycol 3350 17 Gram(s) Oral daily  predniSONE   Tablet 5 milliGRAM(s) Oral <User Schedule>  predniSONE   Tablet 2 milliGRAM(s) Oral <User Schedule>  senna 2 Tablet(s) Oral at bedtime    All other medications reviewed.    SUBJECTIVE:  Comfortable, denies new events over night.    VITALS:  T(C): 36.4 (04-22-25 @ 07:16), Max: 36.7 (04-22-25 @ 05:20)  T(F): 97.5 (04-22-25 @ 07:16), Max: 98.1 (04-22-25 @ 05:20)  HR: 80 (04-22-25 @ 07:16) (80 - 93)  BP: 124/78 (04-22-25 @ 07:16) (121/69 - 133/77)    PHYSICAL EXAM:  Constitutional: alert, well developed  HEENT: normocephalic, anicteric sclerae, no mucositis or thrush  Respiratory: bilateral clear to auscultation anteriorly  Cardiovascular : S1, S2 regular, rhythmic, no murmurs, gallops or rubs  Abdomen: soft, distended, + normoactive BS, no palpable HS- megaly  Extremities: no tenderness;  -c/c/e, pulses equal bilaterally    LABS:  Platelet Count - Automated: 84 K/uL (04-22-25 @ 05:30)  Platelet Count - Automated: 94 K/uL (04-21-25 @ 05:55)    (04-22) WBC: 3.84 K/uL,Hemoglobin: 11.5 g/dL, Hematocrit: 35.6 %,  Platelet: 84 K/uL    RADIOLOGY:  ACC: [id number] EXAM:  CT BRAIN   ORDERED BY: LAUREN N STEINERT   PROCEDURE DATE:  04/04/2025    INTERPRETATION:  CLINICAL INDICATIONS:  lethargy    COMPARISON: Head CT dated 4/2/2025. MRI brain dated 3/31/2025    TECHNIQUE: Noncontrast CT ofthe head. Multiplanar reformations are   submitted.    FINDINGS: Stable subacute bilateral infarctions, most pronounced in left   basal ganglia. Trace left frontal lobe petechial hemorrhage versus spared   cortex on images 33 and 34 of series 4. There is periventricular and   subcortical white matter hypodensity without mass effect, nonspecific,   likely representing mild chronic microvascular ischemic changes. There is   no compelling evidence for an acute transcortical infarction. There is no   evidence of mass, mass effect, midline shift or extra-axial fluid   collection. The lateral ventricles and cortical sulci are age-appropriate   in size and configuration. The orbits, mastoid air cells and visualized   paranasal sinuses are unremarkable. The calvarium is intact. Consider MRI   as clinically warranted.    IMPRESSION: Stable subacute bilateral infarctions, most pronounced in   left basal ganglia. Trace left frontal lobe petechial hemorrhage versus   spared cortex. Continued follow-up is recommended.

## 2025-04-22 NOTE — PROGRESS NOTE ADULT - ASSESSMENT
Assessment/Plan:  SHARON HULL is a 88 y/o F with PMHx of Afib (off Eliquis), PPM, HTN, HLD, GERD, severe MR/TR, iatrogenic adrenal insufficiency (on prednisone), lymphedema, metastatic melanoma s/p left foot melanoma and lymph node resection, s/p RUE melanoma and right axillary lymph node excision at [facility] (3/18/25), recent admission (3/21-3/25/25) for L MCA CVA s/p M1 thrombectomy, presented to  with left sided weakness and aphasia, found to have right MCA CVA and s/p M2 thrombectomy (3/26/25).  Hospital Course complicated by right sided weakness and aphasia, found to have new Left MCA CVA with M2 occlusion and is s/p 3rd thrombectomy (3/29/25). Hospital course also complicated by pseudoaneurysm (s/p vasc surgery consult and thrombin injection x2), urinary retention (s/p rivas, failed TOV 4/2), fever of unknown origin (on IV cefepime until 4/10/25). Patient resumed Eliquis on 4/2/25. Patient now admitted for a multidisciplinary rehab program with right sided weakness, Transcortical motor aphasia, cognitive deficits, Dysphagia and gait and ADL impairments.     #  Bihemispheric embolic infarcts s/p Thrombectomy x 3  - s/p cerebral angiogram for mechanical thrombectomy of L M1 occlusion, TICI 3 (one pass) on 3/21/25  - s/p cerebral angiogram for mechanical thrombectomy of Rt M2 occlusion, TICI 3 (one pass) on 3/26/25  - s/p thrombectomy of left M4 with TICI 0 3/29/25  - Etiology likely cardioembolic in the setting of atrial fibrillation and underlying hypercoagulability from malignancy  - Aphasia, Right hemiparesis, Dysphagia  - Start comprehensive rehab program of PT/OT/SLP - 3 hours a day, 5 days a week. P&O as needed   - Precautions: Fall, Aspiration  - Eliquis 5 mg BID  - Atorvastatin 10 mg QHS (LDL 58 on 3/22)    # Atrial Fibrillation  - has PPM present  - Eliquis 5 mg BID  - Lopressor 50 mg TID --> decreased to BID (4/18) due to BP being on softer side  - /69 - 133/77 (4/22)    #Elevated BUN  - Bun 28, Cr 0.54 (4/14) -> 30/0.61 (4/17) -> 26/0.52 (4/21)  - Encourage oral hydration, patient is on mildly thickened liquid (MBS today, awaiting results)    #Thrombocytopenia  - Platelets downtrending, Plt 94 (4/21)  - Hospitalist aware, heme consulted  - Protonix discontinued    # Right femoral artery pseudoaneurysm   - s/p thrombin injection x2    # Iatrogenic adrenal insuffiencey   - prednisone 5 mg in AM (8:00) and 2 mg in PM (16:00) - home regimen  - f/u outpatient endocrinology    # Metastatic Melanoma  - metastatic melanoma s/p left foot melanoma and lymph node resection, s/p RUE melanoma and right axillary lymph node excision at [facility] (3/18/25)  - outpatient follow up     # Incidental Pancreatic Cyst  - Incidentally found new 1.7 cm cystic lesion at the pancreatic body on CT a/p on 3/28/25  - GI informed, no inpatient workup needed  - Follow up outpatient with Dr. Maurisio Haas    # Fever of unknown origin, ?aspiration PNA (resolved)  - Blood cultures 3/26, 4/4  no growth   - RVP/COVID 19 PCR 4/4 negative   - UA 4/2 with some pyuria   - Urine Cx 3/26 no growth 4/3 contaminated   - CXR 4/4 reviewed and compared to 3/29, not much change noted  - CT Chest with contrast reporting no PE, + Atelectasis   - Completed Cefepime    # Pain  - Tylenol PRN    # Mood / Cognition  - Neuropsychology consult PRN  - recreational therapy    # Sleep  - Maintain quiet hours and a low stim environment.   - Melatonin PRN to maximize participation in therapy during the day    # GI / Bowel  - Senna qHS  - Miralax Daily  - GI ppx: Protonix 40 mg QD    #  / Bladder  # Urinary Retention  - Continue Rivas present on admission, plan for TOV  - Failed TOV on 4/1-4/2, Rivas replaced 4/3  - Rivas removed 4/10 night for voiding trial  - Flomax 0.4 mg QHS (dc'd 4/11)   - Bethanecol 25 mg TID (started 4/11)  - has not needed anymore SC, voiding, bladder scans discontinued    # Skin / Pressure injury  - Skin assessment on admission performed: IAD to sacrum, right posterior thigh ecchymosis, forehead scab s/p biopsy,  left lateral ankle scar with redness towards heel  - Pressure Injury/Skin: OOB to chair, PT/OT  - nursing to monitor skin qShift    # Dysphagia:  - Diet Consistency: regular with mildly thick liquids  - Supplement: MVI  - Aspiration Precautions  - SLP consult for swallow function evaluation and treatment  - Nutrition consult  - MBS completed 4/22, awaiting results    # DVT prophylaxis:   - on Eliquis 5 mg Q12H      Outpatient Follow-up:  Maurisio Haas  Gastroenterology  [address], [address]  [place] [address]  Phone: [phone]  Fax: [phone]  Follow Up Time: 1 month    Celina Carlin  Hematology/Oncology  [address] [place] [address]  Phone: [phone]  Fax: [phone]  Follow Up Time: 1 month    Juan Diego Johnson  Vascular Neurology  [address] [place] [address]  Phone: [phone]  Fax: [phone]  Follow Up Time: 1 month    Antonio Gaines  Endocrinology/Metab/Diabetes  [address]  [place] [address]  Phone: [phone]  Fax: [phone]  Follow Up Time: 1 month    Milo Murguia  Cardiovascular Disease  [address] [place] [address]  Phone: [phone]  Fax: [phone]  Follow Up Time: 2 weeks    Your Primary Care Provider,   Phone: (   )    -  Fax: (   )    -  Follow Up Time: 1 week    [place]  Urology  [address] [place] [address]  Phone: [phone]  Fax:   Follow Up Time: 1 month    [facility] Vascular Surgery  Vascular Surgery  [address] [place] [address]  Phone: [phone]               Assessment/Plan:  SHARON HULL is a 89 y/o F with PMHx of Afib (off Eliquis), PPM, HTN, HLD, GERD, severe MR/TR, iatrogenic adrenal insufficiency (on prednisone), lymphedema, metastatic melanoma s/p left foot melanoma and lymph node resection, s/p RUE melanoma and right axillary lymph node excision at [facility] (3/18/25), recent admission (3/21-3/25/25) for L MCA CVA s/p M1 thrombectomy, presented to  with left sided weakness and aphasia, found to have right MCA CVA and s/p M2 thrombectomy (3/26/25).  Hospital Course complicated by right sided weakness and aphasia, found to have new Left MCA CVA with M2 occlusion and is s/p 3rd thrombectomy (3/29/25). Hospital course also complicated by pseudoaneurysm (s/p vasc surgery consult and thrombin injection x2), urinary retention (s/p rivas, failed TOV 4/2), fever of unknown origin (on IV cefepime until 4/10/25). Patient resumed Eliquis on 4/2/25. Patient now admitted for a multidisciplinary rehab program with right sided weakness, Transcortical motor aphasia, cognitive deficits, Dysphagia and gait and ADL impairments.     #  Bihemispheric embolic infarcts s/p Thrombectomy x 3  - s/p cerebral angiogram for mechanical thrombectomy of L M1 occlusion, TICI 3 (one pass) on 3/21/25  - s/p cerebral angiogram for mechanical thrombectomy of Rt M2 occlusion, TICI 3 (one pass) on 3/26/25  - s/p thrombectomy of left M4 with TICI 0 3/29/25  - Etiology likely cardioembolic in the setting of atrial fibrillation and underlying hypercoagulability from malignancy  - Aphasia, Right hemiparesis, Dysphagia  - Start comprehensive rehab program of PT/OT/SLP - 3 hours a day, 5 days a week. P&O as needed   - Precautions: Fall, Aspiration  - Eliquis 5 mg BID  - Atorvastatin 10 mg QHS (LDL 58 on 3/22)    # Atrial Fibrillation  - has PPM present  - Eliquis 5 mg BID  - Lopressor 50 mg BID (decreased 4/18) due to BP being on softer side  - /69 - 133/77 (4/22)    #Elevated BUN  - Bun 28, Cr 0.54 (4/14) -> 30/0.61 (4/17) -> 26/0.52 (4/21)  - Encourage oral hydration, patient is on mildly thickened liquid (MBS today, awaiting results)    #Thrombocytopenia  - Platelets downtrending, Plt 94 (4/21)  - Hospitalist aware, heme consulted  - Protonix discontinued    # Right femoral artery pseudoaneurysm   - s/p thrombin injection x2    # Iatrogenic adrenal insuffiencey   - prednisone 5 mg in AM (8:00) and 2 mg in PM (16:00) - home regimen  - f/u outpatient endocrinology    # Metastatic Melanoma  - metastatic melanoma s/p left foot melanoma and lymph node resection, s/p RUE melanoma and right axillary lymph node excision at [facility] (3/18/25)  - outpatient follow up     # Incidental Pancreatic Cyst  - Incidentally found new 1.7 cm cystic lesion at the pancreatic body on CT a/p on 3/28/25  - GI informed, no inpatient workup needed  - Follow up outpatient with Dr. Maurisio Haas      # Pain  - Tylenol PRN    # Mood / Cognition  - Neuropsychology consult PRN  - recreational therapy    # Sleep  - Maintain quiet hours and a low stim environment.   - Melatonin PRN to maximize participation in therapy during the day    # GI / Bowel  - Senna qHS  - Miralax Daily  - GI ppx: Protonix 40 mg QD    #  / Bladder  # Urinary Retention  - Continue Rivas present on admission, plan for TOV  - Failed TOV on 4/1-4/2, Rivas replaced 4/3  - Rivas removed 4/10 night for voiding trial  - Flomax 0.4 mg QHS (dc'd 4/11)   - Bethanecol 25 mg TID (started 4/11)  - has not needed anymore SC, voiding, bladder scans discontinued    # Skin / Pressure injury  - Skin assessment on admission performed: IAD to sacrum, right posterior thigh ecchymosis, forehead scab s/p biopsy,  left lateral ankle scar with redness towards heel  - Pressure Injury/Skin: OOB to chair, PT/OT  - nursing to monitor skin qShift    # Dysphagia:  - Diet Consistency: regular with mildly thick liquids  - Supplement: MVI  - Aspiration Precautions  - SLP consult for swallow function evaluation and treatment  - Nutrition consult  - [facility] [place]--With thin liquids and mildly thick liquids there was fairly consistent penetration with most of the material retrieved but there was persistent coating of the under epiglottis. No jada aspiration.   --Speech therapy will review results further and provide recommendations.     # DVT prophylaxis:   - on Eliquis 5 mg Q12H      Outpatient Follow-up:  Maurisio Haas  Gastroenterology  [address], [address]  [place] [address]  Phone: [phone]  Fax: [phone]  Follow Up Time: 1 month    Celina Carlin  Hematology/Oncology  [address] [place] [address]  Phone: [phone]  Fax: [phone]  Follow Up Time: 1 month    Juan Diego Johnson  Vascular Neurology  [address] [place] [address]  Phone: [phone]  Fax: [phone]  Follow Up Time: 1 month    Antonio Gaines  Endocrinology/Metab/Diabetes  [address]  [place] [address]  Phone: [phone]  Fax: [phone]  Follow Up Time: 1 month    Milo Murguia  Cardiovascular Disease  [address] [place] [address]  Phone: [phone]  Fax: [phone]  Follow Up Time: 2 weeks    Your Primary Care Provider,   Phone: (   )    -  Fax: (   )    -  Follow Up Time: 1 week    [place]  Urology  [address] [place] [address]  Phone: [phone]  Fax:   Follow Up Time: 1 month    [facility] Vascular Surgery  Vascular Surgery  [address] [place] [address]  Phone: [phone]

## 2025-04-22 NOTE — PROGRESS NOTE ADULT - SUBJECTIVE AND OBJECTIVE BOX
HPI:  89 y/o F with PMHx of Afib (off Eliquis), PPM, HTN, HLD, GERD, severe MR/TR, iatrogenic adrenal insufficiency (on prednisone), lymphedema, metastatic melanoma s/p left foot melanoma and lymph node resection, s/p RUE melanoma and right axillary lymph node excision at [facility] (3/18/25). Patient with a recent admission 3/21 -3/25/25  when she initially presented to  with code stroke and was transferred to [facility] where she underwent cerebral angiogram for mechanical thrombectomy of L M1 occlusion, TICI 3 (one pass) on 3/21/25. Patient was discharged and presented back to [facility] 3/26 with L sided weakness and aphasia, transferred to [facility] for stroke codem and is now s/p mechanical thrombectomy of Rt M2 occlusion, TICI 3 (one pass) on 3/26/25. On the early morning of 3/29/25 pt aphasic with right sided weakness. CTH negative for acute hemorrhage. CTA + new Left MCA M2 occulusion and patient subsequently underwent 3rd thrombectomy on 3/29. MRI brain demonstrated acute bilateral multifocal cerebral infarctions and subacute left basal ganglia infarct.  Etiology likely cardioembolic in the setting of atrial fibrillation and underlying hypercoaguability from malignancy.    Hospital course complicated by right groin pseudoaneurysm s/p L MCA thrombectomy. She was seen by vascular surgery and given thrombin injection x2 and recommended for outpatient follow up as needed. Hematology consulted due to concern for hypercoagulable state, anemia, hx of melanoma. CBC trended, overall stable. Hypercoagulable serology panel sent, negative for abnormalities. Follow up outpatient for further monitoring/testing. Hospital course further complicated by urinary retention and rivas catheter placed. Failed TOV 4/1-4/2, rivas replaced 4/3/25, and patient started on flomax.     Endocrine consulted due to hx of adrenal insufficiency; initially started on hydrocortisone then transitioned to home regimen of prednisone 5mg in AM and 2mg in PM. Incidentally found new 1.7 cm cystic lesion at the pancreatic body on CT A/P on 3/28/25. GI informed, no inpatient workup needed. Follow up outpatient with Dr. Maurisio Haas. Patient started on cefepime and vancomycin on 4/4/25 due to fever of unknown origin. ID consulted, Vancomycin discontinued after blood cultures resulted negative. ID recommendations to continue with IV cefepime until 4/10/25.     Patient resumed Eliquis 5mg daily (4/2/25) for secondary stroke prevention. Also re-started on home regimen of atorvastatin 10mg daily; LDL - 59, nonatherosclerotic etiology of stroke, high intensity statin not indicated at this time. PT/OT evaluated patient, recommended dispo to acute rehab. Patient should follow up with neurology/neuro IR, cardiology, endocrinology, hematology, GI, primary care, +/- urology and vascular surgery after discharge. Patient discharged to [facility] on 4/8/25.   (08 Apr 2025 12:56)      Subjective/Objective: Patient seen and assessed in therapy. No overnight events and no new complaints. Slept well. Looking forward to going home tomorrow.    Vital Signs Last 24 Hrs  T(C): 36.4 (22 Apr 2025 07:16), Max: 36.7 (22 Apr 2025 05:20)  T(F): 97.5 (22 Apr 2025 07:16), Max: 98.1 (22 Apr 2025 05:20)  HR: 80 (22 Apr 2025 07:16) (80 - 93)  BP: 124/78 (22 Apr 2025 07:16) (121/69 - 133/77)  RR: 15 (22 Apr 2025 07:16) (15 - 16)  SpO2: 96% (22 Apr 2025 07:16) (96% - 98%)      REVIEW OF SYMPTOMS  (+) aphasia    PHYSICAL EXAM   Gen - NAD, Comfortable  HEENT - NCAT, EOMI  Pulm - breathing comfortably on RA  Cardiovascular - warm, well perfused  Abdomen - Soft, NT/ND,   Extremities - No edema, No calf tenderness  Neuro-     Cognitive - AAOx3 when given yes/no choices, follows commands     Communication - non-fluent aphasia      Motor -                     LEFT    UE - ShAB 5/5, EF 5/5, EE 5/5, WE 5/5,  5/5                    RIGHT UE - ShAB 4/5, EF 4/5, EE 4/5, WE 5/5,  4/5                    LEFT    LE - HF 3/5, KE 5/5, DF 5/5, PF 5/5                    RIGHT LE - HF 2/5, KE 3/5, DF 5/5, PF 5/5     Psychiatric - Mood stable, Affect WNL      RECENT LABS               12.9   3.35  )-----------( 94       ( 21 Apr 2025 05:55 )             37.7     04-21    142  |  107  |  26[H]  ----------------------------<  88  4.0   |  30  |  0.52    Ca    9.2      21 Apr 2025 05:55    TPro  5.9[L]  /  Alb  2.9[L]  /  TBili  0.9  /  DBili  x   /  AST  16  /  ALT  34  /  AlkPhos  55  04-21      MEDICATIONS  (STANDING):  apixaban 5 milliGRAM(s) Oral every 12 hours  atorvastatin 10 milliGRAM(s) Oral at bedtime  bethanechol 25 milliGRAM(s) Oral three times a day  metoprolol tartrate 50 milliGRAM(s) Oral two times a day  multivitamin 1 Tablet(s) Oral daily  nystatin    Suspension 144463 Unit(s) Oral four times a day  polyethylene glycol 3350 17 Gram(s) Oral daily  predniSONE   Tablet 5 milliGRAM(s) Oral <User Schedule>  predniSONE   Tablet 2 milliGRAM(s) Oral <User Schedule>  senna 2 Tablet(s) Oral at bedtime    MEDICATIONS  (PRN):  acetaminophen     Tablet .. 650 milliGRAM(s) Oral every 6 hours PRN Mild Pain (1 - 3)     HPI:  89 y/o F with PMHx of Afib (off Eliquis), PPM, HTN, HLD, GERD, severe MR/TR, iatrogenic adrenal insufficiency (on prednisone), lymphedema, metastatic melanoma s/p left foot melanoma and lymph node resection, s/p RUE melanoma and right axillary lymph node excision at [facility] (3/18/25). Patient with a recent admission 3/21 -3/25/25  when she initially presented to  with code stroke and was transferred to [facility] where she underwent cerebral angiogram for mechanical thrombectomy of L M1 occlusion, TICI 3 (one pass) on 3/21/25. Patient was discharged and presented back to [facility] 3/26 with L sided weakness and aphasia, transferred to [facility] for stroke codem and is now s/p mechanical thrombectomy of Rt M2 occlusion, TICI 3 (one pass) on 3/26/25. On the early morning of 3/29/25 pt aphasic with right sided weakness. CTH negative for acute hemorrhage. CTA + new Left MCA M2 occulusion and patient subsequently underwent 3rd thrombectomy on 3/29. MRI brain demonstrated acute bilateral multifocal cerebral infarctions and subacute left basal ganglia infarct.  Etiology likely cardioembolic in the setting of atrial fibrillation and underlying hypercoaguability from malignancy.    Hospital course complicated by right groin pseudoaneurysm s/p L MCA thrombectomy. She was seen by vascular surgery and given thrombin injection x2 and recommended for outpatient follow up as needed. Hematology consulted due to concern for hypercoagulable state, anemia, hx of melanoma. CBC trended, overall stable. Hypercoagulable serology panel sent, negative for abnormalities. Follow up outpatient for further monitoring/testing. Hospital course further complicated by urinary retention and rivas catheter placed. Failed TOV 4/1-4/2, rivas replaced 4/3/25, and patient started on flomax.     Endocrine consulted due to hx of adrenal insufficiency; initially started on hydrocortisone then transitioned to home regimen of prednisone 5mg in AM and 2mg in PM. Incidentally found new 1.7 cm cystic lesion at the pancreatic body on CT A/P on 3/28/25. GI informed, no inpatient workup needed. Follow up outpatient with Dr. Maurisio Haas. Patient started on cefepime and vancomycin on 4/4/25 due to fever of unknown origin. ID consulted, Vancomycin discontinued after blood cultures resulted negative. ID recommendations to continue with IV cefepime until 4/10/25.     Patient resumed Eliquis 5mg daily (4/2/25) for secondary stroke prevention. Also re-started on home regimen of atorvastatin 10mg daily; LDL - 59, nonatherosclerotic etiology of stroke, high intensity statin not indicated at this time. PT/OT evaluated patient, recommended dispo to acute rehab. Patient should follow up with neurology/neuro IR, cardiology, endocrinology, hematology, GI, primary care, +/- urology and vascular surgery after discharge. Patient discharged to [facility] on 4/8/25.   (08 Apr 2025 12:56)      Subjective/Objective: Patient seen and assessed in therapy. No overnight events and no new complaints. Slept well. Looking forward to going home tomorrow.  Pt. had MBS this AM.      Vital Signs Last 24 Hrs  T(C): 36.4 (22 Apr 2025 07:16), Max: 36.7 (22 Apr 2025 05:20)  T(F): 97.5 (22 Apr 2025 07:16), Max: 98.1 (22 Apr 2025 05:20)  HR: 80 (22 Apr 2025 07:16) (80 - 93)  BP: 124/78 (22 Apr 2025 07:16) (121/69 - 133/77)  RR: 15 (22 Apr 2025 07:16) (15 - 16)  SpO2: 96% (22 Apr 2025 07:16) (96% - 98%)      REVIEW OF SYMPTOMS  (+) aphasia    PHYSICAL EXAM   Gen - NAD, Comfortable  HEENT - NCAT, EOMI  Pulm - breathing comfortably on RA  Cardiovascular - warm, well perfused  Abdomen - Soft, NT/ND,   Extremities - No edema, No calf tenderness  Neuro-     Cognitive - AAOx3 when given yes/no choices, follows commands     Communication - non-fluent aphasia      Motor -                     LEFT    UE - ShAB 5/5, EF 5/5, EE 5/5, WE 5/5,  5/5                    RIGHT UE - ShAB 4/5, EF 4/5, EE 4/5, WE 5/5,  4/5                    LEFT    LE - HF 3/5, KE 5/5, DF 5/5, PF 5/5                    RIGHT LE - HF 2/5, KE 3/5, DF 5/5, PF 5/5     Psychiatric - Mood stable, Affect WNL      RECENT LABS               12.9   3.35  )-----------( 94       ( 21 Apr 2025 05:55 )             37.7     04-21    142  |  107  |  26[H]  ----------------------------<  88  4.0   |  30  |  0.52    Ca    9.2      21 Apr 2025 05:55    TPro  5.9[L]  /  Alb  2.9[L]  /  TBili  0.9  /  DBili  x   /  AST  16  /  ALT  34  /  AlkPhos  55  04-21    RADIOLOGY--   EXAM:  XR SWAL FUNC FREDRICK VID CON STDY   ORDERED BY:    IRENA GUY     PROCEDURE DATE:  04/22/2025          INTERPRETATION:  Swallowing function video. Patient has swallowing   difficulty. Barium was used as contrast.    With thinliquids and mildly thick liquids there was fairly consistent   penetration with most of the material retrieved but there was persistent   coating of the under epiglottis. No jada aspiration. With thicker   materials no penetration or aspiration was evident.    IMPRESSION: As above.      MEDICATIONS  (STANDING):  apixaban 5 milliGRAM(s) Oral every 12 hours  atorvastatin 10 milliGRAM(s) Oral at bedtime  bethanechol 25 milliGRAM(s) Oral three times a day  metoprolol tartrate 50 milliGRAM(s) Oral two times a day  multivitamin 1 Tablet(s) Oral daily  nystatin    Suspension 145738 Unit(s) Oral four times a day  polyethylene glycol 3350 17 Gram(s) Oral daily  predniSONE   Tablet 5 milliGRAM(s) Oral <User Schedule>  predniSONE   Tablet 2 milliGRAM(s) Oral <User Schedule>  senna 2 Tablet(s) Oral at bedtime    MEDICATIONS  (PRN):  acetaminophen     Tablet .. 650 milliGRAM(s) Oral every 6 hours PRN Mild Pain (1 - 3)

## 2025-04-22 NOTE — PROGRESS NOTE ADULT - SUBJECTIVE AND OBJECTIVE BOX
Patient is a 89y old  Female who presents with a chief complaint of Left MCA CVA s/p M1 and M2 thrombectomy, Right MCA CVA s/p M2 thrombectomy (21 Apr 2025 18:58)      SUBJECTIVE / OVERNIGHT EVENTS:  Pt seen and examined at bedside. No acute events overnight.  Pt denies cp, palpitations, sob, abd pain, N/V, fever, chills.    ROS:  All other review of systems negative    Allergies    penicillins (Hives)    Intolerances        MEDICATIONS  (STANDING):  apixaban 5 milliGRAM(s) Oral every 12 hours  atorvastatin 10 milliGRAM(s) Oral at bedtime  bethanechol 25 milliGRAM(s) Oral three times a day  famotidine    Tablet 20 milliGRAM(s) Oral daily  metoprolol tartrate 50 milliGRAM(s) Oral two times a day  multivitamin 1 Tablet(s) Oral daily  nystatin    Suspension 499819 Unit(s) Oral four times a day  polyethylene glycol 3350 17 Gram(s) Oral daily  predniSONE   Tablet 5 milliGRAM(s) Oral <User Schedule>  predniSONE   Tablet 2 milliGRAM(s) Oral <User Schedule>  senna 2 Tablet(s) Oral at bedtime    MEDICATIONS  (PRN):  acetaminophen     Tablet .. 650 milliGRAM(s) Oral every 6 hours PRN Mild Pain (1 - 3)      Vital Signs Last 24 Hrs  T(C): 36.4 (22 Apr 2025 07:16), Max: 36.7 (21 Apr 2025 08:45)  T(F): 97.5 (22 Apr 2025 07:16), Max: 98.1 (22 Apr 2025 05:20)  HR: 80 (22 Apr 2025 07:16) (80 - 93)  BP: 124/78 (22 Apr 2025 07:16) (121/69 - 133/77)  BP(mean): --  RR: 15 (22 Apr 2025 07:16) (15 - 16)  SpO2: 96% (22 Apr 2025 07:16) (96% - 98%)    Parameters below as of 22 Apr 2025 07:16  Patient On (Oxygen Delivery Method): room air      CAPILLARY BLOOD GLUCOSE        I&O's Summary    21 Apr 2025 07:01  -  22 Apr 2025 07:00  --------------------------------------------------------  IN: 0 mL / OUT: 1 mL / NET: -1 mL        PHYSICAL EXAM:  GENERAL: NAD, well-developed Elderly female   HEAD:  Atraumatic, Normocephalic  NECK: Supple, No JVD  CHEST/LUNG: Clear to auscultation bilaterally; No wheeze, nonlabored breathing  HEART: Regular rate and rhythm; No murmurs, rubs, or gallops  ABDOMEN: Soft, Nontender, Nondistended; Bowel sounds present  EXTREMITIES:  No clubbing, cyanosis, or edema  PSYCH: calm, appropriate mood    LABS:                        11.5   3.84  )-----------( 84       ( 22 Apr 2025 05:30 )             35.6     04-21    142  |  107  |  26[H]  ----------------------------<  88  4.0   |  30  |  0.52    Ca    9.2      21 Apr 2025 05:55    TPro  5.9[L]  /  Alb  2.9[L]  /  TBili  0.9  /  DBili  x   /  AST  16  /  ALT  34  /  AlkPhos  55  04-21          Urinalysis Basic - ( 21 Apr 2025 05:55 )    Color: x / Appearance: x / SG: x / pH: x  Gluc: 88 mg/dL / Ketone: x  / Bili: x / Urobili: x   Blood: x / Protein: x / Nitrite: x   Leuk Esterase: x / RBC: x / WBC x   Sq Epi: x / Non Sq Epi: x / Bacteria: x        RADIOLOGY & ADDITIONAL TESTS:  Results Reviewed:   Imaging Personally Reviewed:  Electrocardiogram Personally Reviewed:    COORDINATION OF CARE:  Care Discussed with Consultants/Other Providers [Y/N]:  Prior or Outpatient Records Reviewed [Y/N]:

## 2025-04-22 NOTE — PROGRESS NOTE ADULT - ATTENDING COMMENTS
Pt. seen with resident.  Agree with documentation above as per resident with amendments made as appropriate. Patient medically stable. Making progress towards rehab goals.     left MCA infarct.     Atrial Fibrillation  - has PPM present  - Eliquis 5 mg BID  - Lopressor 50 mg BID (decreased 4/18) due to BP being on softer side  - /69 - 133/77 (4/22)    #Elevated BUN  - Bun 28, Cr 0.54 (4/14) -> 30/0.61 (4/17) -> 26/0.52 (4/21)  - Encourage oral hydration, patient is on mildly thickened liquid (MBS today, awaiting results)    #Thrombocytopenia  - Platelets downtrending, Plt 94 (4/21)  - Hospitalist aware, heme consulted  - Protonix discontinued    MBS completed 4/22--With thin liquids and mildly thick liquids there was fairly consistent penetration with most of the material retrieved but there was persistent coating of the under epiglottis. No jada aspiration.   --Speech therapy will review results further and provide recommendations.    d/w planning to home tomorrow. Pt. seen with resident.  Agree with documentation above as per resident with amendments made as appropriate. Patient medically stable. Making progress towards rehab goals.     left MCA infarct.     Atrial Fibrillation  - has PPM present  - Eliquis 5 mg BID  - Lopressor 50 mg BID (decreased 4/18) due to BP being on softer side  - /69 - 133/77 (4/22)    #Elevated BUN  - Bun 28, Cr 0.54 (4/14) -> 30/0.61 (4/17) -> 26/0.52 (4/21)  - Encourage oral hydration, patient is on mildly thickened liquid (MBS today, awaiting results)    #Thrombocytopenia  - Platelets downtrending, Plt 94 (4/21)  - Hospitalist aware, heme consulted  - Protonix discontinued    MBS completed 4/22--With thin liquids and mildly thick liquids there was fairly consistent penetration with most of the material retrieved but there was persistent coating of the under epiglottis. No jada aspiration.   --Speech therapy will review results further and provide recommendations.    d/w planning to home tomorrow.    ** Spoke with pt's  _son, Philip___, to provide update on pt's status,  medical update, discharge medications, and discharge needs/expectations.  Pt. will have supervision/ assistance at home from family. discussed results of pt's MBS and need for Mildly thick liquid at home.   All questions answered.

## 2025-04-22 NOTE — PROGRESS NOTE ADULT - ASSESSMENT
88 y/o F with PMH of Afib (off Eliquis), PPM, HTN, HLD, GERD, severe MR/TR, iatrogenic adrenal insufficiency (on prednisone), lymphedema, metastatic melanoma s/p left foot melanoma and lymph node resection, s/p RUE melanoma and right axillary lymph node excision at [facility] (3/18/25), recent admission (3/21-3/25/25) for L MCA CVA s/p M1 thrombectomy, presented to  with left sided weakness and aphasia, found to have right MCA CVA and s/p M2 thrombectomy (3/26/25). Hospital course complicated by right sided weakness and aphasia, found to have new Left MCA CVA with M2 occlusion and is s/p 3rd thrombectomy (3/29/25). Hospital course also complicated by pseudoaneurysm (s/p vasc surgery consult and thrombin injection x2), urinary retention (s/p rivas, failed TOV 4/2), fever of unknown origin (on IV cefepime until 4/10/25). Patient resumed Eliquis on 4/2/25.    #Thrombocytopenia  - Noted platelet count downtrending  - Asymptomatic  - Medications reviewed for Drug induced thrombocytopenia; Protonix switched to Pepcid  - Hematology recommendations appreciated; No contraindication to AC  - Monitor CBC for now    #Bi-Hemispheric Embolic Infarcts s/p Thrombectomy x 3  #Aphasia, Right hemiparesis, Dysphagia  - Etiology likely cardioembolic in the setting of atrial fibrillation and underlying hypercoagulability from malignancy  - Continue Eliquis 5 mg BID  - Continue Atorvastatin 10 mg QHS    # Chronic Atrial Fibrillation  # Status Post PPM   - Continue metoprolol - changed from 50mg TID to 50mg BID 4/18 - BPs improving  - Continue Eliquis 5mg BID    # Right femoral artery pseudoaneurysm   - s/p thrombin injection x2  - Outpatient follow up     # Iatrogenic Adrenal insufficiency  - Prednisone 5 mg in AM (8:00) and 2 mg in PM (16:00) - home regimen  - f/u outpatient endocrinology    # Metastatic Melanoma  - Metastatic melanoma s/p left foot melanoma and lymph node resection, s/p RUE melanoma and right axillary lymph node excision at [facility] (3/18/25)  - Outpatient follow up     # Incidental Pancreatic Cyst  - Incidentally found new 1.7 cm cystic lesion at the pancreatic body on CT a/p on 3/28/25  - GI informed, no inpatient workup needed  - Follow up outpatient with Dr. Maurisio Haas    DVT prophylaxis: Eliquis 5 mg Q12H

## 2025-04-23 ENCOUNTER — TRANSCRIPTION ENCOUNTER (OUTPATIENT)
Age: 89
End: 2025-04-23

## 2025-04-23 VITALS
HEART RATE: 96 BPM | SYSTOLIC BLOOD PRESSURE: 120 MMHG | DIASTOLIC BLOOD PRESSURE: 80 MMHG | TEMPERATURE: 97 F | RESPIRATION RATE: 14 BRPM | OXYGEN SATURATION: 98 %

## 2025-04-23 PROCEDURE — 99232 SBSQ HOSP IP/OBS MODERATE 35: CPT

## 2025-04-23 PROCEDURE — 99238 HOSP IP/OBS DSCHRG MGMT 30/<: CPT

## 2025-04-23 RX ADMIN — APIXABAN 5 MILLIGRAM(S): 2.5 TABLET, FILM COATED ORAL at 05:36

## 2025-04-23 RX ADMIN — Medication 25 MILLIGRAM(S): at 05:36

## 2025-04-23 RX ADMIN — Medication 500000 UNIT(S): at 05:36

## 2025-04-23 RX ADMIN — PREDNISONE 5 MILLIGRAM(S): 20 TABLET ORAL at 08:13

## 2025-04-23 RX ADMIN — METOPROLOL SUCCINATE 50 MILLIGRAM(S): 50 TABLET, EXTENDED RELEASE ORAL at 05:36

## 2025-04-23 NOTE — PROGRESS NOTE ADULT - PROBLEM SELECTOR PROBLEM 1
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
Thrombocytopenia
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
Thrombocytopenia

## 2025-04-23 NOTE — DISCHARGE NOTE NURSING/CASE MANAGEMENT/SOCIAL WORK - PATIENT PORTAL LINK FT
You can access the FollowMyHealth Patient Portal offered by [facility] by registering at the following website: [email or web address]. By joining [employer]’s FollowMyHealth portal, you will also be able to view your health information using other applications (apps) compatible with our system.

## 2025-04-23 NOTE — PROGRESS NOTE ADULT - ASSESSMENT
89 y/o F with PMH of Afib (off Eliquis), PPM, HTN, HLD, GERD, severe MR/TR, iatrogenic adrenal insufficiency (on prednisone), lymphedema, metastatic melanoma s/p left foot melanoma and lymph node resection, s/p RUE melanoma and right axillary lymph node excision at [facility] (3/18/25), recent admission (3/21-3/25/25) for L MCA CVA s/p M1 thrombectomy, presented to  with left sided weakness and aphasia, found to have right MCA CVA and s/p M2 thrombectomy (3/26/25). Hospital course complicated by right sided weakness and aphasia, found to have new Left MCA CVA with M2 occlusion and is s/p 3rd thrombectomy (3/29/25). Hospital course also complicated by pseudoaneurysm (s/p vasc surgery consult and thrombin injection x2), urinary retention (s/p rivas, failed TOV 4/2), fever of unknown origin (on IV cefepime until 4/10/25). Patient resumed Eliquis on 4/2/25.    #Thrombocytopenia  - Noted platelet count downtrending  - Asymptomatic  - Medications reviewed for Drug induced thrombocytopenia; Protonix switched to Pepcid  - Hematology recommendations appreciated; No contraindication to AC  - Monitor CBC for now    #Bi-Hemispheric Embolic Infarcts s/p Thrombectomy x 3  #Aphasia, Right hemiparesis, Dysphagia  - Etiology likely cardioembolic in the setting of atrial fibrillation and underlying hypercoagulability from malignancy  - Continue Eliquis 5 mg BID  - Continue Atorvastatin 10 mg QHS    # Chronic Atrial Fibrillation  # Status Post PPM   - Continue metoprolol - changed from 50mg TID to 50mg BID 4/18 - BPs improving  - Continue Eliquis 5mg BID    # Right femoral artery pseudoaneurysm   - s/p thrombin injection x2  - Outpatient follow up     # Iatrogenic Adrenal insufficiency  - Prednisone 5 mg in AM (8:00) and 2 mg in PM (16:00) - home regimen  - f/u outpatient endocrinology    # Metastatic Melanoma  - Metastatic melanoma s/p left foot melanoma and lymph node resection, s/p RUE melanoma and right axillary lymph node excision at [facility] [place] (3/18/25)  - Outpatient follow up     # Incidental Pancreatic Cyst  - Incidentally found new 1.7 cm cystic lesion at the pancreatic body on CT a/p on 3/28/25  - GI informed, no inpatient workup needed  - Follow up outpatient with Dr. Maurisio Haas    DVT prophylaxis: Eliquis 5 mg Q12H

## 2025-04-23 NOTE — DISCHARGE NOTE NURSING/CASE MANAGEMENT/SOCIAL WORK - NSDCFUADDAPPT_GEN_ALL_CORE_FT
I sent pt a message through [employer]   Please follow up with your primary care physician (PCP) as soon as possible.    If you are in need of a PCP or a specialist in your area: contact the Health system Physician Referral Service at [phone].

## 2025-04-23 NOTE — PROGRESS NOTE ADULT - REASON FOR ADMISSION
Left MCA CVA s/p M1 and M2 thrombectomy, Right MCA CVA s/p M2 thrombectomy

## 2025-04-23 NOTE — PROGRESS NOTE ADULT - PROBLEM SELECTOR PLAN 2
Recurrent melanoma upper extremity, s/p resection. Stable for discharge; will follow up at [facility] for systemic treatment.
Recurrent melanoma upper extremity, s/p resection.  Patient has completed maintenance immunotherapy in the past.  Needs to be reevaluated with PET in ambulatory for staging.   Case discussed with Philip, her son, who is aware of the plan.

## 2025-04-23 NOTE — PROGRESS NOTE ADULT - SUBJECTIVE AND OBJECTIVE BOX
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 No acute events overnight.  Pt denies cp, palpitations, sob, abd pain, N/V, fever, chills.    ROS:  All other review of systems negative    Allergies    penicillins (Hives)    Intolerances        MEDICATIONS  (STANDING):  apixaban 5 milliGRAM(s) Oral every 12 hours  atorvastatin 10 milliGRAM(s) Oral at bedtime  bethanechol 25 milliGRAM(s) Oral three times a day  famotidine    Tablet 20 milliGRAM(s) Oral daily  metoprolol tartrate 50 milliGRAM(s) Oral two times a day  multivitamin 1 Tablet(s) Oral daily  nystatin    Suspension 728699 Unit(s) Oral four times a day  polyethylene glycol 3350 17 Gram(s) Oral daily  predniSONE   Tablet 5 milliGRAM(s) Oral <User Schedule>  predniSONE   Tablet 2 milliGRAM(s) Oral <User Schedule>  senna 2 Tablet(s) Oral at bedtime    MEDICATIONS  (PRN):  acetaminophen     Tablet .. 650 milliGRAM(s) Oral every 6 hours PRN Mild Pain (1 - 3)      Vital Signs Last 24 Hrs  T(C): 36.3 (23 Apr 2025 08:02), Max: 36.7 (22 Apr 2025 19:41)  T(F): 97.4 (23 Apr 2025 08:02), Max: 98.1 (22 Apr 2025 19:41)  HR: 96 (23 Apr 2025 08:02) (72 - 96)  BP: 120/80 (23 Apr 2025 08:02) (114/79 - 122/69)  BP(mean): --  RR: 14 (23 Apr 2025 08:02) (14 - 15)  SpO2: 98% (23 Apr 2025 08:02) (95% - 98%)    Parameters below as of 23 Apr 2025 08:02  Patient On (Oxygen Delivery Method): room air      CAPILLARY BLOOD GLUCOSE        I&O's Summary      PHYSICAL EXAM:  GENERAL: NAD, well-developed Elderly female   HEAD:  Atraumatic, Normocephalic  NECK: Supple, No JVD  CHEST/LUNG: Clear to auscultation bilaterally; No wheeze, nonlabored breathing  HEART: Regular rate and rhythm; No murmurs, rubs, or gallops  ABDOMEN: Soft, Nontender, Nondistended; Bowel sounds present  EXTREMITIES:  No clubbing, cyanosis, or edema  PSYCH: calm, appropriate mood    LABS:                        11.5   3.84  )-----------( 84       ( 22 Apr 2025 05:30 )             35.6                     RADIOLOGY & ADDITIONAL TESTS:  Results Reviewed:   Imaging Personally Reviewed:  Electrocardiogram Personally Reviewed:    COORDINATION OF CARE:  Care Discussed with Consultants/Other Providers [Y/N]:  Prior or Outpatient Records Reviewed [Y/N]:

## 2025-04-23 NOTE — PROGRESS NOTE ADULT - PROVIDER SPECIALTY LIST ADULT
Hospitalist
Physiatry
Brain Injury Medicine
Brain Injury Medicine
Hospitalist
Physiatry
Hospitalist
Physiatry
Brain Injury Medicine
Hospitalist
Physiatry
Heme/Onc
Hospitalist
Heme/Onc

## 2025-04-23 NOTE — PROGRESS NOTE ADULT - PROBLEM SELECTOR PLAN 1
Platelet count at 84K, no clinical evidence of active bleeding. Favor observation.  Continue present medication.

## 2025-04-23 NOTE — PROGRESS NOTE ADULT - NUTRITIONAL ASSESSMENT
This patient has been assessed with a concern for Malnutrition and has been determined to have a diagnosis/diagnoses of Severe protein-calorie malnutrition.    This patient is being managed with:   Diet Regular-  Mildly Thick Liquids (MILDTHICKLIQS)  Entered: Apr 8 2025  2:53PM  

## 2025-04-23 NOTE — PROGRESS NOTE ADULT - SUBJECTIVE AND OBJECTIVE BOX
HPI:  89 y/o F with PMHx of Afib (off Eliquis), PPM, HTN, HLD, GERD, severe MR/TR, iatrogenic adrenal insufficiency (on prednisone), lymphedema, metastatic melanoma s/p left foot melanoma and lymph node resection, s/p RUE melanoma and right axillary lymph node excision at [facility] (3/18/25). Patient with a recent admission 3/21 -3/25/25  when she initially presented to  with code stroke and was transferred to [facility] where she underwent cerebral angiogram for mechanical thrombectomy of L M1 occlusion, TICI 3 (one pass) on 3/21/25. Patient was discharged and presented back to [facility] 3/26 with L sided weakness and aphasia, transferred to [facility] for stroke codem and is now s/p mechanical thrombectomy of Rt M2 occlusion, TICI 3 (one pass) on 3/26/25. On the early morning of 3/29/25 pt aphasic with right sided weakness. CTH negative for acute hemorrhage. CTA + new Left MCA M2 occulusion and patient subsequently underwent 3rd thrombectomy on 3/29. MRI brain demonstrated acute bilateral multifocal cerebral infarctions and subacute left basal ganglia infarct.  Etiology likely cardioembolic in the setting of atrial fibrillation and underlying hypercoaguability from malignancy.    Hospital course complicated by right groin pseudoaneurysm s/p L MCA thrombectomy. She was seen by vascular surgery and given thrombin injection x2 and recommended for outpatient follow up as needed. Hematology consulted due to concern for hypercoagulable state, anemia, hx of melanoma. CBC trended, overall stable. Hypercoagulable serology panel sent, negative for abnormalities. Follow up outpatient for further monitoring/testing. Hospital course further complicated by urinary retention and rivas catheter placed. Failed TOV 4/1-4/2, rivas replaced 4/3/25, and patient started on flomax.     Endocrine consulted due to hx of adrenal insufficiency; initially started on hydrocortisone then transitioned to home regimen of prednisone 5mg in AM and 2mg in PM. Incidentally found new 1.7 cm cystic lesion at the pancreatic body on CT A/P on 3/28/25. GI informed, no inpatient workup needed. Follow up outpatient with Dr. Maurisio Haas. Patient started on cefepime and vancomycin on 4/4/25 due to fever of unknown origin. ID consulted, Vancomycin discontinued after blood cultures resulted negative. ID recommendations to continue with IV cefepime until 4/10/25.     Patient resumed Eliquis 5mg daily (4/2/25) for secondary stroke prevention. Also re-started on home regimen of atorvastatin 10mg daily; LDL - 59, nonatherosclerotic etiology of stroke, high intensity statin not indicated at this time. PT/OT evaluated patient, recommended dispo to acute rehab. Patient should follow up with neurology/neuro IR, cardiology, endocrinology, hematology, GI, primary care, +/- urology and vascular surgery after discharge. Patient discharged to [facility] on 4/8/25.   (08 Apr 2025 12:56)          Subjective:  Seen this AM.  No new complaints.  Looking forward to discharge home today.       VITALS  Vital Signs Last 24 Hrs  T(C): 36.3 (23 Apr 2025 08:02), Max: 36.7 (22 Apr 2025 19:41)  T(F): 97.4 (23 Apr 2025 08:02), Max: 98.1 (22 Apr 2025 19:41)  HR: 96 (23 Apr 2025 08:02) (72 - 96)  BP: 120/80 (23 Apr 2025 08:02) (114/79 - 122/69)  BP(mean): --  RR: 14 (23 Apr 2025 08:02) (14 - 15)  SpO2: 98% (23 Apr 2025 08:02) (95% - 98%)    Parameters below as of 23 Apr 2025 08:02  Patient On (Oxygen Delivery Method): room air        REVIEW OF SYMPTOMS  Neurological deficits      PHYSICAL EXAM   Gen - NAD, Comfortable  HEENT - NCAT, EOMI  Pulm - breathing comfortably on RA  Cardiovascular - warm, well perfused  Abdomen - Soft, NT/ND,   Extremities - No edema, No calf tenderness  Neuro-     Cognitive - AAOx3 when given yes/no choices, follows commands     Communication - non-fluent aphasia      Motor -                     LEFT    UE - ShAB 5/5, EF 5/5, EE 5/5, WE 5/5,  5/5                    RIGHT UE - ShAB 4/5, EF 4/5, EE 4/5, WE 5/5,  4/5                    LEFT    LE - HF 3/5, KE 5/5, DF 5/5, PF 5/5                    RIGHT LE - HF 2/5, KE 3/5, DF 5/5, PF 5/5     Psychiatric - Mood stable, Affect WNL    RECENT LABS                        11.5   3.84  )-----------( 84       ( 22 Apr 2025 05:30 )             35.6                   RADIOLOGY/OTHER RESULTS      MEDICATIONS  (STANDING):  apixaban 5 milliGRAM(s) Oral every 12 hours  atorvastatin 10 milliGRAM(s) Oral at bedtime  bethanechol 25 milliGRAM(s) Oral three times a day  famotidine    Tablet 20 milliGRAM(s) Oral daily  metoprolol tartrate 50 milliGRAM(s) Oral two times a day  multivitamin 1 Tablet(s) Oral daily  nystatin    Suspension 460150 Unit(s) Oral four times a day  polyethylene glycol 3350 17 Gram(s) Oral daily  predniSONE   Tablet 5 milliGRAM(s) Oral <User Schedule>  predniSONE   Tablet 2 milliGRAM(s) Oral <User Schedule>  senna 2 Tablet(s) Oral at bedtime    MEDICATIONS  (PRN):  acetaminophen     Tablet .. 650 milliGRAM(s) Oral every 6 hours PRN Mild Pain (1 - 3)         HPI:  89 y/o F with PMHx of Afib (off Eliquis), PPM, HTN, HLD, GERD, severe MR/TR, iatrogenic adrenal insufficiency (on prednisone), lymphedema, metastatic melanoma s/p left foot melanoma and lymph node resection, s/p RUE melanoma and right axillary lymph node excision at [facility] (3/18/25). Patient with a recent admission 3/21 -3/25/25  when she initially presented to  with code stroke and was transferred to [facility] where she underwent cerebral angiogram for mechanical thrombectomy of L M1 occlusion, TICI 3 (one pass) on 3/21/25. Patient was discharged and presented back to [facility] 3/26 with L sided weakness and aphasia, transferred to [facility] for stroke codem and is now s/p mechanical thrombectomy of Rt M2 occlusion, TICI 3 (one pass) on 3/26/25. On the early morning of 3/29/25 pt aphasic with right sided weakness. CTH negative for acute hemorrhage. CTA + new Left MCA M2 occulusion and patient subsequently underwent 3rd thrombectomy on 3/29. MRI brain demonstrated acute bilateral multifocal cerebral infarctions and subacute left basal ganglia infarct.  Etiology likely cardioembolic in the setting of atrial fibrillation and underlying hypercoaguability from malignancy.    Hospital course complicated by right groin pseudoaneurysm s/p L MCA thrombectomy. She was seen by vascular surgery and given thrombin injection x2 and recommended for outpatient follow up as needed. Hematology consulted due to concern for hypercoagulable state, anemia, hx of melanoma. CBC trended, overall stable. Hypercoagulable serology panel sent, negative for abnormalities. Follow up outpatient for further monitoring/testing. Hospital course further complicated by urinary retention and rivas catheter placed. Failed TOV 4/1-4/2, rivas replaced 4/3/25, and patient started on flomax.     Endocrine consulted due to hx of adrenal insufficiency; initially started on hydrocortisone then transitioned to home regimen of prednisone 5mg in AM and 2mg in PM. Incidentally found new 1.7 cm cystic lesion at the pancreatic body on CT A/P on 3/28/25. GI informed, no inpatient workup needed. Follow up outpatient with Dr. Maurisio Haas. Patient started on cefepime and vancomycin on 4/4/25 due to fever of unknown origin. ID consulted, Vancomycin discontinued after blood cultures resulted negative. ID recommendations to continue with IV cefepime until 4/10/25.     Patient resumed Eliquis 5mg daily (4/2/25) for secondary stroke prevention. Also re-started on home regimen of atorvastatin 10mg daily; LDL - 59, nonatherosclerotic etiology of stroke, high intensity statin not indicated at this time. PT/OT evaluated patient, recommended dispo to acute rehab. Patient should follow up with neurology/neuro IR, cardiology, endocrinology, hematology, GI, primary care, +/- urology and vascular surgery after discharge. Patient discharged to [facility] on 4/8/25.   (08 Apr 2025 12:56)        Subjective:  Seen this AM.  No new complaints.  Looking forward to discharge home today.       VITALS  Vital Signs Last 24 Hrs  T(C): 36.3 (23 Apr 2025 08:02), Max: 36.7 (22 Apr 2025 19:41)  T(F): 97.4 (23 Apr 2025 08:02), Max: 98.1 (22 Apr 2025 19:41)  HR: 96 (23 Apr 2025 08:02) (72 - 96)  BP: 120/80 (23 Apr 2025 08:02) (114/79 - 122/69)  BP(mean): --  RR: 14 (23 Apr 2025 08:02) (14 - 15)  SpO2: 98% (23 Apr 2025 08:02) (95% - 98%)    Parameters below as of 23 Apr 2025 08:02  Patient On (Oxygen Delivery Method): room air        REVIEW OF SYMPTOMS  Neurological deficits      PHYSICAL EXAM   Gen - NAD, Comfortable  HEENT - NCAT, EOMI  Pulm - breathing comfortably on RA  Cardiovascular - warm, well perfused  Abdomen - Soft, NT/ND,   Extremities - No edema, No calf tenderness  Neuro-     Cognitive - AAOx3 when given yes/no choices, follows commands     Communication - non-fluent aphasia      Motor -                     LEFT    UE - ShAB 5/5, EF 5/5, EE 5/5, WE 5/5,  5/5                    RIGHT UE - ShAB 4/5, EF 4/5, EE 4/5, WE 5/5,  4/5                    LEFT    LE - HF 3/5, KE 5/5, DF 5/5, PF 5/5                    RIGHT LE - HF 2/5, KE 3/5, DF 5/5, PF 5/5     Psychiatric - Mood stable, Affect WNL    RECENT LABS                        11.5   3.84  )-----------( 84       ( 22 Apr 2025 05:30 )             35.6                   RADIOLOGY/OTHER RESULTS      MEDICATIONS  (STANDING):  apixaban 5 milliGRAM(s) Oral every 12 hours  atorvastatin 10 milliGRAM(s) Oral at bedtime  bethanechol 25 milliGRAM(s) Oral three times a day  famotidine    Tablet 20 milliGRAM(s) Oral daily  metoprolol tartrate 50 milliGRAM(s) Oral two times a day  multivitamin 1 Tablet(s) Oral daily  nystatin    Suspension 935759 Unit(s) Oral four times a day  polyethylene glycol 3350 17 Gram(s) Oral daily  predniSONE   Tablet 5 milliGRAM(s) Oral <User Schedule>  predniSONE   Tablet 2 milliGRAM(s) Oral <User Schedule>  senna 2 Tablet(s) Oral at bedtime    MEDICATIONS  (PRN):  acetaminophen     Tablet .. 650 milliGRAM(s) Oral every 6 hours PRN Mild Pain (1 - 3)

## 2025-04-23 NOTE — PROGRESS NOTE ADULT - SUBJECTIVE AND OBJECTIVE BOX
SHARON HULL, 89y Female  MRN: [id number]  ATTENDING: Nara Canseco    HPI:  89 y/o F with PMHx of Afib (off Eliquis), PPM, HTN, HLD, GERD, severe MR/TR, iatrogenic adrenal insufficiency (on prednisone), lymphedema, metastatic melanoma s/p left foot melanoma and lymph node resection, s/p RUE melanoma and right axillary lymph node excision at [facility] (3/18/25). Patient with a recent admission 3/21 -3/25/25  when she initially presented to  with code stroke and was transferred to [facility] where she underwent cerebral angiogram for mechanical thrombectomy of L M1 occlusion, TICI 3 (one pass) on 3/21/25. Patient was discharged and presented back to [facility] 3/26 with L sided weakness and aphasia, transferred to [facility] for stroke codem and is now s/p mechanical thrombectomy of Rt M2 occlusion, TICI 3 (one pass) on 3/26/25. On the early morning of 3/29/25 pt aphasic with right sided weakness. CTH negative for acute hemorrhage. CTA + new Left MCA M2 occulusion and patient subsequently underwent 3rd thrombectomy on 3/29. MRI brain demonstrated acute bilateral multifocal cerebral infarctions and subacute left basal ganglia infarct.  Etiology likely cardioembolic in the setting of atrial fibrillation and underlying hypercoaguability from malignancy.  Hospital course complicated by right groin pseudoaneurysm s/p L MCA thrombectomy. She was seen by vascular surgery and given thrombin injection x2 and recommended for outpatient follow up as needed. Hematology consulted due to concern for hypercoagulable state, anemia, hx of melanoma. CBC trended, overall stable. Hypercoagulable serology panel sent, negative for abnormalities. Follow up outpatient for further monitoring/testing. Hospital course further complicated by urinary retention and rivas catheter placed. Failed TOV 4/1-4/2, rivas replaced 4/3/25, and patient started on flomax.   Endocrine consulted due to hx of adrenal insufficiency; initially started on hydrocortisone then transitioned to home regimen of prednisone 5mg in AM and 2mg in PM. Incidentally found new 1.7 cm cystic lesion at the pancreatic body on CT A/P on 3/28/25. GI informed, no inpatient workup needed. Follow up outpatient with Dr. Maurisio Haas. Patient started on cefepime and vancomycin on 4/4/25 due to fever of unknown origin. ID consulted, Vancomycin discontinued after blood cultures resulted negative. ID recommendations to continue with IV cefepime until 4/10/25.   Patient resumed Eliquis 5mg daily (4/2/25) for secondary stroke prevention. Also re-started on home regimen of atorvastatin 10mg daily; LDL - 59, nonatherosclerotic etiology of stroke, high intensity statin not indicated at this time. PT/OT evaluated patient, recommended dispo to acute rehab. Patient should follow up with neurology/neuro IR, cardiology, endocrinology, hematology, GI, primary care, +/- urology and vascular surgery after discharge. Patient discharged to [facility] on 4/8/25.    MEDICATIONS:  acetaminophen     Tablet .. 650 milliGRAM(s) Oral every 6 hours PRN  apixaban 5 milliGRAM(s) Oral every 12 hours  atorvastatin 10 milliGRAM(s) Oral at bedtime  bethanechol 25 milliGRAM(s) Oral three times a day  famotidine    Tablet 20 milliGRAM(s) Oral daily  metoprolol tartrate 50 milliGRAM(s) Oral two times a day  multivitamin 1 Tablet(s) Oral daily  nystatin    Suspension 983288 Unit(s) Oral four times a day  polyethylene glycol 3350 17 Gram(s) Oral daily  predniSONE   Tablet 5 milliGRAM(s) Oral <User Schedule>  predniSONE   Tablet 2 milliGRAM(s) Oral <User Schedule>  senna 2 Tablet(s) Oral at bedtime    All other medications reviewed.    SUBJECTIVE:  Alert, in NAD    VITALS:  T(C): 36.4 (04-22-25 @ 07:16), Max: 36.7 (04-22-25 @ 05:20)  T(F): 97.5 (04-22-25 @ 07:16), Max: 98.1 (04-22-25 @ 05:20)  HR: 80 (04-22-25 @ 07:16) (80 - 93)  BP: 124/78 (04-22-25 @ 07:16) (121/69 - 133/77)    PHYSICAL EXAM:  Constitutional: alert, well developed  HEENT: normocephalic, anicteric sclerae, no mucositis or thrush  Respiratory: bilateral clear to auscultation anteriorly  Cardiovascular : S1, S2 regular, rhythmic, no murmurs, gallops or rubs  Abdomen: soft, distended, + normoactive BS, no palpable HS- megaly  Extremities: no tenderness;  -c/c/e, pulses equal bilaterally    LABS:  Platelet Count - Automated: 84 K/uL (04-22-25 @ 05:30)  Platelet Count - Automated: 94 K/uL (04-21-25 @ 05:55)    (04-22) WBC: 3.84 K/uL,Hemoglobin: 11.5 g/dL, Hematocrit: 35.6 %,  Platelet: 84 K/uL    RADIOLOGY:  ACC: [id number] EXAM:  CT BRAIN   ORDERED BY: LAUREN N STEINERT   PROCEDURE DATE:  04/04/2025    INTERPRETATION:  CLINICAL INDICATIONS:  lethargy    COMPARISON: Head CT dated 4/2/2025. MRI brain dated 3/31/2025    TECHNIQUE: Noncontrast CT ofthe head. Multiplanar reformations are   submitted.    FINDINGS: Stable subacute bilateral infarctions, most pronounced in left   basal ganglia. Trace left frontal lobe petechial hemorrhage versus spared   cortex on images 33 and 34 of series 4. There is periventricular and   subcortical white matter hypodensity without mass effect, nonspecific,   likely representing mild chronic microvascular ischemic changes. There is   no compelling evidence for an acute transcortical infarction. There is no   evidence of mass, mass effect, midline shift or extra-axial fluid   collection. The lateral ventricles and cortical sulci are age-appropriate   in size and configuration. The orbits, mastoid air cells and visualized   paranasal sinuses are unremarkable. The calvarium is intact. Consider MRI   as clinically warranted.    IMPRESSION: Stable subacute bilateral infarctions, most pronounced in   left basal ganglia. Trace left frontal lobe petechial hemorrhage versus   spared cortex. Continued follow-up is recommended.

## 2025-04-23 NOTE — DISCHARGE NOTE NURSING/CASE MANAGEMENT/SOCIAL WORK - NSDCVIVACCINE_GEN_ALL_CORE_FT
Tdap; 06-Sep-2019 14:05; Jesica Davies (SHELDON); [employer]; [id number] (Exp. Date: 07-Jun-2021); IntraMuscular; Deltoid Left.; 0.5 milliLiter(s); VIS (VIS Published: 09-May-2013, VIS Presented: 06-Sep-2019);

## 2025-04-23 NOTE — PROGRESS NOTE ADULT - ASSESSMENT
Assessment/Plan:  SHARON HULL is a 88 y/o F with PMHx of Afib (off Eliquis), PPM, HTN, HLD, GERD, severe MR/TR, iatrogenic adrenal insufficiency (on prednisone), lymphedema, metastatic melanoma s/p left foot melanoma and lymph node resection, s/p RUE melanoma and right axillary lymph node excision at [facility] (3/18/25), recent admission (3/21-3/25/25) for L MCA CVA s/p M1 thrombectomy, presented to  with left sided weakness and aphasia, found to have right MCA CVA and s/p M2 thrombectomy (3/26/25).  Hospital Course complicated by right sided weakness and aphasia, found to have new Left MCA CVA with M2 occlusion and is s/p 3rd thrombectomy (3/29/25). Hospital course also complicated by pseudoaneurysm (s/p vasc surgery consult and thrombin injection x2), urinary retention (s/p rivas, failed TOV 4/2), fever of unknown origin (on IV cefepime until 4/10/25). Patient resumed Eliquis on 4/2/25. Patient now admitted for a multidisciplinary rehab program with right sided weakness, Transcortical motor aphasia, cognitive deficits, Dysphagia and gait and ADL impairments.     Patient medically stable for discharge to home today.  Discharge medications and instructions reviewed with patient's family.  All questions answered.     #  Bihemispheric embolic infarcts s/p Thrombectomy x 3  - s/p cerebral angiogram for mechanical thrombectomy of L M1 occlusion, TICI 3 (one pass) on 3/21/25  - s/p cerebral angiogram for mechanical thrombectomy of Rt M2 occlusion, TICI 3 (one pass) on 3/26/25  - s/p thrombectomy of left M4 with TICI 0 3/29/25  - Etiology likely cardioembolic in the setting of atrial fibrillation and underlying hypercoagulability from malignancy  - Aphasia, Right hemiparesis, Dysphagia  - Start comprehensive rehab program of PT/OT/SLP - 3 hours a day, 5 days a week. P&O as needed   - Precautions: Fall, Aspiration  - Eliquis 5 mg BID  - Atorvastatin 10 mg QHS (LDL 58 on 3/22)    # Atrial Fibrillation  - has PPM present  - Eliquis 5 mg BID  - Lopressor 50 mg BID (decreased 4/18) due to BP being on softer side  - /69 - 133/77 (4/22)    #Elevated BUN  - Bun 28, Cr 0.54 (4/14) -> 30/0.61 (4/17) -> 26/0.52 (4/21)  - Encourage oral hydration, patient is on mildly thickened liquid (MBS today, awaiting results)    #Thrombocytopenia  - Platelets downtrending, Plt 94 (4/21)  - Hospitalist aware, heme consulted  - Protonix discontinued    # Right femoral artery pseudoaneurysm   - s/p thrombin injection x2    # Iatrogenic adrenal insuffiencey   - prednisone 5 mg in AM (8:00) and 2 mg in PM (16:00) - home regimen  - f/u outpatient endocrinology    # Metastatic Melanoma  - metastatic melanoma s/p left foot melanoma and lymph node resection, s/p RUE melanoma and right axillary lymph node excision at [facility] (3/18/25)  - outpatient follow up     # Incidental Pancreatic Cyst  - Incidentally found new 1.7 cm cystic lesion at the pancreatic body on CT a/p on 3/28/25  - GI informed, no inpatient workup needed  - Follow up outpatient with Dr. Maurisio Haas      # Pain  - Tylenol PRN    # Mood / Cognition  - Neuropsychology consult PRN  - recreational therapy    # Sleep  - Maintain quiet hours and a low stim environment.   - Melatonin PRN to maximize participation in therapy during the day    # GI / Bowel  - Senna qHS  - Miralax Daily  - GI ppx: Protonix 40 mg QD    #  / Bladder  # Urinary Retention  - Continue Rivas present on admission, plan for TOV  - Failed TOV on 4/1-4/2, Rivas replaced 4/3  - Rivas removed 4/10 night for voiding trial  - Flomax 0.4 mg QHS (dc'd 4/11)   - Bethanecol 25 mg TID (started 4/11)  - has not needed anymore SC, voiding, bladder scans discontinued    # Skin / Pressure injury  - Skin assessment on admission performed: IAD to sacrum, right posterior thigh ecchymosis, forehead scab s/p biopsy,  left lateral ankle scar with redness towards heel  - Pressure Injury/Skin: OOB to chair, PT/OT  - nursing to monitor skin qShift    # Dysphagia:  - Diet Consistency: regular with mildly thick liquids  - Supplement: MVI  - Aspiration Precautions  - SLP consult for swallow function evaluation and treatment  - Nutrition consult  - MBS completed 4/22--With thin liquids and mildly thick liquids there was fairly consistent penetration with most of the material retrieved but there was persistent coating of the under epiglottis. No jada aspiration.   --Speech therapy will review results further and provide recommendations.     # DVT prophylaxis:   - on Eliquis 5 mg Q12H      Outpatient Follow-up:  Maurisio Haas  Gastroenterology  [address], [address]  [place] [address]  Phone: [phone]  Fax: [phone]  Follow Up Time: 1 month    Celina Carlin  Hematology/Oncology  [address] [place] [address]  Phone: [phone]  Fax: [phone]  Follow Up Time: 1 month    Juan Diego Johnson  Vascular Neurology  [address] [place] [address]  Phone: [phone]  Fax: [phone]  Follow Up Time: 1 month    Antonio Gaines  Endocrinology/Metab/Diabetes  [address], [address]  [place] [address]  Phone: [phone]  Fax: [phone]  Follow Up Time: 1 month    Milo Murguia  Cardiovascular Disease  [address] [place] [address]  Phone: [phone]  Fax: [phone]  Follow Up Time: 2 weeks    Your Primary Care Provider,   Phone: (   )    -  Fax: (   )    -  Follow Up Time: 1 week    [place]  Urology  [address] [place] [address]  Phone: [phone]  Fax:   Follow Up Time: 1 month    [facility] Vascular Surgery  Vascular Surgery  [address] [place] [address]  Phone: [phone]               Assessment/Plan:  SHARON HULL is a 89 y/o F with PMHx of Afib (off Eliquis), PPM, HTN, HLD, GERD, severe MR/TR, iatrogenic adrenal insufficiency (on prednisone), lymphedema, metastatic melanoma s/p left foot melanoma and lymph node resection, s/p RUE melanoma and right axillary lymph node excision at [facility] (3/18/25), recent admission (3/21-3/25/25) for L MCA CVA s/p M1 thrombectomy, presented to  with left sided weakness and aphasia, found to have right MCA CVA and s/p M2 thrombectomy (3/26/25).  Hospital Course complicated by right sided weakness and aphasia, found to have new Left MCA CVA with M2 occlusion and is s/p 3rd thrombectomy (3/29/25). Hospital course also complicated by pseudoaneurysm (s/p vasc surgery consult and thrombin injection x2), urinary retention (s/p rivas, failed TOV 4/2), fever of unknown origin (on IV cefepime until 4/10/25). Patient resumed Eliquis on 4/2/25. Patient now admitted for a multidisciplinary rehab program with right sided weakness, Transcortical motor aphasia, cognitive deficits, Dysphagia and gait and ADL impairments.     Patient medically stable for discharge to home today.  Discharge medications and instructions reviewed with patient's family.  All questions answered.     #  Bihemispheric embolic infarcts s/p Thrombectomy x 3  - s/p cerebral angiogram for mechanical thrombectomy of L M1 occlusion, TICI 3 (one pass) on 3/21/25  - s/p cerebral angiogram for mechanical thrombectomy of Rt M2 occlusion, TICI 3 (one pass) on 3/26/25  - s/p thrombectomy of left M4 with TICI 0 3/29/25  - Etiology likely cardioembolic in the setting of atrial fibrillation and underlying hypercoagulability from malignancy  - Aphasia, Right hemiparesis, Dysphagia  - Start comprehensive rehab program of PT/OT/SLP - 3 hours a day, 5 days a week. P&O as needed   - Precautions: Fall, Aspiration  - Eliquis 5 mg BID  - Atorvastatin 10 mg QHS (LDL 58 on 3/22)    # Atrial Fibrillation  - has PPM present  - Eliquis 5 mg BID  - Lopressor 50 mg BID (decreased 4/18) due to BP being on softer side  - BP controlled    #Elevated BUN  - Bun 28, Cr 0.54 (4/14) -> 30/0.61 (4/17) -> 26/0.52 (4/21)  - Encourage oral hydration, patient is on mildly thickened liquid    #Thrombocytopenia  - Platelets downtrending, Plt 94 (4/21)  - Hospitalist aware, heme consulted  - Protonix discontinued  --Hematology consult reviewed and appreciated-- d/w Dr. Pastrana 4/23-- who is familiar with patient  --Thrombocytopenia.   ·  Plan: Platelet count at 84K, no clinical evidence of active bleeding. Favor observation.  Patient to f/u as an outpatient---   --d/w pt's SonPhilip    # Right femoral artery pseudoaneurysm   - s/p thrombin injection x2    # Iatrogenic adrenal insuffiencey   - prednisone 5 mg in AM (8:00) and 2 mg in PM (16:00) - home regimen  - f/u outpatient endocrinology    # Metastatic Melanoma  - metastatic melanoma s/p left foot melanoma and lymph node resection, s/p RUE melanoma and right axillary lymph node excision at [facility] (3/18/25)  - outpatient follow up   --    # Incidental Pancreatic Cyst  - Incidentally found new 1.7 cm cystic lesion at the pancreatic body on CT a/p on 3/28/25  - GI informed, no inpatient workup needed  - Follow up outpatient with Dr. Maurisio Haas      # Pain  - Tylenol PRN    # Mood / Cognition  - Neuropsychology consult PRN  - recreational therapy    # Sleep  - Maintain quiet hours and a low stim environment.   - Melatonin PRN to maximize participation in therapy during the day    # GI / Bowel  - Senna qHS  - Miralax Daily  - GI ppx: Protonix 40 mg QD    #  / Bladder  # Urinary Retention  - Continue Rivas present on admission, plan for TOV  - Failed TOV on 4/1-4/2, Rivas replaced 4/3  - Rivas removed 4/10 night for voiding trial  - Flomax 0.4 mg QHS (dc'd 4/11)   - Bethanecol 25 mg TID (started 4/11)  - has not needed anymore SC, voiding, bladder scans discontinued        # Dysphagia:  - Diet Consistency: regular with mildly thick liquids  - Supplement: MVI  - Aspiration Precautions  - SLP consult for swallow function evaluation and treatment  - MBS completed 4/22--With thin liquids and mildly thick liquids there was fairly consistent penetration with most of the material retrieved but there was persistent coating of the under epiglottis. No jada aspiration.   --Speech therapy will review results further and provide recommendations.     # DVT prophylaxis:   - on Eliquis 5 mg Q12H      Outpatient Follow-up:  Maurisio Haas  Gastroenterology  [address], [address]  [place] [address]  Phone: [phone]  Fax: [phone]  Follow Up Time: 1 month    Celina Carlin  Hematology/Oncology  [address] [place] [address]  Phone: [phone]  Fax: [phone]  Follow Up Time: 1 month    Juan Diego Johnson  Vascular Neurology  [address] [place] [address]  Phone: [phone]  Fax: [phone]  Follow Up Time: 1 month    Antonio Gaines  Endocrinology/Metab/Diabetes  [address], [address]  [place] [address]  Phone: [phone]  Fax: [phone]  Follow Up Time: 1 month    Milo Murguia-Victoriano  Cardiovascular Disease  [address] [place] [address]  Phone: [phone]  Fax: [phone]  Follow Up Time: 2 weeks    Your Primary Care Provider,   Phone: (   )    -  Fax: (   )    -  Follow Up Time: 1 week    Herreid  Urology  [address] [place] [address]  Phone: [phone]  Fax:   Follow Up Time: 1 month    [facility] Vascular Surgery  Vascular Surgery  [address] [place] [address]  Phone: [phone]

## 2025-04-23 NOTE — DISCHARGE NOTE NURSING/CASE MANAGEMENT/SOCIAL WORK - FINANCIAL ASSISTANCE
[facility] provides services at a reduced cost to those who are determined to be eligible through [facility]’s financial assistance program. Information regarding [facility]’s financial assistance program can be found by going to [email or web address] or by calling [phone].

## 2025-04-28 ENCOUNTER — NON-APPOINTMENT (OUTPATIENT)
Age: 89
End: 2025-04-28

## 2025-04-28 ENCOUNTER — APPOINTMENT (OUTPATIENT)
Dept: NEUROLOGY | Facility: CLINIC | Age: 89
End: 2025-04-28
Payer: MEDICARE

## 2025-04-28 VITALS
HEIGHT: 64 IN | WEIGHT: 140 LBS | HEART RATE: 70 BPM | OXYGEN SATURATION: 95 % | DIASTOLIC BLOOD PRESSURE: 66 MMHG | SYSTOLIC BLOOD PRESSURE: 130 MMHG | BODY MASS INDEX: 23.9 KG/M2

## 2025-04-28 DIAGNOSIS — I63.419 CEREBRAL INFARCTION DUE TO EMBOLISM OF UNSPECIFIED MIDDLE CEREBRAL ARTERY: ICD-10-CM

## 2025-04-28 PROCEDURE — 99215 OFFICE O/P EST HI 40 MIN: CPT

## 2025-04-28 PROCEDURE — G2211 COMPLEX E/M VISIT ADD ON: CPT

## 2025-04-28 RX ORDER — POLYETHYLENE GLYCOL 3350 17 G/17G
17 POWDER, FOR SOLUTION ORAL
Refills: 0 | Status: ACTIVE | COMMUNITY

## 2025-04-28 RX ORDER — METOPROLOL TARTRATE 50 MG/1
50 TABLET ORAL
Refills: 0 | Status: ACTIVE | COMMUNITY

## 2025-04-28 RX ORDER — ATORVASTATIN CALCIUM 10 MG/1
10 TABLET, FILM COATED ORAL
Refills: 0 | Status: ACTIVE | COMMUNITY

## 2025-04-28 RX ORDER — FAMOTIDINE 20 MG/1
20 TABLET, FILM COATED ORAL
Refills: 0 | Status: ACTIVE | COMMUNITY

## 2025-04-28 RX ORDER — BETHANECHOL CHLORIDE 25 MG/1
25 TABLET ORAL
Refills: 0 | Status: ACTIVE | COMMUNITY

## 2025-05-01 ENCOUNTER — APPOINTMENT (OUTPATIENT)
Dept: ENDOCRINOLOGY | Facility: CLINIC | Age: 89
End: 2025-05-01

## 2025-05-01 VITALS
DIASTOLIC BLOOD PRESSURE: 80 MMHG | WEIGHT: 132.31 LBS | HEIGHT: 64 IN | OXYGEN SATURATION: 99 % | BODY MASS INDEX: 22.59 KG/M2 | TEMPERATURE: 97.5 F | HEART RATE: 63 BPM | SYSTOLIC BLOOD PRESSURE: 110 MMHG

## 2025-05-01 DIAGNOSIS — E27.40 UNSPECIFIED ADRENOCORTICAL INSUFFICIENCY: ICD-10-CM

## 2025-05-01 DIAGNOSIS — I48.91 UNSPECIFIED ATRIAL FIBRILLATION: ICD-10-CM

## 2025-05-01 DIAGNOSIS — E78.5 HYPERLIPIDEMIA, UNSPECIFIED: ICD-10-CM

## 2025-05-01 PROCEDURE — 36415 COLL VENOUS BLD VENIPUNCTURE: CPT

## 2025-05-01 PROCEDURE — G2211 COMPLEX E/M VISIT ADD ON: CPT

## 2025-05-01 PROCEDURE — 99214 OFFICE O/P EST MOD 30 MIN: CPT

## 2025-05-05 LAB
ALBUMIN SERPL ELPH-MCNC: 4 G/DL
ALP BLD-CCNC: 61 U/L
ALT SERPL-CCNC: 30 U/L
ANION GAP SERPL CALC-SCNC: 18 MMOL/L
APO B SERPL-MCNC: 72 MG/DL
AST SERPL-CCNC: 18 U/L
BILIRUB SERPL-MCNC: 0.6 MG/DL
BUN SERPL-MCNC: 24 MG/DL
CALCIUM SERPL-MCNC: 9.5 MG/DL
CHLORIDE SERPL-SCNC: 101 MMOL/L
CHOLEST SERPL-MCNC: 179 MG/DL
CO2 SERPL-SCNC: 23 MMOL/L
CREAT SERPL-MCNC: 0.57 MG/DL
EGFRCR SERPLBLD CKD-EPI 2021: 87 ML/MIN/1.73M2
GLUCOSE SERPL-MCNC: 136 MG/DL
HDLC SERPL-MCNC: 86 MG/DL
LDLC SERPL DIRECT ASSAY-MCNC: 79 MG/DL
POTASSIUM SERPL-SCNC: 4.2 MMOL/L
PROT SERPL-MCNC: 6.5 G/DL
SODIUM SERPL-SCNC: 142 MMOL/L
T3FREE SERPL-MCNC: 3.29 PG/ML
T4 FREE SERPL-MCNC: 1.8 NG/DL
TRIGL SERPL-MCNC: 87 MG/DL
TSH SERPL-ACNC: 1.5 UIU/ML
VIT B12 SERPL-MCNC: 579 PG/ML

## 2025-05-07 ENCOUNTER — APPOINTMENT (OUTPATIENT)
Dept: SURGICAL ONCOLOGY | Facility: CLINIC | Age: 89
End: 2025-05-07

## 2025-05-07 VITALS
WEIGHT: 132 LBS | SYSTOLIC BLOOD PRESSURE: 137 MMHG | DIASTOLIC BLOOD PRESSURE: 80 MMHG | HEART RATE: 120 BPM | BODY MASS INDEX: 22.53 KG/M2 | HEIGHT: 64 IN

## 2025-05-07 DIAGNOSIS — D03.61 MELANOMA IN SITU OF RIGHT UPPER LIMB, INCLUDING SHOULDER: ICD-10-CM

## 2025-05-07 PROCEDURE — 99024 POSTOP FOLLOW-UP VISIT: CPT

## 2025-05-09 PROCEDURE — 97165 OT EVAL LOW COMPLEX 30 MIN: CPT | Mod: GO

## 2025-05-09 PROCEDURE — 97112 NEUROMUSCULAR REEDUCATION: CPT | Mod: GP

## 2025-05-09 PROCEDURE — 92523 SPEECH SOUND LANG COMPREHEN: CPT | Mod: GN

## 2025-05-09 PROCEDURE — 92610 EVALUATE SWALLOWING FUNCTION: CPT | Mod: GN

## 2025-05-09 PROCEDURE — 97116 GAIT TRAINING THERAPY: CPT | Mod: GP

## 2025-05-09 PROCEDURE — 80053 COMPREHEN METABOLIC PANEL: CPT

## 2025-05-09 PROCEDURE — 92507 TX SP LANG VOICE COMM INDIV: CPT | Mod: GN

## 2025-05-09 PROCEDURE — 92611 MOTION FLUOROSCOPY/SWALLOW: CPT | Mod: GN

## 2025-05-09 PROCEDURE — 97530 THERAPEUTIC ACTIVITIES: CPT | Mod: GP

## 2025-05-09 PROCEDURE — 97161 PT EVAL LOW COMPLEX 20 MIN: CPT | Mod: GP

## 2025-05-09 PROCEDURE — 97535 SELF CARE MNGMENT TRAINING: CPT | Mod: GO

## 2025-05-09 PROCEDURE — 85025 COMPLETE CBC W/AUTO DIFF WBC: CPT

## 2025-05-09 PROCEDURE — 97110 THERAPEUTIC EXERCISES: CPT | Mod: GO

## 2025-05-09 PROCEDURE — 87635 SARS-COV-2 COVID-19 AMP PRB: CPT

## 2025-05-09 PROCEDURE — 74230 X-RAY XM SWLNG FUNCJ C+: CPT

## 2025-05-09 PROCEDURE — 92526 ORAL FUNCTION THERAPY: CPT | Mod: GN

## 2025-05-09 PROCEDURE — 85027 COMPLETE CBC AUTOMATED: CPT

## 2025-05-09 PROCEDURE — 36415 COLL VENOUS BLD VENIPUNCTURE: CPT

## 2025-05-20 ENCOUNTER — OUTPATIENT (OUTPATIENT)
Dept: OUTPATIENT SERVICES | Facility: HOSPITAL | Age: 89
LOS: 1 days | Discharge: HOME | End: 2025-05-20

## 2025-05-20 DIAGNOSIS — C43.72 MALIGNANT MELANOMA OF LEFT LOWER LIMB, INCLUDING HIP: ICD-10-CM

## 2025-05-20 DIAGNOSIS — Z98.890 OTHER SPECIFIED POSTPROCEDURAL STATES: Chronic | ICD-10-CM

## 2025-05-20 DIAGNOSIS — Z98.49 CATARACT EXTRACTION STATUS, UNSPECIFIED EYE: Chronic | ICD-10-CM

## 2025-05-20 DIAGNOSIS — Z90.89 ACQUIRED ABSENCE OF OTHER ORGANS: Chronic | ICD-10-CM

## 2025-05-21 DIAGNOSIS — R13.12 DYSPHAGIA, OROPHARYNGEAL PHASE: ICD-10-CM

## 2025-05-21 DIAGNOSIS — I63.512 CEREBRAL INFARCTION DUE TO UNSPECIFIED OCCLUSION OR STENOSIS OF LEFT MIDDLE CEREBRAL ARTERY: ICD-10-CM

## 2025-05-22 ENCOUNTER — APPOINTMENT (OUTPATIENT)
Dept: HEMATOLOGY ONCOLOGY | Facility: CLINIC | Age: 89
End: 2025-05-22
Payer: MEDICARE

## 2025-05-22 VITALS
WEIGHT: 136 LBS | DIASTOLIC BLOOD PRESSURE: 74 MMHG | TEMPERATURE: 97.3 F | OXYGEN SATURATION: 99 % | HEART RATE: 89 BPM | RESPIRATION RATE: 17 BRPM | SYSTOLIC BLOOD PRESSURE: 128 MMHG | BODY MASS INDEX: 23.34 KG/M2

## 2025-05-22 DIAGNOSIS — C77.9 SECONDARY AND UNSPECIFIED MALIGNANT NEOPLASM OF LYMPH NODE, UNSPECIFIED: ICD-10-CM

## 2025-05-22 PROCEDURE — 99215 OFFICE O/P EST HI 40 MIN: CPT

## 2025-05-27 ENCOUNTER — APPOINTMENT (OUTPATIENT)
Age: 89
End: 2025-05-27

## 2025-05-27 DIAGNOSIS — R33.9 RETENTION OF URINE, UNSPECIFIED: ICD-10-CM

## 2025-05-27 PROCEDURE — 51798 US URINE CAPACITY MEASURE: CPT

## 2025-05-27 PROCEDURE — 99203 OFFICE O/P NEW LOW 30 MIN: CPT

## 2025-05-27 RX ORDER — BETHANECHOL CHLORIDE 25 MG/1
25 TABLET ORAL 3 TIMES DAILY
Qty: 90 | Refills: 0 | Status: ACTIVE | COMMUNITY
Start: 2025-05-27 | End: 1900-01-01

## 2025-05-28 ENCOUNTER — APPOINTMENT (OUTPATIENT)
Dept: NEUROLOGY | Facility: CLINIC | Age: 89
End: 2025-05-28

## 2025-05-29 NOTE — DIETITIAN INITIAL EVALUATION ADULT - HEIGHT FOR BMI (CENTIMETERS)
162.56 Pre-op instructions provided. Pt verbalized understanding.   Pepcid provided for GI prophylaxis.   Pt given detailed verbal and written instructions on chlorhexidine wash. Pt verbalized understanding with teachback.   CBC with reflex, BMP, HgA1c, T&S/ABO, UA, urine culture, nasal culture done at [facility].

## 2025-06-06 NOTE — DIETITIAN INITIAL EVALUATION ADULT - FUNCTIONAL SCREEN CURRENT LEVEL: SWALLOWING (IF SCORE 2 OR MORE FOR ANY ITEM, CONSULT REHAB SERVICES), MLM)
Occupational Therapy Discharge Summary    Reason for therapy discharge:    Discharged to home with outpatient therapy.    Progress towards therapy goal(s). See goals on Care Plan in [facility] electronic health record for goal details.  Goals partially met.  Barriers to achieving goals:   discharge from facility.    Therapy recommendation(s):    Continued therapy is recommended.  Rationale/Recommendations:  Recommend OP CR to progress cardiopulmonary health.             2 = difficulty swallowing liquids/foods

## 2025-06-09 ENCOUNTER — OUTPATIENT (OUTPATIENT)
Dept: OUTPATIENT SERVICES | Facility: HOSPITAL | Age: 89
LOS: 1 days | End: 2025-06-09
Payer: MEDICARE

## 2025-06-09 ENCOUNTER — APPOINTMENT (OUTPATIENT)
Dept: ULTRASOUND IMAGING | Facility: CLINIC | Age: 89
End: 2025-06-09
Payer: MEDICARE

## 2025-06-09 DIAGNOSIS — Z90.89 ACQUIRED ABSENCE OF OTHER ORGANS: Chronic | ICD-10-CM

## 2025-06-09 DIAGNOSIS — R33.9 RETENTION OF URINE, UNSPECIFIED: ICD-10-CM

## 2025-06-09 DIAGNOSIS — Z98.890 OTHER SPECIFIED POSTPROCEDURAL STATES: Chronic | ICD-10-CM

## 2025-06-09 DIAGNOSIS — Z98.49 CATARACT EXTRACTION STATUS, UNSPECIFIED EYE: Chronic | ICD-10-CM

## 2025-06-09 PROCEDURE — 76770 US EXAM ABDO BACK WALL COMP: CPT

## 2025-06-09 PROCEDURE — 76770 US EXAM ABDO BACK WALL COMP: CPT | Mod: 26

## 2025-06-18 ENCOUNTER — APPOINTMENT (OUTPATIENT)
Dept: PHYSICAL MEDICINE AND REHAB | Facility: CLINIC | Age: 89
End: 2025-06-18
Payer: MEDICARE

## 2025-06-18 ENCOUNTER — NON-APPOINTMENT (OUTPATIENT)
Age: 89
End: 2025-06-18

## 2025-06-18 VITALS
OXYGEN SATURATION: 96 % | DIASTOLIC BLOOD PRESSURE: 75 MMHG | WEIGHT: 137 LBS | HEART RATE: 72 BPM | SYSTOLIC BLOOD PRESSURE: 115 MMHG | BODY MASS INDEX: 23.39 KG/M2 | HEIGHT: 64 IN | TEMPERATURE: 97.8 F

## 2025-06-18 PROBLEM — R26.9 ABNORMAL GAIT: Status: ACTIVE | Noted: 2025-06-18

## 2025-06-18 PROBLEM — R53.1 RIGHT SIDED WEAKNESS: Status: ACTIVE | Noted: 2025-06-18

## 2025-06-18 PROCEDURE — 99214 OFFICE O/P EST MOD 30 MIN: CPT

## 2025-06-23 ENCOUNTER — APPOINTMENT (OUTPATIENT)
Dept: NEUROLOGY | Facility: CLINIC | Age: 89
End: 2025-06-23
Payer: MEDICARE

## 2025-06-23 VITALS
SYSTOLIC BLOOD PRESSURE: 130 MMHG | HEIGHT: 64 IN | DIASTOLIC BLOOD PRESSURE: 70 MMHG | BODY MASS INDEX: 23.39 KG/M2 | OXYGEN SATURATION: 96 % | WEIGHT: 137 LBS | HEART RATE: 66 BPM

## 2025-06-23 PROBLEM — R47.01 EXPRESSIVE APHASIA: Status: ACTIVE | Noted: 2025-06-23

## 2025-06-23 PROCEDURE — 99215 OFFICE O/P EST HI 40 MIN: CPT

## 2025-06-23 PROCEDURE — G2211 COMPLEX E/M VISIT ADD ON: CPT

## 2025-06-23 NOTE — PHYSICAL THERAPY INITIAL EVALUATION ADULT - ACTIVE RANGE OF MOTION EXAMINATION, REHAB EVAL
REASON FOR VISIT:  Change in bowel habits   PCP (requesting provider): Charlene Cordero MD PhD.    HPI:  Yinka Wasserman is a 82 y.o. male with a past medical history of HTN, HLD, and DM being evaluated for constipation and and personal history of adenomatous colon polyps. EGD normal (4/2021). Colonoscopy showed single 10 mm adenoma and hemorrhoids (4/2021). Hydrogen breath test negative (4/2024).    Patient notes his howel habits have been a little more irregular. He has been taking Metamucil every 2-3 days. He and his son know he needs to be more consistent with this. He had episode of spotting of blood. Denies dysphagia or odynophagia. No heartburn or reflux. He has a bowel movement every other day on average. He does drink 2-3 bottles of water per day.    PSurgHx:  -Cervical spine surgery    FamHx: No GI cancer     Prior Endoscopy:  -Colonoscopy (4/2021): Good prep, 10 mm transverse polyp (TA), grade II IH, otherwise normal colon.  -EGD (4/2021): Normal esophagus (normal esophageal biopsies), normal stomach (biopsies showed mild chronic inflammation with intestinal metaplasia, H. Pylori -), normal duodenum.    PAST MEDICAL HISTORY  Medical History[1]    PAST SURGICAL HISTORY  Surgical History[2]    FAMILY HISTORY  Family History[3]    SOCIAL HISTORY   reports that he has never smoked. He has never used smokeless tobacco. He reports that he does not currently use alcohol. He reports that he does not use drugs.    REVIEW OF SYSTEMS  Review of Systems   Respiratory:  Negative for shortness of breath.    Cardiovascular:  Negative for chest pain.   All other systems reviewed and are negative.    A 10+ point review of systems was otherwise negative except as noted and per HPI.    ALLERGIES  Allergies[4]    MEDICATIONS  Current Outpatient Medications   Medication Instructions    esomeprazole (NEXIUM) 20 mg, oral, 2 times daily, Do not open capsule.    famotidine (PEPCID) 40 mg, oral, Nightly    fluticasone (Flonase) 50  mcg/actuation nasal spray Flonase 50 MCG/ACT SUSP  Refills: 0    lisinopril 10 mg, oral, Daily    metFORMIN (GLUCOPHAGE) 500 mg, oral, 2 times daily    OneTouch Ultra Test strip 1 strip, miscellaneous, 2 times daily    rosuvastatin (CRESTOR) 40 mg, oral, Daily    tamsulosin (FLOMAX) 0.4 mg, oral, Daily    triamcinolone (Kenalog) 0.1 % cream 2 times daily       VITALS  Vitals:    06/23/25 0954   Pulse: 56   SpO2: 98%      Body mass index is 21.41 kg/m².    PHYSICAL EXAM  CONSTITUTIONAL: NAD, appears stated age  EYES: anicteric sclera, sclera clear  HEAD: normocephalic, atraumatic   NECK: supple   PULMONARY: CTAB  CARDIOVASCULAR: RRR, no M/R/G appreciated   ABDOMEN: soft, NTND, +BS, no rebound or guarding   MUSCULOSKELETAL: no edema  SKIN: no jaundice   PSYCHIATRIC: AOx3, appropriate insight and judgement    LABS  WBC (x10*3/uL)   Date Value   03/15/2024 3.6 (L)   12/12/2023 4.0 (L)   11/01/2023 4.5     Hemoglobin (g/dL)   Date Value   03/15/2024 14.8   12/12/2023 15.3   11/01/2023 13.8     Platelets (x10*3/uL)   Date Value   03/15/2024 162   12/12/2023 168   11/01/2023 174     SODIUM (mmol/L)   Date Value   03/04/2025 138     Sodium (mmol/L)   Date Value   06/13/2024 135 (L)   03/15/2024 136   12/12/2023 131 (L)     POTASSIUM (mmol/L)   Date Value   03/04/2025 5.0     Potassium (mmol/L)   Date Value   06/13/2024 4.5   03/15/2024 4.5   12/12/2023 5.1     CHLORIDE (mmol/L)   Date Value   03/04/2025 101     Chloride (mmol/L)   Date Value   06/13/2024 99   03/15/2024 102   12/12/2023 95 (L)     CARBON DIOXIDE (mmol/L)   Date Value   03/04/2025 28     Bicarbonate (mmol/L)   Date Value   06/13/2024 30   03/15/2024 30   12/12/2023 29     UREA NITROGEN (BUN) (mg/dL)   Date Value   03/04/2025 24     Urea Nitrogen (mg/dL)   Date Value   06/13/2024 30 (H)   03/15/2024 18   12/12/2023 20     Creatinine (mg/dL)   Date Value   06/13/2024 1.18   03/15/2024 1.12   12/12/2023 1.10     CREATININE (mg/dL)   Date Value   03/04/2025 1.31  "(H)     CALCIUM (mg/dL)   Date Value   03/04/2025 9.8     Calcium (mg/dL)   Date Value   06/13/2024 9.7   03/15/2024 9.4   12/12/2023 10.2     PROTEIN, TOTAL (g/dL)   Date Value   03/04/2025 7.5     Total Protein (g/dL)   Date Value   06/13/2024 6.8   03/15/2024 6.8   12/12/2023 7.1     BILIRUBIN, TOTAL (mg/dL)   Date Value   03/04/2025 0.5     Bilirubin, Total (mg/dL)   Date Value   06/13/2024 0.3   03/15/2024 0.4   12/12/2023 0.4     ALKALINE PHOSPHATASE (U/L)   Date Value   03/04/2025 60     Alkaline Phosphatase (U/L)   Date Value   06/13/2024 59   03/15/2024 56   12/12/2023 54     ALT (U/L)   Date Value   03/04/2025 39   06/13/2024 35   03/15/2024 22   12/12/2023 18     AST (U/L)   Date Value   03/04/2025 31   06/13/2024 28   03/15/2024 24   12/12/2023 21     GLUCOSE (mg/dL)   Date Value   03/04/2025 112 (H)     Glucose (mg/dL)   Date Value   06/13/2024 112 (H)   03/15/2024 114 (H)   12/12/2023 83     No results found for: \"LIPASE\", \"CRP\"    ASSESSMENT/PLAN  Yinka Wasserman is a 82 y.o. male with a past medical history of HTN, HLD, and DM being evaluated for constipation and and personal history of adenomatous colon polyps. EGD normal (4/2021). Colonoscopy showed single 10 mm adenoma and hemorrhoids (4/2021). Hydrogen breath test negative (4/2024). He struggles with mild constipation and had spotting of blood related to hemorrhoids. He is due for surveillance colonoscopy. He would benefit from being more consistent with Metamucil as his son reports the patient stops taking this when bowel habits are improved.    -Plan for colonoscopy at [facility] given cervical spine surgery history  -The procedure(s) including risks/benefits, diet restrictions, prep, and sedation were discussed with the patient  -Advised Metamucil once daily consistently and increase to BID thereafter PRN    Follow-up will be at colonoscopy.    Signature: Gonzalez Chaudhry MD       [1] No past medical history on file.  [2] No past surgical history on " file.  [3]   Family History  Problem Relation Name Age of Onset    No Known Problems Mother      Allergies Father     [4] No Known Allergies     Right UE Active ROM was WFL (within functional limits)/Left LE Active ROM was WFL (within functional limits)/Left UE Active ROM was WFL (within functional limits)/Right LE Active ROM was WFL (within functional limits)

## 2025-07-01 ENCOUNTER — APPOINTMENT (OUTPATIENT)
Dept: UROLOGY | Facility: CLINIC | Age: 89
End: 2025-07-01

## 2025-07-01 LAB
APPEARANCE: CLEAR
BILIRUBIN URINE: NEGATIVE
BLOOD URINE: NEGATIVE
COLOR: YELLOW
GLUCOSE QUALITATIVE U: NEGATIVE
KETONES URINE: NEGATIVE
LEUKOCYTE ESTERASE URINE: NEGATIVE
NITRITE URINE: POSITIVE
PH URINE: 6
PROTEIN URINE: NEGATIVE
SPECIFIC GRAVITY URINE: 1.02
UROBILINOGEN URINE: 1 (ref 0.2–?)

## 2025-07-01 PROCEDURE — 99213 OFFICE O/P EST LOW 20 MIN: CPT

## 2025-07-05 LAB — BACTERIA UR CULT: ABNORMAL

## 2025-07-11 NOTE — DISCHARGE NOTE NURSING/CASE MANAGEMENT/SOCIAL WORK - NSTRANSFERBELONGINGSDISPO_GEN_A_NUR

## 2025-07-15 NOTE — ED PROVIDER NOTE - IV ALTEPLASE DOOR HIDDEN
OB Progress Note: LTCS, POD#1    S: Patient seen at bedside. Pain well controlled, tolerating regular diet, voiding spontaneously, ambulating without difficulty. Patient has yet to pass flatus. Denies heavy vaginal bleeding, CP/SOB, lightheadedness/dizziness, nausea/vomiting, headache, visual disturbances, epigastric pain. Patient was supposed to get heparin dose at 1130p on 7/14 but received dose late at 330a on 7/15.  O:  Vitals:  Vital Signs Last 24 Hrs  T(C): 36.8 (15 Jul 2025 01:54), Max: 36.8 (15 Jul 2025 01:54)  T(F): 98.2 (15 Jul 2025 01:54), Max: 98.2 (15 Jul 2025 01:54)  HR: 62 (14 Jul 2025 20:10) (55 - 69)  BP: 111/62 (14 Jul 2025 20:10) (99/71 - 123/64)  BP(mean): 84 (14 Jul 2025 19:20) (75 - 85)  RR: 17 (15 Jul 2025 01:54) (11 - 17)  SpO2: 99% (15 Jul 2025 01:54) (97% - 100%)    Parameters below as of 15 Jul 2025 01:54  Patient On (Oxygen Delivery Method): room air        MEDICATIONS  (STANDING):  acetaminophen     Tablet .. 975 milliGRAM(s) Oral <User Schedule>  ascorbic acid 500 milliGRAM(s) Oral daily  diphtheria/tetanus/pertussis (acellular) Vaccine (Adacel) 0.5 milliLiter(s) IntraMuscular once  ferrous    sulfate 325 milliGRAM(s) Oral daily  heparin   Injectable 5000 Unit(s) SubCutaneous every 12 hours  ibuprofen  Tablet. 600 milliGRAM(s) Oral every 6 hours  ketorolac   Injectable 30 milliGRAM(s) IV Push every 6 hours  lactated ringers Bolus 500 milliLiter(s) IV Bolus once  lactated ringers. 1000 milliLiter(s) (83 mL/Hr) IV Continuous <Continuous>  levothyroxine 50 MICROGram(s) Oral <User Schedule>  oxytocin Infusion 42 milliUNIT(s)/Min (42 mL/Hr) IV Continuous <Continuous>  prenatal multivitamin 1 Tablet(s) Oral daily  senna 2 Tablet(s) Oral at bedtime      MEDICATIONS  (PRN):  dexAMETHasone  Injectable 4 milliGRAM(s) IV Push every 6 hours PRN Nausea  diphenhydrAMINE 25 milliGRAM(s) Oral every 6 hours PRN Pruritus  lanolin Ointment 1 Application(s) Topical every 6 hours PRN Sore Nipples  magnesium hydroxide Suspension 30 milliLiter(s) Oral two times a day PRN Constipation  nalbuphine Injectable 2.5 milliGRAM(s) IV Push every 6 hours PRN Pruritus  naloxone Injectable 0.1 milliGRAM(s) IV Push every 3 minutes PRN For ANY of the following changes in patient status:  A. Breaths Per Minute LESS THAN 10, B. Oxygen saturation LESS THAN 90%, C. Sedation score of 6 for Stop After: 4 Times  ondansetron Injectable 4 milliGRAM(s) IV Push every 6 hours PRN Nausea  oxyCODONE    IR 5 milliGRAM(s) Oral every 3 hours PRN Mild Pain (1 - 3)  oxyCODONE    IR 10 milliGRAM(s) Oral every 3 hours PRN Moderate Pain (4 - 6)  oxyCODONE    IR 5 milliGRAM(s) Oral every 3 hours PRN Moderate to Severe Pain (4-10)  oxyCODONE    IR 5 milliGRAM(s) Oral once PRN Moderate to Severe Pain (4-10)  simethicone 80 milliGRAM(s) Chew every 4 hours PRN Gas      Labs:  Blood type: B Positive  Rubella IgG: RPR: Negative                          10.1[L]   15.32[H] >-----------< 270    ( 07-14 @ 19:20 )             31.4[L]                        12.5   8.43 >-----------< 314    ( 07-14 @ 12:00 )             37.8        PE:  General: NAD  CV: RRR  Pulm: CTA B/L  Abdomen: Soft, appropriately tender, Fundus firm incision c/d/i.  VE: No heavy vaginal bleeding  Extremities: No erythema, no pitting edema, no calf tenderness.    A/P: 35yo POD#1 s/p LTCS.  Patient is stable and doing well post-operatively. There are no signs of DVT.     - F/u AM CBC  - Continue regular diet  - Increase ambulation  - Continue motrin, tylenol, oxycodone PRN for pain control.     Liberty White, PGY1 show

## 2025-08-11 ENCOUNTER — OUTPATIENT (OUTPATIENT)
Dept: OUTPATIENT SERVICES | Facility: HOSPITAL | Age: 89
LOS: 1 days | End: 2025-08-11
Payer: MEDICARE

## 2025-08-11 DIAGNOSIS — Z98.49 CATARACT EXTRACTION STATUS, UNSPECIFIED EYE: Chronic | ICD-10-CM

## 2025-08-11 DIAGNOSIS — Z98.890 OTHER SPECIFIED POSTPROCEDURAL STATES: Chronic | ICD-10-CM

## 2025-08-11 DIAGNOSIS — I63.512 CEREBRAL INFARCTION DUE TO UNSPECIFIED OCCLUSION OR STENOSIS OF LEFT MIDDLE CEREBRAL ARTERY: ICD-10-CM

## 2025-08-11 DIAGNOSIS — Z90.89 ACQUIRED ABSENCE OF OTHER ORGANS: Chronic | ICD-10-CM

## 2025-08-11 PROCEDURE — 74230 X-RAY XM SWLNG FUNCJ C+: CPT

## 2025-08-11 PROCEDURE — 92611 MOTION FLUOROSCOPY/SWALLOW: CPT | Mod: GN

## 2025-08-11 PROCEDURE — 74230 X-RAY XM SWLNG FUNCJ C+: CPT | Mod: 26

## 2025-08-12 DIAGNOSIS — I63.512 CEREBRAL INFARCTION DUE TO UNSPECIFIED OCCLUSION OR STENOSIS OF LEFT MIDDLE CEREBRAL ARTERY: ICD-10-CM

## 2025-08-13 ENCOUNTER — APPOINTMENT (OUTPATIENT)
Dept: SURGICAL ONCOLOGY | Facility: CLINIC | Age: 89
End: 2025-08-13
Payer: MEDICARE

## 2025-08-13 VITALS
SYSTOLIC BLOOD PRESSURE: 140 MMHG | HEART RATE: 94 BPM | HEIGHT: 64 IN | BODY MASS INDEX: 23.39 KG/M2 | OXYGEN SATURATION: 98 % | DIASTOLIC BLOOD PRESSURE: 90 MMHG | WEIGHT: 137 LBS

## 2025-08-13 DIAGNOSIS — D03.61 MELANOMA IN SITU OF RIGHT UPPER LIMB, INCLUDING SHOULDER: ICD-10-CM

## 2025-08-13 PROCEDURE — 99215 OFFICE O/P EST HI 40 MIN: CPT

## 2025-08-18 ENCOUNTER — APPOINTMENT (OUTPATIENT)
Dept: PHYSICAL MEDICINE AND REHAB | Facility: CLINIC | Age: 89
End: 2025-08-18
Payer: MEDICARE

## 2025-08-18 VITALS
HEIGHT: 64 IN | DIASTOLIC BLOOD PRESSURE: 84 MMHG | TEMPERATURE: 98 F | OXYGEN SATURATION: 96 % | BODY MASS INDEX: 22.88 KG/M2 | SYSTOLIC BLOOD PRESSURE: 145 MMHG | WEIGHT: 134 LBS | HEART RATE: 88 BPM

## 2025-08-18 DIAGNOSIS — I63.419 CEREBRAL INFARCTION DUE TO EMBOLISM OF UNSPECIFIED MIDDLE CEREBRAL ARTERY: ICD-10-CM

## 2025-08-18 DIAGNOSIS — R47.01 APHASIA: ICD-10-CM

## 2025-08-18 PROCEDURE — 99214 OFFICE O/P EST MOD 30 MIN: CPT

## 2025-08-21 ENCOUNTER — RESULT REVIEW (OUTPATIENT)
Age: 89
End: 2025-08-21

## 2025-08-21 ENCOUNTER — APPOINTMENT (OUTPATIENT)
Dept: HEMATOLOGY ONCOLOGY | Facility: CLINIC | Age: 89
End: 2025-08-21
Payer: MEDICARE

## 2025-08-21 VITALS
WEIGHT: 133.4 LBS | HEART RATE: 82 BPM | SYSTOLIC BLOOD PRESSURE: 131 MMHG | RESPIRATION RATE: 16 BRPM | OXYGEN SATURATION: 92 % | TEMPERATURE: 97 F | DIASTOLIC BLOOD PRESSURE: 78 MMHG | BODY MASS INDEX: 22.9 KG/M2

## 2025-08-21 DIAGNOSIS — C77.9 SECONDARY AND UNSPECIFIED MALIGNANT NEOPLASM OF LYMPH NODE, UNSPECIFIED: ICD-10-CM

## 2025-08-21 LAB
ALBUMIN SERPL ELPH-MCNC: 4.5 G/DL
ALP BLD-CCNC: 60 U/L
ALT SERPL-CCNC: 30 U/L
ANION GAP SERPL CALC-SCNC: 13 MMOL/L
AST SERPL-CCNC: 23 U/L
BILIRUB SERPL-MCNC: 0.7 MG/DL
BUN SERPL-MCNC: 28 MG/DL
CALCIUM SERPL-MCNC: 9.8 MG/DL
CHLORIDE SERPL-SCNC: 104 MMOL/L
CO2 SERPL-SCNC: 25 MMOL/L
CREAT SERPL-MCNC: 0.78 MG/DL
EGFRCR SERPLBLD CKD-EPI 2021: 73 ML/MIN/1.73M2
GLUCOSE SERPL-MCNC: 103 MG/DL
LDH SERPL-CCNC: 253 U/L
POTASSIUM SERPL-SCNC: 4.9 MMOL/L
PROT SERPL-MCNC: 6.6 G/DL
SODIUM SERPL-SCNC: 142 MMOL/L

## 2025-08-21 PROCEDURE — 99214 OFFICE O/P EST MOD 30 MIN: CPT

## 2025-08-23 ENCOUNTER — INPATIENT (INPATIENT)
Facility: HOSPITAL | Age: 89
LOS: 3 days | Discharge: HOME HEALTH | DRG: 871 | End: 2025-08-27
Attending: INTERNAL MEDICINE | Admitting: STUDENT IN AN ORGANIZED HEALTH CARE EDUCATION/TRAINING PROGRAM
Payer: MEDICARE

## 2025-08-23 VITALS
SYSTOLIC BLOOD PRESSURE: 138 MMHG | WEIGHT: 138.01 LBS | TEMPERATURE: 100 F | HEART RATE: 84 BPM | DIASTOLIC BLOOD PRESSURE: 75 MMHG | OXYGEN SATURATION: 98 % | RESPIRATION RATE: 19 BRPM

## 2025-08-23 DIAGNOSIS — Z90.89 ACQUIRED ABSENCE OF OTHER ORGANS: Chronic | ICD-10-CM

## 2025-08-23 DIAGNOSIS — Z98.890 OTHER SPECIFIED POSTPROCEDURAL STATES: Chronic | ICD-10-CM

## 2025-08-23 DIAGNOSIS — Z98.49 CATARACT EXTRACTION STATUS, UNSPECIFIED EYE: Chronic | ICD-10-CM

## 2025-08-23 PROCEDURE — 99285 EMERGENCY DEPT VISIT HI MDM: CPT

## 2025-08-24 DIAGNOSIS — R11.2 NAUSEA WITH VOMITING, UNSPECIFIED: ICD-10-CM

## 2025-08-24 LAB
ALBUMIN SERPL ELPH-MCNC: 3.3 G/DL — SIGNIFICANT CHANGE UP (ref 3.3–5)
ALP SERPL-CCNC: 57 U/L — SIGNIFICANT CHANGE UP (ref 40–120)
ALT FLD-CCNC: 47 U/L — SIGNIFICANT CHANGE UP (ref 12–78)
ANION GAP SERPL CALC-SCNC: 6 MMOL/L — SIGNIFICANT CHANGE UP (ref 5–17)
APPEARANCE UR: CLEAR — SIGNIFICANT CHANGE UP
APTT BLD: 31.4 SEC — SIGNIFICANT CHANGE UP (ref 26.1–36.8)
AST SERPL-CCNC: 28 U/L — SIGNIFICANT CHANGE UP (ref 15–37)
BACTERIA # UR AUTO: ABNORMAL /HPF
BASOPHILS # BLD AUTO: 0.03 K/UL — SIGNIFICANT CHANGE UP (ref 0–0.2)
BASOPHILS NFR BLD AUTO: 0.5 % — SIGNIFICANT CHANGE UP (ref 0–2)
BILIRUB SERPL-MCNC: 1 MG/DL — SIGNIFICANT CHANGE UP (ref 0.2–1.2)
BILIRUB UR-MCNC: NEGATIVE — SIGNIFICANT CHANGE UP
BLD GP AB SCN SERPL QL: SIGNIFICANT CHANGE UP
BUN SERPL-MCNC: 28 MG/DL — HIGH (ref 7–23)
CALCIUM SERPL-MCNC: 8.6 MG/DL — SIGNIFICANT CHANGE UP (ref 8.5–10.1)
CAST: 0 /LPF — SIGNIFICANT CHANGE UP (ref 0–4)
CHLORIDE SERPL-SCNC: 107 MMOL/L — SIGNIFICANT CHANGE UP (ref 96–108)
CO2 SERPL-SCNC: 23 MMOL/L — SIGNIFICANT CHANGE UP (ref 22–31)
COLOR SPEC: YELLOW — SIGNIFICANT CHANGE UP
CREAT SERPL-MCNC: 0.83 MG/DL — SIGNIFICANT CHANGE UP (ref 0.5–1.3)
DIFF PNL FLD: ABNORMAL
EGFR: 67 ML/MIN/1.73M2 — SIGNIFICANT CHANGE UP
EGFR: 67 ML/MIN/1.73M2 — SIGNIFICANT CHANGE UP
EOSINOPHIL # BLD AUTO: 0 K/UL — SIGNIFICANT CHANGE UP (ref 0–0.5)
EOSINOPHIL NFR BLD AUTO: 0 % — SIGNIFICANT CHANGE UP (ref 0–6)
GLUCOSE SERPL-MCNC: 124 MG/DL — HIGH (ref 70–99)
GLUCOSE UR QL: >=1000 MG/DL
HCT VFR BLD CALC: 41.8 % — SIGNIFICANT CHANGE UP (ref 34.5–45)
HGB BLD-MCNC: 12.9 G/DL — SIGNIFICANT CHANGE UP (ref 11.5–15.5)
IMM GRANULOCYTES # BLD AUTO: 0.02 K/UL — SIGNIFICANT CHANGE UP (ref 0–0.07)
IMM GRANULOCYTES NFR BLD AUTO: 0.4 % — SIGNIFICANT CHANGE UP (ref 0–0.9)
INR BLD: 1.3 RATIO — HIGH (ref 0.85–1.16)
KETONES UR QL: ABNORMAL MG/DL
LACTATE SERPL-SCNC: 1.6 MMOL/L — SIGNIFICANT CHANGE UP (ref 0.7–2)
LEUKOCYTE ESTERASE UR-ACNC: NEGATIVE — SIGNIFICANT CHANGE UP
LIDOCAIN IGE QN: 28 U/L — SIGNIFICANT CHANGE UP (ref 13–75)
LYMPHOCYTES # BLD AUTO: 0.7 K/UL — LOW (ref 1–3.3)
LYMPHOCYTES NFR BLD AUTO: 12.3 % — LOW (ref 13–44)
MCHC RBC-ENTMCNC: 29.4 PG — SIGNIFICANT CHANGE UP (ref 27–34)
MCHC RBC-ENTMCNC: 30.9 G/DL — LOW (ref 32–36)
MCV RBC AUTO: 95.2 FL — SIGNIFICANT CHANGE UP (ref 80–100)
MONOCYTES # BLD AUTO: 0.4 K/UL — SIGNIFICANT CHANGE UP (ref 0–0.9)
MONOCYTES NFR BLD AUTO: 7 % — SIGNIFICANT CHANGE UP (ref 2–14)
NEUTROPHILS # BLD AUTO: 4.54 K/UL — SIGNIFICANT CHANGE UP (ref 1.8–7.4)
NEUTROPHILS NFR BLD AUTO: 79.8 % — HIGH (ref 43–77)
NITRITE UR-MCNC: POSITIVE
NRBC # BLD AUTO: 0 K/UL — SIGNIFICANT CHANGE UP (ref 0–0)
NRBC # FLD: 0 K/UL — SIGNIFICANT CHANGE UP (ref 0–0)
NRBC BLD AUTO-RTO: 0 /100 WBCS — SIGNIFICANT CHANGE UP (ref 0–0)
PH UR: 5.5 — SIGNIFICANT CHANGE UP (ref 5–8)
PLATELET # BLD AUTO: 110 K/UL — LOW (ref 150–400)
PMV BLD: 10.6 FL — SIGNIFICANT CHANGE UP (ref 7–13)
POTASSIUM SERPL-MCNC: 4 MMOL/L — SIGNIFICANT CHANGE UP (ref 3.5–5.3)
POTASSIUM SERPL-SCNC: 4 MMOL/L — SIGNIFICANT CHANGE UP (ref 3.5–5.3)
PROT SERPL-MCNC: 6 GM/DL — SIGNIFICANT CHANGE UP (ref 6–8.3)
PROT UR-MCNC: NEGATIVE MG/DL — SIGNIFICANT CHANGE UP
PROTHROM AB SERPL-ACNC: 15 SEC — HIGH (ref 9.9–13.4)
RBC # BLD: 4.39 M/UL — SIGNIFICANT CHANGE UP (ref 3.8–5.2)
RBC # FLD: 15.3 % — HIGH (ref 10.3–14.5)
RBC CASTS # UR COMP ASSIST: 0 /HPF — SIGNIFICANT CHANGE UP (ref 0–4)
SODIUM SERPL-SCNC: 136 MMOL/L — SIGNIFICANT CHANGE UP (ref 135–145)
SP GR SPEC: >1.03 — HIGH (ref 1–1.03)
SQUAMOUS # UR AUTO: 0 /HPF — SIGNIFICANT CHANGE UP (ref 0–5)
UROBILINOGEN FLD QL: 0.2 MG/DL — SIGNIFICANT CHANGE UP (ref 0.2–1)
WBC # BLD: 5.69 K/UL — SIGNIFICANT CHANGE UP (ref 3.8–10.5)
WBC # FLD AUTO: 5.69 K/UL — SIGNIFICANT CHANGE UP (ref 3.8–10.5)
WBC UR QL: 1 /HPF — SIGNIFICANT CHANGE UP (ref 0–5)

## 2025-08-24 PROCEDURE — 80053 COMPREHEN METABOLIC PANEL: CPT

## 2025-08-24 PROCEDURE — 93005 ELECTROCARDIOGRAM TRACING: CPT

## 2025-08-24 PROCEDURE — 99223 1ST HOSP IP/OBS HIGH 75: CPT

## 2025-08-24 PROCEDURE — 97530 THERAPEUTIC ACTIVITIES: CPT | Mod: GP

## 2025-08-24 PROCEDURE — 85610 PROTHROMBIN TIME: CPT

## 2025-08-24 PROCEDURE — 80048 BASIC METABOLIC PNL TOTAL CA: CPT

## 2025-08-24 PROCEDURE — 97116 GAIT TRAINING THERAPY: CPT | Mod: GP

## 2025-08-24 PROCEDURE — 74177 CT ABD & PELVIS W/CONTRAST: CPT | Mod: 26

## 2025-08-24 PROCEDURE — 70450 CT HEAD/BRAIN W/O DYE: CPT | Mod: 26

## 2025-08-24 PROCEDURE — 80076 HEPATIC FUNCTION PANEL: CPT

## 2025-08-24 PROCEDURE — 85027 COMPLETE CBC AUTOMATED: CPT

## 2025-08-24 PROCEDURE — 87040 BLOOD CULTURE FOR BACTERIA: CPT

## 2025-08-24 PROCEDURE — 36415 COLL VENOUS BLD VENIPUNCTURE: CPT

## 2025-08-24 PROCEDURE — 93010 ELECTROCARDIOGRAM REPORT: CPT

## 2025-08-24 PROCEDURE — 87637 SARSCOV2&INF A&B&RSV AMP PRB: CPT

## 2025-08-24 PROCEDURE — 82565 ASSAY OF CREATININE: CPT

## 2025-08-24 PROCEDURE — 83605 ASSAY OF LACTIC ACID: CPT

## 2025-08-24 PROCEDURE — 93926 LOWER EXTREMITY STUDY: CPT | Mod: 26,RT

## 2025-08-24 PROCEDURE — 71045 X-RAY EXAM CHEST 1 VIEW: CPT

## 2025-08-24 PROCEDURE — 97163 PT EVAL HIGH COMPLEX 45 MIN: CPT | Mod: GP

## 2025-08-24 RX ORDER — METOPROLOL SUCCINATE 50 MG/1
50 TABLET, EXTENDED RELEASE ORAL
Refills: 0 | Status: DISCONTINUED | OUTPATIENT
Start: 2025-08-24 | End: 2025-08-27

## 2025-08-24 RX ORDER — ACETAMINOPHEN 500 MG/5ML
1000 LIQUID (ML) ORAL ONCE
Refills: 0 | Status: COMPLETED | OUTPATIENT
Start: 2025-08-24 | End: 2025-08-24

## 2025-08-24 RX ORDER — LACTOBACILLUS ACIDOPHILUS/PECT 75 MM-100
1 CAPSULE ORAL DAILY
Refills: 0 | Status: DISCONTINUED | OUTPATIENT
Start: 2025-08-24 | End: 2025-08-27

## 2025-08-24 RX ORDER — CEFTRIAXONE 500 MG/1
1000 INJECTION, POWDER, FOR SOLUTION INTRAMUSCULAR; INTRAVENOUS EVERY 24 HOURS
Refills: 0 | Status: COMPLETED | OUTPATIENT
Start: 2025-08-24 | End: 2025-08-26

## 2025-08-24 RX ORDER — ATORVASTATIN CALCIUM 80 MG/1
10 TABLET, FILM COATED ORAL AT BEDTIME
Refills: 0 | Status: DISCONTINUED | OUTPATIENT
Start: 2025-08-24 | End: 2025-08-27

## 2025-08-24 RX ORDER — ACETAMINOPHEN 500 MG/5ML
650 LIQUID (ML) ORAL EVERY 6 HOURS
Refills: 0 | Status: DISCONTINUED | OUTPATIENT
Start: 2025-08-24 | End: 2025-08-27

## 2025-08-24 RX ORDER — CIPROFLOXACIN HCL 250 MG
400 TABLET ORAL ONCE
Refills: 0 | Status: COMPLETED | OUTPATIENT
Start: 2025-08-24 | End: 2025-08-24

## 2025-08-24 RX ORDER — ONDANSETRON HCL/PF 4 MG/2 ML
4 VIAL (ML) INJECTION EVERY 8 HOURS
Refills: 0 | Status: DISCONTINUED | OUTPATIENT
Start: 2025-08-24 | End: 2025-08-27

## 2025-08-24 RX ORDER — FUROSEMIDE 10 MG/ML
20 INJECTION INTRAMUSCULAR; INTRAVENOUS DAILY
Refills: 0 | Status: DISCONTINUED | OUTPATIENT
Start: 2025-08-24 | End: 2025-08-24

## 2025-08-24 RX ORDER — DAPAGLIFLOZIN 5 MG/1
1 TABLET, FILM COATED ORAL
Refills: 0 | DISCHARGE

## 2025-08-24 RX ORDER — APIXABAN 2.5 MG/1
5 TABLET, FILM COATED ORAL EVERY 12 HOURS
Refills: 0 | Status: DISCONTINUED | OUTPATIENT
Start: 2025-08-24 | End: 2025-08-27

## 2025-08-24 RX ORDER — MAGNESIUM, ALUMINUM HYDROXIDE 200-200 MG
30 TABLET,CHEWABLE ORAL EVERY 4 HOURS
Refills: 0 | Status: DISCONTINUED | OUTPATIENT
Start: 2025-08-24 | End: 2025-08-27

## 2025-08-24 RX ORDER — PREDNISONE 20 MG/1
2 TABLET ORAL
Refills: 0 | Status: DISCONTINUED | OUTPATIENT
Start: 2025-08-24 | End: 2025-08-25

## 2025-08-24 RX ORDER — MELATONIN 5 MG
3 TABLET ORAL AT BEDTIME
Refills: 0 | Status: DISCONTINUED | OUTPATIENT
Start: 2025-08-24 | End: 2025-08-27

## 2025-08-24 RX ORDER — B1/B2/B3/B5/B6/B12/VIT C/FOLIC 500-0.5 MG
1 TABLET ORAL DAILY
Refills: 0 | Status: DISCONTINUED | OUTPATIENT
Start: 2025-08-24 | End: 2025-08-27

## 2025-08-24 RX ORDER — ONDANSETRON HCL/PF 4 MG/2 ML
4 VIAL (ML) INJECTION ONCE
Refills: 0 | Status: COMPLETED | OUTPATIENT
Start: 2025-08-24 | End: 2025-08-24

## 2025-08-24 RX ORDER — FUROSEMIDE 10 MG/ML
1 INJECTION INTRAMUSCULAR; INTRAVENOUS
Refills: 0 | DISCHARGE

## 2025-08-24 RX ORDER — PREDNISONE 20 MG/1
5 TABLET ORAL DAILY
Refills: 0 | Status: DISCONTINUED | OUTPATIENT
Start: 2025-08-24 | End: 2025-08-25

## 2025-08-24 RX ORDER — BETHANECHOL CHLORIDE 50 MG
25 TABLET ORAL THREE TIMES A DAY
Refills: 0 | Status: DISCONTINUED | OUTPATIENT
Start: 2025-08-24 | End: 2025-08-27

## 2025-08-24 RX ADMIN — METOPROLOL SUCCINATE 50 MILLIGRAM(S): 50 TABLET, EXTENDED RELEASE ORAL at 22:01

## 2025-08-24 RX ADMIN — CEFTRIAXONE 1000 MILLIGRAM(S): 500 INJECTION, POWDER, FOR SOLUTION INTRAMUSCULAR; INTRAVENOUS at 10:06

## 2025-08-24 RX ADMIN — Medication 100 MILLILITER(S): at 11:03

## 2025-08-24 RX ADMIN — Medication 1000 MILLIGRAM(S): at 09:30

## 2025-08-24 RX ADMIN — Medication 25 MILLIGRAM(S): at 22:01

## 2025-08-24 RX ADMIN — PREDNISONE 2 MILLIGRAM(S): 20 TABLET ORAL at 17:10

## 2025-08-24 RX ADMIN — Medication 4 MILLIGRAM(S): at 03:04

## 2025-08-24 RX ADMIN — ATORVASTATIN CALCIUM 10 MILLIGRAM(S): 80 TABLET, FILM COATED ORAL at 22:01

## 2025-08-24 RX ADMIN — APIXABAN 5 MILLIGRAM(S): 2.5 TABLET, FILM COATED ORAL at 11:03

## 2025-08-24 RX ADMIN — Medication 25 MILLIGRAM(S): at 13:37

## 2025-08-24 RX ADMIN — Medication 1 TABLET(S): at 11:03

## 2025-08-24 RX ADMIN — Medication 400 MILLIGRAM(S): at 09:08

## 2025-08-24 RX ADMIN — APIXABAN 5 MILLIGRAM(S): 2.5 TABLET, FILM COATED ORAL at 22:01

## 2025-08-24 RX ADMIN — PREDNISONE 5 MILLIGRAM(S): 20 TABLET ORAL at 11:43

## 2025-08-24 RX ADMIN — Medication 200 MILLIGRAM(S): at 06:58

## 2025-08-25 LAB
ALBUMIN SERPL ELPH-MCNC: 2.8 G/DL — LOW (ref 3.3–5)
ALBUMIN SERPL ELPH-MCNC: 2.9 G/DL — LOW (ref 3.3–5)
ALP SERPL-CCNC: 50 U/L — SIGNIFICANT CHANGE UP (ref 40–120)
ALP SERPL-CCNC: 54 U/L — SIGNIFICANT CHANGE UP (ref 40–120)
ALT FLD-CCNC: 43 U/L — SIGNIFICANT CHANGE UP (ref 12–78)
ALT FLD-CCNC: 49 U/L — SIGNIFICANT CHANGE UP (ref 12–78)
ANION GAP SERPL CALC-SCNC: 7 MMOL/L — SIGNIFICANT CHANGE UP (ref 5–17)
AST SERPL-CCNC: 31 U/L — SIGNIFICANT CHANGE UP (ref 15–37)
AST SERPL-CCNC: 34 U/L — SIGNIFICANT CHANGE UP (ref 15–37)
BILIRUB DIRECT SERPL-MCNC: 0.4 MG/DL — HIGH (ref 0–0.3)
BILIRUB INDIRECT FLD-MCNC: 0.7 MG/DL — SIGNIFICANT CHANGE UP (ref 0.2–1)
BILIRUB SERPL-MCNC: 1.1 MG/DL — SIGNIFICANT CHANGE UP (ref 0.2–1.2)
BILIRUB SERPL-MCNC: 1.1 MG/DL — SIGNIFICANT CHANGE UP (ref 0.2–1.2)
BUN SERPL-MCNC: 22 MG/DL — SIGNIFICANT CHANGE UP (ref 7–23)
CALCIUM SERPL-MCNC: 8.6 MG/DL — SIGNIFICANT CHANGE UP (ref 8.5–10.1)
CHLORIDE SERPL-SCNC: 106 MMOL/L — SIGNIFICANT CHANGE UP (ref 96–108)
CO2 SERPL-SCNC: 24 MMOL/L — SIGNIFICANT CHANGE UP (ref 22–31)
CREAT SERPL-MCNC: 0.76 MG/DL — SIGNIFICANT CHANGE UP (ref 0.5–1.3)
CREAT SERPL-MCNC: 0.9 MG/DL — SIGNIFICANT CHANGE UP (ref 0.5–1.3)
EGFR: 61 ML/MIN/1.73M2 — SIGNIFICANT CHANGE UP
EGFR: 61 ML/MIN/1.73M2 — SIGNIFICANT CHANGE UP
EGFR: 75 ML/MIN/1.73M2 — SIGNIFICANT CHANGE UP
EGFR: 75 ML/MIN/1.73M2 — SIGNIFICANT CHANGE UP
FLUAV AG NPH QL: SIGNIFICANT CHANGE UP
FLUBV AG NPH QL: SIGNIFICANT CHANGE UP
GLUCOSE SERPL-MCNC: 81 MG/DL — SIGNIFICANT CHANGE UP (ref 70–99)
INR BLD: 2.33 RATIO — HIGH (ref 0.85–1.16)
POTASSIUM SERPL-MCNC: 3.8 MMOL/L — SIGNIFICANT CHANGE UP (ref 3.5–5.3)
POTASSIUM SERPL-SCNC: 3.8 MMOL/L — SIGNIFICANT CHANGE UP (ref 3.5–5.3)
PROT SERPL-MCNC: 5.7 GM/DL — LOW (ref 6–8.3)
PROT SERPL-MCNC: 5.8 GM/DL — LOW (ref 6–8.3)
PROTHROM AB SERPL-ACNC: 26.8 SEC — HIGH (ref 9.9–13.4)
RSV RNA NPH QL NAA+NON-PROBE: SIGNIFICANT CHANGE UP
SARS-COV-2 RNA SPEC QL NAA+PROBE: DETECTED
SODIUM SERPL-SCNC: 137 MMOL/L — SIGNIFICANT CHANGE UP (ref 135–145)
SOURCE RESPIRATORY: SIGNIFICANT CHANGE UP

## 2025-08-25 PROCEDURE — 71045 X-RAY EXAM CHEST 1 VIEW: CPT | Mod: 26

## 2025-08-25 PROCEDURE — 99233 SBSQ HOSP IP/OBS HIGH 50: CPT

## 2025-08-25 RX ORDER — DEXAMETHASONE 0.5 MG/1
6 TABLET ORAL DAILY
Refills: 0 | Status: DISCONTINUED | OUTPATIENT
Start: 2025-08-25 | End: 2025-08-27

## 2025-08-25 RX ORDER — REMDESIVIR 5 MG/ML
INJECTION INTRAVENOUS
Refills: 0 | Status: DISCONTINUED | OUTPATIENT
Start: 2025-08-25 | End: 2025-08-27

## 2025-08-25 RX ORDER — REMDESIVIR 5 MG/ML
200 INJECTION INTRAVENOUS EVERY 24 HOURS
Refills: 0 | Status: COMPLETED | OUTPATIENT
Start: 2025-08-25 | End: 2025-08-25

## 2025-08-25 RX ORDER — REMDESIVIR 5 MG/ML
100 INJECTION INTRAVENOUS EVERY 24 HOURS
Refills: 0 | Status: DISCONTINUED | OUTPATIENT
Start: 2025-08-26 | End: 2025-08-27

## 2025-08-25 RX ADMIN — Medication 25 MILLIGRAM(S): at 21:14

## 2025-08-25 RX ADMIN — Medication 40 MILLIGRAM(S): at 06:31

## 2025-08-25 RX ADMIN — Medication 1 TABLET(S): at 11:20

## 2025-08-25 RX ADMIN — PREDNISONE 5 MILLIGRAM(S): 20 TABLET ORAL at 11:20

## 2025-08-25 RX ADMIN — METOPROLOL SUCCINATE 50 MILLIGRAM(S): 50 TABLET, EXTENDED RELEASE ORAL at 11:19

## 2025-08-25 RX ADMIN — METOPROLOL SUCCINATE 50 MILLIGRAM(S): 50 TABLET, EXTENDED RELEASE ORAL at 21:14

## 2025-08-25 RX ADMIN — Medication 1 TABLET(S): at 11:19

## 2025-08-25 RX ADMIN — DEXAMETHASONE 6 MILLIGRAM(S): 0.5 TABLET ORAL at 17:35

## 2025-08-25 RX ADMIN — REMDESIVIR 200 MILLIGRAM(S): 5 INJECTION INTRAVENOUS at 21:14

## 2025-08-25 RX ADMIN — APIXABAN 5 MILLIGRAM(S): 2.5 TABLET, FILM COATED ORAL at 11:18

## 2025-08-25 RX ADMIN — CEFTRIAXONE 1000 MILLIGRAM(S): 500 INJECTION, POWDER, FOR SOLUTION INTRAMUSCULAR; INTRAVENOUS at 11:19

## 2025-08-25 RX ADMIN — Medication 650 MILLIGRAM(S): at 14:29

## 2025-08-25 RX ADMIN — APIXABAN 5 MILLIGRAM(S): 2.5 TABLET, FILM COATED ORAL at 21:14

## 2025-08-25 RX ADMIN — ATORVASTATIN CALCIUM 10 MILLIGRAM(S): 80 TABLET, FILM COATED ORAL at 21:14

## 2025-08-25 RX ADMIN — Medication 25 MILLIGRAM(S): at 16:16

## 2025-08-25 RX ADMIN — Medication 25 MILLIGRAM(S): at 06:31

## 2025-08-26 LAB
ANION GAP SERPL CALC-SCNC: 5 MMOL/L — SIGNIFICANT CHANGE UP (ref 5–17)
BUN SERPL-MCNC: 28 MG/DL — HIGH (ref 7–23)
CALCIUM SERPL-MCNC: 8.4 MG/DL — LOW (ref 8.5–10.1)
CHLORIDE SERPL-SCNC: 111 MMOL/L — HIGH (ref 96–108)
CO2 SERPL-SCNC: 25 MMOL/L — SIGNIFICANT CHANGE UP (ref 22–31)
CREAT SERPL-MCNC: 0.58 MG/DL — SIGNIFICANT CHANGE UP (ref 0.5–1.3)
EGFR: 86 ML/MIN/1.73M2 — SIGNIFICANT CHANGE UP
EGFR: 86 ML/MIN/1.73M2 — SIGNIFICANT CHANGE UP
GLUCOSE SERPL-MCNC: 121 MG/DL — HIGH (ref 70–99)
HCT VFR BLD CALC: 37.5 % — SIGNIFICANT CHANGE UP (ref 34.5–45)
HGB BLD-MCNC: 12 G/DL — SIGNIFICANT CHANGE UP (ref 11.5–15.5)
IMMATURE PLATELET FRACTION #: 2.5 K/UL — LOW (ref 4.7–11.1)
IMMATURE PLATELET FRACTION %: 3.1 % — SIGNIFICANT CHANGE UP (ref 1.6–4.9)
INR BLD: 1.96 RATIO — HIGH (ref 0.85–1.16)
MCHC RBC-ENTMCNC: 29.3 PG — SIGNIFICANT CHANGE UP (ref 27–34)
MCHC RBC-ENTMCNC: 32 G/DL — SIGNIFICANT CHANGE UP (ref 32–36)
MCV RBC AUTO: 91.7 FL — SIGNIFICANT CHANGE UP (ref 80–100)
NRBC # BLD AUTO: 0 K/UL — SIGNIFICANT CHANGE UP (ref 0–0)
NRBC # FLD: 0 K/UL — SIGNIFICANT CHANGE UP (ref 0–0)
NRBC BLD AUTO-RTO: 0 /100 WBCS — SIGNIFICANT CHANGE UP (ref 0–0)
PLATELET # BLD AUTO: 81 K/UL — LOW (ref 150–400)
PMV BLD: 10.7 FL — SIGNIFICANT CHANGE UP (ref 7–13)
POTASSIUM SERPL-MCNC: 4.1 MMOL/L — SIGNIFICANT CHANGE UP (ref 3.5–5.3)
POTASSIUM SERPL-SCNC: 4.1 MMOL/L — SIGNIFICANT CHANGE UP (ref 3.5–5.3)
PROTHROM AB SERPL-ACNC: 22.6 SEC — HIGH (ref 9.9–13.4)
RBC # BLD: 4.09 M/UL — SIGNIFICANT CHANGE UP (ref 3.8–5.2)
RBC # FLD: 15.3 % — HIGH (ref 10.3–14.5)
SODIUM SERPL-SCNC: 141 MMOL/L — SIGNIFICANT CHANGE UP (ref 135–145)
WBC # BLD: 5.32 K/UL — SIGNIFICANT CHANGE UP (ref 3.8–10.5)
WBC # FLD AUTO: 5.32 K/UL — SIGNIFICANT CHANGE UP (ref 3.8–10.5)

## 2025-08-26 PROCEDURE — 99233 SBSQ HOSP IP/OBS HIGH 50: CPT

## 2025-08-26 RX ADMIN — Medication 40 MILLIGRAM(S): at 05:08

## 2025-08-26 RX ADMIN — DEXAMETHASONE 6 MILLIGRAM(S): 0.5 TABLET ORAL at 11:03

## 2025-08-26 RX ADMIN — METOPROLOL SUCCINATE 50 MILLIGRAM(S): 50 TABLET, EXTENDED RELEASE ORAL at 11:04

## 2025-08-26 RX ADMIN — Medication 25 MILLIGRAM(S): at 15:09

## 2025-08-26 RX ADMIN — APIXABAN 5 MILLIGRAM(S): 2.5 TABLET, FILM COATED ORAL at 11:03

## 2025-08-26 RX ADMIN — CEFTRIAXONE 1000 MILLIGRAM(S): 500 INJECTION, POWDER, FOR SOLUTION INTRAMUSCULAR; INTRAVENOUS at 11:04

## 2025-08-26 RX ADMIN — Medication 1 TABLET(S): at 11:04

## 2025-08-26 RX ADMIN — APIXABAN 5 MILLIGRAM(S): 2.5 TABLET, FILM COATED ORAL at 21:43

## 2025-08-26 RX ADMIN — Medication 25 MILLIGRAM(S): at 05:07

## 2025-08-26 RX ADMIN — METOPROLOL SUCCINATE 50 MILLIGRAM(S): 50 TABLET, EXTENDED RELEASE ORAL at 21:42

## 2025-08-26 RX ADMIN — Medication 25 MILLIGRAM(S): at 21:43

## 2025-08-26 RX ADMIN — Medication 1 APPLICATION(S): at 11:05

## 2025-08-26 RX ADMIN — Medication 1 TABLET(S): at 11:03

## 2025-08-26 RX ADMIN — ATORVASTATIN CALCIUM 10 MILLIGRAM(S): 80 TABLET, FILM COATED ORAL at 21:43

## 2025-08-26 RX ADMIN — REMDESIVIR 200 MILLIGRAM(S): 5 INJECTION INTRAVENOUS at 23:47

## 2025-08-27 ENCOUNTER — TRANSCRIPTION ENCOUNTER (OUTPATIENT)
Age: 89
End: 2025-08-27

## 2025-08-27 VITALS — OXYGEN SATURATION: 99 %

## 2025-08-27 LAB
ALBUMIN SERPL ELPH-MCNC: 2.8 G/DL — LOW (ref 3.3–5)
ALP SERPL-CCNC: 53 U/L — SIGNIFICANT CHANGE UP (ref 40–120)
ALT FLD-CCNC: 40 U/L — SIGNIFICANT CHANGE UP (ref 12–78)
ANION GAP SERPL CALC-SCNC: 5 MMOL/L — SIGNIFICANT CHANGE UP (ref 5–17)
AST SERPL-CCNC: 16 U/L — SIGNIFICANT CHANGE UP (ref 15–37)
BILIRUB DIRECT SERPL-MCNC: 0.2 MG/DL — SIGNIFICANT CHANGE UP (ref 0–0.3)
BILIRUB INDIRECT FLD-MCNC: 0.3 MG/DL — SIGNIFICANT CHANGE UP (ref 0.2–1)
BILIRUB SERPL-MCNC: 0.5 MG/DL — SIGNIFICANT CHANGE UP (ref 0.2–1.2)
BUN SERPL-MCNC: 42 MG/DL — HIGH (ref 7–23)
CALCIUM SERPL-MCNC: 9 MG/DL — SIGNIFICANT CHANGE UP (ref 8.5–10.1)
CHLORIDE SERPL-SCNC: 108 MMOL/L — SIGNIFICANT CHANGE UP (ref 96–108)
CO2 SERPL-SCNC: 26 MMOL/L — SIGNIFICANT CHANGE UP (ref 22–31)
CREAT SERPL-MCNC: 0.82 MG/DL — SIGNIFICANT CHANGE UP (ref 0.5–1.3)
EGFR: 68 ML/MIN/1.73M2 — SIGNIFICANT CHANGE UP
EGFR: 68 ML/MIN/1.73M2 — SIGNIFICANT CHANGE UP
GLUCOSE SERPL-MCNC: 150 MG/DL — HIGH (ref 70–99)
HCT VFR BLD CALC: 39.7 % — SIGNIFICANT CHANGE UP (ref 34.5–45)
HGB BLD-MCNC: 12.4 G/DL — SIGNIFICANT CHANGE UP (ref 11.5–15.5)
IMMATURE PLATELET FRACTION #: 3.1 K/UL — LOW (ref 4.7–11.1)
IMMATURE PLATELET FRACTION %: 3.5 % — SIGNIFICANT CHANGE UP (ref 1.6–4.9)
INR BLD: 1.58 RATIO — HIGH (ref 0.85–1.16)
MCHC RBC-ENTMCNC: 28.8 PG — SIGNIFICANT CHANGE UP (ref 27–34)
MCHC RBC-ENTMCNC: 31.2 G/DL — LOW (ref 32–36)
MCV RBC AUTO: 92.1 FL — SIGNIFICANT CHANGE UP (ref 80–100)
NRBC # BLD AUTO: 0 K/UL — SIGNIFICANT CHANGE UP (ref 0–0)
NRBC # FLD: 0 K/UL — SIGNIFICANT CHANGE UP (ref 0–0)
NRBC BLD AUTO-RTO: 0 /100 WBCS — SIGNIFICANT CHANGE UP (ref 0–0)
PLATELET # BLD AUTO: 89 K/UL — LOW (ref 150–400)
PMV BLD: 11.1 FL — SIGNIFICANT CHANGE UP (ref 7–13)
POTASSIUM SERPL-MCNC: 4.2 MMOL/L — SIGNIFICANT CHANGE UP (ref 3.5–5.3)
POTASSIUM SERPL-SCNC: 4.2 MMOL/L — SIGNIFICANT CHANGE UP (ref 3.5–5.3)
PROT SERPL-MCNC: 6.3 GM/DL — SIGNIFICANT CHANGE UP (ref 6–8.3)
PROTHROM AB SERPL-ACNC: 18.2 SEC — HIGH (ref 9.9–13.4)
RBC # BLD: 4.31 M/UL — SIGNIFICANT CHANGE UP (ref 3.8–5.2)
RBC # FLD: 15 % — HIGH (ref 10.3–14.5)
SODIUM SERPL-SCNC: 139 MMOL/L — SIGNIFICANT CHANGE UP (ref 135–145)
WBC # BLD: 4.78 K/UL — SIGNIFICANT CHANGE UP (ref 3.8–10.5)
WBC # FLD AUTO: 4.78 K/UL — SIGNIFICANT CHANGE UP (ref 3.8–10.5)

## 2025-08-27 PROCEDURE — 99239 HOSP IP/OBS DSCHRG MGMT >30: CPT

## 2025-08-27 RX ADMIN — Medication 1 TABLET(S): at 11:07

## 2025-08-27 RX ADMIN — REMDESIVIR 200 MILLIGRAM(S): 5 INJECTION INTRAVENOUS at 13:37

## 2025-08-27 RX ADMIN — Medication 25 MILLIGRAM(S): at 13:38

## 2025-08-27 RX ADMIN — METOPROLOL SUCCINATE 50 MILLIGRAM(S): 50 TABLET, EXTENDED RELEASE ORAL at 11:07

## 2025-08-27 RX ADMIN — Medication 40 MILLIGRAM(S): at 06:06

## 2025-08-27 RX ADMIN — Medication 25 MILLIGRAM(S): at 06:06

## 2025-08-27 RX ADMIN — APIXABAN 5 MILLIGRAM(S): 2.5 TABLET, FILM COATED ORAL at 11:06

## 2025-08-28 ENCOUNTER — APPOINTMENT (OUTPATIENT)
Dept: ENDOCRINOLOGY | Facility: CLINIC | Age: 89
End: 2025-08-28

## 2025-09-02 ENCOUNTER — APPOINTMENT (OUTPATIENT)
Dept: UROLOGY | Facility: CLINIC | Age: 89
End: 2025-09-02

## 2025-09-04 DIAGNOSIS — D03.61 MELANOMA IN SITU OF RIGHT UPPER LIMB, INCLUDING SHOULDER: ICD-10-CM

## 2025-09-04 DIAGNOSIS — I11.0 HYPERTENSIVE HEART DISEASE WITH HEART FAILURE: ICD-10-CM

## 2025-09-04 DIAGNOSIS — K21.9 GASTRO-ESOPHAGEAL REFLUX DISEASE WITHOUT ESOPHAGITIS: ICD-10-CM

## 2025-09-04 DIAGNOSIS — Z20.9 CONTACT WITH AND (SUSPECTED) EXPOSURE TO UNSPECIFIED COMMUNICABLE DISEASE: ICD-10-CM

## 2025-09-04 DIAGNOSIS — Z86.73 PERSONAL HISTORY OF TRANSIENT ISCHEMIC ATTACK (TIA), AND CEREBRAL INFARCTION WITHOUT RESIDUAL DEFICITS: ICD-10-CM

## 2025-09-04 DIAGNOSIS — E27.40 UNSPECIFIED ADRENOCORTICAL INSUFFICIENCY: ICD-10-CM

## 2025-09-04 DIAGNOSIS — Z79.52 LONG TERM (CURRENT) USE OF SYSTEMIC STEROIDS: ICD-10-CM

## 2025-09-04 DIAGNOSIS — Z80.6 FAMILY HISTORY OF LEUKEMIA: ICD-10-CM

## 2025-09-04 DIAGNOSIS — A41.89 OTHER SPECIFIED SEPSIS: ICD-10-CM

## 2025-09-04 DIAGNOSIS — Z79.01 LONG TERM (CURRENT) USE OF ANTICOAGULANTS: ICD-10-CM

## 2025-09-04 DIAGNOSIS — C43.72 MALIGNANT MELANOMA OF LEFT LOWER LIMB, INCLUDING HIP: ICD-10-CM

## 2025-09-04 DIAGNOSIS — D68.69 OTHER THROMBOPHILIA: ICD-10-CM

## 2025-09-04 DIAGNOSIS — Z98.42 CATARACT EXTRACTION STATUS, LEFT EYE: ICD-10-CM

## 2025-09-04 DIAGNOSIS — E78.5 HYPERLIPIDEMIA, UNSPECIFIED: ICD-10-CM

## 2025-09-04 DIAGNOSIS — Z95.0 PRESENCE OF CARDIAC PACEMAKER: ICD-10-CM

## 2025-09-04 DIAGNOSIS — Z98.41 CATARACT EXTRACTION STATUS, RIGHT EYE: ICD-10-CM

## 2025-09-04 DIAGNOSIS — Z86.79 PERSONAL HISTORY OF OTHER DISEASES OF THE CIRCULATORY SYSTEM: ICD-10-CM

## 2025-09-04 DIAGNOSIS — I48.91 UNSPECIFIED ATRIAL FIBRILLATION: ICD-10-CM

## 2025-09-04 DIAGNOSIS — U07.1 COVID-19: ICD-10-CM

## 2025-09-04 DIAGNOSIS — N39.0 URINARY TRACT INFECTION, SITE NOT SPECIFIED: ICD-10-CM

## 2025-09-04 DIAGNOSIS — J96.01 ACUTE RESPIRATORY FAILURE WITH HYPOXIA: ICD-10-CM

## 2025-09-04 DIAGNOSIS — Z88.0 ALLERGY STATUS TO PENICILLIN: ICD-10-CM

## 2025-09-04 DIAGNOSIS — I89.0 LYMPHEDEMA, NOT ELSEWHERE CLASSIFIED: ICD-10-CM

## 2025-09-04 DIAGNOSIS — G93.41 METABOLIC ENCEPHALOPATHY: ICD-10-CM

## 2025-09-04 DIAGNOSIS — I50.32 CHRONIC DIASTOLIC (CONGESTIVE) HEART FAILURE: ICD-10-CM

## 2025-09-09 ENCOUNTER — APPOINTMENT (OUTPATIENT)
Dept: UROLOGY | Facility: CLINIC | Age: 89
End: 2025-09-09

## 2025-09-09 VITALS
HEIGHT: 64 IN | WEIGHT: 132 LBS | DIASTOLIC BLOOD PRESSURE: 79 MMHG | SYSTOLIC BLOOD PRESSURE: 125 MMHG | HEART RATE: 99 BPM | BODY MASS INDEX: 22.53 KG/M2

## 2025-09-09 LAB
APPEARANCE: CLEAR
BILIRUBIN URINE: NEGATIVE
BLOOD URINE: NEGATIVE
COLOR: YELLOW
GLUCOSE QUALITATIVE U: >=1000
KETONES URINE: NEGATIVE
LEUKOCYTE ESTERASE URINE: NEGATIVE
NITRITE URINE: NEGATIVE
PH URINE: 5.5
PROTEIN URINE: NEGATIVE
SPECIFIC GRAVITY URINE: 1.02
UROBILINOGEN URINE: 0.2 (ref 0.2–?)

## 2025-09-16 ENCOUNTER — APPOINTMENT (OUTPATIENT)
Dept: ENDOCRINOLOGY | Facility: CLINIC | Age: 89
End: 2025-09-16
Payer: MEDICARE

## 2025-09-16 VITALS
BODY MASS INDEX: 22.9 KG/M2 | WEIGHT: 134.13 LBS | HEART RATE: 88 BPM | TEMPERATURE: 98 F | OXYGEN SATURATION: 99 % | DIASTOLIC BLOOD PRESSURE: 80 MMHG | HEIGHT: 64 IN | SYSTOLIC BLOOD PRESSURE: 121 MMHG

## 2025-09-16 DIAGNOSIS — N39.0 URINARY TRACT INFECTION, SITE NOT SPECIFIED: ICD-10-CM

## 2025-09-16 DIAGNOSIS — D03.61 MELANOMA IN SITU OF RIGHT UPPER LIMB, INCLUDING SHOULDER: ICD-10-CM

## 2025-09-16 DIAGNOSIS — E78.5 HYPERLIPIDEMIA, UNSPECIFIED: ICD-10-CM

## 2025-09-16 DIAGNOSIS — I48.91 UNSPECIFIED ATRIAL FIBRILLATION: ICD-10-CM

## 2025-09-16 DIAGNOSIS — E27.40 UNSPECIFIED ADRENOCORTICAL INSUFFICIENCY: ICD-10-CM

## 2025-09-16 PROCEDURE — 99214 OFFICE O/P EST MOD 30 MIN: CPT

## 2025-09-16 PROCEDURE — G2211 COMPLEX E/M VISIT ADD ON: CPT

## (undated) DEVICE — WARMING BLANKET UPPER ADULT

## (undated) DEVICE — ELCTR ROCKER SWITCH PENCIL BLUE 10FT

## (undated) DEVICE — LIGASURE SMALL JAW

## (undated) DEVICE — SPECIMEN CONTAINER 100ML

## (undated) DEVICE — DRSG ADAPTIC 3 X 8"

## (undated) DEVICE — SOL IRR POUR NS 0.9% 500ML

## (undated) DEVICE — DRSG KLING 4"

## (undated) DEVICE — PACK MINOR NO DRAPE

## (undated) DEVICE — SOL IRR POUR H2O 500ML

## (undated) DEVICE — DRSG STERISTRIPS 0.5 X 4"

## (undated) DEVICE — GLV 7 PROTEXIS (WHITE)

## (undated) DEVICE — WARMING BLANKET LOWER ADULT

## (undated) DEVICE — DRAPE 3/4 SHEET 52X76"

## (undated) DEVICE — VENODYNE/SCD SLEEVE CALF MEDIUM

## (undated) DEVICE — SUT CHROMIC 3-0 27" SH

## (undated) DEVICE — ELCTR GROUNDING PAD ADULT COVIDIEN

## (undated) DEVICE — GLV 7.5 PROTEXIS (BLUE)

## (undated) DEVICE — DRSG COBAN 4"

## (undated) DEVICE — DRSG CURITY GAUZE SPONGE 4 X 4" 12-PLY

## (undated) DEVICE — SUT VICRYL 2-0 27" CT-1

## (undated) DEVICE — SUT MONOCRYL 4-0 27" PS-2 UNDYED

## (undated) DEVICE — ELCTR BOVIE TIP BLADE INSULATED 4" EDGE

## (undated) DEVICE — DRSG OPSITE 13.75 X 4"

## (undated) DEVICE — PREP CHLORAPREP HI-LITE ORANGE 26ML

## (undated) DEVICE — DRSG XEROFORM 5 X 9"

## (undated) DEVICE — DRSG COMBINE 5 X 9"

## (undated) DEVICE — STAPLER SKIN VISI-STAT 35 WIDE

## (undated) DEVICE — SUT VICRYL 3-0 27" SH

## (undated) DEVICE — SUT SILK 2-0 18" FS

## (undated) DEVICE — DRAPE SPLIT SHEET 77" X 108"

## (undated) DEVICE — DRSG TELFA 3 X 8

## (undated) DEVICE — DRSG TEGADERM 6 X 8"

## (undated) DEVICE — DRAPE 1/2 SHEET 40X57"

## (undated) DEVICE — DRSG STOCKINETTE IMPERVIOUS XL 12 X 48"

## (undated) DEVICE — SUT MONOCRYL 3-0 27" PS-2 UNDYED

## (undated) DEVICE — SUT POLYSORB 3-0 30" V-20 UNDYED

## (undated) DEVICE — MARKING PEN W RULER

## (undated) DEVICE — BLADE SCALPEL SAFETYLOCK #15

## (undated) DEVICE — POSITIONER FOAM EGG CRATE ULNAR 2PCS (PINK)

## (undated) DEVICE — DRAPE TOWEL BLUE 17" X 24"

## (undated) DEVICE — DRSG STOCKINETTE IMPERVIOUS MED 8 X 38"

## (undated) DEVICE — DRAPE INSTRUMENT POUCH 6.75" X 11"

## (undated) DEVICE — DRSG TEGADERM 1.75X1.75"